# Patient Record
Sex: FEMALE | Race: WHITE | NOT HISPANIC OR LATINO | Employment: FULL TIME | URBAN - NONMETROPOLITAN AREA
[De-identification: names, ages, dates, MRNs, and addresses within clinical notes are randomized per-mention and may not be internally consistent; named-entity substitution may affect disease eponyms.]

---

## 2017-01-17 ENCOUNTER — MYC MEDICAL ADVICE (OUTPATIENT)
Dept: FAMILY MEDICINE | Facility: OTHER | Age: 42
End: 2017-01-17

## 2017-01-30 ENCOUNTER — OFFICE VISIT (OUTPATIENT)
Dept: FAMILY MEDICINE | Facility: OTHER | Age: 42
End: 2017-01-30
Payer: COMMERCIAL

## 2017-01-30 VITALS
WEIGHT: 207.7 LBS | RESPIRATION RATE: 20 BRPM | SYSTOLIC BLOOD PRESSURE: 122 MMHG | TEMPERATURE: 99.6 F | HEART RATE: 80 BPM | HEIGHT: 66 IN | BODY MASS INDEX: 33.38 KG/M2 | DIASTOLIC BLOOD PRESSURE: 80 MMHG

## 2017-01-30 DIAGNOSIS — J32.0 CHRONIC MAXILLARY SINUSITIS: Primary | ICD-10-CM

## 2017-01-30 PROCEDURE — 99213 OFFICE O/P EST LOW 20 MIN: CPT | Performed by: PHYSICIAN ASSISTANT

## 2017-01-30 RX ORDER — AZITHROMYCIN 250 MG/1
TABLET, FILM COATED ORAL
Qty: 6 TABLET | Refills: 1 | Status: SHIPPED | OUTPATIENT
Start: 2017-01-30 | End: 2017-03-14

## 2017-01-30 ASSESSMENT — PAIN SCALES - GENERAL: PAINLEVEL: MILD PAIN (3)

## 2017-01-30 NOTE — NURSING NOTE
"Chief Complaint   Patient presents with     Sinus Problem     2 weeks       Initial /80 mmHg  Pulse 80  Temp(Src) 99.6  F (37.6  C) (Oral)  Resp 20  Ht 5' 5.75\" (1.67 m)  Wt 207 lb 11.2 oz (94.212 kg)  BMI 33.78 kg/m2 Estimated body mass index is 33.78 kg/(m^2) as calculated from the following:    Height as of this encounter: 5' 5.75\" (1.67 m).    Weight as of this encounter: 207 lb 11.2 oz (94.212 kg).  BP completed using cuff size: dilip DE LA GARZA LPN      "

## 2017-01-30 NOTE — PROGRESS NOTES
SUBJECTIVE:                                                    Amelia Coker is a 42 year old female who presents to clinic today for the following health issues:      Acute Illness   Acute illness concerns: sinus problems  Onset: 2 weeks     Fever: no    Chills/Sweats: no    Headache (location?): YES    Sinus Pressure:YES- post-nasal drainage, facial pain, teeth pain and mucopurulent discharge    Conjunctivitis:  no    Ear Pain: no    Rhinorrhea: YES    Congestion: YES    Sore Throat: no     Cough: no    Wheeze: no    Decreased Appetite: no    Nausea: no    Vomiting: no    Diarrhea:  no    Dysuria/Freq.: no    Fatigue/Achiness: no    Sick/Strep Exposure: no     Therapies Tried and outcome:     Problem list and histories reviewed & adjusted, as indicated.  Additional history: as documented    Patient Active Problem List   Diagnosis     Intractable migraine     Bell's palsy     Contact dermatitis and other eczema, due to unspecified cause     Allergic rhinitis due to other allergen     Scalp lesion     Lyme disease     PAC (premature atrial contraction)     Palpitations     CARDIOVASCULAR SCREENING; LDL GOAL LESS THAN 160     Raynaud phenomenon     Cold intolerance of hand     Chronic fatigue     Hair loss     Pain in joint     Abnormal PFT     Shoulder joint instability     Family history of melanoma     MRSA (methicillin resistant staph aureus) culture positive     Dry eyes     Keratitis sicca     Laceration of foot, right     Skin ulcer of neck (H)     Family history of colon cancer     Pain in joint, upper arm     Traumatic amputation of fingertip     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     FLORIDALMA positive     Plantar fascial fibromatosis     Foraminal stenosis of lumbar region     DJD (degenerative joint disease), lumbar     Left shoulder pain     Migraine without aura and without status migrainosus, not intractable     Lumbar radiculopathy     Ds DNA antibody positive      Muscular wasting and disuse atrophy, not elsewhere classified     Other postprocedural status(V45.89)     Facial contusion     Frontal headache     Telangiectasia of face     Lateral epicondylitis     Right shoulder pain     Plantar fasciitis     HTN, goal below 140/90     Tooth pain     MRSA infection     Skin lesion     AD (atopic dermatitis)     Heat sensitivity     Rotator cuff arthropathy, right     Contusion of left foot, initial encounter     Gastroesophageal reflux disease, esophagitis presence not specified     Abnormal mammogram     Ringworm of body     Past Surgical History   Procedure Laterality Date     Hc removal of tonsils,<9 y/o       Tonsils <10y.o.     Hc tooth extraction w/forcep       Colonoscopy  3/11/2013     Procedure: COLONOSCOPY;  colonoscopy;  Surgeon: Lobito Mas MD;  Location: [facility]     Esophagoscopy, gastroscopy, duodenoscopy (egd), combined  11/8/2013     Procedure: COMBINED ESOPHAGOSCOPY, GASTROSCOPY, DUODENOSCOPY (EGD), BIOPSY SINGLE OR MULTIPLE;  Esophagoscopy, Gastroscopy, Duodenoscopy EGD with multiple biopsies;  Surgeon: Teja Koch MD;  Location: [facility]     Repair tendon elbow  7/9/2014     Procedure: REPAIR TENDON ELBOW;  Surgeon: Chris Johnston MD;  Location: [facility]     Decompression lumbar two levels Bilateral 12/8/2016     Procedure: DECOMPRESSION LUMBAR TWO LEVELS;  Surgeon: Bang Burrell MD;  Location: [facility]       Social History   Substance Use Topics     Smoking status: Never Smoker      Smokeless tobacco: Never Used     Alcohol Use: No      Comment: social     Family History   Problem Relation Age of Onset     DIABETES Mother      adult     Cancer - colorectal Mother      Hypertension Mother      Allergies Mother      CANCER Mother      colon     Depression Mother      GASTROINTESTINAL DISEASE Mother      ibs     Genitourinary Problems Mother      kidney cyst     Gynecology Mother      endometriosis     Lipids Mother      Thyroid Disease  "Mother      Arthritis Mother      RA     Allergies Father      Unknown/Adopted Father      HEART DISEASE Father      mi-age mid 40's     Cardiovascular Father      Lipids Father      Respiratory Father      asthma     Depression Sister      Allergies Sister      Gynecology Sister      endometriosis     HEART DISEASE Maternal Grandfather      MI,  - 60's     Lipids Paternal Grandmother      OSTEOPOROSIS Paternal Grandmother      Thyroid Disease Paternal Grandmother      Respiratory Son      asthma     Allergies Son      Arthritis Sister      Allergies Brother      HEART DISEASE Brother      Allergies Daughter            ROS:  Constitutional, HEENT, cardiovascular, pulmonary, gi and gu systems are negative, except as otherwise noted.    OBJECTIVE:                                                    /80 mmHg  Pulse 80  Temp(Src) 99.6  F (37.6  C) (Oral)  Resp 20  Ht 5' 5.75\" (1.67 m)  Wt 207 lb 11.2 oz (94.212 kg)  BMI 33.78 kg/m2  Body mass index is 33.78 kg/(m^2).  GENERAL: healthy, alert and no distress  HENT: normal cephalic/atraumatic, ear canals and TM's normal, nose and mouth without ulcers or lesions, rhinorrhea yellow, oropharynx clear and sinuses: maxillary tenderness on left  NECK: no adenopathy, no asymmetry, masses, or scars and trachea midline and normal to palpation  RESP: lungs clear to auscultation - no rales, rhonchi or wheezes  CV: regular rate and rhythm, normal S1 S2, no S3 or S4, no murmur, click or rub, no peripheral edema and peripheral pulses strong  ABDOMEN: soft, nontender, no hepatosplenomegaly, no masses and bowel sounds normal  MS: no gross musculoskeletal defects noted, no edema  NEURO: Normal strength and tone, mentation intact and speech normal  PSYCH: mentation appears normal, affect normal/bright         ASSESSMENT/PLAN:                                                    Amelia was seen today for sinus problem.    Diagnoses and all orders for this " visit:    Chronic maxillary sinusitis  -     azithromycin (ZITHROMAX) 250 MG tablet; Two tablets first day, then one tablet daily for four days.    JENNIFER Manuel PA-C  [facility]

## 2017-01-30 NOTE — PATIENT INSTRUCTIONS
Sinusitis          What is sinusitis?   Sinusitis is swollen, infected linings of the sinuses. The sinuses are hollow spaces in the bones of your face and skull. They connect with the nose through small openings. Like the nose, their linings make mucus.   How does it occur?   Sinusitis occurs when the sinus linings become infected. The passageways from the sinuses to the nose are very narrow. Swelling and mucus may block the passageways. This leads to pressure changes in the sinuses that can be painful.   A number of things can cause swelling and sinusitis. Most often it's allergens (things that cause allergies, like pollen and mold) and viruses, such as viruses that cause the common cold. Whether the cause is allergies or a virus, the sinus linings can swell. When swelling causes the sinus passageway to swell shut, bacteria, viruses, and even fungus can be trapped in the sinuses and cause a sinus infection.   If your nasal bones have been injured or are deformed, causing partial blockage of the sinus openings, you are more likely to get sinusitis.   What are the symptoms?   Symptoms include:   feeling of fullness or pressure in your head   a headache that is most painful when you first wake up in the morning or when you bend your head down or forward   pain above or below your eyes   aching in the upper jaw and teeth   runny or stuffy nose   cough, especially at night   fluid draining down the back of your throat (postnasal drainage)   sore throat in the morning or evening.   How is it diagnosed?   Your healthcare provider will ask about your symptoms and will examine you. You may have an X-ray to look for swelling, fluid, or small benign growths (polyps) in the sinuses.   How is it treated?   Decongestants may help. They may be nonprescription or prescription. They are available as liquids, pills, and nose sprays.   Your healthcare provider may prescribe an antibiotic. In some cases you may need to  take decongestants and antibiotics for several weeks.   You may need nonprescription medicine for pain, such as acetaminophen or ibuprofen. Check with your healthcare provider before you give any medicine that contains aspirin or salicylates to a child or teen. This includes medicines like baby aspirin, some cold medicines, and Pepto Bismol. Children and teens who take aspirin are at risk for a serious illness called Reye's syndrome. Ibuprofen is an NSAID. Nonsteroidal anti-inflammatory medicines (NSAIDs) may cause stomach bleeding and other problems. These risks increase with age. Read the label and take as directed. Unless recommended by your healthcare provider, do not take NSAIDs for more than 10 days for any reason.   If you have chronic or repeated sinus infections, allergies may be the cause. Your healthcare provider may prescribe antihistamine tablets or prescription nasal sprays (steroids or cromolyn) to treat the allergies.   If you have chronic, severe sinusitis that does not respond to treatment with medicines, surgery may be done. The surgeon can create an extra or enlarged passageway in the wall of the sinus cavity. This allows the sinuses to drain more easily through the nasal passages. This should help them stay free of infection.   How long will the effects last?   Symptoms may get better gradually over 3 to 10 days. Depending on what caused the sinusitis and how severe it is, it may last for days or weeks. The symptoms may come back if you do not finish all of your antibiotic.   How can I take care of myself?   Follow your healthcare provider's instructions.   If you are taking an antibiotic, take all of it as directed by your provider. If you stop taking the medicine when your symptoms are gone but before you have taken all of the medicine, symptoms may come back.   Avoid tobacco smoke.   If you have allergies, take care to avoid the things you are allergic to, such as animal dander.   Add  moisture to the air with a humidifier or a vaporizer, unless you have mold allergy (mold may grow in your vaporizer).   Inhale steam from a basin of hot water or shower to help open your sinuses and relieve pain.   Use saline nasal sprays to help wash out nasal passages and clear some mucus from the airways.   Use decongestants as directed on the label or by your provider.   If you are using a nonprescription nasal-spray decongestant, generally you should not use it for more than 3 days. After 3 days it may cause your symptoms to get worse. Ask your healthcare provider if it is OK for you to use a nasal spray decongestant longer than this.   Get plenty of rest.   Drink more fluids to keep the mucus as thin as possible so your sinuses can drain more easily.   Put warm compresses on painful areas.   Take antibiotics as prescribed. Use all of the medicine, even after you feel better. Some sinus infections require 2 to 4 weeks of antibiotic treatment.   See your healthcare provider if the pain lasts for several days or gets worse.   If the sinus areas above or below your eyes are swollen or bulging, see your healthcare provider right away. This symptom may mean that the infection is spreading. A spreading infection can affect other parts of your body--even the brain--and needs to be treated promptly.   How can I help prevent sinusitis?   Treat your colds and allergies promptly. Use decongestants as soon as you start having symptoms.   Do not smoke and stay away from secondhand smoke.   Drink lots of fluids to keep the mucus thin.   Humidify your home if the air is particularly dry.   If you have sinus infections often, consider having allergy tests.   If sinusitis continues to be a problem despite treatment, you might need an exam by an ear, nose, and throat doctor (called an ENT or otolaryngologist). The specialist will check for polyps or a deformed bone that may be blocking your sinuses.     Published by  [employer].  This content is reviewed periodically and is subject to change as new health information becomes available. The information is intended to inform and educate and is not a replacement for medical evaluation, advice, diagnosis or treatment by a healthcare professional.   Developed by [employer].   ? 2010 [employer] [facility] and/or its affiliates. All Rights Reserved.   Copyright   [employer] 2011  Adult [employer] Advisor

## 2017-01-30 NOTE — MR AVS SNAPSHOT
After Visit Summary   1/30/2017    Amelia Coker    MRN: [id number]           Patient Information     Date Of Birth          1975        Visit Information        Provider Department      1/30/2017 4:20 PM Otoniel Nelson PA-C [facility]        Today's Diagnoses     Chronic maxillary sinusitis    -  1       Care Instructions                 Sinusitis          What is sinusitis?   Sinusitis is swollen, infected linings of the sinuses. The sinuses are hollow spaces in the bones of your face and skull. They connect with the nose through small openings. Like the nose, their linings make mucus.   How does it occur?   Sinusitis occurs when the sinus linings become infected. The passageways from the sinuses to the nose are very narrow. Swelling and mucus may block the passageways. This leads to pressure changes in the sinuses that can be painful.   A number of things can cause swelling and sinusitis. Most often it's allergens (things that cause allergies, like pollen and mold) and viruses, such as viruses that cause the common cold. Whether the cause is allergies or a virus, the sinus linings can swell. When swelling causes the sinus passageway to swell shut, bacteria, viruses, and even fungus can be trapped in the sinuses and cause a sinus infection.   If your nasal bones have been injured or are deformed, causing partial blockage of the sinus openings, you are more likely to get sinusitis.   What are the symptoms?   Symptoms include:   feeling of fullness or pressure in your head   a headache that is most painful when you first wake up in the morning or when you bend your head down or forward   pain above or below your eyes   aching in the upper jaw and teeth   runny or stuffy nose   cough, especially at night   fluid draining down the back of your throat (postnasal drainage)   sore throat in the morning or evening.   How is it diagnosed?   Your healthcare provider will ask about your  symptoms and will examine you. You may have an X-ray to look for swelling, fluid, or small benign growths (polyps) in the sinuses.   How is it treated?   Decongestants may help. They may be nonprescription or prescription. They are available as liquids, pills, and nose sprays.   Your healthcare provider may prescribe an antibiotic. In some cases you may need to take decongestants and antibiotics for several weeks.   You may need nonprescription medicine for pain, such as acetaminophen or ibuprofen. Check with your healthcare provider before you give any medicine that contains aspirin or salicylates to a child or teen. This includes medicines like baby aspirin, some cold medicines, and Pepto Bismol. Children and teens who take aspirin are at risk for a serious illness called Reye's syndrome. Ibuprofen is an NSAID. Nonsteroidal anti-inflammatory medicines (NSAIDs) may cause stomach bleeding and other problems. These risks increase with age. Read the label and take as directed. Unless recommended by your healthcare provider, do not take NSAIDs for more than 10 days for any reason.   If you have chronic or repeated sinus infections, allergies may be the cause. Your healthcare provider may prescribe antihistamine tablets or prescription nasal sprays (steroids or cromolyn) to treat the allergies.   If you have chronic, severe sinusitis that does not respond to treatment with medicines, surgery may be done. The surgeon can create an extra or enlarged passageway in the wall of the sinus cavity. This allows the sinuses to drain more easily through the nasal passages. This should help them stay free of infection.   How long will the effects last?   Symptoms may get better gradually over 3 to 10 days. Depending on what caused the sinusitis and how severe it is, it may last for days or weeks. The symptoms may come back if you do not finish all of your antibiotic.   How can I take care of myself?   Follow your healthcare  provider's instructions.   If you are taking an antibiotic, take all of it as directed by your provider. If you stop taking the medicine when your symptoms are gone but before you have taken all of the medicine, symptoms may come back.   Avoid tobacco smoke.   If you have allergies, take care to avoid the things you are allergic to, such as animal dander.   Add moisture to the air with a humidifier or a vaporizer, unless you have mold allergy (mold may grow in your vaporizer).   Inhale steam from a basin of hot water or shower to help open your sinuses and relieve pain.   Use saline nasal sprays to help wash out nasal passages and clear some mucus from the airways.   Use decongestants as directed on the label or by your provider.   If you are using a nonprescription nasal-spray decongestant, generally you should not use it for more than 3 days. After 3 days it may cause your symptoms to get worse. Ask your healthcare provider if it is OK for you to use a nasal spray decongestant longer than this.   Get plenty of rest.   Drink more fluids to keep the mucus as thin as possible so your sinuses can drain more easily.   Put warm compresses on painful areas.   Take antibiotics as prescribed. Use all of the medicine, even after you feel better. Some sinus infections require 2 to 4 weeks of antibiotic treatment.   See your healthcare provider if the pain lasts for several days or gets worse.   If the sinus areas above or below your eyes are swollen or bulging, see your healthcare provider right away. This symptom may mean that the infection is spreading. A spreading infection can affect other parts of your body--even the brain--and needs to be treated promptly.   How can I help prevent sinusitis?   Treat your colds and allergies promptly. Use decongestants as soon as you start having symptoms.   Do not smoke and stay away from secondhand smoke.   Drink lots of fluids to keep the mucus thin.   Humidify your home if the air  is particularly dry.   If you have sinus infections often, consider having allergy tests.   If sinusitis continues to be a problem despite treatment, you might need an exam by an ear, nose, and throat doctor (called an ENT or otolaryngologist). The specialist will check for polyps or a deformed bone that may be blocking your sinuses.     Published by [employer].  This content is reviewed periodically and is subject to change as new health information becomes available. The information is intended to inform and educate and is not a replacement for medical evaluation, advice, diagnosis or treatment by a healthcare professional.   Developed by [employer].   ? 2010 [employer] and/or its affiliates. All Rights Reserved.   Copyright   Clinical Reference Systems 2011  Adult Health Advisor              Follow-ups after your visit        Who to contact     If you have questions or need follow up information about today's clinic visit or your schedule please contact [facility] directly at [phone].  Normal or non-critical lab and imaging results will be communicated to you by Robodromhart, letter or phone within 4 business days after the clinic has received the results. If you do not hear from us within 7 days, please contact the clinic through [place] or phone. If you have a critical or abnormal lab result, we will notify you by phone as soon as possible.  Submit refill requests through [employer] or call your pharmacy and they will forward the refill request to us. Please allow 3 business days for your refill to be completed.          Additional Information About Your Visit        [facility]odromhar[employer]a Information     [employer] gives you secure access to your electronic health record. If you see a primary care provider, you can also send messages to your care team and make appointments. If you have questions, please call your primary care clinic.  If you do not have a primary care provider, please call [phone] and they  "will assist you.        Care EveryWhere ID     This is your Care EveryWhere ID. This could be used by other organizations to access your [facility] medical records  [id number]        Your Vitals Were     Pulse Temperature Respirations Height BMI (Body Mass Index)       80 99.6  F (37.6  C) (Oral) 20 5' 5.75\" (1.67 m) 33.78 kg/m2        Blood Pressure from Last 3 Encounters:   01/30/17 122/80   12/09/16 112/71   12/01/16 124/60    Weight from Last 3 Encounters:   01/30/17 207 lb 11.2 oz (94.212 kg)   12/08/16 204 lb (92.534 kg)   12/01/16 204 lb 4.8 oz (92.67 kg)              Today, you had the following     No orders found for display         Today's Medication Changes          These changes are accurate as of: 1/30/17  4:29 PM.  If you have any questions, ask your nurse or doctor.               Start taking these medicines.        Dose/Directions    azithromycin 250 MG tablet   Commonly known as:  ZITHROMAX   Used for:  Chronic maxillary sinusitis   Started by:  Otoniel Nelson PA-C        Two tablets first day, then one tablet daily for four days.   Quantity:  6 tablet   Refills:  1            Where to get your medicines      These medications were sent to [facility] [place] [address]   [address] [facility] [address]     Phone:  [phone]    - azithromycin 250 MG tablet             Primary Care Provider Office Phone # Fax #    Otoniel Nelson PA-C [phone]       [facility] [address] [facility] [address]        Thank you!     Thank you for choosing [facility]  for your care. Our goal is always to provide you with excellent care. Hearing back from our patients is one way we can continue to improve our services. Please take a few minutes to complete the written survey that you may receive in the mail after your visit with us. Thank you!             Your Updated Medication List - Protect others around you: Learn how to safely use, store and throw " away your medicines at www.disposemymeds.org.          This list is accurate as of: 1/30/17  4:29 PM.  Always use your most recent med list.                   Brand Name Dispense Instructions for use    ALLEGRA 180 MG tablet   Generic drug:  fexofenadine      Take 1 tablet by mouth daily As needed       azithromycin 250 MG tablet    ZITHROMAX    6 tablet    Two tablets first day, then one tablet daily for four days.       FRESHKOTE 2.7-2 % Soln   Generic drug:  Polyvinyl Alcohol-Povidone      Apply  to eye.       hydrocortisone 0.2 % cream    WESTCORT    60 g    Apply sparingly to affected area three times daily as needed.       ibuprofen 800 MG tablet    ADVIL/MOTRIN    100 tablet    TAKE ONE TABLET BY MOUTH EVERY 8 HOURS AS NEEDED FOR PAIN       ketoconazole 2 % cream    NIZORAL    30 g    Apply topically 2 times daily       meloxicam 15 MG tablet    MOBIC    90 tablet    TAKE ONE TABLET BY MOUTH EVERY DAY       mupirocin 2 % ointment    BACTROBAN    22 g    Apply topically 3 times daily       nystatin-triamcinolone cream    MYCOLOG II    15 g    Apply topically 2 times daily       order for DME     1 Device    Dorsal (anterior) night splint, Size L/XL, with [facility] agreement signed by patient.       rizatriptan 5 MG ODT tab    MAXALT-MLT    18 tablet    Take 1-2 tablets (5-10 mg) by mouth at onset of headache for migraine May repeat dose in 2 hours.  Do not exceed 30 mg in 24 hours       sucralfate 1 GM tablet    CARAFATE    120 tablet    TAKE ONE TABLET BY MOUTH FOUR TIMES A DAY       SUMAtriptan 100 MG tablet    IMITREX    27 tablet    Take 1 tablet by mouth See Admin Instructions. WITH ONSET OF MIGRAINE, MAY REPEAT ONCE AFTER 2 HOURS. DO NOT EXCEED 2 TABLETS IN 24 HOURS.       SYSTANE OP      Apply  to eye. In between the fresh kote.       tiZANidine 4 MG tablet    ZANAFLEX    90 tablet    Take 1-2 tablets (4-8 mg) by mouth 3 times daily as needed for muscle spasms       TYLENOL PO      Take 500 mg by mouth  every 6 hours as needed

## 2017-02-16 ENCOUNTER — HOSPITAL ENCOUNTER (OUTPATIENT)
Dept: PHYSICAL THERAPY | Facility: CLINIC | Age: 42
Setting detail: THERAPIES SERIES
End: 2017-02-16
Attending: PHYSICIAN ASSISTANT
Payer: COMMERCIAL

## 2017-02-16 PROCEDURE — 97162 PT EVAL MOD COMPLEX 30 MIN: CPT | Mod: GP | Performed by: PHYSICAL THERAPIST

## 2017-02-16 PROCEDURE — 97110 THERAPEUTIC EXERCISES: CPT | Mod: GP | Performed by: PHYSICAL THERAPIST

## 2017-02-16 PROCEDURE — 40000718 ZZHC STATISTIC PT DEPARTMENT ORTHO VISIT: Performed by: PHYSICAL THERAPIST

## 2017-02-16 PROCEDURE — 97140 MANUAL THERAPY 1/> REGIONS: CPT | Mod: GP | Performed by: PHYSICAL THERAPIST

## 2017-02-16 NOTE — PROGRESS NOTES
" 02/16/17 0800   General Information   Type of Visit Initial OP Ortho PT Evaluation   Start of Care Date 02/16/17   Referring Physician Tucker RUDOLPH, Dr Burrell Surgeon   Patient/Family Goals Statement Sleep better without waking, better pain control   Orders Per Therapist Evaluation   Orders Comment Stretching, strengthening   Date of Order 01/19/17   Insurance Type [employer]   Insurance Comments/Visits Authorized 20   Medical Diagnosis LBP, s/p decompression 12/8/2016   Surgical/Medical history reviewed Yes   Precautions/Limitations no known precautions/limitations   Weight-Bearing Status - LUE weight-bearing as tolerated   Weight-Bearing Status - RUE weight-bearing as tolerated   Weight-Bearing Status - LLE weight-bearing as tolerated   Weight-Bearing Status - RLE weight-bearing as tolerated   Body Part(s)   Body Part(s) Lumbar Spine/SI   Presentation and Etiology   Pertinent history of current problem (include personal factors and/or comorbidities that impact the POC) Lumbar decompression 12/8/2016 2 level decompression with Dr Burrell. States she has low back symptoms starting 2011, always had issues; riding a horse and jerked into hyperextension which created more symptoms, and also more aggravated after doing home repair projects around then and stayed flared up since then. Since surgery; states she things were initially better with less of the spasms in the legs. In the past week, she has had return of \"curt horses\" in legs, especially with ankle DF. They are increase in frequency again than she had had since surgery. Laying on her back she couldn't move her legs much at all.  Relevant past medical history includes: Neck, headaches, TMJ, R shoulder dysfunction, history of falls (ice related). Relevant surgical history includes: This lumbar decompression and cyst removal, L foot Plantar fascia tendon release, Bone spur removal 2015, 2014 elbow repair, Tonsillectomy, 2 vaginal births. Aggravating " factors include: Turning to rotate in the car/backing up/turning,  laying down (stomach worst, sides, back are also hard), supported sitting with lumbar extension, standing with PPT,  sitting on the couch . Alleviating/Easing factors include: Keeping hip/pelvis straight/not rotating, Stretching, semi supported bent over on counter while watching TV, abducted/ER and flexing leg, shifting off to stand on R leg. Patient is self reporting their current level of function to be 70% compared to their baseline, states she just pushes through the pain.  Prior interventions include  injections pre operatively, pre op PT, and surgery. Hoping to avoid lumbar fusion. Can fall asleep but waking 4-6 times per night with with need for position shift and hard to get back comfortable.  Patient takes Myloxican and Tylenol medication for this on a daily basis.  Did do pre op PT which did not seem to help, including stretching, neural flossing, strengthening. 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  Able to alter positioning as she needs to, tending to change position. Sitting tolerance increasing 1 hour than has to get up and move, standing increased symptoms at 5 minutes but can continue to stand longer, Walking tolerance is about 1 mile, and hasn't pushed past it. Car rides are tough.  Goal for PT: Be able to sleep through night again   Symptom Location B low back L>R, L leg   How/Where did it occur With repetition/overuse;From insidious onset;From Degenerative Joint Disease   Pain rating comment Pain is 2-3/10 at best, 6/10  Recently at worst (9/10 post surgery), and 4/10 currently L>R low back and shoots up in pelvis crests size of sheet of paper, L>R leg to middle of arch posterior in leg, constant; variable intensity   Frequency of pain/symptoms A. Constant   Pain/symptoms are: (Worse with rest/lack of movement)   Pain/symptoms exacerbated by A. Sitting;B. Walking;C. Lifting;D.  Carrying;E. Rest;G. Certain positions;H. Overhead reach;I. Bending;J. ADL;K. Home tasks;L. Work tasks   Pain/symptoms eased by E. Changing positions;F. Certain positions   Pain eased by comment Hyperflexion of spine, L leg, Abd/ER FLexion of hip (tailors sit)   Progression of symptoms since onset: Other   Pain progression comment Was better from surgery but now some return of leg symptoms   Current / Previous Interventions   Diagnostic Tests: MRI   MRI Results Results   MRI results EMR reviewed MRI from 2015, unable to see most current one   Prior Level of Function   Functional Level Prior Comment Works through the pain   Current Level of Function   Patient role/employment history A. 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 Yes   Per patient - Fall with injury in past year? Yes   Timed Up and Go score (seconds) 11   Is patient a fall risk? No   Fall screen comments Fell  2 times on ice outside   System Outcome Measures   Outcome Measures Low Back Pain (see Oswestry and Jonah)   Lumbar Spine/SI Objective Findings   Pelvic Screen SI Squish stiff/hypomobile on L with ilium compression into sacrum on L   Hip Screen Hip ROM WNL. Demonstrates suggestion of overall hypermobility> Painful pubis with flexion/adduction of hip and lack of SI gapping with this on L   Lumbar/Hip/Knee/Foot Strength Comments B heel raise able, L toe raise hard with weight shift   Hamstring Flexibility WNL    Sensation Testing Intact, fluctuating parathesias, buzzing, nerve symptoms in L LE varying from L low back to L plantar foot   Observation Prefers to sit in extreme lumbar lordosis and more WB on R with IT lower than L and also R TATA lower.    Integumentary Scar from lumbar surgery in December   Posture Standing, prefers  to PF foot to make leg longer, shift weight to R side   Gait/Locomotion Reduces pelvic mobility in all planes with slight compensated lateral trunk sway/lean during stance L    Balance/Proprioception (Single Leg Stance) Able with more difficulty L   Lumbar ROM Comment Lumbar Range of motion: Standing Forward flexion with fingertips reaching palms flat on the floor and states this relieves her back pain and R leg pain. Early and increased rise of L sacrum/SL and return to stand she demonstrates hinging of lumbar spine prior to pelvis posterior rotation indicating reversal of expected lumbopelvis rhythm, Extension mildly limited in lower lumbar spine with compensated upper lumbar and thoracic hinging  So overall 25% limited with poor sacral mobility noted. R sidebend Full with poor sacral mobility.  L sidebend is 25% limited and tight down her whole L leg. Supine lying pain to foot, Hooklying recedes to knee, and abd/ER/flexion L hip relieves pain up to L hip.  HS length L with R knee extended is nearing excess but again relieves some of her symptoms. In leg.Abduction, flexion and ER of L hip over hooklying R knee relieves the foot pain up to the L hip and also relaxes the back. She has observed early and excess mobility of L innominate again on a relatively fixed sacrum. She does have the innominate positioning in outflare positioning L versus R. There is difficulty with L hip/pelvic complex movement observed in weightbearing suspect due to sacral mechanics.    Hip Flexion (L2) Strength 5   Hip Abduction Strength 4   Hip Adduction Strength 4 with pressure L pubis   Hip Extension Strength 4+   Knee Flexion Strength 5   Knee Extension (L3) Strength 5   Ankle Dorsiflexion (L4) Strength 5   Piriformis Flexibility WNL   Lumbar/SI Special Tests Comments She has good global mobility but some local hypomobility of sacrum altering lumbopelvic mechanics and lead to hypermobility obtained else where. She also uses stretching to  gain ROM via HS stretch, Weight FF stretch to floor to reduce symptoms but suspect she is stretching the neural container and ligamentous structure some . Poor ribcage expansion, lower thoraic mobility and diaphragm tight per palpation with reduced thoracic/ribcage on pelvis rotation leading again likely to increased lumbar rotation demands with things like backing her car up   Transversus Abdominus Strength (Florian Leg Lowering-deg) Core strength is fair with iso flexor hold, > 30 seconds, isolated TA test is fair with recruitment. Bent knee fall out is with biomechanical influences and may not be indicative of strength loss but of movement mechanics   Clinical Impression   Criteria for Skilled Therapeutic Interventions Met yes, treatment indicated   PT Diagnosis Reduced sacral mobility,SI and pelvic dysfunction with underlying suspect hypermobility and compensated movement patterns,    Influenced by the following impairments clinical exam, palpation, ROM,    Functional limitations due to impairments Sustained positions, push, pull , sleep disturbances, activity tolerance   Clinical Presentation Evolving/Changing   Clinical Presentation Rationale Cormorbidities, symptoms reported, exam findings   Clinical Decision Making (Complexity) Moderate complexity   Therapy Frequency 2 times/Week   Predicted Duration of Therapy Intervention (days/wks) 12 weeks   Risk & Benefits of therapy have been explained Yes   Patient, Family & other staff in agreement with plan of care Yes   Clinical Impression Comments Sacral mobilty and neural mobility (normal length but sensitivity noted in dural glide with sacrum position). Overall hypermobility, needing dural/neural work, sacral work and work on core/stabilization exercises next   Education Assessment   Preferred Learning Style Listening;Reading;Demonstration;Pictures/video   Barriers to Learning No barriers   Ortho Goal 1   Goal Identifier 1   Goal Description Patient will report  ability to sleep with  50% reduced waking/restlessness due to back pain in 8 weeks   Target Date 04/13/17   Ortho Goal 2   Goal Identifier 2   Goal Description Patient will report 50% reduced nerve pain in L leg and ability to maintain positions for 1 hour to improve tolerance to work and ADL activities in 8 weeks   Target Date 04/13/17   Ortho Goal 3   Goal Identifier 4   Goal Description Patient will note improved quality of life related to activiites evidenced on [place] <20% and Jonah < 4 in 12 weeks   Target Date 05/11/17   Total Evaluation Time   Total Evaluation Time 40

## 2017-02-20 ENCOUNTER — MYC MEDICAL ADVICE (OUTPATIENT)
Dept: FAMILY MEDICINE | Facility: OTHER | Age: 42
End: 2017-02-20

## 2017-02-23 ENCOUNTER — HOSPITAL ENCOUNTER (OUTPATIENT)
Dept: PHYSICAL THERAPY | Facility: CLINIC | Age: 42
Setting detail: THERAPIES SERIES
End: 2017-02-23
Attending: PHYSICIAN ASSISTANT
Payer: COMMERCIAL

## 2017-02-23 ENCOUNTER — MYC MEDICAL ADVICE (OUTPATIENT)
Dept: FAMILY MEDICINE | Facility: OTHER | Age: 42
End: 2017-02-23

## 2017-02-23 PROCEDURE — 40000718 ZZHC STATISTIC PT DEPARTMENT ORTHO VISIT: Performed by: PHYSICAL THERAPIST

## 2017-02-23 PROCEDURE — 97140 MANUAL THERAPY 1/> REGIONS: CPT | Mod: GP | Performed by: PHYSICAL THERAPIST

## 2017-02-27 ENCOUNTER — HOSPITAL ENCOUNTER (OUTPATIENT)
Dept: PHYSICAL THERAPY | Facility: CLINIC | Age: 42
Setting detail: THERAPIES SERIES
End: 2017-02-27
Attending: PHYSICIAN ASSISTANT
Payer: COMMERCIAL

## 2017-02-27 PROCEDURE — 97140 MANUAL THERAPY 1/> REGIONS: CPT | Mod: GP | Performed by: PHYSICAL THERAPIST

## 2017-02-27 PROCEDURE — 40000718 ZZHC STATISTIC PT DEPARTMENT ORTHO VISIT: Performed by: PHYSICAL THERAPIST

## 2017-03-06 ENCOUNTER — MYC MEDICAL ADVICE (OUTPATIENT)
Dept: FAMILY MEDICINE | Facility: OTHER | Age: 42
End: 2017-03-06

## 2017-03-10 ENCOUNTER — HOSPITAL ENCOUNTER (OUTPATIENT)
Dept: PHYSICAL THERAPY | Facility: CLINIC | Age: 42
Setting detail: THERAPIES SERIES
End: 2017-03-10
Attending: PHYSICIAN ASSISTANT
Payer: COMMERCIAL

## 2017-03-10 PROCEDURE — 40000718 ZZHC STATISTIC PT DEPARTMENT ORTHO VISIT: Performed by: PHYSICAL THERAPIST

## 2017-03-10 PROCEDURE — 97140 MANUAL THERAPY 1/> REGIONS: CPT | Mod: GP | Performed by: PHYSICAL THERAPIST

## 2017-03-13 ENCOUNTER — TELEPHONE (OUTPATIENT)
Dept: FAMILY MEDICINE | Facility: OTHER | Age: 42
End: 2017-03-13

## 2017-03-13 DIAGNOSIS — Z53.9 ERRONEOUS ENCOUNTER--DISREGARD: Primary | ICD-10-CM

## 2017-03-13 DIAGNOSIS — R92.8 ABNORMAL MAMMOGRAM: Primary | ICD-10-CM

## 2017-03-14 ENCOUNTER — OFFICE VISIT (OUTPATIENT)
Dept: FAMILY MEDICINE | Facility: OTHER | Age: 42
End: 2017-03-14
Payer: COMMERCIAL

## 2017-03-14 VITALS
WEIGHT: 207.8 LBS | SYSTOLIC BLOOD PRESSURE: 130 MMHG | HEART RATE: 76 BPM | RESPIRATION RATE: 20 BRPM | DIASTOLIC BLOOD PRESSURE: 80 MMHG | TEMPERATURE: 99.1 F | BODY MASS INDEX: 33.8 KG/M2

## 2017-03-14 DIAGNOSIS — K12.0 APHTHAE, ORAL: Primary | ICD-10-CM

## 2017-03-14 PROCEDURE — 99213 OFFICE O/P EST LOW 20 MIN: CPT | Performed by: PHYSICIAN ASSISTANT

## 2017-03-14 ASSESSMENT — PAIN SCALES - GENERAL: PAINLEVEL: MODERATE PAIN (4)

## 2017-03-14 NOTE — NURSING NOTE
"Chief Complaint   Patient presents with     Mass     lump in her mouth, x 1 week       Initial /80 (BP Location: Right arm, Patient Position: Chair, Cuff Size: Adult Large)  Pulse 76  Temp 99.1  F (37.3  C) (Oral)  Resp 20  Wt 207 lb 12.8 oz (94.3 kg)  BMI 33.8 kg/m2 Estimated body mass index is 33.8 kg/(m^2) as calculated from the following:    Height as of 1/30/17: 5' 5.75\" (1.67 m).    Weight as of this encounter: 207 lb 12.8 oz (94.3 kg).  Medication Reconciliation: complete       Shaina DE LA GARZA LPN      "

## 2017-03-14 NOTE — PROGRESS NOTES
SUBJECTIVE:                                                    Amelia Coekr is a 42 year old female who presents to clinic today for the following health issues:      Concern - lump in her mouth     Onset: Small cystic-type bump at the buccal mucosa on the right near the posterior molars.    Description:   As noted    Intensity: mild    Progression of Symptoms:  waxing and waning    Accompanying Signs & Symptoms:  Mild pain and discomfort       Previous history of similar problem:   Denies    Precipitating factors:   Worsened by: Catching it with her teeth    Alleviating factors:  Improved by: Salt on this area is helped a little bit       Therapies Tried and outcome:     Problem list and histories reviewed & adjusted, as indicated.  Additional history: as documented    Patient Active Problem List   Diagnosis     Intractable migraine     Bell's palsy     Contact dermatitis and other eczema, due to unspecified cause     Allergic rhinitis due to other allergen     Scalp lesion     Lyme disease     PAC (premature atrial contraction)     Palpitations     CARDIOVASCULAR SCREENING; LDL GOAL LESS THAN 160     Raynaud phenomenon     Cold intolerance of hand     Chronic fatigue     Hair loss     Pain in joint     Abnormal PFT     Shoulder joint instability     Family history of melanoma     MRSA (methicillin resistant staph aureus) culture positive     Dry eyes     Keratitis sicca     Laceration of foot, right     Skin ulcer of neck (H)     Family history of colon cancer     Pain in joint, upper arm     Traumatic amputation of fingertip     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     FLORIDALMA positive     Plantar fascial fibromatosis     Foraminal stenosis of lumbar region     DJD (degenerative joint disease), lumbar     Left shoulder pain     Migraine without aura and without status migrainosus, not intractable     Lumbar radiculopathy     Ds DNA antibody positive     Muscular wasting and disuse  atrophy, not elsewhere classified     Other postprocedural status(V45.89)     Facial contusion     Frontal headache     Telangiectasia of face     Lateral epicondylitis     Right shoulder pain     Plantar fasciitis     HTN, goal below 140/90     Tooth pain     MRSA infection     Skin lesion     AD (atopic dermatitis)     Heat sensitivity     Rotator cuff arthropathy, right     Contusion of left foot, initial encounter     Gastroesophageal reflux disease, esophagitis presence not specified     Abnormal mammogram     Ringworm of body     Past Surgical History   Procedure Laterality Date     Hc removal of tonsils,<9 y/o       Tonsils <10y.o.     Hc tooth extraction w/forcep       Colonoscopy  3/11/2013     Procedure: COLONOSCOPY;  colonoscopy;  Surgeon: Lobito Mas MD;  Location: [facility]     Esophagoscopy, gastroscopy, duodenoscopy (egd), combined  11/8/2013     Procedure: COMBINED ESOPHAGOSCOPY, GASTROSCOPY, DUODENOSCOPY (EGD), BIOPSY SINGLE OR MULTIPLE;  Esophagoscopy, Gastroscopy, Duodenoscopy EGD with multiple biopsies;  Surgeon: Teja Koch MD;  Location: [facility]     Repair tendon elbow  7/9/2014     Procedure: REPAIR TENDON ELBOW;  Surgeon: Chris Johnston MD;  Location: [facility]     Decompression lumbar two levels Bilateral 12/8/2016     Procedure: DECOMPRESSION LUMBAR TWO LEVELS;  Surgeon: Bang Burrell MD;  Location: [facility]       Social History   Substance Use Topics     Smoking status: Never Smoker     Smokeless tobacco: Never Used     Alcohol use No      Comment: social     Family History   Problem Relation Age of Onset     DIABETES Mother      adult     Cancer - colorectal Mother      Hypertension Mother      Allergies Mother      CANCER Mother      colon     Depression Mother      GASTROINTESTINAL DISEASE Mother      ibs     Genitourinary Problems Mother      kidney cyst     Gynecology Mother      endometriosis     Lipids Mother      Thyroid Disease Mother      Arthritis Mother       RA     HEART DISEASE Maternal Grandfather      MI,  - 60's     Allergies Father      Unknown/Adopted Father      HEART DISEASE Father      mi-age mid 40's     Cardiovascular Father      Lipids Father      Respiratory Father      asthma     CANCER Father      Other Cancer Father      renal cancer     Depression Sister      Allergies Sister      Gynecology Sister      endometriosis     Lipids Paternal Grandmother      OSTEOPOROSIS Paternal Grandmother      Thyroid Disease Paternal Grandmother      Respiratory Son      asthma     Allergies Son      Arthritis Sister      Allergies Brother      HEART DISEASE Brother      Allergies Daughter            Reviewed and updated as needed this visit by clinical staff  Tobacco  Allergies  Med Hx  Surg Hx  Fam Hx  Soc Hx      Reviewed and updated as needed this visit by Provider         ROS:  Constitutional, HEENT, cardiovascular, pulmonary, gi and gu systems are negative, except as otherwise noted.    OBJECTIVE:                                                    /80 (BP Location: Right arm, Patient Position: Chair, Cuff Size: Adult Large)  Pulse 76  Temp 99.1  F (37.3  C) (Oral)  Resp 20  Wt 207 lb 12.8 oz (94.3 kg)  BMI 33.8 kg/m2  Body mass index is 33.8 kg/(m^2).  GENERAL: healthy, alert and no distress  HENT: normal cephalic/atraumatic, ear canals and TM's normal, nose and mouth without ulcers or lesions, oropharynx clear, oral mucous membranes moist and what appears to be a single oral aphthae-type lesion to the right buccal mucosa is noted upon exam.  NECK: no adenopathy, no asymmetry, masses, or scars and trachea midline and normal to palpation  RESP: lungs clear to auscultation - no rales, rhonchi or wheezes  CV: regular rate and rhythm, normal S1 S2, no S3 or S4, no murmur, click or rub, no peripheral edema and peripheral pulses strong  MS: no gross musculoskeletal defects noted, no edema    Diagnostic Test Results:  No results found for this or any  previous visit (from the past 24 hour(s)).     ASSESSMENT/PLAN:                                                    1. Aphthae, oral  As noted above advised warm hot water salt gargles 3-4 times per day and observe carefully. If any further concerns for infection and noted we will consider Zithromax to treat this. Return office visit for follow-up as needed.  Otoniel Manuel PA-C  [facility]

## 2017-03-14 NOTE — MR AVS SNAPSHOT
After Visit Summary   3/14/2017    Amelia Coker    MRN: [id number]           Patient Information     Date Of Birth          1975        Visit Information        Provider Department      3/14/2017 2:40 PM Otoniel Nelson PA-C [facility]        Today's Diagnoses     Aphthae, oral    -  1       Follow-ups after your visit        Your next 10 appointments already scheduled     Mar 15, 2017  7:30 AM CDT   Treatment 45 with Stacie Beltran PT   [facility] ([facility])    [address] [place] [address] Dr Cheng [place] [address] [phone]            Mar 15, 2017 10:00 AM CDT   MA DIAGNOSTIC DIGITAL RIGHT with PHMA1, PH RAD   [facility] ([facility])    [address] [place], [place] [address] [place] [address] [phone]           Do not use any powder, lotion or deodorant under your arms or on your breast. If you do, we will ask you to remove it before your exam.  Wear comfortable, two-piece clothing.  If you have any allergies, tell your care team.  Bring any previous mammograms from other facilities or have them mailed to the breast center.            Mar 17, 2017  7:30 AM CDT   Treatment 45 with Stacie Beltran PT   [facility] ([facility])    [address] [place] [address] Dr Cheng [place] [address]   [phone]            Mar 21, 2017  7:30 AM CDT   Treatment 45 with Stacie Beltran PT   [facility] ([facility])    [address] Cheng [place] [address]   [phone]            Mar 24, 2017  7:30 AM CDT   Treatment 45 with Stacie Beltran PT   [facility] ([facility])    [address] Cheng [place] [address]   [phone]            Mar 30, 2017  8:30 AM CDT   Treatment 45 with Stacie Beltran PT   [facility] ([facility])    [address] Cheng [place] [address]    [phone]            Apr 03, 2017  3:45 PM CDT   Treatment 45 with Stacie Beltran, PT   [facility] ([facility])    [address] Cheng [place] [id number] [phone]            Apr 07, 2017  7:30 AM CDT   Treatment 45 with Stacie Beltran, PT   [facility] ([facility])    [address] Cheng [place] [id number] [phone]              Future tests that were ordered for you today     Open Future Orders        Priority Expected Expires Ordered    MA Diagnostic Digital Right Routine  3/13/2018 3/13/2017            Who to contact     If you have questions or need follow up information about today's clinic visit or your schedule please contact [facility] directly at [phone].  Normal or non-critical lab and imaging results will be communicated to you by [employer], letter or phone within 4 business days after the clinic has received the results. If you do not hear from us within 7 days, please contact the clinic through [employer] or phone. If you have a critical or abnormal lab result, we will notify you by phone as soon as possible.  Submit refill requests through [employer] or call your pharmacy and they will forward the refill request to us. Please allow 3 business days for your refill to be completed.          Additional Information About Your Visit        [employer] Information     [employer] gives you secure access to your electronic health record. If you see a primary care provider, you can also send messages to your care team and make appointments. If you have questions, please call your primary care clinic.  If you do not have a primary care provider, please call [phone] and they will assist you.        Care EveryWhere ID     This is your Care EveryWhere ID. This could be used by other organizations to access your [facility] medical records  [id number]        Your Vitals Were     Pulse Temperature  Respirations BMI (Body Mass Index)          76 99.1  F (37.3  C) (Oral) 20 33.8 kg/m2         Blood Pressure from Last 3 Encounters:   03/14/17 130/80   01/30/17 122/80   12/09/16 112/71    Weight from Last 3 Encounters:   03/14/17 207 lb 12.8 oz (94.3 kg)   01/30/17 207 lb 11.2 oz (94.2 kg)   12/08/16 204 lb (92.5 kg)              Today, you had the following     No orders found for display       Primary Care Provider Office Phone # Fax #    Otoniel Nelson PA-C [phone]       [facility] [address] [facility] [address]        Thank you!     Thank you for choosing [facility]  for your care. Our goal is always to provide you with excellent care. Hearing back from our patients is one way we can continue to improve our services. Please take a few minutes to complete the written survey that you may receive in the mail after your visit with us. Thank you!             Your Updated Medication List - Protect others around you: Learn how to safely use, store and throw away your medicines at www.disposemymeds.org.          This list is accurate as of: 3/14/17  2:59 PM.  Always use your most recent med list.                   Brand Name Dispense Instructions for use    ALLEGRA 180 MG tablet   Generic drug:  fexofenadine      Take 1 tablet by mouth daily Reported on 3/14/2017       FRESHKOTE 2.7-2 % Soln   Generic drug:  Polyvinyl Alcohol-Povidone      Apply  to eye.       hydrocortisone 0.2 % cream    WESTCORT    60 g    Apply sparingly to affected area three times daily as needed.       ibuprofen 800 MG tablet    ADVIL/MOTRIN    100 tablet    TAKE ONE TABLET BY MOUTH EVERY 8 HOURS AS NEEDED FOR PAIN       ketoconazole 2 % cream    NIZORAL    30 g    Apply topically 2 times daily       meloxicam 15 MG tablet    MOBIC    90 tablet    TAKE ONE TABLET BY MOUTH EVERY DAY       mupirocin 2 % ointment    BACTROBAN    22 g    Apply topically 3 times daily       nystatin-triamcinolone cream     MYCOLOG II    15 g    Apply topically 2 times daily       order for DME     1 Device    Reported on 3/14/2017       rizatriptan 5 MG ODT tab    MAXALT-MLT    18 tablet    Take 1-2 tablets (5-10 mg) by mouth at onset of headache for migraine May repeat dose in 2 hours.  Do not exceed 30 mg in 24 hours       sucralfate 1 GM tablet    CARAFATE    120 tablet    TAKE ONE TABLET BY MOUTH FOUR TIMES A DAY       SUMAtriptan 100 MG tablet    IMITREX    27 tablet    Take 1 tablet by mouth See Admin Instructions. WITH ONSET OF MIGRAINE, MAY REPEAT ONCE AFTER 2 HOURS. DO NOT EXCEED 2 TABLETS IN 24 HOURS.       SYSTANE OP      Reported on 3/14/2017       tiZANidine 4 MG tablet    ZANAFLEX    90 tablet    Take 1-2 tablets (4-8 mg) by mouth 3 times daily as needed for muscle spasms       TYLENOL PO      Take 500 mg by mouth every 6 hours as needed

## 2017-03-15 ENCOUNTER — HOSPITAL ENCOUNTER (OUTPATIENT)
Dept: PHYSICAL THERAPY | Facility: CLINIC | Age: 42
Setting detail: THERAPIES SERIES
End: 2017-03-15
Attending: PHYSICIAN ASSISTANT
Payer: COMMERCIAL

## 2017-03-15 ENCOUNTER — TELEPHONE (OUTPATIENT)
Dept: FAMILY MEDICINE | Facility: OTHER | Age: 42
End: 2017-03-15

## 2017-03-15 ENCOUNTER — HOSPITAL ENCOUNTER (OUTPATIENT)
Dept: MAMMOGRAPHY | Facility: CLINIC | Age: 42
Discharge: HOME | End: 2017-03-15
Attending: PHYSICIAN ASSISTANT | Admitting: PHYSICIAN ASSISTANT
Payer: COMMERCIAL

## 2017-03-15 DIAGNOSIS — R92.8 ABNORMAL MAMMOGRAM: Primary | ICD-10-CM

## 2017-03-15 DIAGNOSIS — R92.8 ABNORMAL MAMMOGRAM: ICD-10-CM

## 2017-03-15 PROCEDURE — G0206 DX MAMMO INCL CAD UNI: HCPCS | Mod: RT

## 2017-03-15 PROCEDURE — 40000718 ZZHC STATISTIC PT DEPARTMENT ORTHO VISIT: Performed by: PHYSICAL THERAPIST

## 2017-03-15 PROCEDURE — 97140 MANUAL THERAPY 1/> REGIONS: CPT | Mod: GP | Performed by: PHYSICAL THERAPIST

## 2017-03-15 PROCEDURE — 97110 THERAPEUTIC EXERCISES: CPT | Mod: GP | Performed by: PHYSICAL THERAPIST

## 2017-03-15 NOTE — TELEPHONE ENCOUNTER
Spoke with [facility] regarding MRI, they will call patient to schedule.    Bhavya Lewis XRO/

## 2017-03-17 ENCOUNTER — HOSPITAL ENCOUNTER (OUTPATIENT)
Dept: PHYSICAL THERAPY | Facility: CLINIC | Age: 42
Setting detail: THERAPIES SERIES
End: 2017-03-17
Attending: PHYSICIAN ASSISTANT
Payer: COMMERCIAL

## 2017-03-17 PROCEDURE — 97140 MANUAL THERAPY 1/> REGIONS: CPT | Mod: GP | Performed by: PHYSICAL THERAPIST

## 2017-03-17 PROCEDURE — 40000718 ZZHC STATISTIC PT DEPARTMENT ORTHO VISIT: Performed by: PHYSICAL THERAPIST

## 2017-03-21 ENCOUNTER — HOSPITAL ENCOUNTER (OUTPATIENT)
Dept: PHYSICAL THERAPY | Facility: CLINIC | Age: 42
Setting detail: THERAPIES SERIES
End: 2017-03-21
Attending: PHYSICIAN ASSISTANT
Payer: COMMERCIAL

## 2017-03-21 PROCEDURE — 40000718 ZZHC STATISTIC PT DEPARTMENT ORTHO VISIT: Performed by: PHYSICAL THERAPIST

## 2017-03-21 PROCEDURE — 97140 MANUAL THERAPY 1/> REGIONS: CPT | Mod: GP | Performed by: PHYSICAL THERAPIST

## 2017-03-23 ENCOUNTER — RADIANT APPOINTMENT (OUTPATIENT)
Dept: MRI IMAGING | Facility: CLINIC | Age: 42
End: 2017-03-23
Attending: PHYSICIAN ASSISTANT
Payer: COMMERCIAL

## 2017-03-23 DIAGNOSIS — R92.8 ABNORMAL MAMMOGRAM: ICD-10-CM

## 2017-03-23 PROCEDURE — A9585 GADOBUTROL INJECTION: HCPCS | Mod: JW | Performed by: RADIOLOGY

## 2017-03-23 PROCEDURE — 0159T MR BREAST RIGHT W/O & W CONTRAST: CPT | Performed by: RADIOLOGY

## 2017-03-23 PROCEDURE — 77058 MR BREAST RIGHT W/O & W CONTRAST: CPT | Mod: RT | Performed by: RADIOLOGY

## 2017-03-23 RX ORDER — GADOBUTROL 604.72 MG/ML
10 INJECTION INTRAVENOUS ONCE
Status: COMPLETED | OUTPATIENT
Start: 2017-03-23 | End: 2017-03-23

## 2017-03-23 RX ADMIN — GADOBUTROL 9.5 ML: 604.72 INJECTION INTRAVENOUS at 10:27

## 2017-03-24 ENCOUNTER — HOSPITAL ENCOUNTER (OUTPATIENT)
Dept: PHYSICAL THERAPY | Facility: CLINIC | Age: 42
Setting detail: THERAPIES SERIES
End: 2017-03-24
Attending: PHYSICIAN ASSISTANT
Payer: COMMERCIAL

## 2017-03-24 PROCEDURE — 40000718 ZZHC STATISTIC PT DEPARTMENT ORTHO VISIT: Performed by: PHYSICAL THERAPIST

## 2017-03-24 PROCEDURE — 97140 MANUAL THERAPY 1/> REGIONS: CPT | Mod: GP | Performed by: PHYSICAL THERAPIST

## 2017-03-30 ENCOUNTER — HOSPITAL ENCOUNTER (OUTPATIENT)
Dept: PHYSICAL THERAPY | Facility: CLINIC | Age: 42
Setting detail: THERAPIES SERIES
End: 2017-03-30
Attending: PHYSICIAN ASSISTANT
Payer: COMMERCIAL

## 2017-03-30 PROCEDURE — 97140 MANUAL THERAPY 1/> REGIONS: CPT | Mod: GP | Performed by: PHYSICAL THERAPIST

## 2017-03-30 PROCEDURE — 40000718 ZZHC STATISTIC PT DEPARTMENT ORTHO VISIT: Performed by: PHYSICAL THERAPIST

## 2017-04-03 ENCOUNTER — HOSPITAL ENCOUNTER (OUTPATIENT)
Dept: PHYSICAL THERAPY | Facility: CLINIC | Age: 42
Setting detail: THERAPIES SERIES
End: 2017-04-03
Attending: PHYSICIAN ASSISTANT
Payer: COMMERCIAL

## 2017-04-03 PROCEDURE — 97140 MANUAL THERAPY 1/> REGIONS: CPT | Mod: GP | Performed by: PHYSICAL THERAPIST

## 2017-04-03 PROCEDURE — 40000718 ZZHC STATISTIC PT DEPARTMENT ORTHO VISIT: Performed by: PHYSICAL THERAPIST

## 2017-04-04 ENCOUNTER — MYC MEDICAL ADVICE (OUTPATIENT)
Dept: FAMILY MEDICINE | Facility: OTHER | Age: 42
End: 2017-04-04

## 2017-04-04 NOTE — TELEPHONE ENCOUNTER
Called patient and she has been scheduled to come in to be seen tomorrow, 4/5/17.    Barbara DE LA GARZA

## 2017-04-05 ENCOUNTER — OFFICE VISIT (OUTPATIENT)
Dept: FAMILY MEDICINE | Facility: OTHER | Age: 42
End: 2017-04-05
Payer: COMMERCIAL

## 2017-04-05 VITALS
RESPIRATION RATE: 16 BRPM | DIASTOLIC BLOOD PRESSURE: 80 MMHG | SYSTOLIC BLOOD PRESSURE: 136 MMHG | HEART RATE: 76 BPM | WEIGHT: 209.1 LBS | TEMPERATURE: 98.9 F | BODY MASS INDEX: 34.01 KG/M2
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
## 2017-04-05 DIAGNOSIS — M79.12 STERNOCLEIDOMASTOID MUSCLE TENDERNESS: ICD-10-CM

## 2017-04-05 DIAGNOSIS — R07.89 CHEST WALL PAIN: ICD-10-CM

## 2017-04-05 DIAGNOSIS — S16.1XXA ACUTE CERVICAL MYOFASCIAL STRAIN, INITIAL ENCOUNTER: Primary | ICD-10-CM

## 2017-04-05 PROCEDURE — 99213 OFFICE O/P EST LOW 20 MIN: CPT | Performed by: PHYSICIAN ASSISTANT

## 2017-04-05 RX ORDER — METHYLPREDNISOLONE 4 MG
TABLET, DOSE PACK ORAL
Qty: 21 TABLET | Refills: 0 | Status: SHIPPED | OUTPATIENT
Start: 2017-04-05 | End: 2017-08-30

## 2017-04-05 ASSESSMENT — ANXIETY QUESTIONNAIRES
7. FEELING AFRAID AS IF SOMETHING AWFUL MIGHT HAPPEN: NOT AT ALL
1. FEELING NERVOUS, ANXIOUS, OR ON EDGE: NOT AT ALL
5. BEING SO RESTLESS THAT IT IS HARD TO SIT STILL: NOT AT ALL
2. NOT BEING ABLE TO STOP OR CONTROL WORRYING: NOT AT ALL
3. WORRYING TOO MUCH ABOUT DIFFERENT THINGS: NOT AT ALL
GAD7 TOTAL SCORE: 0
6. BECOMING EASILY ANNOYED OR IRRITABLE: NOT AT ALL

## 2017-04-05 ASSESSMENT — PAIN SCALES - GENERAL: PAINLEVEL: SEVERE PAIN (7)

## 2017-04-05 ASSESSMENT — PATIENT HEALTH QUESTIONNAIRE - PHQ9: 5. POOR APPETITE OR OVEREATING: NOT AT ALL

## 2017-04-05 NOTE — MR AVS SNAPSHOT
After Visit Summary   4/5/2017    Amelia Coker    MRN: [id number]           Patient Information     Date Of Birth          1975        Visit Information        Provider Department      4/5/2017 3:40 PM Otoniel Nelson PA-C [facility]        Today's Diagnoses     Acute cervical myofascial strain, initial encounter    -  1    Sternocleidomastoid muscle tenderness        Fall from horse, initial encounter        Chest wall pain           Follow-ups after your visit        Your next 10 appointments already scheduled     Apr 07, 2017  7:30 AM CDT   Treatment 45 with Stacie Blankenship, PT   [facility] ([facility])    [address] [place] [address] Dr Cheng [place] [address]   [phone]            Apr 11, 2017  7:30 AM CDT   Treatment 45 with Stacie Blankenship, PT   [facility] ([facility])    [address] [place] [address] Dr Cheng [place] [address]   [phone]            Apr 14, 2017  7:30 AM CDT   Treatment 45 with Stacie Blankenship, PT   [facility] ([facility])    [address] [place] [address] Dr Cheng [place] [address]   [phone]            Apr 18, 2017  7:30 AM CDT   Treatment 45 with Stacie Blankenship, PT   [facility] ([facility])    [address] Cheng [place] [address]   [phone]            Apr 20, 2017  7:30 AM CDT   Treatment 45 with Stacie Blankenship PT   [facility] ([facility])    [address] [place] [address] Dr Cheng [place] [address]   [phone]              Who to contact     If you have questions or need follow up information about today's clinic visit or your schedule please contact [facility] directly at [phone].  Normal or non-critical lab and imaging results will be communicated to you by MyChart, letter or phone within 4 business days after the clinic has received the results. If you do not hear from us within 7 days,  please contact the clinic through [employer] or phone. If you have a critical or abnormal lab result, we will notify you by phone as soon as possible.  Submit refill requests through [employer] or call your pharmacy and they will forward the refill request to us. Please allow 3 business days for your refill to be completed.          Additional Information About Your Visit        [employer] Information     [employer] gives you secure access to your electronic health record. If you see a primary care provider, you can also send messages to your care team and make appointments. If you have questions, please call your primary care clinic.  If you do not have a primary care provider, please call [phone] and they will assist you.        Care EveryWhere ID     This is your Care EveryWhere ID. This could be used by other organizations to access your [facility] medical records  [id number]        Your Vitals Were     Pulse Temperature Respirations BMI (Body Mass Index)          76 98.9  F (37.2  C) (Oral) 16 34.01 kg/m2         Blood Pressure from Last 3 Encounters:   04/05/17 136/80   03/14/17 130/80   01/30/17 122/80    Weight from Last 3 Encounters:   04/05/17 209 lb 1.6 oz (94.8 kg)   03/14/17 207 lb 12.8 oz (94.3 kg)   01/30/17 207 lb 11.2 oz (94.2 kg)              Today, you had the following     No orders found for display         Today's Medication Changes          These changes are accurate as of: 4/5/17  4:10 PM.  If you have any questions, ask your nurse or doctor.               Start taking these medicines.        Dose/Directions    methylPREDNISolone 4 MG tablet   Commonly known as:  MEDROL DOSEPAK   Used for:  Fall from horse, initial encounter, Sternocleidomastoid muscle tenderness, Acute cervical myofascial strain, initial encounter   Started by:  Otoniel Nelson PA-C        Follow package instructions   Quantity:  21 tablet   Refills:  0            Where to get your medicines      These medications were sent to  [facility] [place], [place] - [address]  [address] , [facility] [address]     Phone:  [phone]     methylPREDNISolone 4 MG tablet                Primary Care Provider Office Phone # Fax #    Otoniel Nelson PA-C [phone]       [facility] [address]  [facility] [address]        Thank you!     Thank you for choosing [facility]  for your care. Our goal is always to provide you with excellent care. Hearing back from our patients is one way we can continue to improve our services. Please take a few minutes to complete the written survey that you may receive in the mail after your visit with us. Thank you!             Your Updated Medication List - Protect others around you: Learn how to safely use, store and throw away your medicines at www.disposemymeds.org.          This list is accurate as of: 4/5/17  4:10 PM.  Always use your most recent med list.                   Brand Name Dispense Instructions for use    ALLEGRA 180 MG tablet   Generic drug:  fexofenadine      Take 1 tablet by mouth daily Reported on 4/5/2017       FRESHKOTE 2.7-2 % Soln   Generic drug:  Polyvinyl Alcohol-Povidone      Apply  to eye.       hydrocortisone 0.2 % cream    WESTCORT    60 g    Apply sparingly to affected area three times daily as needed.       ibuprofen 800 MG tablet    ADVIL/MOTRIN    100 tablet    TAKE ONE TABLET BY MOUTH EVERY 8 HOURS AS NEEDED FOR PAIN       ketoconazole 2 % cream    NIZORAL    30 g    Apply topically 2 times daily       meloxicam 15 MG tablet    MOBIC    90 tablet    TAKE ONE TABLET BY MOUTH EVERY DAY       methylPREDNISolone 4 MG tablet    MEDROL DOSEPAK    21 tablet    Follow package instructions       mupirocin 2 % ointment    BACTROBAN    22 g    Apply topically 3 times daily       nystatin-triamcinolone cream    MYCOLOG II    15 g    Apply topically 2 times daily       order for DME     1 Device    Reported on 4/5/2017       rizatriptan 5 MG ODT tab     MAXALT-MLT    18 tablet    Take 1-2 tablets (5-10 mg) by mouth at onset of headache for migraine May repeat dose in 2 hours.  Do not exceed 30 mg in 24 hours       sucralfate 1 GM tablet    CARAFATE    120 tablet    TAKE ONE TABLET BY MOUTH FOUR TIMES A DAY       SUMAtriptan 100 MG tablet    IMITREX    27 tablet    Take 1 tablet by mouth See Admin Instructions. WITH ONSET OF MIGRAINE, MAY REPEAT ONCE AFTER 2 HOURS. DO NOT EXCEED 2 TABLETS IN 24 HOURS.       SYSTANE OP      Reported on 3/14/2017       tiZANidine 4 MG tablet    ZANAFLEX    90 tablet    Take 1-2 tablets (4-8 mg) by mouth 3 times daily as needed for muscle spasms       TYLENOL PO      Take 500 mg by mouth every 6 hours as needed

## 2017-04-05 NOTE — PROGRESS NOTES
SUBJECTIVE:                                                    Amelia Coker is a 42 year old female who presents to clinic today for the following health issues:      Concern - fell off her horse      Onset: 4/1/2017    Description:   Patient was riding horse bareback near her home when her horse became skittish and move rapidly from underneath her causing her to fall off landing heavily on the right buttock and to the flat of her back. She has left-sided neck pain right-sided buttock pain right-sided chest wall pain and muscle aches and pain globally. No new numbness and tingling has been noted. Still has had some problems with lumbar radiculopathy down the left leg but this is chronic in nature and not changed from previous visits.    Intensity: moderate    Progression of Symptoms:  waxing and waning    Accompanying Signs & Symptoms:  Pain and discomfort with movement. We joke around about her needing bubble wrap and pain with laughter is noted as well. She is able to take a deep breath if she does so slowly.       Previous history of similar problem:   She has not fallen off a horse in quite some time but she has had elbow pain for quite a while.    Precipitating factors:   Worsened by: Movement    Alleviating factors:  Improved by: Breast pain control muscle relaxants       Therapies Tried and outcome:     Problem list and histories reviewed & adjusted, as indicated.  Additional history: as documented    Patient Active Problem List   Diagnosis     Intractable migraine     Bell's palsy     Contact dermatitis and other eczema, due to unspecified cause     Allergic rhinitis due to other allergen     Scalp lesion     Lyme disease     PAC (premature atrial contraction)     Palpitations     CARDIOVASCULAR SCREENING; LDL GOAL LESS THAN 160     Raynaud phenomenon     Cold intolerance of hand     Chronic fatigue     Hair loss     Pain in joint     Abnormal PFT     Shoulder joint instability     Family history of  melanoma     MRSA (methicillin resistant staph aureus) culture positive     Dry eyes     Keratitis sicca     Laceration of foot, right     Skin ulcer of neck (H)     Family history of colon cancer     Pain in joint, upper arm     Traumatic amputation of fingertip     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     FLORIDALMA positive     Plantar fascial fibromatosis     Foraminal stenosis of lumbar region     DJD (degenerative joint disease), lumbar     Left shoulder pain     Migraine without aura and without status migrainosus, not intractable     Lumbar radiculopathy     Ds DNA antibody positive     Muscular wasting and disuse atrophy, not elsewhere classified     Other postprocedural status(V45.89)     Facial contusion     Frontal headache     Telangiectasia of face     Lateral epicondylitis     Right shoulder pain     Plantar fasciitis     HTN, goal below 140/90     Tooth pain     MRSA infection     Skin lesion     AD (atopic dermatitis)     Heat sensitivity     Rotator cuff arthropathy, right     Contusion of left foot, initial encounter     Gastroesophageal reflux disease, esophagitis presence not specified     Abnormal mammogram     Ringworm of body     Fall from horse, initial encounter     Sternocleidomastoid muscle tenderness     Acute cervical myofascial strain, initial encounter     Past Surgical History:   Procedure Laterality Date     COLONOSCOPY  3/11/2013    Procedure: COLONOSCOPY;  colonoscopy;  Surgeon: Lobito Mas MD;  Location:  GI     DECOMPRESSION LUMBAR TWO LEVELS Bilateral 12/8/2016    Procedure: DECOMPRESSION LUMBAR TWO LEVELS;  Surgeon: Bang Burrell MD;  Location:  OR     ESOPHAGOSCOPY, GASTROSCOPY, DUODENOSCOPY (EGD), COMBINED  11/8/2013    Procedure: COMBINED ESOPHAGOSCOPY, GASTROSCOPY, DUODENOSCOPY (EGD), BIOPSY SINGLE OR MULTIPLE;  Esophagoscopy, Gastroscopy, Duodenoscopy EGD with multiple biopsies;  Surgeon: Teja Koch MD;  Location:   GI     HC REMOVAL OF TONSILS,<11 Y/O      Tonsils <10y.o.     HC TOOTH EXTRACTION W/FORCEP       REPAIR TENDON ELBOW  2014    Procedure: REPAIR TENDON ELBOW;  Surgeon: Chris Johnston MD;  Location: [facility]       Social History   Substance Use Topics     Smoking status: Never Smoker     Smokeless tobacco: Never Used     Alcohol use No      Comment: social     Family History   Problem Relation Age of Onset     DIABETES Mother      adult     Cancer - colorectal Mother      Hypertension Mother      Allergies Mother      CANCER Mother      colon     Depression Mother      GASTROINTESTINAL DISEASE Mother      ibs     Genitourinary Problems Mother      kidney cyst     Gynecology Mother      endometriosis     Lipids Mother      Thyroid Disease Mother      Arthritis Mother      RA     HEART DISEASE Maternal Grandfather      MI,  - 60's     Allergies Father      Unknown/Adopted Father      HEART DISEASE Father      mi-age mid 40's     Cardiovascular Father      Lipids Father      Respiratory Father      asthma     CANCER Father      Other Cancer Father      renal cancer     Depression Sister      Allergies Sister      Gynecology Sister      endometriosis     Lipids Paternal Grandmother      OSTEOPOROSIS Paternal Grandmother      Thyroid Disease Paternal Grandmother      Respiratory Son      asthma     Allergies Son      Arthritis Sister      Allergies Brother      HEART DISEASE Brother      Allergies Daughter      Blood Disease Paternal Aunt      LGL T cell Leukemia           Reviewed and updated as needed this visit by clinical staff  Tobacco  Allergies  Med Hx  Surg Hx  Fam Hx  Soc Hx      Reviewed and updated as needed this visit by Provider         ROS:  Constitutional, HEENT, cardiovascular, pulmonary, gi and gu systems are negative, except as otherwise noted.    OBJECTIVE:                                                    /80 (BP Location: Left arm, Patient Position: Chair, Cuff Size: Adult  Large)  Pulse 76  Temp 98.9  F (37.2  C) (Oral)  Resp 16  Wt 209 lb 1.6 oz (94.8 kg)  BMI 34.01 kg/m2  Body mass index is 34.01 kg/(m^2).  GENERAL: healthy, alert and no distress  NECK: no adenopathy, no asymmetry, masses, or scars and trachea midline and normal to palpation  RESP: lungs clear to auscultation - no rales, rhonchi or wheezes  CV: regular rate and rhythm, normal S1 S2, no S3 or S4, no murmur, click or rub, no peripheral edema and peripheral pulses strong  ABDOMEN: soft, nontender, no hepatosplenomegaly, no masses and bowel sounds normal  MS: no gross musculoskeletal defects noted, no edema, tenderness noted to the chest wall on the right. I do not appreciate any grave concerns for rib fracture and even if there were concerns at this point in time decided not to do any imaging as it would not change overall treatment. The right buttock and low back pain seems to be muscular in nature perhaps more with the piriformis and gluteal muscles to this region. Left low back is within normal limits considering her prior condition. The left sternocleidomastoid muscle is tender to touch and to a type of movement. She has a hard time turning her head to the right as we would consider normal considering the overall related pain and tenderness and accident. In short this appears to be similar to a whiplash type of injury.  SKIN: no suspicious lesions or rashes  NEURO: Normal strength and tone, mentation intact and speech normal  PSYCH: mentation appears normal, affect normal/bright    Diagnostic Test Results:  No results found for this or any previous visit (from the past 24 hour(s)).     ASSESSMENT/PLAN:                                                    1. Acute cervical myofascial strain, initial encounter  2. Sternocleidomastoid muscle tenderness  3. Fall from horse, initial encounter  At this point in time advised that we do Medrol Dosepak to see if we get some inflammation causing pain. She ready has muscle  relaxants and benzodiazepines at home. She has pain medications from previous surgeries at home. She is to use these very judiciously. She is to follow-up in one to 2 weeks as needed.  - methylPREDNISolone (MEDROL DOSEPAK) 4 MG tablet; Follow package instructions  Dispense: 21 tablet; Refill: 0    4. Chest wall pain  Most likely contusion from the fall no adventitious breath sounds were noted above continue to observe carefully.    Otoniel Manuel PA-C  [facility]

## 2017-04-06 ASSESSMENT — PATIENT HEALTH QUESTIONNAIRE - PHQ9: SUM OF ALL RESPONSES TO PHQ QUESTIONS 1-9: 0

## 2017-04-06 ASSESSMENT — ANXIETY QUESTIONNAIRES: GAD7 TOTAL SCORE: 0

## 2017-04-07 ENCOUNTER — HOSPITAL ENCOUNTER (OUTPATIENT)
Dept: PHYSICAL THERAPY | Facility: CLINIC | Age: 42
Setting detail: THERAPIES SERIES
End: 2017-04-07
Attending: PHYSICIAN ASSISTANT
Payer: COMMERCIAL

## 2017-04-07 PROCEDURE — 97140 MANUAL THERAPY 1/> REGIONS: CPT | Mod: GP | Performed by: PHYSICAL THERAPIST

## 2017-04-07 PROCEDURE — 40000718 ZZHC STATISTIC PT DEPARTMENT ORTHO VISIT: Performed by: PHYSICAL THERAPIST

## 2017-04-10 ENCOUNTER — MYC MEDICAL ADVICE (OUTPATIENT)
Dept: FAMILY MEDICINE | Facility: OTHER | Age: 42
End: 2017-04-10

## 2017-04-11 ENCOUNTER — HOSPITAL ENCOUNTER (OUTPATIENT)
Dept: PHYSICAL THERAPY | Facility: CLINIC | Age: 42
Setting detail: THERAPIES SERIES
End: 2017-04-11
Attending: PHYSICIAN ASSISTANT
Payer: COMMERCIAL

## 2017-04-11 PROCEDURE — 97140 MANUAL THERAPY 1/> REGIONS: CPT | Mod: GP | Performed by: PHYSICAL THERAPIST

## 2017-04-11 PROCEDURE — 40000718 ZZHC STATISTIC PT DEPARTMENT ORTHO VISIT: Performed by: PHYSICAL THERAPIST

## 2017-04-14 ENCOUNTER — HOSPITAL ENCOUNTER (OUTPATIENT)
Dept: PHYSICAL THERAPY | Facility: CLINIC | Age: 42
Setting detail: THERAPIES SERIES
End: 2017-04-14
Attending: PHYSICIAN ASSISTANT
Payer: COMMERCIAL

## 2017-04-14 PROCEDURE — 40000718 ZZHC STATISTIC PT DEPARTMENT ORTHO VISIT: Performed by: PHYSICAL THERAPIST

## 2017-04-14 PROCEDURE — 97140 MANUAL THERAPY 1/> REGIONS: CPT | Mod: GP | Performed by: PHYSICAL THERAPIST

## 2017-04-17 ENCOUNTER — TELEPHONE (OUTPATIENT)
Dept: FAMILY MEDICINE | Facility: CLINIC | Age: 42
End: 2017-04-17

## 2017-04-17 ENCOUNTER — ALLIED HEALTH/NURSE VISIT (OUTPATIENT)
Dept: FAMILY MEDICINE | Facility: OTHER | Age: 42
End: 2017-04-17
Payer: COMMERCIAL

## 2017-04-17 DIAGNOSIS — G43.111 INTRACTABLE MIGRAINE WITH AURA WITH STATUS MIGRAINOSUS: Primary | ICD-10-CM

## 2017-04-17 DIAGNOSIS — G43.009 MIGRAINE WITHOUT AURA AND WITHOUT STATUS MIGRAINOSUS, NOT INTRACTABLE: Primary | ICD-10-CM

## 2017-04-17 PROCEDURE — 96372 THER/PROPH/DIAG INJ SC/IM: CPT

## 2017-04-17 PROCEDURE — 99207 ZZC NO CHARGE NURSE ONLY: CPT

## 2017-04-17 RX ORDER — KETOROLAC TROMETHAMINE 30 MG/ML
60 INJECTION, SOLUTION INTRAMUSCULAR; INTRAVENOUS ONCE
Qty: 2 ML | Refills: 0 | OUTPATIENT
Start: 2017-04-17 | End: 2017-04-17

## 2017-04-17 NOTE — NURSING NOTE
The following medication was given:     MEDICATION: Toradol 60 mg  See medication note.  Patient instructed to remain in clinic for 20 minutes afterwards, and to report any adverse reaction to me immediately.  Prior to injection verified patient identity using patient's name and date of birth.  Suzanne Cervantes MA     4/17/2017

## 2017-04-17 NOTE — TELEPHONE ENCOUNTER
"Called and spoke to the patient. She said sometime last night she developed a migraine. Patient said when she got up this morning at 5 AM it was a full on bad migraine. Patient said she tool the max amount allowed of her Maxalt but it has not helped. Patient said she did get minimal relief from it but not very much. Patient said she can now open her eyes where earlier she could not even open her eyes because the nausea and vertigo was so bad. Patient said she had some vomiting this morning. She said her vision is still slightly blurry.     Patient said it has been awhile since her last terrible migraine. She said a few times Otoniel was able to give her a Toradol shot in clinic which seemed to have helped. Patient is wondering if Dr. Mary could order a Toradol shot that she could come to the clinic for.     Patient denies: \"worst headache ever\", weakness, numbness, tingling, confusion, slurred speech, fever, or rash.     Patient is refusing to be seen in the ER. She would like request to go to covering provider to see if a Toradol shot could be ordered. She is aware that Dr. Mary is not in clinic until after 1:30 this afternoon. She is requesting message to go to him. If he cant order it patient said she would follow up with Otoniel.     Will route to provider to advise.     RECOMMENDED DISPOSITION:  To ED, another person to drive - patient is refusing the ER. She is requesting message to go to covering provider to see if a Toradol shot could be ordered.   Will comply with recommendation: no - patient is refusing the ER. She is requesting message to go to covering provider to see if a Toradol shot could be ordered.    If further questions/concerns or if Sx do not improve, worsen or new Sx develop, call your PCP or [facility] as soon as possible.    NOTES:  Disposition was determined by the first positive assessment question, therefore all previous assessment questions were negative.  Informed to check " provider manual or call insurance company to assure coverage.    Guideline used: Headache  Telephone Triage Protocols for Nurses, Fifth Edition, Estephania Philip RN

## 2017-04-17 NOTE — TELEPHONE ENCOUNTER
Called and spoke to the patient. Informed her of the message below. Patient is scheduled today with the float nurse.     Yudelka Philip RN  [facility]

## 2017-04-17 NOTE — TELEPHONE ENCOUNTER
Reason for call:  Patient reporting a symptom    Symptom or request: would like to see if another provider would order a Tordal shot for her migraine.  Her PCP is out.  Amelia states she has reached her max on her migraine medication and it still is not going away.  Pt states she has vomited also.    Duration (how long have symptoms been present): During night    Have you been treated for this before? Yes    Additional comments: Please call and advise    Phone Number patient can be reached at:  Home number on file [phone] (home)    Best Time:  any    Can we leave a detailed message on this number:  YES    Call taken on 4/17/2017 at 11:40 AM by Melia Colin

## 2017-04-17 NOTE — MR AVS SNAPSHOT
After Visit Summary   4/17/2017    Amelia Coker    MRN: [id number]           Patient Information     Date Of Birth          1975        Visit Information        Provider Department      4/17/2017 2:15 PM NL FLOAT NURSE, [facility]        Today's Diagnoses     Migraine without aura and without status migrainosus, not intractable    -  1       Follow-ups after your visit        Your next 10 appointments already scheduled     Apr 18, 2017  7:30 AM CDT   Treatment 45 with Stacie Blankenship, PT   [facility] ([facility])    [address] [place] [address]   [phone]            Apr 20, 2017  7:30 AM CDT   Treatment 45 with Stacie Blankenship, PT   [facility] ([facility])    [address] [place] [address]   [phone]            May 08, 2017  7:30 AM CDT   Treatment 45 with Stacie Blankenship PT   [facility] ([facility])    [address] [place] [address]   [phone]            May 11, 2017  7:30 AM CDT   Treatment 45 with Stacie Blankenship PT   [facility] ([facility])    [address] [place] [address]   [phone]            May 16, 2017  7:30 AM CDT   Treatment 45 with Stacie Blankenship, PT   [facility] ([facility])    [address] [place] [address]   [phone]            May 19, 2017  7:30 AM CDT   Treatment 45 with Stacie Blankenship PT   [facility] ([facility])    [address] [place] [address] [address] [place] [address]   [phone]              Who to contact     If you have questions or need follow up information about today's clinic visit or your schedule please contact [facility] directly at [phone].  Normal or non-critical lab and imaging results will be communicated to you by MyChart, letter or  phone within 4 business days after the clinic has received the results. If you do not hear from us within 7 days, please contact the clinic through [employer] or phone. If you have a critical or abnormal lab result, we will notify you by phone as soon as possible.  Submit refill requests through [employer] or call your pharmacy and they will forward the refill request to us. Please allow 3 business days for your refill to be completed.          Additional Information About Your Visit        [employer] Information     [employer] gives you secure access to your electronic health record. If you see a primary care provider, you can also send messages to your care team and make appointments. If you have questions, please call your primary care clinic.  If you do not have a primary care provider, please call [phone] and they will assist you.        Care EveryWhere ID     This is your Care EveryWhere ID. This could be used by other organizations to access your [place] medical records  [id number]         Blood Pressure from Last 3 Encounters:   04/05/17 136/80   03/14/17 130/80   01/30/17 122/80    Weight from Last 3 Encounters:   04/05/17 209 lb 1.6 oz (94.8 kg)   03/14/17 207 lb 12.8 oz (94.3 kg)   01/30/17 207 lb 11.2 oz (94.2 kg)              We Performed the Following     INJECTION INTRAMUSCULAR OR SUB-Q     KETOROLAC TROMETHAMINE 15MG          Today's Medication Changes          These changes are accurate as of: 4/17/17  2:36 PM.  If you have any questions, ask your nurse or doctor.               Start taking these medicines.        Dose/Directions    ketorolac 60 MG/2ML Soln injection   Commonly known as:  TORADOL   Used for:  Intractable migraine with aura with status migrainosus   Started by:  Otoniel Nelson PA-C        Dose:  60 mg   Inject 2 mLs (60 mg) into the muscle once for 1 dose   Quantity:  2 mL   Refills:  0            Where to get your medicines      Some of these will need a paper prescription and others  can be bought over the counter.  Ask your nurse if you have questions.     You don't need a prescription for these medications     ketorolac 60 MG/2ML Soln injection                Primary Care Provider Office Phone # Fax #    Otoniel Nelson PA-C [phone]       [facility] [address] [facility] [address]        Thank you!     Thank you for choosing [facility]  for your care. Our goal is always to provide you with excellent care. Hearing back from our patients is one way we can continue to improve our services. Please take a few minutes to complete the written survey that you may receive in the mail after your visit with us. Thank you!             Your Updated Medication List - Protect others around you: Learn how to safely use, store and throw away your medicines at www.disposemymeds.org.          This list is accurate as of: 4/17/17  2:36 PM.  Always use your most recent med list.                   Brand Name Dispense Instructions for use    ALLEGRA 180 MG tablet   Generic drug:  fexofenadine      Take 1 tablet by mouth daily Reported on 4/5/2017       FRESHKOTE 2.7-2 % Soln   Generic drug:  Polyvinyl Alcohol-Povidone      Apply  to eye.       hydrocortisone 0.2 % cream    WESTCORT    60 g    Apply sparingly to affected area three times daily as needed.       ibuprofen 800 MG tablet    ADVIL/MOTRIN    100 tablet    TAKE ONE TABLET BY MOUTH EVERY 8 HOURS AS NEEDED FOR PAIN       ketoconazole 2 % cream    NIZORAL    30 g    Apply topically 2 times daily       ketorolac 60 MG/2ML Soln injection    TORADOL    2 mL    Inject 2 mLs (60 mg) into the muscle once for 1 dose       meloxicam 15 MG tablet    MOBIC    90 tablet    TAKE ONE TABLET BY MOUTH EVERY DAY       methylPREDNISolone 4 MG tablet    MEDROL DOSEPAK    21 tablet    Follow package instructions       mupirocin 2 % ointment    BACTROBAN    22 g    Apply topically 3 times daily       nystatin-triamcinolone cream     MYCOLOG II    15 g    Apply topically 2 times daily       order for DME     1 Device    Reported on 4/5/2017       rizatriptan 5 MG ODT tab    MAXALT-MLT    18 tablet    Take 1-2 tablets (5-10 mg) by mouth at onset of headache for migraine May repeat dose in 2 hours.  Do not exceed 30 mg in 24 hours       sucralfate 1 GM tablet    CARAFATE    120 tablet    TAKE ONE TABLET BY MOUTH FOUR TIMES A DAY       SUMAtriptan 100 MG tablet    IMITREX    27 tablet    Take 1 tablet by mouth See Admin Instructions. WITH ONSET OF MIGRAINE, MAY REPEAT ONCE AFTER 2 HOURS. DO NOT EXCEED 2 TABLETS IN 24 HOURS.       SYSTANE OP      Reported on 3/14/2017       tiZANidine 4 MG tablet    ZANAFLEX    90 tablet    Take 1-2 tablets (4-8 mg) by mouth 3 times daily as needed for muscle spasms       TYLENOL PO      Take 500 mg by mouth every 6 hours as needed

## 2017-04-18 ENCOUNTER — HOSPITAL ENCOUNTER (OUTPATIENT)
Dept: PHYSICAL THERAPY | Facility: CLINIC | Age: 42
Setting detail: THERAPIES SERIES
End: 2017-04-18
Attending: PHYSICIAN ASSISTANT
Payer: COMMERCIAL

## 2017-04-18 PROCEDURE — 97140 MANUAL THERAPY 1/> REGIONS: CPT | Mod: GP | Performed by: PHYSICAL THERAPIST

## 2017-04-18 PROCEDURE — 40000718 ZZHC STATISTIC PT DEPARTMENT ORTHO VISIT: Performed by: PHYSICAL THERAPIST

## 2017-04-20 ENCOUNTER — HOSPITAL ENCOUNTER (OUTPATIENT)
Dept: PHYSICAL THERAPY | Facility: CLINIC | Age: 42
Setting detail: THERAPIES SERIES
End: 2017-04-20
Attending: PHYSICIAN ASSISTANT
Payer: COMMERCIAL

## 2017-04-20 PROCEDURE — 97140 MANUAL THERAPY 1/> REGIONS: CPT | Mod: GP | Performed by: PHYSICAL THERAPIST

## 2017-04-20 PROCEDURE — 40000718 ZZHC STATISTIC PT DEPARTMENT ORTHO VISIT: Performed by: PHYSICAL THERAPIST

## 2017-04-24 ENCOUNTER — HOSPITAL ENCOUNTER (OUTPATIENT)
Dept: PHYSICAL THERAPY | Facility: CLINIC | Age: 42
Setting detail: THERAPIES SERIES
End: 2017-04-24
Attending: PHYSICIAN ASSISTANT
Payer: COMMERCIAL

## 2017-04-24 PROCEDURE — 40000718 ZZHC STATISTIC PT DEPARTMENT ORTHO VISIT: Performed by: PHYSICAL THERAPIST

## 2017-04-24 PROCEDURE — 97140 MANUAL THERAPY 1/> REGIONS: CPT | Mod: GP | Performed by: PHYSICAL THERAPIST

## 2017-05-01 ENCOUNTER — HOSPITAL ENCOUNTER (OUTPATIENT)
Dept: PHYSICAL THERAPY | Facility: CLINIC | Age: 42
Setting detail: THERAPIES SERIES
End: 2017-05-01
Attending: PHYSICIAN ASSISTANT
Payer: COMMERCIAL

## 2017-05-01 PROCEDURE — 97140 MANUAL THERAPY 1/> REGIONS: CPT | Mod: GP | Performed by: PHYSICAL THERAPIST

## 2017-05-01 PROCEDURE — 40000718 ZZHC STATISTIC PT DEPARTMENT ORTHO VISIT: Performed by: PHYSICAL THERAPIST

## 2017-05-04 ENCOUNTER — HOSPITAL ENCOUNTER (OUTPATIENT)
Dept: PHYSICAL THERAPY | Facility: CLINIC | Age: 42
Setting detail: THERAPIES SERIES
End: 2017-05-04
Attending: PHYSICIAN ASSISTANT
Payer: COMMERCIAL

## 2017-05-04 PROCEDURE — 40000718 ZZHC STATISTIC PT DEPARTMENT ORTHO VISIT: Performed by: PHYSICAL THERAPIST

## 2017-05-04 PROCEDURE — 97140 MANUAL THERAPY 1/> REGIONS: CPT | Mod: GP | Performed by: PHYSICAL THERAPIST

## 2017-05-08 ENCOUNTER — HOSPITAL ENCOUNTER (OUTPATIENT)
Dept: PHYSICAL THERAPY | Facility: CLINIC | Age: 42
Setting detail: THERAPIES SERIES
End: 2017-05-08
Attending: PHYSICIAN ASSISTANT
Payer: COMMERCIAL

## 2017-05-08 PROCEDURE — 40000718 ZZHC STATISTIC PT DEPARTMENT ORTHO VISIT: Performed by: PHYSICAL THERAPIST

## 2017-05-08 PROCEDURE — 97140 MANUAL THERAPY 1/> REGIONS: CPT | Mod: GP | Performed by: PHYSICAL THERAPIST

## 2017-05-08 NOTE — PROGRESS NOTES
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 ;s had bells palsy on the R. Still having the L sided headache neck pain and L SCM to temporal bone. LBP 3/10, minimal leg pain just the tightness/statically feeling. Static leg feeling is intermittent now. Was able to get 23,000 steps this weekend, working out in the yard. Still having B SI pain with less referal pain into legs now. She has better control now of the stomach upset. Not needing to contort her body as much as she use to have to get gain symptom relief.     Objective Measurements:  Objective Measure: ROM  Details: Lumbar flexion full to palsm on floor. Improved tolerance to extension to 50% but passing midline and into ext at lower lumbar spine, poor sacral and lumbar mechanics.   Objective Measure: Palpation  Details: Improving overall pull on sacrum. AP glide hip L is quite limited with increasing flexion ROM. Overall, has localized Myofascial, visceral and joint tightness with difficulty isolating them due to overly of hypermobility. Still apparent LLD L short though suspect related to the tightness we are dealing with in the pelvis and hip complex L.   Objective Measure: Gait  Details: Better evidence of emergence of trunk rotation at thoracic region, emergence of a bit of L iliac AP rotation vs all transverse plane rotation. Less L LE loading response.        Goals:  Goal Identifier 1   Goal Description Patient will report ability to sleep with  50% reduced waking/restlessness due to back pain in 8 weeks   Target Date 17   Date Met  17   Progress:     Goal  Identifier 2   Goal Description Patient will report 50% reduced nerve pain in L leg and ability to maintain positions for 1 hour to improve tolerance to work and ADL activities in 8 weeks   Target Date 04/13/17   Date Met  05/02/17   Progress:     Goal Identifier 2   Goal Description Patient will report 50% reduced nerve pain in L leg and ability to maintain positions for 1 hour to improve tolerance to work and ADL activities in 8 weeks   Target Date 04/13/17   Date Met  05/02/17   Progress:     Goal Identifier 3   Goal Description Patient will note improved quality of life related to activites evidenced on DAKOTA <20% and Jonah < 4 in 12 weeks   Target Date 07/03/17   Date Met   (26% as of 4/20)   Progress:     Goal Identifier 4   Goal Description Patient will note ability to sit and stand for 1 hour without pain > 2/10 to better tolerate work activities   Target Date 07/03/17   Date Met      Progress: New goal added today related to functional deficits remaining     Progress Toward Goals:   Progress this reporting period: Good progress.    Treatment has included therapeutic exercise for positioning to improve nerve root relaxation, Manual therapy to address myofascial, visceral, scar tissue, muscular and joint mobility limitations affection biomechanics of movement, compensated patterns with overlay of generalized hypermobility. This has added to complexity of isolating movement restrictions with HEP as she does not isolate her tightness well with stretching or self guided exercises; and needs continued PT to help her get to the point of self symptom management. She has noted > 60% reduction in her symptom intensity/frequency, and remains appropriate to continue PT at this time  Plan:  Continue therapy per current plan of care.  Changes include 1-2 times per week for 8 weeks.     Discharge:  No

## 2017-05-11 ENCOUNTER — HOSPITAL ENCOUNTER (OUTPATIENT)
Dept: PHYSICAL THERAPY | Facility: CLINIC | Age: 42
Setting detail: THERAPIES SERIES
End: 2017-05-11
Attending: PHYSICIAN ASSISTANT
Payer: COMMERCIAL

## 2017-05-11 PROCEDURE — 40000718 ZZHC STATISTIC PT DEPARTMENT ORTHO VISIT: Performed by: PHYSICAL THERAPIST

## 2017-05-11 PROCEDURE — 97140 MANUAL THERAPY 1/> REGIONS: CPT | Mod: GP | Performed by: PHYSICAL THERAPIST

## 2017-05-16 ENCOUNTER — MYC MEDICAL ADVICE (OUTPATIENT)
Dept: FAMILY MEDICINE | Facility: OTHER | Age: 42
End: 2017-05-16

## 2017-05-24 ENCOUNTER — HOSPITAL ENCOUNTER (OUTPATIENT)
Dept: PHYSICAL THERAPY | Facility: CLINIC | Age: 42
Setting detail: THERAPIES SERIES
End: 2017-05-24
Attending: PHYSICIAN ASSISTANT
Payer: COMMERCIAL

## 2017-05-24 PROCEDURE — 40000718 ZZHC STATISTIC PT DEPARTMENT ORTHO VISIT: Performed by: PHYSICAL THERAPIST

## 2017-05-24 PROCEDURE — 97140 MANUAL THERAPY 1/> REGIONS: CPT | Mod: GP | Performed by: PHYSICAL THERAPIST

## 2017-05-26 ENCOUNTER — HOSPITAL ENCOUNTER (OUTPATIENT)
Dept: PHYSICAL THERAPY | Facility: CLINIC | Age: 42
Setting detail: THERAPIES SERIES
End: 2017-05-26
Attending: PHYSICIAN ASSISTANT
Payer: COMMERCIAL

## 2017-05-26 PROCEDURE — 40000718 ZZHC STATISTIC PT DEPARTMENT ORTHO VISIT: Performed by: PHYSICAL THERAPIST

## 2017-05-26 PROCEDURE — 97140 MANUAL THERAPY 1/> REGIONS: CPT | Mod: GP | Performed by: PHYSICAL THERAPIST

## 2017-06-01 ENCOUNTER — HOSPITAL ENCOUNTER (OUTPATIENT)
Dept: PHYSICAL THERAPY | Facility: CLINIC | Age: 42
Setting detail: THERAPIES SERIES
End: 2017-06-01
Attending: PHYSICIAN ASSISTANT
Payer: COMMERCIAL

## 2017-06-01 PROCEDURE — 40000718 ZZHC STATISTIC PT DEPARTMENT ORTHO VISIT: Performed by: PHYSICAL THERAPIST

## 2017-06-01 PROCEDURE — 97140 MANUAL THERAPY 1/> REGIONS: CPT | Mod: GP | Performed by: PHYSICAL THERAPIST

## 2017-06-05 ENCOUNTER — HOSPITAL ENCOUNTER (OUTPATIENT)
Dept: PHYSICAL THERAPY | Facility: CLINIC | Age: 42
Setting detail: THERAPIES SERIES
End: 2017-06-05
Attending: PHYSICIAN ASSISTANT
Payer: COMMERCIAL

## 2017-06-05 PROCEDURE — 97110 THERAPEUTIC EXERCISES: CPT | Mod: GP | Performed by: PHYSICAL THERAPIST

## 2017-06-05 PROCEDURE — 40000718 ZZHC STATISTIC PT DEPARTMENT ORTHO VISIT: Performed by: PHYSICAL THERAPIST

## 2017-06-08 ENCOUNTER — HOSPITAL ENCOUNTER (OUTPATIENT)
Dept: PHYSICAL THERAPY | Facility: CLINIC | Age: 42
Setting detail: THERAPIES SERIES
End: 2017-06-08
Attending: PHYSICIAN ASSISTANT
Payer: COMMERCIAL

## 2017-06-08 PROCEDURE — 40000718 ZZHC STATISTIC PT DEPARTMENT ORTHO VISIT: Performed by: PHYSICAL THERAPIST

## 2017-06-08 PROCEDURE — 97140 MANUAL THERAPY 1/> REGIONS: CPT | Mod: GP | Performed by: PHYSICAL THERAPIST

## 2017-06-12 ENCOUNTER — HOSPITAL ENCOUNTER (OUTPATIENT)
Dept: PHYSICAL THERAPY | Facility: CLINIC | Age: 42
Setting detail: THERAPIES SERIES
End: 2017-06-12
Attending: PHYSICIAN ASSISTANT
Payer: COMMERCIAL

## 2017-06-12 ENCOUNTER — MYC MEDICAL ADVICE (OUTPATIENT)
Dept: FAMILY MEDICINE | Facility: OTHER | Age: 42
End: 2017-06-12

## 2017-06-12 PROCEDURE — 40000718 ZZHC STATISTIC PT DEPARTMENT ORTHO VISIT: Performed by: PHYSICAL THERAPIST

## 2017-06-12 PROCEDURE — 97140 MANUAL THERAPY 1/> REGIONS: CPT | Mod: GP | Performed by: PHYSICAL THERAPIST

## 2017-06-15 ENCOUNTER — HOSPITAL ENCOUNTER (OUTPATIENT)
Dept: PHYSICAL THERAPY | Facility: CLINIC | Age: 42
Setting detail: THERAPIES SERIES
End: 2017-06-15
Attending: PHYSICIAN ASSISTANT
Payer: COMMERCIAL

## 2017-06-15 PROCEDURE — 40000718 ZZHC STATISTIC PT DEPARTMENT ORTHO VISIT: Performed by: PHYSICAL THERAPIST

## 2017-06-15 PROCEDURE — 97110 THERAPEUTIC EXERCISES: CPT | Mod: GP | Performed by: PHYSICAL THERAPIST

## 2017-06-15 PROCEDURE — 97140 MANUAL THERAPY 1/> REGIONS: CPT | Mod: GP | Performed by: PHYSICAL THERAPIST

## 2017-06-20 ENCOUNTER — MYC MEDICAL ADVICE (OUTPATIENT)
Dept: FAMILY MEDICINE | Facility: OTHER | Age: 42
End: 2017-06-20

## 2017-06-20 DIAGNOSIS — M48.061 FORAMINAL STENOSIS OF LUMBAR REGION: ICD-10-CM

## 2017-06-20 DIAGNOSIS — M54.16 LUMBAR RADICULOPATHY: Primary | ICD-10-CM

## 2017-06-21 ENCOUNTER — MYC MEDICAL ADVICE (OUTPATIENT)
Dept: FAMILY MEDICINE | Facility: OTHER | Age: 42
End: 2017-06-21

## 2017-06-26 ENCOUNTER — HOSPITAL ENCOUNTER (OUTPATIENT)
Dept: MRI IMAGING | Facility: CLINIC | Age: 42
Discharge: HOME | End: 2017-06-26
Attending: PHYSICIAN ASSISTANT | Admitting: PHYSICIAN ASSISTANT
Payer: COMMERCIAL

## 2017-06-26 ENCOUNTER — MYC MEDICAL ADVICE (OUTPATIENT)
Dept: FAMILY MEDICINE | Facility: OTHER | Age: 42
End: 2017-06-26

## 2017-06-26 DIAGNOSIS — M48.061 FORAMINAL STENOSIS OF LUMBAR REGION: ICD-10-CM

## 2017-06-26 DIAGNOSIS — M54.16 LUMBAR RADICULOPATHY: ICD-10-CM

## 2017-06-26 PROCEDURE — 25000128 H RX IP 250 OP 636: Performed by: RADIOLOGY

## 2017-06-26 PROCEDURE — 72158 MRI LUMBAR SPINE W/O & W/DYE: CPT

## 2017-06-26 PROCEDURE — A9585 GADOBUTROL INJECTION: HCPCS | Performed by: RADIOLOGY

## 2017-06-26 RX ORDER — GADOBUTROL 604.72 MG/ML
10 INJECTION INTRAVENOUS ONCE
Status: COMPLETED | OUTPATIENT
Start: 2017-06-26 | End: 2017-06-26

## 2017-06-26 RX ADMIN — GADOBUTROL 10 ML: 604.72 INJECTION INTRAVENOUS at 14:24

## 2017-06-30 ENCOUNTER — HOSPITAL ENCOUNTER (OUTPATIENT)
Dept: PHYSICAL THERAPY | Facility: CLINIC | Age: 42
Setting detail: THERAPIES SERIES
End: 2017-06-30
Attending: PHYSICIAN ASSISTANT
Payer: COMMERCIAL

## 2017-06-30 PROCEDURE — 97140 MANUAL THERAPY 1/> REGIONS: CPT | Mod: GP | Performed by: PHYSICAL THERAPIST

## 2017-06-30 PROCEDURE — 40000718 ZZHC STATISTIC PT DEPARTMENT ORTHO VISIT: Performed by: PHYSICAL THERAPIST

## 2017-07-06 ENCOUNTER — HOSPITAL ENCOUNTER (OUTPATIENT)
Dept: PHYSICAL THERAPY | Facility: CLINIC | Age: 42
Setting detail: THERAPIES SERIES
End: 2017-07-06
Attending: PHYSICIAN ASSISTANT
Payer: COMMERCIAL

## 2017-07-06 PROCEDURE — 40000718 ZZHC STATISTIC PT DEPARTMENT ORTHO VISIT: Performed by: PHYSICAL THERAPIST

## 2017-07-06 PROCEDURE — 97140 MANUAL THERAPY 1/> REGIONS: CPT | Mod: GP | Performed by: PHYSICAL THERAPIST

## 2017-07-07 DIAGNOSIS — M62.830 BACK MUSCLE SPASM: ICD-10-CM

## 2017-07-07 DIAGNOSIS — M54.16 LUMBAR RADICULOPATHY: ICD-10-CM

## 2017-07-07 NOTE — TELEPHONE ENCOUNTER
Zanaflex      Last Written Prescription Date:  12/15/16  Last Fill Quantity: 90,   # refills: 0  Last Office Visit with [facility] prescribing provider: 4/5/17  Future Office visit:       Routing refill request to provider for review/approval because:  Drug not on the [facility] refill protocol or controlled substance

## 2017-07-10 ENCOUNTER — MYC MEDICAL ADVICE (OUTPATIENT)
Dept: FAMILY MEDICINE | Facility: OTHER | Age: 42
End: 2017-07-10

## 2017-07-10 DIAGNOSIS — L65.9 HAIR LOSS: Primary | ICD-10-CM

## 2017-07-10 DIAGNOSIS — L98.9 SCALP LESION: ICD-10-CM

## 2017-07-11 ENCOUNTER — TELEPHONE (OUTPATIENT)
Dept: FAMILY MEDICINE | Facility: OTHER | Age: 42
End: 2017-07-11

## 2017-07-11 DIAGNOSIS — M62.838 MUSCLE SPASM: ICD-10-CM

## 2017-07-11 DIAGNOSIS — M62.838 SPASM OF MUSCLE: Primary | ICD-10-CM

## 2017-07-11 DIAGNOSIS — M54.2 CERVICALGIA: ICD-10-CM

## 2017-07-11 RX ORDER — MELOXICAM 15 MG/1
TABLET ORAL
Qty: 90 TABLET | Refills: 1 | Status: SHIPPED | OUTPATIENT
Start: 2017-07-11 | End: 2018-02-20

## 2017-07-11 RX ORDER — DIAZEPAM 5 MG
5 TABLET ORAL 3 TIMES DAILY PRN
Qty: 90 TABLET | Refills: 0 | Status: SHIPPED | OUTPATIENT
Start: 2017-07-11 | End: 2019-08-13

## 2017-07-11 NOTE — TELEPHONE ENCOUNTER
Amelia is requesting a prescription for Diazepam 5mg tablets and it is not on her current med list, she was getting it from Dr. Blanquita Gentile at [facility] for quantity of 90 with directions of 1 tablet every 6 hours as needed for muscle spasms (Max of 3 tablets per day).  Last dispensed on 12/12/16 for #90.  Last office visit with you was 4/05/17, if you are ok with this please fax new order to [facility], thanks.    Yaima Doyle-Technician  [facility]  [phone]

## 2017-07-20 ENCOUNTER — MYC MEDICAL ADVICE (OUTPATIENT)
Dept: FAMILY MEDICINE | Facility: OTHER | Age: 42
End: 2017-07-20

## 2017-07-20 DIAGNOSIS — R51.9 FACIAL PAIN: Primary | ICD-10-CM

## 2017-07-20 DIAGNOSIS — H92.01 PAIN IN RIGHT EAR: ICD-10-CM

## 2017-07-20 RX ORDER — VALACYCLOVIR HYDROCHLORIDE 1 G/1
1000 TABLET, FILM COATED ORAL 3 TIMES DAILY
Qty: 21 TABLET | Refills: 0 | Status: SHIPPED | OUTPATIENT
Start: 2017-07-20 | End: 2017-08-30

## 2017-08-08 ENCOUNTER — HOSPITAL ENCOUNTER (OUTPATIENT)
Dept: PHYSICAL THERAPY | Facility: CLINIC | Age: 42
Setting detail: THERAPIES SERIES
End: 2017-08-08
Attending: PHYSICIAN ASSISTANT
Payer: COMMERCIAL

## 2017-08-08 PROCEDURE — 97140 MANUAL THERAPY 1/> REGIONS: CPT | Mod: GP | Performed by: PHYSICAL THERAPIST

## 2017-08-08 PROCEDURE — 40000718 ZZHC STATISTIC PT DEPARTMENT ORTHO VISIT: Performed by: PHYSICAL THERAPIST

## 2017-08-17 ENCOUNTER — HOSPITAL ENCOUNTER (OUTPATIENT)
Dept: PHYSICAL THERAPY | Facility: CLINIC | Age: 42
Setting detail: THERAPIES SERIES
End: 2017-08-17
Attending: PHYSICIAN ASSISTANT
Payer: COMMERCIAL

## 2017-08-17 PROCEDURE — 40000718 ZZHC STATISTIC PT DEPARTMENT ORTHO VISIT: Performed by: PHYSICAL THERAPIST

## 2017-08-17 PROCEDURE — 97140 MANUAL THERAPY 1/> REGIONS: CPT | Mod: GP | Performed by: PHYSICAL THERAPIST

## 2017-08-17 PROCEDURE — 97110 THERAPEUTIC EXERCISES: CPT | Mod: GP | Performed by: PHYSICAL THERAPIST

## 2017-08-30 ENCOUNTER — MYC MEDICAL ADVICE (OUTPATIENT)
Dept: FAMILY MEDICINE | Facility: OTHER | Age: 42
End: 2017-08-30

## 2017-08-30 ENCOUNTER — OFFICE VISIT (OUTPATIENT)
Dept: FAMILY MEDICINE | Facility: OTHER | Age: 42
End: 2017-08-30
Payer: COMMERCIAL

## 2017-08-30 VITALS
DIASTOLIC BLOOD PRESSURE: 70 MMHG | SYSTOLIC BLOOD PRESSURE: 118 MMHG | TEMPERATURE: 98.9 F | HEART RATE: 80 BPM | RESPIRATION RATE: 16 BRPM | WEIGHT: 202.2 LBS | BODY MASS INDEX: 32.88 KG/M2

## 2017-08-30 DIAGNOSIS — I10 HTN, GOAL BELOW 140/90: Primary | ICD-10-CM

## 2017-08-30 DIAGNOSIS — D23.5 BENIGN NEOPLASM OF SKIN OF TRUNK, EXCEPT SCROTUM: ICD-10-CM

## 2017-08-30 DIAGNOSIS — E78.5 HYPERLIPIDEMIA LDL GOAL <130: ICD-10-CM

## 2017-08-30 DIAGNOSIS — R53.82 CHRONIC FATIGUE: ICD-10-CM

## 2017-08-30 LAB
ALBUMIN SERPL-MCNC: 3.9 G/DL (ref 3.4–5)
ALP SERPL-CCNC: 53 U/L (ref 40–150)
ALT SERPL W P-5'-P-CCNC: 22 U/L (ref 0–50)
ANION GAP SERPL CALCULATED.3IONS-SCNC: 9 MMOL/L (ref 3–14)
AST SERPL W P-5'-P-CCNC: 14 U/L (ref 0–45)
BILIRUB SERPL-MCNC: 0.8 MG/DL (ref 0.2–1.3)
BUN SERPL-MCNC: 14 MG/DL (ref 7–30)
CALCIUM SERPL-MCNC: 8.8 MG/DL (ref 8.5–10.1)
CHLORIDE SERPL-SCNC: 109 MMOL/L (ref 94–109)
CHOLEST SERPL-MCNC: 159 MG/DL
CO2 SERPL-SCNC: 25 MMOL/L (ref 20–32)
CREAT SERPL-MCNC: 0.66 MG/DL (ref 0.52–1.04)
GFR SERPL CREATININE-BSD FRML MDRD: >90 ML/MIN/1.7M2
GLUCOSE SERPL-MCNC: 92 MG/DL (ref 70–99)
HDLC SERPL-MCNC: 72 MG/DL
LDLC SERPL CALC-MCNC: 77 MG/DL
NONHDLC SERPL-MCNC: 87 MG/DL
POTASSIUM SERPL-SCNC: 4.2 MMOL/L (ref 3.4–5.3)
PROT SERPL-MCNC: 7.1 G/DL (ref 6.8–8.8)
SODIUM SERPL-SCNC: 143 MMOL/L (ref 133–144)
TRIGL SERPL-MCNC: 50 MG/DL

## 2017-08-30 PROCEDURE — 80053 COMPREHEN METABOLIC PANEL: CPT | Performed by: PHYSICIAN ASSISTANT

## 2017-08-30 PROCEDURE — 80061 LIPID PANEL: CPT | Performed by: PHYSICIAN ASSISTANT

## 2017-08-30 PROCEDURE — 17110 DESTRUCTION B9 LES UP TO 14: CPT | Performed by: PHYSICIAN ASSISTANT

## 2017-08-30 PROCEDURE — 99213 OFFICE O/P EST LOW 20 MIN: CPT | Mod: 25 | Performed by: PHYSICIAN ASSISTANT

## 2017-08-30 PROCEDURE — 36415 COLL VENOUS BLD VENIPUNCTURE: CPT | Performed by: PHYSICIAN ASSISTANT

## 2017-08-30 RX ORDER — METHYLPHENIDATE HYDROCHLORIDE 10 MG/1
10-20 TABLET ORAL 2 TIMES DAILY
Qty: 120 TABLET | Refills: 0 | Status: SHIPPED | OUTPATIENT
Start: 2017-08-30 | End: 2019-03-11

## 2017-08-30 ASSESSMENT — PAIN SCALES - GENERAL: PAINLEVEL: MODERATE PAIN (4)

## 2017-08-30 NOTE — MR AVS SNAPSHOT
After Visit Summary   8/30/2017    Amelia Coker    MRN: [id number]           Patient Information     Date Of Birth          1975        Visit Information        Provider Department      8/30/2017 7:40 AM Otoniel Nelson PA-C [facility]        Today's Diagnoses     HTN, goal below 140/90    -  1    Hyperlipidemia LDL goal <130           Follow-ups after your visit        Your next 10 appointments already scheduled     Aug 31, 2017  7:15 AM CDT   Ortho Treatment with Ashleigh Martínez, PT   [facility] ([facility])    [address] [place] [address]   [phone]            Sep 07, 2017  2:30 PM CDT   Ortho Treatment with Ashleigh Martínez, PT   [facility] ([facility])    [address] [place] [address]   [phone]              Who to contact     If you have questions or need follow up information about today's clinic visit or your schedule please contact [facility] directly at [phone].  Normal or non-critical lab and imaging results will be communicated to you by Swan Inchart, letter or phone within 4 business days after the clinic has received the results. If you do not hear from us within 7 days, please contact the clinic through Kickit Witht or phone. If you have a critical or abnormal lab result, we will notify you by phone as soon as possible.  Submit refill requests through Broadband Voice or call your pharmacy and they will forward the refill request to us. Please allow 3 business days for your refill to be completed.          Additional Information About Your Visit        MyChart Information     Broadband Voice gives you secure access to your electronic health record. If you see a primary care provider, you can also send messages to your care team and make appointments. If you have questions, please call your primary care clinic.  If you do not have a primary care provider, please call  [phone] and they will assist you.        Care EveryWhere ID     This is your Care EveryWhere ID. This could be used by other organizations to access your [place] medical records  [id number]        Your Vitals Were     Pulse Temperature Respirations BMI (Body Mass Index)          80 98.9  F (37.2  C) (Oral) 16 32.88 kg/m2         Blood Pressure from Last 3 Encounters:   08/30/17 118/70   04/05/17 136/80   03/14/17 130/80    Weight from Last 3 Encounters:   08/30/17 202 lb 3.2 oz (91.7 kg)   04/05/17 209 lb 1.6 oz (94.8 kg)   03/14/17 207 lb 12.8 oz (94.3 kg)              We Performed the Following     Comprehensive metabolic panel     Lipid panel reflex to direct LDL        Primary Care Provider Office Phone # Fax #    Otoniel Nelson PA-C [phone]       [address] [facility] [address]        Equal Access to Services     CHI GARRISON : Hadii aad ku hadasho Soomaali, waaxda luqadaha, qaybta kaalmada adeegyada, waxay rangelin sharlene amaya . So [facility] [phone].    ATENCIÓN: Si habla español, tiene a hines disposición servicios gratuitos de asistencia lingüística. Llame al [phone].    We comply with applicable federal civil rights laws and [place] laws. We do not discriminate on the basis of race, color, national origin, age, disability sex, sexual orientation or gender identity.            Thank you!     Thank you for choosing [facility]  for your care. Our goal is always to provide you with excellent care. Hearing back from our patients is one way we can continue to improve our services. Please take a few minutes to complete the written survey that you may receive in the mail after your visit with us. Thank you!             Your Updated Medication List - Protect others around you: Learn how to safely use, store and throw away your medicines at www.disposemymeds.org.          This list is accurate as of: 8/30/17  7:57 AM.  Always use your most recent med list.                    Brand Name Dispense Instructions for use Diagnosis    ALLEGRA 180 MG tablet   Generic drug:  fexofenadine      Take 1 tablet by mouth daily Reported on 4/5/2017    FLORIDALMA positive, Family history of lupus erythematosus, Lumbar radiculopathy, Fatigue, Dry eyes, bilateral, Keratitis sicca, Foraminal stenosis of lumbar region, Ds DNA antibody positive       Coal Tar Extract 4 % Sham     168 mL    Externally apply 1 Dose topically daily    Hair loss, Scalp lesion       diazepam 5 MG tablet    VALIUM    90 tablet    Take 1 tablet (5 mg) by mouth 3 times daily as needed for muscle spasms    Spasm of muscle       FRESHKOTE 2.7-2 % Soln   Generic drug:  Polyvinyl Alcohol-Povidone      Apply  to eye.        hydrocortisone 0.2 % cream    WESTCORT    60 g    Apply sparingly to affected area three times daily as needed.    Telangiectasia of face       ibuprofen 800 MG tablet    ADVIL/MOTRIN    100 tablet    TAKE ONE TABLET BY MOUTH EVERY 8 HOURS AS NEEDED FOR PAIN    Cervicalgia, Muscle spasm       ketoconazole 2 % cream    NIZORAL    30 g    Apply topically 2 times daily    Ringworm of body       * meloxicam 15 MG tablet    MOBIC    90 tablet    TAKE ONE TABLET BY MOUTH EVERY DAY    Cervicalgia, Muscle spasm       * meloxicam 15 MG tablet    MOBIC    90 tablet    TAKE ONE TABLET BY MOUTH EVERY DAY    Cervicalgia, Muscle spasm       mupirocin 2 % ointment    BACTROBAN    22 g    Apply topically 3 times daily    MRSA (methicillin resistant staph aureus) culture positive, Skin lesion       nystatin-triamcinolone cream    MYCOLOG II    15 g    Apply topically 2 times daily    Atopic dermatitis       order for DME     1 Device    Reported on 4/5/2017    Plantar fascial fibromatosis       rizatriptan 5 MG ODT tab    MAXALT-MLT    18 tablet    Take 1-2 tablets (5-10 mg) by mouth at onset of headache for migraine May repeat dose in 2 hours.  Do not exceed 30 mg in 24 hours    Migraine without aura and without status  migrainosus, not intractable       sucralfate 1 GM tablet    CARAFATE    120 tablet    TAKE ONE TABLET BY MOUTH FOUR TIMES A DAY    Gastroesophageal reflux disease, esophagitis presence not specified       SUMAtriptan 100 MG tablet    IMITREX    27 tablet    Take 1 tablet by mouth See Admin Instructions. WITH ONSET OF MIGRAINE, MAY REPEAT ONCE AFTER 2 HOURS. DO NOT EXCEED 2 TABLETS IN 24 HOURS.    Migraine, unspecified, with intractable migraine, so stated, without mention of status migrainosus       SYSTANE OP      Reported on 3/14/2017        tiZANidine 4 MG tablet    ZANAFLEX    90 tablet    TAKE ONE TO TWO TABLETS BY MOUTH THREE TIMES A DAY AS NEEDED FOR MUSCLE SPASMS    Lumbar radiculopathy, Back muscle spasm       TYLENOL PO      Take 500 mg by mouth every 6 hours as needed        * Notice:  This list has 2 medication(s) that are the same as other medications prescribed for you. Read the directions carefully, and ask your doctor or other care provider to review them with you.

## 2017-08-30 NOTE — PROGRESS NOTES
SUBJECTIVE:   Amelia Coker is a 42 year old female who presents to clinic today for the following health issues:      Hyperlipidemia Follow-Up      Rate your low fat/cholesterol diet?: good    Taking statin?  No    Other lipid medications/supplements?:  none        Amount of exercise or physical activity: 2-3 days/week for an average of 15-30 minutes    Problems taking medications regularly: No    Medication side effects: none  Diet: regular (no restrictions)  Concern - check spots on her skin  Onset: several dry flaking areas noted to the neck, scalp and temple region.  States scalp lesions have essentially resolved today.    Description:   Flat scaling lesions    Intensity: mild    Progression of Symptoms:  waxing and waning    Accompanying Signs & Symptoms:      Previous history of similar problem:   ongoing    Precipitating factors:   Worsened by: getting caught on clothing.    Alleviating factors:  Improved by: none    Therapies Tried and outcome:     Problem list and histories reviewed & adjusted, as indicated.  Additional history: as documented    Patient Active Problem List   Diagnosis     Intractable migraine     Bell's palsy     Contact dermatitis and other eczema, due to unspecified cause     Allergic rhinitis due to other allergen     Scalp lesion     Lyme disease     PAC (premature atrial contraction)     Palpitations     Raynaud phenomenon     Cold intolerance of hand     Chronic fatigue     Hair loss     Pain in joint     Abnormal PFT     Shoulder joint instability     Family history of melanoma     MRSA (methicillin resistant staph aureus) culture positive     Dry eyes     Keratitis sicca     Laceration of foot, right     Skin ulcer of neck (H)     Family history of colon cancer     Pain in joint, upper arm     Traumatic amputation of fingertip     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     FLORIDALMA positive     Plantar fascial fibromatosis     Foraminal stenosis of  lumbar region     DJD (degenerative joint disease), lumbar     Left shoulder pain     Migraine without aura and without status migrainosus, not intractable     Lumbar radiculopathy     Ds DNA antibody positive     Muscular wasting and disuse atrophy, not elsewhere classified     Post-proc states NEC     Facial contusion     Frontal headache     Telangiectasia of face     Lateral epicondylitis     Right shoulder pain     Plantar fasciitis     HTN, goal below 140/90     Tooth pain     MRSA infection     Skin lesion     AD (atopic dermatitis)     Heat sensitivity     Rotator cuff arthropathy, right     Contusion of left foot, initial encounter     Gastroesophageal reflux disease, esophagitis presence not specified     Abnormal mammogram     Ringworm of body     Fall from horse, initial encounter     Sternocleidomastoid muscle tenderness     Acute cervical myofascial strain, initial encounter     Spasm of muscle     Hyperlipidemia LDL goal <130     Past Surgical History:   Procedure Laterality Date     COLONOSCOPY  3/11/2013    Procedure: COLONOSCOPY;  colonoscopy;  Surgeon: Lobito Mas MD;  Location: [facility]     DECOMPRESSION LUMBAR TWO LEVELS Bilateral 12/8/2016    Procedure: DECOMPRESSION LUMBAR TWO LEVELS;  Surgeon: Bang Burrell MD;  Location: [facility]     ESOPHAGOSCOPY, GASTROSCOPY, DUODENOSCOPY (EGD), COMBINED  11/8/2013    Procedure: COMBINED ESOPHAGOSCOPY, GASTROSCOPY, DUODENOSCOPY (EGD), BIOPSY SINGLE OR MULTIPLE;  Esophagoscopy, Gastroscopy, Duodenoscopy EGD with multiple biopsies;  Surgeon: Teja Koch MD;  Location: [facility]     HC REMOVAL OF TONSILS,<13 Y/O      Tonsils <10y.o.     HC TOOTH EXTRACTION W/FORCEP       REPAIR TENDON ELBOW  7/9/2014    Procedure: REPAIR TENDON ELBOW;  Surgeon: Chris Johnston MD;  Location: [facility]       Social History   Substance Use Topics     Smoking status: Never Smoker     Smokeless tobacco: Never Used     Alcohol use No      Comment: social      Family History   Problem Relation Age of Onset     DIABETES Mother      adult     Cancer - colorectal Mother      Hypertension Mother      Allergies Mother      CANCER Mother      colon     Depression Mother      GASTROINTESTINAL DISEASE Mother      ibs     Genitourinary Problems Mother      kidney cyst     Gynecology Mother      endometriosis     Lipids Mother      Thyroid Disease Mother      Arthritis Mother      RA     HEART DISEASE Maternal Grandfather      MI,  - 60's     Allergies Father      Unknown/Adopted Father      HEART DISEASE Father      mi-age mid 40's     Cardiovascular Father      Lipids Father      Respiratory Father      asthma     CANCER Father      Other Cancer Father      renal cancer     Depression Sister      Allergies Sister      Gynecology Sister      endometriosis     Lipids Paternal Grandmother      OSTEOPOROSIS Paternal Grandmother      Thyroid Disease Paternal Grandmother      Respiratory Son      asthma     Allergies Son      Arthritis Sister      Allergies Brother      HEART DISEASE Brother      Allergies Daughter      Blood Disease Paternal Aunt      LGL T cell Leukemia             Reviewed and updated as needed this visit by clinical staffTobacco  Allergies  Med Hx  Surg Hx  Fam Hx  Soc Hx      Reviewed and updated as needed this visit by Provider         ROS:  Constitutional, HEENT, cardiovascular, pulmonary, gi and gu systems are negative, except as otherwise noted.      OBJECTIVE:   /70 (BP Location: Left arm, Patient Position: Chair, Cuff Size: Adult Large)  Pulse 80  Temp 98.9  F (37.2  C) (Oral)  Resp 16  Wt 202 lb 3.2 oz (91.7 kg)  BMI 32.88 kg/m2  Body mass index is 32.88 kg/(m^2).  GENERAL: healthy, alert and no distress  MS: no gross musculoskeletal defects noted, no edema  SKIN: no suspicious lesions or rashes  SKIN: keratoses - seborrheic, actinic and actinic # 4 - frozen to the face and neck region    Diagnostic Test Results:  No results found  for this or any previous visit (from the past 24 hour(s)).    Each lesion was frozen easily three times with liquid nitrogen.  A total of 4 lesions are treated today.  The patient is advised to return every 2-3 weeks until all warts have resolved.    ASSESSMENT/PLAN:     1. HTN, goal below 140/90  Doing well  - Comprehensive metabolic panel  - Lipid panel reflex to direct LDL    2. Hyperlipidemia LDL goal <130  Due for labs ROV PRN  - Comprehensive metabolic panel  - Lipid panel reflex to direct LDL    3. Benign neoplasm of skin of trunk, except scrotum  Noted and treated today.      ROV PRN    Otoniel Manuel PA-C  [facility]

## 2017-08-30 NOTE — NURSING NOTE
"Chief Complaint   Patient presents with     Derm Problem     check spots on skin     Lipids       Initial /70 (BP Location: Left arm, Patient Position: Chair, Cuff Size: Adult Large)  Pulse 80  Temp 98.9  F (37.2  C) (Oral)  Resp 16  Wt 202 lb 3.2 oz (91.7 kg)  BMI 32.88 kg/m2 Estimated body mass index is 32.88 kg/(m^2) as calculated from the following:    Height as of 1/30/17: 5' 5.75\" (1.67 m).    Weight as of this encounter: 202 lb 3.2 oz (91.7 kg).  Medication Reconciliation: complete       Shaina DE LA GARZA LPN      "

## 2017-10-09 ENCOUNTER — MYC MEDICAL ADVICE (OUTPATIENT)
Dept: FAMILY MEDICINE | Facility: OTHER | Age: 42
End: 2017-10-09

## 2017-10-09 RX ORDER — AZITHROMYCIN 250 MG/1
TABLET, FILM COATED ORAL
Qty: 6 TABLET | Refills: 0 | Status: SHIPPED | OUTPATIENT
Start: 2017-10-09 | End: 2017-11-09

## 2017-10-13 ENCOUNTER — MYC MEDICAL ADVICE (OUTPATIENT)
Dept: FAMILY MEDICINE | Facility: OTHER | Age: 42
End: 2017-10-13

## 2017-10-13 RX ORDER — FLUCONAZOLE 150 MG/1
150 TABLET ORAL ONCE
Qty: 1 TABLET | Refills: 0 | Status: SHIPPED | OUTPATIENT
Start: 2017-10-13 | End: 2017-10-13

## 2017-10-23 ENCOUNTER — MYC MEDICAL ADVICE (OUTPATIENT)
Dept: FAMILY MEDICINE | Facility: OTHER | Age: 42
End: 2017-10-23

## 2017-10-30 ENCOUNTER — MYC MEDICAL ADVICE (OUTPATIENT)
Dept: FAMILY MEDICINE | Facility: OTHER | Age: 42
End: 2017-10-30

## 2017-10-30 ENCOUNTER — HOSPITAL ENCOUNTER (OUTPATIENT)
Dept: CT IMAGING | Facility: CLINIC | Age: 42
Discharge: HOME | End: 2017-10-30
Attending: PHYSICIAN ASSISTANT | Admitting: PHYSICIAN ASSISTANT
Payer: COMMERCIAL

## 2017-10-30 DIAGNOSIS — S00.03XA CONTUSION OF FACE, SCALP AND NECK, INITIAL ENCOUNTER: ICD-10-CM

## 2017-10-30 DIAGNOSIS — S00.83XA CONTUSION OF FACE, SCALP AND NECK, INITIAL ENCOUNTER: Primary | ICD-10-CM

## 2017-10-30 DIAGNOSIS — S00.83XA CONTUSION OF FACE, SCALP AND NECK, INITIAL ENCOUNTER: ICD-10-CM

## 2017-10-30 DIAGNOSIS — S00.03XA CONTUSION OF SCALP, INITIAL ENCOUNTER: Primary | ICD-10-CM

## 2017-10-30 DIAGNOSIS — S10.93XA CONTUSION OF FACE, SCALP AND NECK, INITIAL ENCOUNTER: ICD-10-CM

## 2017-10-30 DIAGNOSIS — S00.03XA CONTUSION OF FACE, SCALP AND NECK, INITIAL ENCOUNTER: Primary | ICD-10-CM

## 2017-10-30 DIAGNOSIS — S10.93XA CONTUSION OF FACE, SCALP AND NECK, INITIAL ENCOUNTER: Primary | ICD-10-CM

## 2017-10-30 PROCEDURE — 70450 CT HEAD/BRAIN W/O DYE: CPT

## 2017-10-30 NOTE — TELEPHONE ENCOUNTER
Per radiologist suggestion Skull xrays have been changed to CT Head without, patient has been informed of this and has no further questions  Closing encounter  Sandi Alas RT (R)

## 2017-10-30 NOTE — TELEPHONE ENCOUNTER
Responded via Synappiohart using the head injury protocol from telephone triage protocols for nurses, 5th edition.    Nona Schuster RN

## 2017-10-30 NOTE — PROGRESS NOTES
Adjusted imaging orders based on radiology advice.  Electronically signed:    Otoniel Manuel PA-C

## 2017-10-31 ENCOUNTER — MYC MEDICAL ADVICE (OUTPATIENT)
Dept: FAMILY MEDICINE | Facility: OTHER | Age: 42
End: 2017-10-31

## 2017-10-31 NOTE — TELEPHONE ENCOUNTER
[employer] message read with no response.  Will close encounter at this time.    Eric Camacho, RN, BSN

## 2017-11-09 ENCOUNTER — TELEPHONE (OUTPATIENT)
Dept: FAMILY MEDICINE | Facility: OTHER | Age: 42
End: 2017-11-09

## 2017-11-09 ENCOUNTER — OFFICE VISIT (OUTPATIENT)
Dept: FAMILY MEDICINE | Facility: OTHER | Age: 42
End: 2017-11-09
Payer: COMMERCIAL

## 2017-11-09 VITALS
DIASTOLIC BLOOD PRESSURE: 76 MMHG | TEMPERATURE: 97.1 F | WEIGHT: 199.8 LBS | RESPIRATION RATE: 18 BRPM | HEART RATE: 64 BPM | SYSTOLIC BLOOD PRESSURE: 138 MMHG | BODY MASS INDEX: 32.49 KG/M2

## 2017-11-09 DIAGNOSIS — S00.03XA HEMATOMA OF SCALP, INITIAL ENCOUNTER: Primary | ICD-10-CM

## 2017-11-09 DIAGNOSIS — G51.8 PAIN DUE TO NEUROPATHY OF FACIAL NERVE: ICD-10-CM

## 2017-11-09 PROCEDURE — 99213 OFFICE O/P EST LOW 20 MIN: CPT | Performed by: PHYSICIAN ASSISTANT

## 2017-11-09 RX ORDER — METHYLPREDNISOLONE 4 MG
TABLET, DOSE PACK ORAL
Qty: 21 TABLET | Refills: 0 | Status: SHIPPED | OUTPATIENT
Start: 2017-11-09 | End: 2018-02-02

## 2017-11-09 ASSESSMENT — PAIN SCALES - GENERAL: PAINLEVEL: MODERATE PAIN (4)

## 2017-11-09 NOTE — MR AVS SNAPSHOT
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  Normal or non-critical lab and imaging results will be communicated to you by Foodistahart, letter or phone within 4 business days after the clinic has received the results. If you do not hear from us within 7 days, please contact the clinic through [email or web address] or phone. If you have a critical or abnormal lab result, we will notify you by phone as soon as possible.  Submit refill requests through Tonx or call your pharmacy and they will forward the refill request to us. Please allow 3 business days for your refill to be completed.          Additional Information About Your Visit        MyChart Information     Tonx gives you secure access to your electronic health record. If you see a primary care provider, you can also send messages to your care team and make appointments. If you have questions, please call your primary care clinic.  If you do not have a primary care provider, please call [phone] and they will assist you.        Care EveryWhere ID     This is your Care EveryWhere ID. This could be used by other organizations to access your [place] medical records  [id number]        Your Vitals Were     Pulse Temperature Respirations BMI (Body Mass Index)          64 97.1  F (36.2  C) (Temporal) 18 32.49 kg/m2         Blood Pressure from Last 3 Encounters:   11/09/17 138/76   08/30/17 118/70   04/05/17  136/80    Weight from Last 3 Encounters:   11/09/17 199 lb 12.8 oz (90.6 kg)   08/30/17 202 lb 3.2 oz (91.7 kg)   04/05/17 209 lb 1.6 oz (94.8 kg)              Today, you had the following     No orders found for display         Today's Medication Changes          These changes are accurate as of: 11/9/17  4:47 PM.  If you have any questions, ask your nurse or doctor.               Start taking these medicines.        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   Quantity:  21 tablet   Refills:  0            Where to get your medicines      These medications were sent to [facility] [address]   [address] [facility] [address]     Phone:  [phone]     methylPREDNISolone 4 MG tablet                Primary Care Provider Office Phone # Fax #    Otoniel Nelson PA-C [phone]       [address] [facility] [address]        Equal Access to Services     HOLLIE GARRISON : Hadii abelino ku hadasho Soomaali, waaxda luqadaha, qaybta kaalmada adeegyada, jade amaya . So [facility] [phone].    ATENCIÓN: Si habla español, tiene a hines disposición servicios gratuitos de asistencia lingüística. Llame al [phone].    We comply with applicable federal civil rights laws and [place] laws. We do not discriminate on the basis of race, color, national origin, age, disability, sex, sexual orientation, or gender identity.            Thank you!     Thank you for choosing [facility]  for your care. Our goal is always to provide you with excellent care. Hearing back from our patients is one way we can continue to improve our services. Please take a few minutes to complete the written survey that you may receive in the mail after your visit with us. Thank  you!             Your Updated Medication List - Protect others around you: Learn how to safely use, store and throw away your medicines at www.disposemymeds.org.          This list is accurate as of: 11/9/17  4:47 PM.  Always use your most recent med list.                   Brand Name Dispense Instructions for use Diagnosis    ALLEGRA 180 MG tablet   Generic drug:  fexofenadine      Take 1 tablet by mouth daily Reported on 4/5/2017    FLORIDALMA positive, Family history of lupus erythematosus, Lumbar radiculopathy, Fatigue, Dry eyes, bilateral, Keratitis sicca, Foraminal stenosis of lumbar region, Ds DNA antibody positive       Coal Tar Extract 4 % Sham     168 mL    Externally apply 1 Dose topically daily    Hair loss, Scalp lesion       diazepam 5 MG tablet    VALIUM    90 tablet    Take 1 tablet (5 mg) by mouth 3 times daily as needed for muscle spasms    Spasm of muscle       FRESHKOTE 2.7-2 % Soln   Generic drug:  Polyvinyl Alcohol-Povidone      Apply  to eye.        hydrocortisone 0.2 % cream    WESTCORT    60 g    Apply sparingly to affected area three times daily as needed.    Telangiectasia of face       ibuprofen 800 MG tablet    ADVIL/MOTRIN    100 tablet    TAKE ONE TABLET BY MOUTH EVERY 8 HOURS AS NEEDED FOR PAIN    Cervicalgia, Muscle spasm       ketoconazole 2 % cream    NIZORAL    30 g    Apply topically 2 times daily    Ringworm of body       * meloxicam 15 MG tablet    MOBIC    90 tablet    TAKE ONE TABLET BY MOUTH EVERY DAY    Cervicalgia, Muscle spasm       * meloxicam 15 MG tablet    MOBIC    90 tablet    TAKE ONE TABLET BY MOUTH EVERY DAY    Cervicalgia, Muscle spasm       methylphenidate 10 MG tablet    RITALIN    120 tablet    Take 1-2 tablets (10-20 mg) by mouth 2 times daily    Chronic fatigue       methylPREDNISolone 4 MG tablet    MEDROL DOSEPAK    21 tablet    Follow package directions.    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, Hematoma of scalp, initial encounter       mupirocin 2 % nasal  ointment    BACTROBAN NASAL    22 g    Apply to affected area 3 times daily    Skin puncture       mupirocin 2 % ointment    BACTROBAN    22 g    Apply topically 3 times daily    MRSA (methicillin resistant staph aureus) culture positive, Skin lesion       nystatin-triamcinolone cream    MYCOLOG II    15 g    Apply topically 2 times daily    Atopic dermatitis       order for DME     1 Device    Reported on 4/5/2017    Plantar fascial fibromatosis       rizatriptan 5 MG ODT tab    MAXALT-MLT    18 tablet    Take 1-2 tablets (5-10 mg) by mouth at onset of headache for migraine May repeat dose in 2 hours.  Do not exceed 30 mg in 24 hours    Migraine without aura and without status migrainosus, not intractable       sucralfate 1 GM tablet    CARAFATE    120 tablet    TAKE ONE TABLET BY MOUTH FOUR TIMES A DAY    Gastroesophageal reflux disease, esophagitis presence not specified       SUMAtriptan 100 MG tablet    IMITREX    27 tablet    Take 1 tablet by mouth See Admin Instructions. WITH ONSET OF MIGRAINE, MAY REPEAT ONCE AFTER 2 HOURS. DO NOT EXCEED 2 TABLETS IN 24 HOURS.    Migraine, unspecified, with intractable migraine, so stated, without mention of status migrainosus       SYSTANE OP      Reported on 3/14/2017        tiZANidine 4 MG tablet    ZANAFLEX    90 tablet    TAKE ONE TO TWO TABLETS BY MOUTH THREE TIMES A DAY AS NEEDED FOR MUSCLE SPASMS    Lumbar radiculopathy, Back muscle spasm       TYLENOL PO      Take 500 mg by mouth every 6 hours as needed        XIIDRA 5 % Soln   Generic drug:  Lifitegrast           * Notice:  This list has 2 medication(s) that are the same as other medications prescribed for you. Read the directions carefully, and ask your doctor or other care provider to review them with you.

## 2017-11-09 NOTE — PROGRESS NOTES
SUBJECTIVE:                                                    Amelia Coker is a 42 year old female who presents to clinic today for the following health issues:      HPI    Headache  Onset: over one week    Description:   Location: unilateral in the left temporal area   Character: dull pain, shooting  Frequency:  Every day  Duration:  All day    Intensity: mild, severe    Progression of Symptoms:  same    Accompanying Signs & Symptoms:  Stiff neck: YES- left side  Neck or upper back pain: YES- left side  Fever: no  Sinus pressure: no  Nausea or vomiting: no  Dizziness: YES  Numbness: no  Weakness: no  Visual changes: YES    History:   Head trauma: YES  Family history of migraines: no  Previous tests for headaches: no  Neurologist evaluations: no  Able to do daily activities: YES- with more difficulty  Wake with a headaches: YES  Do headaches wake you up: YES  Daily pain medication use: YES  Work/school stressors/changes: no    Precipitating factors:   Does light make it worse: YES  Does sound make it worse: YES    Alleviating factors:  Does sleep help: no    Therapies Tried and outcome: Ibuprofen (Advil, Motrin) and Maxalt      Problem list and histories reviewed & adjusted, as indicated.  Additional history: as documented    Patient Active Problem List   Diagnosis     Intractable migraine     Bell's palsy     Contact dermatitis and other eczema, due to unspecified cause     Allergic rhinitis due to other allergen     Scalp lesion     Lyme disease     PAC (premature atrial contraction)     Palpitations     Raynaud phenomenon     Cold intolerance of hand     Chronic fatigue     Hair loss     Pain in joint     Abnormal PFT     Shoulder joint instability     Family history of melanoma     MRSA (methicillin resistant staph aureus) culture positive     Dry eyes     Keratitis sicca     Laceration of foot, right     Skin ulcer of neck (H)     Family history of colon cancer     Pain in joint, upper arm     Traumatic  amputation of fingertip     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     FLORIDALMA positive     Plantar fascial fibromatosis     Foraminal stenosis of lumbar region     DJD (degenerative joint disease), lumbar     Left shoulder pain     Migraine without aura and without status migrainosus, not intractable     Lumbar radiculopathy     Ds DNA antibody positive     Muscular wasting and disuse atrophy, not elsewhere classified     Other postprocedural status(V45.89)     Facial contusion     Frontal headache     Telangiectasia of face     Lateral epicondylitis     Right shoulder pain     Plantar fasciitis     HTN, goal below 140/90     Tooth pain     MRSA infection     Skin lesion     AD (atopic dermatitis)     Heat sensitivity     Rotator cuff arthropathy, right     Contusion of left foot, initial encounter     Gastroesophageal reflux disease, esophagitis presence not specified     Abnormal mammogram     Ringworm of body     Fall from horse, initial encounter     Sternocleidomastoid muscle tenderness     Acute cervical myofascial strain, initial encounter     Spasm of muscle     Hyperlipidemia LDL goal <130     Past Surgical History:   Procedure Laterality Date     COLONOSCOPY  3/11/2013    Procedure: COLONOSCOPY;  colonoscopy;  Surgeon: Lobito Mas MD;  Location: [facility]     DECOMPRESSION LUMBAR TWO LEVELS Bilateral 12/8/2016    Procedure: DECOMPRESSION LUMBAR TWO LEVELS;  Surgeon: Bang Burrell MD;  Location: [facility]     ESOPHAGOSCOPY, GASTROSCOPY, DUODENOSCOPY (EGD), COMBINED  11/8/2013    Procedure: COMBINED ESOPHAGOSCOPY, GASTROSCOPY, DUODENOSCOPY (EGD), BIOPSY SINGLE OR MULTIPLE;  Esophagoscopy, Gastroscopy, Duodenoscopy EGD with multiple biopsies;  Surgeon: Teja Koch MD;  Location: [facility]     HC REMOVAL OF TONSILS,<9 Y/O      Tonsils <10y.o.     HC TOOTH EXTRACTION W/FORCEP       REPAIR TENDON ELBOW  7/9/2014    Procedure: REPAIR TENDON ELBOW;  Surgeon: Chris Johnston  MD Nadeem;  Location:  OR       Social History   Substance Use Topics     Smoking status: Never Smoker     Smokeless tobacco: Never Used     Alcohol use No      Comment: social     Family History   Problem Relation Age of Onset     DIABETES Mother      adult     Cancer - colorectal Mother      Hypertension Mother      Allergies Mother      CANCER Mother      colon     Depression Mother      GASTROINTESTINAL DISEASE Mother      ibs     Genitourinary Problems Mother      kidney cyst     Gynecology Mother      endometriosis     Lipids Mother      Thyroid Disease Mother      Arthritis Mother      RA     HEART DISEASE Maternal Grandfather      MI,  - 60's     Allergies Father      Unknown/Adopted Father      HEART DISEASE Father      mi-age mid 40's     Cardiovascular Father      Lipids Father      Respiratory Father      asthma     CANCER Father      Other Cancer Father      renal cancer     Depression Sister      Allergies Sister      Gynecology Sister      endometriosis     Lipids Paternal Grandmother      OSTEOPOROSIS Paternal Grandmother      Thyroid Disease Paternal Grandmother      Respiratory Son      asthma     Allergies Son      Arthritis Sister      Allergies Brother      HEART DISEASE Brother      Allergies Daughter      Blood Disease Paternal Aunt      LGL T cell Leukemia         Current Outpatient Prescriptions   Medication Sig Dispense Refill     Lifitegrast (XIIDRA) 5 % SOLN        methylPREDNISolone (MEDROL DOSEPAK) 4 MG tablet Follow package directions. 21 tablet 0     mupirocin (BACTROBAN NASAL) 2 % nasal ointment Apply to affected area 3 times daily 22 g 0     methylphenidate (RITALIN) 10 MG tablet Take 1-2 tablets (10-20 mg) by mouth 2 times daily 120 tablet 0     diazepam (VALIUM) 5 MG tablet Take 1 tablet (5 mg) by mouth 3 times daily as needed for muscle spasms 90 tablet 0     tiZANidine (ZANAFLEX) 4 MG tablet TAKE ONE TO TWO TABLETS BY MOUTH THREE TIMES A DAY AS NEEDED FOR MUSCLE  SPASMS 90 tablet 0     Coal Tar Extract 4 % SHAM Externally apply 1 Dose topically daily 168 mL 1     meloxicam (MOBIC) 15 MG tablet TAKE ONE TABLET BY MOUTH EVERY DAY 90 tablet 1     rizatriptan (MAXALT-MLT) 5 MG ODT tab Take 1-2 tablets (5-10 mg) by mouth at onset of headache for migraine May repeat dose in 2 hours.  Do not exceed 30 mg in 24 hours 18 tablet 3     sucralfate (CARAFATE) 1 GM tablet TAKE ONE TABLET BY MOUTH FOUR TIMES A  tablet 1     Acetaminophen (TYLENOL PO) Take 500 mg by mouth every 6 hours as needed        SUMAtriptan (IMITREX) 100 MG tablet Take 1 tablet by mouth See Admin Instructions. WITH ONSET OF MIGRAINE, MAY REPEAT ONCE AFTER 2 HOURS. DO NOT EXCEED 2 TABLETS IN 24 HOURS. 27 tablet prn     POLYVIN ALC-POVIDON DIMETHYLAM (FRESHKOTE) 2.7-2 % SOLN Apply  to eye.       meloxicam (MOBIC) 15 MG tablet TAKE ONE TABLET BY MOUTH EVERY DAY (Patient not taking: Reported on 11/9/2017) 90 tablet 1     ibuprofen (ADVIL/MOTRIN) 800 MG tablet TAKE ONE TABLET BY MOUTH EVERY 8 HOURS AS NEEDED FOR PAIN (Patient not taking: Reported on 8/30/2017) 100 tablet 3     ketoconazole (NIZORAL) 2 % cream Apply topically 2 times daily (Patient not taking: Reported on 3/14/2017) 30 g 1     mupirocin (BACTROBAN) 2 % ointment Apply topically 3 times daily (Patient not taking: Reported on 11/9/2017) 22 g 1     hydrocortisone (WESTCORT) 0.2 % cream Apply sparingly to affected area three times daily as needed. (Patient not taking: Reported on 11/9/2017) 60 g 0     fexofenadine (ALLEGRA) 180 MG tablet Take 1 tablet by mouth daily Reported on 4/5/2017       nystatin-triamcinolone (MYCOLOG II) cream Apply topically 2 times daily (Patient not taking: Reported on 11/9/2017) 15 g 0     ORDER FOR DME Reported on 4/5/2017 1 Device 0     Polyethyl Glycol-Propyl Glycol (SYSTANE OP) Reported on 3/14/2017       Allergies   Allergen Reactions     Ceftriaxone Anaphylaxis     Hives, rash, racing heart beat     Bactrim  [Sulfamethoxazole W/Trimethoprim] Hives     Ciprofloxacin Other (See Comments)     Tendon Issues     Codeine Nausea and Vomiting     Copper      Rash       Doxycycline      Loss of skin pigmentation, skin loss.     Gold      Rash       Iodine-131 Hives     Latex      Levaquin [Levofloxacin] Other (See Comments)     Tendon issues with levaquin and cipro     Nickel      rash     Steel [Staples]      Rash from staples       Penicillins Rash     BP Readings from Last 3 Encounters:   11/09/17 138/76   08/30/17 118/70   04/05/17 136/80    Wt Readings from Last 3 Encounters:   11/09/17 199 lb 12.8 oz (90.6 kg)   08/30/17 202 lb 3.2 oz (91.7 kg)   04/05/17 209 lb 1.6 oz (94.8 kg)                        ROS:  Constitutional, HEENT, cardiovascular, pulmonary, gi and gu systems are negative, except as otherwise noted.      OBJECTIVE:   /76 (Cuff Size: Adult Regular)  Pulse 64  Temp 97.1  F (36.2  C) (Temporal)  Resp 18  Wt 199 lb 12.8 oz (90.6 kg)  BMI 32.49 kg/m2  Body mass index is 32.49 kg/(m^2).  GENERAL: healthy, alert and no distress  EYES: Eyes grossly normal to inspection, PERRL and conjunctivae and sclerae normal  HENT: ear canals and TM's normal, nose and mouth without ulcers or lesions  SCALP: small hematoma suspected over the left posterior temporal region  NECK: no adenopathy, no asymmetry, masses, or scars and trachea midline and normal to palpation  MS: no gross musculoskeletal defects noted, no edema  SKIN: no suspicious lesions or rashes  NEURO: Normal strength and tone, mentation intact and speech normal  PSYCH: mentation appears normal, affect normal/bright    Diagnostic Test Results:  Results for orders placed or performed during the hospital encounter of 10/30/17   CT Head w/o Contrast    Narrative    CT HEAD WITHOUT CONTRAST   10/30/2017 2:36 PM     HISTORY:  Struck in the head by metal implement 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 Left temporal injury area. Injury yesterday morning.  Pain now  "radiating down left back of patient's head into neck. No  history of surgery or cancer. Patient does have history of Bell's  palsy.    COMPARISON: CT dated 1/27/2016 and MRI dated 3/29/2016.    TECHNIQUE: Axial images through the brain obtained without intravenous  contrast, reviewed in bone, brain, and subdural windows.  Radiation dose for this scan was reduced using automated exposure  control, adjustment of the mA and/or kV according to patient size, or  iterative reconstruction technique.    FINDINGS: Attenuation of the brain parenchyma is grossly within normal  limits without mass or acute hemorrhage. There is no mass-effect or  midline shift. Gray/white matter differentiation is well maintained.   No abnormal intra- or extra-axial fluid collections. The ventricles do  not appear enlarged out of proportion to the cerebral sulci.     The visualized portions of the paranasal sinuses, mastoid air cells,  calvarium, and orbits are unremarkable.       Impression    IMPRESSION:  No acute intracranial pathology. No acute intracranial  traumatic injury is identified.    I discussed these findings with Otoniel Manuel on 10/30/2017 at 2:45 PM.    PRACHI MAYORGA MD       ASSESSMENT/PLAN:   BMI:   Estimated body mass index is 32.49 kg/(m^2) as calculated from the following:    Height as of 1/30/17: 5' 5.75\" (1.67 m).    Weight as of this encounter: 199 lb 12.8 oz (90.6 kg).   Weight management plan: Discussed healthy diet and exercise guidelines and patient will follow up in 6 months in clinic to re-evaluate.        1. Hematoma of scalp, initial encounter  2. 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  Dispense: 21 tablet; Refill: 0    ROV NATHANN    Otoniel Manuel PA-C  [facility]  "

## 2017-11-09 NOTE — NURSING NOTE
"Chief Complaint   Patient presents with     Head Injury       Initial /76 (Cuff Size: Adult Regular)  Pulse 64  Temp 97.1  F (36.2  C) (Temporal)  Resp 18  Wt 199 lb 12.8 oz (90.6 kg)  BMI 32.49 kg/m2 Estimated body mass index is 32.49 kg/(m^2) as calculated from the following:    Height as of 1/30/17: 5' 5.75\" (1.67 m).    Weight as of this encounter: 199 lb 12.8 oz (90.6 kg).  Medication Reconciliation: complete   Arabella Petty CMA ([facility])    "

## 2017-11-13 ENCOUNTER — TRANSFERRED RECORDS (OUTPATIENT)
Dept: HEALTH INFORMATION MANAGEMENT | Facility: CLINIC | Age: 42
End: 2017-11-13

## 2017-11-28 ENCOUNTER — MYC MEDICAL ADVICE (OUTPATIENT)
Dept: FAMILY MEDICINE | Facility: OTHER | Age: 42
End: 2017-11-28

## 2017-11-28 ENCOUNTER — TELEPHONE (OUTPATIENT)
Dept: FAMILY MEDICINE | Facility: OTHER | Age: 42
End: 2017-11-28

## 2017-11-28 DIAGNOSIS — B00.1 RECURRENT COLD SORES: ICD-10-CM

## 2017-11-28 DIAGNOSIS — B00.1 RECURRENT COLD SORES: Primary | ICD-10-CM

## 2017-11-28 RX ORDER — DOCOSANOL 100 MG/G
CREAM TOPICAL
Qty: 2 G | Refills: 1 | Status: SHIPPED | OUTPATIENT
Start: 2017-11-28 | End: 2017-11-28

## 2017-11-28 RX ORDER — ACYCLOVIR 50 MG/G
OINTMENT TOPICAL
Qty: 15 G | Refills: 3 | Status: SHIPPED | OUTPATIENT
Start: 2017-11-28 | End: 2020-09-24

## 2017-12-01 ENCOUNTER — MYC MEDICAL ADVICE (OUTPATIENT)
Dept: FAMILY MEDICINE | Facility: OTHER | Age: 42
End: 2017-12-01

## 2017-12-01 ENCOUNTER — ALLIED HEALTH/NURSE VISIT (OUTPATIENT)
Dept: FAMILY MEDICINE | Facility: OTHER | Age: 42
End: 2017-12-01
Payer: COMMERCIAL

## 2017-12-01 DIAGNOSIS — G43.009 MIGRAINE WITHOUT AURA AND WITHOUT STATUS MIGRAINOSUS, NOT INTRACTABLE: Primary | ICD-10-CM

## 2017-12-01 PROCEDURE — 96372 THER/PROPH/DIAG INJ SC/IM: CPT

## 2017-12-01 RX ORDER — KETOROLAC TROMETHAMINE 30 MG/ML
60 INJECTION, SOLUTION INTRAMUSCULAR; INTRAVENOUS ONCE
Qty: 2 ML | Refills: 0 | OUTPATIENT
Start: 2017-12-01 | End: 2017-12-01

## 2017-12-01 NOTE — MR AVS SNAPSHOT
After Visit Summary   12/1/2017    Amelia Coker    MRN: [id number]           Patient Information     Date Of Birth          1975        Visit Information        Provider Department      12/1/2017 10:45 AM PAMELA MARCELINO NURSE, [facility]        Today's Diagnoses     Migraine without aura and without status migrainosus, not intractable    -  1       Follow-ups after your visit        Who to contact     If you have questions or need follow up information about today's clinic visit or your schedule please contact [facility] directly at [phone].  Normal or non-critical lab and imaging results will be communicated to you by [employer], letter or phone within 4 business days after the clinic has received the results. If you do not hear from us within 7 days, please contact the clinic through ezzai - how to arabiat or phone. If you have a critical or abnormal lab result, we will notify you by phone as soon as possible.  Submit refill requests through [employer]Pads or call your pharmacy and they will forward the refill request to us. Please allow 3 business days for your refill to be completed.          Additional Information About Your Visit        MyChart Information     HotPads gives you secure access to your electronic health record. If you see a primary care provider, you can also send messages to your care team and make appointments. If you have questions, please call your primary care clinic.  If you do not have a primary care provider, please call [phone] and they will assist you.        Care EveryWhere ID     This is your Care EveryWhere ID. This could be used by other organizations to access your [facility] medical records  [id number]         Blood Pressure from Last 3 Encounters:   11/09/17 138/76   08/30/17 118/70   04/05/17 136/80    Weight from Last 3 Encounters:   11/09/17 199 lb 12.8 oz (90.6 kg)   08/30/17 202 lb 3.2 oz (91.7 kg)   04/05/17 209 lb 1.6 oz (94.8 kg)               We Performed the Following     INJECTION INTRAMUSCULAR OR SUB-Q     KETOROLAC TROMETHAMINE 15MG          Today's Medication Changes          These changes are accurate as of: 12/1/17 10:57 AM.  If you have any questions, ask your nurse or doctor.               Start taking these medicines.        Dose/Directions    ketorolac 60 MG/2ML Soln injection   Commonly known as:  TORADOL   Used for:  Migraine without aura and without status migrainosus, not intractable   Started by:  Otoniel Nelson PA-C        Dose:  60 mg   Inject 2 mLs (60 mg) into the muscle once for 1 dose   Quantity:  2 mL   Refills:  0            Where to get your medicines      Some of these will need a paper prescription and others can be bought over the counter.  Ask your nurse if you have questions.     You don't need a prescription for these medications     ketorolac 60 MG/2ML Soln injection                Primary Care Provider Office Phone # Fax #    Otoniel Nelson PA-C [phone]       [place] [address]        Equal Access to Services     HOLLIE [facility] HospitalARAMIS AH: Hadii aad ku hadasho Soomaali, waaxda luqadaha, qaybta kaalmada adeegyada, waxay idiin haymartan cheri amaya . [facility] [phone].    ATENCIÓN: Si habla español, tiene a hines disposición servicios gratuitos de asistencia lingüística. [facility] [phone].    We comply with applicable federal civil rights laws and [place] laws. We do not discriminate on the basis of race, color, national origin, age, disability, sex, sexual orientation, or gender identity.            Thank you!     Thank you for choosing [facility]  for your care. Our goal is always to provide you with excellent care. Hearing back from our patients is one way we can continue to improve our services. Please take a few minutes to complete the written survey that you may receive in the mail after your visit with us. Thank you!             Your Updated  Medication List - Protect others around you: Learn how to safely use, store and throw away your medicines at www.disposemymeds.org.          This list is accurate as of: 12/1/17 10:57 AM.  Always use your most recent med list.                   Brand Name Dispense Instructions for use Diagnosis    acyclovir 5 % ointment    ZOVIRAX    15 g    Apply topically 6 times daily    Recurrent cold sores       ALLEGRA 180 MG tablet   Generic drug:  fexofenadine      Take 1 tablet by mouth daily Reported on 4/5/2017    FLORIDALMA positive, Family history of lupus erythematosus, Lumbar radiculopathy, Fatigue, Dry eyes, bilateral, Keratitis sicca, Foraminal stenosis of lumbar region, Ds DNA antibody positive       Coal Tar Extract 4 % Sham     168 mL    Externally apply 1 Dose topically daily    Hair loss, Scalp lesion       diazepam 5 MG tablet    VALIUM    90 tablet    Take 1 tablet (5 mg) by mouth 3 times daily as needed for muscle spasms    Spasm of muscle       FRESHKOTE 2.7-2 % Soln   Generic drug:  Polyvinyl Alcohol-Povidone      Apply  to eye.        hydrocortisone 0.2 % cream    WESTCORT    60 g    Apply sparingly to affected area three times daily as needed.    Telangiectasia of face       ibuprofen 800 MG tablet    ADVIL/MOTRIN    100 tablet    TAKE ONE TABLET BY MOUTH EVERY 8 HOURS AS NEEDED FOR PAIN    Cervicalgia, Muscle spasm       ketoconazole 2 % cream    NIZORAL    30 g    Apply topically 2 times daily    Ringworm of body       ketorolac 60 MG/2ML Soln injection    TORADOL    2 mL    Inject 2 mLs (60 mg) into the muscle once for 1 dose    Migraine without aura and without status migrainosus, not intractable       * meloxicam 15 MG tablet    MOBIC    90 tablet    TAKE ONE TABLET BY MOUTH EVERY DAY    Cervicalgia, Muscle spasm       * meloxicam 15 MG tablet    MOBIC    90 tablet    TAKE ONE TABLET BY MOUTH EVERY DAY    Cervicalgia, Muscle spasm       methylphenidate 10 MG tablet    RITALIN    120 tablet    Take 1-2  tablets (10-20 mg) by mouth 2 times daily    Chronic fatigue       methylPREDNISolone 4 MG tablet    MEDROL DOSEPAK    21 tablet    Follow package directions.    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, Hematoma of scalp, initial encounter       mupirocin 2 % nasal ointment    BACTROBAN NASAL    22 g    Apply to affected area 3 times daily    Skin puncture       mupirocin 2 % ointment    BACTROBAN    22 g    Apply topically 3 times daily    MRSA (methicillin resistant staph aureus) culture positive, Skin lesion       nystatin-triamcinolone cream    MYCOLOG II    15 g    Apply topically 2 times daily    Atopic dermatitis       order for DME     1 Device    Reported on 4/5/2017    Plantar fascial fibromatosis       rizatriptan 5 MG ODT tab    MAXALT-MLT    18 tablet    Take 1-2 tablets (5-10 mg) by mouth at onset of headache for migraine May repeat dose in 2 hours.  Do not exceed 30 mg in 24 hours    Migraine without aura and without status migrainosus, not intractable       sucralfate 1 GM tablet    CARAFATE    120 tablet    TAKE ONE TABLET BY MOUTH FOUR TIMES A DAY    Gastroesophageal reflux disease, esophagitis presence not specified       SUMAtriptan 100 MG tablet    IMITREX    27 tablet    Take 1 tablet by mouth See Admin Instructions. WITH ONSET OF MIGRAINE, MAY REPEAT ONCE AFTER 2 HOURS. DO NOT EXCEED 2 TABLETS IN 24 HOURS.    Migraine, unspecified, with intractable migraine, so stated, without mention of status migrainosus       SYSTANE OP      Reported on 3/14/2017        tiZANidine 4 MG tablet    ZANAFLEX    90 tablet    TAKE ONE TO TWO TABLETS BY MOUTH THREE TIMES A DAY AS NEEDED FOR MUSCLE SPASMS    Lumbar radiculopathy, Back muscle spasm       TYLENOL PO      Take 500 mg by mouth every 6 hours as needed        XIIDRA 5 % Soln   Generic drug:  Lifitegrast           * Notice:  This list has 2 medication(s) that are the same as other medications prescribed for you. Read the directions carefully, and ask your  doctor or other care provider to review them with you.

## 2017-12-01 NOTE — NURSING NOTE
The following medication was given:     MEDICATION: Toradol 60 mg  See medication note.  Patient instructed to remain in clinic for 20 minutes afterwards, and to report any adverse reaction to me immediately.  Prior to injection verified patient identity using patient's name and date of birth.  Suzanne Cervantes MA     12/1/2017

## 2017-12-01 NOTE — NURSING NOTE
Pt sat for 30 minutes, her headache did not improve and her pain is left sided. She reports that she had a head injury a few weeks ago on her left side and had testing done in [place], her PCP is aware of this. I huddled with Yudelka Philip RN and called PCP, Otoniel Manuel PA-C, and per PCP pt was advised to go to the ED. Pt declined ambulance. She plans to drive to work which is a few blocks from the clinic and wait for her  to pick her up in an hour.    Suzanne Cervantes MA     12/1/2017

## 2017-12-01 NOTE — TELEPHONE ENCOUNTER
Medication pending for provider signature if indicated.  Arabella Petty CMA ([facility])

## 2018-01-29 NOTE — PROGRESS NOTES
SUBJECTIVE:   Amelia Coker is a 43 year old female who presents to clinic today for the following health issues:      HPI  Acute Illness   Acute illness concerns: Cough, fever - recent trip to [place]  Onset: 11 days    Fever: YES- Off and on    Chills/Sweats: YES    Headache (location?): no    Sinus Pressure:no    Conjunctivitis:  no    Ear Pain: no    Rhinorrhea: YES    Congestion: no    Sore Throat: no     Cough: YES-non-productive, productive of clear sputum, with shortness of breath    Wheeze: YES- at night, reclining    Decreased Appetite: no    Nausea: no    Vomiting: no    Diarrhea:  no    Dysuria/Freq.: no    Fatigue/Achiness: YES    Sick/Strep Exposure: YES-      Therapies Tried and outcome: Mucinex, sudafed, Ibuprofen, tylenol    Problem list and histories reviewed & adjusted, as indicated.  Additional history: as documented    Patient Active Problem List   Diagnosis     Intractable migraine     Bell's palsy     Contact dermatitis and other eczema, due to unspecified cause     Allergic rhinitis due to other allergen     Scalp lesion     Lyme disease     PAC (premature atrial contraction)     Palpitations     Raynaud phenomenon     Cold intolerance of hand     Chronic fatigue     Hair loss     Pain in joint     Abnormal PFT     Shoulder joint instability     Family history of melanoma     MRSA (methicillin resistant staph aureus) culture positive     Dry eyes     Keratitis sicca     Laceration of foot, right     Skin ulcer of neck (H)     Family history of colon cancer     Pain in joint, upper arm     Traumatic amputation of fingertip     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     FLORIDALMA positive     Plantar fascial fibromatosis     Foraminal stenosis of lumbar region     DJD (degenerative joint disease), lumbar     Left shoulder pain     Migraine without aura and without status migrainosus, not intractable     Lumbar radiculopathy     Ds DNA antibody positive     Muscular  wasting and disuse atrophy, not elsewhere classified     Other postprocedural status(V45.89)     Facial contusion     Frontal headache     Telangiectasia of face     Lateral epicondylitis     Right shoulder pain     Plantar fasciitis     HTN, goal below 140/90     Tooth pain     MRSA infection     Skin lesion     AD (atopic dermatitis)     Heat sensitivity     Rotator cuff arthropathy, right     Contusion of left foot, initial encounter     Gastroesophageal reflux disease, esophagitis presence not specified     Abnormal mammogram     Ringworm of body     Fall from horse, initial encounter     Sternocleidomastoid muscle tenderness     Acute cervical myofascial strain, initial encounter     Spasm of muscle     Hyperlipidemia LDL goal <130     Past Surgical History:   Procedure Laterality Date     COLONOSCOPY  3/11/2013    Procedure: COLONOSCOPY;  colonoscopy;  Surgeon: Lobito Mas MD;  Location: [facility]     DECOMPRESSION LUMBAR TWO LEVELS Bilateral 12/8/2016    Procedure: DECOMPRESSION LUMBAR TWO LEVELS;  Surgeon: Bang Burrell MD;  Location: [facility]     ESOPHAGOSCOPY, GASTROSCOPY, DUODENOSCOPY (EGD), COMBINED  11/8/2013    Procedure: COMBINED ESOPHAGOSCOPY, GASTROSCOPY, DUODENOSCOPY (EGD), BIOPSY SINGLE OR MULTIPLE;  Esophagoscopy, Gastroscopy, Duodenoscopy EGD with multiple biopsies;  Surgeon: Teja Koch MD;  Location: [facility]     HC REMOVAL OF TONSILS,<13 Y/O      Tonsils <10y.o.     HC TOOTH EXTRACTION W/FORCEP       REPAIR TENDON ELBOW  7/9/2014    Procedure: REPAIR TENDON ELBOW;  Surgeon: Chris Johnston MD;  Location: [facility]       Social History   Substance Use Topics     Smoking status: Never Smoker     Smokeless tobacco: Never Used     Alcohol use No      Comment: social     Family History   Problem Relation Age of Onset     DIABETES Mother      adult     Cancer - colorectal Mother      Hypertension Mother      Allergies Mother      CANCER Mother      colon     Depression Mother       GASTROINTESTINAL DISEASE Mother      ibs     Genitourinary Problems Mother      kidney cyst     Gynecology Mother      endometriosis     Lipids Mother      Thyroid Disease Mother      Arthritis Mother      RA     HEART DISEASE Maternal Grandfather      MI,  - 60's     Allergies Father      Unknown/Adopted Father      HEART DISEASE Father      mi-age mid 40's     Cardiovascular Father      Lipids Father      Respiratory Father      asthma     CANCER Father      Other Cancer Father      renal cancer     Depression Sister      Allergies Sister      Gynecology Sister      endometriosis     Lipids Paternal Grandmother      OSTEOPOROSIS Paternal Grandmother      Thyroid Disease Paternal Grandmother      Respiratory Son      asthma     Allergies Son      Arthritis Sister      Allergies Brother      HEART DISEASE Brother      Allergies Daughter      Blood Disease Paternal Aunt      LGL T cell Leukemia         Current Outpatient Prescriptions   Medication Sig Dispense Refill     polyethylene glycol 0.4%- propylene glycol 0.3% (SYSTANE ULTRA) 0.4-0.3 % SOLN ophthalmic solution Place 1 drop into both eyes every hour as needed for dry eyes       methylPREDNISolone (MEDROL DOSEPAK) 4 MG tablet Follow package instructions 21 tablet 0     acyclovir (ZOVIRAX) 5 % ointment Apply topically 6 times daily 15 g 3     Lifitegrast (XIIDRA) 5 % SOLN        mupirocin (BACTROBAN NASAL) 2 % nasal ointment Apply to affected area 3 times daily 22 g 0     methylphenidate (RITALIN) 10 MG tablet Take 1-2 tablets (10-20 mg) by mouth 2 times daily 120 tablet 0     diazepam (VALIUM) 5 MG tablet Take 1 tablet (5 mg) by mouth 3 times daily as needed for muscle spasms 90 tablet 0     tiZANidine (ZANAFLEX) 4 MG tablet TAKE ONE TO TWO TABLETS BY MOUTH THREE TIMES A DAY AS NEEDED FOR MUSCLE SPASMS 90 tablet 0     Coal Tar Extract 4 % SHAM Externally apply 1 Dose topically daily 168 mL 1     ibuprofen (ADVIL/MOTRIN) 800 MG tablet TAKE ONE TABLET BY  MOUTH EVERY 8 HOURS AS NEEDED FOR PAIN 100 tablet 3     rizatriptan (MAXALT-MLT) 5 MG ODT tab Take 1-2 tablets (5-10 mg) by mouth at onset of headache for migraine May repeat dose in 2 hours.  Do not exceed 30 mg in 24 hours 18 tablet 3     ketoconazole (NIZORAL) 2 % cream Apply topically 2 times daily 30 g 1     sucralfate (CARAFATE) 1 GM tablet TAKE ONE TABLET BY MOUTH FOUR TIMES A  tablet 1     hydrocortisone (WESTCORT) 0.2 % cream Apply sparingly to affected area three times daily as needed. 60 g 0     Acetaminophen (TYLENOL PO) Take 500 mg by mouth every 6 hours as needed        fexofenadine (ALLEGRA) 180 MG tablet Take 1 tablet by mouth daily Reported on 4/5/2017       nystatin-triamcinolone (MYCOLOG II) cream Apply topically 2 times daily 15 g 0     SUMAtriptan (IMITREX) 100 MG tablet Take 1 tablet by mouth See Admin Instructions. WITH ONSET OF MIGRAINE, MAY REPEAT ONCE AFTER 2 HOURS. DO NOT EXCEED 2 TABLETS IN 24 HOURS. 27 tablet prn     meloxicam (MOBIC) 15 MG tablet TAKE ONE TABLET BY MOUTH EVERY DAY (Patient not taking: Reported on 11/9/2017) 90 tablet 1     meloxicam (MOBIC) 15 MG tablet TAKE ONE TABLET BY MOUTH EVERY DAY (Patient not taking: Reported on 2/2/2018) 90 tablet 1     mupirocin (BACTROBAN) 2 % ointment Apply topically 3 times daily (Patient not taking: Reported on 11/9/2017) 22 g 1     POLYVIN ALC-POVIDON DIMETHYLAM (FRESHKOTE) 2.7-2 % SOLN Apply  to eye.       Allergies   Allergen Reactions     Ceftriaxone Anaphylaxis     Hives, rash, racing heart beat     Bactrim [Sulfamethoxazole W/Trimethoprim] Hives     Ciprofloxacin Other (See Comments)     Tendon Issues     Codeine Nausea and Vomiting     Copper      Rash       Doxycycline      Loss of skin pigmentation, skin loss.     Gold      Rash       Iodine-131 Hives     Latex      Levaquin [Levofloxacin] Other (See Comments)     Tendon issues with levaquin and cipro     Nickel      rash     Steel [Staples]      Rash from staples        "Penicillins Rash     BP Readings from Last 3 Encounters:   02/02/18 120/84   11/09/17 138/76   08/30/17 118/70    Wt Readings from Last 3 Encounters:   02/02/18 200 lb 3.2 oz (90.8 kg)   11/09/17 199 lb 12.8 oz (90.6 kg)   08/30/17 202 lb 3.2 oz (91.7 kg)                    ROS:  Constitutional, HEENT, cardiovascular, pulmonary, gi and gu systems are negative, except as otherwise noted.    OBJECTIVE:     /84 (Patient Position: Chair, Cuff Size: Adult Large)  Pulse 68  Temp 99.2  F (37.3  C) (Temporal)  Resp 20  Ht 5' 5.75\" (1.67 m)  Wt 200 lb 3.2 oz (90.8 kg)  SpO2 100%  BMI 32.56 kg/m2  Body mass index is 32.56 kg/(m^2).  GENERAL: healthy, alert and no distress  HENT: ear canals and TM's normal, nose and mouth without ulcers or lesions  NECK: no adenopathy, no asymmetry, masses, or scars and thyroid normal to palpation  RESP: lungs clear to auscultation - no rales, rhonchi or wheezes  CV: regular rate and rhythm, normal S1 S2, no S3 or S4, no murmur, click or rub, no peripheral edema and peripheral pulses strong  MS: no gross musculoskeletal defects noted, no edema  PSYCH: mentation appears normal, affect normal/bright    Diagnostic Test Results:  Results for orders placed or performed in visit on 02/02/18 (from the past 24 hour(s))   CBC with platelets and differential   Result Value Ref Range    WBC 4.7 4.0 - 11.0 10e9/L    RBC Count 4.46 3.8 - 5.2 10e12/L    Hemoglobin 13.6 11.7 - 15.7 g/dL    Hematocrit 40.2 35.0 - 47.0 %    MCV 90 78 - 100 fl    MCH 30.5 26.5 - 33.0 pg    MCHC 33.8 31.5 - 36.5 g/dL    RDW 13.2 10.0 - 15.0 %    Platelet Count 315 150 - 450 10e9/L    Diff Method Automated Method     % Neutrophils 55.1 %    % Lymphocytes 34.8 %    % Monocytes 9.0 %    % Eosinophils 0.9 %    % Basophils 0.2 %    Absolute Neutrophil 2.6 1.6 - 8.3 10e9/L    Absolute Lymphocytes 1.6 0.8 - 5.3 10e9/L    Absolute Monocytes 0.4 0.0 - 1.3 10e9/L    Absolute Eosinophils 0.0 0.0 - 0.7 10e9/L    Absolute " Basophils 0.0 0.0 - 0.2 10e9/L       ASSESSMENT/PLAN:     1. Bronchitis  2. Recent foreign travel  3. Cough  4. Fever, unspecified fever cause  5. Malaise  Appears to be viral, recheck in 2 weeks.  - CBC with platelets and differential  - methylPREDNISolone (MEDROL DOSEPAK) 4 MG tablet; Follow package instructions  Dispense: 21 tablet; Refill: 0    ROV NATHANN    Otoniel Manuel PA-C  [facility]

## 2018-02-02 ENCOUNTER — OFFICE VISIT (OUTPATIENT)
Dept: FAMILY MEDICINE | Facility: OTHER | Age: 43
End: 2018-02-02
Payer: COMMERCIAL

## 2018-02-02 VITALS
BODY MASS INDEX: 32.17 KG/M2 | TEMPERATURE: 99.2 F | HEIGHT: 66 IN | SYSTOLIC BLOOD PRESSURE: 120 MMHG | DIASTOLIC BLOOD PRESSURE: 84 MMHG | WEIGHT: 200.2 LBS | HEART RATE: 68 BPM | RESPIRATION RATE: 20 BRPM | OXYGEN SATURATION: 100 %

## 2018-02-02 DIAGNOSIS — R05.9 COUGH: ICD-10-CM

## 2018-02-02 DIAGNOSIS — J40 BRONCHITIS: Primary | ICD-10-CM

## 2018-02-02 DIAGNOSIS — R53.81 MALAISE: ICD-10-CM

## 2018-02-02 DIAGNOSIS — Z78.9 RECENT FOREIGN TRAVEL: ICD-10-CM

## 2018-02-02 DIAGNOSIS — R50.9 FEVER, UNSPECIFIED FEVER CAUSE: ICD-10-CM

## 2018-02-02 LAB
BASOPHILS # BLD AUTO: 0 10E9/L (ref 0–0.2)
BASOPHILS NFR BLD AUTO: 0.2 %
DIFFERENTIAL METHOD BLD: NORMAL
EOSINOPHIL # BLD AUTO: 0 10E9/L (ref 0–0.7)
EOSINOPHIL NFR BLD AUTO: 0.9 %
ERYTHROCYTE [DISTWIDTH] IN BLOOD BY AUTOMATED COUNT: 13.2 % (ref 10–15)
HCT VFR BLD AUTO: 40.2 % (ref 35–47)
HGB BLD-MCNC: 13.6 G/DL (ref 11.7–15.7)
LYMPHOCYTES # BLD AUTO: 1.6 10E9/L (ref 0.8–5.3)
LYMPHOCYTES NFR BLD AUTO: 34.8 %
MCH RBC QN AUTO: 30.5 PG (ref 26.5–33)
MCHC RBC AUTO-ENTMCNC: 33.8 G/DL (ref 31.5–36.5)
MCV RBC AUTO: 90 FL (ref 78–100)
MONOCYTES # BLD AUTO: 0.4 10E9/L (ref 0–1.3)
MONOCYTES NFR BLD AUTO: 9 %
NEUTROPHILS # BLD AUTO: 2.6 10E9/L (ref 1.6–8.3)
NEUTROPHILS NFR BLD AUTO: 55.1 %
PLATELET # BLD AUTO: 315 10E9/L (ref 150–450)
RBC # BLD AUTO: 4.46 10E12/L (ref 3.8–5.2)
WBC # BLD AUTO: 4.7 10E9/L (ref 4–11)

## 2018-02-02 PROCEDURE — 85025 COMPLETE CBC W/AUTO DIFF WBC: CPT | Performed by: PHYSICIAN ASSISTANT

## 2018-02-02 PROCEDURE — 99213 OFFICE O/P EST LOW 20 MIN: CPT | Performed by: PHYSICIAN ASSISTANT

## 2018-02-02 PROCEDURE — 36415 COLL VENOUS BLD VENIPUNCTURE: CPT | Performed by: PHYSICIAN ASSISTANT

## 2018-02-02 RX ORDER — METHYLPREDNISOLONE 4 MG
TABLET, DOSE PACK ORAL
Qty: 21 TABLET | Refills: 0 | Status: SHIPPED | OUTPATIENT
Start: 2018-02-02 | End: 2018-03-13

## 2018-02-02 ASSESSMENT — PAIN SCALES - GENERAL: PAINLEVEL: MODERATE PAIN (4)

## 2018-02-02 NOTE — MR AVS SNAPSHOT
After Visit Summary   2/2/2018    Amelia Coker    MRN: [id number]           Patient Information     Date Of Birth          1975        Visit Information        Provider Department      2/2/2018 7:40 AM Otoniel Nelson PA-C [facility]        Today's Diagnoses     Cough    -  1    Fever, unspecified fever cause        Malaise        Recent foreign travel        Bronchitis          Care Instructions                Acute Bronchitis          What is acute bronchitis?   Bronchitis is an infection of the air passages--that is, the tubes that connect the windpipe to the lungs. It causes swelling and irritation of the airways. With acute bronchitis you usually have a cough that produces phlegm and pain behind the breastbone when you breathe deeply or cough.   How does it occur?   Bronchitis often occurs with viral infections of the respiratory tract, such as colds and flu. Bronchitis may also be caused by bacterial infections. It may occur with childhood illnesses such as measles and whooping cough.   Attacks are most frequent during the winter or when the level of air pollution is high.   Infants, young children, older adults, smokers, and people with heart disease or lung disease (including asthma and allergies) are most likely to get acute bronchitis.   What are the symptoms?   Symptoms may include:   a deep cough that produces yellowish or greenish phlegm   pain behind the breastbone when you breathe deeply or cough   wheezing   feeling short of breath   fever   chills   headache   sore muscles.   How is it diagnosed?   Your healthcare provider will ask about your symptoms and examine you. You may have tests, such as:   a test of phlegm to look for bacteria   chest X-ray   blood tests.   How is it treated?   Acute bronchitis often does not require medical treatment. Resting at home and drinking plenty of fluids to keep the mucus loose may be all you need to do to get better in a few  days. If your symptoms are severe or you have other health problems (such as heart or lung disease or diabetes), you may need to take antibiotics.   How long will the effects last?   Most of the time acute bronchitis clears up in a few days. Your cough may slowly get better in 1 to 2 weeks.   It may take you longer to recover if:   You are a smoker.   You live in an area where air pollution is a problem.   You have a heart or lung disease.   You have any other continuing health problems.   How can I take care of myself?   You can help yourself by:   following the full treatment your healthcare provider recommends   using a vaporizer, humidifier, or steam from hot water to add moisture to the air   drinking plenty of liquids   taking cough medicine if recommended by your healthcare provider   resting in bed   taking aspirin or acetaminophen to reduce fever and relieve headache and muscle pain (Check with your healthcare provider before you give any medicine that contains aspirin or salicylates to a child or teen. This includes medicines like baby aspirin, some cold medicines, and Pepto Bismol. Children and teens who take aspirin are at risk for a serious illness called Reye's syndrome.)   eating healthy meals.   Call your healthcare provider if:   You have trouble breathing.   You have a fever of 101.5?F (38.6?C) or higher.   You cough up blood.   Your symptoms are getting worse instead of better.   You don't start to feel better after 3 days of treatment.   You have any symptoms that concern you.   How can I help prevent acute bronchitis?   To reduce your risk of getting a respiratory infection:   Do not smoke.   Wash your hands often, especially when you are around people with colds (upper respiratory infections).   If you have asthma or allergies, keep your symptoms under good control.   Get regular exercise.   Eat healthy foods.     Published by [employer].  This content is reviewed periodically and is subject  "to change as new health information becomes available. The information is intended to inform and educate and is not a replacement for medical evaluation, advice, diagnosis or treatment by a healthcare professional.   Developed by [employer].   ? 2010 [employer] and/or its affiliates. All Rights Reserved.   Copyright   Clinical Reference Systems 2011  Adult Health Advisor                Follow-ups after your visit        Who to contact     If you have questions or need follow up information about today's clinic visit or your schedule please contact [facility] directly at [phone].  Normal or non-critical lab and imaging results will be communicated to you by [employer], letter or phone within 4 business days after the clinic has received the results. If you do not hear from us within 7 days, please contact the clinic through [employer] or phone. If you have a critical or abnormal lab result, we will notify you by phone as soon as possible.  Submit refill requests through [employer] or call your pharmacy and they will forward the refill request to us. Please allow 3 business days for your refill to be completed.          Additional Information About Your Visit        Virtual DBS Information     Virtual [employer]BS gives you secure access to your electronic health record. If you see a primary care provider, you can also send messages to your care team and make appointments. If you have questions, please call your primary care clinic.  If you do not have a primary care provider, please call [phone] and they will assist you.        Care EveryWhere ID     This is your Care EveryWhere ID. This could be used by other organizations to access your [facility] medical records  [id number]        Your Vitals Were     Pulse Temperature Respirations Height Pulse Oximetry BMI (Body Mass Index)    68 99.2  F (37.3  C) (Temporal) 20 5' 5.75\" (1.67 m) 100% 32.56 kg/m2       Blood Pressure from Last 3 Encounters:   02/02/18 120/84 "   11/09/17 138/76   08/30/17 118/70    Weight from Last 3 Encounters:   02/02/18 200 lb 3.2 oz (90.8 kg)   11/09/17 199 lb 12.8 oz (90.6 kg)   08/30/17 202 lb 3.2 oz (91.7 kg)              We Performed the Following     CBC with platelets and differential          Today's Medication Changes          These changes are accurate as of 2/2/18  8:04 AM.  If you have any questions, ask your nurse or doctor.               Start taking these medicines.        Dose/Directions    methylPREDNISolone 4 MG tablet   Commonly known as:  MEDROL DOSEPAK   Used for:  Cough, Fever, unspecified fever cause, Malaise, Recent foreign travel, Bronchitis   Started by:  Otoniel Nelson PA-C        Follow package instructions   Quantity:  21 tablet   Refills:  0            Where to get your medicines      These medications were sent to [facility] [place] [address]   [address] [facility] [address]     Phone:  [phone]     methylPREDNISolone 4 MG tablet                Primary Care Provider Office Phone # Fax #    Otoniel Nelson PA-C [phone]       [place] [address]        Equal Access to Services     [facility] AH: Hadii abelino mallory hadasho Soomaali, waaxda luqadaha, qaybta kaalmada adeegyada, jade plasencia. So [facility] [phone].    ATENCIÓN: Si habla español, tiene a hines disposición servicios gratuitos de asistencia lingüística. Llame al [phone].    We comply with applicable federal civil rights laws and [place] laws. We do not discriminate on the basis of race, color, national origin, age, disability, sex, sexual orientation, or gender identity.            Thank you!     Thank you for choosing [facility]  for your care. Our goal is always to provide you with excellent care. Hearing back from our patients is one way we can continue to improve our services. Please take a few minutes to complete the written survey that  you may receive in the mail after your visit with us. Thank you!             Your Updated Medication List - Protect others around you: Learn how to safely use, store and throw away your medicines at www.disposemymeds.org.          This list is accurate as of 2/2/18  8:04 AM.  Always use your most recent med list.                   Brand Name Dispense Instructions for use Diagnosis    acyclovir 5 % ointment    ZOVIRAX    15 g    Apply topically 6 times daily    Recurrent cold sores       ALLEGRA 180 MG tablet   Generic drug:  fexofenadine      Take 1 tablet by mouth daily Reported on 4/5/2017    FLORIDALMA positive, Family history of lupus erythematosus, Lumbar radiculopathy, Fatigue, Dry eyes, bilateral, Keratitis sicca, Foraminal stenosis of lumbar region, Ds DNA antibody positive       Coal Tar Extract 4 % Sham     168 mL    Externally apply 1 Dose topically daily    Hair loss, Scalp lesion       diazepam 5 MG tablet    VALIUM    90 tablet    Take 1 tablet (5 mg) by mouth 3 times daily as needed for muscle spasms    Spasm of muscle       FRESHKOTE 2.7-2 % Soln   Generic drug:  Polyvinyl Alcohol-Povidone      Apply  to eye.        hydrocortisone 0.2 % cream    WESTCORT    60 g    Apply sparingly to affected area three times daily as needed.    Telangiectasia of face       ibuprofen 800 MG tablet    ADVIL/MOTRIN    100 tablet    TAKE ONE TABLET BY MOUTH EVERY 8 HOURS AS NEEDED FOR PAIN    Cervicalgia, Muscle spasm       ketoconazole 2 % cream    NIZORAL    30 g    Apply topically 2 times daily    Ringworm of body       * meloxicam 15 MG tablet    MOBIC    90 tablet    TAKE ONE TABLET BY MOUTH EVERY DAY    Cervicalgia, Muscle spasm       * meloxicam 15 MG tablet    MOBIC    90 tablet    TAKE ONE TABLET BY MOUTH EVERY DAY    Cervicalgia, Muscle spasm       methylphenidate 10 MG tablet    RITALIN    120 tablet    Take 1-2 tablets (10-20 mg) by mouth 2 times daily    Chronic fatigue       methylPREDNISolone 4 MG tablet     MEDROL DOSEPAK    21 tablet    Follow package instructions    Cough, Fever, unspecified fever cause, Malaise, Recent foreign travel, Bronchitis       mupirocin 2 % nasal ointment    BACTROBAN NASAL    22 g    Apply to affected area 3 times daily    Skin puncture       mupirocin 2 % ointment    BACTROBAN    22 g    Apply topically 3 times daily    MRSA (methicillin resistant staph aureus) culture positive, Skin lesion       nystatin-triamcinolone cream    MYCOLOG II    15 g    Apply topically 2 times daily    Atopic dermatitis       polyethylene glycol 0.4%- propylene glycol 0.3% 0.4-0.3 % Soln ophthalmic solution    SYSTANE ULTRA     Place 1 drop into both eyes every hour as needed for dry eyes        rizatriptan 5 MG ODT tab    MAXALT-MLT    18 tablet    Take 1-2 tablets (5-10 mg) by mouth at onset of headache for migraine May repeat dose in 2 hours.  Do not exceed 30 mg in 24 hours    Migraine without aura and without status migrainosus, not intractable       sucralfate 1 GM tablet    CARAFATE    120 tablet    TAKE ONE TABLET BY MOUTH FOUR TIMES A DAY    Gastroesophageal reflux disease, esophagitis presence not specified       SUMAtriptan 100 MG tablet    IMITREX    27 tablet    Take 1 tablet by mouth See Admin Instructions. WITH ONSET OF MIGRAINE, MAY REPEAT ONCE AFTER 2 HOURS. DO NOT EXCEED 2 TABLETS IN 24 HOURS.    Migraine, unspecified, with intractable migraine, so stated, without mention of status migrainosus       tiZANidine 4 MG tablet    ZANAFLEX    90 tablet    TAKE ONE TO TWO TABLETS BY MOUTH THREE TIMES A DAY AS NEEDED FOR MUSCLE SPASMS    Lumbar radiculopathy, Back muscle spasm       TYLENOL PO      Take 500 mg by mouth every 6 hours as needed        XIIDRA 5 % Soln   Generic drug:  Lifitegrast           * Notice:  This list has 2 medication(s) that are the same as other medications prescribed for you. Read the directions carefully, and ask your doctor or other care provider to review them with you.

## 2018-02-02 NOTE — PATIENT INSTRUCTIONS
Acute Bronchitis          What is acute bronchitis?   Bronchitis is an infection of the air passages--that is, the tubes that connect the windpipe to the lungs. It causes swelling and irritation of the airways. With acute bronchitis you usually have a cough that produces phlegm and pain behind the breastbone when you breathe deeply or cough.   How does it occur?   Bronchitis often occurs with viral infections of the respiratory tract, such as colds and flu. Bronchitis may also be caused by bacterial infections. It may occur with childhood illnesses such as measles and whooping cough.   Attacks are most frequent during the winter or when the level of air pollution is high.   Infants, young children, older adults, smokers, and people with heart disease or lung disease (including asthma and allergies) are most likely to get acute bronchitis.   What are the symptoms?   Symptoms may include:   a deep cough that produces yellowish or greenish phlegm   pain behind the breastbone when you breathe deeply or cough   wheezing   feeling short of breath   fever   chills   headache   sore muscles.   How is it diagnosed?   Your healthcare provider will ask about your symptoms and examine you. You may have tests, such as:   a test of phlegm to look for bacteria   chest X-ray   blood tests.   How is it treated?   Acute bronchitis often does not require medical treatment. Resting at home and drinking plenty of fluids to keep the mucus loose may be all you need to do to get better in a few days. If your symptoms are severe or you have other health problems (such as heart or lung disease or diabetes), you may need to take antibiotics.   How long will the effects last?   Most of the time acute bronchitis clears up in a few days. Your cough may slowly get better in 1 to 2 weeks.   It may take you longer to recover if:   You are a smoker.   You live in an area where air pollution is a problem.   You have a heart or lung  disease.   You have any other continuing health problems.   How can I take care of myself?   You can help yourself by:   following the full treatment your healthcare provider recommends   using a vaporizer, humidifier, or steam from hot water to add moisture to the air   drinking plenty of liquids   taking cough medicine if recommended by your healthcare provider   resting in bed   taking aspirin or acetaminophen to reduce fever and relieve headache and muscle pain (Check with your healthcare provider before you give any medicine that contains aspirin or salicylates to a child or teen. This includes medicines like baby aspirin, some cold medicines, and Pepto Bismol. Children and teens who take aspirin are at risk for a serious illness called Reye's syndrome.)   eating healthy meals.   Call your healthcare provider if:   You have trouble breathing.   You have a fever of 101.5?F (38.6?C) or higher.   You cough up blood.   Your symptoms are getting worse instead of better.   You don't start to feel better after 3 days of treatment.   You have any symptoms that concern you.   How can I help prevent acute bronchitis?   To reduce your risk of getting a respiratory infection:   Do not smoke.   Wash your hands often, especially when you are around people with colds (upper respiratory infections).   If you have asthma or allergies, keep your symptoms under good control.   Get regular exercise.   Eat healthy foods.     Published by [employer].  This content is reviewed periodically and is subject to change as new health information becomes available. The information is intended to inform and educate and is not a replacement for medical evaluation, advice, diagnosis or treatment by a healthcare professional.   Developed by [employer].   ? 2010 [employer] and/or its affiliates. All Rights Reserved.   Copyright   [employer] 2011  Adult Health Advisor

## 2018-02-02 NOTE — NURSING NOTE
"Chief Complaint   Patient presents with     Cough     Fever     Panel Management     flu shot       Initial /84 (Patient Position: Chair, Cuff Size: Adult Large)  Pulse 68  Temp 99.2  F (37.3  C) (Temporal)  Resp 20  Ht 5' 5.75\" (1.67 m)  Wt 200 lb 3.2 oz (90.8 kg)  SpO2 100%  BMI 32.56 kg/m2 Estimated body mass index is 32.56 kg/(m^2) as calculated from the following:    Height as of this encounter: 5' 5.75\" (1.67 m).    Weight as of this encounter: 200 lb 3.2 oz (90.8 kg).  Medication Reconciliation: complete  "

## 2018-02-12 ENCOUNTER — MYC MEDICAL ADVICE (OUTPATIENT)
Dept: FAMILY MEDICINE | Facility: OTHER | Age: 43
End: 2018-02-12

## 2018-02-12 ENCOUNTER — ALLIED HEALTH/NURSE VISIT (OUTPATIENT)
Dept: FAMILY MEDICINE | Facility: OTHER | Age: 43
End: 2018-02-12
Payer: COMMERCIAL

## 2018-02-12 DIAGNOSIS — G43.009 MIGRAINE WITHOUT AURA AND WITHOUT STATUS MIGRAINOSUS, NOT INTRACTABLE: Primary | ICD-10-CM

## 2018-02-12 PROCEDURE — 99207 ZZC NO CHARGE NURSE ONLY: CPT

## 2018-02-12 PROCEDURE — 96372 THER/PROPH/DIAG INJ SC/IM: CPT

## 2018-02-12 RX ORDER — KETOROLAC TROMETHAMINE 30 MG/ML
60 INJECTION, SOLUTION INTRAMUSCULAR; INTRAVENOUS ONCE
Qty: 2 ML | Refills: 0 | OUTPATIENT
Start: 2018-02-12 | End: 2018-02-12

## 2018-02-12 NOTE — MR AVS SNAPSHOT
After Visit Summary   2/12/2018    Amelia Coker    MRN: [id number]           Patient Information     Date Of Birth          1975        Visit Information        Provider Department      2/12/2018 4:00 PM NL FLOAT NURSE [facility]        Today's Diagnoses     Migraine without aura and without status migrainosus, not intractable    -  1       Follow-ups after your visit        Who to contact     If you have questions or need follow up information about today's clinic visit or your schedule please contact [facility] directly at [phone].  Normal or non-critical lab and imaging results will be communicated to you by [employer] Movie[employer], letter or phone within 4 business days after the clinic has received the results. If you do not hear from us within 7 days, please contact the clinic through [employer] or phone. If you have a critical or abnormal lab result, we will notify you by phone as soon as possible.  Submit refill requests through [employer] or call your pharmacy and they will forward the refill request to us. Please allow 3 business days for your refill to be completed.          Additional Information About Your Visit        MyChart Information     [employer] gives you secure access to your electronic health record. If you see a primary care provider, you can also send messages to your care team and make appointments. If you have questions, please call your primary care clinic.  If you do not have a primary care provider, please call [phone] and they will assist you.        Care EveryWhere ID     This is your Care EveryWhere ID. This could be used by other organizations to access your [place] medical records  [id number]         Blood Pressure from Last 3 Encounters:   02/02/18 120/84   11/09/17 138/76   08/30/17 118/70    Weight from Last 3 Encounters:   02/02/18 200 lb 3.2 oz (90.8 kg)   11/09/17 199 lb 12.8 oz (90.6 kg)   08/30/17 202 lb 3.2 oz (91.7 kg)               We Performed the Following     INJECTION INTRAMUSCULAR OR SUB-Q     KETOROLAC TROMETHAMINE 15MG          Today's Medication Changes          These changes are accurate as of 2/12/18  5:20 PM.  If you have any questions, ask your nurse or doctor.               Start taking these medicines.        Dose/Directions    ketorolac 60 MG/2ML Soln injection   Commonly known as:  TORADOL   Used for:  Migraine without aura and without status migrainosus, not intractable   Started by:  Otoniel Nelson PA-C        Dose:  60 mg   Inject 2 mLs (60 mg) into the muscle once for 1 dose   Quantity:  2 mL   Refills:  0            Where to get your medicines      Some of these will need a paper prescription and others can be bought over the counter.  Ask your nurse if you have questions.     You don't need a prescription for these medications     ketorolac 60 MG/2ML Soln injection                Primary Care Provider Office Phone # Fax #    Otoniel Nelson PA-C [phone]       [place] [address]        Equal Access to Services     HOLLIE GARRISON : Hadii aad ku hadasho Soomaali, waaxda luqadaha, qaybta kaalmada adeegyada, waxay idiin haymisti amaya . So [facility] [phone].    ATENCIÓN: Si habla español, tiene a hines disposición servicios gratuitos de asistencia lingüística. [facility] [phone].    We comply with applicable federal civil rights laws and [place] laws. We do not discriminate on the basis of race, color, national origin, age, disability, sex, sexual orientation, or gender identity.            Thank you!     Thank you for choosing [facility]  for your care. Our goal is always to provide you with excellent care. Hearing back from our patients is one way we can continue to improve our services. Please take a few minutes to complete the written survey that you may receive in the mail after your visit with us. Thank you!             Your Updated  Medication List - Protect others around you: Learn how to safely use, store and throw away your medicines at www.disposemymeds.org.          This list is accurate as of 2/12/18  5:20 PM.  Always use your most recent med list.                   Brand Name Dispense Instructions for use Diagnosis    acyclovir 5 % ointment    ZOVIRAX    15 g    Apply topically 6 times daily    Recurrent cold sores       ALLEGRA 180 MG tablet   Generic drug:  fexofenadine      Take 1 tablet by mouth daily Reported on 4/5/2017    FLORIDALMA positive, Family history of lupus erythematosus, Lumbar radiculopathy, Fatigue, Dry eyes, bilateral, Keratitis sicca, Foraminal stenosis of lumbar region, Ds DNA antibody positive       Coal Tar Extract 4 % Sham     168 mL    Externally apply 1 Dose topically daily    Hair loss, Scalp lesion       diazepam 5 MG tablet    VALIUM    90 tablet    Take 1 tablet (5 mg) by mouth 3 times daily as needed for muscle spasms    Spasm of muscle       FRESHKOTE 2.7-2 % Soln   Generic drug:  Polyvinyl Alcohol-Povidone      Apply  to eye.        hydrocortisone 0.2 % cream    WESTCORT    60 g    Apply sparingly to affected area three times daily as needed.    Telangiectasia of face       ibuprofen 800 MG tablet    ADVIL/MOTRIN    100 tablet    TAKE ONE TABLET BY MOUTH EVERY 8 HOURS AS NEEDED FOR PAIN    Cervicalgia, Muscle spasm       ketoconazole 2 % cream    NIZORAL    30 g    Apply topically 2 times daily    Ringworm of body       ketorolac 60 MG/2ML Soln injection    TORADOL    2 mL    Inject 2 mLs (60 mg) into the muscle once for 1 dose    Migraine without aura and without status migrainosus, not intractable       * meloxicam 15 MG tablet    MOBIC    90 tablet    TAKE ONE TABLET BY MOUTH EVERY DAY    Cervicalgia, Muscle spasm       * meloxicam 15 MG tablet    MOBIC    90 tablet    TAKE ONE TABLET BY MOUTH EVERY DAY    Cervicalgia, Muscle spasm       methylphenidate 10 MG tablet    RITALIN    120 tablet    Take 1-2  tablets (10-20 mg) by mouth 2 times daily    Chronic fatigue       methylPREDNISolone 4 MG tablet    MEDROL DOSEPAK    21 tablet    Follow package instructions    Cough, Fever, unspecified fever cause, Malaise, Recent foreign travel, Bronchitis       mupirocin 2 % nasal ointment    BACTROBAN NASAL    22 g    Apply to affected area 3 times daily    Skin puncture       mupirocin 2 % ointment    BACTROBAN    22 g    Apply topically 3 times daily    MRSA (methicillin resistant staph aureus) culture positive, Skin lesion       nystatin-triamcinolone cream    MYCOLOG II    15 g    Apply topically 2 times daily    Atopic dermatitis       polyethylene glycol 0.4%- propylene glycol 0.3% 0.4-0.3 % Soln ophthalmic solution    SYSTANE ULTRA     Place 1 drop into both eyes every hour as needed for dry eyes        rizatriptan 5 MG ODT tab    MAXALT-MLT    18 tablet    Take 1-2 tablets (5-10 mg) by mouth at onset of headache for migraine May repeat dose in 2 hours.  Do not exceed 30 mg in 24 hours    Migraine without aura and without status migrainosus, not intractable       sucralfate 1 GM tablet    CARAFATE    120 tablet    TAKE ONE TABLET BY MOUTH FOUR TIMES A DAY    Gastroesophageal reflux disease, esophagitis presence not specified       SUMAtriptan 100 MG tablet    IMITREX    27 tablet    Take 1 tablet by mouth See Admin Instructions. WITH ONSET OF MIGRAINE, MAY REPEAT ONCE AFTER 2 HOURS. DO NOT EXCEED 2 TABLETS IN 24 HOURS.    Migraine, unspecified, with intractable migraine, so stated, without mention of status migrainosus       tiZANidine 4 MG tablet    ZANAFLEX    90 tablet    TAKE ONE TO TWO TABLETS BY MOUTH THREE TIMES A DAY AS NEEDED FOR MUSCLE SPASMS    Lumbar radiculopathy, Back muscle spasm       TYLENOL PO      Take 500 mg by mouth every 6 hours as needed        XIIDRA 5 % Soln   Generic drug:  Lifitegrast           * Notice:  This list has 2 medication(s) that are the same as other medications prescribed for you.  Read the directions carefully, and ask your doctor or other care provider to review them with you.

## 2018-02-12 NOTE — PROGRESS NOTES
Chief Complaint   Patient presents with     Imm/Inj     toradol     Prior to injection verified patient identity using patient's name and date of birth.  The following medication was given:     MEDICATION: Toradol 60 mg  ROUTE: IM  SITE: Ventrogluteal - Left  DOSE: 60mg/mL  LOT #: [id number]  :  [employer]  EXPIRATION DATE:  6/2018  NDC#: 08028-128-84  Provider Otoniel Kat CMA ([facility])

## 2018-02-20 DIAGNOSIS — M62.838 MUSCLE SPASM: ICD-10-CM

## 2018-02-20 DIAGNOSIS — M54.2 CERVICALGIA: ICD-10-CM

## 2018-02-20 RX ORDER — MELOXICAM 15 MG/1
TABLET ORAL
Qty: 90 TABLET | Refills: 1 | Status: SHIPPED | OUTPATIENT
Start: 2018-02-20 | End: 2018-10-09

## 2018-02-20 NOTE — TELEPHONE ENCOUNTER
Meloxicam    Prescription approved per [facility] Refill Protocol.    Nasrin Trevizo, RN, BSN

## 2018-03-08 ENCOUNTER — HOSPITAL ENCOUNTER (OUTPATIENT)
Dept: MRI IMAGING | Facility: CLINIC | Age: 43
Discharge: HOME | End: 2018-03-08
Attending: PHYSICIAN ASSISTANT | Admitting: PHYSICIAN ASSISTANT
Payer: COMMERCIAL

## 2018-03-08 DIAGNOSIS — M25.511 ACUTE PAIN OF RIGHT SHOULDER: ICD-10-CM

## 2018-03-08 PROCEDURE — 73221 MRI JOINT UPR EXTREM W/O DYE: CPT | Mod: RT

## 2018-03-13 ENCOUNTER — OFFICE VISIT (OUTPATIENT)
Dept: ORTHOPEDICS | Facility: CLINIC | Age: 43
End: 2018-03-13
Payer: COMMERCIAL

## 2018-03-13 ENCOUNTER — MYC MEDICAL ADVICE (OUTPATIENT)
Dept: FAMILY MEDICINE | Facility: OTHER | Age: 43
End: 2018-03-13

## 2018-03-13 VITALS — WEIGHT: 208 LBS | BODY MASS INDEX: 33.43 KG/M2 | TEMPERATURE: 98.5 F | HEIGHT: 66 IN

## 2018-03-13 DIAGNOSIS — S43.421D SPRAIN OF RIGHT ROTATOR CUFF CAPSULE, SUBSEQUENT ENCOUNTER: Primary | ICD-10-CM

## 2018-03-13 PROCEDURE — 99213 OFFICE O/P EST LOW 20 MIN: CPT | Performed by: ORTHOPAEDIC SURGERY

## 2018-03-13 ASSESSMENT — PAIN SCALES - GENERAL: PAINLEVEL: MODERATE PAIN (5)

## 2018-03-13 NOTE — MR AVS SNAPSHOT
"              After Visit Summary   3/13/2018    Amelia Coker    MRN: [id number]           Patient Information     Date Of Birth          1975        Visit Information        Provider Department      3/13/2018 7:30 AM Chris Johnston MD [facility]        Today's Diagnoses     Sprain of right rotator cuff capsule, subsequent encounter    -  1       Follow-ups after your visit        Who to contact     If you have questions or need follow up information about today's clinic visit or your schedule please contact [facility] directly at [phone].  Normal or non-critical lab and imaging results will be communicated to you by J[employer]hart, letter or phone within 4 business days after the clinic has received the results. If you do not hear from us within 7 days, please contact the clinic through [employer] or phone. If you have a critical or abnormal lab result, we will notify you by phone as soon as possible.  Submit refill requests through [employer] or call your pharmacy and they will forward the refill request to us. Please allow 3 business days for your refill to be completed.          Additional Information About Your Visit        MyChart Information     [employer] gives you secure access to your electronic health record. If you see a primary care provider, you can also send messages to your care team and make appointments. If you have questions, please call your primary care clinic.  If you do not have a primary care provider, please call [phone] and they will assist you.        Care EveryWhere ID     This is your Care EveryWhere ID. This could be used by other organizations to access your [place] medical records  [id number]        Your Vitals Were     Temperature Height BMI (Body Mass Index)             98.5  F (36.9  C) (Temporal) 1.67 m (5' 5.75\") 33.83 kg/m2          Blood Pressure from Last 3 Encounters:   02/02/18 120/84   11/09/17 138/76   08/30/17 118/70    Weight " from Last 3 Encounters:   03/13/18 94.3 kg (208 lb)   02/02/18 90.8 kg (200 lb 3.2 oz)   11/09/17 90.6 kg (199 lb 12.8 oz)              Today, you had the following     No orders found for display       Primary Care Provider Office Phone # Fax #    Otoniel Nelson PA-C [phone]       [place] [address]        Equal Access to Services     CHI GARRISON : Hadii aad ku hadasho Soomaali, waaxda luqadaha, qaybta kaalmada adeegyada, waxay idiin hayaan adeeg kharash lajeanine plasencia. So [facility] [phone].    ATENCIÓN: Si habla español, tiene a hines disposición servicios gratuitos de asistencia lingüística. [facility] [phone].    We comply with applicable federal civil rights laws and [place] laws. We do not discriminate on the basis of race, color, national origin, age, disability, sex, sexual orientation, or gender identity.            Thank you!     Thank you for choosing [facility]  for your care. Our goal is always to provide you with excellent care. Hearing back from our patients is one way we can continue to improve our services. Please take a few minutes to complete the written survey that you may receive in the mail after your visit with us. Thank you!             Your Updated Medication List - Protect others around you: Learn how to safely use, store and throw away your medicines at www.disposemymeds.org.          This list is accurate as of 3/13/18  8:22 AM.  Always use your most recent med list.                   Brand Name Dispense Instructions for use Diagnosis    acyclovir 5 % ointment    ZOVIRAX    15 g    Apply topically 6 times daily    Recurrent cold sores       ALLEGRA 180 MG tablet   Generic drug:  fexofenadine      Take 1 tablet by mouth daily Reported on 4/5/2017    FLORIDALMA positive, Family history of lupus erythematosus, Lumbar radiculopathy, Fatigue, Dry eyes, bilateral, Keratitis sicca, Foraminal stenosis of lumbar region, Ds DNA antibody  positive       Coal Tar Extract 4 % Sham     168 mL    Externally apply 1 Dose topically daily    Hair loss, Scalp lesion       diazepam 5 MG tablet    VALIUM    90 tablet    Take 1 tablet (5 mg) by mouth 3 times daily as needed for muscle spasms    Spasm of muscle       FRESHKOTE 2.7-2 % Soln   Generic drug:  Polyvinyl Alcohol-Povidone      Apply  to eye.        hydrocortisone 0.2 % cream    WESTCORT    60 g    Apply sparingly to affected area three times daily as needed.    Telangiectasia of face       ibuprofen 800 MG tablet    ADVIL/MOTRIN    100 tablet    TAKE ONE TABLET BY MOUTH EVERY 8 HOURS AS NEEDED FOR PAIN    Cervicalgia, Muscle spasm       ketoconazole 2 % cream    NIZORAL    30 g    Apply topically 2 times daily    Ringworm of body       * meloxicam 15 MG tablet    MOBIC    90 tablet    TAKE ONE TABLET BY MOUTH EVERY DAY    Cervicalgia, Muscle spasm       * meloxicam 15 MG tablet    MOBIC    90 tablet    TAKE ONE TABLET BY MOUTH EVERY DAY (PATIENT WANTS Sepior BRAND ONLY)    Cervicalgia, Muscle spasm       methylphenidate 10 MG tablet    RITALIN    120 tablet    Take 1-2 tablets (10-20 mg) by mouth 2 times daily    Chronic fatigue       mupirocin 2 % ointment    BACTROBAN    22 g    Apply topically 3 times daily    MRSA (methicillin resistant staph aureus) culture positive, Skin lesion       nystatin-triamcinolone cream    MYCOLOG II    15 g    Apply topically 2 times daily    Atopic dermatitis       polyethylene glycol 0.4%- propylene glycol 0.3% 0.4-0.3 % Soln ophthalmic solution    SYSTANE ULTRA     Place 1 drop into both eyes every hour as needed for dry eyes        rizatriptan 5 MG ODT tab    MAXALT-MLT    18 tablet    Take 1-2 tablets (5-10 mg) by mouth at onset of headache for migraine May repeat dose in 2 hours.  Do not exceed 30 mg in 24 hours    Migraine without aura and without status migrainosus, not intractable       sucralfate 1 GM tablet    CARAFATE    120 tablet    TAKE ONE TABLET BY  MOUTH FOUR TIMES A DAY    Gastroesophageal reflux disease, esophagitis presence not specified       SUMAtriptan 100 MG tablet    IMITREX    27 tablet    Take 1 tablet by mouth See Admin Instructions. WITH ONSET OF MIGRAINE, MAY REPEAT ONCE AFTER 2 HOURS. DO NOT EXCEED 2 TABLETS IN 24 HOURS.    Migraine, unspecified, with intractable migraine, so stated, without mention of status migrainosus       TYLENOL PO      Take 500 mg by mouth every 6 hours as needed        XIIDRA 5 % Soln opthalmic solution   Generic drug:  Lifitegrast           * Notice:  This list has 2 medication(s) that are the same as other medications prescribed for you. Read the directions carefully, and ask your doctor or other care provider to review them with you.

## 2018-03-13 NOTE — PROGRESS NOTES
"Office Visit-Follow up  HISTORY OF PRESENT ILLNESS:    Amelia Coker is a 43 year old female who is seen in follow up for   Chief Complaint   Patient presents with     RECHECK     Right shoulder pain.  Review MRI.       Patient presents with:  RECHECK: Right shoulder pain.  Review MRI.      The patient called stating that she had worsening of her right shoulder symptoms.  She was concerned about progression of overall shoulder function.  She had been seen and fully evaluated 2 years ago for her right shoulder.  An MRI was done at that time.  She had a partial rotator cuff tear and evidence of labral tear.  Present symptoms: Her main complaint is that of deltoid discomfort with overhead activities.  She has modified her activities so that she does not reach overhead.  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  She has a hobby horse farm.  She has avoided any heavy activities there.  Treatments tried to this point: She has had injections in the past of which the most beneficial was the intra-articular injection.  She has attended physical therapy in the past.    She also has been recognized as having a positive FLORIDALMA test.  They are not certain of the type of autoimmune problems she has.  However she has soft tissue healing that is hypomanic because of this.    REVIEW OF SYSTEMS    General: negative for, night sweats, dizziness, fatigue  Resp: No shortness of breath and no cough  CV: negative for chest pain, syncope or near-syncope  GI: negative for nausea, vomiting and diarrhea  : negative for dysuria and hematuria  Musculoskeletal: as above  Neurologic: negative for syncope   Hematologic: negative for bleeding disorder    Physical Exam:    Vitals: Temp 98.5  F (36.9  C) (Temporal)  Ht 1.67 m (5' 5.75\")  Wt 94.3 kg (208 lb)  BMI 33.83 kg/m2  BMI= Body mass index is 33.83 kg/(m^2).  Constitutional: healthy, alert and no acute distress   Psychiatric: mentation appears normal " and affect normal/bright  NEURO: no focal deficits  SKIN: no excoriation or erythema. No signs of infection.    GAIT: non-antalgic    JOINT/EXTREMITIES:     Her overall range of motion is not too far off the opposite side.  She has a painful arc with forward elevation.  She has significant discomfort with supraspinatus testing with associated weakness.  Arm at side external rotation testing and abdominal liftoff testing is performed reasonably well.  Speed's testing causes some anterior arm discomfort.    Her biceps tendon contour on inspection is symmetrical..    IMAGING INTERPRETATION: The MRI had been repeated prior to this office visit.  This was compared to her previous study from January 2016.  The most notable finding is at the undersurface rotator cuff tear appears to have progressed.  The report is that she now has a 70% undersurface tear was previously was 40-50%.       Impression:      ICD-10-CM    1. Sprain of right rotator cuff capsule, subsequent encounter S43.421D        Patient has increasing right shoulder pain.  She by her description has not been doing any unusual activities that would typically irritate or stress the shoulder joint.  Despite this she has a progression of her rotator cuff damage although not a complete rotator cuff tear.  She also has labral changes of the posterior labrum which is consistent with internal impingement.    Concern for her is the soft tissue problems with identified in the past.  She had completely detached the lateral muscle mass from the lateral epicondyle with her elbow was operated on and any sort of repair has led to less than ideal outcome.    Plan:   The above was reviewed with Amelia and she is well aware of her own personal issues..     I expressed that the rotator cuff damage will likely advance as time goes on.  It is already proven that.  There is no urgency to surgically addressing this at this point in time given that it is a partial tear.  However  does have the propensity to become a complete tear and we usually recommend a complete tears get surgically addressed.    If she were to have surgery she may very well need to miss work for an extended amount of time.  My prediction would be at least 4 months.    At the end of the conversation she was discharged to think about her options.  She would have to let us know if she would like to proceed differently.      Return to clinic GAMALIEL Johnston MD

## 2018-03-13 NOTE — NURSING NOTE
"Chief Complaint   Patient presents with     RECHECK     Right shoulder pain.  Review MRI.       Initial Temp 98.5  F (36.9  C) (Temporal)  Ht 1.67 m (5' 5.75\")  Wt 94.3 kg (208 lb)  BMI 33.83 kg/m2 Estimated body mass index is 33.83 kg/(m^2) as calculated from the following:    Height as of this encounter: 1.67 m (5' 5.75\").    Weight as of this encounter: 94.3 kg (208 lb).  Medication Reconciliation: complete   COOKIE Beckford  "

## 2018-03-13 NOTE — LETTER
3/13/2018         RE: Amelia Coker  [address] [place] [id number]        Dear Colleague,    Thank you for referring your patient, Amelia Coker, to the [facility]. Please see a copy of my visit note below.    Office Visit-Follow up  HISTORY OF PRESENT ILLNESS:    Amelia Coker is a 43 year old female who is seen in follow up for   Chief Complaint   Patient presents with     RECHECK     Right shoulder pain.  Review MRI.       Patient presents with:  RECHECK: Right shoulder pain.  Review MRI.      The patient called stating that she had worsening of her right shoulder symptoms.  She was concerned about progression of overall shoulder function.  She had been seen and fully evaluated 2 years ago for her right shoulder.  An MRI was done at that time.  She had a partial rotator cuff tear and evidence of labral tear.  Present symptoms: Her main complaint is that of deltoid discomfort with overhead activities.  She has modified her activities so that she does not reach overhead.  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  She has a hobby horse farm.  She has avoided any heavy activities there.  Treatments tried to this point: She has had injections in the past of which the most beneficial was the intra-articular injection.  She has attended physical therapy in the past.    She also has been recognized as having a positive FLORIDALMA test.  They are not certain of the type of autoimmune problems she has.  However she has soft tissue healing that is hypomanic because of this.    REVIEW OF SYSTEMS    General: negative for, night sweats, dizziness, fatigue  Resp: No shortness of breath and no cough  CV: negative for chest pain, syncope or near-syncope  GI: negative for nausea, vomiting and diarrhea  : negative for dysuria and hematuria  Musculoskeletal: as above  Neurologic: negative for syncope   Hematologic: negative for bleeding disorder    Physical  "Exam:    Vitals: Temp 98.5  F (36.9  C) (Temporal)  Ht 1.67 m (5' 5.75\")  Wt 94.3 kg (208 lb)  BMI 33.83 kg/m2  BMI= Body mass index is 33.83 kg/(m^2).  Constitutional: healthy, alert and no acute distress   Psychiatric: mentation appears normal and affect normal/bright  NEURO: no focal deficits  SKIN: no excoriation or erythema. No signs of infection.    GAIT: non-antalgic    JOINT/EXTREMITIES:     Her overall range of motion is not too far off the opposite side.  She has a painful arc with forward elevation.  She has significant discomfort with supraspinatus testing with associated weakness.  Arm at side external rotation testing and abdominal liftoff testing is performed reasonably well.  Speed's testing causes some anterior arm discomfort.    Her biceps tendon contour on inspection is symmetrical..    IMAGING INTERPRETATION: The MRI had been repeated prior to this office visit.  This was compared to her previous study from January 2016.  The most notable finding is at the undersurface rotator cuff tear appears to have progressed.  The report is that she now has a 70% undersurface tear was previously was 40-50%.       Impression:      ICD-10-CM    1. Sprain of right rotator cuff capsule, subsequent encounter S43.421D        Patient has increasing right shoulder pain.  She by her description has not been doing any unusual activities that would typically irritate or stress the shoulder joint.  Despite this she has a progression of her rotator cuff damage although not a complete rotator cuff tear.  She also has labral changes of the posterior labrum which is consistent with internal impingement.    Concern for her is the soft tissue problems with identified in the past.  She had completely detached the lateral muscle mass from the lateral epicondyle with her elbow was operated on and any sort of repair has led to less than ideal outcome.    Plan:   The above was reviewed with Amelia and she is well aware of her " own personal issues..     I expressed that the rotator cuff damage will likely advance as time goes on.  It is already proven that.  There is no urgency to surgically addressing this at this point in time given that it is a partial tear.  However does have the propensity to become a complete tear and we usually recommend a complete tears get surgically addressed.    If she were to have surgery she may very well need to miss work for an extended amount of time.  My prediction would be at least 4 months.    At the end of the conversation she was discharged to think about her options.  She would have to let us know if she would like to proceed differently.      Return to clinic PRNAMAN Johnston MD    Again, thank you for allowing me to participate in the care of your patient.        Sincerely,        Chris Johnston MD

## 2018-03-15 NOTE — PROGRESS NOTES
Outpatient Physical Therapy Discharge Note     Patient: Amelia Coker  : 1975    Beginning/End Dates of Reporting Period:  16 to 17    Referring Provider: Tucker RUDOLPH    Therapy Diagnosis: LBP, leg pain     Client Self Report: Patient states not much better, placing on hold.     Objective Measurements:     See daily note, pain persists          PT feels very strongly that pt has stenosis on the left and this is causing her pain. I recommend pt getting a second opinion from a surgeon and she may need to have a new MRI because the last one is skewed from her being in 90-90 position in the MRI machine--this would cause the spine to flex and open which would not show true stenosis          Goals:  Not met    Plan:  Discharge from therapy.    Discharge:    Reason for Discharge: No further expectation of progress.      Discharge Plan: Other services: Patient returning back to     Thank you for the referral,            Ashleigh Martínez PT  .

## 2018-03-15 NOTE — ADDENDUM NOTE
Encounter addended by: Ashleigh Martínez, PT on: 3/15/2018 11:31 AM<BR>     Actions taken: Flowsheet data copied forward, Sign clinical note, Episode resolved

## 2018-03-20 ENCOUNTER — PRE VISIT (OUTPATIENT)
Dept: ORTHOPEDICS | Facility: CLINIC | Age: 43
End: 2018-03-20

## 2018-03-20 DIAGNOSIS — M25.511 RIGHT SHOULDER PAIN: Primary | ICD-10-CM

## 2018-03-20 DIAGNOSIS — M25.521 RIGHT ELBOW PAIN: ICD-10-CM

## 2018-03-20 NOTE — TELEPHONE ENCOUNTER
Pt reports being seen for : right shoulder pain, right elbow pain  Right elbow surgery 07/2014    Injections have not been helpful in shoulder, no injections in elbow since surgery.  Bicep is tight and has been cramping.  Losing  strength just like she did when her elbow was injured in 2014.     1. Do you have recent xrays (if not seen in EPIC)? No -  Patient informed that they will have x-rays done before appointment and to arrive at least 30 minutes before MD appointment. Xrays ordered per standing orders.  2. Do you have any MRI's (if not seen in EPIC)?Yes - Confirmed in EPIC.  3. Have you had surgery in the past for the same issue?Yes  4. Are you being referred by another provider? No  If yes-Records in Epic or being obtained by: .  5. Is this work comp or MVA related? No.      Trey Serrato RN

## 2018-03-22 ENCOUNTER — OFFICE VISIT (OUTPATIENT)
Dept: ORTHOPEDICS | Facility: CLINIC | Age: 43
End: 2018-03-22
Payer: COMMERCIAL

## 2018-03-22 ENCOUNTER — RADIANT APPOINTMENT (OUTPATIENT)
Dept: GENERAL RADIOLOGY | Facility: CLINIC | Age: 43
End: 2018-03-22
Attending: ORTHOPAEDIC SURGERY
Payer: COMMERCIAL

## 2018-03-22 VITALS — HEART RATE: 58 BPM | DIASTOLIC BLOOD PRESSURE: 84 MMHG | OXYGEN SATURATION: 99 % | SYSTOLIC BLOOD PRESSURE: 135 MMHG

## 2018-03-22 DIAGNOSIS — M67.921 BICEPS TENDINOPATHY, RIGHT: Primary | ICD-10-CM

## 2018-03-22 DIAGNOSIS — M25.511 RIGHT SHOULDER PAIN: ICD-10-CM

## 2018-03-22 DIAGNOSIS — M25.521 RIGHT ELBOW PAIN: ICD-10-CM

## 2018-03-22 PROCEDURE — 73030 X-RAY EXAM OF SHOULDER: CPT | Mod: RT | Performed by: RADIOLOGY

## 2018-03-22 PROCEDURE — 73080 X-RAY EXAM OF ELBOW: CPT | Mod: RT | Performed by: RADIOLOGY

## 2018-03-22 PROCEDURE — 99214 OFFICE O/P EST MOD 30 MIN: CPT | Performed by: ORTHOPAEDIC SURGERY

## 2018-03-22 NOTE — PROGRESS NOTES
CHIEF CONCERN:  Right shoulder and elbow pain.     HISTORY OF PRESENT ILLNESS:  Amelia Coker is a 43-year-old right-hand-dominant female presents for evaluation of right anterior shoulder and elbow pain.  No inciting event.  No trauma.  She has had issues with her right elbow since 2014 when she underwent a right elbow lateral epicondyle debridement with Dr. Johnston at [facility].  She notes slow progression of her pain symptoms.  She notes an aching and sharp anterior shoulder and antecubital fossa pain.  She notes some weakness with her .  No numbness or tingling.  Pain is worse with use of the arm.  She has problems with sleeping on it and it awakens her from sleep every night.  She has pain with activities as simple as brushing her teeth.  She has tried physical therapy a few years ago that did not help and only made her pain worse.  She has had a multitude of corticosteroid injections with at least 7 Subacromial steroid injections and 1 recent glenohumeral joint corticosteroid injection.  The glenohumeral joint corticosteroid injection did not help her at all per her report.  She notes limited relief with NSAIDs.      Past Medical History:   Diagnosis Date     Abnormal mammogram 9/21/2016    right breast      AD (atopic dermatitis) 10/29/2015     Allergic rhinitis due to other allergen      Arthritis      Bell's palsy      Chronic fatigue 6/6/2012     Chronic infection     MRSA - 2015 scalp and prior to Abdomen     Contact dermatitis and other eczema, due to unspecified cause     eczema     Contusion of left foot, initial encounter 3/16/2016     DJD (degenerative joint disease), lumbar 9/26/2013     DJD (degenerative joint disease), lumbar 9/26/2013     Ds DNA antibody positive 4/16/2014    borderline.      Elevated blood pressure reading without diagnosis of hypertension 12/24/2013     Facial contusion 12/15/2014    hit by horse      Foraminal stenosis of lumbar region 9/26/2013     Frontal  headache 12/15/2014     Gastroesophageal reflux disease, esophagitis presence not specified 6/29/2016     Heat sensitivity 10/29/2015     HTN, goal below 140/90 9/23/2015     Hyperlipidemia LDL goal <130 8/30/2017     Irregular heart beat      Keratitis sicca 10/31/2012     Left shoulder pain 9/26/2013     Lumbar radiculopathy 1/22/2014     Migraine headache 10/23/2013     Migraine, unspecified, with intractable migraine, so stated, without mention of status migrainosus      MRSA infection 10/20/2015     Muscular wasting and disuse atrophy, not elsewhere classified 6/9/2014    right SCM, right wrist,       Numbness and tingling     both legs anf feet greater on the left     PAC (premature atrial contraction) 7/15/2010     Plantar fasciitis 9/23/2015     PONV (postoperative nausea and vomiting)      Skin lesion 10/20/2015    posterior superior scalp      Spasm of muscle 7/11/2017     Telangiectasia of face 12/19/2014     Tooth pain 10/20/2015    left lower primary molar        Past Surgical History:   Procedure Laterality Date     COLONOSCOPY  3/11/2013    Procedure: COLONOSCOPY;  colonoscopy;  Surgeon: Lobito Mas MD;  Location: [facility]     DECOMPRESSION LUMBAR TWO LEVELS Bilateral 12/8/2016    Procedure: DECOMPRESSION LUMBAR TWO LEVELS;  Surgeon: Bang Burrell MD;  Location: [facility]     ESOPHAGOSCOPY, GASTROSCOPY, DUODENOSCOPY (EGD), COMBINED  11/8/2013    Procedure: COMBINED ESOPHAGOSCOPY, GASTROSCOPY, DUODENOSCOPY (EGD), BIOPSY SINGLE OR MULTIPLE;  Esophagoscopy, Gastroscopy, Duodenoscopy EGD with multiple biopsies;  Surgeon: Teja Koch MD;  Location: [facility]     HC REMOVAL OF TONSILS,<13 Y/O      Tonsils <10y.o.     HC TOOTH EXTRACTION W/FORCEP       REPAIR TENDON ELBOW  7/9/2014    Procedure: REPAIR TENDON ELBOW;  Surgeon: Chris Johnston MD;  Location: [facility]       Current Outpatient Prescriptions   Medication Sig Dispense Refill     meloxicam (MOBIC) 15 MG tablet TAKE ONE TABLET BY  MOUTH EVERY DAY (PATIENT WANTS UNICHEM BRAND ONLY) 90 tablet 1     polyethylene glycol 0.4%- propylene glycol 0.3% (SYSTANE ULTRA) 0.4-0.3 % SOLN ophthalmic solution Place 1 drop into both eyes every hour as needed for dry eyes       acyclovir (ZOVIRAX) 5 % ointment Apply topically 6 times daily 15 g 3     Lifitegrast (XIIDRA) 5 % SOLN        methylphenidate (RITALIN) 10 MG tablet Take 1-2 tablets (10-20 mg) by mouth 2 times daily 120 tablet 0     diazepam (VALIUM) 5 MG tablet Take 1 tablet (5 mg) by mouth 3 times daily as needed for muscle spasms 90 tablet 0     Coal Tar Extract 4 % SHAM Externally apply 1 Dose topically daily 168 mL 1     ibuprofen (ADVIL/MOTRIN) 800 MG tablet TAKE ONE TABLET BY MOUTH EVERY 8 HOURS AS NEEDED FOR PAIN 100 tablet 3     meloxicam (MOBIC) 15 MG tablet TAKE ONE TABLET BY MOUTH EVERY DAY 90 tablet 1     rizatriptan (MAXALT-MLT) 5 MG ODT tab Take 1-2 tablets (5-10 mg) by mouth at onset of headache for migraine May repeat dose in 2 hours.  Do not exceed 30 mg in 24 hours 18 tablet 3     ketoconazole (NIZORAL) 2 % cream Apply topically 2 times daily 30 g 1     sucralfate (CARAFATE) 1 GM tablet TAKE ONE TABLET BY MOUTH FOUR TIMES A  tablet 1     mupirocin (BACTROBAN) 2 % ointment Apply topically 3 times daily 22 g 1     hydrocortisone (WESTCORT) 0.2 % cream Apply sparingly to affected area three times daily as needed. 60 g 0     Acetaminophen (TYLENOL PO) Take 500 mg by mouth every 6 hours as needed        fexofenadine (ALLEGRA) 180 MG tablet Take 1 tablet by mouth daily Reported on 4/5/2017       nystatin-triamcinolone (MYCOLOG II) cream Apply topically 2 times daily 15 g 0     SUMAtriptan (IMITREX) 100 MG tablet Take 1 tablet by mouth See Admin Instructions. WITH ONSET OF MIGRAINE, MAY REPEAT ONCE AFTER 2 HOURS. DO NOT EXCEED 2 TABLETS IN 24 HOURS. 27 tablet prn     POLYVIN ALC-POVIDON DIMETHYLAM (FRESHKOTE) 2.7-2 % SOLN Apply  to eye.            Allergies   Allergen Reactions      Ceftriaxone Anaphylaxis     Hives, rash, racing heart beat     Bactrim [Sulfamethoxazole W/Trimethoprim] Hives     Ciprofloxacin Other (See Comments)     Tendon Issues     Codeine Nausea and Vomiting     Copper      Rash       Doxycycline      Loss of skin pigmentation, skin loss.     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     Social History Main Topics     Smoking status: Never Smoker     Smokeless tobacco: Never Used     Alcohol use No      Comment: social     Drug use: No     Sexual activity: Yes     Partners: Male     Birth control/ protection: Surgical      Comment: vasectomy     Other Topics Concern      Service No     Blood Transfusions No     Caffeine Concern No     0     Occupational Exposure No     Hobby Hazards Yes     horses     Sleep Concern No     Stress Concern No     Weight Concern Yes     Special Diet Yes     nhung's     Back Care No     Exercise Yes     occ     Bike Helmet Not Asked     na     Seat Belt Yes     Self-Exams Yes     occ     Social History Narrative       FAMILY HISTORY: Reviewed in EMR      REVIEW OF SYSTEMS: Positive for right shoulder and elbow pain as above. Also positive for heart palpitations (chronic). Negative for general, skin, HENT, eye,  lung, intestinal, urinary, reporductive, blood, nervous system, and mental health symptoms.      PHYSICAL EXAM:    Pleasant adult female in no apparent distress.  Height 5 feet 6 inches, weight 208 pounds.  Respirations nonlabored on room air.  Mood and affect congruent.    Focused examination of the right upper extremity  demonstrates full active and passive right shoulder and right elbow range of motion.  Symmetric to the contralateral extremity.  She is a well-healed surgical incision over the lateral epicondyle of her right arm.  Her skin is otherwise clean dry warm intact.  Normal hair and sweat patterns.  Sensation intact to light touch throughout.  5 out of 5 , FPL, EPL, IO, wrist flexor, wrist extensor, biceps, triceps, deltoid muscle strength.  She has no tenderness to firm palpation over her AC joint or posterior glenohumeral joint.  She does have significant tenderness to palpation over the bicipital groove.  Positive speeds, positive Yergason's with pain in the distal biceps tendon.    IMAGIN view radiographic series right shoulder reviewed demonstrates normal and well-maintained glenohumeral joint space.  No evidence of AC joint arthrosis.  No evidence of fracture or dislocation.  No other acute bony abnormality.  Normal glenoid morphology.    MRI of the right shoulder dated 3/8/2018 is also reviewed.  This does demonstrate a partial-thickness articular sided supraspinatus tear measuring approximately 25% of the tendon width.  There is also some degenerative tearing of the superior labrum.  There is also tendinopathy of the long head of biceps tendon.    ASSESSMENT:    1. 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   2. Right shoulder partial thickness articular sided supraspinatus tendon tear (25%).   3. 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     PLAN:  We had a long discussion with Amelia today.  We do feel that her anterior shoulder and anterior elbow pain is predominantly coming from the disease of the long head of the biceps tendon.  It is disappointing to hear that her last glenohumeral joint corticosteroid injection did not provide her significant relief.  We did explain options would include doing nothing, another course of physical therapy, a corticosteroid injection directly  in the bicipital groove under ultrasound guidance, or surgical intervention in the form of a right shoulder arthroscopy, subacromial decompression, possible cuff repair versus debridement, and open subpectoral biceps tenodesis versus tenotomy.  We carefully weighed the risks, benefits, alternatives to each of these options.  At the present time the patient is interested in proceeding with a right shoulder ultrasound-guided bicipital groove corticosteroid injection.  We will coordinate this with one of our partners either Dr. Westfall or Dr. Martínez.    1. Referral to Dr. Westfall or Dr. Martínez for a right shoulder ultrasound guided bicipital groove corticosteroid injection.   2. Return to clinic PRN to discuss results of injection and possible next steps.     Patient was seen with and discussed with Rona James MD, who agrees with the above.     I have personally examined this patient and have reviewed the clinical presentation and progress note with the resident.  I agree with the treatment plan as outlined.  The plan was formulated with the resident on the day of the resident's note.

## 2018-03-22 NOTE — NURSING NOTE
"Amelia Coker's goals for this visit include: 2nd opinion Right shoulder/elbow pain, s/p right elbow surgery 7/2014 with Dr. Johnston (in [facility]). R shoulder MR done 3/8/18, Right elbow MR done 9/2015, shoulder XR done 01/2016 ([facility] not in system), elbow XR done 7/2014    She requests these members of her care team be copied on today's visit information: Yes    PCP: Otoniel Nelson    Referring Provider:  No referring provider defined for this encounter.    Chief Complaint   Patient presents with     Consult      2nd opinion Right shoulder/elbow pain, s/p right elbow surgery 7/2014 with Dr. Johnston (in [facility]). R shoulder MR done 3/8/18, Right elbow MR done 9/2015, shoulder XR done 01/2016 ([facility] not in system), elbow XR done 7/2014       Hand Dominance: Right handed  Occupation:      Initial There were no vitals taken for this visit. Estimated body mass index is 33.83 kg/(m^2) as calculated from the following:    Height as of 3/13/18: 1.67 m (5' 5.75\").    Weight as of 3/13/18: 94.3 kg (208 lb).  Medication Reconciliation: complete   Yessenia Cross CMA      "

## 2018-03-22 NOTE — MR AVS SNAPSHOT
After Visit Summary   3/22/2018    Amelia Coker    MRN: [id number]           Patient Information     Date Of Birth          1975        Visit Information        Provider Department      3/22/2018 8:00 AM Rona James MD [facility]        Today's Diagnoses     Biceps tendinopathy, right    -  1       Follow-ups after your visit        Who to contact     If you have questions or need follow up information about today's clinic visit or your schedule please contact [facility] directly at [phone].  Normal or non-critical lab and imaging results will be communicated to you by [employer], letter or phone within 4 business days after the clinic has received the results. If you do not hear from us within 7 days, please contact the clinic through [employer] or phone. If you have a critical or abnormal lab result, we will notify you by phone as soon as possible.  Submit refill requests through [employer] or call your pharmacy and they will forward the refill request to us. Please allow 3 business days for your refill to be completed.          Additional Information About Your Visit        MyChart Information     [employer] gives you secure access to your electronic health record. If you see a primary care provider, you can also send messages to your care team and make appointments. If you have questions, please call your primary care clinic.  If you do not have a primary care provider, please call [phone] and they will assist you.      [employer] is an electronic gateway that provides easy, online access to your medical records. With [employer] you can request a clinic appointment, read your test results, renew a prescription or communicate with your care team.     To access your existing account, please contact your [facility] or call [phone] for assistance.        Care EveryWhere ID     This is your Care EveryWhere ID. This could  be used by other organizations to access your [place] medical records  [id number]        Your Vitals Were     Pulse Pulse Oximetry                58 99%           Blood Pressure from Last 3 Encounters:   03/27/18 157/80   03/22/18 135/84   02/02/18 120/84    Weight from Last 3 Encounters:   03/13/18 94.3 kg (208 lb)   02/02/18 90.8 kg (200 lb 3.2 oz)   11/09/17 90.6 kg (199 lb 12.8 oz)              Today, you had the following     No orders found for display       Primary Care Provider Office Phone # Fax #    Otoniel Nelson PA-C [phone]       [place] [address]        Equal Access to Services     CHI GARRISON : Hadii aad ku hadasho Soomaali, waaxda luqadaha, qaybta kaalmada adeegyada, jade plasencia. So [facility] [phone].    ATENCIÓN: Si habla español, tiene a hines disposición servicios gratuitos de asistencia lingüística. Llame al [phone].    We comply with applicable federal civil rights laws and [place] laws. We do not discriminate on the basis of race, color, national origin, age, disability, sex, sexual orientation, or gender identity.            Thank you!     Thank you for choosing [facility]  for your care. Our goal is always to provide you with excellent care. Hearing back from our patients is one way we can continue to improve our services. Please take a few minutes to complete the written survey that you may receive in the mail after your visit with us. Thank you!             Your Updated Medication List - Protect others around you: Learn how to safely use, store and throw away your medicines at www.disposemymeds.org.          This list is accurate as of 3/22/18 11:59 PM.  Always use your most recent med list.                   Brand Name Dispense Instructions for use Diagnosis    acyclovir 5 % ointment    ZOVIRAX    15 g    Apply topically 6 times daily    Recurrent cold sores       ALLEGRA 180 MG  tablet   Generic drug:  fexofenadine      Take 1 tablet by mouth daily Reported on 4/5/2017    FLORIDALMA positive, Family history of lupus erythematosus, Lumbar radiculopathy, Fatigue, Dry eyes, bilateral, Keratitis sicca, Foraminal stenosis of lumbar region, Ds DNA antibody positive       Coal Tar Extract 4 % Sham     168 mL    Externally apply 1 Dose topically daily    Hair loss, Scalp lesion       diazepam 5 MG tablet    VALIUM    90 tablet    Take 1 tablet (5 mg) by mouth 3 times daily as needed for muscle spasms    Spasm of muscle       FRESHKOTE 2.7-2 % Soln   Generic drug:  Polyvinyl Alcohol-Povidone      Apply  to eye.        hydrocortisone 0.2 % cream    WESTCORT    60 g    Apply sparingly to affected area three times daily as needed.    Telangiectasia of face       ibuprofen 800 MG tablet    ADVIL/MOTRIN    100 tablet    TAKE ONE TABLET BY MOUTH EVERY 8 HOURS AS NEEDED FOR PAIN    Cervicalgia, Muscle spasm       ketoconazole 2 % cream    NIZORAL    30 g    Apply topically 2 times daily    Ringworm of body       * meloxicam 15 MG tablet    MOBIC    90 tablet    TAKE ONE TABLET BY MOUTH EVERY DAY    Cervicalgia, Muscle spasm       * meloxicam 15 MG tablet    MOBIC    90 tablet    TAKE ONE TABLET BY MOUTH EVERY DAY (PATIENT WANTS [employer] BRAND ONLY)    Cervicalgia, Muscle spasm       methylphenidate 10 MG tablet    RITALIN    120 tablet    Take 1-2 tablets (10-20 mg) by mouth 2 times daily    Chronic fatigue       mupirocin 2 % ointment    BACTROBAN    22 g    Apply topically 3 times daily    MRSA (methicillin resistant staph aureus) culture positive, Skin lesion       nystatin-triamcinolone cream    MYCOLOG II    15 g    Apply topically 2 times daily    Atopic dermatitis       polyethylene glycol 0.4%- propylene glycol 0.3% 0.4-0.3 % Soln ophthalmic solution    SYSTANE ULTRA     Place 1 drop into both eyes every hour as needed for dry eyes        rizatriptan 5 MG ODT tab    MAXALT-MLT    18 tablet    Take 1-2  tablets (5-10 mg) by mouth at onset of headache for migraine May repeat dose in 2 hours.  Do not exceed 30 mg in 24 hours    Migraine without aura and without status migrainosus, not intractable       sucralfate 1 GM tablet    CARAFATE    120 tablet    TAKE ONE TABLET BY MOUTH FOUR TIMES A DAY    Gastroesophageal reflux disease, esophagitis presence not specified       SUMAtriptan 100 MG tablet    IMITREX    27 tablet    Take 1 tablet by mouth See Admin Instructions. WITH ONSET OF MIGRAINE, MAY REPEAT ONCE AFTER 2 HOURS. DO NOT EXCEED 2 TABLETS IN 24 HOURS.    Migraine, unspecified, with intractable migraine, so stated, without mention of status migrainosus       TYLENOL PO      Take 500 mg by mouth every 6 hours as needed        XIIDRA 5 % Soln opthalmic solution   Generic drug:  Lifitegrast           * Notice:  This list has 2 medication(s) that are the same as other medications prescribed for you. Read the directions carefully, and ask your doctor or other care provider to review them with you.

## 2018-03-22 NOTE — LETTER
3/22/2018         RE: Amelia Coker  [address] [place] [id number]        Dear Colleague,    Thank you for referring your patient, Amelia Coker, to the [facility]. Please see a copy of my visit note below.    CHIEF CONCERN:  Right shoulder and elbow pain.     HISTORY OF PRESENT ILLNESS:  Amelia Coker is a 43-year-old right-hand-dominant female presents for evaluation of right anterior shoulder and elbow pain.  No inciting event.  No trauma.  She has had issues with her right elbow since 2014 when she underwent a right elbow lateral epicondyle debridement with Dr. Johnston at [facility].  She notes slow progression of her pain symptoms.  She notes an aching and sharp anterior shoulder and antecubital fossa pain.  She notes some weakness with her .  No numbness or tingling.  Pain is worse with use of the arm.  She has problems with sleeping on it and it awakens her from sleep every night.  She has pain with activities as simple as brushing her teeth.  She has tried physical therapy a few years ago that did not help and only made her pain worse.  She has had a multitude of corticosteroid injections with at least 7 Subacromial steroid injections and 1 recent glenohumeral joint corticosteroid injection.  The glenohumeral joint corticosteroid injection did not help her at all per her report.  She notes limited relief with NSAIDs.      Past Medical History:   Diagnosis Date     Abnormal mammogram 9/21/2016    right breast      AD (atopic dermatitis) 10/29/2015     Allergic rhinitis due to other allergen      Arthritis      Bell's palsy      Chronic fatigue 6/6/2012     Chronic infection     MRSA - 2015 scalp and prior to Abdomen     Contact dermatitis and other eczema, due to unspecified cause     eczema     Contusion of left foot, initial encounter 3/16/2016     DJD (degenerative joint disease), lumbar 9/26/2013     DJD (degenerative joint disease), lumbar 9/26/2013     Ds  DNA antibody positive 4/16/2014    borderline.      Elevated blood pressure reading without diagnosis of hypertension 12/24/2013     Facial contusion 12/15/2014    hit by horse      Foraminal stenosis of lumbar region 9/26/2013     Frontal headache 12/15/2014     Gastroesophageal reflux disease, esophagitis presence not specified 6/29/2016     Heat sensitivity 10/29/2015     HTN, goal below 140/90 9/23/2015     Hyperlipidemia LDL goal <130 8/30/2017     Irregular heart beat      Keratitis sicca 10/31/2012     Left shoulder pain 9/26/2013     Lumbar radiculopathy 1/22/2014     Migraine headache 10/23/2013     Migraine, unspecified, with intractable migraine, so stated, without mention of status migrainosus      MRSA infection 10/20/2015     Muscular wasting and disuse atrophy, not elsewhere classified 6/9/2014    right SCM, right wrist,       Numbness and tingling     both legs anf feet greater on the left     PAC (premature atrial contraction) 7/15/2010     Plantar fasciitis 9/23/2015     PONV (postoperative nausea and vomiting)      Skin lesion 10/20/2015    posterior superior scalp      Spasm of muscle 7/11/2017     Telangiectasia of face 12/19/2014     Tooth pain 10/20/2015    left lower primary molar        Past Surgical History:   Procedure Laterality Date     COLONOSCOPY  3/11/2013    Procedure: COLONOSCOPY;  colonoscopy;  Surgeon: Lobito Mas MD;  Location: [facility]     DECOMPRESSION LUMBAR TWO LEVELS Bilateral 12/8/2016    Procedure: DECOMPRESSION LUMBAR TWO LEVELS;  Surgeon: Bang Burrell MD;  Location:  OR     ESOPHAGOSCOPY, GASTROSCOPY, DUODENOSCOPY (EGD), COMBINED  11/8/2013    Procedure: COMBINED ESOPHAGOSCOPY, GASTROSCOPY, DUODENOSCOPY (EGD), BIOPSY SINGLE OR MULTIPLE;  Esophagoscopy, Gastroscopy, Duodenoscopy EGD with multiple biopsies;  Surgeon: Teja Koch MD;  Location: [facility]     HC REMOVAL OF TONSILS,<11 Y/O      Tonsils <10y.o.     HC TOOTH EXTRACTION W/FORCEP        REPAIR TENDON ELBOW  7/9/2014    Procedure: REPAIR TENDON ELBOW;  Surgeon: Chris Johnston MD;  Location: [facility]       Current Outpatient Prescriptions   Medication Sig Dispense Refill     meloxicam (MOBIC) 15 MG tablet TAKE ONE TABLET BY MOUTH EVERY DAY (PATIENT WANTS UNICHEM BRAND ONLY) 90 tablet 1     polyethylene glycol 0.4%- propylene glycol 0.3% (SYSTANE ULTRA) 0.4-0.3 % SOLN ophthalmic solution Place 1 drop into both eyes every hour as needed for dry eyes       acyclovir (ZOVIRAX) 5 % ointment Apply topically 6 times daily 15 g 3     Lifitegrast (XIIDRA) 5 % SOLN        methylphenidate (RITALIN) 10 MG tablet Take 1-2 tablets (10-20 mg) by mouth 2 times daily 120 tablet 0     diazepam (VALIUM) 5 MG tablet Take 1 tablet (5 mg) by mouth 3 times daily as needed for muscle spasms 90 tablet 0     Coal Tar Extract 4 % SHAM Externally apply 1 Dose topically daily 168 mL 1     ibuprofen (ADVIL/MOTRIN) 800 MG tablet TAKE ONE TABLET BY MOUTH EVERY 8 HOURS AS NEEDED FOR PAIN 100 tablet 3     meloxicam (MOBIC) 15 MG tablet TAKE ONE TABLET BY MOUTH EVERY DAY 90 tablet 1     rizatriptan (MAXALT-MLT) 5 MG ODT tab Take 1-2 tablets (5-10 mg) by mouth at onset of headache for migraine May repeat dose in 2 hours.  Do not exceed 30 mg in 24 hours 18 tablet 3     ketoconazole (NIZORAL) 2 % cream Apply topically 2 times daily 30 g 1     sucralfate (CARAFATE) 1 GM tablet TAKE ONE TABLET BY MOUTH FOUR TIMES A  tablet 1     mupirocin (BACTROBAN) 2 % ointment Apply topically 3 times daily 22 g 1     hydrocortisone (WESTCORT) 0.2 % cream Apply sparingly to affected area three times daily as needed. 60 g 0     Acetaminophen (TYLENOL PO) Take 500 mg by mouth every 6 hours as needed        fexofenadine (ALLEGRA) 180 MG tablet Take 1 tablet by mouth daily Reported on 4/5/2017       nystatin-triamcinolone (MYCOLOG II) cream Apply topically 2 times daily 15 g 0     SUMAtriptan (IMITREX) 100 MG tablet Take 1 tablet by mouth See  Admin Instructions. WITH ONSET OF MIGRAINE, MAY REPEAT ONCE AFTER 2 HOURS. DO NOT EXCEED 2 TABLETS IN 24 HOURS. 27 tablet prn     POLYVIN ALC-POVIDON DIMETHYLAM (FRESHKOTE) 2.7-2 % SOLN Apply  to eye.            Allergies   Allergen Reactions     Ceftriaxone Anaphylaxis     Hives, rash, racing heart beat     Bactrim [Sulfamethoxazole W/Trimethoprim] Hives     Ciprofloxacin Other (See Comments)     Tendon Issues     Codeine Nausea and Vomiting     Copper      Rash       Doxycycline      Loss of skin pigmentation, skin loss.     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     Social History Main Topics     Smoking status: Never Smoker     Smokeless tobacco: Never Used     Alcohol use No      Comment: social     Drug use: No     Sexual activity: Yes     Partners: Male     Birth control/ protection: Surgical      Comment: vasectomy     Other Topics Concern      Service No     Blood Transfusions No     Caffeine Concern No     0     Occupational Exposure No     Hobby Hazards Yes     horses     Sleep Concern No     Stress Concern No     Weight Concern Yes     Special Diet Yes     nhung's     Back Care No     Exercise Yes     occ     Bike Helmet Not Asked     na     Seat Belt Yes     Self-Exams Yes     occ     Social History Narrative       FAMILY HISTORY: Reviewed in EMR      REVIEW OF SYSTEMS: Positive for right shoulder and elbow pain as above. Also positive for heart palpitations (chronic). Negative for general, skin, HENT, eye,  lung, intestinal, urinary, reporductive, blood, nervous system, and mental health  symptoms.      PHYSICAL EXAM:    Pleasant adult female in no apparent distress.  Height 5 feet 6 inches, weight 208 pounds.  Respirations nonlabored on room air.  Mood and affect congruent.    Focused examination of the right upper extremity demonstrates full active and passive right shoulder and right elbow range of motion.  Symmetric to the contralateral extremity.  She is a well-healed surgical incision over the lateral epicondyle of her right arm.  Her skin is otherwise clean dry warm intact.  Normal hair and sweat patterns.  Sensation intact to light touch throughout.  5 out of 5 , FPL, EPL, IO, wrist flexor, wrist extensor, biceps, triceps, deltoid muscle strength.  She has no tenderness to firm palpation over her AC joint or posterior glenohumeral joint.  She does have significant tenderness to palpation over the bicipital groove.  Positive speeds, positive Yergason's with pain in the distal biceps tendon.    IMAGIN view radiographic series right shoulder reviewed demonstrates normal and well-maintained glenohumeral joint space.  No evidence of AC joint arthrosis.  No evidence of fracture or dislocation.  No other acute bony abnormality.  Normal glenoid morphology.    MRI of the right shoulder dated 3/8/2018 is also reviewed.  This does demonstrate a partial-thickness articular sided supraspinatus tear measuring approximately 25% of the tendon width.  There is also some degenerative tearing of the superior labrum.  There is also tendinopathy of the long head of biceps tendon.    ASSESSMENT:    1. 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   2. Right shoulder partial thickness articular sided supraspinatus tendon tear (25%).   3. 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     PLAN:  We had a long discussion with Amelia today.  We do feel that her anterior shoulder and anterior elbow pain is predominantly coming from the disease of the long head of the biceps tendon.  It is  disappointing to hear that her last glenohumeral joint corticosteroid injection did not provide her significant relief.  We did explain options would include doing nothing, another course of physical therapy, a corticosteroid injection directly in the bicipital groove under ultrasound guidance, or surgical intervention in the form of a right shoulder arthroscopy, subacromial decompression, possible cuff repair versus debridement, and open subpectoral biceps tenodesis versus tenotomy.  We carefully weighed the risks, benefits, alternatives to each of these options.  At the present time the patient is interested in proceeding with a right shoulder ultrasound-guided bicipital groove corticosteroid injection.  We will coordinate this with one of our partners either Dr. Westfall or Dr. Martínez.    1. Referral to Dr. Westfall or Dr. Martínez for a right shoulder ultrasound guided bicipital groove corticosteroid injection.   2. Return to clinic PRN to discuss results of injection and possible next steps.     Patient was seen with and discussed with Rona James MD, who agrees with the above.     I have personally examined this patient and have reviewed the clinical presentation and progress note with the resident.  I agree with the treatment plan as outlined.  The plan was formulated with the resident on the day of the resident's note.       Again, thank you for allowing me to participate in the care of your patient.        Sincerely,        Rona James MD

## 2018-03-26 ENCOUNTER — MYC MEDICAL ADVICE (OUTPATIENT)
Dept: FAMILY MEDICINE | Facility: OTHER | Age: 43
End: 2018-03-26

## 2018-03-26 DIAGNOSIS — Z80.0 FAMILY HISTORY OF COLON CANCER: ICD-10-CM

## 2018-03-26 DIAGNOSIS — R76.8 ANA POSITIVE: ICD-10-CM

## 2018-03-26 DIAGNOSIS — S16.1XXA ACUTE CERVICAL MYOFASCIAL STRAIN, INITIAL ENCOUNTER: Primary | ICD-10-CM

## 2018-03-26 DIAGNOSIS — M12.811 ROTATOR CUFF ARTHROPATHY, RIGHT: ICD-10-CM

## 2018-03-26 DIAGNOSIS — R76.8 DS DNA ANTIBODY POSITIVE: ICD-10-CM

## 2018-03-26 DIAGNOSIS — H16.229 KERATITIS SICCA: ICD-10-CM

## 2018-03-26 DIAGNOSIS — R53.82 CHRONIC FATIGUE: ICD-10-CM

## 2018-03-26 DIAGNOSIS — M67.921 BICEPS TENDINOPATHY, RIGHT: ICD-10-CM

## 2018-03-26 NOTE — TELEPHONE ENCOUNTER
Patient is due for colonoscopy, Mammogram, PHQ, and a TSH. Patient was seen 02/02/18 for illness. Does patient need follow up appointment?  Please advise.  Arabella Petty CMA ([facility])

## 2018-03-27 ENCOUNTER — OFFICE VISIT (OUTPATIENT)
Dept: ORTHOPEDICS | Facility: CLINIC | Age: 43
End: 2018-03-27
Payer: COMMERCIAL

## 2018-03-27 ENCOUNTER — RADIANT APPOINTMENT (OUTPATIENT)
Dept: MAMMOGRAPHY | Facility: CLINIC | Age: 43
End: 2018-03-27
Attending: PHYSICIAN ASSISTANT
Payer: COMMERCIAL

## 2018-03-27 VITALS — HEART RATE: 69 BPM | OXYGEN SATURATION: 99 % | SYSTOLIC BLOOD PRESSURE: 157 MMHG | DIASTOLIC BLOOD PRESSURE: 80 MMHG

## 2018-03-27 DIAGNOSIS — M12.811 ROTATOR CUFF ARTHROPATHY, RIGHT: ICD-10-CM

## 2018-03-27 DIAGNOSIS — R53.82 CHRONIC FATIGUE: ICD-10-CM

## 2018-03-27 DIAGNOSIS — Z80.0 FAMILY HISTORY OF COLON CANCER: ICD-10-CM

## 2018-03-27 DIAGNOSIS — Z12.31 VISIT FOR SCREENING MAMMOGRAM: ICD-10-CM

## 2018-03-27 DIAGNOSIS — M75.21 BICEPS TENDINITIS OF RIGHT UPPER EXTREMITY: Primary | ICD-10-CM

## 2018-03-27 DIAGNOSIS — H16.229 KERATITIS SICCA: ICD-10-CM

## 2018-03-27 DIAGNOSIS — S16.1XXA ACUTE CERVICAL MYOFASCIAL STRAIN, INITIAL ENCOUNTER: ICD-10-CM

## 2018-03-27 DIAGNOSIS — R76.8 DS DNA ANTIBODY POSITIVE: ICD-10-CM

## 2018-03-27 DIAGNOSIS — R76.8 ANA POSITIVE: ICD-10-CM

## 2018-03-27 DIAGNOSIS — M67.921 BICEPS TENDINOPATHY, RIGHT: ICD-10-CM

## 2018-03-27 LAB
CRP SERPL-MCNC: <2.9 MG/L (ref 0–8)
RHEUMATOID FACT SER NEPH-ACNC: <20 IU/ML (ref 0–20)
TSH SERPL DL<=0.005 MIU/L-ACNC: 1.23 MU/L (ref 0.4–4)

## 2018-03-27 PROCEDURE — 77067 SCR MAMMO BI INCL CAD: CPT

## 2018-03-27 PROCEDURE — 99000 SPECIMEN HANDLING OFFICE-LAB: CPT | Performed by: FAMILY MEDICINE

## 2018-03-27 PROCEDURE — 00000167 ZZHCL STATISTIC INR NC: Performed by: FAMILY MEDICINE

## 2018-03-27 PROCEDURE — 85613 RUSSELL VIPER VENOM DILUTED: CPT | Performed by: FAMILY MEDICINE

## 2018-03-27 PROCEDURE — 84443 ASSAY THYROID STIM HORMONE: CPT | Performed by: FAMILY MEDICINE

## 2018-03-27 PROCEDURE — 86618 LYME DISEASE ANTIBODY: CPT | Performed by: FAMILY MEDICINE

## 2018-03-27 PROCEDURE — 99207 ZZC NO CHARGE LOS: CPT | Performed by: FAMILY MEDICINE

## 2018-03-27 PROCEDURE — 86039 ANTINUCLEAR ANTIBODIES (ANA): CPT | Performed by: FAMILY MEDICINE

## 2018-03-27 PROCEDURE — 36415 COLL VENOUS BLD VENIPUNCTURE: CPT | Performed by: FAMILY MEDICINE

## 2018-03-27 PROCEDURE — 86140 C-REACTIVE PROTEIN: CPT | Performed by: FAMILY MEDICINE

## 2018-03-27 PROCEDURE — 85730 THROMBOPLASTIN TIME PARTIAL: CPT | Performed by: FAMILY MEDICINE

## 2018-03-27 PROCEDURE — 86431 RHEUMATOID FACTOR QUANT: CPT | Performed by: FAMILY MEDICINE

## 2018-03-27 PROCEDURE — 86038 ANTINUCLEAR ANTIBODIES: CPT | Performed by: FAMILY MEDICINE

## 2018-03-27 PROCEDURE — 00000401 ZZHCL STATISTIC THROMBIN TIME NC: Performed by: FAMILY MEDICINE

## 2018-03-27 PROCEDURE — 86666 EHRLICHIA ANTIBODY: CPT | Mod: 90 | Performed by: FAMILY MEDICINE

## 2018-03-27 PROCEDURE — 20550 NJX 1 TENDON SHEATH/LIGAMENT: CPT | Mod: RT | Performed by: FAMILY MEDICINE

## 2018-03-27 RX ORDER — TRIAMCINOLONE ACETONIDE 40 MG/ML
40 INJECTION, SUSPENSION INTRA-ARTICULAR; INTRAMUSCULAR ONCE
Qty: 1 ML | Refills: 0 | OUTPATIENT
Start: 2018-03-27 | End: 2018-03-27

## 2018-03-27 ASSESSMENT — PAIN SCALES - GENERAL: PAINLEVEL: MODERATE PAIN (4)

## 2018-03-27 NOTE — PATIENT INSTRUCTIONS
Thanks for coming today.  Ortho[facility] Medicine Clinic  [address] [place] [address]    To schedule future appointments in Ortho Clinic, you may call [phone].    To schedule ordered imaging by your provider:   Call Central Imaging [phone]    To schedule an injection ordered by your provider:  Call Central [facility] Injection scheduling line: [phone]  [employer] available online at:  [email or web address]    Please call if any further questions or concerns ([phone]).  Clinic hours 8 am to 5 pm.    Return to clinic (call) if symptoms worsen or fail to improve.

## 2018-03-27 NOTE — PROGRESS NOTES
Bhupinder is here for a right biceps tendon injection.  She's seen at the request of Dr. James.    Vitals:    03/27/18 0750   BP: 157/80   Pulse: 69   SpO2: 99%     Biceps Tendon Injection - Ultrasound Guided  The patient was informed of the risks and the benefits of the procedure and a written consent was signed.  The patient s right shoulder was prepped with chlorhexidine in sterile fashion.   40 mg of triamcinolone suspension was drawn up into a 3 mL syringe with 1 mL of 1% lidocaine.  Injection was performed using sterile technique.  Under ultrasound guidance a 1.5-inch 25-gauge needle was used to enter the right proximal biceps tendon in the bicipital groove.  Lateral to medial approach was used with the patient in supine position.  Needle placement was visualized and documented with ultrasound.  Ultrasound visualization was necessary to ensure placement in to the tendon sheath and not in to the tendon itself, which could potentially cause further tendon damage.  Injection performed long axis to the probe.  Injection solution visualized within the tendon sheath.  Images were permanently stored for the patient's record.  There were no complications. The patient tolerated the procedure well. There was negligible bleeding.   The patient was instructed to ice the shoulder upon leaving clinic and refrain from overuse over the next 3 days.   The patient was instructed to call or go to the emergency room with any unusual pain, swelling, redness, or if otherwise concerned.  A follow up appointment will be scheduled to evaluate response to the injection, and to assess range of motion and pain.  Kenalog: NDC [id number]    Prakash Martínez DO CAQSM

## 2018-03-27 NOTE — MR AVS SNAPSHOT
After Visit Summary   3/27/2018    Amelia Coker    MRN: [id number]           Patient Information     Date Of Birth          1975        Visit Information        Provider Department      3/27/2018 7:40 AM Patel Martínez,  [facility]        Today's Diagnoses     Biceps tendinitis of right upper extremity    -  1      Care Instructions    Thanks for coming today.  [facility]  [address] [place] [address]    To schedule future appointments in Ortho Clinic, you may call [phone].    To schedule ordered imaging by your provider:   Call Central Imaging [phone]    To schedule an injection ordered by your provider:  Call Central Imaging Injection scheduling line: [phone]  MyChart available online at:  [email or web address]    Please call if any further questions or concerns ([phone]).  Clinic hours 8 am to 5 pm.    Return to clinic (call) if symptoms worsen or fail to improve.            Follow-ups after your visit        Your next 10 appointments already scheduled     Mar 27, 2018  8:30 AM CDT   (Arrive by 8:15 AM)   MA SCREENING DIGITAL BILATERAL with MGMA1, MG MA TECH   [facility]    [address] [place] [id number] [phone]           Do not use any powder, lotion or deodorant under your arms or on your breast. If you do, we will ask you to remove it before your exam.  Wear comfortable, two-piece clothing.  If you have any allergies, tell your care team.  Bring any previous mammograms from other facilities or have them mailed to the breast center. Three-dimensional (3D) mammograms are available at [facility] locations in [facility], [place], [place], [facility], [facility], and [place]. [facility] locations include [place] and [facility] [place] in [place]. Benefits of 3D mammograms include: - Improved rate of cancer  "detection - Decreases your chance of having to go back for more tests, which means fewer: - \"False-positive\" results (This means that there is an abnormal area but it isn't cancer.) - Invasive testing procedures, such as a biopsy or surgery - Can provide clearer images of the breast if you have dense breast tissue. 3D mammography is an optional exam that anyone can have with a 2D mammogram. It doesn't replace or take the place of a 2D mammogram. 2D mammograms remain an effective screening test for all women.  Not all insurance companies cover the cost of a 3D mammogram. Check with your insurance.            Mar 27, 2018  9:20 AM CDT   LAB with LAB FIRST FLOOR [facility] ([facility])    [id number] [place] [address] [phone]           Please do not eat 10-12 hours before your appointment if you are coming in fasting for labs on lipids, cholesterol, or glucose (sugar). This does not apply to pregnant women. Water, hot tea and black coffee (with nothing added) are okay. Do not drink other fluids, diet soda or chew gum.              Future tests that were ordered for you today     Open Future Orders        Priority Expected Expires Ordered    US Guided Needle Placement Routine  3/28/2019 3/27/2018    MA Screening Digital Bilateral Routine  3/26/2019 3/26/2018            Who to contact     If you have questions or need follow up information about today's clinic visit or your schedule please contact [facility] directly at [phone].  Normal or non-critical lab and imaging results will be communicated to you by MyChart, letter or phone within 4 business days after the clinic has received the results. If you do not hear from us within 7 days, please contact the clinic through [employer] or phone. If you have a critical or abnormal lab result, we will notify you by phone as soon as possible.  Submit refill requests through [employer] or call " your pharmacy and they will forward the refill request to us. Please allow 3 business days for your refill to be completed.          Additional Information About Your Visit        [employer] Information     [employer] gives you secure access to your electronic health record. If you see a primary care provider, you can also send messages to your care team and make appointments. If you have questions, please call your primary care clinic.  If you do not have a primary care provider, please call [phone] and they will assist you.      [employer] is an electronic gateway that provides easy, online access to your medical records. With [employer], you can request a clinic appointment, read your test results, renew a prescription or communicate with your care team.     To access your existing account, please contact your [facility] or call [phone] for assistance.        Care EveryWhere ID     This is your Care EveryWhere ID. This could be used by other organizations to access your [facility] medical records  [id number]        Your Vitals Were     Pulse Pulse Oximetry                69 99%           Blood Pressure from Last 3 Encounters:   03/27/18 157/80   03/22/18 135/84   02/02/18 120/84    Weight from Last 3 Encounters:   03/13/18 94.3 kg (208 lb)   02/02/18 90.8 kg (200 lb 3.2 oz)   11/09/17 90.6 kg (199 lb 12.8 oz)              We Performed the Following     INJECTION SINGLE TENDON SHEATH/LIGAMENT          Today's Medication Changes          These changes are accurate as of 3/27/18  8:16 AM.  If you have any questions, ask your nurse or doctor.               Start taking these medicines.        Dose/Directions    triamcinolone acetonide 40 MG/ML injection   Commonly known as:  KENALOG   Used for:  Biceps tendinitis of right upper extremity   Started by:  Patel Martínez DO        Dose:  40 mg   1 mL (40 mg) by INTRA-ARTICULAR route once for 1 dose   Quantity:  1 mL   Refills:  0             Where to get your medicines      Some of these will need a paper prescription and others can be bought over the counter.  Ask your nurse if you have questions.     You don't need a prescription for these medications     triamcinolone acetonide 40 MG/ML injection                Primary Care Provider Office Phone # Fax #    Otoniel Nelson PA-C [phone]       [place] [address]        Equal Access to Services     CHI GARRISON : Hadii aad ku hadasho Soomaali, waaxda luqadaha, qaybta kaalmada adeegyada, waxay idiin hayaan adeeg kharash monique plasencia. So [facility] [phone].    ATENCIÓN: Si kavin simmons, tiene a hines disposición servicios gratuitos de asistencia lingüística. [facility] [phone].    We comply with applicable federal civil rights laws and [place] laws. We do not discriminate on the basis of race, color, national origin, age, disability, sex, sexual orientation, or gender identity.            Thank you!     Thank you for choosing [facility]  for your care. Our goal is always to provide you with excellent care. Hearing back from our patients is one way we can continue to improve our services. Please take a few minutes to complete the written survey that you may receive in the mail after your visit with us. Thank you!             Your Updated Medication List - Protect others around you: Learn how to safely use, store and throw away your medicines at www.disposemymeds.org.          This list is accurate as of 3/27/18  8:16 AM.  Always use your most recent med list.                   Brand Name Dispense Instructions for use Diagnosis    acyclovir 5 % ointment    ZOVIRAX    15 g    Apply topically 6 times daily    Recurrent cold sores       ALLEGRA 180 MG tablet   Generic drug:  fexofenadine      Take 1 tablet by mouth daily Reported on 4/5/2017    FLORIDALMA positive, Family history of lupus erythematosus, Lumbar radiculopathy, Fatigue, Dry eyes,  bilateral, Keratitis sicca, Foraminal stenosis of lumbar region, Ds DNA antibody positive       Coal Tar Extract 4 % Sham     168 mL    Externally apply 1 Dose topically daily    Hair loss, Scalp lesion       diazepam 5 MG tablet    VALIUM    90 tablet    Take 1 tablet (5 mg) by mouth 3 times daily as needed for muscle spasms    Spasm of muscle       FRESHKOTE 2.7-2 % Soln   Generic drug:  Polyvinyl Alcohol-Povidone      Apply  to eye.        hydrocortisone 0.2 % cream    WESTCORT    60 g    Apply sparingly to affected area three times daily as needed.    Telangiectasia of face       ibuprofen 800 MG tablet    ADVIL/MOTRIN    100 tablet    TAKE ONE TABLET BY MOUTH EVERY 8 HOURS AS NEEDED FOR PAIN    Cervicalgia, Muscle spasm       ketoconazole 2 % cream    NIZORAL    30 g    Apply topically 2 times daily    Ringworm of body       * meloxicam 15 MG tablet    MOBIC    90 tablet    TAKE ONE TABLET BY MOUTH EVERY DAY    Cervicalgia, Muscle spasm       * meloxicam 15 MG tablet    MOBIC    90 tablet    TAKE ONE TABLET BY MOUTH EVERY DAY (PATIENT WANTS RelaborateHEM BRAND ONLY)    Cervicalgia, Muscle spasm       methylphenidate 10 MG tablet    RITALIN    120 tablet    Take 1-2 tablets (10-20 mg) by mouth 2 times daily    Chronic fatigue       mupirocin 2 % ointment    BACTROBAN    22 g    Apply topically 3 times daily    MRSA (methicillin resistant staph aureus) culture positive, Skin lesion       nystatin-triamcinolone cream    MYCOLOG II    15 g    Apply topically 2 times daily    Atopic dermatitis       polyethylene glycol 0.4%- propylene glycol 0.3% 0.4-0.3 % Soln ophthalmic solution    SYSTANE ULTRA     Place 1 drop into both eyes every hour as needed for dry eyes        rizatriptan 5 MG ODT tab    MAXALT-MLT    18 tablet    Take 1-2 tablets (5-10 mg) by mouth at onset of headache for migraine May repeat dose in 2 hours.  Do not exceed 30 mg in 24 hours    Migraine without aura and without status migrainosus, not  intractable       sucralfate 1 GM tablet    CARAFATE    120 tablet    TAKE ONE TABLET BY MOUTH FOUR TIMES A DAY    Gastroesophageal reflux disease, esophagitis presence not specified       SUMAtriptan 100 MG tablet    IMITREX    27 tablet    Take 1 tablet by mouth See Admin Instructions. WITH ONSET OF MIGRAINE, MAY REPEAT ONCE AFTER 2 HOURS. DO NOT EXCEED 2 TABLETS IN 24 HOURS.    Migraine, unspecified, with intractable migraine, so stated, without mention of status migrainosus       triamcinolone acetonide 40 MG/ML injection    KENALOG    1 mL    1 mL (40 mg) by INTRA-ARTICULAR route once for 1 dose    Biceps tendinitis of right upper extremity       TYLENOL PO      Take 500 mg by mouth every 6 hours as needed        XIIDRA 5 % Soln opthalmic solution   Generic drug:  Lifitegrast           * Notice:  This list has 2 medication(s) that are the same as other medications prescribed for you. Read the directions carefully, and ask your doctor or other care provider to review them with you.

## 2018-03-27 NOTE — LETTER
3/27/2018         RE: Amelia Coker  [address] [place] [id number]        Dear Colleague,    Thank you for referring your patient, Amelia Coker, to the [facility]. Please see a copy of my visit note below.    Bhupinder is here for a right biceps tendon injection.  She's seen at the request of Dr. James.    Vitals:    03/27/18 0750   BP: 157/80   Pulse: 69   SpO2: 99%     Biceps Tendon Injection - Ultrasound Guided  The patient was informed of the risks and the benefits of the procedure and a written consent was signed.  The patient s right shoulder was prepped with chlorhexidine in sterile fashion.   40 mg of triamcinolone suspension was drawn up into a 3 mL syringe with 1 mL of 1% lidocaine.  Injection was performed using sterile technique.  Under ultrasound guidance a 1.5-inch 25-gauge needle was used to enter the right proximal biceps tendon in the bicipital groove.  Lateral to medial approach was used with the patient in supine position.  Needle placement was visualized and documented with ultrasound.  Ultrasound visualization was necessary to ensure placement in to the tendon sheath and not in to the tendon itself, which could potentially cause further tendon damage.  Injection performed long axis to the probe.  Injection solution visualized within the tendon sheath.  Images were permanently stored for the patient's record.  There were no complications. The patient tolerated the procedure well. There was negligible bleeding.   The patient was instructed to ice the shoulder upon leaving clinic and refrain from overuse over the next 3 days.   The patient was instructed to call or go to the emergency room with any unusual pain, swelling, redness, or if otherwise concerned.  A follow up appointment will be scheduled to evaluate response to the injection, and to assess range of motion and pain.  Kenalog: NDC [id number]    Prakash Martínez DO CAQSM          Again, thank you for  allowing me to participate in the care of your patient.        Sincerely,        Patel Martínez, DO

## 2018-03-27 NOTE — NURSING NOTE
"Amelia Coker's goals for this visit include: Right shoulder injection   She requests these members of her care team be copied on today's visit information: yes    PCP: Otoniel Nelson    Referring Provider:  No referring provider defined for this encounter.    Chief Complaint   Patient presents with     Consult     right shoulder injection.        Initial /80  Pulse 69  SpO2 99% Estimated body mass index is 33.83 kg/(m^2) as calculated from the following:    Height as of 3/13/18: 1.67 m (5' 5.75\").    Weight as of 3/13/18: 94.3 kg (208 lb).  Medication Reconciliation: complete    "

## 2018-03-28 LAB
ANA PAT SER IF-IMP: ABNORMAL
ANA SER QL IF: ABNORMAL
ANA TITR SER IF: ABNORMAL {TITER}
B BURGDOR IGG+IGM SER QL: 0.17 (ref 0–0.89)
LA PPP-IMP: NEGATIVE

## 2018-03-29 LAB
A PHAGOCYTOPH IGG TITR SER IF: NORMAL {TITER}
A PHAGOCYTOPH IGM TITR SER IF: NORMAL {TITER}

## 2018-04-02 ENCOUNTER — MYC MEDICAL ADVICE (OUTPATIENT)
Dept: FAMILY MEDICINE | Facility: OTHER | Age: 43
End: 2018-04-02

## 2018-04-09 DIAGNOSIS — G43.009 MIGRAINE WITHOUT AURA AND WITHOUT STATUS MIGRAINOSUS, NOT INTRACTABLE: ICD-10-CM

## 2018-04-10 RX ORDER — RIZATRIPTAN BENZOATE 5 MG/1
5-10 TABLET, ORALLY DISINTEGRATING ORAL
Qty: 18 TABLET | Refills: 3 | Status: SHIPPED | OUTPATIENT
Start: 2018-04-10 | End: 2018-11-12 | Stop reason: ALTCHOICE

## 2018-04-10 NOTE — TELEPHONE ENCOUNTER
rizatriptan (MAXALT-MLT) 5 MG ODT tab  Routing refill request to provider for review/approval because:  A break in medication, last prescribed 12/16/2016    Macrina Doyle RN, BSN

## 2018-04-12 ENCOUNTER — MYC MEDICAL ADVICE (OUTPATIENT)
Dept: FAMILY MEDICINE | Facility: OTHER | Age: 43
End: 2018-04-12

## 2018-04-12 ENCOUNTER — OFFICE VISIT (OUTPATIENT)
Dept: FAMILY MEDICINE | Facility: OTHER | Age: 43
End: 2018-04-12
Payer: COMMERCIAL

## 2018-04-12 VITALS
RESPIRATION RATE: 18 BRPM | BODY MASS INDEX: 33.5 KG/M2 | WEIGHT: 206 LBS | TEMPERATURE: 98.3 F | DIASTOLIC BLOOD PRESSURE: 76 MMHG | SYSTOLIC BLOOD PRESSURE: 142 MMHG | HEART RATE: 84 BPM

## 2018-04-12 DIAGNOSIS — R22.0 SUPERFICIAL SWELLING OF SCALP: Primary | ICD-10-CM

## 2018-04-12 PROCEDURE — 99213 OFFICE O/P EST LOW 20 MIN: CPT | Performed by: PHYSICIAN ASSISTANT

## 2018-04-12 ASSESSMENT — ANXIETY QUESTIONNAIRES
4. TROUBLE RELAXING: NOT AT ALL
GAD7 TOTAL SCORE: 0
1. FEELING NERVOUS, ANXIOUS, OR ON EDGE: NOT AT ALL
7. FEELING AFRAID AS IF SOMETHING AWFUL MIGHT HAPPEN: NOT AT ALL
2. NOT BEING ABLE TO STOP OR CONTROL WORRYING: NOT AT ALL
6. BECOMING EASILY ANNOYED OR IRRITABLE: NOT AT ALL
GAD7 TOTAL SCORE: 0
7. FEELING AFRAID AS IF SOMETHING AWFUL MIGHT HAPPEN: NOT AT ALL
3. WORRYING TOO MUCH ABOUT DIFFERENT THINGS: NOT AT ALL
GAD7 TOTAL SCORE: 0
5. BEING SO RESTLESS THAT IT IS HARD TO SIT STILL: NOT AT ALL

## 2018-04-12 ASSESSMENT — PATIENT HEALTH QUESTIONNAIRE - PHQ9
SUM OF ALL RESPONSES TO PHQ QUESTIONS 1-9: 4
SUM OF ALL RESPONSES TO PHQ QUESTIONS 1-9: 4
10. IF YOU CHECKED OFF ANY PROBLEMS, HOW DIFFICULT HAVE THESE PROBLEMS MADE IT FOR YOU TO DO YOUR WORK, TAKE CARE OF THINGS AT HOME, OR GET ALONG WITH OTHER PEOPLE: NOT DIFFICULT AT ALL

## 2018-04-12 ASSESSMENT — PAIN SCALES - GENERAL: PAINLEVEL: SEVERE PAIN (6)

## 2018-04-12 NOTE — PROGRESS NOTES
SUBJECTIVE:   Amelia Coker is a 43 year old female who presents to clinic today for the following health issues:      HPI  Headache  Onset: 2 weeks    Description:   Location: left side of head- swelling   Character: dull pain, pressure  Frequency:  daily  Duration:  All day    Intensity: moderate, severe    Progression of Symptoms:  worsening    Accompanying Signs & Symptoms:  Stiff neck: no  Neck or upper back pain: no  Fever: no  Sinus pressure: YES- Pressure in head.  Nausea or vomiting: no  Dizziness: no  Numbness: YES  Weakness: no  Visual changes: YES    History:   Head trauma: YES- in the past  Family history of migraines: YES  Previous tests for headaches: YES  Neurologist evaluations: YES- Previous  Able to do daily activities: YES  Wake with a headaches: YES  Do headaches wake you up: no  Daily pain medication use: YES- Meloxicam, tylenol  Work/school stressors/changes: no    Precipitating factors:   Does light make it worse: YES  Does sound make it worse: no    Alleviating factors:  Does sleep help: no    Therapies Tried and outcome: Meloxicam, Tylenol and Maxalt  Problem list and histories reviewed & adjusted, as indicated.  Additional history: as documented    Patient Active Problem List   Diagnosis     Intractable migraine     Bell's palsy     Contact dermatitis and other eczema, due to unspecified cause     Allergic rhinitis due to other allergen     Scalp lesion     Lyme disease     PAC (premature atrial contraction)     Palpitations     Raynaud phenomenon     Cold intolerance of hand     Chronic fatigue     Hair loss     Pain in joint     Abnormal PFT     Shoulder joint instability     Family history of melanoma     MRSA (methicillin resistant staph aureus) culture positive     Dry eyes     Keratitis sicca     Laceration of foot, right     Skin ulcer of neck (H)     Family history of colon cancer     Pain in joint, upper arm     Traumatic amputation of fingertip     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     FLORIDALMA positive     Plantar fascial fibromatosis     Foraminal stenosis of lumbar region     DJD (degenerative joint disease), lumbar     Left shoulder pain     Migraine without aura and without status migrainosus, not intractable     Lumbar radiculopathy     Ds DNA antibody positive     Muscular wasting and disuse atrophy, not elsewhere classified     Other postprocedural status(V45.89)     Facial contusion     Frontal headache     Telangiectasia of face     Lateral epicondylitis     Right shoulder pain     Plantar fasciitis     HTN, goal below 140/90     Tooth pain     MRSA infection     Skin lesion     AD (atopic dermatitis)     Heat sensitivity     Rotator cuff arthropathy, right     Contusion of left foot, initial encounter     Gastroesophageal reflux disease, esophagitis presence not specified     Abnormal mammogram     Ringworm of body     Fall from horse, initial encounter     Sternocleidomastoid muscle tenderness     Acute cervical myofascial strain, initial encounter     Spasm of muscle     Hyperlipidemia LDL goal <130     Biceps tendinopathy, right     Superficial swelling of scalp     Past Surgical History:   Procedure Laterality Date     COLONOSCOPY  3/11/2013    Procedure: COLONOSCOPY;  colonoscopy;  Surgeon: Lobito Mas MD;  Location: [facility]     DECOMPRESSION LUMBAR TWO LEVELS Bilateral 12/8/2016    Procedure: DECOMPRESSION LUMBAR TWO LEVELS;  Surgeon: Bang Burrell MD;  Location: [facility]     ESOPHAGOSCOPY, GASTROSCOPY, DUODENOSCOPY (EGD), COMBINED  11/8/2013    Procedure: COMBINED ESOPHAGOSCOPY, GASTROSCOPY, DUODENOSCOPY (EGD), BIOPSY SINGLE OR MULTIPLE;  Esophagoscopy, Gastroscopy, Duodenoscopy EGD with multiple biopsies;  Surgeon: Teja Koch MD;  Location: [facility]     HC REMOVAL OF TONSILS,<11 Y/O      Tonsils <10y.o.     HC TOOTH EXTRACTION W/FORCEP       REPAIR TENDON ELBOW  7/9/2014    Procedure: REPAIR TENDON ELBOW;   Surgeon: Chris Johnston MD;  Location: [facility]       Social History   Substance Use Topics     Smoking status: Never Smoker     Smokeless tobacco: Never Used     Alcohol use No      Comment: social     Family History   Problem Relation Age of Onset     DIABETES Mother      adult     Cancer - colorectal Mother      Hypertension Mother      Allergies Mother      CANCER Mother      colon     Depression Mother      GASTROINTESTINAL DISEASE Mother      ibs     Genitourinary Problems Mother      kidney cyst     Gynecology Mother      endometriosis     Lipids Mother      Thyroid Disease Mother      Arthritis Mother      RA     HEART DISEASE Maternal Grandfather      MI,  - 60's     Allergies Father      Unknown/Adopted Father      HEART DISEASE Father      mi-age mid 40's     Cardiovascular Father      Lipids Father      Respiratory Father      asthma     CANCER Father      Other Cancer Father      renal cancer     Depression Sister      Allergies Sister      Gynecology Sister      endometriosis     Lipids Paternal Grandmother      OSTEOPOROSIS Paternal Grandmother      Thyroid Disease Paternal Grandmother      Respiratory Son      asthma     Allergies Son      Arthritis Sister      Allergies Brother      HEART DISEASE Brother      Allergies Daughter      Blood Disease Paternal Aunt      LGL T cell Leukemia         Current Outpatient Prescriptions   Medication Sig Dispense Refill     rizatriptan (MAXALT-MLT) 5 MG ODT tab Take 1-2 tablets (5-10 mg) by mouth at onset of headache for migraine May repeat dose in 2 hours.  Do not exceed 30 mg in 24 hours 18 tablet 3     meloxicam (MOBIC) 15 MG tablet TAKE ONE TABLET BY MOUTH EVERY DAY (PATIENT WANTS UNICHEM BRAND ONLY) 90 tablet 1     polyethylene glycol 0.4%- propylene glycol 0.3% (SYSTANE ULTRA) 0.4-0.3 % SOLN ophthalmic solution Place 1 drop into both eyes every hour as needed for dry eyes       acyclovir (ZOVIRAX) 5 % ointment Apply topically 6 times daily 15 g  3     Lifitegrast (XIIDRA) 5 % SOLN        methylphenidate (RITALIN) 10 MG tablet Take 1-2 tablets (10-20 mg) by mouth 2 times daily 120 tablet 0     diazepam (VALIUM) 5 MG tablet Take 1 tablet (5 mg) by mouth 3 times daily as needed for muscle spasms 90 tablet 0     Coal Tar Extract 4 % SHAM Externally apply 1 Dose topically daily 168 mL 1     ibuprofen (ADVIL/MOTRIN) 800 MG tablet TAKE ONE TABLET BY MOUTH EVERY 8 HOURS AS NEEDED FOR PAIN 100 tablet 3     meloxicam (MOBIC) 15 MG tablet TAKE ONE TABLET BY MOUTH EVERY DAY 90 tablet 1     ketoconazole (NIZORAL) 2 % cream Apply topically 2 times daily 30 g 1     sucralfate (CARAFATE) 1 GM tablet TAKE ONE TABLET BY MOUTH FOUR TIMES A  tablet 1     mupirocin (BACTROBAN) 2 % ointment Apply topically 3 times daily 22 g 1     hydrocortisone (WESTCORT) 0.2 % cream Apply sparingly to affected area three times daily as needed. 60 g 0     Acetaminophen (TYLENOL PO) Take 500 mg by mouth every 6 hours as needed        fexofenadine (ALLEGRA) 180 MG tablet Take 1 tablet by mouth daily Reported on 4/5/2017       nystatin-triamcinolone (MYCOLOG II) cream Apply topically 2 times daily 15 g 0     SUMAtriptan (IMITREX) 100 MG tablet Take 1 tablet by mouth See Admin Instructions. WITH ONSET OF MIGRAINE, MAY REPEAT ONCE AFTER 2 HOURS. DO NOT EXCEED 2 TABLETS IN 24 HOURS. 27 tablet prn     POLYVIN ALC-POVIDON DIMETHYLAM (FRESHKOTE) 2.7-2 % SOLN Apply  to eye.       Allergies   Allergen Reactions     Ceftriaxone Anaphylaxis     Hives, rash, racing heart beat     Bactrim [Sulfamethoxazole W/Trimethoprim] Hives     Ciprofloxacin Other (See Comments)     Tendon Issues     Codeine Nausea and Vomiting     Copper      Rash       Doxycycline      Loss of skin pigmentation, skin loss.     Gold      Rash       Iodine-131 Hives     Latex      Levaquin [Levofloxacin] Other (See Comments)     Tendon issues with levaquin and cipro     Nickel      rash     Steel [Staples]      Rash from staples        Penicillins Rash     Recent Labs   Lab Test  03/27/18   0733  08/30/17   0757  12/23/16   0801   09/15/16   1123  06/08/16   0745   12/02/15   0758   06/09/14   1335   A1C   --    --    --    --    --   5.5   --    --    --    --    LDL   --   77   --    --    --    --    --   108*   --   132*   HDL   --   72   --    --    --    --    --   78   --   56   TRIG   --   50   --    --    --    --    --   52   --   62   ALT   --   22  43   --    --    --    --   64*   < >  34   CR   --   0.66  0.59   < >   --   0.54   < >  0.60   < >  0.67   GFRESTIMATED   --   >90  >90  Non African American GFR Calc     < >   --   >90  Non  GFR Calc     < >  >90  Non  GFR Calc     < >  >90   GFRESTBLACK   --   >90  >90  African American GFR Calc     < >   --   >90   GFR Calc     < >  >90   GFR Calc     < >  >90   POTASSIUM   --   4.2  4.1   < >   --   4.2   < >  4.0   < >  4.1   TSH  1.23   --    --    --   0.84   --    --    --    < >  1.51    < > = values in this interval not displayed.      BP Readings from Last 3 Encounters:   04/12/18 142/76   03/27/18 157/80   03/22/18 135/84    Wt Readings from Last 3 Encounters:   04/12/18 206 lb (93.4 kg)   03/13/18 208 lb (94.3 kg)   02/02/18 200 lb 3.2 oz (90.8 kg)                  Labs reviewed in EPIC    ROS:  Constitutional, HEENT, cardiovascular, pulmonary, gi and gu systems are negative, except as otherwise noted.    OBJECTIVE:     /76 (Cuff Size: Adult Large)  Pulse 84  Temp 98.3  F (36.8  C) (Temporal)  Resp 18  Wt 206 lb (93.4 kg)  BMI 33.5 kg/m2  Body mass index is 33.5 kg/(m^2).  GENERAL: healthy, alert and no distress  HENT: normal cephalic/atraumatic, ear canals and TM's normal, nose and mouth without ulcers or lesions, oropharynx clear and oral mucous membranes moist  NECK: no adenopathy, no asymmetry, masses, or scars and trachea midline and normal to palpation  RESP: lungs clear to auscultation - no  rales, rhonchi or wheezes  CV: regular rate and rhythm, normal S1 S2, no S3 or S4, no murmur, click or rub, no peripheral edema and peripheral pulses strong  ABDOMEN: soft, nontender, no hepatosplenomegaly, no masses and bowel sounds normal  MS: no gross musculoskeletal defects noted, no edema  SKIN: no suspicious lesions or rashes to visible skin.  There may be a dry patch to the left upper scalp over top the area of swelling to the scalp.  No new concerns at this point in time.      ASSESSMENT/PLAN:     1. Superficial swelling of scalp  Mild and slowly resolving this point in time she is simply to observe.    Otoniel Manuel PA-C  [facility] for HPI/ROS submitted by the patient on 4/12/2018   If you checked off any problems, how difficult have these problems made it for you to do your work, take care of things at home, or get along with other people?: Not difficult at all  PHQ9 TOTAL SCORE: 4  DYLON 7 TOTAL SCORE: 0

## 2018-04-12 NOTE — TELEPHONE ENCOUNTER
I have a 4:20 PM or 4:40 PM appointment time that we could work you into today.  Tomorrow morning we have a 940 1040 or 1140.  With whether predictions it would probably be smarter to see you this afternoon.  Feel free to make an appointment when you can make it work in your schedule.  When you call you may tell the  that I gave permission for the 420 or the 440 today.  Electronically signed:    Otoniel Manuel PA-C

## 2018-04-12 NOTE — NURSING NOTE
"Chief Complaint   Patient presents with     Headache       Initial /76 (Cuff Size: Adult Large)  Pulse 84  Temp 98.3  F (36.8  C) (Temporal)  Resp 18  Wt 206 lb (93.4 kg)  BMI 33.5 kg/m2 Estimated body mass index is 33.5 kg/(m^2) as calculated from the following:    Height as of 3/13/18: 5' 5.75\" (1.67 m).    Weight as of this encounter: 206 lb (93.4 kg).  Medication Reconciliation: complete  "

## 2018-04-12 NOTE — MR AVS SNAPSHOT
After Visit Summary   4/12/2018    Amelia Coker    MRN: [id number]           Patient Information     Date Of Birth          1975        Visit Information        Provider Department      4/12/2018 4:40 PM Otoniel Nelson PA-C [facility]         Follow-ups after your visit        Who to contact     If you have questions or need follow up information about today's clinic visit or your schedule please contact [facility] directly at [phone].  Normal or non-critical lab and imaging results will be communicated to you by MyChart, letter or phone within 4 business days after the clinic has received the results. If you do not hear from us within 7 days, please contact the clinic through [facility] or phone. If you have a critical or abnormal lab result, we will notify you by phone as soon as possible.  Submit refill requests through [facility] or call your pharmacy and they will forward the refill request to us. Please allow 3 business days for your refill to be completed.          Additional Information About Your Visit        MyChart Information     [facility] [employer] gives you secure access to your electronic health record. If you see a primary care provider, you can also send messages to your care team and make appointments. If you have questions, please call your primary care clinic.  If you do not have a primary care provider, please call [phone] and they will assist you.        Care EveryWhere ID     This is your Care EveryWhere ID. This could be used by other organizations to access your [place] medical records  [id number]        Your Vitals Were     Pulse Temperature Respirations BMI (Body Mass Index)          84 98.3  F (36.8  C) (Temporal) 18 33.5 kg/m2         Blood Pressure from Last 3 Encounters:   04/12/18 142/76   03/27/18 157/80   03/22/18 135/84    Weight from Last 3 Encounters:   04/12/18 206 lb (93.4 kg)   03/13/18 208 lb (94.3 kg)   02/02/18 200 lb 3.2 oz  (90.8 kg)              Today, you had the following     No orders found for display       Primary Care Provider Office Phone # Fax #    Otoniel Nelson PA-C [phone]       [facility] [address] [facility] [address]        Equal Access to Services     [facility] GARRISON : Hadii aad ku hadasho Soomaali, waaxda luqadaha, qaybta kaalmada adeegyada, waxay rangelin hayaan adeelizabeth karitata lajeanine plasencia. So [facility] [phone].    ATENCIÓN: Si habla español, tiene a hines disposición servicios gratuitos de asistencia lingüística. Llame al [phone].    We comply with applicable federal civil rights laws and [place] laws. We do not discriminate on the basis of race, color, national origin, age, disability, sex, sexual orientation, or gender identity.            Thank you!     Thank you for choosing [facility]  for your care. Our goal is always to provide you with excellent care. Hearing back from our patients is one way we can continue to improve our services. Please take a few minutes to complete the written survey that you may receive in the mail after your visit with us. Thank you!             Your Updated Medication List - Protect others around you: Learn how to safely use, store and throw away your medicines at www.disposemymeds.org.          This list is accurate as of 4/12/18  5:07 PM.  Always use your most recent med list.                   Brand Name Dispense Instructions for use Diagnosis    acyclovir 5 % ointment    ZOVIRAX    15 g    Apply topically 6 times daily    Recurrent cold sores       ALLEGRA 180 MG tablet   Generic drug:  fexofenadine      Take 1 tablet by mouth daily Reported on 4/5/2017    FLORIDALMA positive, Family history of lupus erythematosus, Lumbar radiculopathy, Fatigue, Dry eyes, bilateral, Keratitis sicca, Foraminal stenosis of lumbar region, Ds DNA antibody positive       Coal Tar Extract 4 % Sham     168 mL    Externally apply 1 Dose topically daily    Hair loss, Scalp  lesion       diazepam 5 MG tablet    VALIUM    90 tablet    Take 1 tablet (5 mg) by mouth 3 times daily as needed for muscle spasms    Spasm of muscle       FRESHKOTE 2.7-2 % Soln   Generic drug:  Polyvinyl Alcohol-Povidone      Apply  to eye.        hydrocortisone 0.2 % cream    WESTCORT    60 g    Apply sparingly to affected area three times daily as needed.    Telangiectasia of face       ibuprofen 800 MG tablet    ADVIL/MOTRIN    100 tablet    TAKE ONE TABLET BY MOUTH EVERY 8 HOURS AS NEEDED FOR PAIN    Cervicalgia, Muscle spasm       ketoconazole 2 % cream    NIZORAL    30 g    Apply topically 2 times daily    Ringworm of body       * meloxicam 15 MG tablet    MOBIC    90 tablet    TAKE ONE TABLET BY MOUTH EVERY DAY    Cervicalgia, Muscle spasm       * meloxicam 15 MG tablet    MOBIC    90 tablet    TAKE ONE TABLET BY MOUTH EVERY DAY (PATIENT WANTS [employer] BRAND ONLY)    Cervicalgia, Muscle spasm       methylphenidate 10 MG tablet    RITALIN    120 tablet    Take 1-2 tablets (10-20 mg) by mouth 2 times daily    Chronic fatigue       mupirocin 2 % ointment    BACTROBAN    22 g    Apply topically 3 times daily    MRSA (methicillin resistant staph aureus) culture positive, Skin lesion       nystatin-triamcinolone cream    MYCOLOG II    15 g    Apply topically 2 times daily    Atopic dermatitis       polyethylene glycol 0.4%- propylene glycol 0.3% 0.4-0.3 % Soln ophthalmic solution    SYSTANE ULTRA     Place 1 drop into both eyes every hour as needed for dry eyes        rizatriptan 5 MG ODT tab    MAXALT-MLT    18 tablet    Take 1-2 tablets (5-10 mg) by mouth at onset of headache for migraine May repeat dose in 2 hours.  Do not exceed 30 mg in 24 hours    Migraine without aura and without status migrainosus, not intractable       sucralfate 1 GM tablet    CARAFATE    120 tablet    TAKE ONE TABLET BY MOUTH FOUR TIMES A DAY    Gastroesophageal reflux disease, esophagitis presence not specified       SUMAtriptan 100  MG tablet    IMITREX    27 tablet    Take 1 tablet by mouth See Admin Instructions. WITH ONSET OF MIGRAINE, MAY REPEAT ONCE AFTER 2 HOURS. DO NOT EXCEED 2 TABLETS IN 24 HOURS.    Migraine, unspecified, with intractable migraine, so stated, without mention of status migrainosus       TYLENOL PO      Take 500 mg by mouth every 6 hours as needed        XIIDRA 5 % Soln opthalmic solution   Generic drug:  Lifitegrast           * Notice:  This list has 2 medication(s) that are the same as other medications prescribed for you. Read the directions carefully, and ask your doctor or other care provider to review them with you.

## 2018-04-13 ASSESSMENT — ANXIETY QUESTIONNAIRES: GAD7 TOTAL SCORE: 0

## 2018-04-13 ASSESSMENT — PATIENT HEALTH QUESTIONNAIRE - PHQ9: SUM OF ALL RESPONSES TO PHQ QUESTIONS 1-9: 4

## 2018-04-14 ENCOUNTER — MYC MEDICAL ADVICE (OUTPATIENT)
Dept: FAMILY MEDICINE | Facility: OTHER | Age: 43
End: 2018-04-14

## 2018-04-16 ENCOUNTER — MYC MEDICAL ADVICE (OUTPATIENT)
Dept: FAMILY MEDICINE | Facility: OTHER | Age: 43
End: 2018-04-16

## 2018-05-08 ENCOUNTER — MYC MEDICAL ADVICE (OUTPATIENT)
Dept: FAMILY MEDICINE | Facility: OTHER | Age: 43
End: 2018-05-08

## 2018-05-08 NOTE — TELEPHONE ENCOUNTER
Added to family history.  Closing encounter.  Arabella Petty CMA ([facility])

## 2018-05-11 ENCOUNTER — HEALTH MAINTENANCE LETTER (OUTPATIENT)
Age: 43
End: 2018-05-11

## 2018-05-17 NOTE — PROGRESS NOTES
SUBJECTIVE:   Amelia Coker is a 43 year old female who presents to clinic today for the following health issues:      HPI  Joint Pain    Onset: April 28th    Description:   Location: Right foot  Character: Sharp and Dull ache    Intensity: mild, severe    Progression of Symptoms: worse    Accompanying Signs & Symptoms:  Other symptoms: radiation of pain up calf    History:   Previous similar pain: no       Precipitating factors:   Trauma or overuse: no     Alleviating factors:  Improved by: ice    Therapies Tried and outcome: Ice, ibuprofen,       Problem list and histories reviewed & adjusted, as indicated.  Additional history: as documented      Patient Active Problem List   Diagnosis     Intractable migraine     Bell's palsy     Contact dermatitis and other eczema, due to unspecified cause     Allergic rhinitis due to other allergen     Scalp lesion     Lyme disease     PAC (premature atrial contraction)     Palpitations     Raynaud phenomenon     Cold intolerance of hand     Chronic fatigue     Hair loss     Pain in joint     Abnormal PFT     Shoulder joint instability     Family history of melanoma     MRSA (methicillin resistant staph aureus) culture positive     Dry eyes     Keratitis sicca     Laceration of foot, right     Skin ulcer of neck (H)     Family history of colon cancer     Pain in joint, upper arm     Traumatic amputation of fingertip     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     FLORIDALMA positive     Plantar fascial fibromatosis     Foraminal stenosis of lumbar region     DJD (degenerative joint disease), lumbar     Left shoulder pain     Migraine without aura and without status migrainosus, not intractable     Lumbar radiculopathy     Ds DNA antibody positive     Muscular wasting and disuse atrophy, not elsewhere classified     Other postprocedural status(V45.89)     Facial contusion     Frontal headache     Telangiectasia of face     Lateral epicondylitis     Right  shoulder pain     Plantar fasciitis     HTN, goal below 140/90     Tooth pain     MRSA infection     Skin lesion     AD (atopic dermatitis)     Heat sensitivity     Rotator cuff arthropathy, right     Contusion of left foot, initial encounter     Gastroesophageal reflux disease, esophagitis presence not specified     Abnormal mammogram     Ringworm of body     Fall from horse, initial encounter     Sternocleidomastoid muscle tenderness     Acute cervical myofascial strain, initial encounter     Spasm of muscle     Hyperlipidemia LDL goal <130     Biceps tendinopathy, right     Superficial swelling of scalp     Past Surgical History:   Procedure Laterality Date     COLONOSCOPY  3/11/2013    Procedure: COLONOSCOPY;  colonoscopy;  Surgeon: Lobito Mas MD;  Location: [facility]     DECOMPRESSION LUMBAR TWO LEVELS Bilateral 12/8/2016    Procedure: DECOMPRESSION LUMBAR TWO LEVELS;  Surgeon: Bang Burrell MD;  Location: [facility]     ESOPHAGOSCOPY, GASTROSCOPY, DUODENOSCOPY (EGD), COMBINED  11/8/2013    Procedure: COMBINED ESOPHAGOSCOPY, GASTROSCOPY, DUODENOSCOPY (EGD), BIOPSY SINGLE OR MULTIPLE;  Esophagoscopy, Gastroscopy, Duodenoscopy EGD with multiple biopsies;  Surgeon: Teja Koch MD;  Location: [facility]     HC REMOVAL OF TONSILS,<13 Y/O      Tonsils <10y.o.     HC TOOTH EXTRACTION W/FORCEP       REPAIR TENDON ELBOW  7/9/2014    Procedure: REPAIR TENDON ELBOW;  Surgeon: Chris Johnston MD;  Location: [facility]       Social History   Substance Use Topics     Smoking status: Never Smoker     Smokeless tobacco: Never Used     Alcohol use No      Comment: social     Family History   Problem Relation Age of Onset     DIABETES Mother      adult     Cancer - colorectal Mother      Hypertension Mother      Allergies Mother      CANCER Mother      colon     Depression Mother      GASTROINTESTINAL DISEASE Mother      ibs     Genitourinary Problems Mother      kidney cyst     Gynecology Mother      endometriosis      Lipids Mother      Thyroid Disease Mother      Arthritis Mother      RA     HEART DISEASE Maternal Grandfather      MI,  - 60's     Allergies Father      Unknown/Adopted Father      HEART DISEASE Father      mi-age mid 40's     Cardiovascular Father      Lipids Father      Respiratory Father      asthma     CANCER Father      Other Cancer Father      renal cancer     Depression Sister      Allergies Sister      Gynecology Sister      endometriosis     Lipids Paternal Grandmother      OSTEOPOROSIS Paternal Grandmother      Thyroid Disease Paternal Grandmother      Respiratory Son      asthma     Allergies Son      Arthritis Sister      Allergies Brother      HEART DISEASE Brother      Allergies Daughter      Blood Disease Paternal Aunt      LGL T cell Leukemia     Rheumatoid Arthritis Paternal Aunt      KIDNEY DISEASE Maternal Aunt      Lupus Maternal Aunt          Current Outpatient Prescriptions   Medication Sig Dispense Refill     Acetaminophen (TYLENOL PO) Take 500 mg by mouth every 6 hours as needed        acyclovir (ZOVIRAX) 5 % ointment Apply topically 6 times daily 15 g 3     Coal Tar Extract 4 % SHAM Externally apply 1 Dose topically daily 168 mL 1     diazepam (VALIUM) 5 MG tablet Take 1 tablet (5 mg) by mouth 3 times daily as needed for muscle spasms 90 tablet 0     fexofenadine (ALLEGRA) 180 MG tablet Take 1 tablet by mouth daily Reported on 2017       hydrocortisone (WESTCORT) 0.2 % cream Apply sparingly to affected area three times daily as needed. 60 g 0     ibuprofen (ADVIL/MOTRIN) 800 MG tablet TAKE ONE TABLET BY MOUTH EVERY 8 HOURS AS NEEDED FOR PAIN 100 tablet 3     ketoconazole (NIZORAL) 2 % cream Apply topically 2 times daily 30 g 1     meloxicam (MOBIC) 15 MG tablet TAKE ONE TABLET BY MOUTH EVERY DAY 90 tablet 1     meloxicam (MOBIC) 15 MG tablet TAKE ONE TABLET BY MOUTH EVERY DAY (PATIENT WANTS UNICHEM BRAND ONLY) 90 tablet 1     methylphenidate (RITALIN) 10 MG tablet Take 1-2  tablets (10-20 mg) by mouth 2 times daily 120 tablet 0     mupirocin (BACTROBAN) 2 % ointment Apply topically 3 times daily 22 g 1     nystatin-triamcinolone (MYCOLOG II) cream Apply topically 2 times daily 15 g 0     polyethylene glycol 0.4%- propylene glycol 0.3% (SYSTANE ULTRA) 0.4-0.3 % SOLN ophthalmic solution Place 1 drop into both eyes every hour as needed for dry eyes       POLYVIN ALC-POVIDON DIMETHYLAM (FRESHKOTE) 2.7-2 % SOLN Apply  to eye.       rizatriptan (MAXALT-MLT) 5 MG ODT tab Take 1-2 tablets (5-10 mg) by mouth at onset of headache for migraine May repeat dose in 2 hours.  Do not exceed 30 mg in 24 hours 18 tablet 3     sucralfate (CARAFATE) 1 GM tablet TAKE ONE TABLET BY MOUTH FOUR TIMES A  tablet 1     SUMAtriptan (IMITREX) 100 MG tablet Take 1 tablet by mouth See Admin Instructions. WITH ONSET OF MIGRAINE, MAY REPEAT ONCE AFTER 2 HOURS. DO NOT EXCEED 2 TABLETS IN 24 HOURS. 27 tablet prn     Lifitegrast (XIIDRA) 5 % SOLN        Allergies   Allergen Reactions     Ceftriaxone Anaphylaxis     Hives, rash, racing heart beat     Bactrim [Sulfamethoxazole W/Trimethoprim] Hives     Ciprofloxacin Other (See Comments)     Tendon Issues     Codeine Nausea and Vomiting     Copper      Rash       Doxycycline      Loss of skin pigmentation, skin loss.     Gold      Rash       Iodine-131 Hives     Latex      Levaquin [Levofloxacin] Other (See Comments)     Tendon issues with levaquin and cipro     Nickel      rash     Steel [Staples]      Rash from staples       Penicillins Rash     Recent Labs   Lab Test  03/27/18   0733  08/30/17   0757  12/23/16   0801   09/15/16   1123  06/08/16   0745   12/02/15   0758   06/09/14   1335   A1C   --    --    --    --    --   5.5   --    --    --    --    LDL   --   77   --    --    --    --    --   108*   --   132*   HDL   --   72   --    --    --    --    --   78   --   56   TRIG   --   50   --    --    --    --    --   52   --   62   ALT   --   22  43   --    --     --    --   64*   < >  34   CR   --   0.66  0.59   < >   --   0.54   < >  0.60   < >  0.67   GFRESTIMATED   --   >90  >90  Non African American GFR Calc     < >   --   >90  Non  GFR Calc     < >  >90  Non  GFR Calc     < >  >90   GFRESTBLACK   --   >90  >90  African American GFR Calc     < >   --   >90   GFR Calc     < >  >90   GFR Calc     < >  >90   POTASSIUM   --   4.2  4.1   < >   --   4.2   < >  4.0   < >  4.1   TSH  1.23   --    --    --   0.84   --    --    --    < >  1.51    < > = values in this interval not displayed.      BP Readings from Last 3 Encounters:   05/22/18 118/76   04/12/18 142/76   03/27/18 157/80    Wt Readings from Last 3 Encounters:   05/22/18 204 lb (92.5 kg)   04/12/18 206 lb (93.4 kg)   03/13/18 208 lb (94.3 kg)                  Labs reviewed in EPIC    ROS:  Constitutional, HEENT, cardiovascular, pulmonary, gi and gu systems are negative, except as otherwise noted.    OBJECTIVE:     /76 (Cuff Size: Adult Large)  Pulse 72  Temp 99.1  F (37.3  C) (Temporal)  Resp 18  Wt 204 lb (92.5 kg)  BMI 33.18 kg/m2  Body mass index is 33.18 kg/(m^2).  GENERAL: healthy, alert and no distress  MS: She has some tenderness over the lateral aspect of the calcaneus of the right foot.  This may be related to the insertion of the lateral ligament from the fibula.  No bruising is noted.  Skin is intact.  SKIN: no suspicious lesions or rashes to visible skin.  Site of ulceration of the neck is within normal limits and completely resolved.  It is obvious that she has spent time in the sun recently.  NEURO: Normal strength and tone, mentation intact and speech normal  PSYCH: mentation appears normal, affect normal/bright    Diagnostic Test Results:  No results found. However, due to the size of the patient record, not all encounters were searched. Please check Results Review for a complete set of results.    ASSESSMENT/PLAN:     1. Pain  of right heel  Our x-ray tech is not here at the time of the visit so she will have the x-rays done in [place] at her convenience.  We will adjust her cares based on results.  - XR Calcaneus Right G/E 2 Views; Future    2. Skin ulcer of neck, limited to breakdown of skin (H)  Resolved    Work on weight loss  Regular exercise  Follow-up in 3 months as needed.    Otoniel Manuel PA-C  [facility]

## 2018-05-22 ENCOUNTER — RADIANT APPOINTMENT (OUTPATIENT)
Dept: GENERAL RADIOLOGY | Facility: OTHER | Age: 43
End: 2018-05-22
Attending: PHYSICIAN ASSISTANT
Payer: COMMERCIAL

## 2018-05-22 ENCOUNTER — MYC MEDICAL ADVICE (OUTPATIENT)
Dept: FAMILY MEDICINE | Facility: OTHER | Age: 43
End: 2018-05-22

## 2018-05-22 ENCOUNTER — OFFICE VISIT (OUTPATIENT)
Dept: FAMILY MEDICINE | Facility: OTHER | Age: 43
End: 2018-05-22
Payer: COMMERCIAL

## 2018-05-22 VITALS
HEART RATE: 72 BPM | BODY MASS INDEX: 33.18 KG/M2 | DIASTOLIC BLOOD PRESSURE: 76 MMHG | SYSTOLIC BLOOD PRESSURE: 118 MMHG | WEIGHT: 204 LBS | RESPIRATION RATE: 18 BRPM | TEMPERATURE: 99.1 F

## 2018-05-22 DIAGNOSIS — Z80.0 FAMILY HISTORY OF COLON CANCER: Primary | ICD-10-CM

## 2018-05-22 DIAGNOSIS — M79.671 PAIN OF RIGHT HEEL: ICD-10-CM

## 2018-05-22 DIAGNOSIS — L98.491: ICD-10-CM

## 2018-05-22 DIAGNOSIS — M79.671 PAIN OF RIGHT HEEL: Primary | ICD-10-CM

## 2018-05-22 PROCEDURE — 73650 X-RAY EXAM OF HEEL: CPT | Mod: RT

## 2018-05-22 PROCEDURE — 99213 OFFICE O/P EST LOW 20 MIN: CPT | Performed by: PHYSICIAN ASSISTANT

## 2018-05-22 ASSESSMENT — PAIN SCALES - GENERAL: PAINLEVEL: MODERATE PAIN (4)

## 2018-05-22 NOTE — MR AVS SNAPSHOT
After Visit Summary   5/22/2018    Amelia Coker    MRN: [id number]           Patient Information     Date Of Birth          1975        Visit Information        Provider Department      5/22/2018 7:40 AM Otoniel Nelson PA-C [facility]        Today's Diagnoses     Pain of right heel    -  1       Follow-ups after your visit        Future tests that were ordered for you today     Open Future Orders        Priority Expected Expires Ordered    XR Calcaneus Right G/E 2 Views Routine 5/22/2018 5/22/2019 5/22/2018            Who to contact     If you have questions or need follow up information about today's clinic visit or your schedule please contact [facility] directly at [phone].  Normal or non-critical lab and imaging results will be communicated to you by [employer], letter or phone within 4 business days after the clinic has received the results. If you do not hear from us within 7 days, please contact the clinic through [employer] or phone. If you have a critical or abnormal lab result, we will notify you by phone as soon as possible.  Submit refill requests through [employer] or call your pharmacy and they will forward the refill request to us. Please allow 3 business days for your refill to be completed.          Additional Information About Your Visit        MyChart Information     [employer] gives you secure access to your electronic health record. If you see a primary care provider, you can also send messages to your care team and make appointments. If you have questions, please call your primary care clinic.  If you do not have a primary care provider, please call [phone] and they will assist you.        Care EveryWhere ID     This is your Care EveryWhere ID. This could be used by other organizations to access your [place] medical records  [id number]        Your Vitals Were     Pulse Temperature Respirations BMI (Body Mass Index)          72 99.1  F (37.3   C) (Temporal) 18 33.18 kg/m2         Blood Pressure from Last 3 Encounters:   05/22/18 118/76   04/12/18 142/76   03/27/18 157/80    Weight from Last 3 Encounters:   05/22/18 204 lb (92.5 kg)   04/12/18 206 lb (93.4 kg)   03/13/18 208 lb (94.3 kg)               Primary Care Provider Office Phone # Fax #    Otoniel Nelson PA-C [phone] [id number] [facility] [address]        Equal Access to Services     [facility]: Hadii aad ku hadasho Soomaali, waaxda luqadaha, qaybta kaalmada adeegyada, jade plasencia. So [facility] [phone].    ATENCIÓN: Si habla español, tiene a hines disposición servicios gratuitos de asistencia lingüística. [facility] [phone].    We comply with applicable federal civil rights laws and [place] laws. We do not discriminate on the basis of race, color, national origin, age, disability, sex, sexual orientation, or gender identity.            Thank you!     Thank you for choosing [facility]  for your care. Our goal is always to provide you with excellent care. Hearing back from our patients is one way we can continue to improve our services. Please take a few minutes to complete the written survey that you may receive in the mail after your visit with us. Thank you!             Your Updated Medication List - Protect others around you: Learn how to safely use, store and throw away your medicines at www.disposemymeds.org.          This list is accurate as of 5/22/18  7:53 AM.  Always use your most recent med list.                   Brand Name Dispense Instructions for use Diagnosis    acyclovir 5 % ointment    ZOVIRAX    15 g    Apply topically 6 times daily    Recurrent cold sores       ALLEGRA 180 MG tablet   Generic drug:  fexofenadine      Take 1 tablet by mouth daily Reported on 4/5/2017    FLORIDALMA positive, Family history of lupus erythematosus, Lumbar radiculopathy, Fatigue, Dry eyes, bilateral, Keratitis sicca, Foraminal  stenosis of lumbar region, Ds DNA antibody positive       Coal Tar Extract 4 % Sham     168 mL    Externally apply 1 Dose topically daily    Hair loss, Scalp lesion       diazepam 5 MG tablet    VALIUM    90 tablet    Take 1 tablet (5 mg) by mouth 3 times daily as needed for muscle spasms    Spasm of muscle       FRESHKOTE 2.7-2 % Soln   Generic drug:  Polyvinyl Alcohol-Povidone      Apply  to eye.        hydrocortisone 0.2 % cream    WESTCORT    60 g    Apply sparingly to affected area three times daily as needed.    Telangiectasia of face       ibuprofen 800 MG tablet    ADVIL/MOTRIN    100 tablet    TAKE ONE TABLET BY MOUTH EVERY 8 HOURS AS NEEDED FOR PAIN    Cervicalgia, Muscle spasm       ketoconazole 2 % cream    NIZORAL    30 g    Apply topically 2 times daily    Ringworm of body       * meloxicam 15 MG tablet    MOBIC    90 tablet    TAKE ONE TABLET BY MOUTH EVERY DAY    Cervicalgia, Muscle spasm       * meloxicam 15 MG tablet    MOBIC    90 tablet    TAKE ONE TABLET BY MOUTH EVERY DAY (PATIENT WANTS UNICHEM BRAND ONLY)    Cervicalgia, Muscle spasm       methylphenidate 10 MG tablet    RITALIN    120 tablet    Take 1-2 tablets (10-20 mg) by mouth 2 times daily    Chronic fatigue       mupirocin 2 % ointment    BACTROBAN    22 g    Apply topically 3 times daily    MRSA (methicillin resistant staph aureus) culture positive, Skin lesion       nystatin-triamcinolone cream    MYCOLOG II    15 g    Apply topically 2 times daily    Atopic dermatitis       polyethylene glycol 0.4%- propylene glycol 0.3% 0.4-0.3 % Soln ophthalmic solution    SYSTANE ULTRA     Place 1 drop into both eyes every hour as needed for dry eyes        rizatriptan 5 MG ODT tab    MAXALT-MLT    18 tablet    Take 1-2 tablets (5-10 mg) by mouth at onset of headache for migraine May repeat dose in 2 hours.  Do not exceed 30 mg in 24 hours    Migraine without aura and without status migrainosus, not intractable       sucralfate 1 GM tablet     CARAFATE    120 tablet    TAKE ONE TABLET BY MOUTH FOUR TIMES A DAY    Gastroesophageal reflux disease, esophagitis presence not specified       SUMAtriptan 100 MG tablet    IMITREX    27 tablet    Take 1 tablet by mouth See Admin Instructions. WITH ONSET OF MIGRAINE, MAY REPEAT ONCE AFTER 2 HOURS. DO NOT EXCEED 2 TABLETS IN 24 HOURS.    Migraine, unspecified, with intractable migraine, so stated, without mention of status migrainosus       TYLENOL PO      Take 500 mg by mouth every 6 hours as needed        XIIDRA 5 % Soln opthalmic solution   Generic drug:  Lifitegrast           * Notice:  This list has 2 medication(s) that are the same as other medications prescribed for you. Read the directions carefully, and ask your doctor or other care provider to review them with you.

## 2018-05-23 ENCOUNTER — MYC MEDICAL ADVICE (OUTPATIENT)
Dept: FAMILY MEDICINE | Facility: OTHER | Age: 43
End: 2018-05-23

## 2018-05-24 ENCOUNTER — TELEPHONE (OUTPATIENT)
Dept: FAMILY MEDICINE | Facility: OTHER | Age: 43
End: 2018-05-24

## 2018-05-24 ENCOUNTER — MYC MEDICAL ADVICE (OUTPATIENT)
Dept: FAMILY MEDICINE | Facility: OTHER | Age: 43
End: 2018-05-24

## 2018-05-24 NOTE — TELEPHONE ENCOUNTER
Left message for patient to return call to schedule colonoscopy or EGD. If Eboni or Radha are unavailable, please transfer to the surgery center.

## 2018-05-25 NOTE — TELEPHONE ENCOUNTER
Left message for patient to return call to schedule EGD/colonoscopy. If Radha or Eboni are not available, please transfer to same day surgery

## 2018-05-29 ENCOUNTER — MYC MEDICAL ADVICE (OUTPATIENT)
Dept: FAMILY MEDICINE | Facility: OTHER | Age: 43
End: 2018-05-29

## 2018-05-29 ENCOUNTER — TELEPHONE (OUTPATIENT)
Dept: FAMILY MEDICINE | Facility: OTHER | Age: 43
End: 2018-05-29

## 2018-05-29 DIAGNOSIS — M79.671 INTRACTABLE RIGHT HEEL PAIN: Primary | ICD-10-CM

## 2018-05-29 RX ORDER — LIDOCAINE/PRILOCAINE 2.5 %-2.5%
CREAM (GRAM) TOPICAL PRN
Qty: 30 G | Refills: 1 | Status: SHIPPED | OUTPATIENT
Start: 2018-05-29 | End: 2019-03-11

## 2018-05-29 NOTE — TELEPHONE ENCOUNTER
Your easiest and most efficient option would be to see our podiatrist in [place].  If that is not your liking than the next best approach would be to go down [place] for specialist consultation.  We certainly could prescribe some EMLA cream to see if that will help with pain relief.  I have concerns for connective tissue disorder in this region with what you are are explaining and a negative x-ray.  Have any further questions please give us a call or you can respond to this as well thanks.  Electronically signed:    Otoniel Manuel PA-C

## 2018-06-07 ENCOUNTER — OFFICE VISIT (OUTPATIENT)
Dept: ORTHOPEDICS | Facility: CLINIC | Age: 43
End: 2018-06-07
Payer: COMMERCIAL

## 2018-06-07 VITALS — SYSTOLIC BLOOD PRESSURE: 134 MMHG | DIASTOLIC BLOOD PRESSURE: 72 MMHG | HEART RATE: 101 BPM | OXYGEN SATURATION: 99 %

## 2018-06-07 DIAGNOSIS — M79.671 CHRONIC HEEL PAIN, RIGHT: Primary | ICD-10-CM

## 2018-06-07 DIAGNOSIS — G89.29 CHRONIC HEEL PAIN, RIGHT: Primary | ICD-10-CM

## 2018-06-07 DIAGNOSIS — M25.521 RIGHT ELBOW PAIN: ICD-10-CM

## 2018-06-07 PROCEDURE — 99213 OFFICE O/P EST LOW 20 MIN: CPT | Performed by: FAMILY MEDICINE

## 2018-06-07 ASSESSMENT — PAIN SCALES - GENERAL: PAINLEVEL: MODERATE PAIN (4)

## 2018-06-07 NOTE — LETTER
6/7/2018         RE: Amelia Coker  [address] [place] [id number]        Dear Colleague,    Thank you for referring your patient, Amelia Coker, to the [facility]. Please see a copy of my visit note below.    CHIEF COMPLAINT:  RECHECK (pt states she rolled her right heel. Also pt is here for follow up on right elbow pain. )       HISTORY OF PRESENT ILLNESS  Ms. Coker is a pleasant 43 year old year old female who presents to clinic today with right heel pain.  I saw Bhupinder briefly at the end of March of this year in consult for a biceps tendon injection.  Bhupinder is seen at the request of Otoniel Nelson PA-C.    Bhupinder has had pain in her right heel since an incident on April 20 in which she was walking outside and she stepped into a hole that a horse who had made.  She got her heel caught in the hole and inverted her right foot.  She felt a sudden pull in the region of her heel and had immediate pain.  She was limping throughout the day but was able to keep walking.  Later in the day she felt a pull go all the way up her Achilles tendon.  Since this time she has had persistent pain in the lateral portion of her heel.  Markedly tender to the touch, denies numbness or tingling, no weakness.  this is somewhat relieved by inverting her foot when she walks, she has to do this in order to walk throughout the day.  Tennis shoes on her feet bring her pain.  She has tried oral analgesics and NSAID gel, this has not helped.    She also mentions chronic right elbow pain.  She had a history of severe lateral epicondylitis for which she had a surgery in 2014.  Her pain never really improved after the surgery, during the surgery she does note that there was more damage than was expected.  The surgery has left her with some residual pain in her forearm and the inability to fully extend her middle finger.  Her pain is sharp, worse when she , worse as the day goes on.  She does have some pain with  gripping most objects and with writing.    Additional history: as documented    MEDICAL HISTORY  Patient Active Problem List   Diagnosis     Intractable migraine     Bell's palsy     Contact dermatitis and other eczema, due to unspecified cause     Allergic rhinitis due to other allergen     Scalp lesion     Lyme disease     PAC (premature atrial contraction)     Palpitations     Raynaud phenomenon     Cold intolerance of hand     Chronic fatigue     Hair loss     Pain in joint     Abnormal PFT     Shoulder joint instability     Family history of melanoma     MRSA (methicillin resistant staph aureus) culture positive     Dry eyes     Keratitis sicca     Laceration of foot, right     Family history of colon cancer     Pain in joint, upper arm     Traumatic amputation of fingertip     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     FLORIDALMA positive     Plantar fascial fibromatosis     Foraminal stenosis of lumbar region     DJD (degenerative joint disease), lumbar     Left shoulder pain     Migraine without aura and without status migrainosus, not intractable     Lumbar radiculopathy     Ds DNA antibody positive     Muscular wasting and disuse atrophy, not elsewhere classified     Other postprocedural status(V45.89)     Facial contusion     Frontal headache     Telangiectasia of face     Lateral epicondylitis     Right shoulder pain     Plantar fasciitis     HTN, goal below 140/90     Tooth pain     MRSA infection     Skin lesion     AD (atopic dermatitis)     Heat sensitivity     Rotator cuff arthropathy, right     Contusion of left foot, initial encounter     Gastroesophageal reflux disease, esophagitis presence not specified     Abnormal mammogram     Ringworm of body     Fall from horse, initial encounter     Sternocleidomastoid muscle tenderness     Acute cervical myofascial strain, initial encounter     Spasm of muscle     Hyperlipidemia LDL goal <130     Biceps tendinopathy, right      Superficial swelling of scalp     Intractable right heel pain       Current Outpatient Prescriptions   Medication Sig Dispense Refill     Acetaminophen (TYLENOL PO) Take 500 mg by mouth every 6 hours as needed        acyclovir (ZOVIRAX) 5 % ointment Apply topically 6 times daily 15 g 3     Coal Tar Extract 4 % SHAM Externally apply 1 Dose topically daily 168 mL 1     diazepam (VALIUM) 5 MG tablet Take 1 tablet (5 mg) by mouth 3 times daily as needed for muscle spasms 90 tablet 0     fexofenadine (ALLEGRA) 180 MG tablet Take 1 tablet by mouth daily Reported on 4/5/2017       hydrocortisone (WESTCORT) 0.2 % cream Apply sparingly to affected area three times daily as needed. 60 g 0     ketoconazole (NIZORAL) 2 % cream Apply topically 2 times daily 30 g 1     lidocaine-prilocaine (EMLA) cream Apply topically as needed for moderate pain 30 g 1     Lifitegrast (XIIDRA) 5 % SOLN        meloxicam (MOBIC) 15 MG tablet TAKE ONE TABLET BY MOUTH EVERY DAY (PATIENT WANTS UNICHEM BRAND ONLY) 90 tablet 1     meloxicam (MOBIC) 15 MG tablet TAKE ONE TABLET BY MOUTH EVERY DAY 90 tablet 1     methylphenidate (RITALIN) 10 MG tablet Take 1-2 tablets (10-20 mg) by mouth 2 times daily 120 tablet 0     mupirocin (BACTROBAN) 2 % ointment Apply topically 3 times daily 22 g 1     nystatin-triamcinolone (MYCOLOG II) cream Apply topically 2 times daily 15 g 0     polyethylene glycol 0.4%- propylene glycol 0.3% (SYSTANE ULTRA) 0.4-0.3 % SOLN ophthalmic solution Place 1 drop into both eyes every hour as needed for dry eyes       POLYVIN ALC-POVIDON DIMETHYLAM (FRESHKOTE) 2.7-2 % SOLN Apply  to eye.       rizatriptan (MAXALT-MLT) 5 MG ODT tab Take 1-2 tablets (5-10 mg) by mouth at onset of headache for migraine May repeat dose in 2 hours.  Do not exceed 30 mg in 24 hours 18 tablet 3     sucralfate (CARAFATE) 1 GM tablet TAKE ONE TABLET BY MOUTH FOUR TIMES A  tablet 1     SUMAtriptan (IMITREX) 100 MG tablet Take 1 tablet by mouth See Admin  Instructions. WITH ONSET OF MIGRAINE, MAY REPEAT ONCE AFTER 2 HOURS. DO NOT EXCEED 2 TABLETS IN 24 HOURS. 27 tablet prn     ibuprofen (ADVIL/MOTRIN) 800 MG tablet TAKE ONE TABLET BY MOUTH EVERY 8 HOURS AS NEEDED FOR PAIN (Patient not taking: Reported on 2018) 100 tablet 3       Allergies   Allergen Reactions     Ceftriaxone Anaphylaxis     Hives, rash, racing heart beat     Bactrim [Sulfamethoxazole W/Trimethoprim] Hives     Ciprofloxacin Other (See Comments)     Tendon Issues     Codeine Nausea and Vomiting     Copper      Rash       Doxycycline      Loss of skin pigmentation, skin loss.     Gold      Rash       Iodine-131 Hives     Latex      Levaquin [Levofloxacin] Other (See Comments)     Tendon issues with levaquin and cipro     Nickel      rash     Steel [Staples]      Rash from staples       Penicillins Rash       Family History   Problem Relation Age of Onset     DIABETES Mother      adult     Cancer - colorectal Mother      Hypertension Mother      Allergies Mother      CANCER Mother      colon     Depression Mother      GASTROINTESTINAL DISEASE Mother      ibs     Genitourinary Problems Mother      kidney cyst     Gynecology Mother      endometriosis     Lipids Mother      Thyroid Disease Mother      Arthritis Mother      RA     HEART DISEASE Maternal Grandfather      MI,  - 60's     Allergies Father      Unknown/Adopted Father      HEART DISEASE Father      mi-age mid 40's     Cardiovascular Father      Lipids Father      Respiratory Father      asthma     CANCER Father      Other Cancer Father      renal cancer     Depression Sister      Allergies Sister      Gynecology Sister      endometriosis     Lipids Paternal Grandmother      OSTEOPOROSIS Paternal Grandmother      Thyroid Disease Paternal Grandmother      Respiratory Son      asthma     Allergies Son      Arthritis Sister      Allergies Brother      HEART DISEASE Brother      Allergies Daughter      Blood Disease Paternal Aunt      LGL  T cell Leukemia     Rheumatoid Arthritis Paternal Aunt      KIDNEY DISEASE Maternal Aunt      Lupus Maternal Aunt      Bladder Cancer Maternal Aunt        Additional medical/Social/Surgical histories reviewed in [facility] and updated as appropriate.     REVIEW OF SYSTEMS (6/7/2018)  CONSTITUTIONAL: Denies fever and weight loss  EYES: Denies acute vision changes  ENT: Denies hearing changes or difficulty swallowing  CARDIAC: Denies chest pain or edema  RESPIRATORY: Denies dyspnea, cough or wheeze  GASTROINTESTINAL: Denies abdominal pain, nausea, vomiting  MUSCULOSKELETAL: See HPI  SKIN: Denies any recent rash or lesion  NEUROLOGICAL: Denies numbness or focal weakness  PSYCHIATRIC: No history of psychiatric symptoms or problems  ENDOCRINE:    Lab Results   Component Value Date    A1C 5.5 06/08/2016     HEMATOLOGY: Denies episodes of easy bleeding      PHYSICAL EXAM  Vitals:    06/07/18 1349   BP: 134/72   Pulse: 101   SpO2: 99%     General  - normal appearance, in no obvious distress  CV  - normal pulses at posterior tib and dorsalis pedis  Pulm  - normal respiratory pattern, non-labored  Musculoskeletal - right foot / ankle  - stance: inverts right foot for comfort, gait favors right side  - inspection: Achilles tendon not thickened, normal bone and joint alignment, no obvious deformity  - palpation: tender over the lateral calcaneal body and Achilles insertion  - ROM: normal plantar flexion, dorsiflexion  - strength: 5/5 in all planes  (+) calcaneal squeeze    Musculoskeletal - right elbow  - inspection: longitudinal scar overlying common extensor tendon, mild soft tissue swelling laterally  - palpation: tender at the origin of the common extensor tendon  - ROM:  Intact but painful in all directions  - strength: 5/5 wrist extension with elbow flexed, 4/5 with elbow extended, painful resisted extension of long finger with elbow flexed, worse with extension, 5/5  strength    Neuro  - no sensory or motor deficit,  grossly normal coordination, normal muscle tone  Skin  - no ecchymosis, erythema, warmth, or induration, no obvious rash  Psych  - interactive, appropriate, normal mood and affect           ASSESSMENT & PLAN  Ms. Coker is a 43 year old year old female who presents to clinic today with right heel pain.    I reviewed her x-ray in the room with her which reads:    IMPRESSION: Enthesopathic changes of the calcaneus have progressed  since the prior study. Otherwise negative right calcaneal x-rays.    I do suspect she may have a bony injury to her calcaneus, rather that an Achilles injury - although this is difficult to discount.  I am ordering an MRI of her right foot to further diagnose.    Her elbow pain is a bit more difficult.  She has had a couple of surgical consults, one surgery was offered by [facility] that would involve a muscle transposition, she was not very interested in this.  She is more interested in nonoperative treatment modalities.  She has failed a couple of rounds of physical therapy.  We did discuss the idea of PRP, although I am not certain if this would be risky or carry a large risk of infection.  I will discuss this with our hand specialists.    I'll get in touch with Bhupinder about this and regarding her MRI.    Thank you for allowing me to participate in Amelia's care.    Patel Martínez DO, [facility]  Primary Care Sports Medicine           Again, thank you for allowing me to participate in the care of your patient.        Sincerely,        Patel Martínez DO

## 2018-06-07 NOTE — PROGRESS NOTES
CHIEF COMPLAINT:  RECHECK (pt states she rolled her right heel. Also pt is here for follow up on right elbow pain. )       HISTORY OF PRESENT ILLNESS  Ms. Coker is a pleasant 43 year old year old female who presents to clinic today with right heel pain.  I saw Bhupinder briefly at the end of March of this year in consult for a biceps tendon injection.  Bhupinder is seen at the request of Otoniel Nelson PA-C.    Bhupinder has had pain in her right heel since an incident on April 20 in which she was walking outside and she stepped into a hole that a horse who had made.  She got her heel caught in the hole and inverted her right foot.  She felt a sudden pull in the region of her heel and had immediate pain.  She was limping throughout the day but was able to keep walking.  Later in the day she felt a pull go all the way up her Achilles tendon.  Since this time she has had persistent pain in the lateral portion of her heel.  Markedly tender to the touch, denies numbness or tingling, no weakness.  this is somewhat relieved by inverting her foot when she walks, she has to do this in order to walk throughout the day.  Tennis shoes on her feet bring her pain.  She has tried oral analgesics and NSAID gel, this has not helped.    She also mentions chronic right elbow pain.  She had a history of severe lateral epicondylitis for which she had a surgery in 2014.  Her pain never really improved after the surgery, during the surgery she does note that there was more damage than was expected.  The surgery has left her with some residual pain in her forearm and the inability to fully extend her middle finger.  Her pain is sharp, worse when she , worse as the day goes on.  She does have some pain with gripping most objects and with writing.    Additional history: as documented    MEDICAL HISTORY  Patient Active Problem List   Diagnosis     Intractable migraine     Bell's palsy     Contact dermatitis and other eczema, due to unspecified cause      Allergic rhinitis due to other allergen     Scalp lesion     Lyme disease     PAC (premature atrial contraction)     Palpitations     Raynaud phenomenon     Cold intolerance of hand     Chronic fatigue     Hair loss     Pain in joint     Abnormal PFT     Shoulder joint instability     Family history of melanoma     MRSA (methicillin resistant staph aureus) culture positive     Dry eyes     Keratitis sicca     Laceration of foot, right     Family history of colon cancer     Pain in joint, upper arm     Traumatic amputation of fingertip     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     FLORIDALMA positive     Plantar fascial fibromatosis     Foraminal stenosis of lumbar region     DJD (degenerative joint disease), lumbar     Left shoulder pain     Migraine without aura and without status migrainosus, not intractable     Lumbar radiculopathy     Ds DNA antibody positive     Muscular wasting and disuse atrophy, not elsewhere classified     Other postprocedural status(V45.89)     Facial contusion     Frontal headache     Telangiectasia of face     Lateral epicondylitis     Right shoulder pain     Plantar fasciitis     HTN, goal below 140/90     Tooth pain     MRSA infection     Skin lesion     AD (atopic dermatitis)     Heat sensitivity     Rotator cuff arthropathy, right     Contusion of left foot, initial encounter     Gastroesophageal reflux disease, esophagitis presence not specified     Abnormal mammogram     Ringworm of body     Fall from horse, initial encounter     Sternocleidomastoid muscle tenderness     Acute cervical myofascial strain, initial encounter     Spasm of muscle     Hyperlipidemia LDL goal <130     Biceps tendinopathy, right     Superficial swelling of scalp     Intractable right heel pain       Current Outpatient Prescriptions   Medication Sig Dispense Refill     Acetaminophen (TYLENOL PO) Take 500 mg by mouth every 6 hours as needed        acyclovir (ZOVIRAX) 5 % ointment Apply  topically 6 times daily 15 g 3     Coal Tar Extract 4 % SHAM Externally apply 1 Dose topically daily 168 mL 1     diazepam (VALIUM) 5 MG tablet Take 1 tablet (5 mg) by mouth 3 times daily as needed for muscle spasms 90 tablet 0     fexofenadine (ALLEGRA) 180 MG tablet Take 1 tablet by mouth daily Reported on 4/5/2017       hydrocortisone (WESTCORT) 0.2 % cream Apply sparingly to affected area three times daily as needed. 60 g 0     ketoconazole (NIZORAL) 2 % cream Apply topically 2 times daily 30 g 1     lidocaine-prilocaine (EMLA) cream Apply topically as needed for moderate pain 30 g 1     Lifitegrast (XIIDRA) 5 % SOLN        meloxicam (MOBIC) 15 MG tablet TAKE ONE TABLET BY MOUTH EVERY DAY (PATIENT WANTS UNICHEM BRAND ONLY) 90 tablet 1     meloxicam (MOBIC) 15 MG tablet TAKE ONE TABLET BY MOUTH EVERY DAY 90 tablet 1     methylphenidate (RITALIN) 10 MG tablet Take 1-2 tablets (10-20 mg) by mouth 2 times daily 120 tablet 0     mupirocin (BACTROBAN) 2 % ointment Apply topically 3 times daily 22 g 1     nystatin-triamcinolone (MYCOLOG II) cream Apply topically 2 times daily 15 g 0     polyethylene glycol 0.4%- propylene glycol 0.3% (SYSTANE ULTRA) 0.4-0.3 % SOLN ophthalmic solution Place 1 drop into both eyes every hour as needed for dry eyes       POLYVIN ALC-POVIDON DIMETHYLAM (FRESHKOTE) 2.7-2 % SOLN Apply  to eye.       rizatriptan (MAXALT-MLT) 5 MG ODT tab Take 1-2 tablets (5-10 mg) by mouth at onset of headache for migraine May repeat dose in 2 hours.  Do not exceed 30 mg in 24 hours 18 tablet 3     sucralfate (CARAFATE) 1 GM tablet TAKE ONE TABLET BY MOUTH FOUR TIMES A  tablet 1     SUMAtriptan (IMITREX) 100 MG tablet Take 1 tablet by mouth See Admin Instructions. WITH ONSET OF MIGRAINE, MAY REPEAT ONCE AFTER 2 HOURS. DO NOT EXCEED 2 TABLETS IN 24 HOURS. 27 tablet prn     ibuprofen (ADVIL/MOTRIN) 800 MG tablet TAKE ONE TABLET BY MOUTH EVERY 8 HOURS AS NEEDED FOR PAIN (Patient not taking: Reported on  2018) 100 tablet 3       Allergies   Allergen Reactions     Ceftriaxone Anaphylaxis     Hives, rash, racing heart beat     Bactrim [Sulfamethoxazole W/Trimethoprim] Hives     Ciprofloxacin Other (See Comments)     Tendon Issues     Codeine Nausea and Vomiting     Copper      Rash       Doxycycline      Loss of skin pigmentation, skin loss.     Gold      Rash       Iodine-131 Hives     Latex      Levaquin [Levofloxacin] Other (See Comments)     Tendon issues with levaquin and cipro     Nickel      rash     Steel [Staples]      Rash from staples       Penicillins Rash       Family History   Problem Relation Age of Onset     DIABETES Mother      adult     Cancer - colorectal Mother      Hypertension Mother      Allergies Mother      CANCER Mother      colon     Depression Mother      GASTROINTESTINAL DISEASE Mother      ibs     Genitourinary Problems Mother      kidney cyst     Gynecology Mother      endometriosis     Lipids Mother      Thyroid Disease Mother      Arthritis Mother      RA     HEART DISEASE Maternal Grandfather      MI,  - 60's     Allergies Father      Unknown/Adopted Father      HEART DISEASE Father      mi-age mid 40's     Cardiovascular Father      Lipids Father      Respiratory Father      asthma     CANCER Father      Other Cancer Father      renal cancer     Depression Sister      Allergies Sister      Gynecology Sister      endometriosis     Lipids Paternal Grandmother      OSTEOPOROSIS Paternal Grandmother      Thyroid Disease Paternal Grandmother      Respiratory Son      asthma     Allergies Son      Arthritis Sister      Allergies Brother      HEART DISEASE Brother      Allergies Daughter      Blood Disease Paternal Aunt      LGL T cell Leukemia     Rheumatoid Arthritis Paternal Aunt      KIDNEY DISEASE Maternal Aunt      Lupus Maternal Aunt      Bladder Cancer Maternal Aunt        Additional medical/Social/Surgical histories reviewed in [facility] and updated as appropriate.      REVIEW OF SYSTEMS (6/7/2018)  CONSTITUTIONAL: Denies fever and weight loss  EYES: Denies acute vision changes  ENT: Denies hearing changes or difficulty swallowing  CARDIAC: Denies chest pain or edema  RESPIRATORY: Denies dyspnea, cough or wheeze  GASTROINTESTINAL: Denies abdominal pain, nausea, vomiting  MUSCULOSKELETAL: See HPI  SKIN: Denies any recent rash or lesion  NEUROLOGICAL: Denies numbness or focal weakness  PSYCHIATRIC: No history of psychiatric symptoms or problems  ENDOCRINE:    Lab Results   Component Value Date    A1C 5.5 06/08/2016     HEMATOLOGY: Denies episodes of easy bleeding      PHYSICAL EXAM  Vitals:    06/07/18 1349   BP: 134/72   Pulse: 101   SpO2: 99%     General  - normal appearance, in no obvious distress  CV  - normal pulses at posterior tib and dorsalis pedis  Pulm  - normal respiratory pattern, non-labored  Musculoskeletal - right foot / ankle  - stance: inverts right foot for comfort, gait favors right side  - inspection: Achilles tendon not thickened, normal bone and joint alignment, no obvious deformity  - palpation: tender over the lateral calcaneal body and Achilles insertion  - ROM: normal plantar flexion, dorsiflexion  - strength: 5/5 in all planes  (+) calcaneal squeeze    Musculoskeletal - right elbow  - inspection: longitudinal scar overlying common extensor tendon, mild soft tissue swelling laterally  - palpation: tender at the origin of the common extensor tendon  - ROM:  Intact but painful in all directions  - strength: 5/5 wrist extension with elbow flexed, 4/5 with elbow extended, painful resisted extension of long finger with elbow flexed, worse with extension, 5/5  strength    Neuro  - no sensory or motor deficit, grossly normal coordination, normal muscle tone  Skin  - no ecchymosis, erythema, warmth, or induration, no obvious rash  Psych  - interactive, appropriate, normal mood and affect           ASSESSMENT & PLAN  Ms. Coker is a 43 year old year old female  who presents to clinic today with right heel pain.    I reviewed her x-ray in the room with her which reads:    IMPRESSION: Enthesopathic changes of the calcaneus have progressed  since the prior study. Otherwise negative right calcaneal x-rays.    I do suspect she may have a bony injury to her calcaneus, rather that an Achilles injury - although this is difficult to discount.  I am ordering an MRI of her right foot to further diagnose.    Her elbow pain is a bit more difficult.  She has had a couple of surgical consults, one surgery was offered by [facility] that would involve a muscle transposition, she was not very interested in this.  She is more interested in nonoperative treatment modalities.  She has failed a couple of rounds of physical therapy.  We did discuss the idea of PRP, although I am not certain if this would be risky or carry a large risk of infection.  I will discuss this with our hand specialists.    I'll get in touch with Bhupinder about this and regarding her MRI.    Thank you for allowing me to participate in Amelia's care.    Patel Martínez DO, [facility]  Primary Care Sports Medicine

## 2018-06-07 NOTE — NURSING NOTE
Amelia Coker's chief complaint for this visit includes:  Chief Complaint   Patient presents with     RECHECK     pt states she rolled her right heel. Also pt is here for follow up on right elbow pain.      PCP: Otoniel Nelson    Referring Provider:  Otoniel Nelson PA-C  [address] [place] [address]    /72  Pulse 101  SpO2 99%  Moderate Pain (4)     Do you need any medication refills at today's visit? No

## 2018-06-07 NOTE — PATIENT INSTRUCTIONS
Thanks for coming today.  Ortho[facility]Sports Medicine Clinic  [address] [place] [address]    To schedule future appointments in Ortho Clinic, you may call [phone].    To schedule ordered imaging by your provider:   Call [facility] [phone]    To schedule an injection ordered by your provider:  Call [facility] Injection scheduling line: [phone]  [facility] available online at:  [email or web address]    Please call if any further questions or concerns ([phone]).  Clinic hours 8 am to 5 pm.    Return to clinic (call) if symptoms worsen or fail to improve.

## 2018-06-07 NOTE — MR AVS SNAPSHOT
After Visit Summary   2018    Amelia Coker    MRN: [id number]           Patient Information     Date Of Birth          1975        Visit Information        Provider Department      2018 2:00 PM Patel Martínez, DO [facility]        Today's Diagnoses     Chronic heel pain, right    -  1      Care Instructions    Thanks for coming today.  [facility]  [address] [place] [address]    To schedule future appointments in Ortho Clinic, you may call [phone].    To schedule ordered imaging by your provider:   Call Central Imaging [phone]    To schedule an injection ordered by your provider:  Call Central Imaging Injection scheduling line: [phone]  PocketMobile available online at:  [employer]    Please call if any further questions or concerns ([phone]).  Clinic hours 8 am to 5 pm.    Return to clinic (call) if symptoms worsen or fail to improve.            Follow-ups after your visit        Future tests that were ordered for you today     Open Future Orders        Priority Expected Expires Ordered    MR Foot Right w/o Contrast Routine  2019            Who to contact     If you have questions or need follow up information about today's clinic visit or your schedule please contact [facility] directly at [phone].  Normal or non-critical lab and imaging results will be communicated to you by MyChart, letter or phone within 4 business days after the clinic has received the results. If you do not hear from us within 7 days, please contact the clinic through MyChart or phone. If you have a critical or abnormal lab result, we will notify you by phone as soon as possible.  Submit refill requests through PocketMobile or call your pharmacy and they will forward the refill request to us. Please allow 3 business days for your refill to be completed.          Additional Information About  Your Visit        MyChart Information     [employer] gives you secure access to your electronic health record. If you see a primary care provider, you can also send messages to your care team and make appointments. If you have questions, please call your primary care clinic.  If you do not have a primary care provider, please call [phone] and they will assist you.      Vquence is an electronic gateway that provides easy, online access to your medical records. With Vquence, you can request a clinic appointment, read your test results, renew a prescription or communicate with your care team.     To access your existing account, please contact your [facility] or call [phone] for assistance.        Care EveryWhere ID     This is your Care EveryWhere ID. This could be used by other organizations to access your [facility] medical records  [id number]        Your Vitals Were     Pulse Pulse Oximetry                101 99%           Blood Pressure from Last 3 Encounters:   06/07/18 134/72   05/22/18 118/76   04/12/18 142/76    Weight from Last 3 Encounters:   05/22/18 92.5 kg (204 lb)   04/12/18 93.4 kg (206 lb)   03/13/18 94.3 kg (208 lb)               Primary Care Provider Office Phone # Fax #    Otoniel Nelson PA-C [phone] [address] [facility] [address]        Equal Access to Services     CHI GARRISON AH: Hadii aad ku hadasho Soomaali, waaxda luqadaha, qaybta kaalmada adeegyada, jade amaya . So [facility] [phone].    ATENCIÓN: Si habla español, tiene a hines disposición servicios gratuitos de asistencia lingüística. Larisa al [phone].    We comply with applicable federal civil rights laws and [place] laws. We do not discriminate on the basis of race, color, national origin, age, disability, sex, sexual orientation, or gender identity.            Thank you!     Thank you for choosing [facility]  for your  care. Our goal is always to provide you with excellent care. Hearing back from our patients is one way we can continue to improve our services. Please take a few minutes to complete the written survey that you may receive in the mail after your visit with us. Thank you!             Your Updated Medication List - Protect others around you: Learn how to safely use, store and throw away your medicines at www.disposemymeds.org.          This list is accurate as of 6/7/18  2:23 PM.  Always use your most recent med list.                   Brand Name Dispense Instructions for use Diagnosis    acyclovir 5 % ointment    ZOVIRAX    15 g    Apply topically 6 times daily    Recurrent cold sores       ALLEGRA 180 MG tablet   Generic drug:  fexofenadine      Take 1 tablet by mouth daily Reported on 4/5/2017    FLORIDALMA positive, Family history of lupus erythematosus, Lumbar radiculopathy, Fatigue, Dry eyes, bilateral, Keratitis sicca, Foraminal stenosis of lumbar region, Ds DNA antibody positive       Coal Tar Extract 4 % Sham     168 mL    Externally apply 1 Dose topically daily    Hair loss, Scalp lesion       diazepam 5 MG tablet    VALIUM    90 tablet    Take 1 tablet (5 mg) by mouth 3 times daily as needed for muscle spasms    Spasm of muscle       FRESHKOTE 2.7-2 % Soln   Generic drug:  Polyvinyl Alcohol-Povidone      Apply  to eye.        hydrocortisone 0.2 % cream    WESTCORT    60 g    Apply sparingly to affected area three times daily as needed.    Telangiectasia of face       ibuprofen 800 MG tablet    ADVIL/MOTRIN    100 tablet    TAKE ONE TABLET BY MOUTH EVERY 8 HOURS AS NEEDED FOR PAIN    Cervicalgia, Muscle spasm       ketoconazole 2 % cream    NIZORAL    30 g    Apply topically 2 times daily    Ringworm of body       lidocaine-prilocaine cream    EMLA    30 g    Apply topically as needed for moderate pain    Intractable right heel pain       * meloxicam 15 MG tablet    MOBIC    90 tablet    TAKE ONE TABLET BY MOUTH  EVERY DAY    Cervicalgia, Muscle spasm       * meloxicam 15 MG tablet    MOBIC    90 tablet    TAKE ONE TABLET BY MOUTH EVERY DAY (PATIENT WANTS UNICHEM BRAND ONLY)    Cervicalgia, Muscle spasm       methylphenidate 10 MG tablet    RITALIN    120 tablet    Take 1-2 tablets (10-20 mg) by mouth 2 times daily    Chronic fatigue       mupirocin 2 % ointment    BACTROBAN    22 g    Apply topically 3 times daily    MRSA (methicillin resistant staph aureus) culture positive, Skin lesion       nystatin-triamcinolone cream    MYCOLOG II    15 g    Apply topically 2 times daily    Atopic dermatitis       polyethylene glycol 0.4%- propylene glycol 0.3% 0.4-0.3 % Soln ophthalmic solution    SYSTANE ULTRA     Place 1 drop into both eyes every hour as needed for dry eyes        rizatriptan 5 MG ODT tab    MAXALT-MLT    18 tablet    Take 1-2 tablets (5-10 mg) by mouth at onset of headache for migraine May repeat dose in 2 hours.  Do not exceed 30 mg in 24 hours    Migraine without aura and without status migrainosus, not intractable       sucralfate 1 GM tablet    CARAFATE    120 tablet    TAKE ONE TABLET BY MOUTH FOUR TIMES A DAY    Gastroesophageal reflux disease, esophagitis presence not specified       SUMAtriptan 100 MG tablet    IMITREX    27 tablet    Take 1 tablet by mouth See Admin Instructions. WITH ONSET OF MIGRAINE, MAY REPEAT ONCE AFTER 2 HOURS. DO NOT EXCEED 2 TABLETS IN 24 HOURS.    Migraine, unspecified, with intractable migraine, so stated, without mention of status migrainosus       TYLENOL PO      Take 500 mg by mouth every 6 hours as needed        XIIDRA 5 % Soln opthalmic solution   Generic drug:  Lifitegrast           * Notice:  This list has 2 medication(s) that are the same as other medications prescribed for you. Read the directions carefully, and ask your doctor or other care provider to review them with you.

## 2018-06-08 ENCOUNTER — MYC MEDICAL ADVICE (OUTPATIENT)
Dept: FAMILY MEDICINE | Facility: OTHER | Age: 43
End: 2018-06-08

## 2018-06-08 ENCOUNTER — ALLIED HEALTH/NURSE VISIT (OUTPATIENT)
Dept: FAMILY MEDICINE | Facility: OTHER | Age: 43
End: 2018-06-08
Payer: COMMERCIAL

## 2018-06-08 DIAGNOSIS — T14.8XXA OPEN WOUND: ICD-10-CM

## 2018-06-08 DIAGNOSIS — T14.8XXA OPEN WOUND: Primary | ICD-10-CM

## 2018-06-08 PROCEDURE — 99207 ZZC NO CHARGE NURSE ONLY: CPT

## 2018-06-08 PROCEDURE — 90714 TD VACC NO PRESV 7 YRS+ IM: CPT

## 2018-06-08 NOTE — PROGRESS NOTES
Prior to injection verified patient identity using patient's name and date of birth.  Due to injection administration, patient instructed to remain in clinic for 15 minutes  afterwards, and to report any adverse reaction to me immediately.    Screening Questionnaire for Adult Immunization    Are you sick today?   No   Do you have allergies to medications, food, a vaccine component or latex?   No   Have you ever had a serious reaction after receiving a vaccination?   No   Do you have a long-term health problem with heart disease, lung disease, asthma, kidney disease, metabolic disease (e.g. diabetes), anemia, or other blood disorder?   No   Do you have cancer, leukemia, HIV/AIDS, or any other immune system problem?   No   In the past 3 months, have you taken medications that affect  your immune system, such as prednisone, other steroids, or anticancer drugs; drugs for the treatment of rheumatoid arthritis, Crohn s disease, or psoriasis; or have you had radiation treatments?   No   Have you had a seizure, or a brain or other nervous system problem?   No   During the past year, have you received a transfusion of blood or blood     products, or been given immune (gamma) globulin or antiviral drug?   No   For women: Are you pregnant or is there a chance you could become        pregnant during the next month?   No   Have you received any vaccinations in the past 4 weeks?   No     Immunization questionnaire answers were all negative.        Per orders of Otoniel Han, injection of td given by Amber Cevallos. Patient instructed to remain in clinic for 15 minutes afterwards, and to report any adverse reaction to me immediately.       Screening performed by Amber Cevallos on 6/8/2018 at 1:53 PM.

## 2018-06-08 NOTE — MR AVS SNAPSHOT
After Visit Summary   6/8/2018    Amelia Coker    MRN: [id number]           Patient Information     Date Of Birth          1975        Visit Information        Provider Department      6/8/2018 1:45 PM LISA MARCELINO [facility]        Today's Diagnoses     Open wound           Follow-ups after your visit        Your next 10 appointments already scheduled     Jun 09, 2018  9:00 AM CDT   MR FOOT RIGHT W/O CONTRAST with PHMR1   [facility]    [address] [facility] [address] [phone]           Take your medicines as usual, unless your doctor tells you not to. Bring a list of your current medicines to your exam (including vitamins, minerals and over-the-counter drugs). Also bring the results of similar scans you may have had.  Please remove any body piercings and hair extensions before you arrive.  Follow your doctor s orders. If you do not, we may have to postpone your exam.  You may or may not receive IV contrast for this exam pending the discretion of the Radiologist.  You do not need to do anything special to prepare.  The MRI machine uses a strong magnet. Please wear clothes without metal (snaps, zippers). A sweatsuit works well, or we may give you a hospital gown.   **IMPORTANT** THE INSTRUCTIONS BELOW ARE ONLY FOR THOSE PATIENTS WHO HAVE BEEN PRESCRIBED SEDATION OR GENERAL ANESTHESIA DURING THEIR MRI PROCEDURE:  IF YOUR DOCTOR PRESCRIBED ORAL SEDATION (take medicine to help you relax during your exam):   You must get the medicine from your doctor (oral medication) before you arrive. Bring the medicine to the exam. Do not take it at home. You ll be told when to take it upon arriving for your exam.   Arrive one hour early. Bring someone who can take you home after the test. Your medicine will make you sleepy. After the exam, you may not drive, take a bus or take a taxi by yourself.  IF YOUR DOCTOR PRESCRIBED IV SEDATION:    Arrive one hour early. Bring someone who can take you home after the test. Your medicine will make you sleepy. After the exam, you may not drive, take a bus or take a taxi by yourself.   No eating 6 hours before your exam. You may have clear liquids up until 4 hours before your exam. (Clear liquids include water, clear tea, black coffee and fruit juice without pulp.)  IF YOUR DOCTOR PRESCRIBED ANESTHESIA (be asleep for your exam):   Arrive 1 1/2 hours early. Bring someone who can take you home after the test. You may not drive, take a bus or take a taxi by yourself.   No eating 8 hours before your exam. You may have clear liquids up until 4 hours before your exam. (Clear liquids include water, clear tea, black coffee and fruit juice without pulp.)   You will spend four to five hours in the recovery room.  Please call the Imaging Department at your exam site with any questions.              Future tests that were ordered for you today     Open Future Orders        Priority Expected Expires Ordered    MR Foot Right w/o Contrast Routine  6/7/2019 6/7/2018            Who to contact     If you have questions or need follow up information about today's clinic visit or your schedule please contact [facility] directly at [phone].  Normal or non-critical lab and imaging results will be communicated to you by [employer], letter or phone within 4 business days after the clinic has received the results. If you do not hear from us within 7 days, please contact the clinic through SQLstream[employer] or phone. If you have a critical or abnormal lab result, we will notify you by phone as soon as possible.  Submit refill requests through [employer] or call your pharmacy and they will forward the refill request to us. Please allow 3 business days for your refill to be completed.          Additional Information About Your Visit        Pennant Information     [employer] gives you secure access to your electronic health record. If you see  a primary care provider, you can also send messages to your care team and make appointments. If you have questions, please call your primary care clinic.  If you do not have a primary care provider, please call [phone] and they will assist you.        Care EveryWhere ID     This is your Care EveryWhere ID. This could be used by other organizations to access your [facility] medical records  [id number]         Blood Pressure from Last 3 Encounters:   06/07/18 134/72   05/22/18 118/76   04/12/18 142/76    Weight from Last 3 Encounters:   05/22/18 204 lb (92.5 kg)   04/12/18 206 lb (93.4 kg)   03/13/18 208 lb (94.3 kg)              We Performed the Following     TD PRESERV FREE >=7 YRS ADS IM        Primary Care Provider Office Phone # Fax #    Otoniel Nelson PA-C [phone] [address] [facility] [address]        Equal Access to Services     CHI GARRISON : Hadii aad ku hadasho Soomaali, waaxda luqadaha, qaybta kaalmada adeegyada, waxay idiin hayaan vanesaeg kharatata lajeanine . So [facility] [phone].    ATENCIÓN: Si habla español, tiene a hines disposición servicios gratuitos de asistencia lingüística. Llame al [phone].    We comply with applicable federal civil rights laws and [place] laws. We do not discriminate on the basis of race, color, national origin, age, disability, sex, sexual orientation, or gender identity.            Thank you!     Thank you for choosing [facility]  for your care. Our goal is always to provide you with excellent care. Hearing back from our patients is one way we can continue to improve our services. Please take a few minutes to complete the written survey that you may receive in the mail after your visit with us. Thank you!             Your Updated Medication List - Protect others around you: Learn how to safely use, store and throw away your medicines at www.disposemymeds.org.          This list is accurate as of 6/8/18  1:58 PM.  Always use your  most recent med list.                   Brand Name Dispense Instructions for use Diagnosis    acyclovir 5 % ointment    ZOVIRAX    15 g    Apply topically 6 times daily    Recurrent cold sores       ALLEGRA 180 MG tablet   Generic drug:  fexofenadine      Take 1 tablet by mouth daily Reported on 4/5/2017    FLORIDALMA positive, Family history of lupus erythematosus, Lumbar radiculopathy, Fatigue, Dry eyes, bilateral, Keratitis sicca, Foraminal stenosis of lumbar region, Ds DNA antibody positive       Coal Tar Extract 4 % Sham     168 mL    Externally apply 1 Dose topically daily    Hair loss, Scalp lesion       diazepam 5 MG tablet    VALIUM    90 tablet    Take 1 tablet (5 mg) by mouth 3 times daily as needed for muscle spasms    Spasm of muscle       FRESHKOTE 2.7-2 % Soln   Generic drug:  Polyvinyl Alcohol-Povidone      Apply  to eye.        hydrocortisone 0.2 % cream    WESTCORT    60 g    Apply sparingly to affected area three times daily as needed.    Telangiectasia of face       ibuprofen 800 MG tablet    ADVIL/MOTRIN    100 tablet    TAKE ONE TABLET BY MOUTH EVERY 8 HOURS AS NEEDED FOR PAIN    Cervicalgia, Muscle spasm       ketoconazole 2 % cream    NIZORAL    30 g    Apply topically 2 times daily    Ringworm of body       lidocaine-prilocaine cream    EMLA    30 g    Apply topically as needed for moderate pain    Intractable right heel pain       * meloxicam 15 MG tablet    MOBIC    90 tablet    TAKE ONE TABLET BY MOUTH EVERY DAY    Cervicalgia, Muscle spasm       * meloxicam 15 MG tablet    MOBIC    90 tablet    TAKE ONE TABLET BY MOUTH EVERY DAY (PATIENT WANTS Dr. ZHEM BRAND ONLY)    Cervicalgia, Muscle spasm       methylphenidate 10 MG tablet    RITALIN    120 tablet    Take 1-2 tablets (10-20 mg) by mouth 2 times daily    Chronic fatigue       mupirocin 2 % ointment    BACTROBAN    22 g    Apply topically 3 times daily    MRSA (methicillin resistant staph aureus) culture positive, Skin lesion        nystatin-triamcinolone cream    MYCOLOG II    15 g    Apply topically 2 times daily    Atopic dermatitis       polyethylene glycol 0.4%- propylene glycol 0.3% 0.4-0.3 % Soln ophthalmic solution    SYSTANE ULTRA     Place 1 drop into both eyes every hour as needed for dry eyes        rizatriptan 5 MG ODT tab    MAXALT-MLT    18 tablet    Take 1-2 tablets (5-10 mg) by mouth at onset of headache for migraine May repeat dose in 2 hours.  Do not exceed 30 mg in 24 hours    Migraine without aura and without status migrainosus, not intractable       sucralfate 1 GM tablet    CARAFATE    120 tablet    TAKE ONE TABLET BY MOUTH FOUR TIMES A DAY    Gastroesophageal reflux disease, esophagitis presence not specified       SUMAtriptan 100 MG tablet    IMITREX    27 tablet    Take 1 tablet by mouth See Admin Instructions. WITH ONSET OF MIGRAINE, MAY REPEAT ONCE AFTER 2 HOURS. DO NOT EXCEED 2 TABLETS IN 24 HOURS.    Migraine, unspecified, with intractable migraine, so stated, without mention of status migrainosus       TYLENOL PO      Take 500 mg by mouth every 6 hours as needed        XIIDRA 5 % Soln opthalmic solution   Generic drug:  Lifitegrast           * Notice:  This list has 2 medication(s) that are the same as other medications prescribed for you. Read the directions carefully, and ask your doctor or other care provider to review them with you.

## 2018-06-09 ENCOUNTER — HOSPITAL ENCOUNTER (OUTPATIENT)
Dept: MRI IMAGING | Facility: CLINIC | Age: 43
Discharge: HOME | End: 2018-06-09
Attending: FAMILY MEDICINE | Admitting: FAMILY MEDICINE
Payer: COMMERCIAL

## 2018-06-09 DIAGNOSIS — M79.671 CHRONIC HEEL PAIN, RIGHT: ICD-10-CM

## 2018-06-09 DIAGNOSIS — G89.29 CHRONIC HEEL PAIN, RIGHT: ICD-10-CM

## 2018-06-09 PROCEDURE — 73721 MRI JNT OF LWR EXTRE W/O DYE: CPT | Mod: RT

## 2018-06-11 ENCOUNTER — MYC MEDICAL ADVICE (OUTPATIENT)
Dept: ORTHOPEDICS | Facility: CLINIC | Age: 43
End: 2018-06-11

## 2018-06-11 DIAGNOSIS — B37.31 CANDIDIASIS OF VAGINA: ICD-10-CM

## 2018-06-11 DIAGNOSIS — M72.2 PLANTAR FASCIAL FIBROMATOSIS: Primary | ICD-10-CM

## 2018-06-12 RX ORDER — PREDNISONE 20 MG/1
40 TABLET ORAL DAILY
Qty: 10 TABLET | Refills: 0 | Status: SHIPPED | OUTPATIENT
Start: 2018-06-12 | End: 2018-06-17

## 2018-06-14 RX ORDER — FLUCONAZOLE 150 MG/1
150 TABLET ORAL ONCE
Qty: 3 TABLET | Refills: 0 | Status: SHIPPED | OUTPATIENT
Start: 2018-06-14 | End: 2018-06-14

## 2018-06-14 NOTE — TELEPHONE ENCOUNTER
Pt came in today to get a short boot for right foot. Dr. Martínez also did Rx Diflucan and sent to [facility]

## 2018-07-02 ENCOUNTER — OFFICE VISIT (OUTPATIENT)
Dept: ORTHOPEDICS | Facility: CLINIC | Age: 43
End: 2018-07-02
Payer: COMMERCIAL

## 2018-07-02 VITALS — DIASTOLIC BLOOD PRESSURE: 73 MMHG | HEART RATE: 67 BPM | SYSTOLIC BLOOD PRESSURE: 111 MMHG | OXYGEN SATURATION: 99 %

## 2018-07-02 DIAGNOSIS — M72.2 PLANTAR FASCIAL FIBROMATOSIS: Primary | ICD-10-CM

## 2018-07-02 PROCEDURE — 99213 OFFICE O/P EST LOW 20 MIN: CPT | Performed by: FAMILY MEDICINE

## 2018-07-02 ASSESSMENT — PAIN SCALES - GENERAL: PAINLEVEL: MODERATE PAIN (4)

## 2018-07-02 NOTE — LETTER
7/2/2018         RE: Amelia Coker  [address] [place] [id number]        Dear Colleague,    Thank you for referring your patient, Amelia Coker, to the [facility]. Please see a copy of my visit note below.    HISTORY OF PRESENT ILLNESS  Ms. Coker is a pleasant 43 year old year old female following up with right heel pain.  Bhupinder is not feeling much better overall.  She has been wearing her boot at most times throughout the day, at least 8-12 hours a day while at work.  She does have pain with most steps, definitely still has pain in the morning.  Her pain is now concentrated to the medial and lateral aspects of her heel.  She is here to review her MRI as well.  Additional history: as documented      REVIEW OF SYSTEMS (7/2/2018)  10 point ROS of systems including Constitutional, Eyes, Respiratory, Cardiovascular, Gastroenterology, Genitourinary, Integumentary, Musculoskeletal, Psychiatric were all negative except for pertinent positives noted in my HPI.     PHYSICAL EXAM  Vitals:    07/02/18 0843   BP: 111/73   Pulse: 67   SpO2: 99%     General  - normal appearance, in no obvious distress  CV  - normal pulses at posterior tib and dorsalis pedis  Pulm  - normal respiratory pattern, non-labored  Musculoskeletal - right foot  - stance: gait favors right foot  - inspection: no swelling or effusion  - palpation: tender at the medial calcaneal tubercle  - ROM: normal active and passive ROM of great and lesser toes  - strength: 5/5 in all planes  Neuro  - no sensory or motor deficit, grossly normal coordination, normal muscle tone  Skin  - no ecchymosis, erythema, warmth, or induration, no obvious rash  Psych  - interactive, appropriate, normal mood and affect        ASSESSMENT & PLAN  Ms. Coker is a 43 year old year old female following up with chronic right heel pain.    I reviewed her MR in the room with her:  IMPRESSION:    1. Moderate acute to subacute plantar fasciitis.  2. Small area  of subchondral edema medial corner of talar dome may  represent a contusion or small focus of osteochondritis dissecans with  intact overlying articular cartilage.       We had a long discussion centering around treatment moving forward.  Bhupinder is reasonably frustrated by her pain.  I did prescribe her Voltaren gel for topical relief, hopefully this will ease to help temporarily.  I do think she will benefit from more aggressive physical therapy, I am referring her to PT for eccentrics and to include Graston or Astym.  I also gave her handout for platelet rich plasma, this might be a viable treatment option for her.    Bhupinder may follow-up for a PRP injection soon.    It was a pleasure seeing Bhupinder.        Patel Martínez DO, [facility]          Again, thank you for allowing me to participate in the care of your patient.        Sincerely,        Patel Martínez DO

## 2018-07-02 NOTE — MR AVS SNAPSHOT
After Visit Summary   2018    Amelia Coker    MRN: [id number]           Patient Information     Date Of Birth          1975        Visit Information        Provider Department      2018 9:00 AM Patel Martínez, DO [facility]        Today's Diagnoses     Plantar fascial fibromatosis    -  1      Care Instructions    Thanks for coming today.  Ortho[facility] Clinic  [address] [place] [address]    To schedule future appointments in Ortho Clinic, you may call [phone].    To schedule ordered imaging by your provider:   Call Central Imaging [phone]    To schedule an injection ordered by your provider:  Call Central Imaging Injection scheduling line: [phone]  [employer] available online at:  [email or web address]    Please call if any further questions or concerns ([phone]).  Clinic hours 8 am to 5 pm.    Return to clinic (call) if symptoms worsen or fail to improve.            Follow-ups after your visit        Additional Services     PHYSICAL THERAPY REFERRAL       Please eval and treat for plantar fasciitis, please perform eccentrics, can perform graston or astym if able                  Who to contact     If you have questions or need follow up information about today's clinic visit or your schedule please contact [facility] directly at [phone].  Normal or non-critical lab and imaging results will be communicated to you by MyChart, letter or phone within 4 business days after the clinic has received the results. If you do not hear from us within 7 days, please contact the clinic through MyChart or phone. If you have a critical or abnormal lab result, we will notify you by phone as soon as possible.  Submit refill requests through [employer] or call your pharmacy and they will forward the refill request to us. Please allow 3 business days for your refill to be completed.          Additional  Information About Your Visit        [employer] Information     [employer] gives you secure access to your electronic health record. If you see a primary care provider, you can also send messages to your care team and make appointments. If you have questions, please call your primary care clinic.  If you do not have a primary care provider, please call [phone] and they will assist you.      [employer] is an electronic gateway that provides easy, online access to your medical records. With [employer], you can request a clinic appointment, read your test results, renew a prescription or communicate with your care team.     To access your existing account, please contact your [facility] or call [phone] for assistance.        Care EveryWhere ID     This is your Care EveryWhere ID. This could be used by other organizations to access your [facility] medical records  [id number]        Your Vitals Were     Pulse Pulse Oximetry                67 99%           Blood Pressure from Last 3 Encounters:   07/02/18 111/73   06/07/18 134/72   05/22/18 118/76    Weight from Last 3 Encounters:   05/22/18 92.5 kg (204 lb)   04/12/18 93.4 kg (206 lb)   03/13/18 94.3 kg (208 lb)              We Performed the Following     PHYSICAL THERAPY REFERRAL          Today's Medication Changes          These changes are accurate as of 7/2/18  9:44 AM.  If you have any questions, ask your nurse or doctor.               Start taking these medicines.        Dose/Directions    diclofenac 1 % Gel topical gel   Commonly known as:  VOLTAREN   Used for:  Plantar fascial fibromatosis   Started by:  Patel Martínez, DO        Apply 2 grams to heels up to four times daily using enclosed dosing card.   Quantity:  100 g   Refills:  1            Where to get your medicines      These medications were sent to [facility] [place], [place] - [address]   [address] [facility] [address]     Phone:  [phone]     diclofenac  1 % Gel topical gel                Primary Care Provider Office Phone # Fax #    Otoniel Nelson PA-C [phone] [address] [facility] [address]        Equal Access to Services     CHI GARRISON : Hadmaria eugenia abelino mallory samsono Soandiali, waaxda luqadaha, qaybta kaalmada adeegyada, jade trimble laPallavimisti plasencia. So [facility] [phone].    ATENCIÓN: Si habla español, tiene a hines disposición servicios gratuitos de asistencia lingüística. Llame al [phone].    We comply with applicable federal civil rights laws and [place] laws. We do not discriminate on the basis of race, color, national origin, age, disability, sex, sexual orientation, or gender identity.            Thank you!     Thank you for choosing [facility]  for your care. Our goal is always to provide you with excellent care. Hearing back from our patients is one way we can continue to improve our services. Please take a few minutes to complete the written survey that you may receive in the mail after your visit with us. Thank you!             Your Updated Medication List - Protect others around you: Learn how to safely use, store and throw away your medicines at www.disposemymeds.org.          This list is accurate as of 7/2/18  9:44 AM.  Always use your most recent med list.                   Brand Name Dispense Instructions for use Diagnosis    acyclovir 5 % ointment    ZOVIRAX    15 g    Apply topically 6 times daily    Recurrent cold sores       ALLEGRA 180 MG tablet   Generic drug:  fexofenadine      Take 1 tablet by mouth daily Reported on 4/5/2017    FLORIDALMA positive, Family history of lupus erythematosus, Lumbar radiculopathy, Fatigue, Dry eyes, bilateral, Keratitis sicca, Foraminal stenosis of lumbar region, Ds DNA antibody positive       Coal Tar Extract 4 % Sham     168 mL    Externally apply 1 Dose topically daily    Hair loss, Scalp lesion       diazepam 5 MG tablet    VALIUM    90 tablet    Take 1  tablet (5 mg) by mouth 3 times daily as needed for muscle spasms    Spasm of muscle       diclofenac 1 % Gel topical gel    VOLTAREN    100 g    Apply 2 grams to heels up to four times daily using enclosed dosing card.    Plantar fascial fibromatosis       FRESHKOTE 2.7-2 % Soln   Generic drug:  Polyvinyl Alcohol-Povidone      Apply  to eye.        hydrocortisone 0.2 % cream    WESTCORT    60 g    Apply sparingly to affected area three times daily as needed.    Telangiectasia of face       ibuprofen 800 MG tablet    ADVIL/MOTRIN    100 tablet    TAKE ONE TABLET BY MOUTH EVERY 8 HOURS AS NEEDED FOR PAIN    Cervicalgia, Muscle spasm       ketoconazole 2 % cream    NIZORAL    30 g    Apply topically 2 times daily    Ringworm of body       lidocaine-prilocaine cream    EMLA    30 g    Apply topically as needed for moderate pain    Intractable right heel pain       * meloxicam 15 MG tablet    MOBIC    90 tablet    TAKE ONE TABLET BY MOUTH EVERY DAY    Cervicalgia, Muscle spasm       * meloxicam 15 MG tablet    MOBIC    90 tablet    TAKE ONE TABLET BY MOUTH EVERY DAY (PATIENT WANTS MENABANQER BRAND ONLY)    Cervicalgia, Muscle spasm       methylphenidate 10 MG tablet    RITALIN    120 tablet    Take 1-2 tablets (10-20 mg) by mouth 2 times daily    Chronic fatigue       mupirocin 2 % ointment    BACTROBAN    22 g    Apply topically 3 times daily    MRSA (methicillin resistant staph aureus) culture positive, Skin lesion       nystatin-triamcinolone cream    MYCOLOG II    15 g    Apply topically 2 times daily    Atopic dermatitis       polyethylene glycol 0.4%- propylene glycol 0.3% 0.4-0.3 % Soln ophthalmic solution    SYSTANE ULTRA     Place 1 drop into both eyes every hour as needed for dry eyes        rizatriptan 5 MG ODT tab    MAXALT-MLT    18 tablet    Take 1-2 tablets (5-10 mg) by mouth at onset of headache for migraine May repeat dose in 2 hours.  Do not exceed 30 mg in 24 hours    Migraine without aura and without  status migrainosus, not intractable       sucralfate 1 GM tablet    CARAFATE    120 tablet    TAKE ONE TABLET BY MOUTH FOUR TIMES A DAY    Gastroesophageal reflux disease, esophagitis presence not specified       SUMAtriptan 100 MG tablet    IMITREX    27 tablet    Take 1 tablet by mouth See Admin Instructions. WITH ONSET OF MIGRAINE, MAY REPEAT ONCE AFTER 2 HOURS. DO NOT EXCEED 2 TABLETS IN 24 HOURS.    Migraine, unspecified, with intractable migraine, so stated, without mention of status migrainosus       TYLENOL PO      Take 500 mg by mouth every 6 hours as needed        XIIDRA 5 % Soln opthalmic solution   Generic drug:  Lifitegrast           * Notice:  This list has 2 medication(s) that are the same as other medications prescribed for you. Read the directions carefully, and ask your doctor or other care provider to review them with you.

## 2018-07-02 NOTE — NURSING NOTE
Amelia Coker's chief complaint for this visit includes:  Chief Complaint   Patient presents with     RECHECK     right foot follow up.      PCP: Otoniel Nelson    Referring Provider:  No referring provider defined for this encounter.    /73  Pulse 67  SpO2 99%  Moderate Pain (4)     Do you need any medication refills at today's visit? No

## 2018-07-02 NOTE — PROGRESS NOTES
HISTORY OF PRESENT ILLNESS  Ms. Coker is a pleasant 43 year old year old female following up with right heel pain.  Bhupinder is not feeling much better overall.  She has been wearing her boot at most times throughout the day, at least 8-12 hours a day while at work.  She does have pain with most steps, definitely still has pain in the morning.  Her pain is now concentrated to the medial and lateral aspects of her heel.  She is here to review her MRI as well.  Additional history: as documented      REVIEW OF SYSTEMS (7/2/2018)  10 point ROS of systems including Constitutional, Eyes, Respiratory, Cardiovascular, Gastroenterology, Genitourinary, Integumentary, Musculoskeletal, Psychiatric were all negative except for pertinent positives noted in my HPI.     PHYSICAL EXAM  Vitals:    07/02/18 0843   BP: 111/73   Pulse: 67   SpO2: 99%     General  - normal appearance, in no obvious distress  CV  - normal pulses at posterior tib and dorsalis pedis  Pulm  - normal respiratory pattern, non-labored  Musculoskeletal - right foot  - stance: gait favors right foot  - inspection: no swelling or effusion  - palpation: tender at the medial calcaneal tubercle  - ROM: normal active and passive ROM of great and lesser toes  - strength: 5/5 in all planes  Neuro  - no sensory or motor deficit, grossly normal coordination, normal muscle tone  Skin  - no ecchymosis, erythema, warmth, or induration, no obvious rash  Psych  - interactive, appropriate, normal mood and affect        ASSESSMENT & PLAN  Ms. Coker is a 43 year old year old female following up with chronic right heel pain.    I reviewed her MR in the room with her:  IMPRESSION:    1. Moderate acute to subacute plantar fasciitis.  2. Small area of subchondral edema medial corner of talar dome may  represent a contusion or small focus of osteochondritis dissecans with  intact overlying articular cartilage.       We had a long discussion centering around treatment moving forward.  Bhupinder  is reasonably frustrated by her pain.  I did prescribe her Voltaren gel for topical relief, hopefully this will ease to help temporarily.  I do think she will benefit from more aggressive physical therapy, I am referring her to PT for eccentrics and to include Afshan or Alonso.  I also gave her handout for platelet rich plasma, this might be a viable treatment option for her.    Bhupinder may follow-up for a PRP injection soon.    It was a pleasure seeing Bhupinder.        Patel Martínez, DO, CAQSM

## 2018-07-02 NOTE — PATIENT INSTRUCTIONS
Thanks for coming today.  Ortho[facility] Medicine Clinic  [address] [place] [address]    To schedule future appointments in Ortho Clinic, you may call [phone].    To schedule ordered imaging by your provider:   Call Central Imaging [phone]    To schedule an injection ordered by your provider:  Call Central Imaging Injection scheduling line: [phone]  [employer] available online at:  [email or web address]    Please call if any further questions or concerns ([phone]).  Clinic hours 8 am to 5 pm.    Return to clinic (call) if symptoms worsen or fail to improve.

## 2018-08-06 ENCOUNTER — OFFICE VISIT (OUTPATIENT)
Dept: FAMILY MEDICINE | Facility: OTHER | Age: 43
End: 2018-08-06
Payer: COMMERCIAL

## 2018-08-06 ENCOUNTER — MYC MEDICAL ADVICE (OUTPATIENT)
Dept: FAMILY MEDICINE | Facility: OTHER | Age: 43
End: 2018-08-06

## 2018-08-06 VITALS
RESPIRATION RATE: 18 BRPM | TEMPERATURE: 99.5 F | WEIGHT: 208 LBS | DIASTOLIC BLOOD PRESSURE: 86 MMHG | HEART RATE: 72 BPM | SYSTOLIC BLOOD PRESSURE: 120 MMHG | BODY MASS INDEX: 33.83 KG/M2

## 2018-08-06 DIAGNOSIS — R10.11 ABDOMINAL PAIN, RIGHT UPPER QUADRANT: Primary | ICD-10-CM

## 2018-08-06 DIAGNOSIS — R10.13 ABDOMINAL PAIN, EPIGASTRIC: ICD-10-CM

## 2018-08-06 DIAGNOSIS — Z80.0 FAMILY HISTORY OF COLON CANCER: ICD-10-CM

## 2018-08-06 LAB
ALBUMIN SERPL-MCNC: 4 G/DL (ref 3.4–5)
ALP SERPL-CCNC: 55 U/L (ref 40–150)
ALT SERPL W P-5'-P-CCNC: 23 U/L (ref 0–50)
AMYLASE SERPL-CCNC: 51 U/L (ref 30–110)
ANION GAP SERPL CALCULATED.3IONS-SCNC: 10 MMOL/L (ref 3–14)
AST SERPL W P-5'-P-CCNC: 16 U/L (ref 0–45)
BILIRUB SERPL-MCNC: 0.7 MG/DL (ref 0.2–1.3)
BUN SERPL-MCNC: 22 MG/DL (ref 7–30)
CALCIUM SERPL-MCNC: 8.8 MG/DL (ref 8.5–10.1)
CHLORIDE SERPL-SCNC: 104 MMOL/L (ref 94–109)
CO2 SERPL-SCNC: 27 MMOL/L (ref 20–32)
CREAT SERPL-MCNC: 0.74 MG/DL (ref 0.52–1.04)
ERYTHROCYTE [DISTWIDTH] IN BLOOD BY AUTOMATED COUNT: 12.7 % (ref 10–15)
GFR SERPL CREATININE-BSD FRML MDRD: 85 ML/MIN/1.7M2
GLUCOSE SERPL-MCNC: 100 MG/DL (ref 70–99)
HCT VFR BLD AUTO: 36.5 % (ref 35–47)
HGB BLD-MCNC: 12.3 G/DL (ref 11.7–15.7)
LIPASE SERPL-CCNC: 180 U/L (ref 73–393)
MCH RBC QN AUTO: 30.8 PG (ref 26.5–33)
MCHC RBC AUTO-ENTMCNC: 33.7 G/DL (ref 31.5–36.5)
MCV RBC AUTO: 91 FL (ref 78–100)
PLATELET # BLD AUTO: 274 10E9/L (ref 150–450)
POTASSIUM SERPL-SCNC: 3.8 MMOL/L (ref 3.4–5.3)
PROT SERPL-MCNC: 7.6 G/DL (ref 6.8–8.8)
RBC # BLD AUTO: 4 10E12/L (ref 3.8–5.2)
SODIUM SERPL-SCNC: 141 MMOL/L (ref 133–144)
WBC # BLD AUTO: 6 10E9/L (ref 4–11)

## 2018-08-06 PROCEDURE — 99214 OFFICE O/P EST MOD 30 MIN: CPT | Performed by: PHYSICIAN ASSISTANT

## 2018-08-06 PROCEDURE — 83690 ASSAY OF LIPASE: CPT | Performed by: PHYSICIAN ASSISTANT

## 2018-08-06 PROCEDURE — 82150 ASSAY OF AMYLASE: CPT | Performed by: PHYSICIAN ASSISTANT

## 2018-08-06 PROCEDURE — 85027 COMPLETE CBC AUTOMATED: CPT | Performed by: PHYSICIAN ASSISTANT

## 2018-08-06 PROCEDURE — 36415 COLL VENOUS BLD VENIPUNCTURE: CPT | Performed by: PHYSICIAN ASSISTANT

## 2018-08-06 PROCEDURE — 80053 COMPREHEN METABOLIC PANEL: CPT | Performed by: PHYSICIAN ASSISTANT

## 2018-08-06 ASSESSMENT — PAIN SCALES - GENERAL: PAINLEVEL: SEVERE PAIN (6)

## 2018-08-06 NOTE — PATIENT INSTRUCTIONS
Gallstones          What are gallstones?   Gallstones are solid particles made from bile in the gallbladder. Bile is a substance made by the liver to help you digest fats. The bile is stored in the gallbladder. The gallbladder is a small sac that lies under the liver and is part of the digestive system. Bile ducts are small tubes that drain bile from the liver into the gallbladder and small intestine. Gallstones may stay in the gallbladder or they may move into the bile ducts. If they block the outlet of the gallbladder or a duct, they can cause a lot of pain.  The formation of gallstones in the gallbladder is called cholelithiasis. Gallstones can be any size--from a grain of sand to as large as a golf ball.  How does it occur?   Bile can contain cholesterol or other substances from the breakdown of old blood cells (bilirubin). If there is too much cholesterol or bilirubin in the bile, the bile can turn into a solid form called a gallstone.   You are more likely to have gallstones if:  You are female.   You have had multiple pregnancies, are on hormone replacement therapy, or take birth control pills.   You are overweight.   You have type 2 diabetes.   You are .   You have sickle cell anemia or another disease that breaks down red blood cells.   Other members of your family have had gallstones.   You are taking drugs to lower cholesterol.   You are over 40 years old.   You lost a lot of weight in a short time (for example, more than 3 pounds a week), especially if this happened from eating a very low calorie diet.   You have been getting intravenous (IV) feedings for a long time.  What are the symptoms?   Gallstones often do not cause any symptoms and do not need treatment. When they do cause symptoms, they may include:  pain in your upper abdomen or back, or in the center of your chest after meals, especially after heavy or high-fat meals   indigestion, bloating, belching   nausea and  vomiting   fever   yellow-tinged skin (jaundice).  Biliary colic is the medical term for the pain caused by gallstones. It happens when the gallbladder tries to empty and a stone is in the way. The pain may be mild or severe. It may last a few minutes or an hour or more. You may have nausea and vomiting with the pain. The pain may spread from your chest or abdomen to your right shoulder or back.  It's possible for stones to move into the main duct and clog it, causing you to turn yellow (jaundice). The stones can also cause pancreatitis, an inflammatory reaction in the pancreas that can be life threatening. The main symptom of pancreatitis is severe pain in the middle of the upper abdomen.  How is it diagnosed?   Your healthcare provider will review your symptoms, ask about your medical history, and examine you. He or she may use the following tests:  X-rays   ultrasound scan   CT scan   nuclear gallbladder scan (called a HIDA or DISIDA scan)   blood tests.  Not all gallstones show up on regular X-rays. Ultrasound can most often show whether stones are present.   A nuclear gallbladder scan uses an injection of radioactive dye and can show whether the gallbladder is blocked and inflamed. It can also show if the gallbladder is working properly. Your provider will check to see if your symptoms happen again when the scan shows the gallbladder emptying during the test.  How is it treated?   Usually gallstones that cause symptoms are treated with surgery to remove the gallbladder. In most cases the gallbladder and stones can be removed with a laparoscope and several small cuts rather than open surgery with a large incision. A laparoscope is a thin metal tube with a light and tiny camera. Your provider can put the scope and tools into your abdominal cavity through the small cuts. Open surgery through a large incision is necessary when the gallbladder disease is more serious or there is severe infection. It may also be  necessary if you are very obese or pregnant. Removal of the gallbladder should cause few, if any, long-term problems because the digestive system can function normally without it. Some people have looser bowel movements after its removal.  In some cases, especially if you are not well enough to have surgery, other treatments may be tried. For example, if you have only a couple of very tiny stones, your healthcare provider may try to dissolve the stones with medicine. The stones may come back, so the best treatment is usually removal of the gallbladder.  If you are extremely overweight and need to follow a very low calorie diet for quick weight loss, your healthcare provider may prescribe the medicine ursodiol, which may help prevent gallstones. The weight loss medicine orlistat may also help protect against stone formation during weight loss. Ask your provider if either of these medicines would be appropriate.  How long will the effects last?   The pain caused by gallstones usually keeps coming back until the stones are removed. If the pain lasts over a few hours, you should seek care from your healthcare provider. Gallstones that are not removed can cause an infection in the gallbladder or slide into the bile duct and block bile flow. Both of these conditions need emergency care.  How can I take care of myself?   To take care of yourself during and after treatment, follow these guidelines:  Follow the treatment plan prescribed by your healthcare provider.   Follow your healthcare provider s advice about diet.   After surgery don t lift more than 10 pounds until your healthcare provider says it s OK. Take frequent short walks to help you get your strength back.  Call your healthcare provider right away if:  Your symptoms are getting worse, not better.   You have abdominal or back pain with nausea and vomiting that are not getting better with treatment.   Your skin or eyes look yellow.   You have a fever over. 100 F  (37.8 C).   You have any symptoms that worry you.  How can I help prevent gallstones?   To prevent gallstones, follow these guidelines:  Follow your healthcare provider's advice for weight control if you are overweight. You should not try to lose weight too fast because that can lead to more gallstones.   Eat a high-fiber diet that includes healthy fats.   Eat healthy foods that are high in fiber, such as whole grains, fresh fruits, and vegetables.   Avoid fasting. Long periods of fasting can cause gallstones because the bile stays in the gallbladder too long.   Lose weight if you are overweight and then keep a normal weight with a healthy diet and physical activity.

## 2018-08-06 NOTE — MR AVS SNAPSHOT
After Visit Summary   8/6/2018    Amelia Coker    MRN: [id number]           Patient Information     Date Of Birth          1975        Visit Information        Provider Department      8/6/2018 2:40 PM Otoniel Nelson PA-C [facility]        Today's Diagnoses     Abdominal pain, right upper quadrant    -  1    Abdominal pain, epigastric          Care Instructions                  Gallstones          What are gallstones?   Gallstones are solid particles made from bile in the gallbladder. Bile is a substance made by the liver to help you digest fats. The bile is stored in the gallbladder. The gallbladder is a small sac that lies under the liver and is part of the digestive system. Bile ducts are small tubes that drain bile from the liver into the gallbladder and small intestine. Gallstones may stay in the gallbladder or they may move into the bile ducts. If they block the outlet of the gallbladder or a duct, they can cause a lot of pain.  The formation of gallstones in the gallbladder is called cholelithiasis. Gallstones can be any size--from a grain of sand to as large as a golf ball.  How does it occur?   Bile can contain cholesterol or other substances from the breakdown of old blood cells (bilirubin). If there is too much cholesterol or bilirubin in the bile, the bile can turn into a solid form called a gallstone.   You are more likely to have gallstones if:  You are female.   You have had multiple pregnancies, are on hormone replacement therapy, or take birth control pills.   You are overweight.   You have type 2 diabetes.   You are .   You have sickle cell anemia or another disease that breaks down red blood cells.   Other members of your family have had gallstones.   You are taking drugs to lower cholesterol.   You are over 40 years old.   You lost a lot of weight in a short time (for example, more than 3 pounds a week), especially if this happened from eating a  very low calorie diet.   You have been getting intravenous (IV) feedings for a long time.  What are the symptoms?   Gallstones often do not cause any symptoms and do not need treatment. When they do cause symptoms, they may include:  pain in your upper abdomen or back, or in the center of your chest after meals, especially after heavy or high-fat meals   indigestion, bloating, belching   nausea and vomiting   fever   yellow-tinged skin (jaundice).  Biliary colic is the medical term for the pain caused by gallstones. It happens when the gallbladder tries to empty and a stone is in the way. The pain may be mild or severe. It may last a few minutes or an hour or more. You may have nausea and vomiting with the pain. The pain may spread from your chest or abdomen to your right shoulder or back.  It's possible for stones to move into the main duct and clog it, causing you to turn yellow (jaundice). The stones can also cause pancreatitis, an inflammatory reaction in the pancreas that can be life threatening. The main symptom of pancreatitis is severe pain in the middle of the upper abdomen.  How is it diagnosed?   Your healthcare provider will review your symptoms, ask about your medical history, and examine you. He or she may use the following tests:  X-rays   ultrasound scan   CT scan   nuclear gallbladder scan (called a HIDA or DISIDA scan)   blood tests.  Not all gallstones show up on regular X-rays. Ultrasound can most often show whether stones are present.   A nuclear gallbladder scan uses an injection of radioactive dye and can show whether the gallbladder is blocked and inflamed. It can also show if the gallbladder is working properly. Your provider will check to see if your symptoms happen again when the scan shows the gallbladder emptying during the test.  How is it treated?   Usually gallstones that cause symptoms are treated with surgery to remove the gallbladder. In most cases the gallbladder and stones can be  removed with a laparoscope and several small cuts rather than open surgery with a large incision. A laparoscope is a thin metal tube with a light and tiny camera. Your provider can put the scope and tools into your abdominal cavity through the small cuts. Open surgery through a large incision is necessary when the gallbladder disease is more serious or there is severe infection. It may also be necessary if you are very obese or pregnant. Removal of the gallbladder should cause few, if any, long-term problems because the digestive system can function normally without it. Some people have looser bowel movements after its removal.  In some cases, especially if you are not well enough to have surgery, other treatments may be tried. For example, if you have only a couple of very tiny stones, your healthcare provider may try to dissolve the stones with medicine. The stones may come back, so the best treatment is usually removal of the gallbladder.  If you are extremely overweight and need to follow a very low calorie diet for quick weight loss, your healthcare provider may prescribe the medicine ursodiol, which may help prevent gallstones. The weight loss medicine orlistat may also help protect against stone formation during weight loss. Ask your provider if either of these medicines would be appropriate.  How long will the effects last?   The pain caused by gallstones usually keeps coming back until the stones are removed. If the pain lasts over a few hours, you should seek care from your healthcare provider. Gallstones that are not removed can cause an infection in the gallbladder or slide into the bile duct and block bile flow. Both of these conditions need emergency care.  How can I take care of myself?   To take care of yourself during and after treatment, follow these guidelines:  Follow the treatment plan prescribed by your healthcare provider.   Follow your healthcare provider s advice about diet.   After surgery  don t lift more than 10 pounds until your healthcare provider says it s OK. Take frequent short walks to help you get your strength back.  Call your healthcare provider right away if:  Your symptoms are getting worse, not better.   You have abdominal or back pain with nausea and vomiting that are not getting better with treatment.   Your skin or eyes look yellow.   You have a fever over. 100 F (37.8 C).   You have any symptoms that worry you.  How can I help prevent gallstones?   To prevent gallstones, follow these guidelines:  Follow your healthcare provider's advice for weight control if you are overweight. You should not try to lose weight too fast because that can lead to more gallstones.   Eat a high-fiber diet that includes healthy fats.   Eat healthy foods that are high in fiber, such as whole grains, fresh fruits, and vegetables.   Avoid fasting. Long periods of fasting can cause gallstones because the bile stays in the gallbladder too long.   Lose weight if you are overweight and then keep a normal weight with a healthy diet and physical activity.                               Follow-ups after your visit        Follow-up notes from your care team     Return in about 2 weeks (around 8/20/2018) for recheck of current condition.      Your next 10 appointments already scheduled     Aug 07, 2018 10:30 AM CDT   US ABDOMEN COMPLETE with ERUS1   [facility]    [address] [place] [id number] [phone]           Please bring a list of your medicines (including vitamins, minerals and over-the-counter drugs). Also, tell your doctor about any allergies you may have. Wear comfortable clothes and leave your valuables at home.  Adults: No eating or drinking for 8 hours before the exam. You may take medicine with a small sip of water.  Children: - Infants, breast-fed: may have breast milk up to 2 hours before exam. - Infants, formula: may have bottle until 4  hours before exam. - Children 1-5 years: No food or drink for 4 hours before exam. - Children 6 -12 years: No food or drink for 6 hours before exam. - Children over 12 years: No food or drink for 8 hours before exam. - J Tube Fed: No need to stop feedings.  Please call the Imaging Department at your exam site with any questions.            Aug 13, 2018  1:20 PM CDT   PROCEDURE with Patel Martínez DO   [facility] ([facility]    [address] [place] [id number] [phone]              Future tests that were ordered for you today     Open Future Orders        Priority Expected Expires Ordered    US Abdomen Complete Routine  8/6/2019 8/6/2018            Who to contact     If you have questions or need follow up information about today's clinic visit or your schedule please contact [facility] directly at [phone].  Normal or non-critical lab and imaging results will be communicated to you by [email or web address], letter or phone within 4 business days after the clinic has received the results. If you do not hear from us within 7 days, please contact the clinic through [employer] or phone. If you have a critical or abnormal lab result, we will notify you by phone as soon as possible.  Submit refill requests through [employer] or call your pharmacy and they will forward the refill request to us. Please allow 3 business days for your refill to be completed.          Additional Information About Your Visit        [email or web address] Information     [employer] gives you secure access to your electronic health record. If you see a primary care provider, you can also send messages to your care team and make appointments. If you have questions, please call your primary care clinic.  If you do not have a primary care provider, please call [phone] and they will assist you.        Care EveryWhere ID     This is your Care EveryWhere ID. This could be used by other organizations to  access your [place] medical records  [id number]        Your Vitals Were     Pulse Temperature Respirations BMI (Body Mass Index)          72 99.5  F (37.5  C) (Temporal) 18 33.83 kg/m2         Blood Pressure from Last 3 Encounters:   08/06/18 120/86   07/02/18 111/73   06/07/18 134/72    Weight from Last 3 Encounters:   08/06/18 208 lb (94.3 kg)   05/22/18 204 lb (92.5 kg)   04/12/18 206 lb (93.4 kg)              We Performed the Following     Amylase     CBC with platelets     Comprehensive metabolic panel     Lipase        Primary Care Provider Office Phone # Fax #    Otoniel Nelson PA-C [phone] [id number] [facility] [address]        Equal Access to Services     CHI GARRISON : Merry cannon Soleon, waaxda luqadaha, qaybta kaalmada cheriyadulce, jade amaya . So [facility] [phone].    ATENCIÓN: Si habla español, tiene a hines disposición servicios gratuitos de asistencia lingüística. Llame al [phone].    We comply with applicable federal civil rights laws and [place] laws. We do not discriminate on the basis of race, color, national origin, age, disability, sex, sexual orientation, or gender identity.            Thank you!     Thank you for choosing [facility]  for your care. Our goal is always to provide you with excellent care. Hearing back from our patients is one way we can continue to improve our services. Please take a few minutes to complete the written survey that you may receive in the mail after your visit with us. Thank you!             Your Updated Medication List - Protect others around you: Learn how to safely use, store and throw away your medicines at www.disposemymeds.org.          This list is accurate as of 8/6/18  3:08 PM.  Always use your most recent med list.                   Brand Name Dispense Instructions for use Diagnosis    acyclovir 5 % ointment    ZOVIRAX    15 g    Apply topically 6 times daily     Recurrent cold sores       ALLEGRA 180 MG tablet   Generic drug:  fexofenadine      Take 1 tablet by mouth daily Reported on 4/5/2017    FLORIDALMA positive, Family history of lupus erythematosus, Lumbar radiculopathy, Fatigue, Dry eyes, bilateral, Keratitis sicca, Foraminal stenosis of lumbar region, Ds DNA antibody positive       Coal Tar Extract 4 % Sham     168 mL    Externally apply 1 Dose topically daily    Hair loss, Scalp lesion       diazepam 5 MG tablet    VALIUM    90 tablet    Take 1 tablet (5 mg) by mouth 3 times daily as needed for muscle spasms    Spasm of muscle       diclofenac 1 % Gel topical gel    VOLTAREN    100 g    Apply 2 grams to heels up to four times daily using enclosed dosing card.    Plantar fascial fibromatosis       FRESHKOTE 2.7-2 % Soln   Generic drug:  Polyvinyl Alcohol-Povidone      Apply  to eye.        hydrocortisone 0.2 % cream    WESTCORT    60 g    Apply sparingly to affected area three times daily as needed.    Telangiectasia of face       ibuprofen 800 MG tablet    ADVIL/MOTRIN    100 tablet    TAKE ONE TABLET BY MOUTH EVERY 8 HOURS AS NEEDED FOR PAIN    Cervicalgia, Muscle spasm       ketoconazole 2 % cream    NIZORAL    30 g    Apply topically 2 times daily    Ringworm of body       lidocaine-prilocaine cream    EMLA    30 g    Apply topically as needed for moderate pain    Intractable right heel pain       meloxicam 15 MG tablet    MOBIC    90 tablet    TAKE ONE TABLET BY MOUTH EVERY DAY (PATIENT WANTS UNICHEM BRAND ONLY)    Cervicalgia, Muscle spasm       methylphenidate 10 MG tablet    RITALIN    120 tablet    Take 1-2 tablets (10-20 mg) by mouth 2 times daily    Chronic fatigue       mupirocin 2 % ointment    BACTROBAN    22 g    Apply topically 3 times daily    MRSA (methicillin resistant staph aureus) culture positive, Skin lesion       nystatin-triamcinolone cream    MYCOLOG II    15 g    Apply topically 2 times daily    Atopic dermatitis       polyethylene glycol 0.4%-  propylene glycol 0.3% 0.4-0.3 % Soln ophthalmic solution    SYSTANE ULTRA     Place 1 drop into both eyes every hour as needed for dry eyes        rizatriptan 5 MG ODT tab    MAXALT-MLT    18 tablet    Take 1-2 tablets (5-10 mg) by mouth at onset of headache for migraine May repeat dose in 2 hours.  Do not exceed 30 mg in 24 hours    Migraine without aura and without status migrainosus, not intractable       sucralfate 1 GM tablet    CARAFATE    120 tablet    TAKE ONE TABLET BY MOUTH FOUR TIMES A DAY    Gastroesophageal reflux disease, esophagitis presence not specified       SUMAtriptan 100 MG tablet    IMITREX    27 tablet    Take 1 tablet by mouth See Admin Instructions. WITH ONSET OF MIGRAINE, MAY REPEAT ONCE AFTER 2 HOURS. DO NOT EXCEED 2 TABLETS IN 24 HOURS.    Migraine, unspecified, with intractable migraine, so stated, without mention of status migrainosus       TYLENOL PO      Take 500 mg by mouth every 6 hours as needed        XIIDRA 5 % opthalmic solution   Generic drug:  lifitegrast

## 2018-08-06 NOTE — PROGRESS NOTES
SUBJECTIVE:   Amelia Coker is a 43 year old female who presents to clinic today for the following health issues:      HPI  ABDOMINAL PAIN     Onset: 2 weeks    Description:   Character: Gnawing, pressure  Location: epigastric region  Radiation: Back    Intensity: mild, severe    Progression of Symptoms:  intermittent    Accompanying Signs & Symptoms:  Fever/Chills?: YES- Low grade  Gas/Bloating: YES- feelings of being bloated  Nausea: YES  Vomitting: no   Diarrhea?: YES  Constipation:no   Dysuria or Hematuria: no    History:   Trauma: no   Previous similar pain: YES- Intermittent for a few days.   Previous tests done: none    Precipitating factors:   Does the pain change with:     Food: YES     BM: no     Urination: no     Alleviating factors:  Positioning    Therapies Tried and outcome: stretching, positioning,     LMP:  not applicable     Problem list and histories reviewed & adjusted, as indicated.  Additional history: Family history is significant for gastric cancers of multiple different types.  With her overall presentation and history of the last couple weeks I am most concerned with gallbladder dysfunction however cannot deny that this could be something other than this as well.  Further evaluation is definitely needed.    Patient Active Problem List   Diagnosis     Intractable migraine     Bell's palsy     Contact dermatitis and other eczema, due to unspecified cause     Allergic rhinitis due to other allergen     Scalp lesion     Lyme disease     PAC (premature atrial contraction)     Palpitations     Raynaud phenomenon     Cold intolerance of hand     Chronic fatigue     Hair loss     Pain in joint     Abnormal PFT     Shoulder joint instability     Family history of melanoma     MRSA (methicillin resistant staph aureus) culture positive     Dry eyes     Keratitis sicca     Laceration of foot, right     Family history of colon cancer     Pain in joint, upper arm     Traumatic amputation of fingertip      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     FLORIDALMA positive     Plantar fascial fibromatosis     Foraminal stenosis of lumbar region     DJD (degenerative joint disease), lumbar     Left shoulder pain     Migraine without aura and without status migrainosus, not intractable     Lumbar radiculopathy     Ds DNA antibody positive     Muscular wasting and disuse atrophy, not elsewhere classified     Other postprocedural status(V45.89)     Facial contusion     Frontal headache     Telangiectasia of face     Lateral epicondylitis     Right shoulder pain     Plantar fasciitis     HTN, goal below 140/90     Tooth pain     MRSA infection     Skin lesion     AD (atopic dermatitis)     Heat sensitivity     Rotator cuff arthropathy, right     Contusion of left foot, initial encounter     Gastroesophageal reflux disease, esophagitis presence not specified     Abnormal mammogram     Ringworm of body     Fall from horse, initial encounter     Sternocleidomastoid muscle tenderness     Acute cervical myofascial strain, initial encounter     Spasm of muscle     Hyperlipidemia LDL goal <130     Biceps tendinopathy, right     Superficial swelling of scalp     Intractable right heel pain     Past Surgical History:   Procedure Laterality Date     COLONOSCOPY  3/11/2013    Procedure: COLONOSCOPY;  colonoscopy;  Surgeon: Lobito Mas MD;  Location: [facility]     DECOMPRESSION LUMBAR TWO LEVELS Bilateral 12/8/2016    Procedure: DECOMPRESSION LUMBAR TWO LEVELS;  Surgeon: Bang Burrell MD;  Location: [facility]     ESOPHAGOSCOPY, GASTROSCOPY, DUODENOSCOPY (EGD), COMBINED  11/8/2013    Procedure: COMBINED ESOPHAGOSCOPY, GASTROSCOPY, DUODENOSCOPY (EGD), BIOPSY SINGLE OR MULTIPLE;  Esophagoscopy, Gastroscopy, Duodenoscopy EGD with multiple biopsies;  Surgeon: Teja Koch MD;  Location: [facility]     HC REMOVAL OF TONSILS,<11 Y/O      Tonsils <10y.o.     HC TOOTH EXTRACTION W/FORCEP       REPAIR TENDON  ELBOW  2014    Procedure: REPAIR TENDON ELBOW;  Surgeon: Chris Johnston MD;  Location: [facility]       Social History   Substance Use Topics     Smoking status: Never Smoker     Smokeless tobacco: Never Used     Alcohol use No      Comment: social     Family History   Problem Relation Age of Onset     Diabetes Mother      adult     Cancer - colorectal Mother      Hypertension Mother      Allergies Mother      Cancer Mother      colon     Depression Mother      GASTROINTESTINAL DISEASE Mother      ibs     Genitourinary Problems Mother      kidney cyst     Gynecology Mother      endometriosis     Lipids Mother      Thyroid Disease Mother      Arthritis Mother      RA     HEART DISEASE Maternal Grandfather      MI,  - 60's     Allergies Father      Unknown/Adopted Father      HEART DISEASE Father      mi-age mid 40's     Cardiovascular Father      Lipids Father      Respiratory Father      asthma     Cancer Father      Other Cancer Father      renal cancer     Depression Sister      Allergies Sister      Gynecology Sister      endometriosis     Lipids Paternal Grandmother      Osteoperosis Paternal Grandmother      Thyroid Disease Paternal Grandmother      Respiratory Son      asthma     Allergies Son      Arthritis Sister      Allergies Brother      HEART DISEASE Brother      Allergies Daughter      Blood Disease Paternal Aunt      LGL T cell Leukemia     Rheumatoid Arthritis Paternal Aunt      KIDNEY DISEASE Maternal Aunt      Lupus Maternal Aunt      Bladder Cancer Maternal Aunt          Current Outpatient Prescriptions   Medication Sig Dispense Refill     Acetaminophen (TYLENOL PO) Take 500 mg by mouth every 6 hours as needed        acyclovir (ZOVIRAX) 5 % ointment Apply topically 6 times daily 15 g 3     Coal Tar Extract 4 % SHAM Externally apply 1 Dose topically daily 168 mL 1     diazepam (VALIUM) 5 MG tablet Take 1 tablet (5 mg) by mouth 3 times daily as needed for muscle spasms 90 tablet 0      diclofenac (VOLTAREN) 1 % GEL topical gel Apply 2 grams to heels up to four times daily using enclosed dosing card. 100 g 1     fexofenadine (ALLEGRA) 180 MG tablet Take 1 tablet by mouth daily Reported on 4/5/2017       hydrocortisone (WESTCORT) 0.2 % cream Apply sparingly to affected area three times daily as needed. 60 g 0     ibuprofen (ADVIL/MOTRIN) 800 MG tablet TAKE ONE TABLET BY MOUTH EVERY 8 HOURS AS NEEDED FOR PAIN 100 tablet 3     ketoconazole (NIZORAL) 2 % cream Apply topically 2 times daily 30 g 1     lidocaine-prilocaine (EMLA) cream Apply topically as needed for moderate pain 30 g 1     meloxicam (MOBIC) 15 MG tablet TAKE ONE TABLET BY MOUTH EVERY DAY (PATIENT WANTS UNICHEM BRAND ONLY) 90 tablet 1     methylphenidate (RITALIN) 10 MG tablet Take 1-2 tablets (10-20 mg) by mouth 2 times daily 120 tablet 0     mupirocin (BACTROBAN) 2 % ointment Apply topically 3 times daily 22 g 1     nystatin-triamcinolone (MYCOLOG II) cream Apply topically 2 times daily 15 g 0     polyethylene glycol 0.4%- propylene glycol 0.3% (SYSTANE ULTRA) 0.4-0.3 % SOLN ophthalmic solution Place 1 drop into both eyes every hour as needed for dry eyes       rizatriptan (MAXALT-MLT) 5 MG ODT tab Take 1-2 tablets (5-10 mg) by mouth at onset of headache for migraine May repeat dose in 2 hours.  Do not exceed 30 mg in 24 hours 18 tablet 3     sucralfate (CARAFATE) 1 GM tablet TAKE ONE TABLET BY MOUTH FOUR TIMES A  tablet 0     SUMAtriptan (IMITREX) 100 MG tablet Take 1 tablet by mouth See Admin Instructions. WITH ONSET OF MIGRAINE, MAY REPEAT ONCE AFTER 2 HOURS. DO NOT EXCEED 2 TABLETS IN 24 HOURS. 27 tablet prn     Lifitegrast (XIIDRA) 5 % SOLN        POLYVIN ALC-POVIDON DIMETHYLAM (FRESHKOTE) 2.7-2 % SOLN Apply  to eye.       Allergies   Allergen Reactions     Ceftriaxone Anaphylaxis     Hives, rash, racing heart beat     Bactrim [Sulfamethoxazole W/Trimethoprim] Hives     Ciprofloxacin Other (See Comments)     Tendon Issues      Codeine Nausea and Vomiting     Copper      Rash       Doxycycline      Loss of skin pigmentation, skin loss.     Gold      Rash       Iodine-131 Hives     Latex      Levaquin [Levofloxacin] Other (See Comments)     Tendon issues with levaquin and cipro     Nickel      rash     Steel [Staples]      Rash from staples       Penicillins Rash     Recent Labs   Lab Test  03/27/18   0733  08/30/17   0757  12/23/16   0801   09/15/16   1123  06/08/16   0745   12/02/15   0758   06/09/14   1335   A1C   --    --    --    --    --   5.5   --    --    --    --    LDL   --   77   --    --    --    --    --   108*   --   132*   HDL   --   72   --    --    --    --    --   78   --   56   TRIG   --   50   --    --    --    --    --   52   --   62   ALT   --   22  43   --    --    --    --   64*   < >  34   CR   --   0.66  0.59   < >   --   0.54   < >  0.60   < >  0.67   GFRESTIMATED   --   >90  >90  Non African American GFR Calc     < >   --   >90  Non  GFR Calc     < >  >90  Non  GFR Calc     < >  >90   GFRESTBLACK   --   >90  >90  African American GFR Calc     < >   --   >90   GFR Calc     < >  >90   GFR Calc     < >  >90   POTASSIUM   --   4.2  4.1   < >   --   4.2   < >  4.0   < >  4.1   TSH  1.23   --    --    --   0.84   --    --    --    < >  1.51    < > = values in this interval not displayed.      BP Readings from Last 3 Encounters:   08/06/18 120/86   07/02/18 111/73   06/07/18 134/72    Wt Readings from Last 3 Encounters:   08/06/18 208 lb (94.3 kg)   05/22/18 204 lb (92.5 kg)   04/12/18 206 lb (93.4 kg)                    ROS:  Constitutional, HEENT, cardiovascular, pulmonary, gi and gu systems are negative, except as otherwise noted.    OBJECTIVE:     /86 (Cuff Size: Adult Large)  Pulse 72  Temp 99.5  F (37.5  C) (Temporal)  Resp 18  Wt 208 lb (94.3 kg)  BMI 33.83 kg/m2  Body mass index is 33.83 kg/(m^2).  GENERAL: healthy, alert and no  distress  RESP: lungs clear to auscultation - no rales, rhonchi or wheezes  CV: regular rate and rhythm, normal S1 S2, no S3 or S4, no murmur, click or rub, no peripheral edema and peripheral pulses strong  ABDOMEN: tenderness epigastric and RUQ and bowel sounds normal  MS: no gross musculoskeletal defects noted, no edema  NEURO: Normal strength and tone, mentation intact and speech normal  PSYCH: mentation appears normal, affect normal/bright    Diagnostic Test Results:  No results found. However, due to the size of the patient record, not all encounters were searched. Please check Results Review for a complete set of results.  X-ray: Discussed further evaluation with ultrasound is a place to start for her right upper quadrant/epigastric type pain.    ASSESSMENT/PLAN:     1. Abdominal pain, right upper quadrant  2. Abdominal pain, epigastric  Suspected gallbladder dysfunction at this point in time but with family history is significant as hers we may need to do more than just lab work and ultrasound.  Would suspect his ultrasound is negative she should have a HIDA scan done.  If HIDA scan is negative as well then CT scan of the abdomen would be the best route to continue to pursue this.  She may be due for colonoscopy in the very near future as well.  - CBC with platelets  - Comprehensive metabolic panel  - Lipase  - Amylase  - US Abdomen Complete; Future    ROV 2-3 weeks for this as needed based on results and approach.    Otoniel Manuel PA-C  [facility]

## 2018-08-07 ENCOUNTER — RADIANT APPOINTMENT (OUTPATIENT)
Dept: ULTRASOUND IMAGING | Facility: OTHER | Age: 43
End: 2018-08-07
Attending: PHYSICIAN ASSISTANT
Payer: COMMERCIAL

## 2018-08-07 DIAGNOSIS — Z53.9 ERRONEOUS ENCOUNTER--DISREGARD: Primary | ICD-10-CM

## 2018-08-07 DIAGNOSIS — R10.13 ABDOMINAL PAIN, EPIGASTRIC: ICD-10-CM

## 2018-08-07 DIAGNOSIS — R10.11 ABDOMINAL PAIN, RIGHT UPPER QUADRANT: Primary | ICD-10-CM

## 2018-08-07 DIAGNOSIS — R10.11 ABDOMINAL PAIN, RIGHT UPPER QUADRANT: ICD-10-CM

## 2018-08-07 PROCEDURE — 76705 ECHO EXAM OF ABDOMEN: CPT

## 2018-08-07 NOTE — MR AVS SNAPSHOT
After Visit Summary   8/7/2018    Amelia Coker    MRN: [id number]           Patient Information     Date Of Birth          1975        Visit Information        Provider Department      8/7/2018 10:30 AM [address]        Today's Diagnoses     ERRONEOUS ENCOUNTER--DISREGARD    -  1    Abdominal pain, right upper quadrant        Abdominal pain, epigastric           Follow-ups after your visit        Your next 10 appointments already scheduled     Aug 16, 2018  7:40 AM CDT   PROCEDURE with Patel Martínez DO   [facility]    [address] [place] [id number] [phone]              Future tests that were ordered for you today     Open Future Orders        Priority Expected Expires Ordered    NM Hepatobiliary Scan w GB EF Routine  8/7/2019 8/7/2018            Who to contact     If you have questions or need follow up information about today's clinic visit or your schedule please contact [facility] directly at [phone].  Normal or non-critical lab and imaging results will be communicated to you by [facility], letter or phone within 4 business days after the clinic has received the results. If you do not hear from us within 7 days, please contact the clinic through [employer] or phone. If you have a critical or abnormal lab result, we will notify you by phone as soon as possible.  Submit refill requests through [employer] or call your pharmacy and they will forward the refill request to us. Please allow 3 business days for your refill to be completed.          Additional Information About Your Visit        [facility] Information     [employer] gives you secure access to your electronic health record. If you see a primary care provider, you can also send messages to your care team and make appointments. If you have questions, please call your primary care clinic.  If you do not have a primary care provider,  please call [phone] and they will assist you.        Care EveryWhere ID     This is your Care EveryWhere ID. This could be used by other organizations to access your [facility] medical records  [id number]         Blood Pressure from Last 3 Encounters:   08/06/18 120/86   07/02/18 111/73   06/07/18 134/72    Weight from Last 3 Encounters:   08/06/18 208 lb (94.3 kg)   05/22/18 204 lb (92.5 kg)   04/12/18 206 lb (93.4 kg)              We Performed the Following     [facility]        Primary Care Provider Office Phone # Fax #    Otoniel Nelson PA-C [phone] [address] [facility] [address]        Equal Access to Services     CHI GARRISON : Hadii aad ku hadasho Soomaali, waaxda luqadaha, qaybta kaalmada adeegyada, jade amaya . So [facility] [phone].    ATENCIÓN: Si habla español, tiene a hines disposición servicios gratuitos de asistencia lingüística. Llame al [phone].    We comply with applicable federal civil rights laws and [place] laws. We do not discriminate on the basis of race, color, national origin, age, disability, sex, sexual orientation, or gender identity.            Thank you!     Thank you for choosing [facility]  for your care. Our goal is always to provide you with excellent care. Hearing back from our patients is one way we can continue to improve our services. Please take a few minutes to complete the written survey that you may receive in the mail after your visit with us. Thank you!             Your Updated Medication List - Protect others around you: Learn how to safely use, store and throw away your medicines at www.disposemymeds.org.          This list is accurate as of 8/7/18 11:59 PM.  Always use your most recent med list.                   Brand Name Dispense Instructions for use Diagnosis    acyclovir 5 % ointment    ZOVIRAX    15 g    Apply topically 6 times daily    Recurrent cold sores       ALLEGRA  180 MG tablet   Generic drug:  fexofenadine      Take 1 tablet by mouth daily Reported on 4/5/2017    FLORIDALMA positive, Family history of lupus erythematosus, Lumbar radiculopathy, Fatigue, Dry eyes, bilateral, Keratitis sicca, Foraminal stenosis of lumbar region, Ds DNA antibody positive       Coal Tar Extract 4 % Sham     168 mL    Externally apply 1 Dose topically daily    Hair loss, Scalp lesion       diazepam 5 MG tablet    VALIUM    90 tablet    Take 1 tablet (5 mg) by mouth 3 times daily as needed for muscle spasms    Spasm of muscle       diclofenac 1 % Gel topical gel    VOLTAREN    100 g    Apply 2 grams to heels up to four times daily using enclosed dosing card.    Plantar fascial fibromatosis       FRESHKOTE 2.7-2 % Soln   Generic drug:  Polyvinyl Alcohol-Povidone      Apply  to eye.        hydrocortisone 0.2 % cream    WESTCORT    60 g    Apply sparingly to affected area three times daily as needed.    Telangiectasia of face       ibuprofen 800 MG tablet    ADVIL/MOTRIN    100 tablet    TAKE ONE TABLET BY MOUTH EVERY 8 HOURS AS NEEDED FOR PAIN    Cervicalgia, Muscle spasm       ketoconazole 2 % cream    NIZORAL    30 g    Apply topically 2 times daily    Ringworm of body       lidocaine-prilocaine cream    EMLA    30 g    Apply topically as needed for moderate pain    Intractable right heel pain       meloxicam 15 MG tablet    MOBIC    90 tablet    TAKE ONE TABLET BY MOUTH EVERY DAY (PATIENT WANTS UNICHEM BRAND ONLY)    Cervicalgia, Muscle spasm       methylphenidate 10 MG tablet    RITALIN    120 tablet    Take 1-2 tablets (10-20 mg) by mouth 2 times daily    Chronic fatigue       mupirocin 2 % ointment    BACTROBAN    22 g    Apply topically 3 times daily    MRSA (methicillin resistant staph aureus) culture positive, Skin lesion       nystatin-triamcinolone cream    MYCOLOG II    15 g    Apply topically 2 times daily    Atopic dermatitis       polyethylene glycol 0.4%- propylene glycol 0.3% 0.4-0.3 % Soln  ophthalmic solution    SYSTANE ULTRA     Place 1 drop into both eyes every hour as needed for dry eyes        rizatriptan 5 MG ODT tab    MAXALT-MLT    18 tablet    Take 1-2 tablets (5-10 mg) by mouth at onset of headache for migraine May repeat dose in 2 hours.  Do not exceed 30 mg in 24 hours    Migraine without aura and without status migrainosus, not intractable       sucralfate 1 GM tablet    CARAFATE    120 tablet    TAKE ONE TABLET BY MOUTH FOUR TIMES A DAY    Gastroesophageal reflux disease, esophagitis presence not specified       SUMAtriptan 100 MG tablet    IMITREX    27 tablet    Take 1 tablet by mouth See Admin Instructions. WITH ONSET OF MIGRAINE, MAY REPEAT ONCE AFTER 2 HOURS. DO NOT EXCEED 2 TABLETS IN 24 HOURS.    Migraine, unspecified, with intractable migraine, so stated, without mention of status migrainosus       TYLENOL PO      Take 500 mg by mouth every 6 hours as needed        XIIDRA 5 % opthalmic solution   Generic drug:  lifitegrast

## 2018-08-08 ENCOUNTER — TELEPHONE (OUTPATIENT)
Dept: FAMILY MEDICINE | Facility: OTHER | Age: 43
End: 2018-08-08

## 2018-08-16 ENCOUNTER — OFFICE VISIT (OUTPATIENT)
Dept: ORTHOPEDICS | Facility: CLINIC | Age: 43
End: 2018-08-16
Payer: COMMERCIAL

## 2018-08-16 VITALS — OXYGEN SATURATION: 98 % | SYSTOLIC BLOOD PRESSURE: 118 MMHG | HEART RATE: 79 BPM | DIASTOLIC BLOOD PRESSURE: 60 MMHG

## 2018-08-16 DIAGNOSIS — M72.2 PLANTAR FASCIITIS OF RIGHT FOOT: Primary | ICD-10-CM

## 2018-08-16 PROCEDURE — 0232T NJX PLATELET PLASMA: CPT | Mod: RT | Performed by: FAMILY MEDICINE

## 2018-08-16 NOTE — NURSING NOTE
Amelia Coker's chief complaint for this visit includes:  Chief Complaint   Patient presents with     RECHECK     PRP injection right foot     PCP: Otoniel Nelson    Referring Provider:  No referring provider defined for this encounter.    /60  Pulse 79  SpO2 98%  Data Unavailable     Do you need any medication refills at today's visit? No

## 2018-08-16 NOTE — PATIENT INSTRUCTIONS
Thanks for coming today.  [facility]  [address] [place] [address]    To schedule future appointments in Ortho Clinic, you may call [phone].    To schedule ordered imaging by your provider:   Call Central [facility] [phone]    To schedule an injection ordered by your provider:  Call [facility] Injection scheduling line: [phone]  [facility] available online at:  [email or web address]    Please call if any further questions or concerns ([phone]).  Clinic hours 8 am to 5 pm.    Return to clinic (call) if symptoms worsen or fail to improve.

## 2018-08-16 NOTE — PROGRESS NOTES
Platelet Rich Plasma Injection - Plantar Fascia  The patient was informed of the risks and the benefits of the procedure and a written consent was signed.  The patient s right heel was prepped with chlorhexidine in sterile fashion.   10 cc of blood was drawn from the patient's antecubital fossa by our in-house  in to a dual lumen 10 cc syringe. Syringe was inserted into the CTSpace centrifuge with appropriate counter balancing. Centrifuge was set at 1500 RPMs for 5 minutes. Syringe was extracted and the PRP was  from the red cells manually.  Injection was performed using sterile technique.  Under ultrasound guidance a 1.5-inch 22-gauge needle was used to fenestrate the plantar fascia.  Multiple fenestrations, approximately 20, were made through the tendon at the bony attachment.  PRP was injected without difficulty.  Needle placement and injection were visualized and documented with ultrasound.  Ultrasound visualization was necessary to visualize fenestration and confirm injectate placement.  Images were permanently stored for the patient's record.  There were no complications. The patient tolerated the procedure well. There was negligible bleeding.   The patient was instructed to avoid NSAIDS for the next week and refrain from overuse over the next 3 days.   The patient was instructed to call or go to the emergency room with any unusual pain, swelling, redness, or if otherwise concerned.      DO SERGE Subramanian

## 2018-08-16 NOTE — LETTER
8/16/2018         RE: Amelia Coker  [address] [place] [id number]        Dear Colleague,    Thank you for referring your patient, Amelia Coker, to the [facility]. Please see a copy of my visit note below.    Platelet Rich Plasma Injection - Plantar Fascia  The patient was informed of the risks and the benefits of the procedure and a written consent was signed.  The patient s right heel was prepped with chlorhexidine in sterile fashion.   10 cc of blood was drawn from the patient's antecubital fossa by our in-house  in to a dual lumen 10 cc syringe. Syringe was inserted into the [email or web address] centrifuge with appropriate counter balancing. Centrifuge was set at 1500 RPMs for 5 minutes. Syringe was extracted and the PRP was  from the red cells manually.  Injection was performed using sterile technique.  Under ultrasound guidance a 1.5-inch 22-gauge needle was used to fenestrate the plantar fascia.  Multiple fenestrations, approximately 20, were made through the tendon at the bony attachment.  PRP was injected without difficulty.  Needle placement and injection were visualized and documented with ultrasound.  Ultrasound visualization was necessary to visualize fenestration and confirm injectate placement.  Images were permanently stored for the patient's record.  There were no complications. The patient tolerated the procedure well. There was negligible bleeding.   The patient was instructed to avoid NSAIDS for the next week and refrain from overuse over the next 3 days.   The patient was instructed to call or go to the emergency room with any unusual pain, swelling, redness, or if otherwise concerned.      Prakash Martínez DO [facility]      Again, thank you for allowing me to participate in the care of your patient.        Sincerely,        Patel Martínez DO

## 2018-08-16 NOTE — MR AVS SNAPSHOT
After Visit Summary   2018    Amelia Coker    MRN: [id number]           Patient Information     Date Of Birth          1975        Visit Information        Provider Department      2018 7:40 AM Patel Martínez, DO [facility]        Today's Diagnoses     Plantar fasciitis of right foot    -  1      Care Instructions    Thanks for coming today.  Ortho/Sports Medicine Clinic  [address] [place] [address]    To schedule future appointments in Ortho Clinic, you may call [phone].    To schedule ordered imaging by your provider:   Call Central Imaging [phone]    To schedule an injection ordered by your provider:  Call Central Imaging Injection scheduling line: [phone]  Scancell available online at:  [email or web address]    Please call if any further questions or concerns ([phone]).  Clinic hours 8 am to 5 pm.    Return to clinic (call) if symptoms worsen or fail to improve.            Follow-ups after your visit        Who to contact     If you have questions or need follow up information about today's clinic visit or your schedule please contact [facility] directly at [phone].  Normal or non-critical lab and imaging results will be communicated to you by MyChart, letter or phone within 4 business days after the clinic has received the results. If you do not hear from us within 7 days, please contact the clinic through Safeguard Interactivehart or phone. If you have a critical or abnormal lab result, we will notify you by phone as soon as possible.  Submit refill requests through Scancell or call your pharmacy and they will forward the refill request to us. Please allow 3 business days for your refill to be completed.          Additional Information About Your Visit        MyChart Information     Scancell gives you secure access to your electronic health record. If you see a primary care provider, you can also send  messages to your care team and make appointments. If you have questions, please call your primary care clinic.  If you do not have a primary care provider, please call [phone] and they will assist you.      Zephyr is an electronic gateway that provides easy, online access to your medical records. With Zephyr, you can request a clinic appointment, read your test results, renew a prescription or communicate with your care team.     To access your existing account, please contact your [facility] or call [phone] for assistance.        Care EveryWhere ID     This is your Care EveryWhere ID. This could be used by other organizations to access your [facility] medical records  [id number]        Your Vitals Were     Pulse Pulse Oximetry                79 98%           Blood Pressure from Last 3 Encounters:   08/16/18 118/60   08/06/18 120/86   07/02/18 111/73    Weight from Last 3 Encounters:   08/06/18 94.3 kg (208 lb)   05/22/18 92.5 kg (204 lb)   04/12/18 93.4 kg (206 lb)              We Performed the Following     HC INJ(S), PLATELET RICH PLASMA W ARTHREX CENTRIFUGE        Primary Care Provider Office Phone # Fax #    Otoniel Nelson PA-C [phone] [address] [facility] [address]        Equal Access to Services     CHI GARRISON AH: Hadii aad ku hadasho Soomaali, waaxda luqadaha, qaybta kaalmada adeegyada, waxay idiin haymartan cheri amaya . So [facility] [phone].    ATENCIÓN: Si habla español, tiene a hines disposición servicios gratuitos de asistencia lingüística. Llame al [phone].    We comply with applicable federal civil rights laws and [place] laws. We do not discriminate on the basis of race, color, national origin, age, disability, sex, sexual orientation, or gender identity.            Thank you!     Thank you for choosing [facility]  for your care. Our goal is always to provide you with excellent care. Hearing back  from our patients is one way we can continue to improve our services. Please take a few minutes to complete the written survey that you may receive in the mail after your visit with us. Thank you!             Your Updated Medication List - Protect others around you: Learn how to safely use, store and throw away your medicines at www.disposemymeds.org.          This list is accurate as of 8/16/18  9:18 AM.  Always use your most recent med list.                   Brand Name Dispense Instructions for use Diagnosis    acyclovir 5 % ointment    ZOVIRAX    15 g    Apply topically 6 times daily    Recurrent cold sores       ALLEGRA 180 MG tablet   Generic drug:  fexofenadine      Take 1 tablet by mouth daily Reported on 4/5/2017    FLORIDALMA positive, Family history of lupus erythematosus, Lumbar radiculopathy, Fatigue, Dry eyes, bilateral, Keratitis sicca, Foraminal stenosis of lumbar region, Ds DNA antibody positive       Coal Tar Extract 4 % Sham     168 mL    Externally apply 1 Dose topically daily    Hair loss, Scalp lesion       diazepam 5 MG tablet    VALIUM    90 tablet    Take 1 tablet (5 mg) by mouth 3 times daily as needed for muscle spasms    Spasm of muscle       diclofenac 1 % Gel topical gel    VOLTAREN    100 g    Apply 2 grams to heels up to four times daily using enclosed dosing card.    Plantar fascial fibromatosis       FRESHKOTE 2.7-2 % Soln   Generic drug:  Polyvinyl Alcohol-Povidone      Apply  to eye.        hydrocortisone 0.2 % cream    WESTCORT    60 g    Apply sparingly to affected area three times daily as needed.    Telangiectasia of face       ibuprofen 800 MG tablet    ADVIL/MOTRIN    100 tablet    TAKE ONE TABLET BY MOUTH EVERY 8 HOURS AS NEEDED FOR PAIN    Cervicalgia, Muscle spasm       ketoconazole 2 % cream    NIZORAL    30 g    Apply topically 2 times daily    Ringworm of body       lidocaine-prilocaine cream    EMLA    30 g    Apply topically as needed for moderate pain    Intractable right  heel pain       meloxicam 15 MG tablet    MOBIC    90 tablet    TAKE ONE TABLET BY MOUTH EVERY DAY (PATIENT WANTS UNICHEM BRAND ONLY)    Cervicalgia, Muscle spasm       methylphenidate 10 MG tablet    RITALIN    120 tablet    Take 1-2 tablets (10-20 mg) by mouth 2 times daily    Chronic fatigue       mupirocin 2 % ointment    BACTROBAN    22 g    Apply topically 3 times daily    MRSA (methicillin resistant staph aureus) culture positive, Skin lesion       nystatin-triamcinolone cream    MYCOLOG II    15 g    Apply topically 2 times daily    Atopic dermatitis       polyethylene glycol 0.4%- propylene glycol 0.3% 0.4-0.3 % Soln ophthalmic solution    SYSTANE ULTRA     Place 1 drop into both eyes every hour as needed for dry eyes        rizatriptan 5 MG ODT tab    MAXALT-MLT    18 tablet    Take 1-2 tablets (5-10 mg) by mouth at onset of headache for migraine May repeat dose in 2 hours.  Do not exceed 30 mg in 24 hours    Migraine without aura and without status migrainosus, not intractable       sucralfate 1 GM tablet    CARAFATE    120 tablet    TAKE ONE TABLET BY MOUTH FOUR TIMES A DAY    Gastroesophageal reflux disease, esophagitis presence not specified       SUMAtriptan 100 MG tablet    IMITREX    27 tablet    Take 1 tablet by mouth See Admin Instructions. WITH ONSET OF MIGRAINE, MAY REPEAT ONCE AFTER 2 HOURS. DO NOT EXCEED 2 TABLETS IN 24 HOURS.    Migraine, unspecified, with intractable migraine, so stated, without mention of status migrainosus       TYLENOL PO      Take 500 mg by mouth every 6 hours as needed        XIIDRA 5 % opthalmic solution   Generic drug:  lifitegrast

## 2018-08-24 ENCOUNTER — MYC MEDICAL ADVICE (OUTPATIENT)
Dept: FAMILY MEDICINE | Facility: OTHER | Age: 43
End: 2018-08-24

## 2018-08-24 DIAGNOSIS — I83.899 RUPTURED VARICOSE VEIN: Primary | ICD-10-CM

## 2018-08-24 NOTE — TELEPHONE ENCOUNTER
Otoniel - anything else you would add?    Responded via [employer] message.    Bobby Porter RN, BSN

## 2018-08-30 ENCOUNTER — OFFICE VISIT (OUTPATIENT)
Dept: ORTHOPEDICS | Facility: CLINIC | Age: 43
End: 2018-08-30
Payer: COMMERCIAL

## 2018-08-30 VITALS — HEART RATE: 71 BPM | SYSTOLIC BLOOD PRESSURE: 112 MMHG | OXYGEN SATURATION: 98 % | DIASTOLIC BLOOD PRESSURE: 68 MMHG

## 2018-08-30 DIAGNOSIS — M72.2 PLANTAR FASCIITIS OF RIGHT FOOT: Primary | ICD-10-CM

## 2018-08-30 PROCEDURE — 99213 OFFICE O/P EST LOW 20 MIN: CPT | Performed by: FAMILY MEDICINE

## 2018-08-30 ASSESSMENT — PAIN SCALES - GENERAL: PAINLEVEL: SEVERE PAIN (7)

## 2018-08-30 NOTE — MR AVS SNAPSHOT
After Visit Summary   2018    Amelia Coker    MRN: [id number]           Patient Information     Date Of Birth          1975        Visit Information        Provider Department      2018 7:40 AM Patel Martínez,  [facility]        Today's Diagnoses     Plantar fasciitis of right foot    -  1      Care Instructions    Thanks for coming today.  Ortho/Sports Medicine Clinic  [address] [place] [address]    To schedule future appointments in Ortho Clinic, you may call [phone].    To schedule ordered imaging by your provider:   Call Central Imaging [phone]    To schedule an injection ordered by your provider:  Call Central Imaging Injection scheduling line: [phone]  [email or web address] available online at:  [email or web address]    Please call if any further questions or concerns ([phone]).  Clinic hours 8 am to 5 pm.    Return to clinic (call) if symptoms worsen or fail to improve.            Follow-ups after your visit        Your next 10 appointments already scheduled     Sep 13, 2018  8:30 AM CDT   NM HEPATOBILIARY SCAN W GB EF with MGNM1, MG IMAGING NURSE   [facility] ([facility])    [address] [place] [id number] [phone]           Please bring a list of your medicines to the exam. (Include vitamins, minerals and over-the-counter drugs.) You should wear comfortable clothes. Leave your valuables at home. Please bring related prior results and films.  Tell your doctor:   If you are breastfeeding or may be pregnant.   If you have had a test including barium within the past 48 hours. Barium may change the results of certain exams.   If you think you may need sedation (medicine to help you relax).  4 hours before your exam: stop drinking and eating. Stop all morphine or morphine derivatives.  Please call your Imaging Department at your exam site with any questions.               Who to contact     If you have questions or need follow up information about today's clinic visit or your schedule please contact [facility] directly at [phone].  Normal or non-critical lab and imaging results will be communicated to you by Relievant Medsystemshart, letter or phone within 4 business days after the clinic has received the results. If you do not hear from us within 7 days, please contact the clinic through [employer] Medsystemshart or phone. If you have a critical or abnormal lab result, we will notify you by phone as soon as possible.  Submit refill requests through Miso Media or call your pharmacy and they will forward the refill request to us. Please allow 3 business days for your refill to be completed.          Additional Information About Your Visit        Miso Media Information     Miso Media gives you secure access to your electronic health record. If you see a primary care provider, you can also send messages to your care team and make appointments. If you have questions, please call your primary care clinic.  If you do not have a primary care provider, please call [phone] and they will assist you.      Miso Media is an electronic gateway that provides easy, online access to your medical records. With Miso Media, you can request a clinic appointment, read your test results, renew a prescription or communicate with your care team.     To access your existing account, please contact your [facility] or call [phone] for assistance.        Care EveryWhere ID     This is your Care EveryWhere ID. This could be used by other organizations to access your [facility] medical records  [id number]        Your Vitals Were     Pulse Pulse Oximetry                71 98%           Blood Pressure from Last 3 Encounters:   08/30/18 112/68   08/16/18 118/60   08/06/18 120/86    Weight from Last 3 Encounters:   08/06/18 94.3 kg (208 lb)   05/22/18 92.5 kg (204 lb)   04/12/18 93.4 kg (206 lb)               Today, you had the following     No orders found for display       Primary Care Provider Office Phone # Fax #    Otoniel Nelson PA-C [phone] [address] [facility] [address]        Equal Access to Services     CHI GARRISON : Hadmaria eugenia abelino ku samsono Soomaali, waaxda luqadaha, qaybta kaalmada adeegyada, jade hurdn cheri trimble lalissethjoaquin plasencia. So [facility] [phone].    ATENCIÓN: Si habla español, tiene a hines disposición servicios gratuitos de asistencia lingüística. Llame al [phone].    We comply with applicable federal civil rights laws and [place] laws. We do not discriminate on the basis of race, color, national origin, age, disability, sex, sexual orientation, or gender identity.            Thank you!     Thank you for choosing [facility]  for your care. Our goal is always to provide you with excellent care. Hearing back from our patients is one way we can continue to improve our services. Please take a few minutes to complete the written survey that you may receive in the mail after your visit with us. Thank you!             Your Updated Medication List - Protect others around you: Learn how to safely use, store and throw away your medicines at www.disposemymeds.org.          This list is accurate as of 8/30/18  8:33 AM.  Always use your most recent med list.                   Brand Name Dispense Instructions for use Diagnosis    acyclovir 5 % ointment    ZOVIRAX    15 g    Apply topically 6 times daily    Recurrent cold sores       ALLEGRA 180 MG tablet   Generic drug:  fexofenadine      Take 1 tablet by mouth daily Reported on 4/5/2017    FLORIDALMA positive, Family history of lupus erythematosus, Lumbar radiculopathy, Fatigue, Dry eyes, bilateral, Keratitis sicca, Foraminal stenosis of lumbar region, Ds DNA antibody positive       Coal Tar Extract 4 % Sham     168 mL    Externally apply 1 Dose topically daily    Hair loss, Scalp lesion       diazepam 5 MG  tablet    VALIUM    90 tablet    Take 1 tablet (5 mg) by mouth 3 times daily as needed for muscle spasms    Spasm of muscle       diclofenac 1 % Gel topical gel    VOLTAREN    100 g    Apply 2 grams to heels up to four times daily using enclosed dosing card.    Plantar fascial fibromatosis       FRESHKOTE 2.7-2 % Soln   Generic drug:  Polyvinyl Alcohol-Povidone      Apply  to eye.        hydrocortisone 0.2 % cream    WESTCORT    60 g    Apply sparingly to affected area three times daily as needed.    Telangiectasia of face       ibuprofen 800 MG tablet    ADVIL/MOTRIN    100 tablet    TAKE ONE TABLET BY MOUTH EVERY 8 HOURS AS NEEDED FOR PAIN    Cervicalgia, Muscle spasm       ketoconazole 2 % cream    NIZORAL    30 g    Apply topically 2 times daily    Ringworm of body       lidocaine-prilocaine cream    EMLA    30 g    Apply topically as needed for moderate pain    Intractable right heel pain       meloxicam 15 MG tablet    MOBIC    90 tablet    TAKE ONE TABLET BY MOUTH EVERY DAY (PATIENT WANTS UNICHEM BRAND ONLY)    Cervicalgia, Muscle spasm       methylphenidate 10 MG tablet    RITALIN    120 tablet    Take 1-2 tablets (10-20 mg) by mouth 2 times daily    Chronic fatigue       mupirocin 2 % ointment    BACTROBAN    22 g    Apply topically 3 times daily    MRSA (methicillin resistant staph aureus) culture positive, Skin lesion       nystatin-triamcinolone cream    MYCOLOG II    15 g    Apply topically 2 times daily    Atopic dermatitis       polyethylene glycol 0.4%- propylene glycol 0.3% 0.4-0.3 % Soln ophthalmic solution    SYSTANE ULTRA     Place 1 drop into both eyes every hour as needed for dry eyes        rizatriptan 5 MG ODT tab    MAXALT-MLT    18 tablet    Take 1-2 tablets (5-10 mg) by mouth at onset of headache for migraine May repeat dose in 2 hours.  Do not exceed 30 mg in 24 hours    Migraine without aura and without status migrainosus, not intractable       sucralfate 1 GM tablet    CARAFATE    120  tablet    TAKE ONE TABLET BY MOUTH FOUR TIMES A DAY    Gastroesophageal reflux disease, esophagitis presence not specified       SUMAtriptan 100 MG tablet    IMITREX    27 tablet    Take 1 tablet by mouth See Admin Instructions. WITH ONSET OF MIGRAINE, MAY REPEAT ONCE AFTER 2 HOURS. DO NOT EXCEED 2 TABLETS IN 24 HOURS.    Migraine, unspecified, with intractable migraine, so stated, without mention of status migrainosus       TYLENOL PO      Take 500 mg by mouth every 6 hours as needed        XIIDRA 5 % opthalmic solution   Generic drug:  lifitegrast

## 2018-08-30 NOTE — LETTER
8/30/2018         RE: Amelia Coker  [address] [place] [address]        Dear Colleague,    Thank you for referring your patient, Amelia Coker, to the [facility]. Please see a copy of my visit note below.    HISTORY OF PRESENT ILLNESS  Ms. Coker is a pleasant 43 year old year old female following up after a PRP injection in her right foot for plantar fasciitis.  Bhupinder's procedure was done exactly 2 weeks ago.  Her foot is quite painful today.  It feels a little worse than it did before she had her injection.  Pain was quite severe for the first 36 hours, then subsided, only to return the following day.  She is having tenderness when she walks on her foot, less so in the boot.  She has been wearing the boot most of the time for the past couple of weeks.  Additional history: as documented      REVIEW OF SYSTEMS (8/30/2018)  10 point ROS of systems including Constitutional, Eyes, Respiratory, Cardiovascular, Gastroenterology, Genitourinary, Integumentary, Musculoskeletal, Psychiatric were all negative except for pertinent positives noted in my HPI.     PHYSICAL EXAM  Vitals:    08/30/18 0741   BP: 112/68   Pulse: 71   SpO2: 98%     Bhupinder's right foot is tender over the plantar fascia and calcaneal fat pad.  There is no bruising or ecchymosis, no skin changes, although her foot does appear mildly swollen.  She has full range of motion of her ankle and is painless with palpation of the Achilles.  She does have pain when the big toe is flexed against resistance.      ASSESSMENT & PLAN  Ms. Coker is a 43 year old year old female following up with chronic plantar fasciitis status post PRP injection.    We did discuss that it it is expected to some degree to be more painful than when she had the injection initially.  I do think she should wear the boot for the next couple of weeks, hopefully she is able to wean out of it and the next week to 2 weeks.  She should also wrap her foot underneath  the boot, this may help with some of the swelling.    She does have a physical therapy appointment this Wednesday, this should help quite a bit with her swelling and pain in the long run.    Bhupinder is going to follow up in 4 weeks.    It was a pleasure seeing Bhupinder.        Patel Martínez DO, [facility]          Again, thank you for allowing me to participate in the care of your patient.        Sincerely,        Patel Martínez DO

## 2018-08-30 NOTE — PROGRESS NOTES
HISTORY OF PRESENT ILLNESS  Ms. Coker is a pleasant 43 year old year old female following up after a PRP injection in her right foot for plantar fasciitis.  Bhupinder's procedure was done exactly 2 weeks ago.  Her foot is quite painful today.  It feels a little worse than it did before she had her injection.  Pain was quite severe for the first 36 hours, then subsided, only to return the following day.  She is having tenderness when she walks on her foot, less so in the boot.  She has been wearing the boot most of the time for the past couple of weeks.  Additional history: as documented      REVIEW OF SYSTEMS (8/30/2018)  10 point ROS of systems including Constitutional, Eyes, Respiratory, Cardiovascular, Gastroenterology, Genitourinary, Integumentary, Musculoskeletal, Psychiatric were all negative except for pertinent positives noted in my HPI.     PHYSICAL EXAM  Vitals:    08/30/18 0741   BP: 112/68   Pulse: 71   SpO2: 98%     Bhupinder's right foot is tender over the plantar fascia and calcaneal fat pad.  There is no bruising or ecchymosis, no skin changes, although her foot does appear mildly swollen.  She has full range of motion of her ankle and is painless with palpation of the Achilles.  She does have pain when the big toe is flexed against resistance.      ASSESSMENT & PLAN  Ms. Coker is a 43 year old year old female following up with chronic plantar fasciitis status post PRP injection.    We did discuss that it it is expected to some degree to be more painful than when she had the injection initially.  I do think she should wear the boot for the next couple of weeks, hopefully she is able to wean out of it and the next week to 2 weeks.  She should also wrap her foot underneath the boot, this may help with some of the swelling.    She does have a physical therapy appointment this Wednesday, this should help quite a bit with her swelling and pain in the long run.    Bhupinder is going to follow up in 4 weeks.    It was a  pleasure seeing Bhupinder.        Patel Martínez DO, CAQSM

## 2018-08-30 NOTE — PATIENT INSTRUCTIONS
Thanks for coming today.  Ortho/Sports Medicine Clinic  [address] [place] [address]    To schedule future appointments in Ortho Clinic, you may call [phone].    To schedule ordered imaging by your provider:   Call Central Imaging [phone]    To schedule an injection ordered by your provider:  Call Central Imaging Injection scheduling line: [phone]  ITemahart available online at:  [email or web address]    Please call if any further questions or concerns ([phone]).  Clinic hours 8 am to 5 pm.    Return to clinic (call) if symptoms worsen or fail to improve.

## 2018-08-30 NOTE — NURSING NOTE
Amelia Coker's chief complaint for this visit includes:  Chief Complaint   Patient presents with     RECHECK     2 week Follow up PRP injection  Plantar fasciitis of right foot      PCP: Otoniel Nelson    Referring Provider:  No referring provider defined for this encounter.    /68  Pulse 71  SpO2 98%  Severe Pain (7)     Do you need any medication refills at today's visit? No

## 2018-09-06 RX ORDER — MUPIROCIN 20 MG/G
OINTMENT TOPICAL
Qty: 22 G | Refills: 0 | Status: SHIPPED | OUTPATIENT
Start: 2018-09-06 | End: 2019-03-11

## 2018-09-06 NOTE — TELEPHONE ENCOUNTER
Bactroban ointment  Routing refill request to provider for review/approval because:  Drug not on the [facility] refill protocol     Kenyatta Lyles, RN, BSN

## 2018-09-13 ENCOUNTER — RADIANT APPOINTMENT (OUTPATIENT)
Dept: NUCLEAR MEDICINE | Facility: CLINIC | Age: 43
End: 2018-09-13
Attending: PHYSICIAN ASSISTANT
Payer: COMMERCIAL

## 2018-09-13 DIAGNOSIS — R10.11 ABDOMINAL PAIN, RIGHT UPPER QUADRANT: ICD-10-CM

## 2018-09-13 DIAGNOSIS — R10.13 ABDOMINAL PAIN, EPIGASTRIC: ICD-10-CM

## 2018-09-13 PROCEDURE — 78227 HEPATOBIL SYST IMAGE W/DRUG: CPT

## 2018-09-13 PROCEDURE — A9537 TC99M MEBROFENIN: HCPCS | Performed by: PHYSICIAN ASSISTANT

## 2018-09-13 RX ORDER — KIT FOR THE PREPARATION OF TECHNETIUM TC 99M MEBROFENIN 45 MG/10ML
4.8-7.2 INJECTION, POWDER, LYOPHILIZED, FOR SOLUTION INTRAVENOUS ONCE
Status: COMPLETED | OUTPATIENT
Start: 2018-09-13 | End: 2018-09-13

## 2018-09-13 RX ADMIN — KIT FOR THE PREPARATION OF TECHNETIUM TC 99M MEBROFENIN 5 MCI.: 45 INJECTION, POWDER, LYOPHILIZED, FOR SOLUTION INTRAVENOUS at 09:33

## 2018-09-19 ENCOUNTER — MYC MEDICAL ADVICE (OUTPATIENT)
Dept: FAMILY MEDICINE | Facility: OTHER | Age: 43
End: 2018-09-19

## 2018-09-19 DIAGNOSIS — I78.1 TELANGIECTASIA OF FACE: ICD-10-CM

## 2018-09-19 RX ORDER — HYDROCORTISONE VALERATE CREAM 2 MG/G
CREAM TOPICAL
Qty: 60 G | Refills: 0 | Status: SHIPPED | OUTPATIENT
Start: 2018-09-19 | End: 2019-03-11

## 2018-09-19 NOTE — TELEPHONE ENCOUNTER
"Requested Prescriptions   Pending Prescriptions Disp Refills     hydrocortisone (WESTCORT) 0.2 % cream 60 g 0     Sig: Apply sparingly to affected area three times daily as needed.    Topical Steroids and Nonsteroidals Protocol Passed    9/19/2018  8:59 AM       Passed - Patient is age 6 or older       Passed - Authorizing prescriber's most recent note related to this medication read.    If refill request is for ophthalmic use, please forward request to provider for approval.         Passed - High potency steroid not ordered       Passed - Recent (12 mo) or future (30 days) visit within the authorizing provider's specialty    Patient had office visit in the last 12 months or has a visit in the next 30 days with authorizing provider or within the authorizing provider's specialty.  See \"Patient Info\" tab in inbasket, or \"Choose Columns\" in Meds & Orders section of the refill encounter.            Last OV 08/06/2018  Last filled 12/19/2014      Routing refill request to provider for review/approval because:  A break in medication            "

## 2018-09-19 NOTE — TELEPHONE ENCOUNTER
Replied via mychart and routed to refill pool.    Kelin Ivory CMA ([facility])

## 2018-09-25 ENCOUNTER — MYC MEDICAL ADVICE (OUTPATIENT)
Dept: FAMILY MEDICINE | Facility: OTHER | Age: 43
End: 2018-09-25

## 2018-09-27 ENCOUNTER — OFFICE VISIT (OUTPATIENT)
Dept: ORTHOPEDICS | Facility: CLINIC | Age: 43
End: 2018-09-27
Payer: COMMERCIAL

## 2018-09-27 VITALS — SYSTOLIC BLOOD PRESSURE: 110 MMHG | HEART RATE: 80 BPM | DIASTOLIC BLOOD PRESSURE: 77 MMHG | OXYGEN SATURATION: 98 %

## 2018-09-27 DIAGNOSIS — M72.2 PLANTAR FASCIITIS OF RIGHT FOOT: Primary | ICD-10-CM

## 2018-09-27 PROCEDURE — 99213 OFFICE O/P EST LOW 20 MIN: CPT | Performed by: FAMILY MEDICINE

## 2018-09-27 ASSESSMENT — PAIN SCALES - GENERAL: PAINLEVEL: MODERATE PAIN (5)

## 2018-09-27 NOTE — LETTER
9/27/2018         RE: Amelia Coker  [address] [place] [id number]        Dear Colleague,    Thank you for referring your patient, Amelia Coker, to the [facility]. Please see a copy of my visit note below.    HISTORY OF PRESENT ILLNESS  Ms. Coker is a pleasant 43 year old year old female following up with plantar fasciitis.  She is 6 weeks status post PRP injection.  Bhupinder is doing a little better today.  Overall she feels about 30-40% better.  Physical therapy is going relatively well.  She is still having pain when walking on uneven surfaces, which is largely unavoidable for her.  Additional history: as documented      REVIEW OF SYSTEMS (9/27/2018)  10 point ROS of systems including Constitutional, Eyes, Respiratory, Cardiovascular, Gastroenterology, Genitourinary, Integumentary, Musculoskeletal, Psychiatric were all negative except for pertinent positives noted in my HPI.     PHYSICAL EXAM  Vitals:    09/27/18 0843   BP: 110/77   Pulse: 80   SpO2: 98%     Walks comfortably without the use of a Cam walkerKris, there is no visual disturbance in her gait.  She is tender along the medial calcaneal tubercle, as well as the general region of the calcaneal fat pad.  Her ankle and foot otherwise are free and painless range of motion.      ASSESSMENT & PLAN  Ms. Coker is a 43 year old year old female following up with chronic plantar fasciitis, 6 weeks status post PRP injection.    We did revisit her pain in detail, I do believe that Kwan is well on her way and is recovering as expected.    I did provide her with some heel cups to hopefully aid in off lifting and shortening her arch.    We should follow-up in 4 weeks to revisit.    It was a pleasure seeing Bhupinder.        Patel Martínez DO, CASHERRONM          Again, thank you for allowing me to participate in the care of your patient.        Sincerely,        Patel Martínez DO

## 2018-09-27 NOTE — NURSING NOTE
Amelia Coker's chief complaint for this visit includes:  Chief Complaint   Patient presents with     RECHECK     PRP follow Right foot      PCP: Otoniel Nelson    Referring Provider:  No referring provider defined for this encounter.    /77  Pulse 80  SpO2 98%  Moderate Pain (5)     Do you need any medication refills at today's visit? No

## 2018-09-27 NOTE — PATIENT INSTRUCTIONS
Thanks for coming today.  Ortho[facility] Medicine Clinic  [address] [place] [address]    To schedule future appointments in Ortho Clinic, you may call [phone].    To schedule ordered imaging by your provider:   Call Central Imaging [phone]    To schedule an injection ordered by your provider:  Call Central Imaging Injection scheduling line: [phone]  [email or web address] available online at:  [email or web address]    Please call if any further questions or concerns ([phone]).  Clinic hours 8 am to 5 pm.    Return to clinic (call) if symptoms worsen or fail to improve.

## 2018-09-27 NOTE — PROGRESS NOTES
HISTORY OF PRESENT ILLNESS  Ms. Coker is a pleasant 43 year old year old female following up with plantar fasciitis.  She is 6 weeks status post PRP injection.  Bhupinder is doing a little better today.  Overall she feels about 30-40% better.  Physical therapy is going relatively well.  She is still having pain when walking on uneven surfaces, which is largely unavoidable for her.  Additional history: as documented      REVIEW OF SYSTEMS (9/27/2018)  10 point ROS of systems including Constitutional, Eyes, Respiratory, Cardiovascular, Gastroenterology, Genitourinary, Integumentary, Musculoskeletal, Psychiatric were all negative except for pertinent positives noted in my HPI.     PHYSICAL EXAM  Vitals:    09/27/18 0843   BP: 110/77   Pulse: 80   SpO2: 98%     Walks comfortably without the use of a Cam walkerKris, there is no visual disturbance in her gait.  She is tender along the medial calcaneal tubercle, as well as the general region of the calcaneal fat pad.  Her ankle and foot otherwise are free and painless range of motion.      ASSESSMENT & PLAN  Ms. Coker is a 43 year old year old female following up with chronic plantar fasciitis, 6 weeks status post PRP injection.    We did revisit her pain in detail, I do believe that Kwan is well on her way and is recovering as expected.    I did provide her with some heel cups to hopefully aid in off lifting and shortening her arch.    We should follow-up in 4 weeks to revisit.    It was a pleasure seeing Bhupinder.        Patel Martínez DO, CAM

## 2018-09-27 NOTE — MR AVS SNAPSHOT
After Visit Summary   2018    Amelia Coker    MRN: [id number]           Patient Information     Date Of Birth          1975        Visit Information        Provider Department      2018 8:40 AM Patel Martínez, DO [facility]        Today's Diagnoses     Plantar fasciitis of right foot    -  1      Care Instructions    Thanks for coming today.  Ortho/[facility] Medicine Clinic  [address] [place] [address]    To schedule future appointments in Ortho Clinic, you may call [phone].    To schedule ordered imaging by your provider:   Call Central Imaging [phone]    To schedule an injection ordered by your provider:  Call Central Imaging Injection scheduling line: [phone]  [facility] available online at:  [email or web address]    Please call if any further questions or concerns ([phone]).  Clinic hours 8 am to 5 pm.    Return to clinic (call) if symptoms worsen or fail to improve.            Follow-ups after your visit        Who to contact     If you have questions or need follow up information about today's clinic visit or your schedule please contact [facility] directly at [phone].  Normal or non-critical lab and imaging results will be communicated to you by MyChart, letter or phone within 4 business days after the clinic has received the results. If you do not hear from us within 7 days, please contact the clinic through [facility] or phone. If you have a critical or abnormal lab result, we will notify you by phone as soon as possible.  Submit refill requests through [facility] or call your pharmacy and they will forward the refill request to us. Please allow 3 business days for your refill to be completed.          Additional Information About Your Visit        MyChart Information     [facility] gives you secure access to your electronic health record. If you see a primary care provider, you can also send  messages to your care team and make appointments. If you have questions, please call your primary care clinic.  If you do not have a primary care provider, please call [phone] and they will assist you.      [employer] is an electronic gateway that provides easy, online access to your medical records. With [employer], you can request a clinic appointment, read your test results, renew a prescription or communicate with your care team.     To access your existing account, please contact your [facility] or call [phone] for assistance.        Care EveryWhere ID     This is your Care EveryWhere ID. This could be used by other organizations to access your [facility] medical records  [id number]        Your Vitals Were     Pulse Pulse Oximetry                80 98%           Blood Pressure from Last 3 Encounters:   09/27/18 110/77   08/30/18 112/68   08/16/18 118/60    Weight from Last 3 Encounters:   08/06/18 94.3 kg (208 lb)   05/22/18 92.5 kg (204 lb)   04/12/18 93.4 kg (206 lb)              Today, you had the following     No orders found for display       Primary Care Provider Office Phone # Fax #    Otoniel Nelson PA-C [phone] [email or web address] [facility] [id number]        Equal Access to Services     CHI GARRISON : Hadii aad ku hadasho Soomaali, waaxda luqadaha, qaybta kaalmada adeegyada, waxay idiin haymartan cheri plasencia. So [facility] [phone].    ATENCIÓN: Si habla español, tiene a hines disposición servicios gratuitos de asistencia lingüística. Llame al [phone].    We comply with applicable federal civil rights laws and [place] laws. We do not discriminate on the basis of race, color, national origin, age, disability, sex, sexual orientation, or gender identity.            Thank you!     Thank you for choosing [facility]  for your care. Our goal is always to provide you with excellent care. Hearing back from our patients is one  way we can continue to improve our services. Please take a few minutes to complete the written survey that you may receive in the mail after your visit with us. Thank you!             Your Updated Medication List - Protect others around you: Learn how to safely use, store and throw away your medicines at www.disposemymeds.org.          This list is accurate as of 9/27/18 10:26 AM.  Always use your most recent med list.                   Brand Name Dispense Instructions for use Diagnosis    acyclovir 5 % ointment    ZOVIRAX    15 g    Apply topically 6 times daily    Recurrent cold sores       ALLEGRA 180 MG tablet   Generic drug:  fexofenadine      Take 1 tablet by mouth daily Reported on 4/5/2017    FLORIDALMA positive, Family history of lupus erythematosus, Lumbar radiculopathy, Fatigue, Dry eyes, bilateral, Keratitis sicca, Foraminal stenosis of lumbar region, Ds DNA antibody positive       Coal Tar Extract 4 % Sham     168 mL    Externally apply 1 Dose topically daily    Hair loss, Scalp lesion       diazepam 5 MG tablet    VALIUM    90 tablet    Take 1 tablet (5 mg) by mouth 3 times daily as needed for muscle spasms    Spasm of muscle       diclofenac 1 % Gel topical gel    VOLTAREN    100 g    Apply 2 grams to heels up to four times daily using enclosed dosing card.    Plantar fascial fibromatosis       FRESHKOTE 2.7-2 % Soln   Generic drug:  Polyvinyl Alcohol-Povidone      Apply  to eye.        hydrocortisone 0.2 % cream    WESTCORT    60 g    Apply sparingly to affected area three times daily as needed.    Telangiectasia of face       ibuprofen 800 MG tablet    ADVIL/MOTRIN    100 tablet    TAKE ONE TABLET BY MOUTH EVERY 8 HOURS AS NEEDED FOR PAIN    Cervicalgia, Muscle spasm       ketoconazole 2 % cream    NIZORAL    30 g    Apply topically 2 times daily    Ringworm of body       lidocaine-prilocaine cream    EMLA    30 g    Apply topically as needed for moderate pain    Intractable right heel pain       meloxicam  15 MG tablet    MOBIC    90 tablet    TAKE ONE TABLET BY MOUTH EVERY DAY (PATIENT WANTS UNICHEM BRAND ONLY)    Cervicalgia, Muscle spasm       methylphenidate 10 MG tablet    RITALIN    120 tablet    Take 1-2 tablets (10-20 mg) by mouth 2 times daily    Chronic fatigue       * mupirocin 2 % ointment    BACTROBAN    22 g    Apply topically 3 times daily    MRSA (methicillin resistant staph aureus) culture positive, Skin lesion       * mupirocin 2 % ointment    BACTROBAN    22 g    APPLY TO AFFECTED AREA(S) THREE TIMES A DAY    Skin puncture       nystatin-triamcinolone cream    MYCOLOG II    15 g    Apply topically 2 times daily    Atopic dermatitis       polyethylene glycol 0.4%- propylene glycol 0.3% 0.4-0.3 % Soln ophthalmic solution    SYSTANE ULTRA     Place 1 drop into both eyes every hour as needed for dry eyes        rizatriptan 5 MG ODT tab    MAXALT-MLT    18 tablet    Take 1-2 tablets (5-10 mg) by mouth at onset of headache for migraine May repeat dose in 2 hours.  Do not exceed 30 mg in 24 hours    Migraine without aura and without status migrainosus, not intractable       sucralfate 1 GM tablet    CARAFATE    120 tablet    TAKE ONE TABLET BY MOUTH FOUR TIMES A DAY    Gastroesophageal reflux disease, esophagitis presence not specified       SUMAtriptan 100 MG tablet    IMITREX    27 tablet    Take 1 tablet by mouth See Admin Instructions. WITH ONSET OF MIGRAINE, MAY REPEAT ONCE AFTER 2 HOURS. DO NOT EXCEED 2 TABLETS IN 24 HOURS.    Migraine, unspecified, with intractable migraine, so stated, without mention of status migrainosus       TYLENOL PO      Take 500 mg by mouth every 6 hours as needed        XIIDRA 5 % opthalmic solution   Generic drug:  lifitegrast           * Notice:  This list has 2 medication(s) that are the same as other medications prescribed for you. Read the directions carefully, and ask your doctor or other care provider to review them with you.

## 2018-10-09 DIAGNOSIS — M62.838 MUSCLE SPASM: ICD-10-CM

## 2018-10-09 DIAGNOSIS — M54.2 CERVICALGIA: ICD-10-CM

## 2018-10-10 RX ORDER — MELOXICAM 15 MG/1
TABLET ORAL
Qty: 90 TABLET | Refills: 1 | Status: SHIPPED | OUTPATIENT
Start: 2018-10-10 | End: 2019-03-11

## 2018-10-10 NOTE — TELEPHONE ENCOUNTER
"Requested Prescriptions   Pending Prescriptions Disp Refills     meloxicam (MOBIC) 15 MG tablet [Pharmacy Med Name: MELOXICAM 15MG TABS] 90 tablet 1     Sig: TAKE ONE TABLET BY MOUTH EVERY DAY (PATIENT WANTS UNICHEM BRAND ONLY)    NSAID Medications Passed    10/9/2018  9:02 AM       Passed - Blood pressure under 140/90 in past 12 months    BP Readings from Last 3 Encounters:   09/27/18 110/77   08/30/18 112/68   08/16/18 118/60          Passed - Normal ALT on file in past 12 months    Recent Labs   Lab Test  08/06/18   1509   ALT  23          Passed - Normal AST on file in past 12 months    Recent Labs   Lab Test  08/06/18   1509   AST  16          Passed - Recent (12 mo) or future (30 days) visit within the authorizing provider's specialty    Patient had office visit in the last 12 months or has a visit in the next 30 days with authorizing provider or within the authorizing provider's specialty.  See \"Patient Info\" tab in inbasket, or \"Choose Columns\" in Meds & Orders section of the refill encounter.           Passed - Patient is age 6-64 years       Passed - Normal CBC on file in past 12 months    Recent Labs   Lab Test  08/06/18   1509   WBC  6.0   RBC  4.00   HGB  12.3   HCT  36.5   PLT  274     For [facility] ONLY: [id number] = WBC, WPXP003 = RBC         Passed - No active pregnancy on record       Passed - Normal serum creatinine on file in past 12 months    Recent Labs   Lab Test  08/06/18   1509   CR  0.74          Passed - No positive pregnancy test in past 12 months        Last OV 08/06/2018  Last filled 02/20/2018    Prescription approved per [facility] Refill Protocol.  Eric Camacho, RN, BSN          "

## 2018-11-01 ENCOUNTER — OFFICE VISIT (OUTPATIENT)
Dept: ORTHOPEDICS | Facility: CLINIC | Age: 43
End: 2018-11-01
Payer: COMMERCIAL

## 2018-11-01 VITALS — DIASTOLIC BLOOD PRESSURE: 76 MMHG | SYSTOLIC BLOOD PRESSURE: 116 MMHG | HEART RATE: 68 BPM

## 2018-11-01 DIAGNOSIS — M72.2 PLANTAR FASCIITIS OF RIGHT FOOT: Primary | ICD-10-CM

## 2018-11-01 PROCEDURE — 99213 OFFICE O/P EST LOW 20 MIN: CPT | Performed by: FAMILY MEDICINE

## 2018-11-01 ASSESSMENT — PAIN SCALES - GENERAL: PAINLEVEL: MODERATE PAIN (5)

## 2018-11-01 NOTE — PROGRESS NOTES
HISTORY OF PRESENT ILLNESS  Ms. Coker is a pleasant 43 year old year old female following up with right plantar fasciitis.  Bhupinder is 11 weeks status post PRP injection.  At our last visit she was improving, unfortunately that was the most improvement that she experienced throughout the course of recovery.  She feels nearly as bad as she did before she started PRP.  She has been going to physical therapy, she has been doing home exercises.  She also did about 8 sessions of Graston, this was not helpful.  Additional history: as documented      REVIEW OF SYSTEMS (11/1/2018)  10 point ROS of systems including Constitutional, Eyes, Respiratory, Cardiovascular, Gastroenterology, Genitourinary, Integumentary, Musculoskeletal, Psychiatric were all negative except for pertinent positives noted in my HPI.     PHYSICAL EXAM  Vitals:    11/01/18 0808   BP: 116/76   BP Location: Right arm   Patient Position: Chair   Cuff Size: Adult Large   Pulse: 68     Bhupinder is tender to palpation at the medial calcaneal tubercle on the right foot.  She does have some swelling around the right plantar fascia.  She does have tenderness in the plantar surface of the heel with calcaneal squeeze, no bony tenderness.  She has no sensory or motor lesions.  Her overlying skin is free of rashes, redness, or warmth.      ASSESSMENT & PLAN  Ms. Coker is a 43 year old year old female following up with chronic plantar fasciitis.    We spent some time reviewing options again.  Ultimately I am disappointed that PRP did not provide long-term benefit.  She would very much like to avoid surgery, if possible.    We renewed our discussion centering around percutaneous tenotomy with Tenex.  She is interested in this.  Pending insurance approval we could get her scheduled at her earliest convenience.    It was a pleasure seeing Bhupinder.        Patel Martínez DO, CAQSM

## 2018-11-01 NOTE — NURSING NOTE
Amelia Coker's goals for this visit include:   Chief Complaint   Patient presents with     Right Foot - Pain     RECHECK       She requests these members of her care team be copied on today's visit information: PCP    PCP: Otoniel Nelson    Referring Provider:  No referring provider defined for this encounter.    /76 (BP Location: Right arm, Patient Position: Chair, Cuff Size: Adult Large)  Pulse 68    Do you need any medication refills at today's visit? None    Lavern Montenegro, CMA

## 2018-11-01 NOTE — LETTER
11/1/2018         RE: Amelia Coker  [address] [place] [id number]        Dear Colleague,    Thank you for referring your patient, Amelia Coker, to the [facility]. Please see a copy of my visit note below.    HISTORY OF PRESENT ILLNESS  Ms. Coker is a pleasant 43 year old year old female following up with right plantar fasciitis.  Bhupinder is 11 weeks status post PRP injection.  At our last visit she was improving, unfortunately that was the most improvement that she experienced throughout the course of recovery.  She feels nearly as bad as she did before she started PRP.  She has been going to physical therapy, she has been doing home exercises.  She also did about 8 sessions of Graston, this was not helpful.  Additional history: as documented      REVIEW OF SYSTEMS (11/1/2018)  10 point ROS of systems including Constitutional, Eyes, Respiratory, Cardiovascular, Gastroenterology, Genitourinary, Integumentary, Musculoskeletal, Psychiatric were all negative except for pertinent positives noted in my HPI.     PHYSICAL EXAM  Vitals:    11/01/18 0808   BP: 116/76   BP Location: Right arm   Patient Position: Chair   Cuff Size: Adult Large   Pulse: 68     Bhupinder is tender to palpation at the medial calcaneal tubercle on the right foot.  She does have some swelling around the right plantar fascia.  She does have tenderness in the plantar surface of the heel with calcaneal squeeze, no bony tenderness.  She has no sensory or motor lesions.  Her overlying skin is free of rashes, redness, or warmth.      ASSESSMENT & PLAN  Ms. Coker is a 43 year old year old female following up with chronic plantar fasciitis.    We spent some time reviewing options again.  Ultimately I am disappointed that PRP did not provide long-term benefit.  She would very much like to avoid surgery, if possible.    We renewed our discussion centering around percutaneous tenotomy with Tenex.  She is interested in this.  Pending  insurance approval we could get her scheduled at her earliest convenience.    It was a pleasure seeing Bhupinder.        Patel Martínez DO, [facility]          Again, thank you for allowing me to participate in the care of your patient.        Sincerely,        Patel Martínez DO

## 2018-11-01 NOTE — MR AVS SNAPSHOT
After Visit Summary   2018    Amelia Coker    MRN: [id number]           Patient Information     Date Of Birth          1975        Visit Information        Provider Department      2018 8:00 AM Patel Martínez,  [facility]        Today's Diagnoses     Plantar fasciitis of right foot    -  1      Care Instructions    Thanks for coming today.  Ortho/Sports Medicine Clinic  [address] [place] [address]    To schedule future appointments in Ortho Clinic, you may call [phone].    To schedule ordered imaging by your provider:   Call Central Imaging [phone]    To schedule an injection ordered by your provider:  Call Central Imaging Injection scheduling line: [phone]  Cheyipait available online at:  [email or web address]    Please call if any further questions or concerns ([phone]).  Clinic hours 8 am to 5 pm.    Return to clinic (call) if symptoms worsen or fail to improve.            Follow-ups after your visit        Your next 10 appointments already scheduled     2018   Procedure with Goyo Blank MD   [facility] (--)    [address] [facility] [id number] [phone]              Who to contact     If you have questions or need follow up information about today's clinic visit or your schedule please contact [facility] directly at [phone].  Normal or non-critical lab and imaging results will be communicated to you by MyChart, letter or phone within 4 business days after the clinic has received the results. If you do not hear from us within 7 days, please contact the clinic through MyChart or phone. If you have a critical or abnormal lab result, we will notify you by phone as soon as possible.  Submit refill requests through My [employer] or call your pharmacy and they will forward the refill request to us. Please allow 3 business days for your refill to be  completed.          Additional Information About Your Visit        [employer] Information     [employer] gives you secure access to your electronic health record. If you see a primary care provider, you can also send messages to your care team and make appointments. If you have questions, please call your primary care clinic.  If you do not have a primary care provider, please call [phone] and they will assist you.      [employer] is an electronic gateway that provides easy, online access to your medical records. With [employer], you can request a clinic appointment, read your test results, renew a prescription or communicate with your care team.     To access your existing account, please contact your [facility] or call [phone] for assistance.        Care EveryWhere ID     This is your Care EveryWhere ID. This could be used by other organizations to access your [facility] medical records  [id number]        Your Vitals Were     Pulse                   68            Blood Pressure from Last 3 Encounters:   11/01/18 116/76   09/27/18 110/77   08/30/18 112/68    Weight from Last 3 Encounters:   08/06/18 94.3 kg (208 lb)   05/22/18 92.5 kg (204 lb)   04/12/18 93.4 kg (206 lb)              Today, you had the following     No orders found for display       Primary Care Provider Office Phone # Fax #    Otoniel Nelson PA-C [phone] [address] [facility] [address]        Equal Access to Services     CHI GARRISON : Hadii aad ku hadasho Soomaali, waaxda luqadaha, qaybta kaalmada adeegyada, jade amaya . So [facility] [phone].    ATENCIÓN: Si habla español, tiene a hines disposición servicios gratuitos de asistencia lingüística. Llame al [phone].    We comply with applicable federal civil rights laws and [place] laws. We do not discriminate on the basis of race, color, national origin, age, disability, sex, sexual orientation, or gender  identity.            Thank you!     Thank you for choosing [facility]  for your care. Our goal is always to provide you with excellent care. Hearing back from our patients is one way we can continue to improve our services. Please take a few minutes to complete the written survey that you may receive in the mail after your visit with us. Thank you!             Your Updated Medication List - Protect others around you: Learn how to safely use, store and throw away your medicines at www.disposemymeds.org.          This list is accurate as of 11/1/18  8:51 AM.  Always use your most recent med list.                   Brand Name Dispense Instructions for use Diagnosis    acyclovir 5 % ointment    ZOVIRAX    15 g    Apply topically 6 times daily    Recurrent cold sores       ALLEGRA 180 MG tablet   Generic drug:  fexofenadine      Take 1 tablet by mouth daily Reported on 4/5/2017    FLORIDALMA positive, Family history of lupus erythematosus, Lumbar radiculopathy, Fatigue, Dry eyes, bilateral, Keratitis sicca, Foraminal stenosis of lumbar region, Ds DNA antibody positive       Coal Tar Extract 4 % Sham     168 mL    Externally apply 1 Dose topically daily    Hair loss, Scalp lesion       diazepam 5 MG tablet    VALIUM    90 tablet    Take 1 tablet (5 mg) by mouth 3 times daily as needed for muscle spasms    Spasm of muscle       diclofenac 1 % Gel topical gel    VOLTAREN    100 g    Apply 2 grams to heels up to four times daily using enclosed dosing card.    Plantar fascial fibromatosis       FRESHKOTE 2.7-2 % Soln   Generic drug:  Polyvinyl Alcohol-Povidone      Apply  to eye.        hydrocortisone 0.2 % cream    WESTCORT    60 g    Apply sparingly to affected area three times daily as needed.    Telangiectasia of face       ibuprofen 800 MG tablet    ADVIL/MOTRIN    100 tablet    TAKE ONE TABLET BY MOUTH EVERY 8 HOURS AS NEEDED FOR PAIN    Cervicalgia, Muscle spasm       ketoconazole 2 % cream    NIZORAL    30  g    Apply topically 2 times daily    Ringworm of body       lidocaine-prilocaine cream    EMLA    30 g    Apply topically as needed for moderate pain    Intractable right heel pain       meloxicam 15 MG tablet    MOBIC    90 tablet    TAKE ONE TABLET BY MOUTH EVERY DAY (PATIENT WANTS UNICHEM BRAND ONLY)    Cervicalgia, Muscle spasm       methylphenidate 10 MG tablet    RITALIN    120 tablet    Take 1-2 tablets (10-20 mg) by mouth 2 times daily    Chronic fatigue       * mupirocin 2 % ointment    BACTROBAN    22 g    Apply topically 3 times daily    MRSA (methicillin resistant staph aureus) culture positive, Skin lesion       * mupirocin 2 % ointment    BACTROBAN    22 g    APPLY TO AFFECTED AREA(S) THREE TIMES A DAY    Skin puncture       nystatin-triamcinolone cream    MYCOLOG II    15 g    Apply topically 2 times daily    Atopic dermatitis       polyethylene glycol 0.4%- propylene glycol 0.3% 0.4-0.3 % Soln ophthalmic solution    SYSTANE ULTRA     Place 1 drop into both eyes every hour as needed for dry eyes        rizatriptan 5 MG ODT tab    MAXALT-MLT    18 tablet    Take 1-2 tablets (5-10 mg) by mouth at onset of headache for migraine May repeat dose in 2 hours.  Do not exceed 30 mg in 24 hours    Migraine without aura and without status migrainosus, not intractable       sucralfate 1 GM tablet    CARAFATE    120 tablet    TAKE ONE TABLET BY MOUTH FOUR TIMES A DAY    Gastroesophageal reflux disease, esophagitis presence not specified       SUMAtriptan 100 MG tablet    IMITREX    27 tablet    Take 1 tablet by mouth See Admin Instructions. WITH ONSET OF MIGRAINE, MAY REPEAT ONCE AFTER 2 HOURS. DO NOT EXCEED 2 TABLETS IN 24 HOURS.    Migraine, unspecified, with intractable migraine, so stated, without mention of status migrainosus       TYLENOL PO      Take 500 mg by mouth every 6 hours as needed        XIIDRA 5 % opthalmic solution   Generic drug:  lifitegrast           * Notice:  This list has 2 medication(s)  that are the same as other medications prescribed for you. Read the directions carefully, and ask your doctor or other care provider to review them with you.

## 2018-11-01 NOTE — PATIENT INSTRUCTIONS
Thanks for coming today.  Ortho/Sports Medicine Clinic  [address] [place] [address]    To schedule future appointments in Ortho Clinic, you may call [phone].    To schedule ordered imaging by your provider:   Call Central Imaging [phone]    To schedule an injection ordered by your provider:  Call Central Imaging Injection scheduling line: [phone]  Vo[employer] available online at:  [email or web address]    Please call if any further questions or concerns ([phone]).  Clinic hours 8 am to 5 pm.    Return to clinic (call) if symptoms worsen or fail to improve.

## 2018-11-02 ENCOUNTER — TELEPHONE (OUTPATIENT)
Dept: ORTHOPEDICS | Facility: CLINIC | Age: 43
End: 2018-11-02

## 2018-11-02 NOTE — TELEPHONE ENCOUNTER
Financial Counselor Review:    Procedure to be performed (include CPT code):Yes Tenex fasciotomy, right foot [id number]    Diagnosis code (include ICD-10 code):Plantar fasciitis of right foot M72.2    Medication order (include J code):NA    Medication dose and frequency:N/A    Cosmetic procedure/medication:NA    Coverage information only:YES    Coverage and patient financial responsibility information:YES    Does patient need to be contacted by Financial Counselor:YES    Note: Do not use abbreviations and route encounter to Sports Med

## 2018-11-02 NOTE — TELEPHONE ENCOUNTER
"Patient is scheduled to see Dr. Martínez on Monday 11-5-18 for \"tenex procedure\"  This procedure cannot be scheduled in clinic with Dr. Martínez, this is scheduled in the  ASC.   Left VM for patient to return call, will need to cancel appointment and run a financial check through our financial department before Tenex can be scheduled.       "

## 2018-11-05 NOTE — TELEPHONE ENCOUNTER
mycharted pt to let her know what is going on. She respond and we candled appointment for today.   Dee Dee Sheppard MA.... 8:02 AM....11/5/2018

## 2018-11-08 NOTE — TELEPHONE ENCOUNTER
Per [employer] [place] -Patient doesn't require a prior approval for Tenex patient is subject to her plan deductible and o/p patient will need to call to get her benefits the rep wouldn't give me that information      M722 - Plantar fascial fibromatosis  86426 - INCISION OF FOOT FASCIA    Transaction Type  Outpatient Authorization   Certification/Reference Number  [id number]  Status  No prior authorization required.  Note  No Prior Authorization is required for the service(s) requested for this member. This response is not an approval or denial of coverage. The health plan will cover the requested service(s) only if the member has active coverage with the health plan on the date of service, the service(s) are covered in the member s benefit plan, the service(s) provided are medically necessary and are not considered experimental or investigative by the health plan, the provider is eligible for payment, and the provider bills for the services requested. All services are subject to other terms of coverage including any applicable copays and/or deductibles, preexisting condition limitations, and contract exclusions that may result in denial of payment. Please verify the member s benefits and network limitations before providing the requested service(s).

## 2018-11-12 ENCOUNTER — MYC MEDICAL ADVICE (OUTPATIENT)
Dept: FAMILY MEDICINE | Facility: OTHER | Age: 43
End: 2018-11-12

## 2018-11-12 DIAGNOSIS — G43.009 MIGRAINE WITHOUT AURA AND WITHOUT STATUS MIGRAINOSUS, NOT INTRACTABLE: ICD-10-CM

## 2018-11-12 RX ORDER — ELETRIPTAN HYDROBROMIDE 20 MG/1
20-40 TABLET, FILM COATED ORAL
Qty: 18 TABLET | Refills: 1 | Status: SHIPPED | OUTPATIENT
Start: 2018-11-12 | End: 2019-10-10 | Stop reason: ALTCHOICE

## 2018-11-12 NOTE — TELEPHONE ENCOUNTER
I spoke with this patient regarding her benefits/coverage she would like a call to get the procedure scheduled while her benefits are all met.  Thanks.

## 2018-11-13 NOTE — TELEPHONE ENCOUNTER
Date Scheduled: 11-21-18  Facility: [facility]  Surgeon: Dr. Martínez and Dr. Westfall   Post-op appointment scheduled:    scheduled?: No  Surgery packet/instructions confirmed received?  No  Special Considerations:   Treasure Cruz, Surgery Scheduling Coordinator

## 2018-11-15 ENCOUNTER — ALLIED HEALTH/NURSE VISIT (OUTPATIENT)
Dept: FAMILY MEDICINE | Facility: OTHER | Age: 43
End: 2018-11-15
Payer: COMMERCIAL

## 2018-11-15 ENCOUNTER — MYC MEDICAL ADVICE (OUTPATIENT)
Dept: FAMILY MEDICINE | Facility: OTHER | Age: 43
End: 2018-11-15

## 2018-11-15 DIAGNOSIS — Z20.818 MRSA EXPOSURE: ICD-10-CM

## 2018-11-15 DIAGNOSIS — Z20.818 MRSA EXPOSURE: Primary | ICD-10-CM

## 2018-11-15 LAB
MRSA DNA SPEC QL NAA+PROBE: NEGATIVE
SPECIMEN SOURCE: NORMAL

## 2018-11-15 PROCEDURE — 87641 MR-STAPH DNA AMP PROBE: CPT | Performed by: FAMILY MEDICINE

## 2018-11-15 PROCEDURE — 99207 ZZC NO CHARGE NURSE ONLY: CPT

## 2018-11-15 PROCEDURE — 87640 STAPH A DNA AMP PROBE: CPT | Mod: 59 | Performed by: FAMILY MEDICINE

## 2018-11-15 NOTE — NURSING NOTE
Wilmer Justin did nasal swab for MRSA for future procedures coming up.       Ashley Blair MA 11/15/2018  3:39 PM

## 2018-11-15 NOTE — MR AVS SNAPSHOT
After Visit Summary   11/15/2018    Amelia Coker    MRN: [id number]           Patient Information     Date Of Birth          1975        Visit Information        Provider Department      11/15/2018 3:30 PM LISA MARCELINO MA [facility]        Today's Diagnoses     MRSA exposure           Follow-ups after your visit        Your next 10 appointments already scheduled     Nov 19, 2018   Procedure with Goyo Blank MD   [facility] (--)    [address]  [facility] [address] [phone]            Nov 21, 2018   Procedure with Patel Martínez DO   [facility] (--)    [address]  [facility] [address] [phone]              Who to contact     If you have questions or need follow up information about today's clinic visit or your schedule please contact [facility] directly at [phone].  Normal or non-critical lab and imaging results will be communicated to you by MyChart, letter or phone within 4 business days after the clinic has received the results. If you do not hear from us within 7 days, please contact the clinic through [employer] or phone. If you have a critical or abnormal lab result, we will notify you by phone as soon as possible.  Submit refill requests through [employer] or call your pharmacy and they will forward the refill request to us. Please allow 3 business days for your refill to be completed.          Additional Information About Your Visit        MyChart Information     [employer] gives you secure access to your electronic health record. If you see a primary care provider, you can also send messages to your care team and make appointments. If you have questions, please call your primary care clinic.  If you do not have a primary care provider, please call [phone] and they will assist you.        Care EveryWhere ID     This is your Care EveryWhere ID. This could be used by other organizations  to access your [facility] medical records  [id number]        Your Vitals Were     Last Period                   11/05/2018            Blood Pressure from Last 3 Encounters:   11/01/18 116/76   09/27/18 110/77   08/30/18 112/68    Weight from Last 3 Encounters:   11/15/18 210 lb (95.3 kg)   08/06/18 208 lb (94.3 kg)   05/22/18 204 lb (92.5 kg)              We Performed the Following     MRSA MSSA Presurgical Screen        Primary Care Provider Office Phone # Fax #    Otoniel Nelson PA-C [phone] [address] [facility] [address]        Equal Access to Services     CHI GARRISON : Hadii abelino cannon Soleon, waaxda ann, qaybta kaalmada adeelizabethyadulce, jade plasencia. So [facility] [phone].    ATENCIÓN: Si habla español, tiene a hines disposición servicios gratuitos de asistencia lingüística. Llame al [phone].    We comply with applicable federal civil rights laws and [place] laws. We do not discriminate on the basis of race, color, national origin, age, disability, sex, sexual orientation, or gender identity.            Thank you!     Thank you for choosing [facility]  for your care. Our goal is always to provide you with excellent care. Hearing back from our patients is one way we can continue to improve our services. Please take a few minutes to complete the written survey that you may receive in the mail after your visit with us. Thank you!             Your Updated Medication List - Protect others around you: Learn how to safely use, store and throw away your medicines at www.disposemymeds.org.          This list is accurate as of 11/15/18  3:40 PM.  Always use your most recent med list.                   Brand Name Dispense Instructions for use Diagnosis    acyclovir 5 % ointment    ZOVIRAX    15 g    Apply topically 6 times daily    Recurrent cold sores       ALLEGRA 180 MG tablet   Generic drug:  fexofenadine      Take 1 tablet by mouth  daily Reported on 4/5/2017    FLORIDALMA positive, Family history of lupus erythematosus, Lumbar radiculopathy, Fatigue, Dry eyes, bilateral, Keratitis sicca, Foraminal stenosis of lumbar region, Ds DNA antibody positive       Coal Tar Extract 4 % Sham     168 mL    Externally apply 1 Dose topically daily    Hair loss, Scalp lesion       diazepam 5 MG tablet    VALIUM    90 tablet    Take 1 tablet (5 mg) by mouth 3 times daily as needed for muscle spasms    Spasm of muscle       diclofenac 1 % Gel topical gel    VOLTAREN    100 g    Apply 2 grams to heels up to four times daily using enclosed dosing card.    Plantar fascial fibromatosis       eletriptan 20 MG tablet    RELPAX    18 tablet    Take 1-2 tablets (20-40 mg) by mouth at onset of headache for migraine May repeat in 2 hours. Max 4 tablets/24 hours.    Migraine without aura and without status migrainosus, not intractable       FRESHKOTE 2.7-2 % Soln   Generic drug:  Polyvinyl Alcohol-Povidone      Apply  to eye.        hydrocortisone 0.2 % cream    WESTCORT    60 g    Apply sparingly to affected area three times daily as needed.    Telangiectasia of face       ibuprofen 800 MG tablet    ADVIL/MOTRIN    100 tablet    TAKE ONE TABLET BY MOUTH EVERY 8 HOURS AS NEEDED FOR PAIN    Cervicalgia, Muscle spasm       ketoconazole 2 % cream    NIZORAL    30 g    Apply topically 2 times daily    Ringworm of body       lidocaine-prilocaine cream    EMLA    30 g    Apply topically as needed for moderate pain    Intractable right heel pain       meloxicam 15 MG tablet    MOBIC    90 tablet    TAKE ONE TABLET BY MOUTH EVERY DAY (PATIENT WANTS UNICHEM BRAND ONLY)    Cervicalgia, Muscle spasm       methylphenidate 10 MG tablet    RITALIN    120 tablet    Take 1-2 tablets (10-20 mg) by mouth 2 times daily    Chronic fatigue       * mupirocin 2 % ointment    BACTROBAN    22 g    Apply topically 3 times daily    MRSA (methicillin resistant staph aureus) culture positive, Skin lesion        * mupirocin 2 % ointment    BACTROBAN    22 g    APPLY TO AFFECTED AREA(S) THREE TIMES A DAY    Skin puncture       nystatin-triamcinolone cream    MYCOLOG II    15 g    Apply topically 2 times daily    Atopic dermatitis       polyethylene glycol 0.4%- propylene glycol 0.3% 0.4-0.3 % Soln ophthalmic solution    SYSTANE ULTRA     Place 1 drop into both eyes every hour as needed for dry eyes        sucralfate 1 GM tablet    CARAFATE    120 tablet    TAKE ONE TABLET BY MOUTH FOUR TIMES A DAY    Gastroesophageal reflux disease, esophagitis presence not specified       SUMAtriptan 100 MG tablet    IMITREX    27 tablet    Take 1 tablet by mouth See Admin Instructions. WITH ONSET OF MIGRAINE, MAY REPEAT ONCE AFTER 2 HOURS. DO NOT EXCEED 2 TABLETS IN 24 HOURS.    Migraine, unspecified, with intractable migraine, so stated, without mention of status migrainosus       TYLENOL PO      Take 500 mg by mouth every 6 hours as needed        XIIDRA 5 % opthalmic solution   Generic drug:  lifitegrast           * Notice:  This list has 2 medication(s) that are the same as other medications prescribed for you. Read the directions carefully, and ask your doctor or other care provider to review them with you.

## 2018-11-19 ENCOUNTER — SURGERY (OUTPATIENT)
Age: 43
End: 2018-11-19

## 2018-11-19 ENCOUNTER — TELEPHONE (OUTPATIENT)
Dept: FAMILY MEDICINE | Facility: OTHER | Age: 43
End: 2018-11-19

## 2018-11-19 ENCOUNTER — HOSPITAL ENCOUNTER (OUTPATIENT)
Facility: AMBULATORY SURGERY CENTER | Age: 43
Discharge: HOME | End: 2018-11-19
Attending: SURGERY | Admitting: SURGERY
Payer: COMMERCIAL

## 2018-11-19 VITALS
TEMPERATURE: 97.9 F | HEART RATE: 66 BPM | DIASTOLIC BLOOD PRESSURE: 88 MMHG | HEIGHT: 66 IN | WEIGHT: 210 LBS | BODY MASS INDEX: 33.75 KG/M2 | OXYGEN SATURATION: 98 % | RESPIRATION RATE: 18 BRPM | SYSTOLIC BLOOD PRESSURE: 105 MMHG

## 2018-11-19 DIAGNOSIS — A49.02 MRSA INFECTION: Primary | ICD-10-CM

## 2018-11-19 LAB — COLONOSCOPY: NORMAL

## 2018-11-19 PROCEDURE — 88305 TISSUE EXAM BY PATHOLOGIST: CPT | Performed by: SURGERY

## 2018-11-19 PROCEDURE — 99152 MOD SED SAME PHYS/QHP 5/>YRS: CPT | Mod: 59 | Performed by: SURGERY

## 2018-11-19 PROCEDURE — G8907 PT DOC NO EVENTS ON DISCHARG: HCPCS

## 2018-11-19 PROCEDURE — 45385 COLONOSCOPY W/LESION REMOVAL: CPT | Mod: PT | Performed by: SURGERY

## 2018-11-19 PROCEDURE — G8918 PT W/O PREOP ORDER IV AB PRO: HCPCS

## 2018-11-19 PROCEDURE — 45385 COLONOSCOPY W/LESION REMOVAL: CPT

## 2018-11-19 RX ORDER — LIDOCAINE 40 MG/G
CREAM TOPICAL
Status: DISCONTINUED | OUTPATIENT
Start: 2018-11-19 | End: 2018-11-20 | Stop reason: HOSPADM

## 2018-11-19 RX ORDER — FENTANYL CITRATE 50 UG/ML
INJECTION, SOLUTION INTRAMUSCULAR; INTRAVENOUS PRN
Status: DISCONTINUED | OUTPATIENT
Start: 2018-11-19 | End: 2018-11-19 | Stop reason: HOSPADM

## 2018-11-19 RX ADMIN — FENTANYL CITRATE 100 MCG: 50 INJECTION, SOLUTION INTRAMUSCULAR; INTRAVENOUS at 08:23

## 2018-11-19 RX ADMIN — FENTANYL CITRATE 50 MCG: 50 INJECTION, SOLUTION INTRAMUSCULAR; INTRAVENOUS at 08:30

## 2018-11-21 ENCOUNTER — SURGERY (OUTPATIENT)
Age: 43
End: 2018-11-21

## 2018-11-21 ENCOUNTER — HOSPITAL ENCOUNTER (OUTPATIENT)
Facility: AMBULATORY SURGERY CENTER | Age: 43
Discharge: HOME | End: 2018-11-21
Attending: FAMILY MEDICINE | Admitting: FAMILY MEDICINE
Payer: COMMERCIAL

## 2018-11-21 VITALS
OXYGEN SATURATION: 96 % | RESPIRATION RATE: 16 BRPM | DIASTOLIC BLOOD PRESSURE: 71 MMHG | SYSTOLIC BLOOD PRESSURE: 122 MMHG | TEMPERATURE: 98.2 F

## 2018-11-21 LAB — COPATH REPORT: NORMAL

## 2018-11-21 PROCEDURE — G8918 PT W/O PREOP ORDER IV AB PRO: HCPCS

## 2018-11-21 PROCEDURE — 28008 INCISION OF FOOT FASCIA: CPT | Mod: RT

## 2018-11-21 PROCEDURE — G8907 PT DOC NO EVENTS ON DISCHARG: HCPCS

## 2018-11-21 RX ORDER — LIDOCAINE HYDROCHLORIDE 10 MG/ML
INJECTION, SOLUTION INFILTRATION; PERINEURAL PRN
Status: DISCONTINUED | OUTPATIENT
Start: 2018-11-21 | End: 2018-11-21 | Stop reason: HOSPADM

## 2018-11-21 RX ADMIN — LIDOCAINE HYDROCHLORIDE 5 ML: 10 INJECTION, SOLUTION INFILTRATION; PERINEURAL at 12:36

## 2018-11-21 RX ADMIN — LIDOCAINE HYDROCHLORIDE 10 ML: 10 INJECTION, SOLUTION INFILTRATION; PERINEURAL at 12:26

## 2018-11-21 NOTE — OP NOTE
Ms. Coker is a 43 year old year old female here for a percutaneous tenotomy of the plantar fascia using ultrasound guidance to cut and remove the pathologic tissue.  Amelia has a history of chronic plantar fasciitis at the right heel.    The anatomy was identified and the diseased tissue was visualized and confirmed at the common extensor tendon near the insertion.  The area was prepped with chlorhexidine.  Local anesthesia was provided with a local injection of 5 mL of 1% lidocaine, using a 25 gauge needle.  A #11 blade was used to puncture the skin to the site of the pathologic tissue.  A sterile sleeve was placed over the ultrasound transducer.    To section the tendon the surgical instrument is introduced through the puncture site advancing to the diseased tendon.  Once the tip of the instrument is confirmed to be on the pathologic tissue, the foot pedal was depressed and the tendon is incised along its length, cutting and removing the diseased tendon tissue.  Procedure was performed with a total of 120 seconds of cutting time.    Following the procedure, steri-strips were placed, followed by Tegaderm and a Tubigrip compression sleeve.  Post-operative instructions were given:    Amelia is scheduled to follow up in 2 weeks.        DO SERGE Subramanian

## 2018-11-23 ENCOUNTER — TELEPHONE (OUTPATIENT)
Dept: ORTHOPEDICS | Facility: CLINIC | Age: 43
End: 2018-11-23

## 2018-11-23 NOTE — TELEPHONE ENCOUNTER
Health Call Center    Phone Message    May a detailed message be left on voicemail: yes    Reason for Call: Other: Patient is calling requesting to have Dr. Martínez's nurse call her back. Patient had a Tenex injection on 11/21/18 and the  steri strips came off yesterday and was told they should stay on for about five days. Patient would like to know what is advised.     Action Taken: Message routed to:  Adult Clinics: Sports Medicine p [phone]

## 2018-11-26 ENCOUNTER — MYC MEDICAL ADVICE (OUTPATIENT)
Dept: FAMILY MEDICINE | Facility: OTHER | Age: 43
End: 2018-11-26

## 2018-11-26 NOTE — TELEPHONE ENCOUNTER
Advised a shoe with a an elevated heel similar to a cowboy boot to help with equalizing the pelvic tilt.  No new medications at this point time.

## 2018-11-28 NOTE — TELEPHONE ENCOUNTER
Patient was contacted via [employer]. Will discuss with Dr. Martínez for additional recommendations at this time

## 2018-11-29 ENCOUNTER — ALLIED HEALTH/NURSE VISIT (OUTPATIENT)
Dept: FAMILY MEDICINE | Facility: OTHER | Age: 43
End: 2018-11-29
Payer: COMMERCIAL

## 2018-11-29 DIAGNOSIS — A49.02 MRSA INFECTION: ICD-10-CM

## 2018-11-29 PROCEDURE — 99207 ZZC NO CHARGE NURSE ONLY: CPT

## 2018-11-29 PROCEDURE — 40000869 ZZHCL STATISTIC MRSA/MSSA PRESURGICAL SCREEN CULTURE: Performed by: FAMILY MEDICINE

## 2018-11-29 PROCEDURE — 40000868 ZZHCL STATISTIC MRSA/MSSA PRESURGICAL SCREEN ID: Performed by: FAMILY MEDICINE

## 2018-11-29 NOTE — MR AVS SNAPSHOT
After Visit Summary   11/29/2018    Amelia Coker    MRN: [id number]           Patient Information     Date Of Birth          1975        Visit Information        Provider Department      11/29/2018 2:00 PM LISA MARCELINO MA [facility]        Today's Diagnoses     History of MRSA exposure / infection           Follow-ups after your visit        Who to contact     If you have questions or need follow up information about today's clinic visit or your schedule please contact [facility] directly at [phone].  Normal or non-critical lab and imaging results will be communicated to you by [employer], letter or phone within 4 business days after the clinic has received the results. If you do not hear from us within 7 days, please contact the clinic through [employer] or phone. If you have a critical or abnormal lab result, we will notify you by phone as soon as possible.  Submit refill requests through [employer] or call your pharmacy and they will forward the refill request to us. Please allow 3 business days for your refill to be completed.          Additional Information About Your Visit        MyChart Information     [employer] gives you secure access to your electronic health record. If you see a primary care provider, you can also send messages to your care team and make appointments. If you have questions, please call your primary care clinic.  If you do not have a primary care provider, please call [phone] and they will assist you.        Care EveryWhere ID     This is your Care EveryWhere ID. This could be used by other organizations to access your [place] medical records  [id number]        Your Vitals Were     Last Period                   11/05/2018            Blood Pressure from Last 3 Encounters:   11/21/18 122/71   11/19/18 105/88   11/01/18 116/76    Weight from Last 3 Encounters:   11/15/18 210 lb (95.3 kg)   08/06/18 208 lb (94.3 kg)   05/22/18 204 lb (92.5 kg)               We Performed the Following     MRSA MSSA Presurgical Screen        Primary Care Provider Office Phone # Fax #    Otoniel Nelson PA-C [phone] [facility] [address]        Equal Access to Services     [facility] GARRISON : Merry abelino mallory samsono Soandiali, waaxda luqadaha, qaybta kaalmada adeelizabethyada, jade rangelin hayaajoaquin alexiselizabeth trimble monique plasencia. So [facility] [phone].    ATENCIÓN: Si habla español, tiene a hines disposición servicios gratuitos de asistencia lingüística. Llame al [phone].    We comply with applicable federal civil rights laws and [place] laws. We do not discriminate on the basis of race, color, national origin, age, disability, sex, sexual orientation, or gender identity.            Thank you!     Thank you for choosing [facility]  for your care. Our goal is always to provide you with excellent care. Hearing back from our patients is one way we can continue to improve our services. Please take a few minutes to complete the written survey that you may receive in the mail after your visit with us. Thank you!             Your Updated Medication List - Protect others around you: Learn how to safely use, store and throw away your medicines at www.disposemymeds.org.          This list is accurate as of 11/29/18  2:14 PM.  Always use your most recent med list.                   Brand Name Dispense Instructions for use Diagnosis    acyclovir 5 % external ointment    ZOVIRAX    15 g    Apply topically 6 times daily    Recurrent cold sores       ALLEGRA 180 MG tablet   Generic drug:  fexofenadine      Take 1 tablet by mouth daily Reported on 4/5/2017    FLORIDALMA positive, Family history of lupus erythematosus, Lumbar radiculopathy, Fatigue, Dry eyes, bilateral, Keratitis sicca, Foraminal stenosis of lumbar region, Ds DNA antibody positive       Coal Tar Extract 4 % Sham     168 mL    Externally apply 1 Dose topically daily    Hair loss, Scalp lesion       diazepam  5 MG tablet    VALIUM    90 tablet    Take 1 tablet (5 mg) by mouth 3 times daily as needed for muscle spasms    Spasm of muscle       diclofenac 1 % topical gel    VOLTAREN    100 g    Apply 2 grams to heels up to four times daily using enclosed dosing card.    Plantar fascial fibromatosis       eletriptan 20 MG tablet    RELPAX    18 tablet    Take 1-2 tablets (20-40 mg) by mouth at onset of headache for migraine May repeat in 2 hours. Max 4 tablets/24 hours.    Migraine without aura and without status migrainosus, not intractable       FRESHKOTE 2.7-2 % Soln   Generic drug:  Polyvinyl Alcohol-Povidone      Apply  to eye.        hydrocortisone 0.2 % external cream    WESTCORT    60 g    Apply sparingly to affected area three times daily as needed.    Telangiectasia of face       ibuprofen 800 MG tablet    ADVIL/MOTRIN    100 tablet    TAKE ONE TABLET BY MOUTH EVERY 8 HOURS AS NEEDED FOR PAIN    Cervicalgia, Muscle spasm       ketoconazole 2 % external cream    NIZORAL    30 g    Apply topically 2 times daily    Ringworm of body       lidocaine-prilocaine 2.5-2.5 % external cream    EMLA    30 g    Apply topically as needed for moderate pain    Intractable right heel pain       meloxicam 15 MG tablet    MOBIC    90 tablet    TAKE ONE TABLET BY MOUTH EVERY DAY (PATIENT WANTS UNICHEM BRAND ONLY)    Cervicalgia, Muscle spasm       methylphenidate 10 MG tablet    RITALIN    120 tablet    Take 1-2 tablets (10-20 mg) by mouth 2 times daily    Chronic fatigue       * mupirocin 2 % external ointment    BACTROBAN    22 g    Apply topically 3 times daily    MRSA (methicillin resistant staph aureus) culture positive, Skin lesion       * mupirocin 2 % external ointment    BACTROBAN    22 g    APPLY TO AFFECTED AREA(S) THREE TIMES A DAY    Skin puncture       nystatin-triamcinolone 776483-8.1 UNIT/GM-% external cream    MYCOLOG II    15 g    Apply topically 2 times daily    Atopic dermatitis       polyethylene glycol 0.4%-  propylene glycol 0.3% 0.4-0.3 % Soln ophthalmic solution    SYSTANE ULTRA     Place 1 drop into both eyes every hour as needed for dry eyes        sucralfate 1 GM tablet    CARAFATE    120 tablet    TAKE ONE TABLET BY MOUTH FOUR TIMES A DAY    Gastroesophageal reflux disease, esophagitis presence not specified       SUMAtriptan 100 MG tablet    IMITREX    27 tablet    Take 1 tablet by mouth See Admin Instructions. WITH ONSET OF MIGRAINE, MAY REPEAT ONCE AFTER 2 HOURS. DO NOT EXCEED 2 TABLETS IN 24 HOURS.    Migraine, unspecified, with intractable migraine, so stated, without mention of status migrainosus       TYLENOL PO      Take 500 mg by mouth every 6 hours as needed        XIIDRA 5 % opthalmic solution   Generic drug:  lifitegrast           * Notice:  This list has 2 medication(s) that are the same as other medications prescribed for you. Read the directions carefully, and ask your doctor or other care provider to review them with you.

## 2018-12-01 LAB
BACTERIA SPEC CULT: NORMAL
BACTERIA SPEC CULT: NORMAL
SPECIMEN SOURCE: NORMAL

## 2018-12-13 ENCOUNTER — ALLIED HEALTH/NURSE VISIT (OUTPATIENT)
Dept: FAMILY MEDICINE | Facility: OTHER | Age: 43
End: 2018-12-13
Payer: COMMERCIAL

## 2018-12-13 DIAGNOSIS — A49.02 MRSA INFECTION: ICD-10-CM

## 2018-12-13 DIAGNOSIS — Z86.14 HISTORY OF MRSA INFECTION: Primary | ICD-10-CM

## 2018-12-13 PROCEDURE — 40000868 ZZHCL STATISTIC MRSA/MSSA PRESURGICAL SCREEN ID: Performed by: FAMILY MEDICINE

## 2018-12-13 PROCEDURE — 99207 ZZC NO CHARGE NURSE ONLY: CPT

## 2018-12-13 PROCEDURE — 40000869 ZZHCL STATISTIC MRSA/MSSA PRESURGICAL SCREEN CULTURE: Performed by: FAMILY MEDICINE

## 2018-12-14 LAB
BACTERIA SPEC CULT: NORMAL
BACTERIA SPEC CULT: NORMAL
SPECIMEN SOURCE: NORMAL

## 2018-12-26 NOTE — PROGRESS NOTES
SUBJECTIVE:   CC: Amelia Coker is an 43 year old woman who presents for preventive health visit.     Physical   Annual:     Getting at least 3 servings of Calcium per day:  Yes    Bi-annual eye exam:  Yes    Dental care twice a year:  Yes    Sleep apnea or symptoms of sleep apnea:  Daytime drowsiness    Diet:  Carbohydrate counting    Frequency of exercise:  6-7 days/week    Duration of exercise:  30-45 minutes    Taking medications regularly:  Not Applicable    PHQ-2 Total Score: 0      Today's PHQ-2 Score:   PHQ-2 ( 1999 Pf[employer]) 12/29/2018   Q1: Little interest or pleasure in doing things 0   Q2: Feeling down, depressed or hopeless 0   PHQ-2 Score 0   Q1: Little interest or pleasure in doing things Not at all   Q2: Feeling down, depressed or hopeless Not at all   PHQ-2 Score 0       Abuse: Current or Past(Physical, Sexual or Emotional)- No  Do you feel safe in your environment? Yes    Social History     Tobacco Use     Smoking status: Never Smoker     Smokeless tobacco: Never Used   Substance Use Topics     Alcohol use: No     Comment: social     Alcohol Use 12/29/2018   If you drink alcohol do you typically have greater than 3 drinks per day OR greater than 7 drinks per week? No   No flowsheet data found.    Reviewed orders with patient.  Reviewed health maintenance and updated orders accordingly - Yes  Labs reviewed in EPIC  BP Readings from Last 3 Encounters:   12/31/18 114/80   12/27/18 127/80   11/21/18 122/71    Wt Readings from Last 3 Encounters:   12/31/18 93.3 kg (205 lb 9.6 oz)   12/27/18 95.3 kg (210 lb)   11/15/18 95.3 kg (210 lb)                  Patient Active Problem List   Diagnosis     Intractable migraine     Bell's palsy     Contact dermatitis and other eczema, due to unspecified cause     Allergic rhinitis due to other allergen     Scalp lesion     Lyme disease     PAC (premature atrial contraction)     Palpitations     Raynaud phenomenon     Cold intolerance of hand     Chronic fatigue      Hair loss     Pain in joint     Abnormal PFT     Shoulder joint instability     Family history of melanoma     MRSA (methicillin resistant staph aureus) culture positive     Dry eyes     Keratitis sicca     Laceration of foot, right     Family history of colon cancer     Pain in joint, upper arm     Traumatic amputation of fingertip     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     FLORIDALMA positive     Plantar fascial fibromatosis     Foraminal stenosis of lumbar region     DJD (degenerative joint disease), lumbar     Left shoulder pain     Migraine without aura and without status migrainosus, not intractable     Lumbar radiculopathy     Ds DNA antibody positive     Muscular wasting and disuse atrophy, not elsewhere classified     Other postprocedural status(V45.89)     Facial contusion     Frontal headache     Telangiectasia of face     Lateral epicondylitis     Right shoulder pain     Plantar fasciitis     HTN, goal below 140/90     Tooth pain     History of MRSA exposure / infection     Skin lesion     AD (atopic dermatitis)     Heat sensitivity     Rotator cuff arthropathy, right     Contusion of left foot, initial encounter     Gastroesophageal reflux disease, esophagitis presence not specified     Abnormal mammogram     Ringworm of body     Fall from horse, initial encounter     Sternocleidomastoid muscle tenderness     Acute cervical myofascial strain, initial encounter     Spasm of muscle     Hyperlipidemia LDL goal <130     Biceps tendinopathy, right     Superficial swelling of scalp     Intractable right heel pain     Past Surgical History:   Procedure Laterality Date     COLONOSCOPY  3/11/2013    Procedure: COLONOSCOPY;  colonoscopy;  Surgeon: Lobito Mas MD;  Location: [facility]     COLONOSCOPY WITH CO2 INSUFFLATION N/A 11/19/2018    Procedure: COLONOSCOPY WITH CO2 INSUFFLATION;  Surgeon: Goyo Blank MD;  Location: [facility]     DECOMPRESSION LUMBAR TWO LEVELS  Bilateral 2016    Procedure: DECOMPRESSION LUMBAR TWO LEVELS;  Surgeon: Bang Burrell MD;  Location: [facility]     ESOPHAGOSCOPY, GASTROSCOPY, DUODENOSCOPY (EGD), COMBINED  2013    Procedure: COMBINED ESOPHAGOSCOPY, GASTROSCOPY, DUODENOSCOPY (EGD), BIOPSY SINGLE OR MULTIPLE;  Esophagoscopy, Gastroscopy, Duodenoscopy EGD with multiple biopsies;  Surgeon: Teja Koch MD;  Location: [facility]     HC REMOVAL OF TONSILS,<13 Y/O      Tonsils <10y.o.     HC TOOTH EXTRACTION W/FORCEP       REPAIR TENDON ELBOW  2014    Procedure: REPAIR TENDON ELBOW;  Surgeon: Chris Johnston MD;  Location: [facility]     ULTRASONIC REMOVAL SOFT TISSUE (LOCATION) Right 2018    Procedure: Tenex right foot;  Surgeon: Patel Martínez DO;  Location: [facility]       Social History     Tobacco Use     Smoking status: Never Smoker     Smokeless tobacco: Never Used   Substance Use Topics     Alcohol use: No     Comment: social     Family History   Problem Relation Age of Onset     Diabetes Mother         adult     Cancer - colorectal Mother      Hypertension Mother      Allergies Mother      Cancer Mother         colon     Depression Mother      Gastrointestinal Disease Mother         ibs     Genitourinary Problems Mother         kidney cyst     Gynecology Mother         endometriosis     Lipids Mother      Thyroid Disease Mother      Arthritis Mother         RA     Heart Disease Maternal Grandfather         MI,  - 60's     Allergies Father      Unknown/Adopted Father      Heart Disease Father         mi-age mid 40's     Cardiovascular Father      Lipids Father      Respiratory Father         asthma     Cancer Father      Other Cancer Father         renal cancer     Depression Sister      Allergies Sister      Gynecology Sister         endometriosis     Lipids Paternal Grandmother      Osteoporosis Paternal Grandmother      Thyroid Disease Paternal Grandmother      Respiratory Son         asthma     Allergies Son       Arthritis Sister      Allergies Brother      Heart Disease Brother      Allergies Daughter      Blood Disease Paternal Aunt         LGL T cell Leukemia     Rheumatoid Arthritis Paternal Aunt      Kidney Disease Maternal Aunt      Lupus Maternal Aunt      Bladder Cancer Maternal Aunt          Current Outpatient Medications   Medication Sig Dispense Refill     Acetaminophen (TYLENOL PO) Take 500 mg by mouth every 6 hours as needed        acyclovir (ZOVIRAX) 5 % ointment Apply topically 6 times daily 15 g 3     Coal Tar Extract 4 % SHAM Externally apply 1 Dose topically daily 168 mL 1     diazepam (VALIUM) 5 MG tablet Take 1 tablet (5 mg) by mouth 3 times daily as needed for muscle spasms 90 tablet 0     diclofenac (VOLTAREN) 1 % GEL topical gel Apply 2 grams to heels up to four times daily using enclosed dosing card. 100 g 1     eletriptan (RELPAX) 20 MG tablet Take 1-2 tablets (20-40 mg) by mouth at onset of headache for migraine May repeat in 2 hours. Max 4 tablets/24 hours. 18 tablet 1     fexofenadine (ALLEGRA) 180 MG tablet Take 1 tablet by mouth daily Reported on 4/5/2017       hydrocortisone (WESTCORT) 0.2 % cream Apply sparingly to affected area three times daily as needed. 60 g 0     ibuprofen (ADVIL/MOTRIN) 800 MG tablet TAKE ONE TABLET BY MOUTH EVERY 8 HOURS AS NEEDED FOR PAIN 100 tablet 3     ketoconazole (NIZORAL) 2 % cream Apply topically 2 times daily 30 g 1     lidocaine-prilocaine (EMLA) cream Apply topically as needed for moderate pain 30 g 1     Lifitegrast (XIIDRA) 5 % SOLN        meloxicam (MOBIC) 15 MG tablet TAKE ONE TABLET BY MOUTH EVERY DAY (PATIENT WANTS UNICHEM BRAND ONLY) 90 tablet 1     methylphenidate (RITALIN) 10 MG tablet Take 1-2 tablets (10-20 mg) by mouth 2 times daily 120 tablet 0     mupirocin (BACTROBAN) 2 % ointment Apply topically 3 times daily 22 g 1     nystatin-triamcinolone (MYCOLOG II) cream Apply topically 2 times daily 15 g 0     polyethylene glycol 0.4%- propylene  glycol 0.3% (SYSTANE ULTRA) 0.4-0.3 % SOLN ophthalmic solution Place 1 drop into both eyes every hour as needed for dry eyes       POLYVIN ALC-POVIDON DIMETHYLAM (FRESHKOTE) 2.7-2 % SOLN Apply  to eye.       sucralfate (CARAFATE) 1 GM tablet TAKE ONE TABLET BY MOUTH FOUR TIMES A  tablet 0     SUMAtriptan (IMITREX) 100 MG tablet Take 1 tablet by mouth See Admin Instructions. WITH ONSET OF MIGRAINE, MAY REPEAT ONCE AFTER 2 HOURS. DO NOT EXCEED 2 TABLETS IN 24 HOURS. 27 tablet prn     mupirocin (BACTROBAN) 2 % ointment APPLY TO AFFECTED AREA(S) THREE TIMES A DAY (Patient not taking: Reported on 12/31/2018) 22 g 0     Allergies   Allergen Reactions     Ceftriaxone Anaphylaxis     Hives, rash, racing heart beat     Bactrim [Sulfamethoxazole W/Trimethoprim] Hives     Ciprofloxacin Other (See Comments)     Tendon Issues     Codeine Nausea and Vomiting     Copper      Rash       Doxycycline      Loss of skin pigmentation, skin loss.     Gold      Rash       Iodine-131 Hives     Latex      Levaquin [Levofloxacin] Other (See Comments)     Tendon issues with levaquin and cipro     Nickel      rash     Steel [Staples]      Rash from staples       Penicillins Rash       Mammogram Screening: Patient under age 50, mutual decision reflected in health maintenance.      Pertinent mammograms are reviewed under the imaging tab.  History of abnormal Pap smear:   NO - age 30-65 PAP every 5 years with negative HPV co-testing recommended  Last 3 Pap and HPV Results:   PAP / HPV Latest Ref Rng & Units 12/2/2015 8/31/2012 11/27/2009   PAP - NIL NIL NIL   HPV 16 DNA NEG Negative - -   HPV 18 DNA NEG Negative - -   OTHER HR HPV NEG Negative - -     PAP / HPV Latest Ref Rng & Units 12/2/2015 8/31/2012 11/27/2009   PAP - NIL NIL NIL   HPV 16 DNA NEG Negative - -   HPV 18 DNA NEG Negative - -   OTHER HR HPV NEG Negative - -     Reviewed and updated as needed this visit by clinical staff  Tobacco  Allergies  Meds  Med Hx  Surg Hx  Fam  Hx  Soc Hx        Reviewed and updated as needed this visit by Provider        Past Medical History:   Diagnosis Date     Abnormal mammogram 9/21/2016    right breast      AD (atopic dermatitis) 10/29/2015     Allergic rhinitis due to other allergen      Arthritis      Bell's palsy      Chronic fatigue 6/6/2012     Chronic infection     MRSA - 2015 scalp and prior to Abdomen     Contact dermatitis and other eczema, due to unspecified cause     eczema     Contusion of left foot, initial encounter 3/16/2016     DJD (degenerative joint disease), lumbar 9/26/2013     DJD (degenerative joint disease), lumbar 9/26/2013     Ds DNA antibody positive 4/16/2014    borderline.      Elevated blood pressure reading without diagnosis of hypertension 12/24/2013     Facial contusion 12/15/2014    hit by horse      Foraminal stenosis of lumbar region 9/26/2013     Frontal headache 12/15/2014     Gastroesophageal reflux disease, esophagitis presence not specified 6/29/2016     Heat sensitivity 10/29/2015     HTN, goal below 140/90 9/23/2015     Hyperlipidemia LDL goal <130 8/30/2017     Irregular heart beat      Keratitis sicca 10/31/2012     Left shoulder pain 9/26/2013     Lumbar radiculopathy 1/22/2014     Migraine headache 10/23/2013     Migraine, unspecified, with intractable migraine, so stated, without mention of status migrainosus      MRSA infection 10/20/2015     Muscular wasting and disuse atrophy, not elsewhere classified 6/9/2014    right SCM, right wrist,       Numbness and tingling     both legs anf feet greater on the left     PAC (premature atrial contraction) 7/15/2010     Plantar fasciitis 9/23/2015     PONV (postoperative nausea and vomiting)      Skin lesion 10/20/2015    posterior superior scalp      Spasm of muscle 7/11/2017     Telangiectasia of face 12/19/2014     Tooth pain 10/20/2015    left lower primary molar       Past Surgical History:   Procedure Laterality Date     COLONOSCOPY  3/11/2013     Procedure: COLONOSCOPY;  colonoscopy;  Surgeon: Lobito Mas MD;  [facility]     COLONOSCOPY WITH CO2 INSUFFLATION N/A 11/19/2018    Procedure: COLONOSCOPY WITH CO2 INSUFFLATION;  Surgeon: Goyo Blank MD;  [facility]     DECOMPRESSION LUMBAR TWO LEVELS Bilateral 12/8/2016    Procedure: DECOMPRESSION LUMBAR TWO LEVELS;  Surgeon: Bang Burrell MD;  [facility]: [facility]     ESOPHAGOSCOPY, GASTROSCOPY, DUODENOSCOPY (EGD), COMBINED  11/8/2013    Procedure: COMBINED ESOPHAGOSCOPY, GASTROSCOPY, DUODENOSCOPY (EGD), BIOPSY SINGLE OR MULTIPLE;  Esophagoscopy, Gastroscopy, Duodenoscopy EGD with multiple biopsies;  Surgeon: Teja Koch MD;  [facility]: [facility]     HC REMOVAL OF TONSILS,<11 Y/O      Tonsils <10y.o.     HC TOOTH EXTRACTION W/FORCEP       REPAIR TENDON ELBOW  7/9/2014    Procedure: REPAIR TENDON ELBOW;  Surgeon: Chris Johnston MD;  [facility]     ULTRASONIC REMOVAL SOFT TISSUE (LOCATION) Right 11/21/2018    Procedure: Tenex right foot;  Surgeon: Patel Martínez DO;  Location: [facility]       Review of Systems   Constitutional: Negative for chills and fever.   HENT: Negative for congestion, ear pain, hearing loss and sore throat.    Eyes: Negative for pain and visual disturbance.   Respiratory: Negative for cough and shortness of breath.    Cardiovascular: Negative for chest pain, palpitations and peripheral edema.   Gastrointestinal: Negative for abdominal pain, constipation, diarrhea, heartburn, hematochezia and nausea.   Breasts:  Negative for tenderness, breast mass and discharge.   Genitourinary: Negative for dysuria, frequency, genital sores, hematuria, pelvic pain, urgency, vaginal bleeding and vaginal discharge.   Musculoskeletal: Negative for arthralgias, joint swelling and myalgias.   Skin: Negative for rash.   Neurological: Negative for dizziness, weakness, headaches and paresthesias.   Psychiatric/Behavioral: Negative for mood changes. The patient is not  "nervous/anxious.         OBJECTIVE:   /80 (Cuff Size: Adult Regular)   Pulse 80   Temp 98.8  F (37.1  C) (Temporal)   Resp 18   Ht 1.678 m (5' 6.06\")   Wt 93.3 kg (205 lb 9.6 oz)   BMI 33.12 kg/m    Physical Exam  GENERAL: healthy, alert and no distress  EYES: Eyes grossly normal to inspection, PERRL and conjunctivae and sclerae normal  HENT: ear canals and TM's normal, nose and mouth without ulcers or lesions  NECK: no adenopathy, no asymmetry, masses, or scars and thyroid normal to palpation  RESP: lungs clear to auscultation - no rales, rhonchi or wheezes  BREAST: normal without masses, tenderness or nipple discharge and no palpable axillary masses or adenopathy  CV: regular rate and rhythm, normal S1 S2, no S3 or S4, no murmur, click or rub, no peripheral edema and peripheral pulses strong  ABDOMEN: soft, nontender, no hepatosplenomegaly, no masses and bowel sounds normal   (female): normal female external genitalia, normal urethral meatus, vaginal mucosa pink, moist, well rugated, and normal cervix/adnexa/uterus without masses or discharge   (female): vaginal discharge - moderate and white  MS: no gross musculoskeletal defects noted, no edema  SKIN: no suspicious lesions or rashes  NEURO: Normal strength and tone, mentation intact and speech normal  PSYCH: mentation appears normal, affect normal/bright    Diagnostic Test Results:  Results for orders placed or performed in visit on 12/31/18 (from the past 24 hour(s))   CBC with platelets   Result Value Ref Range    WBC 5.3 4.0 - 11.0 10e9/L    RBC Count 4.39 3.8 - 5.2 10e12/L    Hemoglobin 13.3 11.7 - 15.7 g/dL    Hematocrit 39.3 35.0 - 47.0 %    MCV 90 78 - 100 fl    MCH 30.3 26.5 - 33.0 pg    MCHC 33.8 31.5 - 36.5 g/dL    RDW 14.1 10.0 - 15.0 %    Platelet Count 274 150 - 450 10e9/L     *Note: Due to a large number of results and/or encounters for the requested time period, some results have not been displayed. A complete set of results can be " "found in Results Review.       ASSESSMENT/PLAN:   1. Screening for malignant neoplasm of cervix  - HIV Screening  - Pap imaged thin layer screen with HPV - recommended age 30 - 65 years (select HPV order below)    2. Screening for HIV (human immunodeficiency virus)    3. Need for prophylactic vaccination and inoculation against influenza    4. Hyperlipidemia LDL goal <130  - Lipid panel reflex to direct LDL Fasting  - CBC with platelets  - Comprehensive metabolic panel    5. Vaginal discharge  - Wet prep    COUNSELING:  Reviewed preventive health counseling, as reflected in patient instructions       Regular exercise       Healthy diet/nutrition       Vision screening       Hearing screening    BP Readings from Last 1 Encounters:   12/31/18 114/80     Estimated body mass index is 33.12 kg/m  as calculated from the following:    Height as of this encounter: 1.678 m (5' 6.06\").    Weight as of this encounter: 93.3 kg (205 lb 9.6 oz).      Weight management plan: Discussed healthy diet and exercise guidelines     reports that  has never smoked. she has never used smokeless tobacco.      Counseling Resources:  ATP IV Guidelines  Pooled Cohorts Equation Calculator  Breast Cancer Risk Calculator  FRAX Risk Assessment  ICSI Preventive Guidelines  Dietary Guidelines for Americans, 2010  USDA's MyPlate  ASA Prophylaxis  Lung CA Screening    Otoniel Manuel PA-C  [facility]  "

## 2018-12-27 ENCOUNTER — OFFICE VISIT (OUTPATIENT)
Dept: ORTHOPEDICS | Facility: CLINIC | Age: 43
End: 2018-12-27
Payer: COMMERCIAL

## 2018-12-27 VITALS
DIASTOLIC BLOOD PRESSURE: 80 MMHG | SYSTOLIC BLOOD PRESSURE: 127 MMHG | BODY MASS INDEX: 33.75 KG/M2 | WEIGHT: 210 LBS | HEIGHT: 66 IN | HEART RATE: 62 BPM

## 2018-12-27 DIAGNOSIS — M79.671 INTRACTABLE RIGHT HEEL PAIN: Primary | ICD-10-CM

## 2018-12-27 PROCEDURE — 20550 NJX 1 TENDON SHEATH/LIGAMENT: CPT | Mod: RT | Performed by: FAMILY MEDICINE

## 2018-12-27 PROCEDURE — 99207 ZZC DROP WITH A PROCEDURE: CPT | Performed by: FAMILY MEDICINE

## 2018-12-27 ASSESSMENT — PAIN SCALES - GENERAL: PAINLEVEL: MODERATE PAIN (5)

## 2018-12-27 ASSESSMENT — MIFFLIN-ST. JEOR: SCORE: 1614.3

## 2018-12-27 NOTE — PROGRESS NOTES
"HISTORY OF PRESENT ILLNESS  Ms. Coker is a pleasant 43 year old year old female following up with chronic plantar fasciitis.  Bhupinder had a percutaneous tenotomy procedure with Tenex on November 21.  Unfortunately she feels no better.  For about 3 weeks she was unable to put her heel on the ground due to the pain.  Today she feels slightly improved, although about baseline compared to before she had the procedure none.  Her incision has healed well.  Bhupinder is interested in discussing surgical options for her right foot, as she did with her left.  Her  is gone for about a month, leaving her with much of the duties around her home instead including caring for her horses and general grounds.  Surgery would be this spring.  She is interested in pain control options until then.  Additional history: as documented      REVIEW OF SYSTEMS (12/27/2018)  10 point ROS of systems including Constitutional, Eyes, Respiratory, Cardiovascular, Gastroenterology, Genitourinary, Integumentary, Musculoskeletal, Psychiatric were all negative except for pertinent positives noted in my HPI.     PHYSICAL EXAM  Vitals:    12/27/18 0658   BP: 127/80   Pulse: 62   Weight: 95.3 kg (210 lb)   Height: 1.676 m (5' 6\")     General  - normal appearance, in no obvious distress  CV  - normal pulses at posterior tib and dorsalis pedis  Pulm  - normal respiratory pattern, non-labored  Musculoskeletal - right foot  - inspection: no swelling or effusion  - palpation: tender at the medial calcaneal tubercle  - ROM: normal active and passive ROM of great and lesser toes, no pain with MT translation  Neuro  - no sensory or motor deficit, grossly normal coordination, normal muscle tone  Skin  - no ecchymosis, erythema, warmth, or induration, no obvious rash  Psych  - interactive, appropriate, normal mood and affect        ASSESSMENT & PLAN  Ms. Coker is a 43 year old year old female following up with chronic right plantar fasciitis.    We discussed " options.  I do think it is reasonable to discuss surgical options, she has been through nearly every conservative treatment to date.    Bhupinder did inquire about the possibility of an injection, we discussed this in some detail.  Generally this is not something that is recommended, although in this case I do believe it may benefit her in the short-term now that she has an end goal in mind.    I did inject her heel today (see procedure note).  If no improvement over the next 2 weeks Bhupinder is going to get in touch with me.    It was a pleasure seeing Bhupinder.    PROCEDURE    Foot Injection - Plantar Fascia  The patient was informed of the risks and the benefits of the procedure and a written consent was signed.  The patient s right foot was prepped with chlorhexidine in sterile fashion.   20 mg of triamcinolone suspension was drawn up into a 5 mL syringe with 2 mL of 1% lidocaine.  Injection was performed using substerile technique.  A 1.5-inch 25-gauge needle was used to enter the medial aspect of the . right foot at the calcaneal tubercle. Injection performed successfully without difficulty.  There were no complications. The patient tolerated the procedure well. There was negligible bleeding.   The patient was instructed to ice the foot upon leaving clinic and refrain from overuse over the next 3 days.   The patient was instructed to call or go to the emergency room with any unusual pain, swelling, redness, or if otherwise concerned.  Kenalog: NDC [id number]        Patel Martínez DO, SERGE

## 2018-12-27 NOTE — NURSING NOTE
"Amelia Coker's chief complaint for this visit includes:  Chief Complaint   Patient presents with     RECHECK     Follow up for right foot after tenex     PCP: Otoniel Nelson    Referring Provider:  Referred Self, MD  No address on file    /80   Pulse 62   Ht 1.676 m (5' 6\")   Wt 95.3 kg (210 lb)   BMI 33.89 kg/m    Moderate Pain (5)     Do you need any medication refills at today's visit? No    "

## 2018-12-29 ASSESSMENT — ENCOUNTER SYMPTOMS
HEARTBURN: 0
MYALGIAS: 0
NERVOUS/ANXIOUS: 0
CONSTIPATION: 0
BREAST MASS: 0
CHILLS: 0
COUGH: 0
SHORTNESS OF BREATH: 0
NAUSEA: 0
HEADACHES: 0
SORE THROAT: 0
ABDOMINAL PAIN: 0
DIARRHEA: 0
DIZZINESS: 0
FREQUENCY: 0
HEMATOCHEZIA: 0
HEMATURIA: 0
WEAKNESS: 0
JOINT SWELLING: 0
ARTHRALGIAS: 0
PALPITATIONS: 0
DYSURIA: 0
FEVER: 0
PARESTHESIAS: 0
EYE PAIN: 0

## 2018-12-31 ENCOUNTER — MYC MEDICAL ADVICE (OUTPATIENT)
Dept: FAMILY MEDICINE | Facility: OTHER | Age: 43
End: 2018-12-31

## 2018-12-31 ENCOUNTER — OFFICE VISIT (OUTPATIENT)
Dept: FAMILY MEDICINE | Facility: OTHER | Age: 43
End: 2018-12-31
Payer: COMMERCIAL

## 2018-12-31 VITALS
BODY MASS INDEX: 33.04 KG/M2 | HEIGHT: 66 IN | HEART RATE: 80 BPM | WEIGHT: 205.6 LBS | SYSTOLIC BLOOD PRESSURE: 114 MMHG | RESPIRATION RATE: 18 BRPM | TEMPERATURE: 98.8 F | DIASTOLIC BLOOD PRESSURE: 80 MMHG

## 2018-12-31 DIAGNOSIS — Z23 NEED FOR PROPHYLACTIC VACCINATION AND INOCULATION AGAINST INFLUENZA: ICD-10-CM

## 2018-12-31 DIAGNOSIS — Z12.4 SCREENING FOR MALIGNANT NEOPLASM OF CERVIX: ICD-10-CM

## 2018-12-31 DIAGNOSIS — E78.5 HYPERLIPIDEMIA LDL GOAL <130: Primary | ICD-10-CM

## 2018-12-31 DIAGNOSIS — N89.8 VAGINAL DISCHARGE: ICD-10-CM

## 2018-12-31 DIAGNOSIS — Z11.4 SCREENING FOR HIV (HUMAN IMMUNODEFICIENCY VIRUS): ICD-10-CM

## 2018-12-31 LAB
ALBUMIN SERPL-MCNC: 4.4 G/DL (ref 3.4–5)
ALP SERPL-CCNC: 57 U/L (ref 40–150)
ALT SERPL W P-5'-P-CCNC: 23 U/L (ref 0–50)
ANION GAP SERPL CALCULATED.3IONS-SCNC: 6 MMOL/L (ref 3–14)
AST SERPL W P-5'-P-CCNC: 17 U/L (ref 0–45)
BILIRUB SERPL-MCNC: 1.4 MG/DL (ref 0.2–1.3)
BUN SERPL-MCNC: 16 MG/DL (ref 7–30)
CALCIUM SERPL-MCNC: 8.8 MG/DL (ref 8.5–10.1)
CHLORIDE SERPL-SCNC: 107 MMOL/L (ref 94–109)
CHOLEST SERPL-MCNC: 209 MG/DL
CO2 SERPL-SCNC: 25 MMOL/L (ref 20–32)
CREAT SERPL-MCNC: 0.69 MG/DL (ref 0.52–1.04)
ERYTHROCYTE [DISTWIDTH] IN BLOOD BY AUTOMATED COUNT: 14.1 % (ref 10–15)
GFR SERPL CREATININE-BSD FRML MDRD: >90 ML/MIN/{1.73_M2}
GLUCOSE SERPL-MCNC: 93 MG/DL (ref 70–99)
HCT VFR BLD AUTO: 39.3 % (ref 35–47)
HDLC SERPL-MCNC: 86 MG/DL
HGB BLD-MCNC: 13.3 G/DL (ref 11.7–15.7)
LDLC SERPL CALC-MCNC: 113 MG/DL
MCH RBC QN AUTO: 30.3 PG (ref 26.5–33)
MCHC RBC AUTO-ENTMCNC: 33.8 G/DL (ref 31.5–36.5)
MCV RBC AUTO: 90 FL (ref 78–100)
NONHDLC SERPL-MCNC: 123 MG/DL
PLATELET # BLD AUTO: 274 10E9/L (ref 150–450)
POTASSIUM SERPL-SCNC: 4 MMOL/L (ref 3.4–5.3)
PROT SERPL-MCNC: 8.1 G/DL (ref 6.8–8.8)
RBC # BLD AUTO: 4.39 10E12/L (ref 3.8–5.2)
SODIUM SERPL-SCNC: 138 MMOL/L (ref 133–144)
SPECIMEN SOURCE: NORMAL
TRIGL SERPL-MCNC: 49 MG/DL
WBC # BLD AUTO: 5.3 10E9/L (ref 4–11)
WET PREP SPEC: NORMAL

## 2018-12-31 PROCEDURE — 87389 HIV-1 AG W/HIV-1&-2 AB AG IA: CPT | Performed by: PHYSICIAN ASSISTANT

## 2018-12-31 PROCEDURE — 87210 SMEAR WET MOUNT SALINE/INK: CPT | Performed by: PHYSICIAN ASSISTANT

## 2018-12-31 PROCEDURE — 87624 HPV HI-RISK TYP POOLED RSLT: CPT | Performed by: PHYSICIAN ASSISTANT

## 2018-12-31 PROCEDURE — 36415 COLL VENOUS BLD VENIPUNCTURE: CPT | Performed by: PHYSICIAN ASSISTANT

## 2018-12-31 PROCEDURE — 99396 PREV VISIT EST AGE 40-64: CPT | Performed by: PHYSICIAN ASSISTANT

## 2018-12-31 PROCEDURE — 80053 COMPREHEN METABOLIC PANEL: CPT | Performed by: PHYSICIAN ASSISTANT

## 2018-12-31 PROCEDURE — G0145 SCR C/V CYTO,THINLAYER,RESCR: HCPCS | Performed by: PHYSICIAN ASSISTANT

## 2018-12-31 PROCEDURE — 80061 LIPID PANEL: CPT | Performed by: PHYSICIAN ASSISTANT

## 2018-12-31 PROCEDURE — 85027 COMPLETE CBC AUTOMATED: CPT | Performed by: PHYSICIAN ASSISTANT

## 2018-12-31 ASSESSMENT — ENCOUNTER SYMPTOMS
HEADACHES: 0
JOINT SWELLING: 0
DIZZINESS: 0
SORE THROAT: 0
HEMATURIA: 0
PALPITATIONS: 0
ABDOMINAL PAIN: 0
FREQUENCY: 0
BREAST MASS: 0
DYSURIA: 0
NERVOUS/ANXIOUS: 0
HEMATOCHEZIA: 0
COUGH: 0
CONSTIPATION: 0
FEVER: 0
NAUSEA: 0
EYE PAIN: 0
MYALGIAS: 0
WEAKNESS: 0
DIARRHEA: 0
CHILLS: 0
PARESTHESIAS: 0
ARTHRALGIAS: 0
SHORTNESS OF BREATH: 0
HEARTBURN: 0

## 2018-12-31 ASSESSMENT — MIFFLIN-ST. JEOR: SCORE: 1595.35

## 2018-12-31 ASSESSMENT — PAIN SCALES - GENERAL: PAINLEVEL: NO PAIN (0)

## 2019-01-03 ENCOUNTER — MYC MEDICAL ADVICE (OUTPATIENT)
Dept: FAMILY MEDICINE | Facility: OTHER | Age: 44
End: 2019-01-03

## 2019-01-03 DIAGNOSIS — E78.5 HYPERLIPIDEMIA LDL GOAL <130: Primary | ICD-10-CM

## 2019-01-03 DIAGNOSIS — E80.6 HYPERBILIRUBINEMIA: ICD-10-CM

## 2019-01-29 ENCOUNTER — MYC MEDICAL ADVICE (OUTPATIENT)
Dept: FAMILY MEDICINE | Facility: OTHER | Age: 44
End: 2019-01-29

## 2019-02-06 ENCOUNTER — MYC MEDICAL ADVICE (OUTPATIENT)
Dept: FAMILY MEDICINE | Facility: OTHER | Age: 44
End: 2019-02-06

## 2019-02-07 NOTE — TELEPHONE ENCOUNTER
Spoke with the patient in regards to the questions she had.  My suspicion that all of her signs and symptoms from the waist down are related to her feet and ankle problems.  Made some suggestions as to increasing her calcium to see if that would help plenty of fluids and observation.  Recommended hot bath if at all possible.    The jaw problem is most likely related to known temporomandibular joint dysfunction.  Soft foods encouraged as well as ongoing anti-inflammatories.    Discussed that with her known neck problems this may be affecting her hands and further examination may be necessary.  Advised that she do what she can and if not better by the weekend that we see her next week for evaluation and treatment.    I did offer steroids but she declined at this point in time.  Electronically signed:    Otoniel Manuel PA-C

## 2019-02-08 DIAGNOSIS — E78.5 HYPERLIPIDEMIA WITH TARGET LDL LESS THAN 130: ICD-10-CM

## 2019-02-08 DIAGNOSIS — E78.5 HYPERLIPIDEMIA LDL GOAL <130: ICD-10-CM

## 2019-02-08 DIAGNOSIS — E80.6 HYPERBILIRUBINEMIA: ICD-10-CM

## 2019-02-08 LAB
ALBUMIN SERPL-MCNC: 3.8 G/DL (ref 3.4–5)
ALP SERPL-CCNC: 60 U/L (ref 40–150)
ALT SERPL W P-5'-P-CCNC: 27 U/L (ref 0–50)
AST SERPL W P-5'-P-CCNC: 18 U/L (ref 0–45)
BILIRUB DIRECT SERPL-MCNC: 0.2 MG/DL (ref 0–0.2)
BILIRUB SERPL-MCNC: 0.6 MG/DL (ref 0.2–1.3)
CHOLEST SERPL-MCNC: 176 MG/DL
HDLC SERPL-MCNC: 75 MG/DL
LDLC SERPL CALC-MCNC: 93 MG/DL
NONHDLC SERPL-MCNC: 101 MG/DL
PROT SERPL-MCNC: 7.1 G/DL (ref 6.8–8.8)
TRIGL SERPL-MCNC: 40 MG/DL

## 2019-02-08 PROCEDURE — 36415 COLL VENOUS BLD VENIPUNCTURE: CPT | Performed by: PHYSICIAN ASSISTANT

## 2019-02-08 PROCEDURE — 80061 LIPID PANEL: CPT | Performed by: PHYSICIAN ASSISTANT

## 2019-02-08 PROCEDURE — 80076 HEPATIC FUNCTION PANEL: CPT | Performed by: PHYSICIAN ASSISTANT

## 2019-02-22 ENCOUNTER — MYC MEDICAL ADVICE (OUTPATIENT)
Dept: FAMILY MEDICINE | Facility: OTHER | Age: 44
End: 2019-02-22

## 2019-02-22 DIAGNOSIS — M25.529 ARTHRALGIA OF UPPER ARM, UNSPECIFIED LATERALITY: Primary | ICD-10-CM

## 2019-02-22 DIAGNOSIS — S16.1XXA ACUTE CERVICAL MYOFASCIAL STRAIN, INITIAL ENCOUNTER: ICD-10-CM

## 2019-02-22 DIAGNOSIS — R20.0 BILATERAL HAND NUMBNESS: ICD-10-CM

## 2019-02-25 ENCOUNTER — HOSPITAL ENCOUNTER (OUTPATIENT)
Dept: MRI IMAGING | Facility: CLINIC | Age: 44
Discharge: HOME | End: 2019-02-25
Attending: PHYSICIAN ASSISTANT | Admitting: PHYSICIAN ASSISTANT
Payer: COMMERCIAL

## 2019-02-25 DIAGNOSIS — R20.0 BILATERAL HAND NUMBNESS: ICD-10-CM

## 2019-02-25 DIAGNOSIS — S16.1XXA ACUTE CERVICAL MYOFASCIAL STRAIN, INITIAL ENCOUNTER: ICD-10-CM

## 2019-02-25 DIAGNOSIS — M25.529 ARTHRALGIA OF UPPER ARM, UNSPECIFIED LATERALITY: ICD-10-CM

## 2019-02-25 PROCEDURE — 72141 MRI NECK SPINE W/O DYE: CPT

## 2019-02-26 ENCOUNTER — MYC MEDICAL ADVICE (OUTPATIENT)
Dept: FAMILY MEDICINE | Facility: OTHER | Age: 44
End: 2019-02-26

## 2019-03-11 ENCOUNTER — OFFICE VISIT (OUTPATIENT)
Dept: FAMILY MEDICINE | Facility: OTHER | Age: 44
End: 2019-03-11
Payer: COMMERCIAL

## 2019-03-11 VITALS
WEIGHT: 213.8 LBS | SYSTOLIC BLOOD PRESSURE: 130 MMHG | DIASTOLIC BLOOD PRESSURE: 84 MMHG | TEMPERATURE: 98.5 F | BODY MASS INDEX: 34.36 KG/M2 | HEART RATE: 60 BPM | RESPIRATION RATE: 16 BRPM | HEIGHT: 66 IN

## 2019-03-11 DIAGNOSIS — G44.211 INTRACTABLE EPISODIC TENSION-TYPE HEADACHE: ICD-10-CM

## 2019-03-11 DIAGNOSIS — D17.30 LIPOMA OF SKIN AND SUBCUTANEOUS TISSUE: ICD-10-CM

## 2019-03-11 DIAGNOSIS — L98.9 SKIN LESION OF LEFT ARM: Primary | ICD-10-CM

## 2019-03-11 PROCEDURE — 99214 OFFICE O/P EST MOD 30 MIN: CPT | Performed by: PHYSICIAN ASSISTANT

## 2019-03-11 ASSESSMENT — PAIN SCALES - GENERAL: PAINLEVEL: NO PAIN (0)

## 2019-03-11 ASSESSMENT — MIFFLIN-ST. JEOR: SCORE: 1627.49

## 2019-03-11 NOTE — PROGRESS NOTES
SUBJECTIVE:   Amelia Coker is a 44 year old female who presents to clinic today for the following health issues:      HPI  Headache  Onset: Ongoing    Description:   Location: Base of skull to top of head with radiation surrounding the head   Character: James horse with an icec ream headache   Frequency:  Daily  Duration:  03/01    Intensity: mild, severe    Progression of Symptoms:  intermittent    Accompanying Signs & Symptoms:  Stiff neck: YES  Neck or upper back pain: YES  Fever: no  Sinus pressure: no  Nausea or vomiting: YES- Nausea  Dizziness: YES  Numbness: no  Weakness: no  Visual changes: no    History:   Head trauma: YES  Family history of migraines: no  Wake with a headaches: YES  Do headaches wake you up: YES  Daily pain medication use: YES  Work/school stressors/changes: YES    Precipitating factors:   Does light make it worse: YES  Does sound make it worse: no   Smells worse and turning head to the left and looking up    Alleviating factors:  Does sleep help: no    Therapies Tried and outcome: Alcohol , Ibuprofen (Advil, Motrin), Imitrex, Maxalt and Relpax  Problem list and histories reviewed & adjusted, as indicated.  Additional history: She has an odd lesion to the left posterior lateral forearm that has been changing in texture and color and raising concerns for problematic nature within the last week or so.    She has what is suspected to be a lipoma to the right upper back.  We discussed possible resection.    Patient Active Problem List   Diagnosis     Intractable migraine     Bell's palsy     Contact dermatitis and other eczema, due to unspecified cause     Allergic rhinitis due to other allergen     Scalp lesion     Lyme disease     PAC (premature atrial contraction)     Palpitations     Raynaud phenomenon     Cold intolerance of hand     Chronic fatigue     Hair loss     Pain in joint     Abnormal PFT     Shoulder joint instability     Family history of melanoma     MRSA (methicillin  resistant staph aureus) culture positive     Dry eyes     Keratitis sicca     Laceration of foot, right     Family history of colon cancer     Pain in joint, upper arm     Traumatic amputation of fingertip     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     FLORIDALMA positive     Plantar fascial fibromatosis     Foraminal stenosis of lumbar region     DJD (degenerative joint disease), lumbar     Left shoulder pain     Migraine without aura and without status migrainosus, not intractable     Lumbar radiculopathy     Ds DNA antibody positive     Muscular wasting and disuse atrophy, not elsewhere classified     Other postprocedural status(V45.89)     Facial contusion     Frontal headache     Telangiectasia of face     Lateral epicondylitis     Right shoulder pain     Plantar fasciitis     HTN, goal below 140/90     Tooth pain     History of MRSA exposure / infection     Skin lesion     AD (atopic dermatitis)     Heat sensitivity     Rotator cuff arthropathy, right     Contusion of left foot, initial encounter     Gastroesophageal reflux disease, esophagitis presence not specified     Abnormal mammogram     Ringworm of body     Fall from horse, initial encounter     Sternocleidomastoid muscle tenderness     Acute cervical myofascial strain, initial encounter     Spasm of muscle     Hyperlipidemia LDL goal <130     Biceps tendinopathy, right     Superficial swelling of scalp     Intractable right heel pain     Intractable episodic tension-type headache     Lipoma of skin and subcutaneous tissue     Skin lesion of left arm     Past Surgical History:   Procedure Laterality Date     COLONOSCOPY  3/11/2013    Procedure: COLONOSCOPY;  colonoscopy;  Surgeon: Lobito Mas MD;  Location: [facility]     COLONOSCOPY WITH CO2 INSUFFLATION N/A 11/19/2018    Procedure: COLONOSCOPY WITH CO2 INSUFFLATION;  Surgeon: Goyo Blank MD;  Location: [facility]     DECOMPRESSION LUMBAR TWO LEVELS Bilateral 12/8/2016     Procedure: DECOMPRESSION LUMBAR TWO LEVELS;  Surgeon: Bang Burrell MD;  Location: [facility]     ESOPHAGOSCOPY, GASTROSCOPY, DUODENOSCOPY (EGD), COMBINED  2013    Procedure: COMBINED ESOPHAGOSCOPY, GASTROSCOPY, DUODENOSCOPY (EGD), BIOPSY SINGLE OR MULTIPLE;  Esophagoscopy, Gastroscopy, Duodenoscopy EGD with multiple biopsies;  Surgeon: Teja Koch MD;  Location: [facility]     HC REMOVAL OF TONSILS,<11 Y/O      Tonsils <10y.o.     HC TOOTH EXTRACTION W/FORCEP       REPAIR TENDON ELBOW  2014    Procedure: REPAIR TENDON ELBOW;  Surgeon: Chris Johnston MD;  Location: [facility]     ULTRASONIC REMOVAL SOFT TISSUE (LOCATION) Right 2018    Procedure: Tenex right foot;  Surgeon: Patel Martínez DO;  Location: [facility]       Social History     Tobacco Use     Smoking status: Never Smoker     Smokeless tobacco: Never Used   Substance Use Topics     Alcohol use: No     Comment: social     Family History   Problem Relation Age of Onset     Diabetes Mother         adult     Cancer - colorectal Mother      Hypertension Mother      Allergies Mother      Cancer Mother         colon     Depression Mother      Gastrointestinal Disease Mother         ibs     Genitourinary Problems Mother         kidney cyst     Gynecology Mother         endometriosis     Lipids Mother      Thyroid Disease Mother      Arthritis Mother         RA     Heart Disease Maternal Grandfather         MI,  - 60's     Allergies Father      Unknown/Adopted Father      Heart Disease Father         mi-age mid 40's     Cardiovascular Father      Lipids Father      Respiratory Father         asthma     Cancer Father      Other Cancer Father         renal cancer     Depression Sister      Allergies Sister      Gynecology Sister         endometriosis     Lipids Paternal Grandmother      Osteoporosis Paternal Grandmother      Thyroid Disease Paternal Grandmother      Respiratory Son         asthma     Allergies Son      Arthritis Sister       Allergies Brother      Heart Disease Brother      Allergies Daughter      Blood Disease Paternal Aunt         LGL T cell Leukemia     Rheumatoid Arthritis Paternal Aunt      Kidney Disease Maternal Aunt      Lupus Maternal Aunt      Bladder Cancer Maternal Aunt          Current Outpatient Medications   Medication Sig Dispense Refill     Acetaminophen (TYLENOL PO) Take 500 mg by mouth every 6 hours as needed        acyclovir (ZOVIRAX) 5 % ointment Apply topically 6 times daily 15 g 3     Coal Tar Extract 4 % SHAM Externally apply 1 Dose topically daily 168 mL 1     diazepam (VALIUM) 5 MG tablet Take 1 tablet (5 mg) by mouth 3 times daily as needed for muscle spasms 90 tablet 0     diclofenac (VOLTAREN) 1 % GEL topical gel Apply 2 grams to heels up to four times daily using enclosed dosing card. 100 g 1     eletriptan (RELPAX) 20 MG tablet Take 1-2 tablets (20-40 mg) by mouth at onset of headache for migraine May repeat in 2 hours. Max 4 tablets/24 hours. 18 tablet 1     Lifitegrast (XIIDRA) 5 % SOLN        mupirocin (BACTROBAN) 2 % ointment Apply topically 3 times daily 22 g 1     nystatin-triamcinolone (MYCOLOG II) cream Apply topically 2 times daily 15 g 0     POLYVIN ALC-POVIDON DIMETHYLAM (FRESHKOTE) 2.7-2 % SOLN Apply  to eye.       sucralfate (CARAFATE) 1 GM tablet TAKE ONE TABLET BY MOUTH FOUR TIMES A  tablet 0     SUMAtriptan (IMITREX) 100 MG tablet Take 1 tablet by mouth See Admin Instructions. WITH ONSET OF MIGRAINE, MAY REPEAT ONCE AFTER 2 HOURS. DO NOT EXCEED 2 TABLETS IN 24 HOURS. 27 tablet prn     fexofenadine (ALLEGRA) 180 MG tablet Take 1 tablet by mouth daily Reported on 4/5/2017       Allergies   Allergen Reactions     Ceftriaxone Anaphylaxis     Hives, rash, racing heart beat     Bactrim [Sulfamethoxazole W/Trimethoprim] Hives     Ciprofloxacin Other (See Comments)     Tendon Issues     Codeine Nausea and Vomiting     Copper      Rash       Doxycycline      Loss of skin pigmentation,  "skin loss.     Gold      Rash       Iodine-131 Hives     Latex      Levaquin [Levofloxacin] Other (See Comments)     Tendon issues with levaquin and cipro     Nickel      rash     Steel [Staples]      Rash from staples       Penicillins Rash     BP Readings from Last 3 Encounters:   03/11/19 130/84   12/31/18 114/80   12/27/18 127/80    Wt Readings from Last 3 Encounters:   03/11/19 97 kg (213 lb 12.8 oz)   12/31/18 93.3 kg (205 lb 9.6 oz)   12/27/18 95.3 kg (210 lb)                    ROS:  CONSTITUTIONAL: NEGATIVE for fever, chills, change in weight  INTEGUMENTARY/SKIN: POSITIVE for lesion to the left forearm and right upper back other than that fairly typical for her fair skin.  Multiple hemangiomas noted.  ENT/MOUTH: NEGATIVE for ear, mouth and throat problems  RESP: NEGATIVE for significant cough or SOB  CV: NEGATIVE for chest pain, palpitations or peripheral edema  GI: NEGATIVE for nausea, abdominal pain, heartburn, or change in bowel habits  MUSCULOSKELETAL: Positive for neck pain and discomfort as well as low back pain and discomfort which is chronic in nature.  Recent MRI is reviewed with her today.  Advised that she see spine specialist for her neck.  NEURO: NEGATIVE for weakness, dizziness or paresthesias  PSYCHIATRIC: NEGATIVE for changes in mood or affect    OBJECTIVE:     /84 (Cuff Size: Adult Large)   Pulse 60   Temp 98.5  F (36.9  C) (Temporal)   Resp 16   Ht 1.678 m (5' 6.06\")   Wt 97 kg (213 lb 12.8 oz)   BMI 34.45 kg/m    Body mass index is 34.45 kg/m .  GENERAL: healthy, alert and no distress  NECK: no adenopathy, no asymmetry, masses, or scars, trachea midline and normal to palpation and pain is noted to the posterior aspect of the spine at the base of the skull consistent with muscle spasm today.  With some mild pressure to this region and it helps briefly and then once pressure is relieved she tends to have a little bit more discomfort once again.  RESP: lungs clear to auscultation " - no rales, rhonchi or wheezes  CV: regular rate and rhythm, normal S1 S2, no S3 or S4, no murmur, click or rub, no peripheral edema and peripheral pulses strong  MS: extremities normal- no gross deformities noted  SKIN: no suspicious lesions or rashes with exception of the left anterior forearm lesion as discussed above.  This is approximately 1 cm rough diameter.  It does not have good clear margins.  It is irregularly pigmented and has an irregularly surface.  She describes a tingling sensation at the site which makes me think it might be a neuroma.  NEURO: Normal strength and tone, mentation intact and speech normal  PSYCH: mentation appears normal, affect normal/bright    Diagnostic Test Results:  No results found. However, due to the size of the patient record, not all encounters were searched. Please check Results Review for a complete set of results.    ASSESSMENT/PLAN:     1. Skin lesion of left arm  2. Lipoma of skin and subcutaneous tissue  Advised that we will certainly take the lesion off the left forearm and discuss the potential of scarring.  If we can make a good argument to remove the lipoma we will consider doing that as well.  This will need to be the last patient of my afternoon over the last patient of my morning.  40 minutes is desired for surgical intervention.    3. Intractable episodic tension-type headache  I suspect that this is coming from her neck based on MRI results.  Advised that she contact spine surgery folks to consider injection to help with muscle spasm and overall pain in this region.  Further intervention based on their assessment when she presents to the clinic.  - SPINE SURGERY REFERRAL    ROV as noted above.    Otoniel Manuel PA-C  [facility]

## 2019-03-11 NOTE — PROGRESS NOTES
SUBJECTIVE:   Amelia Coker is a 44 year old female who presents to clinic today for the following health issues:    Patient has 2 lesions that she would like to have removed at this point in time.  The main one is to the left anterior forearm at the lateral aspect of the antecubital fossa.  Is prone to trauma with things that she does in the rubbing of clothing.  It began to bother her about a month ago and has not healed well since.  It has irregular borders and is irregularly pigmented.  It is approximately 1 cm in rough diameter.  It is not tender to touch but it does itch a lot.  Concern of course is for basal cell carcinoma or possibly even melanoma.  The second lesion is to the mid back.  This is more of a cystic structure and I suspected to be either lipoma or a sebaceous cyst deep to the back.  It is approximately at the region of T5 and slightly right of center.  We discussed the risks of scarring, infection, discomfort and poor differentiation of surrounding tissue during surgical excision.  She wishes to proceed.    History of Present Illness     Diet:  Carbohydrate counting  Taking medications regularly:  Yes  Medication side effects:  Not applicable  Additional concerns today:  No    Patient Presents to clinic for lesion removal from the left forearm     Problem list and histories reviewed & adjusted, as indicated.  Additional history: as documented    Patient Active Problem List   Diagnosis     Intractable migraine     Bell's palsy     Contact dermatitis and other eczema, due to unspecified cause     Allergic rhinitis due to other allergen     Scalp lesion     Lyme disease     PAC (premature atrial contraction)     Palpitations     Raynaud phenomenon     Cold intolerance of hand     Chronic fatigue     Hair loss     Pain in joint     Abnormal PFT     Shoulder joint instability     Family history of melanoma     MRSA (methicillin resistant staph aureus) culture positive     Dry eyes     Keratitis  sicca     Laceration of foot, right     Family history of colon cancer     Pain in joint, upper arm     Traumatic amputation of fingertip     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     FLORIDALMA positive     Plantar fascial fibromatosis     Foraminal stenosis of lumbar region     DJD (degenerative joint disease), lumbar     Left shoulder pain     Migraine without aura and without status migrainosus, not intractable     Lumbar radiculopathy     Ds DNA antibody positive     Muscular wasting and disuse atrophy, not elsewhere classified     Other postprocedural status(V45.89)     Facial contusion     Frontal headache     Telangiectasia of face     Lateral epicondylitis     Right shoulder pain     Plantar fasciitis     HTN, goal below 140/90     Tooth pain     History of MRSA exposure / infection     Skin lesion     AD (atopic dermatitis)     Heat sensitivity     Rotator cuff arthropathy, right     Contusion of left foot, initial encounter     Gastroesophageal reflux disease, esophagitis presence not specified     Abnormal mammogram     Ringworm of body     Fall from horse, initial encounter     Sternocleidomastoid muscle tenderness     Acute cervical myofascial strain, initial encounter     Spasm of muscle     Hyperlipidemia LDL goal <130     Biceps tendinopathy, right     Superficial swelling of scalp     Intractable right heel pain     Intractable episodic tension-type headache     Lipoma of skin and subcutaneous tissue     Skin lesion of left arm     Past Surgical History:   Procedure Laterality Date     COLONOSCOPY  3/11/2013    Procedure: COLONOSCOPY;  colonoscopy;  Surgeon: Lobito Mas MD;  Location: [facility]     COLONOSCOPY WITH CO2 INSUFFLATION N/A 11/19/2018    Procedure: COLONOSCOPY WITH CO2 INSUFFLATION;  Surgeon: Goyo Blank MD;  Location: [facility]     DECOMPRESSION LUMBAR TWO LEVELS Bilateral 12/8/2016    Procedure: DECOMPRESSION LUMBAR TWO LEVELS;  Surgeon: Indra  Bang GIFFORD MD;  Location: [facility]     ESOPHAGOSCOPY, GASTROSCOPY, DUODENOSCOPY (EGD), COMBINED  2013    Procedure: COMBINED ESOPHAGOSCOPY, GASTROSCOPY, DUODENOSCOPY (EGD), BIOPSY SINGLE OR MULTIPLE;  Esophagoscopy, Gastroscopy, Duodenoscopy EGD with multiple biopsies;  Surgeon: Teja Koch MD;  Location: [facility]     HC REMOVAL OF TONSILS,<9 Y/O      Tonsils <10y.o.     HC TOOTH EXTRACTION W/FORCEP       REPAIR TENDON ELBOW  2014    Procedure: REPAIR TENDON ELBOW;  Surgeon: Chris Johnston MD;  Location: [facility]     ULTRASONIC REMOVAL SOFT TISSUE (LOCATION) Right 2018    Procedure: Tenex right foot;  Surgeon: Patel Martínez DO;  Location: [facility]       Social History     Tobacco Use     Smoking status: Never Smoker     Smokeless tobacco: Never Used   Substance Use Topics     Alcohol use: No     Comment: social     Family History   Problem Relation Age of Onset     Diabetes Mother         adult     Cancer - colorectal Mother      Hypertension Mother      Allergies Mother      Cancer Mother         colon     Depression Mother      Gastrointestinal Disease Mother         ibs     Genitourinary Problems Mother         kidney cyst     Gynecology Mother         endometriosis     Lipids Mother      Thyroid Disease Mother      Arthritis Mother         RA     Heart Disease Maternal Grandfather         MI,  - 60's     Allergies Father      Unknown/Adopted Father      Heart Disease Father         mi-age mid 40's     Cardiovascular Father      Lipids Father      Respiratory Father         asthma     Cancer Father      Other Cancer Father         renal cancer     Depression Sister      Allergies Sister      Gynecology Sister         endometriosis     Lipids Paternal Grandmother      Osteoporosis Paternal Grandmother      Thyroid Disease Paternal Grandmother      Respiratory Son         asthma     Allergies Son      Arthritis Sister      Allergies Brother      Heart Disease Brother      Allergies  Daughter      Blood Disease Paternal Aunt         LGL T cell Leukemia     Rheumatoid Arthritis Paternal Aunt      Kidney Disease Maternal Aunt      Lupus Maternal Aunt      Bladder Cancer Maternal Aunt          Current Outpatient Medications   Medication Sig Dispense Refill     Acetaminophen (TYLENOL PO) Take 500 mg by mouth every 6 hours as needed        acyclovir (ZOVIRAX) 5 % ointment Apply topically 6 times daily 15 g 3     azithromycin (ZITHROMAX) 250 MG tablet Two tablets first day, then one tablet daily for four days, off for 5 days and repeat. 12 tablet 0     Coal Tar Extract 4 % SHAM Externally apply 1 Dose topically daily 168 mL 1     diazepam (VALIUM) 5 MG tablet Take 1 tablet (5 mg) by mouth 3 times daily as needed for muscle spasms 90 tablet 0     diclofenac (VOLTAREN) 1 % GEL topical gel Apply 2 grams to heels up to four times daily using enclosed dosing card. 100 g 1     eletriptan (RELPAX) 20 MG tablet Take 1-2 tablets (20-40 mg) by mouth at onset of headache for migraine May repeat in 2 hours. Max 4 tablets/24 hours. 18 tablet 1     fexofenadine (ALLEGRA) 180 MG tablet Take 1 tablet by mouth daily Reported on 4/5/2017       Lifitegrast (XIIDRA) 5 % SOLN        mupirocin (BACTROBAN) 2 % ointment Apply topically 3 times daily 22 g 1     nystatin-triamcinolone (MYCOLOG II) cream Apply topically 2 times daily 15 g 0     POLYVIN ALC-POVIDON DIMETHYLAM (FRESHKOTE) 2.7-2 % SOLN Apply  to eye.       sucralfate (CARAFATE) 1 GM tablet TAKE ONE TABLET BY MOUTH FOUR TIMES A  tablet 0     SUMAtriptan (IMITREX) 100 MG tablet Take 1 tablet by mouth See Admin Instructions. WITH ONSET OF MIGRAINE, MAY REPEAT ONCE AFTER 2 HOURS. DO NOT EXCEED 2 TABLETS IN 24 HOURS. 27 tablet prn     Allergies   Allergen Reactions     Ceftriaxone Anaphylaxis     Hives, rash, racing heart beat     Bactrim [Sulfamethoxazole W/Trimethoprim] Hives     Ciprofloxacin Other (See Comments)     Tendon Issues     Codeine Nausea and  Vomiting     Copper      Rash       Doxycycline      Loss of skin pigmentation, skin loss.     Gold      Rash       Iodine-131 Hives     Latex      Levaquin [Levofloxacin] Other (See Comments)     Tendon issues with levaquin and cipro     Nickel      rash     Steel [Staples]      Rash from staples       Penicillins Rash     Recent Labs   Lab Test 02/08/19  0817 12/31/18  0805 08/06/18  1509 03/27/18  0733 08/30/17  0757  09/15/16  1123 06/08/16  0745   A1C  --   --   --   --   --   --   --  5.5   LDL 93 113*  --   --  77  --   --   --    HDL 75 86  --   --  72  --   --   --    TRIG 40 49  --   --  50  --   --   --    ALT 27 23 23  --  22   < >  --   --    CR  --  0.69 0.74  --  0.66   < >  --  0.54   GFRESTIMATED  --  >90 85  --  >90   < >  --  >90  Non  GFR Calc     GFRESTBLACK  --  >90 >90  --  >90   < >  --  >90   GFR Calc     POTASSIUM  --  4.0 3.8  --  4.2   < >  --  4.2   TSH  --   --   --  1.23  --   --  0.84  --     < > = values in this interval not displayed.      BP Readings from Last 3 Encounters:   03/14/19 (!) 137/93   03/14/19 136/81   03/11/19 130/84    Wt Readings from Last 3 Encounters:   03/14/19 96.6 kg (213 lb)   03/14/19 97 kg (213 lb 12.8 oz)   03/11/19 97 kg (213 lb 12.8 oz)                  Labs reviewed in EPIC    ROS:  CONSTITUTIONAL: NEGATIVE for fever, chills, change in weight  INTEGUMENTARY/SKIN: POSITIVE for concerns related to the above lesions.  ENT/MOUTH: NEGATIVE for ear, mouth and throat problems  RESP: NEGATIVE for significant cough or SOB  CV: NEGATIVE for chest pain, palpitations or peripheral edema  GI: NEGATIVE for nausea, abdominal pain, heartburn, or change in bowel habits  MUSCULOSKELETAL: NEGATIVE for significant arthralgias or myalgia  NEURO: NEGATIVE for weakness, dizziness or paresthesias  PSYCHIATRIC: NEGATIVE for changes in mood or affect    OBJECTIVE:     BP (!) 137/93 (Cuff Size: Adult Regular)   Pulse 88   Temp 98.2  F (36.8  C)  (Temporal)   Resp 16   Wt 96.6 kg (213 lb)   BMI 34.32 kg/m    Body mass index is 34.32 kg/m .  GENERAL: healthy, alert and no distress  NECK: no adenopathy, no asymmetry, masses, or scars and trachea midline and normal to palpation  RESP: lungs clear to auscultation - no rales, rhonchi or wheezes  CV: regular rate and rhythm, normal S1 S2, no S3 or S4, no murmur, click or rub, no peripheral edema and peripheral pulses strong  ABDOMEN: soft, nontender, no hepatosplenomegaly, no masses and bowel sounds normal  MS: no gross musculoskeletal defects noted, no edema  SKIN: Curious 1cm lesion to the left anterior forearm as noted above.  No pleural lessons is noted at this point in time.  She states that this does tingle a bit and may be even itch on occasion.  Makes me wonder if this may be a neuroma.  The lesion to the mid back appears to be either a lipoma or a sebaceous cyst.  1 cm  NEURO: Normal strength and tone, mentation intact and speech normal  PSYCH: mentation appears normal, affect normal/bright    Procedure:  Informed consent is obtained and risk factors discussed to the patients satisfaction.  I answered questions the patient had.  The area of concern is/are cleansed with chlorhexidine scrub since she is allergic to iodine..  The base of the forearm lesion is / are infiltrated with 1% lidocaine with epinephrine local with good effect.  The left anterior forearm lesion was easily removed with sharp shave type dissection and placed in pathology containers which have been labeled appropriately. Cautery is used to obtain hemostasis.       The lesion to the mid back was a little bit more involved.  I ended up having to numb up a region approximately 3 cm long by 2 cm wide.  I then used a #15 blade to sharply dissect to the level of the cyst.  We then explore the region and isolate the cyst as best we can unfortunately it was ruptured in the process and we obtained what appears to be fatty type substance and  granular tissue.  The capsule was then grasped and sharply dissected down to its base and removed.  This is all placed in a sample container for delivery to pathology for further evaluation.  I then closed the lesion using 4-0 Ethilon in a horizontal mattress type suture arrangement.  Surgical sites are cleansed and dressed in the usual fashion.  Patient tolerated the procedure well.  Removal of sutures in 1 week is advised.  Close observation for signs or symptoms of infection is discussed.      ASSESSMENT/PLAN:     1. Visit for screening mammogram  2. Need for prophylactic vaccination and inoculation against influenza  Declines health maintenance intervention today.    3. Skin lesion  4. Sebaceous cyst  We will await pathology prior to any further evaluation or treatment changes.  She is to watch carefully for signs symptoms of infection.  We discussed being careful to keep this area clean and dry over the next 24 hours and then she can shower from there as long as she does not scrub.  Call with any questions that she may have.  - azithromycin (ZITHROMAX) 250 MG tablet; Two tablets first day, then one tablet daily for four days, off for 5 days and repeat.  Dispense: 12 tablet; Refill: 0  - Surgical pathology exam      Otoniel Manuel PA-C  [facility]

## 2019-03-14 ENCOUNTER — OFFICE VISIT (OUTPATIENT)
Dept: FAMILY MEDICINE | Facility: OTHER | Age: 44
End: 2019-03-14
Payer: COMMERCIAL

## 2019-03-14 ENCOUNTER — OFFICE VISIT (OUTPATIENT)
Dept: NEUROSURGERY | Facility: CLINIC | Age: 44
End: 2019-03-14
Payer: COMMERCIAL

## 2019-03-14 VITALS
RESPIRATION RATE: 16 BRPM | TEMPERATURE: 98.2 F | HEART RATE: 88 BPM | SYSTOLIC BLOOD PRESSURE: 137 MMHG | BODY MASS INDEX: 34.32 KG/M2 | DIASTOLIC BLOOD PRESSURE: 93 MMHG | WEIGHT: 213 LBS

## 2019-03-14 VITALS
SYSTOLIC BLOOD PRESSURE: 136 MMHG | DIASTOLIC BLOOD PRESSURE: 81 MMHG | BODY MASS INDEX: 34.36 KG/M2 | HEIGHT: 66 IN | HEART RATE: 101 BPM | WEIGHT: 213.8 LBS

## 2019-03-14 DIAGNOSIS — L98.9 SKIN LESION: Primary | ICD-10-CM

## 2019-03-14 DIAGNOSIS — Z23 NEED FOR PROPHYLACTIC VACCINATION AND INOCULATION AGAINST INFLUENZA: ICD-10-CM

## 2019-03-14 DIAGNOSIS — M54.12 CERVICAL RADICULOPATHY: Primary | ICD-10-CM

## 2019-03-14 DIAGNOSIS — Z12.31 VISIT FOR SCREENING MAMMOGRAM: ICD-10-CM

## 2019-03-14 DIAGNOSIS — L72.3 SEBACEOUS CYST: ICD-10-CM

## 2019-03-14 PROCEDURE — 11401 EXC TR-EXT B9+MARG 0.6-1 CM: CPT | Performed by: PHYSICIAN ASSISTANT

## 2019-03-14 PROCEDURE — 99207 ZZC DROP WITH A PROCEDURE: CPT | Performed by: PHYSICIAN ASSISTANT

## 2019-03-14 PROCEDURE — 11102 TANGNTL BX SKIN SINGLE LES: CPT | Mod: 59 | Performed by: PHYSICIAN ASSISTANT

## 2019-03-14 PROCEDURE — 99203 OFFICE O/P NEW LOW 30 MIN: CPT | Performed by: NURSE PRACTITIONER

## 2019-03-14 PROCEDURE — 88305 TISSUE EXAM BY PATHOLOGIST: CPT | Performed by: PHYSICIAN ASSISTANT

## 2019-03-14 PROCEDURE — 88304 TISSUE EXAM BY PATHOLOGIST: CPT | Performed by: PHYSICIAN ASSISTANT

## 2019-03-14 RX ORDER — AZITHROMYCIN 250 MG/1
TABLET, FILM COATED ORAL
Qty: 12 TABLET | Refills: 0 | Status: SHIPPED | OUTPATIENT
Start: 2019-03-14 | End: 2019-04-11

## 2019-03-14 ASSESSMENT — PAIN SCALES - GENERAL
PAINLEVEL: SEVERE PAIN (6)
PAINLEVEL: SEVERE PAIN (6)

## 2019-03-14 ASSESSMENT — MIFFLIN-ST. JEOR: SCORE: 1627.49

## 2019-03-14 NOTE — LETTER
3/14/2019         RE: Amelia Coker  [address] [place] [id number]        Dear Colleague,    Thank you for referring your patient, Amelia Coker, to the [facility]. Please see a copy of my visit note below.    [facility]  Neurosurgery Clinic Visit      CC: neck and head pain    Primary care Provider: Otoniel Nelson      Reason For Visit:   I was asked by Otoniel Salcedo PA-C to consult on the patient for intractable episodic tension-type headache.      HPI: Amelia Coker is a 44 year old female with a 1 year history of progressive neck pain. She states over the past 3 weeks the pain has progressed to be triggering headaches. She states she has a history of migraines but this is different than her typical migraines. She states the pain originates in her neck and radiates to the top of her head. She denies paresthesias, weakness, foot drop, and bowel/bladder complaints. ROM aggravates the pain and she does not know anything that alleviates the pain.     Pain at its worst 6  Pain right now:  6    Past Medical History:   Diagnosis Date     Abnormal mammogram 9/21/2016    right breast      AD (atopic dermatitis) 10/29/2015     Allergic rhinitis due to other allergen      Arthritis      Bell's palsy      Chronic fatigue 6/6/2012     Chronic infection     MRSA - 2015 scalp and prior to Abdomen     Contact dermatitis and other eczema, due to unspecified cause     eczema     Contusion of left foot, initial encounter 3/16/2016     DJD (degenerative joint disease), lumbar 9/26/2013     DJD (degenerative joint disease), lumbar 9/26/2013     Ds DNA antibody positive 4/16/2014    borderline.      Elevated blood pressure reading without diagnosis of hypertension 12/24/2013     Facial contusion 12/15/2014    hit by horse      Foraminal stenosis of lumbar region 9/26/2013     Frontal headache 12/15/2014     Gastroesophageal reflux disease, esophagitis presence not specified  6/29/2016     Heat sensitivity 10/29/2015     HTN, goal below 140/90 9/23/2015     Hyperlipidemia LDL goal <130 8/30/2017     Irregular heart beat      Keratitis sicca 10/31/2012     Left shoulder pain 9/26/2013     Lumbar radiculopathy 1/22/2014     Migraine headache 10/23/2013     Migraine, unspecified, with intractable migraine, so stated, without mention of status migrainosus      MRSA infection 10/20/2015     Muscular wasting and disuse atrophy, not elsewhere classified 6/9/2014    right SCM, right wrist,       Numbness and tingling     both legs anf feet greater on the left     PAC (premature atrial contraction) 7/15/2010     Plantar fasciitis 9/23/2015     PONV (postoperative nausea and vomiting)      Skin lesion 10/20/2015    posterior superior scalp      Spasm of muscle 7/11/2017     Telangiectasia of face 12/19/2014     Tooth pain 10/20/2015    left lower primary molar        Past Medical History reviewed with patient during visit.    Past Surgical History:   Procedure Laterality Date     COLONOSCOPY  3/11/2013    Procedure: COLONOSCOPY;  colonoscopy;  Surgeon: Lobito Mas MD;  [facility]     COLONOSCOPY WITH CO2 INSUFFLATION N/A 11/19/2018    Procedure: COLONOSCOPY WITH CO2 INSUFFLATION;  Surgeon: Goyo Blank MD;  [facility]: [facility]     DECOMPRESSION LUMBAR TWO LEVELS Bilateral 12/8/2016    Procedure: DECOMPRESSION LUMBAR TWO LEVELS;  Surgeon: Bang Burrell MD;  Location: [facility]     ESOPHAGOSCOPY, GASTROSCOPY, DUODENOSCOPY (EGD), COMBINED  11/8/2013    Procedure: COMBINED ESOPHAGOSCOPY, GASTROSCOPY, DUODENOSCOPY (EGD), BIOPSY SINGLE OR MULTIPLE;  Esophagoscopy, Gastroscopy, Duodenoscopy EGD with multiple biopsies;  Surgeon: Teja Koch MD;  Location: [facility]     HC REMOVAL OF TONSILS,<11 Y/O      Tonsils <10y.o.     HC TOOTH EXTRACTION W/FORCEP       REPAIR TENDON ELBOW  7/9/2014    Procedure: REPAIR TENDON ELBOW;  Surgeon: Chris Johnston MD;  [facility]: [facility]      ULTRASONIC REMOVAL SOFT TISSUE (LOCATION) Right 11/21/2018    Procedure: Tenex right foot;  Surgeon: Patel Martínez DO;  Location: [facility]     Past Surgical History reviewed with patient during visit.    Current Outpatient Medications   Medication     Acetaminophen (TYLENOL PO)     acyclovir (ZOVIRAX) 5 % ointment     Coal Tar Extract 4 % SHAM     diazepam (VALIUM) 5 MG tablet     diclofenac (VOLTAREN) 1 % GEL topical gel     eletriptan (RELPAX) 20 MG tablet     fexofenadine (ALLEGRA) 180 MG tablet     Lifitegrast (XIIDRA) 5 % SOLN     mupirocin (BACTROBAN) 2 % ointment     nystatin-triamcinolone (MYCOLOG II) cream     POLYVIN ALC-POVIDON DIMETHYLAM (FRESHKOTE) 2.7-2 % SOLN     sucralfate (CARAFATE) 1 GM tablet     SUMAtriptan (IMITREX) 100 MG tablet     No current facility-administered medications for this visit.        Allergies   Allergen Reactions     Ceftriaxone Anaphylaxis     Hives, rash, racing heart beat     Bactrim [Sulfamethoxazole W/Trimethoprim] Hives     Ciprofloxacin Other (See Comments)     Tendon Issues     Codeine Nausea and Vomiting     Copper      Rash       Doxycycline      Loss of skin pigmentation, skin loss.     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   Social Needs     Financial resource strain: Not on file     Food insecurity:     Worry: Not on file     Inability: Not on file     Transportation needs:     Medical: Not on file     Non-medical: Not on file   Tobacco Use     Smoking status: Never Smoker     Smokeless tobacco: Never Used    Substance and Sexual Activity     Alcohol use: No     Comment: social     Drug use: No     Sexual activity: Yes     Partners: Male     Birth control/protection: Surgical     Comment: vasectomy   Lifestyle     Physical activity:     Days per week: Not on file     Minutes per session: Not on file     Stress: Not on file   Relationships     Social connections:     Talks on phone: Not on file     Gets together: Not on file     Attends Jehovah's witness service: Not on file     Active member of club or organization: Not on file     Attends meetings of clubs or organizations: Not on file     Relationship status: Not on file     Intimate partner violence:     Fear of current or ex partner: Not on file     Emotionally abused: Not on file     Physically abused: Not on file     Forced sexual activity: Not on file   Other Topics Concern      Service No     Blood Transfusions No     Caffeine Concern No     Comment: 0     Occupational Exposure No     Hobby Hazards Yes     Comment: horses     Sleep Concern No     Stress Concern No     Weight Concern Yes     Special Diet Yes     Comment: nhung's     Back Care No     Exercise Yes     Comment: occ     Bike Helmet Not Asked     Comment: na     Seat Belt Yes     Self-Exams Yes     Comment: occ     Parent/sibling w/ CABG, MI or angioplasty before 65F 55M? Not Asked   Social History Narrative     Not on file       Family History   Problem Relation Age of Onset     Diabetes Mother         adult     Cancer - colorectal Mother      Hypertension Mother      Allergies Mother      Cancer Mother         colon     Depression Mother      Gastrointestinal Disease Mother         ibs     Genitourinary Problems Mother         kidney cyst     Gynecology Mother         endometriosis     Lipids Mother      Thyroid Disease Mother      Arthritis Mother         RA     Heart Disease Maternal Grandfather         MI,  - 60's     Allergies Father      Unknown/Adopted Father      Heart Disease Father          mi-age mid 40's     Cardiovascular Father      Lipids Father      Respiratory Father         asthma     Cancer Father      Other Cancer Father         renal cancer     Depression Sister      Allergies Sister      Gynecology Sister         endometriosis     Lipids Paternal Grandmother      Osteoporosis Paternal Grandmother      Thyroid Disease Paternal Grandmother      Respiratory Son         asthma     Allergies Son      Arthritis Sister      Allergies Brother      Heart Disease Brother      Allergies Daughter      Blood Disease Paternal Aunt         LGL T cell Leukemia     Rheumatoid Arthritis Paternal Aunt      Kidney Disease Maternal Aunt      Lupus Maternal Aunt      Bladder Cancer Maternal Aunt          ROS: 10 point ROS neg other than the symptoms noted above in the HPI.    Vital Signs:       Examination:  Constitutional:  Alert, well nourished, NAD.  Memory: recent and remote memory   HEENT: Normocephalic, atraumatic.   Pulm:  Without shortness of breath   CV:  No pitting edema of BLE.    Neurological:  Awake  Alert  Oriented x 3  Speech clear  Motor exam   Shoulder Abduction:  Right:  5/5   Left:  5/5  Biceps:                      Right:  5/5   Left:  5/5  Triceps:                     Right:  5/5   Left:  5/5  Wrist Extensors:       Right:  5/5   Left:  5/5  Wrist Flexors:           Right:  5/5   Left:  5/5  Intrinsics:                   Right:  5/5   Left:  5/5   Hip Flexor:                Right: 5/5  Left:  5/5  Hip Adductor:             Right:  5/5  Left:  5/5  Hip Abductor:             Right:  5/5  Left:  5/5  Gastroc Soleus:        Right:  5/5  Left:  5/5  Tib/Ant:                      Right:  5/5  Left:  5/5  EHL:                          Right:  5/5  Left:  5/5   Sensation normal to bilateral upper and lower extremities  Muscle tone to bilateral upper and lower extremities   Gait: Able to stand from a seated position. Normal non-antalgic, non-myelopathic gait.  Able to heel/toe walk without  loss of balance  Cervical examination reveals good range of motion.  No tenderness to palpation of the cervical spine or paraspinous muscles bilaterally.     Imaging: Cervical MRI 2/25/2019    IMPRESSION:  1. C5-C6 degenerative disc disease with broad-based disc protrusion producing mild central and mild right-sided foraminal stenosis. Changes at this level are slightly more advanced than the prior study.  2. C6-C7 degenerative disc disease with secondary mild central canal stenosis and right-sided foraminal stenosis. Changes at this level are stable since the prior study.      Assessment/Plan:    Amelia Coker is a 44 year old female with a 1 year history of progressive neck pain. She states over the past 3 weeks the pain has progressed to be triggering headaches. She states she has a history of migraines but this is different than her typical migraines. She states the pain originates in her neck and radiates to the top of her head. She denies paresthesias, weakness, foot drop, and bowel/bladder complaints. ROM aggravates the pain and she does not know anything that alleviates the pain. Discussed starting physical therapy. She states scheduling physical therapy is difficult due to her work hours. She states she will attempt to find someone who has availability before 8 am. Discussed trying a cervical FRANCISCO. Patient verbalized understanding and agreement.    Patient Instructions   -Physical therapy ordered. They will contact you to schedule.  -Cervical steroid injection ordered. They will contact you to schedule.  -Please contact the clinic if pain persists at [phone].      Emily Conrad CNP  [facility]  [address] [address]    Tel [phone]  Pager [phone]      Again, thank you for allowing me to participate in the care of your patient.        Sincerely,        Emily Conrad NP

## 2019-03-14 NOTE — PROGRESS NOTES
[facility]  Neurosurgery Clinic Visit      CC: neck and head pain    Primary care Provider: Otoniel Nelson      Reason For Visit:   I was asked by Otoniel Salcedo PA-C to consult on the patient for intractable episodic tension-type headache.      HPI: Amelia Coker is a 44 year old female with a 1 year history of progressive neck pain. She states over the past 3 weeks the pain has progressed to be triggering headaches. She states she has a history of migraines but this is different than her typical migraines. She states the pain originates in her neck and radiates to the top of her head. She denies paresthesias, weakness, foot drop, and bowel/bladder complaints. ROM aggravates the pain and she does not know anything that alleviates the pain.     Pain at its worst 6  Pain right now:  6    Past Medical History:   Diagnosis Date     Abnormal mammogram 9/21/2016    right breast      AD (atopic dermatitis) 10/29/2015     Allergic rhinitis due to other allergen      Arthritis      Bell's palsy      Chronic fatigue 6/6/2012     Chronic infection     MRSA - 2015 scalp and prior to Abdomen     Contact dermatitis and other eczema, due to unspecified cause     eczema     Contusion of left foot, initial encounter 3/16/2016     DJD (degenerative joint disease), lumbar 9/26/2013     DJD (degenerative joint disease), lumbar 9/26/2013     Ds DNA antibody positive 4/16/2014    borderline.      Elevated blood pressure reading without diagnosis of hypertension 12/24/2013     Facial contusion 12/15/2014    hit by horse      Foraminal stenosis of lumbar region 9/26/2013     Frontal headache 12/15/2014     Gastroesophageal reflux disease, esophagitis presence not specified 6/29/2016     Heat sensitivity 10/29/2015     HTN, goal below 140/90 9/23/2015     Hyperlipidemia LDL goal <130 8/30/2017     Irregular heart beat      Keratitis sicca 10/31/2012     Left shoulder pain 9/26/2013     Lumbar radiculopathy 1/22/2014      Migraine headache 10/23/2013     Migraine, unspecified, with intractable migraine, so stated, without mention of status migrainosus      MRSA infection 10/20/2015     Muscular wasting and disuse atrophy, not elsewhere classified 6/9/2014    right SCM, right wrist,       Numbness and tingling     both legs anf feet greater on the left     PAC (premature atrial contraction) 7/15/2010     Plantar fasciitis 9/23/2015     PONV (postoperative nausea and vomiting)      Skin lesion 10/20/2015    posterior superior scalp      Spasm of muscle 7/11/2017     Telangiectasia of face 12/19/2014     Tooth pain 10/20/2015    left lower primary molar        Past Medical History reviewed with patient during visit.    Past Surgical History:   Procedure Laterality Date     COLONOSCOPY  3/11/2013    Procedure: COLONOSCOPY;  colonoscopy;  Surgeon: Lobito Mas MD;  [facility]     COLONOSCOPY WITH CO2 INSUFFLATION N/A 11/19/2018    Procedure: COLONOSCOPY WITH CO2 INSUFFLATION;  Surgeon: Goyo Blank MD;  [facility]: [facility]     DECOMPRESSION LUMBAR TWO LEVELS Bilateral 12/8/2016    Procedure: DECOMPRESSION LUMBAR TWO LEVELS;  Surgeon: Bang Burrell MD;  Location: [facility]     ESOPHAGOSCOPY, GASTROSCOPY, DUODENOSCOPY (EGD), COMBINED  11/8/2013    Procedure: COMBINED ESOPHAGOSCOPY, GASTROSCOPY, DUODENOSCOPY (EGD), BIOPSY SINGLE OR MULTIPLE;  Esophagoscopy, Gastroscopy, Duodenoscopy EGD with multiple biopsies;  Surgeon: Teja Koch MD;  Location: [facility]     HC REMOVAL OF TONSILS,<13 Y/O      Tonsils <10y.o.     HC TOOTH EXTRACTION W/FORCEP       REPAIR TENDON ELBOW  7/9/2014    Procedure: REPAIR TENDON ELBOW;  Surgeon: Chris Johnston MD;  [facility]     ULTRASONIC REMOVAL SOFT TISSUE (LOCATION) Right 11/21/2018    Procedure: Tenex right foot;  Surgeon: Patel Martínez DO;  Location: [facility]     Past Surgical History reviewed with patient during visit.    Current Outpatient Medications   Medication      Acetaminophen (TYLENOL PO)     acyclovir (ZOVIRAX) 5 % ointment     Coal Tar Extract 4 % SHAM     diazepam (VALIUM) 5 MG tablet     diclofenac (VOLTAREN) 1 % GEL topical gel     eletriptan (RELPAX) 20 MG tablet     fexofenadine (ALLEGRA) 180 MG tablet     Lifitegrast (XIIDRA) 5 % SOLN     mupirocin (BACTROBAN) 2 % ointment     nystatin-triamcinolone (MYCOLOG II) cream     POLYVIN ALC-POVIDON DIMETHYLAM (FRESHKOTE) 2.7-2 % SOLN     sucralfate (CARAFATE) 1 GM tablet     SUMAtriptan (IMITREX) 100 MG tablet     No current facility-administered medications for this visit.        Allergies   Allergen Reactions     Ceftriaxone Anaphylaxis     Hives, rash, racing heart beat     Bactrim [Sulfamethoxazole W/Trimethoprim] Hives     Ciprofloxacin Other (See Comments)     Tendon Issues     Codeine Nausea and Vomiting     Copper      Rash       Doxycycline      Loss of skin pigmentation, skin loss.     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   Social Needs     Financial resource strain: Not on file     Food insecurity:     Worry: Not on file     Inability: Not on file     Transportation needs:     Medical: Not on file     Non-medical: Not on file   Tobacco Use     Smoking status: Never Smoker     Smokeless tobacco: Never Used   Substance and Sexual Activity     Alcohol use: No     Comment: social     Drug use: No     Sexual activity: Yes     Partners: Male     Birth control/protection: Surgical     Comment: vasectomy   Lifestyle     Physical activity:     Days per week: Not on  file     Minutes per session: Not on file     Stress: Not on file   Relationships     Social connections:     Talks on phone: Not on file     Gets together: Not on file     Attends Restorationism service: Not on file     Active member of club or organization: Not on file     Attends meetings of clubs or organizations: Not on file     Relationship status: Not on file     Intimate partner violence:     Fear of current or ex partner: Not on file     Emotionally abused: Not on file     Physically abused: Not on file     Forced sexual activity: Not on file   Other Topics Concern      Service No     Blood Transfusions No     Caffeine Concern No     Comment: 0     Occupational Exposure No     Hobby Hazards Yes     Comment: horses     Sleep Concern No     Stress Concern No     Weight Concern Yes     Special Diet Yes     Comment: nhung's     Back Care No     Exercise Yes     Comment: occ     Bike Helmet Not Asked     Comment: na     Seat Belt Yes     Self-Exams Yes     Comment: occ     Parent/sibling w/ CABG, MI or angioplasty before 65F 55M? Not Asked   Social History Narrative     Not on file       Family History   Problem Relation Age of Onset     Diabetes Mother         adult     Cancer - colorectal Mother      Hypertension Mother      Allergies Mother      Cancer Mother         colon     Depression Mother      Gastrointestinal Disease Mother         ibs     Genitourinary Problems Mother         kidney cyst     Gynecology Mother         endometriosis     Lipids Mother      Thyroid Disease Mother      Arthritis Mother         RA     Heart Disease Maternal Grandfather         MI,  - 60's     Allergies Father      Unknown/Adopted Father      Heart Disease Father         mi-age mid 40's     Cardiovascular Father      Lipids Father      Respiratory Father         asthma     Cancer Father      Other Cancer Father         renal cancer     Depression Sister      Allergies Sister      Gynecology Sister          endometriosis     Lipids Paternal Grandmother      Osteoporosis Paternal Grandmother      Thyroid Disease Paternal Grandmother      Respiratory Son         asthma     Allergies Son      Arthritis Sister      Allergies Brother      Heart Disease Brother      Allergies Daughter      Blood Disease Paternal Aunt         LGL T cell Leukemia     Rheumatoid Arthritis Paternal Aunt      Kidney Disease Maternal Aunt      Lupus Maternal Aunt      Bladder Cancer Maternal Aunt          ROS: 10 point ROS neg other than the symptoms noted above in the HPI.    Vital Signs:       Examination:  Constitutional:  Alert, well nourished, NAD.  Memory: recent and remote memory   HEENT: Normocephalic, atraumatic.   Pulm:  Without shortness of breath   CV:  No pitting edema of BLE.    Neurological:  Awake  Alert  Oriented x 3  Speech clear  Motor exam   Shoulder Abduction:  Right:  5/5   Left:  5/5  Biceps:                      Right:  5/5   Left:  5/5  Triceps:                     Right:  5/5   Left:  5/5  Wrist Extensors:       Right:  5/5   Left:  5/5  Wrist Flexors:           Right:  5/5   Left:  5/5  Intrinsics:                   Right:  5/5   Left:  5/5   Hip Flexor:                Right: 5/5  Left:  5/5  Hip Adductor:             Right:  5/5  Left:  5/5  Hip Abductor:             Right:  5/5  Left:  5/5  Gastroc Soleus:        Right:  5/5  Left:  5/5  Tib/Ant:                      Right:  5/5  Left:  5/5  EHL:                          Right:  5/5  Left:  5/5   Sensation normal to bilateral upper and lower extremities  Muscle tone to bilateral upper and lower extremities   Gait: Able to stand from a seated position. Normal non-antalgic, non-myelopathic gait.  Able to heel/toe walk without loss of balance  Cervical examination reveals good range of motion.  No tenderness to palpation of the cervical spine or paraspinous muscles bilaterally.     Imaging: Cervical MRI 2/25/2019    IMPRESSION:  1. C5-C6 degenerative disc disease with  broad-based disc protrusion producing mild central and mild right-sided foraminal stenosis. Changes at this level are slightly more advanced than the prior study.  2. C6-C7 degenerative disc disease with secondary mild central canal stenosis and right-sided foraminal stenosis. Changes at this level are stable since the prior study.      Assessment/Plan:    Amelia Coker is a 44 year old female with a 1 year history of progressive neck pain. She states over the past 3 weeks the pain has progressed to be triggering headaches. She states she has a history of migraines but this is different than her typical migraines. She states the pain originates in her neck and radiates to the top of her head. She denies paresthesias, weakness, foot drop, and bowel/bladder complaints. ROM aggravates the pain and she does not know anything that alleviates the pain. Discussed starting physical therapy. She states scheduling physical therapy is difficult due to her work hours. She states she will attempt to find someone who has availability before 8 am. Discussed trying a cervical FRANCISCO. Patient verbalized understanding and agreement.    Patient Instructions   -Physical therapy ordered. They will contact you to schedule.  -Cervical steroid injection ordered. They will contact you to schedule.  -Please contact the clinic if pain persists at [phone].      Emily Conrad, CNP  [facility]  [address] [address]    Tel [phone]  Pager [phone]

## 2019-03-14 NOTE — PATIENT INSTRUCTIONS
-Physical therapy ordered. They will contact you to schedule.  -Cervical steroid injection ordered. They will contact you to schedule.  -Please contact the clinic if pain persists at [phone].

## 2019-03-15 ENCOUNTER — TELEPHONE (OUTPATIENT)
Dept: SURGERY | Facility: CLINIC | Age: 44
End: 2019-03-15

## 2019-03-19 LAB — COPATH REPORT: NORMAL

## 2019-03-20 ENCOUNTER — ALLIED HEALTH/NURSE VISIT (OUTPATIENT)
Dept: FAMILY MEDICINE | Facility: OTHER | Age: 44
End: 2019-03-20
Payer: COMMERCIAL

## 2019-03-20 VITALS — TEMPERATURE: 98.4 F

## 2019-03-20 DIAGNOSIS — Z48.02 ENCOUNTER FOR REMOVAL OF SUTURES: Primary | ICD-10-CM

## 2019-03-20 PROCEDURE — 99207 ZZC NO CHARGE NURSE ONLY: CPT

## 2019-03-20 NOTE — PROGRESS NOTES
Amelia Coker presents to the clinic today for  removal of sutures.  The patient has had the sutures in place for 7 days.    There has been no history of infection or drainage.    O: 1 running mattress suture is seen located on the back.  The wound is healing well with no signs of infection.    Tetanus status is up to date.    A: Suture removal.    P:  All sutures were easily removed today.  Routine wound care discussed.  The patient will follow up as needed.    JESSY LinN, RN  [facility]

## 2019-04-11 ENCOUNTER — OFFICE VISIT (OUTPATIENT)
Dept: OBGYN | Facility: OTHER | Age: 44
End: 2019-04-11
Payer: COMMERCIAL

## 2019-04-11 ENCOUNTER — HOSPITAL ENCOUNTER (OUTPATIENT)
Dept: ULTRASOUND IMAGING | Facility: CLINIC | Age: 44
Discharge: HOME | End: 2019-04-11
Attending: ADVANCED PRACTICE MIDWIFE | Admitting: ADVANCED PRACTICE MIDWIFE
Payer: COMMERCIAL

## 2019-04-11 VITALS
DIASTOLIC BLOOD PRESSURE: 84 MMHG | HEART RATE: 68 BPM | WEIGHT: 214.5 LBS | BODY MASS INDEX: 34.56 KG/M2 | SYSTOLIC BLOOD PRESSURE: 124 MMHG

## 2019-04-11 DIAGNOSIS — N92.0 MENORRHAGIA WITH REGULAR CYCLE: ICD-10-CM

## 2019-04-11 DIAGNOSIS — N92.0 MENORRHAGIA WITH REGULAR CYCLE: Primary | ICD-10-CM

## 2019-04-11 DIAGNOSIS — N83.292 COMPLEX CYST OF LEFT OVARY: Primary | ICD-10-CM

## 2019-04-11 PROBLEM — D17.30 LIPOMA OF SKIN AND SUBCUTANEOUS TISSUE: Status: RESOLVED | Noted: 2019-03-11 | Resolved: 2019-04-11
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
## 2019-04-11 PROBLEM — L98.9 SKIN LESION OF LEFT ARM: Status: RESOLVED | Noted: 2019-03-11 | Resolved: 2019-04-11

## 2019-04-11 LAB
ERYTHROCYTE [DISTWIDTH] IN BLOOD BY AUTOMATED COUNT: 13.1 % (ref 10–15)
HCT VFR BLD AUTO: 37.2 % (ref 35–47)
HGB BLD-MCNC: 12.5 G/DL (ref 11.7–15.7)
MCH RBC QN AUTO: 31 PG (ref 26.5–33)
MCHC RBC AUTO-ENTMCNC: 33.6 G/DL (ref 31.5–36.5)
MCV RBC AUTO: 92 FL (ref 78–100)
PLATELET # BLD AUTO: 316 10E9/L (ref 150–450)
RBC # BLD AUTO: 4.03 10E12/L (ref 3.8–5.2)
TSH SERPL DL<=0.005 MIU/L-ACNC: 1.64 MU/L (ref 0.4–4)
WBC # BLD AUTO: 5.7 10E9/L (ref 4–11)

## 2019-04-11 PROCEDURE — 36415 COLL VENOUS BLD VENIPUNCTURE: CPT | Performed by: ADVANCED PRACTICE MIDWIFE

## 2019-04-11 PROCEDURE — 84443 ASSAY THYROID STIM HORMONE: CPT | Performed by: ADVANCED PRACTICE MIDWIFE

## 2019-04-11 PROCEDURE — 99203 OFFICE O/P NEW LOW 30 MIN: CPT | Performed by: ADVANCED PRACTICE MIDWIFE

## 2019-04-11 PROCEDURE — 76830 TRANSVAGINAL US NON-OB: CPT

## 2019-04-11 PROCEDURE — 85027 COMPLETE CBC AUTOMATED: CPT | Performed by: ADVANCED PRACTICE MIDWIFE

## 2019-04-11 NOTE — PROGRESS NOTES
Amelia Coker is a 44 year old who presents to the clinic for evaluation of menorrhagia.   Has always had regular fairly light to moderate menses other than for no apparent reason had a two year break in between children.   Range is 26-31/7 days/ light/mod/light.   Vasectomy for birth control.   Has had a pinching sensation from time to time with movement in her right side.  Not overly troubling but constant.   About 2 weeks ago had an episode of 9/10 sharp pain on her right side for about 30 min.   Relieved spontaneously.  Then about 5 days ago same pain repeated for 90 min resolving spontaneously.   Her bleeding continues with bright red bleeding, and small clots, sometimes heavy.   No change in bowel or bladder        Histories reviewed and updated  Past Medical History:   Diagnosis Date     Abnormal mammogram 9/21/2016    right breast      AD (atopic dermatitis) 10/29/2015     Allergic rhinitis due to other allergen      Arthritis      Bell's palsy      Chronic fatigue 6/6/2012     Chronic infection     MRSA - 2015 scalp and prior to Abdomen     Contact dermatitis and other eczema, due to unspecified cause     eczema     Contusion of left foot, initial encounter 3/16/2016     DJD (degenerative joint disease), lumbar 9/26/2013     DJD (degenerative joint disease), lumbar 9/26/2013     Ds DNA antibody positive 4/16/2014    borderline.      Elevated blood pressure reading without diagnosis of hypertension 12/24/2013     Facial contusion 12/15/2014    hit by horse      Foraminal stenosis of lumbar region 9/26/2013     Frontal headache 12/15/2014     Gastroesophageal reflux disease, esophagitis presence not specified 6/29/2016     Heat sensitivity 10/29/2015     HTN, goal below 140/90 9/23/2015     Hyperlipidemia LDL goal <130 8/30/2017     Irregular heart beat      Keratitis sicca 10/31/2012     Left shoulder pain 9/26/2013     Lumbar radiculopathy 1/22/2014     Migraine headache 10/23/2013     Migraine,  unspecified, with intractable migraine, so stated, without mention of status migrainosus      MRSA infection 10/20/2015     Muscular wasting and disuse atrophy, not elsewhere classified 6/9/2014    right SCM, right wrist,       Numbness and tingling     both legs anf feet greater on the left     PAC (premature atrial contraction) 7/15/2010     Plantar fasciitis 9/23/2015     PONV (postoperative nausea and vomiting)      Skin lesion 10/20/2015    posterior superior scalp      Spasm of muscle 7/11/2017     Telangiectasia of face 12/19/2014     Tooth pain 10/20/2015    left lower primary molar      Past Surgical History:   Procedure Laterality Date     COLONOSCOPY  3/11/2013    Procedure: COLONOSCOPY;  colonoscopy;  Surgeon: Lobito Mas MD;  Location: [facility]     COLONOSCOPY WITH CO2 INSUFFLATION N/A 11/19/2018    Procedure: COLONOSCOPY WITH CO2 INSUFFLATION;  Surgeon: Goyo Blank MD;  Location: [facility]     DECOMPRESSION LUMBAR TWO LEVELS Bilateral 12/8/2016    Procedure: DECOMPRESSION LUMBAR TWO LEVELS;  Surgeon: Bang Burrell MD;  Location: [facility]     ESOPHAGOSCOPY, GASTROSCOPY, DUODENOSCOPY (EGD), COMBINED  11/8/2013    Procedure: COMBINED ESOPHAGOSCOPY, GASTROSCOPY, DUODENOSCOPY (EGD), BIOPSY SINGLE OR MULTIPLE;  Esophagoscopy, Gastroscopy, Duodenoscopy EGD with multiple biopsies;  Surgeon: Teja Koch MD;  Location: [facility]     HC REMOVAL OF TONSILS,<13 Y/O      Tonsils <10y.o.     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   Social Needs     Financial resource strain: Not on file     Food insecurity:     Worry: Not on file     Inability: Not on file     Transportation needs:     Medical: Not on file     Non-medical: Not on file   Tobacco Use     Smoking status: Never Smoker     Smokeless tobacco: Never Used   Substance and Sexual Activity     Alcohol use: No     Comment: social     Drug use: No     Sexual activity: Yes     Partners: Male     Birth control/protection: Surgical     Comment: vasectomy   Lifestyle     Physical activity:     Days per week: Not on file     Minutes per session: Not on file     Stress: Not on file   Relationships     Social connections:     Talks on phone: Not on file     Gets together: Not on file     Attends Rastafari service: Not on file     Active member of club or organization: Not on file     Attends meetings of clubs or organizations: Not on file     Relationship status: Not on file     Intimate partner violence:     Fear of current or ex partner: Not on file     Emotionally abused: Not on file     Physically abused: Not on file     Forced sexual activity: Not on file   Other Topics Concern      Service No     Blood Transfusions No     Caffeine Concern No     Comment: 0     Occupational Exposure No     Hobby Hazards Yes     Comment: horses     Sleep Concern No     Stress Concern No     Weight Concern Yes     Special Diet Yes     Comment: nhung's     Back Care No     Exercise Yes     Comment: occ     Bike Helmet Not Asked     Comment: na     Seat Belt Yes     Self-Exams Yes     Comment: occ     Parent/sibling w/ CABG, MI or angioplasty before 65F 55M? Not Asked   Social History Narrative     Not on file     Family History   Problem Relation Age of Onset     Diabetes Mother         adult     Cancer - colorectal Mother      Hypertension Mother      Allergies Mother      Cancer Mother         colon     Depression Mother      Gastrointestinal Disease  Mother         ibs     Genitourinary Problems Mother         kidney cyst     Gynecology Mother         endometriosis     Lipids Mother      Thyroid Disease Mother      Arthritis Mother         RA     Heart Disease Maternal Grandfather         MI,  - 60's     Allergies Father      Unknown/Adopted Father      Heart Disease Father         mi-age mid 40's     Cardiovascular Father      Lipids Father      Respiratory Father         asthma     Cancer Father      Other Cancer Father         renal cancer     Depression Sister      Allergies Sister      Gynecology Sister         endometriosis     Lipids Paternal Grandmother      Osteoporosis Paternal Grandmother      Thyroid Disease Paternal Grandmother      Respiratory Son         asthma     Allergies Son      Arthritis Sister      Allergies Brother      Heart Disease Brother      Allergies Daughter      Blood Disease Paternal Aunt         LGL T cell Leukemia     Rheumatoid Arthritis Paternal Aunt      Kidney Disease Maternal Aunt      Lupus Maternal Aunt      Bladder Cancer Maternal Aunt           ROS: 10 point ROS neg other than the symptoms noted above in the HPI.    EXAM:  /84 (BP Location: Left arm, Patient Position: Sitting, Cuff Size: Adult Large)   Pulse 68   Wt 97.3 kg (214 lb 8 oz)   LMP 2019 (Exact Date)   BMI 34.56 kg/m      : PELVIC EXAM:  Vulva: No external lesions, normal hair distribution, no adenopathy, BUS WNL  Vagina: Moist, pink, no abnormal discharge, well rugated, no lesions  Cervix:, smooth, pink, no visible lesions, neg CMT. Small amount of dark   Uterus: Normal size, anteverted, non-tender, mobile  Ovaries: No mass, non-tender, mobile  Rectal exam: deferred        ASSESSMENT/PLAN:    (N92.0) Menorrhagia with regular cycle  (primary encounter diagnosis)  Comment:   Plan: TSH with free T4 reflex, CBC with platelets, US        Pelvic Complete w Transvaginal          We discussed the possible causes of her pain, simple, complex  or collapsing ovarian cyst.  With follow up and implications.   Less likely cause uterine fibroids.   We also discussed menorrhagia, causes and treatments, depo, Mirena, ablation.   She is very opposed to hormones.   This is her first abnormal period so will follow up if there is recurrence.     Visit length 30 minutes was spent face to face with the patient today discussing her history, diagnosis, and follow-up plan as noted above.  Over 50% of the visit was spent in counseling and coordination of care.    Time noted does not include time required to complete procedures.

## 2019-04-11 NOTE — NURSING NOTE
"Chief Complaint   Patient presents with     Abnormal Uterine Bleeding     mestrual problems     Abdominal Pain     lower right quadrant       Initial /84 (BP Location: Left arm, Patient Position: Sitting, Cuff Size: Adult Large)   Pulse 68   Wt 97.3 kg (214 lb 8 oz)   LMP 03/31/2019 (Exact Date)   BMI 34.56 kg/m   Estimated body mass index is 34.56 kg/m  as calculated from the following:    Height as of 3/14/19: 1.678 m (5' 6.06\").    Weight as of this encounter: 97.3 kg (214 lb 8 oz).  Medication Reconciliation: complete    Marnie Monahan CMA    "

## 2019-04-12 NOTE — TELEPHONE ENCOUNTER
Pt returned call, states she is having stomach issues and will call back if she wants to schedule injection.

## 2019-04-15 ENCOUNTER — MYC MEDICAL ADVICE (OUTPATIENT)
Dept: FAMILY MEDICINE | Facility: OTHER | Age: 44
End: 2019-04-15

## 2019-04-15 ENCOUNTER — MYC MEDICAL ADVICE (OUTPATIENT)
Dept: OBGYN | Facility: OTHER | Age: 44
End: 2019-04-15

## 2019-05-07 DIAGNOSIS — M62.838 MUSCLE SPASM: ICD-10-CM

## 2019-05-07 DIAGNOSIS — M54.2 CERVICALGIA: ICD-10-CM

## 2019-05-08 NOTE — TELEPHONE ENCOUNTER
Routing refill request to provider for review/approval because:  Drug not active on patient's medication list  Last ov 03/14/2019    Pending Prescriptions:                       Disp   Refills    meloxicam (MOBIC) 15 MG tablet [Pharmacy *90 tab*1            Sig: TAKE ONE TABLET BY MOUTH EVERY DAY (PATIENT WANTS           UNICHEM BRAND ONLY)          Eric Camacho, RN, BSN

## 2019-05-09 RX ORDER — MELOXICAM 15 MG/1
TABLET ORAL
Qty: 90 TABLET | Refills: 1 | Status: SHIPPED | OUTPATIENT
Start: 2019-05-09 | End: 2019-11-18

## 2019-05-09 NOTE — TELEPHONE ENCOUNTER
Prescription approved per [facility] Refill Protocol.    Yudelka Philip RN  [facility]    
meloxicam (MOBIC) 15 MG tablet      Last Written Prescription Date: 12/16/16  Last Quantity: 90, # refills: 1  Last Office Visit with G, P or [facility] prescribing provider: 4/5/17       Creatinine   Date Value Ref Range Status   12/23/2016 0.59 0.52 - 1.04 mg/dL Final     Lab Results   Component Value Date    AST 22 12/23/2016     Lab Results   Component Value Date    ALT 43 12/23/2016     BP Readings from Last 3 Encounters:   04/05/17 136/80   03/14/17 130/80   01/30/17 122/80       
normal...

## 2019-05-16 ENCOUNTER — MYC MEDICAL ADVICE (OUTPATIENT)
Dept: FAMILY MEDICINE | Facility: OTHER | Age: 44
End: 2019-05-16

## 2019-05-20 ENCOUNTER — MYC MEDICAL ADVICE (OUTPATIENT)
Dept: FAMILY MEDICINE | Facility: OTHER | Age: 44
End: 2019-05-20

## 2019-05-20 DIAGNOSIS — K13.0 LIP LESION: Primary | ICD-10-CM

## 2019-05-21 ENCOUNTER — ANCILLARY PROCEDURE (OUTPATIENT)
Dept: ULTRASOUND IMAGING | Facility: OTHER | Age: 44
End: 2019-05-21
Attending: ADVANCED PRACTICE MIDWIFE
Payer: COMMERCIAL

## 2019-05-21 DIAGNOSIS — N83.292 COMPLEX CYST OF LEFT OVARY: ICD-10-CM

## 2019-05-21 PROCEDURE — 76856 US EXAM PELVIC COMPLETE: CPT

## 2019-05-21 PROCEDURE — 76830 TRANSVAGINAL US NON-OB: CPT

## 2019-05-22 NOTE — PROGRESS NOTES
Subjective     Amelia Coker is a 44 year old female who presents to clinic today for the following health issues:    HPI   ABDOMINAL   PAIN     Onset: couple months     Description:   Character: Sharp, Dull ache, Stabbing and Cramping  Location: right upper quadrant jin-umbilical region  Radiation: Back    Intensity: moderate, severe    Progression of Symptoms:  worsening    Accompanying Signs & Symptoms:  Fever/Chills?: no   Gas/Bloating: YES  Nausea: YES  Vomitting: no   Diarrhea?: no   Constipation:no   Dysuria or Hematuria: no    History:   Trauma: no   Previous similar pain: YES- cyst    Previous tests done: none    Precipitating factors:   Does the pain change with:     Food: YES     BM: YES    Urination: YES    Alleviating factors:      Therapies Tried and outcome: Mobic     LMP:  5/12/0209       Patient Active Problem List   Diagnosis     Intractable migraine     Bell's palsy     Contact dermatitis and other eczema, due to unspecified cause     Allergic rhinitis due to other allergen     Scalp lesion     Lyme disease     PAC (premature atrial contraction)     Palpitations     Raynaud phenomenon     Chronic fatigue     Hair loss     Abnormal PFT     Shoulder joint instability     Family history of melanoma     Dry eyes     Keratitis sicca     Family history of colon cancer     Pain in joint, upper arm     FLORIDALMA positive     Plantar fascial fibromatosis     Foraminal stenosis of lumbar region     DJD (degenerative joint disease), lumbar     Lumbar radiculopathy     Ds DNA antibody positive     Muscular wasting and disuse atrophy, not elsewhere classified     Frontal headache     Telangiectasia of face     Lateral epicondylitis     Right shoulder pain     Plantar fasciitis     HTN, goal below 140/90     Skin lesion     AD (atopic dermatitis)     Heat sensitivity     Rotator cuff arthropathy, right     Gastroesophageal reflux disease, esophagitis presence not specified     Abnormal mammogram      Sternocleidomastoid muscle tenderness     Acute cervical myofascial strain, initial encounter     Spasm of muscle     Hyperlipidemia LDL goal <130     Biceps tendinopathy, right     Superficial swelling of scalp     Intractable right heel pain     Intractable episodic tension-type headache     Past Surgical History:   Procedure Laterality Date     COLONOSCOPY  3/11/2013    Procedure: COLONOSCOPY;  colonoscopy;  Surgeon: Lobito Mas MD;  [facility]     COLONOSCOPY WITH CO2 INSUFFLATION N/A 11/19/2018    Procedure: COLONOSCOPY WITH CO2 INSUFFLATION;  Surgeon: Goyo Blank MD;  [facility]     DECOMPRESSION LUMBAR TWO LEVELS Bilateral 12/8/2016    Procedure: DECOMPRESSION LUMBAR TWO LEVELS;  Surgeon: Bang Burrell MD;  Location: [facility]     ESOPHAGOSCOPY, GASTROSCOPY, DUODENOSCOPY (EGD), COMBINED  11/8/2013    Procedure: COMBINED ESOPHAGOSCOPY, GASTROSCOPY, DUODENOSCOPY (EGD), BIOPSY SINGLE OR MULTIPLE;  Esophagoscopy, Gastroscopy, Duodenoscopy EGD with multiple biopsies;  Surgeon: Teja Koch MD;  Location: [facility]     HC REMOVAL OF TONSILS,<9 Y/O      Tonsils <10y.o.     HC TOOTH EXTRACTION W/FORCEP       REPAIR TENDON ELBOW  7/9/2014    Procedure: REPAIR TENDON ELBOW;  Surgeon: Chris Johnston MD;  [facility]: [facility]     ULTRASONIC REMOVAL SOFT TISSUE (LOCATION) Right 11/21/2018    Procedure: Tenex right foot;  Surgeon: Patel Martínez DO;  Location: [facility]       Social History     Tobacco Use     Smoking status: Never Smoker     Smokeless tobacco: Never Used   Substance Use Topics     Alcohol use: No     Comment: social     Family History   Problem Relation Age of Onset     Diabetes Mother         adult     Cancer - colorectal Mother      Hypertension Mother      Allergies Mother      Cancer Mother         colon     Depression Mother      Gastrointestinal Disease Mother         ibs     Genitourinary Problems Mother         kidney cyst     Gynecology Mother          endometriosis     Lipids Mother      Thyroid Disease Mother      Arthritis Mother         RA     Heart Disease Maternal Grandfather         MI,  - 60's     Allergies Father      Unknown/Adopted Father      Heart Disease Father         mi-age mid 40's     Cardiovascular Father      Lipids Father      Respiratory Father         asthma     Cancer Father      Other Cancer Father         renal cancer     Depression Sister      Allergies Sister      Gynecology Sister         endometriosis     Lipids Paternal Grandmother      Osteoporosis Paternal Grandmother      Thyroid Disease Paternal Grandmother      Respiratory Son         asthma     Allergies Son      Arthritis Sister      Allergies Brother      Heart Disease Brother      Allergies Daughter      Blood Disease Paternal Aunt         LGL T cell Leukemia     Rheumatoid Arthritis Paternal Aunt      Kidney Disease Maternal Aunt      Lupus Maternal Aunt      Bladder Cancer Maternal Aunt          Current Outpatient Medications   Medication Sig Dispense Refill     Acetaminophen (TYLENOL PO) Take 500 mg by mouth every 6 hours as needed        acyclovir (ZOVIRAX) 5 % ointment Apply topically 6 times daily 15 g 3     eletriptan (RELPAX) 20 MG tablet Take 1-2 tablets (20-40 mg) by mouth at onset of headache for migraine May repeat in 2 hours. Max 4 tablets/24 hours. 18 tablet 1     fexofenadine (ALLEGRA) 180 MG tablet Take 1 tablet by mouth daily Reported on 2017       meloxicam (MOBIC) 15 MG tablet TAKE ONE TABLET BY MOUTH EVERY DAY (PATIENT WANTS UNICHEM BRAND ONLY) 90 tablet 1     mupirocin (BACTROBAN) 2 % ointment Apply topically 3 times daily 22 g 1     SUMAtriptan (IMITREX) 100 MG tablet Take 1 tablet by mouth See Admin Instructions. WITH ONSET OF MIGRAINE, MAY REPEAT ONCE AFTER 2 HOURS. DO NOT EXCEED 2 TABLETS IN 24 HOURS. 27 tablet prn     Coal Tar Extract 4 % SHAM Externally apply 1 Dose topically daily (Patient not taking: Reported on 2019) 168 mL 1      diazepam (VALIUM) 5 MG tablet Take 1 tablet (5 mg) by mouth 3 times daily as needed for muscle spasms (Patient not taking: Reported on 5/23/2019) 90 tablet 0     diclofenac (VOLTAREN) 1 % GEL topical gel Apply 2 grams to heels up to four times daily using enclosed dosing card. (Patient not taking: Reported on 5/23/2019) 100 g 1     Lifitegrast (XIIDRA) 5 % SOLN        nystatin-triamcinolone (MYCOLOG II) cream Apply topically 2 times daily (Patient not taking: Reported on 5/23/2019) 15 g 0     POLYVIN ALC-POVIDON DIMETHYLAM (FRESHKOTE) 2.7-2 % SOLN Apply  to eye.       sucralfate (CARAFATE) 1 GM tablet TAKE ONE TABLET BY MOUTH FOUR TIMES A DAY (Patient not taking: Reported on 5/23/2019) 120 tablet 0     Allergies   Allergen Reactions     Ceftriaxone Anaphylaxis     Hives, rash, racing heart beat     Bactrim [Sulfamethoxazole W/Trimethoprim] Hives     Ciprofloxacin Other (See Comments)     Tendon Issues     Codeine Nausea and Vomiting     Copper      Rash       Doxycycline      Loss of skin pigmentation, skin loss.     Gold      Rash       Iodine-131 Hives     Latex      Levaquin [Levofloxacin] Other (See Comments)     Tendon issues with levaquin and cipro     Nickel      rash     Steel [Staples]      Rash from staples       Penicillins Rash     Recent Labs   Lab Test 04/11/19  1418 02/08/19  0817 12/31/18  0805 08/06/18  1509 03/27/18  0733 08/30/17  0757  06/08/16  0745   A1C  --   --   --   --   --   --   --  5.5   LDL  --  93 113*  --   --  77  --   --    HDL  --  75 86  --   --  72  --   --    TRIG  --  40 49  --   --  50  --   --    ALT  --  27 23 23  --  22   < >  --    CR  --   --  0.69 0.74  --  0.66   < > 0.54   GFRESTIMATED  --   --  >90 85  --  >90   < > >90  Non  GFR Calc     GFRESTBLACK  --   --  >90 >90  --  >90   < > >90  African American GFR Calc     POTASSIUM  --   --  4.0 3.8  --  4.2   < > 4.2   TSH 1.64  --   --   --  1.23  --    < >  --     < > = values in this interval not  displayed.      BP Readings from Last 3 Encounters:   05/23/19 132/86   04/11/19 124/84   03/14/19 (!) 137/93    Wt Readings from Last 3 Encounters:   05/23/19 95.7 kg (211 lb)   04/11/19 97.3 kg (214 lb 8 oz)   03/14/19 96.6 kg (213 lb)                    Reviewed and updated as needed this visit by Provider         Review of Systems   ROS COMP: CONSTITUTIONAL: NEGATIVE for fever, chills, change in weight  INTEGUMENTARY/SKIN: POSITIVE for skin lesion to the right lower lip that is in need of dermatologic evaluation.  ENT/MOUTH: NEGATIVE for ear, mouth and throat problems  RESP: NEGATIVE for significant cough or SOB  CV: NEGATIVE for chest pain, palpitations or peripheral edema  MUSCULOSKELETAL: NEGATIVE for significant arthralgias or myalgia  NEURO: NEGATIVE for weakness, dizziness or paresthesias  PSYCHIATRIC: NEGATIVE for changes in mood or affect      Objective    There were no vitals taken for this visit.  There is no height or weight on file to calculate BMI.  Physical Exam   GENERAL: healthy, alert and no distress  NECK: no adenopathy, no asymmetry, masses, or scars and trachea midline and normal to palpation  RESP: lungs clear to auscultation - no rales, rhonchi or wheezes  CV: regular rate and rhythm, normal S1 S2, no S3 or S4, no murmur, click or rub, no peripheral edema and peripheral pulses strong  ABDOMEN: tenderness epigastric, RUQ and LUQ, bowel sounds normal and questionable mass to the epigastric region today that feels more firm than I recall in the past.  It does give us pause to consider stool retention versus colonic irritation or redundant colon type of presentation.  MS: no gross musculoskeletal defects noted, no edema  SKIN: She has a lesion to the right lower lip today at the vermilion border that comes and goes and is of concern to her.  This lesion seems to be fairly flat approximately 4 mm in rough diameter and appears to be minimally present today.  No ulceration of this lesion is noted  "at this point time.  Further evaluation and treatment as discussed and desired by the patient.  NEURO: Normal strength and tone, mentation intact and speech normal  PSYCH: fatigued and somewhat more anxious about her abdominal pain and discomfort today.    Diagnostic Test Results:  Labs reviewed in Epic  Xray - air and stool in the intestines noted, no clear mass to correspond with physical findings.        Assessment & Plan     1. Epigastric pain  2. Gastroesophageal reflux disease, esophagitis presence not specified  4. [place] complexion  5. Abdominal distension  Concerns for underlying pathology and possible abdominal mass  - XR Abdomen 2 Views; Future  - CT Abdomen Pelvis w Contrast; Future    3. Lip lesion  See derm for consideration of biopsy  - DERMATOLOGY REFERRAL      BMI:   Estimated body mass index is 33.99 kg/m  as calculated from the following:    Height as of this encounter: 1.678 m (5' 6.06\").    Weight as of this encounter: 95.7 kg (211 lb).   Weight management plan: Discussed healthy diet and exercise guidelines    Return in about 1 month (around 6/20/2019) for recheck of current condition, Medication Recheck, if symptoms do not improve.    Otoniel Manuel PA-C  [facility]    "

## 2019-05-23 ENCOUNTER — ANCILLARY PROCEDURE (OUTPATIENT)
Dept: GENERAL RADIOLOGY | Facility: OTHER | Age: 44
End: 2019-05-23
Attending: PHYSICIAN ASSISTANT
Payer: COMMERCIAL

## 2019-05-23 ENCOUNTER — OFFICE VISIT (OUTPATIENT)
Dept: FAMILY MEDICINE | Facility: OTHER | Age: 44
End: 2019-05-23
Payer: COMMERCIAL

## 2019-05-23 ENCOUNTER — HOSPITAL ENCOUNTER (OUTPATIENT)
Dept: CT IMAGING | Facility: CLINIC | Age: 44
Discharge: HOME | End: 2019-05-23
Attending: PHYSICIAN ASSISTANT | Admitting: PHYSICIAN ASSISTANT
Payer: COMMERCIAL

## 2019-05-23 VITALS
OXYGEN SATURATION: 99 % | HEIGHT: 66 IN | DIASTOLIC BLOOD PRESSURE: 86 MMHG | BODY MASS INDEX: 33.91 KG/M2 | TEMPERATURE: 98 F | HEART RATE: 84 BPM | RESPIRATION RATE: 16 BRPM | SYSTOLIC BLOOD PRESSURE: 132 MMHG | WEIGHT: 211 LBS

## 2019-05-23 DIAGNOSIS — R10.13 EPIGASTRIC PAIN: ICD-10-CM

## 2019-05-23 DIAGNOSIS — K13.0 LIP LESION: ICD-10-CM

## 2019-05-23 DIAGNOSIS — R14.0 ABDOMINAL DISTENSION: ICD-10-CM

## 2019-05-23 DIAGNOSIS — K21.9 GASTROESOPHAGEAL REFLUX DISEASE, ESOPHAGITIS PRESENCE NOT SPECIFIED: ICD-10-CM

## 2019-05-23 DIAGNOSIS — R23.8 RUDDY COMPLEXION: ICD-10-CM

## 2019-05-23 DIAGNOSIS — R10.13 EPIGASTRIC PAIN: Primary | ICD-10-CM

## 2019-05-23 PROCEDURE — 25000125 ZZHC RX 250: Performed by: PHYSICIAN ASSISTANT

## 2019-05-23 PROCEDURE — 25000128 H RX IP 250 OP 636: Performed by: PHYSICIAN ASSISTANT

## 2019-05-23 PROCEDURE — 74019 RADEX ABDOMEN 2 VIEWS: CPT | Mod: FY

## 2019-05-23 PROCEDURE — 99214 OFFICE O/P EST MOD 30 MIN: CPT | Performed by: PHYSICIAN ASSISTANT

## 2019-05-23 PROCEDURE — 74177 CT ABD & PELVIS W/CONTRAST: CPT

## 2019-05-23 RX ORDER — IOPAMIDOL 755 MG/ML
500 INJECTION, SOLUTION INTRAVASCULAR ONCE
Status: COMPLETED | OUTPATIENT
Start: 2019-05-23 | End: 2019-05-23

## 2019-05-23 RX ADMIN — IOPAMIDOL 100 ML: 755 INJECTION, SOLUTION INTRAVENOUS at 13:29

## 2019-05-23 RX ADMIN — SODIUM CHLORIDE 60 ML: 9 INJECTION, SOLUTION INTRAVENOUS at 13:29

## 2019-05-23 ASSESSMENT — MIFFLIN-ST. JEOR: SCORE: 1614.79

## 2019-05-23 ASSESSMENT — PAIN SCALES - GENERAL: PAINLEVEL: MODERATE PAIN (4)

## 2019-05-24 ENCOUNTER — TELEPHONE (OUTPATIENT)
Dept: OBGYN | Facility: OTHER | Age: 44
End: 2019-05-24

## 2019-05-24 ENCOUNTER — MYC MEDICAL ADVICE (OUTPATIENT)
Dept: OBGYN | Facility: OTHER | Age: 44
End: 2019-05-24

## 2019-05-24 NOTE — TELEPHONE ENCOUNTER
Pt had requested we call her work during work hours. No answer will attempt later.     Hilda Elkins RN on 5/24/2019 at 12:50 PM

## 2019-05-24 NOTE — TELEPHONE ENCOUNTER
Unfortunately I am not able to work her in before Aubrie 3.  I can look at squeezing her in Aubrie 3.  Otherwise we have an OBGYN in [place] that has openings next week.  There are also openings with my partners in the [place] area.

## 2019-05-24 NOTE — TELEPHONE ENCOUNTER
Called pt's cell phone instead of work phone.     Writer informed pt that Dr. Naqvi would not be able to get her in before Aubrie 3. Writer assisted pt with scheduling to see Dr. Pham May 31 at [id number].     Hilda Elkins RN on 5/24/2019 at 1:18 PM

## 2019-05-31 ENCOUNTER — TELEPHONE (OUTPATIENT)
Dept: ONCOLOGY | Facility: CLINIC | Age: 44
End: 2019-05-31

## 2019-05-31 ENCOUNTER — TELEPHONE (OUTPATIENT)
Dept: FAMILY MEDICINE | Facility: OTHER | Age: 44
End: 2019-05-31

## 2019-05-31 ENCOUNTER — OFFICE VISIT (OUTPATIENT)
Dept: OBGYN | Facility: CLINIC | Age: 44
End: 2019-05-31
Payer: COMMERCIAL

## 2019-05-31 DIAGNOSIS — Z80.0 FAMILY HISTORY OF MALIGNANT NEOPLASM OF GASTROINTESTINAL TRACT: ICD-10-CM

## 2019-05-31 DIAGNOSIS — N93.9 ABNORMAL UTERINE BLEEDING (AUB): ICD-10-CM

## 2019-05-31 DIAGNOSIS — N84.0 ENDOMETRIAL POLYP: Primary | ICD-10-CM

## 2019-05-31 PROCEDURE — 99214 OFFICE O/P EST MOD 30 MIN: CPT | Performed by: OBSTETRICS & GYNECOLOGY

## 2019-05-31 ASSESSMENT — PAIN SCALES - GENERAL: PAINLEVEL: MODERATE PAIN (4)

## 2019-05-31 NOTE — TELEPHONE ENCOUNTER
Type of surgery: hysteroscopy, dilation & curettage, polypectomy, endometrial ablation  Location of surgery: [facility]   Date of surgery: 6/12/19  Surgeon: Dr. Pham  Pre-Op Appt Date: 06/11/19  Post-Op Appt Date: 06/25/19   Packet sent out: Surgery packet mailed to patient's home address.   Pre-cert/Authorization completed: NA  Date: 5/31/2019    Eboni Roth  Surgery Scheduler

## 2019-05-31 NOTE — PROGRESS NOTES
Clinic Note    CC:    Chief Complaint   Patient presents with     Consult     polyp      HPI:  Amelia Coker is a 44 year old  woman who presents for an endometrial polyp. Periods at baseline q28day, light to mod flow, no bothersome symptoms. Now irregular, lots of cramping, found to have a polyp on pelvic Ultrasound with otherwise normal uterus. Family hx of late menopause, around 60 for mom as well as aunt. She is also currently being evaluated for some bowel irritabilities. She understands that management of her polyp will not treat her bowel symptoms.     Ob Hx:  s/p  x2  Gyn Hx: Patient's last menstrual period was 2019.  Menses as above   Last pap NIL, HPV- ()     PMH:   Past Medical History:   Diagnosis Date     Abnormal mammogram 2016    right breast      AD (atopic dermatitis) 10/29/2015     Allergic rhinitis due to other allergen      Arthritis      Bell's palsy      Chronic fatigue 2012     Chronic infection     MRSA -  scalp and prior to Abdomen     Contact dermatitis and other eczema, due to unspecified cause     eczema     Contusion of left foot, initial encounter 3/16/2016     DJD (degenerative joint disease), lumbar 2013     DJD (degenerative joint disease), lumbar 2013     Ds DNA antibody positive 2014    borderline.      Elevated blood pressure reading without diagnosis of hypertension 2013     Facial contusion 12/15/2014    hit by horse      Foraminal stenosis of lumbar region 2013     Frontal headache 12/15/2014     Gastroesophageal reflux disease, esophagitis presence not specified 2016     Heat sensitivity 10/29/2015     HTN, goal below 140/90 2015     Hyperlipidemia LDL goal <130 2017     Irregular heart beat      Keratitis sicca 10/31/2012     Left shoulder pain 2013     Lumbar radiculopathy 2014     Migraine headache 10/23/2013     Migraine, unspecified, with intractable migraine, so stated, without  mention of status migrainosus      MRSA infection 10/20/2015     Muscular wasting and disuse atrophy, not elsewhere classified 6/9/2014    right SCM, right wrist,       Numbness and tingling     both legs anf feet greater on the left     PAC (premature atrial contraction) 7/15/2010     Plantar fasciitis 9/23/2015     PONV (postoperative nausea and vomiting)      Skin lesion 10/20/2015    posterior superior scalp      Spasm of muscle 7/11/2017     Telangiectasia of face 12/19/2014     Tooth pain 10/20/2015    left lower primary molar      SurgHx:   Past Surgical History:   Procedure Laterality Date     COLONOSCOPY  3/11/2013    Procedure: COLONOSCOPY;  colonoscopy;  Surgeon: Lobito Mas MD;  Location: [facility]     COLONOSCOPY WITH CO2 INSUFFLATION N/A 11/19/2018    Procedure: COLONOSCOPY WITH CO2 INSUFFLATION;  Surgeon: Goyo Blank MD;  Location: [facility]     DECOMPRESSION LUMBAR TWO LEVELS Bilateral 12/8/2016    Procedure: DECOMPRESSION LUMBAR TWO LEVELS;  Surgeon: Bang Burrell MD;  Location: [facility]     ESOPHAGOSCOPY, GASTROSCOPY, DUODENOSCOPY (EGD), COMBINED  11/8/2013    Procedure: COMBINED ESOPHAGOSCOPY, GASTROSCOPY, DUODENOSCOPY (EGD), BIOPSY SINGLE OR MULTIPLE;  Esophagoscopy, Gastroscopy, Duodenoscopy EGD with multiple biopsies;  Surgeon: Teja Koch MD;  Location: [facility]     HC REMOVAL OF TONSILS,<13 Y/O      Tonsils <10y.o.     HC TOOTH EXTRACTION W/FORCEP       REPAIR TENDON ELBOW  7/9/2014    Procedure: REPAIR TENDON ELBOW;  Surgeon: Chris Johnston MD;  Location: [facility]     ULTRASONIC REMOVAL SOFT TISSUE (LOCATION) Right 11/21/2018    Procedure: Tenex right foot;  Surgeon: Patel Martínez DO;  Location: [facility]     FamHx:   Family History   Problem Relation Age of Onset     Diabetes Mother         adult     Cancer - colorectal Mother      Hypertension Mother      Allergies Mother      Cancer Mother         colon     Depression Mother      Gastrointestinal Disease Mother          ibs     Genitourinary Problems Mother         kidney cyst     Gynecology Mother         endometriosis     Lipids Mother      Thyroid Disease Mother      Arthritis Mother         RA     Heart Disease Maternal Grandfather         MI,  - 60's     Allergies Father      Unknown/Adopted Father      Heart Disease Father         mi-age mid 40's     Cardiovascular Father      Lipids Father      Respiratory Father         asthma     Cancer Father      Other Cancer Father         renal cancer     Depression Sister      Allergies Sister      Cancer Sister         vaginal     Gynecology Sister         endometriosis     Lipids Paternal Grandmother      Osteoporosis Paternal Grandmother      Thyroid Disease Paternal Grandmother      Respiratory Son         asthma     Allergies Son      Arthritis Sister      Allergies Brother      Heart Disease Brother      Allergies Daughter      Blood Disease Paternal Aunt         LGL T cell Leukemia     Rheumatoid Arthritis Paternal Aunt      Kidney Disease Maternal Aunt      Lupus Maternal Aunt      Bladder Cancer Maternal Aunt      SocHx:   Social History     Socioeconomic History     Marital status:      Spouse name: Robert     Number of children: 2     Years of education: 12     Highest education level: None   Occupational History     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   Social Needs     Financial resource strain: None     Food insecurity:     Worry: None     Inability: None     Transportation needs:     Medical: None     Non-medical: None   Tobacco Use     Smoking status: Never Smoker     Smokeless tobacco: Never Used   Substance and Sexual Activity     Alcohol use: No     Comment: social     Drug use: No     Sexual activity: Yes     Partners: Male     Birth control/protection: Surgical     Comment: vasectomy   Lifestyle     Physical activity:     Days per week: None     Minutes per session: None     Stress: None   Relationships     Social connections:      Talks on phone: None     Gets together: None     Attends Sikh service: None     Active member of club or organization: None     Attends meetings of clubs or organizations: None     Relationship status: None     Intimate partner violence:     Fear of current or ex partner: None     Emotionally abused: None     Physically abused: None     Forced sexual activity: None   Other Topics Concern      Service No     Blood Transfusions No     Caffeine Concern No     Comment: 0     Occupational Exposure No     Hobby Hazards Yes     Comment: horses     Sleep Concern No     Stress Concern No     Weight Concern Yes     Special Diet Yes     Comment: nhung's     Back Care No     Exercise Yes     Comment: occ     Bike Helmet Not Asked     Comment: na     Seat Belt Yes     Self-Exams Yes     Comment: occ     Parent/sibling w/ CABG, MI or angioplasty before 65F 55M? Not Asked   Social History Narrative     None     Allergies:   Ceftriaxone; Bactrim [sulfamethoxazole w/trimethoprim]; Ciprofloxacin; Codeine; Copper; Doxycycline; Gold; Iodine-131; Latex; Levaquin [levofloxacin]; Nickel; Steel [staples]; and Penicillins    Current Medications:   Current Outpatient Medications   Medication Sig Dispense Refill     Acetaminophen (TYLENOL PO) Take 500 mg by mouth every 6 hours as needed        acyclovir (ZOVIRAX) 5 % ointment Apply topically 6 times daily 15 g 3     Coal Tar Extract 4 % SHAM Externally apply 1 Dose topically daily 168 mL 1     diazepam (VALIUM) 5 MG tablet Take 1 tablet (5 mg) by mouth 3 times daily as needed for muscle spasms 90 tablet 0     eletriptan (RELPAX) 20 MG tablet Take 1-2 tablets (20-40 mg) by mouth at onset of headache for migraine May repeat in 2 hours. Max 4 tablets/24 hours. 18 tablet 1     fexofenadine (ALLEGRA) 180 MG tablet Take 1 tablet by mouth daily Reported on 4/5/2017       Lifitegrast (XIIDRA) 5 % SOLN        meloxicam (MOBIC) 15 MG tablet TAKE ONE TABLET BY MOUTH EVERY DAY (PATIENT WANTS  UNICHEM BRAND ONLY) 90 tablet 1     mupirocin (BACTROBAN) 2 % ointment Apply topically 3 times daily 22 g 1     nystatin-triamcinolone (MYCOLOG II) cream Apply topically 2 times daily 15 g 0     POLYVIN ALC-POVIDON DIMETHYLAM (FRESHKOTE) 2.7-2 % SOLN Apply  to eye.       sucralfate (CARAFATE) 1 GM tablet TAKE ONE TABLET BY MOUTH FOUR TIMES A  tablet 0     SUMAtriptan (IMITREX) 100 MG tablet Take 1 tablet by mouth See Admin Instructions. WITH ONSET OF MIGRAINE, MAY REPEAT ONCE AFTER 2 HOURS. DO NOT EXCEED 2 TABLETS IN 24 HOURS. 27 tablet prn     ROS: 10 point ROS negative other than above.     EXAM:  Vitals:    05/31/19 0756   BP: (P) 122/76   BP Location: (P) Right arm   Cuff Size: (P) Adult Regular   Pulse: (P) 70   Weight: (P) 95.4 kg (210 lb 4.8 oz)    Body mass index is 33.88 kg/m  (pended).    Gen: Alert, oriented, appropriately interactive, NAD  CV: RRR, 2+ peripheral pulses  Resp: CTAB, good effort without distress   Abdomen: soft, non tender, non distended, no masses  : deferred     Labs & Imaging:  Hgb 12.5  Plt 316  TSH 1.64    Lab Results   Component Value Date    PAP NIL 12/31/2018    PAP NIL 12/02/2015    PAP NIL 08/31/2012     The uterus measures 8.9 x 4.5 x 4.9 cm. Hypoechoic structure  in the endometrial stripe measuring 1.0 x 0.5 x 0.8 cm could represent  a small polyp. Endometrial stripe is otherwise normal in appearance  measuring up to 0.9 cm in thickness.  The right ovary is normal in appearance measuring 2.7 x 1.5 x 2.3 cm.  The left ovary measures 3.4 x 2.4 x 1.9 cm and contains a simple  appearing cystic structure measuring 1.8 x 1.4 x 1.6 cm likely  representing a dominant follicle. The complex cystic structure in the  left ovary on the prior study which measured up to 4 cm has either  significantly decreased in size and become more simple or has resolved  with development of a new cyst in the left ovary. The ovaries are  otherwise unremarkable. No abnormal free fluid collections  are  identified.                                                             IMPRESSION:  1. Question polyp in the uterine body endometrial canal.  2. Dominant follicle in the left ovary. The complex cystic structure  in the left ovary on the prior study is no longer identified.  3. No other significant abnormalities.    ASSESSMENT/PLAN: Amelia Coker is a 44 year old  woman who presents for an endometrial polyp.    Total time spent face to face was 30 minutes. Greater than 50% of the time was spent counseling and/or coordination or care.    1. Endometrial polyp  - Reviewed imagine together, likely benign polyp causing worsening bleeding. Recommending hysteroscopy, D&C, polypectomy. Will collect tissue biopsy at time of procedure to rule out malignancy. Patient would also like an ablation at the time of the hysteroscopy given completed fertility, and for more definitive management of her AUB.     2. Family cancer history  - Family hx includes cancers of colon, kidney, blood, bladder, and vaginal  - Genetic counseling referral placed to discuss further     Fredy Pham MD  2019 8:21 AM

## 2019-05-31 NOTE — TELEPHONE ENCOUNTER
ONCOLOGY INTAKE: Records Information      APPT INFORMATION:  Referring provider:  Dr. Fredy Pham  Referring provider s clinic:  [facility]  Reason for visit/diagnosis:  Family history of malignant neoplasm of gastrointestinal tract [Z80.0]  Has patient been notified of appointment date and time?: NA    RECORDS INFORMATION:  Were the records received with the referral (via [employer])? No    ADDITIONAL INFORMATION:  Left VM with phone and hours. Will call on [id number] if Pt has not been scheduled. Referral in Epic

## 2019-06-10 ENCOUNTER — OFFICE VISIT (OUTPATIENT)
Dept: FAMILY MEDICINE | Facility: OTHER | Age: 44
End: 2019-06-10
Payer: COMMERCIAL

## 2019-06-10 VITALS
TEMPERATURE: 98.9 F | WEIGHT: 206 LBS | RESPIRATION RATE: 16 BRPM | HEART RATE: 64 BPM | DIASTOLIC BLOOD PRESSURE: 84 MMHG | BODY MASS INDEX: 33.19 KG/M2 | SYSTOLIC BLOOD PRESSURE: 120 MMHG

## 2019-06-10 DIAGNOSIS — Z01.818 PREOP GENERAL PHYSICAL EXAM: Primary | ICD-10-CM

## 2019-06-10 DIAGNOSIS — R19.5 LOOSE STOOLS: ICD-10-CM

## 2019-06-10 DIAGNOSIS — Z98.890 PONV (POSTOPERATIVE NAUSEA AND VOMITING): ICD-10-CM

## 2019-06-10 DIAGNOSIS — N94.6 DYSMENORRHEA: ICD-10-CM

## 2019-06-10 DIAGNOSIS — R19.12 HYPERACTIVE BOWEL SOUNDS: ICD-10-CM

## 2019-06-10 DIAGNOSIS — N94.6 MENORRHALGIA: ICD-10-CM

## 2019-06-10 DIAGNOSIS — N84.0 UTERINE POLYP: ICD-10-CM

## 2019-06-10 DIAGNOSIS — I10 HTN, GOAL BELOW 140/90: ICD-10-CM

## 2019-06-10 DIAGNOSIS — R11.2 PONV (POSTOPERATIVE NAUSEA AND VOMITING): ICD-10-CM

## 2019-06-10 DIAGNOSIS — K21.9 GASTROESOPHAGEAL REFLUX DISEASE, ESOPHAGITIS PRESENCE NOT SPECIFIED: ICD-10-CM

## 2019-06-10 DIAGNOSIS — I49.1 PAC (PREMATURE ATRIAL CONTRACTION): ICD-10-CM

## 2019-06-10 DIAGNOSIS — R10.13 EPIGASTRIC PAIN: ICD-10-CM

## 2019-06-10 LAB
ERYTHROCYTE [DISTWIDTH] IN BLOOD BY AUTOMATED COUNT: 13.1 % (ref 10–15)
HCG UR QL: NEGATIVE
HCT VFR BLD AUTO: 37 % (ref 35–47)
HGB BLD-MCNC: 12.4 G/DL (ref 11.7–15.7)
MCH RBC QN AUTO: 31 PG (ref 26.5–33)
MCHC RBC AUTO-ENTMCNC: 33.5 G/DL (ref 31.5–36.5)
MCV RBC AUTO: 93 FL (ref 78–100)
PLATELET # BLD AUTO: 293 10E9/L (ref 150–450)
RBC # BLD AUTO: 4 10E12/L (ref 3.8–5.2)
WBC # BLD AUTO: 5 10E9/L (ref 4–11)

## 2019-06-10 PROCEDURE — 80053 COMPREHEN METABOLIC PANEL: CPT | Performed by: PHYSICIAN ASSISTANT

## 2019-06-10 PROCEDURE — 93000 ELECTROCARDIOGRAM COMPLETE: CPT | Performed by: PHYSICIAN ASSISTANT

## 2019-06-10 PROCEDURE — 85027 COMPLETE CBC AUTOMATED: CPT | Performed by: PHYSICIAN ASSISTANT

## 2019-06-10 PROCEDURE — 99215 OFFICE O/P EST HI 40 MIN: CPT | Performed by: PHYSICIAN ASSISTANT

## 2019-06-10 PROCEDURE — 81025 URINE PREGNANCY TEST: CPT | Performed by: PHYSICIAN ASSISTANT

## 2019-06-10 PROCEDURE — 36415 COLL VENOUS BLD VENIPUNCTURE: CPT | Performed by: PHYSICIAN ASSISTANT

## 2019-06-10 ASSESSMENT — PAIN SCALES - GENERAL: PAINLEVEL: MODERATE PAIN (4)

## 2019-06-10 NOTE — PROGRESS NOTES
[facility]  [id number] [facility] [id number] [phone]  Dept: [phone]    PRE-OP EVALUATION:  Today's date: 6/10/2019    Amelia Coker (: 1975) presents for pre-operative evaluation assessment as requested by Dr. Pham.  She requires evaluation and anesthesia risk assessment prior to undergoing surgery/procedure for treatment of uterine polyp and dysmenorrhea.    Fax number for surgical facility:   Primary Physician: Otoniel Nelson  Type of Anesthesia Anticipated: Local with MAC    Patient has a Health Care Directive or Living Will:  NO    Preop Questions 6/10/2019   Who is doing your surgery? dr pham   What are you having done? uterine polyp removed and ablation   Date of Surgery/Procedure: 2019   Facility or Hospital where procedure/surgery will be performed: [facility]   1.  Do you have a history of Heart attack, stroke, stent, coronary bypass surgery, or other heart surgery? No   2.  Do you ever have any pain or discomfort in your chest? No   3.  Do you have a history of  Heart Failure? No   4.   Are you troubled by shortness of breath when:  walking on a level surface, or up a slight hill, or at night? No   5.  Do you currently have a cold, bronchitis or other respiratory infection? No   6.  Do you have a cough, shortness of breath, or wheezing? No   7.  Do you sometimes get pains in the calves of your legs when you walk? No   8. Do you or anyone in your family have previous history of blood clots? No   9.  Do you or does anyone in your family have a serious bleeding problem such as prolonged bleeding following surgeries or cuts? No   10. Have you ever had problems with anemia or been told to take iron pills? No   11. Have you had any abnormal blood loss such as black, tarry or bloody stools, or abnormal vaginal bleeding? YES -    12. Have you ever had a blood transfusion? No   13. Have you or any of your relatives ever had problems with anesthesia?  No   14. Do you have sleep apnea, excessive snoring or daytime drowsiness? No   15. Do you have any prosthetic heart valves? No   16. Do you have prosthetic joints? No   17. Is there any chance that you may be pregnant? No         HPI:     HPI related to upcoming procedure: see medical record and recent OB/GYN report      See problem list for active medical problems.  Problems all longstanding and stable, except as noted/documented.  See ROS for pertinent symptoms related to these conditions.      MEDICAL HISTORY:     Patient Active Problem List    Diagnosis Date Noted     Jaycob complexion 05/23/2019     Priority: Medium     Lip lesion 05/23/2019     Priority: Medium     Epigastric pain 05/23/2019     Priority: Medium     Abdominal distension 05/23/2019     Priority: Medium     Intractable episodic tension-type headache 03/11/2019     Priority: Medium     Intractable right heel pain 05/29/2018     Priority: Medium     Superficial swelling of scalp 04/12/2018     Priority: Medium     Biceps tendinopathy, right 03/22/2018     Priority: Medium     Hyperlipidemia LDL goal <130 08/30/2017     Priority: Medium     Spasm of muscle 07/11/2017     Priority: Medium     Sternocleidomastoid muscle tenderness 04/05/2017     Priority: Medium     left         Acute cervical myofascial strain, initial encounter 04/05/2017     Priority: Medium     Abnormal mammogram 09/21/2016     Priority: Medium     right breast       Gastroesophageal reflux disease, esophagitis presence not specified 06/29/2016     Priority: Medium     Rotator cuff arthropathy, right 03/07/2016     Priority: Medium     AD (atopic dermatitis) 10/29/2015     Priority: Medium     Heat sensitivity 10/29/2015     Priority: Medium     Skin lesion 10/20/2015     Priority: Medium     posterior superior scalp       Plantar fasciitis 09/23/2015     Priority: Medium     HTN, goal below 140/90 09/23/2015     Priority: Medium     Lateral epicondylitis 03/16/2015     Priority:  Medium     Problem list name updated by automated process. Provider to review       Right shoulder pain 03/16/2015     Priority: Medium     Telangiectasia of face 12/19/2014     Priority: Medium     Frontal headache 12/15/2014     Priority: Medium     Muscular wasting and disuse atrophy, not elsewhere classified 06/09/2014     Priority: Medium     right SCM, right wrist,        Ds DNA antibody positive 04/16/2014     Priority: Medium     borderline.       Lumbar radiculopathy 01/22/2014     Priority: Medium     Foraminal stenosis of lumbar region 09/26/2013     Priority: Medium     DJD (degenerative joint disease), lumbar 09/26/2013     Priority: Medium     FLORIDALMA positive 07/18/2013     Priority: Medium     Plantar fascial fibromatosis 07/18/2013     Priority: Medium     Pain in joint, upper arm 04/15/2013     Priority: Medium     Family history of colon cancer 03/05/2013     Priority: Medium     mother       Keratitis sicca 10/31/2012     Priority: Medium     Dry eyes 10/15/2012     Priority: Medium     Family history of melanoma 08/24/2012     Priority: Medium     Shoulder joint instability 08/15/2012     Priority: Medium     Abnormal PFT 07/31/2012     Priority: Medium     question of left heart failure and/or shunting in need of further investigation       Chronic fatigue 06/06/2012     Priority: Medium     Hair loss 06/06/2012     Priority: Medium     Raynaud phenomenon 04/11/2012     Priority: Medium     PAC (premature atrial contraction) 07/15/2010     Priority: Medium     Palpitations 07/15/2010     Priority: Medium     Lyme disease 06/07/2010     Priority: Medium     Scalp lesion 05/26/2010     Priority: Medium     Intractable migraine 04/04/2003     Priority: Medium     Problem list name updated by automated process. Provider to review       Bell's palsy 04/04/2003     Priority: Medium     Contact dermatitis and other eczema, due to unspecified cause 04/04/2003     Priority: Medium     Allergic rhinitis due  to other allergen 04/04/2003     Priority: Medium      Past Medical History:   Diagnosis Date     Abnormal mammogram 9/21/2016    right breast      AD (atopic dermatitis) 10/29/2015     Allergic rhinitis due to other allergen      Arthritis      Bell's palsy      Chronic fatigue 6/6/2012     Chronic infection     MRSA - 2015 scalp and prior to Abdomen     Contact dermatitis and other eczema, due to unspecified cause     eczema     Contusion of left foot, initial encounter 3/16/2016     DJD (degenerative joint disease), lumbar 9/26/2013     DJD (degenerative joint disease), lumbar 9/26/2013     Ds DNA antibody positive 4/16/2014    borderline.      Elevated blood pressure reading without diagnosis of hypertension 12/24/2013     Facial contusion 12/15/2014    hit by horse      Foraminal stenosis of lumbar region 9/26/2013     Frontal headache 12/15/2014     Gastroesophageal reflux disease, esophagitis presence not specified 6/29/2016     Heat sensitivity 10/29/2015     HTN, goal below 140/90 9/23/2015     Hyperlipidemia LDL goal <130 8/30/2017     Irregular heart beat      Keratitis sicca 10/31/2012     Left shoulder pain 9/26/2013     Lumbar radiculopathy 1/22/2014     Migraine headache 10/23/2013     Migraine, unspecified, with intractable migraine, so stated, without mention of status migrainosus      MRSA infection 10/20/2015     Muscular wasting and disuse atrophy, not elsewhere classified 6/9/2014    right SCM, right wrist,       Numbness and tingling     both legs anf feet greater on the left     PAC (premature atrial contraction) 7/15/2010     Plantar fasciitis 9/23/2015     PONV (postoperative nausea and vomiting)      Skin lesion 10/20/2015    posterior superior scalp      Spasm of muscle 7/11/2017     Telangiectasia of face 12/19/2014     Tooth pain 10/20/2015    left lower primary molar      Past Surgical History:   Procedure Laterality Date     COLONOSCOPY  3/11/2013    Procedure: COLONOSCOPY;   colonoscopy;  Surgeon: Lobito Mas MD;  Location: [facility]     COLONOSCOPY WITH CO2 INSUFFLATION N/A 11/19/2018    Procedure: COLONOSCOPY WITH CO2 INSUFFLATION;  Surgeon: Gooy Blank MD;  Location: [facility]     DECOMPRESSION LUMBAR TWO LEVELS Bilateral 12/8/2016    Procedure: DECOMPRESSION LUMBAR TWO LEVELS;  Surgeon: Bang Burrell MD;  Location: [facility]     ESOPHAGOSCOPY, GASTROSCOPY, DUODENOSCOPY (EGD), COMBINED  11/8/2013    Procedure: COMBINED ESOPHAGOSCOPY, GASTROSCOPY, DUODENOSCOPY (EGD), BIOPSY SINGLE OR MULTIPLE;  Esophagoscopy, Gastroscopy, Duodenoscopy EGD with multiple biopsies;  Surgeon: Teja Koch MD;  Location: [facility]     HC REMOVAL OF TONSILS,<13 Y/O      Tonsils <10y.o.     HC TOOTH EXTRACTION W/FORCEP       REPAIR TENDON ELBOW  7/9/2014    Procedure: REPAIR TENDON ELBOW;  Surgeon: Chris Johnston MD;  Location: [facility]     ULTRASONIC REMOVAL SOFT TISSUE (LOCATION) Right 11/21/2018    Procedure: Tenex right foot;  Surgeon: Patel Martínez DO;  Location: [facility]     Current Outpatient Medications   Medication Sig Dispense Refill     Acetaminophen (TYLENOL PO) Take 500 mg by mouth every 6 hours as needed        acyclovir (ZOVIRAX) 5 % ointment Apply topically 6 times daily 15 g 3     Coal Tar Extract 4 % SHAM Externally apply 1 Dose topically daily 168 mL 1     diazepam (VALIUM) 5 MG tablet Take 1 tablet (5 mg) by mouth 3 times daily as needed for muscle spasms 90 tablet 0     eletriptan (RELPAX) 20 MG tablet Take 1-2 tablets (20-40 mg) by mouth at onset of headache for migraine May repeat in 2 hours. Max 4 tablets/24 hours. 18 tablet 1     fexofenadine (ALLEGRA) 180 MG tablet Take 1 tablet by mouth daily Reported on 4/5/2017       Lifitegrast (XIIDRA) 5 % SOLN        meloxicam (MOBIC) 15 MG tablet TAKE ONE TABLET BY MOUTH EVERY DAY (PATIENT WANTS UNICHEM BRAND ONLY) 90 tablet 1     mupirocin (BACTROBAN) 2 % ointment Apply topically 3 times daily 22 g 1      nystatin-triamcinolone (MYCOLOG II) cream Apply topically 2 times daily 15 g 0     sucralfate (CARAFATE) 1 GM tablet TAKE ONE TABLET BY MOUTH FOUR TIMES A  tablet 0     SUMAtriptan (IMITREX) 100 MG tablet Take 1 tablet by mouth See Admin Instructions. WITH ONSET OF MIGRAINE, MAY REPEAT ONCE AFTER 2 HOURS. DO NOT EXCEED 2 TABLETS IN 24 HOURS. 27 tablet prn     OTC products: None, except as noted above    Allergies   Allergen Reactions     Ceftriaxone Anaphylaxis     Hives, rash, racing heart beat     Bactrim [Sulfamethoxazole W/Trimethoprim] Hives     Ciprofloxacin Other (See Comments)     Tendon Issues     Codeine Nausea and Vomiting     Copper      Rash       Doxycycline      Loss of skin pigmentation, skin loss.     Gold      Rash       Iodine-131 Hives     Latex      Levaquin [Levofloxacin] Other (See Comments)     Tendon issues with levaquin and cipro     Nickel      rash     Steel [Staples]      Rash from staples       Penicillins Rash      Latex Allergy: NO    Social History     Tobacco Use     Smoking status: Never Smoker     Smokeless tobacco: Never Used   Substance Use Topics     Alcohol use: No     Comment: social     History   Drug Use No       REVIEW OF SYSTEMS:   CONSTITUTIONAL: NEGATIVE for fever, chills, change in weight  INTEGUMENTARY/SKIN: Intermittent rashes of uncertain etiology have occurred in the past.  She has had some biopsies recently and scarring has been significant.  Concerns for adhesions have limited further evaluation of abdominal pain and discomfort which is ongoing.  We discussed this again today and place a referral for GI consult given information as noted.  EYES: NEGATIVE for vision changes or irritation  ENT/MOUTH: NEGATIVE for ear, mouth and throat problems  RESP: NEGATIVE for significant cough or SOB  BREAST: NEGATIVE for masses, tenderness or discharge  CV: NEGATIVE for chest pain, palpitations or peripheral edema  GI: NEGATIVE for nausea, abdominal pain, heartburn, or  change in bowel habits  MUSCULOSKELETAL: Musculoskeletal pain has been intermittent and varied over the years.  Today she does not have anything new to concern her.  NEURO: NEGATIVE for weakness, dizziness or paresthesias  ENDOCRINE: NEGATIVE for temperature intolerance, skin/hair changes  HEME: NEGATIVE for bleeding problems  PSYCHIATRIC: NEGATIVE for changes in mood or affect    EXAM:   /84 (Cuff Size: Adult Large)   Pulse 64   Temp 98.9  F (37.2  C) (Temporal)   Resp 16   Wt 93.4 kg (206 lb)   LMP 05/12/2019   BMI 33.19 kg/m      GENERAL APPEARANCE: healthy, alert and no distress     EYES: EOMI, PERRL     HENT: ear canals and TM's normal and nose and mouth without ulcers or lesions     NECK: no adenopathy, no asymmetry, masses, or scars and thyroid normal to palpation     RESP: lungs clear to auscultation - no rales, rhonchi or wheezes     CV: regular rates and rhythm, normal S1 S2, no S3 or S4 and no murmur, click or rub     ABDOMEN:  soft, nontender, no HSM or masses and bowel sounds normal     MS: extremities normal- no gross deformities noted, no evidence of inflammation in joints, FROM in all extremities.     SKIN: no suspicious lesions or rashes     NEURO: Normal strength and tone, sensory exam grossly normal, mentation intact and speech normal     PSYCH: mentation appears normal. and affect normal/bright     LYMPHATICS: No cervical adenopathy    DIAGNOSTICS:   EKG: appears normal, NSR, normal axis, normal intervals, no acute ST/T changes c/w ischemia, no LVH by voltage criteria, unchanged from previous tracings    Recent Labs   Lab Test 04/11/19  1418 12/31/18  0805 08/06/18  1509  06/08/16  0745  07/14/14  1228  05/14/13  1945   HGB 12.5 13.3 12.3   < > 12.9   < > 13.4   < > 13.1    274 274   < > 319   < > 305   < > 336   INR  --   --   --   --   --   --  1.0  --  0.89   NA  --  138 141   < > 140   < > 143   < > 144   POTASSIUM  --  4.0 3.8   < > 4.2   < > 4.5   < > 3.9   CR  --  0.69  0.74   < > 0.54   < > 0.64   < > 0.65   A1C  --   --   --   --  5.5  --   --   --   --     < > = values in this interval not displayed.        IMPRESSION:   Reason for surgery/procedure: Surgical intervention related to uterine problems with menorrhagia, uterine polyp, dysmenorrhea.    Diagnosis/reason for consult:  Anesthesia/surgical clearance    The proposed surgical procedure is considered INTERMEDIATE risk.    REVISED CARDIAC RISK INDEX  The patient has the following serious cardiovascular risks for perioperative complications such as (MI, PE, VFib and 3  AV Block):  No serious cardiac risks  INTERPRETATION: 1 risks: Class II (low risk - 0.9% complication rate)    The patient has the following additional risks for perioperative complications:  No identified additional risks  The 10-year ASCVD risk score (Jolie CHOUDHURY JrTimothy, et al., 2013) is: 0.4%    Values used to calculate the score:      Age: 44 years      Sex: Female      Is Non- : No      Diabetic: No      Tobacco smoker: No      Systolic Blood Pressure: 120 mmHg      Is BP treated: No      HDL Cholesterol: 75 mg/dL      Total Cholesterol: 176 mg/dL      ICD-10-CM    1. Preop general physical exam Z01.818 EKG 12-lead complete w/read - Clinics     CBC with platelets     Comprehensive metabolic panel     HCG Qual, Urine ([id number])   2. PONV (postoperative nausea and vomiting) R11.2 CBC with platelets    Z98.890 Comprehensive metabolic panel     HCG Qual, Urine ([id number])   3. HTN, goal below 140/90 I10 CBC with platelets     Comprehensive metabolic panel     HCG Qual, Urine ([id number])   4. PAC (premature atrial contraction) I49.1 CBC with platelets     Comprehensive metabolic panel     HCG Qual, Urine ([id number])   5. Menorrhalgia N94.6 CBC with platelets     Comprehensive metabolic panel     HCG Qual, Urine ([id number])   6. Uterine polyp N84.0 CBC with platelets     Comprehensive metabolic panel     HCG Qual, Urine ([id number])   7. Dysmenorrhea N94.6  CBC with platelets     Comprehensive metabolic panel     HCG Qual, Urine ([id number])   8. Epigastric pain R10.13 GASTROENTEROLOGY ADULT REF CONSULT ONLY   9. Gastroesophageal reflux disease, esophagitis presence not specified K21.9 GASTROENTEROLOGY ADULT REF CONSULT ONLY   10. Loose stools R19.5 GASTROENTEROLOGY ADULT REF CONSULT ONLY   11. Hyperactive bowel sounds R19.12 GASTROENTEROLOGY ADULT REF CONSULT ONLY       RECOMMENDATIONS:     --Patient is to take all scheduled medications on the day of surgery EXCEPT for modifications listed below.    APPROVAL GIVEN to proceed with proposed procedure, without further diagnostic evaluation should be of note that she struggles with postoperative nausea and vomiting but has not been trialed with a scopolamine patch which I do recommend at least a trial or consideration the above.  She reportedly has been successful with initiation of anesthesia with propofol but it does wear off more quickly than the anesthesiologist have expected in the past and she has awakened early from a couple of procedures to include colonoscopy and most recently a injection of her low back that was not pleasant for her.  Caution with respect to propofol you should be done.  It also should be noted that she has very poor eyesight and needs to have her glasses on immediately upon awakening from anesthesia to decrease her panic.  Oftentimes the last thing that is done before she drifts off to sleep with anesthesia is that her glasses are removed for her.       Signed Electronically by: SHEREEN Lisa PA-C    Copy of this evaluation report is provided to requesting physician.    [facility] Preop Guidelines    Revised Cardiac Risk Index

## 2019-06-11 LAB
ALBUMIN SERPL-MCNC: 4.2 G/DL (ref 3.4–5)
ALP SERPL-CCNC: 55 U/L (ref 40–150)
ALT SERPL W P-5'-P-CCNC: 20 U/L (ref 0–50)
ANION GAP SERPL CALCULATED.3IONS-SCNC: 6 MMOL/L (ref 3–14)
AST SERPL W P-5'-P-CCNC: 16 U/L (ref 0–45)
BILIRUB SERPL-MCNC: 0.7 MG/DL (ref 0.2–1.3)
BUN SERPL-MCNC: 20 MG/DL (ref 7–30)
CALCIUM SERPL-MCNC: 8.8 MG/DL (ref 8.5–10.1)
CHLORIDE SERPL-SCNC: 107 MMOL/L (ref 94–109)
CO2 SERPL-SCNC: 28 MMOL/L (ref 20–32)
CREAT SERPL-MCNC: 0.69 MG/DL (ref 0.52–1.04)
GFR SERPL CREATININE-BSD FRML MDRD: >90 ML/MIN/{1.73_M2}
GLUCOSE SERPL-MCNC: 90 MG/DL (ref 70–99)
POTASSIUM SERPL-SCNC: 3.9 MMOL/L (ref 3.4–5.3)
PROT SERPL-MCNC: 7.6 G/DL (ref 6.8–8.8)
SODIUM SERPL-SCNC: 141 MMOL/L (ref 133–144)

## 2019-06-12 ENCOUNTER — ANESTHESIA EVENT (OUTPATIENT)
Dept: SURGERY | Facility: CLINIC | Age: 44
End: 2019-06-12
Payer: COMMERCIAL

## 2019-06-12 ENCOUNTER — HOSPITAL ENCOUNTER (OUTPATIENT)
Facility: CLINIC | Age: 44
Discharge: HOME | End: 2019-06-12
Attending: OBSTETRICS & GYNECOLOGY | Admitting: OBSTETRICS & GYNECOLOGY
Payer: COMMERCIAL

## 2019-06-12 ENCOUNTER — ANESTHESIA (OUTPATIENT)
Dept: SURGERY | Facility: CLINIC | Age: 44
End: 2019-06-12
Payer: COMMERCIAL

## 2019-06-12 VITALS
TEMPERATURE: 97.8 F | OXYGEN SATURATION: 98 % | SYSTOLIC BLOOD PRESSURE: 114 MMHG | HEART RATE: 65 BPM | DIASTOLIC BLOOD PRESSURE: 71 MMHG | RESPIRATION RATE: 16 BRPM

## 2019-06-12 DIAGNOSIS — Z98.890 S/P ENDOMETRIAL ABLATION: Primary | ICD-10-CM

## 2019-06-12 LAB
ABO + RH BLD: NORMAL
ABO + RH BLD: NORMAL
B-HCG SERPL-ACNC: <1 IU/L (ref 0–5)
BLD GP AB SCN SERPL QL: NORMAL
BLOOD BANK CMNT PATIENT-IMP: NORMAL
ERYTHROCYTE [DISTWIDTH] IN BLOOD BY AUTOMATED COUNT: 12.8 % (ref 10–15)
HCT VFR BLD AUTO: 35.8 % (ref 35–47)
HGB BLD-MCNC: 12.1 G/DL (ref 11.7–15.7)
MCH RBC QN AUTO: 31.2 PG (ref 26.5–33)
MCHC RBC AUTO-ENTMCNC: 33.8 G/DL (ref 31.5–36.5)
MCV RBC AUTO: 92 FL (ref 78–100)
PLATELET # BLD AUTO: 263 10E9/L (ref 150–450)
RBC # BLD AUTO: 3.88 10E12/L (ref 3.8–5.2)
SPECIMEN EXP DATE BLD: NORMAL
WBC # BLD AUTO: 4.7 10E9/L (ref 4–11)

## 2019-06-12 PROCEDURE — 25000125 ZZHC RX 250: Performed by: NURSE ANESTHETIST, CERTIFIED REGISTERED

## 2019-06-12 PROCEDURE — 37000008 ZZH ANESTHESIA TECHNICAL FEE, 1ST 30 MIN: Performed by: OBSTETRICS & GYNECOLOGY

## 2019-06-12 PROCEDURE — 40000306 ZZH STATISTIC PRE PROC ASSESS II: Performed by: OBSTETRICS & GYNECOLOGY

## 2019-06-12 PROCEDURE — 25000128 H RX IP 250 OP 636: Performed by: NURSE ANESTHETIST, CERTIFIED REGISTERED

## 2019-06-12 PROCEDURE — 36000056 ZZH SURGERY LEVEL 3 1ST 30 MIN: Performed by: OBSTETRICS & GYNECOLOGY

## 2019-06-12 PROCEDURE — 71000027 ZZH RECOVERY PHASE 2 EACH 15 MINS: Performed by: OBSTETRICS & GYNECOLOGY

## 2019-06-12 PROCEDURE — 85027 COMPLETE CBC AUTOMATED: CPT | Performed by: OBSTETRICS & GYNECOLOGY

## 2019-06-12 PROCEDURE — 88305 TISSUE EXAM BY PATHOLOGIST: CPT | Mod: 26 | Performed by: OBSTETRICS & GYNECOLOGY

## 2019-06-12 PROCEDURE — 25000125 ZZHC RX 250: Performed by: OBSTETRICS & GYNECOLOGY

## 2019-06-12 PROCEDURE — 88305 TISSUE EXAM BY PATHOLOGIST: CPT | Performed by: OBSTETRICS & GYNECOLOGY

## 2019-06-12 PROCEDURE — 86901 BLOOD TYPING SEROLOGIC RH(D): CPT | Performed by: OBSTETRICS & GYNECOLOGY

## 2019-06-12 PROCEDURE — 86900 BLOOD TYPING SEROLOGIC ABO: CPT | Performed by: OBSTETRICS & GYNECOLOGY

## 2019-06-12 PROCEDURE — 37000009 ZZH ANESTHESIA TECHNICAL FEE, EACH ADDTL 15 MIN: Performed by: OBSTETRICS & GYNECOLOGY

## 2019-06-12 PROCEDURE — 58563 HYSTEROSCOPY ABLATION: CPT | Performed by: OBSTETRICS & GYNECOLOGY

## 2019-06-12 PROCEDURE — 86850 RBC ANTIBODY SCREEN: CPT | Performed by: OBSTETRICS & GYNECOLOGY

## 2019-06-12 PROCEDURE — 25800030 ZZH RX IP 258 OP 636: Performed by: NURSE ANESTHETIST, CERTIFIED REGISTERED

## 2019-06-12 PROCEDURE — 36000058 ZZH SURGERY LEVEL 3 EA 15 ADDTL MIN: Performed by: OBSTETRICS & GYNECOLOGY

## 2019-06-12 PROCEDURE — 84702 CHORIONIC GONADOTROPIN TEST: CPT | Performed by: OBSTETRICS & GYNECOLOGY

## 2019-06-12 RX ORDER — FENTANYL CITRATE 50 UG/ML
25-50 INJECTION, SOLUTION INTRAMUSCULAR; INTRAVENOUS
Status: DISCONTINUED | OUTPATIENT
Start: 2019-06-12 | End: 2019-06-12 | Stop reason: HOSPADM

## 2019-06-12 RX ORDER — NALOXONE HYDROCHLORIDE 0.4 MG/ML
.1-.4 INJECTION, SOLUTION INTRAMUSCULAR; INTRAVENOUS; SUBCUTANEOUS
Status: DISCONTINUED | OUTPATIENT
Start: 2019-06-12 | End: 2019-06-12 | Stop reason: HOSPADM

## 2019-06-12 RX ORDER — BUPIVACAINE HYDROCHLORIDE AND EPINEPHRINE 2.5; 5 MG/ML; UG/ML
INJECTION, SOLUTION INFILTRATION; PERINEURAL PRN
Status: DISCONTINUED | OUTPATIENT
Start: 2019-06-12 | End: 2019-06-12 | Stop reason: HOSPADM

## 2019-06-12 RX ORDER — DIMENHYDRINATE 50 MG/ML
INJECTION, SOLUTION INTRAMUSCULAR; INTRAVENOUS PRN
Status: DISCONTINUED | OUTPATIENT
Start: 2019-06-12 | End: 2019-06-12

## 2019-06-12 RX ORDER — PROPOFOL 10 MG/ML
INJECTION, EMULSION INTRAVENOUS CONTINUOUS PRN
Status: DISCONTINUED | OUTPATIENT
Start: 2019-06-12 | End: 2019-06-12

## 2019-06-12 RX ORDER — SODIUM CHLORIDE, SODIUM LACTATE, POTASSIUM CHLORIDE, CALCIUM CHLORIDE 600; 310; 30; 20 MG/100ML; MG/100ML; MG/100ML; MG/100ML
INJECTION, SOLUTION INTRAVENOUS CONTINUOUS
Status: DISCONTINUED | OUTPATIENT
Start: 2019-06-12 | End: 2019-06-12 | Stop reason: HOSPADM

## 2019-06-12 RX ORDER — ACETAMINOPHEN 325 MG/1
650 TABLET ORAL
Status: DISCONTINUED | OUTPATIENT
Start: 2019-06-12 | End: 2019-06-12 | Stop reason: HOSPADM

## 2019-06-12 RX ORDER — ONDANSETRON 4 MG/1
4-8 TABLET, ORALLY DISINTEGRATING ORAL EVERY 8 HOURS PRN
Qty: 4 TABLET | Refills: 0 | Status: SHIPPED | OUTPATIENT
Start: 2019-06-12 | End: 2019-08-29

## 2019-06-12 RX ORDER — FENTANYL CITRATE 50 UG/ML
INJECTION, SOLUTION INTRAMUSCULAR; INTRAVENOUS PRN
Status: DISCONTINUED | OUTPATIENT
Start: 2019-06-12 | End: 2019-06-12

## 2019-06-12 RX ORDER — KETOROLAC TROMETHAMINE 30 MG/ML
INJECTION, SOLUTION INTRAMUSCULAR; INTRAVENOUS PRN
Status: DISCONTINUED | OUTPATIENT
Start: 2019-06-12 | End: 2019-06-12

## 2019-06-12 RX ORDER — MEPERIDINE HYDROCHLORIDE 25 MG/ML
12.5 INJECTION INTRAMUSCULAR; INTRAVENOUS; SUBCUTANEOUS
Status: DISCONTINUED | OUTPATIENT
Start: 2019-06-12 | End: 2019-06-12 | Stop reason: HOSPADM

## 2019-06-12 RX ORDER — SODIUM CHLORIDE, SODIUM LACTATE, POTASSIUM CHLORIDE, CALCIUM CHLORIDE 600; 310; 30; 20 MG/100ML; MG/100ML; MG/100ML; MG/100ML
INJECTION, SOLUTION INTRAVENOUS CONTINUOUS PRN
Status: DISCONTINUED | OUTPATIENT
Start: 2019-06-12 | End: 2019-06-12

## 2019-06-12 RX ORDER — IBUPROFEN 600 MG/1
600 TABLET, FILM COATED ORAL EVERY 6 HOURS PRN
Qty: 30 TABLET | Refills: 0 | Status: SHIPPED | OUTPATIENT
Start: 2019-06-12 | End: 2019-10-28

## 2019-06-12 RX ORDER — ONDANSETRON 2 MG/ML
4 INJECTION INTRAMUSCULAR; INTRAVENOUS EVERY 30 MIN PRN
Status: DISCONTINUED | OUTPATIENT
Start: 2019-06-12 | End: 2019-06-12 | Stop reason: HOSPADM

## 2019-06-12 RX ORDER — HYDROCODONE BITARTRATE AND ACETAMINOPHEN 5; 325 MG/1; MG/1
1 TABLET ORAL
Status: DISCONTINUED | OUTPATIENT
Start: 2019-06-12 | End: 2019-06-12 | Stop reason: HOSPADM

## 2019-06-12 RX ORDER — ONDANSETRON 4 MG/1
4 TABLET, ORALLY DISINTEGRATING ORAL EVERY 30 MIN PRN
Status: DISCONTINUED | OUTPATIENT
Start: 2019-06-12 | End: 2019-06-12 | Stop reason: HOSPADM

## 2019-06-12 RX ORDER — SCOLOPAMINE TRANSDERMAL SYSTEM 1 MG/1
PATCH, EXTENDED RELEASE TRANSDERMAL PRN
Status: DISCONTINUED | OUTPATIENT
Start: 2019-06-12 | End: 2019-06-12

## 2019-06-12 RX ORDER — ONDANSETRON 4 MG/1
4 TABLET, ORALLY DISINTEGRATING ORAL
Status: DISCONTINUED | OUTPATIENT
Start: 2019-06-12 | End: 2019-06-12 | Stop reason: HOSPADM

## 2019-06-12 RX ORDER — LIDOCAINE HYDROCHLORIDE 20 MG/ML
INJECTION, SOLUTION INFILTRATION; PERINEURAL PRN
Status: DISCONTINUED | OUTPATIENT
Start: 2019-06-12 | End: 2019-06-12

## 2019-06-12 RX ADMIN — PROPOFOL 200 MCG/KG/MIN: 10 INJECTION, EMULSION INTRAVENOUS at 11:21

## 2019-06-12 RX ADMIN — MIDAZOLAM 1 MG: 1 INJECTION INTRAMUSCULAR; INTRAVENOUS at 11:15

## 2019-06-12 RX ADMIN — SODIUM CHLORIDE, POTASSIUM CHLORIDE, SODIUM LACTATE AND CALCIUM CHLORIDE: 600; 310; 30; 20 INJECTION, SOLUTION INTRAVENOUS at 11:15

## 2019-06-12 RX ADMIN — LIDOCAINE HYDROCHLORIDE 40 MG: 20 INJECTION, SOLUTION INFILTRATION; PERINEURAL at 11:21

## 2019-06-12 RX ADMIN — LIDOCAINE HYDROCHLORIDE 1 ML: 10 INJECTION, SOLUTION EPIDURAL; INFILTRATION; INTRACAUDAL; PERINEURAL at 10:49

## 2019-06-12 RX ADMIN — DIMENHYDRINATE 25 MG: 50 INJECTION, SOLUTION INTRAMUSCULAR; INTRAVENOUS at 11:43

## 2019-06-12 RX ADMIN — FENTANYL CITRATE 50 MCG: 50 INJECTION, SOLUTION INTRAMUSCULAR; INTRAVENOUS at 11:24

## 2019-06-12 RX ADMIN — MIDAZOLAM 1 MG: 1 INJECTION INTRAMUSCULAR; INTRAVENOUS at 11:24

## 2019-06-12 RX ADMIN — SODIUM CHLORIDE, POTASSIUM CHLORIDE, SODIUM LACTATE AND CALCIUM CHLORIDE: 600; 310; 30; 20 INJECTION, SOLUTION INTRAVENOUS at 10:50

## 2019-06-12 RX ADMIN — SCOPALAMINE 1 PATCH: 1 PATCH, EXTENDED RELEASE TRANSDERMAL at 11:36

## 2019-06-12 RX ADMIN — KETOROLAC TROMETHAMINE 30 MG: 30 INJECTION, SOLUTION INTRAMUSCULAR at 11:45

## 2019-06-12 ASSESSMENT — ENCOUNTER SYMPTOMS: DYSRHYTHMIAS: 1

## 2019-06-12 NOTE — ANESTHESIA CARE TRANSFER NOTE
Patient: Amelia Coker    Procedure(s):  hysteroscopy, dilation & curettage, polypectomy, endometrial ablation    Diagnosis: AUB, endometrial polyp  Diagnosis Additional Information: No value filed.    Anesthesia Type:   No value filed.     Note:  Airway :Face Mask  Patient transferred to:Phase II        Vitals: (Last set prior to Anesthesia Care Transfer)    CRNA VITALS  6/12/2019 1122 - 6/12/2019 1206      6/12/2019             Resp Rate (observed):  16                 Electronically Signed By: LAURI Altamirano CRNA  June 12, 2019  12:06 PM

## 2019-06-12 NOTE — DISCHARGE INSTRUCTIONS
[facility]Day [facility]   Adult Discharge Orders & Instructions     For 24 hours after surgery    1. Get plenty of rest.  A responsible adult must stay with you for at least 24 hours after you leave the hospital.   2. Do not drive or use heavy equipment.  If you have weakness or tingling, don't drive or use heavy equipment until this feeling goes away.  3. Do not drink alcohol.  4. Avoid strenuous or risky activities.  Ask for help when climbing stairs.   5. You may feel lightheaded.  If so, sit for a few minutes before standing.  Have someone help you get up.   6. You may have a slight fever. Call the doctor if your fever is over 100 F (37.7 C) (taken under the tongue) or lasts longer than 24 hours.  7. You may have a dry mouth, a sore throat, muscle aches or trouble sleeping.  These should go away after 24 hours.  8. Do not make important or legal decisions.  We don t expect you to have any problems from the surgery or treatment you had today. Just in case, here s what to do if you have pain, upset stomach (nausea), bleeding or infection:  Pain:  Take medicines your doctor has prescribed or over-the-counter medicine they have suggested. Resting and using ice packs can help, too. For surgery on an arm or leg, raise it on a pillow to ease swelling. Call your doctor if these methods don t work.  Copyright Cody Hernandez, Licensed under [employer]  Upset stomach (nausea):  Take anti-nausea medicine approved by your doctor. Drink clear liquids like apple juice, ginger ale, broth or 7-Up. Be sure to drink enough fluids. Rest can help, too. Move to normal foods when you re ready. Bleeding:  In the first 24 hours, you may see a little blood on your dressing, about the size of a quarter. You don t need to worry about this much blood, but if the blood spot keeps getting bigger:    Put pressure on the wound if you can, AND    Call your doctor.  Copyright [employer], Licensed under [employer]  International  Fever/Infection: Please call your doctor if you have any of these signs:    Redness    Swelling    Wound feels warm    Pain gets worse    Bad-smelling fluid leaks from wound    Fever or chills  Call your doctor for any of the followin.  It has been over 8 to 10 hours since surgery and you are still not able to urinate (pass water).    2.  Headache for over 24 hours.        Nurse advice line: [phone]

## 2019-06-12 NOTE — ANESTHESIA POSTPROCEDURE EVALUATION
Patient: Amelia Coker    Procedure(s):  hysteroscopy, dilation & curettage, polypectomy, endometrial ablation    Diagnosis:AUB, endometrial polyp  Diagnosis Additional Information: No value filed.    Anesthesia Type:  MAC    Note:  Anesthesia Post Evaluation    Patient location during evaluation: Phase 2  Patient participation: Able to fully participate in evaluation  Level of consciousness: awake and alert  Pain management: adequate  Airway patency: patent  Cardiovascular status: acceptable  Respiratory status: acceptable and room air  Hydration status: acceptable  PONV: none     Anesthetic complications: None          Last vitals:  Vitals:    06/12/19 1215 06/12/19 1230 06/12/19 1245   BP: 128/87 133/84 114/71   Pulse: 58 (!) 44 65   Resp: 16 16 16   Temp:      SpO2: 98% 99% 98%         Electronically Signed By: LAURI Altamirano CRNA  June 12, 2019  1:46 PM

## 2019-06-12 NOTE — OP NOTE
Operative Note    Name: Amelia Coker  MRN:[id number]  : 1975  Date of Surgery: 2019    Pre-operative Diagnosis:   AUB, endometrial polyp  Post-operative Diagnosis: Same  Procedure(s): Hysteroscopy, D&C, endometrial ablation    Surgeon: Fredy Pham MD    Anesthesia: MAC  EBL: 10 mL   Urine Output: clear urine drained at beginning of procedure    Specimens: Endometrial curetting   Complications: None apparent.  Findings: EUA revealed normal cervix and mobile small anteverted uterus, no adnexal masses.  Uterine cavity contained two polyps. Repeat view following ablation with endometrium ablated throughout, no polyps     Indications: Amelia Coker is a 44 year old female who presented with menorrhagia and endometrial polyps on ultrasound. Following discussion of options, the patient elected to undergo a hysteroscopy, D&C, and endometrial ablation. Risks, benefits, and alternatives to the procedure were discussed. The patient's questions were answered, understanding confirmed, and the patient signed written informed consent.    Technique:  The patient was taken to the operating room where she was placed in the dorsal lithotomy position. MAC anesthesia was administered and the patient was prepped and draped in the usual sterile fashion. A speculum was inserted into the vagina and the cervix visualized. A single-toothed tenaculum was placed at 12 o'clock on the cervix. A paracervical block with 0.5% marcaine with epi was performed at 4 and 8 o'clock. The cervix was dilated using sequential dilators up to 7 mm.  A sound was then used to measure the uterine cavity length.  The hysteroscope was advanced with finding noted about. The hysteroscope was removed and sharp curettage performed circumferentially with all tissue collected and send for pathology. The ablation device was inserted through the internal os and introduced into the uterine cavity.  After testing the uterine cavity size and ensuring an  adequate seal at the cervical os the ablation device was set to run at a power of 68. The device continued to run for 123 seconds. The Novasure was removed and the web examined with endometrial tissue noted. The hysteroscope was replaced with findings noted above, then removed. The tenaculum was removed from the cervix and good hemostasis was noted.  The speculum was removed from the vagina.  The patient tolerated the procedure well and was taken to the recovery room in stable condition.  Instrument, needle, and sponge counts were correct x2.      Fredy Pham   6/12/2019 11:55 AM

## 2019-06-12 NOTE — ANESTHESIA PREPROCEDURE EVALUATION
Anesthesia Pre-Procedure Evaluation    Patient: Amelia Coker   MRN: [id number] : 1975          Preoperative Diagnosis: AUB, endometrial polyp    Procedure(s):  hysteroscopy, dilation & curettage, polypectomy, endometrial ablation    Past Medical History:   Diagnosis Date     Abnormal mammogram 2016    right breast      AD (atopic dermatitis) 10/29/2015     Allergic rhinitis due to other allergen      Arthritis      Bell's palsy      Chronic fatigue 2012     Chronic infection     MRSA -  scalp and prior to Abdomen     Contact dermatitis and other eczema, due to unspecified cause     eczema     Contusion of left foot, initial encounter 3/16/2016     DJD (degenerative joint disease), lumbar 2013     DJD (degenerative joint disease), lumbar 2013     Ds DNA antibody positive 2014    borderline.      Elevated blood pressure reading without diagnosis of hypertension 2013     Facial contusion 12/15/2014    hit by horse      Foraminal stenosis of lumbar region 2013     Frontal headache 12/15/2014     Gastroesophageal reflux disease, esophagitis presence not specified 2016     Heat sensitivity 10/29/2015     HTN, goal below 140/90 2015     Hyperlipidemia LDL goal <130 2017     Irregular heart beat      Keratitis sicca 10/31/2012     Left shoulder pain 2013     Lumbar radiculopathy 2014     Migraine headache 10/23/2013     Migraine, unspecified, with intractable migraine, so stated, without mention of status migrainosus      Motion sickness      MRSA infection 10/20/2015     Muscular wasting and disuse atrophy, 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    right SCM, right wrist,       Numbness and tingling     both legs anf feet greater on the left     PAC (premature atrial contraction) 7/15/2010     Plantar fasciitis 2015     PONV (postoperative nausea and vomiting)      Skin lesion 10/20/2015    posterior superior scalp      Spasm of muscle 2017      Telangiectasia of face 12/19/2014     Tooth pain 10/20/2015    left lower primary molar      Past Surgical History:   Procedure Laterality Date     COLONOSCOPY  3/11/2013    Procedure: COLONOSCOPY;  colonoscopy;  Surgeon: Lobito Mas MD;  Location: [facility]     COLONOSCOPY WITH CO2 INSUFFLATION N/A 11/19/2018    Procedure: COLONOSCOPY WITH CO2 INSUFFLATION;  Surgeon: Goyo Blank MD;  Location: [facility]     DECOMPRESSION LUMBAR TWO LEVELS Bilateral 12/8/2016    Procedure: DECOMPRESSION LUMBAR TWO LEVELS;  Surgeon: Bang Burrell MD;  Location: [facility]     ESOPHAGOSCOPY, GASTROSCOPY, DUODENOSCOPY (EGD), COMBINED  11/8/2013    Procedure: COMBINED ESOPHAGOSCOPY, GASTROSCOPY, DUODENOSCOPY (EGD), BIOPSY SINGLE OR MULTIPLE;  Esophagoscopy, Gastroscopy, Duodenoscopy EGD with multiple biopsies;  Surgeon: Teja Koch MD;  Location: [facility]     HC REMOVAL OF TONSILS,<11 Y/O      Tonsils <10y.o.     HC TOOTH EXTRACTION W/FORCEP       REPAIR TENDON ELBOW  7/9/2014    Procedure: REPAIR TENDON ELBOW;  Surgeon: Chris Johnston MD;  Location: [facility]     ULTRASONIC REMOVAL SOFT TISSUE (LOCATION) Right 11/21/2018    Procedure: Tenex right foot;  Surgeon: Patel Martínez DO;  Location: [facility]       Anesthesia Evaluation     .             ROS/MED HX    ENT/Pulmonary:     (+)allergic rhinitis, , . .    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 : . . . :. dysrhythmias Irregular Heartbeat/Palpitations, .       METS/Exercise Tolerance:     Hematologic:         Musculoskeletal: Comment: Raynauds  (+) arthritis,  -       GI/Hepatic:     (+) GERD       Renal/Genitourinary:         Endo:         Psychiatric:         Infectious Disease:         Malignancy:         Other:    (+) No chance of pregnancy C-spine cleared: N/A, no H/O  "Chronic Pain,no other significant disability                         Physical Exam      Airway   Mallampati: I  TM distance: >3 FB  Neck ROM: full    Dental     Cardiovascular   Rhythm and rate: regular and normal      Pulmonary    breath sounds clear to auscultation            Lab Results   Component Value Date    WBC 4.7 06/12/2019    HGB 12.1 06/12/2019    HCT 35.8 06/12/2019     06/12/2019    CRP <2.9 03/27/2018    SED 9 08/29/2013     06/10/2019    POTASSIUM 3.9 06/10/2019    CHLORIDE 107 06/10/2019    CO2 28 06/10/2019    BUN 20 06/10/2019    CR 0.69 06/10/2019    GLC 90 06/10/2019    JELENA 8.8 06/10/2019    PHOS 3.0 11/19/2014    MAG 2.1 06/08/2016    ALBUMIN 4.2 06/10/2019    PROTTOTAL 7.6 06/10/2019    ALT 20 06/10/2019    AST 16 06/10/2019    ALKPHOS 55 06/10/2019    BILITOTAL 0.7 06/10/2019    BILIDIRECT 0 04/04/2003    LIPASE 180 08/06/2018    AMYLASE 51 08/06/2018    INR 1.0 07/14/2014    TSH 1.64 04/11/2019    T4 1.02 11/19/2014    HCG Negative 06/10/2019       Preop Vitals  BP Readings from Last 3 Encounters:   06/12/19 121/83   06/10/19 120/84   05/31/19 (P) 122/76    Pulse Readings from Last 3 Encounters:   06/10/19 64   05/31/19 (P) 70   05/23/19 84      Resp Readings from Last 3 Encounters:   06/10/19 16   05/23/19 16   03/14/19 16    SpO2 Readings from Last 3 Encounters:   06/12/19 100%   05/23/19 99%   11/21/18 96%      Temp Readings from Last 1 Encounters:   06/12/19 97.8  F (36.6  C) (Oral)    Ht Readings from Last 1 Encounters:   05/23/19 1.678 m (5' 6.06\")      Wt Readings from Last 1 Encounters:   06/10/19 93.4 kg (206 lb)    Estimated body mass index is 33.19 kg/m  as calculated from the following:    Height as of 5/23/19: 1.678 m (5' 6.06\").    Weight as of 6/10/19: 93.4 kg (206 lb).       Anesthesia Plan      History & Physical Review  History and physical reviewed and following examination; no interval change.    ASA Status:  2 .    NPO Status:  > 8 hours    Plan for MAC with " Intravenous and Propofol induction. Maintenance will be TIVA.  Reason for MAC:  Deep or markedly invasive procedure (G8)  PONV prophylaxis:  Ondansetron (or other 5HT-3), Scopolamine patch and Other (See comment)  dramamine      Postoperative Care  Postoperative pain management:  IV analgesics.      Consents  Anesthetic plan, risks, benefits and alternatives discussed with:  Patient and Spouse.  Use of blood products discussed: Yes.   Use of blood products discussed with Patient and Spouse.  Consented to blood products.  .                 LAURI Altamirano CRNA

## 2019-06-14 ENCOUNTER — MYC MEDICAL ADVICE (OUTPATIENT)
Dept: FAMILY MEDICINE | Facility: OTHER | Age: 44
End: 2019-06-14

## 2019-06-14 DIAGNOSIS — R19.7 DIARRHEA OF PRESUMED INFECTIOUS ORIGIN: Primary | ICD-10-CM

## 2019-06-14 DIAGNOSIS — R19.7 DIARRHEA OF PRESUMED INFECTIOUS ORIGIN: ICD-10-CM

## 2019-06-14 LAB
C DIFF TOX B STL QL: NEGATIVE
SPECIMEN SOURCE: NORMAL

## 2019-06-14 PROCEDURE — 87209 SMEAR COMPLEX STAIN: CPT | Performed by: PHYSICIAN ASSISTANT

## 2019-06-14 PROCEDURE — 87493 C DIFF AMPLIFIED PROBE: CPT | Mod: 59 | Performed by: PHYSICIAN ASSISTANT

## 2019-06-14 PROCEDURE — 87329 GIARDIA AG IA: CPT | Mod: 59 | Performed by: PHYSICIAN ASSISTANT

## 2019-06-14 PROCEDURE — 87506 IADNA-DNA/RNA PROBE TQ 6-11: CPT | Performed by: PHYSICIAN ASSISTANT

## 2019-06-14 PROCEDURE — 87177 OVA AND PARASITES SMEARS: CPT | Performed by: PHYSICIAN ASSISTANT

## 2019-06-16 LAB — COPATH REPORT: NORMAL

## 2019-06-17 ENCOUNTER — MYC MEDICAL ADVICE (OUTPATIENT)
Dept: OBGYN | Facility: CLINIC | Age: 44
End: 2019-06-17

## 2019-06-17 LAB
G LAMBLIA AG STL QL IA: NORMAL
O+P STL MICRO: NORMAL
O+P STL MICRO: NORMAL
SPECIMEN SOURCE: NORMAL
SPECIMEN SOURCE: NORMAL

## 2019-06-18 DIAGNOSIS — R19.7 DIARRHEA OF PRESUMED INFECTIOUS ORIGIN: Primary | ICD-10-CM

## 2019-06-18 PROCEDURE — 87209 SMEAR COMPLEX STAIN: CPT | Performed by: PHYSICIAN ASSISTANT

## 2019-06-18 PROCEDURE — 87177 OVA AND PARASITES SMEARS: CPT | Performed by: PHYSICIAN ASSISTANT

## 2019-06-19 LAB
O+P STL MICRO: NORMAL
O+P STL MICRO: NORMAL
SPECIMEN SOURCE: NORMAL

## 2019-06-25 ENCOUNTER — OFFICE VISIT (OUTPATIENT)
Dept: OBGYN | Facility: CLINIC | Age: 44
End: 2019-06-25
Payer: COMMERCIAL

## 2019-06-25 ENCOUNTER — MYC MEDICAL ADVICE (OUTPATIENT)
Dept: OBGYN | Facility: CLINIC | Age: 44
End: 2019-06-25

## 2019-06-25 ENCOUNTER — MYC MEDICAL ADVICE (OUTPATIENT)
Dept: FAMILY MEDICINE | Facility: OTHER | Age: 44
End: 2019-06-25

## 2019-06-25 VITALS
BODY MASS INDEX: 33.14 KG/M2 | HEART RATE: 68 BPM | WEIGHT: 205.7 LBS | SYSTOLIC BLOOD PRESSURE: 108 MMHG | DIASTOLIC BLOOD PRESSURE: 78 MMHG

## 2019-06-25 DIAGNOSIS — Z98.890 POSTOPERATIVE STATE: Primary | ICD-10-CM

## 2019-06-25 PROCEDURE — 99212 OFFICE O/P EST SF 10 MIN: CPT | Performed by: OBSTETRICS & GYNECOLOGY

## 2019-06-26 NOTE — PROGRESS NOTES
Postop Check    HPI:  Amelia Coker is a 44 year old  who is s/p hysteroscopy, D&C, and endometrial ablation on 6/12 for menorrhagia and endometrial polyp, the procedure was uncomplicated. She is doing well, had heavier discharge and some cramping for the first week, now mostly resolved. She also reports that her prior bowel irritation has seemingly also resolved postop. She is happy with the procedure and has no new symptoms nor concerns today.     EXAM:  Vitals:    06/25/19 0735   BP: 108/78   BP Location: Right arm   Cuff Size: Adult Regular   Pulse: 68   Weight: 93.3 kg (205 lb 11.2 oz)    Body mass index is 33.14 kg/m .    Gen: Alert, oriented, appropriately interactive, NAD  CV: RRR, 2+ peripheral pulses  Resp: CTAB, good effort without distress     Abdomen: soft, non tender, non distended, no masses   Lower extremities: non-tender, no edema  Skin: no lesions or rashes    Labs & Imaging:  SPECIMEN(S):   Endometrial curettings   FINAL DIAGNOSIS:   Endometrium, curettings:   Endometrium, biopsy:   - Multiple fragments of benign proliferative endometrium, one polyp-like,   with disordered proliferative   pattern, and negative for hyperplasia, atypia and malignancy.   - A small amount of normal endocervical tissue.     ASSESSMENT/PLAN: Amelia Coker is a 44 year old  who is s/p hysteroscopy, D&C, and endometrial ablation on 6/12 for menorrhagia and endometrial polyp, the procedure was uncomplicated. She is doing well. We reviewed the pathology results. No further concerns.     Fredy Pham MD  6/25/2019 7:52 PM

## 2019-08-01 ENCOUNTER — MYC MEDICAL ADVICE (OUTPATIENT)
Dept: FAMILY MEDICINE | Facility: OTHER | Age: 44
End: 2019-08-01

## 2019-08-01 ENCOUNTER — OFFICE VISIT (OUTPATIENT)
Dept: FAMILY MEDICINE | Facility: OTHER | Age: 44
End: 2019-08-01
Payer: COMMERCIAL

## 2019-08-01 VITALS
WEIGHT: 200.2 LBS | DIASTOLIC BLOOD PRESSURE: 76 MMHG | OXYGEN SATURATION: 99 % | SYSTOLIC BLOOD PRESSURE: 110 MMHG | TEMPERATURE: 98.8 F | HEART RATE: 60 BPM | RESPIRATION RATE: 16 BRPM | BODY MASS INDEX: 32.25 KG/M2

## 2019-08-01 DIAGNOSIS — B37.31 YEAST INFECTION OF THE VAGINA: Primary | ICD-10-CM

## 2019-08-01 DIAGNOSIS — H69.91 DYSFUNCTION OF RIGHT EUSTACHIAN TUBE: Primary | ICD-10-CM

## 2019-08-01 DIAGNOSIS — R59.0 CERVICAL LYMPHADENOPATHY: ICD-10-CM

## 2019-08-01 PROCEDURE — 99213 OFFICE O/P EST LOW 20 MIN: CPT | Performed by: PHYSICIAN ASSISTANT

## 2019-08-01 RX ORDER — AZITHROMYCIN 250 MG/1
TABLET, FILM COATED ORAL
Qty: 6 TABLET | Refills: 0 | Status: SHIPPED | OUTPATIENT
Start: 2019-08-01 | End: 2019-08-29

## 2019-08-01 RX ORDER — FLUCONAZOLE 150 MG/1
150 TABLET ORAL DAILY
Qty: 3 TABLET | Refills: 0 | Status: SHIPPED | OUTPATIENT
Start: 2019-08-01 | End: 2019-08-29

## 2019-08-01 ASSESSMENT — PAIN SCALES - GENERAL: PAINLEVEL: MODERATE PAIN (4)

## 2019-08-01 NOTE — PROGRESS NOTES
Subjective     Amelia Coker is a 44 year old female who presents to clinic today for the following health issues:    HPI   Concern - Ear Pain  Onset: 1 week    Description:   Patient reports her right ear has been painful.    Intensity: moderate    Progression of Symptoms:  worsening    Accompanying Signs & Symptoms:  Right lymph node in the neck is enlarged.    Previous history of similar problem:   Yes    Precipitating factors:   Worsened by: None    Alleviating factors:  Improved by: Meloxicam    Therapies Tried and outcome: Flonase, sudafed,       Patient Active Problem List   Diagnosis     Intractable migraine     Bell's palsy     Contact dermatitis and other eczema, due to unspecified cause     Allergic rhinitis due to other allergen     Scalp lesion     Lyme disease     PAC (premature atrial contraction)     Palpitations     Raynaud phenomenon     Chronic fatigue     Hair loss     Abnormal PFT     Shoulder joint instability     Family history of melanoma     Dry eyes     Keratitis sicca     Family history of colon cancer     Pain in joint, upper arm     FLORIDALMA positive     Plantar fascial fibromatosis     Foraminal stenosis of lumbar region     DJD (degenerative joint disease), lumbar     Lumbar radiculopathy     Ds DNA antibody positive     Muscular wasting and disuse atrophy, not elsewhere classified     Frontal headache     Telangiectasia of face     Lateral epicondylitis     Right shoulder pain     Plantar fasciitis     HTN, goal below 140/90     Skin lesion     AD (atopic dermatitis)     Heat sensitivity     Rotator cuff arthropathy, right     Gastroesophageal reflux disease, esophagitis presence not specified     Abnormal mammogram     Sternocleidomastoid muscle tenderness     Acute cervical myofascial strain, initial encounter     Spasm of muscle     Hyperlipidemia LDL goal <130     Biceps tendinopathy, right     Superficial swelling of scalp     Intractable right heel pain     Intractable episodic  tension-type headache     Jaycob complexion     Lip lesion     Epigastric pain     Abdominal distension     PONV (postoperative nausea and vomiting)     Menorrhalgia     Uterine polyp     Loose stools     Hyperactive bowel sounds     Past Surgical History:   Procedure Laterality Date     COLONOSCOPY  3/11/2013    Procedure: COLONOSCOPY;  colonoscopy;  Surgeon: Lobito Mas MD;  [facility]     COLONOSCOPY WITH CO2 INSUFFLATION N/A 11/19/2018    Procedure: COLONOSCOPY WITH CO2 INSUFFLATION;  Surgeon: Goyo Blank MD;  Location[facility]     DECOMPRESSION LUMBAR TWO LEVELS Bilateral 12/8/2016    Procedure: DECOMPRESSION LUMBAR TWO LEVELS;  Surgeon: Bang Burrell MD;  Location: [facility]     DILATION AND CURETTAGE, HYSTEROSCOPY, ABLATE ENDOMETRIUM NOVASURE, COMBINED N/A 6/12/2019    Procedure: hysteroscopy, dilation & curettage, polypectomy, endometrial ablation;  Surgeon: Fredy Pham MD;  [facility]     ESOPHAGOSCOPY, GASTROSCOPY, DUODENOSCOPY (EGD), COMBINED  11/8/2013    Procedure: COMBINED ESOPHAGOSCOPY, GASTROSCOPY, DUODENOSCOPY (EGD), BIOPSY SINGLE OR MULTIPLE;  Esophagoscopy, Gastroscopy, Duodenoscopy EGD with multiple biopsies;  Surgeon: Teja Koch MD;  Location: [facility]     HC REMOVAL OF TONSILS,<11 Y/O      Tonsils <10y.o.     HC TOOTH EXTRACTION W/FORCEP       REPAIR TENDON ELBOW  7/9/2014    Procedure: REPAIR TENDON ELBOW;  Surgeon: Chris Johnston MD;  [facility]     ULTRASONIC REMOVAL SOFT TISSUE (LOCATION) Right 11/21/2018    Procedure: Tenex right foot;  Surgeon: Patel Martínez DO;  Location: [facility]       Social History     Tobacco Use     Smoking status: Never Smoker     Smokeless tobacco: Never Used   Substance Use Topics     Alcohol use: No     Comment: social     Family History   Problem Relation Age of Onset     Diabetes Mother         adult     Cancer - colorectal Mother      Hypertension Mother      Allergies Mother      Cancer Mother          colon     Depression Mother      Gastrointestinal Disease Mother         ibs     Genitourinary Problems Mother         kidney cyst     Gynecology Mother         endometriosis     Lipids Mother      Thyroid Disease Mother      Arthritis Mother         RA     Heart Disease Maternal Grandfather         MI,  - 60's     Allergies Father      Unknown/Adopted Father      Heart Disease Father         mi-age mid 40's     Cardiovascular Father      Lipids Father      Respiratory Father         asthma     Cancer Father      Other Cancer Father         renal cancer     Depression Sister      Allergies Sister      Cancer Sister         vaginal     Gynecology Sister         endometriosis     Lipids Paternal Grandmother      Osteoporosis Paternal Grandmother      Thyroid Disease Paternal Grandmother      Respiratory Son         asthma     Allergies Son      Arthritis Sister      Allergies Brother      Heart Disease Brother      Allergies Daughter      Blood Disease Paternal Aunt         LGL T cell Leukemia     Rheumatoid Arthritis Paternal Aunt      Kidney Disease Maternal Aunt      Lupus Maternal Aunt      Bladder Cancer Maternal Aunt          Current Outpatient Medications   Medication Sig Dispense Refill     Acetaminophen (TYLENOL PO) Take 500 mg by mouth every 6 hours as needed        acyclovir (ZOVIRAX) 5 % ointment Apply topically 6 times daily 15 g 3     azithromycin (ZITHROMAX) 250 MG tablet Two tablets first day, then one tablet daily for four days 6 tablet 0     Coal Tar Extract 4 % SHAM Externally apply 1 Dose topically daily 168 mL 1     diazepam (VALIUM) 5 MG tablet Take 1 tablet (5 mg) by mouth 3 times daily as needed for muscle spasms 90 tablet 0     eletriptan (RELPAX) 20 MG tablet Take 1-2 tablets (20-40 mg) by mouth at onset of headache for migraine May repeat in 2 hours. Max 4 tablets/24 hours. 18 tablet 1     fexofenadine (ALLEGRA) 180 MG tablet Take 1 tablet by mouth daily Reported on 2017        ibuprofen (ADVIL/MOTRIN) 600 MG tablet Take 1 tablet (600 mg) by mouth every 6 hours as needed for moderate pain 30 tablet 0     meloxicam (MOBIC) 15 MG tablet TAKE ONE TABLET BY MOUTH EVERY DAY (PATIENT WANTS UNICHEM BRAND ONLY) 90 tablet 1     nystatin-triamcinolone (MYCOLOG II) cream Apply topically 2 times daily 15 g 0     ondansetron (ZOFRAN-ODT) 4 MG ODT tab Take 1-2 tablets (4-8 mg) by mouth every 8 hours as needed for nausea 4 tablet 0     sucralfate (CARAFATE) 1 GM tablet TAKE ONE TABLET BY MOUTH FOUR TIMES A  tablet 0     SUMAtriptan (IMITREX) 100 MG tablet Take 1 tablet by mouth See Admin Instructions. WITH ONSET OF MIGRAINE, MAY REPEAT ONCE AFTER 2 HOURS. DO NOT EXCEED 2 TABLETS IN 24 HOURS. 27 tablet prn     mupirocin (BACTROBAN) 2 % ointment Apply topically 3 times daily (Patient not taking: Reported on 8/1/2019) 22 g 1     Allergies   Allergen Reactions     Ceftriaxone Anaphylaxis     Hives, rash, racing heart beat     Bactrim [Sulfamethoxazole W/Trimethoprim] Hives     Ciprofloxacin Other (See Comments)     Tendon Issues     Codeine Nausea and Vomiting     Copper      Rash       Doxycycline      Loss of skin pigmentation, skin loss.     Gold      Rash       Iodine-131 Hives     Latex      Levaquin [Levofloxacin] Other (See Comments)     Tendon issues with levaquin and cipro     Nickel      rash     Steel [Staples]      Rash from staples       Penicillins Rash     Recent Labs   Lab Test 06/10/19  1851 04/11/19  1418 02/08/19  0817 12/31/18  0805  03/27/18  0733 08/30/17  0757  06/08/16  0745   A1C  --   --   --   --   --   --   --   --  5.5   LDL  --   --  93 113*  --   --  77  --   --    HDL  --   --  75 86  --   --  72  --   --    TRIG  --   --  40 49  --   --  50  --   --    ALT 20  --  27 23   < >  --  22   < >  --    CR 0.69  --   --  0.69   < >  --  0.66   < > 0.54   GFRESTIMATED >90  --   --  >90   < >  --  >90   < > >90  Non  GFR Calc     GFRESTBLACK >90  --   --   >90   < >  --  >90   < > >90  African American GFR Calc     POTASSIUM 3.9  --   --  4.0   < >  --  4.2   < > 4.2   TSH  --  1.64  --   --   --  1.23  --    < >  --     < > = values in this interval not displayed.      BP Readings from Last 3 Encounters:   08/01/19 110/76   06/25/19 108/78   06/12/19 114/71    Wt Readings from Last 3 Encounters:   08/01/19 90.8 kg (200 lb 3.2 oz)   06/25/19 93.3 kg (205 lb 11.2 oz)   06/10/19 93.4 kg (206 lb)                    Reviewed and updated as needed this visit by Provider         Review of Systems   ROS COMP: CONSTITUTIONAL: NEGATIVE for fever, chills, change in weight  INTEGUMENTARY/SKIN: NEGATIVE for worrisome rashes, moles or lesions  EYES: NEGATIVE for vision changes or irritation  RESP: NEGATIVE for significant cough or SOB  CV: NEGATIVE for chest pain, palpitations or peripheral edema  MUSCULOSKELETAL: NEGATIVE for significant arthralgias or myalgia  PSYCHIATRIC: NEGATIVE for changes in mood or affect      Objective    /76 (Cuff Size: Adult Regular)   Pulse 60   Temp 98.8  F (37.1  C) (Temporal)   Resp 16   Wt 90.8 kg (200 lb 3.2 oz)   SpO2 99%   BMI 32.25 kg/m    Body mass index is 32.25 kg/m .  Physical Exam   GENERAL: healthy, alert and no distress  HENT: normal cephalic/atraumatic, right ear: clear effusion, left ear: normal: no effusions, no erythema, normal landmarks, nose and mouth without ulcers or lesions, oropharynx clear and oral mucous membranes moist  NECK: cervical adenopathy on the right is noted upon exam today.  This is nontender. no asymmetry, masses, or scars and trachea midline and normal to palpation  RESP: lungs clear to auscultation - no rales, rhonchi or wheezes  CV: regular rate and rhythm, normal S1 S2, no S3 or S4, no murmur, click or rub, no peripheral edema and peripheral pulses strong  MS: no gross musculoskeletal defects noted, no edema  SKIN: no suspicious lesions or rashes to exposed skin.  NEURO: Normal strength and tone,  "mentation intact and speech normal  PSYCH: mentation appears normal, affect normal/bright    Diagnostic Test Results:  Labs reviewed in Epic        Assessment & Plan     1. Dysfunction of right eustachian tube  2. Cervical lymphadenopathy  Treat based on overall signs and symptoms and not necessarily on physical evidence today.  Follow-up in 2 weeks if the lymphadenopathy has not resolved.  - azithromycin (ZITHROMAX) 250 MG tablet; Two tablets first day, then one tablet daily for four days  Dispense: 6 tablet; Refill: 0     BMI:   Estimated body mass index is 32.25 kg/m  as calculated from the following:    Height as of 5/23/19: 1.678 m (5' 6.06\").    Weight as of this encounter: 90.8 kg (200 lb 3.2 oz).   Weight management plan: Discussed healthy diet and exercise guidelines    Return in about 2 weeks (around 8/15/2019) for recheck of current condition.    Otoniel Manuel PA-C  [facility]    "

## 2019-08-13 ENCOUNTER — MYC MEDICAL ADVICE (OUTPATIENT)
Dept: FAMILY MEDICINE | Facility: OTHER | Age: 44
End: 2019-08-13

## 2019-08-13 DIAGNOSIS — M62.838 SPASM OF MUSCLE: ICD-10-CM

## 2019-08-13 RX ORDER — DIAZEPAM 5 MG
5 TABLET ORAL 3 TIMES DAILY PRN
Qty: 30 TABLET | Refills: 0 | Status: SHIPPED | OUTPATIENT
Start: 2019-08-13 | End: 2020-09-24

## 2019-08-15 ENCOUNTER — MYC MEDICAL ADVICE (OUTPATIENT)
Dept: FAMILY MEDICINE | Facility: OTHER | Age: 44
End: 2019-08-15

## 2019-08-15 ENCOUNTER — HOSPITAL ENCOUNTER (OUTPATIENT)
Dept: MAMMOGRAPHY | Facility: CLINIC | Age: 44
Discharge: HOME | End: 2019-08-15
Attending: PHYSICIAN ASSISTANT | Admitting: PHYSICIAN ASSISTANT
Payer: COMMERCIAL

## 2019-08-15 DIAGNOSIS — Z12.31 VISIT FOR SCREENING MAMMOGRAM: ICD-10-CM

## 2019-08-15 PROCEDURE — 77063 BREAST TOMOSYNTHESIS BI: CPT

## 2019-08-26 ENCOUNTER — MYC MEDICAL ADVICE (OUTPATIENT)
Dept: FAMILY MEDICINE | Facility: OTHER | Age: 44
End: 2019-08-26

## 2019-08-26 NOTE — PROGRESS NOTES
Subjective     Amelia Coker is a 44 year old female who presents to clinic today for the following health issues:    HPI   Concern - skin is cracking on fingertips, dry hair, fingernails have ridges and fatigue  Onset: 2 months    Description:   Skin on fingertips is cracking, dry hair fatigue and nail problem    Intensity: worsening    Progression of Symptoms:  worsening    Accompanying Signs & Symptoms:      Previous history of similar problem:   Yes- hair falling out 2011    Precipitating factors:   Worsened by: none    Alleviating factors:  Improved by: none    Therapies Tried and outcome: multivitamin    Patient Active Problem List   Diagnosis     Intractable migraine     Bell's palsy     Contact dermatitis and other eczema, due to unspecified cause     Allergic rhinitis due to other allergen     Scalp lesion     Lyme disease     PAC (premature atrial contraction)     Palpitations     Raynaud phenomenon     Chronic fatigue     Hair loss     Abnormal PFT     Shoulder joint instability     Family history of melanoma     Dry eyes     Keratitis sicca     Family history of colon cancer     Pain in joint, upper arm     FLORIDALMA positive     Plantar fascial fibromatosis     Foraminal stenosis of lumbar region     DJD (degenerative joint disease), lumbar     Lumbar radiculopathy     Ds DNA antibody positive     Muscular wasting and disuse atrophy, not elsewhere classified     Frontal headache     Telangiectasia of face     Lateral epicondylitis     Right shoulder pain     Plantar fasciitis     HTN, goal below 140/90     Skin lesion     AD (atopic dermatitis)     Heat sensitivity     Rotator cuff arthropathy, right     Gastroesophageal reflux disease, esophagitis presence not specified     Abnormal mammogram     Sternocleidomastoid muscle tenderness     Acute cervical myofascial strain, initial encounter     Spasm of muscle     Hyperlipidemia LDL goal <130     Biceps tendinopathy, right     Superficial swelling of scalp      Intractable right heel pain     Intractable episodic tension-type headache     Jaycob complexion     Lip lesion     Epigastric pain     Abdominal distension     PONV (postoperative nausea and vomiting)     Menorrhalgia     Uterine polyp     Loose stools     Hyperactive bowel sounds     Abnormality of nail surface     Dry hair     Dry skin     Malaise and fatigue     Lymphadenopathy     Past Surgical History:   Procedure Laterality Date     COLONOSCOPY  3/11/2013    Procedure: COLONOSCOPY;  colonoscopy;  Surgeon: Lobito Mas MD;  Location: [facility]     COLONOSCOPY WITH CO2 INSUFFLATION N/A 11/19/2018    Procedure: COLONOSCOPY WITH CO2 INSUFFLATION;  Surgeon: Goyo Blank MD;  Location: [facility]     DECOMPRESSION LUMBAR TWO LEVELS Bilateral 12/8/2016    Procedure: DECOMPRESSION LUMBAR TWO LEVELS;  Surgeon: Bang Burrell MD;  Location: [facility]     DILATION AND CURETTAGE, HYSTEROSCOPY, ABLATE ENDOMETRIUM NOVASURE, COMBINED N/A 6/12/2019    Procedure: hysteroscopy, dilation & curettage, polypectomy, endometrial ablation;  Surgeon: Fredy Pham MD;  Location: [facility]     ESOPHAGOSCOPY, GASTROSCOPY, DUODENOSCOPY (EGD), COMBINED  11/8/2013    Procedure: COMBINED ESOPHAGOSCOPY, GASTROSCOPY, DUODENOSCOPY (EGD), BIOPSY SINGLE OR MULTIPLE;  Esophagoscopy, Gastroscopy, Duodenoscopy EGD with multiple biopsies;  Surgeon: Teja Koch MD;  Location: [facility]     HC REMOVAL OF TONSILS,<9 Y/O      Tonsils <10y.o.     HC TOOTH EXTRACTION W/FORCEP       REPAIR TENDON ELBOW  7/9/2014    Procedure: REPAIR TENDON ELBOW;  Surgeon: Chris Johnston MD;  Location: [facility]     ULTRASONIC REMOVAL SOFT TISSUE (LOCATION) Right 11/21/2018    Procedure: Tenex right foot;  Surgeon: Patel Martínez DO;  Location: [facility]       Social History     Tobacco Use     Smoking status: Never Smoker     Smokeless tobacco: Never Used   Substance Use Topics     Alcohol use: No     Comment: social     Family History    Problem Relation Age of Onset     Diabetes Mother         adult     Cancer - colorectal Mother      Hypertension Mother      Allergies Mother      Cancer Mother         colon     Depression Mother      Gastrointestinal Disease Mother         ibs     Genitourinary Problems Mother         kidney cyst     Gynecology Mother         endometriosis     Lipids Mother      Thyroid Disease Mother      Arthritis Mother         RA     Heart Disease Maternal Grandfather         MI,  - 60's     Allergies Father      Unknown/Adopted Father      Heart Disease Father         mi-age mid 40's     Cardiovascular Father      Lipids Father      Respiratory Father         asthma     Cancer Father      Other Cancer Father         renal cancer     Depression Sister      Allergies Sister      Cancer Sister         vaginal     Gynecology Sister         endometriosis     Lipids Paternal Grandmother      Osteoporosis Paternal Grandmother      Thyroid Disease Paternal Grandmother      Respiratory Son         asthma     Allergies Son      Arthritis Sister      Allergies Brother      Heart Disease Brother      Allergies Daughter      Blood Disease Paternal Aunt         LGL T cell Leukemia     Rheumatoid Arthritis Paternal Aunt      Kidney Disease Maternal Aunt      Lupus Maternal Aunt      Bladder Cancer Maternal Aunt          Current Outpatient Medications   Medication Sig Dispense Refill     Acetaminophen (TYLENOL PO) Take 500 mg by mouth every 6 hours as needed        acyclovir (ZOVIRAX) 5 % ointment Apply topically 6 times daily 15 g 3     diazepam (VALIUM) 5 MG tablet Take 1 tablet (5 mg) by mouth 3 times daily as needed for muscle spasms 30 tablet 0     eletriptan (RELPAX) 20 MG tablet Take 1-2 tablets (20-40 mg) by mouth at onset of headache for migraine May repeat in 2 hours. Max 4 tablets/24 hours. 18 tablet 1     fexofenadine (ALLEGRA) 180 MG tablet Take 1 tablet by mouth daily Reported on 2017       ibuprofen  (ADVIL/MOTRIN) 600 MG tablet Take 1 tablet (600 mg) by mouth every 6 hours as needed for moderate pain 30 tablet 0     meloxicam (MOBIC) 15 MG tablet TAKE ONE TABLET BY MOUTH EVERY DAY (PATIENT WANTS UNICHEM BRAND ONLY) 90 tablet 1     mupirocin (BACTROBAN) 2 % ointment Apply topically 3 times daily 22 g 1     nystatin-triamcinolone (MYCOLOG II) cream Apply topically 2 times daily 15 g 0     sucralfate (CARAFATE) 1 GM tablet TAKE ONE TABLET BY MOUTH FOUR TIMES A  tablet 0     SUMAtriptan (IMITREX) 100 MG tablet Take 1 tablet by mouth See Admin Instructions. WITH ONSET OF MIGRAINE, MAY REPEAT ONCE AFTER 2 HOURS. DO NOT EXCEED 2 TABLETS IN 24 HOURS. 27 tablet prn     Allergies   Allergen Reactions     Ceftriaxone Anaphylaxis     Hives, rash, racing heart beat     Bactrim [Sulfamethoxazole W/Trimethoprim] Hives     Ciprofloxacin Other (See Comments)     Tendon Issues     Codeine Nausea and Vomiting     Copper      Rash       Doxycycline      Loss of skin pigmentation, skin loss.     Gold      Rash       Iodine-131 Hives     Latex      Levaquin [Levofloxacin] Other (See Comments)     Tendon issues with levaquin and cipro     Nickel      rash     Steel [Staples]      Rash from staples       Penicillins Rash     Recent Labs   Lab Test 06/10/19  1851 04/11/19  1418 02/08/19  0817 12/31/18  0805  03/27/18  0733 08/30/17  0757  06/08/16  0745   A1C  --   --   --   --   --   --   --   --  5.5   LDL  --   --  93 113*  --   --  77  --   --    HDL  --   --  75 86  --   --  72  --   --    TRIG  --   --  40 49  --   --  50  --   --    ALT 20  --  27 23   < >  --  22   < >  --    CR 0.69  --   --  0.69   < >  --  0.66   < > 0.54   GFRESTIMATED >90  --   --  >90   < >  --  >90   < > >90  Non  GFR Calc     GFRESTBLACK >90  --   --  >90   < >  --  >90   < > >90  African American GFR Calc     POTASSIUM 3.9  --   --  4.0   < >  --  4.2   < > 4.2   TSH  --  1.64  --   --   --  1.23  --    < >  --     < > = values  "in this interval not displayed.      BP Readings from Last 3 Encounters:   08/29/19 112/80   08/01/19 110/76   06/25/19 108/78    Wt Readings from Last 3 Encounters:   08/29/19 90.7 kg (200 lb)   08/01/19 90.8 kg (200 lb 3.2 oz)   06/25/19 93.3 kg (205 lb 11.2 oz)                    Reviewed and updated as needed this visit by Provider         Review of Systems   ROS COMP: Constitutional, HEENT, cardiovascular, pulmonary, GI, , musculoskeletal, neuro, skin, endocrine and psych systems are negative, except as otherwise noted.      Objective    /80   Pulse 68   Temp 98  F (36.7  C) (Temporal)   Resp 16   Ht 1.67 m (5' 5.75\")   Wt 90.7 kg (200 lb)   SpO2 98%   BMI 32.53 kg/m    Body mass index is 32.53 kg/m .  Physical Exam   GENERAL: healthy, alert and no distress  HENT: ear canals and TM's normal, nose and mouth without ulcers or lesions  NECK: Right-sided submandibular lymph adenopathy noted, no asymmetry, masses, or scars and trachea midline and normal to palpation  RESP: lungs clear to auscultation - no rales, rhonchi or wheezes  CV: regular rate and rhythm, normal S1 S2, no S3 or S4, no murmur, click or rub, no peripheral edema and peripheral pulses strong  ABDOMEN: soft, nontender, no hepatosplenomegaly, no masses and bowel sounds normal  MS: no gross musculoskeletal defects noted, no edema  SKIN: Dry brittle hair is noted.  Longitudinal creases in all of her nails present on exam today.  PSYCH: mentation appears normal, affect normal/bright    Diagnostic Test Results:  Labs reviewed in Epic  No results found. However, due to the size of the patient record, not all encounters were searched. Please check Results Review for a complete set of results.        Assessment & Plan     1. Dry skin  2. Dry hair  3. Abnormality of nail surface  4. Malaise and fatigue  5. Ds DNA antibody positive  6. FLORIDALMA positive  7. Lymphadenopathy  Evaluate this further prior to making any changes in medications.  We do " "talk about the possibility of Sjogren's syndrome   - TSH with free T4 reflex  - Vitamin B6  - Vitamin B12  - Vitamin D Deficiency  - Anti Nuclear Nury IgG by IFA with Reflex  - SSA Ro VIRI Antibody IgG  - SSB La VIRI Antibody IgG  - Comprehensive metabolic panel              BMI:   Estimated body mass index is 32.53 kg/m  as calculated from the following:    Height as of this encounter: 1.67 m (5' 5.75\").    Weight as of this encounter: 90.7 kg (200 lb).     Return in about 4 weeks (around 9/26/2019) for recheck of current condition, if symptoms do not improve.    Otoniel Manuel PA-C  [facility]    "

## 2019-08-29 ENCOUNTER — OFFICE VISIT (OUTPATIENT)
Dept: FAMILY MEDICINE | Facility: OTHER | Age: 44
End: 2019-08-29
Payer: COMMERCIAL

## 2019-08-29 VITALS
BODY MASS INDEX: 32.14 KG/M2 | HEIGHT: 66 IN | RESPIRATION RATE: 16 BRPM | SYSTOLIC BLOOD PRESSURE: 112 MMHG | OXYGEN SATURATION: 98 % | DIASTOLIC BLOOD PRESSURE: 80 MMHG | WEIGHT: 200 LBS | HEART RATE: 68 BPM | TEMPERATURE: 98 F

## 2019-08-29 DIAGNOSIS — R76.8 DS DNA ANTIBODY POSITIVE: ICD-10-CM

## 2019-08-29 DIAGNOSIS — L67.8 DRY HAIR: ICD-10-CM

## 2019-08-29 DIAGNOSIS — R53.83 MALAISE AND FATIGUE: ICD-10-CM

## 2019-08-29 DIAGNOSIS — L85.3 DRY SKIN: Primary | ICD-10-CM

## 2019-08-29 DIAGNOSIS — R76.8 ANA POSITIVE: ICD-10-CM

## 2019-08-29 DIAGNOSIS — L60.9 ABNORMALITY OF NAIL SURFACE: ICD-10-CM

## 2019-08-29 DIAGNOSIS — R59.1 LYMPHADENOPATHY: ICD-10-CM

## 2019-08-29 DIAGNOSIS — R53.81 MALAISE AND FATIGUE: ICD-10-CM

## 2019-08-29 LAB
ALBUMIN SERPL-MCNC: 4.1 G/DL (ref 3.4–5)
ALP SERPL-CCNC: 57 U/L (ref 40–150)
ALT SERPL W P-5'-P-CCNC: 21 U/L (ref 0–50)
ANION GAP SERPL CALCULATED.3IONS-SCNC: 5 MMOL/L (ref 3–14)
AST SERPL W P-5'-P-CCNC: 18 U/L (ref 0–45)
BILIRUB SERPL-MCNC: 0.8 MG/DL (ref 0.2–1.3)
BUN SERPL-MCNC: 20 MG/DL (ref 7–30)
CALCIUM SERPL-MCNC: 8.7 MG/DL (ref 8.5–10.1)
CHLORIDE SERPL-SCNC: 108 MMOL/L (ref 94–109)
CO2 SERPL-SCNC: 27 MMOL/L (ref 20–32)
CREAT SERPL-MCNC: 0.56 MG/DL (ref 0.52–1.04)
GFR SERPL CREATININE-BSD FRML MDRD: >90 ML/MIN/{1.73_M2}
GLUCOSE SERPL-MCNC: 95 MG/DL (ref 70–99)
POTASSIUM SERPL-SCNC: 4.2 MMOL/L (ref 3.4–5.3)
PROT SERPL-MCNC: 7.8 G/DL (ref 6.8–8.8)
SODIUM SERPL-SCNC: 140 MMOL/L (ref 133–144)
TSH SERPL DL<=0.005 MIU/L-ACNC: 1.02 MU/L (ref 0.4–4)
VIT B12 SERPL-MCNC: 651 PG/ML (ref 193–986)

## 2019-08-29 PROCEDURE — 82306 VITAMIN D 25 HYDROXY: CPT | Performed by: PHYSICIAN ASSISTANT

## 2019-08-29 PROCEDURE — 80053 COMPREHEN METABOLIC PANEL: CPT | Performed by: PHYSICIAN ASSISTANT

## 2019-08-29 PROCEDURE — 84443 ASSAY THYROID STIM HORMONE: CPT | Performed by: PHYSICIAN ASSISTANT

## 2019-08-29 PROCEDURE — 86235 NUCLEAR ANTIGEN ANTIBODY: CPT | Performed by: PHYSICIAN ASSISTANT

## 2019-08-29 PROCEDURE — 86618 LYME DISEASE ANTIBODY: CPT | Performed by: PHYSICIAN ASSISTANT

## 2019-08-29 PROCEDURE — 86038 ANTINUCLEAR ANTIBODIES: CPT | Performed by: PHYSICIAN ASSISTANT

## 2019-08-29 PROCEDURE — 82607 VITAMIN B-12: CPT | Performed by: PHYSICIAN ASSISTANT

## 2019-08-29 PROCEDURE — 36415 COLL VENOUS BLD VENIPUNCTURE: CPT | Performed by: PHYSICIAN ASSISTANT

## 2019-08-29 PROCEDURE — 99000 SPECIMEN HANDLING OFFICE-LAB: CPT | Performed by: PHYSICIAN ASSISTANT

## 2019-08-29 PROCEDURE — 84207 ASSAY OF VITAMIN B-6: CPT | Mod: 90 | Performed by: PHYSICIAN ASSISTANT

## 2019-08-29 PROCEDURE — 86039 ANTINUCLEAR ANTIBODIES (ANA): CPT | Performed by: PHYSICIAN ASSISTANT

## 2019-08-29 PROCEDURE — 99214 OFFICE O/P EST MOD 30 MIN: CPT | Performed by: PHYSICIAN ASSISTANT

## 2019-08-29 ASSESSMENT — ANXIETY QUESTIONNAIRES
5. BEING SO RESTLESS THAT IT IS HARD TO SIT STILL: NOT AT ALL
6. BECOMING EASILY ANNOYED OR IRRITABLE: NOT AT ALL
7. FEELING AFRAID AS IF SOMETHING AWFUL MIGHT HAPPEN: NOT AT ALL
1. FEELING NERVOUS, ANXIOUS, OR ON EDGE: NOT AT ALL
3. WORRYING TOO MUCH ABOUT DIFFERENT THINGS: NOT AT ALL
GAD7 TOTAL SCORE: 0
2. NOT BEING ABLE TO STOP OR CONTROL WORRYING: NOT AT ALL
IF YOU CHECKED OFF ANY PROBLEMS ON THIS QUESTIONNAIRE, HOW DIFFICULT HAVE THESE PROBLEMS MADE IT FOR YOU TO DO YOUR WORK, TAKE CARE OF THINGS AT HOME, OR GET ALONG WITH OTHER PEOPLE: NOT DIFFICULT AT ALL

## 2019-08-29 ASSESSMENT — MIFFLIN-ST. JEOR: SCORE: 1559.97

## 2019-08-29 ASSESSMENT — PATIENT HEALTH QUESTIONNAIRE - PHQ9
5. POOR APPETITE OR OVEREATING: NOT AT ALL
SUM OF ALL RESPONSES TO PHQ QUESTIONS 1-9: 2

## 2019-08-30 LAB
B BURGDOR IGG+IGM SER QL: 0.25 (ref 0–0.89)
DEPRECATED CALCIDIOL+CALCIFEROL SERPL-MC: 38 UG/L (ref 20–75)
ENA SS-A IGG SER IA-ACNC: <0.2 AI (ref 0–0.9)
ENA SS-B IGG SER IA-ACNC: <0.2 AI (ref 0–0.9)

## 2019-08-30 ASSESSMENT — ANXIETY QUESTIONNAIRES: GAD7 TOTAL SCORE: 0

## 2019-09-01 LAB — VIT B6 SERPL-MCNC: 53.1 NMOL/L (ref 20–125)

## 2019-09-03 LAB
ANA PAT SER IF-IMP: ABNORMAL
ANA SER QL IF: ABNORMAL
ANA TITR SER IF: ABNORMAL {TITER}

## 2019-09-10 ENCOUNTER — MYC MEDICAL ADVICE (OUTPATIENT)
Dept: FAMILY MEDICINE | Facility: OTHER | Age: 44
End: 2019-09-10

## 2019-09-10 DIAGNOSIS — M47.816 OSTEOARTHRITIS OF LUMBAR SPINE, UNSPECIFIED SPINAL OSTEOARTHRITIS COMPLICATION STATUS: ICD-10-CM

## 2019-09-10 DIAGNOSIS — M54.16 LUMBAR RADICULOPATHY: Primary | ICD-10-CM

## 2019-09-10 DIAGNOSIS — R53.83 MALAISE AND FATIGUE: ICD-10-CM

## 2019-09-10 DIAGNOSIS — R53.81 MALAISE AND FATIGUE: ICD-10-CM

## 2019-09-10 NOTE — TELEPHONE ENCOUNTER
MyCJumpTimet message has been sent   Upright MRI  [facility] - [place], [place]  [address], [address]  [place] [address] [phone]    Sandi HIDALGO (R)

## 2019-09-11 ENCOUNTER — MYC MEDICAL ADVICE (OUTPATIENT)
Dept: FAMILY MEDICINE | Facility: OTHER | Age: 44
End: 2019-09-11

## 2019-09-12 ENCOUNTER — TRANSFERRED RECORDS (OUTPATIENT)
Dept: HEALTH INFORMATION MANAGEMENT | Facility: CLINIC | Age: 44
End: 2019-09-12

## 2019-09-16 ENCOUNTER — TELEPHONE (OUTPATIENT)
Dept: FAMILY MEDICINE | Facility: OTHER | Age: 44
End: 2019-09-16

## 2019-09-16 ENCOUNTER — MYC MEDICAL ADVICE (OUTPATIENT)
Dept: FAMILY MEDICINE | Facility: OTHER | Age: 44
End: 2019-09-16

## 2019-09-16 DIAGNOSIS — M48.061 FORAMINAL STENOSIS OF LUMBAR REGION: ICD-10-CM

## 2019-09-16 DIAGNOSIS — M47.26 OSTEOARTHRITIS OF SPINE WITH RADICULOPATHY, LUMBAR REGION: Primary | ICD-10-CM

## 2019-09-16 NOTE — TELEPHONE ENCOUNTER
Study exam date 9/12/2019   Upright MRI conclusion is that of a multilevel lumbar spondylosis with the following notable findings:  #1.  L5-S1 broad-based left sided herniation abuts the left S1 nerve root also chronic left greater than right foraminal stenosis encroaches/impinges the L5 nerve roots.    #2. no residual central subarticular stenosis at L4-5.    #3. disc bulging at L2-3 and L3-4 without stenosis or impingement.   #4. compared with this study from 6/26/2017 performed in the supine position L5-S1 foraminal stenosis slightly increased also mild progression of L2-3 disc degeneration and bulge    I am going to recommend that she see a spine specialist for review of these results.  Electronically signed:    Otoniel Manuel PA-C

## 2019-09-19 ENCOUNTER — TRANSFERRED RECORDS (OUTPATIENT)
Dept: HEALTH INFORMATION MANAGEMENT | Facility: CLINIC | Age: 44
End: 2019-09-19

## 2019-09-19 DIAGNOSIS — L65.9 HAIR LOSS: Primary | ICD-10-CM

## 2019-09-19 LAB
FERRITIN SERPL-MCNC: 67 NG/ML (ref 8–252)
FSH SERPL-ACNC: 4.8 IU/L
LH SERPL-ACNC: 3.8 IU/L
T3FREE SERPL-MCNC: 2.1 PG/ML (ref 2.3–4.2)
T4 FREE SERPL-MCNC: 0.97 NG/DL (ref 0.76–1.46)

## 2019-09-19 PROCEDURE — 84481 FREE ASSAY (FT-3): CPT | Performed by: DERMATOLOGY

## 2019-09-19 PROCEDURE — 36415 COLL VENOUS BLD VENIPUNCTURE: CPT | Performed by: DERMATOLOGY

## 2019-09-19 PROCEDURE — 83001 ASSAY OF GONADOTROPIN (FSH): CPT | Performed by: DERMATOLOGY

## 2019-09-19 PROCEDURE — 84439 ASSAY OF FREE THYROXINE: CPT | Performed by: DERMATOLOGY

## 2019-09-19 PROCEDURE — 83002 ASSAY OF GONADOTROPIN (LH): CPT | Performed by: DERMATOLOGY

## 2019-09-19 PROCEDURE — 82728 ASSAY OF FERRITIN: CPT | Performed by: DERMATOLOGY

## 2019-09-23 ENCOUNTER — MYC MEDICAL ADVICE (OUTPATIENT)
Dept: FAMILY MEDICINE | Facility: OTHER | Age: 44
End: 2019-09-23

## 2019-09-23 DIAGNOSIS — R79.89 ABNORMAL THYROID BLOOD TEST: Primary | ICD-10-CM

## 2019-09-23 NOTE — TELEPHONE ENCOUNTER
Centers message back on my chart.  We will have her resubmit for lab work with a TSH, T4, T3 free and total, referral to endocrinology placed.  Electronically signed:    Otoniel Manuel PA-C

## 2019-09-24 ENCOUNTER — TELEPHONE (OUTPATIENT)
Dept: ENDOCRINOLOGY | Facility: CLINIC | Age: 44
End: 2019-09-24

## 2019-09-24 NOTE — TELEPHONE ENCOUNTER
[facility] Call Center    Phone Message    May a detailed message be left on voicemail: yes    Reason for Call: Other: New endo referral for Abnormal thyroid blood test     Action Taken: Message routed to:  [facility] ([facility] endo

## 2019-09-26 ENCOUNTER — MYC MEDICAL ADVICE (OUTPATIENT)
Dept: FAMILY MEDICINE | Facility: OTHER | Age: 44
End: 2019-09-26

## 2019-09-27 ENCOUNTER — MYC MEDICAL ADVICE (OUTPATIENT)
Dept: FAMILY MEDICINE | Facility: OTHER | Age: 44
End: 2019-09-27

## 2019-09-27 DIAGNOSIS — T78.40XA HYPERSENSITIVITY REACTION, INITIAL ENCOUNTER: Primary | ICD-10-CM

## 2019-09-28 ENCOUNTER — HEALTH MAINTENANCE LETTER (OUTPATIENT)
Age: 44
End: 2019-09-28

## 2019-09-28 ENCOUNTER — MYC MEDICAL ADVICE (OUTPATIENT)
Dept: FAMILY MEDICINE | Facility: OTHER | Age: 44
End: 2019-09-28

## 2019-09-30 ENCOUNTER — TELEPHONE (OUTPATIENT)
Dept: ALLERGY | Facility: OTHER | Age: 44
End: 2019-09-30

## 2019-09-30 ENCOUNTER — MYC MEDICAL ADVICE (OUTPATIENT)
Dept: FAMILY MEDICINE | Facility: OTHER | Age: 44
End: 2019-09-30

## 2019-09-30 DIAGNOSIS — T78.40XA HYPERSENSITIVITY DISORDER, INITIAL ENCOUNTER: Primary | ICD-10-CM

## 2019-09-30 DIAGNOSIS — T78.40XA HYPERSENSITIVITY REACTION, INITIAL ENCOUNTER: ICD-10-CM

## 2019-09-30 DIAGNOSIS — R79.89 ABNORMAL THYROID BLOOD TEST: ICD-10-CM

## 2019-10-04 ENCOUNTER — MYC MEDICAL ADVICE (OUTPATIENT)
Dept: FAMILY MEDICINE | Facility: OTHER | Age: 44
End: 2019-10-04

## 2019-10-06 ENCOUNTER — NURSE TRIAGE (OUTPATIENT)
Dept: NURSING | Facility: CLINIC | Age: 44
End: 2019-10-06

## 2019-10-06 NOTE — TELEPHONE ENCOUNTER
Patient c/o loss of lower extremity feeling. Started Friday, has no soft touch pressure feeling just pressure. Describes not being able to feel herself urinate. Morning time is more pins and needles but by afternoon and later no feeling.   Symptoms triaged and pt advised to go to ER. Pt voiced understand and will follow disposition.   Lolly Li RN  [facility] Nurse Advisor          Reason for Disposition    Numbness in groin or rectal area (i.e., loss of sensation)    Additional Information    Negative: Major injury to the back (e.g., MVA, fall > 10 feet or 3 meters, penetrating injury, etc.)    Negative: Followed a tailbone injury    Negative: [1] Pain in the upper back over the ribs (rib cage) AND [2] radiates (travels, goes) into chest    Negative: [1] Pain in the upper back over the ribs (rib cage) AND [2] worsened by coughing (or clearly increases with breathing)    Negative: Back pain during pregnancy    Negative: Pain mainly in flank (i.e., in the side, over the lower ribs or just below the ribs)    Negative: Passed out (i.e., lost consciousness, collapsed and was not responding)    Negative: Shock suspected (e.g., cold/pale/clammy skin, too weak to stand, low BP, rapid pulse)    Negative: Sounds like a life-threatening emergency to the triager    Protocols used: BACK PAIN-A-

## 2019-10-07 DIAGNOSIS — R79.89 ABNORMAL THYROID BLOOD TEST: ICD-10-CM

## 2019-10-07 LAB
T3 SERPL-MCNC: 90 NG/DL (ref 60–181)
T3FREE SERPL-MCNC: 2.4 PG/ML (ref 2.3–4.2)
T4 FREE SERPL-MCNC: 0.92 NG/DL (ref 0.76–1.46)
TSH SERPL DL<=0.005 MIU/L-ACNC: 0.94 MU/L (ref 0.4–4)

## 2019-10-07 PROCEDURE — 84443 ASSAY THYROID STIM HORMONE: CPT | Performed by: PHYSICIAN ASSISTANT

## 2019-10-07 PROCEDURE — 36415 COLL VENOUS BLD VENIPUNCTURE: CPT | Performed by: PHYSICIAN ASSISTANT

## 2019-10-07 PROCEDURE — 84480 ASSAY TRIIODOTHYRONINE (T3): CPT | Performed by: PHYSICIAN ASSISTANT

## 2019-10-07 PROCEDURE — 84439 ASSAY OF FREE THYROXINE: CPT | Performed by: PHYSICIAN ASSISTANT

## 2019-10-08 ENCOUNTER — MYC MEDICAL ADVICE (OUTPATIENT)
Dept: FAMILY MEDICINE | Facility: OTHER | Age: 44
End: 2019-10-08

## 2019-10-08 DIAGNOSIS — M54.16 LUMBAR RADICULOPATHY: Primary | ICD-10-CM

## 2019-10-08 DIAGNOSIS — R76.8 ANA POSITIVE: ICD-10-CM

## 2019-10-08 DIAGNOSIS — M51.27 HERNIATION OF INTERVERTEBRAL DISC BETWEEN L5 AND S1: ICD-10-CM

## 2019-10-08 DIAGNOSIS — G43.009 MIGRAINE WITHOUT AURA AND WITHOUT STATUS MIGRAINOSUS, NOT INTRACTABLE: ICD-10-CM

## 2019-10-08 DIAGNOSIS — M48.061 FORAMINAL STENOSIS OF LUMBAR REGION: ICD-10-CM

## 2019-10-08 DIAGNOSIS — G44.211 INTRACTABLE EPISODIC TENSION-TYPE HEADACHE: ICD-10-CM

## 2019-10-10 PROBLEM — M51.27 HERNIATION OF INTERVERTEBRAL DISC BETWEEN L5 AND S1: Status: ACTIVE | Noted: 2019-10-10

## 2019-10-10 RX ORDER — RIZATRIPTAN BENZOATE 5 MG/1
5 TABLET, ORALLY DISINTEGRATING ORAL
Qty: 30 TABLET | Refills: 1 | Status: SHIPPED | OUTPATIENT
Start: 2019-10-10 | End: 2020-03-05

## 2019-10-10 NOTE — TELEPHONE ENCOUNTER
Referral placed.  In my MA box (overhead file over my desk) for completion and scan of the document into the medical record.  Electronically signed:    Otoniel Manuel PA-C

## 2019-10-11 ENCOUNTER — MYC MEDICAL ADVICE (OUTPATIENT)
Dept: FAMILY MEDICINE | Facility: OTHER | Age: 44
End: 2019-10-11

## 2019-10-14 ENCOUNTER — TELEPHONE (OUTPATIENT)
Dept: FAMILY MEDICINE | Facility: OTHER | Age: 44
End: 2019-10-14

## 2019-10-17 ENCOUNTER — TRANSFERRED RECORDS (OUTPATIENT)
Dept: HEALTH INFORMATION MANAGEMENT | Facility: CLINIC | Age: 44
End: 2019-10-17

## 2019-10-18 ENCOUNTER — MYC MEDICAL ADVICE (OUTPATIENT)
Dept: FAMILY MEDICINE | Facility: OTHER | Age: 44
End: 2019-10-18

## 2019-10-20 ENCOUNTER — MYC MEDICAL ADVICE (OUTPATIENT)
Dept: FAMILY MEDICINE | Facility: OTHER | Age: 44
End: 2019-10-20

## 2019-10-21 ENCOUNTER — MYC MEDICAL ADVICE (OUTPATIENT)
Dept: FAMILY MEDICINE | Facility: OTHER | Age: 44
End: 2019-10-21

## 2019-10-21 DIAGNOSIS — B37.31 YEAST INFECTION OF THE VAGINA: Primary | ICD-10-CM

## 2019-10-21 DIAGNOSIS — M47.26 OSTEOARTHRITIS OF SPINE WITH RADICULOPATHY, LUMBAR REGION: ICD-10-CM

## 2019-10-21 DIAGNOSIS — M54.16 LUMBAR RADICULOPATHY: Primary | ICD-10-CM

## 2019-10-21 RX ORDER — FLUCONAZOLE 150 MG/1
150 TABLET ORAL
Qty: 1 TABLET | Refills: 0 | Status: SHIPPED | OUTPATIENT
Start: 2019-10-21 | End: 2019-10-28

## 2019-10-21 RX ORDER — PREDNISONE 20 MG/1
TABLET ORAL
Qty: 20 TABLET | Refills: 0 | Status: SHIPPED | OUTPATIENT
Start: 2019-10-21 | End: 2019-11-25

## 2019-10-21 NOTE — TELEPHONE ENCOUNTER
Prescription for prednisone sent to the pharmacy for the patient.  Follow-up PRN.  She needs to continue to see her specialist for the relationship to her low back.

## 2019-10-28 ENCOUNTER — OFFICE VISIT (OUTPATIENT)
Dept: PHARMACY | Facility: OTHER | Age: 44
End: 2019-10-28
Payer: COMMERCIAL

## 2019-10-28 VITALS — WEIGHT: 203.6 LBS | HEART RATE: 82 BPM | BODY MASS INDEX: 33.11 KG/M2

## 2019-10-28 DIAGNOSIS — J30.2 SEASONAL ALLERGIC RHINITIS, UNSPECIFIED TRIGGER: ICD-10-CM

## 2019-10-28 DIAGNOSIS — B00.1 COLD SORE: ICD-10-CM

## 2019-10-28 DIAGNOSIS — G43.909 MIGRAINE WITHOUT STATUS MIGRAINOSUS, NOT INTRACTABLE, UNSPECIFIED MIGRAINE TYPE: ICD-10-CM

## 2019-10-28 DIAGNOSIS — M47.816 OSTEOARTHRITIS OF LUMBAR SPINE, UNSPECIFIED SPINAL OSTEOARTHRITIS COMPLICATION STATUS: Primary | ICD-10-CM

## 2019-10-28 PROCEDURE — 99607 MTMS BY PHARM ADDL 15 MIN: CPT | Performed by: PHARMACIST

## 2019-10-28 PROCEDURE — 99605 MTMS BY PHARM NP 15 MIN: CPT | Performed by: PHARMACIST

## 2019-10-28 RX ORDER — DULOXETIN HYDROCHLORIDE 30 MG/1
30 CAPSULE, DELAYED RELEASE ORAL DAILY
Qty: 60 CAPSULE | Refills: 1 | Status: SHIPPED | OUTPATIENT
Start: 2019-10-28 | End: 2019-11-25

## 2019-10-28 NOTE — Clinical Note
Jhonatan Frederick - I met with Bhupinder today and we discussed ways to optimize pain control. I started Cymbalta today and will follow-up in 1 month.Tatiana Randall, PharmDMedication Therapy Management PharmacistPager: [phone]

## 2019-10-28 NOTE — PROGRESS NOTES
SUBJECTIVE/OBJECTIVE:                           Amelia Coker is a 44 year old female coming in for an initial visit for Medication Therapy Management.  She was referred to me from Otoniel Nelson PA-C.    Chief Complaint: Uncontrolled pain.    Allergies/ADRs: Reviewed in Epic  Tobacco: none   Alcohol: social drinker   Caffeine: 1-2 cups/day of coffee  Activity: limited by back pain, would prefer to be more active.  PMH: Reviewed in Epic    Medication Adherence/Access:  Patient takes medications directly from bottles.  Patient takes medications 2 time(s) per day.   Per patient, misses medication 0 times per week.   The patient fills medications at [place]: YES.    Chronic Pain: Pt is currently taking Tylenol 1300mg twice daily and meloxicam 15mg once daily. Pt is currently on a prednisone course, stepping down from 60mg to 40mg worsened pain. Pt has constant low back ache, also has nerve pain in her left side below the waist, describes as burning and itching and pinprick sensation. Tried gabapentin in the past, it was ineffective at low doses and caused vision issues at higher doses. Tried topical lidocaine and diclofenac gels in the past, they were ineffective, hasn't tried patches yet. Tried cyclobenzaprine and tizanidine in the past, these were ineffective even at max doses. Uses CBD oil on her neck which is helpful. Also takes diazepam 5mg when pain is severe and needs to sleep, but doesn't like to take because it causes migraines and hangover effect for 3 days. Pt recently had a neurosurgery consultation and was referred to the [facility].     Allergies: Pt uses fexofenadine seasonally. It is effective and no side effects.    Migraines: Pt uses rizatriptan as needed, occasional use when diazepam causes migraines. Current therapy is effective and no side effects.     Cold Sores: Pt uses acyclovir ointment as needed. It is effective and no side effects.    Today's Vitals: Pulse 82   Wt 203 lb 9.6 oz (92.4 kg)    BMI 33.11 kg/m       BP Readings from Last 3 Encounters:   08/29/19 112/80   08/01/19 110/76   06/25/19 108/78     Last Comprehensive Metabolic Panel:  Sodium   Date Value Ref Range Status   08/29/2019 140 133 - 144 mmol/L Final     Potassium   Date Value Ref Range Status   08/29/2019 4.2 3.4 - 5.3 mmol/L Final     Chloride   Date Value Ref Range Status   08/29/2019 108 94 - 109 mmol/L Final     Carbon Dioxide   Date Value Ref Range Status   08/29/2019 27 20 - 32 mmol/L Final     Anion Gap   Date Value Ref Range Status   08/29/2019 5 3 - 14 mmol/L Final     Glucose   Date Value Ref Range Status   08/29/2019 95 70 - 99 mg/dL Final     Urea Nitrogen   Date Value Ref Range Status   08/29/2019 20 7 - 30 mg/dL Final     Creatinine   Date Value Ref Range Status   08/29/2019 0.56 0.52 - 1.04 mg/dL Final     GFR Estimate   Date Value Ref Range Status   08/29/2019 >90 >60 mL/min/[1.73_m2] Final     Comment:     Non  GFR Calc  Starting 12/18/2018, serum creatinine based estimated GFR (eGFR) will be   calculated using the Chronic Kidney Disease Epidemiology Collaboration   (CKD-EPI) equation.       Calcium   Date Value Ref Range Status   08/29/2019 8.7 8.5 - 10.1 mg/dL Final     ASSESSMENT:                            Medication Adherence: good, no issues identified    Chronic Pain: Needs Improvement. Pt needs education regarding Tylenol dosing. Pt may benefit from trying lidocaine patches. Pt may benefit from starting duloxetine for nerve pain.    Allergies: Stable.     Migraines: Stable.     Cold Sores: Stable.     PLAN:                            1. Pt can use Tylenol Arthritis 1-2 tablets up to 3 times daily as needed for pain. Maximum dose is 4000 mg/day from all sources.   2. Pt to try OTC lidocaine 4% patches.  3. Pt to start duloxetine 30mg once daily for 1 week, then increase to 60mg once daily. Sent order to pharmacy.    I spent 45 minutes with this patient today. All changes were made via  collaborative practice agreement with Otoniel Nelson PA-C. A copy of the visit note was provided to the patient's primary care provider.    Will follow up in 1 month - scheduled appt on 11/25.    The patient was given a summary of these recommendations as an after visit summary.     Tatiana Randall, PharmD  Medication Therapy Management Pharmacist  Pager: [phone]

## 2019-10-28 NOTE — PATIENT INSTRUCTIONS
Recommendations from today's MTM visit:                                                    MTM (medication therapy management) is a service provided by a clinical pharmacist designed to help you get the most of out of your medicines.   Today we reviewed what your medicines are for, how to know if they are working, that your medicines are safe and how to make your medicine regimen as easy as possible.      1. It is safe to use Tylenol Arthritis 1-2 tablets up to 3 times daily as needed for pain. Maximum dose is 4000 mg per day from all sources.     2. You could try over-the-counter lidocaine patches (Aspercreme is a common brand).    3. Start duloxetine 30mg once daily for 1 week, then increase to 60mg once daily. I'll send a prescription to your pharmacy.    It was great to speak with you today.  I value your experience and would be very thankful for your time with providing feedback on our clinic survey. You may receive a survey via email or text message in the next few days.     Next MTM visit: Monday November 25th at 1:00pm by phone (I will call you).    To schedule another MTM appointment, please call the clinic directly or you may call the MTM scheduling line at [phone] or toll-free at [phone].     My Clinical Pharmacist's contact information:                                                      It was a pleasure talking with you today!  Please feel free to contact me with any questions or concerns you have.      Tatiana Randall, PharmD  Medication Therapy Management Pharmacist  Pager: [phone]

## 2019-11-04 ENCOUNTER — MYC MEDICAL ADVICE (OUTPATIENT)
Dept: FAMILY MEDICINE | Facility: OTHER | Age: 44
End: 2019-11-04

## 2019-11-04 DIAGNOSIS — R35.0 INCREASED FREQUENCY OF URINATION: Primary | ICD-10-CM

## 2019-11-04 NOTE — TELEPHONE ENCOUNTER
More than likely a side effect of the medication but I have placed an order for a UA in the system.  You could certainly go to clinic in [place] and have that done.  If there is an infection we will consider treatment if there is not infection we will have to blame the medication at this point in time.  Electronically signed:    Otoniel Manuel PA-C

## 2019-11-18 ENCOUNTER — MYC MEDICAL ADVICE (OUTPATIENT)
Dept: FAMILY MEDICINE | Facility: OTHER | Age: 44
End: 2019-11-18

## 2019-11-18 DIAGNOSIS — M54.2 CERVICALGIA: ICD-10-CM

## 2019-11-18 DIAGNOSIS — M62.838 MUSCLE SPASM: ICD-10-CM

## 2019-11-19 RX ORDER — MELOXICAM 15 MG/1
TABLET ORAL
Qty: 90 TABLET | Refills: 2 | Status: SHIPPED | OUTPATIENT
Start: 2019-11-19 | End: 2020-03-05

## 2019-11-19 NOTE — TELEPHONE ENCOUNTER
Prescription approved per [facility] Refill Protocol.    Kenyatta Lyles, RN, BSN

## 2019-11-25 ENCOUNTER — ALLIED HEALTH/NURSE VISIT (OUTPATIENT)
Dept: PHARMACY | Facility: OTHER | Age: 44
End: 2019-11-25
Payer: COMMERCIAL

## 2019-11-25 DIAGNOSIS — M47.816 OSTEOARTHRITIS OF LUMBAR SPINE, UNSPECIFIED SPINAL OSTEOARTHRITIS COMPLICATION STATUS: Primary | ICD-10-CM

## 2019-11-25 PROCEDURE — 99606 MTMS BY PHARM EST 15 MIN: CPT | Performed by: PHARMACIST

## 2019-11-25 RX ORDER — DULOXETIN HYDROCHLORIDE 60 MG/1
60 CAPSULE, DELAYED RELEASE ORAL DAILY
Qty: 90 CAPSULE | Refills: 0 | Status: SHIPPED | OUTPATIENT
Start: 2019-11-25 | End: 2020-03-05

## 2019-11-25 NOTE — PROGRESS NOTES
"SUBJECTIVE/OBJECTIVE:                Amelia Coker is a 44 year old female called for a follow-up visit for Medication Therapy Management.  She was referred to me from Otoniel Nelson PA-C.     Chief Complaint: Follow up from [facility] visit on 10/28/19.  Recheck pain mgmt.    Tobacco: none   Alcohol: social drinker     Medication Adherence/Access:  no issues reported    Chronic Pain: Pt is currently taking Tylenol 1000mg PRN (usually 1-2 times daily), meloxicam 15mg once daily, and duloxetine 60mg once daily (started at last [facility] visit). Pt feels duloxetine is helping with nerve burn in her leg, but back pain is the same. Pt initially experienced side effects of feeling \"like eyes couldn't catch up when turned around\", but this has since resolved. Pt has appt with [facility] spinal surgeon and neurologist tomorrow to review recent scans/Xrays and figure out the plan moving forward. Pt tried OTC lidocaine patches, they weren't effective and also didn't adhere to her skin well. Pt would prefer to continue duloxetine at the current dose and reassess after her [facility] consultation.     Today's Vitals: None taken (telemed)    ASSESSMENT:                Medication Adherence: good, no issues identified    Chronic Pain: Neuropathy improved and back pain unimproved. Follow-up plan in place with [facility].      PLAN:                  1. Pt to continue duloxetine 60mg daily. She will first use up her supply of 30mg capsules and then switch to 60mg capsules. Sent order to pharmacy.    I spent 15 minutes with this patient today. All changes were made via collaborative practice agreement with Otoniel Nelson PA-C. A copy of the visit note was provided to the patient's primary care provider.     Will follow up in 3 months, sooner if needed.    The patient declined a summary of these recommendations as an after visit summary.    Tatiana Randall, PharmD  Medication Therapy Management Pharmacist  Pager: [phone]  "

## 2019-12-09 ENCOUNTER — MYC MEDICAL ADVICE (OUTPATIENT)
Dept: FAMILY MEDICINE | Facility: OTHER | Age: 44
End: 2019-12-09

## 2019-12-09 DIAGNOSIS — R59.1 LYMPHADENOPATHY: Primary | ICD-10-CM

## 2019-12-11 ENCOUNTER — HOSPITAL ENCOUNTER (OUTPATIENT)
Dept: CT IMAGING | Facility: CLINIC | Age: 44
Discharge: HOME | End: 2019-12-11
Attending: PHYSICIAN ASSISTANT | Admitting: PHYSICIAN ASSISTANT
Payer: COMMERCIAL

## 2019-12-11 DIAGNOSIS — R59.1 LYMPHADENOPATHY: ICD-10-CM

## 2019-12-11 PROCEDURE — 25000125 ZZHC RX 250: Performed by: PHYSICIAN ASSISTANT

## 2019-12-11 PROCEDURE — 70491 CT SOFT TISSUE NECK W/DYE: CPT

## 2019-12-11 PROCEDURE — 25000128 H RX IP 250 OP 636: Performed by: PHYSICIAN ASSISTANT

## 2019-12-11 RX ORDER — IOPAMIDOL 755 MG/ML
500 INJECTION, SOLUTION INTRAVASCULAR ONCE
Status: COMPLETED | OUTPATIENT
Start: 2019-12-11 | End: 2019-12-11

## 2019-12-11 RX ADMIN — IOPAMIDOL 80 ML: 755 INJECTION, SOLUTION INTRAVENOUS at 07:53

## 2019-12-11 RX ADMIN — SODIUM CHLORIDE 68 ML: 9 INJECTION, SOLUTION INTRAVENOUS at 07:53

## 2020-01-02 ENCOUNTER — ALLIED HEALTH/NURSE VISIT (OUTPATIENT)
Dept: FAMILY MEDICINE | Facility: OTHER | Age: 45
End: 2020-01-02
Payer: COMMERCIAL

## 2020-01-02 VITALS — TEMPERATURE: 97.9 F

## 2020-01-02 DIAGNOSIS — Z48.02 VISIT FOR SUTURE REMOVAL: Primary | ICD-10-CM

## 2020-01-02 PROCEDURE — 99211 OFF/OP EST MAY X REQ PHY/QHP: CPT

## 2020-01-02 NOTE — PROGRESS NOTES
Amelia Coker presents to the clinic today for  removal of sutures.  The patient has had the sutures in place for 14 days.    There has been no history of infection or drainage.    O: 3 sutures are seen located on the top of the scalp. The wound is healing well with no signs of infection.    Tetanus status is up to date.    A: Suture removal.    P:  All sutures were easily removed today.  Routine wound care discussed.  The patient will follow up as needed.    Pt had sutures placed at outside dermatology facility. Candy Mendoza gave verbal permission to remove today.     Daysi Dawn, RN, BSN

## 2020-01-06 ENCOUNTER — TELEPHONE (OUTPATIENT)
Dept: FAMILY MEDICINE | Facility: OTHER | Age: 45
End: 2020-01-06

## 2020-01-06 NOTE — TELEPHONE ENCOUNTER
Fax received to remove suture from [facility].  Ok per Dr. Teja Bhandari. Fax sent to scanning.    It appears she at these removed in [place].    Eric Camacho, RN, BSN

## 2020-01-23 ENCOUNTER — MYC MEDICAL ADVICE (OUTPATIENT)
Dept: FAMILY MEDICINE | Facility: OTHER | Age: 45
End: 2020-01-23

## 2020-01-27 ENCOUNTER — OFFICE VISIT (OUTPATIENT)
Dept: OBGYN | Facility: CLINIC | Age: 45
End: 2020-01-27
Payer: COMMERCIAL

## 2020-01-27 ENCOUNTER — MYC MEDICAL ADVICE (OUTPATIENT)
Dept: OBGYN | Facility: CLINIC | Age: 45
End: 2020-01-27

## 2020-01-27 VITALS
SYSTOLIC BLOOD PRESSURE: 134 MMHG | BODY MASS INDEX: 32.61 KG/M2 | WEIGHT: 202.9 LBS | HEIGHT: 66 IN | HEART RATE: 74 BPM | DIASTOLIC BLOOD PRESSURE: 86 MMHG

## 2020-01-27 DIAGNOSIS — B96.89 BACTERIAL VAGINITIS: Primary | ICD-10-CM

## 2020-01-27 DIAGNOSIS — R10.2 PELVIC PAIN IN FEMALE: ICD-10-CM

## 2020-01-27 DIAGNOSIS — N89.8 VAGINAL DISCHARGE: Primary | ICD-10-CM

## 2020-01-27 DIAGNOSIS — B37.31 YEAST INFECTION OF THE VAGINA: ICD-10-CM

## 2020-01-27 DIAGNOSIS — N76.0 BACTERIAL VAGINITIS: Primary | ICD-10-CM

## 2020-01-27 LAB
SPECIMEN SOURCE: ABNORMAL
WET PREP SPEC: ABNORMAL

## 2020-01-27 PROCEDURE — 99213 OFFICE O/P EST LOW 20 MIN: CPT | Performed by: OBSTETRICS & GYNECOLOGY

## 2020-01-27 PROCEDURE — 87591 N.GONORRHOEAE DNA AMP PROB: CPT | Performed by: OBSTETRICS & GYNECOLOGY

## 2020-01-27 PROCEDURE — 87491 CHLMYD TRACH DNA AMP PROBE: CPT | Performed by: OBSTETRICS & GYNECOLOGY

## 2020-01-27 PROCEDURE — 87210 SMEAR WET MOUNT SALINE/INK: CPT | Performed by: OBSTETRICS & GYNECOLOGY

## 2020-01-27 RX ORDER — METRONIDAZOLE 500 MG/1
500 TABLET ORAL 2 TIMES DAILY
Qty: 14 TABLET | Refills: 0 | Status: SHIPPED | OUTPATIENT
Start: 2020-01-27 | End: 2020-02-10

## 2020-01-27 RX ORDER — FLUCONAZOLE 150 MG/1
150 TABLET ORAL ONCE
Qty: 1 TABLET | Refills: 0 | Status: SHIPPED | OUTPATIENT
Start: 2020-01-27 | End: 2020-02-10

## 2020-01-27 ASSESSMENT — MIFFLIN-ST. JEOR: SCORE: 1572.1

## 2020-01-27 NOTE — PROGRESS NOTES
SUBJECTIVE:       HPI: Amelia Coker is a 45 year old  who presents today for abnormal vaginal discharge. She is s/p hysteroscopy, D+C, endometrial ablation 2019 and overall, has been doing well since until last month. She complains of vaginal discharge that is thick, yellow. No odor and not associated with pruritis. Present for one month. Last intercourse 1-2 weeks ago. Nothing else in the vagina in the last 2 weeks. No menses since ablation. She complains of new-onset pelvic cramping, primarily on the right side, in the last month that bothers her. This is also associated with bloating, pressure and has awakened her from sleep. She has not seen a difference with her daily pain medications (for back pain).    She denies irregular bleeding, dyspareunia. She denies chills, nausea/vomiting, abdominal pain. Denies dysuria, hematuria, constipation or diarrhea. + one time fever at home over weekend, Tmax 100.5. Associated with nasal congestion and cough. Complains of recurring bowel symptoms similar to before procedure.        Ob Hx:  s/p SVDx2.      Gyn Hx: No LMP recorded. Patient has had an ablation.    Patient is sexually active. One male partner   Last pap was 2018 NIL, Neg HR HPV, denies history of abnormal paps. Next pap due 2023   STI history denies  Using vasectomy for contraception.   STD testing offered?  Declined  Last Mammogram 2019 Birads 1  Family history of gyn-related malignancies: Mother- colon ca. Sister- vaginal ca. Maternal aunt- bladder ca.         reports that she has never smoked. She has never used smokeless tobacco.      Today's PHQ-2 Score:   PHQ-2 (  [employer]) 3/14/2019   Q1: Little interest or pleasure in doing things 0   Q2: Feeling down, depressed or hopeless 0   PHQ-2 Score 0   Q1: Little interest or pleasure in doing things Not at all   Q2: Feeling down, depressed or hopeless Not at all   PHQ-2 Score 0     Today's PHQ-9 Score:   PHQ-9 SCORE 2019   PHQ-9  Total Score [facility] -   PHQ-9 Total Score 2     Today's DYLON-7 Score:   DYLON-7 SCORE 8/29/2019   Total Score -   Total Score 0       Problem list and histories reviewed & adjusted, as indicated.  Additional history: as documented.    Patient Active Problem List   Diagnosis     Migraine, unspecified, with intractable migraine, so stated, without mention of status migrainosus     Bell's palsy     Contact dermatitis and other eczema, due to unspecified cause     Allergic rhinitis due to other allergen     Scalp lesion     Lyme disease     PAC (premature atrial contraction)     Palpitations     Raynaud phenomenon     Chronic fatigue     Hair loss     Abnormal PFT     Shoulder joint instability     Family history of melanoma     Dry eyes     Keratitis sicca     Family history of colon cancer     Pain in joint, upper arm     FLORIDALMA positive     Plantar fascial fibromatosis     Foraminal stenosis of lumbar region     DJD (degenerative joint disease), lumbar     Migraine without aura and without status migrainosus, not intractable     Lumbar radiculopathy     Ds DNA antibody positive     Muscular wasting and disuse atrophy, not elsewhere classified     Frontal headache     Telangiectasia of face     Lateral epicondylitis     Right shoulder pain     Plantar fasciitis     HTN, goal below 140/90     Skin lesion     AD (atopic dermatitis)     Heat sensitivity     Rotator cuff arthropathy, right     Gastroesophageal reflux disease, esophagitis presence not specified     Abnormal mammogram     Sternocleidomastoid muscle tenderness     Acute cervical myofascial strain, initial encounter     Spasm of muscle     Hyperlipidemia LDL goal <130     Biceps tendinopathy, right     Superficial swelling of scalp     Intractable right heel pain     Intractable episodic tension-type headache     Jaycob complexion     Lip lesion     Epigastric pain     Abdominal distension     PONV (postoperative nausea and vomiting)     Menorrhalgia     Uterine  polyp     Loose stools     Hyperactive bowel sounds     Abnormality of nail surface     Dry hair     Dry skin     Malaise and fatigue     Lymphadenopathy     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     Past Surgical History:   Procedure Laterality Date     AS INJ TRANSFORAMIN EPIDURAL, LUMB/SACR SINGLE       COLONOSCOPY  3/11/2013    Procedure: COLONOSCOPY;  colonoscopy;  Surgeon: Lobito Mas MD;  [facility]     COLONOSCOPY WITH CO2 INSUFFLATION N/A 11/19/2018    Procedure: COLONOSCOPY WITH CO2 INSUFFLATION;  Surgeon: Goyo Blank MD;  [facility]     DECOMPRESSION LUMBAR TWO LEVELS Bilateral 12/8/2016    Procedure: DECOMPRESSION LUMBAR TWO LEVELS;  Surgeon: Bang Burrell MD;  Location: [facility]     DILATION AND CURETTAGE, HYSTEROSCOPY, ABLATE ENDOMETRIUM NOVASURE, COMBINED N/A 6/12/2019    Procedure: hysteroscopy, dilation & curettage, polypectomy, endometrial ablation;  Surgeon: Fredy Pham MD;  [facility]     ESOPHAGOSCOPY, GASTROSCOPY, DUODENOSCOPY (EGD), COMBINED  11/8/2013    Procedure: COMBINED ESOPHAGOSCOPY, GASTROSCOPY, DUODENOSCOPY (EGD), BIOPSY SINGLE OR MULTIPLE;  Esophagoscopy, Gastroscopy, Duodenoscopy EGD with multiple biopsies;  Surgeon: Teja Koch MD;  [facility]: [facility]     HC REMOVAL OF TONSILS,<13 Y/O      Tonsils <10y.o.     HC TOOTH EXTRACTION W/FORCEP       REPAIR TENDON ELBOW  7/9/2014    Procedure: REPAIR TENDON ELBOW;  Surgeon: Chris Johnston MD;  [facility]: [facility]     ULTRASONIC REMOVAL SOFT TISSUE (LOCATION) Right 11/21/2018    Procedure: Tenex right foot;  Surgeon: Patel Martínez DO;  Location: [facility]      Social History     Tobacco Use     Smoking status: Never Smoker     Smokeless tobacco: Never Used   Substance Use Topics     Alcohol use: No     Comment: social      Problem (# of Occurrences) Relation (Name,Age of Onset)    Allergies (6) Mother, Father, Sister, Son, Brother, Daughter    Arthritis (2) Mother:  RA, Sister    Bladder Cancer (1) Maternal Aunt    Blood Disease (1) Paternal Aunt: LGL T cell Leukemia    Cancer (3) Mother: colon, Father, Sister: vaginal    Cancer - colorectal (1) Mother    Cardiovascular (1) Father    Depression (2) Mother, Sister    Diabetes (1) Mother: adult    Gastrointestinal Disease (1) Mother: ibs    Genitourinary Problems (1) Mother: kidney cyst    Gynecology (2) Mother: endometriosis, Sister: endometriosis    Heart Disease (3) Maternal Grandfather: MI,  - 60's, Father: mi-age mid 40's, Brother    Hypertension (1) Mother    Kidney Disease (1) Maternal Aunt    Lipids (3) Mother, Father, Paternal Grandmother    Lupus (1) Maternal Aunt    Osteoporosis (1) Paternal Grandmother    Other Cancer (1) Father: renal cancer    Respiratory (2) Father: asthma, Son: asthma    Rheumatoid Arthritis (1) Paternal Aunt    Thyroid Disease (2) Mother, Paternal Grandmother    Unknown/Adopted (1) Father            Acetaminophen (TYLENOL PO), Take 1,000 mg by mouth every 8 hours as needed   acyclovir (ZOVIRAX) 5 % ointment, Apply topically 6 times daily  DULoxetine (CYMBALTA) 60 MG capsule, Take 1 capsule (60 mg) by mouth daily  meloxicam (MOBIC) 15 MG tablet, TAKE ONE TABLET BY MOUTH EVERY DAY (PATIENT WANTS UNICHEM BRAND ONLY)  rizatriptan (MAXALT-MLT) 5 MG ODT, Take 1 tablet (5 mg) by mouth at onset of headache for migraine May repeat in 2 hours. Max 6 tablets/24 hours.  diazepam (VALIUM) 5 MG tablet, Take 1 tablet (5 mg) by mouth 3 times daily as needed for muscle spasms (Patient not taking: Reported on 2020)  fexofenadine (ALLEGRA) 180 MG tablet, Take 1 tablet by mouth daily Reported on 2017    No current facility-administered medications on file prior to visit.     Allergies   Allergen Reactions     Ceftriaxone Anaphylaxis     Hives, rash, racing heart beat     Bactrim [Sulfamethoxazole W/Trimethoprim] Hives     Ciprofloxacin Other (See Comments)     Tendon Issues     Codeine Nausea and  "Vomiting     Copper      Rash       Doxycycline      Loss of skin pigmentation, skin loss.     Gold      Rash       Iodine-131 Hives     Levaquin [Levofloxacin] Other (See Comments)     Tendon issues with levaquin and cipro     Nickel      rash     Steel [Staples]      Rash from staples       Latex Rash     Penicillins Rash       ROS:  10 Point review of systems negative other noted above in HPI    OBJECTIVE:     /86   Pulse 74   Ht 1.676 m (5' 6\")   Wt 92 kg (202 lb 14.4 oz)   BMI 32.75 kg/m    Body mass index is 32.75 kg/m .      Gen: Alert, oriented, appropriately interactive, NAD  Chest: Symmetrical, unlabored breathing  Abdomen: soft, non tender, non distended, no masses, no hernias. No inguinal lymphadenopathy.   External genitalia: no lesions; normal appearing external genitalia, bartholins glands, urethra, skenes glands  Vagina: no masses or lesions, normally rugated. +creamy white-yellow discharge.  Cervix: no masses or lesions or discharge   Bimanual exam:   Nontender pelvic floor muscles  Urethra: nontender   Bladder: nontender and without massess, well supported   Uterus: midline, anteverted, small, mobile  no masses, non-tender  Adnexa: no masses. Mild ttp w/ deep palpation right adnexa  No cervical motion tenderness  MSK: normal gait, symmetric movements UE & LE  Lower extremities: non-tender, no edema      In-Clinic Test Results:  No results found. However, due to the size of the patient record, not all encounters were searched. Please check Results Review for a complete set of results.     SPECIMEN(S):   Endometrial curettings   FINAL DIAGNOSIS:   Endometrium, curettings:   Endometrium, biopsy:   - Multiple fragments of benign proliferative endometrium, one polyp-like,   with disordered proliferative   pattern, and negative for hyperplasia, atypia and malignancy.   - A small amount of normal endocervical tissue.     ASSESSMENT/PLAN:                                                  "     Ameila Coker is a 45 year old  who presents today for vaginal discharge       ICD-10-CM    1. Vaginal discharge N89.8 Wet prep   2. Pelvic pain in female R10.2      Right pelvic pain, pressure and bloating for one month as well as increased vaginal discharge that is bothersome to her. Pain has awoken her from sleep once. Minimal tenderness on exam today. Pelvis ultrasound ordered for further evaluation. Wet prep, Gc/Ct also collected. Will call with results, treat as indicated.     Strong family history cancer, previously referred to genetic counseling. Planning to make an appointment soon.    Alice Contreras,   [facility]

## 2020-01-28 ENCOUNTER — MYC MEDICAL ADVICE (OUTPATIENT)
Dept: OBGYN | Facility: CLINIC | Age: 45
End: 2020-01-28

## 2020-01-28 DIAGNOSIS — B37.31 YEAST INFECTION OF THE VAGINA: Primary | ICD-10-CM

## 2020-01-28 LAB
C TRACH DNA SPEC QL NAA+PROBE: NEGATIVE
N GONORRHOEA DNA SPEC QL NAA+PROBE: NEGATIVE
SPECIMEN SOURCE: NORMAL
SPECIMEN SOURCE: NORMAL

## 2020-01-28 RX ORDER — FLUCONAZOLE 150 MG/1
150 TABLET ORAL DAILY
Qty: 2 TABLET | Refills: 0 | Status: SHIPPED | OUTPATIENT
Start: 2020-01-28 | End: 2020-02-10

## 2020-01-28 NOTE — TELEPHONE ENCOUNTER
Alice blankenship, DO  You 14 minutes ago (9:11 AM)      She is correct, sometimes we do treat yeast infections with up to 3 doses of Diflucan; however, this is not always necessary and why I did not initially prescribe the medication this way. I have no problem sending in additional doses to the pharmacy however, given the length of time she has had these symptoms. This has been sent to the pharmacy in [place].     Alice Contreras, DO

## 2020-01-28 NOTE — TELEPHONE ENCOUNTER
Pt was seen on 1/27/20 with Dr. Contreras.  Per pt's wet prep result note:  Notes recorded by Alice Contreras DO on 1/27/2020 at 4:11 PM CST  Please call the patient with the results. Wet prep positive for clue cells, suggesting bacterial vaginosis, as well as yeast. Rx for Flagyl 500mg BID x7days as well as Diflucan x1 dose sent.     Alice Contreras DO

## 2020-01-30 ENCOUNTER — MYC MEDICAL ADVICE (OUTPATIENT)
Dept: FAMILY MEDICINE | Facility: OTHER | Age: 45
End: 2020-01-30

## 2020-01-30 DIAGNOSIS — K13.0 CRACKED LIPS: Primary | ICD-10-CM

## 2020-01-30 RX ORDER — MUPIROCIN 20 MG/G
OINTMENT TOPICAL 3 TIMES DAILY
Qty: 30 G | Refills: 1 | Status: SHIPPED | OUTPATIENT
Start: 2020-01-30 | End: 2020-10-14

## 2020-01-31 ENCOUNTER — HOSPITAL ENCOUNTER (OUTPATIENT)
Dept: ULTRASOUND IMAGING | Facility: CLINIC | Age: 45
Discharge: HOME | End: 2020-01-31
Attending: OBSTETRICS & GYNECOLOGY | Admitting: OBSTETRICS & GYNECOLOGY
Payer: COMMERCIAL

## 2020-01-31 DIAGNOSIS — R10.2 PELVIC PAIN IN FEMALE: ICD-10-CM

## 2020-01-31 PROCEDURE — 76856 US EXAM PELVIC COMPLETE: CPT

## 2020-02-04 DIAGNOSIS — N83.201 CYST OF RIGHT OVARY: Primary | ICD-10-CM

## 2020-02-06 ENCOUNTER — MYC MEDICAL ADVICE (OUTPATIENT)
Dept: FAMILY MEDICINE | Facility: OTHER | Age: 45
End: 2020-02-06

## 2020-02-06 NOTE — PROGRESS NOTES
Subjective     Amelia Coker is a 45 year old female who presents to clinic today for the following health issues:    HPI   Joint Pain    Onset: Over one year    Description:   Location: Right foot  Character: Sharp    Intensity: moderate, severe    Progression of Symptoms: worse    Accompanying Signs & Symptoms:  Other symptoms: Lumps under the skin on the fight foot    Patient Active Problem List   Diagnosis     Migraine, unspecified, with intractable migraine, so stated, without mention of status migrainosus     Bell's palsy     Contact dermatitis and other eczema, due to unspecified cause     Allergic rhinitis due to other allergen     Scalp lesion     Lyme disease     PAC (premature atrial contraction)     Palpitations     Raynaud phenomenon     Chronic fatigue     Hair loss     Abnormal PFT     Shoulder joint instability     Family history of melanoma     Dry eyes     Keratitis sicca     Family history of colon cancer     Pain in joint, upper arm     FLORIDALMA positive     Plantar fascial fibromatosis     Foraminal stenosis of lumbar region     DJD (degenerative joint disease), lumbar     Migraine without aura and without status migrainosus, not intractable     Lumbar radiculopathy     Ds DNA antibody positive     Muscular wasting and disuse atrophy, not elsewhere classified     Frontal headache     Telangiectasia of face     Lateral epicondylitis     Right shoulder pain     Plantar fasciitis     HTN, goal below 140/90     Skin lesion     AD (atopic dermatitis)     Heat sensitivity     Rotator cuff arthropathy, right     Gastroesophageal reflux disease, esophagitis presence not specified     Abnormal mammogram     Sternocleidomastoid muscle tenderness     Acute cervical myofascial strain, initial encounter     Spasm of muscle     Hyperlipidemia LDL goal <130     Biceps tendinopathy, right     Superficial swelling of scalp     Intractable right heel pain     Intractable episodic tension-type headache     Jaycob  complexion     Lip lesion     Epigastric pain     Abdominal distension     PONV (postoperative nausea and vomiting)     Menorrhalgia     Uterine polyp     Loose stools     Hyperactive bowel sounds     Abnormality of nail surface     Dry hair     Dry skin     Malaise and fatigue     Lymphadenopathy     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     Endometrium, hyperplasia - on recent CT scan     Pain in joint involving pelvic region and thigh, right     Right lateral abdominal pain     Right foot pain     Past Surgical History:   Procedure Laterality Date     AS INJ TRANSFORAMIN EPIDURAL, LUMB/SACR SINGLE       COLONOSCOPY  3/11/2013    Procedure: COLONOSCOPY;  colonoscopy;  Surgeon: Lobito Mas MD;  [facility]     COLONOSCOPY WITH CO2 INSUFFLATION N/A 11/19/2018    Procedure: COLONOSCOPY WITH CO2 INSUFFLATION;  Surgeon: Goyo Blank MD;  [facility]: [facility]     DECOMPRESSION LUMBAR TWO LEVELS Bilateral 12/8/2016    Procedure: DECOMPRESSION LUMBAR TWO LEVELS;  Surgeon: Bang Burrell MD;  Location: [facility]     DILATION AND CURETTAGE, HYSTEROSCOPY, ABLATE ENDOMETRIUM NOVASURE, COMBINED N/A 6/12/2019    Procedure: hysteroscopy, dilation & curettage, polypectomy, endometrial ablation;  Surgeon: Fredy Pham MD;  [facility]     ESOPHAGOSCOPY, GASTROSCOPY, DUODENOSCOPY (EGD), COMBINED  11/8/2013    Procedure: COMBINED ESOPHAGOSCOPY, GASTROSCOPY, DUODENOSCOPY (EGD), BIOPSY SINGLE OR MULTIPLE;  Esophagoscopy, Gastroscopy, Duodenoscopy EGD with multiple biopsies;  Surgeon: Teja Koch MD;  [facility]: [facility]     HC REMOVAL OF TONSILS,<11 Y/O      Tonsils <10y.o.     HC TOOTH EXTRACTION W/FORCEP       REPAIR TENDON ELBOW  7/9/2014    Procedure: REPAIR TENDON ELBOW;  Surgeon: Chris Johnston MD;  [facility]     ULTRASONIC REMOVAL SOFT TISSUE (LOCATION) Right 11/21/2018    Procedure: Tenex right foot;  Surgeon: Patel Martínez DO;  Location: [facility]       Social  History     Tobacco Use     Smoking status: Never Smoker     Smokeless tobacco: Never Used   Substance Use Topics     Alcohol use: No     Comment: social     Family History   Problem Relation Age of Onset     Diabetes Mother         adult     Cancer - colorectal Mother      Hypertension Mother      Allergies Mother      Cancer Mother         colon     Depression Mother      Gastrointestinal Disease Mother         ibs     Genitourinary Problems Mother         kidney cyst     Gynecology Mother         endometriosis     Lipids Mother      Thyroid Disease Mother      Arthritis Mother         RA     Heart Disease Maternal Grandfather         MI,  - 60's     Allergies Father      Unknown/Adopted Father      Heart Disease Father         mi-age mid 40's     Cardiovascular Father      Lipids Father      Respiratory Father         asthma     Cancer Father      Other Cancer Father         renal cancer     Depression Sister      Allergies Sister      Cancer Sister         vaginal     Gynecology Sister         endometriosis     Lipids Paternal Grandmother      Osteoporosis Paternal Grandmother      Thyroid Disease Paternal Grandmother      Respiratory Son         asthma     Allergies Son      Arthritis Sister      Allergies Brother      Heart Disease Brother      Allergies Daughter      Blood Disease Paternal Aunt         LGL T cell Leukemia     Rheumatoid Arthritis Paternal Aunt      Kidney Disease Maternal Aunt      Lupus Maternal Aunt      Bladder Cancer Maternal Aunt          Current Outpatient Medications   Medication Sig Dispense Refill     Acetaminophen (TYLENOL PO) Take 1,000 mg by mouth every 8 hours as needed        acyclovir (ZOVIRAX) 5 % ointment Apply topically 6 times daily 15 g 3     DULoxetine (CYMBALTA) 60 MG capsule Take 1 capsule (60 mg) by mouth daily 90 capsule 0     fexofenadine (ALLEGRA) 180 MG tablet Take 1 tablet by mouth daily Reported on 2017       meloxicam (MOBIC) 15 MG tablet TAKE ONE  TABLET BY MOUTH EVERY DAY (PATIENT WANTS UNICQteros BRAND ONLY) 90 tablet 2     mupirocin (BACTROBAN) 2 % external ointment Apply topically 3 times daily 30 g 1     rizatriptan (MAXALT-MLT) 5 MG ODT Take 1 tablet (5 mg) by mouth at onset of headache for migraine May repeat in 2 hours. Max 6 tablets/24 hours. 30 tablet 1     diazepam (VALIUM) 5 MG tablet Take 1 tablet (5 mg) by mouth 3 times daily as needed for muscle spasms (Patient not taking: Reported on 1/27/2020) 30 tablet 0     Allergies   Allergen Reactions     Ceftriaxone Anaphylaxis     Hives, rash, racing heart beat     Bactrim [Sulfamethoxazole W/Trimethoprim] Hives     Ciprofloxacin Other (See Comments)     Tendon Issues     Codeine Nausea and Vomiting     Copper      Rash       Doxycycline      Loss of skin pigmentation, skin loss.     Gold      Rash       Iodine-131 Hives     Levaquin [Levofloxacin] Other (See Comments)     Tendon issues with levaquin and cipro     Nickel      rash     Steel [Staples]      Rash from staples       Latex Rash     Penicillins Rash     Recent Labs   Lab Test 02/07/20  1440 10/07/19  1505 08/29/19  0835 06/10/19  1851  02/08/19  0817 12/31/18  0805  08/30/17  0757  06/08/16  0745   A1C  --   --   --   --   --   --   --   --   --   --  5.5   LDL  --   --   --   --   --  93 113*  --  77  --   --    HDL  --   --   --   --   --  75 86  --  72  --   --    TRIG  --   --   --   --   --  40 49  --  50  --   --    ALT 26  --  21 20  --  27 23   < > 22   < >  --    CR 0.56  --  0.56 0.69  --   --  0.69   < > 0.66   < > 0.54   GFRESTIMATED >90  --  >90 >90  --   --  >90   < > >90   < > >90  Non  GFR Calc     GFRESTBLACK >90  --  >90 >90  --   --  >90   < > >90   < > >90  African American GFR Calc     POTASSIUM 3.9  --  4.2 3.9  --   --  4.0   < > 4.2   < > 4.2   TSH  --  0.94 1.02  --    < >  --   --    < >  --    < >  --     < > = values in this interval not displayed.      BP Readings from Last 3 Encounters:   02/10/20  "130/74   01/27/20 134/86   08/29/19 112/80    Wt Readings from Last 3 Encounters:   02/10/20 90.4 kg (199 lb 3.2 oz)   01/27/20 92 kg (202 lb 14.4 oz)   10/28/19 92.4 kg (203 lb 9.6 oz)                    Reviewed and updated as needed this visit by Provider         Review of Systems   ROS COMP: Constitutional, HEENT, cardiovascular, pulmonary, GI, , musculoskeletal, neuro, skin, endocrine and psych systems are negative, except as otherwise noted.      Objective    /74   Pulse 72   Temp 98.8  F (37.1  C) (Temporal)   Resp 16   Ht 1.676 m (5' 6\")   Wt 90.4 kg (199 lb 3.2 oz)   BMI 32.15 kg/m    Body mass index is 32.15 kg/m .  Physical Exam   GENERAL: healthy, alert and no distress  NECK: no adenopathy, no asymmetry, masses, or scars and thyroid normal to palpation  RESP: lungs clear to auscultation - no rales, rhonchi or wheezes  CV: regular rate and rhythm, normal S1 S2, no S3 or S4, no murmur, click or rub, no peripheral edema and peripheral pulses strong  ABDOMEN: soft, nontender, no hepatosplenomegaly, no masses and bowel sounds normal  MS: no gross musculoskeletal defects noted, no edema, there is a fullness over the plantar aspect of the base of the second and third toes.  Possible cystic type structure at the anterior aspect of the ankle of the right foot as well and some audible as well as palpable clicking of the tendons over this region.  SKIN: no suspicious lesions or rashes  NEURO: Normal strength and tone, mentation intact and speech normal  PSYCH: mentation appears normal, affect normal/bright    Diagnostic Test Results:  Labs reviewed in Epic  No results found. However, due to the size of the patient record, not all encounters were searched. Please check Results Review for a complete set of results.      X-ray: negative for concerning pathology to my independent review today.  It will be overread by radiology.    Assessment & Plan     1. Right foot pain  Suspect benign cysts causing some " "tendon irritation with mild degeneration as well.  - XR Foot Right G/E 3 Views; Future  - PODIATRY/FOOT & ANKLE SURGERY REFERRAL    2. Hyperlipidemia LDL goal <130  - Lipid panel reflex to direct LDL Fasting; Future    3. Right lateral abdominal pain  4. Pain in joint involving pelvic region and thigh, right  5. Endometrium, hyperplasia - on recent CT scan  Question of endometriosis referred pain?  - OB/GYN REFERRAL     BMI:   Estimated body mass index is 32.15 kg/m  as calculated from the following:    Height as of this encounter: 1.676 m (5' 6\").    Weight as of this encounter: 90.4 kg (199 lb 3.2 oz).   Weight management plan: Discussed healthy diet and exercise guidelines    Work on weight loss  Regular exercise  Return in about 4 weeks (around 3/9/2020) for recheck of current condition, if symptoms do not improve.    Otoniel Manuel PA-C  [facility]    "

## 2020-02-07 ENCOUNTER — HOSPITAL ENCOUNTER (OUTPATIENT)
Dept: CT IMAGING | Facility: CLINIC | Age: 45
Discharge: HOME | End: 2020-02-07
Attending: PHYSICIAN ASSISTANT | Admitting: PHYSICIAN ASSISTANT
Payer: COMMERCIAL

## 2020-02-07 DIAGNOSIS — R10.31 RLQ ABDOMINAL PAIN: Primary | ICD-10-CM

## 2020-02-07 DIAGNOSIS — R63.0 LOSS OF APPETITE: ICD-10-CM

## 2020-02-07 DIAGNOSIS — R53.81 MALAISE AND FATIGUE: ICD-10-CM

## 2020-02-07 DIAGNOSIS — R10.31 RLQ ABDOMINAL PAIN: ICD-10-CM

## 2020-02-07 DIAGNOSIS — R53.83 MALAISE AND FATIGUE: ICD-10-CM

## 2020-02-07 LAB
ALBUMIN SERPL-MCNC: 3.9 G/DL (ref 3.4–5)
ALP SERPL-CCNC: 53 U/L (ref 40–150)
ALT SERPL W P-5'-P-CCNC: 26 U/L (ref 0–50)
ANION GAP SERPL CALCULATED.3IONS-SCNC: 4 MMOL/L (ref 3–14)
AST SERPL W P-5'-P-CCNC: 20 U/L (ref 0–45)
BILIRUB SERPL-MCNC: 0.5 MG/DL (ref 0.2–1.3)
BUN SERPL-MCNC: 20 MG/DL (ref 7–30)
CALCIUM SERPL-MCNC: 8.8 MG/DL (ref 8.5–10.1)
CHLORIDE SERPL-SCNC: 109 MMOL/L (ref 94–109)
CO2 SERPL-SCNC: 28 MMOL/L (ref 20–32)
CREAT SERPL-MCNC: 0.56 MG/DL (ref 0.52–1.04)
GFR SERPL CREATININE-BSD FRML MDRD: >90 ML/MIN/{1.73_M2}
GLUCOSE SERPL-MCNC: 102 MG/DL (ref 70–99)
POTASSIUM SERPL-SCNC: 3.9 MMOL/L (ref 3.4–5.3)
PROT SERPL-MCNC: 7.5 G/DL (ref 6.8–8.8)
SODIUM SERPL-SCNC: 141 MMOL/L (ref 133–144)

## 2020-02-07 PROCEDURE — 36415 COLL VENOUS BLD VENIPUNCTURE: CPT | Performed by: PHYSICIAN ASSISTANT

## 2020-02-07 PROCEDURE — 25000125 ZZHC RX 250: Performed by: PHYSICIAN ASSISTANT

## 2020-02-07 PROCEDURE — 74177 CT ABD & PELVIS W/CONTRAST: CPT

## 2020-02-07 PROCEDURE — 25000128 H RX IP 250 OP 636: Performed by: PHYSICIAN ASSISTANT

## 2020-02-07 PROCEDURE — 80053 COMPREHEN METABOLIC PANEL: CPT | Performed by: PHYSICIAN ASSISTANT

## 2020-02-07 RX ORDER — IOPAMIDOL 755 MG/ML
500 INJECTION, SOLUTION INTRAVASCULAR ONCE
Status: COMPLETED | OUTPATIENT
Start: 2020-02-07 | End: 2020-02-07

## 2020-02-07 RX ADMIN — SODIUM CHLORIDE 60 ML: 9 INJECTION, SOLUTION INTRAVENOUS at 15:05

## 2020-02-07 RX ADMIN — IOPAMIDOL 90 ML: 755 INJECTION, SOLUTION INTRAVENOUS at 15:06

## 2020-02-07 NOTE — PROGRESS NOTES
Patient reports RLQ abdomen pain with loss of appetite worsening with movement and tight clothing  Different from recent ovarian cyst pain  Concerns for appendicitis.  Electronically signed:    Otoniel Manuel PA-C

## 2020-02-10 ENCOUNTER — ANCILLARY PROCEDURE (OUTPATIENT)
Dept: GENERAL RADIOLOGY | Facility: OTHER | Age: 45
End: 2020-02-10
Attending: PHYSICIAN ASSISTANT
Payer: COMMERCIAL

## 2020-02-10 ENCOUNTER — OFFICE VISIT (OUTPATIENT)
Dept: OBGYN | Facility: OTHER | Age: 45
End: 2020-02-10
Payer: COMMERCIAL

## 2020-02-10 ENCOUNTER — OFFICE VISIT (OUTPATIENT)
Dept: FAMILY MEDICINE | Facility: OTHER | Age: 45
End: 2020-02-10
Payer: COMMERCIAL

## 2020-02-10 VITALS
HEART RATE: 72 BPM | HEIGHT: 66 IN | DIASTOLIC BLOOD PRESSURE: 74 MMHG | BODY MASS INDEX: 32.02 KG/M2 | SYSTOLIC BLOOD PRESSURE: 130 MMHG | RESPIRATION RATE: 16 BRPM | WEIGHT: 199.2 LBS | TEMPERATURE: 98.8 F

## 2020-02-10 VITALS
BODY MASS INDEX: 32.01 KG/M2 | DIASTOLIC BLOOD PRESSURE: 78 MMHG | HEART RATE: 68 BPM | WEIGHT: 199.19 LBS | HEIGHT: 66 IN | SYSTOLIC BLOOD PRESSURE: 114 MMHG

## 2020-02-10 DIAGNOSIS — E78.5 HYPERLIPIDEMIA LDL GOAL <130: ICD-10-CM

## 2020-02-10 DIAGNOSIS — M79.671 RIGHT FOOT PAIN: Primary | ICD-10-CM

## 2020-02-10 DIAGNOSIS — M79.671 RIGHT FOOT PAIN: ICD-10-CM

## 2020-02-10 DIAGNOSIS — M25.551 PAIN IN JOINT INVOLVING PELVIC REGION AND THIGH, RIGHT: ICD-10-CM

## 2020-02-10 DIAGNOSIS — N85.00 ENDOMETRIUM, HYPERPLASIA: ICD-10-CM

## 2020-02-10 DIAGNOSIS — R10.2 PELVIC PAIN IN FEMALE: Primary | ICD-10-CM

## 2020-02-10 DIAGNOSIS — R10.9 RIGHT LATERAL ABDOMINAL PAIN: ICD-10-CM

## 2020-02-10 PROCEDURE — 73630 X-RAY EXAM OF FOOT: CPT | Mod: RT

## 2020-02-10 PROCEDURE — 99214 OFFICE O/P EST MOD 30 MIN: CPT | Performed by: PHYSICIAN ASSISTANT

## 2020-02-10 PROCEDURE — 99243 OFF/OP CNSLTJ NEW/EST LOW 30: CPT | Performed by: OBSTETRICS & GYNECOLOGY

## 2020-02-10 ASSESSMENT — MIFFLIN-ST. JEOR
SCORE: 1555.32
SCORE: 1555.26

## 2020-02-10 ASSESSMENT — PAIN SCALES - GENERAL: PAINLEVEL: SEVERE PAIN (6)

## 2020-02-10 NOTE — PROGRESS NOTES
Consult Note    Consult requested by Otoniel Salcedo  Chief Complaint   Patient presents with     Consult     ovarian cyst      HPI:  Amelia Coker is a 45 year old  woman with history of endometrial ablation last  who presents with a one month hx of worsening deep pelvic pain.   She historically has normal, regular, and non bothersome periods, though became irregular last spring. Found to have two endometrial polyps. Underwent a hysteroscopy, D&C, and ablation. Pathology was benign, and no periods nor vaginal bleeding since this time. Family menstrual hx notable for mother and maternal aunt with late menopause, around 60.   For the past month, slowly worsening pelvic pain. Pain is constant at baseline though worsening colicky pain which has brought tears to her eyes, caused her to miss work. Pain deep midline as well as bilaterally, Rt>LT, and wraps around to her back, more on the right. Her bowels have also been irregular, though this is her baseline (of know, bowels had been more regular following her ablation, until recently). She was seen on , neg GC/Chlam at that time, though wet prep did show yeast and clue cells (she had recently switched to a scented laundry soap, not back to prior one). Treated for BV and yeast, discharge resolved, pelvic pain has not changed.   A pelvic Ultrasound () showed a 2.4 cm right ovarian cyst, likely corpus lutem, and a 4 mm endometrial stripe. A CT () now shows as 2.9 cm cyst on the left ovary, and the endometrial stripe in 16 mm.   Hx notable for migraines with aura,  is s/p vasectomy.     PMH:   Past Medical History:   Diagnosis Date     Abnormal mammogram 2016    right breast      AD (atopic dermatitis) 10/29/2015     Allergic rhinitis due to other allergen      Arthritis      Bell's palsy      Chronic fatigue 2012     Chronic infection     MRSA - 2015 scalp and prior to Abdomen     Contact dermatitis and other eczema, due to unspecified  cause     eczema     Contusion of left foot, initial encounter 3/16/2016     DJD (degenerative joint disease), lumbar 9/26/2013     DJD (degenerative joint disease), lumbar 9/26/2013     Ds DNA antibody positive 4/16/2014    borderline.      Elevated blood pressure reading without diagnosis of hypertension 12/24/2013     Facial contusion 12/15/2014    hit by horse      Foraminal stenosis of lumbar region 9/26/2013     Frontal headache 12/15/2014     Gastroesophageal reflux disease, esophagitis presence not specified 6/29/2016     Heat sensitivity 10/29/2015     HTN, goal below 140/90 9/23/2015     Hyperlipidemia LDL goal <130 8/30/2017     Irregular heart beat      Keratitis sicca 10/31/2012     Left shoulder pain 9/26/2013     Lumbar radiculopathy 1/22/2014     Migraine headache 10/23/2013     Migraine, unspecified, with intractable migraine, so stated, without mention of status migrainosus      Motion sickness      MRSA infection 10/20/2015     Muscular wasting and disuse atrophy, not elsewhere classified 6/9/2014    right SCM, right wrist,       Numbness and tingling     both legs anf feet greater on the left     PAC (premature atrial contraction) 7/15/2010     Plantar fasciitis 9/23/2015     PONV (postoperative nausea and vomiting)      Skin lesion 10/20/2015    posterior superior scalp      Spasm of muscle 7/11/2017     Telangiectasia of face 12/19/2014     Tooth pain 10/20/2015    left lower primary molar      SurgHx:   Past Surgical History:   Procedure Laterality Date     AS INJ TRANSFORAMIN EPIDURAL, LUMB/SACR SINGLE       COLONOSCOPY  3/11/2013    Procedure: COLONOSCOPY;  colonoscopy;  Surgeon: Lobito Mas MD;  Location: [facility]     COLONOSCOPY WITH CO2 INSUFFLATION N/A 11/19/2018    Procedure: COLONOSCOPY WITH CO2 INSUFFLATION;  Surgeon: Goyo Blank MD;  Location: [facility]     DECOMPRESSION LUMBAR TWO LEVELS Bilateral 12/8/2016    Procedure: DECOMPRESSION LUMBAR TWO LEVELS;  Surgeon:  Bang Burrell MD;  Location: [facility]     DILATION AND CURETTAGE, HYSTEROSCOPY, ABLATE ENDOMETRIUM NOVASURE, COMBINED N/A 2019    Procedure: hysteroscopy, dilation & curettage, polypectomy, endometrial ablation;  Surgeon: Fredy Pham MD;  Location: [facility]     ESOPHAGOSCOPY, GASTROSCOPY, DUODENOSCOPY (EGD), COMBINED  2013    Procedure: COMBINED ESOPHAGOSCOPY, GASTROSCOPY, DUODENOSCOPY (EGD), BIOPSY SINGLE OR MULTIPLE;  Esophagoscopy, Gastroscopy, Duodenoscopy EGD with multiple biopsies;  Surgeon: Teja Koch MD;  Location: [facility]     HC REMOVAL OF TONSILS,<9 Y/O      Tonsils <10y.o.     HC TOOTH EXTRACTION W/FORCEP       REPAIR TENDON ELBOW  2014    Procedure: REPAIR TENDON ELBOW;  Surgeon: Chris Johnston MD;  Location: [facility]     ULTRASONIC REMOVAL SOFT TISSUE (LOCATION) Right 2018    Procedure: Tenex right foot;  Surgeon: Patel Martínez DO;  Location: [facility]     FamHx:   Family History   Problem Relation Age of Onset     Diabetes Mother         adult     Cancer - colorectal Mother      Hypertension Mother      Allergies Mother      Cancer Mother         colon     Depression Mother      Gastrointestinal Disease Mother         ibs     Genitourinary Problems Mother         kidney cyst     Gynecology Mother         endometriosis     Lipids Mother      Thyroid Disease Mother      Arthritis Mother         RA     Heart Disease Maternal Grandfather         MI,  - 60's     Allergies Father      Unknown/Adopted Father      Heart Disease Father         mi-age mid 40's     Cardiovascular Father      Lipids Father      Respiratory Father         asthma     Cancer Father      Other Cancer Father         renal cancer     Depression Sister      Allergies Sister      Cancer Sister         vaginal     Gynecology Sister         endometriosis     Lipids Paternal Grandmother      Osteoporosis Paternal Grandmother      Thyroid Disease Paternal Grandmother      Respiratory Son          asthma     Allergies Son      Arthritis Sister      Allergies Brother      Heart Disease Brother      Allergies Daughter      Blood Disease Paternal Aunt         LGL T cell Leukemia     Rheumatoid Arthritis Paternal Aunt      Kidney Disease Maternal Aunt      Lupus Maternal Aunt      Bladder Cancer Maternal Aunt      SocHx:   Social History     Socioeconomic History     Marital status:      Spouse name: Robert     Number of children: 2     Years of education: 12     Highest education level: None   Occupational History     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   Social Needs     Financial resource strain: None     Food insecurity:     Worry: None     Inability: None     Transportation needs:     Medical: None     Non-medical: None   Tobacco Use     Smoking status: Never Smoker     Smokeless tobacco: Never Used   Substance and Sexual Activity     Alcohol use: No     Comment: social     Drug use: No     Sexual activity: Yes     Partners: Male     Birth control/protection: Surgical     Comment: vasectomy   Lifestyle     Physical activity:     Days per week: None     Minutes per session: None     Stress: None   Relationships     Social connections:     Talks on phone: None     Gets together: None     Attends Congregation service: None     Active member of club or organization: None     Attends meetings of clubs or organizations: None     Relationship status: None     Intimate partner violence:     Fear of current or ex partner: None     Emotionally abused: None     Physically abused: None     Forced sexual activity: None   Other Topics Concern      Service No     Blood Transfusions No     Caffeine Concern No     Comment: 0     Occupational Exposure No     Hobby Hazards Yes     Comment: horses     Sleep Concern No     Stress Concern No     Weight Concern Yes     Special Diet Yes     Comment: nhung's     Back Care No     Exercise Yes     Comment: occ     Bike Helmet Not Asked     Comment: na      "Seat Belt Yes     Self-Exams Yes     Comment: occ     Parent/sibling w/ CABG, MI or angioplasty before 65F 55M? Not Asked   Social History Narrative     None     Allergies:   Ceftriaxone; Bactrim [sulfamethoxazole w/trimethoprim]; Ciprofloxacin; Codeine; Copper; Doxycycline; Gold; Iodine-131; Levaquin [levofloxacin]; Nickel; Steel [staples]; Latex; and Penicillins  Current Medications:   Current Outpatient Medications   Medication Sig Dispense Refill     Acetaminophen (TYLENOL PO) Take 1,000 mg by mouth every 8 hours as needed        acyclovir (ZOVIRAX) 5 % ointment Apply topically 6 times daily 15 g 3     DULoxetine (CYMBALTA) 60 MG capsule Take 1 capsule (60 mg) by mouth daily 90 capsule 0     fexofenadine (ALLEGRA) 180 MG tablet Take 1 tablet by mouth daily Reported on 4/5/2017       meloxicam (MOBIC) 15 MG tablet TAKE ONE TABLET BY MOUTH EVERY DAY (PATIENT WANTS UNICHEM BRAND ONLY) 90 tablet 2     mupirocin (BACTROBAN) 2 % external ointment Apply topically 3 times daily 30 g 1     rizatriptan (MAXALT-MLT) 5 MG ODT Take 1 tablet (5 mg) by mouth at onset of headache for migraine May repeat in 2 hours. Max 6 tablets/24 hours. 30 tablet 1     diazepam (VALIUM) 5 MG tablet Take 1 tablet (5 mg) by mouth 3 times daily as needed for muscle spasms (Patient not taking: Reported on 1/27/2020) 30 tablet 0     ROS: 10 point ROS negative other than as noted in the HPI    EXAM:  Vitals:    02/10/20 1236   BP: 114/78   BP Location: Right arm   Patient Position: Chair   Cuff Size: Adult Regular   Pulse: 68   Weight: 90.4 kg (199 lb 3 oz)   Height: 1.676 m (5' 6\")    Body mass index is 32.15 kg/m .    Gen: Alert, oriented, appropriately interactive, NAD  CV: RRR  Resp: CTAB, good effort without distress   Abdomen: soft, diffusely tender without focus, no guarding, no rebound, non distended, no masses, no hernias. No inguinal lymphadenopathy.   Perineum: no lesions; normal appearing external genitalia  Bimanual exam: non enlarged " mobile uterus in tender to palpation, right and left adnexa slightly less tender and without masses  MSK: normal gait, symmetric movements UE & LE  Lower extremities: non-tender, no edema    Labs & Imaging:  CT (2/7/20): The endometrial stripe is somewhat thickened and hypodense and is  slightly more prominent than on the prior CT dated 5/23/2019 and is  more prominent than expected as compared to the prior pelvic  ultrasound dated 1/31/2020. This could be due to the phase of  patient's cycle. Patient has reportedly had an endometrial ablation.  Cyst in the left ovary is again noted and has enlarged since the prior  CT dated 5/23/2019 but is similar in appearance to what is expected  from the prior pelvic ultrasound dated 1/31/2020. It measures up to  2.9 cm. The ovaries are otherwise unremarkable.  No adenopathy, free fluid or free air is seen in the peritoneal  cavity.   The colon is of normal caliber without pericolonic inflammatory change  to suggest acute diverticulitis. The appendix extends posteriorly from  the cecum and is normal in appearance and caliber. Small bowel is of  normal caliber and appearance. The stomach contains fluid and air and  some other ingested material. Stomach is otherwise unremarkable.                                                                IMPRESSION:  1. No evidence for acute appendicitis, diverticulitis, or urinary  system calculus or obstruction.  2. Mild thickening of the endometrial stripe of decreased attenuation  is of uncertain etiology and clinical significance but could be  associated with the patient's cycle. Endometrial stripe was normal in  appearance on the prior pelvic ultrasound dated 1/31/2020.  3. Left ovarian cyst is again noted and is similar in appearance to  the prior ultrasound dated 1/31/2020 but has increased in size since  the prior CT dated 5/23/2019. No other ovarian abnormality is seen.  4. Etiology for patient's symptoms is otherwise not  definitely  identified study.    Pelvic Ultrasound (20): The uterus measures 8.2 x 4.1 x 4.7 cm. Nabothian cyst in  the cervix measures 1 cm in diameter. Hyperechoic focus in the right  uterine fundal region measures 0.3 x 0.2 x 0.4 cm and could represent  a calcification. It is immediately adjacent to the endometrium and  could be associated with patient's prior endometrial ablation. Trace  amount free fluid is noted in the endometrial canal. This is of  doubtful clinical significance. Endometrium is 0.4 cm thick and  otherwise appears normal.  The ovaries are normal in size, shape and echotexture with no focal  lesions, measuring 2.8 x 2.7 x 2.2 cm on the right and 2.8 x 1.9 x 1.9  cm on the left. There is a mildly complex irregular structure with  vascular flow in the right ovary measuring 2.2 x 2.2 x 2.4 cm this  could represent a corpus luteum cyst. Other etiologies are considered  less likely. Normal-appearing follicles in the left ovary measures 1.4  x 1.3 x 2.0 cm.  There are no adnexal masses. There is no free fluid in the cul-de-sac.                                                               IMPRESSION:    1. Small amount of fluid in the endometrial canal and multiple small  calculi seen adjacent to the endometrium are likely chronic changes  status post endometrial ablation.  2. Question corpus luteum cyst versus other lesion in the right ovary.  I recommend follow-up ultrasound in 6 or 10 weeks at different stage  of patient's cycle. This could be associated with the patient's pain.  3. Simple appearing, probable follicle in the left ovary measures up  to 2.0 cm.    ASSESSMENT/PLAN: Amelia Coker is a 45 year old  woman with history of endometrial ablation last  who presents with a one month hx of worsening constant deep pelvic pain with colicky severe pain intermittently.     1. Pelvic pain in female  - Serial imaging with likely functional, possibly hemorrhagic cyst on right ovary,  then on left.   - Endometrium with apparent expansion from 4 to 16 mm, possible hematometra, which would be unusual in the setting of an ablation last summer with no vaginal bleeding, no other symptoms since, ablation was confirmed hysteroscopically at end of procedure.   - Endometrial tissue benign on pathology from time of ablation  - recent BV & yeast following use of scented laundry soap, now resolved without improvement in pelvic pain  - STI swab negative  - Exam is non specific, with mildly focal pain at uterus on bimanual, though adnexal bilaterally tender without mass appreciated.   - Recommending serial Ultrasound to look for improvement vs worsening of ovarian cysts vs endometrium. Discussed possibility of cystectomy vs hysteroscopy vs hysterectomy pending results of repeat imaging as well as evolution of her symptoms, asked to return sooner if pain becoming more severe     Fredy Pham MD   2/10/2020 1:29 PM

## 2020-02-10 NOTE — NURSING NOTE
"Chief Complaint   Patient presents with     Consult     ovarian cyst       Initial /78 (BP Location: Right arm, Patient Position: Chair, Cuff Size: Adult Regular)   Pulse 68   Ht 1.676 m (5' 6\")   Wt 90.4 kg (199 lb 3 oz)   LMP  (LMP Unknown)   BMI 32.15 kg/m   Estimated body mass index is 32.15 kg/m  as calculated from the following:    Height as of this encounter: 1.676 m (5' 6\").    Weight as of this encounter: 90.4 kg (199 lb 3 oz).  BP completed using cuff size: regular        The following HM Due: NONE      The following patient reported/Care Every where data was sent to:  P ABSTRACT QUALITY INITIATIVES [[id number]]       Stacie Mcclelland CMA  February 10, 2020           "

## 2020-02-13 ENCOUNTER — MYC MEDICAL ADVICE (OUTPATIENT)
Dept: FAMILY MEDICINE | Facility: OTHER | Age: 45
End: 2020-02-13

## 2020-02-17 ENCOUNTER — ANCILLARY PROCEDURE (OUTPATIENT)
Dept: ULTRASOUND IMAGING | Facility: OTHER | Age: 45
End: 2020-02-17
Attending: OBSTETRICS & GYNECOLOGY
Payer: COMMERCIAL

## 2020-02-17 DIAGNOSIS — N83.201 CYST OF RIGHT OVARY: ICD-10-CM

## 2020-02-17 PROCEDURE — 76830 TRANSVAGINAL US NON-OB: CPT

## 2020-02-17 PROCEDURE — 76856 US EXAM PELVIC COMPLETE: CPT

## 2020-02-21 ENCOUNTER — MYC MEDICAL ADVICE (OUTPATIENT)
Dept: FAMILY MEDICINE | Facility: OTHER | Age: 45
End: 2020-02-21

## 2020-02-21 DIAGNOSIS — K04.7 DENTAL ABSCESS: Primary | ICD-10-CM

## 2020-02-21 RX ORDER — VANCOMYCIN HYDROCHLORIDE 250 MG/1
250 CAPSULE ORAL 4 TIMES DAILY
Qty: 28 CAPSULE | Refills: 0 | Status: SHIPPED | OUTPATIENT
Start: 2020-02-21 | End: 2020-03-19

## 2020-03-02 NOTE — TELEPHONE ENCOUNTER
ONCOLOGY INTAKE: Records Information      APPT INFORMATION: 4/23/20 - [place] -   Referring provider:  Fredy Pham  Referring provider s clinic:  [facility]  Reason for visit/diagnosis:  fx hx of malignant neoplasm of GI tract  Has patient been notified of appointment date and time?: Yes    RECORDS INFORMATION:  Were the records received with the referral (via [facility])? Internal referral    Has patient been seen for any external appt for this diagnosis? No    If yes, where? NA    Has patient had any imaging or procedures outside of [facility] for this condition? No      If Yes, where? NA    ADDITIONAL INFORMATION:  Scheduled via call from patient

## 2020-03-03 NOTE — PROGRESS NOTES
SUBJECTIVE:   CC: Amelia Coker is an 45 year old woman who presents for preventive health visit.     Healthy Habits:     Getting at least 3 servings of Calcium per day:  Yes    Bi-annual eye exam:  Yes    Dental care twice a year:  NO    Sleep apnea or symptoms of sleep apnea:  Daytime drowsiness    Diet:  Carbohydrate counting    Frequency of exercise:  4-5 days/week    Duration of exercise:  30-45 minutes    Taking medications regularly:  Yes    Barriers to taking medications:  None    Medication side effects:  None    PHQ-2 Total Score: 0    Additional concerns today:  No    Please look at lower left tooth and left ear    Today's PHQ-2 Score:   PHQ-2 ( 1999 [employer]) 2/10/2020   Q1: Little interest or pleasure in doing things 0   Q2: Feeling down, depressed or hopeless 0   PHQ-2 Score 0   Q1: Little interest or pleasure in doing things Not at all   Q2: Feeling down, depressed or hopeless Not at all   PHQ-2 Score 0       Abuse: Current or Past(Physical, Sexual or Emotional)- No  Do you feel safe in your environment? Yes        Social History     Tobacco Use     Smoking status: Never Smoker     Smokeless tobacco: Never Used   Substance Use Topics     Alcohol use: No     Comment: social     If you drink alcohol do you typically have >3 drinks per day or >7 drinks per week? No    Reviewed orders with patient.  Reviewed health maintenance and updated orders accordingly - Yes  Lab work is in process  Labs reviewed in EPIC  BP Readings from Last 3 Encounters:   03/05/20 118/78   02/10/20 114/78   02/10/20 130/74    Wt Readings from Last 3 Encounters:   03/05/20 91.9 kg (202 lb 11.2 oz)   02/10/20 90.4 kg (199 lb 3 oz)   02/10/20 90.4 kg (199 lb 3.2 oz)                  Patient Active Problem List   Diagnosis     Migraine, unspecified, with intractable migraine, so stated, without mention of status migrainosus     Bell's palsy     Contact dermatitis and other eczema, due to unspecified cause     Allergic rhinitis due  to other allergen     Scalp lesion     Lyme disease     PAC (premature atrial contraction)     Palpitations     Raynaud phenomenon     Chronic fatigue     Hair loss     Abnormal PFT     Shoulder joint instability     Family history of melanoma     Dry eyes     Keratitis sicca     Family history of colon cancer     Pain in joint, upper arm     FLORIDALMA positive     Plantar fascial fibromatosis     Foraminal stenosis of lumbar region     DJD (degenerative joint disease), lumbar     Migraine without aura and without status migrainosus, not intractable     Lumbar radiculopathy     Ds DNA antibody positive     Muscular wasting and disuse atrophy, not elsewhere classified     Frontal headache     Telangiectasia of face     Lateral epicondylitis     Right shoulder pain     Plantar fasciitis     HTN, goal below 140/90     Skin lesion     AD (atopic dermatitis)     Heat sensitivity     Rotator cuff arthropathy, right     Gastroesophageal reflux disease, esophagitis presence not specified     Abnormal mammogram     Sternocleidomastoid muscle tenderness     Acute cervical myofascial strain, initial encounter     Spasm of muscle     Hyperlipidemia LDL goal <130     Biceps tendinopathy, right     Superficial swelling of scalp     Intractable right heel pain     Intractable episodic tension-type headache     Jaycob complexion     Lip lesion     Epigastric pain     Abdominal distension     PONV (postoperative nausea and vomiting)     Menorrhalgia     Uterine polyp     Loose stools     Hyperactive bowel sounds     Abnormality of nail surface     Dry hair     Dry skin     Malaise and fatigue     Lymphadenopathy     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     Endometrium, hyperplasia - on recent CT scan     Pain in joint involving pelvic region and thigh, right     Right lateral abdominal pain     Right foot pain     Dental abscess - left mandible molar     Past Surgical History:   Procedure Laterality Date     AS INJ  TRANSFORAMIN EPIDURAL, LUMB/SACR SINGLE       COLONOSCOPY  3/11/2013    Procedure: COLONOSCOPY;  colonoscopy;  Surgeon: Lobito Mas MD;  [facility]: [facility]     COLONOSCOPY WITH CO2 INSUFFLATION N/A 11/19/2018    Procedure: COLONOSCOPY WITH CO2 INSUFFLATION;  Surgeon: Goyo Blank MD;  Location: [facility]     DECOMPRESSION LUMBAR TWO LEVELS Bilateral 12/8/2016    Procedure: DECOMPRESSION LUMBAR TWO LEVELS;  Surgeon: Bang Burrell MD;  Location: [facility]     DILATION AND CURETTAGE, HYSTEROSCOPY, ABLATE ENDOMETRIUM NOVASURE, COMBINED N/A 6/12/2019    Procedure: hysteroscopy, dilation & curettage, polypectomy, endometrial ablation;  Surgeon: Fredy Pham MD;  [facility]     ESOPHAGOSCOPY, GASTROSCOPY, DUODENOSCOPY (EGD), COMBINED  11/8/2013    Procedure: COMBINED ESOPHAGOSCOPY, GASTROSCOPY, DUODENOSCOPY (EGD), BIOPSY SINGLE OR MULTIPLE;  Esophagoscopy, Gastroscopy, Duodenoscopy EGD with multiple biopsies;  Surgeon: Teja Koch MD;  Location: [facility]     HC REMOVAL OF TONSILS,<13 Y/O      Tonsils <10y.o.     HC TOOTH EXTRACTION W/FORCEP       REPAIR TENDON ELBOW  7/9/2014    Procedure: REPAIR TENDON ELBOW;  Surgeon: Chris Johnston MD;  [facility]: [facility]     ULTRASONIC REMOVAL SOFT TISSUE (LOCATION) Right 11/21/2018    Procedure: Tenex right foot;  Surgeon: Patel Martínez DO;  Location: [facility]       Social History     Tobacco Use     Smoking status: Never Smoker     Smokeless tobacco: Never Used   Substance Use Topics     Alcohol use: No     Comment: social     Family History   Problem Relation Age of Onset     Diabetes Mother         adult     Cancer - colorectal Mother      Hypertension Mother      Allergies Mother      Cancer Mother         colon     Depression Mother      Gastrointestinal Disease Mother         ibs     Genitourinary Problems Mother         kidney cyst     Gynecology Mother         endometriosis     Lipids Mother      Thyroid Disease Mother      Arthritis  Mother         RA     Heart Disease Maternal Grandfather         MI,  - 60's     Allergies Father      Unknown/Adopted Father      Heart Disease Father         mi-age mid 40's     Cardiovascular Father      Lipids Father      Respiratory Father         asthma     Cancer Father      Other Cancer Father         renal cancer     Depression Sister      Allergies Sister      Cancer Sister         vaginal     Gynecology Sister         endometriosis     Lipids Paternal Grandmother      Osteoporosis Paternal Grandmother      Thyroid Disease Paternal Grandmother      Respiratory Son         asthma     Allergies Son      Arthritis Sister      Allergies Brother      Heart Disease Brother      Allergies Daughter      Blood Disease Paternal Aunt         LGL T cell Leukemia     Rheumatoid Arthritis Paternal Aunt      Kidney Disease Maternal Aunt      Lupus Maternal Aunt      Bladder Cancer Maternal Aunt          Current Outpatient Medications   Medication Sig Dispense Refill     Acetaminophen (TYLENOL PO) Take 1,000 mg by mouth every 8 hours as needed        acyclovir (ZOVIRAX) 5 % ointment Apply topically 6 times daily 15 g 3     diazepam (VALIUM) 5 MG tablet Take 1 tablet (5 mg) by mouth 3 times daily as needed for muscle spasms 30 tablet 0     DULoxetine (CYMBALTA) 60 MG capsule Take 1 capsule (60 mg) by mouth daily 90 capsule 0     fexofenadine (ALLEGRA) 180 MG tablet Take 1 tablet by mouth daily Reported on 2017       meloxicam (MOBIC) 15 MG tablet TAKE ONE TABLET BY MOUTH EVERY DAY (PATIENT WANTS UNICHEM BRAND ONLY) 90 tablet 2     mupirocin (BACTROBAN) 2 % external ointment Apply topically 3 times daily 30 g 1     rizatriptan (MAXALT-MLT) 5 MG ODT Take 1 tablet (5 mg) by mouth at onset of headache for migraine May repeat in 2 hours. Max 6 tablets/24 hours. 30 tablet 1     Allergies   Allergen Reactions     Ceftriaxone Anaphylaxis     Hives, rash, racing heart beat     Bactrim [Sulfamethoxazole W/Trimethoprim]  Hives     Ciprofloxacin Other (See Comments)     Tendon Issues     Codeine Nausea and Vomiting     Copper      Rash       Doxycycline      Loss of skin pigmentation, skin loss.     Gold      Rash       Iodine-131 Hives     Levaquin [Levofloxacin] Other (See Comments)     Tendon issues with levaquin and cipro     Nickel      rash     Steel [Staples]      Rash from staples       Latex Rash     Penicillins Rash     Recent Labs   Lab Test 02/07/20  1440 10/07/19  1505 08/29/19  0835 06/10/19  1851  02/08/19  0817 12/31/18  0805  08/30/17  0757  06/08/16  0745   A1C  --   --   --   --   --   --   --   --   --   --  5.5   LDL  --   --   --   --   --  93 113*  --  77  --   --    HDL  --   --   --   --   --  75 86  --  72  --   --    TRIG  --   --   --   --   --  40 49  --  50  --   --    ALT 26  --  21 20  --  27 23   < > 22   < >  --    CR 0.56  --  0.56 0.69  --   --  0.69   < > 0.66   < > 0.54   GFRESTIMATED >90  --  >90 >90  --   --  >90   < > >90   < > >90  Non  GFR Calc     GFRESTBLACK >90  --  >90 >90  --   --  >90   < > >90   < > >90  African American GFR Calc     POTASSIUM 3.9  --  4.2 3.9  --   --  4.0   < > 4.2   < > 4.2   TSH  --  0.94 1.02  --    < >  --   --    < >  --    < >  --     < > = values in this interval not displayed.        Mammogram Screening: Patient under age 50, mutual decision reflected in health maintenance.      Pertinent mammograms are reviewed under the imaging tab.  History of abnormal Pap smear:   Last 3 Pap and HPV Results:   PAP / HPV Latest Ref Rng & Units 12/31/2018 12/2/2015 8/31/2012   PAP - NIL NIL NIL   HPV 16 DNA NEG:Negative Negative Negative -   HPV 18 DNA NEG:Negative Negative Negative -   OTHER HR HPV NEG:Negative Negative Negative -     PAP / HPV Latest Ref Rng & Units 12/31/2018 12/2/2015 8/31/2012   PAP - NIL NIL NIL   HPV 16 DNA NEG:Negative Negative Negative -   HPV 18 DNA NEG:Negative Negative Negative -   OTHER HR HPV NEG:Negative Negative Negative -      Reviewed and updated as needed this visit by clinical staff         Reviewed and updated as needed this visit by Provider        Past Medical History:   Diagnosis Date     Abnormal mammogram 9/21/2016    right breast      AD (atopic dermatitis) 10/29/2015     Allergic rhinitis due to other allergen      Arthritis      Bell's palsy      Chronic fatigue 6/6/2012     Chronic infection     MRSA - 2015 scalp and prior to Abdomen     Contact dermatitis and other eczema, due to unspecified cause     eczema     Contusion of left foot, initial encounter 3/16/2016     DJD (degenerative joint disease), lumbar 9/26/2013     DJD (degenerative joint disease), lumbar 9/26/2013     Ds DNA antibody positive 4/16/2014    borderline.      Elevated blood pressure reading without diagnosis of hypertension 12/24/2013     Facial contusion 12/15/2014    hit by horse      Foraminal stenosis of lumbar region 9/26/2013     Frontal headache 12/15/2014     Gastroesophageal reflux disease, esophagitis presence not specified 6/29/2016     Heat sensitivity 10/29/2015     HTN, goal below 140/90 9/23/2015     Hyperlipidemia LDL goal <130 8/30/2017     Irregular heart beat      Keratitis sicca 10/31/2012     Left shoulder pain 9/26/2013     Lumbar radiculopathy 1/22/2014     Migraine headache 10/23/2013     Migraine, unspecified, with intractable migraine, so stated, without mention of status migrainosus      Motion sickness      MRSA infection 10/20/2015     Muscular wasting and disuse atrophy, not elsewhere classified 6/9/2014    right SCM, right wrist,       Numbness and tingling     both legs anf feet greater on the left     PAC (premature atrial contraction) 7/15/2010     Plantar fasciitis 9/23/2015     PONV (postoperative nausea and vomiting)      Skin lesion 10/20/2015    posterior superior scalp      Spasm of muscle 7/11/2017     Telangiectasia of face 12/19/2014     Tooth pain 10/20/2015    left lower primary molar       Past Surgical  History:   Procedure Laterality Date     AS INJ TRANSFORAMIN EPIDURAL, LUMB/SACR SINGLE       COLONOSCOPY  3/11/2013    Procedure: COLONOSCOPY;  colonoscopy;  Surgeon: Lobito Mas MD;  [facility]     COLONOSCOPY WITH CO2 INSUFFLATION N/A 11/19/2018    Procedure: COLONOSCOPY WITH CO2 INSUFFLATION;  Surgeon: Goyo Blank MD;  [facility]     DECOMPRESSION LUMBAR TWO LEVELS Bilateral 12/8/2016    Procedure: DECOMPRESSION LUMBAR TWO LEVELS;  Surgeon: Bang Burrell MD;  Location[facility]     DILATION AND CURETTAGE, HYSTEROSCOPY, ABLATE ENDOMETRIUM NOVASURE, COMBINED N/A 6/12/2019    Procedure: hysteroscopy, dilation & curettage, polypectomy, endometrial ablation;  Surgeon: Fredy Pham MD;  [facility]     ESOPHAGOSCOPY, GASTROSCOPY, DUODENOSCOPY (EGD), COMBINED  11/8/2013    Procedure: COMBINED ESOPHAGOSCOPY, GASTROSCOPY, DUODENOSCOPY (EGD), BIOPSY SINGLE OR MULTIPLE;  Esophagoscopy, Gastroscopy, Duodenoscopy EGD with multiple biopsies;  Surgeon: Teja Koch MD;  [facility]: [facility]     HC REMOVAL OF TONSILS,<11 Y/O      Tonsils <10y.o.     HC TOOTH EXTRACTION W/FORCEP       REPAIR TENDON ELBOW  7/9/2014    Procedure: REPAIR TENDON ELBOW;  Surgeon: Chris Johnston MD;  [facility]     ULTRASONIC REMOVAL SOFT TISSUE (LOCATION) Right 11/21/2018    Procedure: Tenex right foot;  Surgeon: Patel Martínez DO;  Location: [facility]       Review of Systems   Constitutional: Negative for chills and fever.   HENT: Positive for ear pain. Negative for congestion, hearing loss and sore throat.    Eyes: Negative for pain and visual disturbance.   Respiratory: Negative for cough and shortness of breath.    Cardiovascular: Positive for palpitations. Negative for chest pain and peripheral edema.   Gastrointestinal: Negative for abdominal pain, constipation, diarrhea, heartburn, hematochezia and nausea.   Breasts:  Negative for tenderness, breast mass and discharge.  "  Genitourinary: Negative for dysuria, frequency, genital sores, hematuria, pelvic pain, urgency, vaginal bleeding and vaginal discharge.   Musculoskeletal: Negative for arthralgias, joint swelling and myalgias.   Skin: Negative for rash.   Neurological: Negative for dizziness, weakness, headaches and paresthesias.   Psychiatric/Behavioral: Negative for mood changes. The patient is not nervous/anxious.        OBJECTIVE:   /78   Pulse 72   Temp 97.2  F (36.2  C)   Resp 16   Ht 1.67 m (5' 5.75\")   Wt 91.9 kg (202 lb 11.2 oz)   LMP  (LMP Unknown)   SpO2 100%   BMI 32.97 kg/m    Physical Exam  GENERAL APPEARANCE: healthy, alert and no distress  EYES: Eyes grossly normal to inspection, PERRL and conjunctivae and sclerae normal  HENT: ear canals and TM's normal, nose and mouth without ulcers or lesions, oropharynx clear and oral mucous membranes moist, primary molar missing on the left mandibular.  Mild erythema of the gumline is noted.  Tenderness of the mandible is noted in this region.  Advised that she return to her dentist for consideration of directed x-rays.  NECK: no adenopathy, no asymmetry, masses, or scars and thyroid normal to palpation  RESP: lungs clear to auscultation - no rales, rhonchi or wheezes  CV: regular rate and rhythm, normal S1 S2, no S3 or S4, no murmur, click or rub, no peripheral edema and peripheral pulses strong  ABDOMEN: soft, nontender, no hepatosplenomegaly, no masses and bowel sounds normal  MS: no musculoskeletal defects are noted and gait is age appropriate without ataxia  SKIN: no suspicious lesions or rashes  NEURO: Normal strength and tone, sensory exam grossly normal, mentation intact and speech normal  PSYCH: mentation appears normal and affect normal/bright    Diagnostic Test Results:  Labs reviewed in Epic  No results found. However, due to the size of the patient record, not all encounters were searched. Please check Results Review for a complete set of " results.    ASSESSMENT/PLAN:   1. Routine general medical examination at a health care facility  - Lipid panel reflex to direct LDL Fasting  - CBC with platelets and differential  - Comprehensive metabolic panel  - TSH with free T4 reflex    2. Gastroesophageal reflux disease, esophagitis presence not specified  No new concerns with respect to this she is doing very well since she had her molar removed.  Follow-up in 1 year.    3. HTN, goal below 140/90  Good control with respect to her blood pressure follow-up in 1 year is fine.    4. Dental abscess - left mandible molar  Ongoing struggles with this advised that she see her dentist for directed x-rays and further evaluation.    5. Hyperlipidemia LDL goal <130  Goal reviewed with the patient.  Labs pending.    6. Cervicalgia  7. Muscle spasm  Doing well as long as she has the medications on a regular basis.  Follow-up in 6 months advised.  - meloxicam (MOBIC) 15 MG tablet; TAKE ONE TABLET BY MOUTH EVERY DAY (PATIENT WANTS UNICHEM BRAND ONLY)  Dispense: 90 tablet; Refill: 2    8. Osteoarthritis of lumbar spine, unspecified spinal osteoarthritis complication status  Within normal limits at this point in time all things considered.  Refills granted.  Follow-up in 6 months.  - DULoxetine (CYMBALTA) 60 MG capsule; Take 1 capsule (60 mg) by mouth daily  Dispense: 90 capsule; Refill: 0    9. Intractable episodic tension-type headache  10. Migraine without aura and without status migrainosus, not intractable  Doing well with respect to this no new concerns follow-up in 6 months  - rizatriptan (MAXALT-MLT) 5 MG ODT; Take 1 tablet (5 mg) by mouth at onset of headache for migraine May repeat in 2 hours. Max 6 tablets/24 hours.  Dispense: 30 tablet; Refill: 1    COUNSELING:  Reviewed preventive health counseling, as reflected in patient instructions       Regular exercise       Healthy diet/nutrition       Vision screening       Hearing screening    Estimated body mass index is  "32.15 kg/m  as calculated from the following:    Height as of 2/10/20: 1.676 m (5' 6\").    Weight as of 2/10/20: 90.4 kg (199 lb 3 oz).    Weight management plan: Discussed healthy diet and exercise guidelines     reports that she has never smoked. She has never used smokeless tobacco.      Counseling Resources:  ATP IV Guidelines  Pooled Cohorts Equation Calculator  Breast Cancer Risk Calculator  FRAX Risk Assessment  ICSI Preventive Guidelines  Dietary Guidelines for Americans, 2010  USDA's MyPlate  ASA Prophylaxis  Lung CA Screening    Otoniel Manuel PA-C  [facility]  "

## 2020-03-05 ENCOUNTER — OFFICE VISIT (OUTPATIENT)
Dept: FAMILY MEDICINE | Facility: OTHER | Age: 45
End: 2020-03-05
Payer: COMMERCIAL

## 2020-03-05 VITALS
SYSTOLIC BLOOD PRESSURE: 118 MMHG | HEIGHT: 66 IN | HEART RATE: 72 BPM | WEIGHT: 202.7 LBS | RESPIRATION RATE: 16 BRPM | OXYGEN SATURATION: 100 % | DIASTOLIC BLOOD PRESSURE: 78 MMHG | TEMPERATURE: 97.2 F | BODY MASS INDEX: 32.58 KG/M2

## 2020-03-05 DIAGNOSIS — I10 HTN, GOAL BELOW 140/90: ICD-10-CM

## 2020-03-05 DIAGNOSIS — M54.2 CERVICALGIA: ICD-10-CM

## 2020-03-05 DIAGNOSIS — K04.7 DENTAL ABSCESS: ICD-10-CM

## 2020-03-05 DIAGNOSIS — M47.816 OSTEOARTHRITIS OF LUMBAR SPINE, UNSPECIFIED SPINAL OSTEOARTHRITIS COMPLICATION STATUS: ICD-10-CM

## 2020-03-05 DIAGNOSIS — G43.009 MIGRAINE WITHOUT AURA AND WITHOUT STATUS MIGRAINOSUS, NOT INTRACTABLE: ICD-10-CM

## 2020-03-05 DIAGNOSIS — K21.9 GASTROESOPHAGEAL REFLUX DISEASE, ESOPHAGITIS PRESENCE NOT SPECIFIED: ICD-10-CM

## 2020-03-05 DIAGNOSIS — E78.5 HYPERLIPIDEMIA LDL GOAL <130: ICD-10-CM

## 2020-03-05 DIAGNOSIS — M62.838 MUSCLE SPASM: ICD-10-CM

## 2020-03-05 DIAGNOSIS — Z00.00 ROUTINE GENERAL MEDICAL EXAMINATION AT A HEALTH CARE FACILITY: Primary | ICD-10-CM

## 2020-03-05 DIAGNOSIS — G44.211 INTRACTABLE EPISODIC TENSION-TYPE HEADACHE: ICD-10-CM

## 2020-03-05 LAB
ALBUMIN SERPL-MCNC: 4 G/DL (ref 3.4–5)
ALP SERPL-CCNC: 58 U/L (ref 40–150)
ALT SERPL W P-5'-P-CCNC: 30 U/L (ref 0–50)
ANION GAP SERPL CALCULATED.3IONS-SCNC: 3 MMOL/L (ref 3–14)
AST SERPL W P-5'-P-CCNC: 20 U/L (ref 0–45)
BASOPHILS # BLD AUTO: 0 10E9/L (ref 0–0.2)
BASOPHILS NFR BLD AUTO: 0.6 %
BILIRUB SERPL-MCNC: 0.6 MG/DL (ref 0.2–1.3)
BUN SERPL-MCNC: 25 MG/DL (ref 7–30)
CALCIUM SERPL-MCNC: 8.7 MG/DL (ref 8.5–10.1)
CHLORIDE SERPL-SCNC: 108 MMOL/L (ref 94–109)
CHOLEST SERPL-MCNC: 214 MG/DL
CO2 SERPL-SCNC: 29 MMOL/L (ref 20–32)
CREAT SERPL-MCNC: 0.58 MG/DL (ref 0.52–1.04)
DIFFERENTIAL METHOD BLD: NORMAL
EOSINOPHIL # BLD AUTO: 0.1 10E9/L (ref 0–0.7)
EOSINOPHIL NFR BLD AUTO: 2.9 %
ERYTHROCYTE [DISTWIDTH] IN BLOOD BY AUTOMATED COUNT: 13.4 % (ref 10–15)
GFR SERPL CREATININE-BSD FRML MDRD: >90 ML/MIN/{1.73_M2}
GLUCOSE SERPL-MCNC: 87 MG/DL (ref 70–99)
HCT VFR BLD AUTO: 39.1 % (ref 35–47)
HDLC SERPL-MCNC: 79 MG/DL
HGB BLD-MCNC: 12.7 G/DL (ref 11.7–15.7)
LDLC SERPL CALC-MCNC: 125 MG/DL
LYMPHOCYTES # BLD AUTO: 1.7 10E9/L (ref 0.8–5.3)
LYMPHOCYTES NFR BLD AUTO: 35.1 %
MCH RBC QN AUTO: 30.2 PG (ref 26.5–33)
MCHC RBC AUTO-ENTMCNC: 32.5 G/DL (ref 31.5–36.5)
MCV RBC AUTO: 93 FL (ref 78–100)
MONOCYTES # BLD AUTO: 0.5 10E9/L (ref 0–1.3)
MONOCYTES NFR BLD AUTO: 11.1 %
NEUTROPHILS # BLD AUTO: 2.4 10E9/L (ref 1.6–8.3)
NEUTROPHILS NFR BLD AUTO: 50.3 %
NONHDLC SERPL-MCNC: 135 MG/DL
PLATELET # BLD AUTO: 295 10E9/L (ref 150–450)
POTASSIUM SERPL-SCNC: 4.4 MMOL/L (ref 3.4–5.3)
PROT SERPL-MCNC: 7.5 G/DL (ref 6.8–8.8)
RBC # BLD AUTO: 4.21 10E12/L (ref 3.8–5.2)
SODIUM SERPL-SCNC: 140 MMOL/L (ref 133–144)
TRIGL SERPL-MCNC: 51 MG/DL
TSH SERPL DL<=0.005 MIU/L-ACNC: 1.07 MU/L (ref 0.4–4)
WBC # BLD AUTO: 4.8 10E9/L (ref 4–11)

## 2020-03-05 PROCEDURE — 36415 COLL VENOUS BLD VENIPUNCTURE: CPT | Performed by: PHYSICIAN ASSISTANT

## 2020-03-05 PROCEDURE — 99213 OFFICE O/P EST LOW 20 MIN: CPT | Mod: 25 | Performed by: PHYSICIAN ASSISTANT

## 2020-03-05 PROCEDURE — 80050 GENERAL HEALTH PANEL: CPT | Performed by: PHYSICIAN ASSISTANT

## 2020-03-05 PROCEDURE — 99396 PREV VISIT EST AGE 40-64: CPT | Performed by: PHYSICIAN ASSISTANT

## 2020-03-05 PROCEDURE — 80061 LIPID PANEL: CPT | Performed by: PHYSICIAN ASSISTANT

## 2020-03-05 RX ORDER — RIZATRIPTAN BENZOATE 5 MG/1
5 TABLET, ORALLY DISINTEGRATING ORAL
Qty: 30 TABLET | Refills: 1 | Status: SHIPPED | OUTPATIENT
Start: 2020-03-05 | End: 2023-10-26

## 2020-03-05 RX ORDER — DULOXETIN HYDROCHLORIDE 60 MG/1
60 CAPSULE, DELAYED RELEASE ORAL DAILY
Qty: 90 CAPSULE | Refills: 0 | Status: SHIPPED | OUTPATIENT
Start: 2020-03-05 | End: 2020-05-22

## 2020-03-05 RX ORDER — MELOXICAM 15 MG/1
TABLET ORAL
Qty: 90 TABLET | Refills: 2 | Status: SHIPPED | OUTPATIENT
Start: 2020-03-05 | End: 2021-01-07

## 2020-03-05 ASSESSMENT — ENCOUNTER SYMPTOMS
DIARRHEA: 0
SHORTNESS OF BREATH: 0
EYE PAIN: 0
BREAST MASS: 0
WEAKNESS: 0
HEADACHES: 0
NAUSEA: 0
FEVER: 0
DYSURIA: 0
DIZZINESS: 0
CHILLS: 0
MYALGIAS: 0
PALPITATIONS: 1
COUGH: 0
ARTHRALGIAS: 0
FREQUENCY: 0
HEMATURIA: 0
CONSTIPATION: 0
JOINT SWELLING: 0
SORE THROAT: 0
HEARTBURN: 0
NERVOUS/ANXIOUS: 0
ABDOMINAL PAIN: 0
PARESTHESIAS: 0
HEMATOCHEZIA: 0

## 2020-03-05 ASSESSMENT — PAIN SCALES - GENERAL: PAINLEVEL: MODERATE PAIN (4)

## 2020-03-05 ASSESSMENT — MIFFLIN-ST. JEOR: SCORE: 1567.19

## 2020-03-16 ENCOUNTER — OFFICE VISIT (OUTPATIENT)
Dept: PODIATRY | Facility: CLINIC | Age: 45
End: 2020-03-16
Payer: COMMERCIAL

## 2020-03-16 ENCOUNTER — MYC MEDICAL ADVICE (OUTPATIENT)
Dept: ORTHOPEDICS | Facility: CLINIC | Age: 45
End: 2020-03-16

## 2020-03-16 ENCOUNTER — ANCILLARY PROCEDURE (OUTPATIENT)
Dept: MRI IMAGING | Facility: CLINIC | Age: 45
End: 2020-03-16
Attending: PODIATRIST
Payer: COMMERCIAL

## 2020-03-16 ENCOUNTER — ANCILLARY PROCEDURE (OUTPATIENT)
Dept: GENERAL RADIOLOGY | Facility: CLINIC | Age: 45
End: 2020-03-16
Attending: PODIATRIST
Payer: COMMERCIAL

## 2020-03-16 VITALS — DIASTOLIC BLOOD PRESSURE: 74 MMHG | SYSTOLIC BLOOD PRESSURE: 125 MMHG | HEART RATE: 99 BPM | OXYGEN SATURATION: 100 %

## 2020-03-16 DIAGNOSIS — M25.571 RIGHT ANKLE PAIN, UNSPECIFIED CHRONICITY: ICD-10-CM

## 2020-03-16 DIAGNOSIS — M25.371 INSTABILITY OF RIGHT ANKLE JOINT: ICD-10-CM

## 2020-03-16 DIAGNOSIS — M79.671 RIGHT FOOT PAIN: Primary | ICD-10-CM

## 2020-03-16 DIAGNOSIS — M79.671 RIGHT FOOT PAIN: ICD-10-CM

## 2020-03-16 PROCEDURE — 73610 X-RAY EXAM OF ANKLE: CPT | Mod: RT | Performed by: RADIOLOGY

## 2020-03-16 PROCEDURE — 99203 OFFICE O/P NEW LOW 30 MIN: CPT | Performed by: PODIATRIST

## 2020-03-16 PROCEDURE — 73721 MRI JNT OF LWR EXTRE W/O DYE: CPT | Mod: RT | Performed by: RADIOLOGY

## 2020-03-16 ASSESSMENT — PAIN SCALES - GENERAL: PAINLEVEL: MILD PAIN (2)

## 2020-03-16 NOTE — LETTER
3/16/2020         RE: Amelia Coker  [address] [facility] [id number]        Dear Colleague,    Thank you for referring your patient, Amelia Coker, to the [facility]. Please see a copy of my visit note below.    Past Medical History:   Diagnosis Date     Abnormal mammogram 9/21/2016    right breast      AD (atopic dermatitis) 10/29/2015     Allergic rhinitis due to other allergen      Arthritis      Bell's palsy      Chronic fatigue 6/6/2012     Chronic infection     MRSA - 2015 scalp and prior to Abdomen     Contact dermatitis and other eczema, due to unspecified cause     eczema     Contusion of left foot, initial encounter 3/16/2016     DJD (degenerative joint disease), lumbar 9/26/2013     DJD (degenerative joint disease), lumbar 9/26/2013     Ds DNA antibody positive 4/16/2014    borderline.      Elevated blood pressure reading without diagnosis of hypertension 12/24/2013     Facial contusion 12/15/2014    hit by horse      Foraminal stenosis of lumbar region 9/26/2013     Frontal headache 12/15/2014     Gastroesophageal reflux disease, esophagitis presence not specified 6/29/2016     Heat sensitivity 10/29/2015     HTN, goal below 140/90 9/23/2015     Hyperlipidemia LDL goal <130 8/30/2017     Irregular heart beat      Keratitis sicca 10/31/2012     Left shoulder pain 9/26/2013     Lumbar radiculopathy 1/22/2014     Migraine headache 10/23/2013     Migraine, unspecified, with intractable migraine, so stated, without mention of status migrainosus      Motion sickness      MRSA infection 10/20/2015     Muscular wasting and disuse atrophy, not elsewhere classified 6/9/2014    right SCM, right wrist,       Numbness and tingling     both legs anf feet greater on the left     PAC (premature atrial contraction) 7/15/2010     Plantar fasciitis 9/23/2015     PONV (postoperative nausea and vomiting)      Skin lesion 10/20/2015    posterior superior scalp      Spasm of muscle 7/11/2017      Telangiectasia of face 12/19/2014     Tooth pain 10/20/2015    left lower primary molar      Patient Active Problem List   Diagnosis     Migraine, unspecified, with intractable migraine, so stated, without mention of status migrainosus     Bell's palsy     Contact dermatitis and other eczema, due to unspecified cause     Allergic rhinitis due to other allergen     Scalp lesion     Lyme disease     PAC (premature atrial contraction)     Palpitations     Raynaud phenomenon     Chronic fatigue     Hair loss     Abnormal PFT     Shoulder joint instability     Family history of melanoma     Dry eyes     Keratitis sicca     Family history of colon cancer     Pain in joint, upper arm     FLORIDALMA positive     Plantar fascial fibromatosis     Foraminal stenosis of lumbar region     DJD (degenerative joint disease), lumbar     Migraine without aura and without status migrainosus, not intractable     Lumbar radiculopathy     Ds DNA antibody positive     Muscular wasting and disuse atrophy, not elsewhere classified     Frontal headache     Telangiectasia of face     Lateral epicondylitis     Right shoulder pain     Plantar fasciitis     HTN, goal below 140/90     Skin lesion     AD (atopic dermatitis)     Heat sensitivity     Rotator cuff arthropathy, right     Gastroesophageal reflux disease, esophagitis presence not specified     Abnormal mammogram     Sternocleidomastoid muscle tenderness     Acute cervical myofascial strain, initial encounter     Spasm of muscle     Hyperlipidemia LDL goal <130     Biceps tendinopathy, right     Superficial swelling of scalp     Intractable right heel pain     Intractable episodic tension-type headache     Jaycob complexion     Lip lesion     Epigastric pain     Abdominal distension     PONV (postoperative nausea and vomiting)     Menorrhalgia     Uterine polyp     Loose stools     Hyperactive bowel sounds     Abnormality of nail surface     Dry hair     Dry skin     Malaise and fatigue      Lymphadenopathy     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     Endometrium, hyperplasia - on recent CT scan     Pain in joint involving pelvic region and thigh, right     Right lateral abdominal pain     Right foot pain     Dental abscess - left mandible molar     Past Surgical History:   Procedure Laterality Date     AS INJ TRANSFORAMIN EPIDURAL, LUMB/SACR SINGLE       COLONOSCOPY  3/11/2013    Procedure: COLONOSCOPY;  colonoscopy;  Surgeon: Lobito Mas MD;  Location: [facility]     COLONOSCOPY WITH CO2 INSUFFLATION N/A 11/19/2018    Procedure: COLONOSCOPY WITH CO2 INSUFFLATION;  Surgeon: Goyo Blank MD;  Location: [facility]     DECOMPRESSION LUMBAR TWO LEVELS Bilateral 12/8/2016    Procedure: DECOMPRESSION LUMBAR TWO LEVELS;  Surgeon: Bang Burrell MD;  Location: [facility]     DILATION AND CURETTAGE, HYSTEROSCOPY, ABLATE ENDOMETRIUM NOVASURE, COMBINED N/A 6/12/2019    Procedure: hysteroscopy, dilation & curettage, polypectomy, endometrial ablation;  Surgeon: Fredy Pham MD;  Location: [facility]     ESOPHAGOSCOPY, GASTROSCOPY, DUODENOSCOPY (EGD), COMBINED  11/8/2013    Procedure: COMBINED ESOPHAGOSCOPY, GASTROSCOPY, DUODENOSCOPY (EGD), BIOPSY SINGLE OR MULTIPLE;  Esophagoscopy, Gastroscopy, Duodenoscopy EGD with multiple biopsies;  Surgeon: Teja Koch MD;  Location: [facility]     HC REMOVAL OF TONSILS,<13 Y/O      Tonsils <10y.o.     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   Social Needs     Financial resource strain: Not on file     Food insecurity     Worry: Not on file     Inability: Not on file     Transportation needs     Medical: Not on file     Non-medical: Not on file   Tobacco Use     Smoking status: Never Smoker     Smokeless tobacco: Never Used   Substance and Sexual Activity     Alcohol use: No     Comment: social     Drug use: No     Sexual activity: Yes     Partners: Male     Birth control/protection: Surgical     Comment: vasectomy   Lifestyle     Physical activity     Days per week: Not on file     Minutes per session: Not on file     Stress: Not on file   Relationships     Social connections     Talks on phone: Not on file     Gets together: Not on file     Attends Restorationist service: Not on file     Active member of club or organization: Not on file     Attends meetings of clubs or organizations: Not on file     Relationship status: Not on file     Intimate partner violence     Fear of current or ex partner: Not on file     Emotionally abused: Not on file     Physically abused: Not on file     Forced sexual activity: Not on file   Other Topics Concern      Service No     Blood Transfusions No     Caffeine Concern No     Comment: 0     Occupational Exposure No     Hobby Hazards Yes     Comment: horses     Sleep Concern No     Stress Concern No     Weight Concern Yes     Special Diet Yes     Comment: nhung's     Back Care No     Exercise Yes     Comment: occ     Bike Helmet Not Asked     Comment: na     Seat Belt Yes     Self-Exams Yes     Comment: occ     Parent/sibling w/ CABG, MI or angioplasty before 65F 55M? Not Asked   Social History Narrative     Not on file     Family History   Problem Relation Age of Onset     Diabetes Mother         adult     Cancer - colorectal Mother      Hypertension Mother      Allergies Mother      Cancer Mother         colon     Depression Mother      Gastrointestinal Disease Mother         ibs     Genitourinary  Problems Mother         kidney cyst     Gynecology Mother         endometriosis     Lipids Mother      Thyroid Disease Mother      Arthritis Mother         RA     Heart Disease Maternal Grandfather         MI,  - 60's     Allergies Father      Unknown/Adopted Father      Heart Disease Father         mi-age mid 40's     Cardiovascular Father      Lipids Father      Respiratory Father         asthma     Cancer Father      Other Cancer Father         renal cancer     Depression Sister      Allergies Sister      Cancer Sister         vaginal     Gynecology Sister         endometriosis     Lipids Paternal Grandmother      Osteoporosis Paternal Grandmother      Thyroid Disease Paternal Grandmother      Respiratory Son         asthma     Allergies Son      Arthritis Sister      Allergies Brother      Heart Disease Brother      Allergies Daughter      Blood Disease Paternal Aunt         LGL T cell Leukemia     Rheumatoid Arthritis Paternal Aunt      Kidney Disease Maternal Aunt      Lupus Maternal Aunt      Bladder Cancer Maternal Aunt      Lab Results   Component Value Date    WBC 4.8 2020     Lab Results   Component Value Date    RBC 4.21 2020     Lab Results   Component Value Date    HGB 12.7 2020     Lab Results   Component Value Date    HCT 39.1 2020     No components found for: MCT  Lab Results   Component Value Date    MCV 93 2020     Lab Results   Component Value Date    MCH 30.2 2020     Lab Results   Component Value Date    MCHC 32.5 2020     Lab Results   Component Value Date    RDW 13.4 2020     Lab Results   Component Value Date     2020     Last Comprehensive Metabolic Panel:  Sodium   Date Value Ref Range Status   2020 140 133 - 144 mmol/L Final     Potassium   Date Value Ref Range Status   2020 4.4 3.4 - 5.3 mmol/L Final     Chloride   Date Value Ref Range Status   2020 108 94 - 109 mmol/L Final     Carbon Dioxide   Date  Value Ref Range Status   03/05/2020 29 20 - 32 mmol/L Final     Anion Gap   Date Value Ref Range Status   03/05/2020 3 3 - 14 mmol/L Final     Glucose   Date Value Ref Range Status   03/05/2020 87 70 - 99 mg/dL Final     Comment:     Fasting specimen     Urea Nitrogen   Date Value Ref Range Status   03/05/2020 25 7 - 30 mg/dL Final     Creatinine   Date Value Ref Range Status   03/05/2020 0.58 0.52 - 1.04 mg/dL Final     GFR Estimate   Date Value Ref Range Status   03/05/2020 >90 >60 mL/min/[1.73_m2] Final     Comment:     Non  GFR Calc  Starting 12/18/2018, serum creatinine based estimated GFR (eGFR) will be   calculated using the Chronic Kidney Disease Epidemiology Collaboration   (CKD-EPI) equation.       Calcium   Date Value Ref Range Status   03/05/2020 8.7 8.5 - 10.1 mg/dL Final     SUBJECTIVE FINDINGS:  A 45-year-old female returns to clinic for right ankle pain.  She relates it kind of hurts on the anterior lateral ankle and down the dorsolateral foot.  She relates it locks up and it hurts, and it feels like the tendon is rolling over.  It goes into the foot.  She relates she has had plantar fasciitis in the past.  She had PRP injections, Tenex done.  The last injection did help, but she is starting to get that back again.  She relates it hurts in the plantar fascial course around the heel and then medial and lateral heel, she gets kind of shooting pain.  She relates she has had back disease in the past, which she has been treated for.  She relates no recent injuries.  She relates about 1-2 years ago, she kind of stepped in a hoof hole and rolled her ankle.  She relates she had x-rays, MRI done of the right previously.  She relates she had plantar fasciitis surgery on the left in the past.  She had a bone cyst removed on the left as well.  She relates her ankle has been bothering her for a few months and just feels like it locks up.      OBJECTIVE FINDINGS:  DP and PT are 2/4.  She has  functional hallux limitus with dorsal first MPJ prominence bilaterally.  She has some mild positive anterior drawer sign on the right anterior lateral ankle.   She relates she does have loose ligaments.  There is pain on palpation of the anterior lateral ankle and dorsolateral foot; it is mild today.  She relates it does feel better today.  There are no gross tendon voids.  No erythema, no drainage, no odor, no calor, right.  X-rays, previous MRIs reviewed with patient in clinic today.      ASSESSMENT AND PLAN:  Right ankle pain.  Right dorsolateral foot pain.  She has some ankle instability.  Rule out talar dome lesion and pathology.  Diagnosis and treatment options discussed with the patient.  Ordered ankle x-rays and MRI and use discussed with her.  Ankle brace dispensed and use discussed with her.  Advised her on rest and ice.  She relates she has Voltaren gel.  She can use that as needed as well.  Return to clinic and see me in 1 week.     X-rays reviewed.  Joint cortical margins are intact.  There is no fracture.  She has posterior calcaneal spurring.  She has bony prominence, posterior talus, some joint space narrowing and subchondral sclerosis noted that is mild.     Mris and report reviewed as noted in emr.        Again, thank you for allowing me to participate in the care of your patient.        Sincerely,        Ramon Wright DPM

## 2020-03-16 NOTE — PROGRESS NOTES
Past Medical History:   Diagnosis Date     Abnormal mammogram 9/21/2016    right breast      AD (atopic dermatitis) 10/29/2015     Allergic rhinitis due to other allergen      Arthritis      Bell's palsy      Chronic fatigue 6/6/2012     Chronic infection     MRSA - 2015 scalp and prior to Abdomen     Contact dermatitis and other eczema, due to unspecified cause     eczema     Contusion of left foot, initial encounter 3/16/2016     DJD (degenerative joint disease), lumbar 9/26/2013     DJD (degenerative joint disease), lumbar 9/26/2013     Ds DNA antibody positive 4/16/2014    borderline.      Elevated blood pressure reading without diagnosis of hypertension 12/24/2013     Facial contusion 12/15/2014    hit by horse      Foraminal stenosis of lumbar region 9/26/2013     Frontal headache 12/15/2014     Gastroesophageal reflux disease, esophagitis presence not specified 6/29/2016     Heat sensitivity 10/29/2015     HTN, goal below 140/90 9/23/2015     Hyperlipidemia LDL goal <130 8/30/2017     Irregular heart beat      Keratitis sicca 10/31/2012     Left shoulder pain 9/26/2013     Lumbar radiculopathy 1/22/2014     Migraine headache 10/23/2013     Migraine, unspecified, with intractable migraine, so stated, without mention of status migrainosus      Motion sickness      MRSA infection 10/20/2015     Muscular wasting and disuse atrophy, not elsewhere classified 6/9/2014    right SCM, right wrist,       Numbness and tingling     both legs anf feet greater on the left     PAC (premature atrial contraction) 7/15/2010     Plantar fasciitis 9/23/2015     PONV (postoperative nausea and vomiting)      Skin lesion 10/20/2015    posterior superior scalp      Spasm of muscle 7/11/2017     Telangiectasia of face 12/19/2014     Tooth pain 10/20/2015    left lower primary molar      Patient Active Problem List   Diagnosis     Migraine, unspecified, with intractable migraine, so stated, without mention of status migrainosus      Bell's palsy     Contact dermatitis and other eczema, due to unspecified cause     Allergic rhinitis due to other allergen     Scalp lesion     Lyme disease     PAC (premature atrial contraction)     Palpitations     Raynaud phenomenon     Chronic fatigue     Hair loss     Abnormal PFT     Shoulder joint instability     Family history of melanoma     Dry eyes     Keratitis sicca     Family history of colon cancer     Pain in joint, upper arm     FLORIDALMA positive     Plantar fascial fibromatosis     Foraminal stenosis of lumbar region     DJD (degenerative joint disease), lumbar     Migraine without aura and without status migrainosus, not intractable     Lumbar radiculopathy     Ds DNA antibody positive     Muscular wasting and disuse atrophy, not elsewhere classified     Frontal headache     Telangiectasia of face     Lateral epicondylitis     Right shoulder pain     Plantar fasciitis     HTN, goal below 140/90     Skin lesion     AD (atopic dermatitis)     Heat sensitivity     Rotator cuff arthropathy, right     Gastroesophageal reflux disease, esophagitis presence not specified     Abnormal mammogram     Sternocleidomastoid muscle tenderness     Acute cervical myofascial strain, initial encounter     Spasm of muscle     Hyperlipidemia LDL goal <130     Biceps tendinopathy, right     Superficial swelling of scalp     Intractable right heel pain     Intractable episodic tension-type headache     Jaycob complexion     Lip lesion     Epigastric pain     Abdominal distension     PONV (postoperative nausea and vomiting)     Menorrhalgia     Uterine polyp     Loose stools     Hyperactive bowel sounds     Abnormality of nail surface     Dry hair     Dry skin     Malaise and fatigue     Lymphadenopathy     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     Endometrium, hyperplasia - on recent CT scan     Pain in joint involving pelvic region and thigh, right     Right lateral abdominal pain     Right foot pain     Dental  abscess - left mandible molar     Past Surgical History:   Procedure Laterality Date     AS INJ TRANSFORAMIN EPIDURAL, LUMB/SACR SINGLE       COLONOSCOPY  3/11/2013    Procedure: COLONOSCOPY;  colonoscopy;  Surgeon: Lobito Mas MD;  [facility]     COLONOSCOPY WITH CO2 INSUFFLATION N/A 11/19/2018    Procedure: COLONOSCOPY WITH CO2 INSUFFLATION;  Surgeon: Goyo Blank MD;  [facility]     DECOMPRESSION LUMBAR TWO LEVELS Bilateral 12/8/2016    Procedure: DECOMPRESSION LUMBAR TWO LEVELS;  Surgeon: Bang Burrell MD;  Location: [facility]     DILATION AND CURETTAGE, HYSTEROSCOPY, ABLATE ENDOMETRIUM NOVASURE, COMBINED N/A 6/12/2019    Procedure: hysteroscopy, dilation & curettage, polypectomy, endometrial ablation;  Surgeon: Fredy Pham MD;  [facility]     ESOPHAGOSCOPY, GASTROSCOPY, DUODENOSCOPY (EGD), COMBINED  11/8/2013    Procedure: COMBINED ESOPHAGOSCOPY, GASTROSCOPY, DUODENOSCOPY (EGD), BIOPSY SINGLE OR MULTIPLE;  Esophagoscopy, Gastroscopy, Duodenoscopy EGD with multiple biopsies;  Surgeon: Teja Koch MD;  [facility]: [facility]     HC REMOVAL OF TONSILS,<13 Y/O      Tonsils <10y.o.     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   Social Needs     Financial resource strain: Not on file     Food insecurity     Worry: Not on file     Inability: Not on file     Transportation needs     Medical: Not on file     Non-medical: Not on file   Tobacco Use     Smoking  status: Never Smoker     Smokeless tobacco: Never Used   Substance and Sexual Activity     Alcohol use: No     Comment: social     Drug use: No     Sexual activity: Yes     Partners: Male     Birth control/protection: Surgical     Comment: vasectomy   Lifestyle     Physical activity     Days per week: Not on file     Minutes per session: Not on file     Stress: Not on file   Relationships     Social connections     Talks on phone: Not on file     Gets together: Not on file     Attends Yazidism service: Not on file     Active member of club or organization: Not on file     Attends meetings of clubs or organizations: Not on file     Relationship status: Not on file     Intimate partner violence     Fear of current or ex partner: Not on file     Emotionally abused: Not on file     Physically abused: Not on file     Forced sexual activity: Not on file   Other Topics Concern      Service No     Blood Transfusions No     Caffeine Concern No     Comment: 0     Occupational Exposure No     Hobby Hazards Yes     Comment: horses     Sleep Concern No     Stress Concern No     Weight Concern Yes     Special Diet Yes     Comment: nhung's     Back Care No     Exercise Yes     Comment: occ     Bike Helmet Not Asked     Comment: na     Seat Belt Yes     Self-Exams Yes     Comment: occ     Parent/sibling w/ CABG, MI or angioplasty before 65F 55M? Not Asked   Social History Narrative     Not on file     Family History   Problem Relation Age of Onset     Diabetes Mother         adult     Cancer - colorectal Mother      Hypertension Mother      Allergies Mother      Cancer Mother         colon     Depression Mother      Gastrointestinal Disease Mother         ibs     Genitourinary Problems Mother         kidney cyst     Gynecology Mother         endometriosis     Lipids Mother      Thyroid Disease Mother      Arthritis Mother         RA     Heart Disease Maternal Grandfather         MI,  - 60's     Allergies Father       Unknown/Adopted Father      Heart Disease Father         mi-age mid 40's     Cardiovascular Father      Lipids Father      Respiratory Father         asthma     Cancer Father      Other Cancer Father         renal cancer     Depression Sister      Allergies Sister      Cancer Sister         vaginal     Gynecology Sister         endometriosis     Lipids Paternal Grandmother      Osteoporosis Paternal Grandmother      Thyroid Disease Paternal Grandmother      Respiratory Son         asthma     Allergies Son      Arthritis Sister      Allergies Brother      Heart Disease Brother      Allergies Daughter      Blood Disease Paternal Aunt         LGL T cell Leukemia     Rheumatoid Arthritis Paternal Aunt      Kidney Disease Maternal Aunt      Lupus Maternal Aunt      Bladder Cancer Maternal Aunt      Lab Results   Component Value Date    WBC 4.8 03/05/2020     Lab Results   Component Value Date    RBC 4.21 03/05/2020     Lab Results   Component Value Date    HGB 12.7 03/05/2020     Lab Results   Component Value Date    HCT 39.1 03/05/2020     No components found for: MCT  Lab Results   Component Value Date    MCV 93 03/05/2020     Lab Results   Component Value Date    MCH 30.2 03/05/2020     Lab Results   Component Value Date    MCHC 32.5 03/05/2020     Lab Results   Component Value Date    RDW 13.4 03/05/2020     Lab Results   Component Value Date     03/05/2020     Last Comprehensive Metabolic Panel:  Sodium   Date Value Ref Range Status   03/05/2020 140 133 - 144 mmol/L Final     Potassium   Date Value Ref Range Status   03/05/2020 4.4 3.4 - 5.3 mmol/L Final     Chloride   Date Value Ref Range Status   03/05/2020 108 94 - 109 mmol/L Final     Carbon Dioxide   Date Value Ref Range Status   03/05/2020 29 20 - 32 mmol/L Final     Anion Gap   Date Value Ref Range Status   03/05/2020 3 3 - 14 mmol/L Final     Glucose   Date Value Ref Range Status   03/05/2020 87 70 - 99 mg/dL Final     Comment:     Fasting  specimen     Urea Nitrogen   Date Value Ref Range Status   03/05/2020 25 7 - 30 mg/dL Final     Creatinine   Date Value Ref Range Status   03/05/2020 0.58 0.52 - 1.04 mg/dL Final     GFR Estimate   Date Value Ref Range Status   03/05/2020 >90 >60 mL/min/[1.73_m2] Final     Comment:     Non  GFR Calc  Starting 12/18/2018, serum creatinine based estimated GFR (eGFR) will be   calculated using the Chronic Kidney Disease Epidemiology Collaboration   (CKD-EPI) equation.       Calcium   Date Value Ref Range Status   03/05/2020 8.7 8.5 - 10.1 mg/dL Final     SUBJECTIVE FINDINGS:  A 45-year-old female returns to clinic for right ankle pain.  She relates it kind of hurts on the anterior lateral ankle and down the dorsolateral foot.  She relates it locks up and it hurts, and it feels like the tendon is rolling over.  It goes into the foot.  She relates she has had plantar fasciitis in the past.  She had PRP injections, Tenex done.  The last injection did help, but she is starting to get that back again.  She relates it hurts in the plantar fascial course around the heel and then medial and lateral heel, she gets kind of shooting pain.  She relates she has had back disease in the past, which she has been treated for.  She relates no recent injuries.  She relates about 1-2 years ago, she kind of stepped in a hoof hole and rolled her ankle.  She relates she had x-rays, MRI done of the right previously.  She relates she had plantar fasciitis surgery on the left in the past.  She had a bone cyst removed on the left as well.  She relates her ankle has been bothering her for a few months and just feels like it locks up.      OBJECTIVE FINDINGS:  DP and PT are 2/4.  She has functional hallux limitus with dorsal first MPJ prominence bilaterally.  She has some mild positive anterior drawer sign on the right anterior lateral ankle.   She relates she does have loose ligaments.  There is pain on palpation of the anterior  lateral ankle and dorsolateral foot; it is mild today.  She relates it does feel better today.  There are no gross tendon voids.  No erythema, no drainage, no odor, no calor, right.  X-rays, previous MRIs reviewed with patient in clinic today.      ASSESSMENT AND PLAN:  Right ankle pain.  Right dorsolateral foot pain.  She has some ankle instability.  Rule out talar dome lesion and pathology.  Diagnosis and treatment options discussed with the patient.  Ordered ankle x-rays and MRI and use discussed with her.  Ankle brace dispensed and use discussed with her.  Advised her on rest and ice.  She relates she has Voltaren gel.  She can use that as needed as well.  Return to clinic and see me in 1 week.     X-rays reviewed.  Joint cortical margins are intact.  There is no fracture.  She has posterior calcaneal spurring.  She has bony prominence, posterior talus, some joint space narrowing and subchondral sclerosis noted that is mild.     Mris and report reviewed as noted in emr.

## 2020-03-16 NOTE — PATIENT INSTRUCTIONS
Thanks for coming today.  [facility]  [address] [place] [address]    To schedule future appointments in Ortho Clinic, you may call [phone].    To schedule ordered imaging by your provider:   Call [facility] [phone]    To schedule an injection ordered by your provider:  Call [facility] Injection scheduling line: [phone]  [email or web address] available online at:  [email or web address]    Please call if any further questions or concerns ([phone]).  Clinic hours 8 am to 5 pm.    Return to clinic (call) if symptoms worsen or fail to improve.

## 2020-03-16 NOTE — NURSING NOTE
Amelia Coker's chief complaint for this visit includes:  Chief Complaint   Patient presents with     Right Ankle - Pain     Right foot pain     Plantar Fascitis     right foot     PCP: Otoniel Nelson    Referring Provider:  No referring provider defined for this encounter.    /74 (BP Location: Left arm, Patient Position: Sitting, Cuff Size: Adult Regular)   Pulse 99   SpO2 100%   Mild Pain (2)     Do you need any medication refills at today's visit? No    Mable Vail CMA

## 2020-03-18 ENCOUNTER — VIRTUAL VISIT (OUTPATIENT)
Dept: PODIATRY | Facility: CLINIC | Age: 45
End: 2020-03-18
Payer: COMMERCIAL

## 2020-03-18 DIAGNOSIS — M25.571 RIGHT ANKLE PAIN, UNSPECIFIED CHRONICITY: ICD-10-CM

## 2020-03-18 DIAGNOSIS — M25.371 INSTABILITY OF RIGHT ANKLE JOINT: ICD-10-CM

## 2020-03-18 DIAGNOSIS — M79.671 RIGHT FOOT PAIN: Primary | ICD-10-CM

## 2020-03-18 NOTE — PROGRESS NOTES
Past Medical History:   Diagnosis Date     Abnormal mammogram 9/21/2016    right breast      AD (atopic dermatitis) 10/29/2015     Allergic rhinitis due to other allergen      Arthritis      Bell's palsy      Chronic fatigue 6/6/2012     Chronic infection     MRSA - 2015 scalp and prior to Abdomen     Contact dermatitis and other eczema, due to unspecified cause     eczema     Contusion of left foot, initial encounter 3/16/2016     DJD (degenerative joint disease), lumbar 9/26/2013     DJD (degenerative joint disease), lumbar 9/26/2013     Ds DNA antibody positive 4/16/2014    borderline.      Elevated blood pressure reading without diagnosis of hypertension 12/24/2013     Facial contusion 12/15/2014    hit by horse      Foraminal stenosis of lumbar region 9/26/2013     Frontal headache 12/15/2014     Gastroesophageal reflux disease, esophagitis presence not specified 6/29/2016     Heat sensitivity 10/29/2015     HTN, goal below 140/90 9/23/2015     Hyperlipidemia LDL goal <130 8/30/2017     Irregular heart beat      Keratitis sicca 10/31/2012     Left shoulder pain 9/26/2013     Lumbar radiculopathy 1/22/2014     Migraine headache 10/23/2013     Migraine, unspecified, with intractable migraine, so stated, without mention of status migrainosus      Motion sickness      MRSA infection 10/20/2015     Muscular wasting and disuse atrophy, not elsewhere classified 6/9/2014    right SCM, right wrist,       Numbness and tingling     both legs anf feet greater on the left     PAC (premature atrial contraction) 7/15/2010     Plantar fasciitis 9/23/2015     PONV (postoperative nausea and vomiting)      Skin lesion 10/20/2015    posterior superior scalp      Spasm of muscle 7/11/2017     Telangiectasia of face 12/19/2014     Tooth pain 10/20/2015    left lower primary molar      Patient Active Problem List   Diagnosis     Migraine, unspecified, with intractable migraine, so stated, without mention of status migrainosus      Bell's palsy     Contact dermatitis and other eczema, due to unspecified cause     Allergic rhinitis due to other allergen     Scalp lesion     Lyme disease     PAC (premature atrial contraction)     Palpitations     Raynaud phenomenon     Chronic fatigue     Hair loss     Abnormal PFT     Shoulder joint instability     Family history of melanoma     Dry eyes     Keratitis sicca     Family history of colon cancer     Pain in joint, upper arm     FLORIDALMA positive     Plantar fascial fibromatosis     Foraminal stenosis of lumbar region     DJD (degenerative joint disease), lumbar     Migraine without aura and without status migrainosus, not intractable     Lumbar radiculopathy     Ds DNA antibody positive     Muscular wasting and disuse atrophy, not elsewhere classified     Frontal headache     Telangiectasia of face     Lateral epicondylitis     Right shoulder pain     Plantar fasciitis     HTN, goal below 140/90     Skin lesion     AD (atopic dermatitis)     Heat sensitivity     Rotator cuff arthropathy, right     Gastroesophageal reflux disease, esophagitis presence not specified     Abnormal mammogram     Sternocleidomastoid muscle tenderness     Acute cervical myofascial strain, initial encounter     Spasm of muscle     Hyperlipidemia LDL goal <130     Biceps tendinopathy, right     Superficial swelling of scalp     Intractable right heel pain     Intractable episodic tension-type headache     Jaycob complexion     Lip lesion     Epigastric pain     Abdominal distension     PONV (postoperative nausea and vomiting)     Menorrhalgia     Uterine polyp     Loose stools     Hyperactive bowel sounds     Abnormality of nail surface     Dry hair     Dry skin     Malaise and fatigue     Lymphadenopathy     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     Endometrium, hyperplasia - on recent CT scan     Pain in joint involving pelvic region and thigh, right     Right lateral abdominal pain     Right foot pain     Dental  abscess - left mandible molar     Past Surgical History:   Procedure Laterality Date     AS INJ TRANSFORAMIN EPIDURAL, LUMB/SACR SINGLE       COLONOSCOPY  3/11/2013    Procedure: COLONOSCOPY;  colonoscopy;  Surgeon: Lobito Mas MD;  [facility]     COLONOSCOPY WITH CO2 INSUFFLATION N/A 11/19/2018    Procedure: COLONOSCOPY WITH CO2 INSUFFLATION;  Surgeon: Goyo Blank MD;  [facility]     DECOMPRESSION LUMBAR TWO LEVELS Bilateral 12/8/2016    Procedure: DECOMPRESSION LUMBAR TWO LEVELS;  Surgeon: Bang Burrell MD;  [facility]: [facility]     DILATION AND CURETTAGE, HYSTEROSCOPY, ABLATE ENDOMETRIUM NOVASURE, COMBINED N/A 6/12/2019    Procedure: hysteroscopy, dilation & curettage, polypectomy, endometrial ablation;  Surgeon: Fredy Pham MD;  [facility]     ESOPHAGOSCOPY, GASTROSCOPY, DUODENOSCOPY (EGD), COMBINED  11/8/2013    Procedure: COMBINED ESOPHAGOSCOPY, GASTROSCOPY, DUODENOSCOPY (EGD), BIOPSY SINGLE OR MULTIPLE;  Esophagoscopy, Gastroscopy, Duodenoscopy EGD with multiple biopsies;  Surgeon: Teja Koch MD;  [facility]     HC REMOVAL OF TONSILS,<9 Y/O      Tonsils <10y.o.     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   Social Needs     Financial resource strain: Not on file     Food insecurity     Worry: Not on file     Inability: Not on file     Transportation needs     Medical: Not on file     Non-medical: Not on file   Tobacco Use     Smoking  status: Never Smoker     Smokeless tobacco: Never Used   Substance and Sexual Activity     Alcohol use: No     Comment: social     Drug use: No     Sexual activity: Yes     Partners: Male     Birth control/protection: Surgical     Comment: vasectomy   Lifestyle     Physical activity     Days per week: Not on file     Minutes per session: Not on file     Stress: Not on file   Relationships     Social connections     Talks on phone: Not on file     Gets together: Not on file     Attends Scientology service: Not on file     Active member of club or organization: Not on file     Attends meetings of clubs or organizations: Not on file     Relationship status: Not on file     Intimate partner violence     Fear of current or ex partner: Not on file     Emotionally abused: Not on file     Physically abused: Not on file     Forced sexual activity: Not on file   Other Topics Concern      Service No     Blood Transfusions No     Caffeine Concern No     Comment: 0     Occupational Exposure No     Hobby Hazards Yes     Comment: horses     Sleep Concern No     Stress Concern No     Weight Concern Yes     Special Diet Yes     Comment: nhung's     Back Care No     Exercise Yes     Comment: occ     Bike Helmet Not Asked     Comment: na     Seat Belt Yes     Self-Exams Yes     Comment: occ     Parent/sibling w/ CABG, MI or angioplasty before 65F 55M? Not Asked   Social History Narrative     Not on file     Family History   Problem Relation Age of Onset     Diabetes Mother         adult     Cancer - colorectal Mother      Hypertension Mother      Allergies Mother      Cancer Mother         colon     Depression Mother      Gastrointestinal Disease Mother         ibs     Genitourinary Problems Mother         kidney cyst     Gynecology Mother         endometriosis     Lipids Mother      Thyroid Disease Mother      Arthritis Mother         RA     Heart Disease Maternal Grandfather         MI,  - 60's     Allergies Father       Unknown/Adopted Father      Heart Disease Father         mi-age mid 40's     Cardiovascular Father      Lipids Father      Respiratory Father         asthma     Cancer Father      Other Cancer Father         renal cancer     Depression Sister      Allergies Sister      Cancer Sister         vaginal     Gynecology Sister         endometriosis     Lipids Paternal Grandmother      Osteoporosis Paternal Grandmother      Thyroid Disease Paternal Grandmother      Respiratory Son         asthma     Allergies Son      Arthritis Sister      Allergies Brother      Heart Disease Brother      Allergies Daughter      Blood Disease Paternal Aunt         LGL T cell Leukemia     Rheumatoid Arthritis Paternal Aunt      Kidney Disease Maternal Aunt      Lupus Maternal Aunt      Bladder Cancer Maternal Aunt      3/18/20-Dx and tx reviewed with patient on phone, Mri and xray results reviewed with her.

## 2020-03-18 NOTE — PATIENT INSTRUCTIONS
Thanks for coming today.  [facility]  [address] [place] [address]    To schedule future appointments in Ortho Clinic, you may call [phone].    To schedule ordered imaging by your provider:   Call [facility] [phone]    To schedule an injection ordered by your provider:  Call [facility] Injection scheduling line: [phone]  [email or web address] available online at:  [email or web address]    Please call if any further questions or concerns ([phone]).  Clinic hours 8 am to 5 pm.    Return to clinic (call) if symptoms worsen or fail to improve.

## 2020-04-02 NOTE — TELEPHONE ENCOUNTER
Called and message left with pt    Spoke to patient she is going to come to [facility].   Arabella Petty CMA ([facility])

## 2020-04-07 ENCOUNTER — TELEPHONE (OUTPATIENT)
Dept: PODIATRY | Facility: CLINIC | Age: 45
End: 2020-04-07

## 2020-04-07 NOTE — TELEPHONE ENCOUNTER
Attempted to reach patient to review 24 hr Covid prescreen questions below.  Left message to return a call to the clinic.     Do you have any of the following symptoms:  a)      Fever (or reported chills) No  b)      Shortness of Breath No  c)      Rash No   D)      Cough No    If a patient reports yes to any of these symptoms, obtain direction from the provider and call the patient back to let them know if they can come in or not.  1.    Provider needs to determine if this patient should still be seen in clinic.  2.    If decision to not see in clinic, call patient back and refer them to COVID- 19 [email or web address] or schedule COVID-19 phone visit.  3.    Turn in-person visit into telephone visit (FOR RETURN PATIENTS ONLY)    Remind patients that visitors are not allowed on site. Only one legal guardian who screens negative to the above questions will be allowed to accompany patients. If a patient indicates that they will be bringing a legal guardian with them to the appointment please make sure to screen both the patient and the legal guardian for symptoms using the tool above.

## 2020-04-08 ENCOUNTER — OFFICE VISIT (OUTPATIENT)
Dept: PODIATRY | Facility: CLINIC | Age: 45
End: 2020-04-08
Payer: COMMERCIAL

## 2020-04-08 DIAGNOSIS — M72.2 PLANTAR FASCIITIS OF RIGHT FOOT: ICD-10-CM

## 2020-04-08 DIAGNOSIS — M25.571 RIGHT ANKLE PAIN, UNSPECIFIED CHRONICITY: ICD-10-CM

## 2020-04-08 DIAGNOSIS — M79.671 RIGHT FOOT PAIN: Primary | ICD-10-CM

## 2020-04-08 PROCEDURE — 20550 NJX 1 TENDON SHEATH/LIGAMENT: CPT | Performed by: PODIATRIST

## 2020-04-08 NOTE — NURSING NOTE
Amelia Coker's chief complaint for this visit includes:  Chief Complaint   Patient presents with     RECHECK     right foot pain     PCP: Otoniel Nelson    Referring Provider:  No referring provider defined for this encounter.    There were no vitals taken for this visit.  Data Unavailable     Do you need any medication refills at today's visit? No    Mable Vail CMA

## 2020-04-08 NOTE — PROGRESS NOTES
Past Medical History:   Diagnosis Date     Abnormal mammogram 9/21/2016    right breast      AD (atopic dermatitis) 10/29/2015     Allergic rhinitis due to other allergen      Arthritis      Bell's palsy      Chronic fatigue 6/6/2012     Chronic infection     MRSA - 2015 scalp and prior to Abdomen     Contact dermatitis and other eczema, due to unspecified cause     eczema     Contusion of left foot, initial encounter 3/16/2016     DJD (degenerative joint disease), lumbar 9/26/2013     DJD (degenerative joint disease), lumbar 9/26/2013     Ds DNA antibody positive 4/16/2014    borderline.      Elevated blood pressure reading without diagnosis of hypertension 12/24/2013     Facial contusion 12/15/2014    hit by horse      Foraminal stenosis of lumbar region 9/26/2013     Frontal headache 12/15/2014     Gastroesophageal reflux disease, esophagitis presence not specified 6/29/2016     Heat sensitivity 10/29/2015     HTN, goal below 140/90 9/23/2015     Hyperlipidemia LDL goal <130 8/30/2017     Irregular heart beat      Keratitis sicca 10/31/2012     Left shoulder pain 9/26/2013     Lumbar radiculopathy 1/22/2014     Migraine headache 10/23/2013     Migraine, unspecified, with intractable migraine, so stated, without mention of status migrainosus      Motion sickness      MRSA infection 10/20/2015     Muscular wasting and disuse atrophy, not elsewhere classified 6/9/2014    right SCM, right wrist,       Numbness and tingling     both legs anf feet greater on the left     PAC (premature atrial contraction) 7/15/2010     Plantar fasciitis 9/23/2015     PONV (postoperative nausea and vomiting)      Skin lesion 10/20/2015    posterior superior scalp      Spasm of muscle 7/11/2017     Telangiectasia of face 12/19/2014     Tooth pain 10/20/2015    left lower primary molar      Patient Active Problem List   Diagnosis     Migraine, unspecified, with intractable migraine, so stated, without mention of status migrainosus      Bell's palsy     Contact dermatitis and other eczema, due to unspecified cause     Allergic rhinitis due to other allergen     Scalp lesion     Lyme disease     PAC (premature atrial contraction)     Palpitations     Raynaud phenomenon     Chronic fatigue     Hair loss     Abnormal PFT     Shoulder joint instability     Family history of melanoma     Dry eyes     Keratitis sicca     Family history of colon cancer     Pain in joint, upper arm     FLORIDALMA positive     Plantar fascial fibromatosis     Foraminal stenosis of lumbar region     DJD (degenerative joint disease), lumbar     Migraine without aura and without status migrainosus, not intractable     Lumbar radiculopathy     Ds DNA antibody positive     Muscular wasting and disuse atrophy, not elsewhere classified     Frontal headache     Telangiectasia of face     Lateral epicondylitis     Right shoulder pain     Plantar fasciitis     HTN, goal below 140/90     Skin lesion     AD (atopic dermatitis)     Heat sensitivity     Rotator cuff arthropathy, right     Gastroesophageal reflux disease, esophagitis presence not specified     Abnormal mammogram     Sternocleidomastoid muscle tenderness     Acute cervical myofascial strain, initial encounter     Spasm of muscle     Hyperlipidemia LDL goal <130     Biceps tendinopathy, right     Superficial swelling of scalp     Intractable right heel pain     Intractable episodic tension-type headache     Jaycob complexion     Lip lesion     Epigastric pain     Abdominal distension     PONV (postoperative nausea and vomiting)     Menorrhalgia     Uterine polyp     Loose stools     Hyperactive bowel sounds     Abnormality of nail surface     Dry hair     Dry skin     Malaise and fatigue     Lymphadenopathy     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     Endometrium, hyperplasia - on recent CT scan     Pain in joint involving pelvic region and thigh, right     Right lateral abdominal pain     Right foot pain     Dental  abscess - left mandible molar     Past Surgical History:   Procedure Laterality Date     AS INJ TRANSFORAMIN EPIDURAL, LUMB/SACR SINGLE       COLONOSCOPY  3/11/2013    Procedure: COLONOSCOPY;  colonoscopy;  Surgeon: Lobito Mas MD;  Location: [facility]     COLONOSCOPY WITH CO2 INSUFFLATION N/A 11/19/2018    Procedure: COLONOSCOPY WITH CO2 INSUFFLATION;  Surgeon: Goyo Blank MD;  Location: [facility]     DECOMPRESSION LUMBAR TWO LEVELS Bilateral 12/8/2016    Procedure: DECOMPRESSION LUMBAR TWO LEVELS;  Surgeon: Bang Burrell MD;  Location: [facility]     DILATION AND CURETTAGE, HYSTEROSCOPY, ABLATE ENDOMETRIUM NOVASURE, COMBINED N/A 6/12/2019    Procedure: hysteroscopy, dilation & curettage, polypectomy, endometrial ablation;  Surgeon: Fredy Pham MD;  Location: [facility]     ESOPHAGOSCOPY, GASTROSCOPY, DUODENOSCOPY (EGD), COMBINED  11/8/2013    Procedure: COMBINED ESOPHAGOSCOPY, GASTROSCOPY, DUODENOSCOPY (EGD), BIOPSY SINGLE OR MULTIPLE;  Esophagoscopy, Gastroscopy, Duodenoscopy EGD with multiple biopsies;  Surgeon: Teja Koch MD;  Location: [facility]     HC REMOVAL OF TONSILS,<13 Y/O      Tonsils <10y.o.     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   Social Needs     Financial resource strain: Not on file     Food insecurity     Worry: Not on file     Inability: Not on file     Transportation needs     Medical: Not on file     Non-medical: Not on file   Tobacco Use     Smoking  status: Never Smoker     Smokeless tobacco: Never Used   Substance and Sexual Activity     Alcohol use: No     Comment: social     Drug use: No     Sexual activity: Yes     Partners: Male     Birth control/protection: Surgical     Comment: vasectomy   Lifestyle     Physical activity     Days per week: Not on file     Minutes per session: Not on file     Stress: Not on file   Relationships     Social connections     Talks on phone: Not on file     Gets together: Not on file     Attends [employer] service: Not on file     Active member of club or organization: Not on file     Attends meetings of clubs or organizations: Not on file     Relationship status: Not on file     Intimate partner violence     Fear of current or ex partner: Not on file     Emotionally abused: Not on file     Physically abused: Not on file     Forced sexual activity: Not on file   Other Topics Concern      Service No     Blood Transfusions No     Caffeine Concern No     Comment: 0     Occupational Exposure No     Hobby Hazards Yes     Comment: horses     Sleep Concern No     Stress Concern No     Weight Concern Yes     Special Diet Yes     Comment: nhung's     Back Care No     Exercise Yes     Comment: occ     Bike Helmet Not Asked     Comment: na     Seat Belt Yes     Self-Exams Yes     Comment: occ     Parent/sibling w/ CABG, MI or angioplasty before 65F 55M? Not Asked   Social History Narrative     Not on file     Family History   Problem Relation Age of Onset     Diabetes Mother         adult     Cancer - colorectal Mother      Hypertension Mother      Allergies Mother      Cancer Mother         colon     Depression Mother      Gastrointestinal Disease Mother         ibs     Genitourinary Problems Mother         kidney cyst     Gynecology Mother         endometriosis     Lipids Mother      Thyroid Disease Mother      Arthritis Mother         RA     Heart Disease Maternal Grandfather         MI,  - 60's     Allergies Father       Unknown/Adopted Father      Heart Disease Father         mi-age mid 40's     Cardiovascular Father      Lipids Father      Respiratory Father         asthma     Cancer Father      Other Cancer Father         renal cancer     Depression Sister      Allergies Sister      Cancer Sister         vaginal     Gynecology Sister         endometriosis     Lipids Paternal Grandmother      Osteoporosis Paternal Grandmother      Thyroid Disease Paternal Grandmother      Respiratory Son         asthma     Allergies Son      Arthritis Sister      Allergies Brother      Heart Disease Brother      Allergies Daughter      Blood Disease Paternal Aunt         LGL T cell Leukemia     Rheumatoid Arthritis Paternal Aunt      Kidney Disease Maternal Aunt      Lupus Maternal Aunt      Bladder Cancer Maternal Aunt      Lab Results   Component Value Date    WBC 4.8 03/05/2020     Lab Results   Component Value Date    RBC 4.21 03/05/2020     Lab Results   Component Value Date    HGB 12.7 03/05/2020     Lab Results   Component Value Date    HCT 39.1 03/05/2020     No components found for: MCT  Lab Results   Component Value Date    MCV 93 03/05/2020     Lab Results   Component Value Date    MCH 30.2 03/05/2020     Lab Results   Component Value Date    MCHC 32.5 03/05/2020     Lab Results   Component Value Date    RDW 13.4 03/05/2020     Lab Results   Component Value Date     03/05/2020     Last Comprehensive Metabolic Panel:  Sodium   Date Value Ref Range Status   03/05/2020 140 133 - 144 mmol/L Final     Potassium   Date Value Ref Range Status   03/05/2020 4.4 3.4 - 5.3 mmol/L Final     Chloride   Date Value Ref Range Status   03/05/2020 108 94 - 109 mmol/L Final     Carbon Dioxide   Date Value Ref Range Status   03/05/2020 29 20 - 32 mmol/L Final     Anion Gap   Date Value Ref Range Status   03/05/2020 3 3 - 14 mmol/L Final     Glucose   Date Value Ref Range Status   03/05/2020 87 70 - 99 mg/dL Final     Comment:     Fasting  specimen     Urea Nitrogen   Date Value Ref Range Status   03/05/2020 25 7 - 30 mg/dL Final     Creatinine   Date Value Ref Range Status   03/05/2020 0.58 0.52 - 1.04 mg/dL Final     GFR Estimate   Date Value Ref Range Status   03/05/2020 >90 >60 mL/min/[1.73_m2] Final     Comment:     Non  GFR Calc  Starting 12/18/2018, serum creatinine based estimated GFR (eGFR) will be   calculated using the Chronic Kidney Disease Epidemiology Collaboration   (CKD-EPI) equation.       Calcium   Date Value Ref Range Status   03/05/2020 8.7 8.5 - 10.1 mg/dL Final     Lab Results   Component Value Date    AST 20 03/05/2020     Lab Results   Component Value Date    ALT 30 03/05/2020     Lab Results   Component Value Date    BILICONJ 0.0 07/18/2003      Lab Results   Component Value Date    BILITOTAL 0.6 03/05/2020     Lab Results   Component Value Date    ALBUMIN 4.0 03/05/2020     Lab Results   Component Value Date    PROTTOTAL 7.5 03/05/2020      Lab Results   Component Value Date    ALKPHOS 58 03/05/2020     SUBJECTIVE FINDINGS:  45-year-old female returns to clinic for right foot pain.  She relates it hurts on the dorsal lateral foot, right anterior lateral ankle, plantar heel and along the plantar fascia course.  She requests a corticosteroid injection.  Previous notes reviewed.  She relates she is also getting some pain in her left foot that is starting.  She has been off work but she has been doing more walking.  She has had plantar fascia release.  She is wearing a heel pad and insoles in her shoes.  If she gets too much arch support, she relates that hurts her foot.        OBJECTIVE FINDINGS:  DP and PT are 2/4 right.  She has pain on palpation of plantar right heel and plantar fascia course.  She has pain on palpation of tarsometatarsal joint on the dorsal lateral right foot and anterior lateral ankle.  She has negative anterior drawer signs.  No tendon voids.  No erythema, no drainage, no odor, no calor  right foot and ankle.  MRI and x-rays reviewed as noted in the EMR.  She does have some osteoarthritic changes on x-ray and MRI noted.      ASSESSMENT/PLAN:  Right foot pain.  Right ankle pain.  Plantar fasciitis, right.  Diagnosis and treatment options discussed with the patient.  The patient requests corticosteroid injection.  Procedure, potential risks, expected benefits, expected outcomes and followup discussed with the patient.  Consent signed today.  The right tarsometatarsal joint area and plantar fascia at plantar heel injected.  The dorsal lateral foot was injected with 0.5 cc of dexamethasone sodium phosphate 4 mg/mL and 2.5 cc of lidocaine plain.  The plantar heel is injected with 0.5 cc of dexamethasone sodium phosphate 4 mg/mL and 4.5 cc of 1% lidocaine pain mix.  Both areas were injected at trigger point.  They were prepped prior to injection.  Patient tolerated the injection well with no complications.  Diagnosis and treatment options discussed with her.  She will return to clinic and see me in about 3 weeks.

## 2020-04-16 ENCOUNTER — MYC MEDICAL ADVICE (OUTPATIENT)
Dept: FAMILY MEDICINE | Facility: OTHER | Age: 45
End: 2020-04-16

## 2020-04-17 ENCOUNTER — APPOINTMENT (OUTPATIENT)
Dept: CT IMAGING | Facility: CLINIC | Age: 45
End: 2020-04-17
Attending: EMERGENCY MEDICINE
Payer: COMMERCIAL

## 2020-04-17 ENCOUNTER — HOSPITAL ENCOUNTER (EMERGENCY)
Facility: CLINIC | Age: 45
Discharge: HOME | End: 2020-04-17
Attending: EMERGENCY MEDICINE | Admitting: EMERGENCY MEDICINE
Payer: COMMERCIAL

## 2020-04-17 VITALS
RESPIRATION RATE: 19 BRPM | OXYGEN SATURATION: 97 % | TEMPERATURE: 98.8 F | HEART RATE: 63 BPM | BODY MASS INDEX: 32.53 KG/M2 | SYSTOLIC BLOOD PRESSURE: 119 MMHG | WEIGHT: 200 LBS | DIASTOLIC BLOOD PRESSURE: 80 MMHG

## 2020-04-17 DIAGNOSIS — R51.9 NONINTRACTABLE HEADACHE, UNSPECIFIED CHRONICITY PATTERN, UNSPECIFIED HEADACHE TYPE: ICD-10-CM

## 2020-04-17 LAB
ANION GAP SERPL CALCULATED.3IONS-SCNC: 4 MMOL/L (ref 3–14)
BASOPHILS # BLD AUTO: 0 10E9/L (ref 0–0.2)
BASOPHILS NFR BLD AUTO: 0.5 %
BUN SERPL-MCNC: 18 MG/DL (ref 7–30)
CALCIUM SERPL-MCNC: 8.7 MG/DL (ref 8.5–10.1)
CHLORIDE SERPL-SCNC: 107 MMOL/L (ref 94–109)
CO2 SERPL-SCNC: 28 MMOL/L (ref 20–32)
CREAT SERPL-MCNC: 0.64 MG/DL (ref 0.52–1.04)
DIFFERENTIAL METHOD BLD: NORMAL
EOSINOPHIL NFR BLD AUTO: 1.6 %
ERYTHROCYTE [DISTWIDTH] IN BLOOD BY AUTOMATED COUNT: 13.2 % (ref 10–15)
GFR SERPL CREATININE-BSD FRML MDRD: >90 ML/MIN/{1.73_M2}
GLUCOSE SERPL-MCNC: 100 MG/DL (ref 70–99)
HCT VFR BLD AUTO: 38.4 % (ref 35–47)
HGB BLD-MCNC: 12.7 G/DL (ref 11.7–15.7)
IMM GRANULOCYTES # BLD: 0 10E9/L (ref 0–0.4)
IMM GRANULOCYTES NFR BLD: 0.2 %
LYMPHOCYTES # BLD AUTO: 2.1 10E9/L (ref 0.8–5.3)
LYMPHOCYTES NFR BLD AUTO: 37.5 %
MCH RBC QN AUTO: 30.7 PG (ref 26.5–33)
MCHC RBC AUTO-ENTMCNC: 33.1 G/DL (ref 31.5–36.5)
MCV RBC AUTO: 93 FL (ref 78–100)
MONOCYTES # BLD AUTO: 0.5 10E9/L (ref 0–1.3)
MONOCYTES NFR BLD AUTO: 8.2 %
NEUTROPHILS # BLD AUTO: 2.9 10E9/L (ref 1.6–8.3)
NEUTROPHILS NFR BLD AUTO: 52 %
NRBC # BLD AUTO: 0 10*3/UL
NRBC BLD AUTO-RTO: 0 /100
PLATELET # BLD AUTO: 276 10E9/L (ref 150–450)
POTASSIUM SERPL-SCNC: 3.7 MMOL/L (ref 3.4–5.3)
RBC # BLD AUTO: 4.14 10E12/L (ref 3.8–5.2)
SODIUM SERPL-SCNC: 139 MMOL/L (ref 133–144)
WBC # BLD AUTO: 5.5 10E9/L (ref 4–11)

## 2020-04-17 PROCEDURE — 25800030 ZZH RX IP 258 OP 636: Performed by: EMERGENCY MEDICINE

## 2020-04-17 PROCEDURE — 99285 EMERGENCY DEPT VISIT HI MDM: CPT | Mod: 25 | Performed by: EMERGENCY MEDICINE

## 2020-04-17 PROCEDURE — 96375 TX/PRO/DX INJ NEW DRUG ADDON: CPT | Performed by: EMERGENCY MEDICINE

## 2020-04-17 PROCEDURE — 25000128 H RX IP 250 OP 636: Performed by: EMERGENCY MEDICINE

## 2020-04-17 PROCEDURE — 85025 COMPLETE CBC W/AUTO DIFF WBC: CPT | Performed by: EMERGENCY MEDICINE

## 2020-04-17 PROCEDURE — 96374 THER/PROPH/DIAG INJ IV PUSH: CPT | Performed by: EMERGENCY MEDICINE

## 2020-04-17 PROCEDURE — 99284 EMERGENCY DEPT VISIT MOD MDM: CPT | Mod: Z6 | Performed by: EMERGENCY MEDICINE

## 2020-04-17 PROCEDURE — 96361 HYDRATE IV INFUSION ADD-ON: CPT | Performed by: EMERGENCY MEDICINE

## 2020-04-17 PROCEDURE — 80048 BASIC METABOLIC PNL TOTAL CA: CPT | Performed by: EMERGENCY MEDICINE

## 2020-04-17 PROCEDURE — 70450 CT HEAD/BRAIN W/O DYE: CPT

## 2020-04-17 RX ORDER — DEXAMETHASONE SODIUM PHOSPHATE 10 MG/ML
10 INJECTION, SOLUTION INTRAMUSCULAR; INTRAVENOUS ONCE
Status: COMPLETED | OUTPATIENT
Start: 2020-04-17 | End: 2020-04-17

## 2020-04-17 RX ORDER — KETOROLAC TROMETHAMINE 30 MG/ML
30 INJECTION, SOLUTION INTRAMUSCULAR; INTRAVENOUS ONCE
Status: COMPLETED | OUTPATIENT
Start: 2020-04-17 | End: 2020-04-17

## 2020-04-17 RX ADMIN — DEXAMETHASONE SODIUM PHOSPHATE 10 MG: 10 INJECTION, SOLUTION INTRAMUSCULAR; INTRAVENOUS at 12:30

## 2020-04-17 RX ADMIN — PROCHLORPERAZINE EDISYLATE 10 MG: 5 INJECTION INTRAMUSCULAR; INTRAVENOUS at 12:30

## 2020-04-17 RX ADMIN — KETOROLAC TROMETHAMINE 30 MG: 30 INJECTION, SOLUTION INTRAMUSCULAR at 12:23

## 2020-04-17 RX ADMIN — SODIUM CHLORIDE 1000 ML: 9 INJECTION, SOLUTION INTRAVENOUS at 12:18

## 2020-04-17 NOTE — ED PROVIDER NOTES
History     Chief Complaint   Patient presents with     Headache     HPI  Amelia Coker is a 45 year old female who presents with a headache.  This began 4 days ago.  It became worse yesterday.  It is on the top of her head and into the right temple.  She did hit her head on a counter as she stood up 4 days ago.  She had vomiting 7 times yesterday.  She said no fever.  Yesterday she felt like she had some vertigo where her eyes could not catch up with her body.  She has a history of Bell's palsy on the right as well.  No other neurological deficit that is acute.  Headache is severe.    Allergies:  Allergies   Allergen Reactions     Ceftriaxone Anaphylaxis     Hives, rash, racing heart beat     Bactrim [Sulfamethoxazole W/Trimethoprim] Hives     Ciprofloxacin Other (See Comments)     Tendon Issues     Codeine Nausea and Vomiting     Copper      Rash       Doxycycline      Loss of skin pigmentation, skin loss.     Gold      Rash       Iodine-131 Hives     Levaquin [Levofloxacin] Other (See Comments)     Tendon issues with levaquin and cipro     Nickel      rash     Steel [Staples]      Rash from staples       Latex Rash     Penicillins Rash       Problem List:    Patient Active Problem List    Diagnosis Date Noted     Dental abscess - left mandible molar 03/05/2020     Priority: Medium     Endometrium, hyperplasia - on recent CT scan 02/10/2020     Priority: Medium     Pain in joint involving pelvic region and thigh, right 02/10/2020     Priority: Medium     Right lateral abdominal pain 02/10/2020     Priority: Medium     Right foot pain 02/10/2020     Priority: Medium     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 10/10/2019     Priority: Medium     Abnormality of nail surface 08/29/2019     Priority: Medium     Dry hair 08/29/2019     Priority: Medium     Dry skin 08/29/2019     Priority: Medium     Malaise and fatigue 08/29/2019     Priority: Medium     Lymphadenopathy 08/29/2019     Priority: Medium      PONV (postoperative nausea and vomiting) 06/10/2019     Priority: Medium     Menorrhalgia 06/10/2019     Priority: Medium     Uterine polyp 06/10/2019     Priority: Medium     Loose stools 06/10/2019     Priority: Medium     Hyperactive bowel sounds 06/10/2019     Priority: Medium     Jaycob complexion 05/23/2019     Priority: Medium     Lip lesion 05/23/2019     Priority: Medium     Epigastric pain 05/23/2019     Priority: Medium     Abdominal distension 05/23/2019     Priority: Medium     Intractable episodic tension-type headache 03/11/2019     Priority: Medium     Intractable right heel pain 05/29/2018     Priority: Medium     Superficial swelling of scalp 04/12/2018     Priority: Medium     Biceps tendinopathy, right 03/22/2018     Priority: Medium     Hyperlipidemia LDL goal <130 08/30/2017     Priority: Medium     Spasm of muscle 07/11/2017     Priority: Medium     Sternocleidomastoid muscle tenderness 04/05/2017     Priority: Medium     left         Acute cervical myofascial strain, initial encounter 04/05/2017     Priority: Medium     Abnormal mammogram 09/21/2016     Priority: Medium     right breast       Gastroesophageal reflux disease, esophagitis presence not specified 06/29/2016     Priority: Medium     Rotator cuff arthropathy, right 03/07/2016     Priority: Medium     AD (atopic dermatitis) 10/29/2015     Priority: Medium     Heat sensitivity 10/29/2015     Priority: Medium     Skin lesion 10/20/2015     Priority: Medium     posterior superior scalp       Plantar fasciitis 09/23/2015     Priority: Medium     HTN, goal below 140/90 09/23/2015     Priority: Medium     Lateral epicondylitis 03/16/2015     Priority: Medium     Problem list name updated by automated process. Provider to review       Right shoulder pain 03/16/2015     Priority: Medium     Telangiectasia of face 12/19/2014     Priority: Medium     Frontal headache 12/15/2014     Priority: Medium     Muscular wasting and disuse atrophy,  not elsewhere classified 06/09/2014     Priority: Medium     right SCM, right wrist,        Ds DNA antibody positive 04/16/2014     Priority: Medium     borderline.       Lumbar radiculopathy 01/22/2014     Priority: Medium     Migraine without aura and without status migrainosus, not intractable 10/23/2013     Priority: Medium     Foraminal stenosis of lumbar region 09/26/2013     Priority: Medium     DJD (degenerative joint disease), lumbar 09/26/2013     Priority: Medium     FLORIDALMA positive 07/18/2013     Priority: Medium     Plantar fascial fibromatosis 07/18/2013     Priority: Medium     Pain in joint, upper arm 04/15/2013     Priority: Medium     Family history of colon cancer 03/05/2013     Priority: Medium     mother       Keratitis sicca 10/31/2012     Priority: Medium     Dry eyes 10/15/2012     Priority: Medium     Family history of melanoma 08/24/2012     Priority: Medium     Shoulder joint instability 08/15/2012     Priority: Medium     Abnormal PFT 07/31/2012     Priority: Medium     question of left heart failure and/or shunting in need of further investigation       Chronic fatigue 06/06/2012     Priority: Medium     Hair loss 06/06/2012     Priority: Medium     Raynaud phenomenon 04/11/2012     Priority: Medium     PAC (premature atrial contraction) 07/15/2010     Priority: Medium     Palpitations 07/15/2010     Priority: Medium     Lyme disease 06/07/2010     Priority: Medium     Scalp lesion 05/26/2010     Priority: Medium     Migraine, unspecified, with intractable migraine, so stated, without mention of status migrainosus 04/04/2003     Priority: Medium     Problem list name updated by automated process. Provider to review       Bell's palsy 04/04/2003     Priority: Medium     Contact dermatitis and other eczema, due to unspecified cause 04/04/2003     Priority: Medium     Allergic rhinitis due to other allergen 04/04/2003     Priority: Medium        Past Medical History:    Past Medical History:    Diagnosis Date     Abnormal mammogram 9/21/2016     AD (atopic dermatitis) 10/29/2015     Allergic rhinitis due to other allergen      Arthritis      Bell's palsy      Chronic fatigue 6/6/2012     Chronic infection      Contact dermatitis and other eczema, due to unspecified cause      Contusion of left foot, initial encounter 3/16/2016     DJD (degenerative joint disease), lumbar 9/26/2013     DJD (degenerative joint disease), lumbar 9/26/2013     Ds DNA antibody positive 4/16/2014     Elevated blood pressure reading without diagnosis of hypertension 12/24/2013     Facial contusion 12/15/2014     Foraminal stenosis of lumbar region 9/26/2013     Frontal headache 12/15/2014     Gastroesophageal reflux disease, esophagitis presence not specified 6/29/2016     Heat sensitivity 10/29/2015     HTN, goal below 140/90 9/23/2015     Hyperlipidemia LDL goal <130 8/30/2017     Irregular heart beat      Keratitis sicca 10/31/2012     Left shoulder pain 9/26/2013     Lumbar radiculopathy 1/22/2014     Migraine headache 10/23/2013     Migraine, unspecified, with intractable migraine, so stated, without mention of status migrainosus      Motion sickness      MRSA infection 10/20/2015     Muscular wasting and disuse atrophy, not elsewhere classified 6/9/2014     Numbness and tingling      PAC (premature atrial contraction) 7/15/2010     Plantar fasciitis 9/23/2015     PONV (postoperative nausea and vomiting)      Skin lesion 10/20/2015     Spasm of muscle 7/11/2017     Telangiectasia of face 12/19/2014     Tooth pain 10/20/2015       Past Surgical History:    Past Surgical History:   Procedure Laterality Date     AS INJ TRANSFORAMIN EPIDURAL, LUMB/SACR SINGLE       COLONOSCOPY  3/11/2013    Procedure: COLONOSCOPY;  colonoscopy;  Surgeon: Lobito Mas MD;  Location:  GI     COLONOSCOPY WITH CO2 INSUFFLATION N/A 11/19/2018    Procedure: COLONOSCOPY WITH CO2 INSUFFLATION;  Surgeon: Goyo Blank MD;  Location:   OR     DECOMPRESSION LUMBAR TWO LEVELS Bilateral 2016    Procedure: DECOMPRESSION LUMBAR TWO LEVELS;  Surgeon: Bang Burrell MD;  Location: [facility] OR     DILATION AND CURETTAGE, HYSTEROSCOPY, ABLATE ENDOMETRIUM NOVASURE, COMBINED N/A 2019    Procedure: hysteroscopy, dilation & curettage, polypectomy, endometrial ablation;  Surgeon: Fredy Pham MD;  Location: [facility] OR     ESOPHAGOSCOPY, GASTROSCOPY, DUODENOSCOPY (EGD), COMBINED  2013    Procedure: COMBINED ESOPHAGOSCOPY, GASTROSCOPY, DUODENOSCOPY (EGD), BIOPSY SINGLE OR MULTIPLE;  Esophagoscopy, Gastroscopy, Duodenoscopy EGD with multiple biopsies;  Surgeon: Teja Koch MD;  Location: [facility]     HC REMOVAL OF TONSILS,<11 Y/O      Tonsils <10y.o.     HC TOOTH EXTRACTION W/FORCEP       REPAIR TENDON ELBOW  2014    Procedure: REPAIR TENDON ELBOW;  Surgeon: Chris Johnston MD;  Location: [facility] OR     ULTRASONIC REMOVAL SOFT TISSUE (LOCATION) Right 2018    Procedure: Tenex right foot;  Surgeon: Patel Martínez DO;  Location: [facility] OR       Family History:    Family History   Problem Relation Age of Onset     Diabetes Mother         adult     Cancer - colorectal Mother      Hypertension Mother      Allergies Mother      Cancer Mother         colon     Depression Mother      Gastrointestinal Disease Mother         ibs     Genitourinary Problems Mother         kidney cyst     Gynecology Mother         endometriosis     Lipids Mother      Thyroid Disease Mother      Arthritis Mother         RA     Heart Disease Maternal Grandfather         MI,  - 60's     Allergies Father      Unknown/Adopted Father      Heart Disease Father         mi-age mid 40's     Cardiovascular Father      Lipids Father      Respiratory Father         asthma     Cancer Father      Other Cancer Father         renal cancer     Depression Sister      Allergies Sister      Cancer Sister         vaginal     Gynecology Sister         endometriosis      Lipids Paternal Grandmother      Osteoporosis Paternal Grandmother      Thyroid Disease Paternal Grandmother      Respiratory Son         asthma     Allergies Son      Arthritis Sister      Allergies Brother      Heart Disease Brother      Allergies Daughter      Blood Disease Paternal Aunt         LGL T cell Leukemia     Rheumatoid Arthritis Paternal Aunt      Kidney Disease Maternal Aunt      Lupus Maternal Aunt      Bladder Cancer Maternal Aunt        Social History:  Marital Status:   [2]  Social History     Tobacco Use     Smoking status: Never Smoker     Smokeless tobacco: Never Used   Substance Use Topics     Alcohol use: No     Comment: social     Drug use: No        Medications:    Acetaminophen (TYLENOL PO)  acyclovir (ZOVIRAX) 5 % ointment  diazepam (VALIUM) 5 MG tablet  DULoxetine (CYMBALTA) 60 MG capsule  fexofenadine (ALLEGRA) 180 MG tablet  meloxicam (MOBIC) 15 MG tablet  mupirocin (BACTROBAN) 2 % external ointment  order for DME  rizatriptan (MAXALT-MLT) 5 MG ODT          Review of Systems  All other systems are reviewed and are negative    Physical Exam   BP: (!) 140/92  Pulse: 74  Heart Rate: 66  Temp: 98.8  F (37.1  C)  Resp: 16  Weight: 90.7 kg (200 lb)  SpO2: 97 %      Physical Exam  Vitals signs and nursing note reviewed.   Constitutional:       General: She is not in acute distress.     Appearance: She is well-developed. She is not diaphoretic.   HENT:      Head: Normocephalic and atraumatic.   Eyes:      General: No scleral icterus.     Extraocular Movements: Extraocular movements intact.      Conjunctiva/sclera: Conjunctivae normal.      Pupils: Pupils are equal, round, and reactive to light.   Neck:      Musculoskeletal: Normal range of motion and neck supple.   Cardiovascular:      Rate and Rhythm: Normal rate and regular rhythm.      Heart sounds: Normal heart sounds. No murmur.   Pulmonary:      Effort: No respiratory distress.      Breath sounds: No stridor. No wheezing or rales.    Abdominal:      Palpations: Abdomen is soft.      Tenderness: There is no abdominal tenderness.   Musculoskeletal:         General: No tenderness.   Skin:     General: Skin is warm and dry.      Coloration: Skin is not pale.      Findings: No erythema or rash.   Neurological:      Mental Status: She is alert.      GCS: GCS eye subscore is 4. GCS verbal subscore is 5. GCS motor subscore is 6.      Motor: Motor function is intact.      Coordination: Coordination is intact.      Comments: Right facial droop which is chronic and known from previous Bell's palsy.         ED Course        Procedures               Critical Care time:  none               Results for orders placed or performed during the hospital encounter of 04/17/20 (from the past 24 hour(s))   CBC with platelets differential   Result Value Ref Range    WBC 5.5 4.0 - 11.0 10e9/L    RBC Count 4.14 3.8 - 5.2 10e12/L    Hemoglobin 12.7 11.7 - 15.7 g/dL    Hematocrit 38.4 35.0 - 47.0 %    MCV 93 78 - 100 fl    MCH 30.7 26.5 - 33.0 pg    MCHC 33.1 31.5 - 36.5 g/dL    RDW 13.2 10.0 - 15.0 %    Platelet Count 276 150 - 450 10e9/L    Diff Method Automated Method     % Neutrophils 52.0 %    % Lymphocytes 37.5 %    % Monocytes 8.2 %    % Eosinophils 1.6 %    % Basophils 0.5 %    % Immature Granulocytes 0.2 %    Nucleated RBCs 0 0 /100    Absolute Neutrophil 2.9 1.6 - 8.3 10e9/L    Absolute Lymphocytes 2.1 0.8 - 5.3 10e9/L    Absolute Monocytes 0.5 0.0 - 1.3 10e9/L    Absolute Basophils 0.0 0.0 - 0.2 10e9/L    Abs Immature Granulocytes 0.0 0 - 0.4 10e9/L    Absolute Nucleated RBC 0.0    Basic metabolic panel   Result Value Ref Range    Sodium 139 133 - 144 mmol/L    Potassium 3.7 3.4 - 5.3 mmol/L    Chloride 107 94 - 109 mmol/L    Carbon Dioxide 28 20 - 32 mmol/L    Anion Gap 4 3 - 14 mmol/L    Glucose 100 (H) 70 - 99 mg/dL    Urea Nitrogen 18 7 - 30 mg/dL    Creatinine 0.64 0.52 - 1.04 mg/dL    GFR Estimate >90 >60 mL/min/[1.73_m2]    GFR Estimate If Black >90 >60  mL/min/[1.73_m2]    Calcium 8.7 8.5 - 10.1 mg/dL   CT Head w/o Contrast    Narrative    CT SCAN OF THE HEAD WITHOUT CONTRAST   4/17/2020 12:55 PM     HISTORY: Headache.    TECHNIQUE:  Axial images of the head and coronal reformations without  IV contrast material.  Radiation dose for this scan was reduced using  automated exposure control, adjustment of the mA and/or kV according  to patient size, or iterative reconstruction technique.    COMPARISON: 10/30/2017    FINDINGS:  The ventricles are normal in size, shape and configuration.   The brain parenchyma and subarachnoid spaces are normal. There is no  evidence of intracranial hemorrhage, mass, acute infarct or anomaly.     The visualized portions of the sinuses and mastoids appear normal.  There is no evidence of trauma.      Impression    IMPRESSION:  Normal CT scan of the head.     RAKESH HOLT MD     *Note: Due to a large number of results and/or encounters for the requested time period, some results have not been displayed. A complete set of results can be found in Results Review.       Medications   0.9% sodium chloride BOLUS (1,000 mLs Intravenous New Bag 4/17/20 1218)   prochlorperazine (COMPAZINE) injection 10 mg (10 mg Intravenous Given 4/17/20 1230)   ketorolac (TORADOL) injection 30 mg (30 mg Intravenous Given 4/17/20 1223)   dexamethasone PF (DECADRON) injection 10 mg (10 mg Intravenous Given 4/17/20 1230)       Assessments & Plan (with Medical Decision Making)  45-year-old female who presented with a headache of 4 days duration as described above.  History of migraines.  CT head negative after some trauma.  Somewhat improved after above medications.  She felt comfortable returning home.  No signs of acute neurological condition or worry.     I have reviewed the nursing notes.    I have reviewed the findings, diagnosis, plan and need for follow up with the patient.       New Prescriptions    No medications on file       Final diagnoses:    Nonintractable headache, unspecified chronicity pattern, unspecified headache type       4/17/2020   [facility] EMERGENCY DEPARTMENT     Luis Angel Blackburn MD  04/17/20 [id number]

## 2020-04-17 NOTE — ED AVS SNAPSHOT
[facility] Emergency Department  [address] [place] [address]  Phone:  [phone]  Fax:  [phone]                                    Amelia Coker   MRN: [id number]    Department:  [facility] Emergency Department   Date of Visit:  4/17/2020           After Visit Summary Signature Page    I have received my discharge instructions, and my questions have been answered. I have discussed any challenges I see with this plan with the nurse or doctor.    ..........................................................................................................................................  Patient/Patient Representative Signature      ..........................................................................................................................................  Patient Representative Print Name and Relationship to Patient    ..................................................               ................................................  Date                                   Time    ..........................................................................................................................................  Reviewed by Signature/Title    ...................................................              ..............................................  Date                                               Time          22EPIC Rev 08/18

## 2020-04-17 NOTE — ED TRIAGE NOTES
Patient hot her head on Monday, has had an increasingly worse headache since. She noticed vertigo on Thursday and increased vision difficulty today. She has previous right sided facial droop from [place] Palsy.

## 2020-04-22 ENCOUNTER — MYC MEDICAL ADVICE (OUTPATIENT)
Dept: FAMILY MEDICINE | Facility: OTHER | Age: 45
End: 2020-04-22

## 2020-04-22 DIAGNOSIS — K29.00 OTHER ACUTE GASTRITIS WITHOUT HEMORRHAGE: ICD-10-CM

## 2020-04-22 DIAGNOSIS — R23.4 CRACKED SKIN: Primary | ICD-10-CM

## 2020-04-22 RX ORDER — CLOBETASOL PROPIONATE 0.5 MG/G
CREAM TOPICAL 2 TIMES DAILY
Qty: 60 G | Refills: 1 | Status: SHIPPED | OUTPATIENT
Start: 2020-04-22 | End: 2022-05-19

## 2020-04-22 RX ORDER — SUCRALFATE 1 G/1
1 TABLET ORAL 4 TIMES DAILY
Qty: 40 TABLET | Refills: 0 | Status: SHIPPED | OUTPATIENT
Start: 2020-04-22 | End: 2022-05-19

## 2020-04-23 ENCOUNTER — PRE VISIT (OUTPATIENT)
Dept: ONCOLOGY | Facility: CLINIC | Age: 45
End: 2020-04-23

## 2020-04-29 NOTE — TELEPHONE ENCOUNTER
"Ramon Wright DPM sent to Mable Vail CMA    Caller: Unspecified (Today,  9:15 AM)               \"She can schedule to a phone/virtual visit or reschedule for 2 weeks.  Evan.\"     Patient rescheduled appointment to 5/21/2020.  "

## 2020-04-29 NOTE — TELEPHONE ENCOUNTER
Placed a call to patient to follow up on right foot and ankle pain.  Patient received injections to the right foot at last office visit on 4/8/2020.    Patient reports no improvement in pain following injection.  Pain can be as high as 6 on a scale of 1-10 if she is on her feet all day.    Will route call to Dr. Wright to determine whether patient should be seen in clinic, telephone or virtual visit or postpone appointment.

## 2020-05-18 ENCOUNTER — OFFICE VISIT (OUTPATIENT)
Dept: NEUROLOGY | Facility: CLINIC | Age: 45
End: 2020-05-18
Payer: COMMERCIAL

## 2020-05-18 DIAGNOSIS — R20.2 RIGHT HAND PARESTHESIA: Primary | ICD-10-CM

## 2020-05-18 NOTE — LETTER
5/18/2020       RE: Amelia Coker  [address] [address]     Dear Colleague,    Thank you for referring your patient, Amelia Coker, to the [facility] EMG at [facility]. Please see a copy of my visit note below.        [facility]  Electrodiagnostic Laboratory    Nerve Conduction & EMG Report          Patient:       Sheela Cisneros  Patient ID:    [id number]  Gender:        Female  YOB: 1975  Age:           45 Years 4 Months      Referring Physician: Tihen Martínez DO    History & Examination:    45 year old woman with chief complaint of paresthesias and pain at digits 1-2 of the right hand, occasionally radiating proximally to the palm, lateral forearm, upper arm and shoulder. She has a tender point at the right shoulder joint which, upon pressure, aggravates the paresthesias. Query right carpal tunnel syndrome, cervical radiculopathy, brachial plexopathy.    Techniques: Motor and sensory conduction studies were done with surface recording electrodes. EMG was done with a concentric needle electrode.     Results:    Right median and ulnar antidromic sensory and orthodromic mixed (done with stimulation at the palm) NCSs were normal. Right median and ulnar motor NCSs were normal. Needle EMG of the right FDI, triceps, deltoid, biceps, pronator teres, and C6-7 paraspinals was normal.     Interpretation:    Normal study. No electrodiagnostic evidence of right carpal tunnel syndrome, ulnar neuropathy, brachial plexopathy, or cervical radiculopathy.    EMG Physician:    Brent Hernandez MD       Sensory NCS      Nerve / Sites Rec. Site Onset Peak Ref. NP Amp Ref. PP Amp Dist Pineda Ref. Temp     ms ms ms  V  V  V cm m/s m/s  C   R MEDIAN - Dig II Anti      Wrist Dig II 2.29 3.13  44.5 10.0 69.0 14 61.1 48.0 23.7   R ULNAR - Dig V Anti      Wrist Dig V 2.19 2.92  30.4 8.0 55.3 12.5 57.1 48.0 23.6   R MEDIAN - Ulnar - Palmar      Median Wrist  1.35 1.82 2.40 94.9  102.3 8 59.1  23.6      Ulnar Wrist 1.30 1.72 2.40 48.5  74.3 8 61.4  23.4       Motor NCS      Nerve / Sites Rec. Site Lat Ref. Amp Ref. Rel Amp Dist Pineda Ref. Dur. Area Temp.     ms ms mV mV % cm m/s m/s ms %  C   R MEDIAN - APB      Wrist APB 3.23 4.40 13.7 5.0 100 8   5.78 100 23.6      Elbow APB 7.03  12.5  91.4 20.7 54.4 48.0 5.99 82.9 23.7   R ULNAR - ADM      Wrist ADM 2.81 3.50 13.0 5.0 100 8   4.84 100 23.6      B.Elbow ADM 5.68  12.3  94.3 18 62.8 48.0 5.05 101 23.6      A.Elbow ADM 7.66  11.7  89.8 10 50.5 48.0 5.05 89.2 23.6       EMG Summary Table     Spontaneous MUAP Recruitment    IA Fib/PSW Fasc H.F. Amp Dur. PPP Pattern   R. FIRST D INTEROSS N None None None N N N N   R. TRICEPS N None None None N N N N   R. DELTOID N None None None N N N N   R. BICEPS N None None None N N N N   R. PRON TERES N None None None N N N N   R. C6 PSP N None None None N N N N                      Again, thank you for allowing me to participate in the care of your patient.      Sincerely,    Brent eHrnandez MD

## 2020-05-18 NOTE — PROGRESS NOTES
[facility]  Electrodiagnostic Laboratory    Nerve Conduction & EMG Report          Patient:       Sheela Cisneros  Patient ID:    [id number]  Gender:        Female  YOB: 1975  Age:           45 Years 4 Months      Referring Physician: Thien Martínez DO    History & Examination:    45 year old woman with chief complaint of paresthesias and pain at digits 1-2 of the right hand, occasionally radiating proximally to the palm, lateral forearm, upper arm and shoulder. She has a tender point at the right shoulder joint which, upon pressure, aggravates the paresthesias. Query right carpal tunnel syndrome, cervical radiculopathy, brachial plexopathy.    Techniques: Motor and sensory conduction studies were done with surface recording electrodes. EMG was done with a concentric needle electrode.     Results:    Right median and ulnar antidromic sensory and orthodromic mixed (done with stimulation at the palm) NCSs were normal. Right median and ulnar motor NCSs were normal. Needle EMG of the right FDI, triceps, deltoid, biceps, pronator teres, and C6-7 paraspinals was normal.     Interpretation:    Normal study. No electrodiagnostic evidence of right carpal tunnel syndrome, ulnar neuropathy, brachial plexopathy, or cervical radiculopathy.    EMG Physician:    Brent Hernandez MD       Sensory NCS      Nerve / Sites Rec. Site Onset Peak Ref. NP Amp Ref. PP Amp Dist Pineda Ref. Temp     ms ms ms  V  V  V cm m/s m/s  C   R MEDIAN - Dig II Anti      Wrist Dig II 2.29 3.13  44.5 10.0 69.0 14 61.1 48.0 23.7   R ULNAR - Dig V Anti      Wrist Dig V 2.19 2.92  30.4 8.0 55.3 12.5 57.1 48.0 23.6   R MEDIAN - Ulnar - Palmar      Median Wrist 1.35 1.82 2.40 94.9  102.3 8 59.1  23.6      Ulnar Wrist 1.30 1.72 2.40 48.5  74.3 8 61.4  23.4       Motor NCS      Nerve / Sites Rec. Site Lat Ref. Amp Ref. Rel Amp Dist Pineda Ref. Dur. Area Temp.     ms ms mV mV % cm m/s m/s ms %  C   R MEDIAN - APB      Wrist APB 3.23  4.40 13.7 5.0 100 8   5.78 100 23.6      Elbow APB 7.03  12.5  91.4 20.7 54.4 48.0 5.99 82.9 23.7   R ULNAR - ADM      Wrist ADM 2.81 3.50 13.0 5.0 100 8   4.84 100 23.6      B.Elbow ADM 5.68  12.3  94.3 18 62.8 48.0 5.05 101 23.6      A.Elbow ADM 7.66  11.7  89.8 10 50.5 48.0 5.05 89.2 23.6       EMG Summary Table     Spontaneous MUAP Recruitment    IA Fib/PSW Fasc H.F. Amp Dur. PPP Pattern   R. FIRST D INTEROSS N None None None N N N N   R. TRICEPS N None None None N N N N   R. DELTOID N None None None N N N N   R. BICEPS N None None None N N N N   R. PRON TERES N None None None N N N N   R. C6 PSP N None None None N N N N

## 2020-05-19 ENCOUNTER — MYC MEDICAL ADVICE (OUTPATIENT)
Dept: FAMILY MEDICINE | Facility: OTHER | Age: 45
End: 2020-05-19

## 2020-05-20 ENCOUNTER — TELEPHONE (OUTPATIENT)
Dept: PODIATRY | Facility: CLINIC | Age: 45
End: 2020-05-20

## 2020-05-20 NOTE — PROGRESS NOTES
Subjective     Amelia Coker is a 45 year old female who presents to clinic today for the following health issues:    HPI     Concern - Right arm numbness  Onset: over a month     Description:   Thumb and index finger are almost always tingly. Arm falls asleep when using computer from wrist to shoulder. The numbness is definitely positional.     EMG was normal yesterday.      Therapies Tried and outcome: none       Concern - Med concerns     Description:   Pt feels that Meloxicam is not working as well as it had been for her all over joint pain      Patient Active Problem List   Diagnosis     Migraine, unspecified, with intractable migraine, so stated, without mention of status migrainosus     Bell's palsy     Contact dermatitis and other eczema, due to unspecified cause     Allergic rhinitis due to other allergen     Scalp lesion     Lyme disease     PAC (premature atrial contraction)     Palpitations     Raynaud phenomenon     Chronic fatigue     Hair loss     Abnormal PFT     Shoulder joint instability     Family history of melanoma     Dry eyes     Keratitis sicca     Family history of colon cancer     Pain in joint, upper arm     FLORIDALMA positive     Plantar fascial fibromatosis     Foraminal stenosis of lumbar region     DJD (degenerative joint disease), lumbar     Migraine without aura and without status migrainosus, not intractable     Lumbar radiculopathy     Ds DNA antibody positive     Muscular wasting and disuse atrophy, not elsewhere classified     Frontal headache     Telangiectasia of face     Lateral epicondylitis     Right shoulder pain     Plantar fasciitis     HTN, goal below 140/90     Skin lesion     AD (atopic dermatitis)     Heat sensitivity     Rotator cuff arthropathy, right     Gastroesophageal reflux disease, esophagitis presence not specified     Abnormal mammogram     Sternocleidomastoid muscle tenderness     Acute cervical myofascial strain, initial encounter     Spasm of muscle      Hyperlipidemia LDL goal <130     Biceps tendinopathy, right     Superficial swelling of scalp     Intractable right heel pain     Intractable episodic tension-type headache     Jaycob complexion     Lip lesion     Epigastric pain     Abdominal distension     PONV (postoperative nausea and vomiting)     Menorrhalgia     Uterine polyp     Loose stools     Hyperactive bowel sounds     Abnormality of nail surface     Dry hair     Dry skin     Malaise and fatigue     Lymphadenopathy     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     Endometrium, hyperplasia - on recent CT scan     Pain in joint involving pelvic region and thigh, right     Right lateral abdominal pain     Right foot pain     Dental abscess - left mandible molar     Brachial plexopathy - right shoulder     Past Surgical History:   Procedure Laterality Date     AS INJ TRANSFORAMIN EPIDURAL, LUMB/SACR SINGLE       COLONOSCOPY  3/11/2013    Procedure: COLONOSCOPY;  colonoscopy;  Surgeon: Lobito Mas MD;  Location: [facility]     COLONOSCOPY WITH CO2 INSUFFLATION N/A 11/19/2018    Procedure: COLONOSCOPY WITH CO2 INSUFFLATION;  Surgeon: Goyo Blank MD;  Location: [facility]     DECOMPRESSION LUMBAR TWO LEVELS Bilateral 12/8/2016    Procedure: DECOMPRESSION LUMBAR TWO LEVELS;  Surgeon: Bang Burrell MD;  Location: [facility]     DILATION AND CURETTAGE, HYSTEROSCOPY, ABLATE ENDOMETRIUM NOVASURE, COMBINED N/A 6/12/2019    Procedure: hysteroscopy, dilation & curettage, polypectomy, endometrial ablation;  Surgeon: Fredy Pham MD;  Location: [facility]     ESOPHAGOSCOPY, GASTROSCOPY, DUODENOSCOPY (EGD), COMBINED  11/8/2013    Procedure: COMBINED ESOPHAGOSCOPY, GASTROSCOPY, DUODENOSCOPY (EGD), BIOPSY SINGLE OR MULTIPLE;  Esophagoscopy, Gastroscopy, Duodenoscopy EGD with multiple biopsies;  Surgeon: Teja Koch MD;  Location: [facility]     HC REMOVAL OF TONSILS,<9 Y/O      Tonsils <10y.o.     HC TOOTH EXTRACTION W/FORCEP       REPAIR  TENDON ELBOW  2014    Procedure: REPAIR TENDON ELBOW;  Surgeon: Chris Johnston MD;  Location: [facility]     ULTRASONIC REMOVAL SOFT TISSUE (LOCATION) Right 2018    Procedure: Tenex right foot;  Surgeon: Patel Martínez DO;  Location: [facility]       Social History     Tobacco Use     Smoking status: Never Smoker     Smokeless tobacco: Never Used   Substance Use Topics     Alcohol use: No     Comment: social     Family History   Problem Relation Age of Onset     Diabetes Mother         adult     Cancer - colorectal Mother      Hypertension Mother      Allergies Mother      Cancer Mother         colon     Depression Mother      Gastrointestinal Disease Mother         ibs     Genitourinary Problems Mother         kidney cyst     Gynecology Mother         endometriosis     Lipids Mother      Thyroid Disease Mother      Arthritis Mother         RA     Heart Disease Maternal Grandfather         MI,  - 60's     Allergies Father      Unknown/Adopted Father      Heart Disease Father         mi-age mid 40's     Cardiovascular Father      Lipids Father      Respiratory Father         asthma     Cancer Father      Other Cancer Father         renal cancer     Depression Sister      Allergies Sister      Cancer Sister         vaginal     Gynecology Sister         endometriosis     Lipids Paternal Grandmother      Osteoporosis Paternal Grandmother      Thyroid Disease Paternal Grandmother      Respiratory Son         asthma     Allergies Son      Arthritis Sister      Allergies Brother      Heart Disease Brother      Allergies Daughter      Blood Disease Paternal Aunt         LGL T cell Leukemia     Rheumatoid Arthritis Paternal Aunt      Kidney Disease Maternal Aunt      Lupus Maternal Aunt      Bladder Cancer Maternal Aunt          Current Outpatient Medications   Medication Sig Dispense Refill     Acetaminophen (TYLENOL PO) Take 1,000 mg by mouth every 8 hours as needed        acyclovir (ZOVIRAX) 5 % ointment  Apply topically 6 times daily 15 g 3     clobetasol (TEMOVATE) 0.05 % external cream Apply topically 2 times daily 60 g 1     DULoxetine (CYMBALTA) 60 MG capsule Take 1 capsule (60 mg) by mouth daily 90 capsule 0     meloxicam (MOBIC) 15 MG tablet TAKE ONE TABLET BY MOUTH EVERY DAY (PATIENT WANTS UNICHEM BRAND ONLY) 90 tablet 2     mupirocin (BACTROBAN) 2 % external ointment Apply topically 3 times daily 30 g 1     order for [facility] Equipment being ordered: DME [id number] $70  Ankle Support MD Figure 8, Lace up 1 Device 0     rizatriptan (MAXALT-MLT) 5 MG ODT Take 1 tablet (5 mg) by mouth at onset of headache for migraine May repeat in 2 hours. Max 6 tablets/24 hours. 30 tablet 1     sucralfate (CARAFATE) 1 GM tablet Take 1 tablet (1 g) by mouth 4 times daily 40 tablet 0     diazepam (VALIUM) 5 MG tablet Take 1 tablet (5 mg) by mouth 3 times daily as needed for muscle spasms (Patient not taking: Reported on 5/21/2020) 30 tablet 0     fexofenadine (ALLEGRA) 180 MG tablet Take 1 tablet by mouth daily Reported on 4/5/2017       Allergies   Allergen Reactions     Ceftriaxone Anaphylaxis     Hives, rash, racing heart beat     Bactrim [Sulfamethoxazole W/Trimethoprim] Hives     Ciprofloxacin Other (See Comments)     Tendon Issues     Codeine Nausea and Vomiting     Copper      Rash       Doxycycline      Loss of skin pigmentation, skin loss.     Gold      Rash       Iodine-131 Hives     Levaquin [Levofloxacin] Other (See Comments)     Tendon issues with levaquin and cipro     Nickel      rash     Steel [Staples]      Rash from staples       Latex Rash     Penicillins Rash     Recent Labs   Lab Test 04/17/20  1136 03/05/20  0749 02/07/20  1440 10/07/19  1505 08/29/19  0835  02/08/19  0817 12/31/18  0805  06/08/16  0745   A1C  --   --   --   --   --   --   --   --   --  5.5   LDL  --  125*  --   --   --   --  93 113*   < >  --    HDL  --  79  --   --   --   --  75 86   < >  --    TRIG  --  51  --   --   --   --  40 49   <  ">  --    ALT  --  30 26  --  21   < > 27 23   < >  --    CR 0.64 0.58 0.56  --  0.56   < >  --  0.69   < > 0.54   GFRESTIMATED >90 >90 >90  --  >90   < >  --  >90   < > >90  Non  GFR Calc     GFRESTBLACK >90 >90 >90  --  >90   < >  --  >90   < > >90  African American GFR Calc     POTASSIUM 3.7 4.4 3.9  --  4.2   < >  --  4.0   < > 4.2   TSH  --  1.07  --  0.94 1.02   < >  --   --    < >  --     < > = values in this interval not displayed.      BP Readings from Last 3 Encounters:   05/21/20 122/84   05/21/20 120/80   04/17/20 119/80    Wt Readings from Last 3 Encounters:   05/21/20 93.4 kg (205 lb 12.8 oz)   05/21/20 90.7 kg (200 lb)   04/17/20 90.7 kg (200 lb)                    Reviewed and updated as needed this visit by Provider         Review of Systems   Constitutional, HEENT, cardiovascular, pulmonary, GI, , musculoskeletal, neuro, skin, endocrine and psych systems are negative, except as otherwise noted.      Objective    /84   Pulse 75   Temp 98  F (36.7  C) (Temporal)   Resp 16   Ht 1.665 m (5' 5.55\")   Wt 93.4 kg (205 lb 12.8 oz)   SpO2 99%   BMI 33.67 kg/m    Body mass index is 33.67 kg/m .  Physical Exam   GENERAL: healthy, alert and no distress  NECK: no adenopathy, no asymmetry, masses, or scars and thyroid normal to palpation  RESP: lungs clear to auscultation - no rales, rhonchi or wheezes  CV: regular rate and rhythm, normal S1 S2, no S3 or S4, no murmur, click or rub, no peripheral edema and peripheral pulses strong  MS: no gross musculoskeletal defects noted, no edema, focused exam of the right shoulder reveals normal musculature at this point in time.  There is residual ink marks from her recent EMG noted to the skin throughout the arm and shoulder.  We do note that she has full range of motion of the shoulder though it is uncomfortable for her to do so.  She describes posterior lateral hand numbness under certain positions especially if she places it to use a mouse " on a computer.  At this point time we do some sensation testing as long as she has her arm and hand down by her side and slightly posterior to the buttock she has good sensation and things are normal.  When we have her place her hand over top of her head and do similar sensation check this is normal as well.  Now we have her put her hand in position where her arm is slightly abducted and her elbow is flexed at 90 degrees and her palm is down.  Now over the course of 15 to 20 seconds she notes numbness and tingling to the posterior aspect of the hand this radiates up to the shoulder as we let time progress.  We placed her hand back in the position of hanging down by her side and slightly posterior to her right buttock and then placed pressure over top of the area that is just medial to the coracoid process and inferior to the clavicle at approximately the area where I would expect the brachial plexus to exit.  We are able to reproduce her signs and symptoms to the hand and forearm.  SKIN: no suspicious lesions or rashes  NEURO: Normal strength and tone, sensory exam grossly normal, mentation intact and other than changes as noted above in the musculoskeletal exam.  PSYCH: mentation appears normal, affect normal/bright    Diagnostic Test Results:  Labs reviewed in Epic        Assessment & Plan     1. Brachial plexopathy - right shoulder  At this point in time the best explanation for her overall numbness and tingling and sensation changes would be a brachial plexus compression that is positional in nature.  Hopefully our sports medicine provider can help delineate this further and consider ultrasound-guided injections in this region or further imaging to prove or disprove the neurologic dysfunction related to this region and the positional changes.  We did discuss trial of daily steroid but she states she was just on some steroids for another matter and did not change this plexopathy at all.  At this point time I will  need to turn this back over to our sports medicine orthopedic specialist for further evaluation.  We did discuss sending her to a neurologist to evaluate this more completely but will lean on sports med to guide that care as well.    NOTE:  I SPENT 35 MINUTES IN DIRECT CONTACT WITH THIS PATIENT GREATER THAN 50% OF WHICH WAS RELATED TO COUNSELING HER IN RELATIONSHIP TO THE ABOVE MENTIONED CONDITIONS.     No follow-ups on file.    Otoniel Manuel PA-C  [facility]

## 2020-05-20 NOTE — TELEPHONE ENCOUNTER
Do you have any of the following symptoms:  a)      Fever (or reported chills) No  b)      Shortness of Breath No  c)      Cough in the last 14 days No    If a patient reports yes to any of these symptoms, obtain direction from the provider and call the patient back to let them know if they can come in or not.  1.    Provider needs to determine if this patient should still be seen in clinic.  2.    If decision to not see in clinic, call patient back and refer them to COVID- 19 [email or web address] or schedule COVID-19 phone visit.  3.    Turn in-person visit into telephone visit (FOR RETURN PATIENTS ONLY)    Remind patients that visitors are not allowed on site. Only one legal guardian who screens negative to the above questions will be allowed to accompany patients. If a patient indicates that they will be bringing a legal guardian with them to the appointment please make sure to screen both the patient and the legal guardian for symptoms using the tool above.

## 2020-05-21 ENCOUNTER — OFFICE VISIT (OUTPATIENT)
Dept: FAMILY MEDICINE | Facility: OTHER | Age: 45
End: 2020-05-21
Payer: COMMERCIAL

## 2020-05-21 ENCOUNTER — PRE VISIT (OUTPATIENT)
Dept: ONCOLOGY | Facility: CLINIC | Age: 45
End: 2020-05-21

## 2020-05-21 ENCOUNTER — OFFICE VISIT (OUTPATIENT)
Dept: PODIATRY | Facility: CLINIC | Age: 45
End: 2020-05-21
Payer: COMMERCIAL

## 2020-05-21 ENCOUNTER — MYC MEDICAL ADVICE (OUTPATIENT)
Dept: FAMILY MEDICINE | Facility: OTHER | Age: 45
End: 2020-05-21

## 2020-05-21 VITALS
BODY MASS INDEX: 33.07 KG/M2 | WEIGHT: 205.8 LBS | RESPIRATION RATE: 16 BRPM | OXYGEN SATURATION: 99 % | TEMPERATURE: 98 F | HEART RATE: 75 BPM | HEIGHT: 66 IN | SYSTOLIC BLOOD PRESSURE: 122 MMHG | DIASTOLIC BLOOD PRESSURE: 84 MMHG

## 2020-05-21 VITALS — BODY MASS INDEX: 32.53 KG/M2 | SYSTOLIC BLOOD PRESSURE: 120 MMHG | DIASTOLIC BLOOD PRESSURE: 80 MMHG | WEIGHT: 200 LBS

## 2020-05-21 DIAGNOSIS — M47.816 OSTEOARTHRITIS OF LUMBAR SPINE, UNSPECIFIED SPINAL OSTEOARTHRITIS COMPLICATION STATUS: ICD-10-CM

## 2020-05-21 DIAGNOSIS — M72.2 PLANTAR FASCIITIS OF RIGHT FOOT: ICD-10-CM

## 2020-05-21 DIAGNOSIS — G54.0 BRACHIAL PLEXOPATHY: Primary | ICD-10-CM

## 2020-05-21 DIAGNOSIS — M79.671 RIGHT FOOT PAIN: ICD-10-CM

## 2020-05-21 DIAGNOSIS — M25.571 RIGHT ANKLE PAIN, UNSPECIFIED CHRONICITY: Primary | ICD-10-CM

## 2020-05-21 PROCEDURE — 99214 OFFICE O/P EST MOD 30 MIN: CPT | Performed by: PHYSICIAN ASSISTANT

## 2020-05-21 PROCEDURE — 20550 NJX 1 TENDON SHEATH/LIGAMENT: CPT | Performed by: PODIATRIST

## 2020-05-21 ASSESSMENT — MIFFLIN-ST. JEOR: SCORE: 1578.12

## 2020-05-21 NOTE — NURSING NOTE
Amelia Coker's chief complaint for this visit includes:  Chief Complaint   Patient presents with     RECHECK     follow up right heel pain      PCP: Otoniel Nelson    Referring Provider:  No referring provider defined for this encounter.    /80   Wt 90.7 kg (200 lb)   BMI 32.53 kg/m    Data Unavailable     Do you need any medication refills at today's visit? no

## 2020-05-21 NOTE — LETTER
5/21/2020         RE: Amelia Coker  [address] [facility] [id number]        Dear Colleague,    Thank you for referring your patient, Amelia Coker, to the [facility]. Please see a copy of my visit note below.    Past Medical History:   Diagnosis Date     Abnormal mammogram 9/21/2016    right breast      AD (atopic dermatitis) 10/29/2015     Allergic rhinitis due to other allergen      Arthritis      Bell's palsy      Chronic fatigue 6/6/2012     Chronic infection     MRSA - 2015 scalp and prior to Abdomen     Contact dermatitis and other eczema, due to unspecified cause     eczema     Contusion of left foot, initial encounter 3/16/2016     DJD (degenerative joint disease), lumbar 9/26/2013     DJD (degenerative joint disease), lumbar 9/26/2013     Ds DNA antibody positive 4/16/2014    borderline.      Elevated blood pressure reading without diagnosis of hypertension 12/24/2013     Facial contusion 12/15/2014    hit by horse      Foraminal stenosis of lumbar region 9/26/2013     Frontal headache 12/15/2014     Gastroesophageal reflux disease, esophagitis presence not specified 6/29/2016     Heat sensitivity 10/29/2015     HTN, goal below 140/90 9/23/2015     Hyperlipidemia LDL goal <130 8/30/2017     Irregular heart beat      Keratitis sicca 10/31/2012     Left shoulder pain 9/26/2013     Lumbar radiculopathy 1/22/2014     Migraine headache 10/23/2013     Migraine, unspecified, with intractable migraine, so stated, without mention of status migrainosus      Motion sickness      MRSA infection 10/20/2015     Muscular wasting and disuse atrophy, not elsewhere classified 6/9/2014    right SCM, right wrist,       Numbness and tingling     both legs anf feet greater on the left     PAC (premature atrial contraction) 7/15/2010     Plantar fasciitis 9/23/2015     PONV (postoperative nausea and vomiting)      Skin lesion 10/20/2015    posterior superior scalp      Spasm of muscle 7/11/2017      Telangiectasia of face 12/19/2014     Tooth pain 10/20/2015    left lower primary molar      Patient Active Problem List   Diagnosis     Migraine, unspecified, with intractable migraine, so stated, without mention of status migrainosus     Bell's palsy     Contact dermatitis and other eczema, due to unspecified cause     Allergic rhinitis due to other allergen     Scalp lesion     Lyme disease     PAC (premature atrial contraction)     Palpitations     Raynaud phenomenon     Chronic fatigue     Hair loss     Abnormal PFT     Shoulder joint instability     Family history of melanoma     Dry eyes     Keratitis sicca     Family history of colon cancer     Pain in joint, upper arm     FLORIDALMA positive     Plantar fascial fibromatosis     Foraminal stenosis of lumbar region     DJD (degenerative joint disease), lumbar     Migraine without aura and without status migrainosus, not intractable     Lumbar radiculopathy     Ds DNA antibody positive     Muscular wasting and disuse atrophy, not elsewhere classified     Frontal headache     Telangiectasia of face     Lateral epicondylitis     Right shoulder pain     Plantar fasciitis     HTN, goal below 140/90     Skin lesion     AD (atopic dermatitis)     Heat sensitivity     Rotator cuff arthropathy, right     Gastroesophageal reflux disease, esophagitis presence not specified     Abnormal mammogram     Sternocleidomastoid muscle tenderness     Acute cervical myofascial strain, initial encounter     Spasm of muscle     Hyperlipidemia LDL goal <130     Biceps tendinopathy, right     Superficial swelling of scalp     Intractable right heel pain     Intractable episodic tension-type headache     Jaycob complexion     Lip lesion     Epigastric pain     Abdominal distension     PONV (postoperative nausea and vomiting)     Menorrhalgia     Uterine polyp     Loose stools     Hyperactive bowel sounds     Abnormality of nail surface     Dry hair     Dry skin     Malaise and fatigue      Lymphadenopathy     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     Endometrium, hyperplasia - on recent CT scan     Pain in joint involving pelvic region and thigh, right     Right lateral abdominal pain     Right foot pain     Dental abscess - left mandible molar     Past Surgical History:   Procedure Laterality Date     AS INJ TRANSFORAMIN EPIDURAL, LUMB/SACR SINGLE       COLONOSCOPY  3/11/2013    Procedure: COLONOSCOPY;  colonoscopy;  Surgeon: Lobito Mas MD;  Location: [facility]     COLONOSCOPY WITH CO2 INSUFFLATION N/A 11/19/2018    Procedure: COLONOSCOPY WITH CO2 INSUFFLATION;  Surgeon: Goyo Blank MD;  Location: [facility]     DECOMPRESSION LUMBAR TWO LEVELS Bilateral 12/8/2016    Procedure: DECOMPRESSION LUMBAR TWO LEVELS;  Surgeon: Bang Burrell MD;  Location: [facility]     DILATION AND CURETTAGE, HYSTEROSCOPY, ABLATE ENDOMETRIUM NOVASURE, COMBINED N/A 6/12/2019    Procedure: hysteroscopy, dilation & curettage, polypectomy, endometrial ablation;  Surgeon: Fredy Pham MD;  Location: [facility]     ESOPHAGOSCOPY, GASTROSCOPY, DUODENOSCOPY (EGD), COMBINED  11/8/2013    Procedure: COMBINED ESOPHAGOSCOPY, GASTROSCOPY, DUODENOSCOPY (EGD), BIOPSY SINGLE OR MULTIPLE;  Esophagoscopy, Gastroscopy, Duodenoscopy EGD with multiple biopsies;  Surgeon: Teja Koch MD;  Location: [facility]     HC REMOVAL OF TONSILS,<13 Y/O      Tonsils <10y.o.     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   Social Needs     Financial resource strain: Not on file     Food insecurity     Worry: Not on file     Inability: Not on file     Transportation needs     Medical: Not on file     Non-medical: Not on file   Tobacco Use     Smoking status: Never Smoker     Smokeless tobacco: Never Used   Substance and Sexual Activity     Alcohol use: No     Comment: social     Drug use: No     Sexual activity: Yes     Partners: Male     Birth control/protection: Surgical     Comment: vasectomy   Lifestyle     Physical activity     Days per week: Not on file     Minutes per session: Not on file     Stress: Not on file   Relationships     Social connections     Talks on phone: Not on file     Gets together: Not on file     Attends Restorationist service: Not on file     Active member of club or organization: Not on file     Attends meetings of clubs or organizations: Not on file     Relationship status: Not on file     Intimate partner violence     Fear of current or ex partner: Not on file     Emotionally abused: Not on file     Physically abused: Not on file     Forced sexual activity: Not on file   Other Topics Concern      Service No     Blood Transfusions No     Caffeine Concern No     Comment: 0     Occupational Exposure No     Hobby Hazards Yes     Comment: horses     Sleep Concern No     Stress Concern No     Weight Concern Yes     Special Diet Yes     Comment: nhung's     Back Care No     Exercise Yes     Comment: occ     Bike Helmet Not Asked     Comment: na     Seat Belt Yes     Self-Exams Yes     Comment: occ     Parent/sibling w/ CABG, MI or angioplasty before 65F 55M? Not Asked   Social History Narrative     Not on file     Family History   Problem Relation Age of Onset     Diabetes Mother         adult     Cancer - colorectal Mother      Hypertension Mother      Allergies Mother      Cancer Mother         colon     Depression Mother      Gastrointestinal Disease Mother         ibs     Genitourinary  Problems Mother         kidney cyst     Gynecology Mother         endometriosis     Lipids Mother      Thyroid Disease Mother      Arthritis Mother         RA     Heart Disease Maternal Grandfather         MI,  - 60's     Allergies Father      Unknown/Adopted Father      Heart Disease Father         mi-age mid 40's     Cardiovascular Father      Lipids Father      Respiratory Father         asthma     Cancer Father      Other Cancer Father         renal cancer     Depression Sister      Allergies Sister      Cancer Sister         vaginal     Gynecology Sister         endometriosis     Lipids Paternal Grandmother      Osteoporosis Paternal Grandmother      Thyroid Disease Paternal Grandmother      Respiratory Son         asthma     Allergies Son      Arthritis Sister      Allergies Brother      Heart Disease Brother      Allergies Daughter      Blood Disease Paternal Aunt         LGL T cell Leukemia     Rheumatoid Arthritis Paternal Aunt      Kidney Disease Maternal Aunt      Lupus Maternal Aunt      Bladder Cancer Maternal Aunt      Lab Results   Component Value Date    WBC 5.5 2020     Lab Results   Component Value Date    RBC 4.14 2020     Lab Results   Component Value Date    HGB 12.7 2020     Lab Results   Component Value Date    HCT 38.4 2020     No components found for: MCT  Lab Results   Component Value Date    MCV 93 2020     Lab Results   Component Value Date    MCH 30.7 2020     Lab Results   Component Value Date    MCHC 33.1 2020     Lab Results   Component Value Date    RDW 13.2 2020     Lab Results   Component Value Date     2020     Last Comprehensive Metabolic Panel:  Sodium   Date Value Ref Range Status   2020 139 133 - 144 mmol/L Final     Potassium   Date Value Ref Range Status   2020 3.7 3.4 - 5.3 mmol/L Final     Chloride   Date Value Ref Range Status   2020 107 94 - 109 mmol/L Final     Carbon Dioxide   Date  Value Ref Range Status   04/17/2020 28 20 - 32 mmol/L Final     Anion Gap   Date Value Ref Range Status   04/17/2020 4 3 - 14 mmol/L Final     Glucose   Date Value Ref Range Status   04/17/2020 100 (H) 70 - 99 mg/dL Final     Urea Nitrogen   Date Value Ref Range Status   04/17/2020 18 7 - 30 mg/dL Final     Creatinine   Date Value Ref Range Status   04/17/2020 0.64 0.52 - 1.04 mg/dL Final     GFR Estimate   Date Value Ref Range Status   04/17/2020 >90 >60 mL/min/[1.73_m2] Final     Comment:     Non  GFR Calc  Starting 12/18/2018, serum creatinine based estimated GFR (eGFR) will be   calculated using the Chronic Kidney Disease Epidemiology Collaboration   (CKD-EPI) equation.       Calcium   Date Value Ref Range Status   04/17/2020 8.7 8.5 - 10.1 mg/dL Final     SUBJECTIVE FINDINGS:  A 45-year-old female returns to clinic for right foot pain, plantar fasciitis and ankle pain.  She relates that the right heel shot did not do any good; it did not really help.  The right dorsal lateral foot, it helped that, but she is also getting ankle pain right in the anterior ankle and that is not any better.  Relates she has got a left skin lesion and feels the scar tissue in her left plantar heel.  She had surgery for a spur and plantar fasciitis in that foot in about 2015.  She relates she did physical therapy, and it still bothers her.  No specific injuries.  No other specific relieving or aggravating factors.  Previous notes reviewed.      OBJECTIVE FINDINGS:  DP and PT are 2/4 bilaterally.  She has a left plantar heel small area of hyperkeratotic tissue buildup in line with the scarring, some subcutaneous fat pad prominence which would be also consistent with scarring.  There is no erythema, no drainage, no odor, no calor.  She has pain on palpation of the right plantar heel, right anterior ankle.  There are no gross tendon  voids.  No erythema, no drainage, no odor, no calor, right foot and ankle.       ASSESSMENT AND PLAN:   1.  Right foot pain.   2.  Right ankle pain.   3.  Plantar fasciitis, right.   4.  She has got scar tissue on the left heel.      Diagnosis and treatment options discussed with the patient.  The patient requests corticosteroid injection.  Procedure, potential risks, expected benefits, expected outcomes and followup discussed with the patient.  Consent signed today.  The right anterior ankle was injected with 1 mL of dexamethasone sodium phosphate 4 mg per mL and 4 mL of 1% lidocaine plain mixed.  The right plantar heel was injected with 0.5 mL of dexamethasone sodium phosphate 4 mg per mL and 2.5 mL of 1% lidocaine plain mixed.  Both areas were prepped prior to injection.  The patient relates to feeling the area is starting to get numb with injections.  She tolerated the injection with no complications.  Diagnosis and treatment options were discussed with her.  She will return to clinic to see me as needed or if she wants further treatment options.  She opted for no physical therapy today.  She relates she has been wearing her ankle brace.  She is not wearing it today, but she relates she has been wearing it.  She stopped wearing it a little bit and kind of stepped wrong in the yard and aggravated the foot and ankle, so then she started wearing it again.         Again, thank you for allowing me to participate in the care of your patient.        Sincerely,        Ramon Wright DPM

## 2020-05-21 NOTE — PROGRESS NOTES
Past Medical History:   Diagnosis Date     Abnormal mammogram 9/21/2016    right breast      AD (atopic dermatitis) 10/29/2015     Allergic rhinitis due to other allergen      Arthritis      Bell's palsy      Chronic fatigue 6/6/2012     Chronic infection     MRSA - 2015 scalp and prior to Abdomen     Contact dermatitis and other eczema, due to unspecified cause     eczema     Contusion of left foot, initial encounter 3/16/2016     DJD (degenerative joint disease), lumbar 9/26/2013     DJD (degenerative joint disease), lumbar 9/26/2013     Ds DNA antibody positive 4/16/2014    borderline.      Elevated blood pressure reading without diagnosis of hypertension 12/24/2013     Facial contusion 12/15/2014    hit by horse      Foraminal stenosis of lumbar region 9/26/2013     Frontal headache 12/15/2014     Gastroesophageal reflux disease, esophagitis presence not specified 6/29/2016     Heat sensitivity 10/29/2015     HTN, goal below 140/90 9/23/2015     Hyperlipidemia LDL goal <130 8/30/2017     Irregular heart beat      Keratitis sicca 10/31/2012     Left shoulder pain 9/26/2013     Lumbar radiculopathy 1/22/2014     Migraine headache 10/23/2013     Migraine, unspecified, with intractable migraine, so stated, without mention of status migrainosus      Motion sickness      MRSA infection 10/20/2015     Muscular wasting and disuse atrophy, not elsewhere classified 6/9/2014    right SCM, right wrist,       Numbness and tingling     both legs anf feet greater on the left     PAC (premature atrial contraction) 7/15/2010     Plantar fasciitis 9/23/2015     PONV (postoperative nausea and vomiting)      Skin lesion 10/20/2015    posterior superior scalp      Spasm of muscle 7/11/2017     Telangiectasia of face 12/19/2014     Tooth pain 10/20/2015    left lower primary molar      Patient Active Problem List   Diagnosis     Migraine, unspecified, with intractable migraine, so stated, without mention of status migrainosus      Bell's palsy     Contact dermatitis and other eczema, due to unspecified cause     Allergic rhinitis due to other allergen     Scalp lesion     Lyme disease     PAC (premature atrial contraction)     Palpitations     Raynaud phenomenon     Chronic fatigue     Hair loss     Abnormal PFT     Shoulder joint instability     Family history of melanoma     Dry eyes     Keratitis sicca     Family history of colon cancer     Pain in joint, upper arm     FLORIDALMA positive     Plantar fascial fibromatosis     Foraminal stenosis of lumbar region     DJD (degenerative joint disease), lumbar     Migraine without aura and without status migrainosus, not intractable     Lumbar radiculopathy     Ds DNA antibody positive     Muscular wasting and disuse atrophy, not elsewhere classified     Frontal headache     Telangiectasia of face     Lateral epicondylitis     Right shoulder pain     Plantar fasciitis     HTN, goal below 140/90     Skin lesion     AD (atopic dermatitis)     Heat sensitivity     Rotator cuff arthropathy, right     Gastroesophageal reflux disease, esophagitis presence not specified     Abnormal mammogram     Sternocleidomastoid muscle tenderness     Acute cervical myofascial strain, initial encounter     Spasm of muscle     Hyperlipidemia LDL goal <130     Biceps tendinopathy, right     Superficial swelling of scalp     Intractable right heel pain     Intractable episodic tension-type headache     Jaycob complexion     Lip lesion     Epigastric pain     Abdominal distension     PONV (postoperative nausea and vomiting)     Menorrhalgia     Uterine polyp     Loose stools     Hyperactive bowel sounds     Abnormality of nail surface     Dry hair     Dry skin     Malaise and fatigue     Lymphadenopathy     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     Endometrium, hyperplasia - on recent CT scan     Pain in joint involving pelvic region and thigh, right     Right lateral abdominal pain     Right foot pain     Dental  abscess - left mandible molar     Past Surgical History:   Procedure Laterality Date     AS INJ TRANSFORAMIN EPIDURAL, LUMB/SACR SINGLE       COLONOSCOPY  3/11/2013    Procedure: COLONOSCOPY;  colonoscopy;  Surgeon: Lobito Mas MD;  Location: [facility]     COLONOSCOPY WITH CO2 INSUFFLATION N/A 11/19/2018    Procedure: COLONOSCOPY WITH CO2 INSUFFLATION;  Surgeon: Goyo Blank MD;  Location: [facility]     DECOMPRESSION LUMBAR TWO LEVELS Bilateral 12/8/2016    Procedure: DECOMPRESSION LUMBAR TWO LEVELS;  Surgeon: Bang Burrell MD;  Location: [facility]     DILATION AND CURETTAGE, HYSTEROSCOPY, ABLATE ENDOMETRIUM NOVASURE, COMBINED N/A 6/12/2019    Procedure: hysteroscopy, dilation & curettage, polypectomy, endometrial ablation;  Surgeon: Fredy Pham MD;  Location: [facility]     ESOPHAGOSCOPY, GASTROSCOPY, DUODENOSCOPY (EGD), COMBINED  11/8/2013    Procedure: COMBINED ESOPHAGOSCOPY, GASTROSCOPY, DUODENOSCOPY (EGD), BIOPSY SINGLE OR MULTIPLE;  Esophagoscopy, Gastroscopy, Duodenoscopy EGD with multiple biopsies;  Surgeon: Teja Koch MD;  Location: [facility]     HC REMOVAL OF TONSILS,<11 Y/O      Tonsils <10y.o.     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   Social Needs     Financial resource strain: Not on file     Food insecurity     Worry: Not on file     Inability: Not on file     Transportation needs     Medical: Not on file     Non-medical: Not on file   Tobacco Use     Smoking  status: Never Smoker     Smokeless tobacco: Never Used   Substance and Sexual Activity     Alcohol use: No     Comment: social     Drug use: No     Sexual activity: Yes     Partners: Male     Birth control/protection: Surgical     Comment: vasectomy   Lifestyle     Physical activity     Days per week: Not on file     Minutes per session: Not on file     Stress: Not on file   Relationships     Social connections     Talks on phone: Not on file     Gets together: Not on file     Attends Zoroastrianism service: Not on file     Active member of club or organization: Not on file     Attends meetings of clubs or organizations: Not on file     Relationship status: Not on file     Intimate partner violence     Fear of current or ex partner: Not on file     Emotionally abused: Not on file     Physically abused: Not on file     Forced sexual activity: Not on file   Other Topics Concern      Service No     Blood Transfusions No     Caffeine Concern No     Comment: 0     Occupational Exposure No     Hobby Hazards Yes     Comment: horses     Sleep Concern No     Stress Concern No     Weight Concern Yes     Special Diet Yes     Comment: nhung's     Back Care No     Exercise Yes     Comment: occ     Bike Helmet Not Asked     Comment: na     Seat Belt Yes     Self-Exams Yes     Comment: occ     Parent/sibling w/ CABG, MI or angioplasty before 65F 55M? Not Asked   Social History Narrative     Not on file     Family History   Problem Relation Age of Onset     Diabetes Mother         adult     Cancer - colorectal Mother      Hypertension Mother      Allergies Mother      Cancer Mother         colon     Depression Mother      Gastrointestinal Disease Mother         ibs     Genitourinary Problems Mother         kidney cyst     Gynecology Mother         endometriosis     Lipids Mother      Thyroid Disease Mother      Arthritis Mother         RA     Heart Disease Maternal Grandfather         MI,  - 60's     Allergies Father       Unknown/Adopted Father      Heart Disease Father         mi-age mid 40's     Cardiovascular Father      Lipids Father      Respiratory Father         asthma     Cancer Father      Other Cancer Father         renal cancer     Depression Sister      Allergies Sister      Cancer Sister         vaginal     Gynecology Sister         endometriosis     Lipids Paternal Grandmother      Osteoporosis Paternal Grandmother      Thyroid Disease Paternal Grandmother      Respiratory Son         asthma     Allergies Son      Arthritis Sister      Allergies Brother      Heart Disease Brother      Allergies Daughter      Blood Disease Paternal Aunt         LGL T cell Leukemia     Rheumatoid Arthritis Paternal Aunt      Kidney Disease Maternal Aunt      Lupus Maternal Aunt      Bladder Cancer Maternal Aunt      Lab Results   Component Value Date    WBC 5.5 04/17/2020     Lab Results   Component Value Date    RBC 4.14 04/17/2020     Lab Results   Component Value Date    HGB 12.7 04/17/2020     Lab Results   Component Value Date    HCT 38.4 04/17/2020     No components found for: MCT  Lab Results   Component Value Date    MCV 93 04/17/2020     Lab Results   Component Value Date    MCH 30.7 04/17/2020     Lab Results   Component Value Date    MCHC 33.1 04/17/2020     Lab Results   Component Value Date    RDW 13.2 04/17/2020     Lab Results   Component Value Date     04/17/2020     Last Comprehensive Metabolic Panel:  Sodium   Date Value Ref Range Status   04/17/2020 139 133 - 144 mmol/L Final     Potassium   Date Value Ref Range Status   04/17/2020 3.7 3.4 - 5.3 mmol/L Final     Chloride   Date Value Ref Range Status   04/17/2020 107 94 - 109 mmol/L Final     Carbon Dioxide   Date Value Ref Range Status   04/17/2020 28 20 - 32 mmol/L Final     Anion Gap   Date Value Ref Range Status   04/17/2020 4 3 - 14 mmol/L Final     Glucose   Date Value Ref Range Status   04/17/2020 100 (H) 70 - 99 mg/dL Final     Urea Nitrogen   Date  Value Ref Range Status   04/17/2020 18 7 - 30 mg/dL Final     Creatinine   Date Value Ref Range Status   04/17/2020 0.64 0.52 - 1.04 mg/dL Final     GFR Estimate   Date Value Ref Range Status   04/17/2020 >90 >60 mL/min/[1.73_m2] Final     Comment:     Non  GFR Calc  Starting 12/18/2018, serum creatinine based estimated GFR (eGFR) will be   calculated using the Chronic Kidney Disease Epidemiology Collaboration   (CKD-EPI) equation.       Calcium   Date Value Ref Range Status   04/17/2020 8.7 8.5 - 10.1 mg/dL Final     SUBJECTIVE FINDINGS:  A 45-year-old female returns to clinic for right foot pain, plantar fasciitis and ankle pain.  She relates that the right heel shot did not do any good; it did not really help.  The right dorsal lateral foot, it helped that, but she is also getting ankle pain right in the anterior ankle and that is not any better.  Relates she has got a left skin lesion and feels the scar tissue in her left plantar heel.  She had surgery for a spur and plantar fasciitis in that foot in about 2015.  She relates she did physical therapy, and it still bothers her.  No specific injuries.  No other specific relieving or aggravating factors.  Previous notes reviewed.      OBJECTIVE FINDINGS:  DP and PT are 2/4 bilaterally.  She has a left plantar heel small area of hyperkeratotic tissue buildup in line with the scarring, some subcutaneous fat pad prominence which would be also consistent with scarring.  There is no erythema, no drainage, no odor, no calor.  She has pain on palpation of the right plantar heel, right anterior ankle.  There are no gross tendon  voids.  No erythema, no drainage, no odor, no calor, right foot and ankle.      ASSESSMENT AND PLAN:   1.  Right foot pain.   2.  Right ankle pain.   3.  Plantar fasciitis, right.   4.  She has got scar tissue on the left heel.      Diagnosis and treatment options discussed with the patient.  The patient requests corticosteroid  injection.  Procedure, potential risks, expected benefits, expected outcomes and followup discussed with the patient.  Consent signed today.  The right anterior ankle was injected with 1 mL of dexamethasone sodium phosphate 4 mg per mL and 4 mL of 1% lidocaine plain mixed.  The right plantar heel was injected with 0.5 mL of dexamethasone sodium phosphate 4 mg per mL and 2.5 mL of 1% lidocaine plain mixed.  Both areas were prepped prior to injection.  The patient relates to feeling the area is starting to get numb with injections.  She tolerated the injection with no complications.  Diagnosis and treatment options were discussed with her.  She will return to clinic to see me as needed or if she wants further treatment options.  She opted for no physical therapy today.  She relates she has been wearing her ankle brace.  She is not wearing it today, but she relates she has been wearing it.  She stopped wearing it a little bit and kind of stepped wrong in the yard and aggravated the foot and ankle, so then she started wearing it again.

## 2020-05-21 NOTE — PATIENT INSTRUCTIONS
Patient Education     Treatment for Thoracic Outlet Syndrome  Thoracic outlet syndrome is a set of symptoms in the shoulder, arm, or hand. It occurs from a narrowing of the thoracic outlet. This is the space between your collarbone and your first rib. It can result from injury, disease, or a problem present from birth. Thoracic outlet syndrome is not common. It can occur in people of any age.   Types of treatment  Treatments for thoracic outlet syndrome may be nonsurgical (conservative) or surgical, and may include:    Physical therapy to help strengthen shoulder muscles, improve posture, and enlarge the thoracic outlet space    Over-the-counter pain medicine to relieve pain and swelling    Loss of excess weight    Changes to daily activities that bring on symptoms    Botulinum toxin shots (injections)    Surgical decompression, which may be removing a part of a muscle (anterior scalene), or the first rib, or a fibrous band    Other treatments, when the condition is caused by a tumor (sometimes called Pancoast syndrome)  Possible complications of thoracic outlet syndrome  In some cases thoracic outlet syndrome can cause a blood clot to form in a vein in your arm. This blocks blood flow and may make your arm very swollen. The clot may also move to the lungs and cause a pulmonary embolism. This is a blood clot in an artery in your lung. Or a clot can move somewhere else in your body. You may need to take blood-thinner medicine to prevent clotting. You may need a procedure to remove the clot using a thin tube (catheter) inserted through a vein.  A blood clot may also form in one of the arteries of your arm. This may cause a sudden decrease in blood flow to your arm. The clot may be treated with blood thinners or a catheter inserted through an artery. In some cases, surgery may be done to remove the clot.  Living with thoracic outlet syndrome  There are things you can do to help prevent symptoms. Don t put heavy bags  over your shoulder. This increases pressure on the thoracic outlet. Also practice your physical therapy exercises. This will help keep your shoulder muscles strong.  You may have a poor response to nonsurgical treatment and a good response to Botulinum toxin shots. In that case, you may be a good candidate for surgical decompression.  When to call your healthcare provider  Call your healthcare provider right away if you have any of these:    Arm or hand that is suddenly cool, lighter in color, or swollen    Sudden weakness of your hand    Symptoms that don t get better with therapy   Date Last Reviewed: 12/1/2017 2000-2019 [employer]. [address] [place] [id number]. All rights reserved. This information is not intended as a substitute for professional medical care. Always follow your healthcare professional's instructions.           Patient Education     Understanding Thoracic Outlet Syndrome  Thoracic outlet syndrome is a set of symptoms in the shoulder, arm, or hand. It occurs from a narrowing of the thoracic outlet. This is the space between your collarbone and your first rib. It can result from injury, disease, or a problem present from birth. Thoracic outlet syndrome is not common. It can occur in people of any age.  What is the thoracic outlet?  The thoracic outlet is a narrow space between your collarbone (clavicle) and your first rib. Nerves and blood vessels pass from your chest to your arm through this area. The nerves and blood vessels include:    A bundle of nerves that serve your shoulder, arm, and hand    A large and important vein (subclavian vein)    A very large important artery (subclavian artery)  What causes thoracic outlet syndrome?  Your shoulder muscles normally keep your clavicle raised and in place. But with thoracic outlet syndrome, the upper rib and clavicle are closer. This makes the thoracic outlet smaller. Nerves and blood vessels going through the area can  be compressed. Conditions that can cause thoracic outlet syndrome include:    Having an extra rib at birth    An abnormality in the neck muscles at birth    Neck injury    Injury to the first rib or collarbone    Repetitive overhead arm movements that may cause inflammatory changes  Poor posture and obesity may raise your risk for thoracic outlet syndrome. People who do repetitive overhead arm movements, such as swimmers or pitchers, may also have a higher risk.  Symptoms of thoracic outlet syndrome  Your symptoms may come and go. This may be partly based on your activity level. Overhead activities may make your symptoms worse. You most likely have symptoms on only 1 side. In some cases thoracic outlet syndrome can cause symptoms on both sides of the body.  Symptoms can include:    Aching in your neck, shoulder, arm, or hand    Pain, numbness, or tingling of your forearm or fingers    Hand weakness    Limited range of motion of your arm    A depression in your shoulder    Pain in your neck muscles    Swelling and redness of your arm    Pale and cool arm and hand  Diagnosing thoracic outlet syndrome  Thoracic outlet syndrome is often more difficult to diagnose than other shoulder problems. Your healthcare provider will ask about your health history and your symptoms. You will also have a physical exam. Your healthcare provider may move your hand and arm in different positions.  A specific test may be done to help diagnose thoracic outlet syndrome. Your healthcare provider may have you raise your arms and then open and close your fist for a few minutes. This often brings on symptoms.  You may have other tests, such as:    Nerve conduction tests. This is done to see how your nerves are affected.    Doppler ultrasound. This looks at blood flow through your arm and hand.    Chest X-ray. This is done to check for abnormal bone such as an extra rib.    CT scan. This may be done if your healthcare provider needs to see more  detail.    CT angiography. This is done to get more information about blood flow through your arm.  Date Last Reviewed: 6/1/2017 2000-2019 [employer]. [address] [place] [address], [place] [address]. All rights reserved. This information is not intended as a substitute for professional medical care. Always follow your healthcare professional's instructions.

## 2020-05-22 RX ORDER — DULOXETIN HYDROCHLORIDE 60 MG/1
CAPSULE, DELAYED RELEASE ORAL
Qty: 90 CAPSULE | Refills: 0 | Status: SHIPPED | OUTPATIENT
Start: 2020-05-22 | End: 2020-07-09

## 2020-05-22 NOTE — TELEPHONE ENCOUNTER
Pending Prescriptions:                       Disp   Refills    DULoxetine (CYMBALTA) 60 MG capsule [Phar*90 cap*0            Sig: TAKE ONE CAPSULE BY MOUTH ONCE DAILY    Routing refill request to provider for review/approval because:  Diagnosis not anxiety or depression, provider to review    Rachelle Colin, BSN, RN, PHN

## 2020-06-01 ENCOUNTER — MYC MEDICAL ADVICE (OUTPATIENT)
Dept: FAMILY MEDICINE | Facility: OTHER | Age: 45
End: 2020-06-01

## 2020-06-01 DIAGNOSIS — M67.921 BICEPS TENDINOPATHY, RIGHT: ICD-10-CM

## 2020-06-01 DIAGNOSIS — G54.0 BRACHIAL PLEXOPATHY: Primary | ICD-10-CM

## 2020-06-01 DIAGNOSIS — S16.1XXA ACUTE CERVICAL MYOFASCIAL STRAIN, INITIAL ENCOUNTER: Primary | ICD-10-CM

## 2020-06-01 DIAGNOSIS — G89.29 CHRONIC RIGHT SHOULDER PAIN: ICD-10-CM

## 2020-06-01 DIAGNOSIS — G54.0 BRACHIAL PLEXOPATHY: ICD-10-CM

## 2020-06-01 DIAGNOSIS — M25.511 CHRONIC RIGHT SHOULDER PAIN: ICD-10-CM

## 2020-06-01 DIAGNOSIS — R51.9 FRONTAL HEADACHE: ICD-10-CM

## 2020-06-01 RX ORDER — CELECOXIB 200 MG/1
200 CAPSULE ORAL 2 TIMES DAILY
Qty: 60 CAPSULE | Refills: 0 | Status: SHIPPED | OUTPATIENT
Start: 2020-06-01 | End: 2020-07-09

## 2020-06-04 ENCOUNTER — ANCILLARY PROCEDURE (OUTPATIENT)
Dept: ULTRASOUND IMAGING | Facility: CLINIC | Age: 45
End: 2020-06-04
Attending: FAMILY MEDICINE
Payer: COMMERCIAL

## 2020-06-04 ENCOUNTER — VIRTUAL VISIT (OUTPATIENT)
Dept: ONCOLOGY | Facility: CLINIC | Age: 45
End: 2020-06-04
Attending: OBSTETRICS & GYNECOLOGY
Payer: COMMERCIAL

## 2020-06-04 DIAGNOSIS — Z80.8 FAMILY HISTORY OF MALIGNANT MELANOMA: ICD-10-CM

## 2020-06-04 DIAGNOSIS — R20.0 NUMBNESS AND TINGLING OF RIGHT ARM: ICD-10-CM

## 2020-06-04 DIAGNOSIS — Z80.0 FAMILY HISTORY OF MALIGNANT NEOPLASM OF COLON: Primary | ICD-10-CM

## 2020-06-04 DIAGNOSIS — R20.2 NUMBNESS AND TINGLING OF RIGHT ARM: ICD-10-CM

## 2020-06-04 DIAGNOSIS — Z80.51 FAMILY HISTORY OF MALIGNANT NEOPLASM OF KIDNEY: ICD-10-CM

## 2020-06-04 PROCEDURE — 96040 ZZC GENETIC COUNSELING, EACH 30 MIN: CPT | Mod: 95 | Performed by: GENETIC COUNSELOR, MS

## 2020-06-04 NOTE — LETTER
Assessing Cancer Risk  Only about 5-10% of cancers are thought to be due to an inherited cancer susceptibility gene.    These families often have:  ? Several people with the same or related types of cancer  ? Cancers diagnosed at a young age (before age 50)  ? Individuals with more than one primary cancer  ? Multiple generations of the family affected with cancer    Comprehensive Colon Cancer Panel  We each inherit two copies of every gene in our bodies: one from our mother, and one from our father.  Each gene has a specific job to do.  When a gene has a mistake or  mutation  in it, it does not work like it should.      This handout will review common hereditary colon cancer syndromes, and other genes related to an increased risk for colon cancer.  The genes that will be discussed in this handout are: APC, BMPR1A, CDH1, CHEK2, EPCAM, GREM1, MLH1, MSH2, MSH6, MUTYH, PMS2, POLD1, POLE, PTEN, SMAD4, STK11, and TP53.  These genes are clinically actionable, meaning there are published guidelines for cancer screening and management for individuals who are found to carry mutations in these genes. Inheriting a mutation does not mean a person will develop cancer, but it does significantly increase his or her risk above the general population risk.      Familial Adenomatous Polyposis (FAP)  FAP is a hereditary cancer syndrome caused by mutations in the APC gene. The condition is known to cause hundreds to thousands of adenomatous polyps in the colon, creating a  carpet  of polyps. Some individuals have what is called attenuated FAP (AFAP), a milder form of FAP with fewer polyps and typically later onset. Individuals with an APC gene mutation are at an increased risk for colon, thyroid, and duodenal cancers, as well as several other types of cancer1.  Other features of this condition may include: osteomas, dental anomalies, benign skin lesions, CHRPE ( freckle  on the inside of the eye), and desmoid tumors.      Lifetime  Cancer Risks     Cancer Type General Population FAP   Colon  5% near 100%   Thyroid (papillary) 1% 1-12%   Duodenal <1% 5%   Liver  <1% 1-2% before age 5   Pancreas <1% 1%   Stomach <1% 1%       Juvenile Polyposis Syndrome (JPS)  JPS is characterized by hamartomatous polyps, called juvenile polyps, in the gastrointestinal tract.  Juvenile  refers to the type of polyps seen in this hereditary cancer condition, not the age of onset. Currently, mutations in two genes are known to cause JPS: BMPR1A and SMAD4. Of individuals clinically diagnosed with JPS, only 40% have an identifiable mutation in one of these genes, suggesting there are other genes that cause JPS that have not been discovered yet. Individuals with JPS are at an increased risk for colon cancer and stomach cancer 2,3,4. Pancreatic and small bowel cancers have also been reported in JPS, but the actual risks are unknown.         Lifetime Cancer Risks    Cancer Type General Population JPS   Colon 5% 40-50%   Gastric/Duodenal <1% 10-21%     Some individuals with SMAD4 mutations have a condition called JPS/HHT (Juvenile Polyposis/Hereditary Hemorrhagic Telangiectasia) where in addition to JPS, individuals may have nose bleeds and clotting issues.     Hereditary Diffuse Gastric Cancer (HDGC)  Currently, mutations in one gene are known to cause Hereditary Diffuse Gastric Cancer: CDH1.  Individuals with HDGC are at increased risk for diffuse gastric cancer and lobular breast cancer. Of people diagnosed with HDGC, 30-50% have a mutation in the CDH1 gene.  This suggests there are likely mutations in other genes that may cause HDGC that have not been identified yet. Individuals with HDGC may also be at increased risk for colon cancer.      Lifetime Cancer Risks    Cancer Type General Population HDGC   Diffuse Gastric <1% 67-83%   Breast 12% 39-52%       Sullivan syndrome  Mutations in five different genes are known to cause Sullivan syndrome: MLH1, MSH2, MSH6, PMS2, and  EPCAM. Individuals with Sullivan syndrome have an increased risk for colon, uterine, ovarian, small bowel, stomach, urinary tract, and brain cancer, as well as several other types of cancer. The exact lifetime cancer risks are dependent upon the gene in which the mutation was identified.      Lifetime Cancer Risks    Cancer Type General Population Sullivan syndrome   Colon 5% 12-52%   Uterine 2-3% Up to 57%   Stomach <1% Up to 16%   Ovarian 2% Up to 38%   Urinary tract <1% 1-7%   Hepatobiliary tract <1% 1-4%   Small bowel <1% Up to 11%   Brain/CNS <1% Not well established   Pancreas <1% Up to 6%       MUTYH-Associated Polyposis (MAP)  MAP is a hereditary cancer syndrome caused by mutations in the MUTYH gene. Unlike the other hereditary cancer genes discussed in this handout, two mutations in the MUTYH gene cause MAP and increase cancer risk. Those affected with MAP typically have between  adenomatous polyps. This syndrome also increases the risk for colon and duodenal cancer. Current research suggests that other cancers may be associated with MUTYH mutations, as well. The table below includes the risk that someone with two MUTYH gene mutations would develop cancer in their lifetime; of note, there is also an increased colon cancer risk for individuals who carry only one MUTYH gene mutation5,6,7.       Lifetime Cancer Risks    Cancer Type General Population MAP   Colon 5% %   Duodenal  <1% 5%       Cowden syndrome  Cowden syndrome is a hereditary condition that increases the risk for breast, thyroid, endometrial, colon, and kidney cancer.  A single mutation in the PTEN gene causes Cowden syndrome and increases cancer risk.  The table below shows the chance that someone with a PTEN mutation would develop cancer in their lifetime8,9.  Other benign features seen in some individuals with Cowden syndrome include benign skin lesions (facial papules, keratoses, lipomas), learning disabilities, autism, thyroid nodules,  hamartomatous colon polyps, and larger head size.      Lifetime Cancer Risks   Cancer Type General Population Cowden Syndrome   Breast 12% 25-50%*   Thyroid 1% 35%   Renal 1-2% 35%   Endometrial 2-3% 28%   Colon 5% 9%   Melanoma 2-3% >5%     *One recent study found breast cancer risk to be increased to 85%    Peutz-Jeghers syndrome (PJS)  PJS is a hereditary cancer syndrome caused by mutations in the STK11 gene. This condition can be distinguished from other hereditary syndromes by the presence of hamartomatous polyps in the gastrointestinal tract and freckles present in unusual places such as the hands, feet, neck, and lips. Individuals with Peutz-Jeghers syndrome have an increased risk for colon, breast, pancreatic, and other cancers3.  Men are at risk for testicular tumors which can affect hormones in the body. Women are at risk for sex cord tumors of the ovaries and a rare aggressive type of cervical cancer.     Lifetime Cancer Risks    Cancer Type General Population PJS   Breast 12% 45-50%   Colon 5% 39%   Stomach <1% 29%   Pancreas 1.5% 11-36%   Small Intestine <1% 13%     Ovarian  2%  18%   Lung 6-7% 15-17%     Additional Genes Associated with Increased Colon Cancer Risk  CHEK2  CHEK2 is a moderate-risk breast cancer gene.  Women who have a mutation in CHEK2 have around a 2-fold increased risk for breast cancer compared to the general population, and this risk may be higher depending upon family history.12,13,14.  Mutations in CHEK2 have also been shown to increase the risk of a number of other cancers, including colon and prostate, however these cancer risks are currently not well understood.     GREM1  GREM1 is a moderate-risk colon polyposis gene. Duplications of this gene are more commonly found in individuals with Ashkenazi Alevism [id number]. Mutations in GREM1 are associated with colon polyps and therefore an increased risk of colon cancer; however the estimated cancer risk is not well [id number].      POLD1 and POLE  POLD1 and POLE are moderate-risk colon cancer genes. Carriers of a mutation in one of these genes increases the lifetime risk of colorectal brnetw77,18,19,20. Mutations in these genes may also be associated with increased risk for other cancers including: endometrial cancer, duodenal adenomas and carcinomas, and brain tumors.    TP53  Li Fraumeni syndrome (LFS) is a hereditary cancer predisposition syndrome. LFS is caused by a mutation in the TP53 gene. A single mutation in one of the copies of TP53 increases the risk for multiple cancers. Individuals with LFS are at an increased risk for developing cancer at a young age. The general lifetime risk for development of cancer is 50% by age 30 and 90% by age 60.      Core Cancers: Sarcomas, Breast, Brain, Lung, Leukemias/Lymphomas, Adrenocortical carcinomas  Other Cancers: Gastrointestinal, Thyroid, Skin, Genitourinary    Genetic Testing  Genetic testing involves a simple blood test and will look at the genetic information in genes associated with an increased risk of colon cancer. The tests look for any harmful mutations that are associated with increased cancer risk.  If possible, it is recommended that the person(s) who has had cancer be tested before other family members.  That person will give us the most useful information about whether or not a specific gene mutation is associated with the cancer in the family.     Results  There are three possible results from genetic testing:  ? Positive--a harmful mutation was identified  ? Negative--no mutation was identified  ? Variant of unknown significance--a variation in one of the genes was identified, but it is unclear how this impacts cancer risk in the family  Advantages and Disadvantages  There are advantages and disadvantages to genetic testing of these genes.    Advantages  ? May clarify your cancer risk  ? Can help you make medical decisions  ? May explain the cancers in your family  ? May  give useful information to your family members (if you share your results)    Disadvantages  ? Possible negative emotional impact of learning about inherited cancer risk  ? Uncertainty in interpreting a negative test result in some situations  ? Possible genetic discrimination concerns (see below)    Inheritance   Most mutations in the genes outlined above are inherited in an autosomal dominant pattern.  This means that if a parent has a mutation, each of his or her children will have a 50% chance of inheriting that same mutation.  Therefore, each child--male or female--would have a 50% chance of being at increased risk for developing cancer.                                            Image obtained from [employer] Reference, 2013     In the case of MUTYH-Associated Polyposis (MAP) this hereditary cancer syndrome is inherited in an autosomal recessive pattern. This means that each parent of an individual with MAP is a carrier of MAP, meaning that they have only one mutation in MUTYH. They still have one functioning copy of their gene.  Carriers are at a slightly higher risk for colon cancer than the general population. If each parent is a carrier for MAP, they have a 25% of having a child who is affected with MAP, meaning the child inherited both gene mutations - one from each parent.       Image obtained from [email or web address] Reference, 2016    Genetic Information Nondiscrimination Act (BRIDGETT)  BRIDGETT is a federal law that protects individuals from health insurance or employment discrimination based on a genetic test result alone.  Although rare, there are currently no legal protections in terms of life insurance, long term care, or disability insurances.  Visit the National Human Genome Research [place] at Genome.gov/28684504 to learn more.    Reducing Cancer Risk  Each of the genes listed within this handout have nationally recognized cancer screening guidelines that would be recommended for individuals who test  positive.  In addition to increased cancer screening, surgeries may be offered or recommended to reduce cancer risk in certain cases.  Recommendations are based upon an individual s genetic test result as well as their personal and family history of cancer.    Questions to Think About Regarding Genetic Testing  ? What effect will the test result have on me and my relationship with my family members if I have an inherited gene mutation?  If I don t have a gene mutation?  ? Should I share my test results, and how will my family react to this news, which may also affect them?  ? Are my children ready to learn new information that may one day affect their own health?    Resources    PTEN World [email or web address]   No Stomach for Cancer, Inc. [email or web address]   Stomach Cancer Relief Network scrnet.org   Collaborative Group of the Americas on Inherited Colorectal Cancer (CGA) cgaicc.com   Cancer Care cancercare.org   American Cancer Society (ACS) cancer.org   National Cancer [place] (NCI) cancer.org   Sullivan Syndrome International [email or web address]       Please call us if you have any questions or concerns.     Cancer Risk Management Program [phone]-CANCER ([phone])  ? Billy Porter, MS, [facility] [phone]  ? Na Thakkar, MS, [facility]  [phone]  ? Trudy Castro, MS, [facility]  [phone]  ? Kassidy Alas, MS, [facility]  [phone]  ? Aminata Gama, MS, [facility] [phone]  ? Lolly Marie, MS, [facility] [phone]  ? Anu Blair, MS, [facility]  [phone]    References    1. Benny KIRK, Rafia J, Derrick G, Roddy E, Marylou J, et al. The Prevalence of thyroid cancer and benign thyroid disease in patients with familial adenomatous polyposis may be higher than previously recognized. Clin Colorectal Cancer. 2012;11:304-308.  2. Adrián L, Adithya Sue A, Shamar L, Enrico C, Mayra K, et al. Risk of colorectal cancer in juvenile polyposis. Gut. 2007;56:965-967.  3. Frantz FG, Hilda MN, Akira CA. Colorectal cancer risk in  hamartomatous polyposis syndromes. World Journal of Gastrointestinal Surgery. 2015;27:25-32  4. Angeline MG. Guidance on gastrointestinal surveillance for hereditary non-polyposis colorectal cancer, familial adenomatous polypolis, juvenile polyposis, and Peutz-Jeghers syndrome. Gut. 2002;51:21-27.  5. Kirby AK, Presley WALDEMAR, Sofie JG et al. Risk of extracolonic cancers for people with biallelic and monoallelic mutations in MUTYH. Int J of Cancer. 2016;139:1113-9991.  6. Cheng S, Arnold S, Jerica H, Sun K, Villegas M, et al. MUTYH-associated polyposis: 70 of 71 patients with biallelic mutations present with an attenuated or atypical phenotype. Int J of Cancer. 2006;119:807-814.  7. Maik G, Supa F, Acosta I, Preston M, Maik H, et al. MUTYH mutation carriers have increased breast cancer risk. Cancer. 2012;2483-7578.  8. Regan MH, Love J, Zee J, Elliott LA, Tee MS, Eng C. Lifetime cancer risks in individuals with germline PTEN mutations. Clin Cancer Res. 2012;18:400-7.  9. Silvia SELF. Cowden Syndrome: A Critical Review of the Clinical Literature. J Sariah . 2009:18:13-27.  10. National Comprehensive Cancer Network. Clinical practice guidelines in oncology, colorectal cancer screening. Available online (registration required). 2013.  11. National Cancer [place]. SEER Cancer Stat Fact Sheets.  December 2013.  12. CHEK2 Breast Cancer Case-Control Consortium. CHEK2*1100delC and susceptibility to breast cancer: A collaborative analysis involving 10,860 breast cancer cases and 9,065 controls from 10 studies. Am J Hum Sariah, 74 (2004), pp. 0811-8698  13. Alexandro T, Lowell S, Vickie K, et al. Spectrum of Mutations in BRCA1, BRCA2, CHEK2, and TP53 in Families at High Risk of Breast Cancer. JOHN. 2006;295(12):6157-5119.  14. Soo OLMSTEAD, Johnny D, Mounika CHIU, et al. Risk of breast cancer in women with a CHEK2 mutation with and without a family history of breast cancer. J Clin Oncol.  2011;29:5690-0467.  15. Maribel TRIPATHI, Mari E, Sammy J, Shubham N, Phyllis M et al. Defining the polyposis/colorectal cancer phenotype associated with the GREM1 duplication: counseling and management guidelines. Sariah .Res. 2016;98:1-5.  16. Jonathan KIRK, Gwen S, Brenton A, Freya King, et al. Hereditary mixed polyposis syndrome is caused by a 40kb upstream duplication that leads to increased and ectopic expression of the BMP antagonist GREM1. Maribeth Sariah. 2015;44:699-703.  17. ISHAN Wu. et al. Germline mutations affecting the proofreading domains of POLE and POLD1 predispose to colorectal adenomas and carcinomas. Maribeth. Sariah. 45, 136-44 (2013).  18. JOSE Escobar. et al. POLE and POLD1 mutations in 529 tenzin with familial colorectal cancer and/or polyposis: review of reported cases and recommendations for genetic testing and surveillance. Sariah. Med. (2015). doi:10.1038/gim.2015.75  19. LAISHA Salter. et al. New insights into POLE and POLD1 germline mutations in familial colorectal cancer and polyposis. Hum. Mol. Sariah. 23, 9634-12 (2014).  20. HAILEE Mei. et al. Frequency and phenotypic spectrum of germline mutations in POLE and seven other polymerase genes in 266 patients with colorectal adenomas and carcinomas. Int. J. Cancer 137, 320-31 (2015).

## 2020-06-04 NOTE — Clinical Note
"    6/4/2020         RE: Amelia Coker  [address] [facility] [address]        Dear Colleague,    Thank you for referring your patient, Amelia Coker, to the [facility]. Please see a copy of my visit note below.    6/4/2020    Amelia Coker is a 45 year old female who is being evaluated via a billable video visit.      The patient has been notified of following:     \"This video visit will be conducted via a call between you and your physician/provider. We have found that certain health care needs can be provided without the need for an in-person physical exam.  This service lets us provide the care you need with a video conversation.  If a prescription is necessary we can send it directly to your pharmacy.  If lab work is needed we can place an order for that and you can then stop by our lab to have the test done at a later time.    Video visits are billed at different rates depending on your insurance coverage.  Please reach out to your insurance provider with any questions.    If during the course of the call the physician/provider feels a video visit is not appropriate, you will not be charged for this service.\"    Patient has given verbal consent for Video visit? Yes    How would you like to obtain your AVS? Mail a copy    Patient would like the video invitation sent by: Text to cell phone: [phone]    Will anyone else be joining your video visit? No  {If patient encounters technical issues they should call [phone] :[id number]}      Video-Visit Details    Type of service:  Video Visit    Video Start Time: {video visit start/end time:152948}  Video End Time: {video visit start/end time:152948}    Originating Location (pt. Location): Other Car    Distant Location (provider location):  [facility]     Platform used for Video Visit: [employer]    Referring Provider: Fredy Pham MD    Presenting Information:   Given concerns regarding the potential for COVID-19 exposure " during a clinic visit, Amelia elected for a ieo genetic counseling visit through the [facility] to discuss her family history of uveal melanoma, vaginal, colon, kidney, bladder, and gallbladder cancer. Today we reviewed this history, cancer screening recommendations, and available genetic testing options.    Personal History:  Amelia is a 45 year old year old female. She does not have any personal history of cancer. She had her first menstrual period at age 9, her first child at age 18, and is premenopausal. Amelia has her ovaries, fallopian tubes and uterus in place, and she has had no ovarian cancer screening to date. She reports that she has not used hormone replacement therapy.      She has annual clinical breast exams and mammograms; her most recent mammogram in 2019 was normal. Amleia began having colonoscopies at the age of 38. Her most recent colonoscopy in 2018 found two 2-3 mm colon polyps, one of which was a tubular adenoma. Follow-up was recommended in 5 years. She does not regularly do any other cancer screening at this time.    Family History: (Please see scanned pedigree for detailed family history information)    Amelia's sister was diagnosed with vaginal cancer in her mid 30's. She was also diagnosed with uveal (eye) melanoma in her early 40's and skin cancer.     Amelia's father was diagnosed with kidney cancer at age 66 and  at age 67, Amelia reported that one of her father's physicians reported that this was a genetic kidney cancer. No records of this were available for this visit. I informed Amelia to contact me if she is able to get any records on her father's diagnosis and/or genetic testing.    Amelia's paternal grandmother was diagnosed with thyroid cancer at an unknown age and  at age 93.    Amelia's mother was diagnosed with colon cancer at age 50 and  at age 61.    A maternal aunt was diagnosed with liver or pancreatic  cancer and  at age 58.    A maternal aunt was diagnosed with bladder and cervical cancer at age 59.    Amelia's maternal grandmother was diagnosed with gallbladder cancer in her late 40's and  in her early 50's.    Her maternal ethnicity is Ivorian, Saudi Arabian, Turkmen, and Yoruba Filipino. Her paternal ethnicity is Wolof, Sandra, Turkmen, and Ivorian. There is no known Ashkenazi Baptism ancestry on either side of her family. There is no reported consanguinity.    Discussion:    Amelia's family history of uveal melanoma, vaginal, colon, kidney, bladder, and gallbladder is suggestive of a hereditary cancer syndrome.    We reviewed the features of sporadic, familial, and hereditary cancers. We discussed that Sullivan syndrome could be possible hereditary explanations for her family history of colon, kidney, bladder, and gallbladder cancer. Sullivan syndrome can be caused by a mutation in one of five genes: MLH1, MSH2, MSH6, PMS2, and EPCAM.  The highest cancer risks associated with Sullivan syndrome include colon cancer, endometrial/uterine cancer, gastric cancer, bladder cancer, prostate cancer, and ovarian cancer.  Other cancers have also been reported with Sullivan syndrome, such as urinary tract, pancreas, bile duct, and other cancers.    We discussed the natural history and genetics of hereditary uveal melanoma, vaginal, colon, kidney, bladder, and gallbladder. A detailed handout regarding hereditary cancer, along with the other information we discussed, will be mailed to Amelia at the end of our appointment today and can be found in the after visit summary. Topics included: inheritance pattern, cancer risks, cancer screening recommendations, and also risks, benefits and limitations of testing.    Based on her personal and family history, Amelia meets current National Comprehensive Cancer Network (NCCN) criteria for genetic testing of Sullivan syndrome.      We discussed that there are additional genes that could  cause increased risk for uveal melanoma, vaginal, colon, kidney, bladder, and/or gallbladder cancer. As many of these genes present with overlapping features in a family and accurate cancer risk cannot always be established based upon the pedigree analysis alone, it would be reasonable for Amelia to consider panel genetic testing to analyze multiple genes at once.    We reviewed genetic testing options for the cancers seen in Amelia s family history that suggest a hereditary cause: an actionable high/moderate risk gene custom panel and an expanded high and moderate risk custom panel. Amelia expressed an interest in learning as much information as possible from the testing. She opted for the expanded panel.    The CustomNext-Cancer panel we discussed is a combination of the following panels from [employer]: CancerNext and RenalNext.    Genetic testing is available for 46 genes associated with hereditary cancer: APC, KEILA, BAP1, BARD1, BMPR1A, BRCA1, BRCA2, BRIP1, CDH1, CDK4, CDKN2A, CHEK2, DICER1, EPCAM, FH, FLCN, GREM1, HOXB13, MET, MITF, MLH1, MRE11A, MSH2, MSH6, MUTYH, NBN, NF1, PALB2, PMS2, POLD1, POLE, PTEN, RAD50, RAD51C, RAD51D, SDHA, SDHB, SDHC, SDHD, SMAD4, SMARCA4, STK11, TP53, TSC1, TSC2, and VHL).    We discussed that many of the genes in the CustomNext-Cancer panel are associated with specific hereditary cancer syndromes and published management guidelines: Hereditary Breast and Ovarian Cancer syndrome (BRCA1, BRCA2), Sullivan syndrome (MLH1, MSH2, MSH6, PMS2, EPCAM), Familial Adenomatous Polyposis (APC), Hereditary Leiomyomatosis and Renal Cell Cancer (FH), Gzwx-Zxwk-Nbvh (FLCN), Hereditary Diffuse Gastric Cancer (CDH1), Familial Atypical Multiple Mole Melanoma syndrome (CDK4, CDKN2A), Juvenile Polyposis syndrome (BMPR1A, SMAD4), Cowden syndrome (PTEN), Li Fraumeni syndrome (TP53), Hereditary Paraganglioma and Pheochromocytoma syndrome (SDHA, SDHB, SDHC, SDHD), Tuberous sclerosis complex (TSC1,  TSC2), and Von Hippel-Lindau disease (VHL), Peutz-Jeghers syndrome (STK11), MUTYH Associated Polyposis (MUTYH), and Neurofibromatosis type 1 (NF1).     The KEILA, BRIP1, CHEK2, GREM1, NBN, PALB2, POLD1, POLE, RAD51C, and RAD51D genes are associated with increased cancer risk and have published management guidelines for certain cancers.      The remaining genes (BAP1, BARD1, DICER1, HOXB13, MET, MITF, MRE11A, RAD50, and SMARCA4) are associated with increased cancer risk and may allow us to make medical recommendations when mutations are identified.      Due to COVID-19 restrictions, we now can send saliva kits from [employer] to patients. Amelia will receive a kit directly to her home with directions on how to collect a saliva sample. We discussed that the success of the testing depends on how well she follows the directions and that there is a small chance for sample failure. mAelia verbalized understanding of this. Once the sample is collected, she will send it to [employer] using the return envelope and prepaid shipping label.     Verbal consent was given over the phone and written on the consent form due to COVID-19 restrictions. Turnaround time is 4-5 weeks once the lab receives the saliva sample.    Medical Management: For Amelia, we reviewed that the information from genetic testing may determine:    additional cancer screening for which Amelia may qualify (i.e. mammogram and breast MRI, more frequent colonoscopies, more frequent dermatologic exams, etc.),    options for risk reducing surgeries Amelia could consider (i.e. bilateral mastectomy, surgery to remove her ovaries and/or uterus, etc.),      and targeted chemotherapies for Amelia if she were to develop certain cancers in the future (i.e. immunotherapy for individuals with Sullivan syndrome, PARP inhibitors, etc.).     These recommendations and possible targeted chemotherapies will be discussed in detail once genetic testing is completed.      Plan:  1) Today Amelia elected to proceed with CustomNext-Cancer (CancerNext + RenalNext + BAP1).  2) A copy of the consent form and the after visit summary will be mailed to Amelia.  3) This information should be available in 4-5 weeks, once the lab receives the sample.  4) I will call Amelia with the results once they become available.    Time spent on the phone: 40 minutes (9:05- 9:45 AM)    Billy Porter MS [facility] [place]  Licensed Genetic Counselor  Phone: [phone]  Fax: [phone]    ***    Again, thank you for allowing me to participate in the care of your patient.        Sincerely,        Billy Porter GC

## 2020-06-04 NOTE — PROGRESS NOTES
"6/4/2020    Amelia Coker is a 45 year old female who is being evaluated via a billable video visit.      The patient has been notified of following:     \"This video visit will be conducted via a call between you and your physician/provider. We have found that certain health care needs can be provided without the need for an in-person physical exam.  This service lets us provide the care you need with a video conversation.  If a prescription is necessary we can send it directly to your pharmacy.  If lab work is needed we can place an order for that and you can then stop by our lab to have the test done at a later time.    Video visits are billed at different rates depending on your insurance coverage.  Please reach out to your insurance provider with any questions.    If during the course of the call the physician/provider feels a video visit is not appropriate, you will not be charged for this service.\"    Patient has given verbal consent for Video visit? Yes    How would you like to obtain your AVS? Mail a copy    Patient would like the video invitation sent by: Text to cell phone: [phone]    Will anyone else be joining your video visit? No        Video-Visit Details    Type of service:  Video Visit    Originating Location (pt. Location): Other Car    Distant Location (provider location):  [facility]     Platform used for Video Visit: [employer]    Referring Provider: Fredy Pham MD    Presenting Information:   Given concerns regarding the potential for COVID-19 exposure during a clinic visit, Amelia elected for a video genetic counseling visit through the Cancer Risk Management Program to discuss her family history of uveal melanoma, vaginal, colon, kidney, bladder, and gallbladder cancer. Today we reviewed this history, cancer screening recommendations, and available genetic testing options.    Personal History:  Amelia is a 45 year old year old female. She does not have any personal " history of cancer. She had her first menstrual period at age 9, her first child at age 18, and is premenopausal. Amelia has her ovaries, fallopian tubes and uterus in place, and she has had no ovarian cancer screening to date. She reports that she has not used hormone replacement therapy.      She has annual clinical breast exams and mammograms; her most recent mammogram in 2019 was normal. Amelia began having colonoscopies at the age of 38. Her most recent colonoscopy in 2018 found two 2-3 mm colon polyps, one of which was a tubular adenoma. Follow-up was recommended in 5 years. She does not regularly do any other cancer screening at this time.    Family History: (Please see scanned pedigree for detailed family history information)    Amelia's sister was diagnosed with vaginal cancer in her mid 30's. She was also diagnosed with uveal (eye) melanoma in her early 40's and skin cancer.     Amelia's father was diagnosed with kidney cancer at age 66 and  at age 67, Amelia reported that one of her father's physicians reported that this was a genetic kidney cancer. No records of this were available for this visit. I informed Amelia to contact me if she is able to get any records on her father's diagnosis and/or genetic testing.    Amelia's paternal grandmother was diagnosed with thyroid cancer at an unknown age and  at age 93.    Amelia's mother was diagnosed with colon cancer at age 50 and  at age 61.    A maternal aunt was diagnosed with liver or pancreatic cancer and  at age 58.    A maternal aunt was diagnosed with bladder and cervical cancer at age 59.    Amelia's maternal grandmother was diagnosed with gallbladder cancer in her late 40's and  in her early 50's.    Her maternal ethnicity is Ethiopian, Mauritian, Tajik, and Croatian Macedonian. Her paternal ethnicity is Bulgarian, Sandra, Tajik, and Ethiopian. There is no known Ashkenazi Jew ancestry on either side of  her family. There is no reported consanguinity.    Discussion:    Amelia's family history of uveal melanoma, vaginal, colon, kidney, bladder, and gallbladder is suggestive of a hereditary cancer syndrome.    We reviewed the features of sporadic, familial, and hereditary cancers. We discussed that Sullivan syndrome could be possible hereditary explanations for her family history of colon, kidney, bladder, and gallbladder cancer. Sullivan syndrome can be caused by a mutation in one of five genes: MLH1, MSH2, MSH6, PMS2, and EPCAM. The highest cancer risks associated with Sullivan syndrome include colon cancer, endometrial/uterine cancer, gastric cancer, bladder cancer, prostate cancer, and ovarian cancer. Other cancers have also been reported with Sullivan syndrome, such as urinary tract, pancreas, bile duct, and other cancers.    We discussed the natural history and genetics of hereditary uveal melanoma, vaginal, colon, kidney, bladder, and gallbladder. A detailed handout regarding hereditary cancer, along with the other information we discussed, will be mailed to Amelia at the end of our appointment today and can be found in the after visit summary. Topics included: inheritance pattern, cancer risks, cancer screening recommendations, and also risks, benefits and limitations of testing.    Based on her personal and family history, Amelia meets current National Comprehensive Cancer Network (NCCN) criteria for genetic testing of Sullivan syndrome.      We discussed that there are additional genes that could cause increased risk for uveal melanoma, vaginal, colon, kidney, bladder, and/or gallbladder cancer. As many of these genes present with overlapping features in a family and accurate cancer risk cannot always be established based upon the pedigree analysis alone, it would be reasonable for Amelia to consider panel genetic testing to analyze multiple genes at once.    We reviewed genetic testing options for the cancers seen  in Amelia s family history that suggest a hereditary cause: an actionable high/moderate risk gene custom panel and an expanded high and moderate risk custom panel. Amelia expressed an interest in learning as much information as possible from the testing. She opted for the expanded panel.    The CustomNext-Cancer panel we discussed is a combination of the following panels from [employer]: CancerNext and RenalNext.    Genetic testing is available for 46 genes associated with hereditary cancer: APC, KEILA, BAP1, BARD1, BMPR1A, BRCA1, BRCA2, BRIP1, CDH1, CDK4, CDKN2A, CHEK2, DICER1, EPCAM, FH, FLCN, GREM1, HOXB13, MET, MITF, MLH1, MRE11A, MSH2, MSH6, MUTYH, NBN, NF1, PALB2, PMS2, POLD1, POLE, PTEN, RAD50, RAD51C, RAD51D, SDHA, SDHB, SDHC, SDHD, SMAD4, SMARCA4, STK11, TP53, TSC1, TSC2, and VHL).    We discussed that many of the genes in the CustomNext-Cancer panel are associated with specific hereditary cancer syndromes and published management guidelines: Hereditary Breast and Ovarian Cancer syndrome (BRCA1, BRCA2), Sullivan syndrome (MLH1, MSH2, MSH6, PMS2, EPCAM), Familial Adenomatous Polyposis (APC), Hereditary Leiomyomatosis and Renal Cell Cancer (FH), Omgj-Xouy-Yezw (FLCN), Hereditary Diffuse Gastric Cancer (CDH1), Familial Atypical Multiple Mole Melanoma syndrome (CDK4, CDKN2A), Juvenile Polyposis syndrome (BMPR1A, SMAD4), Cowden syndrome (PTEN), Li Fraumeni syndrome (TP53), Hereditary Paraganglioma and Pheochromocytoma syndrome (SDHA, SDHB, SDHC, SDHD), Tuberous sclerosis complex (TSC1, TSC2), and Von Hippel-Lindau disease (VHL), Peutz-Jeghers syndrome (STK11), MUTYH Associated Polyposis (MUTYH), and Neurofibromatosis type 1 (NF1).     The KEILA, BRIP1, CHEK2, GREM1, NBN, PALB2, POLD1, POLE, RAD51C, and RAD51D genes are associated with increased cancer risk and have published management guidelines for certain cancers.      The remaining genes (BAP1, BARD1, DICER1, HOXB13, MET, MITF, MRE11A, RAD50, and SMARCA4)  are associated with increased cancer risk and may allow us to make medical recommendations when mutations are identified.      Amelia elected to have her blood drawn at the [facility] in [place]. I will call the lab to get more information about the process for getting her blood drawn and ensure that the information is sent correctly to [facility].     Verbal consent was given over the phone and written on the consent form due to COVID-19 restrictions. Turnaround time is 4-5 weeks once the lab receives the sample.    Medical Management: For Amelia, we reviewed that the information from genetic testing may determine:    additional cancer screening for which Amelia may qualify (i.e. mammogram and breast MRI, more frequent colonoscopies, more frequent dermatologic exams, etc.),    options for risk reducing surgeries Amelia could consider (i.e. bilateral mastectomy, surgery to remove her ovaries and/or uterus, etc.),      and targeted chemotherapies for Amelia if she were to develop certain cancers in the future (i.e. immunotherapy for individuals with Sullivan syndrome, PARP inhibitors, etc.).     These recommendations and possible targeted chemotherapies will be discussed in detail once genetic testing is completed.     Plan:  1) Today Amelia elected to proceed with CustomNext-Cancer (CancerNext + RenalNext).  2) A copy of the consent form and the after visit summary will be mailed to Amelia.  3) This information should be available in 4-5 weeks, once the lab receives the sample.  4) I will call Amelia with the results once they become available.    Time spent on the phone: 40 minutes (9:05- 9:45 AM)    Billy Porter MS [facility] [place]  Licensed Genetic Counselor  Phone: [phone]  Fax: [phone]

## 2020-06-04 NOTE — LETTER
Cancer Risk Management  Program Locations    [facility]  [facility]  Mailing Address  Cancer Risk Management Program  [facility]  [address]  [place] [address]    New patient appointments  [phone]  June 4, 2020    Amelia Coker  [address]  [place] [address]      Dear Amelia,    It was a pleasure speaking with you over video on 6/4/2020. Here is a copy of the progress note from our discussion. If you have any additional questions, please feel free to call.    Referring Provider: Fredy Pham MD    Presenting Information:   Given concerns regarding the potential for COVID-19 exposure during a clinic visit, Amelia elected for a video genetic counseling visit through the Cancer Risk Management Program to discuss her family history of uveal melanoma, vaginal, colon, kidney, bladder, and gallbladder cancer. Today we reviewed this history, cancer screening recommendations, and available genetic testing options.    Personal History:  Amelia is a 45 year old year old female. She does not have any personal history of cancer. She had her first menstrual period at age 9, her first child at age 18, and is premenopausal. Amelia has her ovaries, fallopian tubes and uterus in place, and she has had no ovarian cancer screening to date. She reports that she has not used hormone replacement therapy.      She has annual clinical breast exams and mammograms; her most recent mammogram in August 2019 was normal. Amelia began having colonoscopies at the age of 38. Her most recent colonoscopy in November 2018 found two 2-3 mm colon polyps, one of which was a tubular adenoma. Follow-up was recommended in 5 years. She does not regularly do any other cancer screening at this time.    Family History: (Please see  scanned pedigree for detailed family history information)    Amelia's sister was diagnosed with vaginal cancer in her mid 30's. She was also diagnosed with uveal (eye) melanoma in her early 40's and skin cancer.     Amelia's father was diagnosed with kidney cancer at age 66 and  at age 67, Amelia reported that one of her father's physicians reported that this was a genetic kidney cancer. No records of this were available for this visit. I informed Amelia to contact me if she is able to get any records on her father's diagnosis and/or genetic testing.    Amelia's paternal grandmother was diagnosed with thyroid cancer at an unknown age and  at age 93.    Amelia's mother was diagnosed with colon cancer at age 50 and  at age 61.    A maternal aunt was diagnosed with liver or pancreatic cancer and  at age 58.    A maternal aunt was diagnosed with bladder and cervical cancer at age 59.    Amelia's maternal grandmother was diagnosed with gallbladder cancer in her late 40's and  in her early 50's.    Her maternal ethnicity is [place], [place], [place]ian, and [place]ian [place]ian. Her paternal ethnicity is Upper sorbian, [place], [place]ian, and [place]ian. There is no known Ashkenazi Adventism ancestry on either side of her family. There is no reported consanguinity.    Discussion:    Amelia's family history of uveal melanoma, vaginal, colon, kidney, bladder, and gallbladder is suggestive of a hereditary cancer syndrome.    We reviewed the features of sporadic, familial, and hereditary cancers. We discussed that Sullivan syndrome could be possible hereditary explanations for her family history of colon, kidney, bladder, and gallbladder cancer. Sullivan syndrome can be caused by a mutation in one of five genes: MLH1, MSH2, MSH6, PMS2, and EPCAM. The highest cancer risks associated with Sullivan syndrome include colon cancer, endometrial/uterine cancer, gastric cancer, bladder cancer, prostate cancer, and ovarian cancer.  Other cancers have also been reported with Sullivan syndrome, such as urinary tract, pancreas, bile duct, and other cancers.    We discussed the natural history and genetics of hereditary uveal melanoma, vaginal, colon, kidney, bladder, and gallbladder. A detailed handout regarding hereditary cancer, along with the other information we discussed, will be mailed to Amelia at the end of our appointment today and can be found in the after visit summary. Topics included: inheritance pattern, cancer risks, cancer screening recommendations, and also risks, benefits and limitations of testing.    Based on her personal and family history, Amelia meets current National Comprehensive Cancer Network (NCCN) criteria for genetic testing of Sullivan syndrome.      We discussed that there are additional genes that could cause increased risk for uveal melanoma, vaginal, colon, kidney, bladder, and/or gallbladder cancer. As many of these genes present with overlapping features in a family and accurate cancer risk cannot always be established based upon the pedigree analysis alone, it would be reasonable for Amelia to consider panel genetic testing to analyze multiple genes at once.    We reviewed genetic testing options for the cancers seen in Amelia s family history that suggest a hereditary cause: an actionable high/moderate risk gene custom panel and an expanded high and moderate risk custom panel. Amelia expressed an interest in learning as much information as possible from the testing. She opted for the expanded panel.    The CustomNext-Cancer panel we discussed is a combination of the following panels from [facility]: CancerNext and RenalNext.    Genetic testing is available for 46 genes associated with hereditary cancer: APC, KEILA, BAP1, BARD1, BMPR1A, BRCA1, BRCA2, BRIP1, CDH1, CDK4, CDKN2A, CHEK2, DICER1, EPCAM, FH, FLCN, GREM1, HOXB13, MET, MITF, MLH1, MRE11A, MSH2, MSH6, MUTYH, NBN, NF1, PALB2, PMS2, POLD1, POLE,  PTEN, RAD50, RAD51C, RAD51D, SDHA, SDHB, SDHC, SDHD, SMAD4, SMARCA4, STK11, TP53, TSC1, TSC2, and VHL).    We discussed that many of the genes in the CustomNext-Cancer panel are associated with specific hereditary cancer syndromes and published management guidelines: Hereditary Breast and Ovarian Cancer syndrome (BRCA1, BRCA2), Sullivan syndrome (MLH1, MSH2, MSH6, PMS2, EPCAM), Familial Adenomatous Polyposis (APC), Hereditary Leiomyomatosis and Renal Cell Cancer (FH), Dvph-Pqbp-Ayft (FLCN), Hereditary Diffuse Gastric Cancer (CDH1), Familial Atypical Multiple Mole Melanoma syndrome (CDK4, CDKN2A), Juvenile Polyposis syndrome (BMPR1A, SMAD4), Cowden syndrome (PTEN), Li Fraumeni syndrome (TP53), Hereditary Paraganglioma and Pheochromocytoma syndrome (SDHA, SDHB, SDHC, SDHD), Tuberous sclerosis complex (TSC1, TSC2), and Von Hippel-Lindau disease (VHL), Peutz-Jeghers syndrome (STK11), MUTYH Associated Polyposis (MUTYH), and Neurofibromatosis type 1 (NF1).     The KEILA, BRIP1, CHEK2, GREM1, NBN, PALB2, POLD1, POLE, RAD51C, and RAD51D genes are associated with increased cancer risk and have published management guidelines for certain cancers.      The remaining genes (BAP1, BARD1, DICER1, HOXB13, MET, MITF, MRE11A, RAD50, and SMARCA4) are associated with increased cancer risk and may allow us to make medical recommendations when mutations are identified.      Amelia elected to have her blood drawn at the [facility] in [place]. I will call the lab to get more information about the process for getting her blood drawn and ensure that the information is sent correctly to [facility].     Verbal consent was given over the phone and written on the consent form due to COVID-19 restrictions. Turnaround time is 4-5 weeks once the lab receives the sample.    Medical Management: For Amelia, we reviewed that the information from genetic testing may determine:    additional cancer screening for which Amelia may qualify  (i.e. mammogram and breast MRI, more frequent colonoscopies, more frequent dermatologic exams, etc.),    options for risk reducing surgeries Amelia could consider (i.e. bilateral mastectomy, surgery to remove her ovaries and/or uterus, etc.),      and targeted chemotherapies for Amelia if she were to develop certain cancers in the future (i.e. immunotherapy for individuals with Sullivan syndrome, PARP inhibitors, etc.).     These recommendations and possible targeted chemotherapies will be discussed in detail once genetic testing is completed.     Plan:  1) Today Amelia elected to proceed with [facility]Next-Cancer (CancerNext + RenalNext).  2) A copy of the consent form and the after visit summary will be mailed to Amelia.  3) This information should be available in 4-5 weeks, once the lab receives the sample.  4) I will call Amelia with the results once they become available.    Time spent on the phone: 40 minutes (9:05- 9:45 AM)    Billy Porter MS [facility]  Licensed Genetic Counselor  Phone: [phone]  Fax: [phone]

## 2020-06-05 NOTE — PATIENT INSTRUCTIONS
Assessing Cancer Risk  Only about 5-10% of cancers are thought to be due to an inherited cancer susceptibility gene.    These families often have:  ? Several people with the same or related types of cancer  ? Cancers diagnosed at a young age (before age 50)  ? Individuals with more than one primary cancer  ? Multiple generations of the family affected with cancer    Comprehensive Colon Cancer Panel  We each inherit two copies of every gene in our bodies: one from our mother, and one from our father.  Each gene has a specific job to do.  When a gene has a mistake or  mutation  in it, it does not work like it should.      This handout will review common hereditary colon cancer syndromes, and other genes related to an increased risk for colon cancer.  The genes that will be discussed in this handout are: APC, BMPR1A, CDH1, CHEK2, EPCAM, GREM1, MLH1, MSH2, MSH6, MUTYH, PMS2, POLD1, POLE, PTEN, SMAD4, STK11, and TP53.  These genes are clinically actionable, meaning there are published guidelines for cancer screening and management for individuals who are found to carry mutations in these genes. Inheriting a mutation does not mean a person will develop cancer, but it does significantly increase his or her risk above the general population risk.      Familial Adenomatous Polyposis (FAP)  FAP is a hereditary cancer syndrome caused by mutations in the APC gene. The condition is known to cause hundreds to thousands of adenomatous polyps in the colon, creating a  carpet  of polyps. Some individuals have what is called attenuated FAP (AFAP), a milder form of FAP with fewer polyps and typically later onset. Individuals with an APC gene mutation are at an increased risk for colon, thyroid, and duodenal cancers, as well as several other types of cancer1.  Other features of this condition may include: osteomas, dental anomalies, benign skin lesions, CHRPE ( freckle  on the inside of the eye), and desmoid tumors.      Lifetime  Cancer Risks     Cancer Type General Population FAP   Colon  5% near 100%   Thyroid (papillary) 1% 1-12%   Duodenal <1% 5%   Liver  <1% 1-2% before age 5   Pancreas <1% 1%   Stomach <1% 1%       Juvenile Polyposis Syndrome (JPS)  JPS is characterized by hamartomatous polyps, called juvenile polyps, in the gastrointestinal tract.  Juvenile  refers to the type of polyps seen in this hereditary cancer condition, not the age of onset. Currently, mutations in two genes are known to cause JPS: BMPR1A and SMAD4. Of individuals clinically diagnosed with JPS, only 40% have an identifiable mutation in one of these genes, suggesting there are other genes that cause JPS that have not been discovered yet. Individuals with JPS are at an increased risk for colon cancer and stomach cancer 2,3,4. Pancreatic and small bowel cancers have also been reported in JPS, but the actual risks are unknown.         Lifetime Cancer Risks    Cancer Type General Population JPS   Colon 5% 40-50%   Gastric/Duodenal <1% 10-21%     Some individuals with SMAD4 mutations have a condition called JPS/HHT (Juvenile Polyposis/Hereditary Hemorrhagic Telangiectasia) where in addition to JPS, individuals may have nose bleeds and clotting issues.     Hereditary Diffuse Gastric Cancer (HDGC)  Currently, mutations in one gene are known to cause Hereditary Diffuse Gastric Cancer: CDH1.  Individuals with HDGC are at increased risk for diffuse gastric cancer and lobular breast cancer. Of people diagnosed with HDGC, 30-50% have a mutation in the CDH1 gene.  This suggests there are likely mutations in other genes that may cause HDGC that have not been identified yet. Individuals with HDGC may also be at increased risk for colon cancer.      Lifetime Cancer Risks    Cancer Type General Population HDGC   Diffuse Gastric <1% 67-83%   Breast 12% 39-52%     Sullivan syndrome  Mutations in five different genes are known to cause Sullivan syndrome: MLH1, MSH2, MSH6, PMS2, and  EPCAM. Individuals with Sullivan syndrome have an increased risk for colon, uterine, ovarian, small bowel, stomach, urinary tract, and brain cancer, as well as several other types of cancer. The exact lifetime cancer risks are dependent upon the gene in which the mutation was identified.      Lifetime Cancer Risks    Cancer Type General Population Sullivan syndrome   Colon 5% 12-52%   Uterine 2-3% Up to 57%   Stomach <1% Up to 16%   Ovarian 2% Up to 38%   Urinary tract <1% 1-7%   Hepatobiliary tract <1% 1-4%   Small bowel <1% Up to 11%   Brain/CNS <1% Not well established   Pancreas <1% Up to 6%       MUTYH-Associated Polyposis (MAP)  MAP is a hereditary cancer syndrome caused by mutations in the MUTYH gene. Unlike the other hereditary cancer genes discussed in this handout, two mutations in the MUTYH gene cause MAP and increase cancer risk. Those affected with MAP typically have between  adenomatous polyps. This syndrome also increases the risk for colon and duodenal cancer. Current research suggests that other cancers may be associated with MUTYH mutations, as well. The table below includes the risk that someone with two MUTYH gene mutations would develop cancer in their lifetime; of note, there is also an increased colon cancer risk for individuals who carry only one MUTYH gene mutation5,6,7.       Lifetime Cancer Risks    Cancer Type General Population MAP   Colon 5% %   Duodenal  <1% 5%       Cowden syndrome  Cowden syndrome is a hereditary condition that increases the risk for breast, thyroid, endometrial, colon, and kidney cancer.  A single mutation in the PTEN gene causes Cowden syndrome and increases cancer risk.  The table below shows the chance that someone with a PTEN mutation would develop cancer in their lifetime8,9.  Other benign features seen in some individuals with Cowden syndrome include benign skin lesions (facial papules, keratoses, lipomas), learning disabilities, autism, thyroid nodules,  hamartomatous colon polyps, and larger head size.      Lifetime Cancer Risks   Cancer Type General Population Cowden Syndrome   Breast 12% 25-50%*   Thyroid 1% 35%   Renal 1-2% 35%   Endometrial 2-3% 28%   Colon 5% 9%   Melanoma 2-3% >5%     *One recent study found breast cancer risk to be increased to 85%    Peutz-Jeghers syndrome (PJS)  PJS is a hereditary cancer syndrome caused by mutations in the STK11 gene. This condition can be distinguished from other hereditary syndromes by the presence of hamartomatous polyps in the gastrointestinal tract and freckles present in unusual places such as the hands, feet, neck, and lips. Individuals with Peutz-Jeghers syndrome have an increased risk for colon, breast, pancreatic, and other cancers3.  Men are at risk for testicular tumors which can affect hormones in the body. Women are at risk for sex cord tumors of the ovaries and a rare aggressive type of cervical cancer.     Lifetime Cancer Risks    Cancer Type General Population PJS   Breast 12% 45-50%   Colon 5% 39%   Stomach <1% 29%   Pancreas 1.5% 11-36%   Small Intestine <1% 13%     Ovarian  2%  18%   Lung 6-7% 15-17%     Additional Genes Associated with Increased Colon Cancer Risk  CHEK2  CHEK2 is a moderate-risk breast cancer gene.  Women who have a mutation in CHEK2 have around a 2-fold increased risk for breast cancer compared to the general population, and this risk may be higher depending upon family history.12,13,14.  Mutations in CHEK2 have also been shown to increase the risk of a number of other cancers, including colon and prostate, however these cancer risks are currently not well understood.     GREM1  GREM1 is a moderate-risk colon polyposis gene. Duplications of this gene are more commonly found in individuals with Ashkenazi Quaker [id number]. Mutations in GREM1 are associated with colon polyps and therefore an increased risk of colon cancer; however the estimated cancer risk is not well [id number].      POLD1 and POLE  POLD1 and POLE are moderate-risk colon cancer genes. Carriers of a mutation in one of these genes increases the lifetime risk of colorectal [id number],18,19,20. Mutations in these genes may also be associated with increased risk for other cancers including: endometrial cancer, duodenal adenomas and carcinomas, and brain tumors.    TP53  Li Fraumeni syndrome (LFS) is a hereditary cancer predisposition syndrome. LFS is caused by a mutation in the TP53 gene. A single mutation in one of the copies of TP53 increases the risk for multiple cancers. Individuals with LFS are at an increased risk for developing cancer at a young age. The general lifetime risk for development of cancer is 50% by age 30 and 90% by age 60.      Core Cancers: Sarcomas, Breast, Brain, Lung, Leukemias/Lymphomas, Adrenocortical carcinomas  Other Cancers: Gastrointestinal, Thyroid, Skin, Genitourinary    Genetic Testing  Genetic testing involves a simple blood test and will look at the genetic information in genes associated with an increased risk of colon cancer. The tests look for any harmful mutations that are associated with increased cancer risk.  If possible, it is recommended that the person(s) who has had cancer be tested before other family members.  That person will give us the most useful information about whether or not a specific gene mutation is associated with the cancer in the family.     Results  There are three possible results from genetic testing:  ? Positive--a harmful mutation was identified  ? Negative--no mutation was identified  ? Variant of unknown significance--a variation in one of the genes was identified, but it is unclear how this impacts cancer risk in the family  Advantages and Disadvantages  There are advantages and disadvantages to genetic testing of these genes.    Advantages  ? May clarify your cancer risk  ? Can help you make medical decisions  ? May explain the cancers in your family  ? May  give useful information to your family members (if you share your results)    Disadvantages  ? Possible negative emotional impact of learning about inherited cancer risk  ? Uncertainty in interpreting a negative test result in some situations  ? Possible genetic discrimination concerns (see below)    Inheritance   Most mutations in the genes outlined above are inherited in an autosomal dominant pattern.  This means that if a parent has a mutation, each of his or her children will have a 50% chance of inheriting that same mutation.  Therefore, each child--male or female--would have a 50% chance of being at increased risk for developing cancer.                                            Image obtained from [place] [employer], 2013     In the case of MUTYH-Associated Polyposis (MAP) this hereditary cancer syndrome is inherited in an autosomal recessive pattern. This means that each parent of an individual with MAP is a carrier of MAP, meaning that they have only one mutation in MUTYH. They still have one functioning copy of their gene.  Carriers are at a slightly higher risk for colon cancer than the general population. If each parent is a carrier for MAP, they have a 25% of having a child who is affected with MAP, meaning the child inherited both gene mutations - one from each parent.       Image obtained from [place] [employer], 2016    Genetic Information Nondiscrimination Act (BRIDGETT)  BRIDGETT is a federal law that protects individuals from health insurance or employment discrimination based on a genetic test result alone.  Although rare, there are currently no legal protections in terms of life insurance, long term care, or disability insurances.  Visit the National Human Genome Research [place] at Genome.gov/17760406 to learn more.    Reducing Cancer Risk  Each of the genes listed within this handout have nationally recognized cancer screening guidelines that would be recommended for individuals who test  positive.  In addition to increased cancer screening, surgeries may be offered or recommended to reduce cancer risk in certain cases.  Recommendations are based upon an individual s genetic test result as well as their personal and family history of cancer.    Questions to Think About Regarding Genetic Testing  ? What effect will the test result have on me and my relationship with my family members if I have an inherited gene mutation?  If I don t have a gene mutation?  ? Should I share my test results, and how will my family react to this news, which may also affect them?  ? Are my children ready to learn new information that may one day affect their own health?    Resources    PTEN World [email or web address]   No Stomach for Cancer, Inc. nostomachforcancer.[email or web address]   Stomach Cancer Relief Network scrnet.org   Collaborative Group of the Americas on Inherited Colorectal Cancer (CGA) cgaicc.com   Cancer Care cancercare.org   American Cancer Society (ACS) cancer.org   National Cancer [place] (NCI) cancer.org   Sullivan Syndrome International [email or web address]       Please call us if you have any questions or concerns.     Cancer Risk Management Program [phone]-CANCER ([phone])  ? Billy Porter, MS, [facility] [phone]  ? Na Thakkar, MS, [facility]  [phone]  ? Trudy Castro, MS, [facility]  [phone]  ? Kassidy Alas, MS, [facility]  [phone]  ? Aminata Gama, MS, [facility] [phone]  ? Lolly Marie, MS, [facility] [phone]  ? Anu Blair, MS, [facility]  [phone]    References    1. Benny KIRK, Rafia J, Derrick G, Roddy E, Marylou J, et al. The Prevalence of thyroid cancer and benign thyroid disease in patients with familial adenomatous polyposis may be higher than previously recognized. Clin Colorectal Cancer. 2012;11:304-308.  2. Adrián L, Adithya Sue A, Shamar L, Enrico C, Mayra K, et al. Risk of colorectal cancer in juvenile polyposis. Gut. 2007;56:965-967.  3. Frantz FG, Hilda MN, Akira CA. Colorectal cancer risk in  hamartomatous polyposis syndromes. World Journal of Gastrointestinal Surgery. 2015;27:25-32  4. Angeline MG. Guidance on gastrointestinal surveillance for hereditary non-polyposis colorectal cancer, familial adenomatous polypolis, juvenile polyposis, and Peutz-Jeghers syndrome. Gut. 2002;51:21-27.  5. Kirby AK, Presley WALDEMAR, Sofie JG et al. Risk of extracolonic cancers for people with biallelic and monoallelic mutations in MUTYH. Int J of Cancer. 2016;139:1396-0789.  6. Cheng S, Arnold S, Jerica H, Sun K, Villegas M, et al. MUTYH-associated polyposis: 70 of 71 patients with biallelic mutations present with an attenuated or atypical phenotype. Int J of Cancer. 2006;119:807-814.  7. Maik G, Supa F, Acosta I, Preston M, Maik H, et al. MUTYH mutation carriers have increased breast cancer risk. Cancer. 2012;7906-7383.  8. Regan MH, Love J, Zee J, Elliott LA, Tee MS, Eng C. Lifetime cancer risks in individuals with germline PTEN mutations. Clin Cancer Res. 2012;18:400-7.  9. Silvia SELF. Cowden Syndrome: A Critical Review of the Clinical Literature. J Sariah . 2009:18:13-27.  10. National Comprehensive Cancer Network. Clinical practice guidelines in oncology, colorectal cancer screening. Available online (registration required). 2013.  11. National Cancer [place]. SEER Cancer Stat Fact Sheets.  December 2013.  12. CHEK2 Breast Cancer Case-Control Consortium. CHEK2*1100delC and susceptibility to breast cancer: A collaborative analysis involving 10,860 breast cancer cases and 9,065 controls from 10 studies. Am J Hum Sariah, 74 (2004), pp. 9357-0020  13. Alexandro T, Lowell S, Vickie K, et al. Spectrum of Mutations in BRCA1, BRCA2, CHEK2, and TP53 in Families at High Risk of Breast Cancer. JOHN. 2006;295(12):4616-4303.  14. Soo OLMSTEAD, Johnny D, Mounika CHIU, et al. Risk of breast cancer in women with a CHEK2 mutation with and without a family history of breast cancer. J Clin Oncol.  2011;29:5394-2172.  15. Maribel TRIPATHI, Mari E, Sammy J, Shubham N, Phyllis M et al. Defining the polyposis/colorectal cancer phenotype associated with the GREM1 duplication: counseling and management guidelines. Sariah .Res. 2016;98:1-5.  16. Jonathan KIRK, Gwen S, Brenton A, Freya King, et al. Hereditary mixed polyposis syndrome is caused by a 40kb upstream duplication that leads to increased and ectopic expression of the BMP antagonist GREM1. Maribeth Sariah. 2015;44:699-703.  17. ISHAN Wu. et al. Germline mutations affecting the proofreading domains of POLE and POLD1 predispose to colorectal adenomas and carcinomas. Maribeth. Sariah. 45, 136-44 (2013).  18. JOSE Escobar. et al. POLE and POLD1 mutations in 529 tenzin with familial colorectal cancer and/or polyposis: review of reported cases and recommendations for genetic testing and surveillance. Sariah. Med. (2015). doi:10.1038/gim.2015.75  19. LAISHA Salter. et al. New insights into POLE and POLD1 germline mutations in familial colorectal cancer and polyposis. Hum. Mol. Sariah. 23, 3572-12 (2014).  20. HAILEE Mei. et al. Frequency and phenotypic spectrum of germline mutations in POLE and seven other polymerase genes in 266 patients with colorectal adenomas and carcinomas. Int. J. Cancer 137, 320-31 (2015).

## 2020-06-08 ENCOUNTER — MYC MEDICAL ADVICE (OUTPATIENT)
Dept: ORTHOPEDICS | Facility: CLINIC | Age: 45
End: 2020-06-08

## 2020-06-08 DIAGNOSIS — M79.601 RIGHT ARM PAIN: ICD-10-CM

## 2020-06-08 DIAGNOSIS — R20.0 NUMBNESS AND TINGLING OF RIGHT ARM: Primary | ICD-10-CM

## 2020-06-08 DIAGNOSIS — R20.2 NUMBNESS AND TINGLING OF RIGHT ARM: Primary | ICD-10-CM

## 2020-06-09 ENCOUNTER — TELEPHONE (OUTPATIENT)
Dept: ORTHOPEDICS | Facility: CLINIC | Age: 45
End: 2020-06-09

## 2020-06-09 ENCOUNTER — TELEPHONE (OUTPATIENT)
Dept: VASCULAR SURGERY | Facility: CLINIC | Age: 45
End: 2020-06-09

## 2020-06-09 DIAGNOSIS — M25.511 RIGHT SHOULDER PAIN: Primary | ICD-10-CM

## 2020-06-09 NOTE — TELEPHONE ENCOUNTER
6/9 Called and left voicemail. Provided phone number [phone] to schedule with vascular.     Madison Pena   Procedure    Ortho/Sports Med/Ent/Eye   [facility]   [phone]  ----- Message -----  From: Sandra Pritchett ATC  Sent: 6/8/2020   1:54 PM CDT  To: Madison Pena    Can you help her schedule with vascular.

## 2020-06-09 NOTE — TELEPHONE ENCOUNTER
[facility]    Phone Message    May a detailed message be left on voicemail: yes     Reason for Call: Appointment Intake    Referring Provider Name: Dr. Patel Martínez  Diagnosis and/or Symptoms: Numbness and tingling of right arm  Right arm pain    Action Taken: Message routed to:  [facility]  vascular    Travel Screening: Not Applicable

## 2020-06-10 ENCOUNTER — TELEPHONE (OUTPATIENT)
Dept: VASCULAR SURGERY | Facility: CLINIC | Age: 45
End: 2020-06-10

## 2020-06-10 NOTE — TELEPHONE ENCOUNTER
[facility] Call [facility]    Phone Message    May a detailed message be left on voicemail: yes     Reason for Call: Other: Pt is being urgently referred by Dr Patel Martínez at [facility] to Vascular Surgery for thoracic outlet syndrome, numbness, pain and tingling in rt arm. Clinic states that Dr Martínez wants pt seen as soon as possible. Please review records and referral in Epic and reach out to pt, thanks!     Action Taken: Message routed to:  [facility] Vascular Surgery    Travel Screening: Not Applicable

## 2020-06-11 ENCOUNTER — MYC MEDICAL ADVICE (OUTPATIENT)
Dept: FAMILY MEDICINE | Facility: OTHER | Age: 45
End: 2020-06-11
Payer: COMMERCIAL

## 2020-06-11 ENCOUNTER — HOSPITAL ENCOUNTER (OUTPATIENT)
Dept: GENERAL RADIOLOGY | Facility: CLINIC | Age: 45
Discharge: HOME | End: 2020-06-11
Attending: SURGERY | Admitting: SURGERY
Payer: COMMERCIAL

## 2020-06-11 DIAGNOSIS — M47.816 SPONDYLOSIS OF LUMBAR REGION WITHOUT MYELOPATHY OR RADICULOPATHY: Primary | ICD-10-CM

## 2020-06-11 DIAGNOSIS — M54.16 LUMBAR RADICULOPATHY: ICD-10-CM

## 2020-06-11 DIAGNOSIS — R20.2 NUMBNESS AND TINGLING OF RIGHT ARM: ICD-10-CM

## 2020-06-11 DIAGNOSIS — R20.0 NUMBNESS AND TINGLING OF RIGHT ARM: ICD-10-CM

## 2020-06-11 DIAGNOSIS — M25.511 RIGHT SHOULDER PAIN: ICD-10-CM

## 2020-06-11 PROCEDURE — 71046 X-RAY EXAM CHEST 2 VIEWS: CPT | Mod: TC

## 2020-06-13 DIAGNOSIS — Z80.51 FAMILY HISTORY OF MALIGNANT NEOPLASM OF KIDNEY: ICD-10-CM

## 2020-06-13 DIAGNOSIS — Z80.0 FAMILY HISTORY OF MALIGNANT NEOPLASM OF COLON: ICD-10-CM

## 2020-06-13 DIAGNOSIS — Z80.8 FAMILY HISTORY OF MALIGNANT MELANOMA: ICD-10-CM

## 2020-06-13 LAB — MISCELLANEOUS TEST: NORMAL

## 2020-06-16 NOTE — TELEPHONE ENCOUNTER
RECORDS RECEIVED FROM: Thoracic Outlet Syndrome//[phone]   DATE RECEIVED: 6.17.20   NOTES STATUS DETAILS   OFFICE NOTE from referring provider Internal 6.8.20  Dr. Patel Martínez  [facility]   OFFICE NOTE from other specialist Internal 5.21.20  SONAM Lisa River   OPERATIVE REPORT N/A    MEDICATION LIST Internal    PERTINENT LABS Internal    CTA (CT ANGIOGRAPHY) N/A    CT N/A    MRI N/A    ULTRASOUND Internal 6.4.20  US Upper Ext Arterial TOS Ozzy

## 2020-06-17 ENCOUNTER — VIRTUAL VISIT (OUTPATIENT)
Dept: VASCULAR SURGERY | Facility: CLINIC | Age: 45
End: 2020-06-17
Payer: COMMERCIAL

## 2020-06-17 ENCOUNTER — PRE VISIT (OUTPATIENT)
Dept: VASCULAR SURGERY | Facility: CLINIC | Age: 45
End: 2020-06-17

## 2020-06-17 DIAGNOSIS — M79.601 RIGHT ARM PAIN: ICD-10-CM

## 2020-06-17 DIAGNOSIS — R20.2 NUMBNESS AND TINGLING OF RIGHT ARM: ICD-10-CM

## 2020-06-17 DIAGNOSIS — G54.0 THORACIC OUTLET SYNDROME OF BOTH THORACIC OUTLETS: Primary | ICD-10-CM

## 2020-06-17 DIAGNOSIS — R20.0 NUMBNESS AND TINGLING OF RIGHT ARM: ICD-10-CM

## 2020-06-17 ASSESSMENT — PAIN SCALES - GENERAL: PAINLEVEL: MODERATE PAIN (4)
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
## 2020-06-17 NOTE — NURSING NOTE
Vascular Rooming Note     Amelia Coker's goals for this visit include:   Chief Complaint   Patient presents with     Consult     Bhupinder, is participating in a video visit today for a consult regarding TOS, right arm is worse than the left arm aching and numbness, as reported by patient.     Maryan Toscano LPN

## 2020-06-19 ENCOUNTER — OFFICE VISIT (OUTPATIENT)
Dept: NEUROSURGERY | Facility: CLINIC | Age: 45
End: 2020-06-19
Attending: PHYSICIAN ASSISTANT
Payer: COMMERCIAL

## 2020-06-19 VITALS
OXYGEN SATURATION: 97 % | WEIGHT: 204 LBS | TEMPERATURE: 98.7 F | HEART RATE: 82 BPM | HEIGHT: 66 IN | SYSTOLIC BLOOD PRESSURE: 129 MMHG | DIASTOLIC BLOOD PRESSURE: 80 MMHG | BODY MASS INDEX: 32.78 KG/M2

## 2020-06-19 DIAGNOSIS — M47.816 SPONDYLOSIS OF LUMBAR REGION WITHOUT MYELOPATHY OR RADICULOPATHY: ICD-10-CM

## 2020-06-19 DIAGNOSIS — M54.16 LUMBAR RADICULOPATHY: ICD-10-CM

## 2020-06-19 PROCEDURE — G0463 HOSPITAL OUTPT CLINIC VISIT: HCPCS

## 2020-06-19 PROCEDURE — 99213 OFFICE O/P EST LOW 20 MIN: CPT | Performed by: PHYSICIAN ASSISTANT

## 2020-06-19 ASSESSMENT — PAIN SCALES - GENERAL: PAINLEVEL: SEVERE PAIN (6)

## 2020-06-19 ASSESSMENT — MIFFLIN-ST. JEOR: SCORE: 1573.12

## 2020-06-19 NOTE — LETTER
6/19/2020         RE: Amelia Coker  [address] [facility] [address]        Dear Colleague,    Thank you for referring your patient, Amelia Coker, to the [facility] NEUROSURGERY CLINIC. Please see a copy of my visit note below.    NEUROSURGERY CLINIC CONSULT NOTE     DATE OF VISIT: 6/19/2020     SUBJECTIVE:     Amelia Coker is a pleasant 45 year old female who presents to the clinic today for consultation on low back pain, SI pain and left leg pain. She is referred to the Neurosurgery Clinic by Mr. Mar PA-C, in . Pertinent medical history consists of a L4-5 cystectomy and L5-S1 discectomy.   Today, Ms. Coker reports a 4 year history of symptoms. She describes constant, sharp, aching pain that initiates in the midline to left low lumbar/SI region and radiates distally in the S1 distribution. This pain is not accompanied by paresthesias and numbness in the same distribution. Prolonged walking and standing aggravate the symptoms, while alleviation is obtained by positional changes such as squatting. No mechanism of injury such as trauma or a fall is associated with the onset of the pain. There are no bowel or bladder changes.    She has been evaluated by [facility], Dr. Burrell and [facility]. She has been offered a multi level fusion as well as non-operative pain management treatments     The patient has participated in conservative therapies to include physical therapy which did not provide any significant long term relief.       Current Outpatient Medications:      Acetaminophen (TYLENOL PO), Take 1,000 mg by mouth every 8 hours as needed , Disp: , Rfl:      DULoxetine (CYMBALTA) 60 MG capsule, TAKE ONE CAPSULE BY MOUTH ONCE DAILY, Disp: 90 capsule, Rfl: 0     meloxicam (MOBIC) 15 MG tablet, TAKE ONE TABLET BY MOUTH EVERY DAY (PATIENT WANTS UNICHEM BRAND ONLY), Disp: 90 tablet, Rfl: 2     acyclovir (ZOVIRAX) 5 % ointment, Apply topically 6 times daily (Patient not taking: Reported on  6/19/2020), Disp: 15 g, Rfl: 3     celecoxib (CELEBREX) 200 MG capsule, Take 1 capsule (200 mg) by mouth 2 times daily (Patient not taking: Reported on 6/17/2020), Disp: 60 capsule, Rfl: 0     clobetasol (TEMOVATE) 0.05 % external cream, Apply topically 2 times daily (Patient not taking: Reported on 6/19/2020), Disp: 60 g, Rfl: 1     diazepam (VALIUM) 5 MG tablet, Take 1 tablet (5 mg) by mouth 3 times daily as needed for muscle spasms (Patient not taking: Reported on 5/21/2020), Disp: 30 tablet, Rfl: 0     fexofenadine (ALLEGRA) 180 MG tablet, Take 1 tablet by mouth daily Reported on 4/5/2017, Disp: , Rfl:      mupirocin (BACTROBAN) 2 % external ointment, Apply topically 3 times daily (Patient not taking: Reported on 6/19/2020), Disp: 30 g, Rfl: 1     order for DME, Equipment being ordered: DME [id number] $70  Ankle Support MD Figure 8, Lace up, Disp: 1 Device, Rfl: 0     rizatriptan (MAXALT-MLT) 5 MG ODT, Take 1 tablet (5 mg) by mouth at onset of headache for migraine May repeat in 2 hours. Max 6 tablets/24 hours. (Patient not taking: Reported on 6/19/2020), Disp: 30 tablet, Rfl: 1     sucralfate (CARAFATE) 1 GM tablet, Take 1 tablet (1 g) by mouth 4 times daily (Patient not taking: Reported on 6/19/2020), Disp: 40 tablet, Rfl: 0     Allergies   Allergen Reactions     Ceftriaxone Anaphylaxis     Hives, rash, racing heart beat     Bactrim [Sulfamethoxazole W/Trimethoprim] Hives     Ciprofloxacin Other (See Comments)     Tendon Issues     Codeine Nausea and Vomiting     Copper      Rash       Doxycycline      Loss of skin pigmentation, skin loss.     Gold      Rash       Iodine-131 Hives     Levaquin [Levofloxacin] Other (See Comments)     Tendon issues with levaquin and cipro     Nickel      rash     Steel [Staples]      Rash from staples       Latex Rash     Penicillins Rash        Past Medical History:   Diagnosis Date     Abnormal mammogram 9/21/2016    right breast      AD (atopic dermatitis) 10/29/2015      Allergic rhinitis due to other allergen      Arthritis      Bell's palsy      Chronic fatigue 6/6/2012     Chronic infection     MRSA - 2015 scalp and prior to Abdomen     Contact dermatitis and other eczema, due to unspecified cause     eczema     Contusion of left foot, initial encounter 3/16/2016     DJD (degenerative joint disease), lumbar 9/26/2013     DJD (degenerative joint disease), lumbar 9/26/2013     Ds DNA antibody positive 4/16/2014    borderline.      Elevated blood pressure reading without diagnosis of hypertension 12/24/2013     Facial contusion 12/15/2014    hit by horse      Foraminal stenosis of lumbar region 9/26/2013     Frontal headache 12/15/2014     Gastroesophageal reflux disease, esophagitis presence not specified 6/29/2016     Heat sensitivity 10/29/2015     HTN, goal below 140/90 9/23/2015     Hyperlipidemia LDL goal <130 8/30/2017     Irregular heart beat      Keratitis sicca (H) 10/31/2012     Left shoulder pain 9/26/2013     Lumbar radiculopathy 1/22/2014     Migraine headache 10/23/2013     Migraine, unspecified, with intractable migraine, so stated, without mention of status migrainosus      Motion sickness      MRSA infection 10/20/2015     Muscular wasting and disuse atrophy, not elsewhere classified 6/9/2014    right SCM, right wrist,       Numbness and tingling     both legs anf feet greater on the left     PAC (premature atrial contraction) 7/15/2010     Plantar fasciitis 9/23/2015     PONV (postoperative nausea and vomiting)      Skin lesion 10/20/2015    posterior superior scalp      Spasm of muscle 7/11/2017     Telangiectasia of face 12/19/2014     Tooth pain 10/20/2015    left lower primary molar         ROS: 10 point review of symptoms are negative other than the symptoms noted above in the HPI.     Family History has been reviewed with the patient, there are no pertinent findings to presenting concern.     Past Surgical History:   Procedure Laterality Date     AS INJ  "TRANSFORAMIN EPIDURAL, LUMB/SACR SINGLE       COLONOSCOPY  3/11/2013    Procedure: COLONOSCOPY;  colonoscopy;  Surgeon: Lobito Mas MD;  [facility]: [facility]     COLONOSCOPY WITH CO2 INSUFFLATION N/A 11/19/2018    Procedure: COLONOSCOPY WITH CO2 INSUFFLATION;  Surgeon: Goyo Blank MD;  [facility]: [facility]     DECOMPRESSION LUMBAR TWO LEVELS Bilateral 12/8/2016    Procedure: DECOMPRESSION LUMBAR TWO LEVELS;  Surgeon: Bang Burrell MD;  Location: [facility]     DILATION AND CURETTAGE, HYSTEROSCOPY, ABLATE ENDOMETRIUM NOVASURE, COMBINED N/A 6/12/2019    Procedure: hysteroscopy, dilation & curettage, polypectomy, endometrial ablation;  Surgeon: Fredy Pham MD;  [facility]     ESOPHAGOSCOPY, GASTROSCOPY, DUODENOSCOPY (EGD), COMBINED  11/8/2013    Procedure: COMBINED ESOPHAGOSCOPY, GASTROSCOPY, DUODENOSCOPY (EGD), BIOPSY SINGLE OR MULTIPLE;  Esophagoscopy, Gastroscopy, Duodenoscopy EGD with multiple biopsies;  Surgeon: Teja Koch MD;  Location: [facility]     HC REMOVAL OF TONSILS,<11 Y/O      Tonsils <10y.o.     HC TOOTH EXTRACTION W/FORCEP       REPAIR TENDON ELBOW  7/9/2014    Procedure: REPAIR TENDON ELBOW;  Surgeon: Chris Johnston MD;  [facility]: [facility]     ULTRASONIC REMOVAL SOFT TISSUE (LOCATION) Right 11/21/2018    Procedure: Tenex right foot;  Surgeon: Patel Martínez DO;  Location: [facility]        Social History     Tobacco Use     Smoking status: Never Smoker     Smokeless tobacco: Never Used   Substance Use Topics     Alcohol use: No     Comment: social     Drug use: No        OBJECTIVE:   /80   Pulse 82   Temp 98.7  F (37.1  C)   Ht 1.67 m (5' 5.75\")   Wt 92.5 kg (204 lb)   SpO2 97%   BMI 33.18 kg/m     Body mass index is 33.18 kg/m .     Imaging:     Reviewed reports, no imaging available.     Full radiological report in chart. Imaging reviewed with with patient today.     Exam:     Patient appears comfortable, conversational, and in no apparent distress. "   Head: Normocephalic, without obvious abnormality, atraumatic, no facial asymmetry.   Eyes: conjunctivae/corneas clear. PERRL, EOM's intact.   Throat: lips, mucosa, and tongue normal; teeth and gums normal.   Neck: supple, symmetrical, trachea midline, no adenopathy and thyroid: not enlarged, symmetric, no tenderness/mass/nodules.   Lungs: clear to auscultation bilaterally.   Heart: regular rate and rhythm.   Abdomen: soft, non-tender; bowel sounds normal; no masses, no organomegaly.   Pulses: 2+ and symmetric.   Skin: Skin color, texture, turgor normal. No rashes or lesions.     CN II-XII grossly intact, alert and appropriate with conversation and following commands.   Gait is non-antalgic. Able to tandem walk. Able to walk on toes and heels without difficulty.   Cervical spine is non tender to palpation. Appropriate range of motion of neck, not concerning for lhermitte's phenomenon.   Bilateral bicep 2/4 and tricep reflexes 1/4. Sensation intact throughout upper extremities.     UE muscle strength  Right  Left    Deltoid  5/5  5/5    Biceps  5/5  5/5    Triceps  5/5  5/5    Hand intrinsics  5/5  5/5    Hand grasp  5/5  5/5    Toscano signs  neg  neg      Lumbar spine is tender to palpation at left paraspinal region and SI joint.   Intact sensation throughout lower extremities.   Bilateral patellar 2/4 and achilles reflex 1/4. Positive for pain with straight leg raise on left.     LE muscle strength  Right  Left    Iliopsoas (hip flexion)  5/5  5/5    Quad (knee extension)  5/5  5/5    Hamstring (knee flexion)  5/5  5/5    Gastrocnemius (PF)  5/5  5/5    Tibialis Ant. (DF)  5/5  5/5    EHL  5/5  5/5      Negative Babinski bilaterally. Negative for clonus.   Calves are soft and non-tender bilaterally.     ASSESSMENT/PLAN:     Amelia Coker is a 45 year old female who presents to the clinic for consultation on low back pain, SI pain and left leg pain. The patient's most recent imaging was reviewed with her today.  On exam, the patient is noted to have tenderness to palpation at the left paraspinal region and SI joint.   Intact sensation throughout lower extremities with a Positive SLR on the left.    Based on her physical exam, imaging review and past treatments, we feel that it would be in Ms. Coker's best interest to try a conservative approach by participating in a program initiated by our colleagues at the [facility]. She did inquire about possible treatment options that may be consider so we briefly discussed core stretching and strengthening exercises in conjunction with SI injections or even ablations as possibilities, but again, we explained that treatments will be determined by the team at the [facility] [place]. We would like to see her back as needed to further discuss possible surgical interventions. In the event that patient's symptoms worsen or change we would like to see her sooner.        Respectfully,     SHEREEN Lambert PA-C  [facility] Neurosurgery  [facility]  Tel: [phone]  Pager: [phone]     Exam, imaging, and plan reviewed by Dr. Kong.     Again, thank you for allowing me to participate in the care of your patient.        Sincerely,        Raymond Yeh PA-C

## 2020-06-19 NOTE — NURSING NOTE
"Amelia Coker is a 45 year old female who presents for:  Chief Complaint   Patient presents with     Neurologic Problem     Spondylosis of lumbar         Initial Vitals:  /80   Pulse 82   Temp 98.7  F (37.1  C)   Ht 5' 5.75\" (1.67 m)   Wt 204 lb (92.5 kg)   SpO2 97%   BMI 33.18 kg/m   Estimated body mass index is 33.18 kg/m  as calculated from the following:    Height as of this encounter: 5' 5.75\" (1.67 m).    Weight as of this encounter: 204 lb (92.5 kg).. Body surface area is 2.07 meters squared. BP completed using cuff size: large  Severe Pain (6)    Nursing Comments: Patient presents w/ LBP radiates left to foot sharp pains    Jose Carlos Ulrich MA  "

## 2020-06-21 NOTE — TELEPHONE ENCOUNTER
Called and spoke with patient. Patient reports that she has had PATCH testing done. She reacted to yellow gold, white gold, copper, nickel and 2 other metals that she cannot recall. She also has reacted to surgical staples and glasses that have titanium in them.     Patient needs to have a spinal fusion and is wondering if Dr. Jhonson can perform any other tests to further evaluate her allergy. Routing to provider to advise.     Jessie Carrillo RN    
Contacted patient and notified her of Dr. Johnson's recommendation, no further questions. Jen Barbosa RN on 9/30/2019 at 4:48 PM    
Metal testing through dermatology is the way to go. They have metal panels if that needed. Thanks.   
Reason for Call:  Other metal testing    Detailed comments: patient is wondering if Dr Johnson does metal testing    Phone Number Patient can be reached at: Home number on file [phone] (home)    Best Time: any    Can we leave a detailed message on this number? YES    Call taken on 9/30/2019 at 9:26 AM by Marli De Souza      
[phone]

## 2020-06-23 ENCOUNTER — MYC MEDICAL ADVICE (OUTPATIENT)
Dept: VASCULAR SURGERY | Facility: CLINIC | Age: 45
End: 2020-06-23

## 2020-06-23 DIAGNOSIS — G54.0 THORACIC OUTLET SYNDROME OF BOTH THORACIC OUTLETS: ICD-10-CM

## 2020-06-23 DIAGNOSIS — R20.2 NUMBNESS AND TINGLING OF RIGHT ARM: Primary | ICD-10-CM

## 2020-06-23 DIAGNOSIS — R20.0 NUMBNESS AND TINGLING OF RIGHT ARM: Primary | ICD-10-CM

## 2020-06-25 LAB — LAB SCANNED RESULT: NORMAL

## 2020-06-30 ENCOUNTER — MYC MEDICAL ADVICE (OUTPATIENT)
Dept: FAMILY MEDICINE | Facility: OTHER | Age: 45
End: 2020-06-30

## 2020-06-30 DIAGNOSIS — M48.061 FORAMINAL STENOSIS OF LUMBAR REGION: Primary | ICD-10-CM

## 2020-06-30 RX ORDER — DULOXETIN HYDROCHLORIDE 20 MG/1
CAPSULE, DELAYED RELEASE ORAL
Qty: 21 CAPSULE | Refills: 0 | Status: SHIPPED | OUTPATIENT
Start: 2020-06-30 | End: 2020-07-09

## 2020-07-01 ENCOUNTER — HOSPITAL ENCOUNTER (OUTPATIENT)
Dept: PHYSICAL THERAPY | Facility: CLINIC | Age: 45
Setting detail: THERAPIES SERIES
End: 2020-07-01
Attending: SURGERY
Payer: COMMERCIAL

## 2020-07-01 DIAGNOSIS — G54.0 THORACIC OUTLET SYNDROME OF BOTH THORACIC OUTLETS: ICD-10-CM

## 2020-07-01 DIAGNOSIS — R20.2 NUMBNESS AND TINGLING OF RIGHT ARM: ICD-10-CM

## 2020-07-01 DIAGNOSIS — R20.0 NUMBNESS AND TINGLING OF RIGHT ARM: ICD-10-CM

## 2020-07-01 PROCEDURE — 97162 PT EVAL MOD COMPLEX 30 MIN: CPT | Mod: GP | Performed by: PHYSICAL THERAPIST

## 2020-07-01 PROCEDURE — 97140 MANUAL THERAPY 1/> REGIONS: CPT | Mod: GP | Performed by: PHYSICAL THERAPIST

## 2020-07-01 PROCEDURE — 97110 THERAPEUTIC EXERCISES: CPT | Mod: GP | Performed by: PHYSICAL THERAPIST

## 2020-07-01 NOTE — PROGRESS NOTES
"   07/01/20 0700   General Information   Type of Visit Initial OP Ortho PT Evaluation   Start of Care Date 07/01/20   Referring Physician Dr.Amy Mock   Patient/Family Goals Statement To not have the right arm fall asleep.    Orders Evaluate and Treat   Date of Order 06/23/20   Certification Required? No   Medical Diagnosis Numbness and tingling of the right arm, Thoracic outlet syndrome of both thoracic outlets   Surgical/Medical history reviewed Yes   Precautions/Limitations no known precautions/limitations   Body Part(s)   Body Part(s) Cervical Spine   Presentation and Etiology   Pertinent history of current problem (include personal factors and/or comorbidities that impact the POC) Patient has been diagnosed with thoracic outlet B. Right is worse than the left. Started 1 year ago with incidious onset. Symptoms include right hand \"goes to sleep\" the fingers include the first 3 digits, go dead many times per day. Patient will extend right arm behind her and it will cause the feeling to come back. Patient is right handed. PMHX: dislocations on the right shoulder-many. Toren rotator cuff on the right. Ortho said no surgery on the shoulder but may have to have rib and scalene resection if PT does not help. HX of LBP. Does pain on the side of her neck. EMG is negative. Had US for vascular and blood flow decreased with arm at 180. she is an . But in the Fall has to do wood, hay, and other strenuous activities. Patient states that she has double D cups with her bras but measures as a \"F\" size breast.    Impairments A. Pain;E. Decreased flexibility;F. Decreased strength and endurance;J. Burning;K. Numbness;L. Tingling   Functional Limitations perform activities of daily living;perform required work activities;perform desired leisure / sports activities   Onset date of current episode/exacerbation 07/01/19   Chronicity Chronic   Pain rating (0-10 point scale)   (tingling in the right fingers a #4-5 today) "   Pain/symptoms exacerbated by   (reaching forward and over head)   Current / Previous Interventions   Diagnostic Tests:   (EMG negative, but blood flow is decreased)   Prior Level of Function   Prior Level of Function-Mobility independent   Current Level of Function   Patient role/employment history A. Employed   Living environment [place]   Fall Risk Screen   Fall screen completed by PT   Have you fallen 2 or more times in the past year? No   Have you fallen and had an injury in the past year? No   Is patient a fall risk? No   Cervical Spine   Observation Dowegers hump, forward head   Cervical Flexion ROM WNL   Cervical Extension ROM WNL   Cervical Right Side Bending ROM WNL   Cervical Left Side Bending ROM WNL   Cervical Right Rotation ROM WNL   Cervical Left Rotation ROM WNL, pulling on the right side of the neck   Shoulder AROM Screen WNL, slightly hypermobile   Shoulder/Wrist/Hand Strength Comments Shoulder strength is WNL, but compensates with UT's, pects.    Upper Trapezius Flexibility tightness bilateral   Scalene Flexibility pt cannot fire the scalene on the right, has not been able to do this for years.    Cervical/Shoulder Special Tests Comments numbness and tingling increases fast in the right hand with having the hand over her head over head. Inferior/medial trap is weak, and scapular humeral rhythm is decreased on the right   Palpation pain in the UT right, C7-T1, right rib 1 is elevated at the costovertebral junction   Thoracic Outlet Syndrome TOS Tests   Adson's (scalene outlet) When attempting pt had immediate tingling in the hand and fingers with shoulder over 90 degrees   Planned Therapy Interventions   Planned Therapy Interventions joint mobilization;manual therapy;neuromuscular re-education;ROM;strengthening;stretching   Clinical Impression   Criteria for Skilled Therapeutic Interventions Met yes, treatment indicated   PT Diagnosis thoracic outlet worse on the right. pain. numbness and  tingling right hand   Influenced by the following impairments hypermobility in the shoulder, first rib high   Functional limitations due to impairments Patient is having tingling and numbness in the right hand all the time, and pain to some degree in the right UT   Clinical Presentation Evolving/Changing   Clinical Presentation Rationale many issues and complex HX, with hypermobility   Clinical Decision Making (Complexity) Moderate complexity   Therapy Frequency 1 time/week   Predicted Duration of Therapy Intervention (days/wks) 10 wks   Risk & Benefits of therapy have been explained Yes   Patient, Family & other staff in agreement with plan of care Yes   Clinical Impression Comments Patient has hypermobility in the right shoulder joint along with a 1st rib that seems elevated, thus causing compression in the thoracic outlet , pt also has very large breast tissue that is pulling the upper trunk forward and causing tightness and pain at the lower cervical and upper thoracic, this is contributing to the thoracic outlet sx's also.    Education Assessment   Preferred Learning Style Listening;Reading;Demonstration   Barriers to Learning No barriers   ORTHO GOALS   PT Ortho Eval Goals 1;2   Ortho Goal 1   Goal Identifier 1   Goal Description Patient is able to sleep at night with 50% less tingling, numbness in the right UE   Target Date 09/09/20   Ortho Goal 2   Goal Identifier 2   Goal Description Patient has 70% decrease in right UE sx's in standing and while being active at work and home   Target Date 09/09/20   Total Evaluation Time   PT Yudi, Moderate Complexity Minutes (92905) 15   Thank you for the referral,            Ashleigh Martínez PT

## 2020-07-08 NOTE — PROGRESS NOTES
"Amelia Coker is a 45 year old female who is being evaluated via a billable video visit.      The patient has been notified of following:     \"This video visit will be conducted via a call between you and your physician/provider. We have found that certain health care needs can be provided without the need for an in-person physical exam.  This service lets us provide the care you need with a video conversation.  If a prescription is necessary we can send it directly to your pharmacy.  If lab work is needed we can place an order for that and you can then stop by our lab to have the test done at a later time.    Video visits are billed at different rates depending on your insurance coverage.  Please reach out to your insurance provider with any questions.    If during the course of the call the physician/provider feels a video visit is not appropriate, you will not be charged for this service.\"    Patient has given verbal consent for Video visit? Yes  How would you like to [facility] your [facility]  Patient would like the video invitation sent by: Text to cell phone: [phone]  Will anyone else be joining your video visit? [facility] Consultation      This patient is being seen in consultation at the request of her provider LANRE Araiza    Primary Care Provider is Otoniel Nelson.    Please see the [facility] health questionnaire which the patient completed and reviewed with me in detail    CHIEF COMPLAINT:  SI joint-bilateral vacuum phenomena-left leg    HISTORY OF PRESENT ILLNESS:  Amelia Coker is a 45 year old female with history of low back pain.    She states that her pain started in 2011. She denies any accident or injury with the pain started. She states that she was doing a lot of bending at the time and it irritated the issue. Since then she has done various PT and took NSAIDs and muscle relaxers. She states that the only thing that has " helped in the past was doing a course of steroid, but lately that isn't helping any more. Surgery helped for about 2 months.     She states that after surgery the low back pain was better and the collapsing of the right leg. She states that around her SI area, she has a sharp, pinching sensation. She gets a deep ache down to her calf. The left side is worse than the right, but she does have some discomfort on the right as well. She states taht when she walks she feels a sharp pinching on both sides.         Pain Information:   Onset/Progression:  Pain started 2011.   Pain quality: Aching, Sharp and pinching    Pain timing: Constant, worse in the afternoon     Pain rating: intensity averages 7/10 on a 0-10 scale.   Aggravating factors include: varies   Relieving factors include: varies      Past Pain Treatments:   Pain Clinic:  No   PT: Yes    Psychologist: No  Relaxation techniques/biofeedback: No  Chiropractor: Yes  Acupuncture: Yes  Pharmacotherapy:    Opioids: No     Non-opioids:  Yes   TENs Unit:Yes  Injections:   -epidural L4-L5, S1 x12 (with Dr. Goetz)-these were before surgery  -S1 nerve injection (at [facility])-Fall 2019-not helpful-made pain worse  Surgeries related to pain:   -12/08/2016 Lumbar decompression L4-5 right, L5-S1 left    Current Pain Relevant Medications:    Tylenol 650mg: takes 2 pills twice daily  Cymbalta-tapering off-currently on 20mg  Mobic 15mg takes once daily  Valium 5mg: uses sparingly-last used in the fall 2019      Previous Pain Relevant Medications: (H--helped; HI--Helped initially; SWH--Somewhat helpful; NH--No help; W--worse; SE--side effects; ?--Unsure if helpful)   NOTE: This medication information taken from patient's intake form, not medical records.   Opiates: none  NSAIDS: Celebrex NH, Mobic H, Ibuprofen [facility] SE stomach upset, Aleve NH SE hand swelling, Nabumetone NH, Voltaren NH  Anti-migraine mediations: Maxalt H  Muscle Relaxants: Valium H, Flexeril NH, Tizanidine NH, Robaxin  NH  Neuropathics: Gabapentin NH,   Anti-depressants: Cymbalta NH, Amitriptyline NH   Anxiolytics: Valium H SE hangover effect  Topicals: CBD NH, Biofreeze NH, Lidocaine NH, Voltaren NH, Magnesium oil NH  Other medications not covered above: Tylenol [facility]    FAMILY MEDICAL HISTORY:  Chronic pain: Yes, all of her siblings have back pain, dad with back pain      PAST MEDICAL HISTORY:   Past Medical History:   Diagnosis Date     Abnormal mammogram 9/21/2016    right breast      AD (atopic dermatitis) 10/29/2015     Allergic rhinitis due to other allergen      Arthritis      Bell's palsy      Chronic fatigue 6/6/2012     Chronic infection     MRSA - 2015 scalp and prior to Abdomen     Contact dermatitis and other eczema, due to unspecified cause     eczema     Contusion of left foot, initial encounter 3/16/2016     DJD (degenerative joint disease), lumbar 9/26/2013     DJD (degenerative joint disease), lumbar 9/26/2013     Ds DNA antibody positive 4/16/2014    borderline.      Elevated blood pressure reading without diagnosis of hypertension 12/24/2013     Facial contusion 12/15/2014    hit by horse      Foraminal stenosis of lumbar region 9/26/2013     Frontal headache 12/15/2014     Gastroesophageal reflux disease, esophagitis presence not specified 6/29/2016     Heat sensitivity 10/29/2015     HTN, goal below 140/90 9/23/2015     Hyperlipidemia LDL goal <130 8/30/2017     Irregular heart beat      Keratitis sicca (H) 10/31/2012     Left shoulder pain 9/26/2013     Lumbar radiculopathy 1/22/2014     Migraine headache 10/23/2013     Migraine, unspecified, with intractable migraine, so stated, without mention of status migrainosus      Motion sickness      MRSA infection 10/20/2015     Muscular wasting and disuse atrophy, not elsewhere classified 6/9/2014    right SCM, right wrist,       Numbness and tingling     both legs anf feet greater on the left     PAC (premature atrial contraction) 7/15/2010     Plantar fasciitis  9/23/2015     PONV (postoperative nausea and vomiting)      Skin lesion 10/20/2015    posterior superior scalp      Spasm of muscle 7/11/2017     Telangiectasia of face 12/19/2014     Tooth pain 10/20/2015    left lower primary molar          HEALTH AND LIFESTYLE PRACTICES:  Have you ever had any problems with alcohol or drug use? no      SLEEP:  Do you snore heavily? no  Have you been diagnosed with sleep apnea? no  Do you wear a CPAP? no      ALLERGIES:  Allergies   Allergen Reactions     Ceftriaxone Anaphylaxis     Hives, rash, racing heart beat     Bactrim [Sulfamethoxazole W/Trimethoprim] Hives     Ciprofloxacin Other (See Comments)     Tendon Issues     Codeine Nausea and Vomiting     Copper      Rash       Doxycycline      Loss of skin pigmentation, skin loss.     Gold      Rash       Iodine-131 Hives     Levaquin [Levofloxacin] Other (See Comments)     Tendon issues with levaquin and cipro     Nickel      rash     Steel [Staples]      Rash from staples       Latex Rash     Penicillins Rash       MEDICATIONS:  Current Outpatient Medications   Medication Sig Dispense Refill     Acetaminophen (TYLENOL PO) Take 1,000 mg by mouth every 8 hours as needed        acyclovir (ZOVIRAX) 5 % ointment Apply topically 6 times daily (Patient not taking: Reported on 6/19/2020) 15 g 3     celecoxib (CELEBREX) 200 MG capsule Take 1 capsule (200 mg) by mouth 2 times daily (Patient not taking: Reported on 6/17/2020) 60 capsule 0     clobetasol (TEMOVATE) 0.05 % external cream Apply topically 2 times daily (Patient not taking: Reported on 6/19/2020) 60 g 1     diazepam (VALIUM) 5 MG tablet Take 1 tablet (5 mg) by mouth 3 times daily as needed for muscle spasms (Patient not taking: Reported on 5/21/2020) 30 tablet 0     DULoxetine (CYMBALTA) 20 MG capsule Take 2 capsules (40 mg) by mouth daily for 7 days, THEN 1 capsule (20 mg) daily for 7 days. 21 capsule 0     DULoxetine (CYMBALTA) 60 MG capsule TAKE ONE CAPSULE BY MOUTH ONCE  DAILY 90 capsule 0     fexofenadine (ALLEGRA) 180 MG tablet Take 1 tablet by mouth daily Reported on 4/5/2017       meloxicam (MOBIC) 15 MG tablet TAKE ONE TABLET BY MOUTH EVERY DAY (PATIENT WANTS UNICHEM BRAND ONLY) 90 tablet 2     mupirocin (BACTROBAN) 2 % external ointment Apply topically 3 times daily (Patient not taking: Reported on 6/19/2020) 30 g 1     order for DME Equipment being ordered: DME [id number] $70  Ankle Support MD Figure 8, Lace up 1 Device 0     rizatriptan (MAXALT-MLT) 5 MG ODT Take 1 tablet (5 mg) by mouth at onset of headache for migraine May repeat in 2 hours. Max 6 tablets/24 hours. (Patient not taking: Reported on 6/19/2020) 30 tablet 1     sucralfate (CARAFATE) 1 GM tablet Take 1 tablet (1 g) by mouth 4 times daily (Patient not taking: Reported on 6/19/2020) 40 tablet 0         REVIEW OF SYSTEMS:   Constitutional:  Negative  Eyes/Head: Dizziness, Headache and Vision Changes  Ears/Nose/Throat: Negative  Allergy/Immune: Immune deficiency (FLORIDALMA +), and allergies  Skin:Rash (eczema), itching on the left foot  Hematologic/Lymphatic/Immunologic:Negative  Respiratory: Negative  Cardiovascular: Palpitations  Gastrointestinal: Nausea and Vomiting  Endocrine: Negative  Musculoskeletal: Back pain, Joint pain, Neck pain and Stiffness  Urinary:  Negative   Any bowel or bladder incontinence: Denies   Neurologic: Numbness/Tingling  Psychiatric: Negative    CURRENT FAMILY/SOCIAL SITUATION:  Living situation: Patient lives with her  and son  Support system: She reports having a good support system  Occupation: patient is an , she is working 40-44hours/week    SUBSTANCE USE:  Any illicit drug use: marijuana as a teenager  EtOH use: rarely, few times year  Nicotine use: none  Any use of prescriptions other than how they were prescribed: none    PHYSICAL EXAM    Vitals: no vitals were taken for this exam    Appearance:     A&O. Patient is appropriate.   Patient is in NAD.   Patient is well  groomed and appears stated age.     HEENT:   Normocephalic, atraumatic. Hearing is adequate for exam.  Respiratory: No shortness of breath with normal conversation  Skin:  No rashes, erythema, breakdowns, lesions to exposed skin.   Hematologic:  No bruises, petechiae or ecchymosis to exposed areas.  Psychiatric:  mentation appears normal., affect and mood normal  Musculoskeletal:  Cervical spine:   Flex:  30 degrees, painful    Ext: 30 degrees, painful   Lumbar/Sacral spine:   Flexion:  90 degrees, pain free   Ext: 20 degrees, painful      Gait pattern:  Patient has a normal gait.     Previous Diagnostic Tests:   Imaging Studies:   MRI Lumbar Spine 9/12/2019        [place] Board of Pharmacy Data Base Reviewed:    YES; No concern for abuse or misuse of controlled medications based on this report.       ASSESSMENT:   Amelia Coker is a 45 year old female who presents today with the complaints of:    1. Chronic SI joint pain  2. Chronic low back pain  3. Lumbar facet joint pain  4. Myofascial pain    We discussed that a multidisciplinary approach to pain management typically includes physical therapy, behavioral health, medication management and injection therapy.     We discussed trying Topamax. She is currently coming off of Cymbalta. Would plan to wait until she is done with this before starting. Discussed the importance of drinking a lot of water with this medication. She denies any history of glaucoma.     We discussed a lateral branch block preceeding to a transection of the nerve as well as a LMBB preceeding to a RFA. She would like to start with the lateral branch block. If this is not helpful, would try the LMBB.    PLAN:    Diagnosis reviewed, treatment option addressed, and risk/benefits discussed.  Self-care instructions given.  I am recommending a multidisciplinary treatment plan to help this patient better manage her pain.       1. Physical Therapy:  Not at this time, patient is aware she would need  this prior to any RFA  2. Clinical Health Psychologist to address issues of relaxation, behavorial change, coping style, and other factors important to improvement: not at this time  3. Diagnostic Studies:  None at this time  4. Medication Management:   1. Plan to start Topamax at 25mg at bedtime for 1 week, then 25mg BID x1week, then 25mg in AM and 50mg at bedtime for 1 week, then 50mg BID once she has completed her taper of Cymbalta   5. Potential procedures: Referred for a left lateral branch block   6. Recommendations to PCP: see above    Follow up: 6-8 Weeks       Video-Visit Details    Type of service:  Video Visit    Video Start Time: 9:01am  Video End Time: 9:42am    Originating Location (pt. Location): Home    Distant Location (provider location):  [facility]     Platform used for Video Visit: María Angulo [facility]

## 2020-07-09 ENCOUNTER — VIRTUAL VISIT (OUTPATIENT)
Dept: ONCOLOGY | Facility: CLINIC | Age: 45
End: 2020-07-09
Payer: COMMERCIAL

## 2020-07-09 ENCOUNTER — VIRTUAL VISIT (OUTPATIENT)
Dept: PALLIATIVE MEDICINE | Facility: CLINIC | Age: 45
End: 2020-07-09
Payer: COMMERCIAL

## 2020-07-09 ENCOUNTER — MYC MEDICAL ADVICE (OUTPATIENT)
Dept: FAMILY MEDICINE | Facility: OTHER | Age: 45
End: 2020-07-09

## 2020-07-09 ENCOUNTER — HOSPITAL ENCOUNTER (OUTPATIENT)
Dept: PHYSICAL THERAPY | Facility: CLINIC | Age: 45
Setting detail: THERAPIES SERIES
End: 2020-07-09
Attending: SURGERY
Payer: COMMERCIAL

## 2020-07-09 VITALS — BODY MASS INDEX: 32.14 KG/M2 | HEIGHT: 66 IN | WEIGHT: 200 LBS

## 2020-07-09 DIAGNOSIS — Z80.8 FAMILY HISTORY OF MALIGNANT MELANOMA: ICD-10-CM

## 2020-07-09 DIAGNOSIS — G89.29 CHRONIC SI JOINT PAIN: Primary | ICD-10-CM

## 2020-07-09 DIAGNOSIS — Z80.51 FAMILY HISTORY OF MALIGNANT NEOPLASM OF KIDNEY: ICD-10-CM

## 2020-07-09 DIAGNOSIS — M48.061 FORAMINAL STENOSIS OF LUMBAR REGION: ICD-10-CM

## 2020-07-09 DIAGNOSIS — M53.3 CHRONIC SI JOINT PAIN: Primary | ICD-10-CM

## 2020-07-09 DIAGNOSIS — Z80.0 FAMILY HISTORY OF MALIGNANT NEOPLASM OF COLON: Primary | ICD-10-CM

## 2020-07-09 PROCEDURE — 99214 OFFICE O/P EST MOD 30 MIN: CPT | Mod: 95 | Performed by: PHYSICIAN ASSISTANT

## 2020-07-09 PROCEDURE — 97110 THERAPEUTIC EXERCISES: CPT | Mod: GP | Performed by: PHYSICAL THERAPIST

## 2020-07-09 PROCEDURE — 97140 MANUAL THERAPY 1/> REGIONS: CPT | Mod: GP | Performed by: PHYSICAL THERAPIST

## 2020-07-09 PROCEDURE — 99207 ZZC NO BILLABLE SERVICE THIS VISIT: CPT | Performed by: GENETIC COUNSELOR, MS

## 2020-07-09 RX ORDER — DULOXETIN HYDROCHLORIDE 20 MG/1
20 CAPSULE, DELAYED RELEASE ORAL DAILY
Qty: 21 CAPSULE | Refills: 0 | Status: SHIPPED | OUTPATIENT
Start: 2020-07-09 | End: 2020-11-05

## 2020-07-09 ASSESSMENT — MIFFLIN-ST. JEOR: SCORE: 1554.97

## 2020-07-09 NOTE — PATIENT INSTRUCTIONS
After Visit Instructions:     Thank you for coming to [facility] for your care. It is my goal to partner with you to help you reach your optimal state of health.     I am recommending multidisciplinary care at this time.  The focus of care will be to continue gradual rehabilitation and pain management with medication adjustments as needed.    Continue daily self-care, identifying contributing factors, and monitoring variations in pain level. Continue to integrate self-care into your life.        Schedule follow-up with Sabiha Angulo PA-C in 6-8 weeks. You will need to make this appointment.   Procedures recommended: Referred for a left lateral branch block. To call and schedule your procedure, you can call: [phone], then hit option 2    Medication recommendations:     Plan to start Topamax at 25mg at bedtime for 1 week, then 25mg BID x1week, then 25mg in AM and 50mg at bedtime for 1 week, then 50mg BID once she has completed her taper of Cymbalta       Sabiha Angulo PA-C  [facility]  [place]    Contact information: [facility]  Clinic Number:  [phone]     Call with any questions about your care and for scheduling assistance.     Calls are returned Monday through Friday between 8 AM and 4:30 PM. We usually get back to you within 2 business days depending on the issue/request.    If we are prescribing your medications:    For opioid medication refills, call the clinic or send a [facility] message 7 days in advance.  Please include:    Name of requested medication    Name of the pharmacy.    For non-opioid medications, call your pharmacy directly to request a refill. Please allow 3-4 days to be processed.     Per [place] State Law:    All controlled substance prescriptions must be filled within 30 days of being written.      For those controlled substances allowing refills, pickup must occur within 30 days of last fill.      We  believe regular attendance is key to your success in our program!      Any time you are unable to keep your appointment we ask that you call us at least 24 hours in advance to cancel.This will allow us to offer the appointment time to another patient.   Multiple missed appointments may lead to dismissal from the clinic.

## 2020-07-09 NOTE — Clinical Note
"Hello,    Please enclose a copy of the test report from the laboratory tab titled \"send out misc test\" dated 6/13/2020 (Order [id number]) to send to the patient along with the letter.    Thank you,  Billy"

## 2020-07-09 NOTE — LETTER
Cancer Risk Management  Program [facility]  [facility]  [facility]  Mailing Address  Cancer Risk Management Program  [facility]  [address]  [place] [address]    New patient appointments  [phone]  July 9, 2020    Amelia Coker  [address]  [facility] [address]      Dear Amelia,    It was a pleasure speaking with you over video on 7/9/2020. Here is a copy of the progress note from our discussion. If you have any additional questions, please feel free to call.    Referring Provider: Fredy Pham MD    Presenting Information:  I spoke to Amelia by video today to discuss her genetic testing results. Her blood was drawn on 6/13/2020. CustomNext-Cancer panel was ordered from [employer]. This testing was done because of Amelia's family history of uveal melanoma, vaginal, colon, kidney, bladder, and gallbladder cancer.    Genetic Testing Result: NEGATIVE  Amelia is negative for mutations in the APC, KEILA, BAP1, BARD1, BMPR1A, BRCA1, BRCA2, BRIP1, CDH1, CDK4, CDKN2A, CHEK2, DICER1, EPCAM, FH, FLCN, GREM1, HOXB13, MET, MITF, MLH1, MRE11A, MSH2, MSH6, MUTYH, NBN, NF1, PALB2, PMS2, POLD1, POLE, PTEN, RAD50, RAD51C, RAD51D, SDHA, SDHB, SDHC, SDHD, SMAD4, SMARCA4, STK11, TP53, TSC1, TSC2, and VHL genes. No mutations were found in any of the 46 genes analyzed. This test involved sequencing and deletion/duplication analysis of all genes with the exception of EPCAM and GREM1 (deletions only) and MITF (sequencing only).    Testing did not detect an identifiable mutation associated with Hereditary Breast and Ovarian Cancer syndrome (BRCA1, BRCA2), Sullivan syndrome (MLH1, MSH2, MSH6, PMS2, EPCAM), Familial Adenomatous Polyposis (APC), Hereditary Leiomyomatosis and Renal Cell Cancer (FH), Ntbb-Iqjf-Lkwq (FLCN),  "Hereditary Diffuse Gastric Cancer (CDH1), Familial Atypical Multiple Mole Melanoma syndrome (CDK4, CDKN2A), Juvenile Polyposis syndrome (BMPR1A, SMAD4), Cowden syndrome (PTEN), Li Fraumeni syndrome (TP53), Hereditary Paraganglioma and Pheochromocytoma syndrome (SDHA, SDHB, SDHC, SDHD), Tuberous sclerosis complex (TSC1, TSC2), and Von Hippel-Lindau disease (VHL), Peutz-Jeghers syndrome (STK11), MUTYH Associated Polyposis (MUTYH), and Neurofibromatosis type 1 (NF1).       A copy of the test report can be found in the Laboratory tab, dated 6/13/2020, and named \"[facility]\". The report is scanned in as a linked document.    Interpretation:  We discussed several different interpretations of this negative test result.    1. One explanation may be that there is a different gene or combination of genes and environment that are associated with the cancers in this family.  2. It is possible that her mother or father did have a mutation in one of these genes and she did not inherit it.  3. There is also a small possibility that there is a mutation in one of these genes, and the testing laboratory could not find it with their current testing methods.       Screening:  Based on this negative test result, it is important for Amelia and her relatives to refer back to the family history for appropriate cancer screening.    Per National Comprehensive Cancer Network (NCCN) guidelines, individuals with a first degree relative with colorectal cancer diagnosed at any age should begin colonoscopy at age 40, or 10 years before the earliest diagnosis of colorectal cancer, whichever is first.  In this family, colonoscopy should start at age 40. Colonoscopy should be repeated every 5 years, or based on colonoscopy findings. This would apply to Amelia and her siblings. These recommendations may change based on personal and family history as well as personal preference, and should be discussed with an individuals physician.    "     Other population cancer screening options, such as those recommended by the American Cancer Society and the National Comprehensive Cancer Network (NCCN), are also appropriate for Amelia and her family. These screening recommendations may change if there are changes to Amelia's personal and/or family history of cancer. Final screening recommendations should be made by each individual's managing physician.    Inheritance:  We reviewed autosomal dominant inheritance. We discussed that Amelia did not pass on an identifiable mutation in these genes to her children based on this test result. Mutations in these genes do not skip generations.      Additional Testing Considerations:  Although Amelia's genetic testing result was negative, other relatives may still carry a gene mutation associated with uveal melanoma, vaginal, colon, kidney, bladder, and/or gallbladder cancer cancer. Genetic counseling is recommended for her siblings and other close maternal relatives to discuss genetic testing options. If any of these relatives do pursue genetic testing, Amelia is encouraged to contact me so that we may review the impact of their test results on her.    Summary:  We do not have an explanation for Amelia's family history of cancer. While no genetic changes were identified, Amelia may still be at risk for certain cancers due to family history, environmental factors, or other genetic causes not identified by this test.  Because of that, it is important that she continue with cancer screening based on her personal and family history as discussed above.    Genetic testing is rapidly advancing, and new cancer susceptibility genes will most likely be identified in the future.  Therefore, I encouraged Amelia to contact me annually or if there are changes in her personal or family history.  This may change how we assess her cancer risk, screening, and the testing we would offer.    Plan:  1. A copy of the test  results will be mailed to Amelia.  2. She plans to follow-up with her physicians.  3. She should contact me annually, or sooner if her family history changes.    If Amelia has any further questions, I encouraged her to contact me at [phone].    Time spent on the phone: 5 minutes.    Billy Porter MS [facility]  Licensed Genetic Counselor  Phone: [phone]  Fax: [phone]

## 2020-07-09 NOTE — PROGRESS NOTES
"7/9/2020    Referring Provider: Fredy Pham MD    Presenting Information:  I spoke to Amelia by video today to discuss her genetic testing results. Her blood was drawn on 6/13/2020. CustomNext-Cancer panel was ordered from [employer]. This testing was done because of Amelia's family history of uveal melanoma, vaginal, colon, kidney, bladder, and gallbladder cancer.    Genetic Testing Result: NEGATIVE  Amelia is negative for mutations in the APC, KEILA, BAP1, BARD1, BMPR1A, BRCA1, BRCA2, BRIP1, CDH1, CDK4, CDKN2A, CHEK2, DICER1, EPCAM, FH, FLCN, GREM1, HOXB13, MET, MITF, MLH1, MRE11A, MSH2, MSH6, MUTYH, NBN, NF1, PALB2, PMS2, POLD1, POLE, PTEN, RAD50, RAD51C, RAD51D, SDHA, SDHB, SDHC, SDHD, SMAD4, SMARCA4, STK11, TP53, TSC1, TSC2, and VHL genes. No mutations were found in any of the 46 genes analyzed. This test involved sequencing and deletion/duplication analysis of all genes with the exception of EPCAM and GREM1 (deletions only) and MITF (sequencing only).    Testing did not detect an identifiable mutation associated with Hereditary Breast and Ovarian Cancer syndrome (BRCA1, BRCA2), Sullivan syndrome (MLH1, MSH2, MSH6, PMS2, EPCAM), Familial Adenomatous Polyposis (APC), Hereditary Leiomyomatosis and Renal Cell Cancer (FH), Ttop-Rvbe-Khsx (FLCN), Hereditary Diffuse Gastric Cancer (CDH1), Familial Atypical Multiple Mole Melanoma syndrome (CDK4, CDKN2A), Juvenile Polyposis syndrome (BMPR1A, SMAD4), Cowden syndrome (PTEN), Li Fraumeni syndrome (TP53), Hereditary Paraganglioma and Pheochromocytoma syndrome (SDHA, SDHB, SDHC, SDHD), Tuberous sclerosis complex (TSC1, TSC2), and Von Hippel-Lindau disease (VHL), Peutz-Jeghers syndrome (STK11), MUTYH Associated Polyposis (MUTYH), and Neurofibromatosis type 1 (NF1).       A copy of the test report can be found in the Laboratory tab, dated 6/13/2020, and named \"SEND OUTS MISC TEST\". The report is scanned in as a linked document.    Interpretation:  We discussed several " different interpretations of this negative test result.    1. One explanation may be that there is a different gene or combination of genes and environment that are associated with the cancers in this family.  2. It is possible that her mother or father did have a mutation in one of these genes and she did not inherit it.  3. There is also a small possibility that there is a mutation in one of these genes, and the testing laboratory could not find it with their current testing methods.       Screening:  Based on this negative test result, it is important for Amelia and her relatives to refer back to the family history for appropriate cancer screening.    Per National Comprehensive Cancer Network (NCCN) guidelines, individuals with a first degree relative with colorectal cancer diagnosed at any age should begin colonoscopy at age 40, or 10 years before the earliest diagnosis of colorectal cancer, whichever is first.  In this family, colonoscopy should start at age 40. Colonoscopy should be repeated every 5 years, or based on colonoscopy findings. This would apply to Amelia and her siblings. These recommendations may change based on personal and family history as well as personal preference, and should be discussed with an individuals physician.        Other population cancer screening options, such as those recommended by the American Cancer Society and the National Comprehensive Cancer Network (NCCN), are also appropriate for Amelia and her family. These screening recommendations may change if there are changes to Amelia's personal and/or family history of cancer. Final screening recommendations should be made by each individual's managing physician.    Inheritance:  We reviewed autosomal dominant inheritance. We discussed that Amelia did not pass on an identifiable mutation in these genes to her children based on this test result. Mutations in these genes do not skip generations.      Additional Testing  Considerations:  Although Amelia's genetic testing result was negative, other relatives may still carry a gene mutation associated with uveal melanoma, vaginal, colon, kidney, bladder, and/or gallbladder cancer cancer. Genetic counseling is recommended for her siblings and other close maternal relatives to discuss genetic testing options. If any of these relatives do pursue genetic testing, Amelia is encouraged to contact me so that we may review the impact of their test results on her.    Summary:  We do not have an explanation for Amelia's family history of cancer. While no genetic changes were identified, Amelia may still be at risk for certain cancers due to family history, environmental factors, or other genetic causes not identified by this test.  Because of that, it is important that she continue with cancer screening based on her personal and family history as discussed above.    Genetic testing is rapidly advancing, and new cancer susceptibility genes will most likely be identified in the future.  Therefore, I encouraged mAelia to contact me annually or if there are changes in her personal or family history.  This may change how we assess her cancer risk, screening, and the testing we would offer.    Plan:  1. A copy of the test results will be mailed to Amelia.  2. She plans to follow-up with her physicians.  3. She should contact me annually, or sooner if her family history changes.    If Amelia has any further questions, I encouraged her to contact me at [phone].    Time spent on the phone: 5 minutes.    Billy Porter MS [facility]  Licensed Genetic Counselor  Phone: [phone]  Fax: [phone]

## 2020-07-13 ENCOUNTER — TELEPHONE (OUTPATIENT)
Dept: SURGERY | Facility: CLINIC | Age: 45
End: 2020-07-13

## 2020-07-13 DIAGNOSIS — Z11.59 ENCOUNTER FOR SCREENING FOR OTHER VIRAL DISEASES: Primary | ICD-10-CM

## 2020-07-13 NOTE — TELEPHONE ENCOUNTER
Pt scheduled for [facility]   Date: 7/23/20  Time: 1000  Dr. Goetz    Patient informed of COVID testing process.

## 2020-07-13 NOTE — PROGRESS NOTES
Subjective     Amelia Coker is a 45 year old female who presents to clinic today for the following health issues:    HPI   Patients BP today 90/64 states she feels disoriented.      Concern - Leg wound   Onset: 5 weeks    Description:   Right lower leg puncture wound that seems to be healing from the inside out. Wound is white and weeping clear fluid    Intensity: moderate    Progression of Symptoms:  waxing and waning    Accompanying Signs & Symptoms:  Very painful if area is touched. Swelling     Previous history of similar problem:   none    Precipitating factors:   Worsened by: touching the area     Alleviating factors:  Improved by: Bactroban     Therapies Tried and outcome: Bactroban   Patient Active Problem List   Diagnosis     Migraine, unspecified, with intractable migraine, so stated, without mention of status migrainosus     Bell's palsy     Contact dermatitis and other eczema, due to unspecified cause     Allergic rhinitis due to other allergen     Scalp lesion     Lyme disease     PAC (premature atrial contraction)     Palpitations     Raynaud phenomenon     Chronic fatigue     Hair loss     Abnormal PFT     Shoulder joint instability     Family history of melanoma     Dry eyes     Keratitis sicca (H)     Family history of colon cancer     Pain in joint, upper arm     FLORIDALMA positive     Plantar fascial fibromatosis     Foraminal stenosis of lumbar region     DJD (degenerative joint disease), lumbar     Migraine without aura and without status migrainosus, not intractable     Lumbar radiculopathy     Ds DNA antibody positive     Muscular wasting and disuse atrophy, not elsewhere classified     Frontal headache     Telangiectasia of face     Lateral epicondylitis     Right shoulder pain     Plantar fasciitis     HTN, goal below 140/90     Skin lesion     AD (atopic dermatitis)     Heat sensitivity     Rotator cuff arthropathy, right     Gastroesophageal reflux disease, esophagitis presence not specified      Abnormal mammogram     Sternocleidomastoid muscle tenderness     Acute cervical myofascial strain, initial encounter     Spasm of muscle     Hyperlipidemia LDL goal <130     Biceps tendinopathy, right     Superficial swelling of scalp     Intractable right heel pain     Intractable episodic tension-type headache     Jaycob complexion     Lip lesion     Epigastric pain     Abdominal distension     PONV (postoperative nausea and vomiting)     Menorrhalgia     Uterine polyp     Loose stools     Hyperactive bowel sounds     Abnormality of nail surface     Dry hair     Dry skin     Malaise and fatigue     Lymphadenopathy     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     Endometrium, hyperplasia - on recent CT scan     Pain in joint involving pelvic region and thigh, right     Right lateral abdominal pain     Right foot pain     Dental abscess - left mandible molar     Brachial plexopathy - right shoulder     Past Surgical History:   Procedure Laterality Date     AS INJ TRANSFORAMIN EPIDURAL, LUMB/SACR SINGLE       COLONOSCOPY  3/11/2013    Procedure: COLONOSCOPY;  colonoscopy;  Surgeon: Lobito Mas MD;  Location: [facility]     COLONOSCOPY WITH CO2 INSUFFLATION N/A 11/19/2018    Procedure: COLONOSCOPY WITH CO2 INSUFFLATION;  Surgeon: Goyo Blank MD;  Location: [facility]     DECOMPRESSION LUMBAR TWO LEVELS Bilateral 12/8/2016    Procedure: DECOMPRESSION LUMBAR TWO LEVELS;  Surgeon: Bang Burrell MD;  Location: [facility]     DILATION AND CURETTAGE, HYSTEROSCOPY, ABLATE ENDOMETRIUM NOVASURE, COMBINED N/A 6/12/2019    Procedure: hysteroscopy, dilation & curettage, polypectomy, endometrial ablation;  Surgeon: Fredy Pham MD;  Location: [facility]     ESOPHAGOSCOPY, GASTROSCOPY, DUODENOSCOPY (EGD), COMBINED  11/8/2013    Procedure: COMBINED ESOPHAGOSCOPY, GASTROSCOPY, DUODENOSCOPY (EGD), BIOPSY SINGLE OR MULTIPLE;  Esophagoscopy, Gastroscopy, Duodenoscopy EGD with multiple biopsies;  Surgeon:  Teja Koch MD;  Location: [facility]     HC REMOVAL OF TONSILS,<13 Y/O      Tonsils <10y.o.     HC TOOTH EXTRACTION W/FORCEP       REPAIR TENDON ELBOW  2014    Procedure: REPAIR TENDON ELBOW;  Surgeon: Chris Johnston MD;  Location: [facility]     ULTRASONIC REMOVAL SOFT TISSUE (LOCATION) Right 2018    Procedure: Tenex right foot;  Surgeon: Patel Martínez DO;  Location: [facility]       Social History     Tobacco Use     Smoking status: Never Smoker     Smokeless tobacco: Never Used   Substance Use Topics     Alcohol use: No     Comment: social     Family History   Problem Relation Age of Onset     Diabetes Mother         adult     Cancer - colorectal Mother      Hypertension Mother      Allergies Mother      Cancer Mother         colon     Depression Mother      Gastrointestinal Disease Mother         ibs     Genitourinary Problems Mother         kidney cyst     Gynecology Mother         endometriosis     Lipids Mother      Thyroid Disease Mother      Arthritis Mother         RA     Heart Disease Maternal Grandfather         MI,  - 60's     Allergies Father      Unknown/Adopted Father      Heart Disease Father         mi-age mid 40's     Cardiovascular Father      Lipids Father      Respiratory Father         asthma     Cancer Father      Other Cancer Father         renal cancer     Depression Sister      Allergies Sister      Cancer Sister         vaginal     Gynecology Sister         endometriosis     Lipids Paternal Grandmother      Osteoporosis Paternal Grandmother      Thyroid Disease Paternal Grandmother      Respiratory Son         asthma     Allergies Son      Arthritis Sister      Allergies Brother      Heart Disease Brother      Allergies Daughter      Blood Disease Paternal Aunt         LGL T cell Leukemia     Rheumatoid Arthritis Paternal Aunt      Kidney Disease Maternal Aunt      Lupus Maternal Aunt      Bladder Cancer Maternal Aunt          Current Outpatient Medications    Medication Sig Dispense Refill     Acetaminophen (TYLENOL PO) Take 1,000 mg by mouth every 8 hours as needed        clindamycin (CLEOCIN) 300 MG capsule Take 1 capsule (300 mg) by mouth 3 times daily 30 capsule 0     clobetasol (TEMOVATE) 0.05 % external cream Apply topically 2 times daily 60 g 1     DULoxetine (CYMBALTA) 20 MG capsule Take 1 capsule (20 mg) by mouth daily for 21 days As directed. 21 capsule 0     meloxicam (MOBIC) 15 MG tablet TAKE ONE TABLET BY MOUTH EVERY DAY (PATIENT WANTS UNICHEM BRAND ONLY) 90 tablet 2     mupirocin (BACTROBAN) 2 % external ointment Apply topically 3 times daily 30 g 1     rizatriptan (MAXALT-MLT) 5 MG ODT Take 1 tablet (5 mg) by mouth at onset of headache for migraine May repeat in 2 hours. Max 6 tablets/24 hours. 30 tablet 1     sucralfate (CARAFATE) 1 GM tablet Take 1 tablet (1 g) by mouth 4 times daily 40 tablet 0     acyclovir (ZOVIRAX) 5 % ointment Apply topically 6 times daily (Patient not taking: Reported on 6/19/2020) 15 g 3     diazepam (VALIUM) 5 MG tablet Take 1 tablet (5 mg) by mouth 3 times daily as needed for muscle spasms (Patient not taking: Reported on 5/21/2020) 30 tablet 0     fexofenadine (ALLEGRA) 180 MG tablet Take 1 tablet by mouth daily Reported on 4/5/2017       order for DME Equipment being ordered: DME [id number] $70  Ankle Support MD Figure 8, Lace up 1 Device 0     Allergies   Allergen Reactions     Ceftriaxone Anaphylaxis     Hives, rash, racing heart beat     Bactrim [Sulfamethoxazole W/Trimethoprim] Hives     Ciprofloxacin Other (See Comments)     Tendon Issues     Codeine Nausea and Vomiting     Copper      Rash       Doxycycline      Loss of skin pigmentation, skin loss.     Gold      Rash       Iodine-131 Hives     Levaquin [Levofloxacin] Other (See Comments)     Tendon issues with levaquin and cipro     Nickel      rash     Steel [Staples]      Rash from staples       Latex Rash     Penicillins Rash     Recent Labs   Lab Test  "04/17/20  1136 03/05/20  0749 02/07/20  1440 10/07/19  1505 08/29/19  0835  02/08/19  0817 12/31/18  0805  06/08/16  0745   A1C  --   --   --   --   --   --   --   --   --  5.5   LDL  --  125*  --   --   --   --  93 113*   < >  --    HDL  --  79  --   --   --   --  75 86   < >  --    TRIG  --  51  --   --   --   --  40 49   < >  --    ALT  --  30 26  --  21   < > 27 23   < >  --    CR 0.64 0.58 0.56  --  0.56   < >  --  0.69   < > 0.54   GFRESTIMATED >90 >90 >90  --  >90   < >  --  >90   < > >90  Non  GFR Calc     GFRESTBLACK >90 >90 >90  --  >90   < >  --  >90   < > >90  African American GFR Calc     POTASSIUM 3.7 4.4 3.9  --  4.2   < >  --  4.0   < > 4.2   TSH  --  1.07  --  0.94 1.02   < >  --   --    < >  --     < > = values in this interval not displayed.      BP Readings from Last 3 Encounters:   07/16/20 90/64   06/19/20 129/80   05/21/20 122/84    Wt Readings from Last 3 Encounters:   07/16/20 94.9 kg (209 lb 4 oz)   07/09/20 90.7 kg (200 lb)   06/19/20 92.5 kg (204 lb)                    Reviewed and updated as needed this visit by Provider         Review of Systems   Constitutional, HEENT, cardiovascular, pulmonary, GI, , musculoskeletal, neuro, skin, endocrine and psych systems are negative, except as otherwise noted.      Objective    BP 90/64   Pulse 92   Temp 98.5  F (36.9  C) (Temporal)   Resp 16   Ht 1.543 m (5' 0.75\")   Wt 94.9 kg (209 lb 4 oz)   BMI 39.86 kg/m    Body mass index is 39.86 kg/m .  Physical Exam   GENERAL: healthy, alert and no distress  NECK: no adenopathy, no asymmetry, masses, or scars and thyroid normal to palpation  RESP: lungs clear to auscultation - no rales, rhonchi or wheezes  CV: regular rate and rhythm, normal S1 S2, no S3 or S4, no murmur, click or rub, no peripheral edema and peripheral pulses strong  ABDOMEN: soft, nontender, no hepatosplenomegaly, no masses and bowel sounds normal  MS: no gross musculoskeletal defects noted, no edema  SKIN: no " suspicious lesions or rashes to exposed skin with exception of evidence of a poorly healing puncture site to the lower third of the anterior right leg.  Mild inflammation and cellulitic component around one puncture site as well.  Tenderness noted.  No discharge present today.  PSYCH: mentation appears normal, affect normal/bright    Diagnostic Test Results:  Labs reviewed in Epic  No results found. However, due to the size of the patient record, not all encounters were searched. Please check Results Review for a complete set of results.        Assessment & Plan     1. Puncture wound of right lower leg, initial encounter  2. Cellulitis and abscess of leg  Treat as noted since she has so many allergies to medications and follow-up PRN.  - clindamycin (CLEOCIN) 300 MG capsule; Take 1 capsule (300 mg) by mouth 3 times daily  Dispense: 30 capsule; Refill: 0       Work on weight loss  Regular exercise  Return in about 2 weeks (around 7/30/2020) for Medication Recheck, recheck of current condition, if symptoms do not improve.    Otoniel Manuel PA-C  [facility]

## 2020-07-16 ENCOUNTER — OFFICE VISIT (OUTPATIENT)
Dept: FAMILY MEDICINE | Facility: OTHER | Age: 45
End: 2020-07-16
Payer: COMMERCIAL

## 2020-07-16 VITALS
DIASTOLIC BLOOD PRESSURE: 64 MMHG | SYSTOLIC BLOOD PRESSURE: 90 MMHG | WEIGHT: 209.25 LBS | RESPIRATION RATE: 16 BRPM | BODY MASS INDEX: 39.5 KG/M2 | HEART RATE: 92 BPM | HEIGHT: 61 IN | TEMPERATURE: 98.5 F

## 2020-07-16 DIAGNOSIS — L03.119 CELLULITIS AND ABSCESS OF LEG: ICD-10-CM

## 2020-07-16 DIAGNOSIS — L02.419 CELLULITIS AND ABSCESS OF LEG: ICD-10-CM

## 2020-07-16 DIAGNOSIS — S81.831A PUNCTURE WOUND OF RIGHT LOWER LEG, INITIAL ENCOUNTER: Primary | ICD-10-CM

## 2020-07-16 PROCEDURE — 99213 OFFICE O/P EST LOW 20 MIN: CPT | Performed by: PHYSICIAN ASSISTANT

## 2020-07-16 RX ORDER — CLINDAMYCIN HCL 300 MG
300 CAPSULE ORAL 3 TIMES DAILY
Qty: 30 CAPSULE | Refills: 0 | Status: SHIPPED | OUTPATIENT
Start: 2020-07-16 | End: 2020-09-24

## 2020-07-16 ASSESSMENT — MIFFLIN-ST. JEOR: SCORE: 1517.56

## 2020-07-16 ASSESSMENT — PAIN SCALES - GENERAL: PAINLEVEL: MODERATE PAIN (4)

## 2020-07-17 ENCOUNTER — MYC MEDICAL ADVICE (OUTPATIENT)
Dept: FAMILY MEDICINE | Facility: OTHER | Age: 45
End: 2020-07-17

## 2020-07-17 ENCOUNTER — HOSPITAL ENCOUNTER (OUTPATIENT)
Dept: PHYSICAL THERAPY | Facility: CLINIC | Age: 45
Setting detail: THERAPIES SERIES
End: 2020-07-17
Attending: SURGERY
Payer: COMMERCIAL

## 2020-07-17 ENCOUNTER — OFFICE VISIT (OUTPATIENT)
Dept: FAMILY MEDICINE | Facility: OTHER | Age: 45
End: 2020-07-17
Payer: COMMERCIAL

## 2020-07-17 VITALS
BODY MASS INDEX: 40.29 KG/M2 | RESPIRATION RATE: 16 BRPM | DIASTOLIC BLOOD PRESSURE: 76 MMHG | SYSTOLIC BLOOD PRESSURE: 116 MMHG | WEIGHT: 211.5 LBS | HEART RATE: 66 BPM | TEMPERATURE: 98.3 F

## 2020-07-17 DIAGNOSIS — R00.2 PALPITATIONS: ICD-10-CM

## 2020-07-17 DIAGNOSIS — Z82.49 FAMILY HISTORY OF ISCHEMIC HEART DISEASE: ICD-10-CM

## 2020-07-17 DIAGNOSIS — B37.31 YEAST INFECTION OF THE VAGINA: Primary | ICD-10-CM

## 2020-07-17 DIAGNOSIS — R53.81 MALAISE AND FATIGUE: Primary | ICD-10-CM

## 2020-07-17 DIAGNOSIS — R26.89 BALANCE PROBLEMS: ICD-10-CM

## 2020-07-17 DIAGNOSIS — I49.8 SINUS ARRHYTHMIA SEEN ON ELECTROCARDIOGRAM: ICD-10-CM

## 2020-07-17 DIAGNOSIS — R53.83 MALAISE AND FATIGUE: Primary | ICD-10-CM

## 2020-07-17 PROBLEM — E66.01 MORBID OBESITY (H): Status: ACTIVE | Noted: 2020-07-17

## 2020-07-17 PROCEDURE — 99214 OFFICE O/P EST MOD 30 MIN: CPT | Performed by: PHYSICIAN ASSISTANT

## 2020-07-17 PROCEDURE — 93000 ELECTROCARDIOGRAM COMPLETE: CPT | Performed by: PHYSICIAN ASSISTANT

## 2020-07-17 PROCEDURE — 97140 MANUAL THERAPY 1/> REGIONS: CPT | Mod: GP | Performed by: PHYSICAL THERAPIST

## 2020-07-17 RX ORDER — FLUCONAZOLE 150 MG/1
150 TABLET ORAL DAILY
Qty: 3 TABLET | Refills: 0 | Status: SHIPPED | OUTPATIENT
Start: 2020-07-17 | End: 2020-07-20

## 2020-07-17 ASSESSMENT — PAIN SCALES - GENERAL: PAINLEVEL: SEVERE PAIN (6)

## 2020-07-17 NOTE — PROGRESS NOTES
"Subjective     Amelia Coker is a 45 year old female who presents to clinic today for the following health issues:    HPI       Concern - Irregular heart rate  Onset: Ongoing off and on     Description:   Patient reports changes in heart rate, blood pressure and feeling dizzy and nauseous. \" Feels like brain is short circuiting.\"    Intensity: moderate, severe    Progression of Symptoms:  worsening    Patient Active Problem List   Diagnosis     Migraine, unspecified, with intractable migraine, so stated, without mention of status migrainosus     Bell's palsy     Contact dermatitis and other eczema, due to unspecified cause     Allergic rhinitis due to other allergen     Scalp lesion     Lyme disease     PAC (premature atrial contraction)     Palpitations     Raynaud phenomenon     Chronic fatigue     Hair loss     Abnormal PFT     Shoulder joint instability     Family history of melanoma     Dry eyes     Keratitis sicca (H)     Family history of colon cancer     Pain in joint, upper arm     FLORIDALMA positive     Plantar fascial fibromatosis     Foraminal stenosis of lumbar region     DJD (degenerative joint disease), lumbar     Migraine without aura and without status migrainosus, not intractable     Lumbar radiculopathy     Ds DNA antibody positive     Muscular wasting and disuse atrophy, not elsewhere classified     Frontal headache     Telangiectasia of face     Lateral epicondylitis     Right shoulder pain     Plantar fasciitis     HTN, goal below 140/90     Skin lesion     AD (atopic dermatitis)     Heat sensitivity     Rotator cuff arthropathy, right     Gastroesophageal reflux disease, esophagitis presence not specified     Abnormal mammogram     Sternocleidomastoid muscle tenderness     Acute cervical myofascial strain, initial encounter     Spasm of muscle     Hyperlipidemia LDL goal <130     Biceps tendinopathy, right     Superficial swelling of scalp     Intractable right heel pain     Intractable episodic " tension-type headache     Jaycob complexion     Lip lesion     Epigastric pain     Abdominal distension     PONV (postoperative nausea and vomiting)     Menorrhalgia     Uterine polyp     Loose stools     Hyperactive bowel sounds     Abnormality of nail surface     Dry hair     Dry skin     Malaise and fatigue     Lymphadenopathy     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     Endometrium, hyperplasia - on recent CT scan     Pain in joint involving pelvic region and thigh, right     Right lateral abdominal pain     Right foot pain     Dental abscess - left mandible molar     Brachial plexopathy - right shoulder     Balance problems     Morbid obesity (H)     Sinus arrhythmia seen on electrocardiogram     Family history of ischemic heart disease - father at 46 massive MI     Past Surgical History:   Procedure Laterality Date     AS INJ TRANSFORAMIN EPIDURAL, LUMB/SACR SINGLE       COLONOSCOPY  3/11/2013    Procedure: COLONOSCOPY;  colonoscopy;  Surgeon: Lobito Mas MD;  Location: [facility]     COLONOSCOPY WITH CO2 INSUFFLATION N/A 11/19/2018    Procedure: COLONOSCOPY WITH CO2 INSUFFLATION;  Surgeon: Goyo Blank MD;  Location: [facility]     DECOMPRESSION LUMBAR TWO LEVELS Bilateral 12/8/2016    Procedure: DECOMPRESSION LUMBAR TWO LEVELS;  Surgeon: Bang Burrell MD;  Location: [facility]     DILATION AND CURETTAGE, HYSTEROSCOPY, ABLATE ENDOMETRIUM NOVASURE, COMBINED N/A 6/12/2019    Procedure: hysteroscopy, dilation & curettage, polypectomy, endometrial ablation;  Surgeon: Fredy Pham MD;  Location: [facility]     ESOPHAGOSCOPY, GASTROSCOPY, DUODENOSCOPY (EGD), COMBINED  11/8/2013    Procedure: COMBINED ESOPHAGOSCOPY, GASTROSCOPY, DUODENOSCOPY (EGD), BIOPSY SINGLE OR MULTIPLE;  Esophagoscopy, Gastroscopy, Duodenoscopy EGD with multiple biopsies;  Surgeon: Teja Koch MD;  Location: [facility]     HC REMOVAL OF TONSILS,<13 Y/O      Tonsils <10y.o.     HC TOOTH EXTRACTION W/FORCEP        REPAIR TENDON ELBOW  2014    Procedure: REPAIR TENDON ELBOW;  Surgeon: Chris Johnston MD;  Location: [facility]     ULTRASONIC REMOVAL SOFT TISSUE (LOCATION) Right 2018    Procedure: Tenex right foot;  Surgeon: Patel Martínez DO;  Location: [facility]       Social History     Tobacco Use     Smoking status: Never Smoker     Smokeless tobacco: Never Used   Substance Use Topics     Alcohol use: No     Comment: social     Family History   Problem Relation Age of Onset     Diabetes Mother         adult     Cancer - colorectal Mother      Hypertension Mother      Allergies Mother      Cancer Mother         colon     Depression Mother      Gastrointestinal Disease Mother         ibs     Genitourinary Problems Mother         kidney cyst     Gynecology Mother         endometriosis     Lipids Mother      Thyroid Disease Mother      Arthritis Mother         RA     Heart Disease Maternal Grandfather         MI,  - 60's     Allergies Father      Unknown/Adopted Father      Heart Disease Father         mi-age mid 40's     Cardiovascular Father      Lipids Father      Respiratory Father         asthma     Cancer Father      Other Cancer Father         renal cancer     Depression Sister      Allergies Sister      Cancer Sister         vaginal     Gynecology Sister         endometriosis     Lipids Paternal Grandmother      Osteoporosis Paternal Grandmother      Thyroid Disease Paternal Grandmother      Respiratory Son         asthma     Allergies Son      Arthritis Sister      Allergies Brother      Heart Disease Brother      Allergies Daughter      Blood Disease Paternal Aunt         LGL T cell Leukemia     Rheumatoid Arthritis Paternal Aunt      Kidney Disease Maternal Aunt      Lupus Maternal Aunt      Bladder Cancer Maternal Aunt          Current Outpatient Medications   Medication Sig Dispense Refill     Acetaminophen (TYLENOL PO) Take 1,000 mg by mouth every 8 hours as needed        clindamycin (CLEOCIN)  300 MG capsule Take 1 capsule (300 mg) by mouth 3 times daily 30 capsule 0     clobetasol (TEMOVATE) 0.05 % external cream Apply topically 2 times daily 60 g 1     DULoxetine (CYMBALTA) 20 MG capsule Take 1 capsule (20 mg) by mouth daily for 21 days As directed. 21 capsule 0     fexofenadine (ALLEGRA) 180 MG tablet Take 1 tablet by mouth daily Reported on 4/5/2017       fluconazole (DIFLUCAN) 150 MG tablet Take 1 tablet (150 mg) by mouth daily for 3 days 3 tablet 0     meloxicam (MOBIC) 15 MG tablet TAKE ONE TABLET BY MOUTH EVERY DAY (PATIENT WANTS UNICHEM BRAND ONLY) 90 tablet 2     mupirocin (BACTROBAN) 2 % external ointment Apply topically 3 times daily 30 g 1     order for [facility] Equipment being ordered: DME [id number] $70  Ankle Support MD Figure 8, Lace up 1 Device 0     rizatriptan (MAXALT-MLT) 5 MG ODT Take 1 tablet (5 mg) by mouth at onset of headache for migraine May repeat in 2 hours. Max 6 tablets/24 hours. 30 tablet 1     sucralfate (CARAFATE) 1 GM tablet Take 1 tablet (1 g) by mouth 4 times daily 40 tablet 0     acyclovir (ZOVIRAX) 5 % ointment Apply topically 6 times daily (Patient not taking: Reported on 6/19/2020) 15 g 3     diazepam (VALIUM) 5 MG tablet Take 1 tablet (5 mg) by mouth 3 times daily as needed for muscle spasms (Patient not taking: Reported on 5/21/2020) 30 tablet 0     Allergies   Allergen Reactions     Ceftriaxone Anaphylaxis     Hives, rash, racing heart beat     Bactrim [Sulfamethoxazole W/Trimethoprim] Hives     Ciprofloxacin Other (See Comments)     Tendon Issues     Codeine Nausea and Vomiting     Copper      Rash       Doxycycline      Loss of skin pigmentation, skin loss.     Gold      Rash       Iodine-131 Hives     Levaquin [Levofloxacin] Other (See Comments)     Tendon issues with levaquin and cipro     Nickel      rash     Steel [Staples]      Rash from staples       Latex Rash     Penicillins Rash     Recent Labs   Lab Test 04/17/20  1136 03/05/20  0749 02/07/20  1440  10/07/19  1505 08/29/19  0835  02/08/19  0817 12/31/18  0805  06/08/16  0745   A1C  --   --   --   --   --   --   --   --   --  5.5   LDL  --  125*  --   --   --   --  93 113*   < >  --    HDL  --  79  --   --   --   --  75 86   < >  --    TRIG  --  51  --   --   --   --  40 49   < >  --    ALT  --  30 26  --  21   < > 27 23   < >  --    CR 0.64 0.58 0.56  --  0.56   < >  --  0.69   < > 0.54   GFRESTIMATED >90 >90 >90  --  >90   < >  --  >90   < > >90  Non  GFR Calc     GFRESTBLACK >90 >90 >90  --  >90   < >  --  >90   < > >90  African American GFR Calc     POTASSIUM 3.7 4.4 3.9  --  4.2   < >  --  4.0   < > 4.2   TSH  --  1.07  --  0.94 1.02   < >  --   --    < >  --     < > = values in this interval not displayed.      BP Readings from Last 3 Encounters:   07/17/20 116/76   07/16/20 90/64   06/19/20 129/80    Wt Readings from Last 3 Encounters:   07/17/20 95.9 kg (211 lb 8 oz)   07/16/20 94.9 kg (209 lb 4 oz)   07/09/20 90.7 kg (200 lb)                    Reviewed and updated as needed this visit by Provider         Review of Systems   Constitutional, HEENT, cardiovascular, pulmonary, GI, , musculoskeletal, neuro, skin, endocrine and psych systems are negative, except as otherwise noted.      Objective    /76   Pulse 66   Temp 98.3  F (36.8  C) (Temporal)   Resp 16   Wt 95.9 kg (211 lb 8 oz)   BMI 40.29 kg/m    Body mass index is 40.29 kg/m .  Physical Exam   GENERAL: healthy, alert and no distress  NECK: no adenopathy, no asymmetry, masses, or scars and thyroid normal to palpation  RESP: lungs clear to auscultation - no rales, rhonchi or wheezes  CV: regular rate and rhythm, normal S1 S2, no S3 or S4, no murmur, click or rub, no peripheral edema and peripheral pulses strong  ABDOMEN: soft, nontender, no hepatosplenomegaly, no masses and bowel sounds normal  MS: no gross musculoskeletal defects noted, no edema    Diagnostic Test Results:  Labs reviewed in Epic  No results found.  However, due to the size of the patient record, not all encounters were searched. Please check Results Review for a complete set of results.      EKG showed normal sinus rhythm with a sinus arrhythmia that I suspect is due to respiratory variant.  No evidence of atrial fibrillation or ischemia is noted.    Assessment & Plan     1. Malaise and fatigue  2. Palpitations  3. Balance problems  4. Sinus arrhythmia seen on electrocardiogram  5. Family history of ischemic heart disease - father at 46 massive MI  Further evaluation take a look at the cardiac echo as well as carotid flow to be done soon.  Do note that she is having thoracic outlet syndrome and further evaluation is being done for that as well.  Etiology of this problem is uncertain at this point time.  Further assessment will be based on results.  - EKG 12-lead complete w/read - Clinics  - Echocardiogram Exercise Stress; Future  - US Carotid Bilateral; Future  - Zio Patch Holter Adult Pediatric Greater than 48 hrs; Future     Return in about 2 weeks (around 7/31/2020) for recheck of current condition, if symptoms do not improve.    Otoniel Manuel PA-C  [facility]

## 2020-07-21 ENCOUNTER — HOSPITAL ENCOUNTER (OUTPATIENT)
Dept: PHYSICAL THERAPY | Facility: CLINIC | Age: 45
Setting detail: THERAPIES SERIES
End: 2020-07-21
Attending: SURGERY
Payer: COMMERCIAL

## 2020-07-21 DIAGNOSIS — Z11.59 ENCOUNTER FOR SCREENING FOR OTHER VIRAL DISEASES: ICD-10-CM

## 2020-07-21 PROCEDURE — 97140 MANUAL THERAPY 1/> REGIONS: CPT | Mod: GP | Performed by: PHYSICAL THERAPIST

## 2020-07-21 PROCEDURE — U0003 INFECTIOUS AGENT DETECTION BY NUCLEIC ACID (DNA OR RNA); SEVERE ACUTE RESPIRATORY SYNDROME CORONAVIRUS 2 (SARS-COV-2) (CORONAVIRUS DISEASE [COVID-19]), AMPLIFIED PROBE TECHNIQUE, MAKING USE OF HIGH THROUGHPUT TECHNOLOGIES AS DESCRIBED BY CMS-2020-01-R: HCPCS | Performed by: ANESTHESIOLOGY

## 2020-07-22 ENCOUNTER — HOSPITAL ENCOUNTER (OUTPATIENT)
Dept: CARDIOLOGY | Facility: CLINIC | Age: 45
End: 2020-07-22
Attending: PHYSICIAN ASSISTANT
Payer: COMMERCIAL

## 2020-07-22 ENCOUNTER — HOSPITAL ENCOUNTER (OUTPATIENT)
Dept: CARDIOLOGY | Facility: CLINIC | Age: 45
Discharge: HOME | End: 2020-07-22
Attending: PHYSICIAN ASSISTANT | Admitting: PHYSICIAN ASSISTANT
Payer: COMMERCIAL

## 2020-07-22 ENCOUNTER — HOSPITAL ENCOUNTER (OUTPATIENT)
Dept: ULTRASOUND IMAGING | Facility: CLINIC | Age: 45
End: 2020-07-22
Attending: PHYSICIAN ASSISTANT
Payer: COMMERCIAL

## 2020-07-22 DIAGNOSIS — R00.2 PALPITATIONS: ICD-10-CM

## 2020-07-22 DIAGNOSIS — R53.81 MALAISE AND FATIGUE: ICD-10-CM

## 2020-07-22 DIAGNOSIS — R26.89 BALANCE PROBLEMS: ICD-10-CM

## 2020-07-22 DIAGNOSIS — I49.8 SINUS ARRHYTHMIA SEEN ON ELECTROCARDIOGRAM: ICD-10-CM

## 2020-07-22 DIAGNOSIS — Z82.49 FAMILY HISTORY OF ISCHEMIC HEART DISEASE: ICD-10-CM

## 2020-07-22 DIAGNOSIS — R53.83 MALAISE AND FATIGUE: ICD-10-CM

## 2020-07-22 LAB
SARS-COV-2 RNA SPEC QL NAA+PROBE: NOT DETECTED
SPECIMEN SOURCE: NORMAL

## 2020-07-22 PROCEDURE — 93018 CV STRESS TEST I&R ONLY: CPT | Performed by: INTERNAL MEDICINE

## 2020-07-22 PROCEDURE — 93350 STRESS TTE ONLY: CPT | Mod: 26 | Performed by: INTERNAL MEDICINE

## 2020-07-22 PROCEDURE — 0296T LEADLESS EKG MONITOR 3 TO 14 DAYS: CPT

## 2020-07-22 PROCEDURE — 93325 DOPPLER ECHO COLOR FLOW MAPG: CPT | Mod: 26 | Performed by: INTERNAL MEDICINE

## 2020-07-22 PROCEDURE — 93016 CV STRESS TEST SUPVJ ONLY: CPT | Performed by: FAMILY MEDICINE

## 2020-07-22 PROCEDURE — 93880 EXTRACRANIAL BILAT STUDY: CPT

## 2020-07-22 PROCEDURE — 93321 DOPPLER ECHO F-UP/LMTD STD: CPT | Mod: 26 | Performed by: INTERNAL MEDICINE

## 2020-07-22 PROCEDURE — 93321 DOPPLER ECHO F-UP/LMTD STD: CPT | Mod: TC

## 2020-07-23 ENCOUNTER — HOSPITAL ENCOUNTER (OUTPATIENT)
Facility: CLINIC | Age: 45
Discharge: HOME | End: 2020-07-23
Attending: ANESTHESIOLOGY | Admitting: ANESTHESIOLOGY
Payer: COMMERCIAL

## 2020-07-23 ENCOUNTER — HOSPITAL ENCOUNTER (OUTPATIENT)
Dept: GENERAL RADIOLOGY | Facility: CLINIC | Age: 45
End: 2020-07-23
Attending: ANESTHESIOLOGY | Admitting: ANESTHESIOLOGY
Payer: COMMERCIAL

## 2020-07-23 ENCOUNTER — MYC MEDICAL ADVICE (OUTPATIENT)
Dept: FAMILY MEDICINE | Facility: OTHER | Age: 45
End: 2020-07-23

## 2020-07-23 VITALS
SYSTOLIC BLOOD PRESSURE: 117 MMHG | OXYGEN SATURATION: 97 % | TEMPERATURE: 99.1 F | HEART RATE: 66 BPM | RESPIRATION RATE: 18 BRPM | DIASTOLIC BLOOD PRESSURE: 73 MMHG

## 2020-07-23 DIAGNOSIS — M53.3 CHRONIC SI JOINT PAIN: ICD-10-CM

## 2020-07-23 DIAGNOSIS — R53.81 MALAISE AND FATIGUE: ICD-10-CM

## 2020-07-23 DIAGNOSIS — G43.009 MIGRAINE WITHOUT AURA AND WITHOUT STATUS MIGRAINOSUS, NOT INTRACTABLE: ICD-10-CM

## 2020-07-23 DIAGNOSIS — R53.83 MALAISE AND FATIGUE: ICD-10-CM

## 2020-07-23 DIAGNOSIS — R53.82 CHRONIC FATIGUE: ICD-10-CM

## 2020-07-23 DIAGNOSIS — R26.89 BALANCE PROBLEMS: Primary | ICD-10-CM

## 2020-07-23 DIAGNOSIS — G89.29 CHRONIC SI JOINT PAIN: ICD-10-CM

## 2020-07-23 PROCEDURE — 64495 INJ PARAVERT F JNT L/S 3 LEV: CPT | Mod: 50 | Performed by: ANESTHESIOLOGY

## 2020-07-23 PROCEDURE — 25000128 H RX IP 250 OP 636: Performed by: ANESTHESIOLOGY

## 2020-07-23 PROCEDURE — 64493 INJ PARAVERT F JNT L/S 1 LEV: CPT | Mod: 50 | Performed by: ANESTHESIOLOGY

## 2020-07-23 PROCEDURE — 64494 INJ PARAVERT F JNT L/S 2 LEV: CPT | Mod: 50 | Performed by: ANESTHESIOLOGY

## 2020-07-23 PROCEDURE — 64495 INJ PARAVERT F JNT L/S 3 LEV: CPT | Performed by: ANESTHESIOLOGY

## 2020-07-23 PROCEDURE — 64493 INJ PARAVERT F JNT L/S 1 LEV: CPT | Performed by: ANESTHESIOLOGY

## 2020-07-23 PROCEDURE — 40000277 XR FLUORO NEEDLE PLACEMENT SPINE (WITH PROCEDURE)

## 2020-07-23 PROCEDURE — 64494 INJ PARAVERT F JNT L/S 2 LEV: CPT | Performed by: ANESTHESIOLOGY

## 2020-07-23 RX ORDER — BUPIVACAINE HYDROCHLORIDE 7.5 MG/ML
INJECTION, SOLUTION EPIDURAL; RETROBULBAR PRN
Status: DISCONTINUED | OUTPATIENT
Start: 2020-07-23 | End: 2020-07-23 | Stop reason: HOSPADM

## 2020-07-23 RX ORDER — IOPAMIDOL 612 MG/ML
INJECTION, SOLUTION INTRATHECAL PRN
Status: DISCONTINUED | OUTPATIENT
Start: 2020-07-23 | End: 2020-07-23 | Stop reason: HOSPADM

## 2020-07-23 NOTE — OP NOTE
PRE-OP DIAGNOSIS: Low back pain/buttock pain secondary to lumbarsacral spondylosis    POST-OP DIAGNOSIS:  same    PROCEDURE: Bilateral lumbar 5 dorsal rami nerve blocks in conjunction with bilateral S1, S2, S3 lateral branch blocks under fluoroscopic guidance with a contrast control    ANESTHESIA:  Local    PROCEDURE DETAILS: After discussing the risks, benefits, and alternatives to the procedure, the patient expressed understanding and wished to proceed. Written consent was obtained.  The patient was escorted to the fluoroscopy suite and placed in prone position on the procedure table.  A procedural pause was conducted to verify the correct: patient identity, procedure to be performed, side, site, patient position, and any special requirements. The skin was aseptically prepped and draped in the usual fashion. The skin and subcutaneous tissue were anesthetized with 1% lidocaine.   Using intermittent fluoroscopic guidance, a 25 gauge 3    spinal needle was advanced to her bilateral sacral ala's as well as just lateral to each S1, S2, S3 sacral foramina.  At each location after bony contact was made Omnipaque contrast was injected which showed proper spread of medication over each nerve.  I then follow this up by injecting 1/2 cc of 0.75% bupivacaine.  There is good washout regenerative and then the needles were removed and sterile bandages placed over the needle insertion sites.  The patient did not receive sedation during the procedure. The patient was monitored with blood pressure and pulse oximetry machines with the assistance of an RN throughout the procedure.  The patient was alert and responsive to questions throughout the procedure.   The patient tolerated the procedure well and was observed in the post-procedural area.  The patient was dismissed without apparent complications.     PLAN:  1. Performed medial and lateral branch blocks to anesthetize her sacral location. if there is benefit with appropriate time  duration (at least 1 hour in the 80 to 100% pain relief range) of relief for both blocks, I would recommend a confirmatory block with lidocaine.  If both bupivacaine and lidocaine blocks are diagnostically positive then I would recommend referral for an endoscopic nerve transection surgery.  If either block is diagnostically negative then I would presume that she is suffering from discogenic pain or instability in her low back given her ligament laxity.  2. The patient was instructed to fax a pain relief sheet back to us for our diagnostic impression.  If there is any doubt which provider to send the pain relief form back to I would recommend sending the pain form to [facility] same-day surgery so that I can diagnostically interpret the form and determine the next step.    Maurisio Goetz MD  Diplomate of the American Board of Anesthesiology, Pain Medicine

## 2020-07-23 NOTE — DISCHARGE INSTRUCTIONS
Home Care Instructions     Please fax or mail this form to the location that is selected at the bottom of your pain relief form.  Do not try to send the form back to Dr. Goetz's clinic in [place].  If you are unsure of which location to send this form back to, please send it back to the [facility] same-day surgery department.  Dr. Goetz will then evaluate your form and determine the next step of your care.            Procedure:  Diagnostic Block (which includes medial branch blocks, SI joint injection blocks, and nerve root injections)    Activity:  The injection was used to identify the cause of your pain:    Resume normal activity  You are to engage in activity that would have normally caused you discomfort to determine the effectiveness of the block/injection.  It is imperative to evaluate and rate your pain the first 2 hours after the injection.     Pain:    You may experience soreness at the injection site for one or two days    You may use an ice pack for 20 minutes every 2 hours for the first 24 hours    You may use a heating pad after the first 24 hours    You may use Tylenol  (acetaminophen) every 4 hours or other pain medicines as directed by your physician after your block wears off.    Safety  You may experience numbness radiating into your legs or arms, (depending on the procedure location)  This numbness may last several hours.  Until the numb sensation returns to normal please use caution in walking, climbing stairs, stepping out of your vehicle, etc.    Please contact us if you have:  Severe pain   Fever more than 101.5 degrees Fahrenheit  Signs of infection (redness, swelling or drainage)       If you need immediate attention we recommend that you go to a hospital emergency room or dial 911.    _____ Please contact our office one week after your injection to report your percent of pain relief.   See attached One Week Procedure Injection Report  __X__ Please return your Nerve Block Pain  Relief Form the day after your injection.     See attached Nerve Block Pain Relief Form             ## PLEASE SEND NERVE BLOCK PAIN RELIEF REPORT WITH PATIENT ##

## 2020-07-27 ENCOUNTER — MYC MEDICAL ADVICE (OUTPATIENT)
Dept: FAMILY MEDICINE | Facility: OTHER | Age: 45
End: 2020-07-27

## 2020-07-27 DIAGNOSIS — R26.89 BALANCE PROBLEMS: ICD-10-CM

## 2020-07-27 DIAGNOSIS — R53.81 MALAISE AND FATIGUE: ICD-10-CM

## 2020-07-27 DIAGNOSIS — R53.83 MALAISE AND FATIGUE: ICD-10-CM

## 2020-07-27 DIAGNOSIS — R53.82 CHRONIC FATIGUE: ICD-10-CM

## 2020-07-27 DIAGNOSIS — G43.009 MIGRAINE WITHOUT AURA AND WITHOUT STATUS MIGRAINOSUS, NOT INTRACTABLE: ICD-10-CM

## 2020-07-27 LAB
ALBUMIN SERPL-MCNC: 3.8 G/DL (ref 3.4–5)
ALP SERPL-CCNC: 62 U/L (ref 40–150)
ALT SERPL W P-5'-P-CCNC: 21 U/L (ref 0–50)
ANION GAP SERPL CALCULATED.3IONS-SCNC: 7 MMOL/L (ref 3–14)
AST SERPL W P-5'-P-CCNC: 13 U/L (ref 0–45)
BILIRUB SERPL-MCNC: 0.6 MG/DL (ref 0.2–1.3)
BUN SERPL-MCNC: 23 MG/DL (ref 7–30)
CALCIUM SERPL-MCNC: 8.6 MG/DL (ref 8.5–10.1)
CHLORIDE SERPL-SCNC: 105 MMOL/L (ref 94–109)
CO2 SERPL-SCNC: 27 MMOL/L (ref 20–32)
CREAT SERPL-MCNC: 0.58 MG/DL (ref 0.52–1.04)
ERYTHROCYTE [DISTWIDTH] IN BLOOD BY AUTOMATED COUNT: 12.8 % (ref 10–15)
ESTRADIOL SERPL-MCNC: 24 PG/ML
FSH SERPL-ACNC: 9.9 IU/L
GFR SERPL CREATININE-BSD FRML MDRD: >90 ML/MIN/{1.73_M2}
GLUCOSE SERPL-MCNC: 93 MG/DL (ref 70–99)
HCT VFR BLD AUTO: 35.3 % (ref 35–47)
HGB BLD-MCNC: 11.8 G/DL (ref 11.7–15.7)
LH SERPL-ACNC: 5.5 IU/L
MCH RBC QN AUTO: 31 PG (ref 26.5–33)
MCHC RBC AUTO-ENTMCNC: 33.4 G/DL (ref 31.5–36.5)
MCV RBC AUTO: 93 FL (ref 78–100)
PLATELET # BLD AUTO: 280 10E9/L (ref 150–450)
POTASSIUM SERPL-SCNC: 3.9 MMOL/L (ref 3.4–5.3)
PROT SERPL-MCNC: 7.2 G/DL (ref 6.8–8.8)
RBC # BLD AUTO: 3.81 10E12/L (ref 3.8–5.2)
SODIUM SERPL-SCNC: 139 MMOL/L (ref 133–144)
TSH SERPL DL<=0.005 MIU/L-ACNC: 1.03 MU/L (ref 0.4–4)
WBC # BLD AUTO: 5.2 10E9/L (ref 4–11)

## 2020-07-27 PROCEDURE — 36415 COLL VENOUS BLD VENIPUNCTURE: CPT | Performed by: PHYSICIAN ASSISTANT

## 2020-07-27 PROCEDURE — 86666 EHRLICHIA ANTIBODY: CPT | Mod: 90 | Performed by: PHYSICIAN ASSISTANT

## 2020-07-27 PROCEDURE — 86757 RICKETTSIA ANTIBODY: CPT | Mod: 90 | Performed by: PHYSICIAN ASSISTANT

## 2020-07-27 PROCEDURE — 85027 COMPLETE CBC AUTOMATED: CPT | Performed by: PHYSICIAN ASSISTANT

## 2020-07-27 PROCEDURE — 83002 ASSAY OF GONADOTROPIN (LH): CPT | Performed by: PHYSICIAN ASSISTANT

## 2020-07-27 PROCEDURE — 84443 ASSAY THYROID STIM HORMONE: CPT | Performed by: PHYSICIAN ASSISTANT

## 2020-07-27 PROCEDURE — 82670 ASSAY OF TOTAL ESTRADIOL: CPT | Performed by: PHYSICIAN ASSISTANT

## 2020-07-27 PROCEDURE — 80053 COMPREHEN METABOLIC PANEL: CPT | Performed by: PHYSICIAN ASSISTANT

## 2020-07-27 PROCEDURE — 86618 LYME DISEASE ANTIBODY: CPT | Performed by: PHYSICIAN ASSISTANT

## 2020-07-27 PROCEDURE — 99000 SPECIMEN HANDLING OFFICE-LAB: CPT | Performed by: PHYSICIAN ASSISTANT

## 2020-07-27 PROCEDURE — 83001 ASSAY OF GONADOTROPIN (FSH): CPT | Performed by: PHYSICIAN ASSISTANT

## 2020-07-28 LAB — B BURGDOR IGG+IGM SER QL: 0.28 (ref 0–0.89)

## 2020-07-29 ENCOUNTER — HOSPITAL ENCOUNTER (OUTPATIENT)
Dept: PHYSICAL THERAPY | Facility: CLINIC | Age: 45
Setting detail: THERAPIES SERIES
End: 2020-07-29
Attending: SURGERY
Payer: COMMERCIAL

## 2020-07-29 ENCOUNTER — MYC MEDICAL ADVICE (OUTPATIENT)
Dept: FAMILY MEDICINE | Facility: OTHER | Age: 45
End: 2020-07-29

## 2020-07-29 ENCOUNTER — TELEPHONE (OUTPATIENT)
Dept: PALLIATIVE MEDICINE | Facility: CLINIC | Age: 45
End: 2020-07-29

## 2020-07-29 DIAGNOSIS — R53.81 MALAISE AND FATIGUE: ICD-10-CM

## 2020-07-29 DIAGNOSIS — R53.83 MALAISE AND FATIGUE: ICD-10-CM

## 2020-07-29 DIAGNOSIS — N62 LARGE BREASTS: Primary | ICD-10-CM

## 2020-07-29 DIAGNOSIS — G54.0 BRACHIAL PLEXOPATHY: ICD-10-CM

## 2020-07-29 LAB
R RICKETTSI IGG TITR SER IF: NORMAL {TITER}
R RICKETTSI IGM TITR SER IF: NORMAL {TITER}

## 2020-07-29 PROCEDURE — 97140 MANUAL THERAPY 1/> REGIONS: CPT | Mod: GP | Performed by: PHYSICAL THERAPIST

## 2020-07-29 NOTE — TELEPHONE ENCOUNTER
"Received pain dairy regarding S1, S2,S3 an L5 blocks. She reports 50% pain relief from 10:15-11:45 then pain decreased from there. She wrote a note stating, \"notes a few things were better but the sharpest nerve pinch little to no improvement (buttcheek to calf)\". This would indicate a negative response and would not proceed any further.    Sabiha Angulo PA-C on 7/29/2020 at 1:33 PM    "

## 2020-07-30 ENCOUNTER — ALLIED HEALTH/NURSE VISIT (OUTPATIENT)
Dept: FAMILY MEDICINE | Facility: CLINIC | Age: 45
End: 2020-07-30
Payer: COMMERCIAL

## 2020-07-30 DIAGNOSIS — R26.89 BALANCE PROBLEMS: Primary | ICD-10-CM

## 2020-07-30 PROCEDURE — 99207 ZZC NO CHARGE NURSE ONLY: CPT

## 2020-07-30 NOTE — NURSING NOTE
Pt came in for orthostatic blood pressures per PCP. I have documents results under vitals. I will route chart to PCP for direction. Zoe Morgan CMA ([facility])

## 2020-07-31 ENCOUNTER — MYC MEDICAL ADVICE (OUTPATIENT)
Dept: PALLIATIVE MEDICINE | Facility: CLINIC | Age: 45
End: 2020-07-31

## 2020-07-31 ENCOUNTER — MYC MEDICAL ADVICE (OUTPATIENT)
Dept: FAMILY MEDICINE | Facility: OTHER | Age: 45
End: 2020-07-31

## 2020-07-31 DIAGNOSIS — S16.1XXA ACUTE CERVICAL MYOFASCIAL STRAIN, INITIAL ENCOUNTER: Primary | ICD-10-CM

## 2020-07-31 DIAGNOSIS — M47.816 OSTEOARTHRITIS OF LUMBAR SPINE, UNSPECIFIED SPINAL OSTEOARTHRITIS COMPLICATION STATUS: ICD-10-CM

## 2020-07-31 LAB
A PHAGOCYTOPH IGG TITR SER IF: NORMAL {TITER}
A PHAGOCYTOPH IGM TITR SER IF: NORMAL {TITER}

## 2020-07-31 RX ORDER — METHYLPREDNISOLONE 4 MG
TABLET, DOSE PACK ORAL
Qty: 21 TABLET | Refills: 0 | Status: SHIPPED | OUTPATIENT
Start: 2020-07-31 | End: 2020-09-10

## 2020-08-07 ENCOUNTER — HOSPITAL ENCOUNTER (OUTPATIENT)
Dept: PHYSICAL THERAPY | Facility: CLINIC | Age: 45
Setting detail: THERAPIES SERIES
End: 2020-08-07
Attending: PHYSICIAN ASSISTANT
Payer: COMMERCIAL

## 2020-08-07 PROCEDURE — 97110 THERAPEUTIC EXERCISES: CPT | Mod: GP | Performed by: PHYSICAL THERAPIST

## 2020-08-11 ENCOUNTER — HOSPITAL ENCOUNTER (OUTPATIENT)
Dept: PHYSICAL THERAPY | Facility: CLINIC | Age: 45
Setting detail: THERAPIES SERIES
End: 2020-08-11
Attending: SURGERY
Payer: COMMERCIAL

## 2020-08-11 PROCEDURE — 97110 THERAPEUTIC EXERCISES: CPT | Mod: GP | Performed by: PHYSICAL THERAPIST

## 2020-08-11 PROCEDURE — 97140 MANUAL THERAPY 1/> REGIONS: CPT | Mod: GP | Performed by: PHYSICAL THERAPIST

## 2020-08-19 ENCOUNTER — MYC MEDICAL ADVICE (OUTPATIENT)
Dept: FAMILY MEDICINE | Facility: OTHER | Age: 45
End: 2020-08-19

## 2020-08-19 NOTE — LETTER
[address]  [facility] [address]  [phone]        August 20, 2020  In regards to:  Amelia Coker  [address] [facility] [address]          To whom it may concern,    Amelia Coker has been a patient of mine for quite some time.  She most recently has been suffering from neck and upper back pain as well as what is thought to be thoracic outlet syndrome.  There has been quite challenging to help her gain control this.  Of note during recent physical therapy visit the therapist helped her by independently supporting her large breasts and relieving the pressure of the bra strap at the shoulder and upper back.  She noted significant relief of her signs and symptoms.  Recommendations from a thoracic outlet syndrome have included surgical intervention to remove bony structure that is thought to be part of the problem.  When her breasts are independently supported she has release of symptoms almost 2 its entirety.  Please evaluate and consider what surgical options may be applicable for breast reduction to assist with her pain control.  At this point in time I would consider breast reduction wise and within the realm of medical necessity.  In my opinion it has less risk and more benefit than bony structure removal.    Sincerely,        SHEREEN Yañez PA-C

## 2020-08-19 NOTE — TELEPHONE ENCOUNTER
Flagging for provider are you willing to write a letter? Please advise  Thanks  Sandi Alas RT (R)

## 2020-08-20 NOTE — TELEPHONE ENCOUNTER
I have drafted a letter and would appreciate physical therapy's input on it.  If they would like to add at what I have said and add their remarks to it I am willing to sign my name on the bottom.  Please contact me with any questions so that we can accomplish this for the patient before she goes to her specialist next Monday.  Electronically signed:    Otoniel Manuel PA-C

## 2020-08-21 ENCOUNTER — HOSPITAL ENCOUNTER (OUTPATIENT)
Dept: PHYSICAL THERAPY | Facility: CLINIC | Age: 45
Setting detail: THERAPIES SERIES
End: 2020-08-21
Attending: SURGERY
Payer: COMMERCIAL

## 2020-08-21 PROCEDURE — 97140 MANUAL THERAPY 1/> REGIONS: CPT | Mod: GP | Performed by: PHYSICAL THERAPIST

## 2020-08-25 ENCOUNTER — ANCILLARY PROCEDURE (OUTPATIENT)
Dept: MAMMOGRAPHY | Facility: OTHER | Age: 45
End: 2020-08-25
Attending: PHYSICIAN ASSISTANT
Payer: COMMERCIAL

## 2020-08-25 DIAGNOSIS — Z12.31 VISIT FOR SCREENING MAMMOGRAM: ICD-10-CM

## 2020-08-25 PROCEDURE — 77063 BREAST TOMOSYNTHESIS BI: CPT | Mod: TC

## 2020-08-25 PROCEDURE — 77067 SCR MAMMO BI INCL CAD: CPT | Mod: TC

## 2020-08-27 ENCOUNTER — HOSPITAL ENCOUNTER (OUTPATIENT)
Dept: PHYSICAL THERAPY | Facility: CLINIC | Age: 45
Setting detail: THERAPIES SERIES
End: 2020-08-27
Attending: SURGERY
Payer: COMMERCIAL

## 2020-08-27 ENCOUNTER — MYC MEDICAL ADVICE (OUTPATIENT)
Dept: FAMILY MEDICINE | Facility: OTHER | Age: 45
End: 2020-08-27

## 2020-08-27 DIAGNOSIS — Z20.822 EXPOSURE TO COVID-19 VIRUS: Primary | ICD-10-CM

## 2020-08-27 PROCEDURE — 97140 MANUAL THERAPY 1/> REGIONS: CPT | Mod: GP | Performed by: PHYSICAL THERAPIST

## 2020-08-27 NOTE — PROGRESS NOTES
Patient exposed to another person who is showing obvious signs and symptoms consistent with COVID-19.  She was with that patient face-to-face with less than social distancing for extended period of time.  She is now developing a headache malaise and fatigue.  Orders placed for COVID-19 testing.  Electronically signed:    Otoniel Manuel PA-C

## 2020-08-28 DIAGNOSIS — Z20.822 EXPOSURE TO COVID-19 VIRUS: ICD-10-CM

## 2020-08-28 PROCEDURE — U0003 INFECTIOUS AGENT DETECTION BY NUCLEIC ACID (DNA OR RNA); SEVERE ACUTE RESPIRATORY SYNDROME CORONAVIRUS 2 (SARS-COV-2) (CORONAVIRUS DISEASE [COVID-19]), AMPLIFIED PROBE TECHNIQUE, MAKING USE OF HIGH THROUGHPUT TECHNOLOGIES AS DESCRIBED BY CMS-2020-01-R: HCPCS | Performed by: PHYSICIAN ASSISTANT

## 2020-08-29 LAB
SARS-COV-2 RNA SPEC QL NAA+PROBE: NOT DETECTED
SPECIMEN SOURCE: NORMAL

## 2020-09-09 NOTE — PROGRESS NOTES
"Amelia Coker is a 45 year old female who is being evaluated via a billable telephone visit.      The patient has been notified of following:     \"This telephone visit will be conducted via a call between you and your physician/provider. We have found that certain health care needs can be provided without the need for a physical exam.  This service lets us provide the care you need with a short phone conversation.  If a prescription is necessary we can send it directly to your pharmacy.  If lab work is needed we can place an order for that and you can then stop by our lab to have the test done at a later time.    Telephone visits are billed at different rates depending on your insurance coverage. During this emergency period, for some insurers they may be billed the same as an in-person visit.  Please reach out to your insurance provider with any questions.    If during the course of the call the physician/provider feels a telephone visit is not appropriate, you will not be charged for this service.\"    Patient has given verbal consent for Telephone visit?  Yes    What phone number would you like to be contacted at? [phone]    How would you like to obtain your AVS? [facility]    CHIEF COMPLAINT:   Low back pain    INTERVAL HISTORY:  Last seen on 7/9/2020.        Recommendations/plan at the last visit included:  1. Physical Therapy:  Not at this time, patient is aware she would need this prior to any RFA  2. Clinical Health Psychologist to address issues of relaxation, behavorial change, coping style, and other factors important to improvement: not at this time  3. Diagnostic Studies:  None at this time  4. Medication Management:   1. Plan to start Topamax at 25mg at bedtime for 1 week, then 25mg BID x1week, then 25mg in AM and 50mg at bedtime for 1 week, then 50mg BID once she has completed her taper of Cymbalta   5. Potential procedures: Referred for a left lateral " branch block   6. Recommendations to PCP: see above     Follow up: 6-8 Weeks    Since her last visit, Amelia Coker reports:  -her back continues to be problem some. She states that by the end of the day she is unable to sleep. She tries to walk but states that her back gets worse.     She had Sacral ,2,3 lateral branch blocks and L5 medial branch blocks bilaterally. She did not achieve significant pain relief. She states that it did not help her leg pain or the pain at her SI joint.      She did not start the Topamax as she had other medical issues that she was dealing with.    Pain Information:     Pain today 5/10      CURRENT RELEVANT PAIN MEDICATIONS:   Tylenol 650mg: takes 2 pills twice daily  Mobic 15mg takes once daily  Valium 5mg: uses sparingly-last used in the fall 2019    Patient is using the medication as prescribed:  YES  Is your medication helpful? NO   Medication side effects? no side effect    Previous Medications: (H--helped; HI--Helped initially; SWH-- somewhat helpful, NH--No help; W--worse; SE--side effects)   Opiates: none  NSAIDS: Celebrex NH, Mobic H, Ibuprofen SW SE stomach upset, Aleve NH SE hand swelling, Nabumetone NH, Voltaren NH  Anti-migraine mediations: Maxalt H  Muscle Relaxants: Valium H, Flexeril NH, Tizanidine NH, Robaxin NH  Neuropathics: Gabapentin NH,   Anti-depressants: Cymbalta NH, Amitriptyline NH   Anxiolytics: Valium H SE hangover effect  Topicals: CBD NH, Biofreeze NH, Lidocaine NH, Voltaren NH, Magnesium oil NH  Other medications not covered above: Tylenol SWH    Past Pain Treatments:  Pain Clinic:  No   PT: Yes    Psychologist: No  Relaxation techniques/biofeedback: No  Chiropractor: Yes  Acupuncture: Yes  Pharmacotherapy:               Opioids: No                Non-opioids:    Yes   TENs Unit:Yes  Injections:   -epidural L4-L5, S1 x12 (with Dr. Goetz)-these were before surgery  -S1 nerve injection (at [facility])-Fall 2019-not helpful-made pain worse  7/23/2020 Sacral 1,  2,3  Lateral branch blocks and L5 medial branch blocks bilaterally (not helpful)  Surgeries related to pain:   -12/08/2016 Lumbar decompression L4-5 right, L5-S1 left    [place] Board of Pharmacy Data Base Reviewed:    NO; (if yes, As expected, no concern for misuse/abuse of controlled medications based on this report.)        Medications:  Current Outpatient Medications   Medication Sig Dispense Refill     Acetaminophen (TYLENOL PO) Take 1,000 mg by mouth every 8 hours as needed        acyclovir (ZOVIRAX) 5 % ointment Apply topically 6 times daily (Patient not taking: Reported on 6/19/2020) 15 g 3     clindamycin (CLEOCIN) 300 MG capsule Take 1 capsule (300 mg) by mouth 3 times daily 30 capsule 0     clobetasol (TEMOVATE) 0.05 % external cream Apply topically 2 times daily 60 g 1     diazepam (VALIUM) 5 MG tablet Take 1 tablet (5 mg) by mouth 3 times daily as needed for muscle spasms (Patient not taking: Reported on 5/21/2020) 30 tablet 0     DULoxetine (CYMBALTA) 20 MG capsule Take 1 capsule (20 mg) by mouth daily for 21 days As directed. 21 capsule 0     fexofenadine (ALLEGRA) 180 MG tablet Take 1 tablet by mouth daily Reported on 4/5/2017       meloxicam (MOBIC) 15 MG tablet TAKE ONE TABLET BY MOUTH EVERY DAY (PATIENT WANTS UNICHEM BRAND ONLY) 90 tablet 2     methylPREDNISolone (MEDROL DOSEPAK) 4 MG tablet therapy pack Follow Package Directions 21 tablet 0     mupirocin (BACTROBAN) 2 % external ointment Apply topically 3 times daily 30 g 1     order for [facility] Equipment being ordered: [facility] [id number] $70  Ankle Support MD Figure 8, Lace up 1 Device 0     rizatriptan (MAXALT-MLT) 5 MG ODT Take 1 tablet (5 mg) by mouth at onset of headache for migraine May repeat in 2 hours. Max 6 tablets/24 hours. 30 tablet 1     sucralfate (CARAFATE) 1 GM tablet Take 1 tablet (1 g) by mouth 4 times daily 40 tablet 0       PHYSICAL EXAM:     Vitals: There were no vitals taken for this visit.      Psychiatric/mental status:  Alert, without lethargy or stupor. Appropriate affect. Mood normal.     DIAGNOSTIC TESTS:  Imaging Studies:   No new imaging to review    Assessment:  Amelia Coker is a 45 year old female who presents today for follow up regarding her:    1. Lumbar facet arthropathy  2. Chronic SI joint pain  3. Myofascial pain      Persistent low back pain. No pain relief with the lateral branch blocks. Will try LMBB preceding to RFA. If not helpful, would recommend considering a L5-S1 TLESI.     Will hold off on starting Topamax now. If she decides she wants to try it, she will contact me.     Plan:    Diagnosis reviewed, treatment option addressed, and risk/benifits discussed.  Self-care instructions given.  I am recommending a multidisciplinary treatment plan to help this patient better manage pain.      1. Physical Therapy:  NO, but this will need to be ordered of successful block   2. Clinical Health Psychologist:  NO    3. Diagnostic Studies:  None at this time  4. Medication Management:    1. Consider starting Topamax  5. Further procedures recommended: referred for LMBB preceding to RFA. Consider TLESI at L5-S1  6. Recommendations to PCP. See above    Follow up with this provider:  2-3 Months     Phone call duration: 17 minutes      Sabiha Angulo PA-C   [facility]

## 2020-09-10 ENCOUNTER — VIRTUAL VISIT (OUTPATIENT)
Dept: PALLIATIVE MEDICINE | Facility: CLINIC | Age: 45
End: 2020-09-10
Payer: COMMERCIAL

## 2020-09-10 ENCOUNTER — TELEPHONE (OUTPATIENT)
Dept: SURGERY | Facility: CLINIC | Age: 45
End: 2020-09-10

## 2020-09-10 DIAGNOSIS — Z11.59 ENCOUNTER FOR SCREENING FOR OTHER VIRAL DISEASES: Primary | ICD-10-CM

## 2020-09-10 DIAGNOSIS — G89.29 CHRONIC SI JOINT PAIN: ICD-10-CM

## 2020-09-10 DIAGNOSIS — M53.3 CHRONIC SI JOINT PAIN: ICD-10-CM

## 2020-09-10 DIAGNOSIS — M47.816 LUMBAR FACET ARTHROPATHY: Primary | ICD-10-CM

## 2020-09-10 DIAGNOSIS — M79.18 MYOFASCIAL PAIN: ICD-10-CM

## 2020-09-10 PROCEDURE — 64484 NJX AA&/STRD TFRM EPI L/S EA: CPT | Performed by: ANESTHESIOLOGY

## 2020-09-10 PROCEDURE — 99213 OFFICE O/P EST LOW 20 MIN: CPT | Mod: 95 | Performed by: PHYSICIAN ASSISTANT

## 2020-09-10 NOTE — TELEPHONE ENCOUNTER
Pt scheduled for MBB  Date: 9/24/20  Time: 0900   Dr. Goetz    Instructed pt to have a  for procedure.  Patient informed of COVID testing process.

## 2020-09-10 NOTE — PATIENT INSTRUCTIONS
After Visit Instructions:     Thank you for coming to [facility] [place] for your care. It is my goal to partner with you to help you reach your optimal state of health.     I am recommending multidisciplinary care at this time.  The focus of care will be to continue gradual rehabilitation and pain management with medication adjustments as needed.    Continue daily self-care, identifying contributing factors, and monitoring variations in pain level. Continue to integrate self-care into your life.        Schedule follow-up with Sabiha Angulo PA-C in 8-12 weeks. You will need to make this appointment.   Procedures recommended: referred for lumbar medial branch blocks preceding to a radiofrequency ablation. To call and schedule your procedure, you can call: [phone], then hit option 2       Sabiha Angulo PA-C  [facility]    Contact information: [facility] [place]  Clinic Number:  [phone]     Call with any questions about your care and for scheduling assistance.     Calls are returned Monday through Friday between 8 AM and 4:30 PM. We usually get back to you within 2 business days depending on the issue/request.    If we are prescribing your medications:    For opioid medication refills, call the clinic or send a Contractually message 7 days in advance.  Please include:    Name of requested medication    Name of the pharmacy.    For non-opioid medications, call your pharmacy directly to request a refill. Please allow 3-4 days to be processed.     Per [place] State Law:    All controlled substance prescriptions must be filled within 30 days of being written.      For those controlled substances allowing refills, pickup must occur within 30 days of last fill.      We believe regular attendance is key to your success in our program!      Any time you are unable to keep your appointment we ask that you call us at least 24 hours in advance to  cancel.This will allow us to offer the appointment time to another patient.   Multiple missed appointments may lead to dismissal from the clinic.

## 2020-09-18 ENCOUNTER — TELEPHONE (OUTPATIENT)
Dept: FAMILY MEDICINE | Facility: OTHER | Age: 45
End: 2020-09-18

## 2020-09-18 NOTE — TELEPHONE ENCOUNTER
Lm for patient to call us back if needing help in scheduling this or if she had been seen by them already.

## 2020-09-18 NOTE — TELEPHONE ENCOUNTER
You placed a referral to [facility] on 7/24/20.    It is unclear if the patient has scheduled yet, not finding a report showing they were seen.     Please forward to your team if further follow up is needed to see if they have made this appointment.      Thank you!   Lola/Referral Representative for [facility]

## 2020-09-21 DIAGNOSIS — Z11.59 ENCOUNTER FOR SCREENING FOR OTHER VIRAL DISEASES: ICD-10-CM

## 2020-09-21 PROCEDURE — U0003 INFECTIOUS AGENT DETECTION BY NUCLEIC ACID (DNA OR RNA); SEVERE ACUTE RESPIRATORY SYNDROME CORONAVIRUS 2 (SARS-COV-2) (CORONAVIRUS DISEASE [COVID-19]), AMPLIFIED PROBE TECHNIQUE, MAKING USE OF HIGH THROUGHPUT TECHNOLOGIES AS DESCRIBED BY CMS-2020-01-R: HCPCS | Performed by: ANESTHESIOLOGY

## 2020-09-22 LAB
SARS-COV-2 RNA SPEC QL NAA+PROBE: NOT DETECTED
SPECIMEN SOURCE: NORMAL

## 2020-09-24 ENCOUNTER — HOSPITAL ENCOUNTER (OUTPATIENT)
Dept: GENERAL RADIOLOGY | Facility: CLINIC | Age: 45
End: 2020-09-24
Attending: ANESTHESIOLOGY | Admitting: ANESTHESIOLOGY
Payer: COMMERCIAL

## 2020-09-24 ENCOUNTER — MYC MEDICAL ADVICE (OUTPATIENT)
Dept: FAMILY MEDICINE | Facility: OTHER | Age: 45
End: 2020-09-24

## 2020-09-24 ENCOUNTER — HOSPITAL ENCOUNTER (OUTPATIENT)
Facility: CLINIC | Age: 45
Discharge: HOME | End: 2020-09-24
Attending: ANESTHESIOLOGY | Admitting: ANESTHESIOLOGY
Payer: COMMERCIAL

## 2020-09-24 ENCOUNTER — OFFICE VISIT (OUTPATIENT)
Dept: FAMILY MEDICINE | Facility: OTHER | Age: 45
End: 2020-09-24
Payer: COMMERCIAL

## 2020-09-24 ENCOUNTER — ANESTHESIA EVENT (OUTPATIENT)
Dept: SURGERY | Facility: CLINIC | Age: 45
End: 2020-09-24
Payer: COMMERCIAL

## 2020-09-24 ENCOUNTER — ANESTHESIA (OUTPATIENT)
Dept: SURGERY | Facility: CLINIC | Age: 45
End: 2020-09-24
Payer: COMMERCIAL

## 2020-09-24 VITALS
HEIGHT: 66 IN | WEIGHT: 209.5 LBS | HEART RATE: 76 BPM | OXYGEN SATURATION: 100 % | DIASTOLIC BLOOD PRESSURE: 80 MMHG | SYSTOLIC BLOOD PRESSURE: 138 MMHG | BODY MASS INDEX: 33.67 KG/M2 | TEMPERATURE: 97.5 F

## 2020-09-24 VITALS
OXYGEN SATURATION: 93 % | SYSTOLIC BLOOD PRESSURE: 111 MMHG | HEART RATE: 60 BPM | DIASTOLIC BLOOD PRESSURE: 74 MMHG | RESPIRATION RATE: 18 BRPM | TEMPERATURE: 98.3 F

## 2020-09-24 VITALS
SYSTOLIC BLOOD PRESSURE: 133 MMHG | DIASTOLIC BLOOD PRESSURE: 75 MMHG | HEART RATE: 75 BPM | OXYGEN SATURATION: 97 % | RESPIRATION RATE: 16 BRPM | TEMPERATURE: 99 F

## 2020-09-24 DIAGNOSIS — M54.16 CHRONIC LEFT LUMBAR RADICULOPATHY: Primary | ICD-10-CM

## 2020-09-24 DIAGNOSIS — M54.16 CHRONIC LEFT LUMBAR RADICULOPATHY: ICD-10-CM

## 2020-09-24 DIAGNOSIS — M48.061 FORAMINAL STENOSIS OF LUMBAR REGION: ICD-10-CM

## 2020-09-24 DIAGNOSIS — M47.26 OSTEOARTHRITIS OF SPINE WITH RADICULOPATHY, LUMBAR REGION: ICD-10-CM

## 2020-09-24 DIAGNOSIS — R03.0 ELEVATED BLOOD PRESSURE READING WITHOUT DIAGNOSIS OF HYPERTENSION: ICD-10-CM

## 2020-09-24 DIAGNOSIS — I73.00 RAYNAUD'S PHENOMENON WITHOUT GANGRENE: ICD-10-CM

## 2020-09-24 DIAGNOSIS — Z01.818 PREOP GENERAL PHYSICAL EXAM: Primary | ICD-10-CM

## 2020-09-24 DIAGNOSIS — M54.16 LUMBAR RADICULOPATHY: ICD-10-CM

## 2020-09-24 DIAGNOSIS — M54.16 LUMBAR RADICULOPATHY, CHRONIC: Primary | ICD-10-CM

## 2020-09-24 DIAGNOSIS — M51.27 HERNIATION OF INTERVERTEBRAL DISC BETWEEN L5 AND S1: ICD-10-CM

## 2020-09-24 PROCEDURE — 40000879 ZZH CANCELLED SURGERY UP TO 16-30 MINS: Performed by: ANESTHESIOLOGY

## 2020-09-24 PROCEDURE — 25000128 H RX IP 250 OP 636: Performed by: NURSE ANESTHETIST, CERTIFIED REGISTERED

## 2020-09-24 PROCEDURE — 64483 NJX AA&/STRD TFRM EPI L/S 1: CPT | Mod: LT | Performed by: ANESTHESIOLOGY

## 2020-09-24 PROCEDURE — 25000125 ZZHC RX 250: Performed by: NURSE ANESTHETIST, CERTIFIED REGISTERED

## 2020-09-24 PROCEDURE — 25000128 H RX IP 250 OP 636: Performed by: ANESTHESIOLOGY

## 2020-09-24 PROCEDURE — 99214 OFFICE O/P EST MOD 30 MIN: CPT | Performed by: PHYSICIAN ASSISTANT

## 2020-09-24 PROCEDURE — 64483 NJX AA&/STRD TFRM EPI L/S 1: CPT | Mod: LT

## 2020-09-24 PROCEDURE — 40000277 XR SURGERY CARM FLUORO LESS THAN 5 MIN W STILLS: Mod: TC

## 2020-09-24 PROCEDURE — 25000125 ZZHC RX 250: Performed by: ANESTHESIOLOGY

## 2020-09-24 PROCEDURE — 37000008 ZZH ANESTHESIA TECHNICAL FEE, 1ST 30 MIN: Performed by: ANESTHESIOLOGY

## 2020-09-24 RX ORDER — IOPAMIDOL 612 MG/ML
INJECTION, SOLUTION INTRATHECAL PRN
Status: DISCONTINUED | OUTPATIENT
Start: 2020-09-24 | End: 2020-09-24 | Stop reason: HOSPADM

## 2020-09-24 RX ORDER — PROPOFOL 10 MG/ML
INJECTION, EMULSION INTRAVENOUS PRN
Status: DISCONTINUED | OUTPATIENT
Start: 2020-09-24 | End: 2020-09-24

## 2020-09-24 RX ORDER — LIDOCAINE HYDROCHLORIDE 40 MG/ML
INJECTION, SOLUTION RETROBULBAR PRN
Status: DISCONTINUED | OUTPATIENT
Start: 2020-09-24 | End: 2020-09-24 | Stop reason: HOSPADM

## 2020-09-24 RX ORDER — PREDNISONE 20 MG/1
20 TABLET ORAL DAILY
Qty: 6 TABLET | Refills: 1 | Status: SHIPPED | OUTPATIENT
Start: 2020-09-24 | End: 2020-11-05

## 2020-09-24 RX ORDER — TRIAMCINOLONE ACETONIDE 40 MG/ML
INJECTION, SUSPENSION INTRA-ARTICULAR; INTRAMUSCULAR PRN
Status: DISCONTINUED | OUTPATIENT
Start: 2020-09-24 | End: 2020-09-24 | Stop reason: HOSPADM

## 2020-09-24 RX ORDER — KETOROLAC TROMETHAMINE 30 MG/ML
30 INJECTION, SOLUTION INTRAMUSCULAR; INTRAVENOUS ONCE
Status: COMPLETED | OUTPATIENT
Start: 2020-09-24 | End: 2020-09-24

## 2020-09-24 RX ORDER — LIDOCAINE HYDROCHLORIDE 20 MG/ML
INJECTION, SOLUTION INFILTRATION; PERINEURAL PRN
Status: DISCONTINUED | OUTPATIENT
Start: 2020-09-24 | End: 2020-09-24

## 2020-09-24 RX ADMIN — KETOROLAC TROMETHAMINE 30 MG: 30 INJECTION, SOLUTION INTRAMUSCULAR at 17:30

## 2020-09-24 RX ADMIN — LIDOCAINE HYDROCHLORIDE 60 MG: 20 INJECTION, SOLUTION INFILTRATION; PERINEURAL at 16:44

## 2020-09-24 RX ADMIN — PROPOFOL 30 MG: 10 INJECTION, EMULSION INTRAVENOUS at 16:55

## 2020-09-24 RX ADMIN — PROPOFOL 50 MG: 10 INJECTION, EMULSION INTRAVENOUS at 16:46

## 2020-09-24 RX ADMIN — PROPOFOL 60 MG: 10 INJECTION, EMULSION INTRAVENOUS at 16:44

## 2020-09-24 RX ADMIN — PROPOFOL 30 MG: 10 INJECTION, EMULSION INTRAVENOUS at 16:48

## 2020-09-24 RX ADMIN — PROPOFOL 40 MG: 10 INJECTION, EMULSION INTRAVENOUS at 16:47

## 2020-09-24 RX ADMIN — PROPOFOL 30 MG: 10 INJECTION, EMULSION INTRAVENOUS at 16:51

## 2020-09-24 SDOH — HEALTH STABILITY: MENTAL HEALTH: CURRENT SMOKER: 0

## 2020-09-24 ASSESSMENT — MIFFLIN-ST. JEOR: SCORE: 1594.91

## 2020-09-24 ASSESSMENT — PAIN SCALES - GENERAL: PAINLEVEL: SEVERE PAIN (6)

## 2020-09-24 ASSESSMENT — ENCOUNTER SYMPTOMS: DYSRHYTHMIAS: 1

## 2020-09-24 NOTE — ANESTHESIA PREPROCEDURE EVALUATION
Anesthesia Pre-Procedure Evaluation    Patient: Amelia Coker   MRN: [id number] : 1975            Past Medical History:   Diagnosis Date     Abnormal mammogram 2016    right breast      AD (atopic dermatitis) 10/29/2015     Allergic rhinitis due to other allergen      Arthritis      Bell's palsy      Chronic fatigue 2012     Chronic infection     MRSA -  scalp and prior to Abdomen     Contact dermatitis and other eczema, due to unspecified cause     eczema     Contusion of left foot, initial encounter 3/16/2016     DJD (degenerative joint disease), lumbar 2013     DJD (degenerative joint disease), lumbar 2013     Ds DNA antibody positive 2014    borderline.      Elevated blood pressure reading without diagnosis of hypertension 2013     Facial contusion 12/15/2014    hit by horse      Foraminal stenosis of lumbar region 2013     Frontal headache 12/15/2014     Gastroesophageal reflux disease, esophagitis presence not specified 2016     Heat sensitivity 10/29/2015     HTN, goal below 140/90 2015     Hyperlipidemia LDL goal <130 2017     Irregular heart beat      Keratitis sicca (H) 10/31/2012     Left shoulder pain 2013     Lumbar radiculopathy 2014     Migraine headache 10/23/2013     Migraine, unspecified, with intractable migraine, so stated, without mention of status migrainosus      Motion sickness      MRSA infection 10/20/2015     Muscular wasting and disuse atrophy, 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    right SCM, right wrist,       Numbness and tingling     both legs anf feet greater on the left     PAC (premature atrial contraction) 7/15/2010     Plantar fasciitis 2015     PONV (postoperative nausea and vomiting)      Skin lesion 10/20/2015    posterior superior scalp      Spasm of muscle 2017     Telangiectasia of face 2014     Tooth pain 10/20/2015    left lower primary molar      Past Surgical History:   Procedure  Laterality Date     AS INJ TRANSFORAMIN EPIDURAL, LUMB/SACR SINGLE       COLONOSCOPY  3/11/2013    Procedure: COLONOSCOPY;  colonoscopy;  Surgeon: Lobito Mas MD;  Location: [facility]     COLONOSCOPY WITH CO2 INSUFFLATION N/A 11/19/2018    Procedure: COLONOSCOPY WITH CO2 INSUFFLATION;  Surgeon: Goyo Blank MD;  Location: [facility]     DECOMPRESSION LUMBAR TWO LEVELS Bilateral 12/8/2016    Procedure: DECOMPRESSION LUMBAR TWO LEVELS;  Surgeon: Bang Burrell MD;  Location: [facility]     DILATION AND CURETTAGE, HYSTEROSCOPY, ABLATE ENDOMETRIUM NOVASURE, COMBINED N/A 6/12/2019    Procedure: hysteroscopy, dilation & curettage, polypectomy, endometrial ablation;  Surgeon: Fredy Pham MD;  Location: [facility]     ESOPHAGOSCOPY, GASTROSCOPY, DUODENOSCOPY (EGD), COMBINED  11/8/2013    Procedure: COMBINED ESOPHAGOSCOPY, GASTROSCOPY, DUODENOSCOPY (EGD), BIOPSY SINGLE OR MULTIPLE;  Esophagoscopy, Gastroscopy, Duodenoscopy EGD with multiple biopsies;  Surgeon: Teja Koch MD;  Location: [facility]     HC REMOVAL OF TONSILS,<11 Y/O      Tonsils <10y.o.     HC TOOTH EXTRACTION W/FORCEP       INJECT BLOCK MEDIAL BRANCH CERVICAL/THORACIC/LUMBAR N/A 7/23/2020    Procedure: Sacral 1,2,3 Lateral Branch Blocks and lumbar 5 medial branch blocks bilaterally;  Surgeon: Maurisio Goetz MD;  Location: [facility]     REPAIR TENDON ELBOW  7/9/2014    Procedure: REPAIR TENDON ELBOW;  Surgeon: Chris Johnston MD;  Location: [facility]     ULTRASONIC REMOVAL SOFT TISSUE (LOCATION) Right 11/21/2018    Procedure: Tenex right foot;  Surgeon: Patel Martínez DO;  Location: [facility]       Anesthesia Evaluation     . Pt has had prior anesthetic. Type: MAC and General    History of anesthetic complications   - PONV        ROS/MED HX    ENT/Pulmonary:     (+)allergic rhinitis, , . .    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 : . . . :. dysrhythmias Other, Irregular Heartbeat/Palpitations, . Previous cardiac testing       METS/Exercise Tolerance:     Hematologic:  - neg hematologic  ROS       Musculoskeletal: Comment: Raynauds  (+) arthritis,  -       GI/Hepatic:     (+) GERD       Renal/Genitourinary:  - ROS Renal section negative       Endo:     (+) Obesity, .      Psychiatric:         Infectious Disease:  - neg infectious disease ROS       Malignancy:      - no malignancy   Other:    (+) No chance of pregnancy C-spine cleared: N/A, H/O Chronic Pain,no other significant disability                           Physical Exam      Airway   Mallampati: I  TM distance: >3 FB  Neck ROM: full    Dental     Cardiovascular   Rhythm and rate: regular and normal      Pulmonary    breath sounds clear to auscultation            Lab Results   Component Value Date    WBC 5.2 07/27/2020    HGB 11.8 07/27/2020    HCT 35.3 07/27/2020     07/27/2020    CRP <2.9 03/27/2018    SED 9 08/29/2013     07/27/2020    POTASSIUM 3.9 07/27/2020    CHLORIDE 105 07/27/2020    CO2 27 07/27/2020    BUN 23 07/27/2020    CR 0.58 07/27/2020    GLC 93 07/27/2020    JELENA 8.6 07/27/2020    PHOS 3.0 11/19/2014    MAG 2.1 06/08/2016    ALBUMIN 3.8 07/27/2020    PROTTOTAL 7.2 07/27/2020    ALT 21 07/27/2020    AST 13 07/27/2020    ALKPHOS 62 07/27/2020    BILITOTAL 0.6 07/27/2020    BILIDIRECT 0 04/04/2003    LIPASE 180 08/06/2018    AMYLASE 51 08/06/2018    INR 1.0 07/14/2014    TSH 1.03 07/27/2020    T4 0.92 10/07/2019    T3 90 10/07/2019    HCG Negative 06/10/2019       Preop Vitals  BP Readings from Last 3 Encounters:   09/24/20 122/79   09/24/20 138/80   09/24/20 133/75    Pulse Readings from Last 3 Encounters:   09/24/20 76   09/24/20 75   07/23/20 66      Resp Readings from Last 3 Encounters:   09/24/20 16   09/24/20 16   07/23/20  "18    SpO2 Readings from Last 3 Encounters:   09/24/20 100%   09/24/20 100%   09/24/20 97%      Temp Readings from Last 1 Encounters:   09/24/20 98.3  F (36.8  C) (Oral)    Ht Readings from Last 1 Encounters:   09/24/20 1.665 m (5' 5.55\")      Wt Readings from Last 1 Encounters:   09/24/20 95 kg (209 lb 8 oz)    Estimated body mass index is 34.28 kg/m  as calculated from the following:    Height as of an earlier encounter on 9/24/20: 1.665 m (5' 5.55\").    Weight as of an earlier encounter on 9/24/20: 95 kg (209 lb 8 oz).       Anesthesia Plan      History & Physical Review  History and physical reviewed and following examination; no interval change.    ASA Status:  2 .    NPO Status:  > 8 hours    Plan for MAC with Intravenous and Propofol induction. Maintenance will be TIVA.  Reason for MAC:  Deep or markedly invasive procedure (G8)    dramamine   The patient is not a current smoker      Postoperative Care      Consents  Anesthetic plan, risks, benefits and alternatives discussed with:  Patient.  Use of blood products discussed: No .   Use of blood products discussed with Patient and Spouse.  .                   LAURI Torres CRNA  "

## 2020-09-24 NOTE — ANESTHESIA POSTPROCEDURE EVALUATION
Patient: Amelia Coker    Procedure(s):  Left Lumbar 5 Sacral 1 nerve root injections.    Diagnosis:Chronic left lumbar radiculopathy [M54.16]  Diagnosis Additional Information: No value filed.    Anesthesia Type:  MAC    Note:  Anesthesia Post Evaluation    Patient location during evaluation: Phase 2  Patient participation: Able to fully participate in evaluation  Level of consciousness: awake  Pain management: adequate  Airway patency: patent  Cardiovascular status: acceptable and hemodynamically stable  Respiratory status: acceptable, room air and nonlabored ventilation  Hydration status: stable  PONV: none     Anesthetic complications: None    Comments: Patient was happy with the anesthesia care received and no anesthesia related complications were noted.  I will follow up with the patient again if it is needed.        Last vitals:  Vitals:    09/24/20 1523 09/24/20 1703   BP: 122/79 115/79   Resp: 16 18   Temp: 98.3  F (36.8  C)    SpO2: 100%          Electronically Signed By: LAURI Torres CRNA  September 24, 2020  5:07 PM

## 2020-09-24 NOTE — PROGRESS NOTES
[address]  [facility] [address]  Phone: [phone]  Primary Provider: Otoniel Nelson  Pre-op Performing Provider: OTONIEL NELSON    PREOPERATIVE EVALUATION:  Today's date: 9/24/2020    Amelia Coker is a 45 year old female who presents for a preoperative evaluation.    Surgical Information:  Surgery Details 9/24/2020   Surgery/Procedure: -   Surgery Location: -   Surgeon: -   Surgery Date: 9/24   Time of Surgery: 3   Where patient plans to recover: At home with family   Additional recovery plan details: N/A     Fax number for surgical facility: Note does not need to be faxed, will be available electronically in Epic.  Type of Anesthesia Anticipated: to be determined- propofol     Subjective     HPI related to upcoming procedure: low back pain in need of intervention  Preop Questions 9/24/2020   1. Have you ever had a heart attack or stroke? No   2. Have you ever had surgery on your heart or blood vessels, such as a stent placement, a coronary artery bypass, or surgery on an artery in your head, neck, heart, or legs? No   3. Do you have chest pain with activity? No   4. Do you have a history of  heart failure? No   5. Do you currently have a cold, bronchitis or symptoms of other infection? No   6. Do you have a cough, shortness of breath, or wheezing? No   7. Do you or anyone in your family have previous history of blood clots? No   8. Do you or does anyone in your family have a serious bleeding problem such as prolonged bleeding following surgeries or cuts? No   9. Have you ever had problems with anemia or been told to take iron pills? No   10. Have you had any abnormal blood loss such as black, tarry or bloody stools, or abnormal vaginal bleeding? No   11. Have you ever had a blood transfusion? No   Are you willing to have a blood transfusion if it is medically needed before, during, or after your surgery? Yes   13. Have you or any of your relatives ever had  problems with anesthesia? YES - puking    14. Do you have sleep apnea, excessive snoring or daytime drowsiness? No   15. Do you have any artifical heart valves or other implanted medical devices like a pacemaker, defibrillator, or continuous glucose monitor? No   16. Do you have artificial joints? No   17. Are you allergic to latex? YES:   18. Is there any chance that you may be pregnant? No     Patient does not have a Health Care Directive or Living Will: Discussed advance care planning with patient; however, patient declined at this time.    RX monitoring [place] ([place]) reviewed:     See problem list for active medical problems.  Problems all longstanding and stable, except as noted/documented.  See ROS for pertinent symptoms related to these conditions.      Review of Systems  CONSTITUTIONAL: NEGATIVE for fever, chills, change in weight  INTEGUMENTARY/SKIN: NEGATIVE for worrisome rashes, moles or lesions  EYES: NEGATIVE for vision changes or irritation  ENT/MOUTH: NEGATIVE for ear, mouth and throat problems  RESP: NEGATIVE for significant cough or SOB  BREAST: NEGATIVE for masses, tenderness or discharge  CV: NEGATIVE for chest pain, palpitations or peripheral edema  GI: NEGATIVE for nausea, abdominal pain, heartburn, or change in bowel habits  : NEGATIVE for frequency, dysuria, or hematuria  MUSCULOSKELETAL: NEGATIVE for significant arthralgias or myalgia  NEURO: NEGATIVE for weakness, dizziness or paresthesias  ENDOCRINE: NEGATIVE for temperature intolerance, skin/hair changes  HEME: NEGATIVE for bleeding problems  PSYCHIATRIC: NEGATIVE for changes in mood or affect    Patient Active Problem List    Diagnosis Date Noted     Balance problems 07/17/2020     Priority: Medium     Morbid obesity (H) 07/17/2020     Priority: Medium     Sinus arrhythmia seen on electrocardiogram 07/17/2020     Priority: Medium     Family history of ischemic heart disease - father at 46 massive MI 07/17/2020     Priority: Medium      Brachial plexopathy - right shoulder 05/21/2020     Priority: Medium     Dental abscess - left mandible molar 03/05/2020     Priority: Medium     Endometrium, hyperplasia - on recent CT scan 02/10/2020     Priority: Medium     Pain in joint involving pelvic region and thigh, right 02/10/2020     Priority: Medium     Right lateral abdominal pain 02/10/2020     Priority: Medium     Right foot pain 02/10/2020     Priority: Medium     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 10/10/2019     Priority: Medium     Abnormality of nail surface 08/29/2019     Priority: Medium     Dry hair 08/29/2019     Priority: Medium     Dry skin 08/29/2019     Priority: Medium     Malaise and fatigue 08/29/2019     Priority: Medium     Lymphadenopathy 08/29/2019     Priority: Medium     PONV (postoperative nausea and vomiting) 06/10/2019     Priority: Medium     Menorrhalgia 06/10/2019     Priority: Medium     Uterine polyp 06/10/2019     Priority: Medium     Loose stools 06/10/2019     Priority: Medium     Hyperactive bowel sounds 06/10/2019     Priority: Medium     Jaycob complexion 05/23/2019     Priority: Medium     Lip lesion 05/23/2019     Priority: Medium     Epigastric pain 05/23/2019     Priority: Medium     Abdominal distension 05/23/2019     Priority: Medium     Intractable episodic tension-type headache 03/11/2019     Priority: Medium     Intractable right heel pain 05/29/2018     Priority: Medium     Superficial swelling of scalp 04/12/2018     Priority: Medium     Biceps tendinopathy, right 03/22/2018     Priority: Medium     Hyperlipidemia LDL goal <130 08/30/2017     Priority: Medium     Spasm of muscle 07/11/2017     Priority: Medium     Sternocleidomastoid muscle tenderness 04/05/2017     Priority: Medium     left         Acute cervical myofascial strain, initial encounter 04/05/2017     Priority: Medium     Abnormal mammogram 09/21/2016     Priority: Medium     right breast       Gastroesophageal reflux  disease, esophagitis presence not specified 06/29/2016     Priority: Medium     Rotator cuff arthropathy, right 03/07/2016     Priority: Medium     AD (atopic dermatitis) 10/29/2015     Priority: Medium     Heat sensitivity 10/29/2015     Priority: Medium     Skin lesion 10/20/2015     Priority: Medium     posterior superior scalp       Plantar fasciitis 09/23/2015     Priority: Medium     HTN, goal below 140/90 09/23/2015     Priority: Medium     Lateral epicondylitis 03/16/2015     Priority: Medium     Problem list name updated by automated process. Provider to review       Right shoulder pain 03/16/2015     Priority: Medium     Telangiectasia of face 12/19/2014     Priority: Medium     Frontal headache 12/15/2014     Priority: Medium     Muscular wasting and disuse atrophy, not elsewhere classified 06/09/2014     Priority: Medium     right SCM, right wrist,        Ds DNA antibody positive 04/16/2014     Priority: Medium     borderline.       Lumbar radiculopathy 01/22/2014     Priority: Medium     Migraine without aura and without status migrainosus, not intractable 10/23/2013     Priority: Medium     Foraminal stenosis of lumbar region 09/26/2013     Priority: Medium     DJD (degenerative joint disease), lumbar 09/26/2013     Priority: Medium     FLORIDALMA positive 07/18/2013     Priority: Medium     Plantar fascial fibromatosis 07/18/2013     Priority: Medium     Pain in joint, upper arm 04/15/2013     Priority: Medium     Family history of colon cancer 03/05/2013     Priority: Medium     mother       Keratitis sicca (H) 10/31/2012     Priority: Medium     Dry eyes 10/15/2012     Priority: Medium     Family history of melanoma 08/24/2012     Priority: Medium     Shoulder joint instability 08/15/2012     Priority: Medium     Abnormal PFT 07/31/2012     Priority: Medium     question of left heart failure and/or shunting in need of further investigation       Chronic fatigue 06/06/2012     Priority: Medium     Hair loss  06/06/2012     Priority: Medium     Raynaud phenomenon 04/11/2012     Priority: Medium     PAC (premature atrial contraction) 07/15/2010     Priority: Medium     Palpitations 07/15/2010     Priority: Medium     Lyme disease 06/07/2010     Priority: Medium     Scalp lesion 05/26/2010     Priority: Medium     Migraine, unspecified, with intractable migraine, so stated, without mention of status migrainosus 04/04/2003     Priority: Medium     Problem list name updated by automated process. Provider to review       Bell's palsy 04/04/2003     Priority: Medium     Contact dermatitis and other eczema, due to unspecified cause 04/04/2003     Priority: Medium     Allergic rhinitis due to other allergen 04/04/2003     Priority: Medium      Past Medical History:   Diagnosis Date     Abnormal mammogram 9/21/2016    right breast      AD (atopic dermatitis) 10/29/2015     Allergic rhinitis due to other allergen      Arthritis      Bell's palsy      Chronic fatigue 6/6/2012     Chronic infection     MRSA - 2015 scalp and prior to Abdomen     Contact dermatitis and other eczema, due to unspecified cause     eczema     Contusion of left foot, initial encounter 3/16/2016     DJD (degenerative joint disease), lumbar 9/26/2013     DJD (degenerative joint disease), lumbar 9/26/2013     Ds DNA antibody positive 4/16/2014    borderline.      Elevated blood pressure reading without diagnosis of hypertension 12/24/2013     Facial contusion 12/15/2014    hit by horse      Foraminal stenosis of lumbar region 9/26/2013     Frontal headache 12/15/2014     Gastroesophageal reflux disease, esophagitis presence not specified 6/29/2016     Heat sensitivity 10/29/2015     HTN, goal below 140/90 9/23/2015     Hyperlipidemia LDL goal <130 8/30/2017     Irregular heart beat      Keratitis sicca (H) 10/31/2012     Left shoulder pain 9/26/2013     Lumbar radiculopathy 1/22/2014     Migraine headache 10/23/2013     Migraine, unspecified, with intractable  migraine, so stated, without mention of status migrainosus      Motion sickness      MRSA infection 10/20/2015     Muscular wasting and disuse atrophy, not elsewhere classified 6/9/2014    right SCM, right wrist,       Numbness and tingling     both legs anf feet greater on the left     PAC (premature atrial contraction) 7/15/2010     Plantar fasciitis 9/23/2015     PONV (postoperative nausea and vomiting)      Skin lesion 10/20/2015    posterior superior scalp      Spasm of muscle 7/11/2017     Telangiectasia of face 12/19/2014     Tooth pain 10/20/2015    left lower primary molar      Past Surgical History:   Procedure Laterality Date     AS INJ TRANSFORAMIN EPIDURAL, LUMB/SACR SINGLE       COLONOSCOPY  3/11/2013    Procedure: COLONOSCOPY;  colonoscopy;  Surgeon: Lobito Mas MD;  Location: [facility]     COLONOSCOPY WITH CO2 INSUFFLATION N/A 11/19/2018    Procedure: COLONOSCOPY WITH CO2 INSUFFLATION;  Surgeon: Goyo Blank MD;  Location: [facility]     DECOMPRESSION LUMBAR TWO LEVELS Bilateral 12/8/2016    Procedure: DECOMPRESSION LUMBAR TWO LEVELS;  Surgeon: Bang Burrell MD;  Location: [facility]     DILATION AND CURETTAGE, HYSTEROSCOPY, ABLATE ENDOMETRIUM NOVASURE, COMBINED N/A 6/12/2019    Procedure: hysteroscopy, dilation & curettage, polypectomy, endometrial ablation;  Surgeon: Fredy Pham MD;  Location: [facility]     ESOPHAGOSCOPY, GASTROSCOPY, DUODENOSCOPY (EGD), COMBINED  11/8/2013    Procedure: COMBINED ESOPHAGOSCOPY, GASTROSCOPY, DUODENOSCOPY (EGD), BIOPSY SINGLE OR MULTIPLE;  Esophagoscopy, Gastroscopy, Duodenoscopy EGD with multiple biopsies;  Surgeon: Teja Koch MD;  Location: [facility]     HC REMOVAL OF TONSILS,<11 Y/O      Tonsils <10y.o.     HC TOOTH EXTRACTION W/FORCEP       INJECT BLOCK MEDIAL BRANCH CERVICAL/THORACIC/LUMBAR N/A 7/23/2020    Procedure: Sacral 1,2,3 Lateral Branch Blocks and lumbar 5 medial branch blocks bilaterally;  Surgeon: Maurisio Goetz MD;   [facility]: [facility]     REPAIR TENDON ELBOW  7/9/2014    Procedure: REPAIR TENDON ELBOW;  Surgeon: Chris Johnston MD;  Location: [facility]     ULTRASONIC REMOVAL SOFT TISSUE (LOCATION) Right 11/21/2018    Procedure: Tenex right foot;  Surgeon: Patel Martínez DO;  Location: [facility]     Current Outpatient Medications   Medication Sig Dispense Refill     Acetaminophen (TYLENOL PO) Take 1,000 mg by mouth every 8 hours as needed        clobetasol (TEMOVATE) 0.05 % external cream Apply topically 2 times daily 60 g 1     fexofenadine (ALLEGRA) 180 MG tablet Take 1 tablet by mouth daily Reported on 4/5/2017       meloxicam (MOBIC) 15 MG tablet TAKE ONE TABLET BY MOUTH EVERY DAY (PATIENT WANTS UNICHEM BRAND ONLY) 90 tablet 2     mupirocin (BACTROBAN) 2 % external ointment Apply topically 3 times daily 30 g 1     rizatriptan (MAXALT-MLT) 5 MG ODT Take 1 tablet (5 mg) by mouth at onset of headache for migraine May repeat in 2 hours. Max 6 tablets/24 hours. 30 tablet 1     sucralfate (CARAFATE) 1 GM tablet Take 1 tablet (1 g) by mouth 4 times daily 40 tablet 0     acyclovir (ZOVIRAX) 5 % ointment Apply topically 6 times daily (Patient not taking: Reported on 6/19/2020) 15 g 3     clindamycin (CLEOCIN) 300 MG capsule Take 1 capsule (300 mg) by mouth 3 times daily (Patient not taking: Reported on 9/24/2020) 30 capsule 0     diazepam (VALIUM) 5 MG tablet Take 1 tablet (5 mg) by mouth 3 times daily as needed for muscle spasms (Patient not taking: Reported on 5/21/2020) 30 tablet 0     DULoxetine (CYMBALTA) 20 MG capsule Take 1 capsule (20 mg) by mouth daily for 21 days As directed. 21 capsule 0     order for DME Equipment being ordered: DME [id number] $70  Ankle Support MD Figure 8, Lace up (Patient not taking: Reported on 9/24/2020) 1 Device 0       Allergies   Allergen Reactions     Ceftriaxone Anaphylaxis     Hives, rash, racing heart beat     Bactrim [Sulfamethoxazole W/Trimethoprim] Hives     Ciprofloxacin Other (See  Comments)     Tendon Issues     Codeine Nausea and Vomiting     Copper      Rash       Doxycycline      Loss of skin pigmentation, skin loss.     Gold      Rash       Iodine-131 Hives     Levaquin [Levofloxacin] Other (See Comments)     Tendon issues with levaquin and cipro     Nickel      rash     Steel [Staples]      Rash from staples       Latex Rash     Penicillins Rash        Social History     Tobacco Use     Smoking status: Never Smoker     Smokeless tobacco: Never Used   Substance Use Topics     Alcohol use: No     Comment: social     Family History   Problem Relation Age of Onset     Diabetes Mother         adult     Cancer - colorectal Mother      Hypertension Mother      Allergies Mother      Cancer Mother         colon     Depression Mother      Gastrointestinal Disease Mother         ibs     Genitourinary Problems Mother         kidney cyst     Gynecology Mother         endometriosis     Lipids Mother      Thyroid Disease Mother      Arthritis Mother         RA     Heart Disease Maternal Grandfather         MI,  - 60's     Allergies Father      Unknown/Adopted Father      Heart Disease Father         mi-age mid 40's     Cardiovascular Father      Lipids Father      Respiratory Father         asthma     Cancer Father      Other Cancer Father         renal cancer     Depression Sister      Allergies Sister      Cancer Sister         vaginal     Gynecology Sister         endometriosis     Lipids Paternal Grandmother      Osteoporosis Paternal Grandmother      Thyroid Disease Paternal Grandmother      Respiratory Son         asthma     Allergies Son      Arthritis Sister      Allergies Brother      Heart Disease Brother      Allergies Daughter      Blood Disease Paternal Aunt         LGL T cell Leukemia     Rheumatoid Arthritis Paternal Aunt      Kidney Disease Maternal Aunt      Lupus Maternal Aunt      Bladder Cancer Maternal Aunt      History   Drug Use No            Objective   Pulse 76   Temp  "97.5  F (36.4  C) (Temporal)   Ht 1.665 m (5' 5.55\")   Wt 95 kg (209 lb 8 oz)   SpO2 100%   BMI 34.28 kg/m    Physical Exam    GENERAL APPEARANCE: healthy, alert and no distress     EYES: EOMI, PERRL     HENT: ear canals and TM's normal and nose and mouth without ulcers or lesions     NECK: no adenopathy, no asymmetry, masses, or scars and thyroid normal to palpation     RESP: lungs clear to auscultation - no rales, rhonchi or wheezes     CV: regular rates and rhythm, normal S1 S2, no S3 or S4 and no murmur, click or rub     ABDOMEN:  soft, nontender, no HSM or masses and bowel sounds normal     MS: back pain is stable at this time.  Extremities WNL.     SKIN: no suspicious lesions or rashes     NEURO: Normal strength and tone, sensory exam grossly normal, mentation intact and speech normal     PSYCH: mentation appears normal. and affect normal/bright     LYMPHATICS: No cervical adenopathy    Recent Labs   Lab Test 07/27/20  1547 04/17/20  1136   HGB 11.8 12.7    276    139   POTASSIUM 3.9 3.7   CR 0.58 0.64        PRE-OP Diagnostics:  No labs were ordered during this visit.  No EKG required for low risk surgery (cataract, skin procedure, breast biopsy, etc).         Assessment & Plan   The proposed surgical procedure is considered LOW risk.    REVISED CARDIAC RISK INDEX  The patient has the following serious cardiovascular risks for perioperative complications:  No serious cardiac risks = 0 points    INTERPRETATION: 1 point: Class II (low risk - 0.9% complication rate)       1. Preop general physical exam  2. Elevated blood pressure reading without diagnosis of hypertension  3. Lumbar radiculopathy  4. Osteoarthritis of spine with radiculopathy, lumbar region  5. Foraminal stenosis of lumbar region  6. 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  7. Raynaud's phenomenon without gangrene  Clearance for surgical intervention and diagnostic approach to take a look at her back pain is improved " at this point in time.  Based on the fact that her surgical specialist has recommended an MRI and a second opinion I have placed the MRI for future application prior to seeing a second opinion specialist.    The patient has the following additional risks and recommendations for perioperative complications:     - No identified additional risk factors other than previously addressed     MEDICATION INSTRUCTIONS:  hold meds at this time.    RECOMMENDATION:  APPROVAL GIVEN to proceed with proposed procedure, without further diagnostic evaluation.    No follow-ups on file.    Signed Electronically by: Otoniel Manuel PA-C    Copy of this evaluation report is provided to requesting physician.    [facility] Guidelines    Revised Cardiac Risk Index

## 2020-09-24 NOTE — DISCHARGE INSTRUCTIONS
Home Care Instructions                Procedure: Epidural injection or joint injection    Activity:    Rest today    Do not work today    Resume normal activity tomorrow    Pain:    You may experience soreness at the injection site for 1 to 3 days.    You may use an ice pack for 20 minutes every 2 hours for the first 24 hours    You may use a heating pad after the first 24 hours    You may use Tylenol  (acetaminophen) every 4 hours or other pain medicines as directed by your physician    Safety  Sedation medicine, if given may remain active for many hours.    It is important for the next 24 hours that you do not:    Drive a car    Operate machines or power tools    Consume alcohol, including beer    Sign any important papers or legal documents    You may experience numbness radiating into your legs or arms, (depending on the procedure location)  This numbness may last several hours.  Until the numb sensation returns to normal please use caution in walking, climbing stairs, stepping out of your vehicle, etc.    Common side effects of steroids:  Not everyone will experience corticosteroid side effects. If side effects are experienced they will gradually subside in the 7-10 day period following an injection.    Most common side effects include:    Flushed face and/or chest    Feeling of warmth, particularly in face but could be overall feeling of warmth    Increased blood sugar in diabetic patients    Menstrual irregularities may occur.  If taking hormone based birth control an alternate method of birth control is recommended    Sleep disturbances and/or mood swings are possible    Leg cramps    Please contact us if you have:  Severe pain   Fever more than 101.5 degrees Fahrenheit  Signs of infection (redness, swelling or drainage)      If you have questions during normal business hours (8am-5pm Monday-Friday) contact the [facility] at [phone]. If you need help after hours, we recommend that you go to a  hospital emergency room or dial 911.Home Care Instructions     Please fax or mail this form to the location that is selected at the bottom of your pain relief form.  Do not try to send the form back to Dr. Goetz's clinic in [place].  If you are unsure of which location to send this form back to, please send it back to the [facility] same-day surgery department.  Dr. Goetz will then evaluate your form and determine the next step of your care.            Procedure:  Diagnostic Block (which includes medial branch blocks, SI joint injection blocks, and nerve root injections)    Activity:  The injection was used to identify the cause of your pain:    Resume normal activity  You are to engage in activity that would have normally caused you discomfort to determine the effectiveness of the block/injection.  It is imperative to evaluate and rate your pain the first 2 hours after the injection.     Pain:    You may experience soreness at the injection site for one or two days    You may use an ice pack for 20 minutes every 2 hours for the first 24 hours    You may use a heating pad after the first 24 hours    You may use Tylenol  (acetaminophen) every 4 hours or other pain medicines as directed by your physician after your block wears off.    Safety  You may experience numbness radiating into your legs or arms, (depending on the procedure location)  This numbness may last several hours.  Until the numb sensation returns to normal please use caution in walking, climbing stairs, stepping out of your vehicle, etc.    Please contact us if you have:  Severe pain   Fever more than 101.5 degrees Fahrenheit  Signs of infection (redness, swelling or drainage)       If you need immediate attention we recommend that you go to a hospital emergency room or dial 911.    _____ Please contact our office one week after your injection to report your percent of pain relief.   See attached One Week Procedure Injection Report  __X__  Please return your Nerve Block Pain Relief Form the day after your injection.     See attached Nerve Block Pain Relief Form             ## PLEASE SEND NERVE BLOCK PAIN RELIEF REPORT WITH PATIENT ##

## 2020-09-24 NOTE — ANESTHESIA CARE TRANSFER NOTE
Patient: Amelia Coker    Procedure(s):  Left Lumbar 5 Sacral 1 nerve root injections.    Diagnosis: Chronic left lumbar radiculopathy [M54.16]  Diagnosis Additional Information: No value filed.    Anesthesia Type:   MAC     Note:  Airway :Nasal Cannula  Patient transferred to:Phase II  Handoff Report: Identifed the Patient, Identified the Reponsible Provider, Reviewed the pertinent medical history, Discussed the surgical course, Reviewed Intra-OP anesthesia mangement and issues during anesthesia, Set expectations for post-procedure period and Allowed opportunity for questions and acknowledgement of understanding      Vitals: (Last set prior to Anesthesia Care Transfer)    CRNA VITALS  9/24/2020 1628 - 9/24/2020 1701      9/24/2020             Resp Rate (observed):  21                Electronically Signed By: LAURI Allison CRNA  September 24, 2020  5:01 PM

## 2020-09-24 NOTE — TELEPHONE ENCOUNTER
Pt is calling and really would like get in today with any provider before noon so she can have a nerve toot sedation but she needs a pre-op before she can do that. Please advise.

## 2020-09-24 NOTE — PATIENT INSTRUCTIONS

## 2020-09-24 NOTE — OP NOTE
Preoperative diagnosis: Left leg pain and left buttock pain secondary to a lumbosacral radiculopathy      Postoperative diagnoses: Same as preoperative diagnoses    Anesthesia: Monitored anesthetic care utilizing only propofol    Procedure: Diagnostic and therapeutic left L5 and S1 selective nerve root blocks (low-volume transforaminal epidural injections at the left L5 and S1 nerve roots).  All under fluoroscopic guidance with a contrast control.    Technique: After written and verbal informed consent was obtained including risks of infection, bleeding, no help, or worse pain she is brought to the procedure area placed in the prone position.  Her back was prepped with ChloraPrep.  Subcutaneous anesthesia with 1/4 cc of 1% lidocaine was used for skin.  I then from the left oblique approach at approximately 25 degrees left oblique I it advanced a 22-gauge Quincke needle in towards the left L5-S1 foramen and walked into the posterior aspect of the foramen in the lateral fluoroscopic image.  A separate 22-gauge Quincke needle was advanced into her left S1 sacral foramina and walked past the sacral border in the lateral fluoroscopic image.  Omnipaque contrast was then injected and the needle tips were manipulated until I had proper spread in isolation of just her left L5 nerve root and her left S1 nerve root.  Once this was achieved with no vascular uptake I then injected 1/2 cc of 4% lidocaine with 4 mg/cc of triamcinolone there is good washout over the left L5 dorsal root ganglion in the left S1 dorsal root ganglion.  I had very good isolation of just these nerve roots with no major central epidural spread.  Once the injection was completed the needles were removed and a sterile bandage placed over the needle insertion sites and she was brought to the recovery room in stable condition    Postoperative reevaluation: In the recovery room I examined her she clearly had isolation of her L5 and S1 nerve roots and they were  fully anesthetized.  Her pain relief was approximately 80% in the recovery area making this a diagnostically positive nerve root injection.  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  I think likely this is coming from lateral recess stenosis and the foraminal stenosis.  I would like her to seek a surgical opinion regarding possible decompression at just this level.

## 2020-09-24 NOTE — DISCHARGE INSTRUCTIONS
Home Care Instructions                Procedure: Epidural injection or joint injection    Activity:    Rest today    Do not work today    Resume normal activity tomorrow    Pain:    You may experience soreness at the injection site for 1 to 3 days.    You may use an ice pack for 20 minutes every 2 hours for the first 24 hours    You may use a heating pad after the first 24 hours    You may use Tylenol  (acetaminophen) every 4 hours or other pain medicines as directed by your physician    Safety  Sedation medicine, if given may remain active for many hours.    It is important for the next 24 hours that you do not:    Drive a car    Operate machines or power tools    Consume alcohol, including beer    Sign any important papers or legal documents    You may experience numbness radiating into your legs or arms, (depending on the procedure location)  This numbness may last several hours.  Until the numb sensation returns to normal please use caution in walking, climbing stairs, stepping out of your vehicle, etc.    Common side effects of steroids:  Not everyone will experience corticosteroid side effects. If side effects are experienced they will gradually subside in the 7-10 day period following an injection.    Most common side effects include:    Flushed face and/or chest    Feeling of warmth, particularly in face but could be overall feeling of warmth    Increased blood sugar in diabetic patients    Menstrual irregularities may occur.  If taking hormone based birth control an alternate method of birth control is recommended    Sleep disturbances and/or mood swings are possible    Leg cramps    Please contact us if you have:  Severe pain   Fever more than 101.5 degrees Fahrenheit  Signs of infection (redness, swelling or drainage)      If you have questions during normal business hours (8am-5pm Monday-Friday) contact the [facility] at [phone]. If you need help after hours, we recommend that you go to a  hospital emergency room or dial 911.

## 2020-10-01 ENCOUNTER — HOSPITAL ENCOUNTER (OUTPATIENT)
Dept: MRI IMAGING | Facility: CLINIC | Age: 45
Discharge: HOME | End: 2020-10-01
Attending: PHYSICIAN ASSISTANT | Admitting: PHYSICIAN ASSISTANT
Payer: COMMERCIAL

## 2020-10-01 DIAGNOSIS — M51.27 HERNIATION OF INTERVERTEBRAL DISC BETWEEN L5 AND S1: ICD-10-CM

## 2020-10-01 DIAGNOSIS — M54.16 LUMBAR RADICULOPATHY: ICD-10-CM

## 2020-10-01 DIAGNOSIS — M48.061 FORAMINAL STENOSIS OF LUMBAR REGION: ICD-10-CM

## 2020-10-01 DIAGNOSIS — R03.0 ELEVATED BLOOD PRESSURE READING WITHOUT DIAGNOSIS OF HYPERTENSION: ICD-10-CM

## 2020-10-01 DIAGNOSIS — M47.26 OSTEOARTHRITIS OF SPINE WITH RADICULOPATHY, LUMBAR REGION: ICD-10-CM

## 2020-10-01 PROCEDURE — A9585 GADOBUTROL INJECTION: HCPCS | Performed by: PHYSICIAN ASSISTANT

## 2020-10-01 PROCEDURE — 255N000002 HC RX 255 OP 636: Performed by: PHYSICIAN ASSISTANT

## 2020-10-01 PROCEDURE — 72158 MRI LUMBAR SPINE W/O & W/DYE: CPT

## 2020-10-01 RX ORDER — GADOBUTROL 604.72 MG/ML
10 INJECTION INTRAVENOUS ONCE
Status: COMPLETED | OUTPATIENT
Start: 2020-10-01 | End: 2020-10-01

## 2020-10-01 RX ADMIN — GADOBUTROL 9 ML: 604.72 INJECTION INTRAVENOUS at 09:01

## 2020-10-06 ENCOUNTER — MYC MEDICAL ADVICE (OUTPATIENT)
Dept: FAMILY MEDICINE | Facility: OTHER | Age: 45
End: 2020-10-06

## 2020-10-08 ENCOUNTER — MYC MEDICAL ADVICE (OUTPATIENT)
Dept: FAMILY MEDICINE | Facility: OTHER | Age: 45
End: 2020-10-08

## 2020-10-12 ENCOUNTER — MYC MEDICAL ADVICE (OUTPATIENT)
Dept: FAMILY MEDICINE | Facility: OTHER | Age: 45
End: 2020-10-12

## 2020-10-14 ENCOUNTER — MYC REFILL (OUTPATIENT)
Dept: FAMILY MEDICINE | Facility: OTHER | Age: 45
End: 2020-10-14

## 2020-10-14 DIAGNOSIS — K13.0 CRACKED LIPS: ICD-10-CM

## 2020-10-14 NOTE — TELEPHONE ENCOUNTER
Pending Prescriptions:                       Disp   Refills    mupirocin (BACTROBAN) 2 % external [id number] g   1        Sig: Apply topically 3 times daily        Routing refill request to provider for review/approval because:  Drug not on the G refill protocol   Last Seen: 09/24/20  Last Refilled: 01/30/20  Next Visit: VIC Jalloh RN  October 14, 2020

## 2020-10-15 ENCOUNTER — HOSPITAL ENCOUNTER (OUTPATIENT)
Dept: PHYSICAL THERAPY | Facility: CLINIC | Age: 45
Setting detail: THERAPIES SERIES
End: 2020-10-15
Attending: SURGERY
Payer: COMMERCIAL

## 2020-10-15 PROCEDURE — 97140 MANUAL THERAPY 1/> REGIONS: CPT | Mod: GP | Performed by: PHYSICAL THERAPIST

## 2020-10-19 RX ORDER — MUPIROCIN 20 MG/G
OINTMENT TOPICAL 3 TIMES DAILY
Qty: 30 G | Refills: 0 | Status: SHIPPED | OUTPATIENT
Start: 2020-10-19 | End: 2022-05-19

## 2020-10-20 NOTE — PROGRESS NOTES
Outpatient Physical Therapy Progress Note     Patient: Amelia Coker  : 1975    Beginning/End Dates of Reporting Period:  20 to 10/20/2020    Referring Provider: Dr.Amy Mock    Therapy Diagnosis: arm pain, numbness, tingling, neck pain.     Client Self Report: Patient last seen on , was place on hold because she was doing better. however about 3 wks ago the tingling, cold and aching started getting worse on the right UE. Since last seen had branch blocks in the LB, and steriod injections in the LB.  Having LB surgery on  fior-lami.  breast reduction.     Objective Measurements:  Objective Measure: % of day has tingling/numbness right UE  Details: constant all day       Goals:  Goal Identifier 1   Goal Description Patient is able to sleep at night with 50% less tingling, numbness in the right UE(was met, but now the sx's are worsening)   Target Date 20   Date Met      Progress:     Goal Identifier 2   Goal Description Patient has 70% decrease in right UE sx's in standing and while being active at work and home(50% or less at this time)   Target Date 20   Date Met      Progress:       Progress Toward Goals:   Progress this reporting period: Patient was on hold, doing better over all and then the tingling and numbness in the right UE worsened along with neck pain. Patient is now back for PT, and is scheduled for a breast reduction on           Plan:  Continue therapy per current plan of care.    Discharge:  No    Thank you for the referral,            Ashleigh Martínez PT

## 2020-10-21 ENCOUNTER — HOSPITAL ENCOUNTER (OUTPATIENT)
Dept: PHYSICAL THERAPY | Facility: CLINIC | Age: 45
Setting detail: THERAPIES SERIES
End: 2020-10-21
Attending: SURGERY
Payer: COMMERCIAL

## 2020-10-21 PROCEDURE — 97140 MANUAL THERAPY 1/> REGIONS: CPT | Mod: GP | Performed by: PHYSICAL THERAPIST

## 2020-11-03 ENCOUNTER — MYC MEDICAL ADVICE (OUTPATIENT)
Dept: FAMILY MEDICINE | Facility: OTHER | Age: 45
End: 2020-11-03

## 2020-11-04 NOTE — PROGRESS NOTES
[address]  [facility] [address]  Phone: [phone]  Primary Provider: Otoniel Nelson  Pre-op Performing Provider: OTONIEL NELSON    PREOPERATIVE EVALUATION:  Today's date: 11/5/2020    Amelia Coker is a 45 year old female who presents for a preoperative evaluation.    Surgical Information:  Surgery/Procedure: left Ls-S1 revision Hemilaminectomy, foraminotomy, microdiscectomy   Surgery Location: [facility]   Surgeon: Dr. Sotomayor   Surgery Date: 11/12/2020  Time of Surgery: 7AM  Where patient plans to recover: At home with family  Fax number for surgical facility: [phone]    Covid results fax to: [phone]    Type of Anesthesia Anticipated: Choice    Subjective     HPI related to upcoming procedure: low back pain in need of intervention      Preop Questions 11/5/2020   1. Have you ever had a heart attack or stroke? No   2. Have you ever had surgery on your heart or blood vessels, such as a stent placement, a coronary artery bypass, or surgery on an artery in your head, neck, heart, or legs? No   3. Do you have chest pain with activity? No   4. Do you have a history of  heart failure? No   5. Do you currently have a cold, bronchitis or symptoms of other infection? No   6. Do you have a cough, shortness of breath, or wheezing? No   7. Do you or anyone in your family have previous history of blood clots? No   8. Do you or does anyone in your family have a serious bleeding problem such as prolonged bleeding following surgeries or cuts? No   9. Have you ever had problems with anemia or been told to take iron pills? No   10. Have you had any abnormal blood loss such as black, tarry or bloody stools, or abnormal vaginal bleeding? No   11. Have you ever had a blood transfusion? No   12. Are you willing to have a blood transfusion if it is medically needed before, during, or after your surgery? Yes   13. Have you or any of your relatives ever  had problems with anesthesia? No   14. Do you have sleep apnea, excessive snoring or daytime drowsiness? No   15. Do you have any artifical heart valves or other implanted medical devices like a pacemaker, defibrillator, or continuous glucose monitor? No   16. Do you have artificial joints? No   17. Are you allergic to latex? YES:    18. Is there any chance that you may be pregnant? No     Health Care Directive:  Patient does not have a Health Care Directive or Living Will: Discussed advance care planning with patient; however, patient declined at this time.    Preoperative Review of :    Status of Chronic Conditions:  See problem list for active medical problems.  Problems all longstanding and stable, except as noted/documented.  See ROS for pertinent symptoms related to these conditions.      Review of Systems  CONSTITUTIONAL: NEGATIVE for fever, chills, change in weight  INTEGUMENTARY/SKIN: NEGATIVE for worrisome rashes, moles or lesions  EYES: NEGATIVE for vision changes or irritation  ENT/MOUTH: NEGATIVE for ear, mouth and throat problems  RESP: NEGATIVE for significant cough or SOB  BREAST: NEGATIVE for masses, tenderness or discharge  CV: NEGATIVE for chest pain, palpitations or peripheral edema  GI: NEGATIVE for nausea, abdominal pain, heartburn, or change in bowel habits  : NEGATIVE for frequency, dysuria, or hematuria  MUSCULOSKELETAL: NEGATIVE for significant arthralgias or myalgia  NEURO: NEGATIVE for weakness, dizziness or paresthesias  ENDOCRINE: NEGATIVE for temperature intolerance, skin/hair changes  HEME: NEGATIVE for bleeding problems  PSYCHIATRIC: NEGATIVE for changes in mood or affect    Patient Active Problem List    Diagnosis Date Noted     Elevated blood pressure reading without diagnosis of hypertension 09/24/2020     Priority: Medium     Balance problems 07/17/2020     Priority: Medium     Morbid obesity (H) 07/17/2020     Priority: Medium     Sinus arrhythmia seen on electrocardiogram  07/17/2020     Priority: Medium     Family history of ischemic heart disease - father at 46 massive MI 07/17/2020     Priority: Medium     Brachial plexopathy - right shoulder 05/21/2020     Priority: Medium     Dental abscess - left mandible molar 03/05/2020     Priority: Medium     Endometrium, hyperplasia - on recent CT scan 02/10/2020     Priority: Medium     Pain in joint involving pelvic region and thigh, right 02/10/2020     Priority: Medium     Right lateral abdominal pain 02/10/2020     Priority: Medium     Right foot pain 02/10/2020     Priority: Medium     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 10/10/2019     Priority: Medium     Abnormality of nail surface 08/29/2019     Priority: Medium     Dry hair 08/29/2019     Priority: Medium     Dry skin 08/29/2019     Priority: Medium     Malaise and fatigue 08/29/2019     Priority: Medium     Lymphadenopathy 08/29/2019     Priority: Medium     PONV (postoperative nausea and vomiting) 06/10/2019     Priority: Medium     Menorrhalgia 06/10/2019     Priority: Medium     Uterine polyp 06/10/2019     Priority: Medium     Loose stools 06/10/2019     Priority: Medium     Hyperactive bowel sounds 06/10/2019     Priority: Medium     Jaycob complexion 05/23/2019     Priority: Medium     Lip lesion 05/23/2019     Priority: Medium     Epigastric pain 05/23/2019     Priority: Medium     Abdominal distension 05/23/2019     Priority: Medium     Intractable episodic tension-type headache 03/11/2019     Priority: Medium     Intractable right heel pain 05/29/2018     Priority: Medium     Superficial swelling of scalp 04/12/2018     Priority: Medium     Biceps tendinopathy, right 03/22/2018     Priority: Medium     Hyperlipidemia LDL goal <130 08/30/2017     Priority: Medium     Spasm of muscle 07/11/2017     Priority: Medium     Sternocleidomastoid muscle tenderness 04/05/2017     Priority: Medium     left         Acute cervical myofascial strain, initial encounter  04/05/2017     Priority: Medium     Abnormal mammogram 09/21/2016     Priority: Medium     right breast       Gastroesophageal reflux disease, esophagitis presence not specified 06/29/2016     Priority: Medium     IMO Regulatory Load OCT 2020       Rotator cuff arthropathy, right 03/07/2016     Priority: Medium     AD (atopic dermatitis) 10/29/2015     Priority: Medium     Heat sensitivity 10/29/2015     Priority: Medium     Skin lesion 10/20/2015     Priority: Medium     posterior superior scalp       Plantar fasciitis 09/23/2015     Priority: Medium     HTN, goal below 140/90 09/23/2015     Priority: Medium     Lateral epicondylitis 03/16/2015     Priority: Medium     Problem list name updated by automated process. Provider to review       Right shoulder pain 03/16/2015     Priority: Medium     Telangiectasia of face 12/19/2014     Priority: Medium     Frontal headache 12/15/2014     Priority: Medium     Muscular wasting and disuse atrophy, not elsewhere classified 06/09/2014     Priority: Medium     right SCM, right wrist,        Ds DNA antibody positive 04/16/2014     Priority: Medium     borderline.       Lumbar radiculopathy 01/22/2014     Priority: Medium     Migraine without aura and without status migrainosus, not intractable 10/23/2013     Priority: Medium     Foraminal stenosis of lumbar region 09/26/2013     Priority: Medium     DJD (degenerative joint disease), lumbar 09/26/2013     Priority: Medium     FLORIDALMA positive 07/18/2013     Priority: Medium     Plantar fascial fibromatosis 07/18/2013     Priority: Medium     Pain in joint, upper arm 04/15/2013     Priority: Medium     Family history of colon cancer 03/05/2013     Priority: Medium     mother       Keratitis sicca (H) 10/31/2012     Priority: Medium     Dry eyes 10/15/2012     Priority: Medium     Family history of melanoma 08/24/2012     Priority: Medium     Shoulder joint instability 08/15/2012     Priority: Medium     Abnormal PFT 07/31/2012      Priority: Medium     question of left heart failure and/or shunting in need of further investigation       Chronic fatigue 06/06/2012     Priority: Medium     Hair loss 06/06/2012     Priority: Medium     Raynaud phenomenon 04/11/2012     Priority: Medium     PAC (premature atrial contraction) 07/15/2010     Priority: Medium     Palpitations 07/15/2010     Priority: Medium     Lyme disease 06/07/2010     Priority: Medium     Scalp lesion 05/26/2010     Priority: Medium     Migraine, unspecified, with intractable migraine, so stated, without mention of status migrainosus 04/04/2003     Priority: Medium     Problem list name updated by automated process. Provider to review  IMO Regulatory Load OCT 2020       Bell's palsy 04/04/2003     Priority: Medium     Contact dermatitis and other eczema, due to unspecified cause 04/04/2003     Priority: Medium     Allergic rhinitis due to other allergen 04/04/2003     Priority: Medium      Past Medical History:   Diagnosis Date     Abnormal mammogram 9/21/2016    right breast      AD (atopic dermatitis) 10/29/2015     Allergic rhinitis due to other allergen      Arthritis      Bell's palsy      Chronic fatigue 6/6/2012     Chronic infection     MRSA - 2015 scalp and prior to Abdomen     Contact dermatitis and other eczema, due to unspecified cause     eczema     Contusion of left foot, initial encounter 3/16/2016     DJD (degenerative joint disease), lumbar 9/26/2013     DJD (degenerative joint disease), lumbar 9/26/2013     Ds DNA antibody positive 4/16/2014    borderline.      Elevated blood pressure reading without diagnosis of hypertension 12/24/2013     Facial contusion 12/15/2014    hit by horse      Foraminal stenosis of lumbar region 9/26/2013     Frontal headache 12/15/2014     Gastroesophageal reflux disease, esophagitis presence not specified 6/29/2016     Heat sensitivity 10/29/2015     HTN, goal below 140/90 9/23/2015     Hyperlipidemia LDL goal <130 8/30/2017      Irregular heart beat      Keratitis sicca (H) 10/31/2012     Left shoulder pain 9/26/2013     Lumbar radiculopathy 1/22/2014     Migraine headache 10/23/2013     Migraine, unspecified, with intractable migraine, so stated, without mention of status migrainosus      Motion sickness      MRSA infection 10/20/2015     Muscular wasting and disuse atrophy, not elsewhere classified 6/9/2014    right SCM, right wrist,       Numbness and tingling     both legs anf feet greater on the left     PAC (premature atrial contraction) 7/15/2010     Plantar fasciitis 9/23/2015     PONV (postoperative nausea and vomiting)      Skin lesion 10/20/2015    posterior superior scalp      Spasm of muscle 7/11/2017     Telangiectasia of face 12/19/2014     Tooth pain 10/20/2015    left lower primary molar      Past Surgical History:   Procedure Laterality Date     AS INJ TRANSFORAMIN EPIDURAL, LUMB/SACR SINGLE       COLONOSCOPY  3/11/2013    Procedure: COLONOSCOPY;  colonoscopy;  Surgeon: Lobito Mas MD;  Location: [facility]     COLONOSCOPY WITH CO2 INSUFFLATION N/A 11/19/2018    Procedure: COLONOSCOPY WITH CO2 INSUFFLATION;  Surgeon: Goyo Blank MD;  Location: [facility]     DECOMPRESSION LUMBAR TWO LEVELS Bilateral 12/8/2016    Procedure: DECOMPRESSION LUMBAR TWO LEVELS;  Surgeon: Bang Burrell MD;  Location: [facility]     DILATION AND CURETTAGE, HYSTEROSCOPY, ABLATE ENDOMETRIUM NOVASURE, COMBINED N/A 6/12/2019    Procedure: hysteroscopy, dilation & curettage, polypectomy, endometrial ablation;  Surgeon: Fredy Pham MD;  Location: [facility]     ESOPHAGOSCOPY, GASTROSCOPY, DUODENOSCOPY (EGD), COMBINED  11/8/2013    Procedure: COMBINED ESOPHAGOSCOPY, GASTROSCOPY, DUODENOSCOPY (EGD), BIOPSY SINGLE OR MULTIPLE;  Esophagoscopy, Gastroscopy, Duodenoscopy EGD with multiple biopsies;  Surgeon: Teja Koch MD;  Location: [facility]     HC REMOVAL OF TONSILS,<11 Y/O      Tonsils <10y.o.     HC TOOTH EXTRACTION W/FORCEP        INJECT BLOCK MEDIAL BRANCH CERVICAL/THORACIC/LUMBAR N/A 7/23/2020    Procedure: Sacral 1,2,3 Lateral Branch Blocks and lumbar 5 medial branch blocks bilaterally;  Surgeon: Maurisio Goetz MD;  [facility]     INJECT JOINT SACROILIAC Left 9/24/2020    Procedure: Left Lumbar 5 Sacral 1 nerve root injections.;  Surgeon: Maurisio Goetz MD;  Location[facility]     REPAIR TENDON ELBOW  7/9/2014    Procedure: REPAIR TENDON ELBOW;  Surgeon: Chris Johnston MD;  Location: [facility]     ULTRASONIC REMOVAL SOFT TISSUE (LOCATION) Right 11/21/2018    Procedure: Tenex right foot;  Surgeon: Patel Martínez DO;  Location: [facility]     Current Outpatient Medications   Medication Sig Dispense Refill     Acetaminophen (TYLENOL PO) Take 1,000 mg by mouth every 8 hours as needed        clobetasol (TEMOVATE) 0.05 % external cream Apply topically 2 times daily 60 g 1     DULoxetine (CYMBALTA) 20 MG capsule Take 1 capsule (20 mg) by mouth daily for 21 days As directed. 21 capsule 0     fexofenadine (ALLEGRA) 180 MG tablet Take 1 tablet by mouth daily Reported on 4/5/2017       meloxicam (MOBIC) 15 MG tablet TAKE ONE TABLET BY MOUTH EVERY DAY (PATIENT WANTS UNICHEM BRAND ONLY) 90 tablet 2     mupirocin (BACTROBAN) 2 % external ointment Apply topically 3 times daily 30 g 0     predniSONE (DELTASONE) 20 MG tablet Take 1 tablet (20 mg) by mouth daily 6 tablet 1     rizatriptan (MAXALT-MLT) 5 MG ODT Take 1 tablet (5 mg) by mouth at onset of headache for migraine May repeat in 2 hours. Max 6 tablets/24 hours. 30 tablet 1     sucralfate (CARAFATE) 1 GM tablet Take 1 tablet (1 g) by mouth 4 times daily 40 tablet 0       Allergies   Allergen Reactions     Ceftriaxone Anaphylaxis     Hives, rash, racing heart beat     Bactrim [Sulfamethoxazole W/Trimethoprim] Hives     Ciprofloxacin Other (See Comments)     Tendon Issues     Codeine Nausea and Vomiting     Copper      Rash       Doxycycline      Loss of skin pigmentation, skin loss.     Gold       Rash       Iodine-131 Hives     Levaquin [Levofloxacin] Other (See Comments)     Tendon issues with levaquin and cipro     Nickel      rash     Steel [Staples]      Rash from staples       Latex Rash     Penicillins Rash        Social History     Tobacco Use     Smoking status: Never Smoker     Smokeless tobacco: Never Used   Substance Use Topics     Alcohol use: No     Comment: social     Family History   Problem Relation Age of Onset     Diabetes Mother         adult     Cancer - colorectal Mother      Hypertension Mother      Allergies Mother      Cancer Mother         colon     Depression Mother      Gastrointestinal Disease Mother         ibs     Genitourinary Problems Mother         kidney cyst     Gynecology Mother         endometriosis     Lipids Mother      Thyroid Disease Mother      Arthritis Mother         RA     Heart Disease Maternal Grandfather         MI,  - 60's     Allergies Father      Unknown/Adopted Father      Heart Disease Father         mi-age mid 40's     Cardiovascular Father      Lipids Father      Respiratory Father         asthma     Cancer Father      Other Cancer Father         renal cancer     Depression Sister      Allergies Sister      Cancer Sister         vaginal     Gynecology Sister         endometriosis     Lipids Paternal Grandmother      Osteoporosis Paternal Grandmother      Thyroid Disease Paternal Grandmother      Respiratory Son         asthma     Allergies Son      Arthritis Sister      Allergies Brother      Heart Disease Brother      Allergies Daughter      Blood Disease Paternal Aunt         LGL T cell Leukemia     Rheumatoid Arthritis Paternal Aunt      Kidney Disease Maternal Aunt      Lupus Maternal Aunt      Bladder Cancer Maternal Aunt      History   Drug Use No         Objective     There were no vitals taken for this visit.    Physical Exam    GENERAL APPEARANCE: healthy, alert and no distress     EYES: EOMI, PERRL     HENT: ear canals and TM's  normal and nose and mouth without ulcers or lesions     NECK: no adenopathy, no asymmetry, masses, or scars and thyroid normal to palpation     RESP: lungs clear to auscultation - no rales, rhonchi or wheezes     CV: regular rates and rhythm, normal S1 S2, no S3 or S4 and no murmur, click or rub     ABDOMEN:  soft, nontender, no HSM or masses and bowel sounds normal     MS: extremities normal- no gross deformities noted, no evidence of inflammation in joints, FROM in all extremities.     SKIN: no suspicious lesions or rashes     NEURO: Normal strength and tone, sensory exam grossly normal, mentation intact and speech normal     PSYCH: mentation appears normal. and affect normal/bright     LYMPHATICS: No cervical adenopathy    Recent Labs   Lab Test 07/27/20  1547 04/17/20  1136   HGB 11.8 12.7    276    139   POTASSIUM 3.9 3.7   CR 0.58 0.64        Diagnostics:  Labs pending at this time.  Results will be reviewed when available.   No EKG required, no history of coronary heart disease, significant arrhythmia, peripheral arterial disease or other structural heart disease.    Revised Cardiac Risk Index (RCRI):  The patient has the following serious cardiovascular risks for perioperative complications:   - No serious cardiac risks = 0 points     RCRI Interpretation: 1 point: Class II (low risk - 0.9% complication rate)         Assessment & Plan   The proposed surgical procedure is considered INTERMEDIATE risk.    Amelia was seen today for pre-op exam.    Diagnoses and all orders for this visit:    Preop general physical exam  -     Comprehensive metabolic panel  -     Asymptomatic COVID-19 Virus (Coronavirus) by PCR; Future  -     HCG Qual, Urine ([id number])    Osteoarthritis of spine with radiculopathy, lumbar region    Foraminal stenosis of lumbar region    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    Hypertrophy of breast    Bell's palsy    Lumbar radiculopathy    Migraine without aura and  without status migrainosus, not intractable    Gastroesophageal reflux disease with esophagitis without hemorrhage    PONV (postoperative nausea and vomiting) - historically    Hyperlipidemia LDL goal <130    Elevated blood pressure reading without diagnosis of hypertension - historically    PAC (premature atrial contraction) - historically           Risks and Recommendations:  The patient has the following additional risks and recommendations for perioperative complications:   - No identified additional risk factors other than previously addressed    Medication Instructions:   - meloxicam (Mobic): HOLD 10 days before surgery.     RECOMMENDATION:  APPROVAL GIVEN to proceed with proposed procedure, without further diagnostic evaluation.    Signed Electronically by: Otoniel Manuel PA-C    Copy of this evaluation report is provided to requesting physician.    Preop [facility] Preop Guidelines    Revised Cardiac Risk Index

## 2020-11-04 NOTE — PATIENT INSTRUCTIONS

## 2020-11-05 ENCOUNTER — OFFICE VISIT (OUTPATIENT)
Dept: FAMILY MEDICINE | Facility: OTHER | Age: 45
End: 2020-11-05
Payer: COMMERCIAL

## 2020-11-05 VITALS
HEIGHT: 65 IN | BODY MASS INDEX: 35.07 KG/M2 | DIASTOLIC BLOOD PRESSURE: 78 MMHG | HEART RATE: 75 BPM | TEMPERATURE: 97.5 F | WEIGHT: 210.5 LBS | OXYGEN SATURATION: 92 % | SYSTOLIC BLOOD PRESSURE: 120 MMHG | RESPIRATION RATE: 18 BRPM

## 2020-11-05 DIAGNOSIS — Z98.890 PONV (POSTOPERATIVE NAUSEA AND VOMITING): ICD-10-CM

## 2020-11-05 DIAGNOSIS — M51.27 HERNIATION OF INTERVERTEBRAL DISC BETWEEN L5 AND S1: ICD-10-CM

## 2020-11-05 DIAGNOSIS — G43.009 MIGRAINE WITHOUT AURA AND WITHOUT STATUS MIGRAINOSUS, NOT INTRACTABLE: ICD-10-CM

## 2020-11-05 DIAGNOSIS — I49.1 PAC (PREMATURE ATRIAL CONTRACTION): ICD-10-CM

## 2020-11-05 DIAGNOSIS — M54.16 LUMBAR RADICULOPATHY: ICD-10-CM

## 2020-11-05 DIAGNOSIS — R03.0 ELEVATED BLOOD PRESSURE READING WITHOUT DIAGNOSIS OF HYPERTENSION: ICD-10-CM

## 2020-11-05 DIAGNOSIS — G51.0 BELL'S PALSY: ICD-10-CM

## 2020-11-05 DIAGNOSIS — Z01.818 PREOP GENERAL PHYSICAL EXAM: Primary | ICD-10-CM

## 2020-11-05 DIAGNOSIS — Z86.19 HISTORY OF COLD SORES: ICD-10-CM

## 2020-11-05 DIAGNOSIS — M47.26 OSTEOARTHRITIS OF SPINE WITH RADICULOPATHY, LUMBAR REGION: ICD-10-CM

## 2020-11-05 DIAGNOSIS — M48.061 FORAMINAL STENOSIS OF LUMBAR REGION: ICD-10-CM

## 2020-11-05 DIAGNOSIS — E78.5 HYPERLIPIDEMIA LDL GOAL <130: ICD-10-CM

## 2020-11-05 DIAGNOSIS — R11.2 PONV (POSTOPERATIVE NAUSEA AND VOMITING): ICD-10-CM

## 2020-11-05 DIAGNOSIS — N62 HYPERTROPHY OF BREAST: ICD-10-CM

## 2020-11-05 DIAGNOSIS — K21.00 GASTROESOPHAGEAL REFLUX DISEASE WITH ESOPHAGITIS WITHOUT HEMORRHAGE: ICD-10-CM

## 2020-11-05 LAB
ALBUMIN SERPL-MCNC: 4.3 G/DL (ref 3.4–5)
ALP SERPL-CCNC: 54 U/L (ref 40–150)
ALT SERPL W P-5'-P-CCNC: 32 U/L (ref 0–50)
ANION GAP SERPL CALCULATED.3IONS-SCNC: 5 MMOL/L (ref 3–14)
AST SERPL W P-5'-P-CCNC: 18 U/L (ref 0–45)
BILIRUB SERPL-MCNC: 0.7 MG/DL (ref 0.2–1.3)
BUN SERPL-MCNC: 19 MG/DL (ref 7–30)
CALCIUM SERPL-MCNC: 9.5 MG/DL (ref 8.5–10.1)
CHLORIDE SERPL-SCNC: 106 MMOL/L (ref 94–109)
CO2 SERPL-SCNC: 28 MMOL/L (ref 20–32)
CREAT SERPL-MCNC: 0.73 MG/DL (ref 0.52–1.04)
GFR SERPL CREATININE-BSD FRML MDRD: >90 ML/MIN/{1.73_M2}
GLUCOSE SERPL-MCNC: 98 MG/DL (ref 70–99)
HCG UR QL: NEGATIVE
POTASSIUM SERPL-SCNC: 3.8 MMOL/L (ref 3.4–5.3)
PROT SERPL-MCNC: 8 G/DL (ref 6.8–8.8)
SODIUM SERPL-SCNC: 139 MMOL/L (ref 133–144)

## 2020-11-05 PROCEDURE — 80053 COMPREHEN METABOLIC PANEL: CPT | Performed by: PHYSICIAN ASSISTANT

## 2020-11-05 PROCEDURE — 81025 URINE PREGNANCY TEST: CPT | Performed by: PHYSICIAN ASSISTANT

## 2020-11-05 PROCEDURE — 99214 OFFICE O/P EST MOD 30 MIN: CPT | Performed by: PHYSICIAN ASSISTANT

## 2020-11-05 PROCEDURE — 36415 COLL VENOUS BLD VENIPUNCTURE: CPT | Performed by: PHYSICIAN ASSISTANT

## 2020-11-05 ASSESSMENT — MIFFLIN-ST. JEOR: SCORE: 1596.31

## 2020-11-05 NOTE — PROGRESS NOTES
[address]  [facility] [address]  Phone: [phone]  Primary Provider: Otoniel Nelson  Pre-op Performing Provider: OTONIEL NELSON    PREOPERATIVE EVALUATION:  Today's date: 11/5/2020    Amelia Coker is a 45 year old female who presents for a preoperative evaluation.    Surgical Information:  Surgery/Procedure: Breast reduction   Surgery Location: [facility]  Surgeon: Dr. Key   Surgery Date: 11/25/2020  Time of Surgery: 10:25AM  Where patient plans to recover: At home with family  Fax number for surgical facility: [phone]    Type of Anesthesia Anticipated: to be determined    Subjective     HPI related to upcoming procedure: large breasts in need of rduction    Preop Questions 11/5/2020   1. Have you ever had a heart attack or stroke? No   2. Have you ever had surgery on your heart or blood vessels, such as a stent placement, a coronary artery bypass, or surgery on an artery in your head, neck, heart, or legs? No   3. Do you have chest pain with activity? No   4. Do you have a history of  heart failure? No   5. Do you currently have a cold, bronchitis or symptoms of other infection? No   6. Do you have a cough, shortness of breath, or wheezing? No   7. Do you or anyone in your family have previous history of blood clots? No   8. Do you or does anyone in your family have a serious bleeding problem such as prolonged bleeding following surgeries or cuts? No   9. Have you ever had problems with anemia or been told to take iron pills? No   10. Have you had any abnormal blood loss such as black, tarry or bloody stools, or abnormal vaginal bleeding? No   11. Have you ever had a blood transfusion? No   12. Are you willing to have a blood transfusion if it is medically needed before, during, or after your surgery? Yes   13. Have you or any of your relatives ever had problems with anesthesia? No   14. Do you have sleep apnea, excessive  snoring or daytime drowsiness? No   15. Do you have any artifical heart valves or other implanted medical devices like a pacemaker, defibrillator, or continuous glucose monitor? No   16. Do you have artificial joints? No   17. Are you allergic to latex? YES:    18. Is there any chance that you may be pregnant? No       Health Care Directive:  Patient does not have a Health Care Directive or Living Will: Discussed advance care planning with patient; however, patient declined at this time.    Preoperative Review of :    Status of Chronic Conditions:  See problem list for active medical problems.  Problems all longstanding and stable, except as noted/documented.  See ROS for pertinent symptoms related to these conditions.      Review of Systems  CONSTITUTIONAL: NEGATIVE for fever, chills, change in weight  INTEGUMENTARY/SKIN: NEGATIVE for worrisome rashes, moles or lesions  EYES: NEGATIVE for vision changes or irritation  ENT/MOUTH: NEGATIVE for ear, mouth and throat problems  RESP: NEGATIVE for significant cough or SOB  BREAST: NEGATIVE for masses, tenderness or discharge  CV: NEGATIVE for chest pain, palpitations or peripheral edema  GI: NEGATIVE for nausea, abdominal pain, heartburn, or change in bowel habits  : NEGATIVE for frequency, dysuria, or hematuria  MUSCULOSKELETAL: NEGATIVE for significant arthralgias or myalgia  NEURO: NEGATIVE for weakness, dizziness or paresthesias  ENDOCRINE: NEGATIVE for temperature intolerance, skin/hair changes  HEME: NEGATIVE for bleeding problems  PSYCHIATRIC: NEGATIVE for changes in mood or affect    Patient Active Problem List    Diagnosis Date Noted     Elevated blood pressure reading without diagnosis of hypertension 09/24/2020     Priority: Medium     Balance problems 07/17/2020     Priority: Medium     Morbid obesity (H) 07/17/2020     Priority: Medium     Sinus arrhythmia seen on electrocardiogram 07/17/2020     Priority: Medium     Family history of ischemic heart  disease - father at 46 massive MI 07/17/2020     Priority: Medium     Brachial plexopathy - right shoulder 05/21/2020     Priority: Medium     Dental abscess - left mandible molar 03/05/2020     Priority: Medium     Endometrium, hyperplasia - on recent CT scan 02/10/2020     Priority: Medium     Pain in joint involving pelvic region and thigh, right 02/10/2020     Priority: Medium     Right lateral abdominal pain 02/10/2020     Priority: Medium     Right foot pain 02/10/2020     Priority: Medium     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 10/10/2019     Priority: Medium     Abnormality of nail surface 08/29/2019     Priority: Medium     Dry hair 08/29/2019     Priority: Medium     Dry skin 08/29/2019     Priority: Medium     Malaise and fatigue 08/29/2019     Priority: Medium     Lymphadenopathy 08/29/2019     Priority: Medium     PONV (postoperative nausea and vomiting) 06/10/2019     Priority: Medium     Menorrhalgia 06/10/2019     Priority: Medium     Uterine polyp 06/10/2019     Priority: Medium     Loose stools 06/10/2019     Priority: Medium     Hyperactive bowel sounds 06/10/2019     Priority: Medium     Jaycob complexion 05/23/2019     Priority: Medium     Lip lesion 05/23/2019     Priority: Medium     Epigastric pain 05/23/2019     Priority: Medium     Abdominal distension 05/23/2019     Priority: Medium     Intractable episodic tension-type headache 03/11/2019     Priority: Medium     Intractable right heel pain 05/29/2018     Priority: Medium     Superficial swelling of scalp 04/12/2018     Priority: Medium     Biceps tendinopathy, right 03/22/2018     Priority: Medium     Hyperlipidemia LDL goal <130 08/30/2017     Priority: Medium     Spasm of muscle 07/11/2017     Priority: Medium     Sternocleidomastoid muscle tenderness 04/05/2017     Priority: Medium     left         Acute cervical myofascial strain, initial encounter 04/05/2017     Priority: Medium     Abnormal mammogram 09/21/2016      Priority: Medium     right breast       Gastroesophageal reflux disease, esophagitis presence not specified 06/29/2016     Priority: Medium     IMO Regulatory Load OCT 2020       Rotator cuff arthropathy, right 03/07/2016     Priority: Medium     AD (atopic dermatitis) 10/29/2015     Priority: Medium     Heat sensitivity 10/29/2015     Priority: Medium     Skin lesion 10/20/2015     Priority: Medium     posterior superior scalp       Plantar fasciitis 09/23/2015     Priority: Medium     HTN, goal below 140/90 09/23/2015     Priority: Medium     Lateral epicondylitis 03/16/2015     Priority: Medium     Problem list name updated by automated process. Provider to review       Right shoulder pain 03/16/2015     Priority: Medium     Telangiectasia of face 12/19/2014     Priority: Medium     Frontal headache 12/15/2014     Priority: Medium     Muscular wasting and disuse atrophy, not elsewhere classified 06/09/2014     Priority: Medium     right SCM, right wrist,        Ds DNA antibody positive 04/16/2014     Priority: Medium     borderline.       Lumbar radiculopathy 01/22/2014     Priority: Medium     Migraine without aura and without status migrainosus, not intractable 10/23/2013     Priority: Medium     Foraminal stenosis of lumbar region 09/26/2013     Priority: Medium     DJD (degenerative joint disease), lumbar 09/26/2013     Priority: Medium     FLORIDALMA positive 07/18/2013     Priority: Medium     Plantar fascial fibromatosis 07/18/2013     Priority: Medium     Pain in joint, upper arm 04/15/2013     Priority: Medium     Family history of colon cancer 03/05/2013     Priority: Medium     mother       Keratitis sicca (H) 10/31/2012     Priority: Medium     Dry eyes 10/15/2012     Priority: Medium     Family history of melanoma 08/24/2012     Priority: Medium     Shoulder joint instability 08/15/2012     Priority: Medium     Abnormal PFT 07/31/2012     Priority: Medium     question of left heart failure and/or shunting  in need of further investigation       Chronic fatigue 06/06/2012     Priority: Medium     Hair loss 06/06/2012     Priority: Medium     Raynaud phenomenon 04/11/2012     Priority: Medium     PAC (premature atrial contraction) 07/15/2010     Priority: Medium     Palpitations 07/15/2010     Priority: Medium     Lyme disease 06/07/2010     Priority: Medium     Scalp lesion 05/26/2010     Priority: Medium     Migraine, unspecified, with intractable migraine, so stated, without mention of status migrainosus 04/04/2003     Priority: Medium     Problem list name updated by automated process. Provider to review  IMO Regulatory Load OCT 2020       Bell's palsy 04/04/2003     Priority: Medium     Contact dermatitis and other eczema, due to unspecified cause 04/04/2003     Priority: Medium     Allergic rhinitis due to other allergen 04/04/2003     Priority: Medium      Past Medical History:   Diagnosis Date     Abnormal mammogram 9/21/2016    right breast      AD (atopic dermatitis) 10/29/2015     Allergic rhinitis due to other allergen      Arthritis      Bell's palsy      Chronic fatigue 6/6/2012     Chronic infection     MRSA - 2015 scalp and prior to Abdomen     Contact dermatitis and other eczema, due to unspecified cause     eczema     Contusion of left foot, initial encounter 3/16/2016     DJD (degenerative joint disease), lumbar 9/26/2013     DJD (degenerative joint disease), lumbar 9/26/2013     Ds DNA antibody positive 4/16/2014    borderline.      Elevated blood pressure reading without diagnosis of hypertension 12/24/2013     Facial contusion 12/15/2014    hit by horse      Foraminal stenosis of lumbar region 9/26/2013     Frontal headache 12/15/2014     Gastroesophageal reflux disease, esophagitis presence not specified 6/29/2016     Heat sensitivity 10/29/2015     HTN, goal below 140/90 9/23/2015     Hyperlipidemia LDL goal <130 8/30/2017     Irregular heart beat      Keratitis sicca (H) 10/31/2012     Left  shoulder pain 9/26/2013     Lumbar radiculopathy 1/22/2014     Migraine headache 10/23/2013     Migraine, unspecified, with intractable migraine, so stated, without mention of status migrainosus      Motion sickness      MRSA infection 10/20/2015     Muscular wasting and disuse atrophy, not elsewhere classified 6/9/2014    right SCM, right wrist,       Numbness and tingling     both legs anf feet greater on the left     PAC (premature atrial contraction) 7/15/2010     Plantar fasciitis 9/23/2015     PONV (postoperative nausea and vomiting)      Skin lesion 10/20/2015    posterior superior scalp      Spasm of muscle 7/11/2017     Telangiectasia of face 12/19/2014     Tooth pain 10/20/2015    left lower primary molar      Past Surgical History:   Procedure Laterality Date     AS INJ TRANSFORAMIN EPIDURAL, LUMB/SACR SINGLE       COLONOSCOPY  3/11/2013    Procedure: COLONOSCOPY;  colonoscopy;  Surgeon: Lobito Mas MD;  Location: [facility]     COLONOSCOPY WITH CO2 INSUFFLATION N/A 11/19/2018    Procedure: COLONOSCOPY WITH CO2 INSUFFLATION;  Surgeon: Goyo Blank MD;  Location: [facility]     DECOMPRESSION LUMBAR TWO LEVELS Bilateral 12/8/2016    Procedure: DECOMPRESSION LUMBAR TWO LEVELS;  Surgeon: Bang Burrell MD;  Location: [facility]     DILATION AND CURETTAGE, HYSTEROSCOPY, ABLATE ENDOMETRIUM NOVASURE, COMBINED N/A 6/12/2019    Procedure: hysteroscopy, dilation & curettage, polypectomy, endometrial ablation;  Surgeon: Fredy Pham MD;  Location: [facility]     ESOPHAGOSCOPY, GASTROSCOPY, DUODENOSCOPY (EGD), COMBINED  11/8/2013    Procedure: COMBINED ESOPHAGOSCOPY, GASTROSCOPY, DUODENOSCOPY (EGD), BIOPSY SINGLE OR MULTIPLE;  Esophagoscopy, Gastroscopy, Duodenoscopy EGD with multiple biopsies;  Surgeon: Teja Koch MD;  Location: [facility]     HC REMOVAL OF TONSILS,<9 Y/O      Tonsils <10y.o.     HC TOOTH EXTRACTION W/FORCEP       INJECT BLOCK MEDIAL BRANCH CERVICAL/THORACIC/LUMBAR N/A  7/23/2020    Procedure: Sacral 1,2,3 Lateral Branch Blocks and lumbar 5 medial branch blocks bilaterally;  Surgeon: Maurisio Goetz MD;  [facility]: [facility]     INJECT JOINT SACROILIAC Left 9/24/2020    Procedure: Left Lumbar 5 Sacral 1 nerve root injections.;  Surgeon: Maurisio Goetz MD;  Location: [facility]     REPAIR TENDON ELBOW  7/9/2014    Procedure: REPAIR TENDON ELBOW;  Surgeon: Chris Johnston MD;  Location: [facility]     ULTRASONIC REMOVAL SOFT TISSUE (LOCATION) Right 11/21/2018    Procedure: Tenex right foot;  Surgeon: Patel Martínez DO;  Location: [facility]     Current Outpatient Medications   Medication Sig Dispense Refill     Acetaminophen (TYLENOL PO) Take 1,000 mg by mouth every 8 hours as needed        clobetasol (TEMOVATE) 0.05 % external cream Apply topically 2 times daily 60 g 1     meloxicam (MOBIC) 15 MG tablet TAKE ONE TABLET BY MOUTH EVERY DAY (PATIENT WANTS UNICHEM BRAND ONLY) 90 tablet 2     mupirocin (BACTROBAN) 2 % external ointment Apply topically 3 times daily 30 g 0     DULoxetine (CYMBALTA) 20 MG capsule Take 1 capsule (20 mg) by mouth daily for 21 days As directed. 21 capsule 0     fexofenadine (ALLEGRA) 180 MG tablet Take 1 tablet by mouth daily Reported on 4/5/2017       predniSONE (DELTASONE) 20 MG tablet Take 1 tablet (20 mg) by mouth daily 6 tablet 1     rizatriptan (MAXALT-MLT) 5 MG ODT Take 1 tablet (5 mg) by mouth at onset of headache for migraine May repeat in 2 hours. Max 6 tablets/24 hours. (Patient not taking: Reported on 11/5/2020) 30 tablet 1     sucralfate (CARAFATE) 1 GM tablet Take 1 tablet (1 g) by mouth 4 times daily (Patient not taking: Reported on 11/5/2020) 40 tablet 0       Allergies   Allergen Reactions     Ceftriaxone Anaphylaxis     Hives, rash, racing heart beat     Bactrim [Sulfamethoxazole W/Trimethoprim] Hives     Ciprofloxacin Other (See Comments)     Tendon Issues     Codeine Nausea and Vomiting     Copper      Rash       Doxycycline      Loss of skin  "pigmentation, skin loss.     Gold      Rash       Iodine-131 Hives     Levaquin [Levofloxacin] Other (See Comments)     Tendon issues with levaquin and cipro     Nickel      rash     Steel [Staples]      Rash from staples       Latex Rash     Penicillins Rash        Social History     Tobacco Use     Smoking status: Never Smoker     Smokeless tobacco: Never Used   Substance Use Topics     Alcohol use: No     Comment: social     Family History   Problem Relation Age of Onset     Diabetes Mother         adult     Cancer - colorectal Mother      Hypertension Mother      Allergies Mother      Cancer Mother         colon     Depression Mother      Gastrointestinal Disease Mother         ibs     Genitourinary Problems Mother         kidney cyst     Gynecology Mother         endometriosis     Lipids Mother      Thyroid Disease Mother      Arthritis Mother         RA     Heart Disease Maternal Grandfather         MI,  - 60's     Allergies Father      Unknown/Adopted Father      Heart Disease Father         mi-age mid 40's     Cardiovascular Father      Lipids Father      Respiratory Father         asthma     Cancer Father      Other Cancer Father         renal cancer     Depression Sister      Allergies Sister      Cancer Sister         vaginal     Gynecology Sister         endometriosis     Lipids Paternal Grandmother      Osteoporosis Paternal Grandmother      Thyroid Disease Paternal Grandmother      Respiratory Son         asthma     Allergies Son      Arthritis Sister      Allergies Brother      Heart Disease Brother      Allergies Daughter      Blood Disease Paternal Aunt         LGL T cell Leukemia     Rheumatoid Arthritis Paternal Aunt      Kidney Disease Maternal Aunt      Lupus Maternal Aunt      Bladder Cancer Maternal Aunt      History   Drug Use No         Objective     /78   Pulse 75   Temp 97.5  F (36.4  C) (Temporal)   Resp 18   Ht 1.66 m (5' 5.35\")   Wt 95.5 kg (210 lb 8 oz)   SpO2 92%  "  BMI 34.65 kg/m      Physical Exam    GENERAL APPEARANCE: healthy, alert and no distress     EYES: EOMI, PERRL     HENT: ear canals and TM's normal and nose and mouth without ulcers or lesions     NECK: no adenopathy, no asymmetry, masses, or scars and thyroid normal to palpation     RESP: lungs clear to auscultation - no rales, rhonchi or wheezes     CV: regular rates and rhythm, normal S1 S2, no S3 or S4 and no murmur, click or rub     ABDOMEN:  soft, nontender, no HSM or masses and bowel sounds normal     MS: extremities normal- no gross deformities noted, no evidence of inflammation in joints, FROM in all extremities.     SKIN: no suspicious lesions or rashes     NEURO: Normal strength and tone, sensory exam grossly normal, mentation intact and speech normal     PSYCH: mentation appears normal. and affect normal/bright     LYMPHATICS: No cervical adenopathy    Recent Labs   Lab Test 07/27/20  1547 04/17/20  1136   HGB 11.8 12.7    276    139   POTASSIUM 3.9 3.7   CR 0.58 0.64        Diagnostics:  Labs pending at this time.  Results will be reviewed when available.   No EKG required, no history of coronary heart disease, significant arrhythmia, peripheral arterial disease or other structural heart disease.    Revised Cardiac Risk Index (RCRI):  The patient has the following serious cardiovascular risks for perioperative complications:   - No serious cardiac risks = 0 points     RCRI Interpretation: 1 point: Class II (low risk - 0.9% complication rate)         Assessment & Plan   The proposed surgical procedure is considered INTERMEDIATE risk.    Amelia was seen today for pre-op exam.    Diagnoses and all orders for this visit:    Preop general physical exam  -     Comprehensive metabolic panel  -     Asymptomatic COVID-19 Virus (Coronavirus) by PCR; Future  -     HCG Qual, Urine ([id number])    Osteoarthritis of spine with radiculopathy, lumbar region    Foraminal stenosis of lumbar  region    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    Hypertrophy of breast    Bell's palsy    Lumbar radiculopathy    Migraine without aura and without status migrainosus, not intractable    Gastroesophageal reflux disease with esophagitis without hemorrhage    PONV (postoperative nausea and vomiting) - historically    Hyperlipidemia LDL goal <130    Elevated blood pressure reading without diagnosis of hypertension - historically    PAC (premature atrial contraction) - historically           Risks and Recommendations:  The patient has the following additional risks and recommendations for perioperative complications:   - No identified additional risk factors other than previously addressed    Medication Instructions:   - meloxicam (Mobic): HOLD 10 days before surgery.     RECOMMENDATION:  APPROVAL GIVEN to proceed with proposed procedure, without further diagnostic evaluation.    Signed Electronically by: Otoniel Manuel PA-C    Copy of this evaluation report is provided to requesting physician.    Preop [facility] Preop Guidelines    Revised Cardiac Risk Index

## 2020-11-09 DIAGNOSIS — Z01.818 PREOP GENERAL PHYSICAL EXAM: ICD-10-CM

## 2020-11-09 PROCEDURE — U0003 INFECTIOUS AGENT DETECTION BY NUCLEIC ACID (DNA OR RNA); SEVERE ACUTE RESPIRATORY SYNDROME CORONAVIRUS 2 (SARS-COV-2) (CORONAVIRUS DISEASE [COVID-19]), AMPLIFIED PROBE TECHNIQUE, MAKING USE OF HIGH THROUGHPUT TECHNOLOGIES AS DESCRIBED BY CMS-2020-01-R: HCPCS | Performed by: PHYSICIAN ASSISTANT

## 2020-11-10 LAB
SARS-COV-2 RNA SPEC QL NAA+PROBE: NOT DETECTED
SPECIMEN SOURCE: NORMAL

## 2020-11-16 ENCOUNTER — MYC MEDICAL ADVICE (OUTPATIENT)
Dept: FAMILY MEDICINE | Facility: OTHER | Age: 45
End: 2020-11-16

## 2020-11-18 ENCOUNTER — MYC MEDICAL ADVICE (OUTPATIENT)
Dept: FAMILY MEDICINE | Facility: OTHER | Age: 45
End: 2020-11-18

## 2020-11-18 DIAGNOSIS — R11.0 NAUSEA: Primary | ICD-10-CM

## 2020-11-19 ENCOUNTER — MYC MEDICAL ADVICE (OUTPATIENT)
Dept: FAMILY MEDICINE | Facility: OTHER | Age: 45
End: 2020-11-19

## 2020-11-19 RX ORDER — SCOLOPAMINE TRANSDERMAL SYSTEM 1 MG/1
1 PATCH, EXTENDED RELEASE TRANSDERMAL
Qty: 1 PATCH | Refills: 3 | Status: SHIPPED | OUTPATIENT
Start: 2020-11-19 | End: 2020-12-08

## 2020-11-23 ENCOUNTER — MYC MEDICAL ADVICE (OUTPATIENT)
Dept: FAMILY MEDICINE | Facility: OTHER | Age: 45
End: 2020-11-23

## 2020-11-25 ENCOUNTER — HOSPITAL PATHOLOGY (OUTPATIENT)
Dept: OTHER | Facility: CLINIC | Age: 45
End: 2020-11-25

## 2020-11-30 LAB — COPATH REPORT: NORMAL

## 2020-12-03 ENCOUNTER — HOSPITAL ENCOUNTER (OUTPATIENT)
Dept: MRI IMAGING | Facility: CLINIC | Age: 45
Discharge: HOME | End: 2020-12-03
Attending: ORTHOPAEDIC SURGERY | Admitting: ORTHOPAEDIC SURGERY
Payer: COMMERCIAL

## 2020-12-03 ENCOUNTER — MYC MEDICAL ADVICE (OUTPATIENT)
Dept: FAMILY MEDICINE | Facility: OTHER | Age: 45
End: 2020-12-03

## 2020-12-03 DIAGNOSIS — M54.16 RADICULOPATHY, LUMBAR REGION: ICD-10-CM

## 2020-12-03 PROCEDURE — 72158 MRI LUMBAR SPINE W/O & W/DYE: CPT

## 2020-12-03 PROCEDURE — A9585 GADOBUTROL INJECTION: HCPCS | Performed by: ORTHOPAEDIC SURGERY

## 2020-12-03 PROCEDURE — 255N000002 HC RX 255 OP 636: Performed by: ORTHOPAEDIC SURGERY

## 2020-12-03 RX ORDER — GADOBUTROL 604.72 MG/ML
10 INJECTION INTRAVENOUS ONCE
Status: COMPLETED | OUTPATIENT
Start: 2020-12-03 | End: 2020-12-03

## 2020-12-03 RX ADMIN — GADOBUTROL 9.5 ML: 604.72 INJECTION INTRAVENOUS at 14:58

## 2020-12-08 ENCOUNTER — OFFICE VISIT (OUTPATIENT)
Dept: FAMILY MEDICINE | Facility: OTHER | Age: 45
End: 2020-12-08
Payer: COMMERCIAL

## 2020-12-08 ENCOUNTER — MYC MEDICAL ADVICE (OUTPATIENT)
Dept: FAMILY MEDICINE | Facility: OTHER | Age: 45
End: 2020-12-08

## 2020-12-08 VITALS
DIASTOLIC BLOOD PRESSURE: 80 MMHG | RESPIRATION RATE: 16 BRPM | OXYGEN SATURATION: 99 % | SYSTOLIC BLOOD PRESSURE: 120 MMHG | WEIGHT: 210 LBS | HEART RATE: 95 BPM | TEMPERATURE: 98.7 F | BODY MASS INDEX: 34.57 KG/M2

## 2020-12-08 DIAGNOSIS — L57.0 AK (ACTINIC KERATOSIS): Primary | ICD-10-CM

## 2020-12-08 PROCEDURE — 17000 DESTRUCT PREMALG LESION: CPT | Performed by: PHYSICIAN ASSISTANT

## 2020-12-08 PROCEDURE — 17003 DESTRUCT PREMALG LES 2-14: CPT | Performed by: PHYSICIAN ASSISTANT

## 2020-12-08 ASSESSMENT — PAIN SCALES - GENERAL: PAINLEVEL: NO PAIN (0)

## 2020-12-08 NOTE — PROGRESS NOTES
Subjective     Amelia Coker is a 45 year old female who presents to clinic today for the following health issues:    HPI            Concern -   Skin lesions on both hands  Onset: Ongoing  Description: pink, red, waxy flakey  Intensity: mild, moderate  Progression of Symptoms:  worsening  Accompanying Signs & Symptoms:    They crack and bleed  Previous history of similar problem: Yes  Precipitating factors:        Worsened by:  NA  Alleviating factors:        Improved by: NA  Therapies tried and outcome:  Tried creams etc...      Review of Systems   Constitutional, HEENT, cardiovascular, pulmonary, GI, , musculoskeletal, neuro, skin, endocrine and psych systems are negative, except as otherwise noted.      Objective    There were no vitals taken for this visit.  There is no height or weight on file to calculate BMI.  Physical Exam   GENERAL: healthy, alert and no distress  MS: no gross musculoskeletal defects noted, no edema  SKIN: no suspicious lesions or rashes but she does have a couple areas of actinic keratosis to the bilateral hands at the base of both thumbs.  These are treated 3 times with cryotherapy with a thaw cycle between.  A spot to the left neck anteriorly is treated similarly today.  All of the spots are roughly 3 to 5 mm in rough diameter.  NEURO: Normal strength and tone, mentation intact and speech normal  PSYCH: mentation appears normal, affect normal/bright    No results found. However, due to the size of the patient record, not all encounters were searched. Please check Results Review for a complete set of results.        Assessment & Plan     Amelia was seen today for derm problem.    Diagnoses and all orders for this visit:    AK (actinic keratosis) - both hands at the lateral thenar aspect.  -     DESTRUCT BENIGN LESION, UP TO 14    5 spots were individually treated to the hands to to both hands and then a spot to the neck as well.     BMI:   Estimated body mass index is 34.57 kg/m  as  "calculated from the following:    Height as of 11/5/20: 1.66 m (5' 5.35\").    Weight as of this encounter: 95.3 kg (210 lb).   Weight management plan: Discussed healthy diet and exercise guidelines       Return in about 3 weeks (around 12/29/2020) for recheck of current condition.    Otoniel Manuel PA-C  [facility]    "

## 2021-01-06 DIAGNOSIS — M62.838 MUSCLE SPASM: ICD-10-CM

## 2021-01-06 DIAGNOSIS — M54.2 CERVICALGIA: ICD-10-CM

## 2021-01-07 ENCOUNTER — MYC MEDICAL ADVICE (OUTPATIENT)
Dept: FAMILY MEDICINE | Facility: OTHER | Age: 46
End: 2021-01-07

## 2021-01-07 RX ORDER — MELOXICAM 15 MG/1
TABLET ORAL
Qty: 90 TABLET | Refills: 2 | Status: SHIPPED | OUTPATIENT
Start: 2021-01-07 | End: 2021-05-14

## 2021-01-10 ENCOUNTER — HEALTH MAINTENANCE LETTER (OUTPATIENT)
Age: 46
End: 2021-01-10

## 2021-01-18 ENCOUNTER — MYC MEDICAL ADVICE (OUTPATIENT)
Dept: FAMILY MEDICINE | Facility: OTHER | Age: 46
End: 2021-01-18

## 2021-01-18 DIAGNOSIS — S16.1XXA ACUTE CERVICAL MYOFASCIAL STRAIN, INITIAL ENCOUNTER: Primary | ICD-10-CM

## 2021-01-18 DIAGNOSIS — J01.01 ACUTE RECURRENT MAXILLARY SINUSITIS: ICD-10-CM

## 2021-01-18 RX ORDER — AZITHROMYCIN 250 MG/1
TABLET, FILM COATED ORAL
Qty: 6 TABLET | Refills: 1 | Status: ON HOLD | OUTPATIENT
Start: 2021-01-18 | End: 2021-02-18

## 2021-01-29 ENCOUNTER — MYC MEDICAL ADVICE (OUTPATIENT)
Dept: FAMILY MEDICINE | Facility: OTHER | Age: 46
End: 2021-01-29

## 2021-01-29 DIAGNOSIS — K21.00 GASTROESOPHAGEAL REFLUX DISEASE WITH ESOPHAGITIS, UNSPECIFIED WHETHER HEMORRHAGE: Primary | ICD-10-CM

## 2021-01-29 RX ORDER — SUCRALFATE 1 G/1
1 TABLET ORAL 4 TIMES DAILY
Qty: 32 TABLET | Refills: 0 | Status: SHIPPED | OUTPATIENT
Start: 2021-01-29 | End: 2021-05-14

## 2021-02-01 ENCOUNTER — MYC MEDICAL ADVICE (OUTPATIENT)
Dept: FAMILY MEDICINE | Facility: OTHER | Age: 46
End: 2021-02-01

## 2021-02-01 DIAGNOSIS — R10.13 ABDOMINAL PAIN, EPIGASTRIC: ICD-10-CM

## 2021-02-01 DIAGNOSIS — K21.00 GASTROESOPHAGEAL REFLUX DISEASE WITH ESOPHAGITIS, UNSPECIFIED WHETHER HEMORRHAGE: ICD-10-CM

## 2021-02-03 ENCOUNTER — TELEPHONE (OUTPATIENT)
Dept: FAMILY MEDICINE | Facility: OTHER | Age: 46
End: 2021-02-03

## 2021-02-05 NOTE — TELEPHONE ENCOUNTER
Left message for patient to return call to schedule EGD/colonoscopy. If Radha or Eboni are not available, please transfer to same day surgery           Problem: Respiratory Impairment - Respiratory Therapy 253  Intervention: Volume expansion techniques  Intervention Status  Done  Intervention: Airway clearance techniques  Intervention Status  Done

## 2021-02-08 ENCOUNTER — HOSPITAL ENCOUNTER (OUTPATIENT)
Dept: PHYSICAL THERAPY | Facility: CLINIC | Age: 46
Setting detail: THERAPIES SERIES
End: 2021-02-08
Attending: PHYSICIAN ASSISTANT
Payer: COMMERCIAL

## 2021-02-08 DIAGNOSIS — Z11.59 ENCOUNTER FOR SCREENING FOR OTHER VIRAL DISEASES: ICD-10-CM

## 2021-02-08 DIAGNOSIS — S16.1XXA ACUTE CERVICAL MYOFASCIAL STRAIN, INITIAL ENCOUNTER: ICD-10-CM

## 2021-02-08 PROCEDURE — 97162 PT EVAL MOD COMPLEX 30 MIN: CPT | Mod: GP | Performed by: PHYSICAL THERAPIST

## 2021-02-08 PROCEDURE — 97140 MANUAL THERAPY 1/> REGIONS: CPT | Mod: GP | Performed by: PHYSICAL THERAPIST

## 2021-02-08 NOTE — TELEPHONE ENCOUNTER
Date of procedure: 2/18  EGD  Surgeon: Dr. Ba  Prep:EGD  Packet:Colonoscopy/EGD instructions were sent to the patient in [facility].   Date: 2/8/2021      Surgery Scheduler

## 2021-02-08 NOTE — PROGRESS NOTES
02/08/21 0700   General Information   Type of Visit Initial OP Ortho PT Evaluation   Start of Care Date 02/08/21   Referring Physician Otoniel Han PA-C   Patient/Family Goals Statement To turn her head, no pain in the right UE, no HA's.    Orders Evaluate and Treat   Date of Order 02/08/21   Certification Required? No   Medical Diagnosis Acute cervical myofascial strain, initial encounter   Surgical/Medical history reviewed Yes   Precautions/Limitations no known precautions/limitations   Body Part(s)   Body Part(s) Cervical Spine   Presentation and Etiology   Pertinent history of current problem (include personal factors and/or comorbidities that impact the POC) Patient was last seen in Oct 2020 please see note for HX. Since that last visit pt has had a breast reduction on 11/25/20 this went well and she was reduced to a goal of C. On November 12/2020 had a fior-lami on the left L5-S1 and possibly L4-5 with partial dysectomy and foraminotoy and since that 3 steriod injections. Symptoms : right UE ache to the fingers (first 3). HA's, neck pain, shoulder blade pain on the right, and double vision. The right sided sxs were better after the breast surgery but came back about 3 wks ago. Since the surgery the LBP is not better. and the Leg pain left is the same. The injections help this a little.    Impairments A. Pain;B. Decreased WB tolerance;D. Decreased ROM;E. Decreased flexibility;K. Numbness;L. Tingling;M. Locking or catching;N. Headaches;O. Blurred vision   Functional Limitations perform activities of daily living;perform required work activities;perform desired leisure / sports activities   Onset date of current episode/exacerbation 01/18/21   Chronicity Recurrent   Pain/symptoms exacerbated by G. Certain positions;H. Overhead reach;I. Bending;D. Carrying;C. Lifting;L. Work tasks;K. Home tasks;J. ADL   Pain/symptoms eased by F. Certain positions   Prior Level of Function   Prior Level of Function-Mobility  independent   Current Level of Function   Patient role/employment history A. Employed   Living environment [employer]   Fall Risk Screen   Fall screen completed by PT   Have you fallen 2 or more times in the past year? No   Have you fallen and had an injury in the past year? No   Is patient a fall risk? No   Abuse Screen (yes response referral indicated)   Feels Unsafe at Home or Work/School no   Feels Threatened by Someone no   Does Anyone Try to Keep You From Having Contact with Others or Doing Things Outside Your Home? no   Physical Signs of Abuse Present no   Cervical Spine   Observation forward head yet   Integumentary  healed incisions for the breasts.    Posture with standing against the wall PT can fit 2 fingers behind the head   Cervical Flexion ROM WNL   Cervical Extension ROM 60%   Cervical Right Side Bending ROM 70%   Cervical Left Side Bending ROM 60%   Cervical Right Rotation ROM 80%   Cervical Left Rotation ROM 80%   Shoulder/Wrist/Hand Strength Comments right UE WNL   Upper Trapezius Flexibility tightness B   Levator Scapula Flexibility tightness B   Scalene Flexibility tightness B   Pectoralis Minor Flexibility tightness B   Cervical Flexibility Comments Tightness at the base of the skull and the entire cervical and thoracic outlet on the right   Cervical/Shoulder Special Tests Comments Thoracic outlet right   Palpation tightness and pain throughout the cerivcal and the OCB   Thoracic Outlet Syndrome   (yes does exhibit this right)   Planned Therapy Interventions   Planned Therapy Interventions joint mobilization;manual therapy;neuromuscular re-education;ROM;strengthening;stretching   Clinical Impression   Criteria for Skilled Therapeutic Interventions Met yes, treatment indicated   PT Diagnosis neck pain, LBP, rihgt UE pain, right UE parasthesias   Influenced by the following impairments forward head and weakness in the posterior neck and posterior thoracic area. LBP   Functional limitations  due to impairments see NDI 33.3%   Clinical Presentation Evolving/Changing   Clinical Presentation Rationale many complaints, surgeries injuries   Clinical Decision Making (Complexity) Moderate complexity   Therapy Frequency 1 time/week   Predicted Duration of Therapy Intervention (days/wks) 90 days   Risk & Benefits of therapy have been explained Yes   Patient, Family & other staff in agreement with plan of care Yes   Clinical Impression Comments Patient had a breast reduction that helped her posture a lot, however she has weakness in the cervical and the thoracid and tightness in the anterior chest and cervical. This is causing forward head and possible thoracic outlset. Patient has LBP also see subjective.    Education Assessment   Preferred Learning Style Listening;Reading;Demonstration   Barriers to Learning No barriers   ORTHO GOALS   PT Ortho Eval Goals 1;2;3   Ortho Goal 1   Goal Identifier 1   Goal Description Patient has 50% decrease in neck and UE sx's as seen by a NDI of 16%   Target Date 05/08/21   Ortho Goal 2   Goal Identifier 2   Goal Description Patient is able to reach with her right UE for home and work tasks without pain, tingling or numbness in the right UE   Target Date 05/08/21   Ortho Goal 3   Goal Identifier 3   Goal Description Patient has a HEP to control the LBP and the referral pain to the left LE   Target Date 05/08/21   Total Evaluation Time   PT Erickaal, Moderate Complexity Minutes (23137) 15   Thank you for the referral,            Ashleigh Martínez PT

## 2021-02-14 ENCOUNTER — HOSPITAL ENCOUNTER (OUTPATIENT)
Dept: MRI IMAGING | Facility: CLINIC | Age: 46
Discharge: HOME | End: 2021-02-14
Attending: ANESTHESIOLOGY | Admitting: ANESTHESIOLOGY
Payer: COMMERCIAL

## 2021-02-14 DIAGNOSIS — Z11.59 ENCOUNTER FOR SCREENING FOR OTHER VIRAL DISEASES: ICD-10-CM

## 2021-02-14 DIAGNOSIS — M54.17 RADICULOPATHY, LUMBOSACRAL REGION: ICD-10-CM

## 2021-02-14 LAB
SARS-COV-2 RNA RESP QL NAA+PROBE: NORMAL
SPECIMEN SOURCE: NORMAL

## 2021-02-14 PROCEDURE — U0003 INFECTIOUS AGENT DETECTION BY NUCLEIC ACID (DNA OR RNA); SEVERE ACUTE RESPIRATORY SYNDROME CORONAVIRUS 2 (SARS-COV-2) (CORONAVIRUS DISEASE [COVID-19]), AMPLIFIED PROBE TECHNIQUE, MAKING USE OF HIGH THROUGHPUT TECHNOLOGIES AS DESCRIBED BY CMS-2020-01-R: HCPCS | Performed by: SURGERY

## 2021-02-14 PROCEDURE — 72195 MRI PELVIS W/O DYE: CPT | Mod: 26 | Performed by: RADIOLOGY

## 2021-02-14 PROCEDURE — 72195 MRI PELVIS W/O DYE: CPT

## 2021-02-14 PROCEDURE — U0005 INFEC AGEN DETEC AMPLI PROBE: HCPCS | Performed by: SURGERY

## 2021-02-15 LAB
LABORATORY COMMENT REPORT: NORMAL
SARS-COV-2 RNA RESP QL NAA+PROBE: NEGATIVE
SPECIMEN SOURCE: NORMAL

## 2021-02-16 ENCOUNTER — HOSPITAL ENCOUNTER (OUTPATIENT)
Dept: PHYSICAL THERAPY | Facility: CLINIC | Age: 46
Setting detail: THERAPIES SERIES
End: 2021-02-16
Attending: PHYSICIAN ASSISTANT
Payer: COMMERCIAL

## 2021-02-16 PROCEDURE — 97140 MANUAL THERAPY 1/> REGIONS: CPT | Mod: GP | Performed by: PHYSICAL THERAPIST

## 2021-02-18 ENCOUNTER — ANESTHESIA EVENT (OUTPATIENT)
Dept: GASTROENTEROLOGY | Facility: CLINIC | Age: 46
End: 2021-02-18
Payer: COMMERCIAL

## 2021-02-18 ENCOUNTER — ANESTHESIA (OUTPATIENT)
Dept: GASTROENTEROLOGY | Facility: CLINIC | Age: 46
End: 2021-02-18
Payer: COMMERCIAL

## 2021-02-18 ENCOUNTER — HOSPITAL ENCOUNTER (OUTPATIENT)
Facility: CLINIC | Age: 46
Discharge: HOME | End: 2021-02-18
Attending: SURGERY | Admitting: SURGERY
Payer: COMMERCIAL

## 2021-02-18 VITALS
OXYGEN SATURATION: 98 % | DIASTOLIC BLOOD PRESSURE: 80 MMHG | TEMPERATURE: 98.7 F | HEART RATE: 67 BPM | SYSTOLIC BLOOD PRESSURE: 128 MMHG | RESPIRATION RATE: 20 BRPM

## 2021-02-18 LAB — UPPER GI ENDOSCOPY: NORMAL

## 2021-02-18 PROCEDURE — 258N000003 HC RX IP 258 OP 636: Performed by: SURGERY

## 2021-02-18 PROCEDURE — 43239 EGD BIOPSY SINGLE/MULTIPLE: CPT | Performed by: SURGERY

## 2021-02-18 PROCEDURE — 250N000009 HC RX 250: Performed by: SURGERY

## 2021-02-18 PROCEDURE — 250N000011 HC RX IP 250 OP 636: Performed by: NURSE ANESTHETIST, CERTIFIED REGISTERED

## 2021-02-18 PROCEDURE — 88305 TISSUE EXAM BY PATHOLOGIST: CPT | Mod: TC | Performed by: SURGERY

## 2021-02-18 PROCEDURE — 250N000009 HC RX 250: Performed by: NURSE ANESTHETIST, CERTIFIED REGISTERED

## 2021-02-18 PROCEDURE — 88305 TISSUE EXAM BY PATHOLOGIST: CPT | Mod: 26 | Performed by: PATHOLOGY

## 2021-02-18 PROCEDURE — 370N000017 HC ANESTHESIA TECHNICAL FEE, PER MIN: Performed by: SURGERY

## 2021-02-18 RX ORDER — LIDOCAINE 40 MG/G
CREAM TOPICAL
Status: DISCONTINUED | OUTPATIENT
Start: 2021-02-18 | End: 2021-02-18 | Stop reason: HOSPADM

## 2021-02-18 RX ORDER — PROPOFOL 10 MG/ML
INJECTION, EMULSION INTRAVENOUS CONTINUOUS PRN
Status: DISCONTINUED | OUTPATIENT
Start: 2021-02-18 | End: 2021-02-18

## 2021-02-18 RX ORDER — LIDOCAINE HYDROCHLORIDE 20 MG/ML
INJECTION, SOLUTION INFILTRATION; PERINEURAL PRN
Status: DISCONTINUED | OUTPATIENT
Start: 2021-02-18 | End: 2021-02-18

## 2021-02-18 RX ORDER — PROPOFOL 10 MG/ML
INJECTION, EMULSION INTRAVENOUS PRN
Status: DISCONTINUED | OUTPATIENT
Start: 2021-02-18 | End: 2021-02-18

## 2021-02-18 RX ORDER — SODIUM CHLORIDE, SODIUM LACTATE, POTASSIUM CHLORIDE, CALCIUM CHLORIDE 600; 310; 30; 20 MG/100ML; MG/100ML; MG/100ML; MG/100ML
INJECTION, SOLUTION INTRAVENOUS CONTINUOUS
Status: DISCONTINUED | OUTPATIENT
Start: 2021-02-18 | End: 2021-02-18 | Stop reason: HOSPADM

## 2021-02-18 RX ADMIN — LIDOCAINE HYDROCHLORIDE 1 ML: 10 INJECTION, SOLUTION EPIDURAL; INFILTRATION; INTRACAUDAL; PERINEURAL at 06:50

## 2021-02-18 RX ADMIN — PROPOFOL 80 MG: 10 INJECTION, EMULSION INTRAVENOUS at 07:36

## 2021-02-18 RX ADMIN — SODIUM CHLORIDE, POTASSIUM CHLORIDE, SODIUM LACTATE AND CALCIUM CHLORIDE: 600; 310; 30; 20 INJECTION, SOLUTION INTRAVENOUS at 06:50

## 2021-02-18 RX ADMIN — LIDOCAINE HYDROCHLORIDE 100 MG: 20 INJECTION, SOLUTION INFILTRATION; PERINEURAL at 07:36

## 2021-02-18 RX ADMIN — PROPOFOL 150 MCG/KG/MIN: 10 INJECTION, EMULSION INTRAVENOUS at 07:36

## 2021-02-18 RX ADMIN — PROPOFOL 20 MG: 10 INJECTION, EMULSION INTRAVENOUS at 07:44

## 2021-02-18 RX ADMIN — TOPICAL ANESTHETIC 1 EACH: 200 SPRAY DENTAL; PERIODONTAL at 07:24

## 2021-02-18 NOTE — H&P
[facility]    Pre-Endoscopy History and Physical     Amelia Coker MRN# [id number]   YOB: 1975 Age: 46 year old     Date of Procedure: 2/18/2021  Primary care provider: Otoniel Nelson  Type of Endoscopy: Esophagogastroduodenoscopy with possible biopsy, possible dilation, possible foreign body removal  Reason for Procedure: epigastric pain and reflux  Type of Anesthesia Anticipated: MAC    HPI:    Amelia is a 46 year old female who will be undergoing the above procedure.  GERD; last EGD 2013 - normal at the time; on carafate.  On mobic also    A history and physical has been performed. The patient's medications and allergies have been reviewed. The risks and benefits of the procedure and the sedation options and risks were discussed with the patient.  All questions were answered and informed consent was obtained.      She denies a personal or family history of anesthesia complications or bleeding disorders.     Patient Active Problem List   Diagnosis     Migraine, unspecified, with intractable migraine, so stated, without mention of status migrainosus     Bell's palsy     Contact dermatitis and other eczema, due to unspecified cause     Allergic rhinitis due to other allergen     Scalp lesion     Lyme disease     PAC (premature atrial contraction)     Palpitations     Raynaud phenomenon     Chronic fatigue     Hair loss     Abnormal PFT     Shoulder joint instability     Family history of melanoma     Dry eyes     Keratitis sicca (H)     Family history of colon cancer     Pain in joint, upper arm     FLORIDALMA positive     Plantar fascial fibromatosis     Foraminal stenosis of lumbar region     DJD (degenerative joint disease), lumbar     Migraine without aura and without status migrainosus, not intractable     Lumbar radiculopathy     Ds DNA antibody positive     Muscular wasting and disuse atrophy, not elsewhere classified     Frontal headache     Telangiectasia of face     Lateral  epicondylitis     Right shoulder pain     Plantar fasciitis     Skin lesion     AD (atopic dermatitis)     Heat sensitivity     Rotator cuff arthropathy, right     Gastroesophageal reflux disease, esophagitis presence not specified     Abnormal mammogram     Sternocleidomastoid muscle tenderness     Acute cervical myofascial strain, initial encounter     Spasm of muscle     Hyperlipidemia LDL goal <130     Biceps tendinopathy, right     Superficial swelling of scalp     Intractable right heel pain     Intractable episodic tension-type headache     Jaycob complexion     Lip lesion     Epigastric pain     Abdominal distension     PONV (postoperative nausea and vomiting)     Menorrhalgia     Uterine polyp     Loose stools     Hyperactive bowel sounds     Abnormality of nail surface     Dry hair     Dry skin     Malaise and fatigue     Lymphadenopathy     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     Endometrium, hyperplasia - on recent CT scan     Pain in joint involving pelvic region and thigh, right     Right lateral abdominal pain     Right foot pain     Dental abscess - left mandible molar     Brachial plexopathy - right shoulder     Balance problems     Morbid obesity (H)     Sinus arrhythmia seen on electrocardiogram     Family history of ischemic heart disease - father at 46 massive MI     Elevated blood pressure reading without diagnosis of hypertension     Hypertrophy of breast     History of cold sores     AK (actinic keratosis) - both hands at the lateral thenar aspect.        Past Medical History:   Diagnosis Date     Abnormal mammogram 9/21/2016    right breast      AD (atopic dermatitis) 10/29/2015     Allergic rhinitis due to other allergen      Arthritis      Bell's palsy      Chronic fatigue 6/6/2012     Chronic infection     MRSA - 2015 scalp and prior to Abdomen     Contact dermatitis and other eczema, due to unspecified cause     eczema     Contusion of left foot, initial encounter  3/16/2016     DJD (degenerative joint disease), lumbar 9/26/2013     DJD (degenerative joint disease), lumbar 9/26/2013     Ds DNA antibody positive 4/16/2014    borderline.      Elevated blood pressure reading without diagnosis of hypertension 12/24/2013     Facial contusion 12/15/2014    hit by horse      Foraminal stenosis of lumbar region 9/26/2013     Frontal headache 12/15/2014     Gastroesophageal reflux disease, esophagitis presence not specified 6/29/2016     Heat sensitivity 10/29/2015     HTN, goal below 140/90 9/23/2015     Hyperlipidemia LDL goal <130 8/30/2017     Irregular heart beat      Keratitis sicca (H) 10/31/2012     Left shoulder pain 9/26/2013     Lumbar radiculopathy 1/22/2014     Migraine headache 10/23/2013     Migraine, unspecified, with intractable migraine, so stated, without mention of status migrainosus      Motion sickness      MRSA infection 10/20/2015     Muscular wasting and disuse atrophy, not elsewhere classified 6/9/2014    right SCM, right wrist,       Numbness and tingling     both legs anf feet greater on the left     PAC (premature atrial contraction) 7/15/2010     Plantar fasciitis 9/23/2015     PONV (postoperative nausea and vomiting)      Skin lesion 10/20/2015    posterior superior scalp      Spasm of muscle 7/11/2017     Telangiectasia of face 12/19/2014     Tooth pain 10/20/2015    left lower primary molar         Past Surgical History:   Procedure Laterality Date     AS INJ TRANSFORAMIN EPIDURAL, LUMB/SACR SINGLE       COLONOSCOPY  3/11/2013    Procedure: COLONOSCOPY;  colonoscopy;  Surgeon: Lobito Mas MD;  Location: [facility]     COLONOSCOPY WITH CO2 INSUFFLATION N/A 11/19/2018    Procedure: COLONOSCOPY WITH CO2 INSUFFLATION;  Surgeon: Goyo Blank MD;  Location: [facility]     DECOMPRESSION LUMBAR TWO LEVELS Bilateral 12/8/2016    Procedure: DECOMPRESSION LUMBAR TWO LEVELS;  Surgeon: Bang Burrell MD;  Location: [facility]     DILATION AND CURETTAGE,  HYSTEROSCOPY, ABLATE ENDOMETRIUM NOVASURE, COMBINED N/A 2019    Procedure: hysteroscopy, dilation & curettage, polypectomy, endometrial ablation;  Surgeon: Fredy Pham MD;  Location: [facility]     ESOPHAGOSCOPY, GASTROSCOPY, DUODENOSCOPY (EGD), COMBINED  2013    Procedure: COMBINED ESOPHAGOSCOPY, GASTROSCOPY, DUODENOSCOPY (EGD), BIOPSY SINGLE OR MULTIPLE;  Esophagoscopy, Gastroscopy, Duodenoscopy EGD with multiple biopsies;  Surgeon: Teja Koch MD;  Location: [facility]     HC REMOVAL OF TONSILS,<11 Y/O      Tonsils <10y.o.     HC TOOTH EXTRACTION W/FORCEP       INJECT BLOCK MEDIAL BRANCH CERVICAL/THORACIC/LUMBAR N/A 2020    Procedure: Sacral 1,2,3 Lateral Branch Blocks and lumbar 5 medial branch blocks bilaterally;  Surgeon: Maurisio Goetz MD;  Location: [facility]     INJECT JOINT SACROILIAC Left 2020    Procedure: Left Lumbar 5 Sacral 1 nerve root injections.;  Surgeon: Maurisio Goetz MD;  Location: [facility]     REPAIR TENDON ELBOW  2014    Procedure: REPAIR TENDON ELBOW;  Surgeon: Chris Johnston MD;  Location: [facility]     ULTRASONIC REMOVAL SOFT TISSUE (LOCATION) Right 2018    Procedure: Tenex right foot;  Surgeon: Patel Martínez DO;  Location: [facility]       Social History     Tobacco Use     Smoking status: Never Smoker     Smokeless tobacco: Never Used   Substance Use Topics     Alcohol use: No     Comment: social       Family History   Problem Relation Age of Onset     Diabetes Mother         adult     Cancer - colorectal Mother      Hypertension Mother      Allergies Mother      Cancer Mother         colon     Depression Mother      Gastrointestinal Disease Mother         ibs     Genitourinary Problems Mother         kidney cyst     Gynecology Mother         endometriosis     Lipids Mother      Thyroid Disease Mother      Arthritis Mother         RA     Heart Disease Maternal Grandfather         MI,  - 60's     Allergies Father      Unknown/Adopted Father       Heart Disease Father         mi-age mid 40's     Cardiovascular Father      Lipids Father      Respiratory Father         asthma     Cancer Father      Other Cancer Father         renal cancer     Depression Sister      Allergies Sister      Cancer Sister         vaginal     Gynecology Sister         endometriosis     Lipids Paternal Grandmother      Osteoporosis Paternal Grandmother      Thyroid Disease Paternal Grandmother      Respiratory Son         asthma     Allergies Son      Arthritis Sister      Allergies Brother      Heart Disease Brother      Allergies Daughter      Blood Disease Paternal Aunt         LGL T cell Leukemia     Rheumatoid Arthritis Paternal Aunt      Kidney Disease Maternal Aunt      Lupus Maternal Aunt      Bladder Cancer Maternal Aunt        Prior to Admission medications    Medication Sig Start Date End Date Taking? Authorizing Provider   Acetaminophen (TYLENOL PO) Take 1,000 mg by mouth every 8 hours as needed    Yes Reported, Patient   meloxicam (MOBIC) 15 MG tablet TAKE ONE TABLET BY MOUTH ONCE DAILY 1/7/21  Yes Otonile Nelson PA-C   clobetasol (TEMOVATE) 0.05 % external cream Apply topically 2 times daily  Patient not taking: Reported on 12/8/2020 4/22/20   Otoniel Nelson PA-C   fexofenadine (ALLEGRA) 180 MG tablet Take 1 tablet by mouth daily Reported on 4/5/2017    Reported, Patient   mupirocin (BACTROBAN) 2 % external ointment Apply topically 3 times daily  Patient not taking: Reported on 12/8/2020 10/19/20   Daysi Mayorga PA-C   rizatriptan (MAXALT-MLT) 5 MG ODT Take 1 tablet (5 mg) by mouth at onset of headache for migraine May repeat in 2 hours. Max 6 tablets/24 hours.  Patient not taking: Reported on 11/5/2020 3/5/20   Otoniel Nelson PA-C   sucralfate (CARAFATE) 1 GM tablet Take 1 tablet (1 g) by mouth 4 times daily 1/29/21   Otoniel Nelson PA-C   sucralfate (CARAFATE) 1 GM tablet Take 1 tablet (1 g) by mouth 4 times daily 4/22/20   Otoniel Nelson PA-C       Allergies  "  Allergen Reactions     Ceftriaxone Anaphylaxis     Hives, rash, racing heart beat     Bactrim [Sulfamethoxazole W/Trimethoprim] Hives     Ciprofloxacin Other (See Comments)     Tendon Issues     Codeine Nausea and Vomiting     Copper      Rash       Doxycycline      Loss of skin pigmentation, skin loss.     Gold      Rash       Iodine-131 Hives     Levaquin [Levofloxacin] Other (See Comments)     Tendon issues with levaquin and cipro     Nickel      rash     Steel [Staples]      Rash from staples       Latex Rash     Penicillins Rash        REVIEW OF SYSTEMS:   5 point ROS negative except as noted above in HPI, including Gen., Resp., CV, GI &  system review.    PHYSICAL EXAM:   /85   Temp 98.7  F (37.1  C) (Oral)   Resp 20   SpO2 96%  Estimated body mass index is 34.57 kg/m  as calculated from the following:    Height as of 11/5/20: 1.66 m (5' 5.35\").    Weight as of 12/8/20: 95.3 kg (210 lb).   Constitutional: Awake, alert, no acute distress.  Eyes: No scleral icterus.  Conjunctiva are without injection.  ENMT: Mucous membranes moist, dentition and gums are intact.   Neck: Soft, supple, trachea midline.    Endocrine: n/a   Lymphatic: There is no cervical, submandibularadenopathy.  Respiratory: Lungs are clear to auscultation and percussion bilaterally.   Cardiovascular: Regular rate and rhythm. No murmurs, rubs, or gallops.    Abdomen: Non-distended, non-tender, normoactive bowel sounds present, No masses,  Musculoskeletal: No spinal or CVA tenderness. Full range of motion in the upper and lower extremities.    Skin: No skin rashes or lesions to inspection.  No petechia.    Neurologic: Cranial nerves II through XII are grossly intact and symmetric.  Psychiatric: The patient is alert and oriented times 3.  The patient's affect is not blunted and mood is appropriate.  DIAGNOSTICS:    Not indicated    IMPRESSION   ASA Class 2 - Mild systemic disease    PLAN:   Plan for Esophagogastroduodenoscopy with " possible biopsy, possible dilation, possible foreign body removal. We discussed the risks, benefits and alternatives and the patient wished to proceed.  Patient is cleared for the above procedure.    The above has been forwarded to the consulting provider.    [facility], [facility]

## 2021-02-18 NOTE — DISCHARGE INSTRUCTIONS
[facility]    Home Care Following Endoscopy          Activity:    You have just undergone an endoscopic procedure usually performed with conscious sedation.  Do not work or operate machinery (including a car) for at least 12 hours.      I encourage you to walk and attempt to pass this air as soon as possible.    Diet:    Return to the diet you were on before your procedure but eat lightly for the first 12-24 hours.    Drink plenty of water.    Resume any regular medications unless otherwise advised by your physician.  Please begin any new medication prescribed as a result of your procedure as directed by your physician.     If you had any biopsy or polyp removed please refrain from aspirin or aspirin products for 2 days.  If on Coumadin please restart as instructed by your physician.   Pain:    You may take Tylenol as needed for pain.  Expected Recovery:    You can expect some mild abdominal fullness and/or discomfort due to the air used to inflate your intestinal tract. It is also normal to have a mild sore throat after upper endoscopy.    Call Your Physician if You Have:    After Upper Endoscopy:  o Shoulder, back or chest pain.  o Difficulty breathing or swallowing.  o Vomiting blood.  Any questions or concerns about your recovery, please call [phone] or after hours [phone] Nurse Advice Line.    Follow-up Care:    You should receive a call or letter with your results within 1 week. Please call if you have not received a notification of your results.

## 2021-02-18 NOTE — ANESTHESIA POSTPROCEDURE EVALUATION
Patient: Amelia Coker    Procedure(s):  ESOPHAGOGASTRODUODENOSCOPY, WITH BIOPSY    Diagnosis:Gastroesophageal reflux disease with esophagitis, unspecified whether hemorrhage [K21.00]  Abdominal pain, epigastric [R10.13]  Diagnosis Additional Information: No value filed.    Anesthesia Type:  MAC    Note:  Disposition: Outpatient   Postop Pain Control: Uneventful            Sign Out: Well controlled pain   PONV: No   Neuro/Psych: Uneventful            Sign Out: Acceptable/Baseline neuro status   Airway/Respiratory: Uneventful            Sign Out: Acceptable/Baseline resp. status   CV/Hemodynamics: Uneventful            Sign Out: Acceptable CV status   Other NRE: NONE   DID A NON-ROUTINE EVENT OCCUR? No    Event details/Postop Comments:  Pt was happy with anesthesia care.  No complications.  I will follow up with the pt if needed.         Last vitals:  Vitals:    02/18/21 0750 02/18/21 0800 02/18/21 0810   BP: 132/74 123/81 128/80   Pulse: 71 64 67   Resp:      Temp:      SpO2: 98% 99% 98%       Last vitals prior to Anesthesia Care Transfer:  CRNA VITALS  2/18/2021 0719 - 2/18/2021 0819      2/18/2021             SpO2:  97 %    Resp Rate (observed):  13          Electronically Signed By: LAURI Torres CRNA  February 18, 2021  9:33 AM

## 2021-02-18 NOTE — ANESTHESIA CARE TRANSFER NOTE
Patient: Amelia Coker    Procedure(s):  ESOPHAGOGASTRODUODENOSCOPY (EGD)    Diagnosis: Gastroesophageal reflux disease with esophagitis, unspecified whether hemorrhage [K21.00]  Abdominal pain, epigastric [R10.13]  Diagnosis Additional Information: No value filed.    Anesthesia Type:   MAC     Note:    Oropharynx: oropharynx clear of all foreign objects and spontaneously breathing  Level of Consciousness: drowsy  Oxygen Supplementation: face mask    Independent Airway: airway patency satisfactory and stable  Dentition: dentition unchanged  Vital Signs Stable: post-procedure vital signs reviewed and stable  Report to RN Given: handoff report given  Patient transferred to: Phase II    Handoff Report: Identifed the Patient, Identified the Reponsible Provider, Reviewed the pertinent medical history, Discussed the surgical course, Reviewed Intra-OP anesthesia mangement and issues during anesthesia, Set expectations for post-procedure period and Allowed opportunity for questions and acknowledgement of understanding      Vitals: (Last set prior to Anesthesia Care Transfer)  CRNA VITALS  2/18/2021 0719 - 2/18/2021 0753      2/18/2021             SpO2:  97 %    Resp Rate (observed):  13        Electronically Signed By: LAURI Torres CRNA  February 18, 2021  7:53 AM

## 2021-02-18 NOTE — ANESTHESIA PREPROCEDURE EVALUATION
Anesthesia Pre-Procedure Evaluation    Patient: Amelia Coker   MRN: [id number] : 1975        Preoperative Diagnosis: Gastroesophageal reflux disease with esophagitis, unspecified whether hemorrhage [K21.00]  Abdominal pain, epigastric [R10.13]   Procedure : Procedure(s):  ESOPHAGOGASTRODUODENOSCOPY (EGD)     Past Medical History:   Diagnosis Date     Abnormal mammogram 2016    right breast      AD (atopic dermatitis) 10/29/2015     Allergic rhinitis due to other allergen      Arthritis      Bell's palsy      Chronic fatigue 2012     Chronic infection     MRSA -  scalp and prior to Abdomen     Contact dermatitis and other eczema, due to unspecified cause     eczema     Contusion of left foot, initial encounter 3/16/2016     DJD (degenerative joint disease), lumbar 2013     DJD (degenerative joint disease), lumbar 2013     Ds DNA antibody positive 2014    borderline.      Elevated blood pressure reading without diagnosis of hypertension 2013     Facial contusion 12/15/2014    hit by horse      Foraminal stenosis of lumbar region 2013     Frontal headache 12/15/2014     Gastroesophageal reflux disease, esophagitis presence not specified 2016     Heat sensitivity 10/29/2015     HTN, goal below 140/90 2015     Hyperlipidemia LDL goal <130 2017     Irregular heart beat      Keratitis sicca (H) 10/31/2012     Left shoulder pain 2013     Lumbar radiculopathy 2014     Migraine headache 10/23/2013     Migraine, unspecified, with intractable migraine, so stated, without mention of status migrainosus      Motion sickness      MRSA infection 10/20/2015     Muscular wasting and disuse atrophy, 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    right SCM, right wrist,       Numbness and tingling     both legs anf feet greater on the left     PAC (premature atrial contraction) 7/15/2010     Plantar fasciitis 2015     PONV (postoperative nausea and vomiting)      Skin  lesion 10/20/2015    posterior superior scalp      Spasm of muscle 7/11/2017     Telangiectasia of face 12/19/2014     Tooth pain 10/20/2015    left lower primary molar       Past Surgical History:   Procedure Laterality Date     AS INJ TRANSFORAMIN EPIDURAL, LUMB/SACR SINGLE       COLONOSCOPY  3/11/2013    Procedure: COLONOSCOPY;  colonoscopy;  Surgeon: Lobito Mas MD;  [facility]     COLONOSCOPY WITH CO2 INSUFFLATION N/A 11/19/2018    Procedure: COLONOSCOPY WITH CO2 INSUFFLATION;  Surgeon: Goyo Blank MD;  [facility]     DECOMPRESSION LUMBAR TWO LEVELS Bilateral 12/8/2016    Procedure: DECOMPRESSION LUMBAR TWO LEVELS;  Surgeon: Bang Burrell MD;  Location: [facility]     DILATION AND CURETTAGE, HYSTEROSCOPY, ABLATE ENDOMETRIUM NOVASURE, COMBINED N/A 6/12/2019    Procedure: hysteroscopy, dilation & curettage, polypectomy, endometrial ablation;  Surgeon: Fredy Pham MD;  [facility]     ESOPHAGOSCOPY, GASTROSCOPY, DUODENOSCOPY (EGD), COMBINED  11/8/2013    Procedure: COMBINED ESOPHAGOSCOPY, GASTROSCOPY, DUODENOSCOPY (EGD), BIOPSY SINGLE OR MULTIPLE;  Esophagoscopy, Gastroscopy, Duodenoscopy EGD with multiple biopsies;  Surgeon: Teja Koch MD;  [facility]     HC REMOVAL OF TONSILS,<13 Y/O      Tonsils <10y.o.     HC TOOTH EXTRACTION W/FORCEP       INJECT BLOCK MEDIAL BRANCH CERVICAL/THORACIC/LUMBAR N/A 7/23/2020    Procedure: Sacral 1,2,3 Lateral Branch Blocks and lumbar 5 medial branch blocks bilaterally;  Surgeon: Maurisio Goetz MD;  [facility]     INJECT JOINT SACROILIAC Left 9/24/2020    Procedure: Left Lumbar 5 Sacral 1 nerve root injections.;  Surgeon: Maurisio Goetz MD;  [facility]     REPAIR TENDON ELBOW  7/9/2014    Procedure: REPAIR TENDON ELBOW;  Surgeon: Chris Johnston MD;  [facility]     ULTRASONIC REMOVAL SOFT TISSUE (LOCATION) Right 11/21/2018    Procedure: Tenex right foot;  Surgeon: Patel Martínez DO;  [facility]      Allergies   Allergen Reactions     Ceftriaxone Anaphylaxis     Hives, rash, racing heart beat     Bactrim [Sulfamethoxazole W/Trimethoprim] Hives     Ciprofloxacin Other (See Comments)     Tendon Issues     Codeine Nausea and Vomiting     Copper      Rash       Doxycycline      Loss of skin pigmentation, skin loss.     Gold      Rash       Iodine-131 Hives     Levaquin [Levofloxacin] Other (See Comments)     Tendon issues with levaquin and cipro     Nickel      rash     Steel [Staples]      Rash from staples       Latex Rash     Penicillins Rash      Social History     Tobacco Use     Smoking status: Never Smoker     Smokeless tobacco: Never Used   Substance Use Topics     Alcohol use: No     Comment: social      Wt Readings from Last 1 Encounters:   12/08/20 95.3 kg (210 lb)        Anesthesia Evaluation   Pt has had prior anesthetic. Type: General and MAC.    History of anesthetic complications  - PONV.      ROS/MED HX  ENT/Pulmonary:  - neg pulmonary ROS     Neurologic: Comment: Henry palsy     (+) migraines,     Cardiovascular:     (+) Dyslipidemia hypertension-----Irregular Heartbeat/Palpitations,     METS/Exercise Tolerance:     Hematologic:  - neg hematologic  ROS     Musculoskeletal: Comment: Lumbar radiculopathy, DJD      GI/Hepatic:     (+) GERD, Symptomatic,     Renal/Genitourinary:  - neg Renal ROS     Endo:     (+) Obesity,     Psychiatric/Substance Use:       Infectious Disease:  - neg infectious disease ROS     Malignancy:  - neg malignancy ROS     Other:  - neg other ROS          Physical Exam    Airway  airway exam normal           Respiratory Devices and Support         Dental           Cardiovascular   cardiovascular exam normal          Pulmonary   pulmonary exam normal                OUTSIDE LABS:  CBC:   Lab Results   Component Value Date    WBC 5.2 07/27/2020    WBC 5.5 04/17/2020    HGB 11.8 07/27/2020    HGB 12.7 04/17/2020    HCT 35.3 07/27/2020    HCT 38.4 04/17/2020      07/27/2020     04/17/2020     BMP:   Lab Results   Component Value Date     11/05/2020     07/27/2020    POTASSIUM 3.8 11/05/2020    POTASSIUM 3.9 07/27/2020    CHLORIDE 106 11/05/2020    CHLORIDE 105 07/27/2020    CO2 28 11/05/2020    CO2 27 07/27/2020    BUN 19 11/05/2020    BUN 23 07/27/2020    CR 0.73 11/05/2020    CR 0.58 07/27/2020    GLC 98 11/05/2020    GLC 93 07/27/2020     COAGS:   Lab Results   Component Value Date    INR 1.0 07/14/2014     POC:   Lab Results   Component Value Date    HCG Negative 11/05/2020     HEPATIC:   Lab Results   Component Value Date    ALBUMIN 4.3 11/05/2020    PROTTOTAL 8.0 11/05/2020    ALT 32 11/05/2020    AST 18 11/05/2020    ALKPHOS 54 11/05/2020    BILITOTAL 0.7 11/05/2020    BILIDIRECT 0 04/04/2003     OTHER:   Lab Results   Component Value Date    PH 5.0 04/04/2003    A1C 5.5 06/08/2016    JELENA 9.5 11/05/2020    PHOS 3.0 11/19/2014    MAG 2.1 06/08/2016    LIPASE 180 08/06/2018    AMYLASE 51 08/06/2018    TSH 1.03 07/27/2020    T4 0.92 10/07/2019    T3 90 10/07/2019    CRP <2.9 03/27/2018    SED 9 08/29/2013       Anesthesia Plan    ASA Status:  2   NPO Status:  NPO Appropriate    Anesthesia Type: MAC.     - Reason for MAC: Difficulty with conscious sedation (QS), Deep or markedly invasive procedure (G8)   Induction: Intravenous, Propofol.   Maintenance: TIVA.        Consents    Anesthesia Plan(s) and associated risks, benefits, and realistic alternatives discussed. Questions answered and patient/representative(s) expressed understanding.     - Discussed with:  Patient    Use of blood products discussed: No .     Postoperative Care            Comments:    H and P by Dr Ba prior to anesthetic            LAURI Torres CRNA

## 2021-02-18 NOTE — LETTER
Amelia Coker  [address] [place] [id number]  February 19, 2021    Dear Amelia,   This letter is to inform you of the results of your pathology report on your upper endoscopy (EGD).    Your pathology report was:  SPECIMEN(S):   A: Duodenal biopsy   B: Stomach body biopsy     FINAL DIAGNOSIS:   A. Duodenal biopsy:   - Duodenal mucosa with no diagnostic alterations.     B. Stomach, body, biopsy   - Gastric body-type mucosa with mild edema, congestion and reactive   changes.   - No evidence of inflammation, intestinal metaplasia, dysplasia or   malignancy   - No Helicobacter pylori identified.   Showed findings consistent with a mildly inflamed stomach. If you continue to have symptoms please follow up with your primary care doctor or with myself.    If you have any questions or concerns please do not hesitate to call my office at [phone].  Sincerely,     [facility],   [facility]

## 2021-02-19 DIAGNOSIS — K21.00 GASTROESOPHAGEAL REFLUX DISEASE WITH ESOPHAGITIS, UNSPECIFIED WHETHER HEMORRHAGE: ICD-10-CM

## 2021-02-19 DIAGNOSIS — I78.1 TELANGIECTASIA OF FACE: ICD-10-CM

## 2021-02-19 DIAGNOSIS — R23.8 RUDDY COMPLEXION: ICD-10-CM

## 2021-02-19 DIAGNOSIS — R76.8 ANA POSITIVE: ICD-10-CM

## 2021-02-19 DIAGNOSIS — L65.9 HAIR LOSS: ICD-10-CM

## 2021-02-19 DIAGNOSIS — Z86.19 HISTORY OF COLD SORES: ICD-10-CM

## 2021-02-19 DIAGNOSIS — I73.00 RAYNAUD'S PHENOMENON WITHOUT GANGRENE: ICD-10-CM

## 2021-02-19 DIAGNOSIS — R10.13 EPIGASTRIC PAIN: Primary | ICD-10-CM

## 2021-02-19 DIAGNOSIS — L60.9 ABNORMALITY OF NAIL SURFACE: ICD-10-CM

## 2021-02-19 DIAGNOSIS — L20.9 ATOPIC DERMATITIS, UNSPECIFIED TYPE: ICD-10-CM

## 2021-02-19 DIAGNOSIS — L98.9 SCALP LESION: ICD-10-CM

## 2021-02-19 LAB — COPATH REPORT: NORMAL

## 2021-02-22 ENCOUNTER — HOSPITAL ENCOUNTER (OUTPATIENT)
Dept: PHYSICAL THERAPY | Facility: CLINIC | Age: 46
Setting detail: THERAPIES SERIES
End: 2021-02-22
Attending: PHYSICIAN ASSISTANT
Payer: COMMERCIAL

## 2021-02-22 PROCEDURE — 97110 THERAPEUTIC EXERCISES: CPT | Mod: GP | Performed by: PHYSICAL THERAPIST

## 2021-02-22 PROCEDURE — 97140 MANUAL THERAPY 1/> REGIONS: CPT | Mod: GP | Performed by: PHYSICAL THERAPIST

## 2021-02-25 ENCOUNTER — MYC MEDICAL ADVICE (OUTPATIENT)
Dept: FAMILY MEDICINE | Facility: OTHER | Age: 46
End: 2021-02-25

## 2021-03-01 ENCOUNTER — HOSPITAL ENCOUNTER (OUTPATIENT)
Dept: PHYSICAL THERAPY | Facility: CLINIC | Age: 46
Setting detail: THERAPIES SERIES
End: 2021-03-01
Attending: PHYSICIAN ASSISTANT
Payer: COMMERCIAL

## 2021-03-01 PROCEDURE — 97140 MANUAL THERAPY 1/> REGIONS: CPT | Mod: GP | Performed by: PHYSICAL THERAPIST

## 2021-03-01 PROCEDURE — 97530 THERAPEUTIC ACTIVITIES: CPT | Mod: GP | Performed by: PHYSICAL THERAPIST

## 2021-03-02 NOTE — PROGRESS NOTES
Outpatient Physical Therapy Discharge Note     Patient: Amelia Coker  : 1975    Beginning/End Dates of Reporting Period:  20 to 10/21/20    Referring Provider: Stacie Mock MD    Therapy Diagnosis: thoracic outlet     Client Self Report: less ache in the right UE, tender at the base of the head.Patient went in for a breast reduction therefore discontinue from PT    Objective Measurements:  Objective Measure: % of day has tingling/numbness right UE  Details: constant but less intense.       Goals:  Goals not met when last seen    Plan:  Discharge from therapy.    Discharge:    Reason for Discharge: Patient going in for breast reduction        Discharge Plan: Breast reduction    Thank you for the referral,            Ashleigh Martínez PT

## 2021-03-03 ENCOUNTER — OFFICE VISIT (OUTPATIENT)
Dept: ALLERGY | Facility: OTHER | Age: 46
End: 2021-03-03
Attending: PHYSICIAN ASSISTANT
Payer: COMMERCIAL

## 2021-03-03 VITALS
HEIGHT: 65 IN | BODY MASS INDEX: 34.99 KG/M2 | OXYGEN SATURATION: 95 % | SYSTOLIC BLOOD PRESSURE: 116 MMHG | DIASTOLIC BLOOD PRESSURE: 78 MMHG | WEIGHT: 210 LBS | HEART RATE: 87 BPM

## 2021-03-03 DIAGNOSIS — L30.9 DERMATITIS: ICD-10-CM

## 2021-03-03 DIAGNOSIS — M79.18 MUSCULOSKELETAL PAIN: Primary | ICD-10-CM

## 2021-03-03 DIAGNOSIS — R10.13 ABDOMINAL PAIN, EPIGASTRIC: ICD-10-CM

## 2021-03-03 DIAGNOSIS — R53.83 FATIGUE, UNSPECIFIED TYPE: ICD-10-CM

## 2021-03-03 PROCEDURE — 82784 ASSAY IGA/IGD/IGG/IGM EACH: CPT | Performed by: ALLERGY & IMMUNOLOGY

## 2021-03-03 PROCEDURE — 86256 FLUORESCENT ANTIBODY TITER: CPT | Mod: 90 | Performed by: ALLERGY & IMMUNOLOGY

## 2021-03-03 PROCEDURE — 83520 IMMUNOASSAY QUANT NOS NONAB: CPT | Performed by: ALLERGY & IMMUNOLOGY

## 2021-03-03 PROCEDURE — 36415 COLL VENOUS BLD VENIPUNCTURE: CPT | Performed by: ALLERGY & IMMUNOLOGY

## 2021-03-03 PROCEDURE — 86003 ALLG SPEC IGE CRUDE XTRC EA: CPT | Mod: 90 | Performed by: ALLERGY & IMMUNOLOGY

## 2021-03-03 PROCEDURE — 81376 HLA II TYPING 1 LOCUS LR: CPT | Performed by: ALLERGY & IMMUNOLOGY

## 2021-03-03 PROCEDURE — 99000 SPECIMEN HANDLING OFFICE-LAB: CPT | Performed by: ALLERGY & IMMUNOLOGY

## 2021-03-03 PROCEDURE — 99243 OFF/OP CNSLTJ NEW/EST LOW 30: CPT | Performed by: ALLERGY & IMMUNOLOGY

## 2021-03-03 PROCEDURE — 83516 IMMUNOASSAY NONANTIBODY: CPT | Performed by: ALLERGY & IMMUNOLOGY

## 2021-03-03 ASSESSMENT — MIFFLIN-ST. JEOR: SCORE: 1583.43

## 2021-03-03 NOTE — ASSESSMENT & PLAN NOTE
Patient endorses symptoms suggestive of contact dermatitis.  This has occurred with metals.    -Referral to Dr. Barrett for contact dermatitis evaluation.

## 2021-03-03 NOTE — ASSESSMENT & PLAN NOTE
Diffuse musculoskeletal pain, fatigue, reaction to numerous antibiotics and topical products.  Chronic abdominal discomfort with intermittent diarrhea.    -Serum tryptase.  -Celiac panel.  -Discussed with patient that I do not think her symptoms are secondary to food allergies.  Celiac panel was obtained secondary to associated abdominal discomfort.  She did endorse some abdominal discomfort, skin rash and diarrhea after eating pork.  Therefore pork IgE was sent as well.

## 2021-03-03 NOTE — PATIENT INSTRUCTIONS
Allergy Staff Appt Hours Shot Hours Locations    Physician     Ahmet Johnson DO       Support Staff     INDIGO Jaeger CMA  Tuesday:        [place] 7-5 Wednesday:        [place]: 7-5 Thursday:                    [place] 7-6     Friday:  [place]  7-2   [place]        Thursday: 8-5:20        Friday: 7-12     [place]        Tuesday: 7- 3:20 Wednesday: 7-4:20     [place] Monday: 7-2:20 Tuesday: 9-5:20         [facility]  [address] [place] [address]  Appt Line: [phone]  Allergy RN:  [phone]    [facility]  [address] [place] [address]  Appt Line: [phone]  Allergy RN:  [phone]       Important Scheduling Information  Aspirin Desensitization: Appt will last 2 clinic days. Please call the Allergy RN line for your clinic to schedule. Discontinue antihistamines 7 days prior to the appointment.     Food Challenges: Appt will last 3-4 hours. Please call the Allergy RN line for your clinic to schedule. Discontinue antihistamines 7 days prior to the appointment.     Penicillin Testing: Appt will last 2-3 hours. Please call the Allergy RN line for your clinic to schedule. Discontinue antihistamines 7 days prior to the appointment.     Skin Testing: Appt will about 40 minutes. Call the appointment line for your clinic to schedule. Discontinue antihistamines 7 days prior to the appointment.     Venom Testing: Appt will last 2-3 hours. Please call the Allergy RN line for your clinic to schedule. Discontinue antihistamines 7 days prior to the appointment.     Thank you for trusting us with your Allergy, Asthma, and Immunology care. Please feel free to contact us with any questions or concerns you may have.      - Blood testing today.   - See Pio Young with [facility]

## 2021-03-03 NOTE — PROGRESS NOTES
Amelia Coker is a 46 year old White female with previous medical history significant for contact dermatitis, abdominal pain, musculoskeletal pain and allergic rhinitis. Amelia Coker is being seen today for evaluation of seasonal allergies. The patient is being seen in consultation at the request of Otoniel Nelson PA-C.       The patient reports for years having erythematous, pruritic rashes after exposure to metals.  This could occur with metal from her eyeglasses touching her skin or from a button on her jeans.  She does follow with dermatology.  She has never had patch testing.  She may be in need of surgical metal in the near future and she would like this further evaluated.    She reports that she has had abdominal discomfort.  Feels like this is worse after consuming wheat products.  Otherwise has occurred after numerous other foods that she has been unable to reproducibly ascertain which foods cause symptoms.  She additionally has diffuse musculoskeletal pain and joint pain.  She is concerned that foods may be contributing to the symptoms.  She denies any IgE mediated food allergy symptoms otherwise.  Potentially made worse with pork.    ENVIRONMENTAL HISTORY: [place] The home is heated with a forced air. They do have central air conditioning. The patient's bedroom is furnished with carpeting in bedroom and fabric window coverings.  Pets inside the house include 1 dog(s), outdoor cats/chickens/horses There is history of cockroach or mice infestation. There is/are 0 smokers in the house.  The house does not have a damp basement.       Past Medical History:   Diagnosis Date     Abnormal mammogram 9/21/2016    right breast      AD (atopic dermatitis) 10/29/2015     Allergic rhinitis due to other allergen      Arthritis      Bell's palsy      Chronic fatigue 6/6/2012     Chronic infection     MRSA - 2015 scalp and prior to Abdomen     Contact dermatitis and other  eczema, due to unspecified cause     eczema     Contusion of left foot, initial encounter 3/16/2016     DJD (degenerative joint disease), lumbar 9/26/2013     DJD (degenerative joint disease), lumbar 9/26/2013     Ds DNA antibody positive 4/16/2014    borderline.      Elevated blood pressure reading without diagnosis of hypertension 12/24/2013     Facial contusion 12/15/2014    hit by horse      Foraminal stenosis of lumbar region 9/26/2013     Frontal headache 12/15/2014     Gastroesophageal reflux disease, esophagitis presence not specified 6/29/2016     Heat sensitivity 10/29/2015     HTN, goal below 140/90 9/23/2015     Hyperlipidemia LDL goal <130 8/30/2017     Irregular heart beat      Keratitis sicca (H) 10/31/2012     Left shoulder pain 9/26/2013     Lumbar radiculopathy 1/22/2014     Migraine headache 10/23/2013     Migraine, unspecified, with intractable migraine, so stated, without mention of status migrainosus      Motion sickness      MRSA infection 10/20/2015     Muscular wasting and disuse atrophy, not elsewhere classified 6/9/2014    right SCM, right wrist,       Numbness and tingling     both legs anf feet greater on the left     PAC (premature atrial contraction) 7/15/2010     Plantar fasciitis 9/23/2015     PONV (postoperative nausea and vomiting)      Skin lesion 10/20/2015    posterior superior scalp      Spasm of muscle 7/11/2017     Telangiectasia of face 12/19/2014     Tooth pain 10/20/2015    left lower primary molar      Family History   Problem Relation Age of Onset     Diabetes Mother         adult     Cancer - colorectal Mother      Hypertension Mother      Allergies Mother      Cancer Mother         colon     Depression Mother      Gastrointestinal Disease Mother         ibs     Genitourinary Problems Mother         kidney cyst     Gynecology Mother         endometriosis     Lipids Mother      Thyroid Disease Mother      Arthritis Mother         RA     Heart Disease Maternal Grandfather          MI,  - 60's     Allergies Father      Unknown/Adopted Father      Heart Disease Father         mi-age mid 40's     Cardiovascular Father      Lipids Father      Respiratory Father         asthma     Cancer Father      Other Cancer Father         renal cancer     Depression Sister      Allergies Sister      Cancer Sister         vaginal     Gynecology Sister         endometriosis     Lipids Paternal Grandmother      Osteoporosis Paternal Grandmother      Thyroid Disease Paternal Grandmother      Respiratory Son         asthma     Allergies Son      Arthritis Sister      Allergies Brother      Heart Disease Brother      Allergies Daughter      Blood Disease Paternal Aunt         LGL T cell Leukemia     Rheumatoid Arthritis Paternal Aunt      Kidney Disease Maternal Aunt      Lupus Maternal Aunt      Bladder Cancer Maternal Aunt      Past Surgical History:   Procedure Laterality Date     AS INJ TRANSFORAMIN EPIDURAL, LUMB/SACR SINGLE       COLONOSCOPY  3/11/2013    Procedure: COLONOSCOPY;  colonoscopy;  Surgeon: Lobito Mas MD;  [facility]     COLONOSCOPY WITH CO2 INSUFFLATION N/A 2018    Procedure: COLONOSCOPY WITH CO2 INSUFFLATION;  Surgeon: Goyo Blank MD;  [facility]     DECOMPRESSION LUMBAR TWO LEVELS Bilateral 2016    Procedure: DECOMPRESSION LUMBAR TWO LEVELS;  Surgeon: Bang Burrell MD;  [facility]     DILATION AND CURETTAGE, HYSTEROSCOPY, ABLATE ENDOMETRIUM NOVASURE, COMBINED N/A 2019    Procedure: hysteroscopy, dilation & curettage, polypectomy, endometrial ablation;  Surgeon: Fredy Pham MD;  [facility]     ESOPHAGOSCOPY, GASTROSCOPY, DUODENOSCOPY (EGD), COMBINED  2013    Procedure: COMBINED ESOPHAGOSCOPY, GASTROSCOPY, DUODENOSCOPY (EGD), BIOPSY SINGLE OR MULTIPLE;  Esophagoscopy, Gastroscopy, Duodenoscopy EGD with multiple biopsies;  Surgeon: Teja Koch MD;  [facility]     ESOPHAGOSCOPY,  GASTROSCOPY, DUODENOSCOPY (EGD), COMBINED N/A 2/18/2021    Procedure: ESOPHAGOGASTRODUODENOSCOPY, WITH BIOPSY;  Surgeon: Blanca Ba MD;  [facility]     HC REMOVAL OF TONSILS,<11 Y/O      Tonsils <10y.o.     HC TOOTH EXTRACTION W/FORCEP       INJECT BLOCK MEDIAL BRANCH CERVICAL/THORACIC/LUMBAR N/A 7/23/2020    Procedure: Sacral 1,2,3 Lateral Branch Blocks and lumbar 5 medial branch blocks bilaterally;  Surgeon: Maurisio Goetz MD;  [facility]     INJECT JOINT SACROILIAC Left 9/24/2020    Procedure: Left Lumbar 5 Sacral 1 nerve root injections.;  Surgeon: Maurisio Goetz MD;  [facility]     REPAIR TENDON ELBOW  7/9/2014    Procedure: REPAIR TENDON ELBOW;  Surgeon: Chris Johnston MD;  [facility]     ULTRASONIC REMOVAL SOFT TISSUE (LOCATION) Right 11/21/2018    Procedure: Tenex right foot;  Surgeon: Patel Martínez DO;  Location: [facility]       REVIEW OF SYSTEMS:  General: negative for weight gain. negative for weight loss. negative for changes in sleep.   Ears: negative for fullness. negative for hearing loss. negative for dizziness.   Nose: negative for snoring.negative for changes in smell. negative for drainage.   Eyes: negative for eye watering. negative for eye itching. negative for vision changes. negative for eye redness.  Throat: negative for hoarseness. negative for sore throat. negative for trouble swallowing.   Lungs: negative for shortness of breath.negative for wheezing. negative for sputum production.   Cardiovascular: negative for chest pain. negative for swelling of ankles. negative for fast or irregular heartbeat.   Gastrointestinal: negative for nausea. negative for heartburn. negative for acid reflux.   Musculoskeletal: negative for joint pain. negative for joint stiffness. negative for joint swelling.   Neurologic: negative for seizures. negative for fainting. negative for weakness.   Psychiatric: negative for changes in mood. negative for anxiety.   Endocrine: negative  for cold intolerance. negative for heat intolerance. negative for tremors.   Lymphatic: negative for lower extremity swelling. negative for lymph node swelling.   Hematologic: negative for easy bruising. negative for easy bleeding.  Integumentary: negative for rash. negative for scaling. negative for nail changes.       Current Outpatient Medications:      Acetaminophen (TYLENOL PO), Take 1,000 mg by mouth every 8 hours as needed , Disp: , Rfl:      clobetasol (TEMOVATE) 0.05 % external cream, Apply topically 2 times daily, Disp: 60 g, Rfl: 1     fexofenadine (ALLEGRA) 180 MG tablet, Take 1 tablet by mouth daily Reported on 4/5/2017, Disp: , Rfl:      meloxicam (MOBIC) 15 MG tablet, TAKE ONE TABLET BY MOUTH ONCE DAILY, Disp: 90 tablet, Rfl: 2     mupirocin (BACTROBAN) 2 % external ointment, Apply topically 3 times daily, Disp: 30 g, Rfl: 0     rizatriptan (MAXALT-MLT) 5 MG ODT, Take 1 tablet (5 mg) by mouth at onset of headache for migraine May repeat in 2 hours. Max 6 tablets/24 hours., Disp: 30 tablet, Rfl: 1     sucralfate (CARAFATE) 1 GM tablet, Take 1 tablet (1 g) by mouth 4 times daily, Disp: 32 tablet, Rfl: 0     sucralfate (CARAFATE) 1 GM tablet, Take 1 tablet (1 g) by mouth 4 times daily, Disp: 40 tablet, Rfl: 0  Immunization History   Administered Date(s) Administered     Influenza Vaccine IM > 6 months Valent IIV4 10/22/2013     TD (ADULT, 7+) 06/28/2002, 06/08/2018     TDAP Vaccine (Adacel) 04/24/2008, 05/14/2013     Allergies   Allergen Reactions     Ceftriaxone Anaphylaxis     Hives, rash, racing heart beat     Bactrim [Sulfamethoxazole W/Trimethoprim] Hives     Ciprofloxacin Other (See Comments)     Tendon Issues     Codeine Nausea and Vomiting     Copper      Rash       Doxycycline      Loss of skin pigmentation, skin loss.     Gold      Rash       Iodine-131 Hives     Levaquin [Levofloxacin] Other (See Comments)     Tendon issues with levaquin and cipro     Nickel      rash     Steel [[place]]       Rash from staples       Latex Rash     Penicillins Rash         EXAM:   Constitutional:  Appears well-developed and well-nourished. No distress.   HEENT:   Head: Normocephalic.   Nasal tissue pink and normal appearing.  No rhinorrhea noted.    Eyes: Conjunctivae are non-erythematous   Cardiovascular: Normal rate, regular rhythm and normal heart sounds. Exam reveals no gallop and no friction rub.   No murmur heard.  Respiratory: Effort normal and breath sounds normal. No respiratory distress. No wheezes. No rales.   Musculoskeletal: Normal range of motion.   Neuro: Oriented to person, place, and time.  Skin: Skin is warm and dry. No rash noted.   Psychiatric: Normal mood and affect.     Nursing note and vitals reviewed.    ASSESSMENT/PLAN:  Problem List Items Addressed This Visit        Nervous and Auditory    Abdominal pain, epigastric     Celiac panel.          Relevant Orders    Tryptase (Completed)    IgA [LAB73] (Completed)    Tissue transglutaminase sandee IgA and IgG [[id number]] (Completed)    Endomysial Antibody IgA by IFA [[id number]] (Completed)    HLA DQB1 for Celiac Disease [[id number]] (Completed)    Musculoskeletal pain - Primary     Diffuse musculoskeletal pain, fatigue, reaction to numerous antibiotics and topical products.  Chronic abdominal discomfort with intermittent diarrhea.    -Serum tryptase.  -Celiac panel.  -Discussed with patient that I do not think her symptoms are secondary to food allergies.  Celiac panel was obtained secondary to associated abdominal discomfort.  She did endorse some abdominal discomfort, skin rash and diarrhea after eating pork.  Therefore pork IgE was sent as well.         Relevant Orders    Tryptase (Completed)       Musculoskeletal and Integumentary    Dermatitis     Patient endorses symptoms suggestive of contact dermatitis.  This has occurred with metals.    -Referral to Dr. Barrett for contact dermatitis evaluation.          Relevant Orders    DERMATOLOGY ADULT REFERRAL        Other    Fatigue, unspecified type    Relevant Orders    Tryptase (Completed)    Allergen Pork (Completed)          Chart documentation with Dragon Voice recognition Software. Although reviewed after completion, some words and grammatical errors may remain.    Ahmet Johnson DO [id number]  Medical Director for Allergy/Immunology at [place], [place]

## 2021-03-03 NOTE — LETTER
3/3/2021         RE: Amelia Coker  [address] [facility] [facility] [address]        Dear Colleague,    Thank you for referring your patient, Amelia Coker, to the [facility]. Please see a copy of my visit note below.    Amelia Coker is a 46 year old White female with previous medical history significant for contact dermatitis, abdominal pain, musculoskeletal pain and allergic rhinitis. Amelia Coker is being seen today for evaluation of seasonal allergies. The patient is being seen in consultation at the request of Otoniel Nelson PA-C.       The patient reports for years having erythematous, pruritic rashes after exposure to metals.  This could occur with metal from her eyeglasses touching her skin or from a button on her jeans.  She does follow with dermatology.  She has never had patch testing.  She may be in need of surgical metal in the near future and she would like this further evaluated.    She reports that she has had abdominal discomfort.  Feels like this is worse after consuming wheat products.  Otherwise has occurred after numerous other foods that she has been unable to reproducibly ascertain which foods cause symptoms.  She additionally has diffuse musculoskeletal pain and joint pain.  She is concerned that foods may be contributing to the symptoms.  She denies any IgE mediated food allergy symptoms otherwise.  Potentially made worse with pork.    ENVIRONMENTAL HISTORY: [place] The home is heated with a forced air. They do have central air conditioning. The patient's bedroom is furnished with carpeting in bedroom and fabric window coverings.  Pets inside the house include 1 dog(s), outdoor cats/chickens/horses There is history of cockroach or mice infestation. There is/are 0 smokers in the house.  The house does not have a damp basement.       Past Medical History:   Diagnosis Date     Abnormal mammogram 9/21/2016    right breast       AD (atopic dermatitis) 10/29/2015     Allergic rhinitis due to other allergen      Arthritis      Bell's palsy      Chronic fatigue 6/6/2012     Chronic infection     MRSA - 2015 scalp and prior to Abdomen     Contact dermatitis and other eczema, due to unspecified cause     eczema     Contusion of left foot, initial encounter 3/16/2016     DJD (degenerative joint disease), lumbar 9/26/2013     DJD (degenerative joint disease), lumbar 9/26/2013     Ds DNA antibody positive 4/16/2014    borderline.      Elevated blood pressure reading without diagnosis of hypertension 12/24/2013     Facial contusion 12/15/2014    hit by horse      Foraminal stenosis of lumbar region 9/26/2013     Frontal headache 12/15/2014     Gastroesophageal reflux disease, esophagitis presence not specified 6/29/2016     Heat sensitivity 10/29/2015     HTN, goal below 140/90 9/23/2015     Hyperlipidemia LDL goal <130 8/30/2017     Irregular heart beat      Keratitis sicca (H) 10/31/2012     Left shoulder pain 9/26/2013     Lumbar radiculopathy 1/22/2014     Migraine headache 10/23/2013     Migraine, unspecified, with intractable migraine, so stated, without mention of status migrainosus      Motion sickness      MRSA infection 10/20/2015     Muscular wasting and disuse atrophy, not elsewhere classified 6/9/2014    right SCM, right wrist,       Numbness and tingling     both legs anf feet greater on the left     PAC (premature atrial contraction) 7/15/2010     Plantar fasciitis 9/23/2015     PONV (postoperative nausea and vomiting)      Skin lesion 10/20/2015    posterior superior scalp      Spasm of muscle 7/11/2017     Telangiectasia of face 12/19/2014     Tooth pain 10/20/2015    left lower primary molar      Family History   Problem Relation Age of Onset     Diabetes Mother         adult     Cancer - colorectal Mother      Hypertension Mother      Allergies Mother      Cancer Mother         colon     Depression Mother      Gastrointestinal  Disease Mother         ibs     Genitourinary Problems Mother         kidney cyst     Gynecology Mother         endometriosis     Lipids Mother      Thyroid Disease Mother      Arthritis Mother         RA     Heart Disease Maternal Grandfather         MI,  - 60's     Allergies Father      Unknown/Adopted Father      Heart Disease Father         mi-age mid 40's     Cardiovascular Father      Lipids Father      Respiratory Father         asthma     Cancer Father      Other Cancer Father         renal cancer     Depression Sister      Allergies Sister      Cancer Sister         vaginal     Gynecology Sister         endometriosis     Lipids Paternal Grandmother      Osteoporosis Paternal Grandmother      Thyroid Disease Paternal Grandmother      Respiratory Son         asthma     Allergies Son      Arthritis Sister      Allergies Brother      Heart Disease Brother      Allergies Daughter      Blood Disease Paternal Aunt         LGL T cell Leukemia     Rheumatoid Arthritis Paternal Aunt      Kidney Disease Maternal Aunt      Lupus Maternal Aunt      Bladder Cancer Maternal Aunt      Past Surgical History:   Procedure Laterality Date     AS INJ TRANSFORAMIN EPIDURAL, LUMB/SACR SINGLE       COLONOSCOPY  3/11/2013    Procedure: COLONOSCOPY;  colonoscopy;  Surgeon: Lobito Mas MD;  Location: [facility]     COLONOSCOPY WITH CO2 INSUFFLATION N/A 2018    Procedure: COLONOSCOPY WITH CO2 INSUFFLATION;  Surgeon: Goyo Blank MD;  Location: [facility]     DECOMPRESSION LUMBAR TWO LEVELS Bilateral 2016    Procedure: DECOMPRESSION LUMBAR TWO LEVELS;  Surgeon: Bang Burrell MD;  Location: [facility]     DILATION AND CURETTAGE, HYSTEROSCOPY, ABLATE ENDOMETRIUM NOVASURE, COMBINED N/A 2019    Procedure: hysteroscopy, dilation & curettage, polypectomy, endometrial ablation;  Surgeon: Fredy Pham MD;  Location: [facility]     ESOPHAGOSCOPY, GASTROSCOPY, DUODENOSCOPY (EGD), COMBINED  2013     Procedure: COMBINED ESOPHAGOSCOPY, GASTROSCOPY, DUODENOSCOPY (EGD), BIOPSY SINGLE OR MULTIPLE;  Esophagoscopy, Gastroscopy, Duodenoscopy EGD with multiple biopsies;  Surgeon: Teja Koch MD;  [facility]     ESOPHAGOSCOPY, GASTROSCOPY, DUODENOSCOPY (EGD), COMBINED N/A 2/18/2021    Procedure: ESOPHAGOGASTRODUODENOSCOPY, WITH BIOPSY;  Surgeon: Blanca Ba MD;  Location: [facility]     HC REMOVAL OF TONSILS,<11 Y/O      Tonsils <10y.o.     HC TOOTH EXTRACTION W/FORCEP       INJECT BLOCK MEDIAL BRANCH CERVICAL/THORACIC/LUMBAR N/A 7/23/2020    Procedure: Sacral 1,2,3 Lateral Branch Blocks and lumbar 5 medial branch blocks bilaterally;  Surgeon: Maurisio Goetz MD;  [facility]     INJECT JOINT SACROILIAC Left 9/24/2020    Procedure: Left Lumbar 5 Sacral 1 nerve root injections.;  Surgeon: Maurisio Goetz MD;  [facility]     REPAIR TENDON ELBOW  7/9/2014    Procedure: REPAIR TENDON ELBOW;  Surgeon: Chris Johnston MD;  Location: [facility]     ULTRASONIC REMOVAL SOFT TISSUE (LOCATION) Right 11/21/2018    Procedure: Tenex right foot;  Surgeon: Patel Martínez DO;  Location: [facility]       REVIEW OF SYSTEMS:  General: negative for weight gain. negative for weight loss. negative for changes in sleep.   Ears: negative for fullness. negative for hearing loss. negative for dizziness.   Nose: negative for snoring.negative for changes in smell. negative for drainage.   Eyes: negative for eye watering. negative for eye itching. negative for vision changes. negative for eye redness.  Throat: negative for hoarseness. negative for sore throat. negative for trouble swallowing.   Lungs: negative for shortness of breath.negative for wheezing. negative for sputum production.   Cardiovascular: negative for chest pain. negative for swelling of ankles. negative for fast or irregular heartbeat.   Gastrointestinal: negative for nausea. negative for heartburn. negative for acid reflux.   Musculoskeletal: negative for joint  pain. negative for joint stiffness. negative for joint swelling.   Neurologic: negative for seizures. negative for fainting. negative for weakness.   Psychiatric: negative for changes in mood. negative for anxiety.   Endocrine: negative for cold intolerance. negative for heat intolerance. negative for tremors.   Lymphatic: negative for lower extremity swelling. negative for lymph node swelling.   Hematologic: negative for easy bruising. negative for easy bleeding.  Integumentary: negative for rash. negative for scaling. negative for nail changes.       Current Outpatient Medications:      Acetaminophen (TYLENOL PO), Take 1,000 mg by mouth every 8 hours as needed , Disp: , Rfl:      clobetasol (TEMOVATE) 0.05 % external cream, Apply topically 2 times daily, Disp: 60 g, Rfl: 1     fexofenadine (ALLEGRA) 180 MG tablet, Take 1 tablet by mouth daily Reported on 4/5/2017, Disp: , Rfl:      meloxicam (MOBIC) 15 MG tablet, TAKE ONE TABLET BY MOUTH ONCE DAILY, Disp: 90 tablet, Rfl: 2     mupirocin (BACTROBAN) 2 % external ointment, Apply topically 3 times daily, Disp: 30 g, Rfl: 0     rizatriptan (MAXALT-MLT) 5 MG ODT, Take 1 tablet (5 mg) by mouth at onset of headache for migraine May repeat in 2 hours. Max 6 tablets/24 hours., Disp: 30 tablet, Rfl: 1     sucralfate (CARAFATE) 1 GM tablet, Take 1 tablet (1 g) by mouth 4 times daily, Disp: 32 tablet, Rfl: 0     sucralfate (CARAFATE) 1 GM tablet, Take 1 tablet (1 g) by mouth 4 times daily, Disp: 40 tablet, Rfl: 0  Immunization History   Administered Date(s) Administered     Influenza Vaccine IM > 6 months Valent IIV4 10/22/2013     TD (ADULT, 7+) 06/28/2002, 06/08/2018     TDAP Vaccine (Adacel) 04/24/2008, 05/14/2013     Allergies   Allergen Reactions     Ceftriaxone Anaphylaxis     Hives, rash, racing heart beat     Bactrim [Sulfamethoxazole W/Trimethoprim] Hives     Ciprofloxacin Other (See Comments)     Tendon Issues     Codeine Nausea and Vomiting     Copper      Rash        Doxycycline      Loss of skin pigmentation, skin loss.     Gold      Rash       Iodine-131 Hives     Levaquin [Levofloxacin] Other (See Comments)     Tendon issues with levaquin and cipro     Nickel      rash     Steel [Staples]      Rash from staples       Latex Rash     Penicillins Rash         EXAM:   Constitutional:  Appears well-developed and well-nourished. No distress.   HEENT:   Head: Normocephalic.   Nasal tissue pink and normal appearing.  No rhinorrhea noted.    Eyes: Conjunctivae are non-erythematous   Cardiovascular: Normal rate, regular rhythm and normal heart sounds. Exam reveals no gallop and no friction rub.   No murmur heard.  Respiratory: Effort normal and breath sounds normal. No respiratory distress. No wheezes. No rales.   Musculoskeletal: Normal range of motion.   Neuro: Oriented to person, place, and time.  Skin: Skin is warm and dry. No rash noted.   Psychiatric: Normal mood and affect.     Nursing note and vitals reviewed.    ASSESSMENT/PLAN:  Problem List Items Addressed This Visit        Nervous and Auditory    Abdominal pain, epigastric     Celiac panel.          Relevant Orders    Tryptase (Completed)    IgA [LAB73] (Completed)    Tissue transglutaminase sandee IgA and IgG [WVL5582] (Completed)    Endomysial Antibody IgA by IFA [CSE9893] (Completed)    HLA DQB1 for Celiac Disease [FUU7892] (Completed)    Musculoskeletal pain - Primary     Diffuse musculoskeletal pain, fatigue, reaction to numerous antibiotics and topical products.  Chronic abdominal discomfort with intermittent diarrhea.    -Serum tryptase.  -Celiac panel.  -Discussed with patient that I do not think her symptoms are secondary to food allergies.  Celiac panel was obtained secondary to associated abdominal discomfort.  She did endorse some abdominal discomfort, skin rash and diarrhea after eating pork.  Therefore pork IgE was sent as well.         Relevant Orders    Tryptase (Completed)       Musculoskeletal and  Integumentary    Dermatitis     Patient endorses symptoms suggestive of contact dermatitis.  This has occurred with metals.    -Referral to Dr. Barrett for contact dermatitis evaluation.          Relevant Orders    DERMATOLOGY ADULT REFERRAL       Other    Fatigue, unspecified type    Relevant Orders    Tryptase (Completed)    Allergen Pork (Completed)          Chart documentation with Dragon Voice recognition Software. Although reviewed after completion, some words and grammatical errors may remain.    Ahmet Johnson DO FAAAAI  Medical Director for Allergy/Immunology at [place], [place]        Again, thank you for allowing me to participate in the care of your patient.        Sincerely,        Ahmet Johnson, DO

## 2021-03-04 LAB — IGA SERPL-MCNC: 84 MG/DL (ref 84–499)

## 2021-03-05 LAB
DEPRECATED MISC ALLERGEN IGE RAST QL: NORMAL
DQA1*: NORMAL
DQA1*LOCUS: NORMAL
DQB1* LOCUS: NORMAL
DQB1*: NORMAL
DRSSO COMMENTS: NORMAL
DRSSO TEST METHOD: NORMAL
ENDOMYSIUM IGA TITR SER IF: NORMAL {TITER}
PORK IGE QN: <0.1 KU/L
TTG IGA SER-ACNC: <1 U/ML
TTG IGG SER-ACNC: <1 U/ML

## 2021-03-08 ENCOUNTER — OFFICE VISIT (OUTPATIENT)
Dept: FAMILY MEDICINE | Facility: OTHER | Age: 46
End: 2021-03-08
Payer: COMMERCIAL

## 2021-03-08 ENCOUNTER — HOSPITAL ENCOUNTER (OUTPATIENT)
Dept: PHYSICAL THERAPY | Facility: CLINIC | Age: 46
Setting detail: THERAPIES SERIES
End: 2021-03-08
Attending: PHYSICIAN ASSISTANT
Payer: COMMERCIAL

## 2021-03-08 VITALS
DIASTOLIC BLOOD PRESSURE: 80 MMHG | SYSTOLIC BLOOD PRESSURE: 126 MMHG | TEMPERATURE: 98.2 F | BODY MASS INDEX: 36.28 KG/M2 | HEART RATE: 82 BPM | OXYGEN SATURATION: 98 % | WEIGHT: 218 LBS

## 2021-03-08 DIAGNOSIS — G54.0 BRACHIAL PLEXOPATHY: ICD-10-CM

## 2021-03-08 DIAGNOSIS — M79.674 GREAT TOE PAIN, RIGHT: Primary | ICD-10-CM

## 2021-03-08 DIAGNOSIS — S16.1XXA ACUTE CERVICAL MYOFASCIAL STRAIN, INITIAL ENCOUNTER: ICD-10-CM

## 2021-03-08 LAB — TRYPTASE SERPL-MCNC: 3.2 UG/L

## 2021-03-08 PROCEDURE — 97530 THERAPEUTIC ACTIVITIES: CPT | Mod: GP | Performed by: PHYSICAL THERAPIST

## 2021-03-08 PROCEDURE — 97140 MANUAL THERAPY 1/> REGIONS: CPT | Mod: GP | Performed by: PHYSICAL THERAPIST

## 2021-03-08 PROCEDURE — 99214 OFFICE O/P EST MOD 30 MIN: CPT | Performed by: PHYSICIAN ASSISTANT

## 2021-03-08 ASSESSMENT — PAIN SCALES - GENERAL: PAINLEVEL: MODERATE PAIN (5)

## 2021-03-08 NOTE — PROGRESS NOTES
Assessment & Plan     Great toe pain, right  Etiology of this is yet to be explained.  She describes it as a throbbing burning pain that been going on for about a week.  No evidence of rashes noted.  There may be a certain amount of Raynaud's phenomenon here or more than likely it is coming from her low back since her L4-L5 L5-S1 region has quite a bit of degenerative changes and other related pathology.  Advised that she monitor this carefully and consider further evaluation in the near future with neurology.    Brachial plexopathy - right shoulder  Acute cervical myofascial strain, initial encounter  Patient has been seen physical therapy with some success but there is concern that she has further radiculopathy that this coming from her cervical spine.    MRI has been requested and I can support this request.  - MR Cervical Spine w/o Contrast; Future    No follow-ups on file.    Otoniel Manuel PA-C  [facility] YVES Roe is a 46 year old who presents for the following health issues     HPI       Follow up from physical therapy, recommend MRI of neck     Right, great toe pain  Onset: 1 week   Description: Painful, denies discoloration, denies swelling     Review of Systems   Constitutional, HEENT, cardiovascular, pulmonary, GI, , musculoskeletal, neuro, skin, endocrine and psych systems are negative, except as otherwise noted.      Objective    /80   Pulse 82   Temp 98.2  F (36.8  C) (Temporal)   Wt 218 lb (98.9 kg)   SpO2 98%   BMI 36.28 kg/m    Body mass index is 36.28 kg/m .  Physical Exam   GENERAL: healthy, alert and no distress  NECK: no adenopathy, no asymmetry, masses, or scars and thyroid normal to palpation, she does have some radicular signs and symptoms if we press on the right lateral spine in the cervical region.  I cannot find the exact region today but she states that the physical therapist did find it during therapy earlier this week.  MS: no gross  musculoskeletal defects noted, no edema  SKIN: no suspicious lesions or rashes to exposed visible skin today.  Slight pallor in this region of the right great toe was noted on exam.  NEURO: Normal strength and tone, mentation intact and speech normal, there is some sensory deficit to the right great toe.  He finds it hard to explain.  PSYCH: mentation appears normal, affect normal/bright

## 2021-03-09 ENCOUNTER — MYC MEDICAL ADVICE (OUTPATIENT)
Dept: FAMILY MEDICINE | Facility: OTHER | Age: 46
End: 2021-03-09

## 2021-03-09 NOTE — TELEPHONE ENCOUNTER
Will route to [facility] to review/write letter if appropriate  Sabiha Lawrence CMA     - - -

## 2021-03-09 NOTE — RESULT ENCOUNTER NOTE
Negative testing for celiac disease. Negative testing for pork. Negative testing for mast cell disease. Thanks.     Dr. Johnson

## 2021-03-09 NOTE — LETTER
[address]  [facility] [address]  [phone]        March 9, 2021  In regards to:  Amelia Coker  [address] [facility] [address]          To whom it may concern,    Please be advised that Amelia Coker had been suffering from long term neck and upper back pain/discomfort.  Her pain had not been relieved with the use of anti-inflammatory medications and muscle relaxers.  In addition she had not improved significantly with physical therapy/and/or chiropractic treatment.  Upon further review of her signs and symptoms I and physical therapy made the recommendation for breast reduction to help with the pain discomfort.    It should be noted that Amelia Coker has had excellent results with breast reduction and removal of extra weight from across the shoulders secondary to breast reduction surgery.    Please reconsider your approach for her cares.    Sincerely,        Otoniel Quiroz, MPAPETE MASTERS

## 2021-03-11 ENCOUNTER — HOSPITAL ENCOUNTER (OUTPATIENT)
Dept: MRI IMAGING | Facility: CLINIC | Age: 46
Discharge: HOME | End: 2021-03-11
Attending: PHYSICIAN ASSISTANT | Admitting: PHYSICIAN ASSISTANT
Payer: COMMERCIAL

## 2021-03-11 ENCOUNTER — MYC MEDICAL ADVICE (OUTPATIENT)
Dept: FAMILY MEDICINE | Facility: OTHER | Age: 46
End: 2021-03-11

## 2021-03-11 DIAGNOSIS — L20.89 OTHER ATOPIC DERMATITIS: Primary | ICD-10-CM

## 2021-03-11 DIAGNOSIS — S16.1XXA ACUTE CERVICAL MYOFASCIAL STRAIN, INITIAL ENCOUNTER: ICD-10-CM

## 2021-03-11 DIAGNOSIS — G54.0 BRACHIAL PLEXOPATHY: ICD-10-CM

## 2021-03-11 PROCEDURE — 72141 MRI NECK SPINE W/O DYE: CPT

## 2021-03-11 RX ORDER — HYDROCORTISONE VALERATE 2 MG/G
OINTMENT TOPICAL 2 TIMES DAILY
Qty: 60 G | Refills: 1 | Status: SHIPPED | OUTPATIENT
Start: 2021-03-11 | End: 2021-05-14

## 2021-03-12 DIAGNOSIS — M54.12 CERVICAL RADICULOPATHY: Primary | ICD-10-CM

## 2021-03-12 DIAGNOSIS — E04.1 THYROID NODULE: Primary | ICD-10-CM

## 2021-03-12 DIAGNOSIS — M50.30 DDD (DEGENERATIVE DISC DISEASE), CERVICAL: ICD-10-CM

## 2021-03-15 ENCOUNTER — HOSPITAL ENCOUNTER (OUTPATIENT)
Dept: PHYSICAL THERAPY | Facility: CLINIC | Age: 46
Setting detail: THERAPIES SERIES
End: 2021-03-15
Attending: PHYSICIAN ASSISTANT
Payer: COMMERCIAL

## 2021-03-15 PROCEDURE — 97110 THERAPEUTIC EXERCISES: CPT | Mod: GP | Performed by: PHYSICAL THERAPIST

## 2021-03-16 ENCOUNTER — TELEPHONE (OUTPATIENT)
Dept: FAMILY MEDICINE | Facility: OTHER | Age: 46
End: 2021-03-16

## 2021-03-16 NOTE — TELEPHONE ENCOUNTER
Patient requested (meloxicam 15mg tablet).  Insurance is requiring try and fail.    Alternative medications include:    Diclofenac NA  Etodolac  Ibuprofen  Indomethacin  Ketoprofen  Nabumetone  Naproxen NA  Piroxicam  Sulindac    Pharmacy - [facility] [phone]    Bhavya Lewis XRO/

## 2021-03-17 NOTE — TELEPHONE ENCOUNTER
I sent her a [employer] message to see which ones she can remember.  Ibuprofen does not work and I think she has had Relafen in the past as well.  Electronically signed:    Otoniel Manuel PA-C

## 2021-03-19 ENCOUNTER — HOSPITAL ENCOUNTER (OUTPATIENT)
Dept: ULTRASOUND IMAGING | Facility: CLINIC | Age: 46
Discharge: HOME | End: 2021-03-19
Attending: PHYSICIAN ASSISTANT | Admitting: PHYSICIAN ASSISTANT
Payer: COMMERCIAL

## 2021-03-19 DIAGNOSIS — E04.1 THYROID NODULE: ICD-10-CM

## 2021-03-19 PROCEDURE — 76536 US EXAM OF HEAD AND NECK: CPT

## 2021-03-22 ENCOUNTER — MYC MEDICAL ADVICE (OUTPATIENT)
Dept: FAMILY MEDICINE | Facility: OTHER | Age: 46
End: 2021-03-22

## 2021-03-22 DIAGNOSIS — R20.0 NUMBNESS AND TINGLING OF RIGHT ARM: Primary | ICD-10-CM

## 2021-03-22 DIAGNOSIS — R20.2 NUMBNESS AND TINGLING OF RIGHT ARM: Primary | ICD-10-CM

## 2021-03-23 NOTE — TELEPHONE ENCOUNTER
Here is the list of RX's I have been given for my ailments.  Meloxicam- by far does better then any of them!!   Tylenol minimal help at best.  Diclofenac minimal if any help  Tordal( works ok, but major gi issues acid reflux)  Ibuprofen (gi issues  2nd best to meloxicam but after a few days stomach gets angry)  Prednisone (works great, hard on acid reflux)  Naproxen(made hands swell minimal pain help)  Others not sure if same as some of those on the list  but names I recall or have on hand  Gabapentin-worthless did nothing  Duloxitine -dizziness eye focus issues  Pregabalin- shut down bowels and affected vision  Celebrex- NO help!  Indomethacin- no help  Voltaren no help        Also have tried lidocaine patches, and creams as well as NSAID creams no help.

## 2021-03-23 NOTE — TELEPHONE ENCOUNTER
Central Prior Authorization Team  Phone: [phone]    PA Initiation    Medication: meloxicam (MOBIC) 15 MG tablet  Insurance Company: [employer] [place] - Phone [phone] Fax [phone]  Pharmacy Filling the Rx: [id number] - [place], [place] - [address] [place] [address]  Filling Pharmacy Phone: [phone]  Filling Pharmacy Fax:    Start Date: 3/23/2021

## 2021-03-24 NOTE — TELEPHONE ENCOUNTER
No pa needed- This medication is covered by the plan without needing a pa. The pharmacy received a paid claim.

## 2021-03-29 ENCOUNTER — MYC MEDICAL ADVICE (OUTPATIENT)
Dept: FAMILY MEDICINE | Facility: OTHER | Age: 46
End: 2021-03-29

## 2021-03-30 ENCOUNTER — TELEPHONE (OUTPATIENT)
Dept: PHYSICAL MEDICINE AND REHAB | Facility: CLINIC | Age: 46
End: 2021-03-30

## 2021-03-30 NOTE — TELEPHONE ENCOUNTER
[facility]    Phone Message    May a detailed message be left on voicemail: yes     Reason for Call: Other: Bhupinder calling to return Marcus to discuss scheduling options. Writer's first available in PMR in clinic was not until 8/6/21. Bhupinder stated that she is not going to wait that long and will go to [facility] instead. Please call Bhupinder to discuss scheduling options at your earliest convenience.    Action Taken: Message routed to:  [facility] ([facility]    Travel Screening: Not Applicable

## 2021-03-30 NOTE — TELEPHONE ENCOUNTER
VM left asking for a return call.     The clinic is able to schedule the appointment in May with Dr. Dennis. Direct phone number given asking for a return call if she would like to move forward with scheduling.

## 2021-03-30 NOTE — TELEPHONE ENCOUNTER
VM left asking for a return call.     Referral placed to PMR for TOS. The patient can be scheduled as a new patient with Dr. Dennis as an in person appointment.

## 2021-04-02 ENCOUNTER — TELEPHONE (OUTPATIENT)
Dept: OTHER | Facility: CLINIC | Age: 46
End: 2021-04-02

## 2021-04-02 ENCOUNTER — OFFICE VISIT (OUTPATIENT)
Dept: NEUROSURGERY | Facility: CLINIC | Age: 46
End: 2021-04-02
Payer: COMMERCIAL

## 2021-04-02 VITALS — HEART RATE: 80 BPM | SYSTOLIC BLOOD PRESSURE: 125 MMHG | DIASTOLIC BLOOD PRESSURE: 78 MMHG | RESPIRATION RATE: 16 BRPM

## 2021-04-02 PROCEDURE — 99204 OFFICE O/P NEW MOD 45 MIN: CPT | Performed by: NEUROLOGICAL SURGERY

## 2021-04-02 NOTE — LETTER
4/2/2021         RE: Amelia Coker  [address] [facility] [address]        Dear Colleague,    Thank you for referring your patient, Amelia Coker, to the [facility] [place]. Please see a copy of my visit note below.    Ms. Castro is a 46-year-old woman seen today for evaluation of a slightly greater than 1 year history of progressive difficulty with her dominant right upper extremity.  Specifically, she complains of paresthesias involving her entire right hand, extensor surface of the right forearm and at times her proximal right upper extremity.  This problem is to some degree associated with increased activity and neck movement but also strongly related to movement of her right shoulder.  She reports that she is forced to hold her right shoulder in a dorsally extended position to relieve symptoms into the right side of her neck, shoulder and down her right arm.  She reports that allowing her shoulder to come into a normal position or moving her shoulder ventrally immediately causes a paresthesia type sensation through all the fingers of her right hand.  She notes no specific focal weakness of her right upper extremity but does report that she is dropping objects more frequently and that this has become a functionally limiting problem.  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  This occurred more than several years previously.  She has been evaluated for possible vascular thoracic outlet syndrome.  She underwent an EMG more than a year ago which showed no evidence of neurologic involvement.  She has continued to follow with a vascular surgeon, Dr. Mock, at the [facility] and is apparently scheduled for evaluation by a physiatrist  to perform a Botox injection of an anterior scalene muscle as part of the work-up for this presumed thoracic  outlet syndrome.  Recently, she has undergone a cervical MRI scan which shows evidence of a small focal disc protrusion at right C7-T1.  There is concerned that she suffers from a right C8 radiculopathy.    On examination, she is noted to be right-hand dominant.  There is no evidence of muscular atrophy involving her bilateral upper extremities.  She does have deficient anterior cervical contour related presumably to atrophy of the platysmas muscle.  Her right sternocleidomastoid muscle however is normal-appearing and has good strength with cervical rotation.  Lhermitte's phenomenon is absent.  Spurling sign is negative.  Cervical range of motion appears to be pain-free and does not elicit symptoms into the right hand.  Motor examination of her bilateral upper extremity shows 5/5 strength throughout including deltoids, biceps, triceps, brachioradialis, DrTimothy Digiti quinti, hand  1 distal finger flexors.  There is no evidence of first dorsal interosseous atrophy and flexor carpi ulnaris appears to be normal in profile and motor testing.  She has slight tenderness at the medial epicondyle of the right elbow.  However Tinel's sign is negative.  Deep tendon reflexes are 1+ and equal to biceps, triceps and brachioradialis bilaterally.  There are no long tract findings noted.  Gait is normal.  Distribution of her paresthesias and decreased sensation in her right hand is outside of dermatomal distribution.  She is quite clear that with slumping her shoulder, increased activity, etc. and that the symptoms involve her entire hand circumferentially and equally as well as the dorsum and distal aspect of her right forearm.  She notes that external rotation at the right shoulder is comfortable and tends to alleviate her symptoms and the internal rotation or ventral displacement of the shoulder immediately causes the symptoms.    Her recent cervical MRI scan is reviewed.  The study shows a small disc protrusion in the proximal  right C7-T1 neural foramen.  The foramen is narrowed.  There are minor spondylitic changes involving the adjacent levels but no compelling evidence otherwise of focal neurologic impingement.  Spinal canal is adequate and there is no evidence of spinal cord compression or intrinsic signal change within the cervical spinal cord at any level.  Cervical alignment is satisfactory.    Assessment: Reproducible and progressive symptoms involving this otherwise active and healthy 46-year-old woman's dominant right upper extremity.  This does not appear to match expected distribution of a C8 radiculopathy and I believe is more consistent with thoracic outlet syndrome or plexopathy.  I have asked her to undergo repeat imaging to determine if there has been any significant interval change compared to the study obtained a year ago.  We will try to expedite evaluation by physiatry.  Patient has requested a second opinion from a vascular surgeon.  She is unhappy that to this point all her vascular therapy evaluation and appointments have been virtual.  She has requested an in person visit with Dr. Mock but this has reportedly been denied.    I will plan to see her back in the office when EMG study has been completed.    More than 40 minutes was spent direct contact with the patient majority reviewing the clinical and radiographic findings, management alternatives, risks and benefits.      Again, thank you for allowing me to participate in the care of your patient.        Sincerely,        Tomy Grant MD

## 2021-04-02 NOTE — PROGRESS NOTES
Ms. Castro is a 46-year-old woman seen today for evaluation of a slightly greater than 1 year history of progressive difficulty with her dominant right upper extremity.  Specifically, she complains of paresthesias involving her entire right hand, extensor surface of the right forearm and at times her proximal right upper extremity.  This problem is to some degree associated with increased activity and neck movement but also strongly related to movement of her right shoulder.  She reports that she is forced to hold her right shoulder in a dorsally extended position to relieve symptoms into the right side of her neck, shoulder and down her right arm.  She reports that allowing her shoulder to come into a normal position or moving her shoulder ventrally immediately causes a paresthesia type sensation through all the fingers of her right hand.  She notes no specific focal weakness of her right upper extremity but does report that she is dropping objects more frequently and that this has become a functionally limiting problem.  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  This occurred more than several years previously.  She has been evaluated for possible vascular thoracic outlet syndrome.  She underwent an EMG more than a year ago which showed no evidence of neurologic involvement.  She has continued to follow with a vascular surgeon, Dr. Mock, at the [facility] and is apparently scheduled for evaluation by a physiatrist  to perform a Botox injection of an anterior scalene muscle as part of the work-up for this presumed thoracic outlet syndrome.  Recently, she has undergone a cervical MRI scan which shows evidence of a small focal disc protrusion at right C7-T1.  There is concerned that she suffers from a right C8 radiculopathy.    On examination, she is noted to be right-hand dominant.   There is no evidence of muscular atrophy involving her bilateral upper extremities.  She does have deficient anterior cervical contour related presumably to atrophy of the platysmas muscle.  Her right sternocleidomastoid muscle however is normal-appearing and has good strength with cervical rotation.  Lhermitte's phenomenon is absent.  Spurling sign is negative.  Cervical range of motion appears to be pain-free and does not elicit symptoms into the right hand.  Motor examination of her bilateral upper extremity shows 5/5 strength throughout including deltoids, biceps, triceps, brachioradialis, DrTimothy humphrey, hand  1 distal finger flexors.  There is no evidence of first dorsal interosseous atrophy and flexor carpi ulnaris appears to be normal in profile and motor testing.  She has slight tenderness at the medial epicondyle of the right elbow.  However Tinel's sign is negative.  Deep tendon reflexes are 1+ and equal to biceps, triceps and brachioradialis bilaterally.  There are no long tract findings noted.  Gait is normal.  Distribution of her paresthesias and decreased sensation in her right hand is outside of dermatomal distribution.  She is quite clear that with slumping her shoulder, increased activity, etc. and that the symptoms involve her entire hand circumferentially and equally as well as the dorsum and distal aspect of her right forearm.  She notes that external rotation at the right shoulder is comfortable and tends to alleviate her symptoms and the internal rotation or ventral displacement of the shoulder immediately causes the symptoms.    Her recent cervical MRI scan is reviewed.  The study shows a small disc protrusion in the proximal right C7-T1 neural foramen.  The foramen is narrowed.  There are minor spondylitic changes involving the adjacent levels but no compelling evidence otherwise of focal neurologic impingement.  Spinal canal is adequate and there is no evidence of spinal cord  compression or intrinsic signal change within the cervical spinal cord at any level.  Cervical alignment is satisfactory.    Assessment: Reproducible and progressive symptoms involving this otherwise active and healthy 46-year-old woman's dominant right upper extremity.  This does not appear to match expected distribution of a C8 radiculopathy and I believe is more consistent with thoracic outlet syndrome or plexopathy.  I have asked her to undergo repeat imaging to determine if there has been any significant interval change compared to the study obtained a year ago.  We will try to expedite evaluation by physiatry.  Patient has requested a second opinion from a vascular surgeon.  She is unhappy that to this point all her vascular therapy evaluation and appointments have been virtual.  She has requested an in person visit with Dr. Mock but this has reportedly been denied.    I will plan to see her back in the office when EMG study has been completed.    More than 40 minutes was spent direct contact with the patient majority reviewing the clinical and radiographic findings, management alternatives, risks and benefits.

## 2021-04-02 NOTE — TELEPHONE ENCOUNTER
The patient returned call to schedule an appointment. She was seen by Dr. Grant on 4/2. She has another appointment with him on 5/28/21. She is hoping to have an appointment scheduled with Dr. Dennis before her return f/u.   Message routed to MD for review.

## 2021-04-02 NOTE — PATIENT INSTRUCTIONS
Someone will be in touch with you regarding an appointment with vascular surgery and to make a sooner appointment with vascular surgery.

## 2021-04-02 NOTE — TELEPHONE ENCOUNTER
Pt referred to [facility] by Tomy Grant MD  for TOS.     Patient has no imagining.     Pt needs to be scheduled for in-person consult with Dr. Quick or Dr. Dutta.  Will route to scheduling to coordinate an appointment at next available.    Mary Alice ARGUELLO, RN    [facility]  Vascular [facility]  Office: [phone]  Fax: [phone]

## 2021-04-02 NOTE — TELEPHONE ENCOUNTER
FUTURE VISIT INFORMATION      FUTURE VISIT INFORMATION:    Date: 5.5.21    Time: 10:00    Location: [facility]  REFERRAL INFORMATION:    Referring provider:  Dr. Ahmet Johnson    Referring providers clinic:  [facility]    Reason for visit/diagnosis  Consult for patch testing, per pt metals, skin cremes    RECORDS REQUESTED FROM:       Clinic name Comments Records Status Imaging Status   [facility] 3.3.21  Dr. Johnson Epic na

## 2021-04-05 NOTE — TELEPHONE ENCOUNTER
Appt scheduled for 4/16 at 730am. Patient may need to reschedule due to finding a sitter.     Reviewed that the initial appt was consult only. The patient was upset stating she did not have time for multiple appointments. Reviewed and explained the process and reasoning for consult and diagnostic testing is for documentation and insurance purposes for the PA.  Explained if time was available then may be able to complete diagnostic testing with Lidocaine same day. She is skeptical to try Botox. She was told she needs to do Botox before surgery.   Appt will be kept as scheduled.

## 2021-04-07 ENCOUNTER — MYC MEDICAL ADVICE (OUTPATIENT)
Dept: FAMILY MEDICINE | Facility: OTHER | Age: 46
End: 2021-04-07

## 2021-04-07 DIAGNOSIS — Z20.822 EXPOSURE TO COVID-19 VIRUS: Primary | ICD-10-CM

## 2021-04-08 ENCOUNTER — MYC MEDICAL ADVICE (OUTPATIENT)
Dept: FAMILY MEDICINE | Facility: OTHER | Age: 46
End: 2021-04-08

## 2021-04-08 DIAGNOSIS — Z20.822 EXPOSURE TO COVID-19 VIRUS: ICD-10-CM

## 2021-04-08 LAB
SARS-COV-2 RNA RESP QL NAA+PROBE: NORMAL
SPECIMEN SOURCE: NORMAL

## 2021-04-08 PROCEDURE — U0005 INFEC AGEN DETEC AMPLI PROBE: HCPCS | Performed by: PHYSICIAN ASSISTANT

## 2021-04-08 PROCEDURE — U0003 INFECTIOUS AGENT DETECTION BY NUCLEIC ACID (DNA OR RNA); SEVERE ACUTE RESPIRATORY SYNDROME CORONAVIRUS 2 (SARS-COV-2) (CORONAVIRUS DISEASE [COVID-19]), AMPLIFIED PROBE TECHNIQUE, MAKING USE OF HIGH THROUGHPUT TECHNOLOGIES AS DESCRIBED BY CMS-2020-01-R: HCPCS | Performed by: PHYSICIAN ASSISTANT

## 2021-04-09 ENCOUNTER — MYC MEDICAL ADVICE (OUTPATIENT)
Dept: FAMILY MEDICINE | Facility: OTHER | Age: 46
End: 2021-04-09

## 2021-04-12 ENCOUNTER — MYC MEDICAL ADVICE (OUTPATIENT)
Dept: FAMILY MEDICINE | Facility: OTHER | Age: 46
End: 2021-04-12

## 2021-04-16 ENCOUNTER — OFFICE VISIT (OUTPATIENT)
Dept: PODIATRY | Facility: CLINIC | Age: 46
End: 2021-04-16
Payer: COMMERCIAL

## 2021-04-16 ENCOUNTER — OFFICE VISIT (OUTPATIENT)
Dept: PHYSICAL MEDICINE AND REHAB | Facility: CLINIC | Age: 46
End: 2021-04-16
Payer: COMMERCIAL

## 2021-04-16 ENCOUNTER — ANCILLARY PROCEDURE (OUTPATIENT)
Dept: GENERAL RADIOLOGY | Facility: CLINIC | Age: 46
End: 2021-04-16
Attending: PODIATRIST
Payer: COMMERCIAL

## 2021-04-16 VITALS — HEART RATE: 57 BPM | DIASTOLIC BLOOD PRESSURE: 85 MMHG | OXYGEN SATURATION: 99 % | SYSTOLIC BLOOD PRESSURE: 130 MMHG

## 2021-04-16 VITALS
WEIGHT: 223 LBS | SYSTOLIC BLOOD PRESSURE: 138 MMHG | OXYGEN SATURATION: 97 % | DIASTOLIC BLOOD PRESSURE: 93 MMHG | HEART RATE: 78 BPM | BODY MASS INDEX: 37.11 KG/M2

## 2021-04-16 DIAGNOSIS — G54.0 TOS (THORACIC OUTLET SYNDROME): Primary | ICD-10-CM

## 2021-04-16 DIAGNOSIS — M79.672 LEFT FOOT PAIN: Primary | ICD-10-CM

## 2021-04-16 DIAGNOSIS — M79.2 NEUROPATHIC PAIN: ICD-10-CM

## 2021-04-16 DIAGNOSIS — M77.8 CAPSULITIS OF RIGHT FOOT: ICD-10-CM

## 2021-04-16 PROCEDURE — 99205 OFFICE O/P NEW HI 60 MIN: CPT | Mod: 24 | Performed by: PHYSICAL MEDICINE & REHABILITATION

## 2021-04-16 PROCEDURE — 76942 ECHO GUIDE FOR BIOPSY: CPT | Performed by: PHYSICAL MEDICINE & REHABILITATION

## 2021-04-16 PROCEDURE — 73630 X-RAY EXAM OF FOOT: CPT | Mod: LT | Performed by: RADIOLOGY

## 2021-04-16 PROCEDURE — 99214 OFFICE O/P EST MOD 30 MIN: CPT | Performed by: PODIATRIST

## 2021-04-16 PROCEDURE — 64450 NJX AA&/STRD OTHER PN/BRANCH: CPT | Mod: RT | Performed by: PHYSICAL MEDICINE & REHABILITATION

## 2021-04-16 ASSESSMENT — PAIN SCALES - GENERAL: PAINLEVEL: SEVERE PAIN (7)

## 2021-04-16 NOTE — LETTER
4/16/2021       RE: Amelia Coker  [address] [place] [place] [id number]     Dear Colleague,    Thank you for referring your patient, Amelia Coker, to the [facility] [place] at [facility]. Please see a copy of my visit note below.    Patient seen at the request of Dr. Stacie Mock found for an opinion and evaluation of thoracic outlet syndrome and consideration of scalene muscle block.      HISTORY OF PRESENT ILLNESS:  Amelia Coker is a 46 year old RHD female with history of [place] palsy, lumbosacral pain s/p surgery who presents with a chief complaint of right upper extremity pain and numbness/tinging.     Briefly, pain began approximately one year ago and is not associated with trauma. She has seen multiple specialists including vascular surgery, neurosurgery, and primary care sports medicine to further evaluate cervical versus thoracic outlet -type etiology. She has completed several months of physical therapy without improvement in her symptoms. She also had breast reduction surgery in November 2020 without improvement.     Currently, pain is localized to the right neck, anterior shoulder/pecs with radiating symptoms to the posterior arm and forearm affecting all digits of the hands with assoc numbness/tingling; described as constant aching and intermittently sharp in nature - describes as a 'deep constant nagging headache'. Can be cold and tingling. Pain is reported as 0/10 at rest today and up to 7/10 at worst in the last week. Symptoms are worse with side-bending of her neck, shoulder flexion>abduction; and improved with positional change, particularly with shoulder extension and postural control. she does report pain-related sleep disturbance.       PRIOR INJURIES/TREATMENT:   Ice/Heat - significant relief.   Physical Therapy:   Completed PT March 2021 for cervical and TOS w/ craniosacral manipulation      -  Current Pain Medications -   Medical cannabis helps w/ sleep  Mobic 15mg daily prn   Tylenol prn   Valium prn     - Prior/Trialed Pain Medications -   AED - Gabapentin - did not help, lyrica - GI issues and blurred vision  SNRI - cymbalta - dizziness, blurred vision    Prior Procedures:  Advanced pain and spine - continues w/ intermittent TFESI at L5 and S1 levels         Prior Related Surgery:   11/2020 - breast reduction surgery   11/2020 - hemilaminectomy, foraminotomy, discectomy L4-S1 w/o improvement   2014 - Right elbow surgery      Other (acupuncture, OMT, CMM, TENS, DME, etc.):   +CMM     Specialists Seen - (with most recent, available notes and clinic visits reviewed)   1. Vascular surgery - Dr. Stacie Mock  2. Neurosurgery - Dr. Tomy Grant    3. Primary care sports medicine - Dr. Patel Martínez     IMAGING - reviewed   03/11/2021 - MR C spine   FINDINGS:  Straightening of the normal cervical lordosis. Normal  vertebral body heights. Presumed intraosseous hemangiomas in the C6  vertebral body, as before. Possible tiny intraosseous hemangioma in  the left aspect of the C5 vertebral body. Mild Modic type I  degenerative endplate changes at C5-C6 and C6-C7. Otherwise  unremarkable marrow signal. No abnormal spinal cord signal. There may  be a small 6 mm nodule in the left thyroid lobe without definite  aggressive features, not necessarily requiring follow-up by imaging  criteria. The visualized paravertebral soft tissues otherwise appear  normal.     Segmental analysis:  Craniovertebral Junction/C1-C2: Mild degenerative changes at the  median atlantoaxial joint, as before.     C2-C3: Normal disc height. No herniation. Normal facets. No spinal  canal or neural foraminal stenosis. No change.     C3-C4: Normal disc height. No herniation. Normal facets. No  significant spinal canal or neural foraminal stenosis. No change.     C4-C5: Normal disc height. No herniation. Normal facets. No spinal  canal or neural  foraminal stenosis. No change.     C5-C6: Moderate disc height loss. Again seen is a disc bulge with  superimposed small caudally migrating left central disc extrusion.  Posterior endplate osteophytic ridging and bilateral uncovertebral  arthropathy. Normal facets. Mild spinal canal stenosis. Moderate to  severe right and moderate left neural foraminal stenosis. Overall, no  significant change.     C6-C7: Moderate disc height loss. Again seen is a disc bulge eccentric  to the right with superimposed right central disc protrusion. Mild  bilateral uncovertebral arthropathy. Normal facets. No spinal canal  stenosis. Mild bilateral neural foraminal stenosis. Overall, no  change.     C7-T1: Mild to moderate disc height loss. Disc bulge eccentric to the  right with increased size of a superimposed caudally migrating right  central disc extrusion. Mild mass effect on the ventral thecal sac and  new minimal indentation of the right ventral aspect of the cord.  Moderate to severe right and mild left neural foraminal stenosis. The  degree of right neural foraminal stenosis appears increased compared  to the prior exam.                                                                      IMPRESSION:  1. Increased size of a caudally migrating right central disc extrusion  at C7-T1. Disc bulge with posterior endplate osteophytic ridging and  right more than left uncovertebral arthropathy at that level also  appear increased, now resulting in moderate/moderate to severe right  neural foraminal stenosis. Correlate clinically for possible right C8  radiculopathy.  2. Persistent moderate to severe right neural foraminal stenosis at  C5-C6. Lesser degrees of neural foraminal narrowing elsewhere do not  appear significantly changed.  3. Multilevel degenerative disc disease and uncovertebral arthropathy  of the cervical spine, as detailed in the body of the report. Please  see the body of the report for additional details and  level-by-level  findings.    06/04/2020 - Vascular US RUE  1. Venous: No evidence of venous thoracic outlet syndrome.     2. Arterial: Right greater than left, severely diminished arterial PPG  waveforms with arms 180 degrees. Arterial waveforms otherwise  unremarkable.     Review Of Systems:   I am responding to those symptoms which are directly relevant to the specific indication for my consultation. I recommend that the patient follow up with their primary or referring provider to pursue any other symptoms which may be of concern.      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), Elevated blood pressure reading without diagnosis of hypertension (12/24/2013), Facial contusion (12/15/2014), Foraminal stenosis of lumbar region (9/26/2013), Frontal headache (12/15/2014), Gastroesophageal reflux disease, esophagitis presence not specified (6/29/2016), Heat sensitivity (10/29/2015), HTN, goal below 140/90 (9/23/2015), Hyperlipidemia LDL goal <130 (8/30/2017), Irregular heart beat, Keratitis sicca (H) (10/31/2012), Left shoulder pain (9/26/2013), Lumbar radiculopathy (1/22/2014), Migraine headache (10/23/2013), Migraine, unspecified, with intractable migraine, so stated, without mention of status migrainosus, Motion sickness, MRSA infection (10/20/2015), Muscular wasting and disuse atrophy, not elsewhere classified (6/9/2014), Numbness and tingling, PAC (premature atrial contraction) (7/15/2010), Plantar fasciitis (9/23/2015), PONV (postoperative nausea and vomiting), Skin  lesion (10/20/2015), Spasm of muscle (7/11/2017), 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     She  has a past surgical history that includes REMOVAL OF TONSILS,<9 Y/O; TOOTH EXTRACTION W/FORCEP; Colonoscopy (3/11/2013); Esophagoscopy, gastroscopy, duodenoscopy (EGD), combined (11/8/2013); Repair Tendon Elbow (7/9/2014); Decompression lumbar two levels (Bilateral, 12/8/2016); Colonoscopy with CO2 insufflation (N/A, 11/19/2018); Ultrasonic Removal Soft Tissue (Location) (Right, 11/21/2018); Dilation and curettage, hysteroscopy, ablate endometrium novasure, combined (N/A, 6/12/2019); INJ TRANSFORAMIN EPIDURAL, LUMB/SACR SINGLE; Inject block medial branch cervical/thoracic/lumbar (N/A, 7/23/2020); Inject joint sacroiliac (Left, 9/24/2020); and Esophagoscopy, gastroscopy, duodenoscopy (EGD), combined (N/A, 2/18/2021).      Family History  her family history includes Allergies in her brother, daughter, father, mother, sister, and son; Arthritis in her mother and sister; Bladder Cancer in her maternal aunt; Blood Disease in her paternal aunt; Cancer in her father, mother, and sister; Cancer - colorectal in her mother; Cardiovascular in her father; Depression in her mother and sister; Diabetes in her mother; Gastrointestinal Disease in her mother; Genitourinary Problems in her mother; Gynecology in her mother and sister; Heart Disease in her brother, father, and maternal grandfather; Hypertension in her mother; Kidney Disease in her maternal aunt; Lipids in her father, mother, and paternal grandmother; Lupus in her maternal aunt; Osteoporosis in her paternal grandmother; Other Cancer in her father; Respiratory in her father and son; Rheumatoid Arthritis in her paternal aunt; Thyroid Disease in her mother and paternal grandmother; Unknown/Adopted in her father.       Social History:  Work:  - full time   Current living situation: Lives in [place]   Enjoys taking care of horses   She  reports  that she has never smoked. She has never used smokeless tobacco. She reports that she does not drink alcohol or use drugs.      Current Medications:   She has a current medication list which includes the following prescription(s): acetaminophen, clobetasol, fexofenadine, hydrocortisone valerate, medical cannabis, meloxicam, mupirocin, rizatriptan, sucralfate, and sucralfate.       Allergies:    -- Ceftriaxone -- Anaphylaxis    --  Hives, rash, racing heart beat   -- Bactrim [Sulfamethoxazole W/Trimethoprim] -- Hives   -- Ciprofloxacin -- Other (See Comments)    --  Tendon Issues   -- Codeine -- Nausea and Vomiting   -- Copper     --  Rash   -- Doxycycline     --  Loss of skin pigmentation, skin loss.   -- Gold     --  Rash   -- Iodine-131 -- Hives   -- Levaquin [Levofloxacin] -- Other (See Comments)    --  Tendon issues with levaquin and cipro   -- Nickel     --  rash   -- Steel [Staples]     --  Rash from staples   -- Latex -- Rash   -- Penicillins -- Rash    PHYSICAL EXAMINATION:  BP (!) 138/93 (BP Location: Left arm, Patient Position: Sitting, Cuff Size: Adult Regular)   Pulse 78   Wt 101.2 kg (223 lb)   SpO2 97%   BMI 37.11 kg/m     General: Pleasant, straightforward, WDWN individual.  Mental Status: Pleasant, direct, appropriate mood and affect  Resp: breathing is unlabored without audible wheeze  Vascular: Palpable radial pulses, she does have some increased pallor and slight cyanosis with provacative shoulder/neck movements  Heme: no visible ecchymosis or erythema on extremities  Skin: No notable rash    Neurologic:  Cranial nerves: Pos [place] palsy on the Right     Strength: All major muscle groups of the bilateral upper extremities have normal and symmetric muscle strength    Sensation: SILT in upper left ext C5-T1, reports diffusely diminshed sensation to LT circumferential (at least non-dermatomal) distribution in the right mid-forearm to hand    DTRs: bilateral upper extremity stretch reflexes are  equal and symmetric   Hoffmans: none    Musculoskeletal:  Cervical Spine: ROM mildly reduced, Posture kyphotic. Asymmetric muscle grouping in anterior neck with platysma atrophy on right compared to left on activation. She does have concordant pain with both Spurling and right side-bending    Full shoulder aROM  Tender at Pec minor and insertion at coracoid     +Romulo when shoulder abducted and slightly flexed  Adson's with sl diminished radial pulse and concordant symptoms.         ASSESSMENT:  Aemlia Coker is a pleasant 46 year old right hand dominant female who presents with:    #. Right upper extremity neuropathic pain and symptoms. Somewhat unclear etiology but she does have findings consistent with thoracic outlet syndrome, less consistent picture for radiculopathy given multiple dermatomal distribution.      She has experienced incomplete/inadequate symptomatic relief from multiple pharmacologic trials, protracted course of physical therapy, interventional pain procedures, and w/ ongoing surgical evaluation     PLAN:  - Sonographically-guided right anterior scalene diagnostic block performed today (see detailed procedure note)   - Agree with EMG/NCS of RUE.  - Follow-up with vascular surgery and neurosurgery as scheduled.   - Follow-up will be determined after review of pain diary. We can consider US-guided right PecMinor block in the future depending on her response      Ready to learn, no apparent learning barriers.  Education provided on treatment plan according to patient's preferred learning style.  Patient verbalizes understanding.   __________________________________  Brandi Dennis MD  Physical Medicine & Rehabilitation        67 minutes spent on the date of the encounter doing chart review, review of outside records, review of test results, patient visit and documentation        Chief Complaint   Patient presents with     Consult       Again, thank you for allowing me to participate in the care of  your patient.      Sincerely,    Brandi Dennis MD

## 2021-04-16 NOTE — PROGRESS NOTES
Patient seen at the request of Dr. Stacie Mock found for an opinion and evaluation of thoracic outlet syndrome and consideration of scalene muscle block.      HISTORY OF PRESENT ILLNESS:  Amelia Coker is a 46 year old RHD female with history of Wil[place] palsy, lumbosacral pain s/p surgery who presents with a chief complaint of right upper extremity pain and numbness/tinging.     Briefly, pain began approximately one year ago and is not associated with trauma. She has seen multiple specialists including vascular surgery, neurosurgery, and primary care sports medicine to further evaluate cervical versus thoracic outlet -type etiology. She has completed several months of physical therapy without improvement in her symptoms. She also had breast reduction surgery in November 2020 without improvement.     Currently, pain is localized to the right neck, anterior shoulder/pecs with radiating symptoms to the posterior arm and forearm affecting all digits of the hands with assoc numbness/tingling; described as constant aching and intermittently sharp in nature - describes as a 'deep constant nagging headache'. Can be cold and tingling. Pain is reported as 0/10 at rest today and up to 7/10 at worst in the last week. Symptoms are worse with side-bending of her neck, shoulder flexion>abduction; and improved with positional change, particularly with shoulder extension and postural control. she does report pain-related sleep disturbance.       PRIOR INJURIES/TREATMENT:   Ice/Heat - significant relief.   Physical Therapy:   Completed PT March 2021 for cervical and TOS w/ craniosacral manipulation      - Current Pain Medications -   Medical cannabis helps w/ sleep  Mobic 15mg daily prn   Tylenol prn   Valium prn     - Prior/Trialed Pain Medications -   AED - Gabapentin - did not help, lyrica - GI issues and blurred vision  SNRI - cymbalta - dizziness, blurred vision    Prior Procedures:  Advanced pain and spine - continues w/  intermittent TFESI at L5 and S1 levels         Prior Related Surgery:   11/2020 - breast reduction surgery   11/2020 - hemilaminectomy, foraminotomy, discectomy L4-S1 w/o improvement   2014 - Right elbow surgery      Other (acupuncture, OMT, CMM, TENS, DME, etc.):   +CMM     Specialists Seen - (with most recent, available notes and clinic visits reviewed)   1. Vascular surgery - Dr. Stacie Mock  2. Neurosurgery - Dr. Tomy Grant    3. Primary care sports medicine - Dr. Patel Martínez     IMAGING - reviewed   03/11/2021 - MR C spine   FINDINGS:  Straightening of the normal cervical lordosis. Normal  vertebral body heights. Presumed intraosseous hemangiomas in the C6  vertebral body, as before. Possible tiny intraosseous hemangioma in  the left aspect of the C5 vertebral body. Mild Modic type I  degenerative endplate changes at C5-C6 and C6-C7. Otherwise  unremarkable marrow signal. No abnormal spinal cord signal. There may  be a small 6 mm nodule in the left thyroid lobe without definite  aggressive features, not necessarily requiring follow-up by imaging  criteria. The visualized paravertebral soft tissues otherwise appear  normal.     Segmental analysis:  Craniovertebral Junction/C1-C2: Mild degenerative changes at the  median atlantoaxial joint, as before.     C2-C3: Normal disc height. No herniation. Normal facets. No spinal  canal or neural foraminal stenosis. No change.     C3-C4: Normal disc height. No herniation. Normal facets. No  significant spinal canal or neural foraminal stenosis. No change.     C4-C5: Normal disc height. No herniation. Normal facets. No spinal  canal or neural foraminal stenosis. No change.     C5-C6: Moderate disc height loss. Again seen is a disc bulge with  superimposed small caudally migrating left central disc extrusion.  Posterior endplate osteophytic ridging and bilateral uncovertebral  arthropathy. Normal facets. Mild spinal canal stenosis. Moderate to  severe right and moderate  left neural foraminal stenosis. Overall, no  significant change.     C6-C7: Moderate disc height loss. Again seen is a disc bulge eccentric  to the right with superimposed right central disc protrusion. Mild  bilateral uncovertebral arthropathy. Normal facets. No spinal canal  stenosis. Mild bilateral neural foraminal stenosis. Overall, no  change.     C7-T1: Mild to moderate disc height loss. Disc bulge eccentric to the  right with increased size of a superimposed caudally migrating right  central disc extrusion. Mild mass effect on the ventral thecal sac and  new minimal indentation of the right ventral aspect of the cord.  Moderate to severe right and mild left neural foraminal stenosis. The  degree of right neural foraminal stenosis appears increased compared  to the prior exam.                                                                      IMPRESSION:  1. Increased size of a caudally migrating right central disc extrusion  at C7-T1. Disc bulge with posterior endplate osteophytic ridging and  right more than left uncovertebral arthropathy at that level also  appear increased, now resulting in moderate/moderate to severe right  neural foraminal stenosis. Correlate clinically for possible right C8  radiculopathy.  2. Persistent moderate to severe right neural foraminal stenosis at  C5-C6. Lesser degrees of neural foraminal narrowing elsewhere do not  appear significantly changed.  3. Multilevel degenerative disc disease and uncovertebral arthropathy  of the cervical spine, as detailed in the body of the report. Please  see the body of the report for additional details and level-by-level  findings.    06/04/2020 - Vascular US RUE  1. Venous: No evidence of venous thoracic outlet syndrome.     2. Arterial: Right greater than left, severely diminished arterial PPG  waveforms with arms 180 degrees. Arterial waveforms otherwise  unremarkable.     Review Of Systems:   I am responding to those symptoms which are  directly relevant to the specific indication for my consultation. I recommend that the patient follow up with their primary or referring provider to pursue any other symptoms which may be of concern.      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), Elevated blood pressure reading without diagnosis of hypertension (12/24/2013), Facial contusion (12/15/2014), Foraminal stenosis of lumbar region (9/26/2013), Frontal headache (12/15/2014), Gastroesophageal reflux disease, esophagitis presence not specified (6/29/2016), Heat sensitivity (10/29/2015), HTN, goal below 140/90 (9/23/2015), Hyperlipidemia LDL goal <130 (8/30/2017), Irregular heart beat, Keratitis sicca (H) (10/31/2012), Left shoulder pain (9/26/2013), Lumbar radiculopathy (1/22/2014), Migraine headache (10/23/2013), Migraine, unspecified, with intractable migraine, so stated, without mention of status migrainosus, Motion sickness, MRSA infection (10/20/2015), Muscular wasting and disuse atrophy, not elsewhere classified (6/9/2014), Numbness and tingling, PAC (premature atrial contraction) (7/15/2010), Plantar fasciitis (9/23/2015), PONV (postoperative nausea and vomiting), Skin lesion (10/20/2015), Spasm of muscle (7/11/2017), 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     She  has a past surgical history that includes REMOVAL OF TONSILS,<11 Y/O; TOOTH EXTRACTION W/FORCEP; Colonoscopy (3/11/2013); Esophagoscopy, gastroscopy, duodenoscopy (EGD), combined (11/8/2013); Repair Tendon Elbow  (7/9/2014); Decompression lumbar two levels (Bilateral, 12/8/2016); Colonoscopy with CO2 insufflation (N/A, 11/19/2018); Ultrasonic Removal Soft Tissue (Location) (Right, 11/21/2018); Dilation and curettage, hysteroscopy, ablate endometrium novasure, combined (N/A, 6/12/2019); INJ TRANSFORAMIN EPIDURAL, LUMB/SACR SINGLE; Inject block medial branch cervical/thoracic/lumbar (N/A, 7/23/2020); Inject joint sacroiliac (Left, 9/24/2020); and Esophagoscopy, gastroscopy, duodenoscopy (EGD), combined (N/A, 2/18/2021).      Family History  her family history includes Allergies in her brother, daughter, father, mother, sister, and son; Arthritis in her mother and sister; Bladder Cancer in her maternal aunt; Blood Disease in her paternal aunt; Cancer in her father, mother, and sister; Cancer - colorectal in her mother; Cardiovascular in her father; Depression in her mother and sister; Diabetes in her mother; Gastrointestinal Disease in her mother; Genitourinary Problems in her mother; Gynecology in her mother and sister; Heart Disease in her brother, father, and maternal grandfather; Hypertension in her mother; Kidney Disease in her maternal aunt; Lipids in her father, mother, and paternal grandmother; Lupus in her maternal aunt; Osteoporosis in her paternal grandmother; Other Cancer in her father; Respiratory in her father and son; Rheumatoid Arthritis in her paternal aunt; Thyroid Disease in her mother and paternal grandmother; Unknown/Adopted in her father.       Social History:  Work:  - full time   Current living situation: Lives in [place]   Enjoys taking care of horses   She  reports that she has never smoked. She has never used smokeless tobacco. She reports that she does not drink alcohol or use drugs.      Current Medications:   She has a current medication list which includes the following prescription(s): acetaminophen, clobetasol, fexofenadine, hydrocortisone valerate, medical cannabis, meloxicam, mupirocin,  rizatriptan, sucralfate, and sucralfate.       Allergies:    -- Ceftriaxone -- Anaphylaxis    --  Hives, rash, racing heart beat   -- Bactrim [Sulfamethoxazole W/Trimethoprim] -- Hives   -- Ciprofloxacin -- Other (See Comments)    --  Tendon Issues   -- Codeine -- Nausea and Vomiting   -- Copper     --  Rash   -- Doxycycline     --  Loss of skin pigmentation, skin loss.   -- Gold     --  Rash   -- Iodine-131 -- Hives   -- Levaquin [Levofloxacin] -- Other (See Comments)    --  Tendon issues with levaquin and cipro   -- Nickel     --  rash   -- Steel [Staples]     --  Rash from staples   -- Latex -- Rash   -- Penicillins -- Rash    PHYSICAL EXAMINATION:  BP (!) 138/93 (BP Location: Left arm, Patient Position: Sitting, Cuff Size: Adult Regular)   Pulse 78   Wt 101.2 kg (223 lb)   SpO2 97%   BMI 37.11 kg/m     General: Pleasant, straightforward, WDWN individual.  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 Asymmetric muscle grouping in anterior neck with platysma atrophy on right compared to left on activation. She does have concordant pain with both Spurling and right side-bending    Full shoulder aROM  Tender at Pec minor and  insertion at coracoid     +Romulo when shoulder abducted and slightly flexed  Adson's with sl diminished radial pulse and concordant symptoms.         ASSESSMENT:  Amelia Coker is a pleasant 46 year old right hand dominant female who presents with:    #. Right upper extremity neuropathic pain and symptoms. Somewhat unclear etiology but she does have findings consistent with thoracic outlet syndrome, less consistent picture for radiculopathy given multiple dermatomal distribution.      She has experienced incomplete/inadequate symptomatic relief from multiple pharmacologic trials, protracted course of physical therapy, interventional pain procedures, and w/ ongoing surgical evaluation     PLAN:  - Sonographically-guided right anterior scalene diagnostic block performed today (see detailed procedure note)   - Agree with EMG/NCS of RUE.  - Follow-up with vascular surgery and neurosurgery as scheduled.   - Follow-up will be determined after review of pain diary. We can consider US-guided right PecMinor block in the future depending on her response      Ready to learn, no apparent learning barriers.  Education provided on treatment plan according to patient's preferred learning style.  Patient verbalizes understanding.   __________________________________  Brandi Dennis MD  Physical Medicine & Rehabilitation        67 minutes spent on the date of the encounter doing chart review, review of outside records, review of test results, patient visit and documentation

## 2021-04-16 NOTE — NURSING NOTE
Amelia Coker's chief complaint for this visit includes:  Chief Complaint   Patient presents with     RECHECK     pain and swelling in left 2nd & 3rd toes     PCP: Otoniel Nelson    Referring Provider:  No referring provider defined for this encounter.    /85 (BP Location: Left arm, Patient Position: Sitting, Cuff Size: Adult Regular)   Pulse 57   SpO2 99%   Severe Pain (7)     Do you need any medication refills at today's visit? No    Mable Vail CMA

## 2021-04-16 NOTE — LETTER
4/16/2021         RE: Amelia Coker  [address] [facility] [address]        Dear Colleague,    Thank you for referring your patient, Amelia Coker, to the [facility]. Please see a copy of my visit note below.    Past Medical History:   Diagnosis Date     Abnormal mammogram 9/21/2016    right breast      AD (atopic dermatitis) 10/29/2015     Allergic rhinitis due to other allergen      Arthritis      Bell's palsy      Chronic fatigue 6/6/2012     Chronic infection     MRSA - 2015 scalp and prior to Abdomen     Contact dermatitis and other eczema, due to unspecified cause     eczema     Contusion of left foot, initial encounter 3/16/2016     DJD (degenerative joint disease), lumbar 9/26/2013     DJD (degenerative joint disease), lumbar 9/26/2013     Ds DNA antibody positive 4/16/2014    borderline.      Elevated blood pressure reading without diagnosis of hypertension 12/24/2013     Facial contusion 12/15/2014    hit by horse      Foraminal stenosis of lumbar region 9/26/2013     Frontal headache 12/15/2014     Gastroesophageal reflux disease, esophagitis presence not specified 6/29/2016     Heat sensitivity 10/29/2015     HTN, goal below 140/90 9/23/2015     Hyperlipidemia LDL goal <130 8/30/2017     Irregular heart beat      Keratitis sicca (H) 10/31/2012     Left shoulder pain 9/26/2013     Lumbar radiculopathy 1/22/2014     Migraine headache 10/23/2013     Migraine, unspecified, with intractable migraine, so stated, without mention of status migrainosus      Motion sickness      MRSA infection 10/20/2015     Muscular wasting and disuse atrophy, not elsewhere classified 6/9/2014    right SCM, right wrist,       Numbness and tingling     both legs anf feet greater on the left     PAC (premature atrial contraction) 7/15/2010     Plantar fasciitis 9/23/2015     PONV (postoperative nausea and vomiting)      Skin lesion 10/20/2015    posterior superior scalp      Spasm of muscle  7/11/2017     Telangiectasia of face 12/19/2014     Tooth pain 10/20/2015    left lower primary molar      Patient Active Problem List   Diagnosis     Migraine, unspecified, with intractable migraine, so stated, without mention of status migrainosus     Bell's palsy     Contact dermatitis and other eczema, due to unspecified cause     Scalp lesion     Lyme disease     PAC (premature atrial contraction)     Palpitations     Raynaud phenomenon     Chronic fatigue     Hair loss     Abnormal PFT     Shoulder joint instability     Family history of melanoma     Dry eyes     Keratitis sicca (H)     Family history of colon cancer     Pain in joint, upper arm     FLORIDALMA positive     Plantar fascial fibromatosis     Foraminal stenosis of lumbar region     DJD (degenerative joint disease), lumbar     Migraine without aura and without status migrainosus, not intractable     Lumbar radiculopathy     Ds DNA antibody positive     Muscular wasting and disuse atrophy, not elsewhere classified     Frontal headache     Telangiectasia of face     Lateral epicondylitis     Right shoulder pain     Plantar fasciitis     Skin lesion     AD (atopic dermatitis)     Heat sensitivity     Rotator cuff arthropathy, right     Gastroesophageal reflux disease, esophagitis presence not specified     Abnormal mammogram     Sternocleidomastoid muscle tenderness     Acute cervical myofascial strain, initial encounter     Spasm of muscle     Hyperlipidemia LDL goal <130     Biceps tendinopathy, right     Superficial swelling of scalp     Intractable right heel pain     Intractable episodic tension-type headache     Jaycob complexion     Lip lesion     Abdominal pain, epigastric     Abdominal distension     PONV (postoperative nausea and vomiting)     Menorrhalgia     Uterine polyp     Loose stools     Hyperactive bowel sounds     Abnormality of nail surface     Dry hair     Dry skin     Malaise and fatigue     Lymphadenopathy     Herniation of  intervertebral disc between L5 and S1     Endometrium, hyperplasia - on recent CT scan     Pain in joint involving pelvic region and thigh, right     Right lateral abdominal pain     Right foot pain     Dental abscess - left mandible molar     Brachial plexopathy - right shoulder     Balance problems     Morbid obesity (H)     Sinus arrhythmia seen on electrocardiogram     Family history of ischemic heart disease - father at 46 massive MI     Elevated blood pressure reading without diagnosis of hypertension     Hypertrophy of breast     History of cold sores     AK (actinic keratosis) - both hands at the lateral thenar aspect.     Dermatitis     Musculoskeletal pain     Fatigue, unspecified type     Past Surgical History:   Procedure Laterality Date     AS INJ TRANSFORAMIN EPIDURAL, LUMB/SACR SINGLE       COLONOSCOPY  3/11/2013    Procedure: COLONOSCOPY;  colonoscopy;  Surgeon: Lobito Mas MD;  Location: [facility]     COLONOSCOPY WITH CO2 INSUFFLATION N/A 11/19/2018    Procedure: COLONOSCOPY WITH CO2 INSUFFLATION;  Surgeon: Goyo Blank MD;  Location: [facility]     DECOMPRESSION LUMBAR TWO LEVELS Bilateral 12/8/2016    Procedure: DECOMPRESSION LUMBAR TWO LEVELS;  Surgeon: Bang Burrell MD;  Location: [facility]     DILATION AND CURETTAGE, HYSTEROSCOPY, ABLATE ENDOMETRIUM NOVASURE, COMBINED N/A 6/12/2019    Procedure: hysteroscopy, dilation & curettage, polypectomy, endometrial ablation;  Surgeon: Fredy Pham MD;  Location: [facility]     ESOPHAGOSCOPY, GASTROSCOPY, DUODENOSCOPY (EGD), COMBINED  11/8/2013    Procedure: COMBINED ESOPHAGOSCOPY, GASTROSCOPY, DUODENOSCOPY (EGD), BIOPSY SINGLE OR MULTIPLE;  Esophagoscopy, Gastroscopy, Duodenoscopy EGD with multiple biopsies;  Surgeon: Teja Koch MD;  Location: [facility]     ESOPHAGOSCOPY, GASTROSCOPY, DUODENOSCOPY (EGD), COMBINED N/A 2/18/2021    Procedure: ESOPHAGOGASTRODUODENOSCOPY, WITH BIOPSY;  Surgeon: Blanca Ba MD;  Location: [facility]      HC REMOVAL OF TONSILS,<11 Y/O      Tonsils <10y.o.     HC TOOTH EXTRACTION W/FORCEP       INJECT BLOCK MEDIAL BRANCH CERVICAL/THORACIC/LUMBAR N/A 7/23/2020    Procedure: Sacral 1,2,3 Lateral Branch Blocks and lumbar 5 medial branch blocks bilaterally;  Surgeon: Maurisio Goetz MD;  [facility]: [facility]     INJECT JOINT SACROILIAC Left 9/24/2020    Procedure: Left Lumbar 5 Sacral 1 nerve root injections.;  Surgeon: Maurisio Goetz MD;  Location: [facility]     REPAIR TENDON ELBOW  7/9/2014    Procedure: REPAIR TENDON ELBOW;  Surgeon: Chris Johnston MD;  Location: [facility]     ULTRASONIC REMOVAL SOFT TISSUE (LOCATION) Right 11/21/2018    Procedure: Tenex right foot;  Surgeon: Patel Martínez DO;  Location: [facility]     Social History     Socioeconomic History     Marital status:      Spouse name: Robert     Number of children: 2     Years of education: 12     Highest education level: Not on file   Occupational History     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   Social Needs     Financial resource strain: Not on file     Food insecurity     Worry: Not on file     Inability: Not on file     Transportation needs     Medical: Not on file     Non-medical: Not on file   Tobacco Use     Smoking status: Never Smoker     Smokeless tobacco: Never Used   Substance and Sexual Activity     Alcohol use: No     Comment: social     Drug use: No     Sexual activity: Yes     Partners: Male     Birth control/protection: Surgical     Comment: vasectomy   Lifestyle     Physical activity     Days per week: Not on file     Minutes per session: Not on file     Stress: Not on file   Relationships     Social connections     Talks on phone: Not on file     Gets together: Not on file     Attends [employer] service: Not on file     Active member of club or organization: Not on file     Attends meetings of clubs or organizations: Not on file     Relationship status: Not on file     Intimate partner violence     Fear of current or ex  partner: Not on file     Emotionally abused: Not on file     Physically abused: Not on file     Forced sexual activity: Not on file   Other Topics Concern      Service No     Blood Transfusions No     Caffeine Concern No     Comment: 0     Occupational Exposure No     Hobby Hazards Yes     Comment: horses     Sleep Concern No     Stress Concern No     Weight Concern Yes     Special Diet Yes     Comment: nhung's     Back Care No     Exercise Yes     Comment: occ     Bike Helmet Not Asked     Comment: na     Seat Belt Yes     Self-Exams Yes     Comment: occ     Parent/sibling w/ CABG, MI or angioplasty before 65F 55M? Not Asked   Social History Narrative     Not on file     Family History   Problem Relation Age of Onset     Diabetes Mother         adult     Cancer - colorectal Mother      Hypertension Mother      Allergies Mother      Cancer Mother         colon     Depression Mother      Gastrointestinal Disease Mother         ibs     Genitourinary Problems Mother         kidney cyst     Gynecology Mother         endometriosis     Lipids Mother      Thyroid Disease Mother      Arthritis Mother         RA     Heart Disease Maternal Grandfather         MI,  - 60's     Allergies Father      Unknown/Adopted Father      Heart Disease Father         mi-age mid 40's     Cardiovascular Father      Lipids Father      Respiratory Father         asthma     Cancer Father      Other Cancer Father         renal cancer     Depression Sister      Allergies Sister      Cancer Sister         vaginal     Gynecology Sister         endometriosis     Lipids Paternal Grandmother      Osteoporosis Paternal Grandmother      Thyroid Disease Paternal Grandmother      Respiratory Son         asthma     Allergies Son      Arthritis Sister      Allergies Brother      Heart Disease Brother      Allergies Daughter      Blood Disease Paternal Aunt         LGL T cell Leukemia     Rheumatoid Arthritis Paternal Aunt      Kidney Disease  Maternal Aunt      Lupus Maternal Aunt      Bladder Cancer Maternal Aunt      SUBJECTIVE FINDINGS:  A 46-year-old female returns to clinic for left foot pain.  She relates it hurts in the second and third MPJs, to a lesser degree the fourth and fifth, mostly in the second, though.  She relates it kind of feels like it pops at times.  She relates that this has gone on for about 1-2 months and worsening.  She relates that she tried a pad in her shoe and that seemed to make it worse.  She relates she has used orthotics in the past.  She relates she has had numbness and weakness in her foot since her back surgery in November, and that numbness is in the second and third toes, up the dorsolateral foot, to a lesser degree the fourth and fifth toes, and up into the leg.  She relates she does have a little bit of a drop foot.  They offered her a brace, but she does not want to wear a brace so she doesn't have that.  She relates she does have CAM Boots at home because she has had surgeries in the past.  In that foot she had a midfoot bone cyst removed in the past.  She had plantar fascia partially released.  She relates that the CAM Boot hurts her back, though, when she wears it.  She relates she did get a corticosteroid injection yesterday in her back.  She relates she has tried CBD oil, lidocaine, Voltaren gel and none of that has helped.  She is not supposed to be taking NSAIDs.  She has taken meloxicam for this.      OBJECTIVE FINDINGS:  DP and PT are 2/4, left.  She has pain on palpation of the left second and third greater than fourth and fifth MPJs.  There is no erythema, no drainage, no odor.  She does have some edema of the second and third MPJs.  She relates this gets worse as the day goes on.  There is no palpable click or shooting pain in the interspaces.  No gross tendon voids.        X-rays were reviewed with the patient in clinic today.  There is no fracture.  Joint and cortical margins are intact.  She does  have some mild cortical irregularity at the second proximal phalanx.  She has slightly medially deviated second and third toes.  She has laterally deviated sesamoids, plantar and posterior calcaneal spurring, anterior break in the cyma lines, some joint space narrowing and some chondral sclerosis of the talonavicular joint, elevated first metatarsal noted.  She also has what appears to be a bony fragment of the proximal fourth metatarsal.  She relates that is kind of the area they took the cyst from previously.       ASSESSMENT AND PLAN:  Left foot pain.  Capsulitis, left foot.  She does have some drop foot symptoms as well.  Diagnosis and treatment options were discussed with her.  Advised her to start her CAM Boot again.  We will give this a try.  This is quite painful.  We dispensed a shoe lift for the right to even it out.  She relates the imbalance with the CAM Boot makes this worse.  Metatarsal pad dispensed and use discussed with her.  Discussed with her corticosteroid injection.  We will hold off on that, and I will see her back in 2 weeks.  Previous notes reviewed.           Again, thank you for allowing me to participate in the care of your patient.        Sincerely,        Ramon Wright DPM

## 2021-04-16 NOTE — PATIENT INSTRUCTIONS
Thanks for coming today.  [facility]  [address] [place] [address]    To schedule future appointments in Ortho Clinic, you may call [phone].    To schedule ordered imaging by your provider:   Call [facility] [phone]    To schedule an injection ordered by your provider:  Call [facility] Injection scheduling line: [phone]  [facility] available online at:  [email or web address]    Please call if any further questions or concerns ([phone]).  Clinic hours 8 am to 5 pm.    Return to clinic (call) if symptoms worsen or fail to improve.

## 2021-04-16 NOTE — PROGRESS NOTES
Past Medical History:   Diagnosis Date     Abnormal mammogram 9/21/2016    right breast      AD (atopic dermatitis) 10/29/2015     Allergic rhinitis due to other allergen      Arthritis      Bell's palsy      Chronic fatigue 6/6/2012     Chronic infection     MRSA - 2015 scalp and prior to Abdomen     Contact dermatitis and other eczema, due to unspecified cause     eczema     Contusion of left foot, initial encounter 3/16/2016     DJD (degenerative joint disease), lumbar 9/26/2013     DJD (degenerative joint disease), lumbar 9/26/2013     Ds DNA antibody positive 4/16/2014    borderline.      Elevated blood pressure reading without diagnosis of hypertension 12/24/2013     Facial contusion 12/15/2014    hit by horse      Foraminal stenosis of lumbar region 9/26/2013     Frontal headache 12/15/2014     Gastroesophageal reflux disease, esophagitis presence not specified 6/29/2016     Heat sensitivity 10/29/2015     HTN, goal below 140/90 9/23/2015     Hyperlipidemia LDL goal <130 8/30/2017     Irregular heart beat      Keratitis sicca (H) 10/31/2012     Left shoulder pain 9/26/2013     Lumbar radiculopathy 1/22/2014     Migraine headache 10/23/2013     Migraine, unspecified, with intractable migraine, so stated, without mention of status migrainosus      Motion sickness      MRSA infection 10/20/2015     Muscular wasting and disuse atrophy, not elsewhere classified 6/9/2014    right SCM, right wrist,       Numbness and tingling     both legs anf feet greater on the left     PAC (premature atrial contraction) 7/15/2010     Plantar fasciitis 9/23/2015     PONV (postoperative nausea and vomiting)      Skin lesion 10/20/2015    posterior superior scalp      Spasm of muscle 7/11/2017     Telangiectasia of face 12/19/2014     Tooth pain 10/20/2015    left lower primary molar      Patient Active Problem List   Diagnosis     Migraine, unspecified, with intractable migraine, so stated, without mention of status migrainosus      Bell's palsy     Contact dermatitis and other eczema, due to unspecified cause     Scalp lesion     Lyme disease     PAC (premature atrial contraction)     Palpitations     Raynaud phenomenon     Chronic fatigue     Hair loss     Abnormal PFT     Shoulder joint instability     Family history of melanoma     Dry eyes     Keratitis sicca (H)     Family history of colon cancer     Pain in joint, upper arm     FLORIDALMA positive     Plantar fascial fibromatosis     Foraminal stenosis of lumbar region     DJD (degenerative joint disease), lumbar     Migraine without aura and without status migrainosus, not intractable     Lumbar radiculopathy     Ds DNA antibody positive     Muscular wasting and disuse atrophy, not elsewhere classified     Frontal headache     Telangiectasia of face     Lateral epicondylitis     Right shoulder pain     Plantar fasciitis     Skin lesion     AD (atopic dermatitis)     Heat sensitivity     Rotator cuff arthropathy, right     Gastroesophageal reflux disease, esophagitis presence not specified     Abnormal mammogram     Sternocleidomastoid muscle tenderness     Acute cervical myofascial strain, initial encounter     Spasm of muscle     Hyperlipidemia LDL goal <130     Biceps tendinopathy, right     Superficial swelling of scalp     Intractable right heel pain     Intractable episodic tension-type headache     Jaycob complexion     Lip lesion     Abdominal pain, epigastric     Abdominal distension     PONV (postoperative nausea and vomiting)     Menorrhalgia     Uterine polyp     Loose stools     Hyperactive bowel sounds     Abnormality of nail surface     Dry hair     Dry skin     Malaise and fatigue     Lymphadenopathy     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     Endometrium, hyperplasia - on recent CT scan     Pain in joint involving pelvic region and thigh, right     Right lateral abdominal pain     Right foot pain     Dental abscess - left mandible molar     Brachial plexopathy -  right shoulder     Balance problems     Morbid obesity (H)     Sinus arrhythmia seen on electrocardiogram     Family history of ischemic heart disease - father at 46 massive MI     Elevated blood pressure reading without diagnosis of hypertension     Hypertrophy of breast     History of cold sores     AK (actinic keratosis) - both hands at the lateral thenar aspect.     Dermatitis     Musculoskeletal pain     Fatigue, unspecified type     Past Surgical History:   Procedure Laterality Date     AS INJ TRANSFORAMIN EPIDURAL, LUMB/SACR SINGLE       COLONOSCOPY  3/11/2013    Procedure: COLONOSCOPY;  colonoscopy;  Surgeon: Lobito Mas MD;  Location: [facility]     COLONOSCOPY WITH CO2 INSUFFLATION N/A 11/19/2018    Procedure: COLONOSCOPY WITH CO2 INSUFFLATION;  Surgeon: Goyo Blank MD;  Location: [facility]     DECOMPRESSION LUMBAR TWO LEVELS Bilateral 12/8/2016    Procedure: DECOMPRESSION LUMBAR TWO LEVELS;  Surgeon: Bang Burrell MD;  Location: [facility]     DILATION AND CURETTAGE, HYSTEROSCOPY, ABLATE ENDOMETRIUM NOVASURE, COMBINED N/A 6/12/2019    Procedure: hysteroscopy, dilation & curettage, polypectomy, endometrial ablation;  Surgeon: Fredy Pham MD;  Location: [facility]     ESOPHAGOSCOPY, GASTROSCOPY, DUODENOSCOPY (EGD), COMBINED  11/8/2013    Procedure: COMBINED ESOPHAGOSCOPY, GASTROSCOPY, DUODENOSCOPY (EGD), BIOPSY SINGLE OR MULTIPLE;  Esophagoscopy, Gastroscopy, Duodenoscopy EGD with multiple biopsies;  Surgeon: Teja Koch MD;  [facility]: [facility]     ESOPHAGOSCOPY, GASTROSCOPY, DUODENOSCOPY (EGD), COMBINED N/A 2/18/2021    Procedure: ESOPHAGOGASTRODUODENOSCOPY, WITH BIOPSY;  Surgeon: Blanca Ba MD;  Location: [facility]     HC REMOVAL OF TONSILS,<9 Y/O      Tonsils <10y.o.     HC TOOTH EXTRACTION W/FORCEP       INJECT BLOCK MEDIAL BRANCH CERVICAL/THORACIC/LUMBAR N/A 7/23/2020    Procedure: Sacral 1,2,3 Lateral Branch Blocks and lumbar 5 medial branch blocks bilaterally;  Surgeon:  Maurisio Goetz MD;  [facility]     INJECT JOINT SACROILIAC Left 9/24/2020    Procedure: Left Lumbar 5 Sacral 1 nerve root injections.;  Surgeon: Maurisio Goetz MD;  [facility]     REPAIR TENDON ELBOW  7/9/2014    Procedure: REPAIR TENDON ELBOW;  Surgeon: Chris Johnston MD;  [facility]: [facility]     ULTRASONIC REMOVAL SOFT TISSUE (LOCATION) Right 11/21/2018    Procedure: Tenex right foot;  Surgeon: Patel Martínez DO;  Location: [facility]     Social History     Socioeconomic History     Marital status:      Spouse name: Robert     Number of children: 2     Years of education: 12     Highest education level: Not on file   Occupational History     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   Social Needs     Financial resource strain: Not on file     Food insecurity     Worry: Not on file     Inability: Not on file     Transportation needs     Medical: Not on file     Non-medical: Not on file   Tobacco Use     Smoking status: Never Smoker     Smokeless tobacco: Never Used   Substance and Sexual Activity     Alcohol use: No     Comment: social     Drug use: No     Sexual activity: Yes     Partners: Male     Birth control/protection: Surgical     Comment: vasectomy   Lifestyle     Physical activity     Days per week: Not on file     Minutes per session: Not on file     Stress: Not on file   Relationships     Social connections     Talks on phone: Not on file     Gets together: Not on file     Attends Buddhist service: Not on file     Active member of club or organization: Not on file     Attends meetings of clubs or organizations: Not on file     Relationship status: Not on file     Intimate partner violence     Fear of current or ex partner: Not on file     Emotionally abused: Not on file     Physically abused: Not on file     Forced sexual activity: Not on file   Other Topics Concern      Service No     Blood Transfusions No     Caffeine Concern No     Comment: 0     Occupational Exposure  No     Hobby Hazards Yes     Comment: horses     Sleep Concern No     Stress Concern No     Weight Concern Yes     Special Diet Yes     Comment: nhung's     Back Care No     Exercise Yes     Comment: occ     Bike Helmet Not Asked     Comment: na     Seat Belt Yes     Self-Exams Yes     Comment: occ     Parent/sibling w/ CABG, MI or angioplasty before 65F 55M? Not Asked   Social History Narrative     Not on file     Family History   Problem Relation Age of Onset     Diabetes Mother         adult     Cancer - colorectal Mother      Hypertension Mother      Allergies Mother      Cancer Mother         colon     Depression Mother      Gastrointestinal Disease Mother         ibs     Genitourinary Problems Mother         kidney cyst     Gynecology Mother         endometriosis     Lipids Mother      Thyroid Disease Mother      Arthritis Mother         RA     Heart Disease Maternal Grandfather         MI,  - 60's     Allergies Father      Unknown/Adopted Father      Heart Disease Father         mi-age mid 40's     Cardiovascular Father      Lipids Father      Respiratory Father         asthma     Cancer Father      Other Cancer Father         renal cancer     Depression Sister      Allergies Sister      Cancer Sister         vaginal     Gynecology Sister         endometriosis     Lipids Paternal Grandmother      Osteoporosis Paternal Grandmother      Thyroid Disease Paternal Grandmother      Respiratory Son         asthma     Allergies Son      Arthritis Sister      Allergies Brother      Heart Disease Brother      Allergies Daughter      Blood Disease Paternal Aunt         LGL T cell Leukemia     Rheumatoid Arthritis Paternal Aunt      Kidney Disease Maternal Aunt      Lupus Maternal Aunt      Bladder Cancer Maternal Aunt      SUBJECTIVE FINDINGS:  A 46-year-old female returns to clinic for left foot pain.  She relates it hurts in the second and third MPJs, to a lesser degree the fourth and fifth, mostly in the  second, though.  She relates it kind of feels like it pops at times.  She relates that this has gone on for about 1-2 months and worsening.  She relates that she tried a pad in her shoe and that seemed to make it worse.  She relates she has used orthotics in the past.  She relates she has had numbness and weakness in her foot since her back surgery in November, and that numbness is in the second and third toes, up the dorsolateral foot, to a lesser degree the fourth and fifth toes, and up into the leg.  She relates she does have a little bit of a drop foot.  They offered her a brace, but she does not want to wear a brace so she doesn't have that.  She relates she does have CAM Boots at home because she has had surgeries in the past.  In that foot she had a midfoot bone cyst removed in the past.  She had plantar fascia partially released.  She relates that the CAM Boot hurts her back, though, when she wears it.  She relates she did get a corticosteroid injection yesterday in her back.  She relates she has tried CBD oil, lidocaine, Voltaren gel and none of that has helped.  She is not supposed to be taking NSAIDs.  She has taken meloxicam for this.      OBJECTIVE FINDINGS:  DP and PT are 2/4, left.  She has pain on palpation of the left second and third greater than fourth and fifth MPJs.  There is no erythema, no drainage, no odor.  She does have some edema of the second and third MPJs.  She relates this gets worse as the day goes on.  There is no palpable click or shooting pain in the interspaces.  No gross tendon voids.        X-rays were reviewed with the patient in clinic today.  There is no fracture.  Joint and cortical margins are intact.  She does have some mild cortical irregularity at the second proximal phalanx.  She has slightly medially deviated second and third toes.  She has laterally deviated sesamoids, plantar and posterior calcaneal spurring, anterior break in the cyma lines, some joint space  narrowing and some chondral sclerosis of the talonavicular joint, elevated first metatarsal noted.  She also has what appears to be a bony fragment of the proximal fourth metatarsal.  She relates that is kind of the area they took the cyst from previously.       ASSESSMENT AND PLAN:  Left foot pain.  Capsulitis, left foot.  She does have some drop foot symptoms as well.  Diagnosis and treatment options were discussed with her.  Advised her to start her CAM Boot again.  We will give this a try.  This is quite painful.  We dispensed a shoe lift for the right to even it out.  She relates the imbalance with the CAM Boot makes this worse.  Metatarsal pad dispensed and use discussed with her.  Discussed with her corticosteroid injection.  We will hold off on that, and I will see her back in 2 weeks.  Previous notes reviewed.

## 2021-04-16 NOTE — PROCEDURES
PROCEDURE NOTE: Anterior Scalene Block Under Ultrasound Guidance    PROCEDURE DATE: 4/16/2021    PATIENT NAME: Amelia Coker  YOB: 1975    ATTENDING PHYSICIAN: Brandi Dennis MD  FELLOW/RESIDENT PHYSICIAN: None    PREOPERATIVE DIAGNOSIS:   1. TOS (thoracic outlet syndrome)  (primary encounter diagnosis)  2. Neuropathic pain   POSTOPERATIVE DIAGNOSIS: same    PROCEDURE PERFORMED: Right Anterior Scalene Diagnostic Block Under Ultrasound Guidance    ULTRASOUND WAS USED.    INDICATIONS FOR THE PROCEDURE:  Amelia Coker is a 46 year old female who presents with TOS and neuropathic pain affecting the right upper limb.      PROCEDURE AND FINDINGS:  She was greeted in the clinic. The risk, benefits and alternatives to the procedure were again reviewed with her and informed consent was obtained and the patient agreed to proceed. A time-out was performed. Following review alternatives, benefits and risks, the procedure was carried out under sterile prep with sterile gel. The use of direct sonographic guidance was used to ensure accurate placement of the needle (rather than non-guided injection) and required to minimize the risk of bleeding or injury to nearby neurovascular structures.     A 15-6MHz ultrasound transducer was used to visualize the relevant structures and determine the optimal needle path for the procedure. A 25 gauge  2-inch needle was advanced from utilizing an out-of-plane approach in short axis, under continuous ultrasound guidance to the anterior scalene muscle on the right. After negative aspiration, slow injection of the treatment solution 2mL total consisting of 2% Lidocaine was instilled into affected area. The tip of the needle was visualized intermittently throughout the procedure. The remainder of the single-use vials were discarded.      She tolerated the procedure well, was discharged home in stable condition.     Follow-up will be determined after review of pain diary.      COMPLICATIONS: None    COMMENTS: None

## 2021-05-04 NOTE — PROGRESS NOTES
[place] [facility]    Amelia Coker was referred to see us today for evaluation of potential neurogenic TOS.  This 47-year-old right-handed woman with chronic neck and shoulder who started developing symptoms much more with her right hand left arm over a year ago.  She has some chronic numbness or other issues to the ulnar nerve distribution of her hand and does notice in positions that she will develop tingling numbness more of the median nerve distribution of her hand (-primarily second and third digits).   Carpal tunnel syndrome has been ruled out by EMG.  Mild disc disease but felt not related this by her neck surgeons.  She actually notices that her symptoms are worse when her arms are at her side and forward and she actually feels better when her arm is out to her side and directed posteriorly with abduction.  Likely has a history of Raynaud's disease that is relatively asymptomatic.    She did have an injection into her anterior scalene muscle on 4/16/2021 that gave her no relief and actually some more numbness in her neck region.  The physicians involved with this were discussing the possibility of a block of the pectoralis minor muscle in case this was the etiology particular with the position that causes her symptoms.  It also talked about Botox injections.    Previous testing include no evidence of venous compression on 6/4/2020.  However the PPG in the arteries with maneuvers showed significant compression much more in the right than left arm.  This did imply a narrowed thoracic outlet.    She is undergone extensive physical therapy with no improvement.  Noted to have hypermobility in her neck and clavicle area.  History of multiple shoulder dislocations and still hypermobile in the shoulder also.    Patient had a virtual visit with Dr. Mock of the vascular surgery department on 6/17/2020.  At that time she discussed the possibility of a transaxillary first rib resection and scalenectomy  for this neurogenic TOS since her was evidence of arterial compression and thus indirectly implies that there may be compression of the brachial plexus.      PMH: Medications: Allegra, Carafate, Mobic, Maxalt as needed migraine     History of Bell's palsy over 20 years ago.  Still feel that the used to affect her right more than left neck muscles.       7/22/2020  Carotid duplex revealed mild disease bilaterally.  Right ICA PSV= 123 cm/s and left ICA PSV= 120 cm/s.  Normal end-diastolic velocity and ratio.    No cervical rib on CT of head and chest 10/30/2017    Exam: Alert and appropriate.  Normal affect.             Blood pressure 114/75.  Pulse 63.             HEENT= unremarkable.  Normal clavicle.             Chest= clear   Cardiovascular= regular rate              Upper extremities are unremarkable.  Full range of motion.    +3 radial pulses.  No decrease in pulse with arm elevated and externally rotated  On the right with her arm elevated she does notice numbness and tingling in her                             median nerve digits.  No swelling.    I reviewed the images of the CT scans and chest x-rays along with carotid duplex and upper extremity venous studies with maneuvers and discussed with patient.    Impression: Does have symptoms compatible with neurogenic right TOS.  Her subclavian artery compression with arms elevated does not play there is a narrow thoracic outlet which certainly could cause compression on her brachial plexus.  However, her symptoms are very unusual since her physician that she is more comfortable this with her arm to her side and 90 degrees and externally rotated which usually will cause more irritation of the brachial plexus if this occurring at the thoracic outlet.           This does raise the possibility of pectoralis minor syndrome.  This is a more difficult diagnosis due to its rarity and thus more difficult to determine whether it is the exact cause and we discussed this at  length.  Obviously treatment is different between this syndrome more treatment is dividing the attachment of the pectoralis minor muscle versus the transaxillary rib resection and scalenectomy for the more classical TOS.  This was discussed at length today.  It may be reasonable for them to perform the block of the pectoralis minor muscle to see if she does notice at least temporary relief which certainly could be prognostic postsurgical location.  Long-term morbidity of dividing the pectoralis minor muscle is relatively limited but obviously if this is not because he will not be any better.  I do not feel that Botox injections in either the anterior scalene muscle or pectoralis minor muscle and the needle in her situation and this was also discussed.      We spent 45 minutes today discussing these issues.  She will follow up with us following the pectoralis minor block and make further decisions.    Davie Dutta MD    CC: LC Carr PA-C

## 2021-05-05 ENCOUNTER — PRE VISIT (OUTPATIENT)
Dept: ALLERGY | Facility: CLINIC | Age: 46
End: 2021-05-05

## 2021-05-05 ENCOUNTER — OFFICE VISIT (OUTPATIENT)
Dept: ALLERGY | Facility: CLINIC | Age: 46
End: 2021-05-05
Attending: ALLERGY & IMMUNOLOGY
Payer: COMMERCIAL

## 2021-05-05 DIAGNOSIS — L20.89 OTHER ATOPIC DERMATITIS: ICD-10-CM

## 2021-05-05 DIAGNOSIS — J31.0 CHRONIC RHINITIS: ICD-10-CM

## 2021-05-05 DIAGNOSIS — L30.9 DERMATITIS: ICD-10-CM

## 2021-05-05 DIAGNOSIS — L23.89 ALLERGIC CONTACT DERMATITIS DUE TO OTHER AGENTS: ICD-10-CM

## 2021-05-05 DIAGNOSIS — Z88.9 DRUG ALLERGY, MULTIPLE: Primary | ICD-10-CM

## 2021-05-05 DIAGNOSIS — Z88.9 ATOPY: ICD-10-CM

## 2021-05-05 PROCEDURE — 99204 OFFICE O/P NEW MOD 45 MIN: CPT | Mod: 25 | Performed by: DERMATOLOGY

## 2021-05-05 PROCEDURE — 95004 PERQ TESTS W/ALRGNC XTRCS: CPT | Mod: GC | Performed by: DERMATOLOGY

## 2021-05-05 RX ORDER — SULFAMETHOXAZOLE AND TRIMETHOPRIM 80; 16 MG/ML; MG/ML
80 INJECTION INTRAVENOUS ONCE
Status: DISCONTINUED | OUTPATIENT
Start: 2021-06-21 | End: 2021-07-27

## 2021-05-05 RX ORDER — CEFTAZIDIME 1 G/1
INJECTION, POWDER, FOR SOLUTION INTRAMUSCULAR; INTRAVENOUS
Qty: 1 EACH | Refills: 0 | Status: ON HOLD | OUTPATIENT
Start: 2021-06-21 | End: 2021-07-27

## 2021-05-05 RX ORDER — CEFAZOLIN SODIUM 1 G
500 VIAL (EA) INJECTION ONCE
Status: DISCONTINUED | OUTPATIENT
Start: 2021-06-21 | End: 2021-07-27

## 2021-05-05 RX ORDER — AMPICILLIN 1 G/1
1 INJECTION, POWDER, FOR SOLUTION INTRAMUSCULAR; INTRAVENOUS ONCE
Status: DISCONTINUED | OUTPATIENT
Start: 2021-06-21 | End: 2021-07-27

## 2021-05-05 RX ORDER — PENICILLIN G POTASSIUM 5000000 [IU]/1
5000000 INJECTION, POWDER, FOR SOLUTION INTRAMUSCULAR; INTRAVENOUS ONCE
Status: DISCONTINUED | OUTPATIENT
Start: 2021-06-21 | End: 2021-07-27

## 2021-05-05 RX ORDER — CEFTRIAXONE SODIUM 1 G
250 VIAL (EA) INJECTION ONCE
Status: DISCONTINUED | OUTPATIENT
Start: 2021-06-21 | End: 2021-07-27

## 2021-05-05 NOTE — NURSING NOTE
Dermatology Rooming Note    Amelia Coker's goals for this visit include:   Chief Complaint   Patient presents with     Allergy Consult     skin allergies, self reported (metals) - needs a spinal fusion - concerned about allergies to metals. Wants to find out if there are any metals I am not allergic to as well as some other products like soaps,lotations, ect.

## 2021-05-05 NOTE — PROGRESS NOTES
"[facility] Dermato-allergology Note  Office visit  Encounter Date: May 5, 2021  ____________________________________________    CC: No chief complaint on file.      HPI:  Ms. Amelia Coker is a(n) 46 year old female who presents today as a new patient for allergy consultation.  - carries a diagnosis of eczema. Appears in flexural folds, neck, eyelids. Uses topical steroids: clobetasol and triamcinolone as needed for flares.   - Saw an allergist in [place] who recommended she be seen here for allergy testing for a potential explanation of the sores on her scalp and skin allergies. Sensitive to lotions, shampoos, laundry detergent, dressings, and soaps and skin will \"break out.\" Prior allergy testing with allergy to cats, dogs, pollen, trees, grass, dust mites, mold, guinea pigs. Has a sore on her scalp that has been present for 4 months. A biopsy was done by PCP in [facility] that showed \"inflammatory cells.\"  - Also planning a spinal fusion and is allergic to nickel, white gold, copper, yellow gold, alfie silver and possibly ?other metals. When these contact her skin, she itchy red skin changes as well as blisters. For spinal fusion, plan is to use a  \"titanium alloy with nickel\" (Dr Adames, [facility] Has had allergy to surgical staples in the past. Has had allergy testing to 6 metals (unknown which) with dentistry and was allergic to all 6 so uses porcelain crowns.   - hx latex allergy    She has been holding topical steroids and antihistamines prior to this visit.       Physical exam:  General: In no acute distress, well-developed, well-nourished  Eyes: no conjunctivitis  ENT: no signs of rhinitis   Pulmonary: no wheezing or coughing  Skin: Focused examination of the skin on test sites was performed = see test results below  - 5mm brown crust on a pink macule on R frontal-parietal scalp  - L>R hand has scale and xerosis and pinkness on side of fingers, between digits, " and dorsal wrist.    Past Medical History:   Patient Active Problem List   Diagnosis     Migraine, unspecified, with intractable migraine, so stated, without mention of status migrainosus     Bell's palsy     Contact dermatitis and other eczema, due to unspecified cause     Scalp lesion     Lyme disease     PAC (premature atrial contraction)     Palpitations     Raynaud phenomenon     Chronic fatigue     Hair loss     Abnormal PFT     Shoulder joint instability     Family history of melanoma     Dry eyes     Keratitis sicca (H)     Family history of colon cancer     Pain in joint, upper arm     FLORIDALMA positive     Plantar fascial fibromatosis     Foraminal stenosis of lumbar region     DJD (degenerative joint disease), lumbar     Migraine without aura and without status migrainosus, not intractable     Lumbar radiculopathy     Ds DNA antibody positive     Muscular wasting and disuse atrophy, not elsewhere classified     Frontal headache     Telangiectasia of face     Lateral epicondylitis     Right shoulder pain     Plantar fasciitis     Skin lesion     AD (atopic dermatitis)     Heat sensitivity     Rotator cuff arthropathy, right     Gastroesophageal reflux disease, esophagitis presence not specified     Abnormal mammogram     Sternocleidomastoid muscle tenderness     Acute cervical myofascial strain, initial encounter     Spasm of muscle     Hyperlipidemia LDL goal <130     Biceps tendinopathy, right     Superficial swelling of scalp     Intractable right heel pain     Intractable episodic tension-type headache     Jaycob complexion     Lip lesion     Abdominal pain, epigastric     Abdominal distension     PONV (postoperative nausea and vomiting)     Menorrhalgia     Uterine polyp     Loose stools     Hyperactive bowel sounds     Abnormality of nail surface     Dry hair     Dry skin     Malaise and fatigue     Lymphadenopathy     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     Endometrium, hyperplasia - on  recent CT scan     Pain in joint involving pelvic region and thigh, right     Right lateral abdominal pain     Right foot pain     Dental abscess - left mandible molar     Brachial plexopathy - right shoulder     Balance problems     Morbid obesity (H)     Sinus arrhythmia seen on electrocardiogram     Family history of ischemic heart disease - father at 46 massive MI     Elevated blood pressure reading without diagnosis of hypertension     Hypertrophy of breast     History of cold sores     AK (actinic keratosis) - both hands at the lateral thenar aspect.     Dermatitis     Musculoskeletal pain     Fatigue, unspecified type     Past Medical History:   Diagnosis Date     Abnormal mammogram 9/21/2016    right breast      AD (atopic dermatitis) 10/29/2015     Allergic rhinitis due to other allergen      Arthritis      Bell's palsy      Chronic fatigue 6/6/2012     Chronic infection     MRSA - 2015 scalp and prior to Abdomen     Contact dermatitis and other eczema, due to unspecified cause     eczema     Contusion of left foot, initial encounter 3/16/2016     DJD (degenerative joint disease), lumbar 9/26/2013     DJD (degenerative joint disease), lumbar 9/26/2013     Ds DNA antibody positive 4/16/2014    borderline.      Elevated blood pressure reading without diagnosis of hypertension 12/24/2013     Facial contusion 12/15/2014    hit by horse      Foraminal stenosis of lumbar region 9/26/2013     Frontal headache 12/15/2014     Gastroesophageal reflux disease, esophagitis presence not specified 6/29/2016     Heat sensitivity 10/29/2015     HTN, goal below 140/90 9/23/2015     Hyperlipidemia LDL goal <130 8/30/2017     Irregular heart beat      Keratitis sicca (H) 10/31/2012     Left shoulder pain 9/26/2013     Lumbar radiculopathy 1/22/2014     Migraine headache 10/23/2013     Migraine, unspecified, with intractable migraine, so stated, without mention of status migrainosus      Motion sickness      MRSA infection  10/20/2015     Muscular wasting and disuse atrophy, not elsewhere classified 6/9/2014    right SCM, right wrist,       Numbness and tingling     both legs anf feet greater on the left     PAC (premature atrial contraction) 7/15/2010     Plantar fasciitis 9/23/2015     PONV (postoperative nausea and vomiting)      Skin lesion 10/20/2015    posterior superior scalp      Spasm of muscle 7/11/2017     Telangiectasia of face 12/19/2014     Tooth pain 10/20/2015    left lower primary molar        Allergies:  Allergies   Allergen Reactions     Ceftriaxone Anaphylaxis     Hives, rash, racing heart beat     Bactrim [Sulfamethoxazole W/Trimethoprim] Hives     Ciprofloxacin Other (See Comments)     Tendon Issues     Codeine Nausea and Vomiting     Copper      Rash       Doxycycline      Loss of skin pigmentation, skin loss.     Gold      Rash       Iodine-131 Hives     Levaquin [Levofloxacin] Other (See Comments)     Tendon issues with levaquin and cipro     Nickel      rash     Steel [Staples]      Rash from staples       Latex Rash     Penicillins Rash       Medications:  Current Outpatient Medications   Medication     Acetaminophen (TYLENOL PO)     clobetasol (TEMOVATE) 0.05 % external cream     fexofenadine (ALLEGRA) 180 MG tablet     hydrocortisone valerate (WEST-DENNIS) 0.2 % external ointment     medical cannabis (Patient's own supply)     meloxicam (MOBIC) 15 MG tablet     mupirocin (BACTROBAN) 2 % external ointment     rizatriptan (MAXALT-MLT) 5 MG ODT     sucralfate (CARAFATE) 1 GM tablet     sucralfate (CARAFATE) 1 GM tablet     No current facility-administered medications for this visit.        Social History:  The patient . Patient has the following hobbies or non-occupational exposure: taking care of horses.     Family History:  Family History   Problem Relation Age of Onset     Diabetes Mother         adult     Cancer - colorectal Mother      Hypertension Mother      Allergies Mother      Cancer Mother          colon     Depression Mother      Gastrointestinal Disease Mother         ibs     Genitourinary Problems Mother         kidney cyst     Gynecology Mother         endometriosis     Lipids Mother      Thyroid Disease Mother      Arthritis Mother         RA     Heart Disease Maternal Grandfather         MI,  - 60's     Allergies Father      Unknown/Adopted Father      Heart Disease Father         mi-age mid 40's     Cardiovascular Father      Lipids Father      Respiratory Father         asthma     Cancer Father      Other Cancer Father         renal cancer     Depression Sister      Allergies Sister      Cancer Sister         vaginal     Gynecology Sister         endometriosis     Lipids Paternal Grandmother      Osteoporosis Paternal Grandmother      Thyroid Disease Paternal Grandmother      Respiratory Son         asthma     Allergies Son      Arthritis Sister      Allergies Brother      Heart Disease Brother      Allergies Daughter      Blood Disease Paternal Aunt         LGL T cell Leukemia     Rheumatoid Arthritis Paternal Aunt      Kidney Disease Maternal Aunt      Lupus Maternal Aunt      Bladder Cancer Maternal Aunt        Previous Labs, Allergy Tests, Dermatopathology, Imaging:  See HPI    Referred By: Ahmet Johnson, DO  [address]  [place] [address]     Allergy Tests:    Past Allergy Test: see HPI    Order for Future Allergy Testing:    [x] Outpatient  [] Inpatient: Sandy..../ Bed ....       Skin Atopy (atopic dermatitis) [x] Yes   [] No  Contact allergies:   [x] Yes   [] No (soaps)  Urticaria/Angioedema  [x] Yes   [] No (hives and ?anaphylaxis from sulfa and ceftriaxone. Lip blisters from doxycycline.)  Rhinitis/Sinusitis:   [x] Yes   [] No   [x] seasonal [] perennial      (spring and fall)      Allergic Asthma:   [] Yes   [x] (No strong family history)  Pets :  [x] Yes   [] No  Dog (sleeps in basement in a crate. Mild allergy if she bathes him.)  Food Allergy:   [x] Yes   [] No  (tested to pork by allergist in [place] and this was negative. Home test kit showed strong allergy to egg white and yolks.)  Drug allergies:   [x] Yes   [] No see chart  Leg ulcers:   [] Yes   [x] No  Hand eczema:   [x] Yes   [] No ?possibly   Leading hand:   [x] R   [] L       [] Ambidextrous                        Order for PATCH TESTS  Reason for tests (suspected allergy):  Possible allergic contact dermatitis to additives, metals, fragrances.  Known previous allergies: see HPI  Standardized panels  [x] Standard panel (40 tests)  [x] Preservatives & Antimicrobials (31 tests)  [x] Emulsifiers & Additives (25 tests)   [x] Perfumes/Flavours & Plants (25 tests)  [] Hairdresser panel (12 tests)  [] Rubber Chemicals (22 tests)  [x] Plastics (26 tests)  [] Colorants/Dyes/Food additives (20 tests)  [x] Metals (implants/dental) (24 tests)  [] Local anaesthetics/NSAIDs (13 tests)  [] Antibiotics & Antimycotics (14 tests)   [] Corticosteroids (15 tests)   [] Photopatch test (62 tests)   [] others: ...      [] Patient's own products: ...    DO NOT test if chemical or biological identity is unknown!     always ask from patient the product information and safety sheets (MSDS)       Order for PRICK TESTS    Reason for tests (suspected allergy): atopic predisposition and possible allergy to eggs.   Known previous allergies: see HPI    Standardized prick panels  [x] Atopic panel (20 tests)  [] Pediatric Panel (12 tests)  [x] Milk, Meat, Eggs, Grains (20 tests) --> EGG WHITE & EGG YOLK  [] Dust, Epithelia, Feathers (10 tests)  [] Fish, Seafood, Shellfish (17 tests)  [] Nuts, Beans (14 tests)  [] Spice, Vegetable, Fruit (17 tests)  [x] Others: EGG WHITE & EGG YOLK      [] Patient's own products: ...    DO NOT test if chemical or biological identity is unknown!     always ask from patient the product information and safety sheets (MSDS)     Standardized intradermal tests  [] Penicillium notatum [] Aspergillus fumigatus [] House  dust mites  [] Bee venom   [] Wasp venom !!Specific protocol with dilutions!!  [] Others: ...      Order for Drug allergy tests (prick & Intradermal & patch tests)  [x] Penicillin G  [x] Ampicillin [x] Cefazolin  [x] Ceftriaxone  [x] Ceftazidime  [x] Others: Bactrim      [] Patient needs consultation with Allergy team (changes of tests may apply)  [] Tests discussed with Allergy team (can have direct appointment for allergy tests)       Atopy Screen (Placed 05/05/21 )    No Substance Readings (15 min) Evaluation   POS Histamine 1mg/ml ++    NEG NaCl 0.9% -      No Substance Readings (15 min) Evaluation   1 Alternaria alternata (tenuis)  (+)    2 Cladosporium herbarum (+)    3 Aspergillus fumigatus -    4 Penicillium notatum -    5 Dermatophagoides pteronyssinus ++    6 Dermatophagoides farinae ++    7 Dog epithelium (canis spp) -    8 Cat hair (alexis catus) -    9 Cockroach   (Blatella americana & germanica) -    10 Grass mix midwest   (Aubrie, Orchard, Redtop, Carlito) -    11 Morgan grass (sorghum halepense) -    12 Weed mix   (common Cocklebur, Lamb s quarters, rough redroot Pigweed, Dock/Sorrel) -    13 Mug wort (artemisia vulgare) -    14 Ragweed giant/short (ambrosia spp) -    15 English Plantain (plantago lanceolata) -    16 Tree mix 1 (Pecan, Maple BHR, Oak RVW, american [place], black [place]) -    17 Red cedar (juniperus virginia) -    18 Tree mix 2   (white Rigoberto, river/red Birch, black Mason, common [place], american Elm) -    19 Box elder/Maple mix (acer spp) -    20 [place] shagbark (carya ovata) -     21 Egg white #6 -    22 Egg yolk #7 -      Conclusion: patient is atopic with immediate type sensitization to house dust mites    ________________________________    Assessment & Plan:    ==> Final Diagnosis:     # Atopic predisposition    Atopic dermatitis with sensitive skin    Intermittent Rhinitis during changing season with postnasal drip = sensitization to house dust mite (info given)    Metal  sensitization    *Chronic condition with progression/exacerbation    # Immediate and delayed drug allergies  *Chronic condition with progression/exacerbation    # Possible allergic contact dermatitis to additives, metals, fragrances.  *Chronic condition with progression/exacerbation    ==> Treatment Plan:  - Today: prick test with atopy screen and eggs white+yolk  - If prick is negative, can consider intradermal test to molds and dust mites   .-schedule for patch test & allergy drug testing (see checklist above for specific panels and drugs)    Procedures Performed:   Prick test 5/5/21    Follow-up in Derm-Allergy clinic     Dr Arnold Sterling MD PGY-2    Staff Physician Comments:  I saw and evaluated the patient with the resident and I agree with the assessment and plan as documented in the resident's note.    Pio Barrett MD  Professor  Head of Dermato-Allergy Division  Department of Dermatology  [facility]      I spent a total of 45 minutes with Amelia Coker. This time was spent counseling the patient and/or coordinating care, explaining the allergy tests or procedures,performing allergy tests and assessing the clinical relevance.

## 2021-05-05 NOTE — LETTER
"    5/5/2021         RE: Amelia Coker  [address] [facility] [id number]        Dear Colleague,    Thank you for referring your patient, Amelia Coker, to the [facility] [place]. Please see a copy of my visit note below.    [facility] Dermato-allergology Note  Office visit  Encounter Date: May 5, 2021  ____________________________________________    CC: No chief complaint on file.      HPI:  Ms. Amelia Coker is a(n) 46 year old female who presents today as a new patient for allergy consultation.  - carries a diagnosis of eczema. Appears in flexural folds, neck, eyelids. Uses topical steroids: clobetasol and triamcinolone as needed for flares.   - Saw an allergist in [place] who recommended she be seen here for allergy testing for a potential explanation of the sores on her scalp and skin allergies. Sensitive to lotions, shampoos, laundry detergent, dressings, and soaps and skin will \"break out.\" Prior allergy testing with allergy to cats, dogs, pollen, trees, grass, dust mites, mold, guinea pigs. Has a sore on her scalp that has been present for 4 months. A biopsy was done by PCP in [facility] that showed \"inflammatory cells.\"  - Also planning a spinal fusion and is allergic to nickel, white gold, copper, yellow gold, alfie silver and possibly ?other metals. When these contact her skin, she itchy red skin changes as well as blisters. For spinal fusion, plan is to use a  \"titanium alloy with nickel\" (Dr Adames, [facility] Has had allergy to surgical staples in the past. Has had allergy testing to 6 metals (unknown which) with dentistry and was allergic to all 6 so uses porcelain crowns.   - hx latex allergy    She has been holding topical steroids and antihistamines prior to this visit.       Physical exam:  General: In no acute distress, well-developed, well-nourished  Eyes: no conjunctivitis  ENT: no signs of rhinitis   Pulmonary: no " wheezing or coughing  Skin: Focused examination of the skin on test sites was performed = see test results below  - 5mm brown crust on a pink macule on R frontal-parietal scalp  - L>R hand has scale and xerosis and pinkness on side of fingers, between digits, and dorsal wrist.    Past Medical History:   Patient Active Problem List   Diagnosis     Migraine, unspecified, with intractable migraine, so stated, without mention of status migrainosus     Bell's palsy     Contact dermatitis and other eczema, due to unspecified cause     Scalp lesion     Lyme disease     PAC (premature atrial contraction)     Palpitations     Raynaud phenomenon     Chronic fatigue     Hair loss     Abnormal PFT     Shoulder joint instability     Family history of melanoma     Dry eyes     Keratitis sicca (H)     Family history of colon cancer     Pain in joint, upper arm     FLORIDALMA positive     Plantar fascial fibromatosis     Foraminal stenosis of lumbar region     DJD (degenerative joint disease), lumbar     Migraine without aura and without status migrainosus, not intractable     Lumbar radiculopathy     Ds DNA antibody positive     Muscular wasting and disuse atrophy, not elsewhere classified     Frontal headache     Telangiectasia of face     Lateral epicondylitis     Right shoulder pain     Plantar fasciitis     Skin lesion     AD (atopic dermatitis)     Heat sensitivity     Rotator cuff arthropathy, right     Gastroesophageal reflux disease, esophagitis presence not specified     Abnormal mammogram     Sternocleidomastoid muscle tenderness     Acute cervical myofascial strain, initial encounter     Spasm of muscle     Hyperlipidemia LDL goal <130     Biceps tendinopathy, right     Superficial swelling of scalp     Intractable right heel pain     Intractable episodic tension-type headache     Jaycob complexion     Lip lesion     Abdominal pain, epigastric     Abdominal distension     PONV (postoperative nausea and vomiting)      Menorrhalgia     Uterine polyp     Loose stools     Hyperactive bowel sounds     Abnormality of nail surface     Dry hair     Dry skin     Malaise and fatigue     Lymphadenopathy     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     Endometrium, hyperplasia - on recent CT scan     Pain in joint involving pelvic region and thigh, right     Right lateral abdominal pain     Right foot pain     Dental abscess - left mandible molar     Brachial plexopathy - right shoulder     Balance problems     Morbid obesity (H)     Sinus arrhythmia seen on electrocardiogram     Family history of ischemic heart disease - father at 46 massive MI     Elevated blood pressure reading without diagnosis of hypertension     Hypertrophy of breast     History of cold sores     AK (actinic keratosis) - both hands at the lateral thenar aspect.     Dermatitis     Musculoskeletal pain     Fatigue, unspecified type     Past Medical History:   Diagnosis Date     Abnormal mammogram 9/21/2016    right breast      AD (atopic dermatitis) 10/29/2015     Allergic rhinitis due to other allergen      Arthritis      Bell's palsy      Chronic fatigue 6/6/2012     Chronic infection     MRSA - 2015 scalp and prior to Abdomen     Contact dermatitis and other eczema, due to unspecified cause     eczema     Contusion of left foot, initial encounter 3/16/2016     DJD (degenerative joint disease), lumbar 9/26/2013     DJD (degenerative joint disease), lumbar 9/26/2013     Ds DNA antibody positive 4/16/2014    borderline.      Elevated blood pressure reading without diagnosis of hypertension 12/24/2013     Facial contusion 12/15/2014    hit by horse      Foraminal stenosis of lumbar region 9/26/2013     Frontal headache 12/15/2014     Gastroesophageal reflux disease, esophagitis presence not specified 6/29/2016     Heat sensitivity 10/29/2015     HTN, goal below 140/90 9/23/2015     Hyperlipidemia LDL goal <130 8/30/2017     Irregular heart beat      Keratitis  sicca (H) 10/31/2012     Left shoulder pain 9/26/2013     Lumbar radiculopathy 1/22/2014     Migraine headache 10/23/2013     Migraine, unspecified, with intractable migraine, so stated, without mention of status migrainosus      Motion sickness      MRSA infection 10/20/2015     Muscular wasting and disuse atrophy, not elsewhere classified 6/9/2014    right SCM, right wrist,       Numbness and tingling     both legs anf feet greater on the left     PAC (premature atrial contraction) 7/15/2010     Plantar fasciitis 9/23/2015     PONV (postoperative nausea and vomiting)      Skin lesion 10/20/2015    posterior superior scalp      Spasm of muscle 7/11/2017     Telangiectasia of face 12/19/2014     Tooth pain 10/20/2015    left lower primary molar        Allergies:  Allergies   Allergen Reactions     Ceftriaxone Anaphylaxis     Hives, rash, racing heart beat     Bactrim [Sulfamethoxazole W/Trimethoprim] Hives     Ciprofloxacin Other (See Comments)     Tendon Issues     Codeine Nausea and Vomiting     Copper      Rash       Doxycycline      Loss of skin pigmentation, skin loss.     Gold      Rash       Iodine-131 Hives     Levaquin [Levofloxacin] Other (See Comments)     Tendon issues with levaquin and cipro     Nickel      rash     Steel [Staples]      Rash from staples       Latex Rash     Penicillins Rash       Medications:  Current Outpatient Medications   Medication     Acetaminophen (TYLENOL PO)     clobetasol (TEMOVATE) 0.05 % external cream     fexofenadine (ALLEGRA) 180 MG tablet     hydrocortisone valerate (WEST-DENNIS) 0.2 % external ointment     medical cannabis (Patient's own supply)     meloxicam (MOBIC) 15 MG tablet     mupirocin (BACTROBAN) 2 % external ointment     rizatriptan (MAXALT-MLT) 5 MG ODT     sucralfate (CARAFATE) 1 GM tablet     sucralfate (CARAFATE) 1 GM tablet     No current facility-administered medications for this visit.        Social History:  The patient . Patient has the  following hobbies or non-occupational exposure: taking care of horses.     Family History:  Family History   Problem Relation Age of Onset     Diabetes Mother         adult     Cancer - colorectal Mother      Hypertension Mother      Allergies Mother      Cancer Mother         colon     Depression Mother      Gastrointestinal Disease Mother         ibs     Genitourinary Problems Mother         kidney cyst     Gynecology Mother         endometriosis     Lipids Mother      Thyroid Disease Mother      Arthritis Mother         RA     Heart Disease Maternal Grandfather         MI,  - 60's     Allergies Father      Unknown/Adopted Father      Heart Disease Father         mi-age mid 40's     Cardiovascular Father      Lipids Father      Respiratory Father         asthma     Cancer Father      Other Cancer Father         renal cancer     Depression Sister      Allergies Sister      Cancer Sister         vaginal     Gynecology Sister         endometriosis     Lipids Paternal Grandmother      Osteoporosis Paternal Grandmother      Thyroid Disease Paternal Grandmother      Respiratory Son         asthma     Allergies Son      Arthritis Sister      Allergies Brother      Heart Disease Brother      Allergies Daughter      Blood Disease Paternal Aunt         LGL T cell Leukemia     Rheumatoid Arthritis Paternal Aunt      Kidney Disease Maternal Aunt      Lupus Maternal Aunt      Bladder Cancer Maternal Aunt        Previous Labs, Allergy Tests, Dermatopathology, Imaging:  See HPI    Referred By: Ahmet Johnson, DO  [address] [address]     Allergy Tests:    Past Allergy Test: see HPI    Order for Future Allergy Testing:    [x] Outpatient  [] Inpatient: Sandy..../ Bed ....       Skin Atopy (atopic dermatitis) [x] Yes   [] No  Contact allergies:   [x] Yes   [] No (soaps)  Urticaria/Angioedema  [x] Yes   [] No (hives and ?anaphylaxis from sulfa and ceftriaxone. Lip blisters from  doxycycline.)  Rhinitis/Sinusitis:   [x] Yes   [] No   [x] seasonal [] perennial      (spring and fall)      Allergic Asthma:   [] Yes   [x] (No strong family history)  Pets :  [x] Yes   [] No  Dog (sleeps in basement in a crate. Mild allergy if she bathes him.)  Food Allergy:   [x] Yes   [] No (tested to pork by allergist in [place] and this was negative. Home test kit showed strong allergy to egg white and yolks.)  Drug allergies:   [x] Yes   [] No see chart  Leg ulcers:   [] Yes   [x] No  Hand eczema:   [x] Yes   [] No ?possibly   Leading hand:   [x] R   [] L       [] Ambidextrous                        Order for PATCH TESTS  Reason for tests (suspected allergy):  Possible allergic contact dermatitis to additives, metals, fragrances.  Known previous allergies: see HPI  Standardized panels  [x] Standard panel (40 tests)  [x] Preservatives & Antimicrobials (31 tests)  [x] Emulsifiers & Additives (25 tests)   [x] Perfumes/Flavours & Plants (25 tests)  [] Hairdresser panel (12 tests)  [] Rubber Chemicals (22 tests)  [x] Plastics (26 tests)  [] Colorants/Dyes/Food additives (20 tests)  [x] Metals (implants/dental) (24 tests)  [] Local anaesthetics/NSAIDs (13 tests)  [] Antibiotics & Antimycotics (14 tests)   [] Corticosteroids (15 tests)   [] Photopatch test (62 tests)   [] others: ...      [] Patient's own products: ...    DO NOT test if chemical or biological identity is unknown!     always ask from patient the product information and safety sheets (MSDS)       Order for PRICK TESTS    Reason for tests (suspected allergy): atopic predisposition and possible allergy to eggs.   Known previous allergies: see HPI    Standardized prick panels  [x] Atopic panel (20 tests)  [] Pediatric Panel (12 tests)  [x] Milk, Meat, Eggs, Grains (20 tests) --> EGG WHITE & EGG YOLK  [] Dust, Epithelia, Feathers (10 tests)  [] Fish, Seafood, Shellfish (17 tests)  [] Nuts, Beans (14 tests)  [] Spice, Vegetable, Fruit (17 tests)  [x]  Others: EGG WHITE & EGG YOLK      [] Patient's own products: ...    DO NOT test if chemical or biological identity is unknown!     always ask from patient the product information and safety sheets (MSDS)     Standardized intradermal tests  [] Penicillium notatum [] Aspergillus fumigatus [] House dust mites  [] Bee venom   [] Wasp venom !!Specific protocol with dilutions!!  [] Others: ...      Order for Drug allergy tests (prick & Intradermal & patch tests)  [x] Penicillin G  [x] Ampicillin [x] Cefazolin  [x] Ceftriaxone  [x] Ceftazidime  [x] Others: Bactrim      [] Patient needs consultation with Allergy team (changes of tests may apply)  [] Tests discussed with Allergy team (can have direct appointment for allergy tests)       Atopy Screen (Placed 05/05/21 )    No Substance Readings (15 min) Evaluation   POS Histamine 1mg/ml ++    NEG NaCl 0.9% -      No Substance Readings (15 min) Evaluation   1 Alternaria alternata (tenuis)  (+)    2 Cladosporium herbarum (+)    3 Aspergillus fumigatus -    4 Penicillium notatum -    5 Dermatophagoides pteronyssinus ++    6 Dermatophagoides farinae ++    7 Dog epithelium (canis spp) -    8 Cat hair (alexis catus) -    9 Cockroach   (Blatella americana & germanica) -    10 Grass mix midwest   (Aubrie, Orchard, Redtop, Carlito) -    11 Morgan grass (sorghum halepense) -    12 Weed mix   (common Cocklebur, Lamb s quarters, rough redroot Pigweed, Dock/Sorrel) -    13 Mug wort (artemisia vulgare) -    14 Ragweed giant/short (ambrosia spp) -    15 English Plantain (plantago lanceolata) -    16 Tree mix 1 (Pecan, Maple BHR, Oak RVW, american [place], black Organ) -    17 Red cedar (juniperus virginia) -    18 Tree mix 2   (white Rigoberto, river/red Birch, black Palatine, common [place], american Elm) -    19 Box elder/Maple mix (acer spp) -    20 Brandon shagbark (carya ovata) -     21 Egg white #6 -    22 Egg yolk #7 -      Conclusion: patient is atopic with immediate type sensitization to  house dust mites    ________________________________    Assessment & Plan:    ==> Final Diagnosis:     # Atopic predisposition    Atopic dermatitis with sensitive skin    Intermittent Rhinitis during changing season with postnasal drip = sensitization to house dust mite (info given)    Metal sensitization    *Chronic condition with progression/exacerbation    # Immediate and delayed drug allergies  *Chronic condition with progression/exacerbation    # Possible allergic contact dermatitis to additives, metals, fragrances.  *Chronic condition with progression/exacerbation    ==> Treatment Plan:  - Today: prick test with atopy screen and eggs white+yolk  - If prick is negative, can consider intradermal test to molds and dust mites   .-schedule for patch test & allergy drug testing (see checklist above for specific panels and drugs)    Procedures Performed:   Prick test 5/5/21    Follow-up in Derm-Allergy clinic     Dr Arnold Sterling MD PGY-2    Staff Physician Comments:  I saw and evaluated the patient with the resident and I agree with the assessment and plan as documented in the resident's note.    Pio Barrett MD  Professor  Head of Dermato-Allergy Division  Department of Dermatology  [facility]      I spent a total of 45 minutes with Amelia Valdesos. This time was spent counseling the patient and/or coordinating care, explaining the allergy tests or procedures,performing allergy tests and assessing the clinical relevance.        Again, thank you for allowing me to participate in the care of your patient.        Sincerely,        Pio Barrett MD

## 2021-05-06 ENCOUNTER — OFFICE VISIT (OUTPATIENT)
Dept: OTHER | Facility: CLINIC | Age: 46
End: 2021-05-06
Attending: NEUROLOGICAL SURGERY
Payer: COMMERCIAL

## 2021-05-06 VITALS
DIASTOLIC BLOOD PRESSURE: 75 MMHG | RESPIRATION RATE: 16 BRPM | HEART RATE: 63 BPM | OXYGEN SATURATION: 100 % | SYSTOLIC BLOOD PRESSURE: 114 MMHG | WEIGHT: 223 LBS | HEIGHT: 65 IN | BODY MASS INDEX: 37.15 KG/M2

## 2021-05-06 DIAGNOSIS — G54.0 NEUROGENIC THORACIC OUTLET SYNDROME OF RIGHT BRACHIAL PLEXUS: Primary | ICD-10-CM

## 2021-05-06 PROCEDURE — G0463 HOSPITAL OUTPT CLINIC VISIT: HCPCS

## 2021-05-06 PROCEDURE — 99204 OFFICE O/P NEW MOD 45 MIN: CPT | Performed by: SURGERY

## 2021-05-06 ASSESSMENT — MIFFLIN-ST. JEOR: SCORE: 1642.4

## 2021-05-06 NOTE — PROGRESS NOTES
"Amelia Coker is a 46 year old female who presents for:  Chief Complaint   Patient presents with     RECHECK     Ref by Tomy Grant MD for TOS; prev seen by Dr. Mock  *vg R/S from 04/22/21 LMB        Vitals:    Vitals:    05/06/21 1101   BP: 114/75   BP Location: Left arm   Patient Position: Chair   Cuff Size: Adult Large   Pulse: 63   Resp: 16   SpO2: 100%   Weight: 223 lb (101.2 kg)   Height: 5' 5\" (1.651 m)       BMI:  Estimated body mass index is 37.11 kg/m  as calculated from the following:    Height as of this encounter: 5' 5\" (1.651 m).    Weight as of this encounter: 223 lb (101.2 kg).    Pain Score:  Data Unavailable        Torie Gilmore CMA    "

## 2021-05-08 ENCOUNTER — HEALTH MAINTENANCE LETTER (OUTPATIENT)
Age: 46
End: 2021-05-08

## 2021-05-13 ENCOUNTER — HOSPITAL ENCOUNTER (OUTPATIENT)
Dept: MRI IMAGING | Facility: CLINIC | Age: 46
Discharge: HOME | End: 2021-05-13
Attending: ORTHOPAEDIC SURGERY | Admitting: ORTHOPAEDIC SURGERY
Payer: COMMERCIAL

## 2021-05-13 DIAGNOSIS — M48.062 SPINAL STENOSIS, LUMBAR REGION WITH NEUROGENIC CLAUDICATION: ICD-10-CM

## 2021-05-13 PROCEDURE — A9585 GADOBUTROL INJECTION: HCPCS | Performed by: ORTHOPAEDIC SURGERY

## 2021-05-13 PROCEDURE — 255N000002 HC RX 255 OP 636: Performed by: ORTHOPAEDIC SURGERY

## 2021-05-13 PROCEDURE — 72158 MRI LUMBAR SPINE W/O & W/DYE: CPT

## 2021-05-13 RX ORDER — GADOBUTROL 604.72 MG/ML
10 INJECTION INTRAVENOUS ONCE
Status: COMPLETED | OUTPATIENT
Start: 2021-05-13 | End: 2021-05-13

## 2021-05-13 RX ADMIN — GADOBUTROL 10 ML: 604.72 INJECTION INTRAVENOUS at 17:18

## 2021-05-14 ENCOUNTER — OFFICE VISIT (OUTPATIENT)
Dept: FAMILY MEDICINE | Facility: OTHER | Age: 46
End: 2021-05-14
Payer: COMMERCIAL

## 2021-05-14 VITALS
RESPIRATION RATE: 16 BRPM | OXYGEN SATURATION: 98 % | DIASTOLIC BLOOD PRESSURE: 60 MMHG | BODY MASS INDEX: 36.9 KG/M2 | HEIGHT: 65 IN | TEMPERATURE: 98.6 F | HEART RATE: 90 BPM | SYSTOLIC BLOOD PRESSURE: 110 MMHG | WEIGHT: 221.5 LBS

## 2021-05-14 DIAGNOSIS — H16.229 KERATITIS SICCA: ICD-10-CM

## 2021-05-14 DIAGNOSIS — Z13.220 SCREENING FOR HYPERLIPIDEMIA: Primary | ICD-10-CM

## 2021-05-14 DIAGNOSIS — E66.01 MORBID OBESITY (H): ICD-10-CM

## 2021-05-14 DIAGNOSIS — H69.92 DYSFUNCTION OF LEFT EUSTACHIAN TUBE: ICD-10-CM

## 2021-05-14 PROCEDURE — 99213 OFFICE O/P EST LOW 20 MIN: CPT | Performed by: PHYSICIAN ASSISTANT

## 2021-05-14 RX ORDER — METHYLPREDNISOLONE 4 MG
TABLET, DOSE PACK ORAL
Qty: 21 TABLET | Refills: 0 | Status: SHIPPED | OUTPATIENT
Start: 2021-05-14 | End: 2021-05-25

## 2021-05-14 ASSESSMENT — MIFFLIN-ST. JEOR: SCORE: 1642.47

## 2021-05-14 ASSESSMENT — PAIN SCALES - GENERAL: PAINLEVEL: MILD PAIN (2)

## 2021-05-14 NOTE — PROGRESS NOTES
"Assessment & Plan     Screening for hyperlipidemia  Future labs when she can be fasting.    Dysfunction of left eustachian tube  Trial of medication as noted below and follow-up as needed.  - methylPREDNISolone (MEDROL DOSEPAK) 4 MG tablet therapy pack; Follow Package Directions    Keratitis sicca (H)  Morbid obesity (H)  Historically noted no new concerns.  Body mass index still over 35.  Advised increased physical exercise and dietary changes.  She continues to work through multiple other problems that limit her ability to do so.    No follow-ups on file.    [facility]     Amelia Coker is a 46 year old female who presents to clinic today for the following health issues accompanied by her ears    History of Present Illness       She eats 2-3 servings of fruits and vegetables daily.She consumes 0 sweetened beverage(s) daily.She exercises with enough effort to increase her heart rate 30 to 60 minutes per day.    She is taking medications regularly.       Acute Illness  Acute illness concerns: Left ear pain  Onset/Duration: a week ago  Symptoms:  Fever: no  Chills/Sweats: no  Headache (location?): no  Sinus Pressure: no  Conjunctivitis:  no  Ear Pain: YES: left  Rhinorrhea: no  Congestion: no  Sore Throat: no  Cough: no  Wheeze: no  Decreased Appetite: no  Nausea: no  Vomiting: no  Diarrhea: no  Dysuria/Freq.: no  Dysuria or Hematuria: no  Fatigue/Achiness: YES  Sick/Strep Exposure: no  Therapies tried and outcome: None    Review of Systems   Constitutional, HEENT, cardiovascular, pulmonary, GI, , musculoskeletal, neuro, skin, endocrine and psych systems are negative, except as otherwise noted.      Objective    /60   Pulse 90   Temp 98.6  F (37  C) (Temporal)   Resp 16   Ht 1.662 m (5' 5.43\")   Wt 100.5 kg (221 lb 8 oz)   SpO2 98%   BMI 36.37 kg/m    Body mass index is 36.37 kg/m .  Physical Exam   GENERAL: healthy, alert and no " distress  NECK: no adenopathy, no asymmetry, masses, or scars and thyroid normal to palpation  RESP: lungs clear to auscultation - no rales, rhonchi or wheezes  CV: regular rate and rhythm, normal S1 S2, no S3 or S4, no murmur, click or rub, no peripheral edema and peripheral pulses strong  ABDOMEN: soft, nontender, no hepatosplenomegaly, no masses and bowel sounds normal  MS: no gross musculoskeletal defects noted, no edema      Results for orders placed or performed during the hospital encounter of 05/13/21 (from the past 24 hour(s))   MR Lumbar Spine w/o & w Contrast    Narrative    MR LUMBAR SPINE WITHOUT AND WITH CONTRAST 5/13/2021 5:47 PM     HISTORY: Spinal stenosis, lumbar region with neurogenic claudication.  Back pain with left-sided leg pain, numbness and weakness and  bilateral leg tingling.    TECHNIQUE: Multiplanar multisequence images were obtained through the  lumbar spine without and with contrast. 10 mL of Gadavist given.    COMPARISON: 12/3/2020.    FINDINGS: Five lumbar-type vertebral bodies are presumed. Posterior  alignment is normal. Vertebral body heights are maintained. Discogenic  marrow changes are present at L5-S1 and to lesser extent at L2-L3.  Bone marrow signal intensity is otherwise normal. The conus medullaris  is normal in appearance with its tip at the L1-L2 level. Paraspinal  soft tissues and visualized bony pelvis are within normal limits.  Postcontrast images do not show any abnormal intradural enhancement.  There is some enhancement in the left L5-S1 neural foramen as well as  a left facet joint region and posterior soft tissues due to prior  surgical procedure.    T12-L1: Normal.    L1-L2: Normal.    L2-L3: Minimal disc bulging. Normal facets. No stenosis.    L3-L4: Minimal disc bulging and moderate facet and ligamentum flavum  hypertrophy is present but there is no significant stenosis.    L4-L5: There is a right posterolateral disc protrusion and moderate  facet hypertrophy  causing moderate right-sided neural foraminal  stenosis. Fluid is seen in the right L5-S1 facet joint. No significant  central canal or left-sided foraminal stenosis is present. There  appears to be prior right-sided hemilaminotomy procedure at this  level.    L5-S1: There has been prior left-sided hemilaminectomy and partial  facetectomy. There is enhancing tissue in the left neural foramen  which is presumably granulation tissue. There is some broad-based disc  bulging and/or osteophyte formation along with some facet hypertrophy.  There is no significant central canal stenosis. There is some moderate  right-sided foraminal stenosis.      Impression    IMPRESSION:  1. Prior left-sided L5-S1 hemilaminectomy and partial facetectomy.  There is enhancing tissue in the left neural foramen which is  presumably granulation tissue or scarring. There does not appear to be  any significant left-sided foraminal stenosis. There is moderate  degenerative right-sided foraminal stenosis at this level.  2. Prior right-sided posterior surgical procedure at L4-L5. There is  an associated right posterolateral L4-L5 disc protrusion and facet  hypertrophy results in moderate central canal stenosis but no central  canal or left-sided foraminal stenosis. Fluid is also seen in the  right-sided facet joint.  3. Moderate facet hypertrophy at L3-L4 without stenosis.    RAKESH HOLT MD     *Note: Due to a large number of results and/or encounters for the requested time period, some results have not been displayed. A complete set of results can be found in Results Review.

## 2021-05-22 NOTE — PROGRESS NOTES
SUBJECTIVE:   CC: Amelia Coker is an 46 year old woman who presents for preventive health visit.       Patient has been advised of split billing requirements and indicates understanding: Yes  Healthy Habits:     Getting at least 3 servings of Calcium per day:  Yes    Bi-annual eye exam:  Yes    Dental care twice a year:  Yes    Sleep apnea or symptoms of sleep apnea:  Daytime drowsiness    Diet:  Carbohydrate counting    Frequency of exercise:  4-5 days/week    Duration of exercise:  15-30 minutes    Taking medications regularly:  Yes    Medication side effects:  Not applicable    PHQ-2 Total Score: 0    Additional concerns today:  Yes      Today's PHQ-2 Score:   PHQ-2 ( 1999 [employer]) 5/14/2021   Q1: Little interest or pleasure in doing things 0   Q2: Feeling down, depressed or hopeless 0   PHQ-2 Score 0   Q1: Little interest or pleasure in doing things Not at all   Q2: Feeling down, depressed or hopeless Not at all   PHQ-2 Score 0       Abuse: Current or Past (Physical, Sexual or Emotional) - No  Do you feel safe in your environment? Yes    Have you ever done Advance Care Planning? (For example, a Health Directive, POLST, or a discussion with a medical provider or your loved ones about your wishes): No, advance care planning information given to patient to review.  Patient plans to discuss their wishes with loved ones or provider.      Social History     Tobacco Use     Smoking status: Never Smoker     Smokeless tobacco: Never Used   Substance Use Topics     Alcohol use: No     Comment: social     If you drink alcohol do you typically have >3 drinks per day or >7 drinks per week? No    Alcohol Use 3/5/2020   Prescreen: >3 drinks/day or >7 drinks/week? No   Prescreen: >3 drinks/day or >7 drinks/week? -       Reviewed orders with patient.  Reviewed health maintenance and updated orders accordingly - Yes  Lab work is in process  Labs reviewed in EPIC  BP Readings from Last 3 Encounters:   05/25/21 126/70    05/24/21 129/79   05/14/21 110/60    Wt Readings from Last 3 Encounters:   05/25/21 99.2 kg (218 lb 12 oz)   05/24/21 100.2 kg (221 lb)   05/14/21 100.5 kg (221 lb 8 oz)                  Patient Active Problem List   Diagnosis     Migraine, unspecified, with intractable migraine, so stated, without mention of status migrainosus     Bell's palsy     Contact dermatitis and other eczema, due to unspecified cause     Scalp lesion     Lyme disease     PAC (premature atrial contraction)     Palpitations     Raynaud phenomenon     Chronic fatigue     Hair loss     Abnormal PFT     Shoulder joint instability     Family history of melanoma     Dry eyes     Keratitis sicca (H)     Family history of colon cancer     Pain in joint, upper arm     FLORIDALMA positive     Plantar fascial fibromatosis     Foraminal stenosis of lumbar region     DJD (degenerative joint disease), lumbar     Migraine without aura and without status migrainosus, not intractable     Lumbar radiculopathy     Ds DNA antibody positive     Muscular wasting and disuse atrophy, not elsewhere classified     Frontal headache     Telangiectasia of face     Lateral epicondylitis     Right shoulder pain     Plantar fasciitis     Skin lesion     AD (atopic dermatitis)     Heat sensitivity     Rotator cuff arthropathy, right     Gastroesophageal reflux disease, esophagitis presence not specified     Abnormal mammogram     Sternocleidomastoid muscle tenderness     Acute cervical myofascial strain, initial encounter     Spasm of muscle     Hyperlipidemia LDL goal <130     Biceps tendinopathy, right     Superficial swelling of scalp     Intractable right heel pain     Intractable episodic tension-type headache     Jaycob complexion     Lip lesion     Abdominal pain, epigastric     Abdominal distension     PONV (postoperative nausea and vomiting)     Menorrhalgia     Uterine polyp     Loose stools     Hyperactive bowel sounds     Abnormality of nail surface     Dry hair      Dry skin     Malaise and fatigue     Lymphadenopathy     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     Endometrium, hyperplasia - on recent CT scan     Pain in joint involving pelvic region and thigh, right     Right lateral abdominal pain     Right foot pain     Dental abscess - left mandible molar     Brachial plexopathy - right shoulder     Balance problems     Morbid obesity (H)     Sinus arrhythmia seen on electrocardiogram     Family history of ischemic heart disease - father at 46 massive MI     Elevated blood pressure reading without diagnosis of hypertension     Hypertrophy of breast     History of cold sores     AK (actinic keratosis) - both hands at the lateral thenar aspect.     Dermatitis     Musculoskeletal pain     Fatigue, unspecified type     Past Surgical History:   Procedure Laterality Date     AS INJ TRANSFORAMIN EPIDURAL, LUMB/SACR SINGLE       COLONOSCOPY  3/11/2013    Procedure: COLONOSCOPY;  colonoscopy;  Surgeon: Lobito Mas MD;  [facility]     COLONOSCOPY WITH CO2 INSUFFLATION N/A 11/19/2018    Procedure: COLONOSCOPY WITH CO2 INSUFFLATION;  Surgeon: Goyo Blank MD;  [facility]     DECOMPRESSION LUMBAR TWO LEVELS Bilateral 12/8/2016    Procedure: DECOMPRESSION LUMBAR TWO LEVELS;  Surgeon: Bang Burrell MD;  Location: [facility]     DILATION AND CURETTAGE, HYSTEROSCOPY, ABLATE ENDOMETRIUM NOVASURE, COMBINED N/A 6/12/2019    Procedure: hysteroscopy, dilation & curettage, polypectomy, endometrial ablation;  Surgeon: Fredy Pham MD;  [facility]     ESOPHAGOSCOPY, GASTROSCOPY, DUODENOSCOPY (EGD), COMBINED  11/8/2013    Procedure: COMBINED ESOPHAGOSCOPY, GASTROSCOPY, DUODENOSCOPY (EGD), BIOPSY SINGLE OR MULTIPLE;  Esophagoscopy, Gastroscopy, Duodenoscopy EGD with multiple biopsies;  Surgeon: Teja Koch MD;  [facility]     ESOPHAGOSCOPY, GASTROSCOPY, DUODENOSCOPY (EGD), COMBINED N/A 2/18/2021    Procedure:  ESOPHAGOGASTRODUODENOSCOPY, WITH BIOPSY;  Surgeon: Blanca Ba MD;  [facility]     HC REMOVAL OF TONSILS,<11 Y/O      Tonsils <10y.o.     HC TOOTH EXTRACTION W/FORCEP       INJECT BLOCK MEDIAL BRANCH CERVICAL/THORACIC/LUMBAR N/A 2020    Procedure: Sacral 1,2,3 Lateral Branch Blocks and lumbar 5 medial branch blocks bilaterally;  Surgeon: Maurisio Goetz MD;  [facility]     INJECT JOINT SACROILIAC Left 2020    Procedure: Left Lumbar 5 Sacral 1 nerve root injections.;  Surgeon: Maurisio Goetz MD;  [facility]     REPAIR TENDON ELBOW  2014    Procedure: REPAIR TENDON ELBOW;  Surgeon: Chris Johnston MD;  [facility]     ULTRASONIC REMOVAL SOFT TISSUE (LOCATION) Right 2018    Procedure: Tenex right foot;  Surgeon: Patel Martínez DO;  Location: [facility]       Social History     Tobacco Use     Smoking status: Never Smoker     Smokeless tobacco: Never Used   Substance Use Topics     Alcohol use: No     Comment: social     Family History   Problem Relation Age of Onset     Diabetes Mother         adult     Cancer - colorectal Mother      Hypertension Mother      Allergies Mother      Cancer Mother         colon     Depression Mother      Gastrointestinal Disease Mother         ibs     Genitourinary Problems Mother         kidney cyst     Gynecology Mother         endometriosis     Lipids Mother      Thyroid Disease Mother      Arthritis Mother         RA     Heart Disease Maternal Grandfather         MI,  - 60's     Allergies Father      Unknown/Adopted Father      Heart Disease Father         mi-age mid 40's     Cardiovascular Father      Lipids Father      Respiratory Father         asthma     Cancer Father      Other Cancer Father         renal cancer     Depression Sister      Allergies Sister      Cancer Sister         vaginal     Gynecology Sister         endometriosis     Lipids Paternal Grandmother      Osteoporosis Paternal Grandmother      Thyroid Disease  Paternal Grandmother      Respiratory Son         asthma     Allergies Son      Arthritis Sister      Allergies Brother      Heart Disease Brother      Allergies Daughter      Blood Disease Paternal Aunt         LGL T cell Leukemia     Rheumatoid Arthritis Paternal Aunt      Kidney Disease Maternal Aunt      Lupus Maternal Aunt      Bladder Cancer Maternal Aunt          Current Outpatient Medications   Medication Sig Dispense Refill     Acetaminophen (TYLENOL PO) Take 1,000 mg by mouth every 8 hours as needed        hydrocortisone valerate (WEST-DENNIS) 0.2 % external ointment APPLY TOPICALLY 2 TIMES DAILY       medical cannabis (Patient's own supply) Take 0.7 Doses by mouth See Admin Instructions (The purpose of this order is to document that the patient reports taking medical cannabis.  This is not a prescription, and is not used to certify that the patient has a qualifying medical condition.)       mupirocin (BACTROBAN) 2 % external ointment Apply topically 3 times daily 30 g 0     rizatriptan (MAXALT-MLT) 5 MG ODT Take 1 tablet (5 mg) by mouth at onset of headache for migraine May repeat in 2 hours. Max 6 tablets/24 hours. 30 tablet 1     sucralfate (CARAFATE) 1 GM tablet Take 1 tablet (1 g) by mouth 4 times daily 40 tablet 0     [START ON 6/21/2021] cefTAZidime (FORTAZ) 1 GM vial For Allergy Testing in Allergy Clinic Only (Patient not taking: Reported on 5/14/2021) 1 each 0     clobetasol (TEMOVATE) 0.05 % external cream Apply topically 2 times daily 60 g 1     fexofenadine (ALLEGRA) 180 MG tablet Take 1 tablet by mouth daily Reported on 4/5/2017       meloxicam (MOBIC) 15 MG tablet Take 15 mg by mouth daily       Allergies   Allergen Reactions     Ceftriaxone Anaphylaxis     Hives, rash, racing heart beat     Bactrim [Sulfamethoxazole W/Trimethoprim] Hives     Ciprofloxacin Other (See Comments)     Tendon Issues     Codeine Nausea and Vomiting     Codeine      Copper      Rash       Doxycycline      Loss of skin  pigmentation, skin loss.     Gold      Rash       Iodine      Iodine-131 Hives     Levaquin [Levofloxacin] Other (See Comments)     Tendon issues with levaquin and cipro     Nickel      rash     Steel [Staples]      Rash from staples       Sulfa Drugs      Latex Rash     Penicillins Rash     Recent Labs   Lab Test 11/05/20  1646 07/27/20  1547 03/05/20  0749 03/05/20  0749 02/08/19  0817 02/08/19  0817 12/31/18  0805 06/08/16  0745 06/08/16  0745   A1C  --   --   --   --   --   --   --   --  5.5   LDL  --   --   --  125*  --  93 113*   < >  --    HDL  --   --   --  79  --  75 86   < >  --    TRIG  --   --   --  51  --  40 49   < >  --    ALT 32 21  --  30   < > 27 23   < >  --    CR 0.73 0.58   < > 0.58   < >  --  0.69   < > 0.54   GFRESTIMATED >90 >90   < > >90   < >  --  >90   < > >90  Non  GFR Calc     GFRESTBLACK >90 >90   < > >90   < >  --  >90   < > >90  African American GFR Calc     POTASSIUM 3.8 3.9   < > 4.4   < >  --  4.0   < > 4.2   TSH  --  1.03  --  1.07   < >  --   --    < >  --     < > = values in this interval not displayed.        Breast Cancer Screening:  Any new diagnosis of family breast, ovarian, or bowel cancer? No    FSH-7: No flowsheet data found.    Pertinent mammograms are reviewed under the imaging tab.    History of abnormal Pap smear:   Last 3 Pap and HPV Results:   PAP / HPV Latest Ref Rng & Units 12/31/2018 12/2/2015 8/31/2012   PAP - NIL NIL NIL   HPV 16 DNA NEG:Negative Negative Negative -   HPV 18 DNA NEG:Negative Negative Negative -   OTHER HR HPV NEG:Negative Negative Negative -     PAP / HPV Latest Ref Rng & Units 12/31/2018 12/2/2015 8/31/2012   PAP - NIL NIL NIL   HPV 16 DNA NEG:Negative Negative Negative -   HPV 18 DNA NEG:Negative Negative Negative -   OTHER HR HPV NEG:Negative Negative Negative -     Reviewed and updated as needed this visit by clinical staff                 Reviewed and updated as needed this visit by Provider                Past Medical  History:   Diagnosis Date     Abnormal mammogram 9/21/2016    right breast      AD (atopic dermatitis) 10/29/2015     Allergic rhinitis due to other allergen      Arthritis      Bell's palsy      Chronic fatigue 6/6/2012     Chronic infection     MRSA - 2015 scalp and prior to Abdomen     Contact dermatitis and other eczema, due to unspecified cause     eczema     Contusion of left foot, initial encounter 3/16/2016     DJD (degenerative joint disease), lumbar 9/26/2013     DJD (degenerative joint disease), lumbar 9/26/2013     Ds DNA antibody positive 4/16/2014    borderline.      Elevated blood pressure reading without diagnosis of hypertension 12/24/2013     Facial contusion 12/15/2014    hit by horse      Foraminal stenosis of lumbar region 9/26/2013     Frontal headache 12/15/2014     Gastroesophageal reflux disease, esophagitis presence not specified 6/29/2016     Heat sensitivity 10/29/2015     HTN, goal below 140/90 9/23/2015     Hyperlipidemia LDL goal <130 8/30/2017     Irregular heart beat      Keratitis sicca (H) 10/31/2012     Left shoulder pain 9/26/2013     Lumbar radiculopathy 1/22/2014     Migraine headache 10/23/2013     Migraine, unspecified, with intractable migraine, so stated, without mention of status migrainosus      Motion sickness      MRSA infection 10/20/2015     Muscular wasting and disuse atrophy, not elsewhere classified 6/9/2014    right SCM, right wrist,       Numbness and tingling     both legs anf feet greater on the left     PAC (premature atrial contraction) 7/15/2010     Plantar fasciitis 9/23/2015     PONV (postoperative nausea and vomiting)      Skin lesion 10/20/2015    posterior superior scalp      Spasm of muscle 7/11/2017     Telangiectasia of face 12/19/2014     Tooth pain 10/20/2015    left lower primary molar       Past Surgical History:   Procedure Laterality Date     AS INJ TRANSFORAMIN EPIDURAL, LUMB/SACR SINGLE       COLONOSCOPY  3/11/2013    Procedure: COLONOSCOPY;   colonoscopy;  Surgeon: Lobito Mas MD;  [facility]     COLONOSCOPY WITH CO2 INSUFFLATION N/A 11/19/2018    Procedure: COLONOSCOPY WITH CO2 INSUFFLATION;  Surgeon: Goyo Blank MD;  [facility]     DECOMPRESSION LUMBAR TWO LEVELS Bilateral 12/8/2016    Procedure: DECOMPRESSION LUMBAR TWO LEVELS;  Surgeon: Bang Burrell MD;  [facility]     DILATION AND CURETTAGE, HYSTEROSCOPY, ABLATE ENDOMETRIUM NOVASURE, COMBINED N/A 6/12/2019    Procedure: hysteroscopy, dilation & curettage, polypectomy, endometrial ablation;  Surgeon: Fredy Pham MD;  [facility]     ESOPHAGOSCOPY, GASTROSCOPY, DUODENOSCOPY (EGD), COMBINED  11/8/2013    Procedure: COMBINED ESOPHAGOSCOPY, GASTROSCOPY, DUODENOSCOPY (EGD), BIOPSY SINGLE OR MULTIPLE;  Esophagoscopy, Gastroscopy, Duodenoscopy EGD with multiple biopsies;  Surgeon: Teja Koch MD;  [facility]     ESOPHAGOSCOPY, GASTROSCOPY, DUODENOSCOPY (EGD), COMBINED N/A 2/18/2021    Procedure: ESOPHAGOGASTRODUODENOSCOPY, WITH BIOPSY;  Surgeon: Blanca Ba MD;  [facility]     HC REMOVAL OF TONSILS,<11 Y/O      Tonsils <10y.o.     HC TOOTH EXTRACTION W/FORCEP       INJECT BLOCK MEDIAL BRANCH CERVICAL/THORACIC/LUMBAR N/A 7/23/2020    Procedure: Sacral 1,2,3 Lateral Branch Blocks and lumbar 5 medial branch blocks bilaterally;  Surgeon: Maurisio Goetz MD;  [facility]     INJECT JOINT SACROILIAC Left 9/24/2020    Procedure: Left Lumbar 5 Sacral 1 nerve root injections.;  Surgeon: Maurisio Goetz MD;  [facility]     REPAIR TENDON ELBOW  7/9/2014    Procedure: REPAIR TENDON ELBOW;  Surgeon: Chris Johnston MD;  [facility]     ULTRASONIC REMOVAL SOFT TISSUE (LOCATION) Right 11/21/2018    Procedure: Tenex right foot;  Surgeon: Patel Martínez DO;  Location: [facility]       Review of Systems   Constitutional: Negative for chills and fever.   HENT: Negative for congestion, ear pain, hearing loss and sore throat.   "  Eyes: Negative for pain and visual disturbance.   Respiratory: Negative for cough and shortness of breath.    Cardiovascular: Negative for chest pain, palpitations and peripheral edema.   Gastrointestinal: Negative for abdominal pain, constipation, diarrhea, heartburn, hematochezia and nausea.   Breasts:  Negative for tenderness, breast mass and discharge.   Genitourinary: Negative for dysuria, frequency, genital sores, hematuria, pelvic pain, urgency, vaginal bleeding and vaginal discharge.   Musculoskeletal: Positive for arthralgias. Negative for joint swelling and myalgias.   Skin: Negative for rash.   Neurological: Positive for headaches and paresthesias. Negative for dizziness.   Psychiatric/Behavioral: Negative for mood changes. The patient is not nervous/anxious.      CONSTITUTIONAL: NEGATIVE for fever, chills, change in weight  INTEGUMENTARU/SKIN: NEGATIVE for worrisome rashes, moles or lesions  EYES: NEGATIVE for vision changes or irritation  ENT: NEGATIVE for ear, mouth and throat problems  RESP: NEGATIVE for significant cough or SOB  BREAST: NEGATIVE for masses, tenderness or discharge  CV: NEGATIVE for chest pain, palpitations or peripheral edema  GI: NEGATIVE for nausea, abdominal pain, heartburn, or change in bowel habits  : NEGATIVE for unusual urinary or vaginal symptoms. Periods are regular.  MUSCULOSKELETAL: NEGATIVE for significant arthralgias or myalgia  NEURO: NEGATIVE for weakness, dizziness or paresthesias  PSYCHIATRIC: NEGATIVE for changes in mood or affect     OBJECTIVE:   /70   Pulse 72   Temp 97.4  F (36.3  C) (Temporal)   Resp 16   Ht 1.67 m (5' 5.75\")   Wt 99.2 kg (218 lb 12 oz)   SpO2 99%   BMI 35.58 kg/m    Physical Exam  GENERAL APPEARANCE: healthy, alert and no distress  EYES: Eyes grossly normal to inspection, PERRL and conjunctivae and sclerae normal  HENT: ear canals and TM's normal, nose and mouth without ulcers or lesions, oropharynx clear and oral mucous " membranes moist  NECK: no adenopathy, no asymmetry, masses, or scars and thyroid normal to palpation  RESP: lungs clear to auscultation - no rales, rhonchi or wheezes  BREAST: normal without masses, tenderness or nipple discharge and no palpable axillary masses or adenopathy  CV: regular rate and rhythm, normal S1 S2, no S3 or S4, no murmur, click or rub, no peripheral edema and peripheral pulses strong  ABDOMEN: soft, nontender, no hepatosplenomegaly, no masses and bowel sounds normal  MS: no musculoskeletal defects are noted and gait is age appropriate without ataxia  SKIN: no suspicious lesions or rashes  NEURO: Normal strength and tone, sensory exam grossly normal, mentation intact and speech normal  PSYCH: mentation appears normal and affect normal/bright    Diagnostic Test Results:  Labs reviewed in Epic  No results found. However, due to the size of the patient record, not all encounters were searched. Please check Results Review for a complete set of results.    ASSESSMENT/PLAN:   1. Routine general medical examination at a health care facility  - Lipid panel reflex to direct LDL Fasting  - CBC with platelets  - Comprehensive metabolic panel  - TSH with free T4 reflex    2. Screening for hyperlipidemia  3. Hyperlipidemia LDL goal <130  4. Gastroesophageal reflux disease with esophagitis, unspecified whether hemorrhage  Doing well no new concerns follow-up as needed.    5. Thyroid nodule  Patient would like to see Dr. Perales which in regards to ultrasound of the thyroid with reported nodule.  - OTOLARYNGOLOGY REFERRAL    Patient has been advised of split billing requirements and indicates understanding: Yes  COUNSELING:  Reviewed preventive health counseling, as reflected in patient instructions       Regular exercise       Healthy diet/nutrition       Vision screening       Hearing screening       Aspirin prophylaxis       Alcohol Use       Contraception       Family planning       Osteoporosis prevention/bone  "health    Estimated body mass index is 36.37 kg/m  as calculated from the following:    Height as of 5/14/21: 1.662 m (5' 5.43\").    Weight as of 5/14/21: 100.5 kg (221 lb 8 oz).    Weight management plan: Discussed healthy diet and exercise guidelines     She reports that she has never smoked. She has never used smokeless tobacco.      Counseling Resources:  ATP IV Guidelines  Pooled Cohorts Equation Calculator  Breast Cancer Risk Calculator  BRCA-Related Cancer Risk Assessment: FHS-7 Tool  FRAX Risk Assessment  ICSI Preventive Guidelines  Dietary Guidelines for Americans, 2010  USDA's MyPlate  ASA Prophylaxis  Lung CA Screening    Otoniel Manuel PA-C  [facility]  "

## 2021-05-24 ENCOUNTER — OFFICE VISIT (OUTPATIENT)
Dept: NEUROLOGY | Facility: CLINIC | Age: 46
End: 2021-05-24
Payer: COMMERCIAL

## 2021-05-24 ENCOUNTER — OFFICE VISIT (OUTPATIENT)
Dept: PHYSICAL MEDICINE AND REHAB | Facility: CLINIC | Age: 46
End: 2021-05-24
Payer: COMMERCIAL

## 2021-05-24 VITALS
SYSTOLIC BLOOD PRESSURE: 129 MMHG | DIASTOLIC BLOOD PRESSURE: 79 MMHG | BODY MASS INDEX: 36.82 KG/M2 | HEART RATE: 80 BPM | OXYGEN SATURATION: 96 % | HEIGHT: 65 IN | RESPIRATION RATE: 16 BRPM | WEIGHT: 221 LBS

## 2021-05-24 DIAGNOSIS — G54.0 TOS (THORACIC OUTLET SYNDROME): Primary | ICD-10-CM

## 2021-05-24 DIAGNOSIS — R20.2 RIGHT HAND PARESTHESIA: Primary | ICD-10-CM

## 2021-05-24 DIAGNOSIS — M79.2 NEUROPATHIC PAIN: ICD-10-CM

## 2021-05-24 PROCEDURE — 95886 MUSC TEST DONE W/N TEST COMP: CPT | Performed by: PSYCHIATRY & NEUROLOGY

## 2021-05-24 PROCEDURE — 76942 ECHO GUIDE FOR BIOPSY: CPT | Performed by: PHYSICAL MEDICINE & REHABILITATION

## 2021-05-24 PROCEDURE — 64450 NJX AA&/STRD OTHER PN/BRANCH: CPT | Mod: RT | Performed by: PHYSICAL MEDICINE & REHABILITATION

## 2021-05-24 PROCEDURE — 95910 NRV CNDJ TEST 7-8 STUDIES: CPT | Performed by: PSYCHIATRY & NEUROLOGY

## 2021-05-24 RX ORDER — HYDROCORTISONE VALERATE 2 MG/G
OINTMENT TOPICAL
Status: ON HOLD | COMMUNITY
Start: 2021-03-13 | End: 2021-07-27

## 2021-05-24 RX ORDER — MELOXICAM 15 MG/1
15 TABLET ORAL DAILY
COMMUNITY
Start: 2021-03-24 | End: 2022-05-06

## 2021-05-24 ASSESSMENT — PAIN SCALES - GENERAL: PAINLEVEL: MODERATE PAIN (5)

## 2021-05-24 ASSESSMENT — MIFFLIN-ST. JEOR: SCORE: 1633.33

## 2021-05-24 NOTE — PROGRESS NOTES
PROCEDURE NOTE: Pectoralis Minor Muscle Injection Under Ultrasound Guidance    PROCEDURE DATE: 5/24/2021    PATIENT NAME: Amelia Coker  YOB: 1975    ATTENDING PHYSICIAN: Brandi Dennis MD  FELLOW/RESIDENT PHYSICIAN: None    PREOPERATIVE DIAGNOSIS:   #. TOS (thoracic outlet syndrome)  (primary encounter diagnosis)  #. Neuropathic pain    POSTOPERATIVE DIAGNOSIS: same    PROCEDURE PERFORMED: Right Pectoralis Minor Muscle Block Under Ultrasound Guidance    ULTRASOUND WAS USED.     INDICATIONS FOR THE PROCEDURE:  Amelia Coker is a 46 year old female who presents with thoracic outlet syndrome.     PROCEDURE AND FINDINGS:  She was greeted in the clinic. The risk, benefits and alternatives to the procedure were again reviewed with her and informed consent was obtained and the patient agreed to proceed. A time-out was performed. Following review alternatives, benefits and risks, the procedure was carried out under sterile prep with sterile gel. The use of direct sonographic guidance was used to ensure accurate placement of the needle (rather than non-guided injection) and required to minimize the risk of bleeding or injury to nearby neurovascular structures.     A 15-6MHz ultrasound transducer was used to visualize the relevant structures and determine the optimal needle path for the procedure. A 22 gauge 3.5 inch needle was advanced from medial to lateral utilizing an in-plane approach, under continuous ultrasound guidance to the pec minor muscle. After negative aspiration, slow injection of the treatment solution 2.5mL total consisting of 2% Lidocaine  was instilled into affected area. The tip of the needle was visualized throughout the procedure. The remainder of the single-use vials were discarded.      she tolerated the procedure well, was discharged home in stable condition.     Follow-up will be determined after review of pain diary.     COMPLICATIONS: None    COMMENTS: None

## 2021-05-24 NOTE — NURSING NOTE
Chief Complaint   Patient presents with     RECHECK     [facility] Return - US Guided Pec Minor Block     Jose Eduardo Saavedra

## 2021-05-24 NOTE — PROGRESS NOTES
[facility]  Electrodiagnostic Laboratory    Nerve Conduction & EMG Report          Patient: Amelia Coker YOB: 1975  Patient ID: [id number] Age: 46 Years 4 Months  Gender: Female        History & Examination:  Amelia Coker is a 46 year old woman with positional paresthesias in the first three digits of the right hand. She is referred for evaluation of possible true neurogenic thoracic outlet syndrome.     Techniques:  Sensory and motor conduction studies were done with surface recording electrodes. EMG was done with a concentric needle electrode.     Results:  Right median, ulnar, radial, and medial antebrachial cutaneous sensory conduction studies, as well as a right median palmar mixed nerve conduction study, were normal. Right median and ulnar motor conduction studies demonstrated mild absolute and relative conduction slowing across the elbow when recording from abductor digiti minimi and first dorsal interosseous, respectively, and were otherwise normal. Electromyography of the right upper limb was normal.     Interpretation:  This is a mildly abnormal study, demonstrating electrophysiologic evidence of the followin. Mild right ulnar neuropathy at the elbow.  2. No electrophysiologic evidence of true neurogenic thoracic outlet syndrome, cervical radiculopathy, or median neuropathy.    Patel Flowers M.D.      Sensory NCS      Nerve / Sites Rec. Site Onset Peak Ref. NP Amp Ref. PP Amp Dist Pineda Ref. Temp     ms ms ms  V  V  V cm m/s m/s  C   R MEDIAN - Dig II      Dig II Wrist 2.34 3.02  24.3 10.0 23.1 14 59.7 48.0 32.5      Palm Wrist 1.30 1.77  59.5  81.9 8 61.4 48.0 32.1   R ULNAR - Dig V      Dig V Wrist 2.40 3.13  11.0 8.0 13.7 12.5 52.2 48.0 32.5   R RADIAL - Snuff      Forearm Snuff 1.98 2.60  22.3 15.0 28.0 12.5 63.2 48.0 32.5   R MED CUT N FORE      Elbow Forearm 1.67 2.24 3.30 14.4  17.6 10.5 63.0  32.4       Motor NCS      Nerve / Sites Rec. Site Lat Ref. Amp Ref. Rel  Amp Dist Pineda Ref. Dur. Area Temp.     ms ms mV mV % cm m/s m/s ms %  C   R MEDIAN - APB      Wrist APB 3.23 4.40 11.3 5.0 100 8   6.04 100 32.5      Elbow APB 7.29  11.4  100 22 54.2 48.0 6.20 96.9 32.5   R ULNAR - ADM      Wrist ADM 2.86 3.50 9.1 5.0 100 8   4.79 100 32.4      B.Elbow ADM 5.89  8.7  95.5 18 59.6 48.0 4.95 95.5 32.4      A.Elbow ADM 7.81  8.6  95.1 8 41.5 48.0 4.90 93.5 32.5   R MEDIAN - II Lumb      Median II Lumb 3.07  1.8  100 10   6.51 100 32.4      Ulnar Palm Int 2.76  4.1  223 10   5.05 141 32.4   R ULNAR - FDI      Wrist FDI 3.80  10.6  100    4.53 100 32.5      B.Elbow FDI 6.88  9.2  86.5 19.5 63.5  4.43 78.1 32.5      A.Elbow FDI 8.85  8.9  83.6 9.5 48.0  4.43 77.4 32.5       F  Wave      Nerve Min F Lat Max F Lat Mean FLat Temp.    ms ms ms  C   R ULNAR 27.08 29.11 28.31 32.6       EMG Summary Table     Spontaneous MUAP Recruitment    IA Fib/PSW Fasc H.F. Amp Dur. PPP Pattern   R. PRON TERES N None None None N N None Normal   R. FLEX CARPI ULN N None None None N N None Normal   R. FLEX POLL LONG N None None None N N None Normal   R. EXT INDICIS N None None None N N None Normal   R. FIRST D INTEROSS N None None None N N None Normal   R. BICEPS N None None None N N None Normal   R. TRICEPS N None None None N N None Normal   R. C7 PSP N None None None N N None Normal

## 2021-05-24 NOTE — LETTER
5/24/2021       RE: Amelia Coker  [address] [facility] [address]     Dear Colleague,    Thank you for referring your patient, Amelia Coker, to the [facility] PHYSICAL MEDICINE AND REHABILITATION CLINIC [place] at [facility]. Please see a copy of my visit note below.    PROCEDURE NOTE: Pectoralis Minor Muscle Injection Under Ultrasound Guidance    PROCEDURE DATE: 5/24/2021    PATIENT NAME: Amelia Coker  YOB: 1975    ATTENDING PHYSICIAN: Brandi Dennis MD  FELLOW/RESIDENT PHYSICIAN: None    PREOPERATIVE DIAGNOSIS:   #. TOS (thoracic outlet syndrome)  (primary encounter diagnosis)  #. Neuropathic pain    POSTOPERATIVE DIAGNOSIS: same    PROCEDURE PERFORMED: Right Pectoralis Minor Muscle Block Under Ultrasound Guidance    ULTRASOUND WAS USED.     INDICATIONS FOR THE PROCEDURE:  Amelia Coker is a 46 year old female who presents with thoracic outlet syndrome.     PROCEDURE AND FINDINGS:  She was greeted in the clinic. The risk, benefits and alternatives to the procedure were again reviewed with her and informed consent was obtained and the patient agreed to proceed. A time-out was performed. Following review alternatives, benefits and risks, the procedure was carried out under sterile prep with sterile gel. The use of direct sonographic guidance was used to ensure accurate placement of the needle (rather than non-guided injection) and required to minimize the risk of bleeding or injury to nearby neurovascular structures.     A 15-6MHz ultrasound transducer was used to visualize the relevant structures and determine the optimal needle path for the procedure. A 22 gauge 3.5 inch needle was advanced from medial to lateral utilizing an in-plane approach, under continuous ultrasound guidance to the pec minor muscle. After negative aspiration, slow injection of the treatment solution 2.5mL total consisting of 2% Lidocaine  was instilled  into affected area. The tip of the needle was visualized throughout the procedure. The remainder of the single-use vials were discarded.      she tolerated the procedure well, was discharged home in stable condition.     Follow-up will be determined after review of pain diary.     COMPLICATIONS: None    COMMENTS: None    Again, thank you for allowing me to participate in the care of your patient.      Sincerely,    Brandi Dennis MD

## 2021-05-24 NOTE — LETTER
2021         RE: Amelia Coker  [address] [facility] [address]        Dear Colleague,    Thank you for referring your patient, Amelia Coker, to the [facility] NEUROLOGY CLINIC [place]. Please see a copy of my visit note below.    [facility]  Electrodiagnostic Laboratory    Nerve Conduction & EMG Report          Patient: Amelia Coker YOB: 1975  Patient ID: [id number] Age: 46 Years 4 Months  Gender: Female        History & Examination:  Amelia Coker is a 46 year old woman with positional paresthesias in the first three digits of the right hand. She is referred for evaluation of possible true neurogenic thoracic outlet syndrome.     Techniques:  Sensory and motor conduction studies were done with surface recording electrodes. EMG was done with a concentric needle electrode.     Results:  Right median, ulnar, radial, and medial antebrachial cutaneous sensory conduction studies, as well as a right median palmar mixed nerve conduction study, were normal. Right median and ulnar motor conduction studies demonstrated mild absolute and relative conduction slowing across the elbow when recording from abductor digiti minimi and first dorsal interosseous, respectively, and were otherwise normal. Electromyography of the right upper limb was normal.     Interpretation:  This is a mildly abnormal study, demonstrating electrophysiologic evidence of the followin. Mild right ulnar neuropathy at the elbow.  2. No electrophysiologic evidence of true neurogenic thoracic outlet syndrome, cervical radiculopathy, or median neuropathy.    Patel Flowers M.D.      Sensory NCS      Nerve / Sites Rec. Site Onset Peak Ref. NP Amp Ref. PP Amp Dist Pineda Ref. Temp     ms ms ms  V  V  V cm m/s m/s  C   R MEDIAN - Dig II      Dig II Wrist 2.34 3.02  24.3 10.0 23.1 14 59.7 48.0 32.5      Palm Wrist 1.30 1.77  59.5  81.9 8 61.4 48.0 32.1   R ULNAR - Dig V      Dig V Wrist 2.40 3.13  11.0  8.0 13.7 12.5 52.2 48.0 32.5   R RADIAL - Snuff      Forearm Snuff 1.98 2.60  22.3 15.0 28.0 12.5 63.2 48.0 32.5   R MED CUT N FORE      Elbow Forearm 1.67 2.24 3.30 14.4  17.6 10.5 63.0  32.4       Motor NCS      Nerve / Sites Rec. Site Lat Ref. Amp Ref. Rel Amp Dist Pineda Ref. Dur. Area Temp.     ms ms mV mV % cm m/s m/s ms %  C   R MEDIAN - APB      Wrist APB 3.23 4.40 11.3 5.0 100 8   6.04 100 32.5      Elbow APB 7.29  11.4  100 22 54.2 48.0 6.20 96.9 32.5   R ULNAR - ADM      Wrist ADM 2.86 3.50 9.1 5.0 100 8   4.79 100 32.4      B.Elbow ADM 5.89  8.7  95.5 18 59.6 48.0 4.95 95.5 32.4      A.Elbow ADM 7.81  8.6  95.1 8 41.5 48.0 4.90 93.5 32.5   R MEDIAN - II Lumb      Median II Lumb 3.07  1.8  100 10   6.51 100 32.4      Ulnar Palm Int 2.76  4.1  223 10   5.05 141 32.4   R ULNAR - FDI      Wrist FDI 3.80  10.6  100    4.53 100 32.5      B.Elbow FDI 6.88  9.2  86.5 19.5 63.5  4.43 78.1 32.5      A.Elbow FDI 8.85  8.9  83.6 9.5 48.0  4.43 77.4 32.5       F  Wave      Nerve Min F Lat Max F Lat Mean FLat Temp.    ms ms ms  C   R ULNAR 27.08 29.11 28.31 32.6       EMG Summary Table     Spontaneous MUAP Recruitment    IA Fib/PSW Fasc H.F. Amp Dur. PPP Pattern   R. PRON TERES N None None None N N None Normal   R. FLEX CARPI ULN N None None None N N None Normal   R. FLEX POLL LONG N None None None N N None Normal   R. EXT INDICIS N None None None N N None Normal   R. FIRST D INTEROSS N None None None N N None Normal   R. BICEPS N None None None N N None Normal   R. TRICEPS N None None None N N None Normal   R. C7 PSP N None None None N N None Normal                                    Again, thank you for allowing me to participate in the care of your patient.        Sincerely,        Patel Flowers MD

## 2021-05-25 ENCOUNTER — OFFICE VISIT (OUTPATIENT)
Dept: FAMILY MEDICINE | Facility: OTHER | Age: 46
End: 2021-05-25
Payer: COMMERCIAL

## 2021-05-25 VITALS
RESPIRATION RATE: 16 BRPM | TEMPERATURE: 97.4 F | HEART RATE: 72 BPM | BODY MASS INDEX: 35.15 KG/M2 | SYSTOLIC BLOOD PRESSURE: 126 MMHG | WEIGHT: 218.75 LBS | HEIGHT: 66 IN | OXYGEN SATURATION: 99 % | DIASTOLIC BLOOD PRESSURE: 70 MMHG

## 2021-05-25 DIAGNOSIS — Z13.220 SCREENING FOR HYPERLIPIDEMIA: ICD-10-CM

## 2021-05-25 DIAGNOSIS — K21.00 GASTROESOPHAGEAL REFLUX DISEASE WITH ESOPHAGITIS, UNSPECIFIED WHETHER HEMORRHAGE: ICD-10-CM

## 2021-05-25 DIAGNOSIS — Z00.00 ROUTINE GENERAL MEDICAL EXAMINATION AT A HEALTH CARE FACILITY: Primary | ICD-10-CM

## 2021-05-25 DIAGNOSIS — E04.1 THYROID NODULE: ICD-10-CM

## 2021-05-25 DIAGNOSIS — E78.5 HYPERLIPIDEMIA LDL GOAL <130: ICD-10-CM

## 2021-05-25 LAB
ALBUMIN SERPL-MCNC: 4.2 G/DL (ref 3.4–5)
ALP SERPL-CCNC: 50 U/L (ref 40–150)
ALT SERPL W P-5'-P-CCNC: 27 U/L (ref 0–50)
ANION GAP SERPL CALCULATED.3IONS-SCNC: 5 MMOL/L (ref 3–14)
AST SERPL W P-5'-P-CCNC: 17 U/L (ref 0–45)
BILIRUB SERPL-MCNC: 0.7 MG/DL (ref 0.2–1.3)
BUN SERPL-MCNC: 15 MG/DL (ref 7–30)
CALCIUM SERPL-MCNC: 9 MG/DL (ref 8.5–10.1)
CHLORIDE SERPL-SCNC: 109 MMOL/L (ref 94–109)
CHOLEST SERPL-MCNC: 202 MG/DL
CO2 SERPL-SCNC: 26 MMOL/L (ref 20–32)
CREAT SERPL-MCNC: 0.62 MG/DL (ref 0.52–1.04)
ERYTHROCYTE [DISTWIDTH] IN BLOOD BY AUTOMATED COUNT: 13.5 % (ref 10–15)
GFR SERPL CREATININE-BSD FRML MDRD: >90 ML/MIN/{1.73_M2}
GLUCOSE SERPL-MCNC: 97 MG/DL (ref 70–99)
HCT VFR BLD AUTO: 39.2 % (ref 35–47)
HDLC SERPL-MCNC: 68 MG/DL
HGB BLD-MCNC: 13.2 G/DL (ref 11.7–15.7)
LDLC SERPL CALC-MCNC: 118 MG/DL
MCH RBC QN AUTO: 30.4 PG (ref 26.5–33)
MCHC RBC AUTO-ENTMCNC: 33.7 G/DL (ref 31.5–36.5)
MCV RBC AUTO: 90 FL (ref 78–100)
NONHDLC SERPL-MCNC: 134 MG/DL
PLATELET # BLD AUTO: 315 10E9/L (ref 150–450)
POTASSIUM SERPL-SCNC: 3.9 MMOL/L (ref 3.4–5.3)
PROT SERPL-MCNC: 7.9 G/DL (ref 6.8–8.8)
RBC # BLD AUTO: 4.34 10E12/L (ref 3.8–5.2)
SODIUM SERPL-SCNC: 140 MMOL/L (ref 133–144)
TRIGL SERPL-MCNC: 81 MG/DL
TSH SERPL DL<=0.005 MIU/L-ACNC: 0.9 MU/L (ref 0.4–4)
WBC # BLD AUTO: 5.2 10E9/L (ref 4–11)

## 2021-05-25 PROCEDURE — 99396 PREV VISIT EST AGE 40-64: CPT | Performed by: PHYSICIAN ASSISTANT

## 2021-05-25 PROCEDURE — 84443 ASSAY THYROID STIM HORMONE: CPT | Performed by: PHYSICIAN ASSISTANT

## 2021-05-25 PROCEDURE — 85027 COMPLETE CBC AUTOMATED: CPT | Performed by: PHYSICIAN ASSISTANT

## 2021-05-25 PROCEDURE — 36415 COLL VENOUS BLD VENIPUNCTURE: CPT | Performed by: PHYSICIAN ASSISTANT

## 2021-05-25 PROCEDURE — 80053 COMPREHEN METABOLIC PANEL: CPT | Performed by: PHYSICIAN ASSISTANT

## 2021-05-25 PROCEDURE — 80061 LIPID PANEL: CPT | Performed by: PHYSICIAN ASSISTANT

## 2021-05-25 ASSESSMENT — MIFFLIN-ST. JEOR: SCORE: 1634.99

## 2021-05-25 ASSESSMENT — ENCOUNTER SYMPTOMS
HEMATOCHEZIA: 0
PALPITATIONS: 0
CHILLS: 0
SHORTNESS OF BREATH: 0
HEMATURIA: 0
ARTHRALGIAS: 1
FREQUENCY: 0
MYALGIAS: 0
NERVOUS/ANXIOUS: 0
HEADACHES: 1
NAUSEA: 0
BREAST MASS: 0
CONSTIPATION: 0
DYSURIA: 0
SORE THROAT: 0
EYE PAIN: 0
COUGH: 0
JOINT SWELLING: 0
ABDOMINAL PAIN: 0
HEARTBURN: 0
DIARRHEA: 0
DIZZINESS: 0
PARESTHESIAS: 1
FEVER: 0

## 2021-05-25 ASSESSMENT — PAIN SCALES - GENERAL: PAINLEVEL: MODERATE PAIN (5)

## 2021-05-28 ENCOUNTER — MYC MEDICAL ADVICE (OUTPATIENT)
Dept: OTHER | Facility: CLINIC | Age: 46
End: 2021-05-28

## 2021-05-28 ENCOUNTER — OFFICE VISIT (OUTPATIENT)
Dept: NEUROSURGERY | Facility: CLINIC | Age: 46
End: 2021-05-28
Payer: COMMERCIAL

## 2021-05-28 VITALS
HEART RATE: 90 BPM | DIASTOLIC BLOOD PRESSURE: 72 MMHG | WEIGHT: 218 LBS | BODY MASS INDEX: 35.46 KG/M2 | SYSTOLIC BLOOD PRESSURE: 132 MMHG

## 2021-05-28 PROCEDURE — 99212 OFFICE O/P EST SF 10 MIN: CPT | Performed by: NEUROLOGICAL SURGERY

## 2021-05-28 ASSESSMENT — PAIN SCALES - GENERAL: PAINLEVEL: MODERATE PAIN (4)

## 2021-05-28 NOTE — LETTER
5/28/2021         RE: Amelia Coker  [address] [facility] [address]        Dear Colleague,    Thank you for referring your patient, Amelia Coker, to the [facility] NEUROSURGERY CLINIC [place]. Please see a copy of my visit note below.    Ms. Coker returns following an EMG of her right upper extremity which she does not show evidence of cervical radiculopathy or brachial plexopathy to account for her continuing right upper extremity symptoms.  Dr. Dennis also performed a right anterior scalene injection which was not particularly helpful.  He then performed an injection into medial portion of her pectoralis muscle with some significant symptomatic improvement.  I have reviewed the cervical MRI scan findings once again and examined the patient.  I am unconvinced that this represents a T1 radiculopathy or cervical radiculopathy at any level.  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  She has recently been seen by a vascular surgeon for this diagnosis and plans to return for follow-up and possible surgery for alleviation of this problem.    Approximately 15 minutes was spent direct contact with the patient the majority reviewing recent diagnostic injections and studies, differential diagnosis and management alternatives.      Again, thank you for allowing me to participate in the care of your patient.        Sincerely,        Tomy Grant MD

## 2021-05-28 NOTE — PROGRESS NOTES
Ms. Coker returns following an EMG of her right upper extremity which she does not show evidence of cervical radiculopathy or brachial plexopathy to account for her continuing right upper extremity symptoms.  Dr. Dennis also performed a right anterior scalene injection which was not particularly helpful.  He then performed an injection into medial portion of her pectoralis muscle with some significant symptomatic improvement.  I have reviewed the cervical MRI scan findings once again and examined the patient.  I am unconvinced that this represents a T1 radiculopathy or cervical radiculopathy at any level.  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  She has recently been seen by a vascular surgeon for this diagnosis and plans to return for follow-up and possible surgery for alleviation of this problem.    Approximately 15 minutes was spent direct contact with the patient the majority reviewing recent diagnostic injections and studies, differential diagnosis and management alternatives.

## 2021-06-02 NOTE — PROGRESS NOTES
[facility]    Amelia Coker had recently been evaluated on 5/6/2021 for possible neurogenic TOS in her dominant right arm.  Some temporary relief with an anterior scalene muscle block on 4/16/2021.  Previous evaluation on 6/4/2020 no evidence of venous compression but significant arterial compression right greater than the left with maneuvers.  No improvement provement with physical therapy.  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  It usually causes irritation brachial plexus problem TOS but could be related to more compression about the pectoralis minor.  Discussed possibility of injection therapy to see if he had any improvement to help clarify the diagnosis and treatment.    Mild asymptomatic bilateral carotid disease on 7/23/2020 carotid duplex.    Patient did undergo injection of the right pectoralis minor insertion last week.  She did notice marked improvement of the achiness with range of motion of her arm which he did extensively did test this out following the injection.  But as expected once the injection wore off her symptoms did return.    Exam: Alert and appropriate.  Normal affect.   Blood pressure 114/76.  Pulse 77 regular   Chest= clear   Cardiovascular= regular rate   Extremities= unremarkable.    Impression: Neurogenic TOS symptoms of her dominant right arm.  Unusual in the fact that her arm is actually better at 90 degrees straight out to her side and even externally rotated which is quite unusual for the standard neurogenic TOS with compression of the brachial plexus at the thoracic outlet between the first rib and scalene muscles.  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    With these  findings we will proceed with surgery to divide the pectoralis minor attachment to the coracoid process in the right shoulder region.  This will be performed via general anesthetic as an outpatient.  No specific restrictions to activities following the surgery.  She is aware that only time will tell whether this will improve her symptoms or whether she would need a more standard first rib resection and scalenectomy if her symptoms do not improve.    25 minutes with patient today revealing past evaluations and more recent nerve block and plan for surgery.      Davie Dutta MD    CC: Otoniel Nelson PA-C

## 2021-06-03 ENCOUNTER — TELEPHONE (OUTPATIENT)
Dept: OTHER | Facility: CLINIC | Age: 46
End: 2021-06-03

## 2021-06-03 ENCOUNTER — OFFICE VISIT (OUTPATIENT)
Dept: OTHER | Facility: CLINIC | Age: 46
End: 2021-06-03
Attending: SURGERY
Payer: COMMERCIAL

## 2021-06-03 VITALS — RESPIRATION RATE: 16 BRPM | SYSTOLIC BLOOD PRESSURE: 114 MMHG | DIASTOLIC BLOOD PRESSURE: 76 MMHG | HEART RATE: 77 BPM

## 2021-06-03 DIAGNOSIS — G54.0 TOS (THORACIC OUTLET SYNDROME): Primary | ICD-10-CM

## 2021-06-03 DIAGNOSIS — I77.89 PECTORALIS MINOR SYNDROME (H): ICD-10-CM

## 2021-06-03 PROCEDURE — G0463 HOSPITAL OUTPT CLINIC VISIT: HCPCS

## 2021-06-03 PROCEDURE — 99214 OFFICE O/P EST MOD 30 MIN: CPT | Performed by: SURGERY

## 2021-06-03 NOTE — PROGRESS NOTES
[facility] Vascular & Wound [facility]      Patient is in clinic today to see Dr Dutta.      Patient is here for a  follow up  to discus about TOS(right)      Patient's condition is stable.    /76 (BP Location: Left arm, Patient Position: Chair, Cuff Size: Adult Regular)   Pulse 77   Resp 16     The provider has been notified that the patient has no concerns.     Questions patient would like addressed today are: N/A.    Refills are needed: No    At the end of the visit the patient was provided with education both verbally and on their AVS regarding follow up appointment(s).        Torie Gilmore, CMA

## 2021-06-03 NOTE — NURSING NOTE
Patient Education    Procedure: DIVISION RIGHT PECTORALIS MUSCLE  Diagnosis: TOS  Anticoagulation Instruction: N/A  Pre-Operative Physical Exam: You need to have a pre-op physical exam within 30 days of your procedure. Your procedure may be cancelled if you do not have a current History and Physical. Call your PCP's office to schedule.  Allergies:  Updated in [facility]  Bowel Prep: N/A  NPO per protocol.   Post Procedure Education: [facility] patient post-procedure fact sheet reviewed with patient.    COVID-19 instructions for isolation and pre testing reviewed with pt.    Surgery scheduler will call to coordinate date/time.     Learner(s):patient  Method: Listening  Barriers to Learning:No Barrier  Outcome: Patient did verbalize understanding of above education.    Case request in Epic.       SAUNDRA Gutierrez, RN  [facility]  Office:  [phone] Fax: [phone]

## 2021-06-04 ENCOUNTER — OFFICE VISIT (OUTPATIENT)
Dept: FAMILY MEDICINE | Facility: OTHER | Age: 46
End: 2021-06-04
Payer: COMMERCIAL

## 2021-06-04 VITALS
WEIGHT: 218 LBS | RESPIRATION RATE: 16 BRPM | HEIGHT: 66 IN | HEART RATE: 84 BPM | BODY MASS INDEX: 35.03 KG/M2 | DIASTOLIC BLOOD PRESSURE: 88 MMHG | TEMPERATURE: 98.2 F | OXYGEN SATURATION: 98 % | SYSTOLIC BLOOD PRESSURE: 126 MMHG

## 2021-06-04 DIAGNOSIS — K21.9 GASTROESOPHAGEAL REFLUX DISEASE, UNSPECIFIED WHETHER ESOPHAGITIS PRESENT: ICD-10-CM

## 2021-06-04 DIAGNOSIS — I49.1 PAC (PREMATURE ATRIAL CONTRACTION): ICD-10-CM

## 2021-06-04 DIAGNOSIS — G54.0 BRACHIAL PLEXOPATHY: ICD-10-CM

## 2021-06-04 DIAGNOSIS — Z01.818 PREOP GENERAL PHYSICAL EXAM: Primary | ICD-10-CM

## 2021-06-04 PROCEDURE — 99214 OFFICE O/P EST MOD 30 MIN: CPT | Performed by: PHYSICIAN ASSISTANT

## 2021-06-04 ASSESSMENT — MIFFLIN-ST. JEOR: SCORE: 1628.46

## 2021-06-04 NOTE — PATIENT INSTRUCTIONS

## 2021-06-04 NOTE — PROGRESS NOTES
[address]  [facility] [address]  Phone: [phone]  Primary Provider: Otoniel Nelson  Pre-op Performing Provider: OTONIEL NELSON    PREOPERATIVE EVALUATION:  Today's date: 6/4/2021    Amelia Coker is a 46 year old female who presents for a preoperative evaluation.    Surgical Information:  Surgery/Procedure: right side pectoral minor release  Surgery Location: [facility]  Surgeon: Dr. Dutta  Surgery Date: 6/11/2021  Time of Surgery: TBD  Where patient plans to recover: At home with family  Fax number for surgical facility: Note does not need to be faxed, will be available electronically in Epic.    Type of Anesthesia Anticipated: to be determined    Assessment & Plan     The proposed surgical procedure is considered LOW risk.    Preop general physical exam  Brachial plexopathy - right shoulder  Gastroesophageal reflux disease, unspecified whether esophagitis present  PAC (premature atrial contraction)  Only historical problematic stable issues present today.  Surgical intervention is approved.       Risks and Recommendations:  The patient has the following additional risks and recommendations for perioperative complications:   - No identified additional risk factors other than previously addressed    Medication Instructions:  Patient is to take all scheduled medications on the day of surgery   Holding meloxicam already    RECOMMENDATION:  APPROVAL GIVEN to proceed with proposed procedure, without further diagnostic evaluation.      Subjective     HPI related to upcoming procedure: right pectoralis minor release      Preop Questions 6/4/2021   1. Have you ever had a heart attack or stroke? No   2. Have you ever had surgery on your heart or blood vessels, such as a stent placement, a coronary artery bypass, or surgery on an artery in your head, neck, heart, or legs? No   3. Do you have chest pain with activity? No   4. Do you have a history of  heart  failure? No   5. Do you currently have a cold, bronchitis or symptoms of other infection? No   6. Do you have a cough, shortness of breath, or wheezing? No   7. Do you or anyone in your family have previous history of blood clots? No   8. Do you or does anyone in your family have a serious bleeding problem such as prolonged bleeding following surgeries or cuts? No   9. Have you ever had problems with anemia or been told to take iron pills? No   10. Have you had any abnormal blood loss such as black, tarry or bloody stools, or abnormal vaginal bleeding? No   11. Have you ever had a blood transfusion? No   12. Are you willing to have a blood transfusion if it is medically needed before, during, or after your surgery? Yes   13. Have you or any of your relatives ever had problems with anesthesia? No   14. Do you have sleep apnea, excessive snoring or daytime drowsiness? No   15. Do you have any artifical heart valves or other implanted medical devices like a pacemaker, defibrillator, or continuous glucose monitor? No   16. Do you have artificial joints? No   17. Are you allergic to latex? YES:    18. Is there any chance that you may be pregnant? No     Health Care Directive:  Patient does not have a Health Care Directive or Living Will: Discussed advance care planning with patient; however, patient declined at this time.    Preoperative Review of :   reviewed - no record of controlled substances prescribed.      Status of Chronic Conditions:  See problem list for active medical problems.  Problems all longstanding and stable, except as noted/documented.  See ROS for pertinent symptoms related to these conditions.      Review of Systems  CONSTITUTIONAL: NEGATIVE for fever, chills, change in weight  INTEGUMENTARY/SKIN: NEGATIVE for worrisome rashes, moles or lesions  EYES: NEGATIVE for vision changes or irritation  ENT/MOUTH: NEGATIVE for ear, mouth and throat problems  RESP: NEGATIVE for significant cough or  SOB  BREAST: NEGATIVE for masses, tenderness or discharge  CV: NEGATIVE for chest pain, palpitations or peripheral edema  GI: NEGATIVE for nausea, abdominal pain, heartburn, or change in bowel habits  : NEGATIVE for frequency, dysuria, or hematuria  MUSCULOSKELETAL: NEGATIVE for significant arthralgias or myalgia  NEURO: NEGATIVE for weakness, dizziness or paresthesias  ENDOCRINE: NEGATIVE for temperature intolerance, skin/hair changes  HEME: NEGATIVE for bleeding problems  PSYCHIATRIC: NEGATIVE for changes in mood or affect    Patient Active Problem List    Diagnosis Date Noted     Dermatitis 03/03/2021     Priority: Medium     Musculoskeletal pain 03/03/2021     Priority: Medium     Fatigue, unspecified type 03/03/2021     Priority: Medium     AK (actinic keratosis) - both hands at the lateral thenar aspect. 12/08/2020     Priority: Medium     Hypertrophy of breast 11/05/2020     Priority: Medium     History of cold sores 11/05/2020     Priority: Medium     Elevated blood pressure reading without diagnosis of hypertension 09/24/2020     Priority: Medium     Balance problems 07/17/2020     Priority: Medium     Morbid obesity (H) 07/17/2020     Priority: Medium     Sinus arrhythmia seen on electrocardiogram 07/17/2020     Priority: Medium     Family history of ischemic heart disease - father at 46 massive MI 07/17/2020     Priority: Medium     Brachial plexopathy - right shoulder 05/21/2020     Priority: Medium     Dental abscess - left mandible molar 03/05/2020     Priority: Medium     Endometrium, hyperplasia - on recent CT scan 02/10/2020     Priority: Medium     Pain in joint involving pelvic region and thigh, right 02/10/2020     Priority: Medium     Right lateral abdominal pain 02/10/2020     Priority: Medium     Right foot pain 02/10/2020     Priority: Medium     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 10/10/2019     Priority: Medium     Abnormality of nail surface 08/29/2019     Priority:  Medium     Dry hair 08/29/2019     Priority: Medium     Dry skin 08/29/2019     Priority: Medium     Malaise and fatigue 08/29/2019     Priority: Medium     Lymphadenopathy 08/29/2019     Priority: Medium     PONV (postoperative nausea and vomiting) 06/10/2019     Priority: Medium     Menorrhalgia 06/10/2019     Priority: Medium     Uterine polyp 06/10/2019     Priority: Medium     Loose stools 06/10/2019     Priority: Medium     Hyperactive bowel sounds 06/10/2019     Priority: Medium     Jaycob complexion 05/23/2019     Priority: Medium     Lip lesion 05/23/2019     Priority: Medium     Abdominal pain, epigastric 05/23/2019     Priority: Medium     Abdominal distension 05/23/2019     Priority: Medium     Intractable episodic tension-type headache 03/11/2019     Priority: Medium     Intractable right heel pain 05/29/2018     Priority: Medium     Superficial swelling of scalp 04/12/2018     Priority: Medium     Biceps tendinopathy, right 03/22/2018     Priority: Medium     Hyperlipidemia LDL goal <130 08/30/2017     Priority: Medium     Spasm of muscle 07/11/2017     Priority: Medium     Sternocleidomastoid muscle tenderness 04/05/2017     Priority: Medium     left         Acute cervical myofascial strain, initial encounter 04/05/2017     Priority: Medium     Abnormal mammogram 09/21/2016     Priority: Medium     right breast       Gastroesophageal reflux disease, esophagitis presence not specified 06/29/2016     Priority: Medium     IMO Regulatory Load OCT 2020       Rotator cuff arthropathy, right 03/07/2016     Priority: Medium     AD (atopic dermatitis) 10/29/2015     Priority: Medium     Heat sensitivity 10/29/2015     Priority: Medium     Skin lesion 10/20/2015     Priority: Medium     posterior superior scalp       Plantar fasciitis 09/23/2015     Priority: Medium     Lateral epicondylitis 03/16/2015     Priority: Medium     Problem list name updated by automated process. Provider to review       Right  shoulder pain 03/16/2015     Priority: Medium     Telangiectasia of face 12/19/2014     Priority: Medium     Frontal headache 12/15/2014     Priority: Medium     Muscular wasting and disuse atrophy, not elsewhere classified 06/09/2014     Priority: Medium     right SCM, right wrist,        Ds DNA antibody positive 04/16/2014     Priority: Medium     borderline.       Lumbar radiculopathy 01/22/2014     Priority: Medium     Migraine without aura and without status migrainosus, not intractable 10/23/2013     Priority: Medium     Foraminal stenosis of lumbar region 09/26/2013     Priority: Medium     DJD (degenerative joint disease), lumbar 09/26/2013     Priority: Medium     FLORIDALMA positive 07/18/2013     Priority: Medium     Plantar fascial fibromatosis 07/18/2013     Priority: Medium     Pain in joint, upper arm 04/15/2013     Priority: Medium     Family history of colon cancer 03/05/2013     Priority: Medium     mother       Keratitis sicca (H) 10/31/2012     Priority: Medium     Dry eyes 10/15/2012     Priority: Medium     Family history of melanoma 08/24/2012     Priority: Medium     Shoulder joint instability 08/15/2012     Priority: Medium     Abnormal PFT 07/31/2012     Priority: Medium     question of left heart failure and/or shunting in need of further investigation       Chronic fatigue 06/06/2012     Priority: Medium     Hair loss 06/06/2012     Priority: Medium     Raynaud phenomenon 04/11/2012     Priority: Medium     PAC (premature atrial contraction) 07/15/2010     Priority: Medium     Palpitations 07/15/2010     Priority: Medium     Lyme disease 06/07/2010     Priority: Medium     Scalp lesion 05/26/2010     Priority: Medium     Migraine, unspecified, with intractable migraine, so stated, without mention of status migrainosus 04/04/2003     Priority: Medium     Problem list name updated by automated process. Provider to review  IMO Regulatory Load OCT 2020       Bell's palsy 04/04/2003     Priority:  Medium     Contact dermatitis and other eczema, due to unspecified cause 04/04/2003     Priority: Medium      Past Medical History:   Diagnosis Date     Abnormal mammogram 9/21/2016    right breast      AD (atopic dermatitis) 10/29/2015     Allergic rhinitis due to other allergen      Arthritis      Bell's palsy      Chronic fatigue 6/6/2012     Chronic infection     MRSA - 2015 scalp and prior to Abdomen     Contact dermatitis and other eczema, due to unspecified cause     eczema     Contusion of left foot, initial encounter 3/16/2016     DJD (degenerative joint disease), lumbar 9/26/2013     DJD (degenerative joint disease), lumbar 9/26/2013     Ds DNA antibody positive 4/16/2014    borderline.      Elevated blood pressure reading without diagnosis of hypertension 12/24/2013     Facial contusion 12/15/2014    hit by horse      Foraminal stenosis of lumbar region 9/26/2013     Frontal headache 12/15/2014     Gastroesophageal reflux disease, esophagitis presence not specified 6/29/2016     Heat sensitivity 10/29/2015     HTN, goal below 140/90 9/23/2015     Hyperlipidemia LDL goal <130 8/30/2017     Irregular heart beat      Keratitis sicca (H) 10/31/2012     Left shoulder pain 9/26/2013     Lumbar radiculopathy 1/22/2014     Migraine headache 10/23/2013     Migraine, unspecified, with intractable migraine, so stated, without mention of status migrainosus      Motion sickness      MRSA infection 10/20/2015     Muscular wasting and disuse atrophy, not elsewhere classified 6/9/2014    right SCM, right wrist,       Numbness and tingling     both legs anf feet greater on the left     PAC (premature atrial contraction) 7/15/2010     Plantar fasciitis 9/23/2015     PONV (postoperative nausea and vomiting)      Skin lesion 10/20/2015    posterior superior scalp      Spasm of muscle 7/11/2017     Telangiectasia of face 12/19/2014     Tooth pain 10/20/2015    left lower primary molar      Past Surgical History:   Procedure  Laterality Date     AS INJ TRANSFORAMIN EPIDURAL, LUMB/SACR SINGLE       COLONOSCOPY  3/11/2013    Procedure: COLONOSCOPY;  colonoscopy;  Surgeon: Lobito Mas MD;  Location: [facility]     COLONOSCOPY WITH CO2 INSUFFLATION N/A 11/19/2018    Procedure: COLONOSCOPY WITH CO2 INSUFFLATION;  Surgeon: Goyo Blank MD;  Location: [facility]     DECOMPRESSION LUMBAR TWO LEVELS Bilateral 12/8/2016    Procedure: DECOMPRESSION LUMBAR TWO LEVELS;  Surgeon: Bang Burrell MD;  Location: [facility]     DILATION AND CURETTAGE, HYSTEROSCOPY, ABLATE ENDOMETRIUM NOVASURE, COMBINED N/A 6/12/2019    Procedure: hysteroscopy, dilation & curettage, polypectomy, endometrial ablation;  Surgeon: Fredy Pham MD;  Location: [facility]     ESOPHAGOSCOPY, GASTROSCOPY, DUODENOSCOPY (EGD), COMBINED  11/8/2013    Procedure: COMBINED ESOPHAGOSCOPY, GASTROSCOPY, DUODENOSCOPY (EGD), BIOPSY SINGLE OR MULTIPLE;  Esophagoscopy, Gastroscopy, Duodenoscopy EGD with multiple biopsies;  Surgeon: Teja Koch MD;  Location: [facility]     ESOPHAGOSCOPY, GASTROSCOPY, DUODENOSCOPY (EGD), COMBINED N/A 2/18/2021    Procedure: ESOPHAGOGASTRODUODENOSCOPY, WITH BIOPSY;  Surgeon: Blanca Ba MD;  Location: [facility]     HC REMOVAL OF TONSILS,<11 Y/O      Tonsils <10y.o.     HC TOOTH EXTRACTION W/FORCEP       INJECT BLOCK MEDIAL BRANCH CERVICAL/THORACIC/LUMBAR N/A 7/23/2020    Procedure: Sacral 1,2,3 Lateral Branch Blocks and lumbar 5 medial branch blocks bilaterally;  Surgeon: Maurisio Goetz MD;  [facility]: [facility]     INJECT JOINT SACROILIAC Left 9/24/2020    Procedure: Left Lumbar 5 Sacral 1 nerve root injections.;  Surgeon: Maurisio Goetz MD;  Location: [facility]     REPAIR TENDON ELBOW  7/9/2014    Procedure: REPAIR TENDON ELBOW;  Surgeon: Chris Johnston MD;  Location: [facility]     ULTRASONIC REMOVAL SOFT TISSUE (LOCATION) Right 11/21/2018    Procedure: Tenex right foot;  Surgeon: Patel Martínez DO;  Location: [facility]     Current Outpatient  Medications   Medication Sig Dispense Refill     Acetaminophen (TYLENOL PO) Take 1,000 mg by mouth every 8 hours as needed        [START ON 6/21/2021] cefTAZidime (FORTAZ) 1 GM vial For Allergy Testing in Allergy Clinic Only 1 each 0     clobetasol (TEMOVATE) 0.05 % external cream Apply topically 2 times daily 60 g 1     fexofenadine (ALLEGRA) 180 MG tablet Take 1 tablet by mouth daily Reported on 4/5/2017       hydrocortisone valerate (WEST-DENNIS) 0.2 % external ointment APPLY TOPICALLY 2 TIMES DAILY       medical cannabis (Patient's own supply) Take 0.7 Doses by mouth See Admin Instructions (The purpose of this order is to document that the patient reports taking medical cannabis.  This is not a prescription, and is not used to certify that the patient has a qualifying medical condition.)       meloxicam (MOBIC) 15 MG tablet Take 15 mg by mouth daily       mupirocin (BACTROBAN) 2 % external ointment Apply topically 3 times daily 30 g 0     rizatriptan (MAXALT-MLT) 5 MG ODT Take 1 tablet (5 mg) by mouth at onset of headache for migraine May repeat in 2 hours. Max 6 tablets/24 hours. 30 tablet 1     sucralfate (CARAFATE) 1 GM tablet Take 1 tablet (1 g) by mouth 4 times daily 40 tablet 0       Allergies   Allergen Reactions     Ceftriaxone Anaphylaxis     Hives, rash, racing heart beat     Bactrim [Sulfamethoxazole W/Trimethoprim] Hives     Ciprofloxacin Other (See Comments)     Tendon Issues     Codeine Nausea and Vomiting     Codeine      Copper      Rash       Doxycycline      Loss of skin pigmentation, skin loss.     Gold      Rash       Iodine      Iodine-131 Hives     Levaquin [Levofloxacin] Other (See Comments)     Tendon issues with levaquin and cipro     Nickel      rash     Steel [Staples]      Rash from staples       Sulfa Drugs      Latex Rash     Penicillins Rash        Social History     Tobacco Use     Smoking status: Never Smoker     Smokeless tobacco: Never Used   Substance Use Topics     Alcohol use:  "No     Comment: social     Family History   Problem Relation Age of Onset     Diabetes Mother         adult     Cancer - colorectal Mother      Hypertension Mother      Allergies Mother      Cancer Mother         colon     Depression Mother      Gastrointestinal Disease Mother         ibs     Genitourinary Problems Mother         kidney cyst     Gynecology Mother         endometriosis     Lipids Mother      Thyroid Disease Mother      Arthritis Mother         RA     Heart Disease Maternal Grandfather         MI,  - 60's     Allergies Father      Unknown/Adopted Father      Heart Disease Father         mi-age mid 40's     Cardiovascular Father      Lipids Father      Respiratory Father         asthma     Cancer Father      Other Cancer Father         renal cancer     Depression Sister      Allergies Sister      Cancer Sister         vaginal     Gynecology Sister         endometriosis     Lipids Paternal Grandmother      Osteoporosis Paternal Grandmother      Thyroid Disease Paternal Grandmother      Respiratory Son         asthma     Allergies Son      Arthritis Sister      Allergies Brother      Heart Disease Brother      Allergies Daughter      Blood Disease Paternal Aunt         LGL T cell Leukemia     Rheumatoid Arthritis Paternal Aunt      Kidney Disease Maternal Aunt      Lupus Maternal Aunt      Bladder Cancer Maternal Aunt      History   Drug Use No         Objective     /88   Pulse 84   Temp 98.2  F (36.8  C) (Temporal)   Resp 16   Ht 1.665 m (5' 5.55\")   Wt 98.9 kg (218 lb)   SpO2 98%   BMI 35.67 kg/m      Physical Exam    GENERAL APPEARANCE: healthy, alert and no distress     EYES: EOMI, PERRL     HENT: ear canals and TM's normal and nose and mouth without ulcers or lesions     NECK: no adenopathy, no asymmetry, masses, or scars and thyroid normal to palpation     RESP: lungs clear to auscultation - no rales, rhonchi or wheezes     CV: regular rates and rhythm, normal S1 S2, no S3 or " S4 and no murmur, click or rub     ABDOMEN:  soft, nontender, no HSM or masses and bowel sounds normal     MS: extremities normal- no gross deformities noted, no evidence of inflammation in joints, FROM in all extremities.     SKIN: no suspicious lesions or rashes     NEURO: Normal strength and tone, sensory exam grossly normal, mentation intact and speech normal     PSYCH: mentation appears normal. and affect normal/bright     LYMPHATICS: No cervical adenopathy    Recent Labs   Lab Test 05/25/21  0740 11/05/20  1646 07/27/20  1547   HGB 13.2  --  11.8     --  280    139 139   POTASSIUM 3.9 3.8 3.9   CR 0.62 0.73 0.58        Diagnostics:  No labs were ordered during this visit.   No EKG required for low risk surgery (cataract, skin procedure, breast biopsy, etc).    Revised Cardiac Risk Index (RCRI):  The patient has the following serious cardiovascular risks for perioperative complications:   - No serious cardiac risks = 0 points     RCRI Interpretation: 1 point: Class II (low risk - 0.9% complication rate)           Signed Electronically by: Otoniel Manuel PA-C  Copy of this evaluation report is provided to requesting physician.

## 2021-06-07 ENCOUNTER — TELEPHONE (OUTPATIENT)
Dept: OTHER | Facility: CLINIC | Age: 46
End: 2021-06-07

## 2021-06-07 NOTE — TELEPHONE ENCOUNTER
Type of surgery: DIVISION RIGHT PECTORALIS MUSCLE   Location of surgery: [facility]  Date and time of surgery: 7/27/2021 11:30am  Surgeon: DR. YADAV  Pre-Op Appt Date: TO BE SCHEDULED BY PATIENT  Post-Op Appt Date: 8/12/2021 CALOS   Packet sent out: MAILED 6/7/2021  Pre-cert/Authorization completed:  Yes  Date: 6/7/2021 VG

## 2021-06-10 ENCOUNTER — OFFICE VISIT (OUTPATIENT)
Dept: OTOLARYNGOLOGY | Facility: CLINIC | Age: 46
End: 2021-06-10
Payer: COMMERCIAL

## 2021-06-10 ENCOUNTER — OFFICE VISIT (OUTPATIENT)
Dept: SURGERY | Facility: CLINIC | Age: 46
End: 2021-06-10
Payer: COMMERCIAL

## 2021-06-10 VITALS
BODY MASS INDEX: 35.52 KG/M2 | SYSTOLIC BLOOD PRESSURE: 112 MMHG | DIASTOLIC BLOOD PRESSURE: 74 MMHG | OXYGEN SATURATION: 100 % | HEIGHT: 66 IN | HEART RATE: 71 BPM | WEIGHT: 221 LBS

## 2021-06-10 DIAGNOSIS — Z11.59 ENCOUNTER FOR SCREENING FOR OTHER VIRAL DISEASES: ICD-10-CM

## 2021-06-10 DIAGNOSIS — R13.13 PHARYNGEAL DYSPHAGIA: Primary | ICD-10-CM

## 2021-06-10 DIAGNOSIS — E04.1 THYROID NODULE: ICD-10-CM

## 2021-06-10 DIAGNOSIS — R49.0 DYSPHONIA: ICD-10-CM

## 2021-06-10 DIAGNOSIS — E04.1 THYROID NODULE: Primary | ICD-10-CM

## 2021-06-10 PROCEDURE — 31575 DIAGNOSTIC LARYNGOSCOPY: CPT | Performed by: OTOLARYNGOLOGY

## 2021-06-10 PROCEDURE — 10005 FNA BX W/US GDN 1ST LES: CPT | Performed by: SURGERY

## 2021-06-10 PROCEDURE — 99203 OFFICE O/P NEW LOW 30 MIN: CPT | Mod: 25 | Performed by: OTOLARYNGOLOGY

## 2021-06-10 PROCEDURE — 88173 CYTOPATH EVAL FNA REPORT: CPT

## 2021-06-10 ASSESSMENT — MIFFLIN-ST. JEOR: SCORE: 1641.26

## 2021-06-10 NOTE — LETTER
6/10/2021         RE: Amelia Coker  [address] [facility] [address]        Dear Colleague,    Thank you for referring your patient, Amelia Coker, to the [facility]. Please see a copy of my visit note below.    ENT Consultation    Amelia Coker is a 46 year old female who is seen in consultation at the request of self.      History of Present Illness - Amelia Coker is a 46 year old female presents for evaluation of thyroid nodule recently discovered incidentally on MRI of the cervical spine since patient has a lot of cervical issues.  She concerned about it even though she was reassured that the size was sub-1 cm did not need to be worked up.  There is strong family history of thyroid disease and thyroid cancer and patient is very concerned.  She had an ultrasound done at [facility].  ULTRASOUND THYROID March 19, 2021 7:39 AM     CLINICAL HISTORY: Possible thyroid nodule with positive family history  of thyroid cancer. Thyroid nodule.     TECHNIQUE: Thyroid ultrasound.      COMPARISON: CT neck dated 12/11/2019, ultrasound thyroid dated  12/28/2009.      FINDINGS:  RIGHT lobe: 4.6 x 1.8 x 1.5 cm. Homogeneous echotexture.  Isthmus: 3 mm.  LEFT lobe: 4.5 x 1.4 x 1.5 cm. Homogeneous echotexture.     NECK: No cervical lymphadenopathy is identified in the area around the  thyroid.     NODULES:     Nodule 1: Mid left lobe thyroid nodule measures 0.9 x 0.6 x 0.5 cm   Composition: Solid or almost completely solid, 2 points   Echogenicity: Hypoechoic, 2 points   Shape: Wider-than-tall, 0 points   Margin: Smooth, 0 points   Echogenic Foci: None, or large comet-tail artifacts, 0 points   Point Total: 4-6 points. TI-RADS 4. If 1.5 cm or larger, recommend  FNA; if 1 cm or larger, follow up US (annually for 5 years).                                                                       IMPRESSION:  1.  Solid nodule mid left lobe of thyroid measuring slightly less than  1 cm  in diameter with a TI-RADS score of 4. This nodule was not  described on prior thyroid ultrasound from 2009. Recommendations as  above.  2.  Otherwise negative thyroid ultrasound.     Nodules are characterized per  ACR Thyroid Imaging, Reporting and Data System (TI-RADS): White Paper  of the ACR TI-RADS Committee  Brenden Amaya et al. Journal of the American College of  Radiology 2017. Volume 14 (2017), Issue 5, 976-070.      Patient second issue is some dysphagia more with liquids potentially feeling of aspiration and cough.  She does have facial nerve weakness on the right side of the Bell's palsy and lately has had also more well weakness in the right side of the neck.  This was possibly attributed to cervical disc issue.  She also feels her voice is a little bit more raspy than it used to be.    Past Medical History -   Past Medical History:   Diagnosis Date     Abnormal mammogram 9/21/2016    right breast      AD (atopic dermatitis) 10/29/2015     Allergic rhinitis due to other allergen      Arthritis      Bell's palsy      Chronic fatigue 6/6/2012     Chronic infection     MRSA - 2015 scalp and prior to Abdomen     Contact dermatitis and other eczema, due to unspecified cause     eczema     Contusion of left foot, initial encounter 3/16/2016     DJD (degenerative joint disease), lumbar 9/26/2013     DJD (degenerative joint disease), lumbar 9/26/2013     Ds DNA antibody positive 4/16/2014    borderline.      Elevated blood pressure reading without diagnosis of hypertension 12/24/2013     Facial contusion 12/15/2014    hit by horse      Foraminal stenosis of lumbar region 9/26/2013     Frontal headache 12/15/2014     Gastroesophageal reflux disease, esophagitis presence not specified 6/29/2016     Heat sensitivity 10/29/2015     HTN, goal below 140/90 9/23/2015     Hyperlipidemia LDL goal <130 8/30/2017     Irregular heart beat      Keratitis sicca (H) 10/31/2012     Left shoulder pain 9/26/2013      Lumbar radiculopathy 1/22/2014     Migraine headache 10/23/2013     Migraine, unspecified, with intractable migraine, so stated, without mention of status migrainosus      Motion sickness      MRSA infection 10/20/2015     Muscular wasting and disuse atrophy, not elsewhere classified 6/9/2014    right SCM, right wrist,       Numbness and tingling     both legs anf feet greater on the left     PAC (premature atrial contraction) 7/15/2010     Plantar fasciitis 9/23/2015     PONV (postoperative nausea and vomiting)      Skin lesion 10/20/2015    posterior superior scalp      Spasm of muscle 7/11/2017     Telangiectasia of face 12/19/2014     Tooth pain 10/20/2015    left lower primary molar        Current Medications -   Current Outpatient Medications:      Acetaminophen (TYLENOL PO), Take 1,000 mg by mouth every 8 hours as needed , Disp: , Rfl:      [START ON 6/21/2021] cefTAZidime (FORTAZ) 1 GM vial, For Allergy Testing in Allergy Clinic Only, Disp: 1 each, Rfl: 0     clobetasol (TEMOVATE) 0.05 % external cream, Apply topically 2 times daily, Disp: 60 g, Rfl: 1     fexofenadine (ALLEGRA) 180 MG tablet, Take 1 tablet by mouth daily Reported on 4/5/2017, Disp: , Rfl:      hydrocortisone valerate (WEST-DENNIS) 0.2 % external ointment, APPLY TOPICALLY 2 TIMES DAILY, Disp: , Rfl:      medical cannabis (Patient's own supply), Take 0.7 Doses by mouth See Admin Instructions (The purpose of this order is to document that the patient reports taking medical cannabis.  This is not a prescription, and is not used to certify that the patient has a qualifying medical condition.), Disp: , Rfl:      meloxicam (MOBIC) 15 MG tablet, Take 15 mg by mouth daily, Disp: , Rfl:      mupirocin (BACTROBAN) 2 % external ointment, Apply topically 3 times daily, Disp: 30 g, Rfl: 0     rizatriptan (MAXALT-MLT) 5 MG ODT, Take 1 tablet (5 mg) by mouth at onset of headache for migraine May repeat in 2 hours. Max 6 tablets/24 hours., Disp: 30 tablet,  Rfl: 1     sucralfate (CARAFATE) 1 GM tablet, Take 1 tablet (1 g) by mouth 4 times daily, Disp: 40 tablet, Rfl: 0    Current Facility-Administered Medications:      [START ON 6/21/2021] ampicillin (OMNIPEN) 1 g vial INTRADERMAL, 1 g, Intradermal, Once, Pio Barrett MD     [START ON 6/21/2021] ceFAZolin (ANCEF) injection 500 mg, 500 mg, Intramuscular, Once, Pio Barrett MD     [START ON 6/21/2021] cefTRIAXone (ROCEPHIN) injection 250 mg, 250 mg, Intramuscular, Once, Pio Barrett MD     [START ON 6/21/2021] penicillin g potassium [id number] unit vial INTRADERMAL, 5,000,000 Units, Intradermal, Once, Pio Barrett MD     [START ON 6/21/2021] sulfamethoxazole-trimethoprim (BACTRIM) injection 80 mg, 80 mg, Intravenous, Once, Pio Barrett MD    Allergies -   Allergies   Allergen Reactions     Ceftriaxone Anaphylaxis     Hives, rash, racing heart beat     Bactrim [Sulfamethoxazole W/Trimethoprim] Hives     Ciprofloxacin Other (See Comments)     Tendon Issues     Codeine Nausea and Vomiting     Codeine      Copper      Rash       Doxycycline      Loss of skin pigmentation, skin loss.     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   Social Needs     Financial resource strain: None     Food insecurity     Worry: None     Inability: None     Transportation needs     Medical: None     Non-medical: None   Tobacco Use     Smoking status: Never Smoker     Smokeless tobacco: Never Used   Substance and Sexual Activity      Alcohol use: No     Comment: social     Drug use: No     Sexual activity: Yes     Partners: Male     Birth control/protection: Surgical     Comment: vasectomy   Lifestyle     Physical activity     Days per week: None     Minutes per session: None     Stress: None   Relationships     Social connections     Talks on phone: None     Gets together: None     Attends Holiness service: None     Active member of club or organization: None     Attends meetings of clubs or organizations: None     Relationship status: None     Intimate partner violence     Fear of current or ex partner: None     Emotionally abused: None     Physically abused: None     Forced sexual activity: None   Other Topics Concern      Service No     Blood Transfusions No     Caffeine Concern No     Comment: 0     Occupational Exposure No     Hobby Hazards Yes     Comment: horses     Sleep Concern No     Stress Concern No     Weight Concern Yes     Special Diet Yes     Comment: nhung's     Back Care No     Exercise Yes     Comment: occ     Bike Helmet Not Asked     Comment: na     Seat Belt Yes     Self-Exams Yes     Comment: occ     Parent/sibling w/ CABG, MI or angioplasty before 65F 55M? Not Asked   Social History Narrative     None       Family History -   Family History   Problem Relation Age of Onset     Diabetes Mother         adult     Cancer - colorectal Mother      Hypertension Mother      Allergies Mother      Cancer Mother         colon     Depression Mother      Gastrointestinal Disease Mother         ibs     Genitourinary Problems Mother         kidney cyst     Gynecology Mother         endometriosis     Lipids Mother      Thyroid Disease Mother      Arthritis Mother         RA     Heart Disease Maternal Grandfather         MI,  - 60's     Allergies Father      Unknown/Adopted Father      Heart Disease Father         mi-age mid 40's     Cardiovascular Father      Lipids Father      Respiratory Father         asthma      "Cancer Father      Other Cancer Father         renal cancer     Depression Sister      Allergies Sister      Cancer Sister         vaginal     Gynecology Sister         endometriosis     Lipids Paternal Grandmother      Osteoporosis Paternal Grandmother      Thyroid Disease Paternal Grandmother      Respiratory Son         asthma     Allergies Son      Arthritis Sister      Allergies Brother      Heart Disease Brother      Allergies Daughter      Blood Disease Paternal Aunt         LGL T cell Leukemia     Rheumatoid Arthritis Paternal Aunt      Kidney Disease Maternal Aunt      Lupus Maternal Aunt      Bladder Cancer Maternal Aunt        Review of Systems - As per HPI and PMHx, otherwise review of system review of the head and neck negative. Otherwise 10+ review systems negative.    Physical Exam  /74   Pulse 71   Ht 1.664 m (5' 5.5\")   Wt 100.2 kg (221 lb)   SpO2 100%   BMI 36.22 kg/m    BMI: Body mass index is 36.22 kg/m .    General - The patient is well nourished and well developed, and appears to have good nutritional status.  Alert and oriented to person and place, answers questions and cooperates with examination appropriately.    SKIN - No suspicious lesions or rashes.  Respiration - No respiratory distress.  Head and Face - Normocephalic and atraumatic, with no gross asymmetry noted of the contour of the facial features.  The facial nerve is intact, with strong symmetric movements.    Voice and Breathing - The patient was breathing comfortably without the use of accessory muscles. The patients voice was somewhat raspy quality.    Ears - Bilateral pinna and EACs with normal appearing overlying skin. Tympanic membrane intact with good mobility on pneumatic otoscopy bilaterally. Bony landmarks of the ossicular chain are normal. The tympanic membranes are normal in appearance. No retraction, perforation, or masses.  No fluid or purulence was seen in the external canal or the middle ear.     Eyes - " Extraocular movements intact.  Sclera were not icteric or injected, conjunctiva were pink and moist.    Mouth - Examination of the oral cavity showed pink, healthy oral mucosa. No lesions or ulcerations noted.  The tongue was mobile and midline, and the dentition were in good condition.      Throat - The walls of the oropharynx were smooth, pink, moist, symmetric, and had no lesions or ulcerations.  The tonsillar pillars and soft palate were symmetric.  The uvula was midline on elevation.    Neck - Normal midline excursion of the laryngotracheal complex during swallowing.  Full range of motion on passive movement.  Palpation of the occipital, submental, submandibular, internal jugular chain, and supraclavicular nodes did not demonstrate any abnormal lymph nodes or masses.  The carotid pulse was palpable bilaterally.  Palpation of the thyroid was soft and smooth, with no nodules or goiter appreciated.  The trachea was mobile and midline.  Of note was weakness of the platysma on the right side is compared to the left.    Nose - External contour is symmetric, no gross deflection or scars.  Nasal mucosa is pink and moist with no abnormal mucus.  The septum was midline and non-obstructive, turbinates of normal size and position.  No polyps, masses, or purulence noted on examination.    Neuro - Nonfocal neuro exam is normal, CN 2 through 12 intact, except for facial nerve function on the right house Brackmann scale 3 out of 6 weakness.  Normal gait and muscle tone.      Performed in clinic today:  Attempts at mirror laryngoscopy were not possible due to gag reflex.  Therefore I proceeded with a fiberoptic examination.  First I sprayed both sides of the nose with a mixture of lidocaine and neosynephrine.  I then passed the scope through the nasal cavity.  The nasal cavity was unremarkable.  The nasopharynx was mucosally covered and symmetric.  The Eustachian tube openings were unobstructed.  Going further down I had a  clear view of the base of tongue which had normal appearing lingual tonsillar tissue.  The base of tongue was free of lesions, and the vallecula was open.  The epiglottis was smooth and mucosally covered.  The supraglottic larynx was then clearly visualized.  The vocal cords moved smoothly and symmetrically, they were pearly white and no lesions were seen.  The pyriform sinuses were open, and the limited view of the postcricoid region did not show any lesions.  The only finding was some false cord localization bilaterally and hyperfunction. Yellow Flexible scope - [employer] - [id number]      A/P - Amelia Coker is a 46 year old female patient with a concerning finding of 0.9 cm nodule.  She is concerned because of strong family history of thyroid cancer as well as the fact that the nodule was not there on routine CT scan done a year and a half ago.  Patient was referred to see Dr. Hall for evaluation and ultrasound-guided biopsy of the lesion.  Dr. Kemp will follow the patient afterward.  In regard to her dysphagia mild dysphonia we discussed some speech therapy for dysphonia which patient is not very concerned about but as far as dysphagia and possible aspiration will order swallow study and then follow-up with the patient afterward.  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  Patient will see me in about a month.      Nam Goins MD      Again, thank you for allowing me to participate in the care of your patient.        Sincerely,        Nam Goins MD, MD

## 2021-06-10 NOTE — LETTER
6/10/2021         RE: Amelia Coker  [address] [facility] [address]        Dear Colleague,    Thank you for referring your patient, Amelia Coker, to the [facility]. Please see a copy of my visit note below.    Thyroid nodule FNA.   Using an ultrasound machine multiple gray scale images of the neck were obtained in both the transverse and longitudinal planes with the patient supine in the examination chair after informed consent was obtained. This revealed 1 thyroid nodule(s) that almost met the current SISSY guidelines for FNA, given her family history, possible rapid growth.  The skin was prepped and a total of 3 mL of 1% lidocaine with epinephrine was injected into the planned biopsy site(s).  Using the US for needle guidance, 3 passes were made into the left mid thyroid nodule using 25g needles.   The patient tolerated the procedure well. No blood loss or complications. The slides were prepared and sent to pathology. Will call the patient with results.       Again, thank you for allowing me to participate in the care of your patient.        Sincerely,        Toi Hall, DO

## 2021-06-10 NOTE — PROGRESS NOTES
ENT Consultation    Amelia Coker is a 46 year old female who is seen in consultation at the request of self.      History of Present Illness - Amelia Coker is a 46 year old female presents for evaluation of thyroid nodule recently discovered incidentally on MRI of the cervical spine since patient has a lot of cervical issues.  She concerned about it even though she was reassured that the size was sub-1 cm did not need to be worked up.  There is strong family history of thyroid disease and thyroid cancer and patient is very concerned.  She had an ultrasound done at [facility].  ULTRASOUND THYROID March 19, 2021 7:39 AM     CLINICAL HISTORY: Possible thyroid nodule with positive family history  of thyroid cancer. Thyroid nodule.     TECHNIQUE: Thyroid ultrasound.      COMPARISON: CT neck dated 12/11/2019, ultrasound thyroid dated  12/28/2009.      FINDINGS:  RIGHT lobe: 4.6 x 1.8 x 1.5 cm. Homogeneous echotexture.  Isthmus: 3 mm.  LEFT lobe: 4.5 x 1.4 x 1.5 cm. Homogeneous echotexture.     NECK: No cervical lymphadenopathy is identified in the area around the  thyroid.     NODULES:     Nodule 1: Mid left lobe thyroid nodule measures 0.9 x 0.6 x 0.5 cm   Composition: Solid or almost completely solid, 2 points   Echogenicity: Hypoechoic, 2 points   Shape: Wider-than-tall, 0 points   Margin: Smooth, 0 points   Echogenic Foci: None, or large comet-tail artifacts, 0 points   Point Total: 4-6 points. TI-RADS 4. If 1.5 cm or larger, recommend  FNA; if 1 cm or larger, follow up US (annually for 5 years).                                                                       IMPRESSION:  1.  Solid nodule mid left lobe of thyroid measuring slightly less than  1 cm in diameter with a TI-RADS score of 4. This nodule was not  described on prior thyroid ultrasound from 2009. Recommendations as  above.  2.  Otherwise negative thyroid ultrasound.     Nodules are characterized per  ACR Thyroid Imaging, Reporting  and Data System (TI-RADS): White Paper  of the ACR TI-RADS Committee  Brenden Amaya et al. Journal of the American College of  Radiology 2017. Volume 14 (2017), Issue 5, 587-721.      Patient second issue is some dysphagia more with liquids potentially feeling of aspiration and cough.  She does have facial nerve weakness on the right side of the Bell's palsy and lately has had also more well weakness in the right side of the neck.  This was possibly attributed to cervical disc issue.  She also feels her voice is a little bit more raspy than it used to be.    Past Medical History -   Past Medical History:   Diagnosis Date     Abnormal mammogram 9/21/2016    right breast      AD (atopic dermatitis) 10/29/2015     Allergic rhinitis due to other allergen      Arthritis      Bell's palsy      Chronic fatigue 6/6/2012     Chronic infection     MRSA - 2015 scalp and prior to Abdomen     Contact dermatitis and other eczema, due to unspecified cause     eczema     Contusion of left foot, initial encounter 3/16/2016     DJD (degenerative joint disease), lumbar 9/26/2013     DJD (degenerative joint disease), lumbar 9/26/2013     Ds DNA antibody positive 4/16/2014    borderline.      Elevated blood pressure reading without diagnosis of hypertension 12/24/2013     Facial contusion 12/15/2014    hit by horse      Foraminal stenosis of lumbar region 9/26/2013     Frontal headache 12/15/2014     Gastroesophageal reflux disease, esophagitis presence not specified 6/29/2016     Heat sensitivity 10/29/2015     HTN, goal below 140/90 9/23/2015     Hyperlipidemia LDL goal <130 8/30/2017     Irregular heart beat      Keratitis sicca (H) 10/31/2012     Left shoulder pain 9/26/2013     Lumbar radiculopathy 1/22/2014     Migraine headache 10/23/2013     Migraine, unspecified, with intractable migraine, so stated, without mention of status migrainosus      Motion sickness      MRSA infection 10/20/2015     Muscular wasting and disuse  atrophy, not elsewhere classified 6/9/2014    right SCM, right wrist,       Numbness and tingling     both legs anf feet greater on the left     PAC (premature atrial contraction) 7/15/2010     Plantar fasciitis 9/23/2015     PONV (postoperative nausea and vomiting)      Skin lesion 10/20/2015    posterior superior scalp      Spasm of muscle 7/11/2017     Telangiectasia of face 12/19/2014     Tooth pain 10/20/2015    left lower primary molar        Current Medications -   Current Outpatient Medications:      Acetaminophen (TYLENOL PO), Take 1,000 mg by mouth every 8 hours as needed , Disp: , Rfl:      [START ON 6/21/2021] cefTAZidime (FORTAZ) 1 GM vial, For Allergy Testing in Allergy Clinic Only, Disp: 1 each, Rfl: 0     clobetasol (TEMOVATE) 0.05 % external cream, Apply topically 2 times daily, Disp: 60 g, Rfl: 1     fexofenadine (ALLEGRA) 180 MG tablet, Take 1 tablet by mouth daily Reported on 4/5/2017, Disp: , Rfl:      hydrocortisone valerate (WEST-DENNIS) 0.2 % external ointment, APPLY TOPICALLY 2 TIMES DAILY, Disp: , Rfl:      medical cannabis (Patient's own supply), Take 0.7 Doses by mouth See Admin Instructions (The purpose of this order is to document that the patient reports taking medical cannabis.  This is not a prescription, and is not used to certify that the patient has a qualifying medical condition.), Disp: , Rfl:      meloxicam (MOBIC) 15 MG tablet, Take 15 mg by mouth daily, Disp: , Rfl:      mupirocin (BACTROBAN) 2 % external ointment, Apply topically 3 times daily, Disp: 30 g, Rfl: 0     rizatriptan (MAXALT-MLT) 5 MG ODT, Take 1 tablet (5 mg) by mouth at onset of headache for migraine May repeat in 2 hours. Max 6 tablets/24 hours., Disp: 30 tablet, Rfl: 1     sucralfate (CARAFATE) 1 GM tablet, Take 1 tablet (1 g) by mouth 4 times daily, Disp: 40 tablet, Rfl: 0    Current Facility-Administered Medications:      [START ON 6/21/2021] ampicillin (OMNIPEN) 1 g vial INTRADERMAL, 1 g, Intradermal, Once,  Pio Barrett MD     [START ON 6/21/2021] ceFAZolin (ANCEF) injection 500 mg, 500 mg, Intramuscular, Once, Pio Barrett MD     [START ON 6/21/2021] cefTRIAXone (ROCEPHIN) injection 250 mg, 250 mg, Intramuscular, Once, Pio Barrett MD     [START ON 6/21/2021] penicillin g potassium [id number] unit vial INTRADERMAL, 5,000,000 Units, Intradermal, Once, Pio Barrett MD     [START ON 6/21/2021] sulfamethoxazole-trimethoprim (BACTRIM) injection 80 mg, 80 mg, Intravenous, Once, Pio Barrett MD    Allergies -   Allergies   Allergen Reactions     Ceftriaxone Anaphylaxis     Hives, rash, racing heart beat     Bactrim [Sulfamethoxazole W/Trimethoprim] Hives     Ciprofloxacin Other (See Comments)     Tendon Issues     Codeine Nausea and Vomiting     Codeine      Copper      Rash       Doxycycline      Loss of skin pigmentation, skin loss.     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   Social Needs     Financial resource strain: None     Food insecurity     Worry: None     Inability: None     Transportation needs     Medical: None     Non-medical: None   Tobacco Use     Smoking status: Never Smoker     Smokeless tobacco: Never Used   Substance and Sexual Activity     Alcohol use: No     Comment: social     Drug use: No     Sexual activity: Yes     Partners: Male     Birth control/protection: Surgical     Comment: vasectomy   Lifestyle     Physical activity     Days per week: None     Minutes per session: None      Stress: None   Relationships     Social connections     Talks on phone: None     Gets together: None     Attends Mu-ism service: None     Active member of club or organization: None     Attends meetings of clubs or organizations: None     Relationship status: None     Intimate partner violence     Fear of current or ex partner: None     Emotionally abused: None     Physically abused: None     Forced sexual activity: None   Other Topics Concern      Service No     Blood Transfusions No     Caffeine Concern No     Comment: 0     Occupational Exposure No     Hobby Hazards Yes     Comment: horses     Sleep Concern No     Stress Concern No     Weight Concern Yes     Special Diet Yes     Comment: nhung's     Back Care No     Exercise Yes     Comment: occ     Bike Helmet Not Asked     Comment: na     Seat Belt Yes     Self-Exams Yes     Comment: occ     Parent/sibling w/ CABG, MI or angioplasty before 65F 55M? Not Asked   Social History Narrative     None       Family History -   Family History   Problem Relation Age of Onset     Diabetes Mother         adult     Cancer - colorectal Mother      Hypertension Mother      Allergies Mother      Cancer Mother         colon     Depression Mother      Gastrointestinal Disease Mother         ibs     Genitourinary Problems Mother         kidney cyst     Gynecology Mother         endometriosis     Lipids Mother      Thyroid Disease Mother      Arthritis Mother         RA     Heart Disease Maternal Grandfather         MI,  - 60's     Allergies Father      Unknown/Adopted Father      Heart Disease Father         mi-age mid 40's     Cardiovascular Father      Lipids Father      Respiratory Father         asthma     Cancer Father      Other Cancer Father         renal cancer     Depression Sister      Allergies Sister      Cancer Sister         vaginal     Gynecology Sister         endometriosis     Lipids Paternal Grandmother      Osteoporosis Paternal Grandmother   "    Thyroid Disease Paternal Grandmother      Respiratory Son         asthma     Allergies Son      Arthritis Sister      Allergies Brother      Heart Disease Brother      Allergies Daughter      Blood Disease Paternal Aunt         LGL T cell Leukemia     Rheumatoid Arthritis Paternal Aunt      Kidney Disease Maternal Aunt      Lupus Maternal Aunt      Bladder Cancer Maternal Aunt        Review of Systems - As per HPI and PMHx, otherwise review of system review of the head and neck negative. Otherwise 10+ review systems negative.    Physical Exam  /74   Pulse 71   Ht 1.664 m (5' 5.5\")   Wt 100.2 kg (221 lb)   SpO2 100%   BMI 36.22 kg/m    BMI: Body mass index is 36.22 kg/m .    General - The patient is well nourished and well developed, and appears to have good nutritional status.  Alert and oriented to person and place, answers questions and cooperates with examination appropriately.    SKIN - No suspicious lesions or rashes.  Respiration - No respiratory distress.  Head and Face - Normocephalic and atraumatic, with no gross asymmetry noted of the contour of the facial features.  The facial nerve is intact, with strong symmetric movements.    Voice and Breathing - The patient was breathing comfortably without the use of accessory muscles. The patients voice was somewhat raspy quality.    Ears - Bilateral pinna and EACs with normal appearing overlying skin. Tympanic membrane intact with good mobility on pneumatic otoscopy bilaterally. Bony landmarks of the ossicular chain are normal. The tympanic membranes are normal in appearance. No retraction, perforation, or masses.  No fluid or purulence was seen in the external canal or the middle ear.     Eyes - Extraocular movements intact.  Sclera were not icteric or injected, conjunctiva were pink and moist.    Mouth - Examination of the oral cavity showed pink, healthy oral mucosa. No lesions or ulcerations noted.  The tongue was mobile and midline, and the " dentition were in good condition.      Throat - The walls of the oropharynx were smooth, pink, moist, symmetric, and had no lesions or ulcerations.  The tonsillar pillars and soft palate were symmetric.  The uvula was midline on elevation.    Neck - Normal midline excursion of the laryngotracheal complex during swallowing.  Full range of motion on passive movement.  Palpation of the occipital, submental, submandibular, internal jugular chain, and supraclavicular nodes did not demonstrate any abnormal lymph nodes or masses.  The carotid pulse was palpable bilaterally.  Palpation of the thyroid was soft and smooth, with no nodules or goiter appreciated.  The trachea was mobile and midline.  Of note was weakness of the platysma on the right side is compared to the left.    Nose - External contour is symmetric, no gross deflection or scars.  Nasal mucosa is pink and moist with no abnormal mucus.  The septum was midline and non-obstructive, turbinates of normal size and position.  No polyps, masses, or purulence noted on examination.    Neuro - Nonfocal neuro exam is normal, CN 2 through 12 intact, except for facial nerve function on the right house Brackmann scale 3 out of 6 weakness.  Normal gait and muscle tone.      Performed in clinic today:  Attempts at mirror laryngoscopy were not possible due to gag reflex.  Therefore I proceeded with a fiberoptic examination.  First I sprayed both sides of the nose with a mixture of lidocaine and neosynephrine.  I then passed the scope through the nasal cavity.  The nasal cavity was unremarkable.  The nasopharynx was mucosally covered and symmetric.  The Eustachian tube openings were unobstructed.  Going further down I had a clear view of the base of tongue which had normal appearing lingual tonsillar tissue.  The base of tongue was free of lesions, and the vallecula was open.  The epiglottis was smooth and mucosally covered.  The supraglottic larynx was then clearly visualized.   The vocal cords moved smoothly and symmetrically, they were pearly white and no lesions were seen.  The pyriform sinuses were open, and the limited view of the postcricoid region did not show any lesions.  The only finding was some false cord localization bilaterally and hyperfunction. Yellow Flexible scope - [employer] - [id number]      A/P - Amelia Coker is a 46 year old female patient with a concerning finding of 0.9 cm nodule.  She is concerned because of strong family history of thyroid cancer as well as the fact that the nodule was not there on routine CT scan done a year and a half ago.  Patient was referred to see Dr. Hall for evaluation and ultrasound-guided biopsy of the lesion.  Dr. Kemp will follow the patient afterward.  In regard to her dysphagia mild dysphonia we discussed some speech therapy for dysphonia which patient is not very concerned about but as far as dysphagia and possible aspiration will order swallow study and then follow-up with the patient afterward.  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  Patient will see me in about a month.      Nam Goins MD

## 2021-06-11 LAB — COPATH REPORT: NORMAL

## 2021-06-19 DIAGNOSIS — Z88.9 DRUG ALLERGY, MULTIPLE: Primary | ICD-10-CM

## 2021-06-19 RX ORDER — PENICILLIN G POTASSIUM 5000000 [IU]/1
5000000 INJECTION, POWDER, FOR SOLUTION INTRAMUSCULAR; INTRAVENOUS ONCE
Status: DISCONTINUED | OUTPATIENT
Start: 2021-06-21 | End: 2021-07-27

## 2021-06-19 RX ORDER — SULFAMETHOXAZOLE AND TRIMETHOPRIM 80; 16 MG/ML; MG/ML
80 INJECTION INTRAVENOUS ONCE
Status: DISCONTINUED | OUTPATIENT
Start: 2021-06-21 | End: 2021-07-27

## 2021-06-19 RX ORDER — AMPICILLIN 1 G/1
1 INJECTION, POWDER, FOR SOLUTION INTRAMUSCULAR; INTRAVENOUS ONCE
Status: DISCONTINUED | OUTPATIENT
Start: 2021-06-21 | End: 2021-07-27

## 2021-06-19 RX ORDER — CEFTAZIDIME 1 G/1
INJECTION, POWDER, FOR SOLUTION INTRAMUSCULAR; INTRAVENOUS
Qty: 1 EACH | Refills: 0 | Status: ON HOLD | OUTPATIENT
Start: 2021-06-21 | End: 2021-07-27

## 2021-06-19 RX ORDER — CEFAZOLIN SODIUM 1 G
500 VIAL (EA) INJECTION ONCE
Status: DISCONTINUED | OUTPATIENT
Start: 2021-06-21 | End: 2021-07-27

## 2021-06-19 RX ORDER — CEFTRIAXONE SODIUM 1 G
250 VIAL (EA) INJECTION ONCE
Status: DISCONTINUED | OUTPATIENT
Start: 2021-06-21 | End: 2021-07-27

## 2021-06-20 NOTE — PROGRESS NOTES
"[facility] Dermato-allergology Note  Office visit  Encounter Date: Jun 21, 2021  ____________________________________________    CC: No chief complaint on file.      HPI:  Ms. Amelia Coker is a(n) 46 year old female who presents today as a return patient for allergy consultation  - for patch and drug allergy tests as previously planned  - otherwise feeling well in usual state of health    Physical exam:  General: In no acute distress, well-developed, well-nourished  Eyes: no conjunctivitis  ENT: no signs of rhinitis   Pulmonary: no wheezing or coughing  Skin: Focused examination of the skin on test sites was performed = see test results below  No active eczematous skin lesions on tests sites, particularly back    Earlier History and Allergy exams:  - carries a diagnosis of eczema. Appears in flexural folds, neck, eyelids. Uses topical steroids: clobetasol and triamcinolone as needed for flares.   - Saw an allergist in [place] who recommended she be seen here for allergy testing for a potential explanation of the sores on her scalp and skin allergies. Sensitive to lotions, shampoos, laundry detergent, dressings, and soaps and skin will \"break out.\" Prior allergy testing with allergy to cats, dogs, pollen, trees, grass, dust mites, mold, guinea pigs. Has a sore on her scalp that has been present for 4 months. A biopsy was done by PCP in [facility] that showed \"inflammatory cells.\"  - Also planning a spinal fusion and is allergic to nickel, white gold, copper, yellow gold, alfie silver and possibly ?other metals. When these contact her skin, she itchy red skin changes as well as blisters. For spinal fusion, plan is to use a  \"titanium alloy with nickel\" (Dr Adames, [facility] Has had allergy to surgical staples in the past. Has had allergy testing to 6 metals (unknown which) with dentistry and was allergic to all 6 so uses porcelain crowns.   - hx latex allergy  .   - 5mm " brown crust on a pink macule on R frontal-parietal scalp  - L>R hand has scale and xerosis and pinkness on side of fingers, between digits, and dorsal wrist.    Past Medical History:   Patient Active Problem List   Diagnosis     Migraine, unspecified, with intractable migraine, so stated, without mention of status migrainosus     Bell's palsy     Contact dermatitis and other eczema, due to unspecified cause     Scalp lesion     Lyme disease     PAC (premature atrial contraction)     Palpitations     Raynaud phenomenon     Chronic fatigue     Hair loss     Abnormal PFT     Shoulder joint instability     Family history of melanoma     Dry eyes     Keratitis sicca (H)     Family history of colon cancer     Pain in joint, upper arm     FLORIDALMA positive     Plantar fascial fibromatosis     Foraminal stenosis of lumbar region     DJD (degenerative joint disease), lumbar     Migraine without aura and without status migrainosus, not intractable     Lumbar radiculopathy     Ds DNA antibody positive     Muscular wasting and disuse atrophy, not elsewhere classified     Frontal headache     Telangiectasia of face     Lateral epicondylitis     Right shoulder pain     Plantar fasciitis     Skin lesion     AD (atopic dermatitis)     Heat sensitivity     Rotator cuff arthropathy, right     Gastroesophageal reflux disease, esophagitis presence not specified     Abnormal mammogram     Sternocleidomastoid muscle tenderness     Acute cervical myofascial strain, initial encounter     Spasm of muscle     Hyperlipidemia LDL goal <130     Biceps tendinopathy, right     Superficial swelling of scalp     Intractable right heel pain     Intractable episodic tension-type headache     Jaycob complexion     Lip lesion     Abdominal pain, epigastric     Abdominal distension     PONV (postoperative nausea and vomiting)     Menorrhalgia     Uterine polyp     Loose stools     Hyperactive bowel sounds     Abnormality of nail surface     Dry hair     Dry  skin     Malaise and fatigue     Lymphadenopathy     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     Endometrium, hyperplasia - on recent CT scan     Pain in joint involving pelvic region and thigh, right     Right lateral abdominal pain     Right foot pain     Dental abscess - left mandible molar     Brachial plexopathy - right shoulder     Balance problems     Morbid obesity (H)     Sinus arrhythmia seen on electrocardiogram     Family history of ischemic heart disease - father at 46 massive MI     Elevated blood pressure reading without diagnosis of hypertension     Hypertrophy of breast     History of cold sores     AK (actinic keratosis) - both hands at the lateral thenar aspect.     Dermatitis     Musculoskeletal pain     Fatigue, unspecified type     TOS (thoracic outlet syndrome)     Past Medical History:   Diagnosis Date     Abnormal mammogram 9/21/2016    right breast      AD (atopic dermatitis) 10/29/2015     Allergic rhinitis due to other allergen      Arthritis      Bell's palsy      Chronic fatigue 6/6/2012     Chronic infection     MRSA - 2015 scalp and prior to Abdomen     Contact dermatitis and other eczema, due to unspecified cause     eczema     Contusion of left foot, initial encounter 3/16/2016     DJD (degenerative joint disease), lumbar 9/26/2013     DJD (degenerative joint disease), lumbar 9/26/2013     Ds DNA antibody positive 4/16/2014    borderline.      Elevated blood pressure reading without diagnosis of hypertension 12/24/2013     Facial contusion 12/15/2014    hit by horse      Foraminal stenosis of lumbar region 9/26/2013     Frontal headache 12/15/2014     Gastroesophageal reflux disease, esophagitis presence not specified 6/29/2016     Heat sensitivity 10/29/2015     HTN, goal below 140/90 9/23/2015     Hyperlipidemia LDL goal <130 8/30/2017     Irregular heart beat      Keratitis sicca (H) 10/31/2012     Left shoulder pain 9/26/2013     Lumbar radiculopathy 1/22/2014      Migraine headache 10/23/2013     Migraine, unspecified, with intractable migraine, so stated, without mention of status migrainosus      Motion sickness      MRSA infection 10/20/2015     Muscular wasting and disuse atrophy, not elsewhere classified 6/9/2014    right SCM, right wrist,       Numbness and tingling     both legs anf feet greater on the left     PAC (premature atrial contraction) 7/15/2010     Plantar fasciitis 9/23/2015     PONV (postoperative nausea and vomiting)      Skin lesion 10/20/2015    posterior superior scalp      Spasm of muscle 7/11/2017     Telangiectasia of face 12/19/2014     Tooth pain 10/20/2015    left lower primary molar        Allergies:  Allergies   Allergen Reactions     Ceftriaxone Anaphylaxis     Hives, rash, racing heart beat     Bactrim [Sulfamethoxazole W/Trimethoprim] Hives     Ciprofloxacin Other (See Comments)     Tendon Issues     Codeine Nausea and Vomiting     Codeine      Copper      Rash       Doxycycline      Loss of skin pigmentation, skin loss.     Gold      Rash       Iodine      Iodine-131 Hives     Levaquin [Levofloxacin] Other (See Comments)     Tendon issues with levaquin and cipro     Nickel      rash     Steel [Staples]      Rash from staples       Sulfa Drugs      Latex Rash     Penicillins Rash       Medications:  Current Outpatient Medications   Medication     Acetaminophen (TYLENOL PO)     [START ON 6/21/2021] cefTAZidime (FORTAZ) 1 GM vial     clobetasol (TEMOVATE) 0.05 % external cream     fexofenadine (ALLEGRA) 180 MG tablet     hydrocortisone valerate (WEST-DENNIS) 0.2 % external ointment     medical cannabis (Patient's own supply)     meloxicam (MOBIC) 15 MG tablet     mupirocin (BACTROBAN) 2 % external ointment     rizatriptan (MAXALT-MLT) 5 MG ODT     sucralfate (CARAFATE) 1 GM tablet     Current Facility-Administered Medications   Medication     [START ON 6/21/2021] ampicillin (OMNIPEN) 1 g vial INTRADERMAL     [START ON 6/21/2021] ceFAZolin  (ANCEF) injection 500 mg     [START ON 2021] cefTRIAXone (ROCEPHIN) injection 250 mg     [START ON 2021] penicillin g potassium [id number] unit vial INTRADERMAL     [START ON 2021] sulfamethoxazole-trimethoprim (BACTRIM) injection 80 mg       Social History:  The patient . Patient has the following hobbies or non-occupational exposure: taking care of horses.     Family History:  Family History   Problem Relation Age of Onset     Diabetes Mother         adult     Cancer - colorectal Mother      Hypertension Mother      Allergies Mother      Cancer Mother         colon     Depression Mother      Gastrointestinal Disease Mother         ibs     Genitourinary Problems Mother         kidney cyst     Gynecology Mother         endometriosis     Lipids Mother      Thyroid Disease Mother      Arthritis Mother         RA     Heart Disease Maternal Grandfather         MI,  - 60's     Allergies Father      Unknown/Adopted Father      Heart Disease Father         mi-age mid 40's     Cardiovascular Father      Lipids Father      Respiratory Father         asthma     Cancer Father      Other Cancer Father         renal cancer     Depression Sister      Allergies Sister      Cancer Sister         vaginal     Gynecology Sister         endometriosis     Lipids Paternal Grandmother      Osteoporosis Paternal Grandmother      Thyroid Disease Paternal Grandmother      Respiratory Son         asthma     Allergies Son      Arthritis Sister      Allergies Brother      Heart Disease Brother      Allergies Daughter      Blood Disease Paternal Aunt         LGL T cell Leukemia     Rheumatoid Arthritis Paternal Aunt      Kidney Disease Maternal Aunt      Lupus Maternal Aunt      Bladder Cancer Maternal Aunt        Previous Labs, Allergy Tests, Dermatopathology, Imaging:  See HPI    Referred By: Ahmet Johnson, DO  [address] [address]     Allergy Tests:    Past Allergy Test: see HPI    Order  for Future Allergy Testing:    [x] Outpatient  [] Inpatient: Sandy..../ Bed ....       Skin Atopy (atopic dermatitis) [x] Yes   [] No  Contact allergies:   [x] Yes   [] No (soaps)  Urticaria/Angioedema  [x] Yes   [] No (hives and ?anaphylaxis from sulfa and ceftriaxone. Lip blisters from doxycycline.)  Rhinitis/Sinusitis:   [x] Yes   [] No   [x] seasonal [] perennial      (spring and fall)      Allergic Asthma:   [] Yes   [x] (No strong family history)  Pets :  [x] Yes   [] No  Dog (sleeps in basement in a crate. Mild allergy if she bathes him.)  Food Allergy:   [x] Yes   [] No (tested to pork by allergist in [place] and this was negative. Home test kit showed strong allergy to egg white and yolks.)  Drug allergies:   [x] Yes   [] No see chart  Leg ulcers:   [] Yes   [x] No  Hand eczema:   [x] Yes   [] No ?possibly   Leading hand:   [x] R   [] L       [] Ambidextrous                        Order for PATCH TESTS  Reason for tests (suspected allergy):  Possible allergic contact dermatitis to additives, metals, fragrances.  Known previous allergies: see HPI  Standardized panels  [x] Standard panel (40 tests)  [x] Preservatives & Antimicrobials (31 tests)  [x] Emulsifiers & Additives (25 tests)   [x] Perfumes/Flavours & Plants (25 tests)  [] Hairdresser panel (12 tests)  [] Rubber Chemicals (22 tests)  [x] Plastics (26 tests)  [] Colorants/Dyes/Food additives (20 tests)  [x] Metals (implants/dental) (24 tests)  [] Local anaesthetics/NSAIDs (13 tests)  [] Antibiotics & Antimycotics (14 tests)   [] Corticosteroids (15 tests)   [] Photopatch test (62 tests)   [] others: ...      [] Patient's own products: ...    DO NOT test if chemical or biological identity is unknown!     always ask from patient the product information and safety sheets (MSDS)       Order for PRICK TESTS    Reason for tests (suspected allergy): atopic predisposition and possible allergy to eggs.   Known previous allergies: see HPI    Standardized  prick panels  [x] Atopic panel (20 tests)  [] Pediatric Panel (12 tests)  [x] Milk, Meat, Eggs, Grains (20 tests) --> EGG WHITE & EGG YOLK  [] Dust, Epithelia, Feathers (10 tests)  [] Fish, Seafood, Shellfish (17 tests)  [] Nuts, Beans (14 tests)  [] Spice, Vegetable, Fruit (17 tests)  [x] Others: EGG WHITE & EGG YOLK      [] Patient's own products: ...    DO NOT test if chemical or biological identity is unknown!     always ask from patient the product information and safety sheets (MSDS)     Standardized intradermal tests  [] Penicillium notatum [] Aspergillus fumigatus [] House dust mites  [] Bee venom   [] Wasp venom !!Specific protocol with dilutions!!  [] Others: ...      Order for Drug allergy tests (prick & Intradermal & patch tests)  [x] Penicillin G  [x] Ampicillin [x] Cefazolin  [x] Ceftriaxone  [x] Ceftazidime  [x] Others: Bactrim      Atopy Screen (Placed 05/05/21 )    No Substance Readings (15 min) Evaluation   POS Histamine 1mg/ml ++    NEG NaCl 0.9% -      No Substance Readings (15 min) Evaluation   1 Alternaria alternata (tenuis)  (+)    2 Cladosporium herbarum (+)    3 Aspergillus fumigatus -    4 Penicillium notatum -    5 Dermatophagoides pteronyssinus ++    6 Dermatophagoides farinae ++    7 Dog epithelium (canis spp) -    8 Cat hair (alexis catus) -    9 Cockroach   (Blatella americana & germanica) -    10 Grass mix midwest   (Aubrie, Orchard, Redtop, Carlito) -    11 Morgan grass (sorghum halepense) -    12 Weed mix   (common Cocklebur, Lamb s quarters, rough redroot Pigweed, Dock/Sorrel) -    13 Mug wort (artemisia vulgare) -    14 Ragweed giant/short (ambrosia spp) -    15 English Plantain (plantago lanceolata) -    16 Tree mix 1 (Pecan, Maple BHR, Oak RVW, american Mathias, black Cunningham) -    17 Red cedar (juniperus virginia) -    18 Tree mix 2   (white Rigoberto, river/red Birch, black Charleston, common Big [place], american Elm) -    19 Box elder/Maple mix (acer spp) -    20 Beaverhead shagbark (carya  ovata) -     21 Egg white #6 -    22 Egg yolk #7 -      Conclusion: patient is atopic with immediate type sensitization to house dust mites    ________________________________  RESULTS & EVALUATION of PATCH TESTS    Patch test readings after     [x] 2 days, [] 3 days [x] 4 days, [] 5 days    Jun 21, 2021 application of patch tests with results:    STANDARD Series        No Substance 2 days 4 days remarks   1 1 Almas Mix [C] - -     2 Colophony - -     3  2-Mercaptobenzothiazole  - -      4 Methylisothiazolinone - -    5 5 Carba Mix - -     6 Thiuram Mix [A] - -     7 Bisphenol A Epoxy Resin - -     8 P-Pvsy-Eskdabkakkk-Formaldehyde Resin - -     9 Mercapto Mix [A] - -    10 10 Black Rubber Mix- PPD [B] - -     11 Potassium Dichromate  -  -     12 Balsam of Peru (Myroxylon Pereirae Resin) - -     13 Nickel Sulphate Hexahydrate - -     14 Mixed Dialkyl Thiourea - -    15 15 Paraben Mix [B] - -     16 Methyldibromo Glutaronitrile - -     17 Fragrance Mix - -     18 2-Bromo-2-Nitropropane-1,3-Diol (Bronopol) NA NA     19 Lyral - -    20 20 Tixocortol-21- Pivalate - -     21 Diazolidiyl Urea (Germall II) - -     22 Methyl Methacrylate NA NA     23 Cobalt (II) Chloride Hexahydrate - -     24 Fragrance Mix II  - -    25 25 Compositae Mix - -     26 Benzoyl Peroxide - -     27 Bacitracin - -     28 Formaldehyde - -     29 Methylchloroisothiazolinone / Methylisothiazolinone - -    30 30 Corticosteroid Mix - -     31 Sodium Lauryl Sulfate - -     32 Lanolin Alcohol - -     33 Turpentine - -     34 Cetylstearylalcohol - -    35 35 Chlorhexidine Dicluconate - -     36 Budenoside - -     37 Imidazolidinyl Urea  - -     38 Ethyl-2 Cyanoacrylate - -     39 Quaternium 15 (Dowicil 200) - -    40 40 Decyl Glucoside - -    PRESERVATIVES & ANTIMICROBIALS        No Substance 2 days 4 days remarks   41 1  1,2-Benzisothiazoline-3-One, Sodium Salt - -     2  1,3,5-Tyler (2-Hydroxyethyl) - Hexahydrotriazine (Angie PILLAI) - -     3  9-Nrijciywwtqfl-6-Nitro-1, 3-Propanediol - -     4  3, 4, 4' - Triclocarban - -    45 5 4 - Chloro - 3 - Cresol - -     6 4 - Chloro - 4 - Xylenol (PCMX) - -     7 7-Ethylbicyclooxazolidine (Bioban QX4309) - -     8 Benzalkonium Chloride - -     9 Benzyl Alcohol - -    50 10 Cetalkonium Chloride - -     11 Cetylpyrimidine Chloride  - -     12 Chloroacetamide - -     13 DMDM Hydantoin - -     14 Glutaraldehyde - -    55 15 Triclosan - -     16 Glyoxal Trimeric Dihydrate - -     17 Iodopropynyl Butylcarbamate - -     18 Octylisothiazoline - -     19 Iodoform NA NA    60 20 (Nitrobutyl) Morpholine/(Ethylnitro-Trimethylene) Dimorpholine ([employer] P 1487) - -     21 Phenoxyethanol - -     22 Phenyl Salicylate - -     23 Povidone Iodine - -     24 Sodium Benzoate - -    65 25 Sodium Disulfite - -     26 Sorbic Acid - -     27 Thimerosal       28 Melamine Formaldehyde Resin       29 Ethylenediamine Dihydrochloride        Parabens      70 30 Butyl-P-Hydroxybenzoate - -     31 Ethyl-P-Hydroxybenzoate - -     32 Methyl-P-Hydroxybenzoate - -    73 33 Propyl-P-Hydroxybenzoate - -    EMULSIFIERS & ADDITIVES       No Substance 2 days 4 days remarks   74 1 Polyethylene Glycol-400 - -    75 2 Cocamidopropyl Betaine NA NA     3 Amerchol L101 - -     4 Propylene Glycol - -     5 Triethanolamine - -     6 Sorbitane Sesquiolate NA NA    80 7 Isopropylmyristate - -     8 Polysorbate 80  - -     9 Amidoamine   (Stearamidopropyl Dimethylamine) - -     10 Oleamidopropyl Dimethylamine NA NA     11 Lauryl Glucoside - -    85 12 Coconut Diethanolamide  - -     13 2-Hydroxy-4-Methoxy Benzophenone (Oxybenzone) - -     14 Benzophenone-4 (Sulisobenzon) - -     15 Propolis - -     16 Dexpanthenol - -    90 17 Carboxymethyl Cellulose Sodium NA NA     18 Abitol - -     19 Tert-Butylhydroquinone - -     20 Benzyl Salicylate - -      Antioxidant       21 Dodecyl Gallate - -    95 22 Butylhydroxyanisole (BHA) - -     23 Butylhydroxytoluene (BHT) - -      24 Di-Alpha-Tocopherol (Vit E) - -     25 Propyl Gallate - -     26 Zinc Pyrithione NA nA    100 27 Dimethylaminopropylamin (DMAPA) NA NA    PERFUMES, FLAVORS & PLANTS        No Substance 2 days 4 days remarks   101 1 Benzyl Cinnamate - -     2 Di-Limonene (Dipentene) - -     3 Cananga Odorata (Vincent Kaur) (I) - -     4 Lichen Acid Mix - -    105 5 Mentha Piperita Oil (Peppermint Oil) - -     6 Sesquiterpenelactone mix - -     7 Tea Tree Oil, Oxidized - -     8 Wood Tar Mix - -     9 Abietic Acid - -    110 10 Lavendula Angustifolia Oil (Lavender Oil) - -     11 Camphor  - -      Fragrance Mix I       12 Oakmoss Absolute - -     13 Eugenol - -     14 Geraniol - -    115 15 Hydroxycitronellal - -     16 Isoeugenol - -     17 Cinnamic Aldehyde - -     18 Cinnamic Alcohol  - -      Fragrance mix II       19 Citronellol - -    120 20 Alpha-Hexylcinnamic Aldehyde    - -     21 Citral - -     22 Farnesol - -    123 23 Coumarin - -    PLASTICS        No Substance 2 days 4 days remarks     Acrylates - -    124 1 2-Hydroxyethyl Methacrylate (HEMA) - -    125 2 1,4-Butandioldimethacrylate (BUDMA) - -     3  2-Ethylhexyl Acrylate - -     4 Bisphenol-A-Dimethacrylate  - -     5 Diurethane-Dimethacrylate - -     6 Ethyleneglycoldimethacrylate (EGDMA) - -    130 7 Pentaerythritoltriacrylate (MAYRA) - -     8 Triethylene Glycol Dimethacrylate (TEGDMA) - -      Synthetic material/additives        9 Z-Aafv-Pkkdydpmdvu - -     10 Tricresyl Phosphate - -     11 0-Rgus-Pcjlcahooextn - -    135 12 Bis (2-Ethylhexyl) Phthalate - -     13 Dibutylphthalate - -     14 Dimethylphthalate - -     15 Toluene-2,4-Diisocyanate - -     16 Diphenylmethane-4,4''-Diisocyanate - -      EPOXY RESIN SYSTEMS        Reactive Solvents - -    140 17 Cresyl Glycidyl Ether - -     18 Butyl Glycidyl Ether - -     19 Phenyl Glycidyl Ether - -     20 1,4-Butanediol Diglycidyl Ether - -     21 1,6-Hexanediole Diglycidyl Ether - -      Hardener / Accelerator - -     145 22 Triethylenetetramine - -     23 Diethylenetriamine - -     24 Isophorone Diamine (IPD) - -    148 25 N,N-Dimethyl-P-Toluidine - -    METALS (Implants / Dental)        No Substance 2 days 4 days remarks   149 1 Ammonium Heptamolybdate (IV) - -    150 2 Ammonium Tetrachloroplatinate - -     3 Indium (III) Chloride - -     4 Iridium (III) Chloride - -     5 Ferric Chloride - -     6 Manganese (II) Chloride - -    155 7 Niobium (V) Chloride - -     8 Palladium Chloride - -     9 Silver Nitrate - -     10 Gold Sodium Thiosulfate - -     11 Tantal - -    160 12 Tin (II) Chloride - -     13 Titanium (IV) Oxide - -     14 Titanium - -     15 Tungstic Acid, Sod Salt Dihydrat - -     16 Vanadium Pentoxide - -    165 17 Wolfram - -     18 Zinc Chloride - -     19 Zirconium (IV) Oxide - -     20 Ammoniated Murcury - -     21 Copper Sulfate Pentahydrate - -    170 22 Amalgam  - -     23 Aluminum Hydroxide - -    172 24 Platinum Tetrachloride - -      Results of patch tests:                         Interpretation:  - Negative                    A    = Allergic      (+) Erythema    TI   = Toxic/irritant   + E + Infiltration    RaP = Relevance at Present     ++ E/I + Papulovesicle   Rpr  = Relevance Previously     +++ E/I/P + Blister     nR   = No Relevance    Order for Drug allergy tests (prick & Intradermal & patch tests)  [x] Penicillin G  [x] Ampicillin [x] Cefazolin  [x] Ceftriaxone  [x] Ceftazidime  [x] Others: Bactrim    DRUG ALLERGY TEST SERIES 06/21/2021)         Prick Tests         Substance/ Allergen Conc Result (20 min) Remarks    Histamine Hydrochloride (ALK) 0.1 mg/ml ++     NaCl 0.9% - Some dermographism    Cefazolin[1] 100 mg/ml -     Ceftriaxone* [2] 100 mg/ml -     Ceftazidime[3] 100 mg/ml -     Benzylpenicillin (Penicillin G) 1 Sam IU/ml (600 mg) -     Ampicillin 100mg/ml -     Bactrim 80/400 mg/ml -     Egg yellow fresh  -     Egg yolk fresh  -       Intradermal Tests   immed immed delay delay       Substance Conc 1st dil  2nd dil  days  days remarks               Cefazolin[1] 1:5 -        Ceftriaxone* [2] 1:5 -        Ceftazidime[3] 1:5 -        Benzylpenicillin (Penicillin G) 1:100 -        Ampicillin 1:10 -        Bactrim 1:100 -           Patch Tests  as is as is 1:2 1:2     As Is  Vas Substance Conc days days days days remarks   173 174 Cefazolin[1] 100 mg/ml        175 176 Ceftriaxone* [2] 100 mg/ml        177 178 Ceftazidime[3] 100 mg/ml        179 180 Benzylpenicillin (Penicillin G) 1 [place] IU/ml (600 mg)        181 182 Ampicillin 100 mg/ml        183 184 Bactrim 80/400 mg/ml        [1]  Cefalexin/Cefazolin-group        [2]    Cefotaxim-group     [3]     Cefuroxim-group    OTHER PRODUCTS        No Substance Conc  % solv 2 days 4 days remarks   185 1 Fresh egg yolk        186 2 Fresh egg white          [] No relevant allergic reaction observed    [] Allergic reaction diagnosed against following allergens:      Interpretation/ remarks:   See later    [] Patient information given   [] ACDS CAMP information's (# ....) to following compounds: .....   [] General information's to following compounds: ......      Assessment & Plan:    ==> Final Diagnosis:     # Atopic predisposition    Atopic dermatitis with sensitive skin    Intermittent Rhinitis during changing season with postnasal drip = sensitization to house dust mite (info given)    Metal sensitization  *Chronic condition with progression/exacerbation    # Immediate and delayed drug allergies  *Chronic condition with progression/exacerbation    # Possible allergic contact dermatitis to additives, metals, fragrances.  *Chronic condition with progression/exacerbation    These conclusions are made at the best of one's knowledge and belief based on the provided evidence such as patient's history and allergy test results and they can change over time or can be incomplete because of missing information's.    ==> Treatment Plan:  - Today: prick test with atopy  screen and eggs white+yolk  - If prick is negative, can consider intradermal test to molds and dust mites   .-schedule for patch test & allergy drug testing (see checklist above for specific panels and drugs)    Procedures Performed:  Allergy tests, including patch tests    Staff: : provider    Follow-up in Derm-Allergy clinic for 1st readings of patch tests after 2 days (virtual) and 2nd readings and final conclusions after 4 days (in person)   ___________________________      I spent a total of 36 minutes with Amelia Coker during today s  visit. This time was spent discussing all the individual test results, correlating them to the clinical relevance, counseling the patient and/or coordinating care and performing allergy tests

## 2021-06-21 ENCOUNTER — OFFICE VISIT (OUTPATIENT)
Dept: ALLERGY | Facility: CLINIC | Age: 46
End: 2021-06-21
Payer: COMMERCIAL

## 2021-06-21 DIAGNOSIS — L20.89 OTHER ATOPIC DERMATITIS: ICD-10-CM

## 2021-06-21 DIAGNOSIS — L23.89 ALLERGIC CONTACT DERMATITIS DUE TO OTHER AGENTS: Primary | ICD-10-CM

## 2021-06-21 DIAGNOSIS — Z88.9 MULTIPLE DRUG ALLERGIES: ICD-10-CM

## 2021-06-21 PROCEDURE — 95024 IQ TESTS W/ALLERGENIC XTRCS: CPT | Performed by: DERMATOLOGY

## 2021-06-21 PROCEDURE — 95044 PATCH/APPLICATION TESTS: CPT | Mod: 59 | Performed by: DERMATOLOGY

## 2021-06-21 PROCEDURE — 95018 ALL TSTG PERQ&IQ DRUGS/BIOL: CPT | Performed by: DERMATOLOGY

## 2021-06-21 NOTE — LETTER
"    6/21/2021         RE: Amelia Coker  [address] [facility] [id number]        Dear Colleague,    Thank you for referring your patient, Amelia Coker, to the [facility] [place]. Please see a copy of my visit note below.    [facility] Dermato-allergology Note  Office visit  Encounter Date: Jun 21, 2021  ____________________________________________    CC: No chief complaint on file.      HPI:  Ms. Amelia Coker is a(n) 46 year old female who presents today as a return patient for allergy consultation  - for patch and drug allergy tests as previously planned  - otherwise feeling well in usual state of health    Physical exam:  General: In no acute distress, well-developed, well-nourished  Eyes: no conjunctivitis  ENT: no signs of rhinitis   Pulmonary: no wheezing or coughing  Skin: Focused examination of the skin on test sites was performed = see test results below  No active eczematous skin lesions on tests sites, particularly back    Earlier History and Allergy exams:  - carries a diagnosis of eczema. Appears in flexural folds, neck, eyelids. Uses topical steroids: clobetasol and triamcinolone as needed for flares.   - Saw an allergist in [place] who recommended she be seen here for allergy testing for a potential explanation of the sores on her scalp and skin allergies. Sensitive to lotions, shampoos, laundry detergent, dressings, and soaps and skin will \"break out.\" Prior allergy testing with allergy to cats, dogs, pollen, trees, grass, dust mites, mold, guinea pigs. Has a sore on her scalp that has been present for 4 months. A biopsy was done by PCP in [facility] that showed \"inflammatory cells.\"  - Also planning a spinal fusion and is allergic to nickel, white gold, copper, yellow gold, alfie silver and possibly ?other metals. When these contact her skin, she itchy red skin changes as well as blisters. For spinal fusion, plan is to use a  \"titanium " "alloy with nickel\" (Dr Adames, [facility] Has had allergy to surgical staples in the past. Has had allergy testing to 6 metals (unknown which) with dentistry and was allergic to all 6 so uses porcelain crowns.   - hx latex allergy  .   - 5mm brown crust on a pink macule on R frontal-parietal scalp  - L>R hand has scale and xerosis and pinkness on side of fingers, between digits, and dorsal wrist.    Past Medical History:   Patient Active Problem List   Diagnosis     Migraine, unspecified, with intractable migraine, so stated, without mention of status migrainosus     Bell's palsy     Contact dermatitis and other eczema, due to unspecified cause     Scalp lesion     Lyme disease     PAC (premature atrial contraction)     Palpitations     Raynaud phenomenon     Chronic fatigue     Hair loss     Abnormal PFT     Shoulder joint instability     Family history of melanoma     Dry eyes     Keratitis sicca (H)     Family history of colon cancer     Pain in joint, upper arm     FLORIDALMA positive     Plantar fascial fibromatosis     Foraminal stenosis of lumbar region     DJD (degenerative joint disease), lumbar     Migraine without aura and without status migrainosus, not intractable     Lumbar radiculopathy     Ds DNA antibody positive     Muscular wasting and disuse atrophy, not elsewhere classified     Frontal headache     Telangiectasia of face     Lateral epicondylitis     Right shoulder pain     Plantar fasciitis     Skin lesion     AD (atopic dermatitis)     Heat sensitivity     Rotator cuff arthropathy, right     Gastroesophageal reflux disease, esophagitis presence not specified     Abnormal mammogram     Sternocleidomastoid muscle tenderness     Acute cervical myofascial strain, initial encounter     Spasm of muscle     Hyperlipidemia LDL goal <130     Biceps tendinopathy, right     Superficial swelling of scalp     Intractable right heel pain     Intractable episodic tension-type headache     Jaycob " complexion     Lip lesion     Abdominal pain, epigastric     Abdominal distension     PONV (postoperative nausea and vomiting)     Menorrhalgia     Uterine polyp     Loose stools     Hyperactive bowel sounds     Abnormality of nail surface     Dry hair     Dry skin     Malaise and fatigue     Lymphadenopathy     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     Endometrium, hyperplasia - on recent CT scan     Pain in joint involving pelvic region and thigh, right     Right lateral abdominal pain     Right foot pain     Dental abscess - left mandible molar     Brachial plexopathy - right shoulder     Balance problems     Morbid obesity (H)     Sinus arrhythmia seen on electrocardiogram     Family history of ischemic heart disease - father at 46 massive MI     Elevated blood pressure reading without diagnosis of hypertension     Hypertrophy of breast     History of cold sores     AK (actinic keratosis) - both hands at the lateral thenar aspect.     Dermatitis     Musculoskeletal pain     Fatigue, unspecified type     TOS (thoracic outlet syndrome)     Past Medical History:   Diagnosis Date     Abnormal mammogram 9/21/2016    right breast      AD (atopic dermatitis) 10/29/2015     Allergic rhinitis due to other allergen      Arthritis      Bell's palsy      Chronic fatigue 6/6/2012     Chronic infection     MRSA - 2015 scalp and prior to Abdomen     Contact dermatitis and other eczema, due to unspecified cause     eczema     Contusion of left foot, initial encounter 3/16/2016     DJD (degenerative joint disease), lumbar 9/26/2013     DJD (degenerative joint disease), lumbar 9/26/2013     Ds DNA antibody positive 4/16/2014    borderline.      Elevated blood pressure reading without diagnosis of hypertension 12/24/2013     Facial contusion 12/15/2014    hit by horse      Foraminal stenosis of lumbar region 9/26/2013     Frontal headache 12/15/2014     Gastroesophageal reflux disease, esophagitis presence not  specified 6/29/2016     Heat sensitivity 10/29/2015     HTN, goal below 140/90 9/23/2015     Hyperlipidemia LDL goal <130 8/30/2017     Irregular heart beat      Keratitis sicca (H) 10/31/2012     Left shoulder pain 9/26/2013     Lumbar radiculopathy 1/22/2014     Migraine headache 10/23/2013     Migraine, unspecified, with intractable migraine, so stated, without mention of status migrainosus      Motion sickness      MRSA infection 10/20/2015     Muscular wasting and disuse atrophy, not elsewhere classified 6/9/2014    right SCM, right wrist,       Numbness and tingling     both legs anf feet greater on the left     PAC (premature atrial contraction) 7/15/2010     Plantar fasciitis 9/23/2015     PONV (postoperative nausea and vomiting)      Skin lesion 10/20/2015    posterior superior scalp      Spasm of muscle 7/11/2017     Telangiectasia of face 12/19/2014     Tooth pain 10/20/2015    left lower primary molar        Allergies:  Allergies   Allergen Reactions     Ceftriaxone Anaphylaxis     Hives, rash, racing heart beat     Bactrim [Sulfamethoxazole W/Trimethoprim] Hives     Ciprofloxacin Other (See Comments)     Tendon Issues     Codeine Nausea and Vomiting     Codeine      Copper      Rash       Doxycycline      Loss of skin pigmentation, skin loss.     Gold      Rash       Iodine      Iodine-131 Hives     Levaquin [Levofloxacin] Other (See Comments)     Tendon issues with levaquin and cipro     Nickel      rash     Steel [Staples]      Rash from staples       Sulfa Drugs      Latex Rash     Penicillins Rash       Medications:  Current Outpatient Medications   Medication     Acetaminophen (TYLENOL PO)     [START ON 6/21/2021] cefTAZidime (FORTAZ) 1 GM vial     clobetasol (TEMOVATE) 0.05 % external cream     fexofenadine (ALLEGRA) 180 MG tablet     hydrocortisone valerate (WEST-DENNIS) 0.2 % external ointment     medical cannabis (Patient's own supply)     meloxicam (MOBIC) 15 MG tablet     mupirocin (BACTROBAN)  2 % external ointment     rizatriptan (MAXALT-MLT) 5 MG ODT     sucralfate (CARAFATE) 1 GM tablet     Current Facility-Administered Medications   Medication     [START ON 2021] ampicillin (OMNIPEN) 1 g vial INTRADERMAL     [START ON 2021] ceFAZolin (ANCEF) injection 500 mg     [START ON 2021] cefTRIAXone (ROCEPHIN) injection 250 mg     [START ON 2021] penicillin g potassium [id number] unit vial INTRADERMAL     [START ON 2021] sulfamethoxazole-trimethoprim (BACTRIM) injection 80 mg       Social History:  The patient . Patient has the following hobbies or non-occupational exposure: taking care of horses.     Family History:  Family History   Problem Relation Age of Onset     Diabetes Mother         adult     Cancer - colorectal Mother      Hypertension Mother      Allergies Mother      Cancer Mother         colon     Depression Mother      Gastrointestinal Disease Mother         ibs     Genitourinary Problems Mother         kidney cyst     Gynecology Mother         endometriosis     Lipids Mother      Thyroid Disease Mother      Arthritis Mother         RA     Heart Disease Maternal Grandfather         MI,  - 60's     Allergies Father      Unknown/Adopted Father      Heart Disease Father         mi-age mid 40's     Cardiovascular Father      Lipids Father      Respiratory Father         asthma     Cancer Father      Other Cancer Father         renal cancer     Depression Sister      Allergies Sister      Cancer Sister         vaginal     Gynecology Sister         endometriosis     Lipids Paternal Grandmother      Osteoporosis Paternal Grandmother      Thyroid Disease Paternal Grandmother      Respiratory Son         asthma     Allergies Son      Arthritis Sister      Allergies Brother      Heart Disease Brother      Allergies Daughter      Blood Disease Paternal Aunt         LGL T cell Leukemia     Rheumatoid Arthritis Paternal Aunt      Kidney Disease Maternal Aunt      Lupus  Maternal Aunt      Bladder Cancer Maternal Aunt        Previous Labs, Allergy Tests, Dermatopathology, Imaging:  See HPI    Referred By: Ahmet Johnson, DO  [address] [address]     Allergy Tests:    Past Allergy Test: see HPI    Order for Future Allergy Testing:    [x] Outpatient  [] Inpatient: Sandy..../ Bed ....       Skin Atopy (atopic dermatitis) [x] Yes   [] No  Contact allergies:   [x] Yes   [] No (soaps)  Urticaria/Angioedema  [x] Yes   [] No (hives and ?anaphylaxis from sulfa and ceftriaxone. Lip blisters from doxycycline.)  Rhinitis/Sinusitis:   [x] Yes   [] No   [x] seasonal [] perennial      (spring and fall)      Allergic Asthma:   [] Yes   [x] (No strong family history)  Pets :  [x] Yes   [] No  Dog (sleeps in basement in a crate. Mild allergy if she bathes him.)  Food Allergy:   [x] Yes   [] No (tested to pork by allergist in [place] and this was negative. Home test kit showed strong allergy to egg white and yolks.)  Drug allergies:   [x] Yes   [] No see chart  Leg ulcers:   [] Yes   [x] No  Hand eczema:   [x] Yes   [] No ?possibly   Leading hand:   [x] R   [] L       [] Ambidextrous                        Order for PATCH TESTS  Reason for tests (suspected allergy):  Possible allergic contact dermatitis to additives, metals, fragrances.  Known previous allergies: see HPI  Standardized panels  [x] Standard panel (40 tests)  [x] Preservatives & Antimicrobials (31 tests)  [x] Emulsifiers & Additives (25 tests)   [x] Perfumes/Flavours & Plants (25 tests)  [] Hairdresser panel (12 tests)  [] Rubber Chemicals (22 tests)  [x] Plastics (26 tests)  [] Colorants/Dyes/Food additives (20 tests)  [x] Metals (implants/dental) (24 tests)  [] Local anaesthetics/NSAIDs (13 tests)  [] Antibiotics & Antimycotics (14 tests)   [] Corticosteroids (15 tests)   [] Photopatch test (62 tests)   [] others: ...      [] Patient's own products: ...    DO NOT test if chemical or biological identity is  unknown!     always ask from patient the product information and safety sheets (MSDS)       Order for PRICK TESTS    Reason for tests (suspected allergy): atopic predisposition and possible allergy to eggs.   Known previous allergies: see HPI    Standardized prick panels  [x] Atopic panel (20 tests)  [] Pediatric Panel (12 tests)  [x] Milk, Meat, Eggs, Grains (20 tests) --> EGG WHITE & EGG YOLK  [] Dust, Epithelia, Feathers (10 tests)  [] Fish, Seafood, Shellfish (17 tests)  [] Nuts, Beans (14 tests)  [] Spice, Vegetable, Fruit (17 tests)  [x] Others: EGG WHITE & EGG YOLK      [] Patient's own products: ...    DO NOT test if chemical or biological identity is unknown!     always ask from patient the product information and safety sheets (MSDS)     Standardized intradermal tests  [] Penicillium notatum [] Aspergillus fumigatus [] House dust mites  [] Bee venom   [] Wasp venom !!Specific protocol with dilutions!!  [] Others: ...      Order for Drug allergy tests (prick & Intradermal & patch tests)  [x] Penicillin G  [x] Ampicillin [x] Cefazolin  [x] Ceftriaxone  [x] Ceftazidime  [x] Others: Bactrim      Atopy Screen (Placed 05/05/21 )    No Substance Readings (15 min) Evaluation   POS Histamine 1mg/ml ++    NEG NaCl 0.9% -      No Substance Readings (15 min) Evaluation   1 Alternaria alternata (tenuis)  (+)    2 Cladosporium herbarum (+)    3 Aspergillus fumigatus -    4 Penicillium notatum -    5 Dermatophagoides pteronyssinus ++    6 Dermatophagoides farinae ++    7 Dog epithelium (canis spp) -    8 Cat hair (alexis catus) -    9 Cockroach   (Blatella americana & germanica) -    10 Grass mix midwest   (Aubrie, Orchard, Redtop, Carlito) -    11 Morgan grass (sorghum halepense) -    12 Weed mix   (common Cocklebur, Lamb s quarters, rough redroot Pigweed, Dock/Sorrel) -    13 Mug wort (artemisia vulgare) -    14 Ragweed giant/short (ambrosia spp) -    15 English Plantain (plantago lanceolata) -    16 Tree mix 1 (Pecan,  Maple BHR, Cashton RVW, american Whitefield, black Kingston) -    17 Red cedar (juniperus virginia) -    18 Tree mix 2   (white Rigoberto, river/red Birch, black Oxford, common Fairfax, american Elm) -    19 Box elder/Maple mix (acer spp) -    20 Westfield shagbark (carya ovata) -     21 Egg white #6 -    22 Egg yolk #7 -      Conclusion: patient is atopic with immediate type sensitization to house dust mites    ________________________________  RESULTS & EVALUATION of PATCH TESTS    Patch test readings after     [x] 2 days, [] 3 days [x] 4 days, [] 5 days    Jun 21, 2021 application of patch tests with results:    STANDARD Series        No Substance 2 days 4 days remarks   1 1 Almas Mix [C] - -     2 Colophony - -     3  2-Mercaptobenzothiazole  - -      4 Methylisothiazolinone - -    5 5 Carba Mix - -     6 Thiuram Mix [A] - -     7 Bisphenol A Epoxy Resin - -     8 K-Spun-Oyghpqobeqi-Formaldehyde Resin - -     9 Mercapto Mix [A] - -    10 10 Black Rubber Mix- PPD [B] - -     11 Potassium Dichromate  -  -     12 Balsam of Peru (Myroxylon Pereirae Resin) - -     13 Nickel Sulphate Hexahydrate - -     14 Mixed Dialkyl Thiourea - -    15 15 Paraben Mix [B] - -     16 Methyldibromo Glutaronitrile - -     17 Fragrance Mix - -     18 2-Bromo-2-Nitropropane-1,3-Diol (Bronopol) NA NA     19 Lyral - -    20 20 Tixocortol-21- Pivalate - -     21 Diazolidiyl Urea (Germall II) - -     22 Methyl Methacrylate NA NA     23 Cobalt (II) Chloride Hexahydrate - -     24 Fragrance Mix II  - -    25 25 Compositae Mix - -     26 Benzoyl Peroxide - -     27 Bacitracin - -     28 Formaldehyde - -     29 Methylchloroisothiazolinone / Methylisothiazolinone - -    30 30 Corticosteroid Mix - -     31 Sodium Lauryl Sulfate - -     32 Lanolin Alcohol - -     33 Turpentine - -     34 Cetylstearylalcohol - -    35 35 Chlorhexidine Dicluconate - -     36 Budenoside - -     37 Imidazolidinyl Urea  - -     38 Ethyl-2 Cyanoacrylate - -     39 Quaternium 15  (Dowicil 200) - -    40 40 Decyl Glucoside - -    PRESERVATIVES & ANTIMICROBIALS        No Substance 2 days 4 days remarks   41 1  1,2-Benzisothiazoline-3-One, Sodium Salt - -     2  1,3,5-Tyler (2-Hydroxyethyl) - Hexahydrotriazine (Grotan BK) - -     3 3-Qtdhtezywnffh-4-Nitro-1, 3-Propanediol - -     4  3, 4, 4' - Triclocarban - -    45 5 4 - Chloro - 3 - Cresol - -     6 4 - Chloro - 4 - Xylenol (PCMX) - -     7 7-Ethylbicyclooxazolidine (Bioban CP1087) - -     8 Benzalkonium Chloride - -     9 Benzyl Alcohol - -    50 10 Cetalkonium Chloride - -     11 Cetylpyrimidine Chloride  - -     12 Chloroacetamide - -     13 DMDM Hydantoin - -     14 Glutaraldehyde - -    55 15 Triclosan - -     16 Glyoxal Trimeric Dihydrate - -     17 Iodopropynyl Butylcarbamate - -     18 Octylisothiazoline - -     19 Iodoform NA NA    60 20 (Nitrobutyl) Morpholine/(Ethylnitro-Trimethylene) Dimorpholine (Bioban P 1487) - -     21 Phenoxyethanol - -     22 Phenyl Salicylate - -     23 Povidone Iodine - -     24 Sodium Benzoate - -    65 25 Sodium Disulfite - -     26 Sorbic Acid - -     27 Thimerosal       28 Melamine Formaldehyde Resin       29 Ethylenediamine Dihydrochloride        Parabens      70 30 Butyl-P-Hydroxybenzoate - -     31 Ethyl-P-Hydroxybenzoate - -     32 Methyl-P-Hydroxybenzoate - -    73 33 Propyl-P-Hydroxybenzoate - -    EMULSIFIERS & ADDITIVES       No Substance 2 days 4 days remarks   74 1 Polyethylene Glycol-400 - -    75 2 Cocamidopropyl Betaine NA NA     3 Amerchol L101 - -     4 Propylene Glycol - -     5 Triethanolamine - -     6 Sorbitane Sesquiolate NA NA    80 7 Isopropylmyristate - -     8 Polysorbate 80  - -     9 Amidoamine   (Stearamidopropyl Dimethylamine) - -     10 Oleamidopropyl Dimethylamine NA NA     11 Lauryl Glucoside - -    85 12 Coconut Diethanolamide  - -     13 2-Hydroxy-4-Methoxy Benzophenone (Oxybenzone) - -     14 Benzophenone-4 (Sulisobenzon) - -     15 Propolis - -     16 Dexpanthenol - -     90 17 Carboxymethyl Cellulose Sodium NA NA     18 Abitol - -     19 Tert-Butylhydroquinone - -     20 Benzyl Salicylate - -      Antioxidant       21 Dodecyl Gallate - -    95 22 Butylhydroxyanisole (BHA) - -     23 Butylhydroxytoluene (BHT) - -     24 Di-Alpha-Tocopherol (Vit E) - -     25 Propyl Gallate - -     26 Zinc Pyrithione NA nA    100 27 Dimethylaminopropylamin (DMAPA) NA NA    PERFUMES, FLAVORS & PLANTS        No Substance 2 days 4 days remarks   101 1 Benzyl Cinnamate - -     2 Di-Limonene (Dipentene) - -     3 Cananga Odorata (Vincent Kaur) (I) - -     4 Lichen Acid Mix - -    105 5 Mentha Piperita Oil (Peppermint Oil) - -     6 Sesquiterpenelactone mix - -     7 Tea Tree Oil, Oxidized - -     8 Wood Tar Mix - -     9 Abietic Acid - -    110 10 Lavendula Angustifolia Oil (Lavender Oil) - -     11 Camphor  - -      Fragrance Mix I       12 Oakmoss Absolute - -     13 Eugenol - -     14 Geraniol - -    115 15 Hydroxycitronellal - -     16 Isoeugenol - -     17 Cinnamic Aldehyde - -     18 Cinnamic Alcohol  - -      Fragrance mix II       19 Citronellol - -    120 20 Alpha-Hexylcinnamic Aldehyde    - -     21 Citral - -     22 Farnesol - -    123 23 Coumarin - -    PLASTICS        No Substance 2 days 4 days remarks     Acrylates - -    124 1 2-Hydroxyethyl Methacrylate (HEMA) - -    125 2 1,4-Butandioldimethacrylate (BUDMA) - -     3  2-Ethylhexyl Acrylate - -     4 Bisphenol-A-Dimethacrylate  - -     5 Diurethane-Dimethacrylate - -     6 Ethyleneglycoldimethacrylate (EGDMA) - -    130 7 Pentaerythritoltriacrylate (MAYRA) - -     8 Triethylene Glycol Dimethacrylate (TEGDMA) - -      Synthetic material/additives        9 S-Fvcv-Wrttyevhzya - -     10 Tricresyl Phosphate - -     11 3-Aiay-Kdhocashkkgzu - -    135 12 Bis (2-Ethylhexyl) Phthalate - -     13 Dibutylphthalate - -     14 Dimethylphthalate - -     15 Toluene-2,4-Diisocyanate - -     16 Diphenylmethane-4,4''-Diisocyanate - -      EPOXY RESIN  SYSTEMS        Reactive Solvents - -    140 17 Cresyl Glycidyl Ether - -     18 Butyl Glycidyl Ether - -     19 Phenyl Glycidyl Ether - -     20 1,4-Butanediol Diglycidyl Ether - -     21 1,6-Hexanediole Diglycidyl Ether - -      Hardener / Accelerator - -    145 22 Triethylenetetramine - -     23 Diethylenetriamine - -     24 Isophorone Diamine (IPD) - -    148 25 N,N-Dimethyl-P-Toluidine - -    METALS (Implants / Dental)        No Substance 2 days 4 days remarks   149 1 Ammonium Heptamolybdate (IV) - -    150 2 Ammonium Tetrachloroplatinate - -     3 Indium (III) Chloride - -     4 Iridium (III) Chloride - -     5 Ferric Chloride - -     6 Manganese (II) Chloride - -    155 7 Niobium (V) Chloride - -     8 Palladium Chloride - -     9 Silver Nitrate - -     10 Gold Sodium Thiosulfate - -     11 Tantal - -    160 12 Tin (II) Chloride - -     13 Titanium (IV) Oxide - -     14 Titanium - -     15 Tungstic Acid, Sod Salt Dihydrat - -     16 Vanadium Pentoxide - -    165 17 Wolfram - -     18 Zinc Chloride - -     19 Zirconium (IV) Oxide - -     20 Ammoniated Murcury - -     21 Copper Sulfate Pentahydrate - -    170 22 Amalgam  - -     23 Aluminum Hydroxide - -    172 24 Platinum Tetrachloride - -      Results of patch tests:                         Interpretation:  - Negative                    A    = Allergic      (+) Erythema    TI   = Toxic/irritant   + E + Infiltration    RaP = Relevance at Present     ++ E/I + Papulovesicle   Rpr  = Relevance Previously     +++ E/I/P + Blister     nR   = No Relevance    Order for Drug allergy tests (prick & Intradermal & patch tests)  [x] Penicillin G  [x] Ampicillin [x] Cefazolin  [x] Ceftriaxone  [x] Ceftazidime  [x] Others: Bactri    DRUG ALLERGY TEST SERIES 06/21/2021)         Prick Tests         Substance/ Allergen Conc Result (20 min) Remarks    Histamine Hydrochloride (ALK) 0.1 mg/ml ++     NaCl 0.9% - Some dermographism    Cefazolin[1] 100 mg/ml -     Ceftriaxone* [2]  100 mg/ml -     Ceftazidime[3] 100 mg/ml -     Benzylpenicillin (Penicillin G) 1 Sam IU/ml (600 mg) -     Ampicillin 100mg/ml -     Bactrim 80/400 mg/ml -     Egg yellow fresh  -     Egg yolk fresh  -       Intradermal Tests   immed immed delay delay      Substance Conc 1st dil  2nd dil  days  days remarks               Cefazolin[1] 1:5 -        Ceftriaxone* [2] 1:5 -        Ceftazidime[3] 1:5 -        Benzylpenicillin (Penicillin G) 1:100 -        Ampicillin 1:10 -        Bactrim 1:100 -           Patch Tests  as is as is 1:2 1:2     As Is  Vas Substance Conc days days days days remarks   173 174 Cefazolin[1] 100 mg/ml        175 176 Ceftriaxone* [2] 100 mg/ml        177 178 Ceftazidime[3] 100 mg/ml        179 180 Benzylpenicillin (Penicillin G) 1 Sam IU/ml (600 mg)        181 182 Ampicillin 100 mg/ml        183 184 Bactrim 80/400 mg/ml        [1]  Cefalexin/Cefazolin-group        [2]    Cefotaxim-group     [3]     Cefuroxim-group    OTHER PRODUCTS        No Substance Conc  % solv 2 days 4 days remarks   185 1 Fresh egg yolk        186 2 Fresh egg white          [] No relevant allergic reaction observed    [] Allergic reaction diagnosed against following allergens:      Interpretation/ remarks:   See later    [] Patient information given   [] ACDS CAMP information's (# ....) to following compounds: .....   [] General information's to following compounds: ......      Assessment & Plan:    ==> Final Diagnosis:     # Atopic predisposition    Atopic dermatitis with sensitive skin    Intermittent Rhinitis during changing season with postnasal drip = sensitization to house dust mite (info given)    Metal sensitization  *Chronic condition with progression/exacerbation    # Immediate and delayed drug allergies  *Chronic condition with progression/exacerbation    # Possible allergic contact dermatitis to additives, metals, fragrances.  *Chronic condition with progression/exacerbation    These conclusions are made at the  best of one's knowledge and belief based on the provided evidence such as patient's history and allergy test results and they can change over time or can be incomplete because of missing information's.    ==> Treatment Plan:  - Today: prick test with atopy screen and eggs white+yolk  - If prick is negative, can consider intradermal test to molds and dust mites   .-schedule for patch test & allergy drug testing (see checklist above for specific panels and drugs)    Procedures Performed:  Allergy tests, including patch tests    Staff: : provider    Follow-up in Derm-Allergy clinic for 1st readings of patch tests after 2 days (virtual) and 2nd readings and final conclusions after 4 days (in person)   ___________________________      I spent a total of 36 minutes with Amelia Coker during today s  visit. This time was spent discussing all the individual test results, correlating them to the clinical relevance, counseling the patient and/or coordinating care and performing allergy tests            Again, thank you for allowing me to participate in the care of your patient.        Sincerely,        Pio Barrett MD

## 2021-06-21 NOTE — PATIENT INSTRUCTIONS
Removal of Patch Tests on Day 3:    1. Remove patches and tape from one test area (one rectangle)          2. Using the purple surgical markers provided (or other permanent marker), draw a grid around the test area so that the circular indentation is in the center of each square, as below. Try to be as neat as possible and keep the lines as straight as you can (you can use a ruler if you need to)          3. Redraw the number that was underneath the tape above the grids, as shown below. Try to print clearly.          4. Repeat the process for the remaining test areas.          5. Photograph the entire area then take close-up photos of any possible reactions. Examples of possible reactions are spots that look like these:          6. Return to clinic for evaluation as instructed. You should continue to avoid getting the area wet (no showering or strenuous exercise), exposing the area to UV light, using topical steroids, or scratching.         Who should I call with questions?    [facility], [place]: [phone]    For urgent needs outside of business hours call the [facility], [place] at [phone] and ask for the dermatology resident on call      If you develop any serious or adverse allergic reaction after office hours please seek immediate medical assistance at the nearest clinic or emergency room

## 2021-06-23 ENCOUNTER — VIRTUAL VISIT (OUTPATIENT)
Dept: ALLERGY | Facility: CLINIC | Age: 46
End: 2021-06-23
Payer: COMMERCIAL

## 2021-06-23 DIAGNOSIS — L23.89 ALLERGIC CONTACT DERMATITIS DUE TO OTHER AGENTS: Primary | ICD-10-CM

## 2021-06-23 DIAGNOSIS — L20.89 OTHER ATOPIC DERMATITIS: ICD-10-CM

## 2021-06-23 PROCEDURE — 99207 PR NO CHARGE LOS: CPT | Mod: 95 | Performed by: DERMATOLOGY

## 2021-06-23 NOTE — PROGRESS NOTES
[facility] Dermato-allergology Note  Virtual visit: store and forward video (MyChart connected)  Encounter Date: Jun 23, 2021  ____________________________________________    CC: No chief complaint on file.      HPI:  Ms. Amelia Coker is a(n) 46 year old female who presents today as a return patient for allergy consultation  - Follow-up in Derm-Allergy clinic for 1st readings of patch tests after 2 days (virtual)   - otherwise feeling well in usual state of health    Physical exam:  General: In no acute distress, well-developed, well-nourished  Eyes: no conjunctivitis  ENT: no signs of rhinitis   Pulmonary: no wheezing or coughing  Skin:Focused examination of the skin on test sites was performed = see test results below    Earlier History and Allergy exams:  [facility] Dermato-allergology Note  Office visit  Encounter Date: Jun 23, 2021  ____________________________________________    CC: No chief complaint on file.      HPI:  Ms. Amelia Coker is a(n) 46 year old female who presents today as a return patient for allergy consultation  - for patch and drug allergy tests as previously planned  - otherwise feeling well in usual state of health    Physical exam:  General: In no acute distress, well-developed, well-nourished  Eyes: no conjunctivitis  ENT: no signs of rhinitis   Pulmonary: no wheezing or coughing  Skin: Focused examination of the skin on test sites was performed = see test results below  No active eczematous skin lesions on tests sites, particularly back    Earlier History and Allergy exams:  - carries a diagnosis of eczema. Appears in flexural folds, neck, eyelids. Uses topical steroids: clobetasol and triamcinolone as needed for flares.   - Saw an allergist in [place] who recommended she be seen here for allergy testing for a potential explanation of the sores on her scalp and skin allergies. Sensitive to lotions, shampoos, laundry detergent, dressings,  "and soaps and skin will \"break out.\" Prior allergy testing with allergy to cats, dogs, pollen, trees, grass, dust mites, mold, guinea pigs. Has a sore on her scalp that has been present for 4 months. A biopsy was done by PCP in [facility] that showed \"inflammatory cells.\"  - Also planning a spinal fusion and is allergic to nickel, white gold, copper, yellow gold, alfie silver and possibly ?other metals. When these contact her skin, she itchy red skin changes as well as blisters. For spinal fusion, plan is to use a  \"titanium alloy with nickel\" (Dr Adames, [facility] Has had allergy to surgical staples in the past. Has had allergy testing to 6 metals (unknown which) with dentistry and was allergic to all 6 so uses porcelain crowns.   - hx latex allergy  .   - 5mm brown crust on a pink macule on R frontal-parietal scalp  - L>R hand has scale and xerosis and pinkness on side of fingers, between digits, and dorsal wrist.    Past Medical History:   Patient Active Problem List   Diagnosis     Migraine, unspecified, with intractable migraine, so stated, without mention of status migrainosus     Bell's palsy     Contact dermatitis and other eczema, due to unspecified cause     Scalp lesion     Lyme disease     PAC (premature atrial contraction)     Palpitations     Raynaud phenomenon     Chronic fatigue     Hair loss     Abnormal PFT     Shoulder joint instability     Family history of melanoma     Dry eyes     Keratitis sicca (H)     Family history of colon cancer     Pain in joint, upper arm     FLORIDALMA positive     Plantar fascial fibromatosis     Foraminal stenosis of lumbar region     DJD (degenerative joint disease), lumbar     Migraine without aura and without status migrainosus, not intractable     Lumbar radiculopathy     Ds DNA antibody positive     Muscular wasting and disuse atrophy, not elsewhere classified     Frontal headache     Telangiectasia of face     Lateral epicondylitis     Right " shoulder pain     Plantar fasciitis     Skin lesion     AD (atopic dermatitis)     Heat sensitivity     Rotator cuff arthropathy, right     Gastroesophageal reflux disease, esophagitis presence not specified     Abnormal mammogram     Sternocleidomastoid muscle tenderness     Acute cervical myofascial strain, initial encounter     Spasm of muscle     Hyperlipidemia LDL goal <130     Biceps tendinopathy, right     Superficial swelling of scalp     Intractable right heel pain     Intractable episodic tension-type headache     Jaycob complexion     Lip lesion     Abdominal pain, epigastric     Abdominal distension     PONV (postoperative nausea and vomiting)     Menorrhalgia     Uterine polyp     Loose stools     Hyperactive bowel sounds     Abnormality of nail surface     Dry hair     Dry skin     Malaise and fatigue     Lymphadenopathy     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     Endometrium, hyperplasia - on recent CT scan     Pain in joint involving pelvic region and thigh, right     Right lateral abdominal pain     Right foot pain     Dental abscess - left mandible molar     Brachial plexopathy - right shoulder     Balance problems     Morbid obesity (H)     Sinus arrhythmia seen on electrocardiogram     Family history of ischemic heart disease - father at 46 massive MI     Elevated blood pressure reading without diagnosis of hypertension     Hypertrophy of breast     History of cold sores     AK (actinic keratosis) - both hands at the lateral thenar aspect.     Dermatitis     Musculoskeletal pain     Fatigue, unspecified type     TOS (thoracic outlet syndrome)     Past Medical History:   Diagnosis Date     Abnormal mammogram 9/21/2016    right breast      AD (atopic dermatitis) 10/29/2015     Allergic rhinitis due to other allergen      Arthritis      Bell's palsy      Chronic fatigue 6/6/2012     Chronic infection     MRSA - 2015 scalp and prior to Abdomen     Contact dermatitis and other eczema,  due to unspecified cause     eczema     Contusion of left foot, initial encounter 3/16/2016     DJD (degenerative joint disease), lumbar 9/26/2013     DJD (degenerative joint disease), lumbar 9/26/2013     Ds DNA antibody positive 4/16/2014    borderline.      Elevated blood pressure reading without diagnosis of hypertension 12/24/2013     Facial contusion 12/15/2014    hit by horse      Foraminal stenosis of lumbar region 9/26/2013     Frontal headache 12/15/2014     Gastroesophageal reflux disease, esophagitis presence not specified 6/29/2016     Heat sensitivity 10/29/2015     HTN, goal below 140/90 9/23/2015     Hyperlipidemia LDL goal <130 8/30/2017     Irregular heart beat      Keratitis sicca (H) 10/31/2012     Left shoulder pain 9/26/2013     Lumbar radiculopathy 1/22/2014     Migraine headache 10/23/2013     Migraine, unspecified, with intractable migraine, so stated, without mention of status migrainosus      Motion sickness      MRSA infection 10/20/2015     Muscular wasting and disuse atrophy, not elsewhere classified 6/9/2014    right SCM, right wrist,       Numbness and tingling     both legs anf feet greater on the left     PAC (premature atrial contraction) 7/15/2010     Plantar fasciitis 9/23/2015     PONV (postoperative nausea and vomiting)      Skin lesion 10/20/2015    posterior superior scalp      Spasm of muscle 7/11/2017     Telangiectasia of face 12/19/2014     Tooth pain 10/20/2015    left lower primary molar        Allergies:  Allergies   Allergen Reactions     Ceftriaxone Anaphylaxis     Hives, rash, racing heart beat     Bactrim [Sulfamethoxazole W/Trimethoprim] Hives     Ciprofloxacin Other (See Comments)     Tendon Issues     Codeine Nausea and Vomiting     Codeine      Copper      Rash       Doxycycline      Loss of skin pigmentation, skin loss.     Gold      Rash       Iodine      Iodine-131 Hives     Levaquin [Levofloxacin] Other (See Comments)     Tendon issues with levaquin and  cipro     Nickel      rash     Steel [Staples]      Rash from staples       Sulfa Drugs      Latex Rash     Penicillins Rash       Medications:  Current Outpatient Medications   Medication     Acetaminophen (TYLENOL PO)     cefTAZidime (FORTAZ) 1 GM vial     cefTAZidime (FORTAZ) 1 GM vial     clobetasol (TEMOVATE) 0.05 % external cream     fexofenadine (ALLEGRA) 180 MG tablet     hydrocortisone valerate (WEST-DENNIS) 0.2 % external ointment     medical cannabis (Patient's own supply)     meloxicam (MOBIC) 15 MG tablet     mupirocin (BACTROBAN) 2 % external ointment     rizatriptan (MAXALT-MLT) 5 MG ODT     sucralfate (CARAFATE) 1 GM tablet     Current Facility-Administered Medications   Medication     ampicillin (OMNIPEN) 1 g vial INTRADERMAL     ampicillin (OMNIPEN) 1 g vial INTRADERMAL     ceFAZolin (ANCEF) injection 500 mg     ceFAZolin (ANCEF) injection 500 mg     cefTRIAXone (ROCEPHIN) injection 250 mg     cefTRIAXone (ROCEPHIN) injection 250 mg     penicillin g potassium [id number] unit vial INTRADERMAL     penicillin g potassium [id number] unit vial INTRADERMAL     sulfamethoxazole-trimethoprim (BACTRIM) injection 80 mg     sulfamethoxazole-trimethoprim (BACTRIM) injection 80 mg       Social History:  The patient . Patient has the following hobbies or non-occupational exposure: taking care of horses.     Family History:  Family History   Problem Relation Age of Onset     Diabetes Mother         adult     Cancer - colorectal Mother      Hypertension Mother      Allergies Mother      Cancer Mother         colon     Depression Mother      Gastrointestinal Disease Mother         ibs     Genitourinary Problems Mother         kidney cyst     Gynecology Mother         endometriosis     Lipids Mother      Thyroid Disease Mother      Arthritis Mother         RA     Heart Disease Maternal Grandfather         MI,  - 60's     Allergies Father      Unknown/Adopted Father      Heart Disease Father         mi-age  mid 40's     Cardiovascular Father      Lipids Father      Respiratory Father         asthma     Cancer Father      Other Cancer Father         renal cancer     Depression Sister      Allergies Sister      Cancer Sister         vaginal     Gynecology Sister         endometriosis     Lipids Paternal Grandmother      Osteoporosis Paternal Grandmother      Thyroid Disease Paternal Grandmother      Respiratory Son         asthma     Allergies Son      Arthritis Sister      Allergies Brother      Heart Disease Brother      Allergies Daughter      Blood Disease Paternal Aunt         LGL T cell Leukemia     Rheumatoid Arthritis Paternal Aunt      Kidney Disease Maternal Aunt      Lupus Maternal Aunt      Bladder Cancer Maternal Aunt        Previous Labs, Allergy Tests, Dermatopathology, Imaging:  See HPI    Referred By: Ahmet Johnson, DO  [address] [address]     Allergy Tests:    Past Allergy Test: see HPI    Order for Future Allergy Testing:    [x] Outpatient  [] Inpatient: [facility]..../ Bed ....       Skin Atopy (atopic dermatitis) [x] Yes   [] No  Contact allergies:   [x] Yes   [] No (soaps)  Urticaria/Angioedema  [x] Yes   [] No (hives and ?anaphylaxis from sulfa and ceftriaxone. Lip blisters from doxycycline.)  Rhinitis/Sinusitis:   [x] Yes   [] No   [x] seasonal [] perennial      (spring and fall)      Allergic Asthma:   [] Yes   [x] (No strong family history)  Pets :  [x] Yes   [] No  Dog (sleeps in basement in a crate. Mild allergy if she bathes him.)  Food Allergy:   [x] Yes   [] No (tested to pork by allergist in [place] and this was negative. Home test kit showed strong allergy to egg white and yolks.)  Drug allergies:   [x] Yes   [] No see chart  Leg ulcers:   [] Yes   [x] No  Hand eczema:   [x] Yes   [] No ?possibly   Leading hand:   [x] R   [] L       [] Ambidextrous                        Order for PATCH TESTS  Reason for tests (suspected allergy):  Possible allergic contact  dermatitis to additives, metals, fragrances.  Known previous allergies: see HPI  Standardized panels  [x] Standard panel (40 tests)  [x] Preservatives & Antimicrobials (31 tests)  [x] Emulsifiers & Additives (25 tests)   [x] Perfumes/Flavours & Plants (25 tests)  [] Hairdresser panel (12 tests)  [] Rubber Chemicals (22 tests)  [x] Plastics (26 tests)  [] Colorants/Dyes/Food additives (20 tests)  [x] Metals (implants/dental) (24 tests)  [] Local anaesthetics/NSAIDs (13 tests)  [] Antibiotics & Antimycotics (14 tests)   [] Corticosteroids (15 tests)   [] Photopatch test (62 tests)   [] others: ...      [] Patient's own products: ...    DO NOT test if chemical or biological identity is unknown!     always ask from patient the product information and safety sheets (MSDS)       Order for PRICK TESTS    Reason for tests (suspected allergy): atopic predisposition and possible allergy to eggs.   Known previous allergies: see HPI    Standardized prick panels  [x] Atopic panel (20 tests)  [] Pediatric Panel (12 tests)  [x] Milk, Meat, Eggs, Grains (20 tests) --> EGG WHITE & EGG YOLK  [] Dust, Epithelia, Feathers (10 tests)  [] Fish, Seafood, Shellfish (17 tests)  [] Nuts, Beans (14 tests)  [] Spice, Vegetable, Fruit (17 tests)  [x] Others: EGG WHITE & EGG YOLK      [] Patient's own products: ...    DO NOT test if chemical or biological identity is unknown!     always ask from patient the product information and safety sheets (MSDS)     Standardized intradermal tests  [] Penicillium notatum [] Aspergillus fumigatus [] House dust mites  [] Bee venom   [] Wasp venom !!Specific protocol with dilutions!!  [] Others: ...      Order for Drug allergy tests (prick & Intradermal & patch tests)  [x] Penicillin G  [x] Ampicillin [x] Cefazolin  [x] Ceftriaxone  [x] Ceftazidime  [x] Others: Bactrim      Atopy Screen (Placed 05/05/21 )    No Substance Readings (15 min) Evaluation   POS Histamine 1mg/ml ++    NEG NaCl 0.9% -      No  Substance Readings (15 min) Evaluation   1 Alternaria alternata (tenuis)  (+)    2 Cladosporium herbarum (+)    3 Aspergillus fumigatus -    4 Penicillium notatum -    5 Dermatophagoides pteronyssinus ++    6 Dermatophagoides farinae ++    7 Dog epithelium (canis spp) -    8 Cat hair (alexis catus) -    9 Cockroach   (Blatella americana & germanica) -    10 Grass mix midwest   (Aubrie, Orchard, Redtop, Carlito) -    11 Morgan grass (sorghum halepense) -    12 Weed mix   (common Cocklebur, Lamb s quarters, rough redroot Pigweed, Dock/Sorrel) -    13 Mug wort (artemisia vulgare) -    14 Ragweed giant/short (ambrosia spp) -    15 English Plantain (plantago lanceolata) -    16 Tree mix 1 (Pecan, Maple BHR, Oak RVW, american Gibbon, black [place]) -    17 Red cedar (juniperus virginia) -    18 Tree mix 2   (white Rigoberto, river/red Birch, black Park [place], common [place], american Elm) -    19 Box elder/Maple mix (acer spp) -    20 [place] shagbark (carya ovata) -     21 Egg white #6 -    22 Egg yolk #7 -      Conclusion: patient is atopic with immediate type sensitization to house dust mites    ________________________________  RESULTS & EVALUATION of PATCH TESTS    Patch test readings after     [x] 2 days, [] 3 days [x] 4 days, [] 5 days    Jun 23, 2021 application of patch tests with results:    STANDARD Series        No Substance 2 days 4 days remarks   1 1 Almas Mix [C] - -     2 Colophony - -     3  2-Mercaptobenzothiazole  - -      4 Methylisothiazolinone - -    5 5 Carba Mix - -     6 Thiuram Mix [A] - -     7 Bisphenol A Epoxy Resin - -     8 G-Kjrx-Xluqphqfnjj-Formaldehyde Resin - -     9 Mercapto Mix [A] - -    10 10 Black Rubber Mix- PPD [B] - -     11 Potassium Dichromate  -  -     12 Balsam of Peru (Myroxylon Pereirae Resin) - -     13 Nickel Sulphate Hexahydrate - -     14 Mixed Dialkyl Thiourea - -    15 15 Paraben Mix [B] - -     16 Methyldibromo Glutaronitrile - -     17 Fragrance Mix - -     18  2-Bromo-2-Nitropropane-1,3-Diol (Bronopol) NA NA     19 Lyral - -    20 20 Tixocortol-21- Pivalate - -     21 Diazolidiyl Urea (Germall II) - -     22 Methyl Methacrylate NA NA     23 Cobalt (II) Chloride Hexahydrate - -     24 Fragrance Mix II  - -    25 25 Compositae Mix - -     26 Benzoyl Peroxide - -     27 Bacitracin - -     28 Formaldehyde - -     29 Methylchloroisothiazolinone / Methylisothiazolinone - -    30 30 Corticosteroid Mix - -     31 Sodium Lauryl Sulfate - -     32 Lanolin Alcohol - -     33 Turpentine - -     34 Cetylstearylalcohol - -    35 35 Chlorhexidine Dicluconate - -     36 Budenoside - -     37 Imidazolidinyl Urea  - -     38 Ethyl-2 Cyanoacrylate - -     39 Quaternium 15 (Dowicil 200) - -    40 40 Decyl Glucoside - -    PRESERVATIVES & ANTIMICROBIALS        No Substance 2 days 4 days remarks   41 1  1,2-Benzisothiazoline-3-One, Sodium Salt - -     2  1,3,5-Tyler (2-Hydroxyethyl) - Hexahydrotriazine (Grotan BK) - -     3 1-Pkintotahjhoy-0-Nitro-1, 3-Propanediol - -     4  3, 4, 4' - Triclocarban - -    45 5 4 - Chloro - 3 - Cresol - -     6 4 - Chloro - 4 - Xylenol (PCMX) - -     7 7-Ethylbicyclooxazolidine (Bioban [id number]) - -     8 Benzalkonium Chloride - -     9 Benzyl Alcohol - -    50 10 Cetalkonium Chloride - -     11 Cetylpyrimidine Chloride  - -     12 Chloroacetamide - -     13 DMDM Hydantoin - -     14 Glutaraldehyde - -    55 15 Triclosan - -     16 Glyoxal Trimeric Dihydrate - -     17 Iodopropynyl Butylcarbamate - -     18 Octylisothiazoline - -     19 Iodoform NA NA    60 20 (Nitrobutyl) Morpholine/(Ethylnitro-Trimethylene) Dimorpholine (Bioban P 1487) - -     21 Phenoxyethanol - -     22 Phenyl Salicylate - -     23 Povidone Iodine - -     24 Sodium Benzoate - -    65 25 Sodium Disulfite - -     26 Sorbic Acid - -     27 Thimerosal       28 Melamine Formaldehyde Resin       29 Ethylenediamine Dihydrochloride        Parabens      70 30 Butyl-P-Hydroxybenzoate - -     31  Ethyl-P-Hydroxybenzoate - -     32 Methyl-P-Hydroxybenzoate - -    73 33 Propyl-P-Hydroxybenzoate - -    EMULSIFIERS & ADDITIVES       No Substance 2 days 4 days remarks   74 1 Polyethylene Glycol-400 - -    75 2 Cocamidopropyl Betaine NA NA     3 Amerchol L101 - -     4 Propylene Glycol - -     5 Triethanolamine - -     6 Sorbitane Sesquiolate NA NA    80 7 Isopropylmyristate - -     8 Polysorbate 80  - -     9 Amidoamine   (Stearamidopropyl Dimethylamine) - -     10 Oleamidopropyl Dimethylamine NA NA     11 Lauryl Glucoside - -    85 12 Coconut Diethanolamide  - -     13 2-Hydroxy-4-Methoxy Benzophenone (Oxybenzone) - -     14 Benzophenone-4 (Sulisobenzon) - -     15 Propolis - -     16 Dexpanthenol - -    90 17 Carboxymethyl Cellulose Sodium NA NA     18 Abitol - -     19 Tert-Butylhydroquinone - -     20 Benzyl Salicylate - -      Antioxidant       21 Dodecyl Gallate - -    95 22 Butylhydroxyanisole (BHA) - -     23 Butylhydroxytoluene (BHT) - -     24 Di-Alpha-Tocopherol (Vit E) - -     25 Propyl Gallate - -     26 Zinc Pyrithione NA nA    100 27 Dimethylaminopropylamin (DMAPA) NA NA    PERFUMES, FLAVORS & PLANTS        No Substance 2 days 4 days remarks   101 1 Benzyl Cinnamate - -     2 Di-Limonene (Dipentene) - -     3 Cananga Odorata (Vincent Kaur) (I) - -     4 Lichen Acid Mix - -    105 5 Mentha Piperita Oil (Peppermint Oil) - -     6 Sesquiterpenelactone mix - -     7 Tea Tree Oil, Oxidized - -     8 Wood Tar Mix - -     9 Abietic Acid - -    110 10 Lavendula Angustifolia Oil (Lavender Oil) - -     11 Camphor  - -      Fragrance Mix I       12 Oakmoss Absolute - -     13 Eugenol - -     14 Geraniol - -    115 15 Hydroxycitronellal - -     16 Isoeugenol - -     17 Cinnamic Aldehyde - -     18 Cinnamic Alcohol  - -      Fragrance mix II       19 Citronellol - -    120 20 Alpha-Hexylcinnamic Aldehyde    - -     21 Citral - -     22 Farnesol - -    123 23 Coumarin - -    PLASTICS        No Substance 2 days 4  days remarks     Acrylates - -    124 1 2-Hydroxyethyl Methacrylate (HEMA) - -    125 2 1,4-Butandioldimethacrylate (BUDMA) - -     3  2-Ethylhexyl Acrylate - -     4 Bisphenol-A-Dimethacrylate  - -     5 Diurethane-Dimethacrylate - -     6 Ethyleneglycoldimethacrylate (EGDMA) - -    130 7 Pentaerythritoltriacrylate (MAYRA) - -     8 Triethylene Glycol Dimethacrylate (TEGDMA) - -      Synthetic material/additives        9 V-Zuso-Bczduycaqvs - -     10 Tricresyl Phosphate - -     11 1-Rcyn-Xhwodokycxlfn - -    135 12 Bis (2-Ethylhexyl) Phthalate - -     13 Dibutylphthalate - -     14 Dimethylphthalate - -     15 Toluene-2,4-Diisocyanate - -     16 Diphenylmethane-4,4''-Diisocyanate - -      EPOXY RESIN SYSTEMS        Reactive Solvents - -    140 17 Cresyl Glycidyl Ether - -     18 Butyl Glycidyl Ether - -     19 Phenyl Glycidyl Ether - -     20 1,4-Butanediol Diglycidyl Ether - -     21 1,6-Hexanediole Diglycidyl Ether - -      Hardener / Accelerator - -    145 22 Triethylenetetramine - -     23 Diethylenetriamine - -     24 Isophorone Diamine (IPD) - -    148 25 N,N-Dimethyl-P-Toluidine - -    METALS (Implants / Dental)        No Substance 2 days 4 days remarks   149 1 Ammonium Heptamolybdate (IV) - -    150 2 Ammonium Tetrachloroplatinate - -     3 Indium (III) Chloride - -     4 Iridium (III) Chloride - -     5 Ferric Chloride - -     6 Manganese (II) Chloride - -    155 7 Niobium (V) Chloride - -     8 Palladium Chloride - -     9 Silver Nitrate - -     10 Gold Sodium Thiosulfate - -     11 Tantal - -    160 12 Tin (II) Chloride - -     13 Titanium (IV) Oxide - -     14 Titanium - -     15 Tungstic Acid, Sod Salt Dihydrat - -     16 Vanadium Pentoxide - -    165 17 Wolfram - -     18 Zinc Chloride - -     19 Zirconium (IV) Oxide - -     20 Ammoniated Murcury - -     21 Copper Sulfate Pentahydrate - -    170 22 Amalgam  - -     23 Aluminum Hydroxide - -    172 24 Platinum Tetrachloride - -      Results of patch  tests:                         Interpretation:  - Negative                    A    = Allergic      (+) Erythema    TI   = Toxic/irritant   + E + Infiltration    RaP = Relevance at Present     ++ E/I + Papulovesicle   Rpr  = Relevance Previously     +++ E/I/P + Blister     nR   = No Relevance    Order for Drug allergy tests (prick & Intradermal & patch tests)  [x] Penicillin G  [x] Ampicillin [x] Cefazolin  [x] Ceftriaxone  [x] Ceftazidime  [x] Others: Bactrim    DRUG ALLERGY TEST SERIES 06/21/2021)         Prick Tests         Substance/ Allergen Conc Result (20 min) Remarks    Histamine Hydrochloride (ALK) 0.1 mg/ml ++     NaCl 0.9% - Some dermographism    Cefazolin[1] 100 mg/ml -     Ceftriaxone* [2] 100 mg/ml -     Ceftazidime[3] 100 mg/ml -     Benzylpenicillin (Penicillin G) 1 [place] IU/ml (600 mg) -     Ampicillin 100mg/ml -     Bactrim 80/400 mg/ml -     Egg yellow fresh  -     Egg yolk fresh  -       Intradermal Tests   immed immed delay delay      Substance Conc 1st dil  2nd dil  days  days remarks               Cefazolin[1] 1:5 -        Ceftriaxone* [2] 1:5 -        Ceftazidime[3] 1:5 -        Benzylpenicillin (Penicillin G) 1:100 -        Ampicillin 1:10 -        Bactrim 1:100 -           Patch Tests  as is as is 1:2 1:2     As Is  Vas Substance Conc 2 days 4 days 2 days 4 days remarks   173 174 Cefazolin[1] 100 mg/ml -  -     175 176 Ceftriaxone* [2] 100 mg/ml -  -     177 178 Ceftazidime[3] 100 mg/ml -  -     179 180 Benzylpenicillin (Penicillin G) 1 Sam IU/ml (600 mg) -  -     181 182 Ampicillin 100 mg/ml -  -     183 184 Bactrim 80/400 mg/ml -  -     [1]  Cefalexin/Cefazolin-group        [2]    Cefotaxim-group     [3]     Cefuroxim-group    OTHER PRODUCTS        No Substance Conc  % solv 2 days 4 days remarks   185 1 Fresh egg yolk   - -    186 2 Fresh egg white   - -      [x] No relevant allergic reaction observed    [] Allergic reaction diagnosed against following allergens:      Interpretation/  remarks:   See later    [] Patient information given   [] ACDS CAMP information's (# ....) to following compounds: .....   [] General information's to following compounds: ......      Assessment & Plan:    ==> Final Diagnosis:     # Atopic predisposition    Atopic dermatitis with sensitive skin    Intermittent Rhinitis during changing season with postnasal drip = sensitization to house dust mite (info given)    Metal sensitization  *Chronic condition with progression/exacerbation    # Immediate and delayed drug allergies  *Chronic condition with progression/exacerbation    # Possible allergic contact dermatitis to additives, metals, fragrances.  *Chronic condition with progression/exacerbation    These conclusions are made at the best of one's knowledge and belief based on the provided evidence such as patient's history and allergy test results and they can change over time or can be incomplete because of missing information's.    ==> Treatment Plan:  - Today: prick test with atopy screen and eggs white+yolk  - If prick is negative, can consider intradermal test to molds and dust mites   .-schedule for patch test & allergy drug testing (see checklist above for specific panels and drugs)    Staff: : provider    Follow-up in [facility] for 2nd readings and final conclusions after 4 days (in person)   ___________________________    Start time: 7:03am  End time: 7:18am    I spent a total of 15 minutes with Amelia Coker during today s  visit. This time was spent discussing all the individual test results, correlating them to the clinical relevance, counseling the patient and/or coordinating care and performing allergy tests or procedures.

## 2021-06-23 NOTE — NURSING NOTE
Chief Complaint   Patient presents with     Allergy Testing Followup     Patch testing day 3      Charmaine Day RN

## 2021-06-23 NOTE — LETTER
6/23/2021         RE: Amelia Coker  [address] [facility] [id number]        Dear Colleague,    Thank you for referring your patient, Amelia Coker, to the [facility] [place]. Please see a copy of my visit note below.    [facility] Dermato-allergology Note  Virtual visit: store and forward video (MyChart connected)  Encounter Date: Jun 23, 2021  ____________________________________________    CC: No chief complaint on file.      HPI:  Ms. Amelia Coker is a(n) 46 year old female who presents today as a return patient for allergy consultation  - Follow-up in Derm-Allergy clinic for 1st readings of patch tests after 2 days (virtual)   - otherwise feeling well in usual state of health    Physical exam:  General: In no acute distress, well-developed, well-nourished  Eyes: no conjunctivitis  ENT: no signs of rhinitis   Pulmonary: no wheezing or coughing  Skin:Focused examination of the skin on test sites was performed = see test results below    Earlier History and Allergy exams:  [facility] Dermato-allergology Note  Office visit  Encounter Date: Jun 23, 2021  ____________________________________________    CC: No chief complaint on file.      HPI:  Ms. Amelia Coker is a(n) 46 year old female who presents today as a return patient for allergy consultation  - for patch and drug allergy tests as previously planned  - otherwise feeling well in usual state of health    Physical exam:  General: In no acute distress, well-developed, well-nourished  Eyes: no conjunctivitis  ENT: no signs of rhinitis   Pulmonary: no wheezing or coughing  Skin: Focused examination of the skin on test sites was performed = see test results below  No active eczematous skin lesions on tests sites, particularly back    Earlier History and Allergy exams:  - carries a diagnosis of eczema. Appears in flexural folds, neck, eyelids. Uses topical steroids: clobetasol and  "triamcinolone as needed for flares.   - Saw an allergist in [place] who recommended she be seen here for allergy testing for a potential explanation of the sores on her scalp and skin allergies. Sensitive to lotions, shampoos, laundry detergent, dressings, and soaps and skin will \"break out.\" Prior allergy testing with allergy to cats, dogs, pollen, trees, grass, dust mites, mold, guinea pigs. Has a sore on her scalp that has been present for 4 months. A biopsy was done by PCP in [facility] that showed \"inflammatory cells.\"  - Also planning a spinal fusion and is allergic to nickel, white gold, copper, yellow gold, alfie silver and possibly ?other metals. When these contact her skin, she itchy red skin changes as well as blisters. For spinal fusion, plan is to use a  \"titanium alloy with nickel\" (Dr Adames, [facility] Has had allergy to surgical staples in the past. Has had allergy testing to 6 metals (unknown which) with dentistry and was allergic to all 6 so uses porcelain crowns.   - hx latex allergy  .   - 5mm brown crust on a pink macule on R frontal-parietal scalp  - L>R hand has scale and xerosis and pinkness on side of fingers, between digits, and dorsal wrist.    Past Medical History:   Patient Active Problem List   Diagnosis     Migraine, unspecified, with intractable migraine, so stated, without mention of status migrainosus     Bell's palsy     Contact dermatitis and other eczema, due to unspecified cause     Scalp lesion     Lyme disease     PAC (premature atrial contraction)     Palpitations     Raynaud phenomenon     Chronic fatigue     Hair loss     Abnormal PFT     Shoulder joint instability     Family history of melanoma     Dry eyes     Keratitis sicca (H)     Family history of colon cancer     Pain in joint, upper arm     FLORIDALMA positive     Plantar fascial fibromatosis     Foraminal stenosis of lumbar region     DJD (degenerative joint disease), lumbar     Migraine without " aura and without status migrainosus, not intractable     Lumbar radiculopathy     Ds DNA antibody positive     Muscular wasting and disuse atrophy, not elsewhere classified     Frontal headache     Telangiectasia of face     Lateral epicondylitis     Right shoulder pain     Plantar fasciitis     Skin lesion     AD (atopic dermatitis)     Heat sensitivity     Rotator cuff arthropathy, right     Gastroesophageal reflux disease, esophagitis presence not specified     Abnormal mammogram     Sternocleidomastoid muscle tenderness     Acute cervical myofascial strain, initial encounter     Spasm of muscle     Hyperlipidemia LDL goal <130     Biceps tendinopathy, right     Superficial swelling of scalp     Intractable right heel pain     Intractable episodic tension-type headache     Jaycob complexion     Lip lesion     Abdominal pain, epigastric     Abdominal distension     PONV (postoperative nausea and vomiting)     Menorrhalgia     Uterine polyp     Loose stools     Hyperactive bowel sounds     Abnormality of nail surface     Dry hair     Dry skin     Malaise and fatigue     Lymphadenopathy     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     Endometrium, hyperplasia - on recent CT scan     Pain in joint involving pelvic region and thigh, right     Right lateral abdominal pain     Right foot pain     Dental abscess - left mandible molar     Brachial plexopathy - right shoulder     Balance problems     Morbid obesity (H)     Sinus arrhythmia seen on electrocardiogram     Family history of ischemic heart disease - father at 46 massive MI     Elevated blood pressure reading without diagnosis of hypertension     Hypertrophy of breast     History of cold sores     AK (actinic keratosis) - both hands at the lateral thenar aspect.     Dermatitis     Musculoskeletal pain     Fatigue, unspecified type     TOS (thoracic outlet syndrome)     Past Medical History:   Diagnosis Date     Abnormal mammogram 9/21/2016    right  breast      AD (atopic dermatitis) 10/29/2015     Allergic rhinitis due to other allergen      Arthritis      Bell's palsy      Chronic fatigue 6/6/2012     Chronic infection     MRSA - 2015 scalp and prior to Abdomen     Contact dermatitis and other eczema, due to unspecified cause     eczema     Contusion of left foot, initial encounter 3/16/2016     DJD (degenerative joint disease), lumbar 9/26/2013     DJD (degenerative joint disease), lumbar 9/26/2013     Ds DNA antibody positive 4/16/2014    borderline.      Elevated blood pressure reading without diagnosis of hypertension 12/24/2013     Facial contusion 12/15/2014    hit by horse      Foraminal stenosis of lumbar region 9/26/2013     Frontal headache 12/15/2014     Gastroesophageal reflux disease, esophagitis presence not specified 6/29/2016     Heat sensitivity 10/29/2015     HTN, goal below 140/90 9/23/2015     Hyperlipidemia LDL goal <130 8/30/2017     Irregular heart beat      Keratitis sicca (H) 10/31/2012     Left shoulder pain 9/26/2013     Lumbar radiculopathy 1/22/2014     Migraine headache 10/23/2013     Migraine, unspecified, with intractable migraine, so stated, without mention of status migrainosus      Motion sickness      MRSA infection 10/20/2015     Muscular wasting and disuse atrophy, not elsewhere classified 6/9/2014    right SCM, right wrist,       Numbness and tingling     both legs anf feet greater on the left     PAC (premature atrial contraction) 7/15/2010     Plantar fasciitis 9/23/2015     PONV (postoperative nausea and vomiting)      Skin lesion 10/20/2015    posterior superior scalp      Spasm of muscle 7/11/2017     Telangiectasia of face 12/19/2014     Tooth pain 10/20/2015    left lower primary molar        Allergies:  Allergies   Allergen Reactions     Ceftriaxone Anaphylaxis     Hives, rash, racing heart beat     Bactrim [Sulfamethoxazole W/Trimethoprim] Hives     Ciprofloxacin Other (See Comments)     Tendon Issues      Codeine Nausea and Vomiting     Codeine      Copper      Rash       Doxycycline      Loss of skin pigmentation, skin loss.     Gold      Rash       Iodine      Iodine-131 Hives     Levaquin [Levofloxacin] Other (See Comments)     Tendon issues with levaquin and cipro     Nickel      rash     Steel [Staples]      Rash from staples       Sulfa Drugs      Latex Rash     Penicillins Rash       Medications:  Current Outpatient Medications   Medication     Acetaminophen (TYLENOL PO)     cefTAZidime (FORTAZ) 1 GM vial     cefTAZidime (FORTAZ) 1 GM vial     clobetasol (TEMOVATE) 0.05 % external cream     fexofenadine (ALLEGRA) 180 MG tablet     hydrocortisone valerate (WEST-DENNIS) 0.2 % external ointment     medical cannabis (Patient's own supply)     meloxicam (MOBIC) 15 MG tablet     mupirocin (BACTROBAN) 2 % external ointment     rizatriptan (MAXALT-MLT) 5 MG ODT     sucralfate (CARAFATE) 1 GM tablet     Current Facility-Administered Medications   Medication     ampicillin (OMNIPEN) 1 g vial INTRADERMAL     ampicillin (OMNIPEN) 1 g vial INTRADERMAL     ceFAZolin (ANCEF) injection 500 mg     ceFAZolin (ANCEF) injection 500 mg     cefTRIAXone (ROCEPHIN) injection 250 mg     cefTRIAXone (ROCEPHIN) injection 250 mg     penicillin g potassium 6003007 unit vial INTRADERMAL     penicillin g potassium [id number] unit vial INTRADERMAL     sulfamethoxazole-trimethoprim (BACTRIM) injection 80 mg     sulfamethoxazole-trimethoprim (BACTRIM) injection 80 mg       Social History:  The patient . Patient has the following hobbies or non-occupational exposure: taking care of horses.     Family History:  Family History   Problem Relation Age of Onset     Diabetes Mother         adult     Cancer - colorectal Mother      Hypertension Mother      Allergies Mother      Cancer Mother         colon     Depression Mother      Gastrointestinal Disease Mother         ibs     Genitourinary Problems Mother         kidney cyst     Gynecology  Mother         endometriosis     Lipids Mother      Thyroid Disease Mother      Arthritis Mother         RA     Heart Disease Maternal Grandfather         MI,  - 60's     Allergies Father      Unknown/Adopted Father      Heart Disease Father         mi-age mid 40's     Cardiovascular Father      Lipids Father      Respiratory Father         asthma     Cancer Father      Other Cancer Father         renal cancer     Depression Sister      Allergies Sister      Cancer Sister         vaginal     Gynecology Sister         endometriosis     Lipids Paternal Grandmother      Osteoporosis Paternal Grandmother      Thyroid Disease Paternal Grandmother      Respiratory Son         asthma     Allergies Son      Arthritis Sister      Allergies Brother      Heart Disease Brother      Allergies Daughter      Blood Disease Paternal Aunt         LGL T cell Leukemia     Rheumatoid Arthritis Paternal Aunt      Kidney Disease Maternal Aunt      Lupus Maternal Aunt      Bladder Cancer Maternal Aunt        Previous Labs, Allergy Tests, Dermatopathology, Imaging:  See HPI    Referred By: Ahmet Johnson, DO  [address] [facility] [address]  [place] [address]     Allergy Tests:    Past Allergy Test: see HPI    Order for Future Allergy Testing:    [x] Outpatient  [] Inpatient: Sandy..../ Bed ....       Skin Atopy (atopic dermatitis) [x] Yes   [] No  Contact allergies:   [x] Yes   [] No (soaps)  Urticaria/Angioedema  [x] Yes   [] No (hives and ?anaphylaxis from sulfa and ceftriaxone. Lip blisters from doxycycline.)  Rhinitis/Sinusitis:   [x] Yes   [] No   [x] seasonal [] perennial      (spring and fall)      Allergic Asthma:   [] Yes   [x] (No strong family history)  Pets :  [x] Yes   [] No  Dog (sleeps in basement in a crate. Mild allergy if she bathes him.)  Food Allergy:   [x] Yes   [] No (tested to pork by allergist in [place] and this was negative. Home test kit showed strong allergy to egg white and yolks.)  Drug allergies:   [x]  Yes   [] No see chart  Leg ulcers:   [] Yes   [x] No  Hand eczema:   [x] Yes   [] No ?possibly   Leading hand:   [x] R   [] L       [] Ambidextrous                        Order for PATCH TESTS  Reason for tests (suspected allergy):  Possible allergic contact dermatitis to additives, metals, fragrances.  Known previous allergies: see HPI  Standardized panels  [x] Standard panel (40 tests)  [x] Preservatives & Antimicrobials (31 tests)  [x] Emulsifiers & Additives (25 tests)   [x] Perfumes/Flavours & Plants (25 tests)  [] Hairdresser panel (12 tests)  [] Rubber Chemicals (22 tests)  [x] Plastics (26 tests)  [] Colorants/Dyes/Food additives (20 tests)  [x] Metals (implants/dental) (24 tests)  [] Local anaesthetics/NSAIDs (13 tests)  [] Antibiotics & Antimycotics (14 tests)   [] Corticosteroids (15 tests)   [] Photopatch test (62 tests)   [] others: ...      [] Patient's own products: ...    DO NOT test if chemical or biological identity is unknown!     always ask from patient the product information and safety sheets (MSDS)       Order for PRICK TESTS    Reason for tests (suspected allergy): atopic predisposition and possible allergy to eggs.   Known previous allergies: see HPI    Standardized prick panels  [x] Atopic panel (20 tests)  [] Pediatric Panel (12 tests)  [x] Milk, Meat, Eggs, Grains (20 tests) --> EGG WHITE & EGG YOLK  [] Dust, Epithelia, Feathers (10 tests)  [] Fish, Seafood, Shellfish (17 tests)  [] Nuts, Beans (14 tests)  [] Spice, Vegetable, Fruit (17 tests)  [x] Others: EGG WHITE & EGG YOLK      [] Patient's own products: ...    DO NOT test if chemical or biological identity is unknown!     always ask from patient the product information and safety sheets (MSDS)     Standardized intradermal tests  [] Penicillium notatum [] Aspergillus fumigatus [] House dust mites  [] Bee venom   [] Wasp venom !!Specific protocol with dilutions!!  [] Others: ...      Order for Drug allergy tests (prick & Intradermal &  patch tests)  [x] Penicillin G  [x] Ampicillin [x] Cefazolin  [x] Ceftriaxone  [x] Ceftazidime  [x] Others: Bactrim      Atopy Screen (Placed 05/05/21 )    No Substance Readings (15 min) Evaluation   POS Histamine 1mg/ml ++    NEG NaCl 0.9% -      No Substance Readings (15 min) Evaluation   1 Alternaria alternata (tenuis)  (+)    2 Cladosporium herbarum (+)    3 Aspergillus fumigatus -    4 Penicillium notatum -    5 Dermatophagoides pteronyssinus ++    6 Dermatophagoides farinae ++    7 Dog epithelium (canis spp) -    8 Cat hair (alexis catus) -    9 Cockroach   (Blatella americana & germanica) -    10 Grass mix midwest   (Aubrie, Orchard, Redtop, Carlito) -    11 Morgan grass (sorghum halepense) -    12 Weed mix   (common Cocklebur, Lamb s quarters, rough redroot Pigweed, Dock/Sorrel) -    13 Mug wort (artemisia vulgare) -    14 Ragweed giant/short (ambrosia spp) -    15 English Plantain (plantago lanceolata) -    16 Tree mix 1 (Pecan, Maple BHR, Oak RVW, american [place], black [place]) -    17 Red cedar (juniperus virginia) -    18 Tree mix 2   (white Rigoberto, river/red Birch, black [place], common [place], american Elm) -    19 Box elder/Maple mix (acer spp) -    20 Sweet Grass shagbark (carya ovata) -     21 Egg white #6 -    22 Egg yolk #7 -      Conclusion: patient is atopic with immediate type sensitization to house dust mites    ________________________________  RESULTS & EVALUATION of PATCH TESTS    Patch test readings after     [x] 2 days, [] 3 days [x] 4 days, [] 5 days    Jun 23, 2021 application of patch tests with results:    STANDARD Series        No Substance 2 days 4 days remarks   1 1 Almas Mix [C] - -     2 Colophony - -     3  2-Mercaptobenzothiazole  - -      4 Methylisothiazolinone - -    5 5 Carba Mix - -     6 Thiuram Mix [A] - -     7 Bisphenol A Epoxy Resin - -     8 A-Bwpj-Hjwyizqcues-Formaldehyde Resin - -     9 Mercapto Mix [A] - -    10 10 Black Rubber Mix- PPD [B] - -     11 Potassium  Dichromate  -  -     12 Balsam of Peru (Myroxylon Pereirae Resin) - -     13 Nickel Sulphate Hexahydrate - -     14 Mixed Dialkyl Thiourea - -    15 15 Paraben Mix [B] - -     16 Methyldibromo Glutaronitrile - -     17 Fragrance Mix - -     18 2-Bromo-2-Nitropropane-1,3-Diol (Bronopol) NA NA     19 Lyral - -    20 20 Tixocortol-21- Pivalate - -     21 Diazolidiyl Urea (Germall II) - -     22 Methyl Methacrylate NA NA     23 Cobalt (II) Chloride Hexahydrate - -     24 Fragrance Mix II  - -    25 25 Compositae Mix - -     26 Benzoyl Peroxide - -     27 Bacitracin - -     28 Formaldehyde - -     29 Methylchloroisothiazolinone / Methylisothiazolinone - -    30 30 Corticosteroid Mix - -     31 Sodium Lauryl Sulfate - -     32 Lanolin Alcohol - -     33 Turpentine - -     34 Cetylstearylalcohol - -    35 35 Chlorhexidine Dicluconate - -     36 Budenoside - -     37 Imidazolidinyl Urea  - -     38 Ethyl-2 Cyanoacrylate - -     39 Quaternium 15 (Dowicil 200) - -    40 40 Decyl Glucoside - -    PRESERVATIVES & ANTIMICROBIALS        No Substance 2 days 4 days remarks   41 1  1,2-Benzisothiazoline-3-One, Sodium Salt - -     2  1,3,5-Tyler (2-Hydroxyethyl) - Hexahydrotriazine (Grotan BK) - -     3 5-Mxflzqcdmqdci-3-Nitro-1, 3-Propanediol - -     4  3, 4, 4' - Triclocarban - -    45 5 4 - Chloro - 3 - Cresol - -     6 4 - Chloro - 4 - Xylenol (PCMX) - -     7 7-Ethylbicyclooxazolidine (Bioban RI6504) - -     8 Benzalkonium Chloride - -     9 Benzyl Alcohol - -    50 10 Cetalkonium Chloride - -     11 Cetylpyrimidine Chloride  - -     12 Chloroacetamide - -     13 DMDM Hydantoin - -     14 Glutaraldehyde - -    55 15 Triclosan - -     16 Glyoxal Trimeric Dihydrate - -     17 Iodopropynyl Butylcarbamate - -     18 Octylisothiazoline - -     19 Iodoform NA NA    60 20 (Nitrobutyl) Morpholine/(Ethylnitro-Trimethylene) Dimorpholine ([facility]87) - -     21 Phenoxyethanol - -     22 Phenyl Salicylate - -     23 Povidone Iodine - -      24 Sodium Benzoate - -    65 25 Sodium Disulfite - -     26 Sorbic Acid - -     27 Thimerosal       28 Melamine Formaldehyde Resin       29 Ethylenediamine Dihydrochloride        Parabens      70 30 Butyl-P-Hydroxybenzoate - -     31 Ethyl-P-Hydroxybenzoate - -     32 Methyl-P-Hydroxybenzoate - -    73 33 Propyl-P-Hydroxybenzoate - -    EMULSIFIERS & ADDITIVES       No Substance 2 days 4 days remarks   74 1 Polyethylene Glycol-400 - -    75 2 Cocamidopropyl Betaine NA NA     3 Amerchol L101 - -     4 Propylene Glycol - -     5 Triethanolamine - -     6 Sorbitane Sesquiolate NA NA    80 7 Isopropylmyristate - -     8 Polysorbate 80  - -     9 Amidoamine   (Stearamidopropyl Dimethylamine) - -     10 Oleamidopropyl Dimethylamine NA NA     11 Lauryl Glucoside - -    85 12 Coconut Diethanolamide  - -     13 2-Hydroxy-4-Methoxy Benzophenone (Oxybenzone) - -     14 Benzophenone-4 (Sulisobenzon) - -     15 Propolis - -     16 Dexpanthenol - -    90 17 Carboxymethyl Cellulose Sodium NA NA     18 Abitol - -     19 Tert-Butylhydroquinone - -     20 Benzyl Salicylate - -      Antioxidant       21 Dodecyl Gallate - -    95 22 Butylhydroxyanisole (BHA) - -     23 Butylhydroxytoluene (BHT) - -     24 Di-Alpha-Tocopherol (Vit E) - -     25 Propyl Gallate - -     26 Zinc Pyrithione NA nA    100 27 Dimethylaminopropylamin (DMAPA) NA NA    PERFUMES, FLAVORS & PLANTS        No Substance 2 days 4 days remarks   101 1 Benzyl Cinnamate - -     2 Di-Limonene (Dipentene) - -     3 Cananga Odorata (Ylang Ylang) (I) - -     4 Lichen Acid Mix - -    105 5 Mentha Piperita Oil (Peppermint Oil) - -     6 Sesquiterpenelactone mix - -     7 Tea Tree Oil, Oxidized - -     8 Wood Tar Mix - -     9 Abietic Acid - -    110 10 Lavendula Angustifolia Oil (Lavender Oil) - -     11 Camphor  - -      Fragrance Mix I       12 Oakmoss Absolute - -     13 Eugenol - -     14 Geraniol - -    115 15 Hydroxycitronellal - -     16 Isoeugenol - -     17 Cinnamic  Aldehyde - -     18 Cinnamic Alcohol  - -      Fragrance mix II       19 Citronellol - -    120 20 Alpha-Hexylcinnamic Aldehyde    - -     21 Citral - -     22 Farnesol - -    123 23 Coumarin - -    PLASTICS        No Substance 2 days 4 days remarks     Acrylates - -    124 1 2-Hydroxyethyl Methacrylate (HEMA) - -    125 2 1,4-Butandioldimethacrylate (BUDMA) - -     3  2-Ethylhexyl Acrylate - -     4 Bisphenol-A-Dimethacrylate  - -     5 Diurethane-Dimethacrylate - -     6 Ethyleneglycoldimethacrylate (EGDMA) - -    130 7 Pentaerythritoltriacrylate (MAYRA) - -     8 Triethylene Glycol Dimethacrylate (TEGDMA) - -      Synthetic material/additives        9 G-Wtcv-Dvnaejrlioj - -     10 Tricresyl Phosphate - -     11 0-Leha-Kwjkgeozmhmkk - -    135 12 Bis (2-Ethylhexyl) Phthalate - -     13 Dibutylphthalate - -     14 Dimethylphthalate - -     15 Toluene-2,4-Diisocyanate - -     16 Diphenylmethane-4,4''-Diisocyanate - -      EPOXY RESIN SYSTEMS        Reactive Solvents - -    140 17 Cresyl Glycidyl Ether - -     18 Butyl Glycidyl Ether - -     19 Phenyl Glycidyl Ether - -     20 1,4-Butanediol Diglycidyl Ether - -     21 1,6-Hexanediole Diglycidyl Ether - -      Hardener / Accelerator - -    145 22 Triethylenetetramine - -     23 Diethylenetriamine - -     24 Isophorone Diamine (IPD) - -    148 25 N,N-Dimethyl-P-Toluidine - -    METALS (Implants / Dental)        No Substance 2 days 4 days remarks   149 1 Ammonium Heptamolybdate (IV) - -    150 2 Ammonium Tetrachloroplatinate - -     3 Indium (III) Chloride - -     4 Iridium (III) Chloride - -     5 Ferric Chloride - -     6 Manganese (II) Chloride - -    155 7 Niobium (V) Chloride - -     8 Palladium Chloride - -     9 Silver Nitrate - -     10 Gold Sodium Thiosulfate - -     11 Tantal - -    160 12 Tin (II) Chloride - -     13 Titanium (IV) Oxide - -     14 Titanium - -     15 Tungstic Acid, Sod Salt Dihydrat - -     16 Vanadium Pentoxide - -    165 17 Clever Sense - -      18 Zinc Chloride - -     19 Zirconium (IV) Oxide - -     20 Ammoniated Murcury - -     21 Copper Sulfate Pentahydrate - -    170 22 Amalgam  - -     23 Aluminum Hydroxide - -    172 24 Platinum Tetrachloride - -      Results of patch tests:                         Interpretation:  - Negative                    A    = Allergic      (+) Erythema    TI   = Toxic/irritant   + E + Infiltration    RaP = Relevance at Present     ++ E/I + Papulovesicle   Rpr  = Relevance Previously     +++ E/I/P + Blister     nR   = No Relevance    Order for Drug allergy tests (prick & Intradermal & patch tests)  [x] Penicillin G  [x] Ampicillin [x] Cefazolin  [x] Ceftriaxone  [x] Ceftazidime  [x] Others: Bactrim    DRUG ALLERGY TEST SERIES 06/21/2021)         Prick Tests         Substance/ Allergen Conc Result (20 min) Remarks    Histamine Hydrochloride (ALK) 0.1 mg/ml ++     NaCl 0.9% - Some dermographism    Cefazolin[1] 100 mg/ml -     Ceftriaxone* [2] 100 mg/ml -     Ceftazidime[3] 100 mg/ml -     Benzylpenicillin (Penicillin G) 1 [place] IU/ml (600 mg) -     Ampicillin 100mg/ml -     Bactrim 80/400 mg/ml -     Egg yellow fresh  -     Egg yolk fresh  -       Intradermal Tests   immed immed delay delay      Substance Conc 1st dil  2nd dil  days  days remarks               Cefazolin[1] 1:5 -        Ceftriaxone* [2] 1:5 -        Ceftazidime[3] 1:5 -        Benzylpenicillin (Penicillin G) 1:100 -        Ampicillin 1:10 -        Bactrim 1:100 -           Patch Tests  as is as is 1:2 1:2     As Is  Vas Substance Conc 2 days 4 days 2 days 4 days remarks   173 174 Cefazolin[1] 100 mg/ml -  -     175 176 Ceftriaxone* [2] 100 mg/ml -  -     177 178 Ceftazidime[3] 100 mg/ml -  -     179 180 Benzylpenicillin (Penicillin G) 1 [place] IU/ml (600 mg) -  -     181 182 Ampicillin 100 mg/ml -  -     183 184 Bactrim 80/400 mg/ml -  -     [1]  Cefalexin/Cefazolin-group        [2]    Cefotaxim-group     [3]     Cefuroxim-group    OTHER PRODUCTS        No Substance  Conc  % solv 2 days 4 days remarks   185 1 Fresh egg yolk   - -    186 2 Fresh egg white   - -      [x] No relevant allergic reaction observed    [] Allergic reaction diagnosed against following allergens:      Interpretation/ remarks:   See later    [] Patient information given   [] ACDS CAMP information's (# ....) to following compounds: .....   [] General information's to following compounds: ......      Assessment & Plan:    ==> Final Diagnosis:     # Atopic predisposition    Atopic dermatitis with sensitive skin    Intermittent Rhinitis during changing season with postnasal drip = sensitization to house dust mite (info given)    Metal sensitization  *Chronic condition with progression/exacerbation    # Immediate and delayed drug allergies  *Chronic condition with progression/exacerbation    # Possible allergic contact dermatitis to additives, metals, fragrances.  *Chronic condition with progression/exacerbation    These conclusions are made at the best of one's knowledge and belief based on the provided evidence such as patient's history and allergy test results and they can change over time or can be incomplete because of missing information's.    ==> Treatment Plan:  - Today: prick test with atopy screen and eggs white+yolk  - If prick is negative, can consider intradermal test to molds and dust mites   .-schedule for patch test & allergy drug testing (see checklist above for specific panels and drugs)    Staff: : provider    Follow-up in [facility] for 2nd readings and final conclusions after 4 days (in person)   ___________________________    Start time: 7:03am  End time: 7:18am    I spent a total of 15 minutes with Amelia Coker during today s  visit. This time was spent discussing all the individual test results, correlating them to the clinical relevance, counseling the patient and/or coordinating care and performing allergy tests or procedures.           Again, thank you for allowing me to  participate in the care of your patient.        Sincerely,        Pio Barrett MD

## 2021-06-24 NOTE — PROGRESS NOTES
"[facility] Note  Office visit  Encounter Date: Jun 25, 2021  ____________________________________________    CC: No chief complaint on file.      HPI:  Ms. Amelia Coker is a(n) 46 year old female who presents today as a return patient for allergy consultation  - Follow-up in Derm-Allergy clinic for 2nd readings and final conclusions after 4 days (in person)   - otherwise feeling well in usual state of health    Physical exam:  General: In no acute distress, well-developed, well-nourished  Eyes: no conjunctivitis  ENT: no signs of rhinitis   Pulmonary: no wheezing or coughing  Skin:Focused examination of the skin on test sites was performed = see test results below    Earlier History and Allergy exams:    - carries a diagnosis of eczema. Appears in flexural folds, neck, eyelids. Uses topical steroids: clobetasol and triamcinolone as needed for flares.   - Saw an allergist in [place] who recommended she be seen here for allergy testing for a potential explanation of the sores on her scalp and skin allergies. Sensitive to lotions, shampoos, laundry detergent, dressings, and soaps and skin will \"break out.\" Prior allergy testing with allergy to cats, dogs, pollen, trees, grass, dust mites, mold, guinea pigs. Has a sore on her scalp that has been present for 4 months. A biopsy was done by PCP in [facility] that showed \"inflammatory cells.\"  - Also planning a spinal fusion and is allergic to nickel, white gold, copper, yellow gold, alfie silver and possibly ?other metals. When these contact her skin, she itchy red skin changes as well as blisters. For spinal fusion, plan is to use a  \"titanium alloy with nickel\" (Dr Adames, [facility] Has had allergy to surgical staples in the past. Has had allergy testing to 6 metals (unknown which) with dentistry and was allergic to all 6 so uses porcelain crowns.   - hx latex allergy  .   - 5mm brown crust on a pink " macule on R frontal-parietal scalp  - L>R hand has scale and xerosis and pinkness on side of fingers, between digits, and dorsal wrist.    Past Medical History:   Patient Active Problem List   Diagnosis     Migraine, unspecified, with intractable migraine, so stated, without mention of status migrainosus     Bell's palsy     Contact dermatitis and other eczema, due to unspecified cause     Scalp lesion     Lyme disease     PAC (premature atrial contraction)     Palpitations     Raynaud phenomenon     Chronic fatigue     Hair loss     Abnormal PFT     Shoulder joint instability     Family history of melanoma     Dry eyes     Keratitis sicca (H)     Family history of colon cancer     Pain in joint, upper arm     FLORIDALMA positive     Plantar fascial fibromatosis     Foraminal stenosis of lumbar region     DJD (degenerative joint disease), lumbar     Migraine without aura and without status migrainosus, not intractable     Lumbar radiculopathy     Ds DNA antibody positive     Muscular wasting and disuse atrophy, not elsewhere classified     Frontal headache     Telangiectasia of face     Lateral epicondylitis     Right shoulder pain     Plantar fasciitis     Skin lesion     AD (atopic dermatitis)     Heat sensitivity     Rotator cuff arthropathy, right     Gastroesophageal reflux disease, esophagitis presence not specified     Abnormal mammogram     Sternocleidomastoid muscle tenderness     Acute cervical myofascial strain, initial encounter     Spasm of muscle     Hyperlipidemia LDL goal <130     Biceps tendinopathy, right     Superficial swelling of scalp     Intractable right heel pain     Intractable episodic tension-type headache     Jaycob complexion     Lip lesion     Abdominal pain, epigastric     Abdominal distension     PONV (postoperative nausea and vomiting)     Menorrhalgia     Uterine polyp     Loose stools     Hyperactive bowel sounds     Abnormality of nail surface     Dry hair     Dry skin     Malaise and  fatigue     Lymphadenopathy     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     Endometrium, hyperplasia - on recent CT scan     Pain in joint involving pelvic region and thigh, right     Right lateral abdominal pain     Right foot pain     Dental abscess - left mandible molar     Brachial plexopathy - right shoulder     Balance problems     Morbid obesity (H)     Sinus arrhythmia seen on electrocardiogram     Family history of ischemic heart disease - father at 46 massive MI     Elevated blood pressure reading without diagnosis of hypertension     Hypertrophy of breast     History of cold sores     AK (actinic keratosis) - both hands at the lateral thenar aspect.     Dermatitis     Musculoskeletal pain     Fatigue, unspecified type     TOS (thoracic outlet syndrome)     Past Medical History:   Diagnosis Date     Abnormal mammogram 9/21/2016    right breast      AD (atopic dermatitis) 10/29/2015     Allergic rhinitis due to other allergen      Arthritis      Bell's palsy      Chronic fatigue 6/6/2012     Chronic infection     MRSA - 2015 scalp and prior to Abdomen     Contact dermatitis and other eczema, due to unspecified cause     eczema     Contusion of left foot, initial encounter 3/16/2016     DJD (degenerative joint disease), lumbar 9/26/2013     DJD (degenerative joint disease), lumbar 9/26/2013     Ds DNA antibody positive 4/16/2014    borderline.      Elevated blood pressure reading without diagnosis of hypertension 12/24/2013     Facial contusion 12/15/2014    hit by horse      Foraminal stenosis of lumbar region 9/26/2013     Frontal headache 12/15/2014     Gastroesophageal reflux disease, esophagitis presence not specified 6/29/2016     Heat sensitivity 10/29/2015     HTN, goal below 140/90 9/23/2015     Hyperlipidemia LDL goal <130 8/30/2017     Irregular heart beat      Keratitis sicca (H) 10/31/2012     Left shoulder pain 9/26/2013     Lumbar radiculopathy 1/22/2014     Migraine headache  10/23/2013     Migraine, unspecified, with intractable migraine, so stated, without mention of status migrainosus      Motion sickness      MRSA infection 10/20/2015     Muscular wasting and disuse atrophy, not elsewhere classified 6/9/2014    right SCM, right wrist,       Numbness and tingling     both legs anf feet greater on the left     PAC (premature atrial contraction) 7/15/2010     Plantar fasciitis 9/23/2015     PONV (postoperative nausea and vomiting)      Skin lesion 10/20/2015    posterior superior scalp      Spasm of muscle 7/11/2017     Telangiectasia of face 12/19/2014     Tooth pain 10/20/2015    left lower primary molar        Allergies:  Allergies   Allergen Reactions     Ceftriaxone Anaphylaxis     Hives, rash, racing heart beat     Bactrim [Sulfamethoxazole W/Trimethoprim] Hives     Ciprofloxacin Other (See Comments)     Tendon Issues     Codeine Nausea and Vomiting     Codeine      Copper      Rash       Doxycycline      Loss of skin pigmentation, skin loss.     Gold      Rash       Iodine      Iodine-131 Hives     Levaquin [Levofloxacin] Other (See Comments)     Tendon issues with levaquin and cipro     Nickel      rash     Steel [Staples]      Rash from staples       Sulfa Drugs      Latex Rash     Penicillins Rash       Medications:  Current Outpatient Medications   Medication     Acetaminophen (TYLENOL PO)     cefTAZidime (FORTAZ) 1 GM vial     cefTAZidime (FORTAZ) 1 GM vial     clobetasol (TEMOVATE) 0.05 % external cream     fexofenadine (ALLEGRA) 180 MG tablet     hydrocortisone valerate (WEST-DENNIS) 0.2 % external ointment     medical cannabis (Patient's own supply)     meloxicam (MOBIC) 15 MG tablet     mupirocin (BACTROBAN) 2 % external ointment     rizatriptan (MAXALT-MLT) 5 MG ODT     sucralfate (CARAFATE) 1 GM tablet     Current Facility-Administered Medications   Medication     ampicillin (OMNIPEN) 1 g vial INTRADERMAL     ampicillin (OMNIPEN) 1 g vial INTRADERMAL     ceFAZolin  (ANCEF) injection 500 mg     ceFAZolin (ANCEF) injection 500 mg     cefTRIAXone (ROCEPHIN) injection 250 mg     cefTRIAXone (ROCEPHIN) injection 250 mg     penicillin g potassium [id number] unit vial INTRADERMAL     penicillin g potassium [id number] unit vial INTRADERMAL     sulfamethoxazole-trimethoprim (BACTRIM) injection 80 mg     sulfamethoxazole-trimethoprim (BACTRIM) injection 80 mg       Social History:  The patient . Patient has the following hobbies or non-occupational exposure: taking care of horses.     Family History:  Family History   Problem Relation Age of Onset     Diabetes Mother         adult     Cancer - colorectal Mother      Hypertension Mother      Allergies Mother      Cancer Mother         colon     Depression Mother      Gastrointestinal Disease Mother         ibs     Genitourinary Problems Mother         kidney cyst     Gynecology Mother         endometriosis     Lipids Mother      Thyroid Disease Mother      Arthritis Mother         RA     Heart Disease Maternal Grandfather         MI,  - 60's     Allergies Father      Unknown/Adopted Father      Heart Disease Father         mi-age mid 40's     Cardiovascular Father      Lipids Father      Respiratory Father         asthma     Cancer Father      Other Cancer Father         renal cancer     Depression Sister      Allergies Sister      Cancer Sister         vaginal     Gynecology Sister         endometriosis     Lipids Paternal Grandmother      Osteoporosis Paternal Grandmother      Thyroid Disease Paternal Grandmother      Respiratory Son         asthma     Allergies Son      Arthritis Sister      Allergies Brother      Heart Disease Brother      Allergies Daughter      Blood Disease Paternal Aunt         LGL T cell Leukemia     Rheumatoid Arthritis Paternal Aunt      Kidney Disease Maternal Aunt      Lupus Maternal Aunt      Bladder Cancer Maternal Aunt        Previous Labs, Allergy Tests, Dermatopathology, Imaging:  See  HPI    Referred By: Ahmet Johnson, DO  [address] [facility] [address]  [place] [address]     Allergy Tests:    Past Allergy Test: see HPI    Order for Future Allergy Testing:    [x] Outpatient  [] Inpatient: Sandy..../ Bed ....       Skin Atopy (atopic dermatitis) [x] Yes   [] No  Contact allergies:   [x] Yes   [] No (soaps)  Urticaria/Angioedema  [x] Yes   [] No (hives and ?anaphylaxis from sulfa and ceftriaxone. Lip blisters from doxycycline.)  Rhinitis/Sinusitis:   [x] Yes   [] No   [x] seasonal [] perennial      (spring and fall)      Allergic Asthma:   [] Yes   [x] (No strong family history)  Pets :  [x] Yes   [] No  Dog (sleeps in basement in a crate. Mild allergy if she bathes him.)  Food Allergy:   [x] Yes   [] No (tested to pork by allergist in [place] and this was negative. Home test kit showed strong allergy to egg white and yolks.)  Drug allergies:   [x] Yes   [] No see chart  Leg ulcers:   [] Yes   [x] No  Hand eczema:   [x] Yes   [] No ?possibly   Leading hand:   [x] R   [] L       [] Ambidextrous                        Order for PATCH TESTS  Reason for tests (suspected allergy):  Possible allergic contact dermatitis to additives, metals, fragrances.  Known previous allergies: see HPI  Standardized panels  [x] Standard panel (40 tests)  [x] Preservatives & Antimicrobials (31 tests)  [x] Emulsifiers & Additives (25 tests)   [x] Perfumes/Flavours & Plants (25 tests)  [] Hairdresser panel (12 tests)  [] Rubber Chemicals (22 tests)  [x] Plastics (26 tests)  [] Colorants/Dyes/Food additives (20 tests)  [x] Metals (implants/dental) (24 tests)  [] Local anaesthetics/NSAIDs (13 tests)  [] Antibiotics & Antimycotics (14 tests)   [] Corticosteroids (15 tests)   [] Photopatch test (62 tests)   [] others: ...      [] Patient's own products: ...    DO NOT test if chemical or biological identity is unknown!     always ask from patient the product information and safety sheets (MSDS)       Order for PRICK  TESTS    Reason for tests (suspected allergy): atopic predisposition and possible allergy to eggs.   Known previous allergies: see HPI    Standardized prick panels  [x] Atopic panel (20 tests)  [] Pediatric Panel (12 tests)  [x] Milk, Meat, Eggs, Grains (20 tests) --> EGG WHITE & EGG YOLK  [] Dust, Epithelia, Feathers (10 tests)  [] Fish, Seafood, Shellfish (17 tests)  [] Nuts, Beans (14 tests)  [] Spice, Vegetable, Fruit (17 tests)  [x] Others: EGG WHITE & EGG YOLK      [] Patient's own products: ...    DO NOT test if chemical or biological identity is unknown!     always ask from patient the product information and safety sheets (MSDS)     Standardized intradermal tests  [] Penicillium notatum [] Aspergillus fumigatus [] House dust mites  [] Bee venom   [] Wasp venom !!Specific protocol with dilutions!!  [] Others: ...      Order for Drug allergy tests (prick & Intradermal & patch tests)  [x] Penicillin G  [x] Ampicillin [x] Cefazolin  [x] Ceftriaxone  [x] Ceftazidime  [x] Others: Bactrim      Atopy Screen (Placed 05/05/21 )    No Substance Readings (15 min) Evaluation   POS Histamine 1mg/ml ++    NEG NaCl 0.9% -      No Substance Readings (15 min) Evaluation   1 Alternaria alternata (tenuis)  (+)    2 Cladosporium herbarum (+)    3 Aspergillus fumigatus -    4 Penicillium notatum -    5 Dermatophagoides pteronyssinus ++    6 Dermatophagoides farinae ++    7 Dog epithelium (canis spp) -    8 Cat hair (alexis catus) -    9 Cockroach   (Blatella americana & germanica) -    10 Grass mix midwest   (Aubrie, Orchard, Redtop, Carlito) -    11 Morgan grass (sorghum halepense) -    12 Weed mix   (common Cocklebur, Lamb s quarters, rough redroot Pigweed, Dock/Sorrel) -    13 Mug wort (artemisia vulgare) -    14 Ragweed giant/short (ambrosia spp) -    15 English Plantain (plantago lanceolata) -    16 Tree mix 1 (Pecan, Maple BHR, Oak RVW, american Thornton, black Crowley) -    17 Red cedar (juniperus virginia) -    18 Tree mix  2   (white Rigoberto, river/red Birch, black Wilson, common Cedarhurst, american Elm) -    19 Box elder/Maple mix (acer spp) -    20 Orwell shagbark (carya ovata) -     21 Egg white #6 -    22 Egg yolk #7 -      Conclusion: patient is atopic with immediate type sensitization to house dust mites    ________________________________  RESULTS & EVALUATION of PATCH TESTS    Patch test readings after     [x] 2 days, [] 3 days [x] 4 days, [] 5 days    Jun 25, 2021 application of patch tests with results:    STANDARD Series        No Substance 2 days 4 days remarks   1 1 Almas Mix [C] - -     2 Colophony - -     3  2-Mercaptobenzothiazole  - -      4 Methylisothiazolinone - -    5 5 Carba Mix - -     6 Thiuram Mix [A] - -     7 Bisphenol A Epoxy Resin - -     8 V-Ffdu-Dwtedervfed-Formaldehyde Resin - -     9 Mercapto Mix [A] - -    10 10 Black Rubber Mix- PPD [B] - -     11 Potassium Dichromate  -  -     12 Balsam of Peru (Myroxylon Pereirae Resin) - -     13 Nickel Sulphate Hexahydrate - -     14 Mixed Dialkyl Thiourea - -    15 15 Paraben Mix [B] - -     16 Methyldibromo Glutaronitrile - -     17 Fragrance Mix - -     18 2-Bromo-2-Nitropropane-1,3-Diol (Bronopol) NA NA     19 Lyral - -    20 20 Tixocortol-21- Pivalate - -     21 Diazolidiyl Urea (Germall II) - -     22 Methyl Methacrylate NA NA     23 Cobalt (II) Chloride Hexahydrate - -     24 Fragrance Mix II  - -    25 25 Compositae Mix - -     26 Benzoyl Peroxide - -     27 Bacitracin - -     28 Formaldehyde - -     29 Methylchloroisothiazolinone / Methylisothiazolinone - -    30 30 Corticosteroid Mix - -     31 Sodium Lauryl Sulfate - -     32 Lanolin Alcohol - -     33 Turpentine - -     34 Cetylstearylalcohol - -    35 35 Chlorhexidine Dicluconate - -     36 Budenoside - -     37 Imidazolidinyl Urea  - -     38 Ethyl-2 Cyanoacrylate - -     39 Quaternium 15 (Dowicil 200) - -    40 40 Decyl Glucoside - -    PRESERVATIVES & ANTIMICROBIALS        No Substance 2 days 4 days  remarks   41 1  1,2-Benzisothiazoline-3-One, Sodium Salt - -     2  1,3,5-Tyler (2-Hydroxyethyl) - Hexahydrotriazine (Grotan BK) - -     3 2-Ilappsucnkwjr-0-Nitro-1, 3-Propanediol - -     4  3, 4, 4' - Triclocarban - -    45 5 4 - Chloro - 3 - Cresol - -     6 4 - Chloro - 4 - Xylenol (PCMX) - -     7 7-Ethylbicyclooxazolidine (Bioban [id number]) - -     8 Benzalkonium Chloride - -     9 Benzyl Alcohol - -    50 10 Cetalkonium Chloride - -     11 Cetylpyrimidine Chloride  - -     12 Chloroacetamide - -     13 DMDM Hydantoin - -     14 Glutaraldehyde - -    55 15 Triclosan - -     16 Glyoxal Trimeric Dihydrate - -     17 Iodopropynyl Butylcarbamate - -     18 Octylisothiazoline - -     19 Iodoform NA NA    60 20 (Nitrobutyl) Morpholine/(Ethylnitro-Trimethylene) Dimorpholine (Bioban P 1487) - -     21 Phenoxyethanol - -     22 Phenyl Salicylate - -     23 Povidone Iodine - -     24 Sodium Benzoate - -    65 25 Sodium Disulfite - -     26 Sorbic Acid - -     27 Thimerosal       28 Melamine Formaldehyde Resin       29 Ethylenediamine Dihydrochloride        Parabens      70 30 Butyl-P-Hydroxybenzoate - -     31 Ethyl-P-Hydroxybenzoate - -     32 Methyl-P-Hydroxybenzoate - -    73 33 Propyl-P-Hydroxybenzoate - -    EMULSIFIERS & ADDITIVES       No Substance 2 days 4 days remarks   74 1 Polyethylene Glycol-400 - -    75 2 Cocamidopropyl Betaine NA NA     3 Amerchol L101 - -     4 Propylene Glycol - -     5 Triethanolamine - -     6 Sorbitane Sesquiolate NA NA    80 7 Isopropylmyristate - -     8 Polysorbate 80  - -     9 Amidoamine   (Stearamidopropyl Dimethylamine) - -     10 Oleamidopropyl Dimethylamine NA NA     11 Lauryl Glucoside - -    85 12 Coconut Diethanolamide  - -     13 2-Hydroxy-4-Methoxy Benzophenone (Oxybenzone) - -     14 Benzophenone-4 (Sulisobenzon) - -     15 Propolis - -     16 Dexpanthenol - -    90 17 Carboxymethyl Cellulose Sodium NA NA     18 Abitol - -     19 Tert-Butylhydroquinone - -     20 Benzyl  Salicylate - -      Antioxidant       21 Dodecyl Gallate - -    95 22 Butylhydroxyanisole (BHA) - -     23 Butylhydroxytoluene (BHT) - -     24 Di-Alpha-Tocopherol (Vit E) - -     25 Propyl Gallate - -     26 Zinc Pyrithione NA nA    100 27 Dimethylaminopropylamin (DMAPA) NA NA    PERFUMES, FLAVORS & PLANTS        No Substance 2 days 4 days remarks   101 1 Benzyl Cinnamate - -     2 Di-Limonene (Dipentene) - -     3 Cananga Odorata (Vincent Kaur) (I) - -     4 Lichen Acid Mix - -    105 5 Mentha Piperita Oil (Peppermint Oil) - -     6 Sesquiterpenelactone mix - -     7 Tea Tree Oil, Oxidized - -     8 Wood Tar Mix - -     9 Abietic Acid - -    110 10 Lavendula Angustifolia Oil (Lavender Oil) - -     11 Camphor  - -      Fragrance Mix I       12 Oakmoss Absolute - -     13 Eugenol - -     14 Geraniol - -    115 15 Hydroxycitronellal - -     16 Isoeugenol - -     17 Cinnamic Aldehyde - -     18 Cinnamic Alcohol  - -      Fragrance mix II       19 Citronellol - -    120 20 Alpha-Hexylcinnamic Aldehyde    - -     21 Citral - -     22 Farnesol - -    123 23 Coumarin - -    PLASTICS        No Substance 2 days 4 days remarks     Acrylates - -    124 1 2-Hydroxyethyl Methacrylate (HEMA) - -    125 2 1,4-Butandioldimethacrylate (BUDMA) - -     3  2-Ethylhexyl Acrylate - -     4 Bisphenol-A-Dimethacrylate  - -     5 Diurethane-Dimethacrylate - -     6 Ethyleneglycoldimethacrylate (EGDMA) - -    130 7 Pentaerythritoltriacrylate (MAYRA) - -     8 Triethylene Glycol Dimethacrylate (TEGDMA) - -      Synthetic material/additives        9 U-Jwnm-Cefzvqowgjr - -     10 Tricresyl Phosphate - -     11 7-Ttmj-Xnwccrhdvdqoi - -    135 12 Bis (2-Ethylhexyl) Phthalate - -     13 Dibutylphthalate - -     14 Dimethylphthalate - -     15 Toluene-2,4-Diisocyanate - -     16 Diphenylmethane-4,4''-Diisocyanate - -      EPOXY RESIN SYSTEMS        Reactive Solvents - -    140 17 Cresyl Glycidyl Ether - -     18 Butyl Glycidyl Ether - -     19  Phenyl Glycidyl Ether - -     20 1,4-Butanediol Diglycidyl Ether - -     21 1,6-Hexanediole Diglycidyl Ether - -      Hardener / Accelerator - -    145 22 Triethylenetetramine - -     23 Diethylenetriamine - -     24 Isophorone Diamine (IPD) - -    148 25 N,N-Dimethyl-P-Toluidine - -    METALS (Implants / Dental)        No Substance 2 days 4 days remarks   149 1 Ammonium Heptamolybdate (IV) - -    150 2 Ammonium Tetrachloroplatinate - -     3 Indium (III) Chloride - -     4 Iridium (III) Chloride - -     5 Ferric Chloride - -     6 Manganese (II) Chloride - -    155 7 Niobium (V) Chloride - -     8 Palladium Chloride - -     9 Silver Nitrate - -     10 Gold Sodium Thiosulfate - -     11 Tantal - -    160 12 Tin (II) Chloride - -     13 Titanium (IV) Oxide - -     14 Titanium - -     15 Tungstic Acid, Sod Salt Dihydrat - -     16 Vanadium Pentoxide - -    165 17 Wolfram - -     18 Zinc Chloride - -     19 Zirconium (IV) Oxide - -     20 Ammoniated Murcury - -     21 Copper Sulfate Pentahydrate - -    170 22 Amalgam  - -     23 Aluminum Hydroxide - -    172 24 Platinum Tetrachloride - -      Results of patch tests:                         Interpretation:  - Negative                    A    = Allergic      (+) Erythema    TI   = Toxic/irritant   + E + Infiltration    RaP = Relevance at Present     ++ E/I + Papulovesicle   Rpr  = Relevance Previously     +++ E/I/P + Blister     nR   = No Relevance    Order for Drug allergy tests (prick & Intradermal & patch tests)  [x] Penicillin G  [x] Ampicillin [x] Cefazolin  [x] Ceftriaxone  [x] Ceftazidime  [x] Others: Bactri    DRUG ALLERGY TEST SERIES 06/21/2021)         Prick Tests         Substance/ Allergen Conc Result (20 min) Remarks    Histamine Hydrochloride (ALK) 0.1 mg/ml ++     NaCl 0.9% - Some dermographism    Cefazolin[1] 100 mg/ml -     Ceftriaxone* [2] 100 mg/ml -     Ceftazidime[3] 100 mg/ml -     Benzylpenicillin (Penicillin G) 1 [place] IU/ml (600 mg) -      Ampicillin 100mg/ml -     Bactrim 80/400 mg/ml -     Egg yellow fresh  -     Egg yolk fresh  -       Intradermal Tests   immed immed delay delay      Substance Conc 1st dil  2nd dil  days  days remarks               Cefazolin[1] 1:5 -        Ceftriaxone* [2] 1:5 -        Ceftazidime[3] 1:5 -        Benzylpenicillin (Penicillin G) 1:100 -        Ampicillin 1:10 -        Bactrim 1:100 -           Patch Tests  as is as is 1:2 1:2     As Is  Vas Substance Conc 2 days 4 days 2 days 4 days remarks   173 174 Cefazolin[1] 100 mg/ml - - - -    175 176 Ceftriaxone* [2] 100 mg/ml - - - -    177 178 Ceftazidime[3] 100 mg/ml - - - -    179 180 Benzylpenicillin (Penicillin G) 1 Sam IU/ml (600 mg) - - - -    181 182 Ampicillin 100 mg/ml - - - -    183 184 Bactrim 80/400 mg/ml - - - -    [1]  Cefalexin/Cefazolin-group        [2]    Cefotaxim-group     [3]     Cefuroxim-group    OTHER PRODUCTS        No Substance Conc  % solv 2 days 4 days remarks   185 1 Fresh egg yolk   - -    186 2 Fresh egg white   - -      Re-testing the metal on the arm (06/25/2021)        No Substance Conc  % solv 2 days 4 days remarks    1 Nickel (#13S)         2 Gold (#10M)         3 Palladium (#8M)         4 Copper (#21M)         5 Titanium (#14 M)         6 Dunmore (#23 S)         7 Titanium oxide (#13M)         8          9          10         Take away on Sunday, make photo on Sunday and Monday/Tuesday and next week virtual consult to conclude ==>  all negative based on photos sent by patient 06/29/2021!  [x] No relevant allergic reaction observed      Interpretation/ remarks:   Patient has an immediate type sensitization to house dust mites, which could indicate and atopic predisposition for atopic dermatitis    [] Patient information given   [] ACDS CAMP information's (# ....) to following compounds: .....   [x] General information's to following compounds: house dust mites      Assessment & Plan:    ==> Final Diagnosis:     # Atopic  predisposition    Atopic dermatitis with sensitive skin    Intermittent Rhinitis during changing season with postnasal drip = sensitization to house dust mite (info given)    Metal sensitization  *Chronic condition with progression/exacerbation    # Immediate and delayed drug allergies  *Chronic condition with progression/exacerbation    # Possible allergic contact dermatitis to additives, metals, fragrances.  *Chronic condition with progression/exacerbation    These conclusions are made at the best of one's knowledge and belief based on the provided evidence such as patient's history and allergy test results and they can change over time or can be incomplete because of missing information's.    ==> Treatment Plan:  - try Vanicream cream 2-3 times daily and Aveeno to rebuild the skin barrier  - if again reactions, then can try as well Protopic 0.1%      Procedures Performed:  Allergy tests, including patch tests    Staff: : provider    Follow-up in [facility] virtually  ___________________________    I spent a total of 36 minutes with Amelia Coker during today s  visit. This time was spent discussing all the individual test results, correlating them to the clinical relevance, counseling the patient and/or coordinating care and performing allergy tests or procedures.

## 2021-06-25 ENCOUNTER — OFFICE VISIT (OUTPATIENT)
Dept: ALLERGY | Facility: CLINIC | Age: 46
End: 2021-06-25
Payer: COMMERCIAL

## 2021-06-25 DIAGNOSIS — L20.89 OTHER ATOPIC DERMATITIS: Primary | ICD-10-CM

## 2021-06-25 DIAGNOSIS — L30.9 DERMATITIS: ICD-10-CM

## 2021-06-25 DIAGNOSIS — Z88.9 DRUG ALLERGY, MULTIPLE: ICD-10-CM

## 2021-06-25 DIAGNOSIS — R10.13 ABDOMINAL PAIN, EPIGASTRIC: ICD-10-CM

## 2021-06-25 PROCEDURE — 95044 PATCH/APPLICATION TESTS: CPT | Performed by: DERMATOLOGY

## 2021-06-25 PROCEDURE — 99214 OFFICE O/P EST MOD 30 MIN: CPT | Mod: 25 | Performed by: DERMATOLOGY

## 2021-06-25 RX ORDER — EMOLLIENT BASE
CREAM (GRAM) TOPICAL 2 TIMES DAILY
Qty: 453 G | Refills: 3 | Status: ON HOLD | OUTPATIENT
Start: 2021-06-25 | End: 2021-07-27

## 2021-06-25 RX ORDER — TACROLIMUS 1 MG/G
OINTMENT TOPICAL 2 TIMES DAILY
Qty: 60 G | Refills: 3 | Status: SHIPPED | OUTPATIENT
Start: 2021-06-25 | End: 2024-06-13

## 2021-06-25 NOTE — PATIENT INSTRUCTIONS
House Dust Mite Allergy        The house dust mite is an arachnid about 0.3 mm in size and not visible to the naked eye. There are around 150 species of house dust mites in the world. One mite produces up to 40 fecal droppings a day. One teaspoonful of bedroom dust contains an average of nearly 1000 mites and 250,000 minute droppings.    Causes and triggers of house dust mite allergy  The house dust mite requires a warm, moist environment without light in order to live and reproduce. Our beds are ideal. The mite feeds on human and animal skin scales. The allergen is mainly contained in the mite's feces. The feces contain allergy-triggering constituents which are spread in fine dust, are breathed in and can cause an allergic reaction.    Symptoms  When the allergens come into contact with the mucous membranes in the eyes, nose, mouth and throat, sufferers develop symptoms typical of an allergic cold (allergic rhinitis) or an allergic inflammation of the conjunctiva (allergic conjunctivitis): blocked or runny nose, sneezing, red, itchy eyes. If all of these symptoms are present, then the condition is also known as rhinoconjunctivitis. Often, the upper respiratory tract becomes chronically inflamed, primarily because house dust mites are present all year round.  The symptoms of house dust mite allergy typically occur in the morning and are more frequent in the cold months of the year.    Therapy and treatment  As a first step, mattress, pillows and duvet/comforter should be placed in mite-proof or anti-mite covers, sometimes known as encasings. Alternatively you can use pillows or comforter that can be washed at over 130 F monthly. At the same time, house dust should be minimized. If necessary, the symptoms can be treated with medication, for example antihistamines in the form of nasal sprays, eye drops and tablets. Desensitization/specific immunotherapy (SIT) is recommended for house dust allergy if all the measures  "above are not sufficient.    Tips and tricks:    Keep room temperature at 66-70 F and relative air humidity at a maximum of 50%.    Ideally, thoroughly air your home two to three times a day for 5 to 10 minutes each time.    Wash bed linens in at least 130 F every week.    Remove stuffed animals or freeze them every other week.    Keep ceiling fans off in the bedroom as they can stir up dust mite allergens.    Remove dust from furniture with a damp cloth and regularly wet mop floors.    Do not put pot and hydroponic plants in the bedroom and also avoid putting too many in living areas, as they increase room humidity.    When staying overnight in other accommodations, we recommend taking your own bed linen and the above anti-mite mattress covers with you.    Remove upholstered furniture from the bedroom and consider removing the carpets. Ideally, use sealed parquet or laminate fahad, cork tiles or fahad made of wood, novilon or PVC.    Maybe additionally reduce dust mites in mattress, upholstery, or fahad using hot steam .      Modified from \"House Dust Mite Allergy\" by [employer] [facility].    "

## 2021-06-25 NOTE — LETTER
"    6/25/2021         RE: Amelia Coker  [address] [facility] [id number]        Dear Colleague,    Thank you for referring your patient, Amelia Coker, to the [facility] [place]. Please see a copy of my visit note below.    [facility] Dermato-allergology Note  Office visit  Encounter Date: Jun 25, 2021  ____________________________________________    CC: No chief complaint on file.      HPI:  Ms. Amelia Coker is a(n) 46 year old female who presents today as a return patient for allergy consultation  - Follow-up in Derm-Allergy clinic for 2nd readings and final conclusions after 4 days (in person)   - otherwise feeling well in usual state of health    Physical exam:  General: In no acute distress, well-developed, well-nourished  Eyes: no conjunctivitis  ENT: no signs of rhinitis   Pulmonary: no wheezing or coughing  Skin:Focused examination of the skin on test sites was performed = see test results below    Earlier History and Allergy exams:    - carries a diagnosis of eczema. Appears in flexural folds, neck, eyelids. Uses topical steroids: clobetasol and triamcinolone as needed for flares.   - Saw an allergist in [place] who recommended she be seen here for allergy testing for a potential explanation of the sores on her scalp and skin allergies. Sensitive to lotions, shampoos, laundry detergent, dressings, and soaps and skin will \"break out.\" Prior allergy testing with allergy to cats, dogs, pollen, trees, grass, dust mites, mold, guinea pigs. Has a sore on her scalp that has been present for 4 months. A biopsy was done by PCP in [facility] that showed \"inflammatory cells.\"  - Also planning a spinal fusion and is allergic to nickel, white gold, copper, yellow gold, alfie silver and possibly ?other metals. When these contact her skin, she itchy red skin changes as well as blisters. For spinal fusion, plan is to use a  \"titanium alloy with nickel\" (Dr" Zacarias, [facility] Has had allergy to surgical staples in the past. Has had allergy testing to 6 metals (unknown which) with dentistry and was allergic to all 6 so uses porcelain crowns.   - hx latex allergy  .   - 5mm brown crust on a pink macule on R frontal-parietal scalp  - L>R hand has scale and xerosis and pinkness on side of fingers, between digits, and dorsal wrist.    Past Medical History:   Patient Active Problem List   Diagnosis     Migraine, unspecified, with intractable migraine, so stated, without mention of status migrainosus     Bell's palsy     Contact dermatitis and other eczema, due to unspecified cause     Scalp lesion     Lyme disease     PAC (premature atrial contraction)     Palpitations     Raynaud phenomenon     Chronic fatigue     Hair loss     Abnormal PFT     Shoulder joint instability     Family history of melanoma     Dry eyes     Keratitis sicca (H)     Family history of colon cancer     Pain in joint, upper arm     FLORIDALMA positive     Plantar fascial fibromatosis     Foraminal stenosis of lumbar region     DJD (degenerative joint disease), lumbar     Migraine without aura and without status migrainosus, not intractable     Lumbar radiculopathy     Ds DNA antibody positive     Muscular wasting and disuse atrophy, not elsewhere classified     Frontal headache     Telangiectasia of face     Lateral epicondylitis     Right shoulder pain     Plantar fasciitis     Skin lesion     AD (atopic dermatitis)     Heat sensitivity     Rotator cuff arthropathy, right     Gastroesophageal reflux disease, esophagitis presence not specified     Abnormal mammogram     Sternocleidomastoid muscle tenderness     Acute cervical myofascial strain, initial encounter     Spasm of muscle     Hyperlipidemia LDL goal <130     Biceps tendinopathy, right     Superficial swelling of scalp     Intractable right heel pain     Intractable episodic tension-type headache     Jaycob complexion     Lip lesion      Abdominal pain, epigastric     Abdominal distension     PONV (postoperative nausea and vomiting)     Menorrhalgia     Uterine polyp     Loose stools     Hyperactive bowel sounds     Abnormality of nail surface     Dry hair     Dry skin     Malaise and fatigue     Lymphadenopathy     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     Endometrium, hyperplasia - on recent CT scan     Pain in joint involving pelvic region and thigh, right     Right lateral abdominal pain     Right foot pain     Dental abscess - left mandible molar     Brachial plexopathy - right shoulder     Balance problems     Morbid obesity (H)     Sinus arrhythmia seen on electrocardiogram     Family history of ischemic heart disease - father at 46 massive MI     Elevated blood pressure reading without diagnosis of hypertension     Hypertrophy of breast     History of cold sores     AK (actinic keratosis) - both hands at the lateral thenar aspect.     Dermatitis     Musculoskeletal pain     Fatigue, unspecified type     TOS (thoracic outlet syndrome)     Past Medical History:   Diagnosis Date     Abnormal mammogram 9/21/2016    right breast      AD (atopic dermatitis) 10/29/2015     Allergic rhinitis due to other allergen      Arthritis      Bell's palsy      Chronic fatigue 6/6/2012     Chronic infection     MRSA - 2015 scalp and prior to Abdomen     Contact dermatitis and other eczema, due to unspecified cause     eczema     Contusion of left foot, initial encounter 3/16/2016     DJD (degenerative joint disease), lumbar 9/26/2013     DJD (degenerative joint disease), lumbar 9/26/2013     Ds DNA antibody positive 4/16/2014    borderline.      Elevated blood pressure reading without diagnosis of hypertension 12/24/2013     Facial contusion 12/15/2014    hit by horse      Foraminal stenosis of lumbar region 9/26/2013     Frontal headache 12/15/2014     Gastroesophageal reflux disease, esophagitis presence not specified 6/29/2016     Heat  sensitivity 10/29/2015     HTN, goal below 140/90 9/23/2015     Hyperlipidemia LDL goal <130 8/30/2017     Irregular heart beat      Keratitis sicca (H) 10/31/2012     Left shoulder pain 9/26/2013     Lumbar radiculopathy 1/22/2014     Migraine headache 10/23/2013     Migraine, unspecified, with intractable migraine, so stated, without mention of status migrainosus      Motion sickness      MRSA infection 10/20/2015     Muscular wasting and disuse atrophy, not elsewhere classified 6/9/2014    right SCM, right wrist,       Numbness and tingling     both legs anf feet greater on the left     PAC (premature atrial contraction) 7/15/2010     Plantar fasciitis 9/23/2015     PONV (postoperative nausea and vomiting)      Skin lesion 10/20/2015    posterior superior scalp      Spasm of muscle 7/11/2017     Telangiectasia of face 12/19/2014     Tooth pain 10/20/2015    left lower primary molar        Allergies:  Allergies   Allergen Reactions     Ceftriaxone Anaphylaxis     Hives, rash, racing heart beat     Bactrim [Sulfamethoxazole W/Trimethoprim] Hives     Ciprofloxacin Other (See Comments)     Tendon Issues     Codeine Nausea and Vomiting     Codeine      Copper      Rash       Doxycycline      Loss of skin pigmentation, skin loss.     Gold      Rash       Iodine      Iodine-131 Hives     Levaquin [Levofloxacin] Other (See Comments)     Tendon issues with levaquin and cipro     Nickel      rash     Steel [Staples]      Rash from staples       Sulfa Drugs      Latex Rash     Penicillins Rash       Medications:  Current Outpatient Medications   Medication     Acetaminophen (TYLENOL PO)     cefTAZidime (FORTAZ) 1 GM vial     cefTAZidime (FORTAZ) 1 GM vial     clobetasol (TEMOVATE) 0.05 % external cream     fexofenadine (ALLEGRA) 180 MG tablet     hydrocortisone valerate (WEST-DENNIS) 0.2 % external ointment     medical cannabis (Patient's own supply)     meloxicam (MOBIC) 15 MG tablet     mupirocin (BACTROBAN) 2 % external  ointment     rizatriptan (MAXALT-MLT) 5 MG ODT     sucralfate (CARAFATE) 1 GM tablet     Current Facility-Administered Medications   Medication     ampicillin (OMNIPEN) 1 g vial INTRADERMAL     ampicillin (OMNIPEN) 1 g vial INTRADERMAL     ceFAZolin (ANCEF) injection 500 mg     ceFAZolin (ANCEF) injection 500 mg     cefTRIAXone (ROCEPHIN) injection 250 mg     cefTRIAXone (ROCEPHIN) injection 250 mg     penicillin g potassium [id number] unit vial INTRADERMAL     penicillin g potassium [id number] unit vial INTRADERMAL     sulfamethoxazole-trimethoprim (BACTRIM) injection 80 mg     sulfamethoxazole-trimethoprim (BACTRIM) injection 80 mg       Social History:  The patient . Patient has the following hobbies or non-occupational exposure: taking care of horses.     Family History:  Family History   Problem Relation Age of Onset     Diabetes Mother         adult     Cancer - colorectal Mother      Hypertension Mother      Allergies Mother      Cancer Mother         colon     Depression Mother      Gastrointestinal Disease Mother         ibs     Genitourinary Problems Mother         kidney cyst     Gynecology Mother         endometriosis     Lipids Mother      Thyroid Disease Mother      Arthritis Mother         RA     Heart Disease Maternal Grandfather         MI,  - 60's     Allergies Father      Unknown/Adopted Father      Heart Disease Father         mi-age mid 40's     Cardiovascular Father      Lipids Father      Respiratory Father         asthma     Cancer Father      Other Cancer Father         renal cancer     Depression Sister      Allergies Sister      Cancer Sister         vaginal     Gynecology Sister         endometriosis     Lipids Paternal Grandmother      Osteoporosis Paternal Grandmother      Thyroid Disease Paternal Grandmother      Respiratory Son         asthma     Allergies Son      Arthritis Sister      Allergies Brother      Heart Disease Brother      Allergies Daughter      Blood Disease  Paternal Aunt         LGL T cell Leukemia     Rheumatoid Arthritis Paternal Aunt      Kidney Disease Maternal Aunt      Lupus Maternal Aunt      Bladder Cancer Maternal Aunt        Previous Labs, Allergy Tests, Dermatopathology, Imaging:  See HPI    Referred By: Ahmet Johnson,   [address] [address]     Allergy Tests:    Past Allergy Test: see HPI    Order for Future Allergy Testing:    [x] Outpatient  [] Inpatient: Sandy..../ Bed ....       Skin Atopy (atopic dermatitis) [x] Yes   [] No  Contact allergies:   [x] Yes   [] No (soaps)  Urticaria/Angioedema  [x] Yes   [] No (hives and ?anaphylaxis from sulfa and ceftriaxone. Lip blisters from doxycycline.)  Rhinitis/Sinusitis:   [x] Yes   [] No   [x] seasonal [] perennial      (spring and fall)      Allergic Asthma:   [] Yes   [x] (No strong family history)  Pets :  [x] Yes   [] No  Dog (sleeps in basement in a crate. Mild allergy if she bathes him.)  Food Allergy:   [x] Yes   [] No (tested to pork by allergist in [place] and this was negative. Home test kit showed strong allergy to egg white and yolks.)  Drug allergies:   [x] Yes   [] No see chart  Leg ulcers:   [] Yes   [x] No  Hand eczema:   [x] Yes   [] No ?possibly   Leading hand:   [x] R   [] L       [] Ambidextrous                        Order for PATCH TESTS  Reason for tests (suspected allergy):  Possible allergic contact dermatitis to additives, metals, fragrances.  Known previous allergies: see HPI  Standardized panels  [x] Standard panel (40 tests)  [x] Preservatives & Antimicrobials (31 tests)  [x] Emulsifiers & Additives (25 tests)   [x] Perfumes/Flavours & Plants (25 tests)  [] Hairdresser panel (12 tests)  [] Rubber Chemicals (22 tests)  [x] Plastics (26 tests)  [] Colorants/Dyes/Food additives (20 tests)  [x] Metals (implants/dental) (24 tests)  [] Local anaesthetics/NSAIDs (13 tests)  [] Antibiotics & Antimycotics (14 tests)   [] Corticosteroids (15 tests)   [] Photopatch  test (62 tests)   [] others: ...      [] Patient's own products: ...    DO NOT test if chemical or biological identity is unknown!     always ask from patient the product information and safety sheets (MSDS)       Order for PRICK TESTS    Reason for tests (suspected allergy): atopic predisposition and possible allergy to eggs.   Known previous allergies: see HPI    Standardized prick panels  [x] Atopic panel (20 tests)  [] Pediatric Panel (12 tests)  [x] Milk, Meat, Eggs, Grains (20 tests) --> EGG WHITE & EGG YOLK  [] Dust, Epithelia, Feathers (10 tests)  [] Fish, Seafood, Shellfish (17 tests)  [] Nuts, Beans (14 tests)  [] Spice, Vegetable, Fruit (17 tests)  [x] Others: EGG WHITE & EGG YOLK      [] Patient's own products: ...    DO NOT test if chemical or biological identity is unknown!     always ask from patient the product information and safety sheets (MSDS)     Standardized intradermal tests  [] Penicillium notatum [] Aspergillus fumigatus [] House dust mites  [] Bee venom   [] Wasp venom !!Specific protocol with dilutions!!  [] Others: ...      Order for Drug allergy tests (prick & Intradermal & patch tests)  [x] Penicillin G  [x] Ampicillin [x] Cefazolin  [x] Ceftriaxone  [x] Ceftazidime  [x] Others: Bactrim      Atopy Screen (Placed 05/05/21 )    No Substance Readings (15 min) Evaluation   POS Histamine 1mg/ml ++    NEG NaCl 0.9% -      No Substance Readings (15 min) Evaluation   1 Alternaria alternata (tenuis)  (+)    2 Cladosporium herbarum (+)    3 Aspergillus fumigatus -    4 Penicillium notatum -    5 Dermatophagoides pteronyssinus ++    6 Dermatophagoides farinae ++    7 Dog epithelium (canis spp) -    8 Cat hair (alexis catus) -    9 Cockroach   (Blatella americana & germanica) -    10 Grass mix midwest   (Aubrie, Orchard, Redtop, Carlito) -    11 Morgan grass (sorghum halepense) -    12 Weed mix   (common Cocklebur, Lamb s quarters, rough redroot Pigweed, Dock/Sorrel) -    13 Mug wort (artemisia  vulgare) -    14 Ragweed giant/short (ambrosia spp) -    15 English Plantain (plantago lanceolata) -    16 Tree mix 1 (Pecan, Maple BHR, Oak RVW, american Bridgeton, black [place]) -    17 Red cedar (juniperus virginia) -    18 Tree mix 2   (white Rigoberto, river/red Birch, black Eugene, common [place], american Elm) -    19 Box elder/Maple mix (acer spp) -    20 [place] shagbark (carya ovata) -     21 Egg white #6 -    22 Egg yolk #7 -      Conclusion: patient is atopic with immediate type sensitization to house dust mites    ________________________________  RESULTS & EVALUATION of PATCH TESTS    Patch test readings after     [x] 2 days, [] 3 days [x] 4 days, [] 5 days    Jun 25, 2021 application of patch tests with results:    STANDARD Series        No Substance 2 days 4 days remarks   1 1 Almas Mix [C] - -     2 Colophony - -     3  2-Mercaptobenzothiazole  - -      4 Methylisothiazolinone - -    5 5 Carba Mix - -     6 Thiuram Mix [A] - -     7 Bisphenol A Epoxy Resin - -     8 T-Rxps-Qdanpcufigk-Formaldehyde Resin - -     9 Mercapto Mix [A] - -    10 10 Black Rubber Mix- PPD [B] - -     11 Potassium Dichromate  -  -     12 Balsam of [place] (Myroxylon Pereirae Resin) - -     13 Nickel Sulphate Hexahydrate - -     14 Mixed Dialkyl Thiourea - -    15 15 Paraben Mix [B] - -     16 Methyldibromo Glutaronitrile - -     17 Fragrance Mix - -     18 2-Bromo-2-Nitropropane-1,3-Diol (Bronopol) NA NA     19 Lyral - -    20 20 Tixocortol-21- Pivalate - -     21 Diazolidiyl Urea (Germall II) - -     22 Methyl Methacrylate NA NA     23 Cobalt (II) Chloride Hexahydrate - -     24 Fragrance Mix II  - -    25 25 Compositae Mix - -     26 Benzoyl Peroxide - -     27 Bacitracin - -     28 Formaldehyde - -     29 Methylchloroisothiazolinone / Methylisothiazolinone - -    30 30 Corticosteroid Mix - -     31 Sodium Lauryl Sulfate - -     32 Lanolin Alcohol - -     33 Turpentine - -     34 Cetylstearylalcohol - -    35 35 Chlorhexidine  Dicluconate - -     36 Budenoside - -     37 Imidazolidinyl Urea  - -     38 Ethyl-2 Cyanoacrylate - -     39 Quaternium 15 (Dowicil 200) - -    40 40 Decyl Glucoside - -    PRESERVATIVES & ANTIMICROBIALS        No Substance 2 days 4 days remarks   41 1  1,2-Benzisothiazoline-3-One, Sodium Salt - -     2  1,3,5-Tyler (2-Hydroxyethyl) - Hexahydrotriazine (Grotan BK) - -     3 7-Ilisuzzszcupg-0-Nitro-1, 3-Propanediol - -     4  3, 4, 4' - Triclocarban - -    45 5 4 - Chloro - 3 - Cresol - -     6 4 - Chloro - 4 - Xylenol (PCMX) - -     7 7-Ethylbicyclooxazolidine (MarkTendan VX9920) - -     8 Benzalkonium Chloride - -     9 Benzyl Alcohol - -    50 10 Cetalkonium Chloride - -     11 Cetylpyrimidine Chloride  - -     12 Chloroacetamide - -     13 DMDM Hydantoin - -     14 Glutaraldehyde - -    55 15 Triclosan - -     16 Glyoxal Trimeric Dihydrate - -     17 Iodopropynyl Butylcarbamate - -     18 Octylisothiazoline - -     19 Iodoform NA NA    60 20 (Nitrobutyl) Morpholine/(Ethylnitro-Trimethylene) Dimorpholine (MarkTendan P 1487) - -     21 Phenoxyethanol - -     22 Phenyl Salicylate - -     23 Povidone Iodine - -     24 Sodium Benzoate - -    65 25 Sodium Disulfite - -     26 Sorbic Acid - -     27 Thimerosal       28 Melamine Formaldehyde Resin       29 Ethylenediamine Dihydrochloride        Parabens      70 30 Butyl-P-Hydroxybenzoate - -     31 Ethyl-P-Hydroxybenzoate - -     32 Methyl-P-Hydroxybenzoate - -    73 33 Propyl-P-Hydroxybenzoate - -    EMULSIFIERS & ADDITIVES       No Substance 2 days 4 days remarks   74 1 Polyethylene Glycol-400 - -    75 2 Cocamidopropyl Betaine NA NA     3 Amerchol L101 - -     4 Propylene Glycol - -     5 Triethanolamine - -     6 Sorbitane Sesquiolate NA NA    80 7 Isopropylmyristate - -     8 Polysorbate 80  - -     9 Amidoamine   (Stearamidopropyl Dimethylamine) - -     10 Oleamidopropyl Dimethylamine NA NA     11 Lauryl Glucoside - -    85 12 Coconut Diethanolamide  - -     13  2-Hydroxy-4-Methoxy Benzophenone (Oxybenzone) - -     14 Benzophenone-4 (Sulisobenzon) - -     15 Propolis - -     16 Dexpanthenol - -    90 17 Carboxymethyl Cellulose Sodium NA NA     18 Abitol - -     19 Tert-Butylhydroquinone - -     20 Benzyl Salicylate - -      Antioxidant       21 Dodecyl Gallate - -    95 22 Butylhydroxyanisole (BHA) - -     23 Butylhydroxytoluene (BHT) - -     24 Di-Alpha-Tocopherol (Vit E) - -     25 Propyl Gallate - -     26 Zinc Pyrithione NA nA    100 27 Dimethylaminopropylamin (DMAPA) NA NA    PERFUMES, FLAVORS & PLANTS        No Substance 2 days 4 days remarks   101 1 Benzyl Cinnamate - -     2 Di-Limonene (Dipentene) - -     3 Cananga Odorata (Vincent Kaur) (I) - -     4 Lichen Acid Mix - -    105 5 Mentha Piperita Oil (Peppermint Oil) - -     6 Sesquiterpenelactone mix - -     7 Tea Tree Oil, Oxidized - -     8 Wood Tar Mix - -     9 Abietic Acid - -    110 10 Lavendula Angustifolia Oil (Lavender Oil) - -     11 Camphor  - -      Fragrance Mix I       12 Oakmoss Absolute - -     13 Eugenol - -     14 Geraniol - -    115 15 Hydroxycitronellal - -     16 Isoeugenol - -     17 Cinnamic Aldehyde - -     18 Cinnamic Alcohol  - -      Fragrance mix II       19 Citronellol - -    120 20 Alpha-Hexylcinnamic Aldehyde    - -     21 Citral - -     22 Farnesol - -    123 23 Coumarin - -    PLASTICS        No Substance 2 days 4 days remarks     Acrylates - -    124 1 2-Hydroxyethyl Methacrylate (HEMA) - -    125 2 1,4-Butandioldimethacrylate (BUDMA) - -     3  2-Ethylhexyl Acrylate - -     4 Bisphenol-A-Dimethacrylate  - -     5 Diurethane-Dimethacrylate - -     6 Ethyleneglycoldimethacrylate (EGDMA) - -    130 7 Pentaerythritoltriacrylate (MAYRA) - -     8 Triethylene Glycol Dimethacrylate (TEGDMA) - -      Synthetic material/additives        9 N-Alms-Xhpxcenbxcf - -     10 Tricresyl Phosphate - -     11 0-Rinq-Rzmkoogmyrnsh - -    135 12 Bis (2-Ethylhexyl) Phthalate - -     13 Dibutylphthalate -  -     14 Dimethylphthalate - -     15 Toluene-2,4-Diisocyanate - -     16 Diphenylmethane-4,4''-Diisocyanate - -      EPOXY RESIN SYSTEMS        Reactive Solvents - -    140 17 Cresyl Glycidyl Ether - -     18 Butyl Glycidyl Ether - -     19 Phenyl Glycidyl Ether - -     20 1,4-Butanediol Diglycidyl Ether - -     21 1,6-Hexanediole Diglycidyl Ether - -      Hardener / Accelerator - -    145 22 Triethylenetetramine - -     23 Diethylenetriamine - -     24 Isophorone Diamine (IPD) - -    148 25 N,N-Dimethyl-P-Toluidine - -    METALS (Implants / Dental)        No Substance 2 days 4 days remarks   149 1 Ammonium Heptamolybdate (IV) - -    150 2 Ammonium Tetrachloroplatinate - -     3 Indium (III) Chloride - -     4 Iridium (III) Chloride - -     5 Ferric Chloride - -     6 Manganese (II) Chloride - -    155 7 Niobium (V) Chloride - -     8 Palladium Chloride - -     9 Silver Nitrate - -     10 Gold Sodium Thiosulfate - -     11 Tantal - -    160 12 Tin (II) Chloride - -     13 Titanium (IV) Oxide - -     14 Titanium - -     15 Tungstic Acid, Sod Salt Dihydrat - -     16 Vanadium Pentoxide - -    165 17 Wolfram - -     18 Zinc Chloride - -     19 Zirconium (IV) Oxide - -     20 Ammoniated Murcury - -     21 Copper Sulfate Pentahydrate - -    170 22 Amalgam  - -     23 Aluminum Hydroxide - -    172 24 Platinum Tetrachloride - -      Results of patch tests:                         Interpretation:  - Negative                    A    = Allergic      (+) Erythema    TI   = Toxic/irritant   + E + Infiltration    RaP = Relevance at Present     ++ E/I + Papulovesicle   Rpr  = Relevance Previously     +++ E/I/P + Blister     nR   = No Relevance    Order for Drug allergy tests (prick & Intradermal & patch tests)  [x] Penicillin G  [x] Ampicillin [x] Cefazolin  [x] Ceftriaxone  [x] Ceftazidime  [x] Others: Bactrim    DRUG ALLERGY TEST SERIES 06/21/2021)         Prick Tests         Substance/ Allergen Conc Result (20 min) Remarks     Histamine Hydrochloride (ALK) 0.1 mg/ml ++     NaCl 0.9% - Some dermographism    Cefazolin[1] 100 mg/ml -     Ceftriaxone* [2] 100 mg/ml -     Ceftazidime[3] 100 mg/ml -     Benzylpenicillin (Penicillin G) 1 [place] IU/ml (600 mg) -     Ampicillin 100mg/ml -     Bactrim 80/400 mg/ml -     Egg yellow fresh  -     Egg yolk fresh  -       Intradermal Tests   immed immed delay delay      Substance Conc 1st dil  2nd dil  days  days remarks               Cefazolin[1] 1:5 -        Ceftriaxone* [2] 1:5 -        Ceftazidime[3] 1:5 -        Benzylpenicillin (Penicillin G) 1:100 -        Ampicillin 1:10 -        Bactrim 1:100 -           Patch Tests  as is as is 1:2 1:2     As Is  Vas Substance Conc 2 days 4 days 2 days 4 days remarks   173 174 Cefazolin[1] 100 mg/ml - - - -    175 176 Ceftriaxone* [2] 100 mg/ml - - - -    177 178 Ceftazidime[3] 100 mg/ml - - - -    179 180 Benzylpenicillin (Penicillin G) 1 [place] IU/ml (600 mg) - - - -    181 182 Ampicillin 100 mg/ml - - - -    183 184 Bactrim 80/400 mg/ml - - - -    [1]  Cefalexin/Cefazolin-group        [2]    Cefotaxim-group     [3]     Cefuroxim-group    OTHER PRODUCTS        No Substance Conc  % solv 2 days 4 days remarks   185 1 Fresh egg yolk   - -    186 2 Fresh egg white   - -      Re-testing the metal on the arm (06/25/2021)        No Substance Conc  % solv 2 days 4 days remarks    1 Nickel (#13S)         2 Gold (#10M)         3 Palladium (#8M)         4 Copper (#21M)         5 Titanium (#14 M)         6 [place] (#23 S)         7 Titanium oxide (#13M)         8          9          10         Take away on Sunday, make photo on Sunday and Monday/Tuesday and next week virtual consult to conclude  [x] No relevant allergic reaction observed      Interpretation/ remarks:   Patient has an immediate type sensitization to house dust mites, which could indicate and atopic predisposition for atopic dermatitis    [] Patient information given   [] ACDS CAMP information's (# ....) to  following compounds: .....   [x] General information's to following compounds: house dust mites      Assessment & Plan:    ==> Final Diagnosis:     # Atopic predisposition    Atopic dermatitis with sensitive skin    Intermittent Rhinitis during changing season with postnasal drip = sensitization to house dust mite (info given)    Metal sensitization  *Chronic condition with progression/exacerbation    # Immediate and delayed drug allergies  *Chronic condition with progression/exacerbation    # Possible allergic contact dermatitis to additives, metals, fragrances.  *Chronic condition with progression/exacerbation    These conclusions are made at the best of one's knowledge and belief based on the provided evidence such as patient's history and allergy test results and they can change over time or can be incomplete because of missing information's.    ==> Treatment Plan:  - try Vanicream cream 2-3 times daily and Aveeno to rebuild the skin barrier  - if again reactions, then can try as well Protopic 0.1%      Procedures Performed:  Allergy tests, including patch tests    Staff: : provider    Follow-up in [facility] virtually  ___________________________    I spent a total of 36 minutes with Amelia Coker during today s  visit. This time was spent discussing all the individual test results, correlating them to the clinical relevance, counseling the patient and/or coordinating care and performing allergy tests or procedures.           Again, thank you for allowing me to participate in the care of your patient.        Sincerely,        Pio Barrett MD

## 2021-06-30 ENCOUNTER — VIRTUAL VISIT (OUTPATIENT)
Dept: ALLERGY | Facility: CLINIC | Age: 46
End: 2021-06-30
Payer: COMMERCIAL

## 2021-06-30 DIAGNOSIS — L20.89 OTHER ATOPIC DERMATITIS: Primary | ICD-10-CM

## 2021-06-30 DIAGNOSIS — L23.89 ALLERGIC CONTACT DERMATITIS DUE TO OTHER AGENTS: ICD-10-CM

## 2021-06-30 PROCEDURE — 99213 OFFICE O/P EST LOW 20 MIN: CPT | Mod: 95 | Performed by: DERMATOLOGY

## 2021-06-30 NOTE — LETTER
"    6/30/2021         RE: Amelia Coker  [address] [facility] [id number]        Dear Colleague,    Thank you for referring your patient, Amelia Coker, to the [facility] [place]. Please see a copy of my visit note below.    [facility] Dermato-allergology Note  Virtual visit: store and forward video (Tappxt connected) "[employer] phone with pictures in Epic, had problems with [employer]  Encounter Date: Jun 30, 2021  ____________________________________________    CC: No chief complaint on file.      HPI:  Ms. Amelia Coker is a(n) 46 year old female who presents today as a return patient for allergy consultation  - Follow-up in Derm-Allergy clinic virtually to discuss results of repeat patch tests  - otherwise feeling well in usual state of health    Physical exam:  General: In no acute distress, well-developed, well-nourished  Eyes: no conjunctivitis  ENT: no signs of rhinitis   Pulmonary: no wheezing or coughing  Skin:Focused examination of the skin on test sites was performed = see test results below    Earlier History and Allergy exams:    - carries a diagnosis of eczema. Appears in flexural folds, neck, eyelids. Uses topical steroids: clobetasol and triamcinolone as needed for flares.   - Saw an allergist in [place] who recommended she be seen here for allergy testing for a potential explanation of the sores on her scalp and skin allergies. Sensitive to lotions, shampoos, laundry detergent, dressings, and soaps and skin will \"break out.\" Prior allergy testing with allergy to cats, dogs, pollen, trees, grass, dust mites, mold, guinea pigs. Has a sore on her scalp that has been present for 4 months. A biopsy was done by PCP in [facility] that showed \"inflammatory cells.\"  - Also planning a spinal fusion and is allergic to nickel, white gold, copper, yellow gold, alfie silver and possibly ?other metals. When these contact her skin, she itchy red skin changes " "as well as blisters. For spinal fusion, plan is to use a  \"titanium alloy with nickel\" (Dr Adames, [facility] Has had allergy to surgical staples in the past. Has had allergy testing to 6 metals (unknown which) with dentistry and was allergic to all 6 so uses porcelain crowns.   - hx latex allergy  .   - 5mm brown crust on a pink macule on R frontal-parietal scalp  - L>R hand has scale and xerosis and pinkness on side of fingers, between digits, and dorsal wrist.    Past Medical History:   Patient Active Problem List   Diagnosis     Migraine, unspecified, with intractable migraine, so stated, without mention of status migrainosus     Bell's palsy     Contact dermatitis and other eczema, due to unspecified cause     Scalp lesion     Lyme disease     PAC (premature atrial contraction)     Palpitations     Raynaud phenomenon     Chronic fatigue     Hair loss     Abnormal PFT     Shoulder joint instability     Family history of melanoma     Dry eyes     Keratitis sicca (H)     Family history of colon cancer     Pain in joint, upper arm     FLORIDALMA positive     Plantar fascial fibromatosis     Foraminal stenosis of lumbar region     DJD (degenerative joint disease), lumbar     Migraine without aura and without status migrainosus, not intractable     Lumbar radiculopathy     Ds DNA antibody positive     Muscular wasting and disuse atrophy, not elsewhere classified     Frontal headache     Telangiectasia of face     Lateral epicondylitis     Right shoulder pain     Plantar fasciitis     Skin lesion     AD (atopic dermatitis)     Heat sensitivity     Rotator cuff arthropathy, right     Gastroesophageal reflux disease, esophagitis presence not specified     Abnormal mammogram     Sternocleidomastoid muscle tenderness     Acute cervical myofascial strain, initial encounter     Spasm of muscle     Hyperlipidemia LDL goal <130     Biceps tendinopathy, right     Superficial swelling of scalp     Intractable right " heel pain     Intractable episodic tension-type headache     Jaycob complexion     Lip lesion     Abdominal pain, epigastric     Abdominal distension     PONV (postoperative nausea and vomiting)     Menorrhalgia     Uterine polyp     Loose stools     Hyperactive bowel sounds     Abnormality of nail surface     Dry hair     Dry skin     Malaise and fatigue     Lymphadenopathy     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     Endometrium, hyperplasia - on recent CT scan     Pain in joint involving pelvic region and thigh, right     Right lateral abdominal pain     Right foot pain     Dental abscess - left mandible molar     Brachial plexopathy - right shoulder     Balance problems     Morbid obesity (H)     Sinus arrhythmia seen on electrocardiogram     Family history of ischemic heart disease - father at 46 massive MI     Elevated blood pressure reading without diagnosis of hypertension     Hypertrophy of breast     History of cold sores     AK (actinic keratosis) - both hands at the lateral thenar aspect.     Dermatitis     Musculoskeletal pain     Fatigue, unspecified type     TOS (thoracic outlet syndrome)     Past Medical History:   Diagnosis Date     Abnormal mammogram 9/21/2016    right breast      AD (atopic dermatitis) 10/29/2015     Allergic rhinitis due to other allergen      Arthritis      Bell's palsy      Chronic fatigue 6/6/2012     Chronic infection     MRSA - 2015 scalp and prior to Abdomen     Contact dermatitis and other eczema, due to unspecified cause     eczema     Contusion of left foot, initial encounter 3/16/2016     DJD (degenerative joint disease), lumbar 9/26/2013     DJD (degenerative joint disease), lumbar 9/26/2013     Ds DNA antibody positive 4/16/2014    borderline.      Elevated blood pressure reading without diagnosis of hypertension 12/24/2013     Facial contusion 12/15/2014    hit by horse      Foraminal stenosis of lumbar region 9/26/2013     Frontal headache 12/15/2014      Gastroesophageal reflux disease, esophagitis presence not specified 6/29/2016     Heat sensitivity 10/29/2015     HTN, goal below 140/90 9/23/2015     Hyperlipidemia LDL goal <130 8/30/2017     Irregular heart beat      Keratitis sicca (H) 10/31/2012     Left shoulder pain 9/26/2013     Lumbar radiculopathy 1/22/2014     Migraine headache 10/23/2013     Migraine, unspecified, with intractable migraine, so stated, without mention of status migrainosus      Motion sickness      MRSA infection 10/20/2015     Muscular wasting and disuse atrophy, not elsewhere classified 6/9/2014    right SCM, right wrist,       Numbness and tingling     both legs anf feet greater on the left     PAC (premature atrial contraction) 7/15/2010     Plantar fasciitis 9/23/2015     PONV (postoperative nausea and vomiting)      Skin lesion 10/20/2015    posterior superior scalp      Spasm of muscle 7/11/2017     Telangiectasia of face 12/19/2014     Tooth pain 10/20/2015    left lower primary molar        Allergies:  Allergies   Allergen Reactions     Ceftriaxone Anaphylaxis     Hives, rash, racing heart beat     Bactrim [Sulfamethoxazole W/Trimethoprim] Hives     Ciprofloxacin Other (See Comments)     Tendon Issues     Codeine Nausea and Vomiting     Codeine      Copper      Rash       Doxycycline      Loss of skin pigmentation, skin loss.     Gold      Rash       Iodine      Iodine-131 Hives     Levaquin [Levofloxacin] Other (See Comments)     Tendon issues with levaquin and cipro     Nickel      rash     Steel [Staples]      Rash from staples       Sulfa Drugs      Latex Rash     Penicillins Rash       Medications:  Current Outpatient Medications   Medication     Acetaminophen (TYLENOL PO)     cefTAZidime (FORTAZ) 1 GM vial     cefTAZidime (FORTAZ) 1 GM vial     clobetasol (TEMOVATE) 0.05 % external cream     emollient (VANICREAM) external cream     fexofenadine (ALLEGRA) 180 MG tablet     hydrocortisone valerate (WEST-DENNIS) 0.2 %  external ointment     medical cannabis (Patient's own supply)     meloxicam (MOBIC) 15 MG tablet     mupirocin (BACTROBAN) 2 % external ointment     rizatriptan (MAXALT-MLT) 5 MG ODT     sucralfate (CARAFATE) 1 GM tablet     tacrolimus (PROTOPIC) 0.1 % external ointment     Current Facility-Administered Medications   Medication     ampicillin (OMNIPEN) 1 g vial INTRADERMAL     ampicillin (OMNIPEN) 1 g vial INTRADERMAL     ceFAZolin (ANCEF) injection 500 mg     ceFAZolin (ANCEF) injection 500 mg     cefTRIAXone (ROCEPHIN) injection 250 mg     cefTRIAXone (ROCEPHIN) injection 250 mg     penicillin g potassium [id number] unit vial INTRADERMAL     penicillin g potassium [id number] unit vial INTRADERMAL     sulfamethoxazole-trimethoprim (BACTRIM) injection 80 mg     sulfamethoxazole-trimethoprim (BACTRIM) injection 80 mg       Social History:  The patient . Patient has the following hobbies or non-occupational exposure: taking care of horses.     Family History:  Family History   Problem Relation Age of Onset     Diabetes Mother         adult     Cancer - colorectal Mother      Hypertension Mother      Allergies Mother      Cancer Mother         colon     Depression Mother      Gastrointestinal Disease Mother         ibs     Genitourinary Problems Mother         kidney cyst     Gynecology Mother         endometriosis     Lipids Mother      Thyroid Disease Mother      Arthritis Mother         RA     Heart Disease Maternal Grandfather         MI,  - 60's     Allergies Father      Unknown/Adopted Father      Heart Disease Father         mi-age mid 40's     Cardiovascular Father      Lipids Father      Respiratory Father         asthma     Cancer Father      Other Cancer Father         renal cancer     Depression Sister      Allergies Sister      Cancer Sister         vaginal     Gynecology Sister         endometriosis     Lipids Paternal Grandmother      Osteoporosis Paternal Grandmother      Thyroid Disease  Paternal Grandmother      Respiratory Son         asthma     Allergies Son      Arthritis Sister      Allergies Brother      Heart Disease Brother      Allergies Daughter      Blood Disease Paternal Aunt         LGL T cell Leukemia     Rheumatoid Arthritis Paternal Aunt      Kidney Disease Maternal Aunt      Lupus Maternal Aunt      Bladder Cancer Maternal Aunt        Previous Labs, Allergy Tests, Dermatopathology, Imaging:  See HPI    Referred By: Ahmet Johnson, DO  [address] [address]     Allergy Tests:    Past Allergy Test: see HPI    Order for Future Allergy Testing:    [x] Outpatient  [] Inpatient: Sandy..../ Bed ....       Skin Atopy (atopic dermatitis) [x] Yes   [] No  Contact allergies:   [x] Yes   [] No (soaps)  Urticaria/Angioedema  [x] Yes   [] No (hives and ?anaphylaxis from sulfa and ceftriaxone. Lip blisters from doxycycline.)  Rhinitis/Sinusitis:   [x] Yes   [] No   [x] seasonal [] perennial      (spring and fall)      Allergic Asthma:   [] Yes   [x] (No strong family history)  Pets :  [x] Yes   [] No  Dog (sleeps in basement in a crate. Mild allergy if she bathes him.)  Food Allergy:   [x] Yes   [] No (tested to pork by allergist in [place] and this was negative. Home test kit showed strong allergy to egg white and yolks.)  Drug allergies:   [x] Yes   [] No see chart  Leg ulcers:   [] Yes   [x] No  Hand eczema:   [x] Yes   [] No ?possibly   Leading hand:   [x] R   [] L       [] Ambidextrous                        Order for PATCH TESTS  Reason for tests (suspected allergy):  Possible allergic contact dermatitis to additives, metals, fragrances.  Known previous allergies: see HPI  Standardized panels  [x] Standard panel (40 tests)  [x] Preservatives & Antimicrobials (31 tests)  [x] Emulsifiers & Additives (25 tests)   [x] Perfumes/Flavours & Plants (25 tests)  [] Hairdresser panel (12 tests)  [] Rubber Chemicals (22 tests)  [x] Plastics (26 tests)  [] Colorants/Dyes/Food  additives (20 tests)  [x] Metals (implants/dental) (24 tests)  [] Local anaesthetics/NSAIDs (13 tests)  [] Antibiotics & Antimycotics (14 tests)   [] Corticosteroids (15 tests)   [] Photopatch test (62 tests)   [] others: ...      [] Patient's own products: ...    DO NOT test if chemical or biological identity is unknown!     always ask from patient the product information and safety sheets (MSDS)       Order for PRICK TESTS    Reason for tests (suspected allergy): atopic predisposition and possible allergy to eggs.   Known previous allergies: see HPI    Standardized prick panels  [x] Atopic panel (20 tests)  [] Pediatric Panel (12 tests)  [x] Milk, Meat, Eggs, Grains (20 tests) --> EGG WHITE & EGG YOLK  [] Dust, Epithelia, Feathers (10 tests)  [] Fish, Seafood, Shellfish (17 tests)  [] Nuts, Beans (14 tests)  [] Spice, Vegetable, Fruit (17 tests)  [x] Others: EGG WHITE & EGG YOLK      [] Patient's own products: ...    DO NOT test if chemical or biological identity is unknown!     always ask from patient the product information and safety sheets (MSDS)     Standardized intradermal tests  [] Penicillium notatum [] Aspergillus fumigatus [] House dust mites  [] Bee venom   [] Wasp venom !!Specific protocol with dilutions!!  [] Others: ...      Order for Drug allergy tests (prick & Intradermal & patch tests)  [x] Penicillin G  [x] Ampicillin [x] Cefazolin  [x] Ceftriaxone  [x] Ceftazidime  [x] Others: Bactrim      Atopy Screen (Placed 05/05/21 )    No Substance Readings (15 min) Evaluation   POS Histamine 1mg/ml ++    NEG NaCl 0.9% -      No Substance Readings (15 min) Evaluation   1 Alternaria alternata (tenuis)  (+)    2 Cladosporium herbarum (+)    3 Aspergillus fumigatus -    4 Penicillium notatum -    5 Dermatophagoides pteronyssinus ++    6 Dermatophagoides farinae ++    7 Dog epithelium (canis spp) -    8 Cat hair (alexis catus) -    9 Cockroach   (Blatella americana & germanica) -    10 Grass mix midwest   (June,  Orchard, Redtop, Carlito) -    11 Morgan grass (sorghum halepense) -    12 Weed mix   (common Cocklebur, Lamb s quarters, rough redroot Pigweed, Dock/Sorrel) -    13 Mug wort (artemisia vulgare) -    14 Ragweed giant/short (ambrosia spp) -    15 English Plantain (plantago lanceolata) -    16 Tree mix 1 (Pecan, Maple BHR, Oak RVW, american [place], black [place]) -    17 Red cedar (juniperus virginia) -    18 Tree mix 2   (white Rigoberto, river/red Birch, black [place], common [place], american Elm) -    19 Box elder/Maple mix (acer spp) -    20 Panther shagbark (carya ovata) -     21 Egg white #6 -    22 Egg yolk #7 -      Conclusion: patient is atopic with immediate type sensitization to house dust mites    ________________________________  RESULTS & EVALUATION of PATCH TESTS    Patch test readings after     [x] 2 days, [] 3 days [x] 4 days, [] 5 days    Jun 30, 2021 application of patch tests with results:    STANDARD Series        No Substance 2 days 4 days remarks   1 1 Almas Mix [C] - -     2 Colophony - -     3  2-Mercaptobenzothiazole  - -      4 Methylisothiazolinone - -    5 5 Carba Mix - -     6 Thiuram Mix [A] - -     7 Bisphenol A Epoxy Resin - -     8 T-Gjzy-Gxzwvsowofl-Formaldehyde Resin - -     9 Mercapto Mix [A] - -    10 10 Black Rubber Mix- PPD [B] - -     11 Potassium Dichromate  -  -     12 Balsam of [place] (Myroxylon Pereirae Resin) - -     13 Nickel Sulphate Hexahydrate - -     14 Mixed Dialkyl Thiourea - -    15 15 Paraben Mix [B] - -     16 Methyldibromo Glutaronitrile - -     17 Fragrance Mix - -     18 2-Bromo-2-Nitropropane-1,3-Diol (Bronopol) NA NA     19 Lyral - -    20 20 Tixocortol-21- Pivalate - -     21 Diazolidiyl Urea (Germall II) - -     22 Methyl Methacrylate NA NA     23 Cobalt (II) Chloride Hexahydrate - -     24 Fragrance Mix II  - -    25 25 Compositae Mix - -     26 Benzoyl Peroxide - -     27 Bacitracin - -     28 Formaldehyde - -     29 Methylchloroisothiazolinone /  Methylisothiazolinone - -    30 30 Corticosteroid Mix - -     31 Sodium Lauryl Sulfate - -     32 Lanolin Alcohol - -     33 Turpentine - -     34 Cetylstearylalcohol - -    35 35 Chlorhexidine Dicluconate - -     36 Budenoside - -     37 Imidazolidinyl Urea  - -     38 Ethyl-2 Cyanoacrylate - -     39 Quaternium 15 (Dowicil 200) - -    40 40 Decyl Glucoside - -    PRESERVATIVES & ANTIMICROBIALS        No Substance 2 days 4 days remarks   41 1  1,2-Benzisothiazoline-3-One, Sodium Salt - -     2  1,3,5-Tyler (2-Hydroxyethyl) - Hexahydrotriazine (Grotan BK) - -     3 9-Ewmfnhkiryjxo-0-Nitro-1, 3-Propanediol - -     4  3, 4, 4' - Triclocarban - -    45 5 4 - Chloro - 3 - Cresol - -     6 4 - Chloro - 4 - Xylenol (PCMX) - -     7 7-Ethylbicyclooxazolidine (Bioban [id number]) - -     8 Benzalkonium Chloride - -     9 Benzyl Alcohol - -    50 10 Cetalkonium Chloride - -     11 Cetylpyrimidine Chloride  - -     12 Chloroacetamide - -     13 DMDM Hydantoin - -     14 Glutaraldehyde - -    55 15 Triclosan - -     16 Glyoxal Trimeric Dihydrate - -     17 Iodopropynyl Butylcarbamate - -     18 Octylisothiazoline - -     19 Iodoform NA NA    60 20 (Nitrobutyl) Morpholine/(Ethylnitro-Trimethylene) Dimorpholine (Bioban P 1487) - -     21 Phenoxyethanol - -     22 Phenyl Salicylate - -     23 Povidone Iodine - -     24 Sodium Benzoate - -    65 25 Sodium Disulfite - -     26 Sorbic Acid - -     27 Thimerosal       28 Melamine Formaldehyde Resin       29 Ethylenediamine Dihydrochloride        Parabens      70 30 Butyl-P-Hydroxybenzoate - -     31 Ethyl-P-Hydroxybenzoate - -     32 Methyl-P-Hydroxybenzoate - -    73 33 Propyl-P-Hydroxybenzoate - -    EMULSIFIERS & ADDITIVES       No Substance 2 days 4 days remarks   74 1 Polyethylene Glycol-400 - -    75 2 Cocamidopropyl Betaine NA NA     3 Amerchol L101 - -     4 Propylene Glycol - -     5 Triethanolamine - -     6 Sorbitane Sesquiolate NA NA    80 7 Isopropylmyristate - -     8  Polysorbate 80  - -     9 Amidoamine   (Stearamidopropyl Dimethylamine) - -     10 Oleamidopropyl Dimethylamine NA NA     11 Lauryl Glucoside - -    85 12 Coconut Diethanolamide  - -     13 2-Hydroxy-4-Methoxy Benzophenone (Oxybenzone) - -     14 Benzophenone-4 (Sulisobenzon) - -     15 Propolis - -     16 Dexpanthenol - -    90 17 Carboxymethyl Cellulose Sodium NA NA     18 Abitol - -     19 Tert-Butylhydroquinone - -     20 Benzyl Salicylate - -      Antioxidant       21 Dodecyl Gallate - -    95 22 Butylhydroxyanisole (BHA) - -     23 Butylhydroxytoluene (BHT) - -     24 Di-Alpha-Tocopherol (Vit E) - -     25 Propyl Gallate - -     26 Zinc Pyrithione NA nA    100 27 Dimethylaminopropylamin (DMAPA) NA NA    PERFUMES, FLAVORS & PLANTS        No Substance 2 days 4 days remarks   101 1 Benzyl Cinnamate - -     2 Di-Limonene (Dipentene) - -     3 Cananga Odorata (Vincent Kaur) (I) - -     4 Lichen Acid Mix - -    105 5 Mentha Piperita Oil (Peppermint Oil) - -     6 Sesquiterpenelactone mix - -     7 Tea Tree Oil, Oxidized - -     8 Wood Tar Mix - -     9 Abietic Acid - -    110 10 Lavendula Angustifolia Oil (Lavender Oil) - -     11 Camphor  - -      Fragrance Mix I       12 Oakmoss Absolute - -     13 Eugenol - -     14 Geraniol - -    115 15 Hydroxycitronellal - -     16 Isoeugenol - -     17 Cinnamic Aldehyde - -     18 Cinnamic Alcohol  - -      Fragrance mix II       19 Citronellol - -    120 20 Alpha-Hexylcinnamic Aldehyde    - -     21 Citral - -     22 Farnesol - -    123 23 Coumarin - -    PLASTICS        No Substance 2 days 4 days remarks     Acrylates - -    124 1 2-Hydroxyethyl Methacrylate (HEMA) - -    125 2 1,4-Butandioldimethacrylate (BUDMA) - -     3  2-Ethylhexyl Acrylate - -     4 Bisphenol-A-Dimethacrylate  - -     5 Diurethane-Dimethacrylate - -     6 Ethyleneglycoldimethacrylate (EGDMA) - -    130 7 Pentaerythritoltriacrylate (MAYRA) - -     8 Triethylene Glycol Dimethacrylate (TEGDMA) - -       Synthetic material/additives        9 Z-Cefc-Bqzhjacprsc - -     10 Tricresyl Phosphate - -     11 2-Piuf-Zsrwcimhjhjkv - -    135 12 Bis (2-Ethylhexyl) Phthalate - -     13 Dibutylphthalate - -     14 Dimethylphthalate - -     15 Toluene-2,4-Diisocyanate - -     16 Diphenylmethane-4,4''-Diisocyanate - -      EPOXY RESIN SYSTEMS        Reactive Solvents - -    140 17 Cresyl Glycidyl Ether - -     18 Butyl Glycidyl Ether - -     19 Phenyl Glycidyl Ether - -     20 1,4-Butanediol Diglycidyl Ether - -     21 1,6-Hexanediole Diglycidyl Ether - -      Hardener / Accelerator - -    145 22 Triethylenetetramine - -     23 Diethylenetriamine - -     24 Isophorone Diamine (IPD) - -    148 25 N,N-Dimethyl-P-Toluidine - -    METALS (Implants / Dental)        No Substance 2 days 4 days remarks   149 1 Ammonium Heptamolybdate (IV) - -    150 2 Ammonium Tetrachloroplatinate - -     3 Indium (III) Chloride - -     4 Iridium (III) Chloride - -     5 Ferric Chloride - -     6 Manganese (II) Chloride - -    155 7 Niobium (V) Chloride - -     8 Palladium Chloride - -     9 Silver Nitrate - -     10 Gold Sodium Thiosulfate - -     11 Tantal - -    160 12 Tin (II) Chloride - -     13 Titanium (IV) Oxide - -     14 Titanium - -     15 Tungstic Acid, Sod Salt Dihydrat - -     16 Vanadium Pentoxide - -    165 17 Wolfram - -     18 Zinc Chloride - -     19 Zirconium (IV) Oxide - -     20 Ammoniated Murcury - -     21 Copper Sulfate Pentahydrate - -    170 22 Amalgam  - -     23 Aluminum Hydroxide - -    172 24 Platinum Tetrachloride - -      Results of patch tests:                         Interpretation:  - Negative                    A    = Allergic      (+) Erythema    TI   = Toxic/irritant   + E + Infiltration    RaP = Relevance at Present     ++ E/I + Papulovesicle   Rpr  = Relevance Previously     +++ E/I/P + Blister     nR   = No Relevance    Order for Drug allergy tests (prick & Intradermal & patch tests)  [x] Penicillin G  [x]  Ampicillin [x] Cefazolin  [x] Ceftriaxone  [x] Ceftazidime  [x] Others: Bactrim    DRUG ALLERGY TEST SERIES 06/21/2021)         Prick Tests         Substance/ Allergen Conc Result (20 min) Remarks    Histamine Hydrochloride (ALK) 0.1 mg/ml ++     NaCl 0.9% - Some dermographism    Cefazolin[1] 100 mg/ml -     Ceftriaxone* [2] 100 mg/ml -     Ceftazidime[3] 100 mg/ml -     Benzylpenicillin (Penicillin G) 1 [place] IU/ml (600 mg) -     Ampicillin 100mg/ml -     Bactrim 80/400 mg/ml -     Egg yellow fresh  -     Egg yolk fresh  -       Intradermal Tests   immed immed delay delay      Substance Conc 1st dil  2nd dil  days  days remarks               Cefazolin[1] 1:5 -        Ceftriaxone* [2] 1:5 -        Ceftazidime[3] 1:5 -        Benzylpenicillin (Penicillin G) 1:100 -        Ampicillin 1:10 -        Bactrim 1:100 -           Patch Tests  as is as is 1:2 1:2     As Is  Vas Substance Conc 2 days 4 days 2 days 4 days remarks   173 174 Cefazolin[1] 100 mg/ml - - - -    175 176 Ceftriaxone* [2] 100 mg/ml - - - -    177 178 Ceftazidime[3] 100 mg/ml - - - -    179 180 Benzylpenicillin (Penicillin G) 1 [place] IU/ml (600 mg) - - - -    181 182 Ampicillin 100 mg/ml - - - -    183 184 Bactrim 80/400 mg/ml - - - -    [1]  Cefalexin/Cefazolin-group        [2]    Cefotaxim-group     [3]     Cefuroxim-group    OTHER PRODUCTS        No Substance Conc  % solv 2 days 4 days remarks   185 1 Fresh egg yolk   - -    186 2 Fresh egg white   - -      Re-testing the metal on the arm (06/25/2021)        No Substance Conc  % solv 2 days 4 days remarks    1 Nickel (#13S)   - (+)     2 Gold (#10M)   - -     3 Palladium (#8M)   - -     4 Copper (#21M)   - -     5 Titanium (#14 M)   - -     6 Cobalt (#23 S)   - (+)     7 Titanium oxide (#13M)   - -     8          9          10         Take away on Sunday, make photo on Sunday and Monday/Tuesday and next week virtual consult to conclude ==>  all negative based on photos sent by patient 06/29/2021 and  06/30/2021. Maybe slight infiltrate over test field #1 and # 6 = Nickel and Cobalt  [x] No relevant allergic reaction observedt      Interpretation/ remarks:   Patient has an immediate type sensitization to house dust mites, which could indicate and atopic predisposition for atopic dermatitis    [] Patient information given   [] ACDS CAMP information's (# ....) to following compounds: .....   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    ==> Treatment Plan:  - try Vanicream cream 2-3 times daily and Aveeno to rebuild the skin barrier  - Protopic 0.1% 2xdaily    ==> avoid for implants Nickel and Cobalt, but Titanium and Copper can used.      Staff: : provider    Follow-up in Derm[facility] clinic if necessary  ___________________________    Start time: 11:40am  End time: 12:00 am    I spent a total of 20 minutes with Amelia Coker during today s  visit. This time was spent discussing all the individual test results, correlating them to the clinical relevance, counseling the  patient and/or coordinating care and performing allergy tests or procedures.           Again, thank you for allowing me to participate in the care of your patient.        Sincerely,        Pio Barrett MD

## 2021-06-30 NOTE — NURSING NOTE
Chief Complaint   Patient presents with     Allergy Testing Followup     Patch testing, final read of arm     Charmaine Day RN

## 2021-06-30 NOTE — PROGRESS NOTES
"[facility] Note  Virtual visit: store and forward video (Hop Skip Connectt connected) DoxAutoNavi phone with pictures in Epic, had problems with Amwell  Encounter Date: Jun 30, 2021  ____________________________________________    CC: No chief complaint on file.      HPI:  Ms. Amelia Coker is a(n) 46 year old female who presents today as a return patient for allergy consultation  - Follow-up in Derm-Allergy clinic virtually to discuss results of repeat patch tests  - otherwise feeling well in usual state of health    Physical exam:  General: In no acute distress, well-developed, well-nourished  Eyes: no conjunctivitis  ENT: no signs of rhinitis   Pulmonary: no wheezing or coughing  Skin:Focused examination of the skin on test sites was performed = see test results below    Earlier History and Allergy exams:    - carries a diagnosis of eczema. Appears in flexural folds, neck, eyelids. Uses topical steroids: clobetasol and triamcinolone as needed for flares.   - Saw an allergist in [place] who recommended she be seen here for allergy testing for a potential explanation of the sores on her scalp and skin allergies. Sensitive to lotions, shampoos, laundry detergent, dressings, and soaps and skin will \"break out.\" Prior allergy testing with allergy to cats, dogs, pollen, trees, grass, dust mites, mold, guinea pigs. Has a sore on her scalp that has been present for 4 months. A biopsy was done by PCP in [facility] that showed \"inflammatory cells.\"  - Also planning a spinal fusion and is allergic to nickel, white gold, copper, yellow gold, alfie silver and possibly ?other metals. When these contact her skin, she itchy red skin changes as well as blisters. For spinal fusion, plan is to use a  \"titanium alloy with nickel\" (Dr Adames, [facility] Has had allergy to surgical staples in the past. Has had allergy testing to 6 metals (unknown which) with dentistry and was " allergic to all 6 so uses porcelain crowns.   - hx latex allergy  .   - 5mm brown crust on a pink macule on R frontal-parietal scalp  - L>R hand has scale and xerosis and pinkness on side of fingers, between digits, and dorsal wrist.    Past Medical History:   Patient Active Problem List   Diagnosis     Migraine, unspecified, with intractable migraine, so stated, without mention of status migrainosus     Bell's palsy     Contact dermatitis and other eczema, due to unspecified cause     Scalp lesion     Lyme disease     PAC (premature atrial contraction)     Palpitations     Raynaud phenomenon     Chronic fatigue     Hair loss     Abnormal PFT     Shoulder joint instability     Family history of melanoma     Dry eyes     Keratitis sicca (H)     Family history of colon cancer     Pain in joint, upper arm     FLORIDALMA positive     Plantar fascial fibromatosis     Foraminal stenosis of lumbar region     DJD (degenerative joint disease), lumbar     Migraine without aura and without status migrainosus, not intractable     Lumbar radiculopathy     Ds DNA antibody positive     Muscular wasting and disuse atrophy, not elsewhere classified     Frontal headache     Telangiectasia of face     Lateral epicondylitis     Right shoulder pain     Plantar fasciitis     Skin lesion     AD (atopic dermatitis)     Heat sensitivity     Rotator cuff arthropathy, right     Gastroesophageal reflux disease, esophagitis presence not specified     Abnormal mammogram     Sternocleidomastoid muscle tenderness     Acute cervical myofascial strain, initial encounter     Spasm of muscle     Hyperlipidemia LDL goal <130     Biceps tendinopathy, right     Superficial swelling of scalp     Intractable right heel pain     Intractable episodic tension-type headache     Jaycob complexion     Lip lesion     Abdominal pain, epigastric     Abdominal distension     PONV (postoperative nausea and vomiting)     Menorrhalgia     Uterine polyp     Loose stools      Hyperactive bowel sounds     Abnormality of nail surface     Dry hair     Dry skin     Malaise and fatigue     Lymphadenopathy     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     Endometrium, hyperplasia - on recent CT scan     Pain in joint involving pelvic region and thigh, right     Right lateral abdominal pain     Right foot pain     Dental abscess - left mandible molar     Brachial plexopathy - right shoulder     Balance problems     Morbid obesity (H)     Sinus arrhythmia seen on electrocardiogram     Family history of ischemic heart disease - father at 46 massive MI     Elevated blood pressure reading without diagnosis of hypertension     Hypertrophy of breast     History of cold sores     AK (actinic keratosis) - both hands at the lateral thenar aspect.     Dermatitis     Musculoskeletal pain     Fatigue, unspecified type     TOS (thoracic outlet syndrome)     Past Medical History:   Diagnosis Date     Abnormal mammogram 9/21/2016    right breast      AD (atopic dermatitis) 10/29/2015     Allergic rhinitis due to other allergen      Arthritis      Bell's palsy      Chronic fatigue 6/6/2012     Chronic infection     MRSA - 2015 scalp and prior to Abdomen     Contact dermatitis and other eczema, due to unspecified cause     eczema     Contusion of left foot, initial encounter 3/16/2016     DJD (degenerative joint disease), lumbar 9/26/2013     DJD (degenerative joint disease), lumbar 9/26/2013     Ds DNA antibody positive 4/16/2014    borderline.      Elevated blood pressure reading without diagnosis of hypertension 12/24/2013     Facial contusion 12/15/2014    hit by horse      Foraminal stenosis of lumbar region 9/26/2013     Frontal headache 12/15/2014     Gastroesophageal reflux disease, esophagitis presence not specified 6/29/2016     Heat sensitivity 10/29/2015     HTN, goal below 140/90 9/23/2015     Hyperlipidemia LDL goal <130 8/30/2017     Irregular heart beat      Keratitis sicca (H)  10/31/2012     Left shoulder pain 9/26/2013     Lumbar radiculopathy 1/22/2014     Migraine headache 10/23/2013     Migraine, unspecified, with intractable migraine, so stated, without mention of status migrainosus      Motion sickness      MRSA infection 10/20/2015     Muscular wasting and disuse atrophy, not elsewhere classified 6/9/2014    right SCM, right wrist,       Numbness and tingling     both legs anf feet greater on the left     PAC (premature atrial contraction) 7/15/2010     Plantar fasciitis 9/23/2015     PONV (postoperative nausea and vomiting)      Skin lesion 10/20/2015    posterior superior scalp      Spasm of muscle 7/11/2017     Telangiectasia of face 12/19/2014     Tooth pain 10/20/2015    left lower primary molar        Allergies:  Allergies   Allergen Reactions     Ceftriaxone Anaphylaxis     Hives, rash, racing heart beat     Bactrim [Sulfamethoxazole W/Trimethoprim] Hives     Ciprofloxacin Other (See Comments)     Tendon Issues     Codeine Nausea and Vomiting     Codeine      Copper      Rash       Doxycycline      Loss of skin pigmentation, skin loss.     Gold      Rash       Iodine      Iodine-131 Hives     Levaquin [Levofloxacin] Other (See Comments)     Tendon issues with levaquin and cipro     Nickel      rash     Steel [Staples]      Rash from staples       Sulfa Drugs      Latex Rash     Penicillins Rash       Medications:  Current Outpatient Medications   Medication     Acetaminophen (TYLENOL PO)     cefTAZidime (FORTAZ) 1 GM vial     cefTAZidime (FORTAZ) 1 GM vial     clobetasol (TEMOVATE) 0.05 % external cream     emollient (VANICREAM) external cream     fexofenadine (ALLEGRA) 180 MG tablet     hydrocortisone valerate (WEST-DENNIS) 0.2 % external ointment     medical cannabis (Patient's own supply)     meloxicam (MOBIC) 15 MG tablet     mupirocin (BACTROBAN) 2 % external ointment     rizatriptan (MAXALT-MLT) 5 MG ODT     sucralfate (CARAFATE) 1 GM tablet     tacrolimus (PROTOPIC) 0.1  % external ointment     Current Facility-Administered Medications   Medication     ampicillin (OMNIPEN) 1 g vial INTRADERMAL     ampicillin (OMNIPEN) 1 g vial INTRADERMAL     ceFAZolin (ANCEF) injection 500 mg     ceFAZolin (ANCEF) injection 500 mg     cefTRIAXone (ROCEPHIN) injection 250 mg     cefTRIAXone (ROCEPHIN) injection 250 mg     penicillin g potassium [id number] unit vial INTRADERMAL     penicillin g potassium [id number] unit vial INTRADERMAL     sulfamethoxazole-trimethoprim (BACTRIM) injection 80 mg     sulfamethoxazole-trimethoprim (BACTRIM) injection 80 mg       Social History:  The patient . Patient has the following hobbies or non-occupational exposure: taking care of horses.     Family History:  Family History   Problem Relation Age of Onset     Diabetes Mother         adult     Cancer - colorectal Mother      Hypertension Mother      Allergies Mother      Cancer Mother         colon     Depression Mother      Gastrointestinal Disease Mother         ibs     Genitourinary Problems Mother         kidney cyst     Gynecology Mother         endometriosis     Lipids Mother      Thyroid Disease Mother      Arthritis Mother         RA     Heart Disease Maternal Grandfather         MI,  - 60's     Allergies Father      Unknown/Adopted Father      Heart Disease Father         mi-age mid 40's     Cardiovascular Father      Lipids Father      Respiratory Father         asthma     Cancer Father      Other Cancer Father         renal cancer     Depression Sister      Allergies Sister      Cancer Sister         vaginal     Gynecology Sister         endometriosis     Lipids Paternal Grandmother      Osteoporosis Paternal Grandmother      Thyroid Disease Paternal Grandmother      Respiratory Son         asthma     Allergies Son      Arthritis Sister      Allergies Brother      Heart Disease Brother      Allergies Daughter      Blood Disease Paternal Aunt         LGL T cell Leukemia     Rheumatoid Arthritis  Paternal Aunt      Kidney Disease Maternal Aunt      Lupus Maternal Aunt      Bladder Cancer Maternal Aunt        Previous Labs, Allergy Tests, Dermatopathology, Imaging:  See HPI    Referred By: Ahmet Johnson, DO  [address] [address]     Allergy Tests:    Past Allergy Test: see HPI    Order for Future Allergy Testing:    [x] Outpatient  [] Inpatient: Sandy..../ Bed ....       Skin Atopy (atopic dermatitis) [x] Yes   [] No  Contact allergies:   [x] Yes   [] No (soaps)  Urticaria/Angioedema  [x] Yes   [] No (hives and ?anaphylaxis from sulfa and ceftriaxone. Lip blisters from doxycycline.)  Rhinitis/Sinusitis:   [x] Yes   [] No   [x] seasonal [] perennial      (spring and fall)      Allergic Asthma:   [] Yes   [x] (No strong family history)  Pets :  [x] Yes   [] No  Dog (sleeps in basement in a crate. Mild allergy if she bathes him.)  Food Allergy:   [x] Yes   [] No (tested to pork by allergist in [place] and this was negative. Home test kit showed strong allergy to egg white and yolks.)  Drug allergies:   [x] Yes   [] No see chart  Leg ulcers:   [] Yes   [x] No  Hand eczema:   [x] Yes   [] No ?possibly   Leading hand:   [x] R   [] L       [] Ambidextrous                        Order for PATCH TESTS  Reason for tests (suspected allergy):  Possible allergic contact dermatitis to additives, metals, fragrances.  Known previous allergies: see HPI  Standardized panels  [x] Standard panel (40 tests)  [x] Preservatives & Antimicrobials (31 tests)  [x] Emulsifiers & Additives (25 tests)   [x] Perfumes/Flavours & Plants (25 tests)  [] Hairdresser panel (12 tests)  [] Rubber Chemicals (22 tests)  [x] Plastics (26 tests)  [] Colorants/Dyes/Food additives (20 tests)  [x] Metals (implants/dental) (24 tests)  [] Local anaesthetics/NSAIDs (13 tests)  [] Antibiotics & Antimycotics (14 tests)   [] Corticosteroids (15 tests)   [] Photopatch test (62 tests)   [] others: ...      [] Patient's own products:  ...    DO NOT test if chemical or biological identity is unknown!     always ask from patient the product information and safety sheets (MSDS)       Order for PRICK TESTS    Reason for tests (suspected allergy): atopic predisposition and possible allergy to eggs.   Known previous allergies: see HPI    Standardized prick panels  [x] Atopic panel (20 tests)  [] Pediatric Panel (12 tests)  [x] Milk, Meat, Eggs, Grains (20 tests) --> EGG WHITE & EGG YOLK  [] Dust, Epithelia, Feathers (10 tests)  [] Fish, Seafood, Shellfish (17 tests)  [] Nuts, Beans (14 tests)  [] Spice, Vegetable, Fruit (17 tests)  [x] Others: EGG WHITE & EGG YOLK      [] Patient's own products: ...    DO NOT test if chemical or biological identity is unknown!     always ask from patient the product information and safety sheets (MSDS)     Standardized intradermal tests  [] Penicillium notatum [] Aspergillus fumigatus [] House dust mites  [] Bee venom   [] Wasp venom !!Specific protocol with dilutions!!  [] Others: ...      Order for Drug allergy tests (prick & Intradermal & patch tests)  [x] Penicillin G  [x] Ampicillin [x] Cefazolin  [x] Ceftriaxone  [x] Ceftazidime  [x] Others: Bactrim      Atopy Screen (Placed 05/05/21 )    No Substance Readings (15 min) Evaluation   POS Histamine 1mg/ml ++    NEG NaCl 0.9% -      No Substance Readings (15 min) Evaluation   1 Alternaria alternata (tenuis)  (+)    2 Cladosporium herbarum (+)    3 Aspergillus fumigatus -    4 Penicillium notatum -    5 Dermatophagoides pteronyssinus ++    6 Dermatophagoides farinae ++    7 Dog epithelium (canis spp) -    8 Cat hair (alexis catus) -    9 Cockroach   (Blatella americana & germanica) -    10 Grass mix midwest   (Aubrie, Orchard, Redtop, Carlito) -    11 Morgan grass (sorghum halepense) -    12 Weed mix   (common Cocklebur, Lamb s quarters, rough redroot Pigweed, Dock/Sorrel) -    13 Mug wort (artemisia vulgare) -    14 Ragweed giant/short (ambrosia spp) -    15 English  Plantain (plantago lanceolata) -    16 Tree mix 1 (Pecan, Maple BHR, Oak RVW, american [place], black [place]) -    17 Red cedar (juniperus virginia) -    18 Tree mix 2   (white Rigoberto, river/red Birch, black [place], common [place], american Elm) -    19 Box elder/Maple mix (acer spp) -    20 [place] shagbark (carya ovata) -     21 Egg white #6 -    22 Egg yolk #7 -      Conclusion: patient is atopic with immediate type sensitization to house dust mites    ________________________________  RESULTS & EVALUATION of PATCH TESTS    Patch test readings after     [x] 2 days, [] 3 days [x] 4 days, [] 5 days    Jun 30, 2021 application of patch tests with results:    STANDARD Series        No Substance 2 days 4 days remarks   1 1 Almas Mix [C] - -     2 Colophony - -     3  2-Mercaptobenzothiazole  - -      4 Methylisothiazolinone - -    5 5 Carba Mix - -     6 Thiuram Mix [A] - -     7 Bisphenol A Epoxy Resin - -     8 O-Wzvc-Oxomirwjsvw-Formaldehyde Resin - -     9 Mercapto Mix [A] - -    10 10 Black Rubber Mix- PPD [B] - -     11 Potassium Dichromate  -  -     12 Balsam of [place] (Myroxylon Pereirae Resin) - -     13 Nickel Sulphate Hexahydrate - -     14 Mixed Dialkyl Thiourea - -    15 15 Paraben Mix [B] - -     16 Methyldibromo Glutaronitrile - -     17 Fragrance Mix - -     18 2-Bromo-2-Nitropropane-1,3-Diol (Bronopol) NA NA     19 Lyral - -    20 20 Tixocortol-21- Pivalate - -     21 Diazolidiyl Urea (Germall II) - -     22 Methyl Methacrylate NA NA     23 Cobalt (II) Chloride Hexahydrate - -     24 Fragrance Mix II  - -    25 25 Compositae Mix - -     26 Benzoyl Peroxide - -     27 Bacitracin - -     28 Formaldehyde - -     29 Methylchloroisothiazolinone / Methylisothiazolinone - -    30 30 Corticosteroid Mix - -     31 Sodium Lauryl Sulfate - -     32 Lanolin Alcohol - -     33 Turpentine - -     34 Cetylstearylalcohol - -    35 35 Chlorhexidine Dicluconate - -     36 Budenoside - -     37 Imidazolidinyl Urea  - -      38 Ethyl-2 Cyanoacrylate - -     39 Quaternium 15 (Dowicil 200) - -    40 40 Decyl Glucoside - -    PRESERVATIVES & ANTIMICROBIALS        No Substance 2 days 4 days remarks   41 1  1,2-Benzisothiazoline-3-One, Sodium Salt - -     2  1,3,5-Tyler (2-Hydroxyethyl) - Hexahydrotriazine (Grotan BK) - -     3 4-Pvaazppsnruox-0-Nitro-1, 3-Propanediol - -     4  3, 4, 4' - Triclocarban - -    45 5 4 - Chloro - 3 - Cresol - -     6 4 - Chloro - 4 - Xylenol (PCMX) - -     7 7-Ethylbicyclooxazolidine (Bioban HN7565) - -     8 Benzalkonium Chloride - -     9 Benzyl Alcohol - -    50 10 Cetalkonium Chloride - -     11 Cetylpyrimidine Chloride  - -     12 Chloroacetamide - -     13 DMDM Hydantoin - -     14 Glutaraldehyde - -    55 15 Triclosan - -     16 Glyoxal Trimeric Dihydrate - -     17 Iodopropynyl Butylcarbamate - -     18 Octylisothiazoline - -     19 Iodoform NA NA    60 20 (Nitrobutyl) Morpholine/(Ethylnitro-Trimethylene) Dimorpholine (Bioban P 1487) - -     21 Phenoxyethanol - -     22 Phenyl Salicylate - -     23 Povidone Iodine - -     24 Sodium Benzoate - -    65 25 Sodium Disulfite - -     26 Sorbic Acid - -     27 Thimerosal       28 Melamine Formaldehyde Resin       29 Ethylenediamine Dihydrochloride        Parabens      70 30 Butyl-P-Hydroxybenzoate - -     31 Ethyl-P-Hydroxybenzoate - -     32 Methyl-P-Hydroxybenzoate - -    73 33 Propyl-P-Hydroxybenzoate - -    EMULSIFIERS & ADDITIVES       No Substance 2 days 4 days remarks   74 1 Polyethylene Glycol-400 - -    75 2 Cocamidopropyl Betaine NA NA     3 Amerchol L101 - -     4 Propylene Glycol - -     5 Triethanolamine - -     6 Sorbitane Sesquiolate NA NA    80 7 Isopropylmyristate - -     8 Polysorbate 80  - -     9 Amidoamine   (Stearamidopropyl Dimethylamine) - -     10 Oleamidopropyl Dimethylamine NA NA     11 Lauryl Glucoside - -    85 12 Coconut Diethanolamide  - -     13 2-Hydroxy-4-Methoxy Benzophenone (Oxybenzone) - -     14 Benzophenone-4  (Sulisobenzon) - -     15 Propolis - -     16 Dexpanthenol - -    90 17 Carboxymethyl Cellulose Sodium NA NA     18 Abitol - -     19 Tert-Butylhydroquinone - -     20 Benzyl Salicylate - -      Antioxidant       21 Dodecyl Gallate - -    95 22 Butylhydroxyanisole (BHA) - -     23 Butylhydroxytoluene (BHT) - -     24 Di-Alpha-Tocopherol (Vit E) - -     25 Propyl Gallate - -     26 Zinc Pyrithione NA nA    100 27 Dimethylaminopropylamin (DMAPA) NA NA    PERFUMES, FLAVORS & PLANTS        No Substance 2 days 4 days remarks   101 1 Benzyl Cinnamate - -     2 Di-Limonene (Dipentene) - -     3 Cananga Odorata (Vincent Kaur) (I) - -     4 Lichen Acid Mix - -    105 5 Mentha Piperita Oil (Peppermint Oil) - -     6 Sesquiterpenelactone mix - -     7 Tea Tree Oil, Oxidized - -     8 Wood Tar Mix - -     9 Abietic Acid - -    110 10 Lavendula Angustifolia Oil (Lavender Oil) - -     11 Camphor  - -      Fragrance Mix I       12 Oakmoss Absolute - -     13 Eugenol - -     14 Geraniol - -    115 15 Hydroxycitronellal - -     16 Isoeugenol - -     17 Cinnamic Aldehyde - -     18 Cinnamic Alcohol  - -      Fragrance mix II       19 Citronellol - -    120 20 Alpha-Hexylcinnamic Aldehyde    - -     21 Citral - -     22 Farnesol - -    123 23 Coumarin - -    PLASTICS        No Substance 2 days 4 days remarks     Acrylates - -    124 1 2-Hydroxyethyl Methacrylate (HEMA) - -    125 2 1,4-Butandioldimethacrylate (BUDMA) - -     3  2-Ethylhexyl Acrylate - -     4 Bisphenol-A-Dimethacrylate  - -     5 Diurethane-Dimethacrylate - -     6 Ethyleneglycoldimethacrylate (EGDMA) - -    130 7 Pentaerythritoltriacrylate (MAYRA) - -     8 Triethylene Glycol Dimethacrylate (TEGDMA) - -      Synthetic material/additives        9 F-Hdth-Rmepcrwkpyj - -     10 Tricresyl Phosphate - -     11 1-Zlri-Bwjfpmoxgruab - -    135 12 Bis (2-Ethylhexyl) Phthalate - -     13 Dibutylphthalate - -     14 Dimethylphthalate - -     15 Toluene-2,4-Diisocyanate - -      16 Diphenylmethane-4,4''-Diisocyanate - -      EPOXY RESIN SYSTEMS        Reactive Solvents - -    140 17 Cresyl Glycidyl Ether - -     18 Butyl Glycidyl Ether - -     19 Phenyl Glycidyl Ether - -     20 1,4-Butanediol Diglycidyl Ether - -     21 1,6-Hexanediole Diglycidyl Ether - -      Hardener / Accelerator - -    145 22 Triethylenetetramine - -     23 Diethylenetriamine - -     24 Isophorone Diamine (IPD) - -    148 25 N,N-Dimethyl-P-Toluidine - -    METALS (Implants / Dental)        No Substance 2 days 4 days remarks   149 1 Ammonium Heptamolybdate (IV) - -    150 2 Ammonium Tetrachloroplatinate - -     3 Indium (III) Chloride - -     4 Iridium (III) Chloride - -     5 Ferric Chloride - -     6 Manganese (II) Chloride - -    155 7 Niobium (V) Chloride - -     8 Palladium Chloride - -     9 Silver Nitrate - -     10 Gold Sodium Thiosulfate - -     11 Tantal - -    160 12 Tin (II) Chloride - -     13 Titanium (IV) Oxide - -     14 Titanium - -     15 Tungstic Acid, Sod Salt Dihydrat - -     16 Vanadium Pentoxide - -    165 17 Wolfram - -     18 Zinc Chloride - -     19 Zirconium (IV) Oxide - -     20 Ammoniated Murcury - -     21 Copper Sulfate Pentahydrate - -    170 22 Amalgam  - -     23 Aluminum Hydroxide - -    172 24 Platinum Tetrachloride - -      Results of patch tests:                         Interpretation:  - Negative                    A    = Allergic      (+) Erythema    TI   = Toxic/irritant   + E + Infiltration    RaP = Relevance at Present     ++ E/I + Papulovesicle   Rpr  = Relevance Previously     +++ E/I/P + Blister     nR   = No Relevance    Order for Drug allergy tests (prick & Intradermal & patch tests)  [x] Penicillin G  [x] Ampicillin [x] Cefazolin  [x] Ceftriaxone  [x] Ceftazidime  [x] Others: Bactrim    DRUG ALLERGY TEST SERIES 06/21/2021)         Prick Tests         Substance/ Allergen Conc Result (20 min) Remarks    Histamine Hydrochloride (ALK) 0.1 mg/ml ++     NaCl 0.9% - Some  dermographism    Cefazolin[1] 100 mg/ml -     Ceftriaxone* [2] 100 mg/ml -     Ceftazidime[3] 100 mg/ml -     Benzylpenicillin (Penicillin G) 1 Lillie IU/ml (600 mg) -     Ampicillin 100mg/ml -     Bactrim 80/400 mg/ml -     Egg yellow fresh  -     Egg yolk fresh  -       Intradermal Tests   immed immed delay delay      Substance Conc 1st dil  2nd dil  days  days remarks               Cefazolin[1] 1:5 -        Ceftriaxone* [2] 1:5 -        Ceftazidime[3] 1:5 -        Benzylpenicillin (Penicillin G) 1:100 -        Ampicillin 1:10 -        Bactrim 1:100 -           Patch Tests  as is as is 1:2 1:2     As Is  Vas Substance Conc 2 days 4 days 2 days 4 days remarks   173 174 Cefazolin[1] 100 mg/ml - - - -    175 176 Ceftriaxone* [2] 100 mg/ml - - - -    177 178 Ceftazidime[3] 100 mg/ml - - - -    179 180 Benzylpenicillin (Penicillin G) 1 Lillie IU/ml (600 mg) - - - -    181 182 Ampicillin 100 mg/ml - - - -    183 184 Bactrim 80/400 mg/ml - - - -    [1]  Cefalexin/Cefazolin-group        [2]    Cefotaxim-group     [3]     Cefuroxim-group    OTHER PRODUCTS        No Substance Conc  % solv 2 days 4 days remarks   185 1 Fresh egg yolk   - -    186 2 Fresh egg white   - -      Re-testing the metal on the arm (06/25/2021)        No Substance Conc  % solv 2 days 4 days remarks    1 Nickel (#13S)   - (+)     2 Gold (#10M)   - -     3 Palladium (#8M)   - -     4 Copper (#21M)   - -     5 Titanium (#14 M)   - -     6 Cobalt (#23 S)   - (+)     7 Titanium oxide (#13M)   - -     8          9          10         Take away on Sunday, make photo on Sunday and Monday/Tuesday and next week virtual consult to conclude ==>  all negative based on photos sent by patient 06/29/2021 and 06/30/2021. Maybe slight infiltrate over test field #1 and # 6 = Nickel and Cobalt  [x] No relevant allergic reaction observedt      Interpretation/ remarks:   Patient has an immediate type sensitization to house dust mites, which could indicate and atopic  predisposition for atopic dermatitis    [] Patient information given   [] ACDS CAMP information's (# ....) to following compounds: .....   [x] General information's to following compounds: house dust mites and slight reaction to Nickel and [place]      Assessment & Plan:    ==> Final Diagnosis:     # Atopic predisposition    Atopic dermatitis with sensitive skin    Intermittent Rhinitis during changing season with postnasal drip = sensitization to house dust mite (info given)  *Chronic condition with progression/exacerbation    # Immediate and delayed drug allergies  *Chronic condition with progression/exacerbation    # recurrent irritant dermatitis to fragrances and maybe metals with atopy    No signs in patch tests for specific sensitization to additives, fragrances    In metals slight reaction to Nickel and Cobalt, but NOT to other metals  *Chronic condition with progression/exacerbation    These conclusions are made at the best of one's knowledge and belief based on the provided evidence such as patient's history and allergy test results and they can change over time or can be incomplete because of missing information's.    ==> Treatment Plan:  - try Vanicream cream 2-3 times daily and Aveeno to rebuild the skin barrier  - Protopic 0.1% 2xdaily    ==> avoid for implants Nickel and Cobalt, but Titanium and Copper can used.      Staff: : provider    Follow-up in Derm-Allergy clinic if necessary  ___________________________    Start time: 11:40am  End time: 12:00 am    I spent a total of 20 minutes with Amelia Coker during today s  visit. This time was spent discussing all the individual test results, correlating them to the clinical relevance, counseling the patient and/or coordinating care and performing allergy tests or procedures.

## 2021-07-01 NOTE — PROGRESS NOTES
Mone) Thyroid nodule FNA.   Using an ultrasound machine multiple gray scale images of the neck were obtained in both the transverse and longitudinal planes with the patient supine in the examination chair after informed consent was obtained. This revealed 1 thyroid nodule(s) that almost met the current SISSY guidelines for FNA, given her family history, possible rapid growth.  The skin was prepped and a total of 3 mL of 1% lidocaine with epinephrine was injected into the planned biopsy site(s).  Using the US for needle guidance, 3 passes were made into the left mid thyroid nodule using 25g needles.   The patient tolerated the procedure well. No blood loss or complications. The slides were prepared and sent to pathology. Will call the patient with results.

## 2021-07-06 NOTE — PROGRESS NOTES
Outpatient Physical Therapy Discharge Note     Patient: Amelia Coker  : 1975    Beginning/End Dates of Reporting Period:  21 to 3/15/21    Referring Provider: Otoniel Nelson PA-C    Therapy Diagnosis: cervical pain, arm pain     Client Self Report: Patient had MRI of the cervical and has significant impingement from discal changes, seeing neuro surgeon on . Patient placed on hold to see spine surgeon and pt never returned to PT      Goals:  Not known it met pt did not return    Plan:  Discharge from therapy.    Discharge:    Reason for Discharge: Patient has failed to schedule further appointments.        Discharge Plan: Patient to continue home program.  Thank you for the referral,            Ashleigh Martínez PT

## 2021-07-12 NOTE — PATIENT INSTRUCTIONS

## 2021-07-12 NOTE — PROGRESS NOTES
[address]  [facility] [address]  Phone: [phone]  Primary Provider: Otoniel Nelson  Pre-op Performing Provider: OTONIEL NELSON    PREOPERATIVE EVALUATION:  Today's date: 7/13/2021    Amelia Coker is a 46 year old female who presents for a preoperative evaluation.    Surgical Information:  Surgery/Procedure: DIVISION RIGHT PECTORALIS MUSCLE  Surgery Location: [facility]  Surgeon: Luzmaria   Surgery Date: 07/27  Time of Surgery: 11:30AM  Where patient plans to recover: At home with family  Fax number for surgical facility: Note does not need to be faxed, will be available electronically in Epic.    Type of Anesthesia Anticipated: Choice    Assessment & Plan     The proposed surgical procedure is considered LOW risk.    Right pectoralis muscle intervention for thoracic outlet syndrome.  1. Preop general physical exam  2. Brachial plexopathy - right shoulder  3. Thoracic outlet syndrome  Cleared for surgical intervention.      Medication Instructions:   - meloxicam (Mobic): HOLD 10 days before surgery.     RECOMMENDATION:  APPROVAL GIVEN to proceed with proposed procedure, without further diagnostic evaluation.    Subjective     HPI related to upcoming procedure: Chronic right brachial plexopathy versus thoracic outlet syndrome problems      Preop Questions 7/13/2021   1. Have you ever had a heart attack or stroke? No   2. Have you ever had surgery on your heart or blood vessels, such as a stent placement, a coronary artery bypass, or surgery on an artery in your head, neck, heart, or legs? No   3. Do you have chest pain with activity? No   4. Do you have a history of  heart failure? No   5. Do you currently have a cold, bronchitis or symptoms of other infection? No   6. Do you have a cough, shortness of breath, or wheezing? No   7. Do you or anyone in your family have previous history of blood clots? No   8. Do you or does anyone in your family have a serious  bleeding problem such as prolonged bleeding following surgeries or cuts? No   9. Have you ever had problems with anemia or been told to take iron pills? No   10. Have you had any abnormal blood loss such as black, tarry or bloody stools, or abnormal vaginal bleeding? No   11. Have you ever had a blood transfusion? No   12. Are you willing to have a blood transfusion if it is medically needed before, during, or after your surgery? Yes   13. Have you or any of your relatives ever had problems with anesthesia? No   14. Do you have sleep apnea, excessive snoring or daytime drowsiness? No   15. Do you have any artifical heart valves or other implanted medical devices like a pacemaker, defibrillator, or continuous glucose monitor? No   16. Do you have artificial joints? No   17. Are you allergic to latex? YES:    18. Is there any chance that you may be pregnant? No     Health Care Directive:  Patient does not have a Health Care Directive or Living Will: Discussed advance care planning with patient; however, patient declined at this time.    Preoperative Review of :   reviewed - no record of controlled substances prescribed.    Review of Systems  CONSTITUTIONAL: NEGATIVE for fever, chills, change in weight  INTEGUMENTARY/SKIN: NEGATIVE for worrisome rashes, moles or lesions  EYES: NEGATIVE for vision changes or irritation  ENT/MOUTH: NEGATIVE for ear, mouth and throat problems  RESP: NEGATIVE for significant cough or SOB  BREAST: NEGATIVE for masses, tenderness or discharge  CV: NEGATIVE for chest pain, palpitations or peripheral edema  GI: NEGATIVE for nausea, abdominal pain, heartburn, or change in bowel habits  : NEGATIVE for frequency, dysuria, or hematuria  MUSCULOSKELETAL: NEGATIVE for significant arthralgias or myalgia  NEURO: NEGATIVE for weakness, dizziness or paresthesias  ENDOCRINE: NEGATIVE for temperature intolerance, skin/hair changes  HEME: NEGATIVE for bleeding problems  PSYCHIATRIC: NEGATIVE for  changes in mood or affect    Patient Active Problem List    Diagnosis Date Noted     TOS (thoracic outlet syndrome) 06/07/2021     Priority: Medium     Added automatically from request for surgery [id number]       Dermatitis 03/03/2021     Priority: Medium     Musculoskeletal pain 03/03/2021     Priority: Medium     Fatigue, unspecified type 03/03/2021     Priority: Medium     AK (actinic keratosis) - both hands at the lateral thenar aspect. 12/08/2020     Priority: Medium     Hypertrophy of breast 11/05/2020     Priority: Medium     History of cold sores 11/05/2020     Priority: Medium     Elevated blood pressure reading without diagnosis of hypertension 09/24/2020     Priority: Medium     Balance problems 07/17/2020     Priority: Medium     Morbid obesity (H) 07/17/2020     Priority: Medium     Sinus arrhythmia seen on electrocardiogram 07/17/2020     Priority: Medium     Family history of ischemic heart disease - father at 46 massive MI 07/17/2020     Priority: Medium     Brachial plexopathy - right shoulder 05/21/2020     Priority: Medium     Dental abscess - left mandible molar 03/05/2020     Priority: Medium     Endometrium, hyperplasia - on recent CT scan 02/10/2020     Priority: Medium     Pain in joint involving pelvic region and thigh, right 02/10/2020     Priority: Medium     Right lateral abdominal pain 02/10/2020     Priority: Medium     Right foot pain 02/10/2020     Priority: Medium     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 10/10/2019     Priority: Medium     Abnormality of nail surface 08/29/2019     Priority: Medium     Dry hair 08/29/2019     Priority: Medium     Dry skin 08/29/2019     Priority: Medium     Malaise and fatigue 08/29/2019     Priority: Medium     Lymphadenopathy 08/29/2019     Priority: Medium     PONV (postoperative nausea and vomiting) 06/10/2019     Priority: Medium     Menorrhalgia 06/10/2019     Priority: Medium     Uterine polyp 06/10/2019     Priority: Medium      Loose stools 06/10/2019     Priority: Medium     Hyperactive bowel sounds 06/10/2019     Priority: Medium     Jaycob complexion 05/23/2019     Priority: Medium     Lip lesion 05/23/2019     Priority: Medium     Abdominal pain, epigastric 05/23/2019     Priority: Medium     Abdominal distension 05/23/2019     Priority: Medium     Intractable episodic tension-type headache 03/11/2019     Priority: Medium     Intractable right heel pain 05/29/2018     Priority: Medium     Superficial swelling of scalp 04/12/2018     Priority: Medium     Biceps tendinopathy, right 03/22/2018     Priority: Medium     Hyperlipidemia LDL goal <130 08/30/2017     Priority: Medium     Spasm of muscle 07/11/2017     Priority: Medium     Sternocleidomastoid muscle tenderness 04/05/2017     Priority: Medium     left         Acute cervical myofascial strain, initial encounter 04/05/2017     Priority: Medium     Abnormal mammogram 09/21/2016     Priority: Medium     right breast       Gastroesophageal reflux disease, esophagitis presence not specified 06/29/2016     Priority: Medium     IMO Regulatory Load OCT 2020       Rotator cuff arthropathy, right 03/07/2016     Priority: Medium     AD (atopic dermatitis) 10/29/2015     Priority: Medium     Heat sensitivity 10/29/2015     Priority: Medium     Skin lesion 10/20/2015     Priority: Medium     posterior superior scalp       Plantar fasciitis 09/23/2015     Priority: Medium     Lateral epicondylitis 03/16/2015     Priority: Medium     Problem list name updated by automated process. Provider to review       Right shoulder pain 03/16/2015     Priority: Medium     Telangiectasia of face 12/19/2014     Priority: Medium     Frontal headache 12/15/2014     Priority: Medium     Muscular wasting and disuse atrophy, not elsewhere classified 06/09/2014     Priority: Medium     right SCM, right wrist,        Ds DNA antibody positive 04/16/2014     Priority: Medium     borderline.       Lumbar radiculopathy  01/22/2014     Priority: Medium     Migraine without aura and without status migrainosus, not intractable 10/23/2013     Priority: Medium     Foraminal stenosis of lumbar region 09/26/2013     Priority: Medium     DJD (degenerative joint disease), lumbar 09/26/2013     Priority: Medium     FLORIDALMA positive 07/18/2013     Priority: Medium     Plantar fascial fibromatosis 07/18/2013     Priority: Medium     Pain in joint, upper arm 04/15/2013     Priority: Medium     Family history of colon cancer 03/05/2013     Priority: Medium     mother       Keratitis sicca (H) 10/31/2012     Priority: Medium     Dry eyes 10/15/2012     Priority: Medium     Family history of melanoma 08/24/2012     Priority: Medium     Shoulder joint instability 08/15/2012     Priority: Medium     Abnormal PFT 07/31/2012     Priority: Medium     question of left heart failure and/or shunting in need of further investigation       Chronic fatigue 06/06/2012     Priority: Medium     Hair loss 06/06/2012     Priority: Medium     Raynaud phenomenon 04/11/2012     Priority: Medium     PAC (premature atrial contraction) 07/15/2010     Priority: Medium     Palpitations 07/15/2010     Priority: Medium     Lyme disease 06/07/2010     Priority: Medium     Scalp lesion 05/26/2010     Priority: Medium     Migraine, unspecified, with intractable migraine, so stated, without mention of status migrainosus 04/04/2003     Priority: Medium     Problem list name updated by automated process. Provider to review  IMO Regulatory Load OCT 2020       Bell's palsy 04/04/2003     Priority: Medium     Contact dermatitis and other eczema, due to unspecified cause 04/04/2003     Priority: Medium      Past Medical History:   Diagnosis Date     Abnormal mammogram 9/21/2016    right breast      AD (atopic dermatitis) 10/29/2015     Allergic rhinitis due to other allergen      Arthritis      Bell's palsy      Chronic fatigue 6/6/2012     Chronic infection     MRSA - 2015 scalp and  prior to Abdomen     Contact dermatitis and other eczema, due to unspecified cause     eczema     Contusion of left foot, initial encounter 3/16/2016     DJD (degenerative joint disease), lumbar 9/26/2013     DJD (degenerative joint disease), lumbar 9/26/2013     Ds DNA antibody positive 4/16/2014    borderline.      Elevated blood pressure reading without diagnosis of hypertension 12/24/2013     Facial contusion 12/15/2014    hit by horse      Foraminal stenosis of lumbar region 9/26/2013     Frontal headache 12/15/2014     Gastroesophageal reflux disease, esophagitis presence not specified 6/29/2016     Heat sensitivity 10/29/2015     HTN, goal below 140/90 9/23/2015     Hyperlipidemia LDL goal <130 8/30/2017     Irregular heart beat      Keratitis sicca (H) 10/31/2012     Left shoulder pain 9/26/2013     Lumbar radiculopathy 1/22/2014     Migraine headache 10/23/2013     Migraine, unspecified, with intractable migraine, so stated, without mention of status migrainosus      Motion sickness      MRSA infection 10/20/2015     Muscular wasting and disuse atrophy, not elsewhere classified 6/9/2014    right SCM, right wrist,       Numbness and tingling     both legs anf feet greater on the left     PAC (premature atrial contraction) 7/15/2010     Plantar fasciitis 9/23/2015     PONV (postoperative nausea and vomiting)      Skin lesion 10/20/2015    posterior superior scalp      Spasm of muscle 7/11/2017     Telangiectasia of face 12/19/2014     Tooth pain 10/20/2015    left lower primary molar      Past Surgical History:   Procedure Laterality Date     AS INJ TRANSFORAMIN EPIDURAL, LUMB/SACR SINGLE       COLONOSCOPY  3/11/2013    Procedure: COLONOSCOPY;  colonoscopy;  Surgeon: Lobito Mas MD;  Location: [facility]     COLONOSCOPY WITH CO2 INSUFFLATION N/A 11/19/2018    Procedure: COLONOSCOPY WITH CO2 INSUFFLATION;  Surgeon: Goyo Blank MD;  Location: [facility]     DECOMPRESSION LUMBAR TWO LEVELS Bilateral  12/8/2016    Procedure: DECOMPRESSION LUMBAR TWO LEVELS;  Surgeon: Bang Burrell MD;  Location: [facility]     DILATION AND CURETTAGE, HYSTEROSCOPY, ABLATE ENDOMETRIUM NOVASURE, COMBINED N/A 6/12/2019    Procedure: hysteroscopy, dilation & curettage, polypectomy, endometrial ablation;  Surgeon: Fredy Pham MD;  Location: [facility]     ESOPHAGOSCOPY, GASTROSCOPY, DUODENOSCOPY (EGD), COMBINED  11/8/2013    Procedure: COMBINED ESOPHAGOSCOPY, GASTROSCOPY, DUODENOSCOPY (EGD), BIOPSY SINGLE OR MULTIPLE;  Esophagoscopy, Gastroscopy, Duodenoscopy EGD with multiple biopsies;  Surgeon: Teja Koch MD;  Location: [facility]     ESOPHAGOSCOPY, GASTROSCOPY, DUODENOSCOPY (EGD), COMBINED N/A 2/18/2021    Procedure: ESOPHAGOGASTRODUODENOSCOPY, WITH BIOPSY;  Surgeon: Blanca Ba MD;  Location: [facility]     HC REMOVAL OF TONSILS,<13 Y/O      Tonsils <10y.o.     HC TOOTH EXTRACTION W/FORCEP       INJECT BLOCK MEDIAL BRANCH CERVICAL/THORACIC/LUMBAR N/A 7/23/2020    Procedure: Sacral 1,2,3 Lateral Branch Blocks and lumbar 5 medial branch blocks bilaterally;  Surgeon: Maurisio Goetz MD;  [facility]: [facility]     INJECT JOINT SACROILIAC Left 9/24/2020    Procedure: Left Lumbar 5 Sacral 1 nerve root injections.;  Surgeon: Maurisio Goetz MD;  Location: [facility]     REPAIR TENDON ELBOW  7/9/2014    Procedure: REPAIR TENDON ELBOW;  Surgeon: Chris Johnston MD;  Location: [facility]     ULTRASONIC REMOVAL SOFT TISSUE (LOCATION) Right 11/21/2018    Procedure: Tenex right foot;  Surgeon: Patel Martínez DO;  Location: [facility]     Current Outpatient Medications   Medication Sig Dispense Refill     Acetaminophen (TYLENOL PO) Take 1,000 mg by mouth every 8 hours as needed        cefTAZidime (FORTAZ) 1 GM vial For Allergy Testing in Allergy Clinic Only 1 each 0     cefTAZidime (FORTAZ) 1 GM vial For Allergy Testing in Allergy Clinic Only 1 each 0     clobetasol (TEMOVATE) 0.05 % external cream Apply topically 2 times daily 60 g 1      emollient (VANICREAM) external cream Apply topically 2 times daily 453 g 3     fexofenadine (ALLEGRA) 180 MG tablet Take 1 tablet by mouth daily Reported on 4/5/2017       hydrocortisone valerate (WEST-DENNIS) 0.2 % external ointment APPLY TOPICALLY 2 TIMES DAILY       medical cannabis (Patient's own supply) Take 0.7 Doses by mouth See Admin Instructions (The purpose of this order is to document that the patient reports taking medical cannabis.  This is not a prescription, and is not used to certify that the patient has a qualifying medical condition.)       meloxicam (MOBIC) 15 MG tablet Take 15 mg by mouth daily       mupirocin (BACTROBAN) 2 % external ointment Apply topically 3 times daily 30 g 0     rizatriptan (MAXALT-MLT) 5 MG ODT Take 1 tablet (5 mg) by mouth at onset of headache for migraine May repeat in 2 hours. Max 6 tablets/24 hours. 30 tablet 1     sucralfate (CARAFATE) 1 GM tablet Take 1 tablet (1 g) by mouth 4 times daily 40 tablet 0     tacrolimus (PROTOPIC) 0.1 % external ointment Apply topically 2 times daily On eczematous lesions 2xdaily 60 g 3       Allergies   Allergen Reactions     Ceftriaxone Anaphylaxis     Hives, rash, racing heart beat     Bactrim [Sulfamethoxazole W/Trimethoprim] Hives     Ciprofloxacin Other (See Comments)     Tendon Issues     Codeine Nausea and Vomiting     Codeine      Copper      Rash       Doxycycline      Loss of skin pigmentation, skin loss.     Gold      Rash       Iodine      Iodine-131 Hives     Levaquin [Levofloxacin] Other (See Comments)     Tendon issues with levaquin and cipro     Nickel      rash     Steel [Staples]      Rash from staples       Sulfa Drugs      Latex Rash     Penicillins Rash        Social History     Tobacco Use     Smoking status: Never Smoker     Smokeless tobacco: Never Used   Substance Use Topics     Alcohol use: No     Comment: social     Family History   Problem Relation Age of Onset     Diabetes Mother         adult     Cancer -  colorectal Mother      Hypertension Mother      Allergies Mother      Cancer Mother         colon     Depression Mother      Gastrointestinal Disease Mother         ibs     Genitourinary Problems Mother         kidney cyst     Gynecology Mother         endometriosis     Lipids Mother      Thyroid Disease Mother      Arthritis Mother         RA     Heart Disease Maternal Grandfather         MI,  - 60's     Allergies Father      Unknown/Adopted Father      Heart Disease Father         mi-age mid 40's     Cardiovascular Father      Lipids Father      Respiratory Father         asthma     Cancer Father      Other Cancer Father         renal cancer     Depression Sister      Allergies Sister      Cancer Sister         vaginal     Gynecology Sister         endometriosis     Lipids Paternal Grandmother      Osteoporosis Paternal Grandmother      Thyroid Disease Paternal Grandmother      Respiratory Son         asthma     Allergies Son      Arthritis Sister      Allergies Brother      Heart Disease Brother      Allergies Daughter      Blood Disease Paternal Aunt         LGL T cell Leukemia     Rheumatoid Arthritis Paternal Aunt      Kidney Disease Maternal Aunt      Lupus Maternal Aunt      Bladder Cancer Maternal Aunt      History   Drug Use No         Objective     There were no vitals taken for this visit.    Physical Exam    GENERAL APPEARANCE: healthy, alert and no distress     EYES: EOMI,  PERRL     HENT: ear canals and TM's normal and nose and mouth without ulcers or lesions     NECK: no adenopathy, no asymmetry, masses, or scars and thyroid normal to palpation     RESP: lungs clear to auscultation - no rales, rhonchi or wheezes     CV: regular rates and rhythm, normal S1 S2, no S3 or S4 and no murmur, click or rub     ABDOMEN:  soft, nontender, no HSM or masses and bowel sounds normal     MS: extremities normal- no gross deformities noted, no evidence of inflammation in joints, FROM in all extremities.      SKIN: no suspicious lesions or rashes     NEURO: Normal strength and tone, sensory exam grossly normal, mentation intact and speech normal     PSYCH: mentation appears normal. and affect normal/bright     LYMPHATICS: No cervical adenopathy    Recent Labs   Lab Test 05/25/21  0740 11/05/20  1646 07/27/20  1547   HGB 13.2  --  11.8     --  280    139 139   POTASSIUM 3.9 3.8 3.9   CR 0.62 0.73 0.58        Diagnostics:  Labs pending at this time.  Results will be reviewed when available.   No EKG required for low risk surgery (cataract, skin procedure, breast biopsy, etc).    Revised Cardiac Risk Index (RCRI):  The patient has the following serious cardiovascular risks for perioperative complications:   - No serious cardiac risks = 0 points     RCRI Interpretation: 1 point: Class II (low risk - 0.9% complication rate)           Signed Electronically by: Otoniel Manuel PA-C  Copy of this evaluation report is provided to requesting physician.

## 2021-07-12 NOTE — H&P (VIEW-ONLY)
[address]  [facility] [address]  Phone: [phone]  Primary Provider: Otoniel Nelson  Pre-op Performing Provider: OTONIEL NELSON    PREOPERATIVE EVALUATION:  Today's date: 7/13/2021    Amelia Coker is a 46 year old female who presents for a preoperative evaluation.    Surgical Information:  Surgery/Procedure: DIVISION RIGHT PECTORALIS MUSCLE  Surgery Location: [facility]  Surgeon: Luzmaria   Surgery Date: 07/27  Time of Surgery: 11:30AM  Where patient plans to recover: At home with family  Fax number for surgical facility: Note does not need to be faxed, will be available electronically in Epic.    Type of Anesthesia Anticipated: Choice    Assessment & Plan     The proposed surgical procedure is considered LOW risk.    Right pectoralis muscle intervention for thoracic outlet syndrome.  1. Preop general physical exam  2. Brachial plexopathy - right shoulder  3. Thoracic outlet syndrome  Cleared for surgical intervention.      Medication Instructions:   - meloxicam (Mobic): HOLD 10 days before surgery.     RECOMMENDATION:  APPROVAL GIVEN to proceed with proposed procedure, without further diagnostic evaluation.    Subjective     HPI related to upcoming procedure: Chronic right brachial plexopathy versus thoracic outlet syndrome problems      Preop Questions 7/13/2021   1. Have you ever had a heart attack or stroke? No   2. Have you ever had surgery on your heart or blood vessels, such as a stent placement, a coronary artery bypass, or surgery on an artery in your head, neck, heart, or legs? No   3. Do you have chest pain with activity? No   4. Do you have a history of  heart failure? No   5. Do you currently have a cold, bronchitis or symptoms of other infection? No   6. Do you have a cough, shortness of breath, or wheezing? No   7. Do you or anyone in your family have previous history of blood clots? No   8. Do you or does anyone in your family have a serious  bleeding problem such as prolonged bleeding following surgeries or cuts? No   9. Have you ever had problems with anemia or been told to take iron pills? No   10. Have you had any abnormal blood loss such as black, tarry or bloody stools, or abnormal vaginal bleeding? No   11. Have you ever had a blood transfusion? No   12. Are you willing to have a blood transfusion if it is medically needed before, during, or after your surgery? Yes   13. Have you or any of your relatives ever had problems with anesthesia? No   14. Do you have sleep apnea, excessive snoring or daytime drowsiness? No   15. Do you have any artifical heart valves or other implanted medical devices like a pacemaker, defibrillator, or continuous glucose monitor? No   16. Do you have artificial joints? No   17. Are you allergic to latex? YES:    18. Is there any chance that you may be pregnant? No     Health Care Directive:  Patient does not have a Health Care Directive or Living Will: Discussed advance care planning with patient; however, patient declined at this time.    Preoperative Review of :   reviewed - no record of controlled substances prescribed.    Review of Systems  CONSTITUTIONAL: NEGATIVE for fever, chills, change in weight  INTEGUMENTARY/SKIN: NEGATIVE for worrisome rashes, moles or lesions  EYES: NEGATIVE for vision changes or irritation  ENT/MOUTH: NEGATIVE for ear, mouth and throat problems  RESP: NEGATIVE for significant cough or SOB  BREAST: NEGATIVE for masses, tenderness or discharge  CV: NEGATIVE for chest pain, palpitations or peripheral edema  GI: NEGATIVE for nausea, abdominal pain, heartburn, or change in bowel habits  : NEGATIVE for frequency, dysuria, or hematuria  MUSCULOSKELETAL: NEGATIVE for significant arthralgias or myalgia  NEURO: NEGATIVE for weakness, dizziness or paresthesias  ENDOCRINE: NEGATIVE for temperature intolerance, skin/hair changes  HEME: NEGATIVE for bleeding problems  PSYCHIATRIC: NEGATIVE for  changes in mood or affect    Patient Active Problem List    Diagnosis Date Noted     TOS (thoracic outlet syndrome) 06/07/2021     Priority: Medium     Added automatically from request for surgery [id number]       Dermatitis 03/03/2021     Priority: Medium     Musculoskeletal pain 03/03/2021     Priority: Medium     Fatigue, unspecified type 03/03/2021     Priority: Medium     AK (actinic keratosis) - both hands at the lateral thenar aspect. 12/08/2020     Priority: Medium     Hypertrophy of breast 11/05/2020     Priority: Medium     History of cold sores 11/05/2020     Priority: Medium     Elevated blood pressure reading without diagnosis of hypertension 09/24/2020     Priority: Medium     Balance problems 07/17/2020     Priority: Medium     Morbid obesity (H) 07/17/2020     Priority: Medium     Sinus arrhythmia seen on electrocardiogram 07/17/2020     Priority: Medium     Family history of ischemic heart disease - father at 46 massive MI 07/17/2020     Priority: Medium     Brachial plexopathy - right shoulder 05/21/2020     Priority: Medium     Dental abscess - left mandible molar 03/05/2020     Priority: Medium     Endometrium, hyperplasia - on recent CT scan 02/10/2020     Priority: Medium     Pain in joint involving pelvic region and thigh, right 02/10/2020     Priority: Medium     Right lateral abdominal pain 02/10/2020     Priority: Medium     Right foot pain 02/10/2020     Priority: Medium     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 10/10/2019     Priority: Medium     Abnormality of nail surface 08/29/2019     Priority: Medium     Dry hair 08/29/2019     Priority: Medium     Dry skin 08/29/2019     Priority: Medium     Malaise and fatigue 08/29/2019     Priority: Medium     Lymphadenopathy 08/29/2019     Priority: Medium     PONV (postoperative nausea and vomiting) 06/10/2019     Priority: Medium     Menorrhalgia 06/10/2019     Priority: Medium     Uterine polyp 06/10/2019     Priority: Medium      Loose stools 06/10/2019     Priority: Medium     Hyperactive bowel sounds 06/10/2019     Priority: Medium     Jaycob complexion 05/23/2019     Priority: Medium     Lip lesion 05/23/2019     Priority: Medium     Abdominal pain, epigastric 05/23/2019     Priority: Medium     Abdominal distension 05/23/2019     Priority: Medium     Intractable episodic tension-type headache 03/11/2019     Priority: Medium     Intractable right heel pain 05/29/2018     Priority: Medium     Superficial swelling of scalp 04/12/2018     Priority: Medium     Biceps tendinopathy, right 03/22/2018     Priority: Medium     Hyperlipidemia LDL goal <130 08/30/2017     Priority: Medium     Spasm of muscle 07/11/2017     Priority: Medium     Sternocleidomastoid muscle tenderness 04/05/2017     Priority: Medium     left         Acute cervical myofascial strain, initial encounter 04/05/2017     Priority: Medium     Abnormal mammogram 09/21/2016     Priority: Medium     right breast       Gastroesophageal reflux disease, esophagitis presence not specified 06/29/2016     Priority: Medium     IMO Regulatory Load OCT 2020       Rotator cuff arthropathy, right 03/07/2016     Priority: Medium     AD (atopic dermatitis) 10/29/2015     Priority: Medium     Heat sensitivity 10/29/2015     Priority: Medium     Skin lesion 10/20/2015     Priority: Medium     posterior superior scalp       Plantar fasciitis 09/23/2015     Priority: Medium     Lateral epicondylitis 03/16/2015     Priority: Medium     Problem list name updated by automated process. Provider to review       Right shoulder pain 03/16/2015     Priority: Medium     Telangiectasia of face 12/19/2014     Priority: Medium     Frontal headache 12/15/2014     Priority: Medium     Muscular wasting and disuse atrophy, not elsewhere classified 06/09/2014     Priority: Medium     right SCM, right wrist,        Ds DNA antibody positive 04/16/2014     Priority: Medium     borderline.       Lumbar radiculopathy  01/22/2014     Priority: Medium     Migraine without aura and without status migrainosus, not intractable 10/23/2013     Priority: Medium     Foraminal stenosis of lumbar region 09/26/2013     Priority: Medium     DJD (degenerative joint disease), lumbar 09/26/2013     Priority: Medium     FLORIDALMA positive 07/18/2013     Priority: Medium     Plantar fascial fibromatosis 07/18/2013     Priority: Medium     Pain in joint, upper arm 04/15/2013     Priority: Medium     Family history of colon cancer 03/05/2013     Priority: Medium     mother       Keratitis sicca (H) 10/31/2012     Priority: Medium     Dry eyes 10/15/2012     Priority: Medium     Family history of melanoma 08/24/2012     Priority: Medium     Shoulder joint instability 08/15/2012     Priority: Medium     Abnormal PFT 07/31/2012     Priority: Medium     question of left heart failure and/or shunting in need of further investigation       Chronic fatigue 06/06/2012     Priority: Medium     Hair loss 06/06/2012     Priority: Medium     Raynaud phenomenon 04/11/2012     Priority: Medium     PAC (premature atrial contraction) 07/15/2010     Priority: Medium     Palpitations 07/15/2010     Priority: Medium     Lyme disease 06/07/2010     Priority: Medium     Scalp lesion 05/26/2010     Priority: Medium     Migraine, unspecified, with intractable migraine, so stated, without mention of status migrainosus 04/04/2003     Priority: Medium     Problem list name updated by automated process. Provider to review  IMO Regulatory Load OCT 2020       Bell's palsy 04/04/2003     Priority: Medium     Contact dermatitis and other eczema, due to unspecified cause 04/04/2003     Priority: Medium      Past Medical History:   Diagnosis Date     Abnormal mammogram 9/21/2016    right breast      AD (atopic dermatitis) 10/29/2015     Allergic rhinitis due to other allergen      Arthritis      Bell's palsy      Chronic fatigue 6/6/2012     Chronic infection     MRSA - 2015 scalp and  prior to Abdomen     Contact dermatitis and other eczema, due to unspecified cause     eczema     Contusion of left foot, initial encounter 3/16/2016     DJD (degenerative joint disease), lumbar 9/26/2013     DJD (degenerative joint disease), lumbar 9/26/2013     Ds DNA antibody positive 4/16/2014    borderline.      Elevated blood pressure reading without diagnosis of hypertension 12/24/2013     Facial contusion 12/15/2014    hit by horse      Foraminal stenosis of lumbar region 9/26/2013     Frontal headache 12/15/2014     Gastroesophageal reflux disease, esophagitis presence not specified 6/29/2016     Heat sensitivity 10/29/2015     HTN, goal below 140/90 9/23/2015     Hyperlipidemia LDL goal <130 8/30/2017     Irregular heart beat      Keratitis sicca (H) 10/31/2012     Left shoulder pain 9/26/2013     Lumbar radiculopathy 1/22/2014     Migraine headache 10/23/2013     Migraine, unspecified, with intractable migraine, so stated, without mention of status migrainosus      Motion sickness      MRSA infection 10/20/2015     Muscular wasting and disuse atrophy, not elsewhere classified 6/9/2014    right SCM, right wrist,       Numbness and tingling     both legs anf feet greater on the left     PAC (premature atrial contraction) 7/15/2010     Plantar fasciitis 9/23/2015     PONV (postoperative nausea and vomiting)      Skin lesion 10/20/2015    posterior superior scalp      Spasm of muscle 7/11/2017     Telangiectasia of face 12/19/2014     Tooth pain 10/20/2015    left lower primary molar      Past Surgical History:   Procedure Laterality Date     AS INJ TRANSFORAMIN EPIDURAL, LUMB/SACR SINGLE       COLONOSCOPY  3/11/2013    Procedure: COLONOSCOPY;  colonoscopy;  Surgeon: Lobito Mas MD;  Location: [facility]     COLONOSCOPY WITH CO2 INSUFFLATION N/A 11/19/2018    Procedure: COLONOSCOPY WITH CO2 INSUFFLATION;  Surgeon: Goyo Blank MD;  Location: [facility]     DECOMPRESSION LUMBAR TWO LEVELS Bilateral  12/8/2016    Procedure: DECOMPRESSION LUMBAR TWO LEVELS;  Surgeon: Bang Burrell MD;  Location: [facility]     DILATION AND CURETTAGE, HYSTEROSCOPY, ABLATE ENDOMETRIUM NOVASURE, COMBINED N/A 6/12/2019    Procedure: hysteroscopy, dilation & curettage, polypectomy, endometrial ablation;  Surgeon: Fredy Pham MD;  Location: [facility]     ESOPHAGOSCOPY, GASTROSCOPY, DUODENOSCOPY (EGD), COMBINED  11/8/2013    Procedure: COMBINED ESOPHAGOSCOPY, GASTROSCOPY, DUODENOSCOPY (EGD), BIOPSY SINGLE OR MULTIPLE;  Esophagoscopy, Gastroscopy, Duodenoscopy EGD with multiple biopsies;  Surgeon: Teja Koch MD;  Location: [facility]     ESOPHAGOSCOPY, GASTROSCOPY, DUODENOSCOPY (EGD), COMBINED N/A 2/18/2021    Procedure: ESOPHAGOGASTRODUODENOSCOPY, WITH BIOPSY;  Surgeon: Blanca Ba MD;  Location: [facility]     HC REMOVAL OF TONSILS,<9 Y/O      Tonsils <10y.o.     HC TOOTH EXTRACTION W/FORCEP       INJECT BLOCK MEDIAL BRANCH CERVICAL/THORACIC/LUMBAR N/A 7/23/2020    Procedure: Sacral 1,2,3 Lateral Branch Blocks and lumbar 5 medial branch blocks bilaterally;  Surgeon: Maurisio Goetz MD;  Location: [facility]     INJECT JOINT SACROILIAC Left 9/24/2020    Procedure: Left Lumbar 5 Sacral 1 nerve root injections.;  Surgeon: Maurisio Goetz MD;  Location: [facility]     REPAIR TENDON ELBOW  7/9/2014    Procedure: REPAIR TENDON ELBOW;  Surgeon: Chris Johnston MD;  Location: [facility]     ULTRASONIC REMOVAL SOFT TISSUE (LOCATION) Right 11/21/2018    Procedure: Tenex right foot;  Surgeon: Patel Martínez DO;  Location: [facility]     Current Outpatient Medications   Medication Sig Dispense Refill     Acetaminophen (TYLENOL PO) Take 1,000 mg by mouth every 8 hours as needed        cefTAZidime (FORTAZ) 1 GM vial For Allergy Testing in Allergy Clinic Only 1 each 0     cefTAZidime (FORTAZ) 1 GM vial For Allergy Testing in Allergy Clinic Only 1 each 0     clobetasol (TEMOVATE) 0.05 % external cream Apply topically 2 times daily 60 g 1      emollient (VANICREAM) external cream Apply topically 2 times daily 453 g 3     fexofenadine (ALLEGRA) 180 MG tablet Take 1 tablet by mouth daily Reported on 4/5/2017       hydrocortisone valerate (WEST-DENNIS) 0.2 % external ointment APPLY TOPICALLY 2 TIMES DAILY       medical cannabis (Patient's own supply) Take 0.7 Doses by mouth See Admin Instructions (The purpose of this order is to document that the patient reports taking medical cannabis.  This is not a prescription, and is not used to certify that the patient has a qualifying medical condition.)       meloxicam (MOBIC) 15 MG tablet Take 15 mg by mouth daily       mupirocin (BACTROBAN) 2 % external ointment Apply topically 3 times daily 30 g 0     rizatriptan (MAXALT-MLT) 5 MG ODT Take 1 tablet (5 mg) by mouth at onset of headache for migraine May repeat in 2 hours. Max 6 tablets/24 hours. 30 tablet 1     sucralfate (CARAFATE) 1 GM tablet Take 1 tablet (1 g) by mouth 4 times daily 40 tablet 0     tacrolimus (PROTOPIC) 0.1 % external ointment Apply topically 2 times daily On eczematous lesions 2xdaily 60 g 3       Allergies   Allergen Reactions     Ceftriaxone Anaphylaxis     Hives, rash, racing heart beat     Bactrim [Sulfamethoxazole W/Trimethoprim] Hives     Ciprofloxacin Other (See Comments)     Tendon Issues     Codeine Nausea and Vomiting     Codeine      Copper      Rash       Doxycycline      Loss of skin pigmentation, skin loss.     Gold      Rash       Iodine      Iodine-131 Hives     Levaquin [Levofloxacin] Other (See Comments)     Tendon issues with levaquin and cipro     Nickel      rash     Steel [Staples]      Rash from staples       Sulfa Drugs      Latex Rash     Penicillins Rash        Social History     Tobacco Use     Smoking status: Never Smoker     Smokeless tobacco: Never Used   Substance Use Topics     Alcohol use: No     Comment: social     Family History   Problem Relation Age of Onset     Diabetes Mother         adult     Cancer -  colorectal Mother      Hypertension Mother      Allergies Mother      Cancer Mother         colon     Depression Mother      Gastrointestinal Disease Mother         ibs     Genitourinary Problems Mother         kidney cyst     Gynecology Mother         endometriosis     Lipids Mother      Thyroid Disease Mother      Arthritis Mother         RA     Heart Disease Maternal Grandfather         MI,  - 60's     Allergies Father      Unknown/Adopted Father      Heart Disease Father         mi-age mid 40's     Cardiovascular Father      Lipids Father      Respiratory Father         asthma     Cancer Father      Other Cancer Father         renal cancer     Depression Sister      Allergies Sister      Cancer Sister         vaginal     Gynecology Sister         endometriosis     Lipids Paternal Grandmother      Osteoporosis Paternal Grandmother      Thyroid Disease Paternal Grandmother      Respiratory Son         asthma     Allergies Son      Arthritis Sister      Allergies Brother      Heart Disease Brother      Allergies Daughter      Blood Disease Paternal Aunt         LGL T cell Leukemia     Rheumatoid Arthritis Paternal Aunt      Kidney Disease Maternal Aunt      Lupus Maternal Aunt      Bladder Cancer Maternal Aunt      History   Drug Use No         Objective     There were no vitals taken for this visit.    Physical Exam    GENERAL APPEARANCE: healthy, alert and no distress     EYES: EOMI,  PERRL     HENT: ear canals and TM's normal and nose and mouth without ulcers or lesions     NECK: no adenopathy, no asymmetry, masses, or scars and thyroid normal to palpation     RESP: lungs clear to auscultation - no rales, rhonchi or wheezes     CV: regular rates and rhythm, normal S1 S2, no S3 or S4 and no murmur, click or rub     ABDOMEN:  soft, nontender, no HSM or masses and bowel sounds normal     MS: extremities normal- no gross deformities noted, no evidence of inflammation in joints, FROM in all extremities.      SKIN: no suspicious lesions or rashes     NEURO: Normal strength and tone, sensory exam grossly normal, mentation intact and speech normal     PSYCH: mentation appears normal. and affect normal/bright     LYMPHATICS: No cervical adenopathy    Recent Labs   Lab Test 05/25/21  0740 11/05/20  1646 07/27/20  1547   HGB 13.2  --  11.8     --  280    139 139   POTASSIUM 3.9 3.8 3.9   CR 0.62 0.73 0.58        Diagnostics:  Labs pending at this time.  Results will be reviewed when available.   No EKG required for low risk surgery (cataract, skin procedure, breast biopsy, etc).    Revised Cardiac Risk Index (RCRI):  The patient has the following serious cardiovascular risks for perioperative complications:   - No serious cardiac risks = 0 points     RCRI Interpretation: 1 point: Class II (low risk - 0.9% complication rate)           Signed Electronically by: Otoniel Manuel PA-C  Copy of this evaluation report is provided to requesting physician.

## 2021-07-13 ENCOUNTER — OFFICE VISIT (OUTPATIENT)
Dept: FAMILY MEDICINE | Facility: OTHER | Age: 46
End: 2021-07-13
Payer: COMMERCIAL

## 2021-07-13 VITALS
TEMPERATURE: 98.6 F | HEART RATE: 90 BPM | DIASTOLIC BLOOD PRESSURE: 68 MMHG | BODY MASS INDEX: 36.12 KG/M2 | SYSTOLIC BLOOD PRESSURE: 120 MMHG | WEIGHT: 220.4 LBS | OXYGEN SATURATION: 98 % | RESPIRATION RATE: 12 BRPM

## 2021-07-13 DIAGNOSIS — G54.0 BRACHIAL PLEXOPATHY: ICD-10-CM

## 2021-07-13 DIAGNOSIS — G54.0 TOS (THORACIC OUTLET SYNDROME): ICD-10-CM

## 2021-07-13 DIAGNOSIS — Z01.818 PREOP GENERAL PHYSICAL EXAM: Primary | ICD-10-CM

## 2021-07-13 PROCEDURE — 99214 OFFICE O/P EST MOD 30 MIN: CPT | Performed by: PHYSICIAN ASSISTANT

## 2021-07-14 ENCOUNTER — MYC MEDICAL ADVICE (OUTPATIENT)
Dept: SURGERY | Facility: PHYSICIAN GROUP | Age: 46
End: 2021-07-14

## 2021-07-19 ENCOUNTER — MYC MEDICAL ADVICE (OUTPATIENT)
Dept: SURGERY | Facility: PHYSICIAN GROUP | Age: 46
End: 2021-07-19

## 2021-07-19 ENCOUNTER — MYC MEDICAL ADVICE (OUTPATIENT)
Dept: FAMILY MEDICINE | Facility: OTHER | Age: 46
End: 2021-07-19

## 2021-07-19 NOTE — TELEPHONE ENCOUNTER
Patient is scheduled 7/27/21 for DIVISION RIGHT PECTORALIS MUSCLE    Needs covid test.  Verified Covid order is in place.    Provided patient with COVID testing number for scheduling.    Lillian Rodriguez RN BSN  [facility]  [phone]

## 2021-07-23 ENCOUNTER — MYC MEDICAL ADVICE (OUTPATIENT)
Dept: SURGERY | Facility: PHYSICIAN GROUP | Age: 46
End: 2021-07-23

## 2021-07-25 ENCOUNTER — LAB (OUTPATIENT)
Dept: LAB | Facility: CLINIC | Age: 46
End: 2021-07-25
Attending: SURGERY
Payer: COMMERCIAL

## 2021-07-25 DIAGNOSIS — Z11.59 ENCOUNTER FOR SCREENING FOR OTHER VIRAL DISEASES: ICD-10-CM

## 2021-07-25 PROCEDURE — U0003 INFECTIOUS AGENT DETECTION BY NUCLEIC ACID (DNA OR RNA); SEVERE ACUTE RESPIRATORY SYNDROME CORONAVIRUS 2 (SARS-COV-2) (CORONAVIRUS DISEASE [COVID-19]), AMPLIFIED PROBE TECHNIQUE, MAKING USE OF HIGH THROUGHPUT TECHNOLOGIES AS DESCRIBED BY CMS-2020-01-R: HCPCS

## 2021-07-25 PROCEDURE — U0005 INFEC AGEN DETEC AMPLI PROBE: HCPCS

## 2021-07-26 ENCOUNTER — TELEPHONE (OUTPATIENT)
Dept: OTHER | Facility: CLINIC | Age: 46
End: 2021-07-26

## 2021-07-26 LAB — SARS-COV-2 RNA RESP QL NAA+PROBE: NEGATIVE

## 2021-07-26 NOTE — TELEPHONE ENCOUNTER
[facility]    I called Amelia Coker about her right pectoralis minor muscle detachment surgery tomorrow related to her TOS.  We rediscussed the procedure and recovery.    She very likely will be able to go home following surgery.  However, she should take at least 1 additional day off work following the procedure and perhaps longer.  We will give her a slip for her employer saying that she is having surgery on 7/27/2021 and the earliest return to work would be on 7/29/2021.  Her activities are as tolerated at work though she may have some soreness if she uses her right arm but there is nothing that she can do to harm with surgical procedure.    All questions were answered today.       Davie Dutta MD

## 2021-07-27 ENCOUNTER — ANESTHESIA (OUTPATIENT)
Dept: SURGERY | Facility: CLINIC | Age: 46
End: 2021-07-27
Payer: COMMERCIAL

## 2021-07-27 ENCOUNTER — APPOINTMENT (OUTPATIENT)
Dept: SURGERY | Facility: PHYSICIAN GROUP | Age: 46
End: 2021-07-27
Payer: COMMERCIAL

## 2021-07-27 ENCOUNTER — ANESTHESIA EVENT (OUTPATIENT)
Dept: SURGERY | Facility: CLINIC | Age: 46
End: 2021-07-27
Payer: COMMERCIAL

## 2021-07-27 ENCOUNTER — HOSPITAL ENCOUNTER (OUTPATIENT)
Facility: CLINIC | Age: 46
Discharge: HOME | End: 2021-07-27
Attending: SURGERY | Admitting: SURGERY
Payer: COMMERCIAL

## 2021-07-27 VITALS
DIASTOLIC BLOOD PRESSURE: 66 MMHG | RESPIRATION RATE: 16 BRPM | BODY MASS INDEX: 34.38 KG/M2 | TEMPERATURE: 96.5 F | HEIGHT: 66 IN | SYSTOLIC BLOOD PRESSURE: 111 MMHG | OXYGEN SATURATION: 98 % | WEIGHT: 213.9 LBS | HEART RATE: 70 BPM

## 2021-07-27 DIAGNOSIS — M79.18 MUSCULOSKELETAL PAIN: Primary | ICD-10-CM

## 2021-07-27 DIAGNOSIS — G54.0 TOS (THORACIC OUTLET SYNDROME): ICD-10-CM

## 2021-07-27 LAB
ANION GAP SERPL CALCULATED.3IONS-SCNC: <1 MMOL/L (ref 3–14)
BUN SERPL-MCNC: 14 MG/DL (ref 7–30)
CALCIUM SERPL-MCNC: 9.1 MG/DL (ref 8.5–10.1)
CHLORIDE BLD-SCNC: 107 MMOL/L (ref 94–109)
CHOLEST SERPL-MCNC: 180 MG/DL
CO2 SERPL-SCNC: 32 MMOL/L (ref 20–32)
CREAT SERPL-MCNC: 0.63 MG/DL (ref 0.52–1.04)
FASTING STATUS PATIENT QL REPORTED: YES
GFR SERPL CREATININE-BSD FRML MDRD: >90 ML/MIN/1.73M2
GLUCOSE BLD-MCNC: 90 MG/DL (ref 70–99)
HBA1C MFR BLD: 5.3 % (ref 0–5.6)
HCG UR QL: NEGATIVE
HDLC SERPL-MCNC: 68 MG/DL
LDLC SERPL CALC-MCNC: 98 MG/DL
NONHDLC SERPL-MCNC: 112 MG/DL
POTASSIUM BLD-SCNC: 4.1 MMOL/L (ref 3.4–5.3)
SODIUM SERPL-SCNC: 139 MMOL/L (ref 133–144)
TRIGL SERPL-MCNC: 68 MG/DL

## 2021-07-27 PROCEDURE — 80048 BASIC METABOLIC PNL TOTAL CA: CPT | Performed by: SURGERY

## 2021-07-27 PROCEDURE — 80061 LIPID PANEL: CPT | Performed by: SURGERY

## 2021-07-27 PROCEDURE — 23929 UNLISTED PROCEDURE SHOULDER: CPT | Performed by: SURGERY

## 2021-07-27 PROCEDURE — 258N000003 HC RX IP 258 OP 636: Performed by: ANESTHESIOLOGY

## 2021-07-27 PROCEDURE — 710N000009 HC RECOVERY PHASE 1, LEVEL 1, PER MIN: Performed by: SURGERY

## 2021-07-27 PROCEDURE — 250N000009 HC RX 250: Performed by: ANESTHESIOLOGY

## 2021-07-27 PROCEDURE — 250N000011 HC RX IP 250 OP 636: Performed by: ANESTHESIOLOGY

## 2021-07-27 PROCEDURE — C1765 ADHESION BARRIER: HCPCS | Performed by: SURGERY

## 2021-07-27 PROCEDURE — 83036 HEMOGLOBIN GLYCOSYLATED A1C: CPT | Performed by: SURGERY

## 2021-07-27 PROCEDURE — 250N000011 HC RX IP 250 OP 636: Performed by: NURSE ANESTHETIST, CERTIFIED REGISTERED

## 2021-07-27 PROCEDURE — 272N000001 HC OR GENERAL SUPPLY STERILE: Performed by: SURGERY

## 2021-07-27 PROCEDURE — 250N000011 HC RX IP 250 OP 636: Performed by: SURGERY

## 2021-07-27 PROCEDURE — 250N000009 HC RX 250: Performed by: NURSE ANESTHETIST, CERTIFIED REGISTERED

## 2021-07-27 PROCEDURE — 999N000141 HC STATISTIC PRE-PROCEDURE NURSING ASSESSMENT: Performed by: SURGERY

## 2021-07-27 PROCEDURE — 360N000076 HC SURGERY LEVEL 3, PER MIN: Performed by: SURGERY

## 2021-07-27 PROCEDURE — 81025 URINE PREGNANCY TEST: CPT | Performed by: ANESTHESIOLOGY

## 2021-07-27 PROCEDURE — 36415 COLL VENOUS BLD VENIPUNCTURE: CPT | Performed by: SURGERY

## 2021-07-27 PROCEDURE — 710N000012 HC RECOVERY PHASE 2, PER MINUTE: Performed by: SURGERY

## 2021-07-27 PROCEDURE — 250N000013 HC RX MED GY IP 250 OP 250 PS 637: Performed by: SURGERY

## 2021-07-27 PROCEDURE — 250N000025 HC SEVOFLURANE, PER MIN: Performed by: SURGERY

## 2021-07-27 PROCEDURE — 250N000012 HC RX MED GY IP 250 OP 636 PS 637: Performed by: ANESTHESIOLOGY

## 2021-07-27 PROCEDURE — 370N000017 HC ANESTHESIA TECHNICAL FEE, PER MIN: Performed by: SURGERY

## 2021-07-27 PROCEDURE — 250N000009 HC RX 250: Performed by: SURGERY

## 2021-07-27 RX ORDER — OXYCODONE HCL 5 MG/5 ML
5 SOLUTION, ORAL ORAL EVERY 4 HOURS PRN
Status: DISCONTINUED | OUTPATIENT
Start: 2021-07-27 | End: 2021-07-27 | Stop reason: HOSPADM

## 2021-07-27 RX ORDER — ACETAMINOPHEN 325 MG/1
975 TABLET ORAL ONCE
Status: COMPLETED | OUTPATIENT
Start: 2021-07-27 | End: 2021-07-27

## 2021-07-27 RX ORDER — CLINDAMYCIN PHOSPHATE 900 MG/50ML
900 INJECTION, SOLUTION INTRAVENOUS
Status: COMPLETED | OUTPATIENT
Start: 2021-07-27 | End: 2021-07-27

## 2021-07-27 RX ORDER — BUPIVACAINE HYDROCHLORIDE 5 MG/ML
INJECTION, SOLUTION PERINEURAL PRN
Status: DISCONTINUED | OUTPATIENT
Start: 2021-07-27 | End: 2021-07-27 | Stop reason: HOSPADM

## 2021-07-27 RX ORDER — ACETAMINOPHEN 325 MG/1
650 TABLET ORAL EVERY 4 HOURS PRN
Qty: 50 TABLET | Refills: 0 | Status: SHIPPED | OUTPATIENT
Start: 2021-07-27 | End: 2021-08-27

## 2021-07-27 RX ORDER — LIDOCAINE HYDROCHLORIDE 20 MG/ML
INJECTION, SOLUTION INFILTRATION; PERINEURAL PRN
Status: DISCONTINUED | OUTPATIENT
Start: 2021-07-27 | End: 2021-07-27

## 2021-07-27 RX ORDER — APREPITANT 40 MG/1
40 CAPSULE ORAL DAILY
Status: DISCONTINUED | OUTPATIENT
Start: 2021-07-27 | End: 2021-07-27 | Stop reason: HOSPADM

## 2021-07-27 RX ORDER — SODIUM CHLORIDE, SODIUM LACTATE, POTASSIUM CHLORIDE, CALCIUM CHLORIDE 600; 310; 30; 20 MG/100ML; MG/100ML; MG/100ML; MG/100ML
INJECTION, SOLUTION INTRAVENOUS CONTINUOUS PRN
Status: DISCONTINUED | OUTPATIENT
Start: 2021-07-27 | End: 2021-07-27

## 2021-07-27 RX ORDER — HYDRALAZINE HYDROCHLORIDE 20 MG/ML
2.5-5 INJECTION INTRAMUSCULAR; INTRAVENOUS EVERY 10 MIN PRN
Status: DISCONTINUED | OUTPATIENT
Start: 2021-07-27 | End: 2021-07-27 | Stop reason: HOSPADM

## 2021-07-27 RX ORDER — ONDANSETRON 2 MG/ML
4 INJECTION INTRAMUSCULAR; INTRAVENOUS EVERY 30 MIN PRN
Status: DISCONTINUED | OUTPATIENT
Start: 2021-07-27 | End: 2021-07-27 | Stop reason: HOSPADM

## 2021-07-27 RX ORDER — DEXAMETHASONE SODIUM PHOSPHATE 4 MG/ML
INJECTION, SOLUTION INTRA-ARTICULAR; INTRALESIONAL; INTRAMUSCULAR; INTRAVENOUS; SOFT TISSUE PRN
Status: DISCONTINUED | OUTPATIENT
Start: 2021-07-27 | End: 2021-07-27

## 2021-07-27 RX ORDER — ONDANSETRON 2 MG/ML
INJECTION INTRAMUSCULAR; INTRAVENOUS PRN
Status: DISCONTINUED | OUTPATIENT
Start: 2021-07-27 | End: 2021-07-27

## 2021-07-27 RX ORDER — GLYCOPYRROLATE 0.2 MG/ML
INJECTION, SOLUTION INTRAMUSCULAR; INTRAVENOUS PRN
Status: DISCONTINUED | OUTPATIENT
Start: 2021-07-27 | End: 2021-07-27

## 2021-07-27 RX ORDER — ONDANSETRON 4 MG/1
4 TABLET, ORALLY DISINTEGRATING ORAL EVERY 30 MIN PRN
Status: DISCONTINUED | OUTPATIENT
Start: 2021-07-27 | End: 2021-07-27 | Stop reason: HOSPADM

## 2021-07-27 RX ORDER — HYDROMORPHONE HCL IN WATER/PF 6 MG/30 ML
0.4 PATIENT CONTROLLED ANALGESIA SYRINGE INTRAVENOUS EVERY 5 MIN PRN
Status: DISCONTINUED | OUTPATIENT
Start: 2021-07-27 | End: 2021-07-27 | Stop reason: HOSPADM

## 2021-07-27 RX ORDER — KETOROLAC TROMETHAMINE 30 MG/ML
15 INJECTION, SOLUTION INTRAMUSCULAR; INTRAVENOUS EVERY 6 HOURS PRN
Status: DISCONTINUED | OUTPATIENT
Start: 2021-07-27 | End: 2021-07-27 | Stop reason: HOSPADM

## 2021-07-27 RX ORDER — KETOROLAC TROMETHAMINE 30 MG/ML
INJECTION, SOLUTION INTRAMUSCULAR; INTRAVENOUS PRN
Status: DISCONTINUED | OUTPATIENT
Start: 2021-07-27 | End: 2021-07-27

## 2021-07-27 RX ORDER — SCOLOPAMINE TRANSDERMAL SYSTEM 1 MG/1
1 PATCH, EXTENDED RELEASE TRANSDERMAL
Status: DISCONTINUED | OUTPATIENT
Start: 2021-07-27 | End: 2021-07-27 | Stop reason: HOSPADM

## 2021-07-27 RX ORDER — FENTANYL CITRATE 50 UG/ML
50 INJECTION, SOLUTION INTRAMUSCULAR; INTRAVENOUS EVERY 5 MIN PRN
Status: DISCONTINUED | OUTPATIENT
Start: 2021-07-27 | End: 2021-07-27 | Stop reason: HOSPADM

## 2021-07-27 RX ORDER — MEPERIDINE HYDROCHLORIDE 25 MG/ML
12.5 INJECTION INTRAMUSCULAR; INTRAVENOUS; SUBCUTANEOUS
Status: DISCONTINUED | OUTPATIENT
Start: 2021-07-27 | End: 2021-07-27 | Stop reason: HOSPADM

## 2021-07-27 RX ORDER — PROPOFOL 10 MG/ML
INJECTION, EMULSION INTRAVENOUS CONTINUOUS PRN
Status: DISCONTINUED | OUTPATIENT
Start: 2021-07-27 | End: 2021-07-27

## 2021-07-27 RX ORDER — LABETALOL HYDROCHLORIDE 5 MG/ML
10 INJECTION, SOLUTION INTRAVENOUS
Status: DISCONTINUED | OUTPATIENT
Start: 2021-07-27 | End: 2021-07-27 | Stop reason: HOSPADM

## 2021-07-27 RX ORDER — SODIUM CHLORIDE, SODIUM LACTATE, POTASSIUM CHLORIDE, CALCIUM CHLORIDE 600; 310; 30; 20 MG/100ML; MG/100ML; MG/100ML; MG/100ML
INJECTION, SOLUTION INTRAVENOUS CONTINUOUS
Status: DISCONTINUED | OUTPATIENT
Start: 2021-07-27 | End: 2021-07-27 | Stop reason: HOSPADM

## 2021-07-27 RX ORDER — MAGNESIUM HYDROXIDE 1200 MG/15ML
LIQUID ORAL PRN
Status: DISCONTINUED | OUTPATIENT
Start: 2021-07-27 | End: 2021-07-27 | Stop reason: HOSPADM

## 2021-07-27 RX ORDER — PROPOFOL 10 MG/ML
INJECTION, EMULSION INTRAVENOUS PRN
Status: DISCONTINUED | OUTPATIENT
Start: 2021-07-27 | End: 2021-07-27

## 2021-07-27 RX ADMIN — SUGAMMADEX 200 MG: 100 INJECTION, SOLUTION INTRAVENOUS at 14:37

## 2021-07-27 RX ADMIN — SCOPALAMINE 1 PATCH: 1 PATCH, EXTENDED RELEASE TRANSDERMAL at 12:56

## 2021-07-27 RX ADMIN — APREPITANT 40 MG: 40 CAPSULE ORAL at 12:55

## 2021-07-27 RX ADMIN — GLYCOPYRROLATE 0.2 MG: 0.2 INJECTION, SOLUTION INTRAMUSCULAR; INTRAVENOUS at 13:47

## 2021-07-27 RX ADMIN — LIDOCAINE HYDROCHLORIDE 100 MG: 20 INJECTION, SOLUTION INFILTRATION; PERINEURAL at 13:45

## 2021-07-27 RX ADMIN — ONDANSETRON 4 MG: 2 INJECTION INTRAMUSCULAR; INTRAVENOUS at 14:37

## 2021-07-27 RX ADMIN — DEXMEDETOMIDINE 8 MCG: 100 INJECTION, SOLUTION, CONCENTRATE INTRAVENOUS at 14:09

## 2021-07-27 RX ADMIN — DEXAMETHASONE SODIUM PHOSPHATE 4 MG: 4 INJECTION, SOLUTION INTRA-ARTICULAR; INTRALESIONAL; INTRAMUSCULAR; INTRAVENOUS; SOFT TISSUE at 13:54

## 2021-07-27 RX ADMIN — SODIUM CHLORIDE, POTASSIUM CHLORIDE, SODIUM LACTATE AND CALCIUM CHLORIDE: 600; 310; 30; 20 INJECTION, SOLUTION INTRAVENOUS at 13:41

## 2021-07-27 RX ADMIN — PROPOFOL 250 MG: 10 INJECTION, EMULSION INTRAVENOUS at 13:45

## 2021-07-27 RX ADMIN — PROPOFOL 150 MCG/KG/MIN: 10 INJECTION, EMULSION INTRAVENOUS at 13:49

## 2021-07-27 RX ADMIN — CLINDAMYCIN PHOSPHATE 900 MG: 900 INJECTION, SOLUTION INTRAVENOUS at 13:41

## 2021-07-27 RX ADMIN — DEXMEDETOMIDINE 12 MCG: 100 INJECTION, SOLUTION, CONCENTRATE INTRAVENOUS at 13:49

## 2021-07-27 RX ADMIN — KETOROLAC TROMETHAMINE 15 MG: 30 INJECTION, SOLUTION INTRAMUSCULAR at 14:23

## 2021-07-27 RX ADMIN — ACETAMINOPHEN 975 MG: 325 TABLET, FILM COATED ORAL at 15:06

## 2021-07-27 RX ADMIN — ONDANSETRON 4 MG: 2 INJECTION INTRAMUSCULAR; INTRAVENOUS at 14:09

## 2021-07-27 RX ADMIN — ROCURONIUM BROMIDE 50 MG: 10 INJECTION INTRAVENOUS at 13:45

## 2021-07-27 RX ADMIN — DEXAMETHASONE SODIUM PHOSPHATE 4 MG: 4 INJECTION, SOLUTION INTRA-ARTICULAR; INTRALESIONAL; INTRAMUSCULAR; INTRAVENOUS; SOFT TISSUE at 13:57

## 2021-07-27 ASSESSMENT — MIFFLIN-ST. JEOR: SCORE: 1609.05

## 2021-07-27 NOTE — ANESTHESIA CARE TRANSFER NOTE
Patient: Amelia Coker    Procedure(s):  DIVISION RIGHT PECTORALIS MUSCLE    Diagnosis: TOS (thoracic outlet syndrome) [G54.0]  Diagnosis Additional Information: No value filed.    Anesthesia Type:   General     Note:    Oropharynx: oropharynx clear of all foreign objects and spontaneously breathing  Level of Consciousness: awake  Oxygen Supplementation: face mask  Level of Supplemental Oxygen (L/min / FiO2): 6  Independent Airway: airway patency satisfactory and stable  Dentition: dentition unchanged  Vital Signs Stable: post-procedure vital signs reviewed and stable  Report to RN Given: handoff report given  Patient transferred to: Phase II    Handoff Report: Identifed the Patient, Identified the Reponsible Provider, Reviewed the pertinent medical history, Discussed the surgical course, Reviewed Intra-OP anesthesia mangement and issues during anesthesia, Set expectations for post-procedure period and Allowed opportunity for questions and acknowledgement of understanding      Vitals:  Vitals Value Taken Time   /73 07/27/21 1448   Temp     Pulse 62 07/27/21 1453   Resp 23 07/27/21 1453   SpO2 99 % 07/27/21 1453   Vitals shown include unvalidated device data.    Electronically Signed By: LAURI Mcgrath CRNA  July 27, 2021  2:53 PM

## 2021-07-27 NOTE — ANESTHESIA PREPROCEDURE EVALUATION
Anesthesia Pre-Procedure Evaluation    Patient: Amelia Coker   MRN: [id number] : 1975        Preoperative Diagnosis: TOS (thoracic outlet syndrome) [G54.0]   Procedure : Procedure(s):  DIVISION RIGHT PECTORALIS MUSCLE     Past Medical History:   Diagnosis Date     Abnormal mammogram 2016    right breast      AD (atopic dermatitis) 10/29/2015     Allergic rhinitis due to other allergen      Arthritis      Bell's palsy      Chronic fatigue 2012     Chronic infection     MRSA -  scalp and prior to Abdomen     Contact dermatitis and other eczema, due to unspecified cause     eczema     Contusion of left foot, initial encounter 3/16/2016     DJD (degenerative joint disease), lumbar 2013     DJD (degenerative joint disease), lumbar 2013     Ds DNA antibody positive 2014    borderline.      Elevated blood pressure reading without diagnosis of hypertension 2013     Facial contusion 12/15/2014    hit by horse      Foraminal stenosis of lumbar region 2013     Frontal headache 12/15/2014     Gastroesophageal reflux disease, esophagitis presence not specified 2016     Heat sensitivity 10/29/2015     HTN, goal below 140/90 2015     Hyperlipidemia LDL goal <130 2017     Irregular heart beat      Keratitis sicca (H) 10/31/2012     Left shoulder pain 2013     Lumbar radiculopathy 2014     Migraine headache 10/23/2013     Migraine, unspecified, with intractable migraine, so stated, without mention of status migrainosus      Motion sickness      MRSA infection 10/20/2015     Muscular wasting and disuse atrophy, 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    right SCM, right wrist,       Numbness and tingling     both legs anf feet greater on the left     PAC (premature atrial contraction) 7/15/2010     Plantar fasciitis 2015     PONV (postoperative nausea and vomiting)      Skin lesion 10/20/2015    posterior superior scalp      Spasm of muscle 2017      Telangiectasia of face 12/19/2014     Tooth pain 10/20/2015    left lower primary molar       Past Surgical History:   Procedure Laterality Date     AS INJ TRANSFORAMIN EPIDURAL, LUMB/SACR SINGLE       COLONOSCOPY  3/11/2013    Procedure: COLONOSCOPY;  colonoscopy;  Surgeon: Lobito Mas MD;  Location: [facility]     COLONOSCOPY WITH CO2 INSUFFLATION N/A 11/19/2018    Procedure: COLONOSCOPY WITH CO2 INSUFFLATION;  Surgeon: Goyo Blank MD;  Location: [facility]     DECOMPRESSION LUMBAR TWO LEVELS Bilateral 12/8/2016    Procedure: DECOMPRESSION LUMBAR TWO LEVELS;  Surgeon: Bang Burrell MD;  Location: [facility]     DILATION AND CURETTAGE, HYSTEROSCOPY, ABLATE ENDOMETRIUM NOVASURE, COMBINED N/A 6/12/2019    Procedure: hysteroscopy, dilation & curettage, polypectomy, endometrial ablation;  Surgeon: Fredy Pham MD;  Location: [facility]     ESOPHAGOSCOPY, GASTROSCOPY, DUODENOSCOPY (EGD), COMBINED  11/8/2013    Procedure: COMBINED ESOPHAGOSCOPY, GASTROSCOPY, DUODENOSCOPY (EGD), BIOPSY SINGLE OR MULTIPLE;  Esophagoscopy, Gastroscopy, Duodenoscopy EGD with multiple biopsies;  Surgeon: Teja Koch MD;  [facility]     ESOPHAGOSCOPY, GASTROSCOPY, DUODENOSCOPY (EGD), COMBINED N/A 2/18/2021    Procedure: ESOPHAGOGASTRODUODENOSCOPY, WITH BIOPSY;  Surgeon: Blanca Ba MD;  Location: [facility]     HC REMOVAL OF TONSILS,<11 Y/O      Tonsils <10y.o.     HC TOOTH EXTRACTION W/FORCEP       INJECT BLOCK MEDIAL BRANCH CERVICAL/THORACIC/LUMBAR N/A 7/23/2020    Procedure: Sacral 1,2,3 Lateral Branch Blocks and lumbar 5 medial branch blocks bilaterally;  Surgeon: Maurisio Goetz MD;  [facility]     INJECT JOINT SACROILIAC Left 9/24/2020    Procedure: Left Lumbar 5 Sacral 1 nerve root injections.;  Surgeon: Maurisio Goetz MD;  [facility]     REPAIR TENDON ELBOW  7/9/2014    Procedure: REPAIR TENDON ELBOW;  Surgeon: Chris Johnston MD;  [facility]: [facility]     ULTRASONIC REMOVAL SOFT TISSUE  (LOCATION) Right 11/21/2018    Procedure: Tenex right foot;  Surgeon: Patel Martínez DO;  Location: [facility]      Allergies   Allergen Reactions     Ceftriaxone Anaphylaxis     Hives, rash, racing heart beat     Bactrim [Sulfamethoxazole W/Trimethoprim] Hives     Ciprofloxacin Other (See Comments)     Tendon Issues     Codeine Nausea and Vomiting     Codeine      Copper      Rash       Doxycycline      Loss of skin pigmentation, skin loss.     Gold      Rash       Iodine Hives     WELTS     Iodine-131 Hives     Levaquin [Levofloxacin] Other (See Comments)     Tendon issues with levaquin and cipro     Nickel      rash     Steel [Staples]      Rash from staples       Sulfa Drugs Hives     Full Body     Latex Rash     Penicillins Rash      Social History     Tobacco Use     Smoking status: Never Smoker     Smokeless tobacco: Never Used   Substance Use Topics     Alcohol use: No     Comment: social      Wt Readings from Last 1 Encounters:   07/27/21 97 kg (213 lb 14.4 oz)        Anesthesia Evaluation   Pt has had prior anesthetic.     History of anesthetic complications  - motion sickness and PONV.      ROS/MED HX  ENT/Pulmonary:    (-) sleep apnea   Neurologic: Comment: H/o Lyme disease  L5-S1 radiculopathy    (+) migraines,  (-) no CVA   Cardiovascular:     (+) hypertension-----Irregular Heartbeat/Palpitations,  (-) CAD   METS/Exercise Tolerance:     Hematologic:       Musculoskeletal:       GI/Hepatic:    (-) GERD   Renal/Genitourinary:       Endo:     (+) Obesity,  (-) Type II DM   Psychiatric/Substance Use:       Infectious Disease:       Malignancy:       Other:               OUTSIDE LABS:  CBC:   Lab Results   Component Value Date    WBC 5.2 05/25/2021    WBC 5.2 07/27/2020    HGB 13.2 05/25/2021    HGB 11.8 07/27/2020    HCT 39.2 05/25/2021    HCT 35.3 07/27/2020     05/25/2021     07/27/2020     BMP:   Lab Results   Component Value Date     07/27/2021     05/25/2021    POTASSIUM 4.1  07/27/2021    POTASSIUM 3.9 05/25/2021    CHLORIDE 107 07/27/2021    CHLORIDE 109 05/25/2021    CO2 32 07/27/2021    CO2 26 05/25/2021    BUN 14 07/27/2021    BUN 15 05/25/2021    CR 0.63 07/27/2021    CR 0.62 05/25/2021    GLC 90 07/27/2021    GLC 97 05/25/2021     COAGS:   Lab Results   Component Value Date    INR 1.0 07/14/2014     POC:   Lab Results   Component Value Date    HCG Negative 07/27/2021     HEPATIC:   Lab Results   Component Value Date    ALBUMIN 4.2 05/25/2021    PROTTOTAL 7.9 05/25/2021    ALT 27 05/25/2021    AST 17 05/25/2021    ALKPHOS 50 05/25/2021    BILITOTAL 0.7 05/25/2021    BILIDIRECT 0 04/04/2003     OTHER:   Lab Results   Component Value Date    PH 5.0 04/04/2003    A1C 5.5 06/08/2016    JELENA 9.1 07/27/2021    PHOS 3.0 11/19/2014    MAG 2.1 06/08/2016    LIPASE 180 08/06/2018    AMYLASE 51 08/06/2018    TSH 0.90 05/25/2021    T4 0.92 10/07/2019    T3 90 10/07/2019    CRP <2.9 03/27/2018    SED 9 08/29/2013       Anesthesia Plan    ASA Status:  2   NPO Status:  NPO Appropriate    Anesthesia Type: General.     - Airway: ETT   Induction: Intravenous.   Maintenance: Balanced.        Consents    Anesthesia Plan(s) and associated risks, benefits, and realistic alternatives discussed. Questions answered and patient/representative(s) expressed understanding.     - Discussed with:  Patient         Postoperative Care    Pain management: IV analgesics, Oral pain medications, Multi-modal analgesia.   PONV prophylaxis: Ondansetron (or other 5HT-3), Scopolamine patch, Dexamethasone or Solumedrol, Background Propofol Infusion, Aprepitant     Comments:                Maria Elena Alas MD, MD

## 2021-07-27 NOTE — DISCHARGE INSTRUCTIONS
You were given Emend, a medicine to prevent nausea.  This medication may decrease effectiveness of birth control.  We recommend using a backup method of birth control for 28  days. Continue to take your hormonal contraceptive during this period.    You were given the medication Sugammadex that may decrease effectiveness of birth control.  We recommend you use a backup method of birth control for 7 days after surgery.  Continue to take your hormonal contraceptive during this period.      Same Day Surgery Discharge Instructions for  Sedation and General Anesthesia       It's not unusual to feel dizzy, light-headed or faint for up to 24 hours after surgery or while taking pain medication.  If you have these symptoms: sit for a few minutes before standing and have someone assist you when you get up to walk or use the bathroom.      You should rest and relax for the next 24 hours. We recommend you make arrangements to have an adult stay with you for at least 24 hours after your discharge.  Avoid hazardous and strenuous activity.      DO NOT DRIVE any vehicle or operate mechanical equipment for 24 hours following the end of your surgery.  Even though you may feel normal, your reactions may be affected by the medication you have received.      Do not drink alcoholic beverages for 24 hours following surgery.       Slowly progress to your regular diet as you feel able. It's not unusual to feel nauseated and/or vomit after receiving anesthesia.  If you develop these symptoms, drink clear liquids (apple juice, ginger ale, broth, 7-up, etc. ) until you feel better.  If your nausea and vomiting persists for 24 hours, please notify your surgeon.        All narcotic pain medications, along with inactivity and anesthesia, can cause constipation. Drinking plenty of liquids and increasing fiber intake will help.      For any questions of a medical nature, call your surgeon.      Do not make important decisions for 24 hours.      If  you had general anesthesia, you may have a sore throat for a couple of days related to the breathing tube used during surgery.  You may use Cepacol lozenges to help with this discomfort.  If it worsens or if you develop a fever, contact your surgeon.       If you feel your pain is not well managed with the pain medications prescribed by your surgeon, please contact your surgeon's office to let them know so they can address your concerns.       CoVid 19 Information    We want to give you information regarding Covid. Please consult your primary care provider with any questions you might have.     Patient who have symptoms (cough, fever, or shortness of breath), need to isolate for 7 days from when symptoms started OR 72 hours after fever resolves (without fever reducing medications) AND improvement of respiratory symptoms (whichever is longer).      Isolate yourself at home (in own room/own bathroom if possible)    Do Not allow any visitors    Do Not go to work or school    Do Not go to [employer],  centers, shopping, or other public places.    Do Not shake hands.    Avoid close and intimate contact with others (hugging, kissing).    Follow CDC recommendations for household cleaning of frequently touched services.     After the initial 7 days, continue to isolate yourself from household members as much as possible. To continue decrease the risk of community spread and exposure, you and any members of your household should limit activities in public for 14 days after starting home isolation.     You can reference the following CDC link for helpful home isolation/care tips:  https://www.cdc.gov/coronavirus/2019-ncov/downloads/10Things.pdf    Protect Others:    Cover Your Mouth and Nose with a mask, disposable tissue or wash cloth to avoid spreading germs to others.    Wash your hands and face frequently with soap and water    Call Your Primary Doctor If: Breathing difficulty develops or you become worse.    For  more information about COVID19 and options for caring for yourself at home, please visit the CDC website at https://www.cdc.gov/coronavirus/2019-ncov/about/steps-when-sick.html  For more options for care at [facility], please visit our website at [email or web address]    Today you received Toradol, an antiinflammatory medication similar to Ibuprofen.  You should not take other antiinflammatory medication, such as Ibuprofen, Motrin, Advil, Aleve, Naprosyn, etc until 8:30 PM.          Today you were given 975 mg of Tylenol at 3:06 PM. The recommended daily maximum dose is 4000 mg.     Information for Patients Discharging with a Transderm Scopolamine Patch       Dry mouth is a common side effect.    Drowsiness is another common side effect especially when combined with pain medication.  Please avoid activities that require mental alertness such as driving a car or making important legal decisions.    Since Scopolamine can cause temporary dilation of the pupils and blurred vision if it comes in contact with the eyes; be sure to wash your hands thoroughly with soap and water immediately after handling the patch.   When you remove your patch, please stick it to a tissue or paper towel for disposal.      Remove the patch immediately and contact a physician in the unlikely event that you experience symptoms of acute glaucoma (pain and reddening of the eyes, accompanied by dilated pupils).    Remove the patch if you develop any difficulties urinating.  If you cannot urinate after removing your patch, please notify your surgeon.    Remove the patch 24 hours after surgery.        Reasons to contact your surgeon:    1. Signs of possible infection: Check your incision daily for redness, swelling, warmth, red streaks or foul drainage.   2. Elevated temperature.  3. Pain not controlled with pain medication and/or rest.   4. Uncontrolled nausea or vomiting.  5. Any questions or concerns.         **If you have questions or concerns about your procedure  call Dr Davie Dutta at [phone]**

## 2021-07-27 NOTE — ANESTHESIA PROCEDURE NOTES
Airway       Patient location during procedure: OR       Procedure Start/Stop Times: 7/27/2021 1:49 PM  Staff -        Anesthesiologist:  Maria Elena Alas MD       CRNA: Dominga Sanford APRN CRNA       Other Anesthesia Staff: Delvis Ovalle RN       Performed By: AIYANA and with CRNAs       Procedure performed by resident/fellow/CRNA in presence of a teaching physician.    Consent for Airway        Urgency: elective  Indications and Patient Condition       Indications for airway management: jin-procedural       Induction type:intravenous       Mask difficulty assessment: 1 - vent by mask    Final Airway Details       Final airway type: endotracheal airway       Successful airway: Oral and ETT - single  Endotracheal Airway Details        ETT size (mm): 7.0       Cuffed: yes       Successful intubation technique: direct laryngoscopy       DL Blade Type: Guillen 2       Grade View of Cords: 1       Position: Right       Measured from: lips       Secured at (cm): 22       Bite block used: None    Post intubation assessment        Placement verified by: capnometry, equal breath sounds and chest rise        Number of attempts at approach: 1       Number of other approaches attempted: 0       Secured with: pink tape       Ease of procedure: easy       Dentition: Intact and Unchanged

## 2021-07-27 NOTE — ANESTHESIA POSTPROCEDURE EVALUATION
Patient: Amelia Coker    Procedure(s):  DIVISION RIGHT PECTORALIS MUSCLE    Diagnosis:TOS (thoracic outlet syndrome) [G54.0]  Diagnosis Additional Information: No value filed.    Anesthesia Type:  General    Note:     Postop Pain Control: Uneventful            Sign Out: Well controlled pain   PONV: No   Neuro/Psych: Uneventful            Sign Out: Acceptable/Baseline neuro status   Airway/Respiratory: Uneventful            Sign Out: Acceptable/Baseline resp. status   CV/Hemodynamics: Uneventful            Sign Out: Acceptable CV status; No obvious hypovolemia; No obvious fluid overload   Other NRE: NONE   DID A NON-ROUTINE EVENT OCCUR? No           Last vitals:  Vitals Value Taken Time   /73 07/27/21 1515   Temp 36  C (96.8  F) 07/27/21 1515   Pulse 56 07/27/21 1515   Resp 20 07/27/21 1515   SpO2 98 % 07/27/21 1515   Vitals shown include unvalidated device data.    Electronically Signed By: Maria Elena Alas MD, MD  July 27, 2021  5:42 PM

## 2021-07-27 NOTE — BRIEF OP NOTE
[facility]    Brief Operative Note    Pre-operative diagnosis: TOS (thoracic outlet syndrome) [G54.0]  Post-operative diagnosis Same as pre-operative diagnosis    Procedure: Procedure(s):  DIVISION RIGHT PECTORALIS MUSCLE  Surgeon: Surgeon(s) and Role:     * Davie Dutta - Primary     * Herberth Gallagher MD - Assisting     * Luan Hummel MD - Resident - Assisting  Anesthesia: General   Estimated blood loss: 1 ml  Drains: None  Specimens: * No specimens in log *  Findings:   Right pectoralis minor dissected out near insertion on coracoid process with care taken to avoid injury to surrounding neurovascular structures. Muscle divided using electrocautery by excising 2 cm segment of muscle, ends covered with Seprafilm.  Complications: None.  Implants: * No implants in log *      Luan Hummel MD   7/27/2021 at 2:29 PM  General Surgery - PGY4  [phone]

## 2021-07-27 NOTE — OR NURSING
Discharge instructions reviewed with patient and  via phone per covid protocol , neither have further questions. Printed form sent with patient and additional ice pack.

## 2021-07-27 NOTE — OP NOTE
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    POSTOPERATIVE DIAGNOSIS:  Symptomatic neurogenic right  thoracic outlet syndrome secondary to pectoralis minor syndrome.    PROCEDURE:  Division/excision of right pectoralis minor muscle.    SURGEON:  Davie Dutta MD    ASSISTANT:  Herberth Gallagher MD     :  Luan Hummel MD ([facility] resident)    ANESTHESIA:  General.    PREOPERATIVE MEDICATIONS:  Cleocin 900 mg IV     DESCRIPTION PROCEDURE:  A 38-year-old patient has disabling right arm and neurogenic thoracic outlet syndrome.  She had very unusual presentation more consistent with impingement of the brachial plexus secondary to the pectoralis minor muscle.  She has undergone injections of this muscle, which did give her temporary relief.  Because of this, we planned surgical division of the pectoralis minor muscle under informed consent of the patient.    DESCRIPTION OF PROCEDURE:  The patient was brought to the operating room and induced under general anesthesia without difficulty.  Calf pneumatic compression boots were placed and a pillow under her knees.  She was placed in a partial sitting position with her right arm to her side and left arm out.  The right chest wall/shoulder region were prepped and draped.  Timeout was called, and the sites were identified.    A transverse incision was made inferior to the clavicle and its lateral one-third.  Dissection was carried with electrocautery to the fascia.  The fascia was divided.  We identified the cephalic vein and several branches, which were preserved and the deltopectoral groove as it dove down towards the subclavian vein.  We mobilized the superior border of the pectoralis major muscle, and this was retracted posteriorly.  We dissected down and identified the pectoralis minor muscle.  This was dissected free cautiously to its attachment  to the acromion.  Right angle clamp was brought under the pectoralis minor muscle, ensuring that we were well away from the vascular structures and brachial plexus.  A clamp was then placed on the more distal segment of the pectoralis minor muscle, and this was divided with electrocautery with excellent hemostasis and allowed to retract.  We then divided the attachment of the pectoralis minor muscle off the acromion.  Upon doing so, we had excellent hemostasis.  We minimally mobilized the neurovascular structures to help prevent scar tissue.  Very dry field was noted.  A small amount of Seprafilm was placed over the divided, retracted segment of the pectoralis minor muscle and off the acromial attachment to help prevent adhesions.    Retractors were removed.  Subcutaneous tissue was closed in layers with interrupted 3-0 Vicryl, and skin was closed with 4-0 Monocryl in subcuticular fashion after performing a field block with 0.5% Marcaine, including at the acromial attachment of the divided pectoralis minor muscle.  Skin was closed with 4-0 Monocryl in subcuticular fashion, followed by gauze, Tegaderm dressing.    The patient tolerated the procedure well.     ESTIMATED BLOOD LOSS was less than 5 mL.    COMPLICATIONS:  None.    Davie Dutta MD        D: 2021   T: 2021   MT: [facility]    Name:     SUSIE JALLOH  MRN:      [id number]        Account:        [id number]   :      1975           Procedure Date: 2021     Document: [id number]    cc:  Davie Dutta MD

## 2021-07-30 ENCOUNTER — TELEPHONE (OUTPATIENT)
Dept: OTHER | Facility: CLINIC | Age: 46
End: 2021-07-30

## 2021-07-30 NOTE — TELEPHONE ENCOUNTER
[place] [facility]      I called Amelia Coker after revision of her right pectoralis minor muscle earlier this week.  She is more sore than she expected.  He also notices some swelling at the incision which may represent a seroma.  She is applying ice to the area.    When she removed her dressing yesterday it was a slight split of the skin closure on the medial and lateral aspect.  She applied a Steri-Strip.  She notes a small amount of redness around the incision which is probably healing though she has had a history of MRSA in the past.  She is aware of the what of cellulitis looks like we will call me if there is any concerns.    We also discussed the seroma.  If this is not improved by this coming week she will call me and we will have her come into aspirate the seroma in the office.      Davie Dutta MD

## 2021-08-02 ENCOUNTER — MYC MEDICAL ADVICE (OUTPATIENT)
Dept: OTHER | Facility: CLINIC | Age: 46
End: 2021-08-02

## 2021-08-09 NOTE — PROGRESS NOTES
[place] [facility]    Amelia Coker had symptomatic neurogenic right TOS.  Symptoms were very unusual in the fact that her arm actually felt better when it was at 90 degrees which is not typical findings for TOS related to first rib and scalene muscles.  A block of the pectoralis minor muscle did give her temporary relief and thus the performed division/excision of the pectoralis minor muscle as an outpatient on 7/22/2021.    We spoke on the phone postoperatively.  She did notice some swelling of the incision which may have represented a seroma.  She used cold compresses for several days but then switched over to heat which seems to have made this better.    Still has soreness in the area exacerbated with moving her arm.  Difficult to her to tell if her symptoms from preoperative have resolved.    Has switched over to Mobic from Advil which was difficult on her stomach and this seems to be helping.    Exam: Alert and appropriate.  Very comfortable.   Blood pressure 116/76 pulse 76 regular   Right medial pectoral incision is healing well.      No evidence of a seroma or significant induration.      No infection.            She has some chronic issues with her right forearm and hand following avulsion of her muscle/tendon requiring surgery in the past over her lateral epicondyle which gives her some chronic issues.    Impression: Seems to be doing well following release of pectoralis minor for very atypical neurogenic TOS symptoms.  Unclear whether she will have relief of her symptoms and we discussed this today.  Continue with Mobic and heat to the area.  No restrictions to activities.  Plan video follow-up in approximately 2 months to see how she is doing.  Answered all questions today.      Davie Dutta MD

## 2021-08-12 ENCOUNTER — OFFICE VISIT (OUTPATIENT)
Dept: OTHER | Facility: CLINIC | Age: 46
End: 2021-08-12
Attending: SURGERY
Payer: COMMERCIAL

## 2021-08-12 VITALS — SYSTOLIC BLOOD PRESSURE: 116 MMHG | HEART RATE: 76 BPM | DIASTOLIC BLOOD PRESSURE: 76 MMHG

## 2021-08-12 DIAGNOSIS — I77.89 PECTORALIS MINOR SYNDROME (H): Primary | ICD-10-CM

## 2021-08-12 PROCEDURE — 99024 POSTOP FOLLOW-UP VISIT: CPT | Performed by: SURGERY

## 2021-08-12 PROCEDURE — G0463 HOSPITAL OUTPT CLINIC VISIT: HCPCS

## 2021-08-12 NOTE — PROGRESS NOTES
[facility]        Patient is here for a post op follow up     Pt is currently taking no meds that would impact our treatment plan.    Patient's condition is stable.    /76 (BP Location: Right arm, Patient Position: Chair, Cuff Size: Adult Regular)   Pulse 76   Breastfeeding No     The provider has been notified that the patient has concerns of upper shoulder having pain if its fluid or inflammation.     Questions patient would like addressed today are: N/A.    Refills are needed: No    Has homecare services and agency name:  Ale Crisostomo MA

## 2021-08-18 DIAGNOSIS — M51.27 HERNIATION OF INTERVERTEBRAL DISC BETWEEN L5 AND S1: ICD-10-CM

## 2021-08-18 DIAGNOSIS — M47.26 OSTEOARTHRITIS OF SPINE WITH RADICULOPATHY, LUMBAR REGION: Primary | ICD-10-CM

## 2021-08-18 DIAGNOSIS — M54.16 LUMBAR RADICULOPATHY: ICD-10-CM

## 2021-08-18 DIAGNOSIS — M48.061 FORAMINAL STENOSIS OF LUMBAR REGION: ICD-10-CM

## 2021-08-19 ENCOUNTER — TELEPHONE (OUTPATIENT)
Dept: NEUROSURGERY | Facility: OTHER | Age: 46
End: 2021-08-19

## 2021-08-19 DIAGNOSIS — I77.89 PECTORALIS MINOR SYNDROME (H): Primary | ICD-10-CM

## 2021-08-27 ENCOUNTER — HOSPITAL ENCOUNTER (OUTPATIENT)
Dept: CT IMAGING | Facility: CLINIC | Age: 46
Discharge: HOME | End: 2021-08-27
Attending: PHYSICIAN ASSISTANT | Admitting: PHYSICIAN ASSISTANT
Payer: COMMERCIAL

## 2021-08-27 ENCOUNTER — OFFICE VISIT (OUTPATIENT)
Dept: FAMILY MEDICINE | Facility: OTHER | Age: 46
End: 2021-08-27
Payer: COMMERCIAL

## 2021-08-27 ENCOUNTER — TELEPHONE (OUTPATIENT)
Dept: FAMILY MEDICINE | Facility: OTHER | Age: 46
End: 2021-08-27

## 2021-08-27 ENCOUNTER — MYC MEDICAL ADVICE (OUTPATIENT)
Dept: FAMILY MEDICINE | Facility: OTHER | Age: 46
End: 2021-08-27

## 2021-08-27 ENCOUNTER — LAB (OUTPATIENT)
Dept: LAB | Facility: CLINIC | Age: 46
End: 2021-08-27
Payer: COMMERCIAL

## 2021-08-27 ENCOUNTER — ANCILLARY PROCEDURE (OUTPATIENT)
Dept: GENERAL RADIOLOGY | Facility: OTHER | Age: 46
End: 2021-08-27
Attending: PHYSICIAN ASSISTANT
Payer: COMMERCIAL

## 2021-08-27 VITALS
SYSTOLIC BLOOD PRESSURE: 118 MMHG | DIASTOLIC BLOOD PRESSURE: 88 MMHG | OXYGEN SATURATION: 100 % | BODY MASS INDEX: 34.82 KG/M2 | RESPIRATION RATE: 16 BRPM | HEART RATE: 88 BPM | WEIGHT: 212.5 LBS | TEMPERATURE: 98.8 F

## 2021-08-27 DIAGNOSIS — R11.0 NAUSEA: ICD-10-CM

## 2021-08-27 DIAGNOSIS — R10.11 RUQ ABDOMINAL PAIN: Primary | ICD-10-CM

## 2021-08-27 DIAGNOSIS — R10.31 RLQ ABDOMINAL PAIN: ICD-10-CM

## 2021-08-27 DIAGNOSIS — R10.11 ABDOMINAL PAIN, RIGHT UPPER QUADRANT: Primary | ICD-10-CM

## 2021-08-27 DIAGNOSIS — R14.0 ABDOMINAL DISTENSION: Primary | ICD-10-CM

## 2021-08-27 DIAGNOSIS — R10.11 RUQ ABDOMINAL PAIN: ICD-10-CM

## 2021-08-27 DIAGNOSIS — K52.9 NON-SPECIFIC COLITIS: ICD-10-CM

## 2021-08-27 DIAGNOSIS — K52.9 COLITIS: ICD-10-CM

## 2021-08-27 DIAGNOSIS — R10.31 ABDOMINAL PAIN, RIGHT LOWER QUADRANT: ICD-10-CM

## 2021-08-27 DIAGNOSIS — R10.13 ABDOMINAL PAIN, EPIGASTRIC: ICD-10-CM

## 2021-08-27 LAB
ALBUMIN UR-MCNC: NEGATIVE MG/DL
APPEARANCE UR: CLEAR
BASOPHILS # BLD AUTO: 0 10E3/UL (ref 0–0.2)
BASOPHILS NFR BLD AUTO: 0 %
BILIRUB UR QL STRIP: NEGATIVE
COLOR UR AUTO: YELLOW
EOSINOPHIL # BLD AUTO: 0.1 10E3/UL (ref 0–0.7)
EOSINOPHIL NFR BLD AUTO: 2 %
ERYTHROCYTE [DISTWIDTH] IN BLOOD BY AUTOMATED COUNT: 13.1 % (ref 10–15)
GLUCOSE UR STRIP-MCNC: NEGATIVE MG/DL
HCT VFR BLD AUTO: 37.9 % (ref 35–47)
HGB BLD-MCNC: 12.7 G/DL (ref 11.7–15.7)
HGB UR QL STRIP: NEGATIVE
KETONES UR STRIP-MCNC: NEGATIVE MG/DL
LEUKOCYTE ESTERASE UR QL STRIP: NEGATIVE
LYMPHOCYTES # BLD AUTO: 1.1 10E3/UL (ref 0.8–5.3)
LYMPHOCYTES NFR BLD AUTO: 14 %
MCH RBC QN AUTO: 30.2 PG (ref 26.5–33)
MCHC RBC AUTO-ENTMCNC: 33.5 G/DL (ref 31.5–36.5)
MCV RBC AUTO: 90 FL (ref 78–100)
MONOCYTES # BLD AUTO: 0.8 10E3/UL (ref 0–1.3)
MONOCYTES NFR BLD AUTO: 10 %
NEUTROPHILS # BLD AUTO: 5.7 10E3/UL (ref 1.6–8.3)
NEUTROPHILS NFR BLD AUTO: 75 %
NITRATE UR QL: NEGATIVE
PH UR STRIP: 5.5 [PH] (ref 5–7)
PLATELET # BLD AUTO: 249 10E3/UL (ref 150–450)
RBC # BLD AUTO: 4.2 10E6/UL (ref 3.8–5.2)
SP GR UR STRIP: 1.01 (ref 1–1.03)
UROBILINOGEN UR STRIP-MCNC: NORMAL MG/DL
WBC # BLD AUTO: 7.7 10E3/UL (ref 4–11)

## 2021-08-27 PROCEDURE — 85025 COMPLETE CBC W/AUTO DIFF WBC: CPT | Performed by: PHYSICIAN ASSISTANT

## 2021-08-27 PROCEDURE — 250N000011 HC RX IP 250 OP 636: Performed by: RADIOLOGY

## 2021-08-27 PROCEDURE — 36415 COLL VENOUS BLD VENIPUNCTURE: CPT | Performed by: PHYSICIAN ASSISTANT

## 2021-08-27 PROCEDURE — 99214 OFFICE O/P EST MOD 30 MIN: CPT | Performed by: PHYSICIAN ASSISTANT

## 2021-08-27 PROCEDURE — 74019 RADEX ABDOMEN 2 VIEWS: CPT | Performed by: RADIOLOGY

## 2021-08-27 PROCEDURE — 250N000009 HC RX 250: Performed by: RADIOLOGY

## 2021-08-27 PROCEDURE — 74178 CT ABD&PLV WO CNTR FLWD CNTR: CPT

## 2021-08-27 PROCEDURE — 81003 URINALYSIS AUTO W/O SCOPE: CPT

## 2021-08-27 RX ORDER — IOPAMIDOL 755 MG/ML
500 INJECTION, SOLUTION INTRAVASCULAR ONCE
Status: COMPLETED | OUTPATIENT
Start: 2021-08-27 | End: 2021-08-27

## 2021-08-27 RX ORDER — CIPROFLOXACIN 500 MG/1
500 TABLET, FILM COATED ORAL 2 TIMES DAILY
Qty: 20 TABLET | Refills: 0 | Status: ON HOLD | OUTPATIENT
Start: 2021-08-27 | End: 2021-09-13

## 2021-08-27 RX ORDER — METRONIDAZOLE 500 MG/1
500 TABLET ORAL 2 TIMES DAILY
Qty: 20 TABLET | Refills: 0 | Status: SHIPPED | OUTPATIENT
Start: 2021-08-27 | End: 2021-09-06

## 2021-08-27 RX ADMIN — IOPAMIDOL 100 ML: 755 INJECTION, SOLUTION INTRAVENOUS at 15:26

## 2021-08-27 RX ADMIN — SODIUM CHLORIDE 60 ML: 9 INJECTION, SOLUTION INTRAVENOUS at 15:27

## 2021-08-27 NOTE — TELEPHONE ENCOUNTER
Abdominal pain with radiation to the right upper quadrant.  We will offer 1320 visit today.  Electronically signed:    Otoniel Manuel PA-C
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
## 2021-08-29 ENCOUNTER — MYC MEDICAL ADVICE (OUTPATIENT)
Dept: FAMILY MEDICINE | Facility: OTHER | Age: 46
End: 2021-08-29

## 2021-08-29 DIAGNOSIS — R10.11 RUQ ABDOMINAL PAIN: Primary | ICD-10-CM

## 2021-08-30 ENCOUNTER — MYC MEDICAL ADVICE (OUTPATIENT)
Dept: FAMILY MEDICINE | Facility: OTHER | Age: 46
End: 2021-08-30

## 2021-08-30 ENCOUNTER — LAB (OUTPATIENT)
Dept: LAB | Facility: CLINIC | Age: 46
End: 2021-08-30
Payer: COMMERCIAL

## 2021-08-30 DIAGNOSIS — R10.31 ABDOMINAL PAIN, RIGHT LOWER QUADRANT: ICD-10-CM

## 2021-08-30 DIAGNOSIS — R10.13 ABDOMINAL PAIN, EPIGASTRIC: ICD-10-CM

## 2021-08-30 DIAGNOSIS — R11.0 NAUSEA: ICD-10-CM

## 2021-08-30 DIAGNOSIS — K52.9 COLITIS: Primary | ICD-10-CM

## 2021-08-30 DIAGNOSIS — R10.11 ABDOMINAL PAIN, RIGHT UPPER QUADRANT: ICD-10-CM

## 2021-08-30 DIAGNOSIS — R19.7 DIARRHEA OF PRESUMED INFECTIOUS ORIGIN: ICD-10-CM

## 2021-08-30 DIAGNOSIS — R10.11 RUQ ABDOMINAL PAIN: ICD-10-CM

## 2021-08-30 DIAGNOSIS — R10.13 ABDOMINAL PAIN, EPIGASTRIC: Primary | ICD-10-CM

## 2021-08-30 LAB
ALBUMIN SERPL-MCNC: 3.5 G/DL (ref 3.4–5)
ALP SERPL-CCNC: 53 U/L (ref 40–150)
ALT SERPL W P-5'-P-CCNC: 21 U/L (ref 0–50)
AMYLASE SERPL-CCNC: 48 U/L (ref 30–110)
ANION GAP SERPL CALCULATED.3IONS-SCNC: 3 MMOL/L (ref 3–14)
AST SERPL W P-5'-P-CCNC: 14 U/L (ref 0–45)
BASOPHILS # BLD AUTO: 0 10E3/UL (ref 0–0.2)
BASOPHILS NFR BLD AUTO: 0 %
BILIRUB SERPL-MCNC: 0.5 MG/DL (ref 0.2–1.3)
BUN SERPL-MCNC: 11 MG/DL (ref 7–30)
CALCIUM SERPL-MCNC: 8.8 MG/DL (ref 8.5–10.1)
CHLORIDE BLD-SCNC: 108 MMOL/L (ref 94–109)
CO2 SERPL-SCNC: 27 MMOL/L (ref 20–32)
CREAT SERPL-MCNC: 0.57 MG/DL (ref 0.52–1.04)
CRP SERPL-MCNC: 53.3 MG/L (ref 0–8)
EOSINOPHIL # BLD AUTO: 0.2 10E3/UL (ref 0–0.7)
EOSINOPHIL NFR BLD AUTO: 3 %
ERYTHROCYTE [DISTWIDTH] IN BLOOD BY AUTOMATED COUNT: 12.4 % (ref 10–15)
GFR SERPL CREATININE-BSD FRML MDRD: >90 ML/MIN/1.73M2
GLUCOSE BLD-MCNC: 100 MG/DL (ref 70–99)
HCT VFR BLD AUTO: 38.1 % (ref 35–47)
HGB BLD-MCNC: 13 G/DL (ref 11.7–15.7)
IMM GRANULOCYTES # BLD: 0 10E3/UL
IMM GRANULOCYTES NFR BLD: 0 %
LIPASE SERPL-CCNC: 172 U/L (ref 73–393)
LYMPHOCYTES # BLD AUTO: 0.8 10E3/UL (ref 0.8–5.3)
LYMPHOCYTES NFR BLD AUTO: 15 %
MCH RBC QN AUTO: 30.5 PG (ref 26.5–33)
MCHC RBC AUTO-ENTMCNC: 34.1 G/DL (ref 31.5–36.5)
MCV RBC AUTO: 89 FL (ref 78–100)
MONOCYTES # BLD AUTO: 0.6 10E3/UL (ref 0–1.3)
MONOCYTES NFR BLD AUTO: 11 %
NEUTROPHILS # BLD AUTO: 3.9 10E3/UL (ref 1.6–8.3)
NEUTROPHILS NFR BLD AUTO: 71 %
NRBC # BLD AUTO: 0 10E3/UL
NRBC BLD AUTO-RTO: 0 /100
PLATELET # BLD AUTO: 227 10E3/UL (ref 150–450)
POTASSIUM BLD-SCNC: 3.8 MMOL/L (ref 3.4–5.3)
PROT SERPL-MCNC: 7.7 G/DL (ref 6.8–8.8)
RBC # BLD AUTO: 4.26 10E6/UL (ref 3.8–5.2)
SODIUM SERPL-SCNC: 138 MMOL/L (ref 133–144)
WBC # BLD AUTO: 5.5 10E3/UL (ref 4–11)

## 2021-08-30 PROCEDURE — 83690 ASSAY OF LIPASE: CPT

## 2021-08-30 PROCEDURE — 36415 COLL VENOUS BLD VENIPUNCTURE: CPT

## 2021-08-30 PROCEDURE — 82150 ASSAY OF AMYLASE: CPT

## 2021-08-30 PROCEDURE — 85025 COMPLETE CBC W/AUTO DIFF WBC: CPT

## 2021-08-30 PROCEDURE — 86140 C-REACTIVE PROTEIN: CPT

## 2021-08-30 PROCEDURE — 80053 COMPREHEN METABOLIC PANEL: CPT

## 2021-08-30 RX ORDER — METHYLPREDNISOLONE 4 MG
TABLET, DOSE PACK ORAL
Qty: 21 TABLET | Refills: 0 | Status: ON HOLD | OUTPATIENT
Start: 2021-08-30 | End: 2021-09-13

## 2021-08-30 NOTE — PROGRESS NOTES
Contact [facility] spoke with one of the GI specialist there.  Agreed to do further testing and consider colonoscopy within the next 7 to 10 days.  We are advised to have the patient continue antibiotics for at least 7 days prior to colonoscopy.  Relay this information to the patient and advised testing as noted herein.  Electronically signed:    Otoniel Manuel PA-C

## 2021-08-31 ENCOUNTER — HOSPITAL ENCOUNTER (EMERGENCY)
Facility: CLINIC | Age: 46
Discharge: HOME | End: 2021-08-31
Attending: EMERGENCY MEDICINE | Admitting: EMERGENCY MEDICINE
Payer: COMMERCIAL

## 2021-08-31 ENCOUNTER — MYC MEDICAL ADVICE (OUTPATIENT)
Dept: FAMILY MEDICINE | Facility: OTHER | Age: 46
End: 2021-08-31

## 2021-08-31 VITALS
BODY MASS INDEX: 33.37 KG/M2 | OXYGEN SATURATION: 99 % | WEIGHT: 207.6 LBS | DIASTOLIC BLOOD PRESSURE: 85 MMHG | HEART RATE: 56 BPM | TEMPERATURE: 98.4 F | HEIGHT: 66 IN | RESPIRATION RATE: 20 BRPM | SYSTOLIC BLOOD PRESSURE: 130 MMHG

## 2021-08-31 DIAGNOSIS — B37.31 YEAST INFECTION OF THE VAGINA: Primary | ICD-10-CM

## 2021-08-31 DIAGNOSIS — E86.0 DEHYDRATION: ICD-10-CM

## 2021-08-31 DIAGNOSIS — R19.7 DIARRHEA, UNSPECIFIED TYPE: ICD-10-CM

## 2021-08-31 LAB
ALBUMIN UR-MCNC: NEGATIVE MG/DL
ANION GAP SERPL CALCULATED.3IONS-SCNC: 4 MMOL/L (ref 3–14)
APPEARANCE UR: CLEAR
BASOPHILS # BLD AUTO: 0 10E3/UL (ref 0–0.2)
BASOPHILS NFR BLD AUTO: 0 %
BILIRUB UR QL STRIP: NEGATIVE
BUN SERPL-MCNC: 14 MG/DL (ref 7–30)
C COLI+JEJUNI+LARI FUSA STL QL NAA+PROBE: NOT DETECTED
C DIFF TOX B STL QL: NEGATIVE
CALCIUM SERPL-MCNC: 9.1 MG/DL (ref 8.5–10.1)
CHLORIDE BLD-SCNC: 108 MMOL/L (ref 94–109)
CO2 SERPL-SCNC: 28 MMOL/L (ref 20–32)
COLOR UR AUTO: YELLOW
CREAT SERPL-MCNC: 0.5 MG/DL (ref 0.52–1.04)
EC STX1 GENE STL QL NAA+PROBE: NOT DETECTED
EC STX2 GENE STL QL NAA+PROBE: NOT DETECTED
EOSINOPHIL # BLD AUTO: 0.1 10E3/UL (ref 0–0.7)
EOSINOPHIL NFR BLD AUTO: 1 %
ERYTHROCYTE [DISTWIDTH] IN BLOOD BY AUTOMATED COUNT: 12.3 % (ref 10–15)
GFR SERPL CREATININE-BSD FRML MDRD: >90 ML/MIN/1.73M2
GLUCOSE BLD-MCNC: 103 MG/DL (ref 70–99)
GLUCOSE UR STRIP-MCNC: NEGATIVE MG/DL
HCT VFR BLD AUTO: 36.8 % (ref 35–47)
HGB BLD-MCNC: 12.7 G/DL (ref 11.7–15.7)
HGB UR QL STRIP: NEGATIVE
IMM GRANULOCYTES # BLD: 0 10E3/UL
IMM GRANULOCYTES NFR BLD: 1 %
KETONES UR STRIP-MCNC: 20 MG/DL
LEUKOCYTE ESTERASE UR QL STRIP: ABNORMAL
LYMPHOCYTES # BLD AUTO: 1 10E3/UL (ref 0.8–5.3)
LYMPHOCYTES NFR BLD AUTO: 17 %
MCH RBC QN AUTO: 30.5 PG (ref 26.5–33)
MCHC RBC AUTO-ENTMCNC: 34.5 G/DL (ref 31.5–36.5)
MCV RBC AUTO: 88 FL (ref 78–100)
MONOCYTES # BLD AUTO: 0.6 10E3/UL (ref 0–1.3)
MONOCYTES NFR BLD AUTO: 10 %
MUCOUS THREADS #/AREA URNS LPF: PRESENT /LPF
NEUTROPHILS # BLD AUTO: 4.1 10E3/UL (ref 1.6–8.3)
NEUTROPHILS NFR BLD AUTO: 71 %
NITRATE UR QL: NEGATIVE
NOROV GI+II ORF1-ORF2 JNC STL QL NAA+PR: NOT DETECTED
NRBC # BLD AUTO: 0 10E3/UL
NRBC BLD AUTO-RTO: 0 /100
PH UR STRIP: 6 [PH] (ref 5–7)
PLAT MORPH BLD: NORMAL
PLATELET # BLD AUTO: 262 10E3/UL (ref 150–450)
POTASSIUM BLD-SCNC: 3.8 MMOL/L (ref 3.4–5.3)
RBC # BLD AUTO: 4.17 10E6/UL (ref 3.8–5.2)
RBC MORPH BLD: NORMAL
RBC URINE: 1 /HPF
RVA NSP5 STL QL NAA+PROBE: NOT DETECTED
SALMONELLA SP RPOD STL QL NAA+PROBE: NOT DETECTED
SHIGELLA SP+EIEC IPAH STL QL NAA+PROBE: NOT DETECTED
SODIUM SERPL-SCNC: 140 MMOL/L (ref 133–144)
SP GR UR STRIP: 1.02 (ref 1–1.03)
SQUAMOUS EPITHELIAL: 3 /HPF
UROBILINOGEN UR STRIP-MCNC: NORMAL MG/DL
V CHOL+PARA RFBL+TRKH+TNAA STL QL NAA+PR: NOT DETECTED
WBC # BLD AUTO: 5.7 10E3/UL (ref 4–11)
WBC URINE: 1 /HPF
Y ENTERO RECN STL QL NAA+PROBE: NOT DETECTED

## 2021-08-31 PROCEDURE — 250N000011 HC RX IP 250 OP 636: Performed by: EMERGENCY MEDICINE

## 2021-08-31 PROCEDURE — 96361 HYDRATE IV INFUSION ADD-ON: CPT | Performed by: EMERGENCY MEDICINE

## 2021-08-31 PROCEDURE — 96376 TX/PRO/DX INJ SAME DRUG ADON: CPT | Performed by: EMERGENCY MEDICINE

## 2021-08-31 PROCEDURE — 36415 COLL VENOUS BLD VENIPUNCTURE: CPT | Performed by: EMERGENCY MEDICINE

## 2021-08-31 PROCEDURE — 82374 ASSAY BLOOD CARBON DIOXIDE: CPT | Performed by: EMERGENCY MEDICINE

## 2021-08-31 PROCEDURE — 96366 THER/PROPH/DIAG IV INF ADDON: CPT | Performed by: EMERGENCY MEDICINE

## 2021-08-31 PROCEDURE — 258N000003 HC RX IP 258 OP 636: Performed by: EMERGENCY MEDICINE

## 2021-08-31 PROCEDURE — 85025 COMPLETE CBC W/AUTO DIFF WBC: CPT | Performed by: EMERGENCY MEDICINE

## 2021-08-31 PROCEDURE — 87493 C DIFF AMPLIFIED PROBE: CPT | Performed by: PHYSICIAN ASSISTANT

## 2021-08-31 PROCEDURE — 87209 SMEAR COMPLEX STAIN: CPT | Performed by: PHYSICIAN ASSISTANT

## 2021-08-31 PROCEDURE — 87329 GIARDIA AG IA: CPT | Performed by: PHYSICIAN ASSISTANT

## 2021-08-31 PROCEDURE — 250N000012 HC RX MED GY IP 250 OP 636 PS 637: Performed by: EMERGENCY MEDICINE

## 2021-08-31 PROCEDURE — 96365 THER/PROPH/DIAG IV INF INIT: CPT | Performed by: EMERGENCY MEDICINE

## 2021-08-31 PROCEDURE — 99285 EMERGENCY DEPT VISIT HI MDM: CPT | Performed by: EMERGENCY MEDICINE

## 2021-08-31 PROCEDURE — 96375 TX/PRO/DX INJ NEW DRUG ADDON: CPT | Performed by: EMERGENCY MEDICINE

## 2021-08-31 PROCEDURE — 51798 US URINE CAPACITY MEASURE: CPT | Performed by: EMERGENCY MEDICINE

## 2021-08-31 PROCEDURE — 99285 EMERGENCY DEPT VISIT HI MDM: CPT | Mod: 25 | Performed by: EMERGENCY MEDICINE

## 2021-08-31 PROCEDURE — 87506 IADNA-DNA/RNA PROBE TQ 6-11: CPT | Performed by: PHYSICIAN ASSISTANT

## 2021-08-31 PROCEDURE — 96372 THER/PROPH/DIAG INJ SC/IM: CPT | Performed by: EMERGENCY MEDICINE

## 2021-08-31 PROCEDURE — 81001 URINALYSIS AUTO W/SCOPE: CPT | Performed by: EMERGENCY MEDICINE

## 2021-08-31 PROCEDURE — 250N000011 HC RX IP 250 OP 636

## 2021-08-31 RX ORDER — SODIUM CHLORIDE 9 MG/ML
INJECTION, SOLUTION INTRAVENOUS CONTINUOUS
Status: DISCONTINUED | OUTPATIENT
Start: 2021-08-31 | End: 2021-08-31 | Stop reason: HOSPADM

## 2021-08-31 RX ORDER — SUMATRIPTAN 6 MG/.5ML
6 INJECTION, SOLUTION SUBCUTANEOUS ONCE
Status: COMPLETED | OUTPATIENT
Start: 2021-08-31 | End: 2021-08-31

## 2021-08-31 RX ORDER — ONDANSETRON 2 MG/ML
4 INJECTION INTRAMUSCULAR; INTRAVENOUS EVERY 30 MIN PRN
Status: DISCONTINUED | OUTPATIENT
Start: 2021-08-31 | End: 2021-08-31 | Stop reason: HOSPADM

## 2021-08-31 RX ORDER — MAGNESIUM SULFATE 1 G/100ML
1 INJECTION INTRAVENOUS ONCE
Status: COMPLETED | OUTPATIENT
Start: 2021-08-31 | End: 2021-08-31

## 2021-08-31 RX ORDER — LORAZEPAM 2 MG/ML
1 INJECTION INTRAMUSCULAR ONCE
Status: DISCONTINUED | OUTPATIENT
Start: 2021-08-31 | End: 2021-08-31 | Stop reason: HOSPADM

## 2021-08-31 RX ORDER — KETOROLAC TROMETHAMINE 30 MG/ML
30 INJECTION, SOLUTION INTRAMUSCULAR; INTRAVENOUS ONCE
Status: COMPLETED | OUTPATIENT
Start: 2021-08-31 | End: 2021-08-31

## 2021-08-31 RX ADMIN — SODIUM CHLORIDE 1000 ML: 9 INJECTION, SOLUTION INTRAVENOUS at 08:01

## 2021-08-31 RX ADMIN — ONDANSETRON 4 MG: 2 INJECTION INTRAMUSCULAR; INTRAVENOUS at 08:02

## 2021-08-31 RX ADMIN — MAGNESIUM SULFATE HEPTAHYDRATE 1 G: 1 INJECTION, SOLUTION INTRAVENOUS at 08:05

## 2021-08-31 RX ADMIN — KETOROLAC TROMETHAMINE 30 MG: 30 INJECTION, SOLUTION INTRAMUSCULAR; INTRAVENOUS at 08:04

## 2021-08-31 RX ADMIN — ONDANSETRON 4 MG: 2 INJECTION INTRAMUSCULAR; INTRAVENOUS at 10:31

## 2021-08-31 RX ADMIN — SUMATRIPTAN 6 MG: 6 INJECTION, SOLUTION SUBCUTANEOUS at 10:06

## 2021-08-31 RX ADMIN — SODIUM CHLORIDE 1000 ML: 9 INJECTION, SOLUTION INTRAVENOUS at 10:30

## 2021-08-31 ASSESSMENT — MIFFLIN-ST. JEOR: SCORE: 1588.42

## 2021-08-31 NOTE — ED TRIAGE NOTES
Pt in with Upper right quadrant pain on Friday.  Today presents with Headache and Nausea complains unable to Function.  Pt vomiting this am.

## 2021-08-31 NOTE — DISCHARGE INSTRUCTIONS
The stool cultures will be the most helpful to determine if your diarrhea needs antibiotics or other treatments.  In the meantime you could try some Imodium to see if that will slow down the stooling.  For the rest of the day take small sips of water, Pedialyte, or Gatorade every 15 to 20 minutes.  Hopefully that will absorb rather than just go through quickly.  The results of your stool cultures may be available later this evening or more likely tomorrow and can be viewed on [employer].  If positive the nurse line usually will contact you and arrange follow-up treatment.
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
## 2021-09-01 ENCOUNTER — PATIENT OUTREACH (OUTPATIENT)
Dept: FAMILY MEDICINE | Facility: OTHER | Age: 46
End: 2021-09-01

## 2021-09-01 ENCOUNTER — TELEPHONE (OUTPATIENT)
Dept: GASTROENTEROLOGY | Facility: CLINIC | Age: 46
End: 2021-09-01

## 2021-09-01 DIAGNOSIS — Z11.59 ENCOUNTER FOR SCREENING FOR OTHER VIRAL DISEASES: ICD-10-CM

## 2021-09-01 LAB
C PARVUM AG STL QL IA: NEGATIVE
G LAMBLIA AG STL QL IA: NEGATIVE
O+P STL MICRO: NEGATIVE

## 2021-09-01 NOTE — TELEPHONE ENCOUNTER
Screening Questions  1. Are you active on mychart?    2. What insurance is in the chart? BCBS    2.  Ordering/Referring Provider:     3. BMI 34.9    4. Are you on daily home oxygen? No     5. Do you have a history of difficult airway?  no    6. Have you had a heart, lung, or liver transplant?  no    7. Are you currently on dialysis?  no    8. Have you had a stroke or Transient ischemic atttack (TIA) within 6 months?  No     9. In the past 6 months, have you had any heart related issues including cardiomyopathy or heart attack?         If yes, did it require cardiac stenting or other implantable device? no    10. Do you have any implantable devices in your body (pacemaker, defib, LVAD)? No     11. Do you take nitroglycerin? If yes, how often?  No     12. Are you currently taking any blood thinners? no    13. Are you a diabetic?  No     14. (Females) Are you currently pregnant?   If yes, how many weeks?    15. Have you had a procedure in the past that was difficult to tolerate with conscious sedation? Any allergies to Fentanyl or Versed  No (nausea when on narcotics)    16. Are you taking any scheduled prescription narcotics more than once daily?  No     17. Do you have any chemical dependencies such as alcohol, street drugs, or methadone? no    18. Do you have any history of post-traumatic stress syndrome or mental health issues?  No     19. Do you transfer independently?  yes    20.  Do you have any issues with constipation? NO    21. Preferred Pharmacy for Pre Prescription [facility] [address]     Scheduling Details    Which Colonoscopy Prep was Sent?: Miralax  Procedure Scheduled: colon  Provider/Surgeon: norris  Date of Procedure: 9/13/2021  Location:   Caller (Please ask for phone number if not scheduled by patient):  Bhupinder Coker      Sedation Type: MAC-pt does not tolerate any narcotics. She stated she has done MAC before and has tolerated it.   Conscious Sedation- Needs   for 6 hours after the procedure  MAC/General-Needs  for 24 hours after procedure    Pre-op Required at [facility] [place] and OR for MAC sedation:   (if yes advise patient they will need a pre-op prior to procedure)      Is patient on blood thinners? -no (If yes- inform patient to follow up with PCP or provider for follow up instructions)     Informed patient they will need an adult  yes  Cannot take any type of public or medical transportation alone    Informed Patient of COVID Test Requirement yes    Confirmed Nurse will call to complete assessment yes    Additional comments: no

## 2021-09-01 NOTE — TELEPHONE ENCOUNTER
"Routing to provider-    Patient is requesting a work-in request for pre-op and ER f/u, still having symptoms. Procedure will be on 9/13. States she is available anytime tomorrow 9/2 will just need a 30 min warning. Otherwise, early morning or later evening time slots work because she is trying to get back to working. As of now first available was on 9/9 and patient is scheduled for this at 12 pm.     Please review for work-in request and let patient know.     ED for acute condition Discharge Protocol    \"Hi, my name is Blanquita Carbajal, a registered nurse, and I am calling from [facility].  I am calling to follow up and see how things are going for you after your recent emergency visit.\"    Tell me how you are doing now that you are home?\"     Still has residual headache, weak, shaky. Was able to eat something and drinking only 4 oz at a time. No diarrhea or vomiting. Does not know if she is dehydrated but is urinating. Advised to continue drinking fluids as much as she can handle.       Discharge Instructions    \"Let's review your discharge instructions.  What is/are the follow-up recommendations?  Pt. Response: Colonoscopy set for 9/13    \"Has an appointment with your primary care provider been scheduled?\"  No (needed - schedule appointment and remind to bring meds) Scheduled with pre-op    Medications    \"Tell me what changed about your medicines when you discharged?\"    None    \"What questions do you have about your medications?\"   None        Call Summary    \"What questions or concerns do you have about your recent visit and your follow-up care?\"     none    \"If you have questions or things don't continue to improve, we encourage you contact us through the main clinic number (give number).  Even if the clinic is not open, triage nurses are available 24/7 to help you.     We would like you to know that our clinic has extended hours (provide information).  We also have urgent care (provide details " "on closest location and hours/contact info)\"    \"Thank you for your time and take care!\"          INDIGO Tucker/[facility]        "

## 2021-09-01 NOTE — TELEPHONE ENCOUNTER
Reason for follow up call: Amelia Coker appeared on our list for being seen in and recenlty discharge from the Emergency Room/In Patient Hospital Admission.    Chief Complaint   Patient presents with     Hospital F/U     11% green     Headache  Weakness              TCM Episode no    Encounter routed for Clinic Triage RN to call for follow up

## 2021-09-02 ENCOUNTER — LAB (OUTPATIENT)
Dept: LAB | Facility: CLINIC | Age: 46
End: 2021-09-02
Payer: COMMERCIAL

## 2021-09-02 ENCOUNTER — MYC MEDICAL ADVICE (OUTPATIENT)
Dept: FAMILY MEDICINE | Facility: OTHER | Age: 46
End: 2021-09-02

## 2021-09-02 DIAGNOSIS — K52.9 COLITIS: ICD-10-CM

## 2021-09-02 DIAGNOSIS — R10.13 ABDOMINAL PAIN, EPIGASTRIC: ICD-10-CM

## 2021-09-02 DIAGNOSIS — R14.0 ABDOMINAL DISTENSION: ICD-10-CM

## 2021-09-02 DIAGNOSIS — R14.0 ABDOMINAL DISTENSION: Primary | ICD-10-CM

## 2021-09-02 LAB
ALBUMIN SERPL-MCNC: 3.8 G/DL (ref 3.4–5)
ALP SERPL-CCNC: 53 U/L (ref 40–150)
ALT SERPL W P-5'-P-CCNC: 26 U/L (ref 0–50)
ANION GAP SERPL CALCULATED.3IONS-SCNC: 3 MMOL/L (ref 3–14)
AST SERPL W P-5'-P-CCNC: 19 U/L (ref 0–45)
BASOPHILS # BLD AUTO: 0 10E3/UL (ref 0–0.2)
BASOPHILS NFR BLD AUTO: 0 %
BILIRUB SERPL-MCNC: 0.4 MG/DL (ref 0.2–1.3)
BUN SERPL-MCNC: 12 MG/DL (ref 7–30)
CALCIUM SERPL-MCNC: 9.1 MG/DL (ref 8.5–10.1)
CHLORIDE BLD-SCNC: 106 MMOL/L (ref 94–109)
CO2 SERPL-SCNC: 31 MMOL/L (ref 20–32)
CREAT SERPL-MCNC: 0.72 MG/DL (ref 0.52–1.04)
EOSINOPHIL # BLD AUTO: 0.1 10E3/UL (ref 0–0.7)
EOSINOPHIL NFR BLD AUTO: 1 %
ERYTHROCYTE [DISTWIDTH] IN BLOOD BY AUTOMATED COUNT: 12.3 % (ref 10–15)
GFR SERPL CREATININE-BSD FRML MDRD: >90 ML/MIN/1.73M2
GLUCOSE BLD-MCNC: 131 MG/DL (ref 70–99)
HCT VFR BLD AUTO: 35.9 % (ref 35–47)
HGB BLD-MCNC: 12.3 G/DL (ref 11.7–15.7)
IMM GRANULOCYTES # BLD: 0 10E3/UL
IMM GRANULOCYTES NFR BLD: 1 %
LYMPHOCYTES # BLD AUTO: 1.6 10E3/UL (ref 0.8–5.3)
LYMPHOCYTES NFR BLD AUTO: 27 %
MCH RBC QN AUTO: 30.4 PG (ref 26.5–33)
MCHC RBC AUTO-ENTMCNC: 34.3 G/DL (ref 31.5–36.5)
MCV RBC AUTO: 89 FL (ref 78–100)
MONOCYTES # BLD AUTO: 0.3 10E3/UL (ref 0–1.3)
MONOCYTES NFR BLD AUTO: 6 %
NEUTROPHILS # BLD AUTO: 3.8 10E3/UL (ref 1.6–8.3)
NEUTROPHILS NFR BLD AUTO: 65 %
NRBC # BLD AUTO: 0 10E3/UL
NRBC BLD AUTO-RTO: 0 /100
PLAT MORPH BLD: NORMAL
PLATELET # BLD AUTO: 295 10E3/UL (ref 150–450)
POTASSIUM BLD-SCNC: 3.8 MMOL/L (ref 3.4–5.3)
PROT SERPL-MCNC: 7.7 G/DL (ref 6.8–8.8)
RBC # BLD AUTO: 4.04 10E6/UL (ref 3.8–5.2)
RBC MORPH BLD: NORMAL
SODIUM SERPL-SCNC: 140 MMOL/L (ref 133–144)
WBC # BLD AUTO: 5.9 10E3/UL (ref 4–11)

## 2021-09-02 PROCEDURE — 85025 COMPLETE CBC W/AUTO DIFF WBC: CPT

## 2021-09-02 PROCEDURE — 36415 COLL VENOUS BLD VENIPUNCTURE: CPT

## 2021-09-02 PROCEDURE — 80053 COMPREHEN METABOLIC PANEL: CPT

## 2021-09-02 RX ORDER — FLUCONAZOLE 150 MG/1
150 TABLET ORAL ONCE
Qty: 1 TABLET | Refills: 0 | Status: SHIPPED | OUTPATIENT
Start: 2021-09-02 | End: 2021-09-02

## 2021-09-09 ENCOUNTER — LAB (OUTPATIENT)
Dept: LAB | Facility: CLINIC | Age: 46
End: 2021-09-09
Payer: COMMERCIAL

## 2021-09-09 ENCOUNTER — OFFICE VISIT (OUTPATIENT)
Dept: FAMILY MEDICINE | Facility: OTHER | Age: 46
End: 2021-09-09
Payer: COMMERCIAL

## 2021-09-09 VITALS
TEMPERATURE: 98 F | HEIGHT: 66 IN | RESPIRATION RATE: 16 BRPM | BODY MASS INDEX: 33.75 KG/M2 | WEIGHT: 210 LBS | OXYGEN SATURATION: 98 % | SYSTOLIC BLOOD PRESSURE: 134 MMHG | DIASTOLIC BLOOD PRESSURE: 72 MMHG | HEART RATE: 89 BPM

## 2021-09-09 DIAGNOSIS — R03.0 ELEVATED BLOOD PRESSURE READING WITHOUT DIAGNOSIS OF HYPERTENSION: ICD-10-CM

## 2021-09-09 DIAGNOSIS — Z01.818 PRE-OPERATIVE EXAMINATION: ICD-10-CM

## 2021-09-09 DIAGNOSIS — Z12.31 ENCOUNTER FOR SCREENING MAMMOGRAM FOR BREAST CANCER: Primary | ICD-10-CM

## 2021-09-09 DIAGNOSIS — R93.3 ABNORMAL CT SCAN, COLON: ICD-10-CM

## 2021-09-09 DIAGNOSIS — Z80.0 FAMILY HISTORY OF COLON CANCER: ICD-10-CM

## 2021-09-09 DIAGNOSIS — K52.9 COLITIS: ICD-10-CM

## 2021-09-09 DIAGNOSIS — I49.1 PAC (PREMATURE ATRIAL CONTRACTION): ICD-10-CM

## 2021-09-09 DIAGNOSIS — Z11.59 ENCOUNTER FOR SCREENING FOR OTHER VIRAL DISEASES: ICD-10-CM

## 2021-09-09 DIAGNOSIS — R10.13 ABDOMINAL PAIN, EPIGASTRIC: ICD-10-CM

## 2021-09-09 LAB
ALBUMIN SERPL-MCNC: 4 G/DL (ref 3.4–5)
ALP SERPL-CCNC: 48 U/L (ref 40–150)
ALT SERPL W P-5'-P-CCNC: 34 U/L (ref 0–50)
ANION GAP SERPL CALCULATED.3IONS-SCNC: 6 MMOL/L (ref 3–14)
AST SERPL W P-5'-P-CCNC: 16 U/L (ref 0–45)
BILIRUB SERPL-MCNC: 0.6 MG/DL (ref 0.2–1.3)
BUN SERPL-MCNC: 14 MG/DL (ref 7–30)
CALCIUM SERPL-MCNC: 9 MG/DL (ref 8.5–10.1)
CHLORIDE BLD-SCNC: 106 MMOL/L (ref 94–109)
CO2 SERPL-SCNC: 27 MMOL/L (ref 20–32)
CREAT SERPL-MCNC: 0.63 MG/DL (ref 0.52–1.04)
CRP SERPL-MCNC: <2.9 MG/L (ref 0–8)
ERYTHROCYTE [DISTWIDTH] IN BLOOD BY AUTOMATED COUNT: 13.4 % (ref 10–15)
GFR SERPL CREATININE-BSD FRML MDRD: >90 ML/MIN/1.73M2
GLUCOSE BLD-MCNC: 93 MG/DL (ref 70–99)
HCT VFR BLD AUTO: 37.8 % (ref 35–47)
HGB BLD-MCNC: 12.5 G/DL (ref 11.7–15.7)
MCH RBC QN AUTO: 30 PG (ref 26.5–33)
MCHC RBC AUTO-ENTMCNC: 33.1 G/DL (ref 31.5–36.5)
MCV RBC AUTO: 91 FL (ref 78–100)
PLATELET # BLD AUTO: 359 10E3/UL (ref 150–450)
POTASSIUM BLD-SCNC: 3.9 MMOL/L (ref 3.4–5.3)
PROT SERPL-MCNC: 7.5 G/DL (ref 6.8–8.8)
RBC # BLD AUTO: 4.17 10E6/UL (ref 3.8–5.2)
SODIUM SERPL-SCNC: 139 MMOL/L (ref 133–144)
WBC # BLD AUTO: 6.3 10E3/UL (ref 4–11)

## 2021-09-09 PROCEDURE — U0003 INFECTIOUS AGENT DETECTION BY NUCLEIC ACID (DNA OR RNA); SEVERE ACUTE RESPIRATORY SYNDROME CORONAVIRUS 2 (SARS-COV-2) (CORONAVIRUS DISEASE [COVID-19]), AMPLIFIED PROBE TECHNIQUE, MAKING USE OF HIGH THROUGHPUT TECHNOLOGIES AS DESCRIBED BY CMS-2020-01-R: HCPCS

## 2021-09-09 PROCEDURE — 80053 COMPREHEN METABOLIC PANEL: CPT | Performed by: PHYSICIAN ASSISTANT

## 2021-09-09 PROCEDURE — U0005 INFEC AGEN DETEC AMPLI PROBE: HCPCS

## 2021-09-09 PROCEDURE — 85027 COMPLETE CBC AUTOMATED: CPT | Performed by: PHYSICIAN ASSISTANT

## 2021-09-09 PROCEDURE — 36415 COLL VENOUS BLD VENIPUNCTURE: CPT | Performed by: PHYSICIAN ASSISTANT

## 2021-09-09 PROCEDURE — 99214 OFFICE O/P EST MOD 30 MIN: CPT | Performed by: PHYSICIAN ASSISTANT

## 2021-09-09 PROCEDURE — 86140 C-REACTIVE PROTEIN: CPT | Performed by: PHYSICIAN ASSISTANT

## 2021-09-09 RX ORDER — FLUCONAZOLE 150 MG/1
TABLET ORAL
Status: ON HOLD | COMMUNITY
Start: 2021-09-02 | End: 2021-09-13

## 2021-09-09 ASSESSMENT — MIFFLIN-ST. JEOR: SCORE: 1591.36

## 2021-09-09 ASSESSMENT — PAIN SCALES - GENERAL: PAINLEVEL: NO PAIN (0)

## 2021-09-09 NOTE — PROGRESS NOTES
[address]  [facility] [address]  Phone: [phone]  Primary Provider: Otoniel Nelson  Pre-op Performing Provider: OTONIEL NELSON      PREOPERATIVE EVALUATION:  Today's date: 9/9/2021    Amelia Coker is a 46 year old female who presents for a preoperative evaluation.    Surgical Information:  Surgery/Procedure: Colonoscopy  Surgery Location: [facility]  Surgeon: Dr. Liz  Surgery Date: 09/13/21  Time of Surgery: 1:00 pm  Where patient plans to recover: At home with family  Fax number for surgical facility: Note does not need to be faxed, will be available electronically in Epic.    Type of Anesthesia Anticipated: Choice    Assessment & Plan     The proposed surgical procedure is considered LOW risk.    Pre-operative examination  Abdominal pain, epigastric  Colitis  Abnormal CT scan, colon  Family history of colon cancer  PAC (premature atrial contraction)  Elevated blood pressure reading without diagnosis of hypertension  Cleared for surgical intervention based on assessment today.  Declines urine pregnancy test.  Has not had her menstrual period in quite some time.  Advised that she may need to do this at time of procedure.  - CRP, inflammation; Future  - Comprehensive metabolic panel (BMP + Alb, Alk Phos, ALT, AST, Total. Bili, TP); Future  - CBC with platelets; Future    Encounter for screening mammogram for breast cancer  - MA SCREENING DIGITAL BILAT - Future  (s+30); Future         Risks and Recommendations:  The patient has the following additional risks and recommendations for perioperative complications:   - No identified additional risk factors other than previously addressed    Medication Instructions:  Patient is on no chronic medications    RECOMMENDATION:  APPROVAL GIVEN to proceed with proposed procedure, without further diagnostic evaluation.      Subjective     HPI related to upcoming procedure: Colonoscopy to evaluate for reason behind  colitis.  Pain continues but is much improved.  Family history of colon cancer noted in her mother at age 50.      Health Care Directive:  Patient does not have a Health Care Directive or Living Will: Discussed advance care planning with patient; however, patient declined at this time.    Preoperative Review of :   reviewed - controlled substances reflected in medication list.    Status of Chronic Conditions:    Review of Systems  CONSTITUTIONAL: NEGATIVE for fever, chills, change in weight  INTEGUMENTARY/SKIN: NEGATIVE for worrisome rashes, moles or lesions  EYES: NEGATIVE for vision changes or irritation  ENT/MOUTH: NEGATIVE for ear, mouth and throat problems  RESP: NEGATIVE for significant cough or SOB  CV: NEGATIVE for chest pain, palpitations or peripheral edema  GI: NEGATIVE for nausea, abdominal pain, heartburn, or change in bowel habits  : NEGATIVE for frequency, dysuria, or hematuria  MUSCULOSKELETAL: NEGATIVE for significant arthralgias or myalgia  NEURO: NEGATIVE for weakness, dizziness or paresthesias  ENDOCRINE: NEGATIVE for temperature intolerance, skin/hair changes  HEME: NEGATIVE for bleeding problems  PSYCHIATRIC: NEGATIVE for changes in mood or affect    Patient Active Problem List    Diagnosis Date Noted     TOS (thoracic outlet syndrome) 06/07/2021     Priority: Medium     Added automatically from request for surgery [id number]       Dermatitis 03/03/2021     Priority: Medium     Musculoskeletal pain 03/03/2021     Priority: Medium     Fatigue, unspecified type 03/03/2021     Priority: Medium     AK (actinic keratosis) - both hands at the lateral thenar aspect. 12/08/2020     Priority: Medium     Hypertrophy of breast 11/05/2020     Priority: Medium     History of cold sores 11/05/2020     Priority: Medium     Elevated blood pressure reading without diagnosis of hypertension 09/24/2020     Priority: Medium     Balance problems 07/17/2020     Priority: Medium     Morbid obesity (H) 07/17/2020      Priority: Medium     Sinus arrhythmia seen on electrocardiogram 07/17/2020     Priority: Medium     Family history of ischemic heart disease - father at 46 massive MI 07/17/2020     Priority: Medium     Brachial plexopathy - right shoulder 05/21/2020     Priority: Medium     Dental abscess - left mandible molar 03/05/2020     Priority: Medium     Endometrium, hyperplasia - on recent CT scan 02/10/2020     Priority: Medium     Pain in joint involving pelvic region and thigh, right 02/10/2020     Priority: Medium     Right lateral abdominal pain 02/10/2020     Priority: Medium     Right foot pain 02/10/2020     Priority: Medium     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 10/10/2019     Priority: Medium     Abnormality of nail surface 08/29/2019     Priority: Medium     Dry hair 08/29/2019     Priority: Medium     Dry skin 08/29/2019     Priority: Medium     Malaise and fatigue 08/29/2019     Priority: Medium     Lymphadenopathy 08/29/2019     Priority: Medium     PONV (postoperative nausea and vomiting) 06/10/2019     Priority: Medium     Menorrhalgia 06/10/2019     Priority: Medium     Uterine polyp 06/10/2019     Priority: Medium     Loose stools 06/10/2019     Priority: Medium     Hyperactive bowel sounds 06/10/2019     Priority: Medium     Jaycob complexion 05/23/2019     Priority: Medium     Lip lesion 05/23/2019     Priority: Medium     Abdominal pain, epigastric 05/23/2019     Priority: Medium     Abdominal distension 05/23/2019     Priority: Medium     Intractable episodic tension-type headache 03/11/2019     Priority: Medium     Intractable right heel pain 05/29/2018     Priority: Medium     Superficial swelling of scalp 04/12/2018     Priority: Medium     Biceps tendinopathy, right 03/22/2018     Priority: Medium     Hyperlipidemia LDL goal <130 08/30/2017     Priority: Medium     Spasm of muscle 07/11/2017     Priority: Medium     Sternocleidomastoid muscle tenderness 04/05/2017     Priority:  Medium     left         Acute cervical myofascial strain, initial encounter 04/05/2017     Priority: Medium     Abnormal mammogram 09/21/2016     Priority: Medium     right breast       Gastroesophageal reflux disease, esophagitis presence not specified 06/29/2016     Priority: Medium     IMO Regulatory Load OCT 2020       Rotator cuff arthropathy, right 03/07/2016     Priority: Medium     AD (atopic dermatitis) 10/29/2015     Priority: Medium     Heat sensitivity 10/29/2015     Priority: Medium     Skin lesion 10/20/2015     Priority: Medium     posterior superior scalp       Plantar fasciitis 09/23/2015     Priority: Medium     Lateral epicondylitis 03/16/2015     Priority: Medium     Problem list name updated by automated process. Provider to review       Right shoulder pain 03/16/2015     Priority: Medium     Telangiectasia of face 12/19/2014     Priority: Medium     Frontal headache 12/15/2014     Priority: Medium     Muscular wasting and disuse atrophy, not elsewhere classified 06/09/2014     Priority: Medium     right SCM, right wrist,        Ds DNA antibody positive 04/16/2014     Priority: Medium     borderline.       Lumbar radiculopathy 01/22/2014     Priority: Medium     Migraine without aura and without status migrainosus, not intractable 10/23/2013     Priority: Medium     Foraminal stenosis of lumbar region 09/26/2013     Priority: Medium     DJD (degenerative joint disease), lumbar 09/26/2013     Priority: Medium     FLORIDALMA positive 07/18/2013     Priority: Medium     Plantar fascial fibromatosis 07/18/2013     Priority: Medium     Pain in joint, upper arm 04/15/2013     Priority: Medium     Family history of colon cancer 03/05/2013     Priority: Medium     mother       Keratitis sicca (H) 10/31/2012     Priority: Medium     Dry eyes 10/15/2012     Priority: Medium     Family history of melanoma 08/24/2012     Priority: Medium     Shoulder joint instability 08/15/2012     Priority: Medium     Abnormal  PFT 07/31/2012     Priority: Medium     question of left heart failure and/or shunting in need of further investigation       Chronic fatigue 06/06/2012     Priority: Medium     Hair loss 06/06/2012     Priority: Medium     Raynaud phenomenon 04/11/2012     Priority: Medium     PAC (premature atrial contraction) 07/15/2010     Priority: Medium     Palpitations 07/15/2010     Priority: Medium     Lyme disease 06/07/2010     Priority: Medium     Scalp lesion 05/26/2010     Priority: Medium     Migraine, unspecified, with intractable migraine, so stated, without mention of status migrainosus 04/04/2003     Priority: Medium     Problem list name updated by automated process. Provider to review  IMO Regulatory Load OCT 2020       Bell's palsy 04/04/2003     Priority: Medium     Contact dermatitis and other eczema, due to unspecified cause 04/04/2003     Priority: Medium      Past Medical History:   Diagnosis Date     Abnormal mammogram 9/21/2016    right breast      AD (atopic dermatitis) 10/29/2015     Allergic rhinitis due to other allergen      Arthritis      Bell's palsy      Chronic fatigue 6/6/2012     Chronic infection     MRSA - 2015 scalp and prior to Abdomen     Contact dermatitis and other eczema, due to unspecified cause     eczema     Contusion of left foot, initial encounter 3/16/2016     DJD (degenerative joint disease), lumbar 9/26/2013     DJD (degenerative joint disease), lumbar 9/26/2013     Ds DNA antibody positive 4/16/2014    borderline.      Elevated blood pressure reading without diagnosis of hypertension 12/24/2013     Facial contusion 12/15/2014    hit by horse      Foraminal stenosis of lumbar region 9/26/2013     Frontal headache 12/15/2014     Gastroesophageal reflux disease, esophagitis presence not specified 6/29/2016     Heat sensitivity 10/29/2015     HTN, goal below 140/90 9/23/2015     Hyperlipidemia LDL goal <130 8/30/2017     Irregular heart beat      Keratitis sicca (H) 10/31/2012      Left shoulder pain 9/26/2013     Lumbar radiculopathy 1/22/2014     Migraine headache 10/23/2013     Migraine, unspecified, with intractable migraine, so stated, without mention of status migrainosus      Motion sickness      MRSA infection 10/20/2015     Muscular wasting and disuse atrophy, not elsewhere classified 6/9/2014    right SCM, right wrist,       Numbness and tingling     both legs anf feet greater on the left     PAC (premature atrial contraction) 7/15/2010     Plantar fasciitis 9/23/2015     PONV (postoperative nausea and vomiting)      Skin lesion 10/20/2015    posterior superior scalp      Spasm of muscle 7/11/2017     Telangiectasia of face 12/19/2014     Tooth pain 10/20/2015    left lower primary molar      Past Surgical History:   Procedure Laterality Date     AS INJ TRANSFORAMIN EPIDURAL, LUMB/SACR SINGLE       COLONOSCOPY  3/11/2013    Procedure: COLONOSCOPY;  colonoscopy;  Surgeon: Lobito Mas MD;  Location: [facility]     COLONOSCOPY WITH CO2 INSUFFLATION N/A 11/19/2018    Procedure: COLONOSCOPY WITH CO2 INSUFFLATION;  Surgeon: Goyo Blank MD;  Location: [facility]     DECOMPRESSION LUMBAR TWO LEVELS Bilateral 12/8/2016    Procedure: DECOMPRESSION LUMBAR TWO LEVELS;  Surgeon: Bang Burrell MD;  Location: [facility]     DILATION AND CURETTAGE, HYSTEROSCOPY, ABLATE ENDOMETRIUM NOVASURE, COMBINED N/A 6/12/2019    Procedure: hysteroscopy, dilation & curettage, polypectomy, endometrial ablation;  Surgeon: Fredy Pham MD;  Location: [facility]     ESOPHAGOSCOPY, GASTROSCOPY, DUODENOSCOPY (EGD), COMBINED  11/8/2013    Procedure: COMBINED ESOPHAGOSCOPY, GASTROSCOPY, DUODENOSCOPY (EGD), BIOPSY SINGLE OR MULTIPLE;  Esophagoscopy, Gastroscopy, Duodenoscopy EGD with multiple biopsies;  Surgeon: Teja Koch MD;  [facility]: [facility]     ESOPHAGOSCOPY, GASTROSCOPY, DUODENOSCOPY (EGD), COMBINED N/A 2/18/2021    Procedure: ESOPHAGOGASTRODUODENOSCOPY, WITH BIOPSY;  Surgeon: Jesenia  MD Blanca;  Location: [facility]     HC REMOVAL OF TONSILS,<11 Y/O      Tonsils <10y.o.     HC TOOTH EXTRACTION W/FORCEP       INJECT BLOCK MEDIAL BRANCH CERVICAL/THORACIC/LUMBAR N/A 2020    Procedure: Sacral 1,2,3 Lateral Branch Blocks and lumbar 5 medial branch blocks bilaterally;  Surgeon: Maurisio Goetz MD;  Location: [facility]     INJECT JOINT SACROILIAC Left 2020    Procedure: Left Lumbar 5 Sacral 1 nerve root injections.;  Surgeon: Maurisio Goetz MD;  Location: [facility]     REPAIR MUSCLE UPPER EXTREMITY Right 2021    Procedure: DIVISION RIGHT PECTORALIS MUSCLE;  Surgeon: Davie Dutta;  Location: [facility]     REPAIR TENDON ELBOW  2014    Procedure: REPAIR TENDON ELBOW;  Surgeon: Chris Johnston MD;  Location: [facility]     ULTRASONIC REMOVAL SOFT TISSUE (LOCATION) Right 2018    Procedure: Tenex right foot;  Surgeon: Patel Martínez DO;  Location: [facility]     Current Outpatient Medications   Medication Sig Dispense Refill     clobetasol (TEMOVATE) 0.05 % external cream Apply topically 2 times daily 60 g 1     fluconazole (DIFLUCAN) 150 MG tablet TAKE 1 TABLET (150 MG) BY MOUTH ONCE FOR 1 DOSE       medical cannabis (Patient's own supply) Take 1 Dose by mouth See Admin Instructions 1/2 to 2 tablets every 3-6 hours as needed (for pain/sleep)    THC:CBD 2.5m.5mg    (The purpose of this order is to document that the patient reports taking medical cannabis.  This is not a prescription, and is not used to certify that the patient has a qualifying medical condition.)       meloxicam (MOBIC) 15 MG tablet Take 15 mg by mouth daily       methylPREDNISolone (MEDROL DOSEPAK) 4 MG tablet therapy pack Follow Package Directions 21 tablet 0     mupirocin (BACTROBAN) 2 % external ointment Apply topically 3 times daily 30 g 0     rizatriptan (MAXALT-MLT) 5 MG ODT Take 1 tablet (5 mg) by mouth at onset of headache for migraine May repeat in 2 hours. Max 6 tablets/24 hours. 30 tablet 1     sucralfate  (CARAFATE) 1 GM tablet Take 1 tablet (1 g) by mouth 4 times daily 40 tablet 0     tacrolimus (PROTOPIC) 0.1 % external ointment Apply topically 2 times daily On eczematous lesions 2xdaily (Patient taking differently: Apply topically 2 times daily as needed On eczematous lesions 2xdaily) 60 g 3     ciprofloxacin (CIPRO) 500 MG tablet Take 1 tablet (500 mg) by mouth 2 times daily (Patient not taking: Reported on 2021) 20 tablet 0       Allergies   Allergen Reactions     Ceftriaxone Anaphylaxis     Hives, rash, racing heart beat     Bactrim [Sulfamethoxazole W/Trimethoprim] Hives     Ciprofloxacin Other (See Comments)     Tendon Issues     Codeine Nausea and Vomiting     Codeine      Copper      Rash       Doxycycline      Loss of skin pigmentation, skin loss.     Gold      Rash       Iodine Hives     WELTS     Iodine-131 Hives     Levaquin [Levofloxacin] Other (See Comments)     Tendon issues with levaquin and cipro     Nickel      rash     Steel [Staples]      Rash from staples       Sulfa Drugs Hives     Full Body     Latex Rash     Penicillins Rash        Social History     Tobacco Use     Smoking status: Never Smoker     Smokeless tobacco: Never Used   Substance Use Topics     Alcohol use: No     Comment: social     Family History   Problem Relation Age of Onset     Diabetes Mother         adult     Cancer - colorectal Mother      Hypertension Mother      Allergies Mother      Cancer Mother         colon     Depression Mother      Gastrointestinal Disease Mother         ibs     Genitourinary Problems Mother         kidney cyst     Gynecology Mother         endometriosis     Lipids Mother      Thyroid Disease Mother      Arthritis Mother         RA     Heart Disease Maternal Grandfather         MI,  - 60's     Allergies Father      Unknown/Adopted Father      Heart Disease Father         mi-age mid 40's     Cardiovascular Father      Lipids Father      Respiratory Father         asthma     Cancer Father       Other Cancer Father         renal cancer     Depression Sister      Allergies Sister      Cancer Sister         vaginal     Gynecology Sister         endometriosis     Lipids Paternal Grandmother      Osteoporosis Paternal Grandmother      Thyroid Disease Paternal Grandmother      Respiratory Son         asthma     Allergies Son      Arthritis Sister      Allergies Brother      Heart Disease Brother      Allergies Daughter      Blood Disease Paternal Aunt         LGL T cell Leukemia     Rheumatoid Arthritis Paternal Aunt      Kidney Disease Maternal Aunt      Lupus Maternal Aunt      Bladder Cancer Maternal Aunt      History   Drug Use No         Objective     There were no vitals taken for this visit.    Physical Exam    GENERAL APPEARANCE: healthy, alert and no distress     EYES: EOMI, PERRL     HENT: ear canals and TM's normal and nose and mouth without ulcers or lesions     NECK: no adenopathy, no asymmetry, masses, or scars and thyroid normal to palpation     RESP: lungs clear to auscultation - no rales, rhonchi or wheezes     CV: regular rates and rhythm, normal S1 S2, no S3 or S4 and no murmur, click or rub     ABDOMEN:  soft, nontender, no HSM or masses and bowel sounds normal     MS: extremities normal- no gross deformities noted, no evidence of inflammation in joints, FROM in all extremities.     SKIN: no suspicious lesions or rashes     NEURO: Normal strength and tone, sensory exam grossly normal, mentation intact and speech normal     PSYCH: mentation appears normal. and affect normal/bright     LYMPHATICS: No cervical adenopathy    Recent Labs   Lab Test 09/02/21  1342 08/31/21  0810 08/27/21  1339 07/27/21  1208   HGB 12.3 12.7  --   --     262  --   --     140   < > 139   POTASSIUM 3.8 3.8   < > 4.1   CR 0.72 0.50*   < > 0.63   A1C  --   --   --  5.3    < > = values in this interval not displayed.        Diagnostics:  Labs pending at this time.  Results will be reviewed when  available.   No EKG required, no history of coronary heart disease, significant arrhythmia, peripheral arterial disease or other structural heart disease.    Revised Cardiac Risk Index (RCRI):  The patient has the following serious cardiovascular risks for perioperative complications:   - No serious cardiac risks = 0 points     RCRI Interpretation: 1 point: Class II (low risk - 0.9% complication rate)           Signed Electronically by: Otoniel Manuel PA-C  Copy of this evaluation report is provided to requesting physician.

## 2021-09-09 NOTE — H&P (VIEW-ONLY)
[address]  [facility] [address]  Phone: [phone]  Primary Provider: Otoniel Nelson  Pre-op Performing Provider: OTONIEL NELSON      PREOPERATIVE EVALUATION:  Today's date: 9/9/2021    Amelia Coker is a 46 year old female who presents for a preoperative evaluation.    Surgical Information:  Surgery/Procedure: Colonoscopy  Surgery Location: [facility]  Surgeon: Dr. Liz  Surgery Date: 09/13/21  Time of Surgery: 1:00 pm  Where patient plans to recover: At home with family  Fax number for surgical facility: Note does not need to be faxed, will be available electronically in Epic.    Type of Anesthesia Anticipated: Choice    Assessment & Plan     The proposed surgical procedure is considered LOW risk.    Pre-operative examination  Abdominal pain, epigastric  Colitis  Abnormal CT scan, colon  Family history of colon cancer  PAC (premature atrial contraction)  Elevated blood pressure reading without diagnosis of hypertension  Cleared for surgical intervention based on assessment today.  Declines urine pregnancy test.  Has not had her menstrual period in quite some time.  Advised that she may need to do this at time of procedure.  - CRP, inflammation; Future  - Comprehensive metabolic panel (BMP + Alb, Alk Phos, ALT, AST, Total. Bili, TP); Future  - CBC with platelets; Future    Encounter for screening mammogram for breast cancer  - MA SCREENING DIGITAL BILAT - Future  (s+30); Future         Risks and Recommendations:  The patient has the following additional risks and recommendations for perioperative complications:   - No identified additional risk factors other than previously addressed    Medication Instructions:  Patient is on no chronic medications    RECOMMENDATION:  APPROVAL GIVEN to proceed with proposed procedure, without further diagnostic evaluation.      Subjective     HPI related to upcoming procedure: Colonoscopy to evaluate for reason behind  colitis.  Pain continues but is much improved.  Family history of colon cancer noted in her mother at age 50.      Health Care Directive:  Patient does not have a Health Care Directive or Living Will: Discussed advance care planning with patient; however, patient declined at this time.    Preoperative Review of :   reviewed - controlled substances reflected in medication list.    Status of Chronic Conditions:    Review of Systems  CONSTITUTIONAL: NEGATIVE for fever, chills, change in weight  INTEGUMENTARY/SKIN: NEGATIVE for worrisome rashes, moles or lesions  EYES: NEGATIVE for vision changes or irritation  ENT/MOUTH: NEGATIVE for ear, mouth and throat problems  RESP: NEGATIVE for significant cough or SOB  CV: NEGATIVE for chest pain, palpitations or peripheral edema  GI: NEGATIVE for nausea, abdominal pain, heartburn, or change in bowel habits  : NEGATIVE for frequency, dysuria, or hematuria  MUSCULOSKELETAL: NEGATIVE for significant arthralgias or myalgia  NEURO: NEGATIVE for weakness, dizziness or paresthesias  ENDOCRINE: NEGATIVE for temperature intolerance, skin/hair changes  HEME: NEGATIVE for bleeding problems  PSYCHIATRIC: NEGATIVE for changes in mood or affect    Patient Active Problem List    Diagnosis Date Noted     TOS (thoracic outlet syndrome) 06/07/2021     Priority: Medium     Added automatically from request for surgery [id number]       Dermatitis 03/03/2021     Priority: Medium     Musculoskeletal pain 03/03/2021     Priority: Medium     Fatigue, unspecified type 03/03/2021     Priority: Medium     AK (actinic keratosis) - both hands at the lateral thenar aspect. 12/08/2020     Priority: Medium     Hypertrophy of breast 11/05/2020     Priority: Medium     History of cold sores 11/05/2020     Priority: Medium     Elevated blood pressure reading without diagnosis of hypertension 09/24/2020     Priority: Medium     Balance problems 07/17/2020     Priority: Medium     Morbid obesity (H) 07/17/2020      Priority: Medium     Sinus arrhythmia seen on electrocardiogram 07/17/2020     Priority: Medium     Family history of ischemic heart disease - father at 46 massive MI 07/17/2020     Priority: Medium     Brachial plexopathy - right shoulder 05/21/2020     Priority: Medium     Dental abscess - left mandible molar 03/05/2020     Priority: Medium     Endometrium, hyperplasia - on recent CT scan 02/10/2020     Priority: Medium     Pain in joint involving pelvic region and thigh, right 02/10/2020     Priority: Medium     Right lateral abdominal pain 02/10/2020     Priority: Medium     Right foot pain 02/10/2020     Priority: Medium     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 10/10/2019     Priority: Medium     Abnormality of nail surface 08/29/2019     Priority: Medium     Dry hair 08/29/2019     Priority: Medium     Dry skin 08/29/2019     Priority: Medium     Malaise and fatigue 08/29/2019     Priority: Medium     Lymphadenopathy 08/29/2019     Priority: Medium     PONV (postoperative nausea and vomiting) 06/10/2019     Priority: Medium     Menorrhalgia 06/10/2019     Priority: Medium     Uterine polyp 06/10/2019     Priority: Medium     Loose stools 06/10/2019     Priority: Medium     Hyperactive bowel sounds 06/10/2019     Priority: Medium     Jaycob complexion 05/23/2019     Priority: Medium     Lip lesion 05/23/2019     Priority: Medium     Abdominal pain, epigastric 05/23/2019     Priority: Medium     Abdominal distension 05/23/2019     Priority: Medium     Intractable episodic tension-type headache 03/11/2019     Priority: Medium     Intractable right heel pain 05/29/2018     Priority: Medium     Superficial swelling of scalp 04/12/2018     Priority: Medium     Biceps tendinopathy, right 03/22/2018     Priority: Medium     Hyperlipidemia LDL goal <130 08/30/2017     Priority: Medium     Spasm of muscle 07/11/2017     Priority: Medium     Sternocleidomastoid muscle tenderness 04/05/2017     Priority:  Medium     left         Acute cervical myofascial strain, initial encounter 04/05/2017     Priority: Medium     Abnormal mammogram 09/21/2016     Priority: Medium     right breast       Gastroesophageal reflux disease, esophagitis presence not specified 06/29/2016     Priority: Medium     IMO Regulatory Load OCT 2020       Rotator cuff arthropathy, right 03/07/2016     Priority: Medium     AD (atopic dermatitis) 10/29/2015     Priority: Medium     Heat sensitivity 10/29/2015     Priority: Medium     Skin lesion 10/20/2015     Priority: Medium     posterior superior scalp       Plantar fasciitis 09/23/2015     Priority: Medium     Lateral epicondylitis 03/16/2015     Priority: Medium     Problem list name updated by automated process. Provider to review       Right shoulder pain 03/16/2015     Priority: Medium     Telangiectasia of face 12/19/2014     Priority: Medium     Frontal headache 12/15/2014     Priority: Medium     Muscular wasting and disuse atrophy, not elsewhere classified 06/09/2014     Priority: Medium     right SCM, right wrist,        Ds DNA antibody positive 04/16/2014     Priority: Medium     borderline.       Lumbar radiculopathy 01/22/2014     Priority: Medium     Migraine without aura and without status migrainosus, not intractable 10/23/2013     Priority: Medium     Foraminal stenosis of lumbar region 09/26/2013     Priority: Medium     DJD (degenerative joint disease), lumbar 09/26/2013     Priority: Medium     FLORIDALMA positive 07/18/2013     Priority: Medium     Plantar fascial fibromatosis 07/18/2013     Priority: Medium     Pain in joint, upper arm 04/15/2013     Priority: Medium     Family history of colon cancer 03/05/2013     Priority: Medium     mother       Keratitis sicca (H) 10/31/2012     Priority: Medium     Dry eyes 10/15/2012     Priority: Medium     Family history of melanoma 08/24/2012     Priority: Medium     Shoulder joint instability 08/15/2012     Priority: Medium     Abnormal  PFT 07/31/2012     Priority: Medium     question of left heart failure and/or shunting in need of further investigation       Chronic fatigue 06/06/2012     Priority: Medium     Hair loss 06/06/2012     Priority: Medium     Raynaud phenomenon 04/11/2012     Priority: Medium     PAC (premature atrial contraction) 07/15/2010     Priority: Medium     Palpitations 07/15/2010     Priority: Medium     Lyme disease 06/07/2010     Priority: Medium     Scalp lesion 05/26/2010     Priority: Medium     Migraine, unspecified, with intractable migraine, so stated, without mention of status migrainosus 04/04/2003     Priority: Medium     Problem list name updated by automated process. Provider to review  IMO Regulatory Load OCT 2020       Bell's palsy 04/04/2003     Priority: Medium     Contact dermatitis and other eczema, due to unspecified cause 04/04/2003     Priority: Medium      Past Medical History:   Diagnosis Date     Abnormal mammogram 9/21/2016    right breast      AD (atopic dermatitis) 10/29/2015     Allergic rhinitis due to other allergen      Arthritis      Bell's palsy      Chronic fatigue 6/6/2012     Chronic infection     MRSA - 2015 scalp and prior to Abdomen     Contact dermatitis and other eczema, due to unspecified cause     eczema     Contusion of left foot, initial encounter 3/16/2016     DJD (degenerative joint disease), lumbar 9/26/2013     DJD (degenerative joint disease), lumbar 9/26/2013     Ds DNA antibody positive 4/16/2014    borderline.      Elevated blood pressure reading without diagnosis of hypertension 12/24/2013     Facial contusion 12/15/2014    hit by horse      Foraminal stenosis of lumbar region 9/26/2013     Frontal headache 12/15/2014     Gastroesophageal reflux disease, esophagitis presence not specified 6/29/2016     Heat sensitivity 10/29/2015     HTN, goal below 140/90 9/23/2015     Hyperlipidemia LDL goal <130 8/30/2017     Irregular heart beat      Keratitis sicca (H) 10/31/2012      Left shoulder pain 9/26/2013     Lumbar radiculopathy 1/22/2014     Migraine headache 10/23/2013     Migraine, unspecified, with intractable migraine, so stated, without mention of status migrainosus      Motion sickness      MRSA infection 10/20/2015     Muscular wasting and disuse atrophy, not elsewhere classified 6/9/2014    right SCM, right wrist,       Numbness and tingling     both legs anf feet greater on the left     PAC (premature atrial contraction) 7/15/2010     Plantar fasciitis 9/23/2015     PONV (postoperative nausea and vomiting)      Skin lesion 10/20/2015    posterior superior scalp      Spasm of muscle 7/11/2017     Telangiectasia of face 12/19/2014     Tooth pain 10/20/2015    left lower primary molar      Past Surgical History:   Procedure Laterality Date     AS INJ TRANSFORAMIN EPIDURAL, LUMB/SACR SINGLE       COLONOSCOPY  3/11/2013    Procedure: COLONOSCOPY;  colonoscopy;  Surgeon: Lobito Mas MD;  Location: [facility]     COLONOSCOPY WITH CO2 INSUFFLATION N/A 11/19/2018    Procedure: COLONOSCOPY WITH CO2 INSUFFLATION;  Surgeon: Goyo Blank MD;  Location: [facility]     DECOMPRESSION LUMBAR TWO LEVELS Bilateral 12/8/2016    Procedure: DECOMPRESSION LUMBAR TWO LEVELS;  Surgeon: Bang Burrell MD;  Location: [facility]     DILATION AND CURETTAGE, HYSTEROSCOPY, ABLATE ENDOMETRIUM NOVASURE, COMBINED N/A 6/12/2019    Procedure: hysteroscopy, dilation & curettage, polypectomy, endometrial ablation;  Surgeon: Fredy Pham MD;  Location: [facility]     ESOPHAGOSCOPY, GASTROSCOPY, DUODENOSCOPY (EGD), COMBINED  11/8/2013    Procedure: COMBINED ESOPHAGOSCOPY, GASTROSCOPY, DUODENOSCOPY (EGD), BIOPSY SINGLE OR MULTIPLE;  Esophagoscopy, Gastroscopy, Duodenoscopy EGD with multiple biopsies;  Surgeon: Teja Koch MD;  Location: [facility]     ESOPHAGOSCOPY, GASTROSCOPY, DUODENOSCOPY (EGD), COMBINED N/A 2/18/2021    Procedure: ESOPHAGOGASTRODUODENOSCOPY, WITH BIOPSY;  Surgeon: Jesenia  MD Blanca;  Location: [facility]     HC REMOVAL OF TONSILS,<11 Y/O      Tonsils <10y.o.     HC TOOTH EXTRACTION W/FORCEP       INJECT BLOCK MEDIAL BRANCH CERVICAL/THORACIC/LUMBAR N/A 2020    Procedure: Sacral 1,2,3 Lateral Branch Blocks and lumbar 5 medial branch blocks bilaterally;  Surgeon: Maurisio Goetz MD;  [facility]     INJECT JOINT SACROILIAC Left 2020    Procedure: Left Lumbar 5 Sacral 1 nerve root injections.;  Surgeon: Maurisio Goetz MD;  Location: [facility]     REPAIR MUSCLE UPPER EXTREMITY Right 2021    Procedure: DIVISION RIGHT PECTORALIS MUSCLE;  Surgeon: Davie Dutta;  Location: [facility]     REPAIR TENDON ELBOW  2014    Procedure: REPAIR TENDON ELBOW;  Surgeon: Chris Johnston MD;  Location: [facility]     ULTRASONIC REMOVAL SOFT TISSUE (LOCATION) Right 2018    Procedure: Tenex right foot;  Surgeon: Patel Martínez DO;  Location: [facility]     Current Outpatient Medications   Medication Sig Dispense Refill     clobetasol (TEMOVATE) 0.05 % external cream Apply topically 2 times daily 60 g 1     fluconazole (DIFLUCAN) 150 MG tablet TAKE 1 TABLET (150 MG) BY MOUTH ONCE FOR 1 DOSE       medical cannabis (Patient's own supply) Take 1 Dose by mouth See Admin Instructions 1/2 to 2 tablets every 3-6 hours as needed (for pain/sleep)    THC:CBD 2.5m.5mg    (The purpose of this order is to document that the patient reports taking medical cannabis.  This is not a prescription, and is not used to certify that the patient has a qualifying medical condition.)       meloxicam (MOBIC) 15 MG tablet Take 15 mg by mouth daily       methylPREDNISolone (MEDROL DOSEPAK) 4 MG tablet therapy pack Follow Package Directions 21 tablet 0     mupirocin (BACTROBAN) 2 % external ointment Apply topically 3 times daily 30 g 0     rizatriptan (MAXALT-MLT) 5 MG ODT Take 1 tablet (5 mg) by mouth at onset of headache for migraine May repeat in 2 hours. Max 6 tablets/24 hours. 30 tablet 1     sucralfate  (CARAFATE) 1 GM tablet Take 1 tablet (1 g) by mouth 4 times daily 40 tablet 0     tacrolimus (PROTOPIC) 0.1 % external ointment Apply topically 2 times daily On eczematous lesions 2xdaily (Patient taking differently: Apply topically 2 times daily as needed On eczematous lesions 2xdaily) 60 g 3     ciprofloxacin (CIPRO) 500 MG tablet Take 1 tablet (500 mg) by mouth 2 times daily (Patient not taking: Reported on 2021) 20 tablet 0       Allergies   Allergen Reactions     Ceftriaxone Anaphylaxis     Hives, rash, racing heart beat     Bactrim [Sulfamethoxazole W/Trimethoprim] Hives     Ciprofloxacin Other (See Comments)     Tendon Issues     Codeine Nausea and Vomiting     Codeine      Copper      Rash       Doxycycline      Loss of skin pigmentation, skin loss.     Gold      Rash       Iodine Hives     WELTS     Iodine-131 Hives     Levaquin [Levofloxacin] Other (See Comments)     Tendon issues with levaquin and cipro     Nickel      rash     Steel [Staples]      Rash from staples       Sulfa Drugs Hives     Full Body     Latex Rash     Penicillins Rash        Social History     Tobacco Use     Smoking status: Never Smoker     Smokeless tobacco: Never Used   Substance Use Topics     Alcohol use: No     Comment: social     Family History   Problem Relation Age of Onset     Diabetes Mother         adult     Cancer - colorectal Mother      Hypertension Mother      Allergies Mother      Cancer Mother         colon     Depression Mother      Gastrointestinal Disease Mother         ibs     Genitourinary Problems Mother         kidney cyst     Gynecology Mother         endometriosis     Lipids Mother      Thyroid Disease Mother      Arthritis Mother         RA     Heart Disease Maternal Grandfather         MI,  - 60's     Allergies Father      Unknown/Adopted Father      Heart Disease Father         mi-age mid 40's     Cardiovascular Father      Lipids Father      Respiratory Father         asthma     Cancer Father       Other Cancer Father         renal cancer     Depression Sister      Allergies Sister      Cancer Sister         vaginal     Gynecology Sister         endometriosis     Lipids Paternal Grandmother      Osteoporosis Paternal Grandmother      Thyroid Disease Paternal Grandmother      Respiratory Son         asthma     Allergies Son      Arthritis Sister      Allergies Brother      Heart Disease Brother      Allergies Daughter      Blood Disease Paternal Aunt         LGL T cell Leukemia     Rheumatoid Arthritis Paternal Aunt      Kidney Disease Maternal Aunt      Lupus Maternal Aunt      Bladder Cancer Maternal Aunt      History   Drug Use No         Objective     There were no vitals taken for this visit.    Physical Exam    GENERAL APPEARANCE: healthy, alert and no distress     EYES: EOMI, PERRL     HENT: ear canals and TM's normal and nose and mouth without ulcers or lesions     NECK: no adenopathy, no asymmetry, masses, or scars and thyroid normal to palpation     RESP: lungs clear to auscultation - no rales, rhonchi or wheezes     CV: regular rates and rhythm, normal S1 S2, no S3 or S4 and no murmur, click or rub     ABDOMEN:  soft, nontender, no HSM or masses and bowel sounds normal     MS: extremities normal- no gross deformities noted, no evidence of inflammation in joints, FROM in all extremities.     SKIN: no suspicious lesions or rashes     NEURO: Normal strength and tone, sensory exam grossly normal, mentation intact and speech normal     PSYCH: mentation appears normal. and affect normal/bright     LYMPHATICS: No cervical adenopathy    Recent Labs   Lab Test 09/02/21  1342 08/31/21  0810 08/27/21  1339 07/27/21  1208   HGB 12.3 12.7  --   --     262  --   --     140   < > 139   POTASSIUM 3.8 3.8   < > 4.1   CR 0.72 0.50*   < > 0.63   A1C  --   --   --  5.3    < > = values in this interval not displayed.        Diagnostics:  Labs pending at this time.  Results will be reviewed when  available.   No EKG required, no history of coronary heart disease, significant arrhythmia, peripheral arterial disease or other structural heart disease.    Revised Cardiac Risk Index (RCRI):  The patient has the following serious cardiovascular risks for perioperative complications:   - No serious cardiac risks = 0 points     RCRI Interpretation: 1 point: Class II (low risk - 0.9% complication rate)           Signed Electronically by: Otoniel Manuel PA-C  Copy of this evaluation report is provided to requesting physician.

## 2021-09-10 LAB — SARS-COV-2 RNA RESP QL NAA+PROBE: NEGATIVE

## 2021-09-10 NOTE — TELEPHONE ENCOUNTER
Appointment made - pt. Notified   Jessie New M.A.     Impression: Type 2 diab with severe nonp rtnop with macular edema, bi: [id number]. Plan: Exam and OCT reveal severe NPDR OS>OD with DME. The diagnosis, natural history, and prognosis of DR were discussed at length. The importance of blood sugar, blood pressure, and cholesterol control and their relationship to progression of diabetic retinopathy were reviewed. The presence of DME is a threat to vision and requires treatment. We discussed that treatment and stabilization of the DME often requires the combination of antiVEGF injections and laser treatment. Recommend intravitreal Avastin injection OU today. R/B/A discussed and patient elects to proceed. Intravitreal Avastin injection was administered Avastin in clinic without complication.   

RTC 4 wks re-eval with OCT OU, poss Avastin

## 2021-09-13 ENCOUNTER — HOSPITAL ENCOUNTER (OUTPATIENT)
Facility: CLINIC | Age: 46
Discharge: HOME | End: 2021-09-13
Attending: INTERNAL MEDICINE | Admitting: INTERNAL MEDICINE
Payer: COMMERCIAL

## 2021-09-13 ENCOUNTER — ANESTHESIA (OUTPATIENT)
Dept: GASTROENTEROLOGY | Facility: CLINIC | Age: 46
End: 2021-09-13
Payer: COMMERCIAL

## 2021-09-13 ENCOUNTER — ANESTHESIA EVENT (OUTPATIENT)
Dept: GASTROENTEROLOGY | Facility: CLINIC | Age: 46
End: 2021-09-13
Payer: COMMERCIAL

## 2021-09-13 VITALS
OXYGEN SATURATION: 100 % | HEIGHT: 65 IN | BODY MASS INDEX: 34.99 KG/M2 | SYSTOLIC BLOOD PRESSURE: 113 MMHG | DIASTOLIC BLOOD PRESSURE: 80 MMHG | WEIGHT: 210 LBS | RESPIRATION RATE: 12 BRPM | HEART RATE: 58 BPM

## 2021-09-13 LAB — COLONOSCOPY: NORMAL

## 2021-09-13 PROCEDURE — 258N000003 HC RX IP 258 OP 636: Performed by: NURSE ANESTHETIST, CERTIFIED REGISTERED

## 2021-09-13 PROCEDURE — 250N000009 HC RX 250: Performed by: ANESTHESIOLOGY

## 2021-09-13 PROCEDURE — 999N000010 HC STATISTIC ANES STAT CODE-CRNA PER MINUTE: Performed by: INTERNAL MEDICINE

## 2021-09-13 PROCEDURE — 370N000017 HC ANESTHESIA TECHNICAL FEE, PER MIN: Performed by: INTERNAL MEDICINE

## 2021-09-13 PROCEDURE — 250N000009 HC RX 250: Performed by: NURSE ANESTHETIST, CERTIFIED REGISTERED

## 2021-09-13 PROCEDURE — 250N000011 HC RX IP 250 OP 636: Performed by: NURSE ANESTHETIST, CERTIFIED REGISTERED

## 2021-09-13 PROCEDURE — 45380 COLONOSCOPY AND BIOPSY: CPT | Mod: PT | Performed by: INTERNAL MEDICINE

## 2021-09-13 PROCEDURE — 88305 TISSUE EXAM BY PATHOLOGIST: CPT | Mod: TC | Performed by: INTERNAL MEDICINE

## 2021-09-13 RX ORDER — PROPOFOL 10 MG/ML
INJECTION, EMULSION INTRAVENOUS CONTINUOUS PRN
Status: DISCONTINUED | OUTPATIENT
Start: 2021-09-13 | End: 2021-09-13

## 2021-09-13 RX ORDER — SODIUM CHLORIDE, SODIUM LACTATE, POTASSIUM CHLORIDE, CALCIUM CHLORIDE 600; 310; 30; 20 MG/100ML; MG/100ML; MG/100ML; MG/100ML
INJECTION, SOLUTION INTRAVENOUS CONTINUOUS PRN
Status: DISCONTINUED | OUTPATIENT
Start: 2021-09-13 | End: 2021-09-13

## 2021-09-13 RX ORDER — SODIUM CHLORIDE, SODIUM LACTATE, POTASSIUM CHLORIDE, CALCIUM CHLORIDE 600; 310; 30; 20 MG/100ML; MG/100ML; MG/100ML; MG/100ML
INJECTION, SOLUTION INTRAVENOUS CONTINUOUS
Status: CANCELLED | OUTPATIENT
Start: 2021-09-13

## 2021-09-13 RX ORDER — ONDANSETRON 2 MG/ML
INJECTION INTRAMUSCULAR; INTRAVENOUS PRN
Status: DISCONTINUED | OUTPATIENT
Start: 2021-09-13 | End: 2021-09-13

## 2021-09-13 RX ORDER — LIDOCAINE HYDROCHLORIDE 20 MG/ML
INJECTION, SOLUTION INFILTRATION; PERINEURAL PRN
Status: DISCONTINUED | OUTPATIENT
Start: 2021-09-13 | End: 2021-09-13

## 2021-09-13 RX ORDER — LIDOCAINE 40 MG/G
CREAM TOPICAL
Status: CANCELLED | OUTPATIENT
Start: 2021-09-13

## 2021-09-13 RX ADMIN — PROPOFOL 175 MCG/KG/MIN: 10 INJECTION, EMULSION INTRAVENOUS at 14:15

## 2021-09-13 RX ADMIN — SODIUM CHLORIDE, POTASSIUM CHLORIDE, SODIUM LACTATE AND CALCIUM CHLORIDE: 600; 310; 30; 20 INJECTION, SOLUTION INTRAVENOUS at 14:15

## 2021-09-13 RX ADMIN — ONDANSETRON 4 MG: 2 INJECTION INTRAMUSCULAR; INTRAVENOUS at 14:15

## 2021-09-13 RX ADMIN — LIDOCAINE HYDROCHLORIDE 40 MG: 20 INJECTION, SOLUTION INFILTRATION; PERINEURAL at 14:15

## 2021-09-13 RX ADMIN — FAMOTIDINE 20 MG: 10 INJECTION, SOLUTION INTRAVENOUS at 14:05

## 2021-09-13 ASSESSMENT — LIFESTYLE VARIABLES: TOBACCO_USE: 0

## 2021-09-13 ASSESSMENT — MIFFLIN-ST. JEOR: SCORE: 1583.43

## 2021-09-13 NOTE — ANESTHESIA PREPROCEDURE EVALUATION
Anesthesia Pre-Procedure Evaluation    Patient: Amelia Coker   MRN: [id number] : 1975        Preoperative Diagnosis: Abdominal pain, epigastric [R10.13]   Procedure : Procedure(s):  COLONOSCOPY     Past Medical History:   Diagnosis Date     Abnormal mammogram 2016    right breast      AD (atopic dermatitis) 10/29/2015     Allergic rhinitis due to other allergen      Arthritis      Bell's palsy      Chronic fatigue 2012     Chronic infection     MRSA -  scalp and prior to Abdomen     Contact dermatitis and other eczema, due to unspecified cause     eczema     Contusion of left foot, initial encounter 3/16/2016     DJD (degenerative joint disease), lumbar 2013     DJD (degenerative joint disease), lumbar 2013     Ds DNA antibody positive 2014    borderline.      Elevated blood pressure reading without diagnosis of hypertension 2013     Facial contusion 12/15/2014    hit by horse      Foraminal stenosis of lumbar region 2013     Frontal headache 12/15/2014     Gastroesophageal reflux disease, esophagitis presence not specified 2016     Heat sensitivity 10/29/2015     HTN, goal below 140/90 2015     Hyperlipidemia LDL goal <130 2017     Irregular heart beat      Keratitis sicca (H) 10/31/2012     Left shoulder pain 2013     Lumbar radiculopathy 2014     Migraine headache 10/23/2013     Migraine, unspecified, with intractable migraine, so stated, without mention of status migrainosus      Motion sickness      MRSA infection 10/20/2015     Muscular wasting and disuse atrophy, 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    right SCM, right wrist,       Numbness and tingling     both legs anf feet greater on the left     PAC (premature atrial contraction) 7/15/2010     Plantar fasciitis 2015     PONV (postoperative nausea and vomiting)      Skin lesion 10/20/2015    posterior superior scalp      Spasm of muscle 2017     Telangiectasia of face  12/19/2014     Tooth pain 10/20/2015    left lower primary molar       Past Surgical History:   Procedure Laterality Date     AS INJ TRANSFORAMIN EPIDURAL, LUMB/SACR SINGLE       COLONOSCOPY  3/11/2013    Procedure: COLONOSCOPY;  colonoscopy;  Surgeon: Lobito Mas MD;  Location: [facility]     COLONOSCOPY WITH CO2 INSUFFLATION N/A 11/19/2018    Procedure: COLONOSCOPY WITH CO2 INSUFFLATION;  Surgeon: Goyo Blank MD;  Location: [facility]     DECOMPRESSION LUMBAR TWO LEVELS Bilateral 12/8/2016    Procedure: DECOMPRESSION LUMBAR TWO LEVELS;  Surgeon: Bang Burrell MD;  Location: [facility]     DILATION AND CURETTAGE, HYSTEROSCOPY, ABLATE ENDOMETRIUM NOVASURE, COMBINED N/A 6/12/2019    Procedure: hysteroscopy, dilation & curettage, polypectomy, endometrial ablation;  Surgeon: Fredy Pham MD;  Location: [facility]     ESOPHAGOSCOPY, GASTROSCOPY, DUODENOSCOPY (EGD), COMBINED  11/8/2013    Procedure: COMBINED ESOPHAGOSCOPY, GASTROSCOPY, DUODENOSCOPY (EGD), BIOPSY SINGLE OR MULTIPLE;  Esophagoscopy, Gastroscopy, Duodenoscopy EGD with multiple biopsies;  Surgeon: Teja Koch MD;  Location: [facility]     ESOPHAGOSCOPY, GASTROSCOPY, DUODENOSCOPY (EGD), COMBINED N/A 2/18/2021    Procedure: ESOPHAGOGASTRODUODENOSCOPY, WITH BIOPSY;  Surgeon: Blanca Ba MD;  Location: [facility]     HC REMOVAL OF TONSILS,<13 Y/O      Tonsils <10y.o.     HC TOOTH EXTRACTION W/FORCEP       INJECT BLOCK MEDIAL BRANCH CERVICAL/THORACIC/LUMBAR N/A 7/23/2020    Procedure: Sacral 1,2,3 Lateral Branch Blocks and lumbar 5 medial branch blocks bilaterally;  Surgeon: Maurisio Goetz MD;  [facility]     INJECT JOINT SACROILIAC Left 9/24/2020    Procedure: Left Lumbar 5 Sacral 1 nerve root injections.;  Surgeon: Maurisio Goetz MD;  [facility]     REPAIR MUSCLE UPPER EXTREMITY Right 7/27/2021    Procedure: DIVISION RIGHT PECTORALIS MUSCLE;  Surgeon: Davie Dutta;  Location: [facility]     REPAIR TENDON ELBOW  7/9/2014     Procedure: REPAIR TENDON ELBOW;  Surgeon: Chris Johnston MD;  [facility]     ULTRASONIC REMOVAL SOFT TISSUE (LOCATION) Right 11/21/2018    Procedure: Tenex right foot;  Surgeon: Patel Martínez DO;  Location: [facility]     VASCULAR SURGERY      Thoracic Outlet Syndrome      Allergies   Allergen Reactions     Ceftriaxone Anaphylaxis     Hives, rash, racing heart beat     Bactrim [Sulfamethoxazole W/Trimethoprim] Hives     Ciprofloxacin Other (See Comments)     Tendon Issues     Codeine Nausea and Vomiting     Codeine      Copper      Rash       Doxycycline      Loss of skin pigmentation, skin loss.     Gold      Rash       Iodine Hives     WELTS     Iodine-131 Hives     Levaquin [Levofloxacin] Other (See Comments)     Tendon issues with levaquin and cipro     Nickel      rash     Steel [Staples]      Rash from staples       Sulfa Drugs Hives     Full Body     Latex Rash     Penicillins Rash      Social History     Tobacco Use     Smoking status: Never Smoker     Smokeless tobacco: Never Used   Substance Use Topics     Alcohol use: No     Comment: social      Wt Readings from Last 1 Encounters:   09/09/21 95.3 kg (210 lb)        Anesthesia Evaluation   Pt has had prior anesthetic.     History of anesthetic complications  - PONV.      ROS/MED HX  ENT/Pulmonary:    (-) tobacco use, asthma and sleep apnea   Neurologic:       Cardiovascular:     (+) hypertension-----Irregular Heartbeat/Palpitations,     METS/Exercise Tolerance:     Hematologic:       Musculoskeletal:       GI/Hepatic:     (+) GERD, Asymptomatic on medication,     Renal/Genitourinary:       Endo:     (+) Obesity,     Psychiatric/Substance Use:       Infectious Disease:       Malignancy:       Other:            Physical Exam    Airway        Mallampati: I   TM distance: > 3 FB   Neck ROM: full   Mouth opening: > 3 cm    Respiratory Devices and Support         Dental  no notable dental history         Cardiovascular   cardiovascular exam normal           Pulmonary   pulmonary exam normal                OUTSIDE LABS:  CBC:   Lab Results   Component Value Date    WBC 6.3 09/09/2021    WBC 5.9 09/02/2021    HGB 12.5 09/09/2021    HGB 12.3 09/02/2021    HCT 37.8 09/09/2021    HCT 35.9 09/02/2021     09/09/2021     09/02/2021     BMP:   Lab Results   Component Value Date     09/09/2021     09/02/2021    POTASSIUM 3.9 09/09/2021    POTASSIUM 3.8 09/02/2021    CHLORIDE 106 09/09/2021    CHLORIDE 106 09/02/2021    CO2 27 09/09/2021    CO2 31 09/02/2021    BUN 14 09/09/2021    BUN 12 09/02/2021    CR 0.63 09/09/2021    CR 0.72 09/02/2021    GLC 93 09/09/2021     (H) 09/02/2021     COAGS:   Lab Results   Component Value Date    INR 1.0 07/14/2014     POC:   Lab Results   Component Value Date    HCG Negative 07/27/2021     HEPATIC:   Lab Results   Component Value Date    ALBUMIN 4.0 09/09/2021    PROTTOTAL 7.5 09/09/2021    ALT 34 09/09/2021    AST 16 09/09/2021    ALKPHOS 48 09/09/2021    BILITOTAL 0.6 09/09/2021    BILIDIRECT 0 04/04/2003     OTHER:   Lab Results   Component Value Date    PH 5.0 04/04/2003    A1C 5.3 07/27/2021    JELENA 9.0 09/09/2021    PHOS 3.0 11/19/2014    MAG 2.1 06/08/2016    LIPASE 172 08/30/2021    AMYLASE 48 08/30/2021    TSH 0.90 05/25/2021    T4 0.92 10/07/2019    T3 90 10/07/2019    CRP <2.9 09/09/2021    SED 9 08/29/2013       Anesthesia Plan    ASA Status:  2      Anesthesia Type: MAC.   Induction: Intravenous.           Consents    Anesthesia Plan(s) and associated risks, benefits, and realistic alternatives discussed. Questions answered and patient/representative(s) expressed understanding.     - Discussed with:  Patient         Postoperative Care    Pain management: IV analgesics.   PONV prophylaxis: Ondansetron (or other 5HT-3)     Comments:    NO NARCOTICS    Pepcid preop 20mg IV            Stacie Townsend

## 2021-09-13 NOTE — ANESTHESIA CARE TRANSFER NOTE
Patient: Amelia Coker    Procedure(s):  COLONOSCOPY    Diagnosis: Abdominal pain, epigastric [R10.13]  Diagnosis Additional Information: No value filed.    Anesthesia Type:   MAC     Note:    Oropharynx: oropharynx clear of all foreign objects and spontaneously breathing  Level of Consciousness: drowsy  Oxygen Supplementation: room air    Independent Airway: airway patency satisfactory and stable  Dentition: dentition unchanged  Vital Signs Stable: post-procedure vital signs reviewed and stable  Report to RN Given: handoff report given  Patient transferred to: PACU    Handoff Report: Identifed the Patient, Identified the Reponsible Provider, Reviewed the pertinent medical history, Discussed the surgical course, Reviewed Intra-OP anesthesia mangement and issues during anesthesia, Set expectations for post-procedure period and Allowed opportunity for questions and acknowledgement of understanding      Vitals:  Vitals Value Taken Time   BP     Temp     Pulse 84 09/13/21 1442   Resp 19 09/13/21 1442   SpO2     Vitals shown include unvalidated device data.    Electronically Signed By: LAURI Arvizu CRNA  September 13, 2021  2:43 PM

## 2021-09-13 NOTE — ANESTHESIA POSTPROCEDURE EVALUATION
Patient: Amelia Coker    Procedure(s):  COLONOSCOPY    Diagnosis:Abdominal pain, epigastric [R10.13]  Diagnosis Additional Information: No value filed.    Anesthesia Type:  MAC    Note:  Disposition: Outpatient   Postop Pain Control: Uneventful            Sign Out: Well controlled pain   PONV: No   Neuro/Psych: Uneventful            Sign Out: Acceptable/Baseline neuro status   Airway/Respiratory: Uneventful            Sign Out: Acceptable/Baseline resp. status   CV/Hemodynamics: Uneventful            Sign Out: Acceptable CV status; No obvious hypovolemia; No obvious fluid overload   Other NRE: NONE   DID A NON-ROUTINE EVENT OCCUR?            Last vitals:  Vitals Value Taken Time   /80 09/13/21 1500   Temp     Pulse 61 09/13/21 1505   Resp 44 09/13/21 1505   SpO2 100 % 09/13/21 1506   Vitals shown include unvalidated device data.    Electronically Signed By: Stacie Townsend  September 13, 2021  3:12 PM

## 2021-09-14 LAB
PATH REPORT.COMMENTS IMP SPEC: NORMAL
PATH REPORT.COMMENTS IMP SPEC: NORMAL
PATH REPORT.FINAL DX SPEC: NORMAL
PATH REPORT.GROSS SPEC: NORMAL
PATH REPORT.MICROSCOPIC SPEC OTHER STN: NORMAL
PATH REPORT.RELEVANT HX SPEC: NORMAL
PHOTO IMAGE: NORMAL

## 2021-09-14 PROCEDURE — 88305 TISSUE EXAM BY PATHOLOGIST: CPT | Mod: 26 | Performed by: PATHOLOGY

## 2021-09-16 ENCOUNTER — MYC MEDICAL ADVICE (OUTPATIENT)
Dept: FAMILY MEDICINE | Facility: OTHER | Age: 46
End: 2021-09-16

## 2021-09-17 ENCOUNTER — TELEPHONE (OUTPATIENT)
Dept: FAMILY MEDICINE | Facility: OTHER | Age: 46
End: 2021-09-17

## 2021-09-17 ENCOUNTER — HOSPITAL ENCOUNTER (OUTPATIENT)
Dept: MAMMOGRAPHY | Facility: CLINIC | Age: 46
Discharge: HOME | End: 2021-09-17
Attending: PHYSICIAN ASSISTANT | Admitting: PHYSICIAN ASSISTANT
Payer: COMMERCIAL

## 2021-09-17 DIAGNOSIS — R93.3 ABNORMAL CT SCAN, COLON: ICD-10-CM

## 2021-09-17 DIAGNOSIS — Z12.31 ENCOUNTER FOR SCREENING MAMMOGRAM FOR BREAST CANCER: ICD-10-CM

## 2021-09-17 DIAGNOSIS — K52.9 COLITIS: ICD-10-CM

## 2021-09-17 DIAGNOSIS — R10.13 ABDOMINAL PAIN, EPIGASTRIC: ICD-10-CM

## 2021-09-17 DIAGNOSIS — R14.0 ABDOMINAL DISTENSION: Primary | ICD-10-CM

## 2021-09-17 PROCEDURE — 77063 BREAST TOMOSYNTHESIS BI: CPT

## 2021-09-17 NOTE — TELEPHONE ENCOUNTER
Requested referral forGastrointestinal specialist.  Spending way too much time on the phone with  freee to accomplish this.  Advised she could consider [facility].  Electronically signed:    Otoniel Manuel PA-C

## 2021-09-22 NOTE — PROGRESS NOTES
INITIAL NEUROLOGY CONSULTATION    DATE OF VISIT: 9/23/2021  CLINIC LOCATION: [facility]  MRN: [id number]  PATIENT NAME: Amelia Coker  YOB: 1975    PRIMARY CARE PROVIDER: Otoniel Manuel PA-C     REASON FOR VISIT:   Chief Complaint   Patient presents with     Back Pain     lumber surgery x2 already      Spasms     left leg down into foot      HISTORY OF PRESENT ILLNESS:                                                    Ms. Amelia Coker is 46 year old right handed female patient with past medical history of migraine, Bell's palsy, Lyme disease, hypertension, vascular thoracic outlet syndrome, and hyperlipidemia, who was seen in consultation today requested by Otoniel Manuel PA-C, for left lower extremity symptoms and to assess the need for EMG.    Per patient's report, several years ago ( before 1st spinal surgery) she developed intermittent left leg spasms, mainly below the knee, but occasionally into the thigh.  Symptoms improved after her first surgery, but then return back.  They improved after second surgery, but again recurred.  She feels that epidural steroid injections help temporarily.  There is also associated loss of movement during that time and feeling of numbness in the left foot (dorsal surface and great toe).  Initially they occurred predominantly at night, but over the last 1 to 2 months also started to happen during the day if she sits, reclines, or stretches wrong.  Muscle spasms affect her night sleep because she is up 1-5 times per night for up to 15 to 20 minutes in the excruciating pain, pacing around the house because the muscles are cramping and causing toe flexion with foot inversion.  It tends to occur when she lies on her back or on the left side.  She tried medical cannabis, Valium, electrolytes, magnesium, Epsom salts, and massage without help.  She reached out to her primary care provider, who referred her to neurology with question of need for  EMG.  She had decompressive lumbar surgery in 2016 with revision in November 2020.    She had history of right upper extremity paresthesias and pain.  Her EMG was negative on 5/18/2020.  It was repeated again on 5/24/2021 and demonstrated mild right ulnar neuropathy at the elbow without evidence of true neurogenic thoracic outlet syndrome, cervical radiculopathy, or medial neuropathy.  Eventually, she underwent pectoralis minor muscle diagnostic block (which was helpful) and eventually division/excision on 7/22/2021.  Denies additional focal neurological symptoms.    Lumbar spine MRI from 5/13/2021 demonstrated postoperative changes related to left L5-S1 hemilaminectomy and partial facetectomy with enhancing tissue in the left neural foramen that felt to likely represent granulation tissue or scarring without causing significant left-sided stenosis.  Moderate right-sided foraminal stenosis is noted at that level.  In addition, prior right posterior surgical procedure at L4-5 was noted with associated right posterior lateral L4-5 disc protrusion and facet hypertrophy resulting in moderate right-sided foraminal stenosis without evidence of central canal stenosis or left-sided narrowing.  Images were personally reviewed and independently interpreted.  It appears that left L5 nerve root is in contact with granulation tissue and remaining L5-S1 disc.  According to early MRI from December 2020, there was severe left foraminal stenosis with effacement of left foraminal fat and enhancement within the left foramen surrounding the exiting left L5 nerve root and traversing left S1 nerve root, but S1 nerve root seems to be not involved at the most recent study.  I also discussed these findings with neuroradiologist.    No additional useful information is available in Care Everywhere, which was reviewed.    Review of Systems - the patient endorses recent weight gain, hay fever, vision changes/diplopia, arthritis, joint swelling,  muscle tenderness, irregular heartbeat, limb paresthesias, difficulty swallowing, and rash.  All of them have been previously discussed with other medical providers. Otherwise, she denies any other complaints on 14-point comprehensive review of systems.  PAST MEDICAL/SURGICAL HISTORY:                                                    I personally reviewed patient's past medical and surgical history with the patient at today's visit.  MEDICATIONS:                                                    I personally reviewed patient's medications and allergies with the patient at today's visit.  clobetasol (TEMOVATE) 0.05 % external cream, Apply topically 2 times daily  medical cannabis (Patient's own supply), Take 1 Dose by mouth See Admin Instructions 1/2 to 2 tablets every 3-6 hours as needed (for pain/sleep)    THC:CBD 2.5m.5mg    (The purpose of this order is to document that the patient reports taking medical cannabis.  This is not a prescription, and is not used to certify that the patient has a qualifying medical condition.)  meloxicam (MOBIC) 15 MG tablet, Take 15 mg by mouth daily  mupirocin (BACTROBAN) 2 % external ointment, Apply topically 3 times daily  rizatriptan (MAXALT-MLT) 5 MG ODT, Take 1 tablet (5 mg) by mouth at onset of headache for migraine May repeat in 2 hours. Max 6 tablets/24 hours.  sucralfate (CARAFATE) 1 GM tablet, Take 1 tablet (1 g) by mouth 4 times daily  tacrolimus (PROTOPIC) 0.1 % external ointment, Apply topically 2 times daily On eczematous lesions 2xdaily (Patient taking differently: Apply topically 2 times daily as needed On eczematous lesions 2xdaily)    No current facility-administered medications on file prior to visit.    ALLERGIES:                                                      Allergies   Allergen Reactions     Ceftriaxone Anaphylaxis     Hives, rash, racing heart beat     Bactrim [Sulfamethoxazole W/Trimethoprim] Hives     Ciprofloxacin Other (See Comments)     Tendon  Issues     Codeine Nausea and Vomiting     Codeine      Copper      Rash       Doxycycline      Loss of skin pigmentation, skin loss.     Gold      Rash       Iodine Hives     WELTS     Iodine-131 Hives     Levaquin [Levofloxacin] Other (See Comments)     Tendon issues with levaquin and cipro     Nickel      rash     Steel [Staples]      Rash from staples       Sulfa Drugs Hives     Full Body     Latex Rash     Penicillins Rash     FAMILY/SOCIAL HISTORY:                                                    Family and social history was reviewed with the patient at today's visit.   Family history is positive for brain aneurysm, migraine, and Parkinson's disease.  , lives with .  Works full-time.  Never smoker.  Vaping cannabis for medical reasons.  REVIEW OF SYSTEMS:                                                    Patient has completed a [facility] Patient Health History, including a 14-system review, which was personally reviewed, and pertinent positives are listed in HPI. She denies any additional problems on the further questioning.  EXAM:                                                    VITAL SIGNS:   BP (!) 142/84   Pulse 65   Wt 95.6 kg (210 lb 12.8 oz)   SpO2 100%   BMI 35.08 kg/m    Mini-Cog Assessment:  Mini Cog Assessment  Clock Draw Score: 2 Normal  3 Item Recall: 3 objects recalled  Mini Cog Total Score: 5  Administered by: : Dominga WISE    General: pt is in NAD, cooperative.  Skin: normal turgor, moist mucous membranes, no lesions/rashes noticed.  HEENT: ATNC, EOMI, PERRL, white sclera, normal conjunctiva, no nystagmus or ptosis. No carotid bruits bilaterally.  Respiratory: lung sounds clear to auscultation bilaterally, no crackles, wheezes, rhonchi. Symmetric lung excursion, no accessory respiratory muscle use.  Cardiovascular: normal S1/S2, no murmurs/rubs/gallops.   Abdomen: Not distended.  : deferred.    Neurological:  Mental: alert, follows commands, Mini Cog Total  Score: 5/5 with 3/3 on memory recall, no aphasia or dysarthria. Fund of knowledge is appropriate for age.  Cranial Nerves:  CN II: visual acuity - able to accurately count fingers with each eye. Visual fields intact, fundi: discs sharp, no papilledema and normal vessels bilaterally.  CN III, IV, VI: EOM intact, pupils equal and reactive  CN V: facial sensation nl  CN VII: Chronic right facial droop (has history of Bell's palsy with incomplete recovery)  CN VIII: hearing normal  CN IX: palate elevation symmetric, uvula at midline  CN XI SCM normal, shoulder shrug nl  CN XII: tongue midline  Motor: Strength: 5/5 in all major groups of all extremities, except left extensor houses longus, which is 4/5. Normal tone. No abnormal movements. No pronator drift b/l.  Reflexes: Triceps, biceps, patellar, and achilles reflexes normal and symmetric, right brachioradialis reflex might be slightly lower than left. No clonus noted. Toes are down-going b/l.   Sensory:  light touch, pinprick, and vibration is reduced in the left foot, mainly in L5 dermatome. Romberg: negative.  Coordination: FNF and heel-shin tests intact b/l.   Gait:  Normal, able to tandem, toe, and heel walk.  DATA:   LABS/IMAGING/OTHER STUDIES: I reviewed pertinent medical records, including personal review of lumbar spine MRI images, prior EMG reports, and Care Everywhere, as detailed in the history of present illness.  ASESSMENT AND PLAN:      ASSESSMENT: Amelia Coker is a 46 year old female patient with listed above past medical history, who presents with left lower extremity paresthesia, muscle spasms and pain in context of chronic low back pain and 2 prior lumbar surgeries.    We had a detailed discussion with the patient regarding her presenting complaints.  The neurological exam today is suggestive of L5 radiculopathy, though peroneal neuropathy is also included in the differential.  I reviewed her most recent MRI from March 2021 and compared it to MRI  from December 2020 (shortly after her last lumbar surgery).  It appears that left L5 nerve root could be still in contact or irritated by remainder of the L5-S1 disc and surrounding scar tissue.  Her left S1 does not look to be involved on MRI.  I think it would be reasonable to proceed with EMG to evaluate for ongoing irritation of the left L5 and less likely S1 nerve roots versus alternative causes followed by discussion about additional surgical options.    We also briefly discussed her positive family history of brain aneurysm in her paternal aunt.  We discussed that she should consider CTA or MRA at some point to assess that possibility.  She wanted more time to think about it.    Bhupinder to follow up with Primary Care provider regarding elevated blood pressure.     DIAGNOSES:    ICD-10-CM    1. Paresthesia of left lower extremity  R20.2 EMG   2. Muscle spasm of left lower extremity  M62.838 EMG   3. 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  M51.27 Adult Neurology Referral   4. Lumbar radiculopathy  M54.16 EMG     Adult Neurology Referral     PLAN: At today's visit we thoroughly discussed various diagnostic possibilities for patient's symptoms, necessary evaluation, and the plan, which includes:  Orders Placed This Encounter   Procedures     EMG     No new medications.     Additional recommendations after the work-up.    Next follow-up appointment is in the next 4-6 weeks or earlier if needed.    Total Time: 71 minutes spent on the date of the encounter doing chart review, history and exam, documentation and further activities per the note.    Lanre Bae MD  [facility] Neurology  (Chart documentation was completed in part with Dragon voice-recognition software. Even though reviewed, some grammatical, spelling, and word errors may remain.)

## 2021-09-23 ENCOUNTER — MYC MEDICAL ADVICE (OUTPATIENT)
Dept: FAMILY MEDICINE | Facility: OTHER | Age: 46
End: 2021-09-23
Payer: COMMERCIAL

## 2021-09-23 ENCOUNTER — OFFICE VISIT (OUTPATIENT)
Dept: NEUROLOGY | Facility: CLINIC | Age: 46
End: 2021-09-23
Attending: PHYSICIAN ASSISTANT
Payer: COMMERCIAL

## 2021-09-23 VITALS
SYSTOLIC BLOOD PRESSURE: 137 MMHG | WEIGHT: 210.8 LBS | HEART RATE: 99 BPM | DIASTOLIC BLOOD PRESSURE: 84 MMHG | BODY MASS INDEX: 35.08 KG/M2 | OXYGEN SATURATION: 99 %

## 2021-09-23 DIAGNOSIS — Z82.49 FAMILY HISTORY OF CEREBRAL ANEURYSM: ICD-10-CM

## 2021-09-23 DIAGNOSIS — R93.3 ABNORMAL CT SCAN, COLON: ICD-10-CM

## 2021-09-23 DIAGNOSIS — M62.838 MUSCLE SPASM OF LEFT LOWER EXTREMITY: ICD-10-CM

## 2021-09-23 DIAGNOSIS — K52.9 COLITIS: ICD-10-CM

## 2021-09-23 DIAGNOSIS — M54.16 LUMBAR RADICULOPATHY: ICD-10-CM

## 2021-09-23 DIAGNOSIS — R14.0 ABDOMINAL DISTENSION: Primary | ICD-10-CM

## 2021-09-23 DIAGNOSIS — R20.2 PARESTHESIA OF LEFT LOWER EXTREMITY: Primary | ICD-10-CM

## 2021-09-23 DIAGNOSIS — Z80.0 FAMILY HISTORY OF COLON CANCER: ICD-10-CM

## 2021-09-23 DIAGNOSIS — M51.27 HERNIATION OF INTERVERTEBRAL DISC BETWEEN L5 AND S1: ICD-10-CM

## 2021-09-23 DIAGNOSIS — R10.13 ABDOMINAL PAIN, EPIGASTRIC: ICD-10-CM

## 2021-09-23 PROCEDURE — 99207 E-CONSULT TO GASTROENTEROLOGY (ADULT OUTPT PROVIDER TO SPECIALIST WRITTEN QUESTION & RESPONSE): CPT | Performed by: PHYSICIAN ASSISTANT

## 2021-09-23 PROCEDURE — G0463 HOSPITAL OUTPT CLINIC VISIT: HCPCS

## 2021-09-23 PROCEDURE — 99205 OFFICE O/P NEW HI 60 MIN: CPT | Performed by: PSYCHIATRY & NEUROLOGY

## 2021-09-23 NOTE — LETTER
9/23/2021         RE: Amelia Coker  [address] [facility] [address]        Dear Colleague,    Thank you for referring your patient, Amelia Coker, to the [facility] [place]. Please see a copy of my visit note below.    INITIAL NEUROLOGY CONSULTATION    DATE OF VISIT: 9/23/2021  CLINIC LOCATION: [facility]  MRN: [id number]  PATIENT NAME: Amelia Coker  YOB: 1975    PRIMARY CARE PROVIDER: Otoniel Manuel PA-C     REASON FOR VISIT:   Chief Complaint   Patient presents with     Back Pain     lumber surgery x2 already      Spasms     left leg down into foot      HISTORY OF PRESENT ILLNESS:                                                    Ms. Amelia Coker is 46 year old right handed female patient with past medical history of migraine, Bell's palsy, Lyme disease, hypertension, vascular thoracic outlet syndrome, and hyperlipidemia, who was seen in consultation today requested by Otoniel Manuel PA-C, for left lower extremity symptoms and to assess the need for EMG.    Per patient's report, several years ago ( before 1st spinal surgery) she developed intermittent left leg spasms, mainly below the knee, but occasionally into the thigh.  Symptoms improved after her first surgery, but then return back.  They improved after second surgery, but again recurred.  She feels that epidural steroid injections help temporarily.  There is also associated loss of movement during that time and feeling of numbness in the left foot (dorsal surface and great toe).  Initially they occurred predominantly at night, but over the last 1 to 2 months also started to happen during the day if she sits, reclines, or stretches wrong.  Muscle spasms affect her night sleep because she is up 1-5 times per night for up to 15 to 20 minutes in the excruciating pain, pacing around the house because the muscles are cramping and causing toe flexion with foot inversion.  It tends to  occur when she lies on her back or on the left side.  She tried medical cannabis, Valium, electrolytes, magnesium, Epsom salts, and massage without help.  She reached out to her primary care provider, who referred her to neurology with question of need for EMG.  She had decompressive lumbar surgery in 2016 with revision in November 2020.    She had history of right upper extremity paresthesias and pain.  Her EMG was negative on 5/18/2020.  It was repeated again on 5/24/2021 and demonstrated mild right ulnar neuropathy at the elbow without evidence of true neurogenic thoracic outlet syndrome, cervical radiculopathy, or medial neuropathy.  Eventually, she underwent pectoralis minor muscle diagnostic block (which was helpful) and eventually division/excision on 7/22/2021.  Denies additional focal neurological symptoms.    Lumbar spine MRI from 5/13/2021 demonstrated postoperative changes related to left L5-S1 hemilaminectomy and partial facetectomy with enhancing tissue in the left neural foramen that felt to likely represent granulation tissue or scarring without causing significant left-sided stenosis.  Moderate right-sided foraminal stenosis is noted at that level.  In addition, prior right posterior surgical procedure at L4-5 was noted with associated right posterior lateral L4-5 disc protrusion and facet hypertrophy resulting in moderate right-sided foraminal stenosis without evidence of central canal stenosis or left-sided narrowing.  Images were personally reviewed and independently interpreted.  It appears that left L5 nerve root is in contact with granulation tissue and remaining L5-S1 disc.  According to early MRI from December 2020, there was severe left foraminal stenosis with effacement of left foraminal fat and enhancement within the left foramen surrounding the exiting left L5 nerve root and traversing left S1 nerve root, but S1 nerve root seems to be not involved at the most recent study.  I also  discussed these findings with neuroradiologist.    No additional useful information is available in Care Everywhere, which was reviewed.    Review of Systems - the patient endorses recent weight gain, hay fever, vision changes/diplopia, arthritis, joint swelling, muscle tenderness, irregular heartbeat, limb paresthesias, difficulty swallowing, and rash.  All of them have been previously discussed with other medical providers. Otherwise, she denies any other complaints on 14-point comprehensive review of systems.  PAST MEDICAL/SURGICAL HISTORY:                                                    I personally reviewed patient's past medical and surgical history with the patient at today's visit.  MEDICATIONS:                                                    I personally reviewed patient's medications and allergies with the patient at today's visit.  clobetasol (TEMOVATE) 0.05 % external cream, Apply topically 2 times daily  medical cannabis (Patient's own supply), Take 1 Dose by mouth See Admin Instructions 1/2 to 2 tablets every 3-6 hours as needed (for pain/sleep)    THC:CBD 2.5m.5mg    (The purpose of this order is to document that the patient reports taking medical cannabis.  This is not a prescription, and is not used to certify that the patient has a qualifying medical condition.)  meloxicam (MOBIC) 15 MG tablet, Take 15 mg by mouth daily  mupirocin (BACTROBAN) 2 % external ointment, Apply topically 3 times daily  rizatriptan (MAXALT-MLT) 5 MG ODT, Take 1 tablet (5 mg) by mouth at onset of headache for migraine May repeat in 2 hours. Max 6 tablets/24 hours.  sucralfate (CARAFATE) 1 GM tablet, Take 1 tablet (1 g) by mouth 4 times daily  tacrolimus (PROTOPIC) 0.1 % external ointment, Apply topically 2 times daily On eczematous lesions 2xdaily (Patient taking differently: Apply topically 2 times daily as needed On eczematous lesions 2xdaily)    No current facility-administered medications on file prior to  visit.    ALLERGIES:                                                      Allergies   Allergen Reactions     Ceftriaxone Anaphylaxis     Hives, rash, racing heart beat     Bactrim [Sulfamethoxazole W/Trimethoprim] Hives     Ciprofloxacin Other (See Comments)     Tendon Issues     Codeine Nausea and Vomiting     Codeine      Copper      Rash       Doxycycline      Loss of skin pigmentation, skin loss.     Gold      Rash       Iodine Hives     WELTS     Iodine-131 Hives     Levaquin [Levofloxacin] Other (See Comments)     Tendon issues with levaquin and cipro     Nickel      rash     Steel [Staples]      Rash from staples       Sulfa Drugs Hives     Full Body     Latex Rash     Penicillins Rash     FAMILY/SOCIAL HISTORY:                                                    Family and social history was reviewed with the patient at today's visit.   Family history is positive for brain aneurysm, migraine, and Parkinson's disease.  , lives with .  Works full-time.  Never smoker.  Vaping cannabis for medical reasons.  REVIEW OF SYSTEMS:                                                    Patient has completed a [facility] Patient Health History, including a 14-system review, which was personally reviewed, and pertinent positives are listed in HPI. She denies any additional problems on the further questioning.  EXAM:                                                    VITAL SIGNS:   BP (!) 142/84   Pulse 65   Wt 95.6 kg (210 lb 12.8 oz)   SpO2 100%   BMI 35.08 kg/m    Mini-Cog Assessment:  Mini Cog Assessment  Clock Draw Score: 2 Normal  3 Item Recall: 3 objects recalled  Mini Cog Total Score: 5  Administered by: : Dominga WISE    General: pt is in NAD, cooperative.  Skin: normal turgor, moist mucous membranes, no lesions/rashes noticed.  HEENT: ATNC, EOMI, PERRL, white sclera, normal conjunctiva, no nystagmus or ptosis. No carotid bruits bilaterally.  Respiratory: lung sounds clear to auscultation  bilaterally, no crackles, wheezes, rhonchi. Symmetric lung excursion, no accessory respiratory muscle use.  Cardiovascular: normal S1/S2, no murmurs/rubs/gallops.   Abdomen: Not distended.  : deferred.    Neurological:  Mental: alert, follows commands, Mini Cog Total Score: 5/5 with 3/3 on memory recall, no aphasia or dysarthria. Fund of knowledge is appropriate for age.  Cranial Nerves:  CN II: visual acuity - able to accurately count fingers with each eye. Visual fields intact, fundi: discs sharp, no papilledema and normal vessels bilaterally.  CN III, IV, VI: EOM intact, pupils equal and reactive  CN V: facial sensation nl  CN VII: Chronic right facial droop (has history of Bell's palsy with incomplete recovery)  CN VIII: hearing normal  CN IX: palate elevation symmetric, uvula at midline  CN XI SCM normal, shoulder shrug nl  CN XII: tongue midline  Motor: Strength: 5/5 in all major groups of all extremities, except left extensor houses longus, which is 4/5. Normal tone. No abnormal movements. No pronator drift b/l.  Reflexes: Triceps, biceps, patellar, and achilles reflexes normal and symmetric, right brachioradialis reflex might be slightly lower than left. No clonus noted. Toes are down-going b/l.   Sensory:  light touch, pinprick, and vibration is reduced in the left foot, mainly in L5 dermatome. Romberg: negative.  Coordination: FNF and heel-shin tests intact b/l.   Gait:  Normal, able to tandem, toe, and heel walk.  DATA:   LABS/IMAGING/OTHER STUDIES: I reviewed pertinent medical records, including personal review of lumbar spine MRI images, prior EMG reports, and Care Everywhere, as detailed in the history of present illness.  ASESSMENT AND PLAN:      ASSESSMENT: Amelia Coker is a 46 year old female patient with listed above past medical history, who presents with left lower extremity paresthesia, muscle spasms and pain in context of chronic low back pain and 2 prior lumbar surgeries.    We had a  detailed discussion with the patient regarding her presenting complaints.  The neurological exam today is suggestive of L5 radiculopathy, though peroneal neuropathy is also included in the differential.  I reviewed her most recent MRI from March 2021 and compared it to MRI from December 2020 (shortly after her last lumbar surgery).  It appears that left L5 nerve root could be still in contact or irritated by remainder of the L5-S1 disc and surrounding scar tissue.  Her left S1 does not look to be involved on MRI.  I think it would be reasonable to proceed with EMG to evaluate for ongoing irritation of the left L5 and less likely S1 nerve roots versus alternative causes followed by discussion about additional surgical options.    Bhupinder to follow up with Primary Care provider regarding elevated blood pressure.     DIAGNOSES:    ICD-10-CM    1. Paresthesia of left lower extremity  R20.2 EMG   2. Muscle spasm of left lower extremity  M62.838 EMG   3. 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  M51.27 Adult Neurology Referral   4. Lumbar radiculopathy  M54.16 EMG     Adult Neurology Referral     PLAN: At today's visit we thoroughly discussed various diagnostic possibilities for patient's symptoms, necessary evaluation, and the plan, which includes:  Orders Placed This Encounter   Procedures     EMG     No new medications.     Additional recommendations after the work-up.    Next follow-up appointment is in the next 4-6 weeks or earlier if needed.    Total Time: 71 minutes spent on the date of the encounter doing chart review, history and exam, documentation and further activities per the note.    Lanre Bae MD  [facility] Neurology  (Chart documentation was completed in part with Dragon voice-recognition software. Even though reviewed, some grammatical, spelling, and word errors may remain.)              Again, thank you for allowing me to participate in the care of your patient.         Sincerely,        Lanre Bae MD

## 2021-09-23 NOTE — PATIENT INSTRUCTIONS
AFTER VISIT SUMMARY (AVS):    At today's visit we thoroughly discussed various diagnostic possibilities for your symptoms, necessary evaluation, and the plan, which includes:  Orders Placed This Encounter   Procedures     EMG     No new medications.     Additional recommendations after the work-up.    Next follow-up appointment is in the next 4-6 weeks or earlier if needed.    Please do not hesitate to call me with any questions or concerns.    Thanks.

## 2021-09-27 ENCOUNTER — E-CONSULT (OUTPATIENT)
Dept: GASTROENTEROLOGY | Facility: CLINIC | Age: 46
End: 2021-09-27
Payer: COMMERCIAL

## 2021-09-27 ENCOUNTER — MYC MEDICAL ADVICE (OUTPATIENT)
Dept: FAMILY MEDICINE | Facility: OTHER | Age: 46
End: 2021-09-27

## 2021-09-27 ENCOUNTER — PATIENT OUTREACH (OUTPATIENT)
Dept: GASTROENTEROLOGY | Facility: CLINIC | Age: 46
End: 2021-09-27

## 2021-09-27 DIAGNOSIS — R10.13 ABDOMINAL PAIN, EPIGASTRIC: ICD-10-CM

## 2021-09-27 DIAGNOSIS — K52.9 COLITIS: ICD-10-CM

## 2021-09-27 DIAGNOSIS — R14.0 ABDOMINAL DISTENSION: Primary | ICD-10-CM

## 2021-09-27 DIAGNOSIS — R93.3 ABNORMAL CT SCAN, COLON: ICD-10-CM

## 2021-09-27 PROCEDURE — 99207 PR NO BILLABLE SERVICE THIS VISIT: CPT | Performed by: INTERNAL MEDICINE

## 2021-09-27 RX ORDER — DICYCLOMINE HYDROCHLORIDE 10 MG/1
10 CAPSULE ORAL
Qty: 120 CAPSULE | Refills: 1 | Status: SHIPPED | OUTPATIENT
Start: 2021-09-27 | End: 2021-12-20

## 2021-09-27 NOTE — TELEPHONE ENCOUNTER
Contacted patient an appointment with Dr. Jimenez on October 6 at 1220 in person  Patient aware of check in time and location and in person visit  No outside records

## 2021-09-27 NOTE — PROGRESS NOTES
ALL SMARTFIELDS MUST BE COMPLETED FOR PATIENT CARE AND BILLING    9/27/2021     E-Consult has been denied due to: Complexity of question, needs in-person referral.    Interprofessional consultation requested by:  Otoniel Nelson PA-C      Clinical Question/Purpose: MY CLINICAL QUESTION IS: Colitis concerns multiple labs and visits without clear etiology.  Has had a colonoscopy without clear etiology as well.  Further treatment options for her ongoing abdominal pain and diarrheal incidents.    Patient assessment and information reviewed:     1. Abnormal CT scan 8/2021 - thickening of hepatic flexure with stranding  2. Normal ileo-colonoscopy with normal colon biopsies  3. Normal CBC, LFTs, BMP, CRP    Pt has requested to see a GI provider.    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    As she has requested meeting a GI provider (I saw this on her recent [facility] message) we will help arrange a visit within the next 1-2 weeks.    Recommendations:   -Trial citrucel fiber powder 1 tablespoon daily in glass of water to help with bowel regularity  -Trial dicyclomine 10 mg q 6 hours as needed for abdominal cramping (this requires prescription)  -Trial over the counter Gas X for bloating     We will contact patient to schedule appointment with GI clinic. This is not typically possible from an E-consult but an exception is made for this instance.      The recommendations provided in this E-Consult are based on the clinical data available to me at this time, and are furnished without the benefit of a comprehensive in-person or virtual patient evaluation, Any new clinical issues or changes in patient status since the filing of this E-Consult will need to be taken into account when assessing these recommendations. Please contact me if you have further questions.    My total time spent  reviewing clinical information and formulating assessment was 15 minutes.    Report sent automatically to requesting provider once signed.     Charge codes - [id number] (5+ minutes) or [id number] (No charge code)    Fredy Jimenez MD

## 2021-09-30 ENCOUNTER — MYC MEDICAL ADVICE (OUTPATIENT)
Dept: FAMILY MEDICINE | Facility: OTHER | Age: 46
End: 2021-09-30

## 2021-10-04 NOTE — TELEPHONE ENCOUNTER
REFERRAL INFORMATION:    Referring Provider:  Otoniel Nelson PA-C    Referring Clinic:  [facility]     Reason for Visit/Diagnosis: Diarrhea, Colitis      FUTURE VISIT INFORMATION:    Appointment Date: 10/6/2021    Appointment Time: 12:20 PM      NOTES STATUS DETAILS   OFFICE NOTE from Referring Provider Internal 8/27/2021, 5/23/19, 8/6/18 Office visit with Otoniel Nelson PA-C     OFFICE NOTE from Other Specialist Internal 9/27/2021 E-consult with Dr. Fredy Jimenez ([facility] GI)      HOSPITAL DISCHARGE SUMMARY/  ED VISITS Internal 8/31/2021 ([facility])    OPERATIVE REPORT N/A    MEDICATION LIST Internal          ENDOSCOPY  Internal EGD: 2/18/2021, 11/8/13   COLONOSCOPY Internal/ Received  9/13/2021, 11/19/18  3/11/13 ([facility])     ERCP N/A    EUS N/A    STOOL TESTING Internal 8/31/2021, 6/14/19   PERTINENT LABS Internal    PATHOLOGY REPORTS (RELATED) Internal 9/13/2021   IMAGING (CT, MRI, EGD, MRCP, Small Bowel Follow Through/SBT, MR/CT Enterography) Internal CT Abdomen Pelvis: 8/27/2021, 2/7/2020, 5/23/19  US Abdomen: 8/7/18

## 2021-10-06 ENCOUNTER — LAB (OUTPATIENT)
Dept: LAB | Facility: CLINIC | Age: 46
End: 2021-10-06
Payer: COMMERCIAL

## 2021-10-06 ENCOUNTER — PRE VISIT (OUTPATIENT)
Dept: GASTROENTEROLOGY | Facility: CLINIC | Age: 46
End: 2021-10-06

## 2021-10-06 ENCOUNTER — OFFICE VISIT (OUTPATIENT)
Dept: GASTROENTEROLOGY | Facility: CLINIC | Age: 46
End: 2021-10-06
Payer: COMMERCIAL

## 2021-10-06 VITALS
DIASTOLIC BLOOD PRESSURE: 84 MMHG | OXYGEN SATURATION: 97 % | HEIGHT: 66 IN | WEIGHT: 207.7 LBS | BODY MASS INDEX: 33.38 KG/M2 | HEART RATE: 72 BPM | SYSTOLIC BLOOD PRESSURE: 141 MMHG

## 2021-10-06 DIAGNOSIS — K52.9 CHRONIC DIARRHEA: ICD-10-CM

## 2021-10-06 DIAGNOSIS — R10.84 ABDOMINAL PAIN, GENERALIZED: ICD-10-CM

## 2021-10-06 DIAGNOSIS — R10.84 ABDOMINAL PAIN, GENERALIZED: Primary | ICD-10-CM

## 2021-10-06 PROCEDURE — 99205 OFFICE O/P NEW HI 60 MIN: CPT | Performed by: INTERNAL MEDICINE

## 2021-10-06 ASSESSMENT — MIFFLIN-ST. JEOR: SCORE: 1580.93

## 2021-10-06 ASSESSMENT — PAIN SCALES - GENERAL: PAINLEVEL: MODERATE PAIN (4)

## 2021-10-06 NOTE — LETTER
"    10/6/2021         RE: Amelia Coker  [address] [facility] [id number]        Dear Colleague,    Thank you for referring your patient, Amelia Coker, to the [facility] [place]. Please see a copy of my visit note below.    GI CLINIC VISIT    CC/REFERRING MD:  No ref. provider found  REASON FOR CONSULTATION:   No ref. provider found for   Chief Complaint   Patient presents with     Consult     Consultation Abdominal Pain       ASSESSMENT/PLAN:    This patient is a pleasant 46-year-old female with history of chronic low back pain on intermittent NSAIDs who is here today to discuss 5 to 6 weeks of ongoing right-sided abdominal pain and loose stools.    CT scan at the end of August did show thickening of the colon near the hepatic flexure.  CRP was elevated at that time.  Extensive infectious stool studies have been negative for infection.  Follow-up ileocolonoscopy was unremarkable with normal right colon biopsies.  Repeat CRP has normalized.    Taking everything into consideration she likely had an acute insult to her GI tract which may have been an infectious colitis or an episode of ischemic colitis.  This appears to be a transient process as her colonoscopy is now normal and her CRP has normalized.  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  We discussed the natural progression of this and how it can take weeks or even months for this to resolve completely.  Sometimes bowel habits never returned to normal.  The mainstay of treatment for this is supportive cares and time.  I do recommend that she retry a lower dose fiber.  I recommend that she take Citrucel 1 teaspoon daily in a glass of water.  She can slowly titrate this up to 1 tablespoon daily.  We will also start her on amitriptyline 25 mg at bedtime.  She can start with half a tablet and after 3 to 4 days increase up to a full tablet.  She can continue to take her dicyclomine 10 mg " every 6 hours as needed.  She will update us on her symptoms in 1 to 2 weeks.  If she is still not tolerating the fiber we can discuss a trial of colestipol.    The differential diagnosis does include inflammatory bowel disease.  This is much less likely given the normal ileocolonoscopy and normalized CRP.  However, we will move forward with an MR enterography to make sure there is no evidence of isolated small bowel Crohn's.  We will check a fecal calprotectin to make sure there is no evidence of ongoing inflammation of the bowel.  The differential diagnosis also includes exocrine pancreatic insufficiency.  We will check a fecal elastase and a spot fecal fat.  The differential diagnosis includes celiac disease she has had duodenal biopsies and serum TTG's that have been normal.  The differential diagnosis also includes microscopic colitis, however, she has had colon biopsies that were unremarkable.    -Start low-dose Citrucel at 1 teaspoon daily and slowly titrate up  -If she does not respond to fiber we can consider trying colestipol  -Start amitriptyline 25 mg at bedtime  -Schedule MR enterography  -Check fecal calprotectin, fecal elastase, and fecal fat.  -Continue dicyclomine 10 mg every 6 hours    Call with update in 1 to 2 weeks.    RTC 2 months    Thank you for this consultation.  It was a pleasure to participate in the care of this patient; please contact us with any further questions.  A total of 40 minutes, face to face, was spent with this patient, >50% of which was counseling regarding the above delineated issues.    This note was created with voice recognition software, and while reviewed for accuracy, typos may remain.     Fredy Jimenez MD  Inflammatory Bowel Disease Program  Division of Gastroenterology, Hepatology and Nutrition  [facility]  Pager: [phone]      HPI:    Bhupinder is a very pleasant 46-year-old female with a past medical history of chronic back pain (on chronic intermittent  NSAIDs) who is here today to discuss 5 to 6 weeks of ongoing right-sided abdominal discomfort and loose stools.    She reports that her symptoms started abruptly on August 25.  It began with right upper quadrant discomfort.  She then developed more loose stools and then significant diarrhea.  She was eventually seen in the emergency room.  She underwent a CT scan which showed some thickening at her hepatic flexure.  She was initially treated with Cipro and Flagyl.  However, this did not improve her symptoms.  Stool studies were done and C. difficile was negative, enteric stool panel was negative, ova and parasites was negative, and Cryptosporidium was negative.  CBC, LFTs, and BMP were unremarkable.  CRP was elevated in the 50s. She was discharged from the hospital and had an outpatient colonoscopy by Dr. JUAN J Liz.  The ileum and colon were normal apart from some diverticulosis in the sigmoid colon.  Biopsies of right colon were obtained to evaluate for microscopic colitis.  Repeat CRP had normalized.  Repeat labs are also unremarkable.    The patient has continued to have ongoing right-sided abdominal discomfort.  This is described as pinching sensation.  It is worse if she eats something and it is also worse leading up to a bowel movement.  After she has a bowel movement she will have some relief in her pain.  She also has ongoing loose stools.  These have been up to 30 times per day.  However, in the last few days they have decreased and have been down to 6-10.  Stools are watery and bright yellow in color.  There has perhaps been more consistency in her stools in the last 2 to 3 days.  Prior to the last few days she was also having ongoing nocturnal stools.    She denies any blood in her stools.  She denies any weight loss.  She has no significant nausea or vomiting.  She does have a history of using meloxicam for chronic low back pain due to degenerative disc disease.  She has not used this very much in the last  6 weeks.  She also uses ibuprofen intermittently but again does not use this very much in the last few weeks.    She has been started on dicyclomine 10 mg every 6 hours.  This does help control the pain.  She feels it only last for about 4 hours.  She did not want to use more of this because it does give her the sensation of difficulty passing bowel movements.    She also tried starting Metamucil 1 tablespoon twice daily.  However, this did add bulk to her stool which actually made it more difficult for her to pass a stool and cause increased abdominal discomfort.    She does have chronic low back pain and has tried amitriptyline in the past for this and did not find it significantly helpful.  She is open to trying it again for her symptoms now.    She did undergo an upper endoscopy in February 2021 for epigastric discomfort.  She was taking significant amount of NSAIDs at that time.  There was some erythema in the gastric body but otherwise the upper endoscopy was unremarkable.  Biopsies of the stomach showed reactive gastropathy.  Biopsies of the duodenum were unremarkable        ROS:    No fevers or chills  No weight loss  No blurry vision, double vision or change in vision  No sore throat  No lymphadenopathy  No headache, paraesthesias, or weakness in a limb  No shortness of breath or wheezing  No chest pain or pressure  No arthralgias or myalgias  No rashes or skin changes  No odynophagia or dysphagia  No BRBPR, hematochezia, melena  No dysuria, frequency or urgency  No hot/cold intolerance or polyria  No anxiety or depression    PREVIOUS ENDOSCOPY:  EGD in February 2021 as above.  Ileocolonoscopy in September 2021 with biopsies as above    PERTINENT RELEVANT IMAGING OR LABS:  CBC, LFTs, BMP, TTG all unremarkable.    Elevated CRP at the end of August during ER stay.  This is subsequently normalized.    ALLERGIES:     Allergies   Allergen Reactions     Ceftriaxone Anaphylaxis     Hives, rash, racing heart beat      Bactrim [Sulfamethoxazole W/Trimethoprim] Hives     Ciprofloxacin Other (See Comments)     Tendon Issues     Codeine Nausea and Vomiting     Codeine      Copper      Rash       Doxycycline      Loss of skin pigmentation, skin loss.     Gold      Rash       Iodine Hives     WELTS     Iodine-131 Hives     Levaquin [Levofloxacin] Other (See Comments)     Tendon issues with levaquin and cipro     Nickel      rash     Steel [Staples]      Rash from staples       Sulfa Drugs Hives     Full Body     Latex Rash     Penicillins Rash       PERTINENT MEDICATIONS:    Current Outpatient Medications:      clobetasol (TEMOVATE) 0.05 % external cream, Apply topically 2 times daily, Disp: 60 g, Rfl: 1     dicyclomine (BENTYL) 10 MG capsule, Take 1 capsule (10 mg) by mouth 4 times daily (before meals and nightly), Disp: 120 capsule, Rfl: 1     medical cannabis (Patient's own supply), Take 1 Dose by mouth See Admin Instructions 1/2 to 2 tablets every 3-6 hours as needed (for pain/sleep)  THC:CBD 2.5m.5mg  (The purpose of this order is to document that the patient reports taking medical cannabis.  This is not a prescription, and is not used to certify that the patient has a qualifying medical condition.), Disp: , Rfl:      meloxicam (MOBIC) 15 MG tablet, Take 15 mg by mouth daily, Disp: , Rfl:      mupirocin (BACTROBAN) 2 % external ointment, Apply topically 3 times daily (Patient not taking: Reported on 2021), Disp: 30 g, Rfl: 0     rizatriptan (MAXALT-MLT) 5 MG ODT, Take 1 tablet (5 mg) by mouth at onset of headache for migraine May repeat in 2 hours. Max 6 tablets/24 hours., Disp: 30 tablet, Rfl: 1     sucralfate (CARAFATE) 1 GM tablet, Take 1 tablet (1 g) by mouth 4 times daily, Disp: 40 tablet, Rfl: 0     tacrolimus (PROTOPIC) 0.1 % external ointment, Apply topically 2 times daily On eczematous lesions 2xdaily (Patient taking differently: Apply topically 2 times daily as needed On eczematous lesions 2xdaily), Disp: 60  g, Rfl: 3    PROBLEM LIST  Patient Active Problem List    Diagnosis Date Noted     Colitis 09/09/2021     Priority: Medium     Abnormal CT scan, colon 09/09/2021     Priority: Medium     TOS (thoracic outlet syndrome) 06/07/2021     Priority: Medium     Added automatically from request for surgery [id number]       Dermatitis 03/03/2021     Priority: Medium     Musculoskeletal pain 03/03/2021     Priority: Medium     Fatigue, unspecified type 03/03/2021     Priority: Medium     AK (actinic keratosis) - both hands at the lateral thenar aspect. 12/08/2020     Priority: Medium     Hypertrophy of breast 11/05/2020     Priority: Medium     History of cold sores 11/05/2020     Priority: Medium     Elevated blood pressure reading without diagnosis of hypertension 09/24/2020     Priority: Medium     Balance problems 07/17/2020     Priority: Medium     Morbid obesity (H) 07/17/2020     Priority: Medium     Sinus arrhythmia seen on electrocardiogram 07/17/2020     Priority: Medium     Family history of ischemic heart disease - father at 46 massive MI 07/17/2020     Priority: Medium     Brachial plexopathy - right shoulder 05/21/2020     Priority: Medium     Dental abscess - left mandible molar 03/05/2020     Priority: Medium     Endometrium, hyperplasia - on recent CT scan 02/10/2020     Priority: Medium     Pain in joint involving pelvic region and thigh, right 02/10/2020     Priority: Medium     Right lateral abdominal pain 02/10/2020     Priority: Medium     Right foot pain 02/10/2020     Priority: Medium     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 10/10/2019     Priority: Medium     Abnormality of nail surface 08/29/2019     Priority: Medium     Dry hair 08/29/2019     Priority: Medium     Dry skin 08/29/2019     Priority: Medium     Malaise and fatigue 08/29/2019     Priority: Medium     Lymphadenopathy 08/29/2019     Priority: Medium     PONV (postoperative nausea and vomiting) 06/10/2019     Priority: Medium      Menorrhalgia 06/10/2019     Priority: Medium     Uterine polyp 06/10/2019     Priority: Medium     Loose stools 06/10/2019     Priority: Medium     Hyperactive bowel sounds 06/10/2019     Priority: Medium     Jaycob complexion 05/23/2019     Priority: Medium     Lip lesion 05/23/2019     Priority: Medium     Abdominal pain, epigastric 05/23/2019     Priority: Medium     Abdominal distension 05/23/2019     Priority: Medium     Intractable episodic tension-type headache 03/11/2019     Priority: Medium     Intractable right heel pain 05/29/2018     Priority: Medium     Superficial swelling of scalp 04/12/2018     Priority: Medium     Biceps tendinopathy, right 03/22/2018     Priority: Medium     Hyperlipidemia LDL goal <130 08/30/2017     Priority: Medium     Spasm of muscle 07/11/2017     Priority: Medium     Sternocleidomastoid muscle tenderness 04/05/2017     Priority: Medium     left         Acute cervical myofascial strain, initial encounter 04/05/2017     Priority: Medium     Abnormal mammogram 09/21/2016     Priority: Medium     right breast       Gastroesophageal reflux disease, esophagitis presence not specified 06/29/2016     Priority: Medium     IMO Regulatory Load OCT 2020       Rotator cuff arthropathy, right 03/07/2016     Priority: Medium     AD (atopic dermatitis) 10/29/2015     Priority: Medium     Heat sensitivity 10/29/2015     Priority: Medium     Skin lesion 10/20/2015     Priority: Medium     posterior superior scalp       Plantar fasciitis 09/23/2015     Priority: Medium     Lateral epicondylitis 03/16/2015     Priority: Medium     Problem list name updated by automated process. Provider to review       Right shoulder pain 03/16/2015     Priority: Medium     Telangiectasia of face 12/19/2014     Priority: Medium     Frontal headache 12/15/2014     Priority: Medium     Muscular wasting and disuse atrophy, not elsewhere classified 06/09/2014     Priority: Medium     right SCM, right wrist,         Ds DNA antibody positive 04/16/2014     Priority: Medium     borderline.       Lumbar radiculopathy 01/22/2014     Priority: Medium     Migraine without aura and without status migrainosus, not intractable 10/23/2013     Priority: Medium     Foraminal stenosis of lumbar region 09/26/2013     Priority: Medium     DJD (degenerative joint disease), lumbar 09/26/2013     Priority: Medium     FLORIDALMA positive 07/18/2013     Priority: Medium     Plantar fascial fibromatosis 07/18/2013     Priority: Medium     Pain in joint, upper arm 04/15/2013     Priority: Medium     Family history of colon cancer 03/05/2013     Priority: Medium     mother       Keratitis sicca (H) 10/31/2012     Priority: Medium     Dry eyes 10/15/2012     Priority: Medium     Family history of melanoma 08/24/2012     Priority: Medium     Shoulder joint instability 08/15/2012     Priority: Medium     Abnormal PFT 07/31/2012     Priority: Medium     question of left heart failure and/or shunting in need of further investigation       Chronic fatigue 06/06/2012     Priority: Medium     Hair loss 06/06/2012     Priority: Medium     Raynaud phenomenon 04/11/2012     Priority: Medium     PAC (premature atrial contraction) 07/15/2010     Priority: Medium     Palpitations 07/15/2010     Priority: Medium     Lyme disease 06/07/2010     Priority: Medium     Scalp lesion 05/26/2010     Priority: Medium     Migraine, unspecified, with intractable migraine, so stated, without mention of status migrainosus 04/04/2003     Priority: Medium     Problem list name updated by automated process. Provider to review  IMO Regulatory Load OCT 2020       Bell's palsy 04/04/2003     Priority: Medium     Contact dermatitis and other eczema, due to unspecified cause 04/04/2003     Priority: Medium       PERTINENT PAST MEDICAL HISTORY:  Past Medical History:   Diagnosis Date     Abnormal mammogram 9/21/2016    right breast      AD (atopic dermatitis) 10/29/2015     Allergic  rhinitis due to other allergen      Arthritis      Bell's palsy      Chronic fatigue 6/6/2012     Chronic infection     MRSA - 2015 scalp and prior to Abdomen     Contact dermatitis and other eczema, due to unspecified cause     eczema     Contusion of left foot, initial encounter 3/16/2016     DJD (degenerative joint disease), lumbar 9/26/2013     DJD (degenerative joint disease), lumbar 9/26/2013     Ds DNA antibody positive 4/16/2014    borderline.      Elevated blood pressure reading without diagnosis of hypertension 12/24/2013     Facial contusion 12/15/2014    hit by horse      Foraminal stenosis of lumbar region 9/26/2013     Frontal headache 12/15/2014     Gastroesophageal reflux disease, esophagitis presence not specified 6/29/2016     Heat sensitivity 10/29/2015     HTN, goal below 140/90 9/23/2015     Hyperlipidemia LDL goal <130 8/30/2017     Irregular heart beat      Keratitis sicca (H) 10/31/2012     Left shoulder pain 9/26/2013     Lumbar radiculopathy 1/22/2014     Migraine headache 10/23/2013     Migraine, unspecified, with intractable migraine, so stated, without mention of status migrainosus      Motion sickness      MRSA infection 10/20/2015     Muscular wasting and disuse atrophy, not elsewhere classified 6/9/2014    right SCM, right wrist,       Numbness and tingling     both legs anf feet greater on the left     PAC (premature atrial contraction) 7/15/2010     Plantar fasciitis 9/23/2015     PONV (postoperative nausea and vomiting)      Skin lesion 10/20/2015    posterior superior scalp      Spasm of muscle 7/11/2017     Telangiectasia of face 12/19/2014     Tooth pain 10/20/2015    left lower primary molar        PREVIOUS SURGERIES:  Past Surgical History:   Procedure Laterality Date     AS INJ TRANSFORAMIN EPIDURAL, LUMB/SACR SINGLE       COLONOSCOPY  3/11/2013    Procedure: COLONOSCOPY;  colonoscopy;  Surgeon: Lobito Mas MD;  Location: [facility]     COLONOSCOPY N/A 9/13/2021     Procedure: COLONOSCOPY, WITH POLYPECTOMY AND BIOPSY;  Surgeon: Sam Liz MD;  Location: [facility]     COLONOSCOPY WITH CO2 INSUFFLATION N/A 11/19/2018    Procedure: COLONOSCOPY WITH CO2 INSUFFLATION;  Surgeon: Goyo Blank MD;  Location: [facility]     DECOMPRESSION LUMBAR TWO LEVELS Bilateral 12/8/2016    Procedure: DECOMPRESSION LUMBAR TWO LEVELS;  Surgeon: Bang Burrell MD;  Location: [facility]     DILATION AND CURETTAGE, HYSTEROSCOPY, ABLATE ENDOMETRIUM NOVASURE, COMBINED N/A 6/12/2019    Procedure: hysteroscopy, dilation & curettage, polypectomy, endometrial ablation;  Surgeon: Fredy Pham MD;  Location: [facility]     ESOPHAGOSCOPY, GASTROSCOPY, DUODENOSCOPY (EGD), COMBINED  11/8/2013    Procedure: COMBINED ESOPHAGOSCOPY, GASTROSCOPY, DUODENOSCOPY (EGD), BIOPSY SINGLE OR MULTIPLE;  Esophagoscopy, Gastroscopy, Duodenoscopy EGD with multiple biopsies;  Surgeon: Teja Koch MD;  [facility]     ESOPHAGOSCOPY, GASTROSCOPY, DUODENOSCOPY (EGD), COMBINED N/A 2/18/2021    Procedure: ESOPHAGOGASTRODUODENOSCOPY, WITH BIOPSY;  Surgeon: Blanca Ba MD;  Location: [facility]     HC REMOVAL OF TONSILS,<13 Y/O      Tonsils <10y.o.     HC TOOTH EXTRACTION W/FORCEP       INJECT BLOCK MEDIAL BRANCH CERVICAL/THORACIC/LUMBAR N/A 7/23/2020    Procedure: Sacral 1,2,3 Lateral Branch Blocks and lumbar 5 medial branch blocks bilaterally;  Surgeon: Maurisio Goetz MD;  [facility]     INJECT JOINT SACROILIAC Left 9/24/2020    Procedure: Left Lumbar 5 Sacral 1 nerve root injections.;  Surgeon: Maurisio Goetz MD;  Location: [facility]     REPAIR MUSCLE UPPER EXTREMITY Right 7/27/2021    Procedure: DIVISION RIGHT PECTORALIS MUSCLE;  Surgeon: Davie Dutta;  Location: [facility]     REPAIR TENDON ELBOW  7/9/2014    Procedure: REPAIR TENDON ELBOW;  Surgeon: Chris Johnston MD;  [facility]: [facility]     ULTRASONIC REMOVAL SOFT TISSUE (LOCATION) Right 11/21/2018    Procedure: Tenex right foot;  Surgeon:  Patel Martínez DO;  Location: [facility]     VASCULAR SURGERY      Thoracic Outlet Syndrome       SOCIAL HISTORY:  Social History     Socioeconomic History     Marital status:      Spouse name: Robert     Number of children: 2     Years of education: 12     Highest education level: Not on file   Occupational History     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   Tobacco Use     Smoking status: Never Smoker     Smokeless tobacco: Never Used   Vaping Use     Vaping Use: Every day     Substances: THC     Devices: Disposable, Pre-filled or refillable cartridge   Substance and Sexual Activity     Alcohol use: No     Comment: social     Drug use: No     Types: Marijuana     Comment: Medical marijuana     Sexual activity: Yes     Partners: Male     Birth control/protection: Surgical     Comment: vasectomy   Other Topics Concern      Service No     Blood Transfusions No     Caffeine Concern No     Comment: 0     Occupational Exposure No     Hobby Hazards Yes     Comment: horses     Sleep Concern No     Stress Concern No     Weight Concern Yes     Special Diet Yes     Comment: nhung's     Back Care No     Exercise Yes     Comment: occ     Bike Helmet Not Asked     Comment: na     Seat Belt Yes     Self-Exams Yes     Comment: occ     Parent/sibling w/ CABG, MI or angioplasty before 65F 55M? Not Asked   Social History Narrative     Not on file     Social Determinants of Health     Financial Resource Strain:      Difficulty of Paying Living Expenses:    Food Insecurity:      Worried About Running Out of Food in the Last Year:      Ran Out of Food in the Last Year:    Transportation Needs:      Lack of Transportation (Medical):      Lack of Transportation (Non-Medical):    Physical Activity:      Days of Exercise per Week:      Minutes of Exercise per Session:    Stress:      Feeling of Stress :    Social Connections:      Frequency of Communication with Friends and Family:      Frequency of Social Gatherings  with Friends and Family:      Attends Judaism Services:      Active Member of Clubs or Organizations:      Attends Club or Organization Meetings:      Marital Status:    Intimate Partner Violence:      Fear of Current or Ex-Partner:      Emotionally Abused:      Physically Abused:      Sexually Abused:        FAMILY HISTORY:  Family History   Problem Relation Age of Onset     Diabetes Mother         adult     Cancer - colorectal Mother      Hypertension Mother      Allergies Mother      Cancer Mother         colon     Depression Mother      Gastrointestinal Disease Mother         ibs     Genitourinary Problems Mother         kidney cyst     Gynecology Mother         endometriosis     Lipids Mother      Thyroid Disease Mother      Arthritis Mother         RA     Heart Disease Maternal Grandfather         MI,  - 60's     Allergies Father      Unknown/Adopted Father      Heart Disease Father         mi-age mid 40's     Cardiovascular Father      Lipids Father      Respiratory Father         asthma     Cancer Father      Other Cancer Father         renal cancer     Depression Sister      Allergies Sister      Cancer Sister         vaginal     Gynecology Sister         endometriosis     Lipids Paternal Grandmother      Osteoporosis Paternal Grandmother      Thyroid Disease Paternal Grandmother      Respiratory Son         asthma     Allergies Son      Arthritis Sister      Allergies Brother      Heart Disease Brother      Allergies Daughter      Blood Disease Paternal Aunt         LGL T cell Leukemia     Rheumatoid Arthritis Paternal Aunt      Kidney Disease Maternal Aunt      Lupus Maternal Aunt      Bladder Cancer Maternal Aunt        Past/family/social history reviewed and no changes    PHYSICAL EXAMINATION:  Constitutional: aaox3, cooperative, pleasant, not dyspneic/diaphoretic, no acute distress  Vitals reviewed: BP (!) 141/84 (BP Location: Left arm, Patient Position: Chair, Cuff Size: Adult Regular)    "Pulse 72   Ht 1.664 m (5' 5.5\")   Wt 94.2 kg (207 lb 11.2 oz)   SpO2 97%   BMI 34.04 kg/m    Wt:   Wt Readings from Last 2 Encounters:   10/06/21 94.2 kg (207 lb 11.2 oz)   09/23/21 95.6 kg (210 lb 12.8 oz)      Eyes: Sclera anicteric/injected  Ears/nose/mouth/throat: Normal oropharynx without ulcers or exudate, mucus membranes moist, hearing intact  Neck: supple, thyroid normal size  CV: No edema  Respiratory: Unlabored breathing  Lymph: No axillary, submandibular, supraclavicular or inguinal lymphadenopathy  Abd:  Nondistended, +bs, no hepatosplenomegaly, mild tenderness to deep palpation in right abdomen. Equivocal Carnet sign  Skin: warm, perfused, no jaundice  Psych: Normal affect  MSK: Normal gait      Again, thank you for allowing me to participate in the care of your patient.      Sincerely,    Fredy Jimenez MD    "

## 2021-10-06 NOTE — PROGRESS NOTES
"GI CLINIC VISIT    CC/REFERRING MD:  No ref. provider found  REASON FOR CONSULTATION:   No ref. provider found for   Chief Complaint   Patient presents with     Consult     Consultation Abdominal Pain       ASSESSMENT/PLAN:    This patient is a pleasant 46-year-old female with history of chronic low back pain on intermittent NSAIDs who is here today to discuss 5 to 6 weeks of ongoing right-sided abdominal pain and loose stools.    CT scan at the end of August did show thickening of the colon near the hepatic flexure.  CRP was elevated at that time.  Extensive infectious stool studies have been negative for infection.  Follow-up ileocolonoscopy was unremarkable with normal right colon biopsies.  Repeat CRP has normalized.    Taking everything into consideration she likely had an acute insult to her GI tract which may have been an infectious colitis or an episode of ischemic colitis.  This appears to be a transient process as her colonoscopy is now normal and her CRP has normalized.  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  We discussed the natural progression of this and how it can take weeks or even months for this to resolve completely.  Sometimes bowel habits never returned to normal.  The mainstay of treatment for this is supportive cares and time.  I do recommend that she retry a lower dose fiber.  I recommend that she take Citrucel 1 teaspoon daily in a glass of water.  She can slowly titrate this up to 1 tablespoon daily.  We will also start her on amitriptyline 25 mg at bedtime.  She can start with half a tablet and after 3 to 4 days increase up to a full tablet.  She can continue to take her dicyclomine 10 mg every 6 hours as needed.  She will update us on her symptoms in 1 to 2 weeks.  If she is still not tolerating the fiber we can discuss a trial of colestipol.    The differential diagnosis does include inflammatory bowel disease.  This is much less likely given the " normal ileocolonoscopy and normalized CRP.  However, we will move forward with an MR enterography to make sure there is no evidence of isolated small bowel Crohn's.  We will check a fecal calprotectin to make sure there is no evidence of ongoing inflammation of the bowel.  The differential diagnosis also includes exocrine pancreatic insufficiency.  We will check a fecal elastase and a spot fecal fat.  The differential diagnosis includes celiac disease she has had duodenal biopsies and serum TTG's that have been normal.  The differential diagnosis also includes microscopic colitis, however, she has had colon biopsies that were unremarkable.    -Start low-dose Citrucel at 1 teaspoon daily and slowly titrate up  -If she does not respond to fiber we can consider trying colestipol  -Start amitriptyline 25 mg at bedtime  -Schedule MR enterography  -Check fecal calprotectin, fecal elastase, and fecal fat.  -Continue dicyclomine 10 mg every 6 hours    Call with update in 1 to 2 weeks.    RTC 2 months    Thank you for this consultation.  It was a pleasure to participate in the care of this patient; please contact us with any further questions.  A total of 40 minutes, face to face, was spent with this patient, >50% of which was counseling regarding the above delineated issues.    This note was created with voice recognition software, and while reviewed for accuracy, typos may remain.     rFedy Jimenez MD  Inflammatory Bowel Disease Program  Division of Gastroenterology, Hepatology and Nutrition  [facility]  Pager: [phone]      HPI:    Bhupinder is a very pleasant 46-year-old female with a past medical history of chronic back pain (on chronic intermittent NSAIDs) who is here today to discuss 5 to 6 weeks of ongoing right-sided abdominal discomfort and loose stools.    She reports that her symptoms started abruptly on August 25.  It began with right upper quadrant discomfort.  She then developed more loose stools and then  significant diarrhea.  She was eventually seen in the emergency room.  She underwent a CT scan which showed some thickening at her hepatic flexure.  She was initially treated with Cipro and Flagyl.  However, this did not improve her symptoms.  Stool studies were done and C. difficile was negative, enteric stool panel was negative, ova and parasites was negative, and Cryptosporidium was negative.  CBC, LFTs, and BMP were unremarkable.  CRP was elevated in the 50s. She was discharged from the hospital and had an outpatient colonoscopy by Dr. JUAN J Liz.  The ileum and colon were normal apart from some diverticulosis in the sigmoid colon.  Biopsies of right colon were obtained to evaluate for microscopic colitis.  Repeat CRP had normalized.  Repeat labs are also unremarkable.    The patient has continued to have ongoing right-sided abdominal discomfort.  This is described as pinching sensation.  It is worse if she eats something and it is also worse leading up to a bowel movement.  After she has a bowel movement she will have some relief in her pain.  She also has ongoing loose stools.  These have been up to 30 times per day.  However, in the last few days they have decreased and have been down to 6-10.  Stools are watery and bright yellow in color.  There has perhaps been more consistency in her stools in the last 2 to 3 days.  Prior to the last few days she was also having ongoing nocturnal stools.    She denies any blood in her stools.  She denies any weight loss.  She has no significant nausea or vomiting.  She does have a history of using meloxicam for chronic low back pain due to degenerative disc disease.  She has not used this very much in the last 6 weeks.  She also uses ibuprofen intermittently but again does not use this very much in the last few weeks.    She has been started on dicyclomine 10 mg every 6 hours.  This does help control the pain.  She feels it only last for about 4 hours.  She did not want to  use more of this because it does give her the sensation of difficulty passing bowel movements.    She also tried starting Metamucil 1 tablespoon twice daily.  However, this did add bulk to her stool which actually made it more difficult for her to pass a stool and cause increased abdominal discomfort.    She does have chronic low back pain and has tried amitriptyline in the past for this and did not find it significantly helpful.  She is open to trying it again for her symptoms now.    She did undergo an upper endoscopy in February 2021 for epigastric discomfort.  She was taking significant amount of NSAIDs at that time.  There was some erythema in the gastric body but otherwise the upper endoscopy was unremarkable.  Biopsies of the stomach showed reactive gastropathy.  Biopsies of the duodenum were unremarkable        ROS:    No fevers or chills  No weight loss  No blurry vision, double vision or change in vision  No sore throat  No lymphadenopathy  No headache, paraesthesias, or weakness in a limb  No shortness of breath or wheezing  No chest pain or pressure  No arthralgias or myalgias  No rashes or skin changes  No odynophagia or dysphagia  No BRBPR, hematochezia, melena  No dysuria, frequency or urgency  No hot/cold intolerance or polyria  No anxiety or depression    PREVIOUS ENDOSCOPY:  EGD in February 2021 as above.  Ileocolonoscopy in September 2021 with biopsies as above    PERTINENT RELEVANT IMAGING OR LABS:  CBC, LFTs, BMP, TTG all unremarkable.    Elevated CRP at the end of August during ER stay.  This is subsequently normalized.    ALLERGIES:     Allergies   Allergen Reactions     Ceftriaxone Anaphylaxis     Hives, rash, racing heart beat     Bactrim [Sulfamethoxazole W/Trimethoprim] Hives     Ciprofloxacin Other (See Comments)     Tendon Issues     Codeine Nausea and Vomiting     Codeine      Copper      Rash       Doxycycline      Loss of skin pigmentation, skin loss.     Gold      Rash       Iodine  Hives     WELTS     Iodine-131 Hives     Levaquin [Levofloxacin] Other (See Comments)     Tendon issues with levaquin and cipro     Nickel      rash     Steel [Staples]      Rash from staples       Sulfa Drugs Hives     Full Body     Latex Rash     Penicillins Rash       PERTINENT MEDICATIONS:    Current Outpatient Medications:      clobetasol (TEMOVATE) 0.05 % external cream, Apply topically 2 times daily, Disp: 60 g, Rfl: 1     dicyclomine (BENTYL) 10 MG capsule, Take 1 capsule (10 mg) by mouth 4 times daily (before meals and nightly), Disp: 120 capsule, Rfl: 1     medical cannabis (Patient's own supply), Take 1 Dose by mouth See Admin Instructions 1/2 to 2 tablets every 3-6 hours as needed (for pain/sleep)  THC:CBD 2.5m.5mg  (The purpose of this order is to document that the patient reports taking medical cannabis.  This is not a prescription, and is not used to certify that the patient has a qualifying medical condition.), Disp: , Rfl:      meloxicam (MOBIC) 15 MG tablet, Take 15 mg by mouth daily, Disp: , Rfl:      mupirocin (BACTROBAN) 2 % external ointment, Apply topically 3 times daily (Patient not taking: Reported on 2021), Disp: 30 g, Rfl: 0     rizatriptan (MAXALT-MLT) 5 MG ODT, Take 1 tablet (5 mg) by mouth at onset of headache for migraine May repeat in 2 hours. Max 6 tablets/24 hours., Disp: 30 tablet, Rfl: 1     sucralfate (CARAFATE) 1 GM tablet, Take 1 tablet (1 g) by mouth 4 times daily, Disp: 40 tablet, Rfl: 0     tacrolimus (PROTOPIC) 0.1 % external ointment, Apply topically 2 times daily On eczematous lesions 2xdaily (Patient taking differently: Apply topically 2 times daily as needed On eczematous lesions 2xdaily), Disp: 60 g, Rfl: 3    PROBLEM LIST  Patient Active Problem List    Diagnosis Date Noted     Colitis 2021     Priority: Medium     Abnormal CT scan, colon 2021     Priority: Medium     TOS (thoracic outlet syndrome) 2021     Priority: Medium     Added  automatically from request for surgery [id number]       Dermatitis 03/03/2021     Priority: Medium     Musculoskeletal pain 03/03/2021     Priority: Medium     Fatigue, unspecified type 03/03/2021     Priority: Medium     AK (actinic keratosis) - both hands at the lateral thenar aspect. 12/08/2020     Priority: Medium     Hypertrophy of breast 11/05/2020     Priority: Medium     History of cold sores 11/05/2020     Priority: Medium     Elevated blood pressure reading without diagnosis of hypertension 09/24/2020     Priority: Medium     Balance problems 07/17/2020     Priority: Medium     Morbid obesity (H) 07/17/2020     Priority: Medium     Sinus arrhythmia seen on electrocardiogram 07/17/2020     Priority: Medium     Family history of ischemic heart disease - father at 46 massive MI 07/17/2020     Priority: Medium     Brachial plexopathy - right shoulder 05/21/2020     Priority: Medium     Dental abscess - left mandible molar 03/05/2020     Priority: Medium     Endometrium, hyperplasia - on recent CT scan 02/10/2020     Priority: Medium     Pain in joint involving pelvic region and thigh, right 02/10/2020     Priority: Medium     Right lateral abdominal pain 02/10/2020     Priority: Medium     Right foot pain 02/10/2020     Priority: Medium     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 10/10/2019     Priority: Medium     Abnormality of nail surface 08/29/2019     Priority: Medium     Dry hair 08/29/2019     Priority: Medium     Dry skin 08/29/2019     Priority: Medium     Malaise and fatigue 08/29/2019     Priority: Medium     Lymphadenopathy 08/29/2019     Priority: Medium     PONV (postoperative nausea and vomiting) 06/10/2019     Priority: Medium     Menorrhalgia 06/10/2019     Priority: Medium     Uterine polyp 06/10/2019     Priority: Medium     Loose stools 06/10/2019     Priority: Medium     Hyperactive bowel sounds 06/10/2019     Priority: Medium     Jaycob complexion 05/23/2019     Priority: Medium      Lip lesion 05/23/2019     Priority: Medium     Abdominal pain, epigastric 05/23/2019     Priority: Medium     Abdominal distension 05/23/2019     Priority: Medium     Intractable episodic tension-type headache 03/11/2019     Priority: Medium     Intractable right heel pain 05/29/2018     Priority: Medium     Superficial swelling of scalp 04/12/2018     Priority: Medium     Biceps tendinopathy, right 03/22/2018     Priority: Medium     Hyperlipidemia LDL goal <130 08/30/2017     Priority: Medium     Spasm of muscle 07/11/2017     Priority: Medium     Sternocleidomastoid muscle tenderness 04/05/2017     Priority: Medium     left         Acute cervical myofascial strain, initial encounter 04/05/2017     Priority: Medium     Abnormal mammogram 09/21/2016     Priority: Medium     right breast       Gastroesophageal reflux disease, esophagitis presence not specified 06/29/2016     Priority: Medium     IMO Regulatory Load OCT 2020       Rotator cuff arthropathy, right 03/07/2016     Priority: Medium     AD (atopic dermatitis) 10/29/2015     Priority: Medium     Heat sensitivity 10/29/2015     Priority: Medium     Skin lesion 10/20/2015     Priority: Medium     posterior superior scalp       Plantar fasciitis 09/23/2015     Priority: Medium     Lateral epicondylitis 03/16/2015     Priority: Medium     Problem list name updated by automated process. Provider to review       Right shoulder pain 03/16/2015     Priority: Medium     Telangiectasia of face 12/19/2014     Priority: Medium     Frontal headache 12/15/2014     Priority: Medium     Muscular wasting and disuse atrophy, not elsewhere classified 06/09/2014     Priority: Medium     right SCM, right wrist,        Ds DNA antibody positive 04/16/2014     Priority: Medium     borderline.       Lumbar radiculopathy 01/22/2014     Priority: Medium     Migraine without aura and without status migrainosus, not intractable 10/23/2013     Priority: Medium     Foraminal stenosis  of lumbar region 09/26/2013     Priority: Medium     DJD (degenerative joint disease), lumbar 09/26/2013     Priority: Medium     FLORIDALMA positive 07/18/2013     Priority: Medium     Plantar fascial fibromatosis 07/18/2013     Priority: Medium     Pain in joint, upper arm 04/15/2013     Priority: Medium     Family history of colon cancer 03/05/2013     Priority: Medium     mother       Keratitis sicca (H) 10/31/2012     Priority: Medium     Dry eyes 10/15/2012     Priority: Medium     Family history of melanoma 08/24/2012     Priority: Medium     Shoulder joint instability 08/15/2012     Priority: Medium     Abnormal PFT 07/31/2012     Priority: Medium     question of left heart failure and/or shunting in need of further investigation       Chronic fatigue 06/06/2012     Priority: Medium     Hair loss 06/06/2012     Priority: Medium     Raynaud phenomenon 04/11/2012     Priority: Medium     PAC (premature atrial contraction) 07/15/2010     Priority: Medium     Palpitations 07/15/2010     Priority: Medium     Lyme disease 06/07/2010     Priority: Medium     Scalp lesion 05/26/2010     Priority: Medium     Migraine, unspecified, with intractable migraine, so stated, without mention of status migrainosus 04/04/2003     Priority: Medium     Problem list name updated by automated process. Provider to review  IMO Regulatory Load OCT 2020       Bell's palsy 04/04/2003     Priority: Medium     Contact dermatitis and other eczema, due to unspecified cause 04/04/2003     Priority: Medium       PERTINENT PAST MEDICAL HISTORY:  Past Medical History:   Diagnosis Date     Abnormal mammogram 9/21/2016    right breast      AD (atopic dermatitis) 10/29/2015     Allergic rhinitis due to other allergen      Arthritis      Bell's palsy      Chronic fatigue 6/6/2012     Chronic infection     MRSA - 2015 scalp and prior to Abdomen     Contact dermatitis and other eczema, due to unspecified cause     eczema     Contusion of left foot, initial  encounter 3/16/2016     DJD (degenerative joint disease), lumbar 9/26/2013     DJD (degenerative joint disease), lumbar 9/26/2013     Ds DNA antibody positive 4/16/2014    borderline.      Elevated blood pressure reading without diagnosis of hypertension 12/24/2013     Facial contusion 12/15/2014    hit by horse      Foraminal stenosis of lumbar region 9/26/2013     Frontal headache 12/15/2014     Gastroesophageal reflux disease, esophagitis presence not specified 6/29/2016     Heat sensitivity 10/29/2015     HTN, goal below 140/90 9/23/2015     Hyperlipidemia LDL goal <130 8/30/2017     Irregular heart beat      Keratitis sicca (H) 10/31/2012     Left shoulder pain 9/26/2013     Lumbar radiculopathy 1/22/2014     Migraine headache 10/23/2013     Migraine, unspecified, with intractable migraine, so stated, without mention of status migrainosus      Motion sickness      MRSA infection 10/20/2015     Muscular wasting and disuse atrophy, not elsewhere classified 6/9/2014    right SCM, right wrist,       Numbness and tingling     both legs anf feet greater on the left     PAC (premature atrial contraction) 7/15/2010     Plantar fasciitis 9/23/2015     PONV (postoperative nausea and vomiting)      Skin lesion 10/20/2015    posterior superior scalp      Spasm of muscle 7/11/2017     Telangiectasia of face 12/19/2014     Tooth pain 10/20/2015    left lower primary molar        PREVIOUS SURGERIES:  Past Surgical History:   Procedure Laterality Date     AS INJ TRANSFORAMIN EPIDURAL, LUMB/SACR SINGLE       COLONOSCOPY  3/11/2013    Procedure: COLONOSCOPY;  colonoscopy;  Surgeon: Lobito Mas MD;  Location: [facility]     COLONOSCOPY N/A 9/13/2021    Procedure: COLONOSCOPY, WITH POLYPECTOMY AND BIOPSY;  Surgeon: Sam Liz MD;  Location:  GI     COLONOSCOPY WITH CO2 INSUFFLATION N/A 11/19/2018    Procedure: COLONOSCOPY WITH CO2 INSUFFLATION;  Surgeon: Goyo Blank MD;  Location: [facility]     DECOMPRESSION LUMBAR  TWO LEVELS Bilateral 12/8/2016    Procedure: DECOMPRESSION LUMBAR TWO LEVELS;  Surgeon: Bang Burrell MD;  Location: [facility]     DILATION AND CURETTAGE, HYSTEROSCOPY, ABLATE ENDOMETRIUM NOVASURE, COMBINED N/A 6/12/2019    Procedure: hysteroscopy, dilation & curettage, polypectomy, endometrial ablation;  Surgeon: Fredy Pham MD;  Location: [facility]     ESOPHAGOSCOPY, GASTROSCOPY, DUODENOSCOPY (EGD), COMBINED  11/8/2013    Procedure: COMBINED ESOPHAGOSCOPY, GASTROSCOPY, DUODENOSCOPY (EGD), BIOPSY SINGLE OR MULTIPLE;  Esophagoscopy, Gastroscopy, Duodenoscopy EGD with multiple biopsies;  Surgeon: Teja Koch MD;  [facility]: [facility]     ESOPHAGOSCOPY, GASTROSCOPY, DUODENOSCOPY (EGD), COMBINED N/A 2/18/2021    Procedure: ESOPHAGOGASTRODUODENOSCOPY, WITH BIOPSY;  Surgeon: Blanca Ba MD;  Location: [facility]     HC REMOVAL OF TONSILS,<9 Y/O      Tonsils <10y.o.     HC TOOTH EXTRACTION W/FORCEP       INJECT BLOCK MEDIAL BRANCH CERVICAL/THORACIC/LUMBAR N/A 7/23/2020    Procedure: Sacral 1,2,3 Lateral Branch Blocks and lumbar 5 medial branch blocks bilaterally;  Surgeon: Maurisio Goetz MD;  [facility]     INJECT JOINT SACROILIAC Left 9/24/2020    Procedure: Left Lumbar 5 Sacral 1 nerve root injections.;  Surgeon: Maurisio Goetz MD;  Location: [facility]     REPAIR MUSCLE UPPER EXTREMITY Right 7/27/2021    Procedure: DIVISION RIGHT PECTORALIS MUSCLE;  Surgeon: Davie Dutta;  Location: [facility]     REPAIR TENDON ELBOW  7/9/2014    Procedure: REPAIR TENDON ELBOW;  Surgeon: Chris Johnston MD;  [facility]: [facility]     ULTRASONIC REMOVAL SOFT TISSUE (LOCATION) Right 11/21/2018    Procedure: Tenex right foot;  Surgeon: Patel Martínez DO;  Location: [facility]     VASCULAR SURGERY      Thoracic Outlet Syndrome       SOCIAL HISTORY:  Social History     Socioeconomic History     Marital status:      Spouse name: Robert     Number of children: 2     Years of education: 12     Highest education  level: Not on file   Occupational History     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   Tobacco Use     Smoking status: Never Smoker     Smokeless tobacco: Never Used   Vaping Use     Vaping Use: Every day     Substances: THC     Devices: Disposable, Pre-filled or refillable cartridge   Substance and Sexual Activity     Alcohol use: No     Comment: social     Drug use: No     Types: Marijuana     Comment: Medical marijuana     Sexual activity: Yes     Partners: Male     Birth control/protection: Surgical     Comment: vasectomy   Other Topics Concern      Service No     Blood Transfusions No     Caffeine Concern No     Comment: 0     Occupational Exposure No     Hobby Hazards Yes     Comment: horses     Sleep Concern No     Stress Concern No     Weight Concern Yes     Special Diet Yes     Comment: nhung's     Back Care No     Exercise Yes     Comment: occ     Bike Helmet Not Asked     Comment: na     Seat Belt Yes     Self-Exams Yes     Comment: occ     Parent/sibling w/ CABG, MI or angioplasty before 65F 55M? Not Asked   Social History Narrative     Not on file     Social Determinants of Health     Financial Resource Strain:      Difficulty of Paying Living Expenses:    Food Insecurity:      Worried About Running Out of Food in the Last Year:      Ran Out of Food in the Last Year:    Transportation Needs:      Lack of Transportation (Medical):      Lack of Transportation (Non-Medical):    Physical Activity:      Days of Exercise per Week:      Minutes of Exercise per Session:    Stress:      Feeling of Stress :    Social Connections:      Frequency of Communication with Friends and Family:      Frequency of Social Gatherings with Friends and Family:      Attends [employer] Services:      Active Member of Clubs or Organizations:      Attends Club or Organization Meetings:      Marital Status:    Intimate Partner Violence:      Fear of Current or Ex-Partner:      Emotionally Abused:      Physically  "Abused:      Sexually Abused:        FAMILY HISTORY:  Family History   Problem Relation Age of Onset     Diabetes Mother         adult     Cancer - colorectal Mother      Hypertension Mother      Allergies Mother      Cancer Mother         colon     Depression Mother      Gastrointestinal Disease Mother         ibs     Genitourinary Problems Mother         kidney cyst     Gynecology Mother         endometriosis     Lipids Mother      Thyroid Disease Mother      Arthritis Mother         RA     Heart Disease Maternal Grandfather         MI,  - 60's     Allergies Father      Unknown/Adopted Father      Heart Disease Father         mi-age mid 40's     Cardiovascular Father      Lipids Father      Respiratory Father         asthma     Cancer Father      Other Cancer Father         renal cancer     Depression Sister      Allergies Sister      Cancer Sister         vaginal     Gynecology Sister         endometriosis     Lipids Paternal Grandmother      Osteoporosis Paternal Grandmother      Thyroid Disease Paternal Grandmother      Respiratory Son         asthma     Allergies Son      Arthritis Sister      Allergies Brother      Heart Disease Brother      Allergies Daughter      Blood Disease Paternal Aunt         LGL T cell Leukemia     Rheumatoid Arthritis Paternal Aunt      Kidney Disease Maternal Aunt      Lupus Maternal Aunt      Bladder Cancer Maternal Aunt        Past/family/social history reviewed and no changes    PHYSICAL EXAMINATION:  Constitutional: aaox3, cooperative, pleasant, not dyspneic/diaphoretic, no acute distress  Vitals reviewed: BP (!) 141/84 (BP Location: Left arm, Patient Position: Chair, Cuff Size: Adult Regular)   Pulse 72   Ht 1.664 m (5' 5.5\")   Wt 94.2 kg (207 lb 11.2 oz)   SpO2 97%   BMI 34.04 kg/m    Wt:   Wt Readings from Last 2 Encounters:   10/06/21 94.2 kg (207 lb 11.2 oz)   21 95.6 kg (210 lb 12.8 oz)      Eyes: Sclera anicteric/injected  Ears/nose/mouth/throat: " Normal oropharynx without ulcers or exudate, mucus membranes moist, hearing intact  Neck: supple, thyroid normal size  CV: No edema  Respiratory: Unlabored breathing  Lymph: No axillary, submandibular, supraclavicular or inguinal lymphadenopathy  Abd:  Nondistended, +bs, no hepatosplenomegaly, mild tenderness to deep palpation in right abdomen. Equivocal Carnet sign  Skin: warm, perfused, no jaundice  Psych: Normal affect  MSK: Normal gait

## 2021-10-06 NOTE — PATIENT INSTRUCTIONS
It is good to meet you today. I have outlined my recommendations and plan below.    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 This should improve with time. But there are some additional things I want to make sure are not going on.    Schedule the MRI of your abdomen    Get some additional stools studies    Start amitriptyline 25 mg at bedtime. Take 1/2 pill for 3 days and then increase to full dose.    Start citrucel 1 tsp daily in glass of water. After 7 days increase to 2 tsp daily. After 7 days increase to 1 TABLESPOON DAILY. Then can increase to 1 tablesoon twice daily after another 10 days.    If you do not tolerate this slow increase in the fiber (citrucel) let us know and we can start a medication called colestipol.    Call with update in 2 weeks and 4 weeks    Follow up in 2 months    Call or mychart with questions

## 2021-10-06 NOTE — NURSING NOTE
"Chief Complaint   Patient presents with     Consult     Consultation Abdominal Pain       Vitals:    10/06/21 1213   BP: (!) 141/84   BP Location: Left arm   Patient Position: Chair   Cuff Size: Adult Regular   Pulse: 72   SpO2: 97%   Weight: 94.2 kg (207 lb 11.2 oz)   Height: 1.664 m (5' 5.5\")       Body mass index is 34.04 kg/m .                          "

## 2021-10-08 ENCOUNTER — APPOINTMENT (OUTPATIENT)
Dept: LAB | Facility: CLINIC | Age: 46
End: 2021-10-08
Payer: COMMERCIAL

## 2021-10-08 PROCEDURE — 82656 EL-1 FECAL QUAL/SEMIQ: CPT | Mod: 90 | Performed by: PATHOLOGY

## 2021-10-08 PROCEDURE — 83993 ASSAY FOR CALPROTECTIN FECAL: CPT | Mod: 90 | Performed by: PATHOLOGY

## 2021-10-08 PROCEDURE — 82705 FATS/LIPIDS FECES QUAL: CPT | Mod: 90 | Performed by: PATHOLOGY

## 2021-10-10 LAB
FAT STL QL: NORMAL
NEUTRAL FAT STL QL: NORMAL

## 2021-10-11 LAB — CALPROTECTIN STL-MCNT: <5 MG/KG (ref 0–49.9)

## 2021-10-12 ENCOUNTER — PATIENT OUTREACH (OUTPATIENT)
Dept: GASTROENTEROLOGY | Facility: CLINIC | Age: 46
End: 2021-10-12
Payer: COMMERCIAL

## 2021-10-12 NOTE — TELEPHONE ENCOUNTER
MRE   Scheduled on Oct 14     Stool studies completed    Follow-up in 2 months in basket to team

## 2021-10-13 LAB — ELASTASE PANC STL-MCNT: >800 UG/G

## 2021-10-15 ENCOUNTER — TELEPHONE (OUTPATIENT)
Dept: GASTROENTEROLOGY | Facility: CLINIC | Age: 46
End: 2021-10-15

## 2021-10-15 NOTE — TELEPHONE ENCOUNTER
LVM, pt to schedule 2 month f/u around 12/6/21 with either Charmaine Odell or Dr. Jimenez. Can be virtual or in person.

## 2021-10-18 ENCOUNTER — MYC MEDICAL ADVICE (OUTPATIENT)
Dept: FAMILY MEDICINE | Facility: OTHER | Age: 46
End: 2021-10-18

## 2021-10-18 NOTE — TELEPHONE ENCOUNTER
At this point I would recommend a visit.       LAURI Long CNP  Questions or concerns please feel free to send me a [employer] message or call me  Phone : [phone]

## 2021-10-23 ENCOUNTER — HEALTH MAINTENANCE LETTER (OUTPATIENT)
Age: 46
End: 2021-10-23

## 2021-10-25 ENCOUNTER — OFFICE VISIT (OUTPATIENT)
Dept: NEUROLOGY | Facility: CLINIC | Age: 46
End: 2021-10-25
Attending: PSYCHIATRY & NEUROLOGY
Payer: COMMERCIAL

## 2021-10-25 DIAGNOSIS — M54.16 LUMBAR RADICULOPATHY: ICD-10-CM

## 2021-10-25 DIAGNOSIS — M62.838 MUSCLE SPASM OF LEFT LOWER EXTREMITY: ICD-10-CM

## 2021-10-25 DIAGNOSIS — R20.2 PARESTHESIA OF LEFT LOWER EXTREMITY: ICD-10-CM

## 2021-10-25 PROCEDURE — 95908 NRV CNDJ TST 3-4 STUDIES: CPT | Mod: GC | Performed by: PSYCHIATRY & NEUROLOGY

## 2021-10-25 PROCEDURE — 95886 MUSC TEST DONE W/N TEST COMP: CPT | Mod: GC | Performed by: PSYCHIATRY & NEUROLOGY

## 2021-10-25 NOTE — LETTER
10/25/2021       RE: Amelia Coker  [address] [facility] [address]     Dear Colleague,    Thank you for referring your patient, Amelia Coker, to the [facility] EMG CLINIC [place] at [facility]. Please see a copy of my visit note below.         [facility]  Electrodiagnostic Laboratory                 Department of Neurology  Test Date:  10/25/2021    Patient: Bhupinder Coker : 1975 Physician: Dr. Branch   Sex: Male AGE: 46 year Ref Phys: Dr. Bae   ID#: [id number]   Technician:      Patient Complaints:  46-year-old woman presenting with left-sided leg numbness and pain as well as low back pain with history of 2 prior lumbar surgeries. Evaluate for radiculopathy.     Medications:  Motor and sensory conduction studies were done with surface recording electrodes. EMG was done with a concentric needle electrode.     Results:  Nerve conduction studies:  1.  Left sural and superficial peroneal sensory nerves are normal.  2.  Left fibular-EDB and tibial-AH motor responses are normal.  3.  Left tibial minimum F wave latency is normal.    Needle EM. No abnormal spontaneous activity was appreciated in the sampled muscles.   2.  Increased amplitude motor unit potentials with increased polyphasia were seen in the left tibialis anterior, gluteus medius, and tibialis posterior muscles.   3. Recruitment patterns were normal.     Impression:  This is an abnormal study.  There is electrophysiologic evidence of a chronic left-sided lumbar radiculopathy. The presence of chronic reinnervation changes in the muscles as discussed above implicates involvement of the left L5 nerve root.  There is no evidence of a large fiber polyneuropathy or focal neuropathy affecting the left lower limb on the basis of this study.  Clinical correlation is recommended.    Cyrus Marshall MD  Neurophysiology Fellow    I was present for all key portions of the  NCS/EMG study. All data and waveforms personally reviewed. I agree with the results and interpretation as above.    Robert Branch MD  Department of Neurology    Nerve Conduction Studies  Motor Nerve Results      Latency Amplitude F-Lat Segment Distance CV Comment   Site (ms) Norm (mV) Norm (ms)  (cm) (m/s) Norm    Left Fibular (EDB) Motor   Ankle 4.4  < 6.1 2.1  > 2.0         Bel Fib Head 11.7 - 1.62 -  Bel Fib Head-Ankle 33 45  > 38    Pop Fossa 13.9 - 1.13 -  Pop Fossa-Bel Fib Head 11 50  > 42    Left Tibial (AHB) Motor   Ankle 3.1  < 6.1 7.4  > 4.4 49.4        Knee 11.7 - 3.5 -  Knee-Ankle 35 41  > 39      Sensory Nerve Results      Latency (Peak) Amplitude (P-P) Segment Distance CV Comment   Site (ms) Norm ( V) Norm  (cm) (m/s) Norm    Left Superficial Fibular Sensory   14 cm-Ankle 3.8 - 11  > 5 14 cm-Ankle 14 37  > 32    Left Sural Sensory   Calf-Lat Mall 3.4  < 4.0 18  > 5 Calf-Lat Mall 14 41  > 35        Electromyography     Side Muscle Nerve Root Ins Act Fibs Psw Amp Dur Poly Recrt Int Pat Comment   Left Vastus Med Femoral L2-L4 Nml Nml Nml Nml Nml *1+ Nml Nml    Left Tib Anterior Fibular,  Deep Fibula... L4-L5 Nml Nml Nml 1+ Nml *2+ Nml Nml    Left Gastroc Tibial S1-S2 Nml Nml Nml Nml Nml 0 Nml Nml    Left Tib Posterior Tibial L5-S1 Nml Nml Nml Nml 1+ *2+ Nml Nml    Left Gluteus Med Sup Gluteal L5-S1 Nml Nml Nml Nml 1+ *1+ Nml Nml          NCS Waveforms:    Motor         F-Wave       Sensory               Again, thank you for allowing me to participate in the care of your patient.      Sincerely,    Robert Branch MD

## 2021-10-25 NOTE — PROGRESS NOTES
[facility]  Electrodiagnostic Laboratory                 Department of Neurology                                                                                                         Test Date:  10/25/2021    Patient: Bhupinder Coker : 1975 Physician: Dr. Branch   Sex: Male AGE: 46 year Ref Phys: Dr. Bae   ID#: [id number]   Technician:      Patient Complaints:  46-year-old woman presenting with left-sided leg numbness and pain as well as low back pain with history of 2 prior lumbar surgeries. Evaluate for radiculopathy.     Medications:  Motor and sensory conduction studies were done with surface recording electrodes. EMG was done with a concentric needle electrode.     Results:  Nerve conduction studies:  1.  Left sural and superficial peroneal sensory nerves are normal.  2.  Left fibular-EDB and tibial-AH motor responses are normal.  3.  Left tibial minimum F wave latency is normal.    Needle EM. No abnormal spontaneous activity was appreciated in the sampled muscles.   2.  Increased amplitude motor unit potentials with increased polyphasia were seen in the left tibialis anterior, gluteus medius, and tibialis posterior muscles.   3. Recruitment patterns were normal.     Impression:  This is an abnormal study.  There is electrophysiologic evidence of a chronic left-sided lumbar radiculopathy. The presence of chronic reinnervation changes in the muscles as discussed above implicates involvement of the left L5 nerve root.  There is no evidence of a large fiber polyneuropathy or focal neuropathy affecting the left lower limb on the basis of this study.  Clinical correlation is recommended.    Cyrus Marshall MD  Neurophysiology Fellow    I was present for all key portions of the NCS/EMG study. All data and waveforms personally reviewed. I agree with the results and interpretation as above.    Robert Branch MD  Department of Neurology      Nerve Conduction  Studies  Motor Nerve Results      Latency Amplitude F-Lat Segment Distance CV Comment   Site (ms) Norm (mV) Norm (ms)  (cm) (m/s) Norm    Left Fibular (EDB) Motor   Ankle 4.4  < 6.1 2.1  > 2.0         Bel Fib Head 11.7 - 1.62 -  Bel Fib Head-Ankle 33 45  > 38    Pop Fossa 13.9 - 1.13 -  Pop Fossa-Bel Fib Head 11 50  > 42    Left Tibial (AHB) Motor   Ankle 3.1  < 6.1 7.4  > 4.4 49.4        Knee 11.7 - 3.5 -  Knee-Ankle 35 41  > 39      Sensory Nerve Results      Latency (Peak) Amplitude (P-P) Segment Distance CV Comment   Site (ms) Norm ( V) Norm  (cm) (m/s) Norm    Left Superficial Fibular Sensory   14 cm-Ankle 3.8 - 11  > 5 14 cm-Ankle 14 37  > 32    Left Sural Sensory   Calf-Lat Mall 3.4  < 4.0 18  > 5 Calf-Lat Mall 14 41  > 35        Electromyography     Side Muscle Nerve Root Ins Act Fibs Psw Amp Dur Poly Recrt Int Pat Comment   Left Vastus Med Femoral L2-L4 Nml Nml Nml Nml Nml *1+ Nml Nml    Left Tib Anterior Fibular,  Deep Fibula... L4-L5 Nml Nml Nml 1+ Nml *2+ Nml Nml    Left Gastroc Tibial S1-S2 Nml Nml Nml Nml Nml 0 Nml Nml    Left Tib Posterior Tibial L5-S1 Nml Nml Nml Nml 1+ *2+ Nml Nml    Left Gluteus Med Sup Gluteal L5-S1 Nml Nml Nml Nml 1+ *1+ Nml Nml          NCS Waveforms:    Motor         F-Wave       Sensory

## 2021-10-27 NOTE — PATIENT INSTRUCTIONS
AFTER VISIT SUMMARY (AVS):    At today's visit we thoroughly discussed current symptoms, EMG results (chronic left L5 radiculopathy without other findings), available treatment options, and the plan.     Next follow-up appointment is on as needed basis.    Please do not hesitate to call me with any questions or concerns.    Thanks.

## 2021-10-27 NOTE — PROGRESS NOTES
ESTABLISHED PATIENT NEUROLOGY NOTE    DATE OF VISIT: 10/28/2021  CLINIC LOCATION: [facility]  MRN: [id number]  PATIENT NAME: Amelia Coker  YOB: 1975    PCP: Otoniel Manuel PA-C    REASON FOR VISIT:   Chief Complaint   Patient presents with     Follow Up     Review EMG      SUBJECTIVE:                                                      HISTORY OF PRESENT ILLNESS: Patient is here to follow up regarding left lower extremity paresthesia. Please refer to my initial/other prior notes for further information.    Since the last visit, the patient reports that her symptoms are getting worse because she cannot do any more epidural steroid injections until the end of the year due to insurance limitations.  She states that she tried physical therapy and multiple medications in the past.  She denies interval development of new focal neurological symptoms.    EMG from 10/25/2021 was abnormal with evidence of chronic left-sided L5 radiculopathy without active denervation.  There was no evidence of large fiber polyneuropathy or focal neuropathy affecting left lower limb.  Tabulated data from EMG report were personally reviewed and independently interpreted.    On review of systems, patient endorses no additional active complaints. Medications, allergies, family and social history were also reviewed. There are no changes reported by patient.  REVIEW OF SYSTEMS:                                                    10-system review was completed. Pertinent positives are included in HPI. The remainder of ROS is negative.  EXAM:                                                    Physical Exam:   Vitals: /77   Pulse 75   SpO2 97%     General: pt is in NAD, cooperative.  Skin: normal turgor, moist mucous membranes, no lesions/rashes noticed.  HEENT: ATNC, white sclera, normal conjunctiva.  Respiratory: Symmetric lung excursion, no accessory respiratory muscle use.  Abdomen: Non  distended.  Neurological: awake, cooperative, follows commands, the rest of the exam was deferred today.  ASSESSMENT AND PLAN:                                                    Assessment: 46-year-old female patient with left lower extremity paresthesias, muscle spasms, and pain presents for follow-up after the EMG that demonstrated chronic left-sided L5 radiculopathy without other findings.  I reviewed these results with the patient.  The absence of active denervation argues against active impingement of the left L5 nerve root, though ongoing irritation by granulation tissue is possible.  No additional etiology was found.  We discussed that epidural steroid injections or physical therapy should be continued for her ongoing symptoms.  She could also discuss additional surgical options with her surgeon given her intractable pain.  She might consider additional muscle relaxants for muscle spasms.    Diagnoses:    ICD-10-CM    1. Lumbar radiculopathy  M54.16    2. Paresthesia of left lower extremity  R20.2      Plan: At today's visit we thoroughly discussed current symptoms, EMG results (chronic left L5 radiculopathy without other findings), available treatment options, and the plan.     Next follow-up appointment is on as needed basis.    Total Time: 21 minutes spent on the date of the encounter doing chart review, history and exam, documentation and further activities per the note.    Lanre Bae MD  [facility]  (Chart documentation was completed in part with Dragon voice-recognition software. Even though reviewed, some grammatical, spelling, and word errors may remain.)

## 2021-10-28 ENCOUNTER — OFFICE VISIT (OUTPATIENT)
Dept: NEUROLOGY | Facility: CLINIC | Age: 46
End: 2021-10-28
Attending: PSYCHIATRY & NEUROLOGY
Payer: COMMERCIAL

## 2021-10-28 VITALS — HEART RATE: 75 BPM | SYSTOLIC BLOOD PRESSURE: 115 MMHG | OXYGEN SATURATION: 97 % | DIASTOLIC BLOOD PRESSURE: 77 MMHG

## 2021-10-28 DIAGNOSIS — R20.2 PARESTHESIA OF LEFT LOWER EXTREMITY: ICD-10-CM

## 2021-10-28 DIAGNOSIS — M54.16 LUMBAR RADICULOPATHY: Primary | ICD-10-CM

## 2021-10-28 PROCEDURE — 99213 OFFICE O/P EST LOW 20 MIN: CPT | Performed by: PSYCHIATRY & NEUROLOGY

## 2021-10-28 PROCEDURE — G0463 HOSPITAL OUTPT CLINIC VISIT: HCPCS

## 2021-10-28 NOTE — LETTER
10/28/2021         RE: Amelia Coker  [address] [facility] [address]        Dear Colleague,    Thank you for referring your patient, Amelia Coker, to the [facility] NEUROLOGY CLINIC [place]. Please see a copy of my visit note below.    ESTABLISHED PATIENT NEUROLOGY NOTE    DATE OF VISIT: 10/28/2021  CLINIC LOCATION: [facility]  MRN: [id number]  PATIENT NAME: Amelia Coker  YOB: 1975    PCP: Otoniel Manuel PA-C    REASON FOR VISIT:   Chief Complaint   Patient presents with     Follow Up     Review EMG      SUBJECTIVE:                                                      HISTORY OF PRESENT ILLNESS: Patient is here to follow up regarding left lower extremity paresthesia. Please refer to my initial/other prior notes for further information.    Since the last visit, the patient reports that her symptoms are getting worse because she cannot do any more epidural steroid injections until the end of the year due to insurance limitations.  She states that she tried physical therapy and multiple medications in the past.  She denies interval development of new focal neurological symptoms.    EMG from 10/25/2021 was abnormal with evidence of chronic left-sided L5 radiculopathy without active denervation.  There was no evidence of large fiber polyneuropathy or focal neuropathy affecting left lower limb.  Tabulated data from EMG report were personally reviewed and independently interpreted.    On review of systems, patient endorses no additional active complaints. Medications, allergies, family and social history were also reviewed. There are no changes reported by patient.  REVIEW OF SYSTEMS:                                                    10-system review was completed. Pertinent positives are included in HPI. The remainder of ROS is negative.  EXAM:                                                    Physical Exam:   Vitals: /77   Pulse 75   SpO2 97%      General: pt is in NAD, cooperative.  Skin: normal turgor, moist mucous membranes, no lesions/rashes noticed.  HEENT: ATNC, white sclera, normal conjunctiva.  Respiratory: Symmetric lung excursion, no accessory respiratory muscle use.  Abdomen: Non distended.  Neurological: awake, cooperative, follows commands, the rest of the exam was deferred today.  ASSESSMENT AND PLAN:                                                    Assessment: 46-year-old female patient with left lower extremity paresthesias, muscle spasms, and pain presents for follow-up after the EMG that demonstrated chronic left-sided L5 radiculopathy without other findings.  I reviewed these results with the patient.  The absence of active denervation argues against active impingement of the left L5 nerve root, though ongoing irritation by granulation tissue is possible.  No additional etiology was found.  We discussed that epidural steroid injections or physical therapy should be continued for her ongoing symptoms.  She could also discuss additional surgical options with her surgeon given her intractable pain.  She might consider additional muscle relaxants for muscle spasms.    Diagnoses:    ICD-10-CM    1. Lumbar radiculopathy  M54.16    2. Paresthesia of left lower extremity  R20.2      Plan: At today's visit we thoroughly discussed current symptoms, EMG results (chronic left L5 radiculopathy without other findings), available treatment options, and the plan.     Next follow-up appointment is on as needed basis.    Total Time: 21 minutes spent on the date of the encounter doing chart review, history and exam, documentation and further activities per the note.    Lanre Bae MD  [facility] Neurology  (Chart documentation was completed in part with Dragon voice-recognition software. Even though reviewed, some grammatical, spelling, and word errors may remain.)      Again, thank you for allowing me to participate in the care of  your patient.        Sincerely,        Lanre Bae MD

## 2021-11-04 ENCOUNTER — OFFICE VISIT (OUTPATIENT)
Dept: FAMILY MEDICINE | Facility: OTHER | Age: 46
End: 2021-11-04
Payer: COMMERCIAL

## 2021-11-04 VITALS
OXYGEN SATURATION: 98 % | SYSTOLIC BLOOD PRESSURE: 120 MMHG | BODY MASS INDEX: 35.23 KG/M2 | RESPIRATION RATE: 16 BRPM | HEART RATE: 68 BPM | TEMPERATURE: 98.4 F | WEIGHT: 215 LBS | DIASTOLIC BLOOD PRESSURE: 66 MMHG

## 2021-11-04 DIAGNOSIS — L98.9 SKIN LESION: ICD-10-CM

## 2021-11-04 DIAGNOSIS — L57.0 ACTINIC KERATOSIS: Primary | ICD-10-CM

## 2021-11-04 PROCEDURE — 17110 DESTRUCTION B9 LES UP TO 14: CPT | Performed by: PHYSICIAN ASSISTANT

## 2021-11-04 PROCEDURE — 17003 DESTRUCT PREMALG LES 2-14: CPT | Mod: 59 | Performed by: PHYSICIAN ASSISTANT

## 2021-11-04 PROCEDURE — 17000 DESTRUCT PREMALG LESION: CPT | Mod: 59 | Performed by: PHYSICIAN ASSISTANT

## 2021-11-04 ASSESSMENT — PAIN SCALES - GENERAL: PAINLEVEL: NO PAIN (0)

## 2021-11-04 NOTE — PROGRESS NOTES
Assessment & Plan     Actinic keratosis  She has 8 small actinic keratosis noted to the hands and arms.  These are subjected to cryotherapy x3 with thaw cycle in between.  She can simply observe these for now.  - DESTRUCT BENIGN LESION, UP TO 14    Skin lesion - right lower lateral leg  He does have a lesion to the right lateral lower leg that is reminiscent of my opinion of pyoderma however it is not as open as what I have seen in the past.  She has tried over-the-counter antibiotic ointment and had it worsen.  She has tried over-the-counter hydrocortisone and prescription Westcort with minimal help other than less itchy.  She has tried Tinactin and Lotrimin with minimal change.  She has tried Lotrisone with no change.  She has tried a combination of Bactroban and Lotrimin and alternate days without any change.  She has tried prescription Protopic which made it worse after 3 days.  She has tried over-the-counter Lamisil alternating with Westcort with minimal change.  I do have to wonder about this lesion but we do subjected to cryotherapy today.  If this does not respond to the cryotherapy then I would have her back as the last patient of my morning or the last patient of my afternoon and we will do a shave lesion and send off to pathology for help establishing direction of treatment.  She has scarred rather badly in the past 2 other surgical intervention so we would like to avoid this if we can.  - DESTRUCT BENIGN LESION, UP TO 14     No follow-ups on file.    SONAM [facility] YVES Roe is a 46 year old who presents for the following health issues     HPI     Skin Lesion  Onset/Duration: 2 months  Description  Location: right lower leg  Color: brown and pink  Border description: irregular border  Character: blotchy  Itching: severe  Bleeding:  YES  Intensity:  moderate  Progression of Symptoms:  worsening  Accompanying signs and symptoms:   Bleeding: no  Scaling:  no  Excessive sun exposure/tanning: no  Sunscreen used: no  History:           Any previous history of skin cancer: no  Any family history of melanoma: no  Previous episodes of similar lesion: no  Precipitating or alleviating factors: none  Therapies tried and outcome: none    Review of Systems   Constitutional, HEENT, cardiovascular, pulmonary, GI, , musculoskeletal, neuro, skin, endocrine and psych systems are negative, except as otherwise noted.      Objective    /66   Pulse 68   Temp 98.4  F (36.9  C) (Temporal)   Resp 16   Wt 97.5 kg (215 lb)   SpO2 98%   BMI 35.23 kg/m    Body mass index is 35.23 kg/m .  Physical Exam   Cardiac:  Normal, RRR.  Negative murmur, S1 and S2 present.    Respiratory:  BS CTA T/O.  Negative retractions or accessory muscle use noted.  Pt has a good non-productive cough.  NEURO:  DTRs are normal and symmetric throughout.  Cranial nerves 2-12 grossly intact.  Sensation intact to light touch.  SKIN: Multiple actinic keratosis lesions to the hands and arms varying in size from 3 mm to 5 mm in rough diameter. They all appear to be benign.  She does have a 1 cm round erythematous patch to the right lateral lower leg that in my opinion is reminiscent of pyoderma.  I do reiterate that she should try warm soaks with Epsom salts twice daily for the next week to 10 days.  We do treat with cryotherapy x3 all lesions.  Psychological:   Negative for memory, mood or flight of ideas at this time with appropriate affect.  Good recall of date, time and place.    No results found. However, due to the size of the patient record, not all encounters were searched. Please check Results Review for a complete set of results.

## 2021-11-10 ENCOUNTER — E-VISIT (OUTPATIENT)
Dept: URGENT CARE | Facility: CLINIC | Age: 46
End: 2021-11-10
Payer: COMMERCIAL

## 2021-11-10 ENCOUNTER — LAB (OUTPATIENT)
Dept: FAMILY MEDICINE | Facility: CLINIC | Age: 46
End: 2021-11-10
Attending: NURSE PRACTITIONER
Payer: COMMERCIAL

## 2021-11-10 DIAGNOSIS — Z20.822 SUSPECTED COVID-19 VIRUS INFECTION: ICD-10-CM

## 2021-11-10 DIAGNOSIS — Z20.822 SUSPECTED COVID-19 VIRUS INFECTION: Primary | ICD-10-CM

## 2021-11-10 LAB — SARS-COV-2 RNA RESP QL NAA+PROBE: POSITIVE

## 2021-11-10 PROCEDURE — U0003 INFECTIOUS AGENT DETECTION BY NUCLEIC ACID (DNA OR RNA); SEVERE ACUTE RESPIRATORY SYNDROME CORONAVIRUS 2 (SARS-COV-2) (CORONAVIRUS DISEASE [COVID-19]), AMPLIFIED PROBE TECHNIQUE, MAKING USE OF HIGH THROUGHPUT TECHNOLOGIES AS DESCRIBED BY CMS-2020-01-R: HCPCS

## 2021-11-10 PROCEDURE — 99421 OL DIG E/M SVC 5-10 MIN: CPT | Performed by: NURSE PRACTITIONER

## 2021-11-10 PROCEDURE — U0005 INFEC AGEN DETEC AMPLI PROBE: HCPCS

## 2021-11-10 NOTE — PATIENT INSTRUCTIONS
Dear Amelia Coker,    Your symptoms show that you may have coronavirus (COVID-19). This illness can cause fever, cough and trouble breathing. Many people get a mild case and get better on their own. Some people can get very sick.    Will I be tested for COVID-19?  We would like to test you for Covid-19 virus. I have placed orders for this test.     To schedule: go to your [employer] home page and scroll down to the section that says  You have an appointment that needs to be scheduled  and click the large green button that says  Schedule Now  and follow the steps to find the next available openings.    If you are unable to complete these [employer] scheduling steps, please call [phone] to schedule your testing.     Return to work/school/ guidance:  Please let your workplace manager and staffing office know when your quarantine ends     We can t give you an exact date as it depends on the above. You can calculate this on your own or work with your manager/staffing office to calculate this. (For example if you were exposed on 10/4, you would have to quarantine for 14 full days. That would be through 10/18. You could return on 10/19.)      If you receive a positive COVID-19 test result, follow the guidance of the those who are giving you the results. Usually the return to work is 10 (or in some cases 20 days from symptom onset.) If you work at [facility], you must also be cleared by Employee Occupational Health and Safety to return to work.        If you receive a negative COVID-19 test result and did not have a high risk exposure to someone with a known positive COVID-19 test, you can return to work once you're free of fever for 24 hours without fever-reducing medication and your symptoms are improving or resolved.      If you receive a negative COVID-19 test and If you had a high risk exposure to someone who has tested positive for COVID-19 then you can return to work 14 days after your last contact  with the positive individual    Note: If you have ongoing exposure to the covid positive person, this quarantine period may be more than 14 days. (For example, if you are continued to be exposed to your child who tested positive and cannot isolate from them, then the quarantine of 7-14 days can't start until your child is no longer contagious. This is typically 10 days from onset of the child's symptoms. So the total duration may be 17-24 days in this case.)    Sign up for [employer].   We know it's scary to hear that you might have COVID-19. We want to track your symptoms to make sure you're okay over the next 2 weeks. Please look for an email from [employer]--this is a free, online program that we'll use to keep in touch. To sign up, follow the link in the email you will receive. Learn more at [email or web address]    How can I take care of myself?    Get lots of rest. Drink extra fluids (unless a doctor has told you not to)    Take Tylenol (acetaminophen) or ibuprofen for fever or pain. If you have liver or kidney problems, ask your family doctor if it's okay to take Tylenol o ibuprofen    If you have other health problems (like cancer, heart failure, an organ transplant or severe kidney disease): Call your specialty clinic if you don't feel better in the next 2 days.    Know when to call 911. Emergency warning signs include:  o Trouble breathing or shortness of breath  o Pain or pressure in the chest that doesn't go away  o Feeling confused like you haven't felt before, or not being able to wake up  o Bluish-colored lips or face    Where can I get more information?  [facility] - About COVID-19:   [email or web address]    CDC - What to Do If You're Sick:   www.cdc.gov/coronavirus/2019-ncov/about/steps-when-sick.html    November 10, 2021  RE:  Amelia Coker                                                                                                                  [id number]  [facility] [id number]      To whom it may concern:    I evaluated Amelia Coker on November 10, 2021. Amelia Coker should be excused from work/school.     They should let their workplace manager and staffing office know when their quarantine ends.    We can not give an exact date as it depends on the information below. They can calculate this on their own or work with their manager/staffing office to calculate this. (For example if they were exposed on 10/04, they would have to quarantine for 14 full days. That would be through 10/18. They could return on 10/19.)    Quarantine Guidelines:      If patient receives a positive COVID-19 test result, they should follow the guidance of those who are giving the results. Usually the return to work is 10 (or in some cases 20 days from symptom onset.) If they work at [employer], they must be cleared by Employee Occupational Health and Safety to return to work.        If patient receives a negative COVID-19 test result and did not have a high risk exposure to someone with a known positive COVID-19 test, they can return to work once they're free of fever for 24 hours without fever-reducing medication and their symptoms are improving or resolved.      If patient receives a negative COVID-19 test and if they had a high risk exposure to someone who has tested positive for COVID-19 then they can return to work 14 days after their last contact with the positive individual    Note: If there is ongoing exposure to the covid positive person, this quarantine period may be longer than 14 days. (For example, if they are continually exposed to their child, who tested positive and cannot isolate from them, then the quarantine of 7-14 days can't start until their child is no longer contagious. This is typically 10 days from onset to the child's symptoms. So the total duration may be 17-24 days in this case.)     Sincerely,  LAURI Brennan CNP

## 2021-11-11 ENCOUNTER — MYC MEDICAL ADVICE (OUTPATIENT)
Dept: FAMILY MEDICINE | Facility: OTHER | Age: 46
End: 2021-11-11
Payer: COMMERCIAL

## 2021-11-11 DIAGNOSIS — U07.1 INFECTION DUE TO 2019 NOVEL CORONAVIRUS: Primary | ICD-10-CM

## 2021-11-11 RX ORDER — PREDNISONE 20 MG/1
TABLET ORAL
Qty: 20 TABLET | Refills: 0 | Status: SHIPPED | OUTPATIENT
Start: 2021-11-11 | End: 2022-05-12

## 2021-11-11 RX ORDER — FLUTICASONE PROPIONATE 110 UG/1
1 AEROSOL, METERED RESPIRATORY (INHALATION) 2 TIMES DAILY
Qty: 12 G | Refills: 0 | Status: SHIPPED | OUTPATIENT
Start: 2021-11-11 | End: 2022-05-12

## 2021-11-11 RX ORDER — ALBUTEROL SULFATE 90 UG/1
2 AEROSOL, METERED RESPIRATORY (INHALATION) EVERY 4 HOURS PRN
Qty: 18 G | Refills: 1 | Status: SHIPPED | OUTPATIENT
Start: 2021-11-11 | End: 2022-05-12

## 2021-11-12 ENCOUNTER — NURSE TRIAGE (OUTPATIENT)
Dept: CARE COORDINATION | Facility: CLINIC | Age: 46
End: 2021-11-12
Payer: COMMERCIAL

## 2021-11-12 NOTE — TELEPHONE ENCOUNTER
"Received notification of patient's report of worsening shortness of breath via [employer]. Called patient. She is doing OK. Has mild shortness of breath with laying flat and walking stairs. Managing other symptoms with alternating tylenol and ibuprofen with relief. Will send patient information via [employer] for home treatment measures for management of cough, worrisome signs/symptoms that require urgent medical evaluation and 24/7 [facility] phone number should patient have questions or concerns after hours. Patient verbalizes agreement with plan.        Answer Assessment - Initial Assessment Questions  1. COVID-19 ONSET: \"When did the symptoms of COVID-19 first start?\"      3 days ago  2. DIAGNOSIS CONFIRMATION: \"How were you diagnosed?\" (e.g., COVID-19 oral or nasal viral test; COVID-19 antibody test; doctor visit)      Not asked  3. MAIN SYMPTOM:  \"What is your main concern or symptom right now?\" (e.g., breathing difficulty, cough, fatigue. loss of smell)     Fever and body aches and cough  4. SYMPTOM ONSET: \"When did the  Fever and body aches  start?\"      3 days ago  5. BETTER-SAME-WORSE: \"Are you getting better, staying the same, or getting worse over the last 1 to 2 weeks?\"      Better since provider prescribed albuterol and flovent inhalers. Has rx for prednisone but was instructed not to take this if cough managed by inhalers  6. RECENT MEDICAL VISIT: \"Have you been seen by a healthcare provider (doctor, NP, PA) for these persisting COVID-19 symptoms?\" If Yes, ask: \"When were you seen?\" (e.g., date)      no  7. COUGH: \"Do you have a cough?\" If Yes, ask: \"How bad is the cough?\"        Yes. Dry, hacking. Managing with inhalers and cough drops  8. FEVER: \"Do you have a fever?\" If Yes, ask: \"What is your temperature, how was it measured, and when did it start?\"      101 orally  9. BREATHING DIFFICULTY: \"Are you having any trouble breathing?\" If Yes, ask: \"How bad is your breathing?\" " "(e.g., mild, moderate, severe)     - MILD: No SOB at rest, mild SOB with walking, speaks normally in sentences, can lay down, no retractions, pulse < 100.     - MODERATE: SOB at rest, SOB with minimal exertion and prefers to sit, cannot lie down flat, speaks in phrases, mild retractions, audible wheezing, pulse 100-120.     - SEVERE: Very SOB at rest, speaks in single words, struggling to breathe, sitting hunched forward, retractions, pulse > 120        mild  10. HIGH RISK DISEASE: \"Do you have any chronic medical problems?\" (e.g., asthma, heart or lung disease, weak immune system, obesity, etc.)        Morbid obesity  11. PREGNANCY: \"Is there any chance you are pregnant?\" \"When was your last menstrual period?\"        none  12. OTHER SYMPTOMS: \"Do you have any other symptoms?\"  (e.g., fatigue, headache, muscle pain, weakness)        As above    Protocols used: CORONAVIRUS (COVID-19) PERSISTING SYMPTOMS FOLLOW-UP CALL-A- 8.25.2021      "

## 2021-11-18 ENCOUNTER — MYC MEDICAL ADVICE (OUTPATIENT)
Dept: FAMILY MEDICINE | Facility: OTHER | Age: 46
End: 2021-11-18
Payer: COMMERCIAL

## 2021-11-18 DIAGNOSIS — U07.1 INFECTION DUE TO 2019 NOVEL CORONAVIRUS: Primary | ICD-10-CM

## 2021-11-18 RX ORDER — PREDNISONE 20 MG/1
TABLET ORAL
Qty: 20 TABLET | Refills: 0 | Status: SHIPPED | OUTPATIENT
Start: 2021-11-18 | End: 2022-05-12

## 2021-11-19 ENCOUNTER — MYC MEDICAL ADVICE (OUTPATIENT)
Dept: FAMILY MEDICINE | Facility: OTHER | Age: 46
End: 2021-11-19
Payer: COMMERCIAL

## 2021-11-22 ENCOUNTER — NURSE TRIAGE (OUTPATIENT)
Dept: CARE COORDINATION | Facility: CLINIC | Age: 46
End: 2021-11-22
Payer: COMMERCIAL

## 2021-11-22 NOTE — TELEPHONE ENCOUNTER
"Received message from patient in [facility] reporting sharp pain in her back with taking deep breaths and home oximetry reading of 90%. Called patient. She had MyChart conversation with her PCP 3 days ago with similar complaint and he thought related to pleurisy. She states area of pain is spreading. Advised this can be consistent with pleurisy. Recommended to continue NSAIDS. Meeker well enough to go to the grocery store today but exhausted afterward. She is considering return to work possibly tomorrow if allowed by her employer. Reviewed CDC return to work guidelines. Patient with no further questions or concerns    Reason for Disposition    [1] PERSISTING SYMPTOMS OF COVID-19 AND [2] symptoms BETTER (improving)     See chart note    [1] PERSISTING SYMPTOMS OF COVID-19 AND [2] NEW symptom AND [3] that sounds mild     See chart note    Additional Information    Negative: Long COVID (e.g., post-COVID syndrome), questions about    Answer Assessment - Initial Assessment Questions  1. COVID-19 ONSET: \"When did the symptoms of COVID-19 first start?\"      Over 2 weeks ago  2. DIAGNOSIS CONFIRMATION: \"How were you diagnosed?\" (e.g., COVID-19 oral or nasal viral test; COVID-19 antibody test; doctor visit)      Not asked  3. MAIN SYMPTOM:  \"What is your main concern or symptom right now?\" (e.g., breathing difficulty, cough, fatigue. loss of smell)      Sharp back pain with inspiration  4. SYMPTOM ONSET: \"When did the back pain start?\"      3-4 days ago  5. BETTER-SAME-WORSE: \"Are you getting better, staying the same, or getting worse over the last 1 to 2 weeks?\"      Area of back pain seems to be \"spreading\"  6. RECENT MEDICAL VISIT: \"Have you been seen by a healthcare provider (doctor, NP, PA) for these persisting COVID-19 symptoms?\" If Yes, ask: \"When were you seen?\" (e.g., date)      Sent message to her PCP who suspects pain is related to pleurisy secondary to COVID-19  7. COUGH: \"Do you have a cough?\" If Yes, ask: \"How " "bad is the cough?\"        Intermittent, mild  8. FEVER: \"Do you have a fever?\" If Yes, ask: \"What is your temperature, how was it measured, and when did it start?\"      No. 99.2 today  9. BREATHING DIFFICULTY: \"Are you having any trouble breathing?\" If Yes, ask: \"How bad is your breathing?\" (e.g., mild, moderate, severe)     - MILD: No SOB at rest, mild SOB with walking, speaks normally in sentences, can lay down, no retractions, pulse < 100.     - MODERATE: SOB at rest, SOB with minimal exertion and prefers to sit, cannot lie down flat, speaks in phrases, mild retractions, audible wheezing, pulse 100-120.     - SEVERE: Very SOB at rest, speaks in single words, struggling to breathe, sitting hunched forward, retractions, pulse > 120        Gets winded after walking up stairs but resolves with rest  10. HIGH RISK DISEASE: \"Do you have any chronic medical problems?\" (e.g., asthma, heart or lung disease, weak immune system, obesity, etc.)        no  11. PREGNANCY: \"Is there any chance you are pregnant?\" \"When was your last menstrual period?\"        No  12. OTHER SYMPTOMS: \"Do you have any other symptoms?\"  (e.g., fatigue, headache, muscle pain, weakness)        no    Protocols used: CORONAVIRUS (COVID-19) PERSISTING SYMPTOMS FOLLOW-UP CALL-A- 8.25.2021    Josefina Lewis RN  [facility]    "

## 2021-12-02 ENCOUNTER — TELEPHONE (OUTPATIENT)
Dept: FAMILY MEDICINE | Facility: OTHER | Age: 46
End: 2021-12-02
Payer: COMMERCIAL

## 2021-12-02 PROBLEM — Z86.16 PERSONAL HISTORY OF COVID-19: Status: ACTIVE | Noted: 2021-12-02

## 2021-12-02 NOTE — TELEPHONE ENCOUNTER
Given to MA for completion and return to the patient.  Electronically signed:    Otoneil Manuel PA-C

## 2021-12-02 NOTE — TELEPHONE ENCOUNTER
Reason for Call:  Form, our goal is to have forms completed with 72 hours, however, some forms may require a visit or additional information.    Type of letter, form or note:  medical    [facility] please explain[facility]    Where did the form come from: form was faxed in    What clinic location was the form placed at?: [facility] [phone]    Where the form was placed: Team D Box/Folder    What number is listed as a contact on the form?: [phone]       Additional comments: Please complete form and return to [phone]    Call taken on 12/2/2021 at 3:16 PM by Kylah Ferguson

## 2021-12-09 ENCOUNTER — TRANSCRIBE ORDERS (OUTPATIENT)
Dept: PALLIATIVE MEDICINE | Facility: CLINIC | Age: 46
End: 2021-12-09
Payer: COMMERCIAL

## 2021-12-09 DIAGNOSIS — M54.16 LUMBAR RADICULOPATHY: Primary | ICD-10-CM

## 2021-12-14 ENCOUNTER — HOSPITAL ENCOUNTER (OUTPATIENT)
Dept: PHYSICAL THERAPY | Facility: CLINIC | Age: 46
Setting detail: THERAPIES SERIES
End: 2021-12-14
Attending: ANESTHESIOLOGY
Payer: COMMERCIAL

## 2021-12-14 DIAGNOSIS — M54.16 LUMBAR RADICULOPATHY: ICD-10-CM

## 2021-12-14 PROCEDURE — 97161 PT EVAL LOW COMPLEX 20 MIN: CPT | Mod: GP | Performed by: PHYSICAL THERAPIST

## 2021-12-14 PROCEDURE — 97140 MANUAL THERAPY 1/> REGIONS: CPT | Mod: GP | Performed by: PHYSICAL THERAPIST

## 2021-12-14 PROCEDURE — 97110 THERAPEUTIC EXERCISES: CPT | Mod: GP | Performed by: PHYSICAL THERAPIST

## 2021-12-16 ENCOUNTER — MYC MEDICAL ADVICE (OUTPATIENT)
Dept: FAMILY MEDICINE | Facility: OTHER | Age: 46
End: 2021-12-16
Payer: COMMERCIAL

## 2021-12-20 ENCOUNTER — OFFICE VISIT (OUTPATIENT)
Dept: FAMILY MEDICINE | Facility: OTHER | Age: 46
End: 2021-12-20
Payer: COMMERCIAL

## 2021-12-20 VITALS
BODY MASS INDEX: 34.58 KG/M2 | RESPIRATION RATE: 18 BRPM | DIASTOLIC BLOOD PRESSURE: 80 MMHG | SYSTOLIC BLOOD PRESSURE: 120 MMHG | HEART RATE: 77 BPM | TEMPERATURE: 97.8 F | OXYGEN SATURATION: 99 % | WEIGHT: 211 LBS

## 2021-12-20 DIAGNOSIS — R10.11 RUQ ABDOMINAL PAIN: Primary | ICD-10-CM

## 2021-12-20 DIAGNOSIS — R93.5 ABNORMAL ABDOMINAL CT SCAN: ICD-10-CM

## 2021-12-20 DIAGNOSIS — U09.9 POST-COVID-19 SYNDROME: ICD-10-CM

## 2021-12-20 PROCEDURE — 99214 OFFICE O/P EST MOD 30 MIN: CPT | Performed by: PHYSICIAN ASSISTANT

## 2021-12-20 ASSESSMENT — ANXIETY QUESTIONNAIRES
2. NOT BEING ABLE TO STOP OR CONTROL WORRYING: NOT AT ALL
4. TROUBLE RELAXING: NOT AT ALL
7. FEELING AFRAID AS IF SOMETHING AWFUL MIGHT HAPPEN: NOT AT ALL
GAD7 TOTAL SCORE: 0
GAD7 TOTAL SCORE: 0
3. WORRYING TOO MUCH ABOUT DIFFERENT THINGS: NOT AT ALL
7. FEELING AFRAID AS IF SOMETHING AWFUL MIGHT HAPPEN: NOT AT ALL
5. BEING SO RESTLESS THAT IT IS HARD TO SIT STILL: NOT AT ALL
1. FEELING NERVOUS, ANXIOUS, OR ON EDGE: NOT AT ALL
GAD7 TOTAL SCORE: 0
6. BECOMING EASILY ANNOYED OR IRRITABLE: NOT AT ALL

## 2021-12-20 ASSESSMENT — PATIENT HEALTH QUESTIONNAIRE - PHQ9
10. IF YOU CHECKED OFF ANY PROBLEMS, HOW DIFFICULT HAVE THESE PROBLEMS MADE IT FOR YOU TO DO YOUR WORK, TAKE CARE OF THINGS AT HOME, OR GET ALONG WITH OTHER PEOPLE: NOT DIFFICULT AT ALL
SUM OF ALL RESPONSES TO PHQ QUESTIONS 1-9: 0
SUM OF ALL RESPONSES TO PHQ QUESTIONS 1-9: 0

## 2021-12-20 NOTE — PROGRESS NOTES
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  Advised that she begin to take her Flovent on a regular basis as directed and follow-up in 2 to 3 weeks.  Would consider recheck of lab and x-ray at that point time.    RUQ abdominal pain  Abnormal abdominal CT scan  Right upper quadrant pain remains.  She is tender to touch underneath the rib margin and to the inferior aspect of the liver in this region as well.  I do feel a couple of subcutaneous lumps that may indeed be fat hernias.  We do note that she is continues to have irregular bowel movements varying from loose stools to stool of a ribbon quality.  We remain concerned about CT findings and unanswered questions as to why she has discomfort in this region.  Advised that she should be seen by gastroenterology for further evaluation and treatment options.  She would like a second opinion and will consider going to [place] [facility] for this.  - US Abdomen Limited; Future  - Adult Gastro Ref - Consult Only; Future     BMI:   Estimated body mass index is 34.58 kg/m  as calculated from the following:    Height as of 10/6/21: 1.664 m (5' 5.5\").    Weight as of this encounter: 95.7 kg (211 lb).     Regular exercise  No follow-ups on file.    Otoniel Manuel PA-C  [facility] YVES Roe is a 46 year old who presents for the following health issues     History of Present Illness       She eats 4 or more servings of fruits and vegetables daily.She consumes 0 sweetened beverage(s) daily.She exercises with enough effort to increase her heart rate 20 to 29 minutes per day.  She exercises with enough effort to increase her heart rate 7 days per week. She is missing 2 dose(s) of medications per week.         How are you feeling today? No change  In the past 24 hours have you had shortness of breath when speaking, walking, or climbing stairs? My breathing issues have stayed the same  Do " you have a cough? Yes, I have a severe cough  When is the last time you had a fever greater than 100? 2 weeks  Are you having any other symptoms? Diarrhea, Fatigue and Pain or pressure in chest   Do you have any other stressors you would like to discuss with your provider? No      PHQ Assesment Total Score(s) 12/20/2021   PHQ-2 Score 0   PHQ-9 Score 0   Some recent data might be hidden       DYLON-7 Results 4/12/2018 12/20/2021   DYLON 7 TOTAL SCORE 0 (minimal anxiety) 0 (minimal anxiety)   Some recent data might be hidden     No flowsheet data found.      Review of Systems   Constitutional, HEENT, cardiovascular, pulmonary, GI, , musculoskeletal, neuro, skin, endocrine and psych systems are negative, except as otherwise noted.      Objective    /80   Pulse 77   Temp 97.8  F (36.6  C) (Temporal)   Resp 18   Wt 95.7 kg (211 lb)   SpO2 99%   BMI 34.58 kg/m    Body mass index is 34.58 kg/m .  Physical Exam   GENERAL: healthy, alert and no distress  RESP: lungs clear to auscultation - no rales, rhonchi or wheezes  CV: regular rate and rhythm, normal S1 S2, no S3 or S4, no murmur, click or rub, no peripheral edema and peripheral pulses strong  ABDOMEN: soft, nontender, no hepatosplenomegaly, no masses and bowel sounds normal  ABDOMEN: soft, nontender, without hepatosplenomegaly or masses, tenderness RUQ and bowel sounds normal  MS: no gross musculoskeletal defects noted, no edema  SKIN: no suspicious lesions or rashes   NEURO: Normal strength and tone, mentation intact and speech normal  PSYCH: mentation appears normal, affect normal/bright    No results found. However, due to the size of the patient record, not all encounters were searched. Please check Results Review for a complete set of results.            Answers for HPI/ROS submitted by the patient on 12/20/2021  If you checked off any problems, how difficult have these problems made it for you to do your work, take care of things at home, or get along  with other people?: Not difficult at all  PHQ9 TOTAL SCORE: 0  DYLON 7 TOTAL SCORE: 0

## 2021-12-20 NOTE — PROGRESS NOTES
12/14/21 0700   Signing Clinician's Name / Credentials   Signing clinician's name / credentials Jen Mora, PT, DPT, OCS, CSCS   Session Number   Session Number 1   Additional Session Number [employer]BS   Authorization status no cert required   Progress Note/Recertification   Progress Note Due Date 01/25/22   Adult Goals   PT Ortho Eval Goals 1;2   Ortho Goal 1   Goal Identifier 1   Goal Description The patient will be able to demonstrate independence with HEP in order to improve ability to manage symptoms at home.    Target Date 01/25/22   Ortho Goal 2   Goal Identifier 2   Goal Description The patient will be able to report consistent relief of symptoms 25% lower than start of treatment in order to improve tolerance of work duties   Target Date 01/25/22   Subjective Report   Subjective Report Pt reports having had physical therapy in past over 10 years without lasting relief. Patient now requires physical therapy for insurance requirements prior to trial of spinal cord stimulator.  Pt reports foot drop on (L) side; aptient reports increasing pain on (R) side after sleeping or standing pain. Pt reports deep ache to lateral calf, and altered sensation from 2nd toe to lateral foot   Objective Measures   Objective Measures Objective Measure 1;Objective Measure 2   Objective Measure 1   Objective Measure radiating symptoms   Details deep ache to lateral calf, and altered sensation from 2nd toe to lateral foot   Objective Measure 2   Objective Measure pain   Details slight pain to (R) buttockm; most pain on (L)   System Outcome Measures   Outcome Measures Low Back Pain (see Oswestry and Jonah)   Treatment Interventions   Interventions Manual Therapy;Therapeutic Procedure/Exercise   Therapeutic Procedure/exercise   Therapeutic Procedures: strength, endurance, ROM, flexibillity minutes (21644) 25   Skilled Intervention education   Patient Response Patient reports understanding of education to simplify exercises in  previous HEP.     Treatment Detail education on pathomechanics of symptoms and associated anatomy; review of previous HEP, significant education on technique in order to complete only 1 task at a time and unable to stretch in balance and strengthening position, signficiant education on centralization of symptoms with regards to frequency and intensity   Manual Therapy   Manual Therapy: Mobilization, MFR, MLD, friction massage minutes (32079) 15   Skilled Intervention mobilization, manual traction   Patient Response Patient reports appropriate tolerance of trial of traction, no symptoms into (R) buttock and educated to assess if frequency of radiating symptoms has changed   Treatment Detail manual traction with SB and strap; MET for sacral base dysfunction (L)   Education   Learner Patient   Readiness Acceptance   Method Explanation   Response Verbalizes Understanding;Demonstrates Understanding;Needs Reinforcement   Education Comments focus on simplizing exercises (ex: was completed semisquat piriformis squat position, for stretch, and balance exercise with no UE support)   Plan   Homework centralization of symptoms, stretch in rest position; insurance call to check requirement   Plan for next session review specific HEP and alterations as required; glut med strengthening   Total Session Time   Timed Code Treatment Minutes 40   Total Treatment Time (sum of timed and untimed services) 60   AMBULATORY CLINICS ONLY-MEDICAL AND TREATMENT DIAGNOSIS   Medical Diagnosis lumbar radiculopathy   PT Diagnosis low back pain with radicular symptoms

## 2021-12-21 ENCOUNTER — HOSPITAL ENCOUNTER (OUTPATIENT)
Dept: PHYSICAL THERAPY | Facility: CLINIC | Age: 46
Setting detail: THERAPIES SERIES
End: 2021-12-21
Attending: ANESTHESIOLOGY
Payer: COMMERCIAL

## 2021-12-21 PROCEDURE — 97110 THERAPEUTIC EXERCISES: CPT | Mod: GP | Performed by: PHYSICAL THERAPIST

## 2021-12-21 PROCEDURE — 97140 MANUAL THERAPY 1/> REGIONS: CPT | Mod: GP | Performed by: PHYSICAL THERAPIST

## 2021-12-21 ASSESSMENT — PATIENT HEALTH QUESTIONNAIRE - PHQ9: SUM OF ALL RESPONSES TO PHQ QUESTIONS 1-9: 0

## 2021-12-21 ASSESSMENT — ANXIETY QUESTIONNAIRES: GAD7 TOTAL SCORE: 0

## 2021-12-24 ENCOUNTER — HOSPITAL ENCOUNTER (OUTPATIENT)
Dept: ULTRASOUND IMAGING | Facility: CLINIC | Age: 46
Discharge: HOME | End: 2021-12-24
Attending: PHYSICIAN ASSISTANT | Admitting: PHYSICIAN ASSISTANT
Payer: COMMERCIAL

## 2021-12-24 DIAGNOSIS — R10.11 RUQ ABDOMINAL PAIN: ICD-10-CM

## 2021-12-24 PROCEDURE — 76705 ECHO EXAM OF ABDOMEN: CPT

## 2021-12-28 ENCOUNTER — HOSPITAL ENCOUNTER (OUTPATIENT)
Dept: PHYSICAL THERAPY | Facility: CLINIC | Age: 46
Setting detail: THERAPIES SERIES
End: 2021-12-28
Attending: ANESTHESIOLOGY
Payer: COMMERCIAL

## 2021-12-28 PROCEDURE — 97140 MANUAL THERAPY 1/> REGIONS: CPT | Mod: GP | Performed by: PHYSICAL THERAPIST

## 2021-12-28 PROCEDURE — 97110 THERAPEUTIC EXERCISES: CPT | Mod: GP | Performed by: PHYSICAL THERAPIST

## 2021-12-28 PROCEDURE — 97530 THERAPEUTIC ACTIVITIES: CPT | Mod: GP | Performed by: PHYSICAL THERAPIST

## 2022-01-04 ENCOUNTER — HOSPITAL ENCOUNTER (OUTPATIENT)
Dept: PHYSICAL THERAPY | Facility: CLINIC | Age: 47
Setting detail: THERAPIES SERIES
End: 2022-01-04
Attending: ANESTHESIOLOGY
Payer: COMMERCIAL

## 2022-01-04 PROCEDURE — 97110 THERAPEUTIC EXERCISES: CPT | Mod: GP | Performed by: PHYSICAL THERAPIST

## 2022-01-04 PROCEDURE — 97140 MANUAL THERAPY 1/> REGIONS: CPT | Mod: GP | Performed by: PHYSICAL THERAPIST

## 2022-01-19 ENCOUNTER — HOSPITAL ENCOUNTER (OUTPATIENT)
Dept: PHYSICAL THERAPY | Facility: CLINIC | Age: 47
Setting detail: THERAPIES SERIES
End: 2022-01-19
Attending: ANESTHESIOLOGY
Payer: COMMERCIAL

## 2022-01-19 PROCEDURE — 97110 THERAPEUTIC EXERCISES: CPT | Mod: GP | Performed by: PHYSICAL THERAPIST

## 2022-01-19 PROCEDURE — 97140 MANUAL THERAPY 1/> REGIONS: CPT | Mod: GP | Performed by: PHYSICAL THERAPIST

## 2022-03-07 ENCOUNTER — MYC MEDICAL ADVICE (OUTPATIENT)
Dept: FAMILY MEDICINE | Facility: OTHER | Age: 47
End: 2022-03-07
Payer: COMMERCIAL

## 2022-03-07 DIAGNOSIS — B02.9 HERPES ZOSTER WITHOUT COMPLICATION: Primary | ICD-10-CM

## 2022-03-07 RX ORDER — VALACYCLOVIR HYDROCHLORIDE 1 G/1
1000 TABLET, FILM COATED ORAL 3 TIMES DAILY
Qty: 21 TABLET | Refills: 0 | Status: SHIPPED | OUTPATIENT
Start: 2022-03-07 | End: 2022-05-12

## 2022-03-16 ENCOUNTER — MYC MEDICAL ADVICE (OUTPATIENT)
Dept: FAMILY MEDICINE | Facility: OTHER | Age: 47
End: 2022-03-16
Payer: COMMERCIAL

## 2022-03-16 DIAGNOSIS — R10.11 RUQ ABDOMINAL PAIN: Primary | ICD-10-CM

## 2022-03-17 ENCOUNTER — LAB (OUTPATIENT)
Dept: LAB | Facility: CLINIC | Age: 47
End: 2022-03-17
Payer: COMMERCIAL

## 2022-03-17 DIAGNOSIS — R10.11 RUQ ABDOMINAL PAIN: ICD-10-CM

## 2022-03-17 LAB
ALBUMIN SERPL-MCNC: 3.9 G/DL (ref 3.4–5)
ALP SERPL-CCNC: 79 U/L (ref 40–150)
ALT SERPL W P-5'-P-CCNC: 32 U/L (ref 0–50)
AMYLASE SERPL-CCNC: 49 U/L (ref 30–110)
ANION GAP SERPL CALCULATED.3IONS-SCNC: 1 MMOL/L (ref 3–14)
AST SERPL W P-5'-P-CCNC: 18 U/L (ref 0–45)
BILIRUB SERPL-MCNC: 0.4 MG/DL (ref 0.2–1.3)
BUN SERPL-MCNC: 19 MG/DL (ref 7–30)
CALCIUM SERPL-MCNC: 9 MG/DL (ref 8.5–10.1)
CHLORIDE BLD-SCNC: 109 MMOL/L (ref 94–109)
CO2 SERPL-SCNC: 29 MMOL/L (ref 20–32)
CREAT SERPL-MCNC: 0.66 MG/DL (ref 0.52–1.04)
CRP SERPL-MCNC: <2.9 MG/L (ref 0–8)
ERYTHROCYTE [DISTWIDTH] IN BLOOD BY AUTOMATED COUNT: 14 % (ref 10–15)
GFR SERPL CREATININE-BSD FRML MDRD: >90 ML/MIN/1.73M2
GLUCOSE BLD-MCNC: 104 MG/DL (ref 70–99)
HCT VFR BLD AUTO: 36.7 % (ref 35–47)
HGB BLD-MCNC: 12.1 G/DL (ref 11.7–15.7)
LIPASE SERPL-CCNC: 159 U/L (ref 73–393)
MCH RBC QN AUTO: 30.6 PG (ref 26.5–33)
MCHC RBC AUTO-ENTMCNC: 33 G/DL (ref 31.5–36.5)
MCV RBC AUTO: 93 FL (ref 78–100)
PLATELET # BLD AUTO: 272 10E3/UL (ref 150–450)
POTASSIUM BLD-SCNC: 4.1 MMOL/L (ref 3.4–5.3)
PROT SERPL-MCNC: 7.3 G/DL (ref 6.8–8.8)
RBC # BLD AUTO: 3.96 10E6/UL (ref 3.8–5.2)
SODIUM SERPL-SCNC: 139 MMOL/L (ref 133–144)
WBC # BLD AUTO: 5.8 10E3/UL (ref 4–11)

## 2022-03-17 PROCEDURE — 83690 ASSAY OF LIPASE: CPT

## 2022-03-17 PROCEDURE — 80053 COMPREHEN METABOLIC PANEL: CPT

## 2022-03-17 PROCEDURE — 86140 C-REACTIVE PROTEIN: CPT

## 2022-03-17 PROCEDURE — 85027 COMPLETE CBC AUTOMATED: CPT

## 2022-03-17 PROCEDURE — 82150 ASSAY OF AMYLASE: CPT

## 2022-03-17 PROCEDURE — 36415 COLL VENOUS BLD VENIPUNCTURE: CPT

## 2022-03-23 NOTE — PATIENT INSTRUCTIONS
Thanks for coming today.  Ortho/Sports Medicine Clinic  [address] [place] [address]    To schedule future appointments in Ortho Clinic, you may call [phone].    To schedule ordered imaging by your provider:   Call [facility] [phone]    To schedule an injection ordered by your provider:  Call [facility] Injection scheduling line: [phone]  [employer] available online at:  [email or web address]    Please call if any further questions or concerns ([phone]).  Clinic hours 8 am to 5 pm.    Return to clinic (call) if symptoms worsen or fail to improve.  
Corbin Arguello

## 2022-03-31 ENCOUNTER — OFFICE VISIT (OUTPATIENT)
Dept: GASTROENTEROLOGY | Facility: CLINIC | Age: 47
End: 2022-03-31
Attending: PHYSICIAN ASSISTANT
Payer: COMMERCIAL

## 2022-03-31 ENCOUNTER — LAB (OUTPATIENT)
Dept: LAB | Facility: CLINIC | Age: 47
End: 2022-03-31

## 2022-03-31 VITALS
DIASTOLIC BLOOD PRESSURE: 83 MMHG | HEART RATE: 70 BPM | SYSTOLIC BLOOD PRESSURE: 143 MMHG | OXYGEN SATURATION: 100 % | WEIGHT: 218.3 LBS | HEIGHT: 66 IN | BODY MASS INDEX: 35.08 KG/M2

## 2022-03-31 DIAGNOSIS — R93.5 ABNORMAL ABDOMINAL CT SCAN: ICD-10-CM

## 2022-03-31 DIAGNOSIS — R10.11 RUQ ABDOMINAL PAIN: ICD-10-CM

## 2022-03-31 PROCEDURE — 99215 OFFICE O/P EST HI 40 MIN: CPT | Performed by: INTERNAL MEDICINE

## 2022-03-31 RX ORDER — DICYCLOMINE HYDROCHLORIDE 10 MG/1
CAPSULE ORAL
Qty: 120 CAPSULE | Refills: 3 | Status: SHIPPED | OUTPATIENT
Start: 2022-03-31 | End: 2022-05-19

## 2022-03-31 ASSESSMENT — PAIN SCALES - GENERAL: PAINLEVEL: MODERATE PAIN (4)

## 2022-03-31 NOTE — PROGRESS NOTES
GI CLINIC VISIT    CC/REFERRING MD:  Otoniel Nelson  REASON FOR CONSULTATION:   Otoniel Nelson for   Chief Complaint   Patient presents with     Follow Up   Right upper quadrant pain and irregular bowel habits    ASSESSMENT/PLAN:  47-year-old female with history of chronic back pain on chronic meloxicam here today to discuss persistent right upper quadrant pain and irregular bowel habits.    1.  Right upper quadrant pain-in listening to her symptoms today I am more concerned for an upper GI pathology.  Some of her symptoms are consistent with biliary colic.  Over the years, she has had her gallbladder evaluated.  She reports a remote HIDA scan that was unremarkable.  Last year she did have a right upper quadrant ultrasound which showed no gallstones and a normal-appearing gallbladder.  I do think it is reasonable to repeat a HIDA scan now.  If this is abnormal we will refer her to a general surgeon to discuss cholecystectomy.  She certainly is at risk for gallbladder pathology given her age and weight.  I do also note that she does take NSAIDs regularly.  She is at risk for gastritis or peptic ulcer disease.  We will schedule an upper endoscopy.  We discussed a trial of PPI but she would like to hold off on this for now until we have the results of the upper endoscopy.  At this time would also like to repeat a CT scan of the abdomen pelvis to make sure there is no other inflammation or abnormalities in the abdomen.  We will perform it with a CT enterography protocol.  Patient does have a history of allergy to iodine but has tolerated IV contrast dye well in the past.  Prior evaluation showed no evidence of pancreatic disease, but we will get another look at the CT scan.  Given her normal ileocolonoscopy and now normal CRP and normal fecal calprotectin I think inflammatory bowel disease is unlikely.  However, if her CT enterography is abnormal we may consider repeating an ileocolonoscopy.  In the future we could also  consider hydrogen breath test to evaluate for small intestinal bacterial overgrowth and referral to nutritionist. Exam is negative for Carnett's sign. Radicular source from back pain possible but less likely.    In the meantime I will give her another prescription for the antispasmodic medication to see if this helps.    It is certainly possible that she continues to have issues with visceral hypersensitivity.  The antispasmodic may be helpful for this.  We have discussed a trial of amitriptyline in the past but she has not respond to this for her chronic back..    If our entire evaluation is unremarkable we may need to consider having her meet with a surgeon to discuss an empiric cholecystectomy to see if this improves her symptoms.    -Schedule HIDA scan  -Schedule CT enterography  -Trial dicyclomine  - consider testing for SIBO in the future    2.  Irregular bowel habits-exact etiology is unclear.  Ileocolonoscopy was unremarkable.  Fecal calprotectin has been normal.  Pancreatic elastase and fecal fat studies are all negative.  There has been no evidence of celiac disease by blood work or duodenal biopsies.  There has been no evidence of inflammatory bowel disease.  At this point, I think an underlying process such as irritable bowel syndrome with visceral hypersensitivity is the most likely etiology.  We have trialed fiber on 2 different occasions with 2 different products but she has not had beneficial results.  We will recheck infectious stool studies and fecal calprotectin at this time.  We will perform the above evaluation.  We can consider repeat ileocolonoscopy the future, however I think this will be low yield given her very recent exam.  There have been some cases of patients with significant biliary colic that have been associated with irregular bowel habits.  We are working up the gallbladder as outlined above.    -Infectious stool studies  -Check fecal calprotectin  -Check H. pylori stool  antigen  -Trial dicyclomine    RTC 3 months    Thank you for this consultation.  It was a pleasure to participate in the care of this patient; please contact us with any further questions.  I spent a total of 60 minutes during the day of encounter performed chart review, meeting with patient, patient counseling, care coordination, and documentation.      This note was created with voice recognition software, and while reviewed for accuracy, typos may remain.     Fredy Jimenez MD  Inflammatory Bowel Disease Program  Division of Gastroenterology, Hepatology and Nutrition  [facility]  Pager: [phone]      HPI:    Bhupinder is a very pleasant 47-year-old female who is here today in GI clinic to follow-up persistent right upper quadrant discomfort and irregular bowel habits.  I direct you to my initial consult note dated 10/6/2021 for full details.    Briefly, at that time the patient had had 5 to 6 weeks of right-sided abdominal discomfort associated with loose and frequent stools.  At that time her symptoms had started in August 2021.  She was eventually seen in the ER and had a CT scan which showed some thickening in her colon near the hepatic flexure.  She was started on ciprofloxacin and Flagyl but this did not improve her symptoms.  Stool studies were done and were negative for infection.  CBC, LFTs, BMP were all unremarkable's.  CRP was elevated in the 50s (this is since resolved).  She was discharged from the hospital and did have an outpatient colonoscopy performed.  The ileum and the colon were normal apart from some diverticulosis in the sigmoid colon.  Biopsies of the right colon were obtained to evaluate for microscopic colitis and these were unremarkable.    She was seen by myself in clinic in October.  Her pain persisted but her stools appeared to be getting better at that time.  She was having some improvement in her discomfort with dicyclomine.  She had tried some Metamucil but this had added too  much bulk to her stool and made passing stools were difficult.    At that time we hypothesized that she had an acute insult upper GI tract that could have been infectious enterocolitis or an ischemic colitis.  We recommended supportive cares with a retrial of Citrucel fiber powder and continued dicyclomine.  We obtained a fecal calprotectin that was normal.  We obtained pancreatic elastase which was normal.  We obtained fecal fat testing that was normal.    Today, the patient is back in clinic to follow-up.  She reports that some things have improved but her abdominal discomfort and irregular stools have persisted.  The right upper quadrant pain is never completely gone away.  Sometimes it is quite low and does not bother her at all.  However, there have been episodes every few weeks where the pain worsens and causes significant distress.  This is centered in the right upper quadrant.  Sometimes it will radiate down to her bellybutton and then into her back just below her right scapula.  She does have some associated nausea.  Sometimes this is not associated with any bowel changes but usually if the pain persists it will eventually lead to more loose bowel movements.    Her bowel movements have never gone back to normal.  She typically has 4-5 loose bowel movements in the morning.  If she eats this will often trigger having more bowel movements.  She still does have a rare nocturnal bowel movements.  She denies any blood in her bowel movements.    She denies any weight loss.  She denies any reflux symptoms.  She denies any odynophagia or dysphagia.    She does have chronic back pain and does take meloxicam 4 times a week for this.  She occasionally will take ibuprofen on top of this but has not done this for several weeks.  She is scheduled to get a neurostimulator in place.  When she had the temporary neurostimulator and she does not know whether this changed her abdominal symptoms or.  She does not think it  did    ROS:    No fevers or chills  No weight loss  No blurry vision, double vision or change in vision  No sore throat  No lymphadenopathy  No headache, paraesthesias, or weakness in a limb  No shortness of breath or wheezing  No chest pain or pressure  No arthralgias or myalgias  No rashes or skin changes  No odynophagia or dysphagia  No BRBPR, hematochezia, melena  No dysuria, frequency or urgency  No hot/cold intolerance or polyria  No anxiety or depression    PREVIOUS ENDOSCOPY:  EGD in February 2021-erythematous mucosa in the lesser curve of the stomach.  Otherwise unremarkable exam.  Gastric biopsies only show reactive changes.  Duodenal biopsies normal.  Ileocolonoscopy 9/2021-terminal ileum and colon normal apart from some sigmoid diverticulosis.  Right colon biopsies unremarkable    PERTINENT RELEVANT IMAGING OR LABS:  CT scan as above    Right upper quadrant ultrasound-unremarkable with normal gallbladder    Labs as outlined above    ALLERGIES:     Allergies   Allergen Reactions     Ceftriaxone Anaphylaxis     Hives, rash, racing heart beat     Bactrim [Sulfamethoxazole W/Trimethoprim] Hives     Ciprofloxacin Other (See Comments)     Tendon Issues     Codeine Nausea and Vomiting     Codeine      Copper      Rash       Doxycycline      Loss of skin pigmentation, skin loss.     Gold      Rash       Iodine Hives     WELTS     Iodine-131 Hives     Levaquin [Levofloxacin] Other (See Comments)     Tendon issues with levaquin and cipro     Nickel      rash     Steel [Staples]      Rash from staples       Sulfa Drugs Hives     Full Body     Latex Rash     Penicillins Rash       PERTINENT MEDICATIONS:    Current Outpatient Medications:      albuterol (PROAIR HFA/PROVENTIL HFA/VENTOLIN HFA) 108 (90 Base) MCG/ACT inhaler, Inhale 2 puffs into the lungs every 4 hours as needed for shortness of breath / dyspnea or wheezing, Disp: 18 g, Rfl: 1     clobetasol (TEMOVATE) 0.05 % external cream, Apply topically 2 times  daily, Disp: 60 g, Rfl: 1     fluticasone (FLOVENT HFA) 110 MCG/ACT inhaler, Inhale 1 puff into the lungs 2 times daily, Disp: 12 g, Rfl: 0     medical cannabis (Patient's own supply), Take 1 Dose by mouth See Admin Instructions 1/2 to 2 tablets every 3-6 hours as needed (for pain/sleep)  THC:CBD 2.5m.5mg  (The purpose of this order is to document that the patient reports taking medical cannabis.  This is not a prescription, and is not used to certify that the patient has a qualifying medical condition.), Disp: , Rfl:      meloxicam (MOBIC) 15 MG tablet, Take 15 mg by mouth daily, Disp: , Rfl:      mupirocin (BACTROBAN) 2 % external ointment, Apply topically 3 times daily, Disp: 30 g, Rfl: 0     predniSONE (DELTASONE) 20 MG tablet, Take 3 tabs by mouth daily x 3 days, then 2 tabs daily x 3 days, then 1 tab daily x 3 days, then 1/2 tab daily x 3 days., Disp: 20 tablet, Rfl: 0     predniSONE (DELTASONE) 20 MG tablet, Take 3 tabs by mouth daily x 3 days, then 2 tabs daily x 3 days, then 1 tab daily x 3 days, then 1/2 tab daily x 3 days., Disp: 20 tablet, Rfl: 0     rizatriptan (MAXALT-MLT) 5 MG ODT, Take 1 tablet (5 mg) by mouth at onset of headache for migraine May repeat in 2 hours. Max 6 tablets/24 hours., Disp: 30 tablet, Rfl: 1     sucralfate (CARAFATE) 1 GM tablet, Take 1 tablet (1 g) by mouth 4 times daily, Disp: 40 tablet, Rfl: 0     tacrolimus (PROTOPIC) 0.1 % external ointment, Apply topically 2 times daily On eczematous lesions 2xdaily, Disp: 60 g, Rfl: 3     valACYclovir (VALTREX) 1000 mg tablet, Take 1 tablet (1,000 mg) by mouth 3 times daily for 7 days, Disp: 21 tablet, Rfl: 0    PROBLEM LIST  Patient Active Problem List    Diagnosis Date Noted     Personal history of COVID-19 2021     Priority: Medium     Colitis 2021     Priority: Medium     Abnormal CT scan, colon 2021     Priority: Medium     TOS (thoracic outlet syndrome) 2021     Priority: Medium     Added automatically  from request for surgery [id number]       Dermatitis 03/03/2021     Priority: Medium     Musculoskeletal pain 03/03/2021     Priority: Medium     Fatigue, unspecified type 03/03/2021     Priority: Medium     AK (actinic keratosis) - both hands at the lateral thenar aspect. 12/08/2020     Priority: Medium     Hypertrophy of breast 11/05/2020     Priority: Medium     History of cold sores 11/05/2020     Priority: Medium     Elevated blood pressure reading without diagnosis of hypertension 09/24/2020     Priority: Medium     Balance problems 07/17/2020     Priority: Medium     Morbid obesity (H) 07/17/2020     Priority: Medium     Sinus arrhythmia seen on electrocardiogram 07/17/2020     Priority: Medium     Family history of ischemic heart disease - father at 46 massive MI 07/17/2020     Priority: Medium     Brachial plexopathy - right shoulder 05/21/2020     Priority: Medium     Dental abscess - left mandible molar 03/05/2020     Priority: Medium     Endometrium, hyperplasia - on recent CT scan 02/10/2020     Priority: Medium     Pain in joint involving pelvic region and thigh, right 02/10/2020     Priority: Medium     Right lateral abdominal pain 02/10/2020     Priority: Medium     Right foot pain 02/10/2020     Priority: Medium     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 10/10/2019     Priority: Medium     Abnormality of nail surface 08/29/2019     Priority: Medium     Dry hair 08/29/2019     Priority: Medium     Dry skin 08/29/2019     Priority: Medium     Malaise and fatigue 08/29/2019     Priority: Medium     Lymphadenopathy 08/29/2019     Priority: Medium     PONV (postoperative nausea and vomiting) 06/10/2019     Priority: Medium     Menorrhalgia 06/10/2019     Priority: Medium     Uterine polyp 06/10/2019     Priority: Medium     Loose stools 06/10/2019     Priority: Medium     Hyperactive bowel sounds 06/10/2019     Priority: Medium     Jaycob complexion 05/23/2019     Priority: Medium     Lip lesion  05/23/2019     Priority: Medium     Abdominal pain, epigastric 05/23/2019     Priority: Medium     Abdominal distension 05/23/2019     Priority: Medium     Intractable episodic tension-type headache 03/11/2019     Priority: Medium     Intractable right heel pain 05/29/2018     Priority: Medium     Superficial swelling of scalp 04/12/2018     Priority: Medium     Biceps tendinopathy, right 03/22/2018     Priority: Medium     Hyperlipidemia LDL goal <130 08/30/2017     Priority: Medium     Spasm of muscle 07/11/2017     Priority: Medium     Sternocleidomastoid muscle tenderness 04/05/2017     Priority: Medium     left         Acute cervical myofascial strain, initial encounter 04/05/2017     Priority: Medium     Abnormal mammogram 09/21/2016     Priority: Medium     right breast       Gastroesophageal reflux disease, esophagitis presence not specified 06/29/2016     Priority: Medium     IMO Regulatory Load OCT 2020       Rotator cuff arthropathy, right 03/07/2016     Priority: Medium     AD (atopic dermatitis) 10/29/2015     Priority: Medium     Heat sensitivity 10/29/2015     Priority: Medium     Skin lesion 10/20/2015     Priority: Medium     posterior superior scalp       Plantar fasciitis 09/23/2015     Priority: Medium     Lateral epicondylitis 03/16/2015     Priority: Medium     Problem list name updated by automated process. Provider to review       Right shoulder pain 03/16/2015     Priority: Medium     Telangiectasia of face 12/19/2014     Priority: Medium     Frontal headache 12/15/2014     Priority: Medium     Muscular wasting and disuse atrophy, not elsewhere classified 06/09/2014     Priority: Medium     right SCM, right wrist,        Ds DNA antibody positive 04/16/2014     Priority: Medium     borderline.       Lumbar radiculopathy 01/22/2014     Priority: Medium     Migraine without aura and without status migrainosus, not intractable 10/23/2013     Priority: Medium     Foraminal stenosis of lumbar  region 09/26/2013     Priority: Medium     DJD (degenerative joint disease), lumbar 09/26/2013     Priority: Medium     FLORIDALMA positive 07/18/2013     Priority: Medium     Plantar fascial fibromatosis 07/18/2013     Priority: Medium     Pain in joint, upper arm 04/15/2013     Priority: Medium     Family history of colon cancer 03/05/2013     Priority: Medium     mother       Keratitis sicca (H) 10/31/2012     Priority: Medium     Dry eyes 10/15/2012     Priority: Medium     Family history of melanoma 08/24/2012     Priority: Medium     Shoulder joint instability 08/15/2012     Priority: Medium     Abnormal PFT 07/31/2012     Priority: Medium     question of left heart failure and/or shunting in need of further investigation       Chronic fatigue 06/06/2012     Priority: Medium     Hair loss 06/06/2012     Priority: Medium     Raynaud phenomenon 04/11/2012     Priority: Medium     PAC (premature atrial contraction) 07/15/2010     Priority: Medium     Palpitations 07/15/2010     Priority: Medium     Lyme disease 06/07/2010     Priority: Medium     Scalp lesion 05/26/2010     Priority: Medium     Migraine, unspecified, with intractable migraine, so stated, without mention of status migrainosus 04/04/2003     Priority: Medium     Problem list name updated by automated process. Provider to review  IMO Regulatory Load OCT 2020       Bell's palsy 04/04/2003     Priority: Medium     Contact dermatitis and other eczema, due to unspecified cause 04/04/2003     Priority: Medium       PERTINENT PAST MEDICAL HISTORY:  Past Medical History:   Diagnosis Date     Abnormal mammogram 9/21/2016    right breast      AD (atopic dermatitis) 10/29/2015     Allergic rhinitis due to other allergen      Arthritis      Bell's palsy      Chronic fatigue 6/6/2012     Chronic infection     MRSA - 2015 scalp and prior to Abdomen     Contact dermatitis and other eczema, due to unspecified cause     eczema     Contusion of left foot, initial encounter  3/16/2016     DJD (degenerative joint disease), lumbar 9/26/2013     DJD (degenerative joint disease), lumbar 9/26/2013     Ds DNA antibody positive 4/16/2014    borderline.      Elevated blood pressure reading without diagnosis of hypertension 12/24/2013     Facial contusion 12/15/2014    hit by horse      Foraminal stenosis of lumbar region 9/26/2013     Frontal headache 12/15/2014     Gastroesophageal reflux disease, esophagitis presence not specified 6/29/2016     Heat sensitivity 10/29/2015     HTN, goal below 140/90 9/23/2015     Hyperlipidemia LDL goal <130 8/30/2017     Irregular heart beat      Keratitis sicca (H) 10/31/2012     Left shoulder pain 9/26/2013     Lumbar radiculopathy 1/22/2014     Migraine headache 10/23/2013     Migraine, unspecified, with intractable migraine, so stated, without mention of status migrainosus      Motion sickness      MRSA infection 10/20/2015     Muscular wasting and disuse atrophy, not elsewhere classified 6/9/2014    right SCM, right wrist,       Numbness and tingling     both legs anf feet greater on the left     PAC (premature atrial contraction) 7/15/2010     Personal history of COVID-19 12/2/2021     Plantar fasciitis 9/23/2015     PONV (postoperative nausea and vomiting)      Skin lesion 10/20/2015    posterior superior scalp      Spasm of muscle 7/11/2017     Telangiectasia of face 12/19/2014     Tooth pain 10/20/2015    left lower primary molar        PREVIOUS SURGERIES:  Past Surgical History:   Procedure Laterality Date     AS INJ TRANSFORAMIN EPIDURAL, LUMB/SACR SINGLE       COLONOSCOPY  3/11/2013    Procedure: COLONOSCOPY;  colonoscopy;  Surgeon: Lobito Mas MD;  Location: [facility]     COLONOSCOPY N/A 9/13/2021    Procedure: COLONOSCOPY, WITH POLYPECTOMY AND BIOPSY;  Surgeon: Sam Liz MD;  Location: [facility]     COLONOSCOPY WITH CO2 INSUFFLATION N/A 11/19/2018    Procedure: COLONOSCOPY WITH CO2 INSUFFLATION;  Surgeon: Goyo Blank MD;   Location: [facility]     DECOMPRESSION LUMBAR TWO LEVELS Bilateral 12/8/2016    Procedure: DECOMPRESSION LUMBAR TWO LEVELS;  Surgeon: Bang Burrell MD;  Location: [facility]     DILATION AND CURETTAGE, HYSTEROSCOPY, ABLATE ENDOMETRIUM NOVASURE, COMBINED N/A 6/12/2019    Procedure: hysteroscopy, dilation & curettage, polypectomy, endometrial ablation;  Surgeon: Fredy Pham MD;  Location: [facility]     ESOPHAGOSCOPY, GASTROSCOPY, DUODENOSCOPY (EGD), COMBINED  11/8/2013    Procedure: COMBINED ESOPHAGOSCOPY, GASTROSCOPY, DUODENOSCOPY (EGD), BIOPSY SINGLE OR MULTIPLE;  Esophagoscopy, Gastroscopy, Duodenoscopy EGD with multiple biopsies;  Surgeon: Teja Koch MD;  [facility]     ESOPHAGOSCOPY, GASTROSCOPY, DUODENOSCOPY (EGD), COMBINED N/A 2/18/2021    Procedure: ESOPHAGOGASTRODUODENOSCOPY, WITH BIOPSY;  Surgeon: Blanca Ba MD;  Location: [facility]     HC REMOVAL OF TONSILS,<11 Y/O      Tonsils <10y.o.     HC TOOTH EXTRACTION W/FORCEP       INJECT BLOCK MEDIAL BRANCH CERVICAL/THORACIC/LUMBAR N/A 7/23/2020    Procedure: Sacral 1,2,3 Lateral Branch Blocks and lumbar 5 medial branch blocks bilaterally;  Surgeon: Maurisio Goetz MD;  [facility]     INJECT JOINT SACROILIAC Left 9/24/2020    Procedure: Left Lumbar 5 Sacral 1 nerve root injections.;  Surgeon: Maurisio Goetz MD;  [facility]     REPAIR MUSCLE UPPER EXTREMITY Right 7/27/2021    Procedure: DIVISION RIGHT PECTORALIS MUSCLE;  Surgeon: Davie Dutta;  Location: [facility]     REPAIR TENDON ELBOW  7/9/2014    Procedure: REPAIR TENDON ELBOW;  Surgeon: Chris Johnston MD;  [facility]     ULTRASONIC REMOVAL SOFT TISSUE (LOCATION) Right 11/21/2018    Procedure: Tenex right foot;  Surgeon: Patel Martínez DO;  Location: [facility]     VASCULAR SURGERY      Thoracic Outlet Syndrome       SOCIAL HISTORY:  Social History     Socioeconomic History     Marital status:      Spouse name: Robert     Number of children: 2     Years of  education: 12     Highest education level: Not on file   Occupational History     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   Tobacco Use     Smoking status: Never Smoker     Smokeless tobacco: Never Used   Vaping Use     Vaping Use: Every day     Substances: THC     Devices: Disposable, Pre-filled or refillable cartridge   Substance and Sexual Activity     Alcohol use: No     Comment: social     Drug use: No     Types: Marijuana     Comment: Medical marijuana     Sexual activity: Yes     Partners: Male     Birth control/protection: Surgical     Comment: vasectomy   Other Topics Concern      Service No     Blood Transfusions No     Caffeine Concern No     Comment: 0     Occupational Exposure No     Hobby Hazards Yes     Comment: horses     Sleep Concern No     Stress Concern No     Weight Concern Yes     Special Diet Yes     Comment: nhung's     Back Care No     Exercise Yes     Comment: occ     Bike Helmet Not Asked     Comment: na     Seat Belt Yes     Self-Exams Yes     Comment: occ     Parent/sibling w/ CABG, MI or angioplasty before 65F 55M? Not Asked   Social History Narrative     Not on file     Social Determinants of Health     Financial Resource Strain: Not on file   Food Insecurity: Not on file   Transportation Needs: Not on file   Physical Activity: Not on file   Stress: Not on file   Social Connections: Not on file   Intimate Partner Violence: Not on file   Housing Stability: Not on file       FAMILY HISTORY:  Family History   Problem Relation Age of Onset     Diabetes Mother         adult     Cancer - colorectal Mother      Hypertension Mother      Allergies Mother      Cancer Mother         colon     Depression Mother      Gastrointestinal Disease Mother         ibs     Genitourinary Problems Mother         kidney cyst     Gynecology Mother         endometriosis     Lipids Mother      Thyroid Disease Mother      Arthritis Mother         RA     Heart Disease Maternal Grandfather         MI,  " - 60's     Allergies Father      Unknown/Adopted Father      Heart Disease Father         mi-age mid 40's     Cardiovascular Father      Lipids Father      Respiratory Father         asthma     Cancer Father      Other Cancer Father         renal cancer     Depression Sister      Allergies Sister      Cancer Sister         vaginal     Gynecology Sister         endometriosis     Lipids Paternal Grandmother      Osteoporosis Paternal Grandmother      Thyroid Disease Paternal Grandmother      Respiratory Son         asthma     Allergies Son      Arthritis Sister      Allergies Brother      Heart Disease Brother      Allergies Daughter      Blood Disease Paternal Aunt         LGL T cell Leukemia     Rheumatoid Arthritis Paternal Aunt      Kidney Disease Maternal Aunt      Lupus Maternal Aunt      Bladder Cancer Maternal Aunt        Past/family/social history reviewed and no changes    PHYSICAL EXAMINATION:  Constitutional: aaox3, cooperative, pleasant, not dyspneic/diaphoretic, no acute distress  Vitals reviewed: BP (!) 143/83   Pulse 70   Ht 1.664 m (5' 5.5\")   Wt 99 kg (218 lb 4.8 oz)   SpO2 100%   BMI 35.77 kg/m    Wt:   Wt Readings from Last 2 Encounters:   22 99 kg (218 lb 4.8 oz)   21 95.7 kg (211 lb)      Eyes: Sclera anicteric/injected  Ears/nose/mouth/throat: Normal oropharynx without ulcers or exudate, mucus membranes moist, hearing intact  Neck: supple, thyroid normal size  CV: No edema  Respiratory: Unlabored breathing  Lymph: No axillary, submandibular, supraclavicular or inguinal lymphadenopathy  Abd:  Nondistended, +bs, no hepatosplenomegaly, nontender, no peritoneal signs. Negative carnetts  Skin: warm, perfused, no jaundice  Psych: Normal affect  MSK: Normal gait          "

## 2022-03-31 NOTE — LETTER
3/31/2022         RE: Amelia Coker  [address] [facility] [id number]        Dear Colleague,    Thank you for referring your patient, Amelia Coker, to the [facility] [place]. Please see a copy of my visit note below.    GI CLINIC VISIT    CC/REFERRING MD:  Otoniel Nelson  REASON FOR CONSULTATION:   Otoniel Nelson for   Chief Complaint   Patient presents with     Follow Up   Right upper quadrant pain and irregular bowel habits    ASSESSMENT/PLAN:  47-year-old female with history of chronic back pain on chronic meloxicam here today to discuss persistent right upper quadrant pain and irregular bowel habits.    1.  Right upper quadrant pain-in listening to her symptoms today I am more concerned for an upper GI pathology.  Some of her symptoms are consistent with biliary colic.  Over the years, she has had her gallbladder evaluated.  She reports a remote HIDA scan that was unremarkable.  Last year she did have a right upper quadrant ultrasound which showed no gallstones and a normal-appearing gallbladder.  I do think it is reasonable to repeat a HIDA scan now.  If this is abnormal we will refer her to a general surgeon to discuss cholecystectomy.  She certainly is at risk for gallbladder pathology given her age and weight.  I do also note that she does take NSAIDs regularly.  She is at risk for gastritis or peptic ulcer disease.  We will schedule an upper endoscopy.  We discussed a trial of PPI but she would like to hold off on this for now until we have the results of the upper endoscopy.  At this time would also like to repeat a CT scan of the abdomen pelvis to make sure there is no other inflammation or abnormalities in the abdomen.  We will perform it with a CT enterography protocol.  Patient does have a history of allergy to iodine but has tolerated IV contrast dye well in the past.  Prior evaluation showed no evidence of pancreatic disease, but we will get another look at  the CT scan.  Given her normal ileocolonoscopy and now normal CRP and normal fecal calprotectin I think inflammatory bowel disease is unlikely.  However, if her CT enterography is abnormal we may consider repeating an ileocolonoscopy.  In the future we could also consider hydrogen breath test to evaluate for small intestinal bacterial overgrowth and referral to nutritionist. Exam is negative for Carnett's sign. Radicular source from back pain possible but less likely.    In the meantime I will give her another prescription for the antispasmodic medication to see if this helps.    It is certainly possible that she continues to have issues with visceral hypersensitivity.  The antispasmodic may be helpful for this.  We have discussed a trial of amitriptyline in the past but she has not respond to this for her chronic back..    If our entire evaluation is unremarkable we may need to consider having her meet with a surgeon to discuss an empiric cholecystectomy to see if this improves her symptoms.    -Schedule HIDA scan  -Schedule CT enterography  -Trial dicyclomine  - consider testing for SIBO in the future    2.  Irregular bowel habits-exact etiology is unclear.  Ileocolonoscopy was unremarkable.  Fecal calprotectin has been normal.  Pancreatic elastase and fecal fat studies are all negative.  There has been no evidence of celiac disease by blood work or duodenal biopsies.  There has been no evidence of inflammatory bowel disease.  At this point, I think an underlying process such as irritable bowel syndrome with visceral hypersensitivity is the most likely etiology.  We have trialed fiber on 2 different occasions with 2 different products but she has not had beneficial results.  We will recheck infectious stool studies and fecal calprotectin at this time.  We will perform the above evaluation.  We can consider repeat ileocolonoscopy the future, however I think this will be low yield given her very recent exam.  There  have been some cases of patients with significant biliary colic that have been associated with irregular bowel habits.  We are working up the gallbladder as outlined above.    -Infectious stool studies  -Check fecal calprotectin  -Check H. pylori stool antigen  -Trial dicyclomine    RTC 3 months    Thank you for this consultation.  It was a pleasure to participate in the care of this patient; please contact us with any further questions.  I spent a total of 60 minutes during the day of encounter performed chart review, meeting with patient, patient counseling, care coordination, and documentation.      This note was created with voice recognition software, and while reviewed for accuracy, typos may remain.     Fredy Jimenez MD  Inflammatory Bowel Disease Program  Division of Gastroenterology, Hepatology and Nutrition  [facility]  Pager: [phone]      HPI:    Bhupinder is a very pleasant 47-year-old female who is here today in GI clinic to follow-up persistent right upper quadrant discomfort and irregular bowel habits.  I direct you to my initial consult note dated 10/6/2021 for full details.    Briefly, at that time the patient had had 5 to 6 weeks of right-sided abdominal discomfort associated with loose and frequent stools.  At that time her symptoms had started in August 2021.  She was eventually seen in the ER and had a CT scan which showed some thickening in her colon near the hepatic flexure.  She was started on ciprofloxacin and Flagyl but this did not improve her symptoms.  Stool studies were done and were negative for infection.  CBC, LFTs, BMP were all unremarkable's.  CRP was elevated in the 50s (this is since resolved).  She was discharged from the hospital and did have an outpatient colonoscopy performed.  The ileum and the colon were normal apart from some diverticulosis in the sigmoid colon.  Biopsies of the right colon were obtained to evaluate for microscopic colitis and these were  unremarkable.    She was seen by myself in clinic in October.  Her pain persisted but her stools appeared to be getting better at that time.  She was having some improvement in her discomfort with dicyclomine.  She had tried some Metamucil but this had added too much bulk to her stool and made passing stools were difficult.    At that time we hypothesized that she had an acute insult upper GI tract that could have been infectious enterocolitis or an ischemic colitis.  We recommended supportive cares with a retrial of Citrucel fiber powder and continued dicyclomine.  We obtained a fecal calprotectin that was normal.  We obtained pancreatic elastase which was normal.  We obtained fecal fat testing that was normal.    Today, the patient is back in clinic to follow-up.  She reports that some things have improved but her abdominal discomfort and irregular stools have persisted.  The right upper quadrant pain is never completely gone away.  Sometimes it is quite low and does not bother her at all.  However, there have been episodes every few weeks where the pain worsens and causes significant distress.  This is centered in the right upper quadrant.  Sometimes it will radiate down to her bellybutton and then into her back just below her right scapula.  She does have some associated nausea.  Sometimes this is not associated with any bowel changes but usually if the pain persists it will eventually lead to more loose bowel movements.    Her bowel movements have never gone back to normal.  She typically has 4-5 loose bowel movements in the morning.  If she eats this will often trigger having more bowel movements.  She still does have a rare nocturnal bowel movements.  She denies any blood in her bowel movements.    She denies any weight loss.  She denies any reflux symptoms.  She denies any odynophagia or dysphagia.    She does have chronic back pain and does take meloxicam 4 times a week for this.  She occasionally will take  ibuprofen on top of this but has not done this for several weeks.  She is scheduled to get a neurostimulator in place.  When she had the temporary neurostimulator and she does not know whether this changed her abdominal symptoms or.  She does not think it did    ROS:    No fevers or chills  No weight loss  No blurry vision, double vision or change in vision  No sore throat  No lymphadenopathy  No headache, paraesthesias, or weakness in a limb  No shortness of breath or wheezing  No chest pain or pressure  No arthralgias or myalgias  No rashes or skin changes  No odynophagia or dysphagia  No BRBPR, hematochezia, melena  No dysuria, frequency or urgency  No hot/cold intolerance or polyria  No anxiety or depression    PREVIOUS ENDOSCOPY:  EGD in February 2021-erythematous mucosa in the lesser curve of the stomach.  Otherwise unremarkable exam.  Gastric biopsies only show reactive changes.  Duodenal biopsies normal.  Ileocolonoscopy 9/2021-terminal ileum and colon normal apart from some sigmoid diverticulosis.  Right colon biopsies unremarkable    PERTINENT RELEVANT IMAGING OR LABS:  CT scan as above    Right upper quadrant ultrasound-unremarkable with normal gallbladder    Labs as outlined above    ALLERGIES:     Allergies   Allergen Reactions     Ceftriaxone Anaphylaxis     Hives, rash, racing heart beat     Bactrim [Sulfamethoxazole W/Trimethoprim] Hives     Ciprofloxacin Other (See Comments)     Tendon Issues     Codeine Nausea and Vomiting     Codeine      Copper      Rash       Doxycycline      Loss of skin pigmentation, skin loss.     Gold      Rash       Iodine Hives     WELTS     Iodine-131 Hives     Levaquin [Levofloxacin] Other (See Comments)     Tendon issues with levaquin and cipro     Nickel      rash     Steel [Staples]      Rash from staples       Sulfa Drugs Hives     Full Body     Latex Rash     Penicillins Rash       PERTINENT MEDICATIONS:    Current Outpatient Medications:      albuterol (PROAIR  HFA/PROVENTIL HFA/VENTOLIN HFA) 108 (90 Base) MCG/ACT inhaler, Inhale 2 puffs into the lungs every 4 hours as needed for shortness of breath / dyspnea or wheezing, Disp: 18 g, Rfl: 1     clobetasol (TEMOVATE) 0.05 % external cream, Apply topically 2 times daily, Disp: 60 g, Rfl: 1     fluticasone (FLOVENT HFA) 110 MCG/ACT inhaler, Inhale 1 puff into the lungs 2 times daily, Disp: 12 g, Rfl: 0     medical cannabis (Patient's own supply), Take 1 Dose by mouth See Admin Instructions 1/2 to 2 tablets every 3-6 hours as needed (for pain/sleep)  THC:CBD 2.5m.5mg  (The purpose of this order is to document that the patient reports taking medical cannabis.  This is not a prescription, and is not used to certify that the patient has a qualifying medical condition.), Disp: , Rfl:      meloxicam (MOBIC) 15 MG tablet, Take 15 mg by mouth daily, Disp: , Rfl:      mupirocin (BACTROBAN) 2 % external ointment, Apply topically 3 times daily, Disp: 30 g, Rfl: 0     predniSONE (DELTASONE) 20 MG tablet, Take 3 tabs by mouth daily x 3 days, then 2 tabs daily x 3 days, then 1 tab daily x 3 days, then 1/2 tab daily x 3 days., Disp: 20 tablet, Rfl: 0     predniSONE (DELTASONE) 20 MG tablet, Take 3 tabs by mouth daily x 3 days, then 2 tabs daily x 3 days, then 1 tab daily x 3 days, then 1/2 tab daily x 3 days., Disp: 20 tablet, Rfl: 0     rizatriptan (MAXALT-MLT) 5 MG ODT, Take 1 tablet (5 mg) by mouth at onset of headache for migraine May repeat in 2 hours. Max 6 tablets/24 hours., Disp: 30 tablet, Rfl: 1     sucralfate (CARAFATE) 1 GM tablet, Take 1 tablet (1 g) by mouth 4 times daily, Disp: 40 tablet, Rfl: 0     tacrolimus (PROTOPIC) 0.1 % external ointment, Apply topically 2 times daily On eczematous lesions 2xdaily, Disp: 60 g, Rfl: 3     valACYclovir (VALTREX) 1000 mg tablet, Take 1 tablet (1,000 mg) by mouth 3 times daily for 7 days, Disp: 21 tablet, Rfl: 0    PROBLEM LIST  Patient Active Problem List    Diagnosis Date Noted      Personal history of COVID-19 12/02/2021     Priority: Medium     Colitis 09/09/2021     Priority: Medium     Abnormal CT scan, colon 09/09/2021     Priority: Medium     TOS (thoracic outlet syndrome) 06/07/2021     Priority: Medium     Added automatically from request for surgery [id number]       Dermatitis 03/03/2021     Priority: Medium     Musculoskeletal pain 03/03/2021     Priority: Medium     Fatigue, unspecified type 03/03/2021     Priority: Medium     AK (actinic keratosis) - both hands at the lateral thenar aspect. 12/08/2020     Priority: Medium     Hypertrophy of breast 11/05/2020     Priority: Medium     History of cold sores 11/05/2020     Priority: Medium     Elevated blood pressure reading without diagnosis of hypertension 09/24/2020     Priority: Medium     Balance problems 07/17/2020     Priority: Medium     Morbid obesity (H) 07/17/2020     Priority: Medium     Sinus arrhythmia seen on electrocardiogram 07/17/2020     Priority: Medium     Family history of ischemic heart disease - father at 46 massive MI 07/17/2020     Priority: Medium     Brachial plexopathy - right shoulder 05/21/2020     Priority: Medium     Dental abscess - left mandible molar 03/05/2020     Priority: Medium     Endometrium, hyperplasia - on recent CT scan 02/10/2020     Priority: Medium     Pain in joint involving pelvic region and thigh, right 02/10/2020     Priority: Medium     Right lateral abdominal pain 02/10/2020     Priority: Medium     Right foot pain 02/10/2020     Priority: Medium     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 10/10/2019     Priority: Medium     Abnormality of nail surface 08/29/2019     Priority: Medium     Dry hair 08/29/2019     Priority: Medium     Dry skin 08/29/2019     Priority: Medium     Malaise and fatigue 08/29/2019     Priority: Medium     Lymphadenopathy 08/29/2019     Priority: Medium     PONV (postoperative nausea and vomiting) 06/10/2019     Priority: Medium     Menorrhalgia  06/10/2019     Priority: Medium     Uterine polyp 06/10/2019     Priority: Medium     Loose stools 06/10/2019     Priority: Medium     Hyperactive bowel sounds 06/10/2019     Priority: Medium     Jaycob complexion 05/23/2019     Priority: Medium     Lip lesion 05/23/2019     Priority: Medium     Abdominal pain, epigastric 05/23/2019     Priority: Medium     Abdominal distension 05/23/2019     Priority: Medium     Intractable episodic tension-type headache 03/11/2019     Priority: Medium     Intractable right heel pain 05/29/2018     Priority: Medium     Superficial swelling of scalp 04/12/2018     Priority: Medium     Biceps tendinopathy, right 03/22/2018     Priority: Medium     Hyperlipidemia LDL goal <130 08/30/2017     Priority: Medium     Spasm of muscle 07/11/2017     Priority: Medium     Sternocleidomastoid muscle tenderness 04/05/2017     Priority: Medium     left         Acute cervical myofascial strain, initial encounter 04/05/2017     Priority: Medium     Abnormal mammogram 09/21/2016     Priority: Medium     right breast       Gastroesophageal reflux disease, esophagitis presence not specified 06/29/2016     Priority: Medium     IMO Regulatory Load OCT 2020       Rotator cuff arthropathy, right 03/07/2016     Priority: Medium     AD (atopic dermatitis) 10/29/2015     Priority: Medium     Heat sensitivity 10/29/2015     Priority: Medium     Skin lesion 10/20/2015     Priority: Medium     posterior superior scalp       Plantar fasciitis 09/23/2015     Priority: Medium     Lateral epicondylitis 03/16/2015     Priority: Medium     Problem list name updated by automated process. Provider to review       Right shoulder pain 03/16/2015     Priority: Medium     Telangiectasia of face 12/19/2014     Priority: Medium     Frontal headache 12/15/2014     Priority: Medium     Muscular wasting and disuse atrophy, not elsewhere classified 06/09/2014     Priority: Medium     right SCM, right wrist,        Ds DNA antibody  positive 04/16/2014     Priority: Medium     borderline.       Lumbar radiculopathy 01/22/2014     Priority: Medium     Migraine without aura and without status migrainosus, not intractable 10/23/2013     Priority: Medium     Foraminal stenosis of lumbar region 09/26/2013     Priority: Medium     DJD (degenerative joint disease), lumbar 09/26/2013     Priority: Medium     FLORIDALMA positive 07/18/2013     Priority: Medium     Plantar fascial fibromatosis 07/18/2013     Priority: Medium     Pain in joint, upper arm 04/15/2013     Priority: Medium     Family history of colon cancer 03/05/2013     Priority: Medium     mother       Keratitis sicca (H) 10/31/2012     Priority: Medium     Dry eyes 10/15/2012     Priority: Medium     Family history of melanoma 08/24/2012     Priority: Medium     Shoulder joint instability 08/15/2012     Priority: Medium     Abnormal PFT 07/31/2012     Priority: Medium     question of left heart failure and/or shunting in need of further investigation       Chronic fatigue 06/06/2012     Priority: Medium     Hair loss 06/06/2012     Priority: Medium     Raynaud phenomenon 04/11/2012     Priority: Medium     PAC (premature atrial contraction) 07/15/2010     Priority: Medium     Palpitations 07/15/2010     Priority: Medium     Lyme disease 06/07/2010     Priority: Medium     Scalp lesion 05/26/2010     Priority: Medium     Migraine, unspecified, with intractable migraine, so stated, without mention of status migrainosus 04/04/2003     Priority: Medium     Problem list name updated by automated process. Provider to review  IMO Regulatory Load OCT 2020       Bell's palsy 04/04/2003     Priority: Medium     Contact dermatitis and other eczema, due to unspecified cause 04/04/2003     Priority: Medium       PERTINENT PAST MEDICAL HISTORY:  Past Medical History:   Diagnosis Date     Abnormal mammogram 9/21/2016    right breast      AD (atopic dermatitis) 10/29/2015     Allergic rhinitis due to other  allergen      Arthritis      Bell's palsy      Chronic fatigue 6/6/2012     Chronic infection     MRSA - 2015 scalp and prior to Abdomen     Contact dermatitis and other eczema, due to unspecified cause     eczema     Contusion of left foot, initial encounter 3/16/2016     DJD (degenerative joint disease), lumbar 9/26/2013     DJD (degenerative joint disease), lumbar 9/26/2013     Ds DNA antibody positive 4/16/2014    borderline.      Elevated blood pressure reading without diagnosis of hypertension 12/24/2013     Facial contusion 12/15/2014    hit by horse      Foraminal stenosis of lumbar region 9/26/2013     Frontal headache 12/15/2014     Gastroesophageal reflux disease, esophagitis presence not specified 6/29/2016     Heat sensitivity 10/29/2015     HTN, goal below 140/90 9/23/2015     Hyperlipidemia LDL goal <130 8/30/2017     Irregular heart beat      Keratitis sicca (H) 10/31/2012     Left shoulder pain 9/26/2013     Lumbar radiculopathy 1/22/2014     Migraine headache 10/23/2013     Migraine, unspecified, with intractable migraine, so stated, without mention of status migrainosus      Motion sickness      MRSA infection 10/20/2015     Muscular wasting and disuse atrophy, not elsewhere classified 6/9/2014    right SCM, right wrist,       Numbness and tingling     both legs anf feet greater on the left     PAC (premature atrial contraction) 7/15/2010     Personal history of COVID-19 12/2/2021     Plantar fasciitis 9/23/2015     PONV (postoperative nausea and vomiting)      Skin lesion 10/20/2015    posterior superior scalp      Spasm of muscle 7/11/2017     Telangiectasia of face 12/19/2014     Tooth pain 10/20/2015    left lower primary molar        PREVIOUS SURGERIES:  Past Surgical History:   Procedure Laterality Date     AS INJ TRANSFORAMIN EPIDURAL, LUMB/SACR SINGLE       COLONOSCOPY  3/11/2013    Procedure: COLONOSCOPY;  colonoscopy;  Surgeon: Lobito Mas MD;  Location: [facility]     COLONOSCOPY N/A  9/13/2021    Procedure: COLONOSCOPY, WITH POLYPECTOMY AND BIOPSY;  Surgeon: Sam Liz MD;  Location: [facility]     COLONOSCOPY WITH CO2 INSUFFLATION N/A 11/19/2018    Procedure: COLONOSCOPY WITH CO2 INSUFFLATION;  Surgeon: Goyo Blank MD;  Location: [facility]     DECOMPRESSION LUMBAR TWO LEVELS Bilateral 12/8/2016    Procedure: DECOMPRESSION LUMBAR TWO LEVELS;  Surgeon: Bang Burrell MD;  Location: [facility]     DILATION AND CURETTAGE, HYSTEROSCOPY, ABLATE ENDOMETRIUM NOVASURE, COMBINED N/A 6/12/2019    Procedure: hysteroscopy, dilation & curettage, polypectomy, endometrial ablation;  Surgeon: Fredy Pham MD;  Location: [facility]     ESOPHAGOSCOPY, GASTROSCOPY, DUODENOSCOPY (EGD), COMBINED  11/8/2013    Procedure: COMBINED ESOPHAGOSCOPY, GASTROSCOPY, DUODENOSCOPY (EGD), BIOPSY SINGLE OR MULTIPLE;  Esophagoscopy, Gastroscopy, Duodenoscopy EGD with multiple biopsies;  Surgeon: Teja Koch MD;  [facility]     ESOPHAGOSCOPY, GASTROSCOPY, DUODENOSCOPY (EGD), COMBINED N/A 2/18/2021    Procedure: ESOPHAGOGASTRODUODENOSCOPY, WITH BIOPSY;  Surgeon: Blanca Ba MD;  Location: [facility]     HC REMOVAL OF TONSILS,<9 Y/O      Tonsils <10y.o.     HC TOOTH EXTRACTION W/FORCEP       INJECT BLOCK MEDIAL BRANCH CERVICAL/THORACIC/LUMBAR N/A 7/23/2020    Procedure: Sacral 1,2,3 Lateral Branch Blocks and lumbar 5 medial branch blocks bilaterally;  Surgeon: Maurisio Goetz MD;  [facility]     INJECT JOINT SACROILIAC Left 9/24/2020    Procedure: Left Lumbar 5 Sacral 1 nerve root injections.;  Surgeon: Maurisio Goetz MD;  [facility]     REPAIR MUSCLE UPPER EXTREMITY Right 7/27/2021    Procedure: DIVISION RIGHT PECTORALIS MUSCLE;  Surgeon: Davie Dutta;  Location: [facility]     REPAIR TENDON ELBOW  7/9/2014    Procedure: REPAIR TENDON ELBOW;  Surgeon: Chris Johnston MD;  [facility]     ULTRASONIC REMOVAL SOFT TISSUE (LOCATION) Right 11/21/2018    Procedure: Tenex right foot;   Surgeon: Patel Martínez DO;  Location: [facility]     VASCULAR SURGERY      Thoracic Outlet Syndrome       SOCIAL HISTORY:  Social History     Socioeconomic History     Marital status:      Spouse name: Robert     Number of children: 2     Years of education: 12     Highest education level: Not on file   Occupational History     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   Tobacco Use     Smoking status: Never Smoker     Smokeless tobacco: Never Used   Vaping Use     Vaping Use: Every day     Substances: THC     Devices: Disposable, Pre-filled or refillable cartridge   Substance and Sexual Activity     Alcohol use: No     Comment: social     Drug use: No     Types: Marijuana     Comment: Medical marijuana     Sexual activity: Yes     Partners: Male     Birth control/protection: Surgical     Comment: vasectomy   Other Topics Concern      Service No     Blood Transfusions No     Caffeine Concern No     Comment: 0     Occupational Exposure No     Hobby Hazards Yes     Comment: horses     Sleep Concern No     Stress Concern No     Weight Concern Yes     Special Diet Yes     Comment: nhung's     Back Care No     Exercise Yes     Comment: occ     Bike Helmet Not Asked     Comment: na     Seat Belt Yes     Self-Exams Yes     Comment: occ     Parent/sibling w/ CABG, MI or angioplasty before 65F 55M? Not Asked   Social History Narrative     Not on file     Social Determinants of Health     Financial Resource Strain: Not on file   Food Insecurity: Not on file   Transportation Needs: Not on file   Physical Activity: Not on file   Stress: Not on file   Social Connections: Not on file   Intimate Partner Violence: Not on file   Housing Stability: Not on file       FAMILY HISTORY:  Family History   Problem Relation Age of Onset     Diabetes Mother         adult     Cancer - colorectal Mother      Hypertension Mother      Allergies Mother      Cancer Mother         colon     Depression Mother      Gastrointestinal  "Disease Mother         ibs     Genitourinary Problems Mother         kidney cyst     Gynecology Mother         endometriosis     Lipids Mother      Thyroid Disease Mother      Arthritis Mother         RA     Heart Disease Maternal Grandfather         MI,  - 60's     Allergies Father      Unknown/Adopted Father      Heart Disease Father         mi-age mid 40's     Cardiovascular Father      Lipids Father      Respiratory Father         asthma     Cancer Father      Other Cancer Father         renal cancer     Depression Sister      Allergies Sister      Cancer Sister         vaginal     Gynecology Sister         endometriosis     Lipids Paternal Grandmother      Osteoporosis Paternal Grandmother      Thyroid Disease Paternal Grandmother      Respiratory Son         asthma     Allergies Son      Arthritis Sister      Allergies Brother      Heart Disease Brother      Allergies Daughter      Blood Disease Paternal Aunt         LGL T cell Leukemia     Rheumatoid Arthritis Paternal Aunt      Kidney Disease Maternal Aunt      Lupus Maternal Aunt      Bladder Cancer Maternal Aunt        Past/family/social history reviewed and no changes    PHYSICAL EXAMINATION:  Constitutional: aaox3, cooperative, pleasant, not dyspneic/diaphoretic, no acute distress  Vitals reviewed: BP (!) 143/83   Pulse 70   Ht 1.664 m (5' 5.5\")   Wt 99 kg (218 lb 4.8 oz)   SpO2 100%   BMI 35.77 kg/m    Wt:   Wt Readings from Last 2 Encounters:   22 99 kg (218 lb 4.8 oz)   21 95.7 kg (211 lb)      Eyes: Sclera anicteric/injected  Ears/nose/mouth/throat: Normal oropharynx without ulcers or exudate, mucus membranes moist, hearing intact  Neck: supple, thyroid normal size  CV: No edema  Respiratory: Unlabored breathing  Lymph: No axillary, submandibular, supraclavicular or inguinal lymphadenopathy  Abd:  Nondistended, +bs, no hepatosplenomegaly, nontender, no peritoneal signs. Negative carnetts  Skin: warm, perfused, no " jaundice  Psych: Normal affect  MSK: Normal gait        Fredy Jimenez MD

## 2022-03-31 NOTE — PATIENT INSTRUCTIONS
It was very nice to see you again. I have outlined my recommendations below.    We will take a closer look at your gallbladder with a HIDA scan. My office will contact you schedule this.    My office will contact you to schedule the CT scan    My office will contact you to schedule the upper endoscopy    Check stool studies    You can take the dicyclomine (bentyl) as needed for pain    Follow up in 3 months

## 2022-03-31 NOTE — PROGRESS NOTES
[facility]    Outpatient Physical Therapy Discharge Note  Patient: Amelia Coker  : 1975    Beginning/End Dates of Reporting Period:  22 to 22    Referring Provider: Maurisio Goetz MD    Therapy Diagnosis: low back pain with radicular symptoms     Client Self Report: Pt had lapse in POC until determining insurance coverage    Objective Measurements:  Objective Measure: radiating symptoms  Details: no chnage  Objective Measure: pain   Details: no change       Goals:  Goal Identifier 1   Goal Description The patient will be able to demonstrate independence with HEP in order to improve ability to manage symptoms at home.    Target Date 22   Date Met   22   Progress (detail required for progress note):       Goal Identifier 2   Goal Description The patient will be able to report consistent relief of symptoms 25% lower than start of treatment in order to improve tolerance of work duties   Target Date 22   Date Met      Progress (detail required for progress note):  No chanage         Plan:  Discharge from therapy.    Discharge:    Reason for Discharge: Pt did not make progress and had reached insurance requirement prior to further intervention from MD.      Discharge Plan: Patient to continue home program.    Jen Mora, PT, DPT, OCS, CSCS  [facility]  [phone]

## 2022-04-01 ENCOUNTER — TELEPHONE (OUTPATIENT)
Dept: GASTROENTEROLOGY | Facility: CLINIC | Age: 47
End: 2022-04-01
Payer: COMMERCIAL

## 2022-04-01 DIAGNOSIS — Z11.59 ENCOUNTER FOR SCREENING FOR OTHER VIRAL DISEASES: Primary | ICD-10-CM

## 2022-04-01 LAB
C COLI+JEJUNI+LARI FUSA STL QL NAA+PROBE: NOT DETECTED
C DIFF TOX B STL QL: NEGATIVE
EC STX1 GENE STL QL NAA+PROBE: NOT DETECTED
EC STX2 GENE STL QL NAA+PROBE: NOT DETECTED
NOROV GI+II ORF1-ORF2 JNC STL QL NAA+PR: NOT DETECTED
RVA NSP5 STL QL NAA+PROBE: NOT DETECTED
SALMONELLA SP RPOD STL QL NAA+PROBE: NOT DETECTED
SHIGELLA SP+EIEC IPAH STL QL NAA+PROBE: NOT DETECTED
V CHOL+PARA RFBL+TRKH+TNAA STL QL NAA+PR: NOT DETECTED
Y ENTERO RECN STL QL NAA+PROBE: NOT DETECTED

## 2022-04-01 PROCEDURE — 87338 HPYLORI STOOL AG IA: CPT | Performed by: INTERNAL MEDICINE

## 2022-04-01 PROCEDURE — 87493 C DIFF AMPLIFIED PROBE: CPT | Mod: XU | Performed by: INTERNAL MEDICINE

## 2022-04-01 PROCEDURE — 87506 IADNA-DNA/RNA PROBE TQ 6-11: CPT | Performed by: INTERNAL MEDICINE

## 2022-04-01 PROCEDURE — 83993 ASSAY FOR CALPROTECTIN FECAL: CPT | Performed by: INTERNAL MEDICINE

## 2022-04-01 NOTE — TELEPHONE ENCOUNTER
Screening Questions  BlueKIND OF PREP RedLOCATION [review exclusion criteria] GreenSEDATION TYPE  1. Have you had a positive covid test in the last 90 days? N     Are you active on mychart? Y    2. What insurance is in the chart? [facility] [employer]  3.      3.   Ordering/Referring Provider: DR. CHRISTIAN    4. BMI 35.77 [BMI OVER 40-EXTENDED PREP]  If greater than 40 review exclusion criteria [PAC APPT IF @ [facility]]        5.  Respiratory Screening :  [If yes to any of the following HOSPITAL setting only]     Do you use daily home oxygen? N    Do you have mod to severe Obstructive Sleep Apnea? N  [OKAY @ [facility]]   Do you have Pulmonary Hypertension? N     Do you have UNCONTROLLED asthma? N        6.   Have you had a heart or lung transplant? N      7.   Are you currently on dialysis? N [ If yes, G-PREP & HOSPITAL setting only]     8.   Do you have chronic kidney disease? N [ If yes, G-PREP ]    9.   Have you had a stroke or Transient ischemic attack (TIA - aka  mini stroke ) within 6 months?  N (If yes, please review exclusion criteria)    10.   In the past 6 months, have you had any heart related issues including cardiomyopathy or heart attack? N           If yes, did it require cardiac stenting or other implantable device? N      11.   Do you have any implantable devices in your body (pacemaker, defib, LVAD)? N (If yes, please review exclusion criteria)    12.   Do you take nitroglycerin? N           If yes, how often? N  (if yes, HOSPITAL setting ONLY)    13.   Are you currently taking any blood thinners? N           [IF YES, INFORM PATIENT TO FOLLOW UP W/ ORDERING PROVIDER FOR BRIDGING INSTRUCTIONS]     14.   Do you have a diagnosis of diabetes? N   [ If yes, G-PREP ]    15.   [FEMALES] Are you currently pregnant? N    If yes, how many weeks? N    16.   Are you taking any prescription pain medications on a routine schedule?  N  [ If yes, EXTENDED PREP.] [If yes, MAC]    17.   Do you have any chemical  dependencies such as alcohol, street drugs, or methadone?  Y-MARIJUANA [If yes, MAC]    18.   Do you have any history of post-traumatic stress syndrome, severe anxiety or history of psychosis?  N  [If yes, MAC]    19.   Do you have a significant intellectual disability?  N [If yes, MAC]    20.   Do you transfer independently?  Y    21.  On a regular basis do you go 3-5 days between bowel movements? N   [ If yes, EXTENDED PREP.]    22.   Preferred LOCAL Pharmacy for Pre Prescription        [id number] - [address]      Scheduling Details      Caller : Amelia Coker  (Please ask for phone number if not scheduled by patient)    Type of Procedure Scheduled: EGD  Which Colonoscopy Prep was Sent?:   LANCE CF PATIENTS & GROEN'S PATIENTS NEEDS EXTENDED PREP  Surgeon: DR. CHRISTIAN  Date of Procedure: 04/11/2022  [place] [facility] [place]      Sedation Type: MAC  Conscious Sedation- Needs  for 6 hours after the procedure  MAC/General-Needs  for 24 hours after procedure    Pre-op Required at [facility] [place], [place] and OR for MAC sedation: N  (advise patient they will need a pre-op prior to procedure -)      Informed patient they will need an adult  Y  Cannot take any type of public or medical transportation alone    Pre-Procedure Covid test to be completed at [facility] or Externally: SCHEDULED    Confirmed Nurse will call to complete assessment Y    Additional comments: N

## 2022-04-04 LAB
CALPROTECTIN STL-MCNT: 10.2 MG/KG (ref 0–49.9)
H PYLORI AG STL QL IA: NEGATIVE

## 2022-04-06 ENCOUNTER — TELEPHONE (OUTPATIENT)
Dept: GASTROENTEROLOGY | Facility: CLINIC | Age: 47
End: 2022-04-06

## 2022-04-06 NOTE — TELEPHONE ENCOUNTER
Pre assessment questions completed for upcoming EGD procedure scheduled on 4/11/22    COVID test scheduled 4/7/22    Reviewed procedural arrival time 1430 and facility location [facility].    Designated  policy reviewed. Instructed to have someone stay 24 hours post procedure.     Reviewed EGD prep instructions with patient. NPO six hour prior to procedure.     Patient verbalized understanding and had no questions or concerns at this time.     Tari Martinez RN

## 2022-04-06 NOTE — TELEPHONE ENCOUNTER
Patient scheduled for EGD on 4.11.22.     Covid test scheduled: 4.7.22    Pre op exam scheduled: N/A     Arrival time: 1430    Fac[place] location: [facility] [place]    Sedation type: MAC    Indication for procedure: Abdominal pain    Pre visit planning completed.    Anaya Ford RN

## 2022-04-07 ENCOUNTER — HOSPITAL ENCOUNTER (OUTPATIENT)
Dept: CT IMAGING | Facility: CLINIC | Age: 47
Discharge: HOME | End: 2022-04-07
Attending: INTERNAL MEDICINE | Admitting: INTERNAL MEDICINE
Payer: COMMERCIAL

## 2022-04-07 ENCOUNTER — LAB (OUTPATIENT)
Dept: LAB | Facility: CLINIC | Age: 47
End: 2022-04-07
Payer: COMMERCIAL

## 2022-04-07 DIAGNOSIS — R93.5 ABNORMAL ABDOMINAL CT SCAN: ICD-10-CM

## 2022-04-07 DIAGNOSIS — Z11.59 ENCOUNTER FOR SCREENING FOR OTHER VIRAL DISEASES: ICD-10-CM

## 2022-04-07 DIAGNOSIS — R10.11 RUQ ABDOMINAL PAIN: ICD-10-CM

## 2022-04-07 LAB — SARS-COV-2 RNA RESP QL NAA+PROBE: NEGATIVE

## 2022-04-07 PROCEDURE — 250N000011 HC RX IP 250 OP 636: Performed by: RADIOLOGY

## 2022-04-07 PROCEDURE — U0005 INFEC AGEN DETEC AMPLI PROBE: HCPCS

## 2022-04-07 PROCEDURE — U0003 INFECTIOUS AGENT DETECTION BY NUCLEIC ACID (DNA OR RNA); SEVERE ACUTE RESPIRATORY SYNDROME CORONAVIRUS 2 (SARS-COV-2) (CORONAVIRUS DISEASE [COVID-19]), AMPLIFIED PROBE TECHNIQUE, MAKING USE OF HIGH THROUGHPUT TECHNOLOGIES AS DESCRIBED BY CMS-2020-01-R: HCPCS

## 2022-04-07 PROCEDURE — 74177 CT ABD & PELVIS W/CONTRAST: CPT

## 2022-04-07 PROCEDURE — 250N000009 HC RX 250: Performed by: RADIOLOGY

## 2022-04-07 RX ORDER — IOPAMIDOL 755 MG/ML
500 INJECTION, SOLUTION INTRAVASCULAR ONCE
Status: COMPLETED | OUTPATIENT
Start: 2022-04-07 | End: 2022-04-07

## 2022-04-07 RX ADMIN — IOPAMIDOL 100 ML: 755 INJECTION, SOLUTION INTRAVENOUS at 08:43

## 2022-04-07 RX ADMIN — SODIUM CHLORIDE 80 ML: 9 INJECTION, SOLUTION INTRAVENOUS at 08:43

## 2022-04-11 ENCOUNTER — HOSPITAL ENCOUNTER (OUTPATIENT)
Facility: AMBULATORY SURGERY CENTER | Age: 47
Discharge: HOME | End: 2022-04-11
Attending: INTERNAL MEDICINE
Payer: COMMERCIAL

## 2022-04-11 ENCOUNTER — ANESTHESIA (OUTPATIENT)
Dept: SURGERY | Facility: AMBULATORY SURGERY CENTER | Age: 47
End: 2022-04-11
Payer: COMMERCIAL

## 2022-04-11 ENCOUNTER — ANESTHESIA EVENT (OUTPATIENT)
Dept: SURGERY | Facility: AMBULATORY SURGERY CENTER | Age: 47
End: 2022-04-11
Payer: COMMERCIAL

## 2022-04-11 VITALS
RESPIRATION RATE: 18 BRPM | HEART RATE: 76 BPM | TEMPERATURE: 97.1 F | DIASTOLIC BLOOD PRESSURE: 86 MMHG | SYSTOLIC BLOOD PRESSURE: 134 MMHG | OXYGEN SATURATION: 94 %

## 2022-04-11 VITALS — HEART RATE: 73 BPM

## 2022-04-11 DIAGNOSIS — R10.11 RUQ PAIN: Primary | ICD-10-CM

## 2022-04-11 LAB — UPPER GI ENDOSCOPY: NORMAL

## 2022-04-11 PROCEDURE — 43239 EGD BIOPSY SINGLE/MULTIPLE: CPT

## 2022-04-11 PROCEDURE — 88305 TISSUE EXAM BY PATHOLOGIST: CPT | Mod: TC | Performed by: INTERNAL MEDICINE

## 2022-04-11 RX ORDER — GLYCOPYRROLATE 0.2 MG/ML
INJECTION, SOLUTION INTRAMUSCULAR; INTRAVENOUS PRN
Status: DISCONTINUED | OUTPATIENT
Start: 2022-04-11 | End: 2022-04-11

## 2022-04-11 RX ORDER — PROCHLORPERAZINE MALEATE 10 MG
10 TABLET ORAL EVERY 6 HOURS PRN
Status: CANCELLED | OUTPATIENT
Start: 2022-04-11

## 2022-04-11 RX ORDER — NALOXONE HYDROCHLORIDE 0.4 MG/ML
0.2 INJECTION, SOLUTION INTRAMUSCULAR; INTRAVENOUS; SUBCUTANEOUS
Status: CANCELLED | OUTPATIENT
Start: 2022-04-11

## 2022-04-11 RX ORDER — PROPOFOL 10 MG/ML
INJECTION, EMULSION INTRAVENOUS CONTINUOUS PRN
Status: DISCONTINUED | OUTPATIENT
Start: 2022-04-11 | End: 2022-04-11

## 2022-04-11 RX ORDER — ONDANSETRON 2 MG/ML
4 INJECTION INTRAMUSCULAR; INTRAVENOUS
Status: DISCONTINUED | OUTPATIENT
Start: 2022-04-11 | End: 2022-04-12 | Stop reason: HOSPADM

## 2022-04-11 RX ORDER — LIDOCAINE 40 MG/G
CREAM TOPICAL
Status: DISCONTINUED | OUTPATIENT
Start: 2022-04-11 | End: 2022-04-12 | Stop reason: HOSPADM

## 2022-04-11 RX ORDER — LIDOCAINE HYDROCHLORIDE 20 MG/ML
INJECTION, SOLUTION INFILTRATION; PERINEURAL PRN
Status: DISCONTINUED | OUTPATIENT
Start: 2022-04-11 | End: 2022-04-11

## 2022-04-11 RX ORDER — NALOXONE HYDROCHLORIDE 0.4 MG/ML
0.4 INJECTION, SOLUTION INTRAMUSCULAR; INTRAVENOUS; SUBCUTANEOUS
Status: CANCELLED | OUTPATIENT
Start: 2022-04-11

## 2022-04-11 RX ORDER — FLUMAZENIL 0.1 MG/ML
0.2 INJECTION, SOLUTION INTRAVENOUS
Status: CANCELLED | OUTPATIENT
Start: 2022-04-11 | End: 2022-04-12

## 2022-04-11 RX ORDER — ONDANSETRON 4 MG/1
4 TABLET, ORALLY DISINTEGRATING ORAL EVERY 6 HOURS PRN
Status: CANCELLED | OUTPATIENT
Start: 2022-04-11

## 2022-04-11 RX ORDER — PROPOFOL 10 MG/ML
INJECTION, EMULSION INTRAVENOUS PRN
Status: DISCONTINUED | OUTPATIENT
Start: 2022-04-11 | End: 2022-04-11

## 2022-04-11 RX ORDER — ONDANSETRON 2 MG/ML
4 INJECTION INTRAMUSCULAR; INTRAVENOUS EVERY 6 HOURS PRN
Status: CANCELLED | OUTPATIENT
Start: 2022-04-11

## 2022-04-11 RX ORDER — SODIUM CHLORIDE, SODIUM LACTATE, POTASSIUM CHLORIDE, CALCIUM CHLORIDE 600; 310; 30; 20 MG/100ML; MG/100ML; MG/100ML; MG/100ML
INJECTION, SOLUTION INTRAVENOUS CONTINUOUS
Status: DISCONTINUED | OUTPATIENT
Start: 2022-04-11 | End: 2022-04-12 | Stop reason: HOSPADM

## 2022-04-11 RX ORDER — OMEPRAZOLE 40 MG/1
40 CAPSULE, DELAYED RELEASE ORAL DAILY
Qty: 60 CAPSULE | Refills: 1 | Status: SHIPPED | OUTPATIENT
Start: 2022-04-11 | End: 2022-05-19

## 2022-04-11 RX ADMIN — PROPOFOL 150 MCG/KG/MIN: 10 INJECTION, EMULSION INTRAVENOUS at 14:50

## 2022-04-11 RX ADMIN — GLYCOPYRROLATE 0.2 MG: 0.2 INJECTION, SOLUTION INTRAMUSCULAR; INTRAVENOUS at 14:49

## 2022-04-11 RX ADMIN — SODIUM CHLORIDE, SODIUM LACTATE, POTASSIUM CHLORIDE, CALCIUM CHLORIDE: 600; 310; 30; 20 INJECTION, SOLUTION INTRAVENOUS at 14:47

## 2022-04-11 RX ADMIN — LIDOCAINE HYDROCHLORIDE 40 MG: 20 INJECTION, SOLUTION INFILTRATION; PERINEURAL at 14:57

## 2022-04-11 RX ADMIN — LIDOCAINE HYDROCHLORIDE 40 MG: 20 INJECTION, SOLUTION INFILTRATION; PERINEURAL at 14:50

## 2022-04-11 RX ADMIN — PROPOFOL 50 MG: 10 INJECTION, EMULSION INTRAVENOUS at 14:52

## 2022-04-11 NOTE — ANESTHESIA PREPROCEDURE EVALUATION
Anesthesia Pre-Procedure Evaluation    Patient: Amelia Coker   MRN: [id number] : 1975        Procedure : Procedure(s):  ESOPHAGOGASTRODUODENOSCOPY (EGD)          Past Medical History:   Diagnosis Date     Abnormal mammogram 2016    right breast      AD (atopic dermatitis) 10/29/2015     Allergic rhinitis due to other allergen      Arthritis      Bell's palsy      Chronic fatigue 2012     Chronic infection     MRSA -  scalp and prior to Abdomen     Contact dermatitis and other eczema, due to unspecified cause     eczema     Contusion of left foot, initial encounter 3/16/2016     DJD (degenerative joint disease), lumbar 2013     DJD (degenerative joint disease), lumbar 2013     Ds DNA antibody positive 2014    borderline.      Elevated blood pressure reading without diagnosis of hypertension 2013     Facial contusion 12/15/2014    hit by horse      Foraminal stenosis of lumbar region 2013     Frontal headache 12/15/2014     Gastroesophageal reflux disease, esophagitis presence not specified 2016     Heat sensitivity 10/29/2015     HTN, goal below 140/90 2015     Hyperlipidemia LDL goal <130 2017     Irregular heart beat      Keratitis sicca (H) 10/31/2012     Left shoulder pain 2013     Lumbar radiculopathy 2014     Migraine headache 10/23/2013     Migraine, unspecified, with intractable migraine, so stated, without mention of status migrainosus      Motion sickness      MRSA infection 10/20/2015     Muscular wasting and disuse atrophy, 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    right SCM, right wrist,       Numbness and tingling     both legs anf feet greater on the left     PAC (premature atrial contraction) 7/15/2010     Personal history of COVID-19 2021     Plantar fasciitis 2015     PONV (postoperative nausea and vomiting)      Skin lesion 10/20/2015    posterior superior scalp      Spasm of muscle 2017     Telangiectasia of face  12/19/2014     Tooth pain 10/20/2015    left lower primary molar       Past Surgical History:   Procedure Laterality Date     AS INJ TRANSFORAMIN EPIDURAL, LUMB/SACR SINGLE       COLONOSCOPY  3/11/2013    Procedure: COLONOSCOPY;  colonoscopy;  Surgeon: Lobito Mas MD;  Location: [facility]     COLONOSCOPY N/A 9/13/2021    Procedure: COLONOSCOPY, WITH POLYPECTOMY AND BIOPSY;  Surgeon: Sam Liz MD;  Location: [facility]     COLONOSCOPY WITH CO2 INSUFFLATION N/A 11/19/2018    Procedure: COLONOSCOPY WITH CO2 INSUFFLATION;  Surgeon: Goyo Blank MD;  Location: [facility]     DECOMPRESSION LUMBAR TWO LEVELS Bilateral 12/8/2016    Procedure: DECOMPRESSION LUMBAR TWO LEVELS;  Surgeon: Bang Burrell MD;  Location: [facility]     DILATION AND CURETTAGE, HYSTEROSCOPY, ABLATE ENDOMETRIUM NOVASURE, COMBINED N/A 6/12/2019    Procedure: hysteroscopy, dilation & curettage, polypectomy, endometrial ablation;  Surgeon: Fredy Pham MD;  Location: [facility]     ESOPHAGOSCOPY, GASTROSCOPY, DUODENOSCOPY (EGD), COMBINED  11/8/2013    Procedure: COMBINED ESOPHAGOSCOPY, GASTROSCOPY, DUODENOSCOPY (EGD), BIOPSY SINGLE OR MULTIPLE;  Esophagoscopy, Gastroscopy, Duodenoscopy EGD with multiple biopsies;  Surgeon: Teja Koch MD;  Location: [facility]     ESOPHAGOSCOPY, GASTROSCOPY, DUODENOSCOPY (EGD), COMBINED N/A 2/18/2021    Procedure: ESOPHAGOGASTRODUODENOSCOPY, WITH BIOPSY;  Surgeon: Blanca Ba MD;  Location: [facility]     HC REMOVAL OF TONSILS,<13 Y/O      Tonsils <10y.o.     HC TOOTH EXTRACTION W/FORCEP       INJECT BLOCK MEDIAL BRANCH CERVICAL/THORACIC/LUMBAR N/A 7/23/2020    Procedure: Sacral 1,2,3 Lateral Branch Blocks and lumbar 5 medial branch blocks bilaterally;  Surgeon: Maurisio Goetz MD;  [facility]: [facility]     INJECT JOINT SACROILIAC Left 9/24/2020    Procedure: Left Lumbar 5 Sacral 1 nerve root injections.;  Surgeon: Maurisio Goetz MD;  Location: [facility]     REPAIR MUSCLE UPPER EXTREMITY Right 7/27/2021     Procedure: DIVISION RIGHT PECTORALIS MUSCLE;  Surgeon: Davie Dutta;  Location: [facility]     REPAIR TENDON ELBOW  7/9/2014    Procedure: REPAIR TENDON ELBOW;  Surgeon: Chris Johnston MD;  Location: [facility]     ULTRASONIC REMOVAL SOFT TISSUE (LOCATION) Right 11/21/2018    Procedure: Tenex right foot;  Surgeon: Patel Martínez DO;  Location: [facility]     VASCULAR SURGERY      Thoracic Outlet Syndrome      Allergies   Allergen Reactions     Ceftriaxone Anaphylaxis     Hives, rash, racing heart beat     Bactrim [Sulfamethoxazole W/Trimethoprim] Hives     Ciprofloxacin Other (See Comments)     Tendon Issues     Codeine Nausea and Vomiting     Codeine      Copper      Rash       Doxycycline      Loss of skin pigmentation, skin loss.     Gold      Rash       Iodine Hives     WELTS     Iodine-131 Hives     Levaquin [Levofloxacin] Other (See Comments)     Tendon issues with levaquin and cipro     Nickel      rash     Steel [Staples]      Rash from staples       Sulfa Drugs Hives     Full Body     Latex Rash     Penicillins Rash      Social History     Tobacco Use     Smoking status: Never Smoker     Smokeless tobacco: Never Used   Substance Use Topics     Alcohol use: No     Comment: social      Wt Readings from Last 1 Encounters:   03/31/22 99 kg (218 lb 4.8 oz)        Anesthesia Evaluation   Pt has had prior anesthetic. Type: General.    History of anesthetic complications  - PONV.      ROS/MED HX  ENT/Pulmonary:  - neg pulmonary ROS     Neurologic:  - neg neurologic ROS     Cardiovascular:     (+) hypertension-----    METS/Exercise Tolerance:     Hematologic:  - neg hematologic  ROS     Musculoskeletal:   (+) arthritis,     GI/Hepatic:     (+) GERD,     Renal/Genitourinary:  - neg Renal ROS     Endo:  - neg endo ROS     Psychiatric/Substance Use:  - neg psychiatric ROS     Infectious Disease:  - neg infectious disease ROS     Malignancy:       Other:            Physical Exam    Airway  airway exam normal            Respiratory Devices and Support         Dental  no notable dental history         Cardiovascular   cardiovascular exam normal          Pulmonary   pulmonary exam normal                OUTSIDE LABS:  CBC:   Lab Results   Component Value Date    WBC 5.8 03/17/2022    WBC 6.3 09/09/2021    HGB 12.1 03/17/2022    HGB 12.5 09/09/2021    HCT 36.7 03/17/2022    HCT 37.8 09/09/2021     03/17/2022     09/09/2021     BMP:   Lab Results   Component Value Date     03/17/2022     09/09/2021    POTASSIUM 4.1 03/17/2022    POTASSIUM 3.9 09/09/2021    CHLORIDE 109 03/17/2022    CHLORIDE 106 09/09/2021    CO2 29 03/17/2022    CO2 27 09/09/2021    BUN 19 03/17/2022    BUN 14 09/09/2021    CR 0.66 03/17/2022    CR 0.63 09/09/2021     (H) 03/17/2022    GLC 93 09/09/2021     COAGS:   Lab Results   Component Value Date    INR 1.0 07/14/2014     POC:   Lab Results   Component Value Date    HCG Negative 07/27/2021     HEPATIC:   Lab Results   Component Value Date    ALBUMIN 3.9 03/17/2022    PROTTOTAL 7.3 03/17/2022    ALT 32 03/17/2022    AST 18 03/17/2022    ALKPHOS 79 03/17/2022    BILITOTAL 0.4 03/17/2022    BILIDIRECT 0 04/04/2003     OTHER:   Lab Results   Component Value Date    PH 5.0 04/04/2003    A1C 5.3 07/27/2021    JELENA 9.0 03/17/2022    PHOS 3.0 11/19/2014    MAG 2.1 06/08/2016    LIPASE 159 03/17/2022    AMYLASE 49 03/17/2022    TSH 0.90 05/25/2021    T4 0.92 10/07/2019    T3 90 10/07/2019    CRP <2.9 03/17/2022    SED 9 08/29/2013       Anesthesia Plan    ASA Status:  2   NPO Status:  NPO Appropriate    Anesthesia Type: MAC.     - Reason for MAC: straight local not clinically adequate   Induction: Intravenous.   Maintenance: TIVA.        Consents    Anesthesia Plan(s) and associated risks, benefits, and realistic alternatives discussed. Questions answered and patient/representative(s) expressed understanding.     - Discussed: Risks, Benefits and Alternatives for BOTH SEDATION and the  PROCEDURE were discussed     - Discussed with:  Patient         Postoperative Care    Pain management: Oral pain medications.   PONV prophylaxis: Ondansetron (or other 5HT-3), Dexamethasone or Solumedrol     Comments:                Tucker Sims MD, MD

## 2022-04-11 NOTE — H&P
Amelia Coker  [id number]  female  47 year old      Reason for procedure/surgery: abdominal pain and irregular bowel habits    Patient Active Problem List   Diagnosis     Migraine, unspecified, with intractable migraine, so stated, without mention of status migrainosus     Bell's palsy     Contact dermatitis and other eczema, due to unspecified cause     Scalp lesion     Lyme disease     PAC (premature atrial contraction)     Palpitations     Raynaud phenomenon     Chronic fatigue     Hair loss     Abnormal PFT     Shoulder joint instability     Family history of melanoma     Dry eyes     Keratitis sicca (H)     Family history of colon cancer     Pain in joint, upper arm     FLORIDALMA positive     Plantar fascial fibromatosis     Foraminal stenosis of lumbar region     DJD (degenerative joint disease), lumbar     Migraine without aura and without status migrainosus, not intractable     Lumbar radiculopathy     Ds DNA antibody positive     Muscular wasting and disuse atrophy, not elsewhere classified     Frontal headache     Telangiectasia of face     Lateral epicondylitis     Right shoulder pain     Plantar fasciitis     Skin lesion     AD (atopic dermatitis)     Heat sensitivity     Rotator cuff arthropathy, right     Gastroesophageal reflux disease, esophagitis presence not specified     Abnormal mammogram     Sternocleidomastoid muscle tenderness     Acute cervical myofascial strain, initial encounter     Spasm of muscle     Hyperlipidemia LDL goal <130     Biceps tendinopathy, right     Superficial swelling of scalp     Intractable right heel pain     Intractable episodic tension-type headache     Jaycob complexion     Lip lesion     Abdominal pain, epigastric     Abdominal distension     PONV (postoperative nausea and vomiting)     Menorrhalgia     Uterine polyp     Loose stools     Hyperactive bowel sounds     Abnormality of nail surface     Dry hair     Dry skin     Malaise and fatigue     Lymphadenopathy      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     Endometrium, hyperplasia - on recent CT scan     Pain in joint involving pelvic region and thigh, right     Right lateral abdominal pain     Right foot pain     Dental abscess - left mandible molar     Brachial plexopathy - right shoulder     Balance problems     Morbid obesity (H)     Sinus arrhythmia seen on electrocardiogram     Family history of ischemic heart disease - father at 46 massive MI     Elevated blood pressure reading without diagnosis of hypertension     Hypertrophy of breast     History of cold sores     AK (actinic keratosis) - both hands at the lateral thenar aspect.     Dermatitis     Musculoskeletal pain     Fatigue, unspecified type     TOS (thoracic outlet syndrome)     Colitis     Abnormal CT scan, colon     Personal history of COVID-19       Past Surgical History:    Past Surgical History:   Procedure Laterality Date     AS INJ TRANSFORAMIN EPIDURAL, LUMB/SACR SINGLE       COLONOSCOPY  3/11/2013    Procedure: COLONOSCOPY;  colonoscopy;  Surgeon: Lobito Mas MD;  Location: [facility]     COLONOSCOPY N/A 9/13/2021    Procedure: COLONOSCOPY, WITH POLYPECTOMY AND BIOPSY;  Surgeon: Sam Liz MD;  Location: [facility]     COLONOSCOPY WITH CO2 INSUFFLATION N/A 11/19/2018    Procedure: COLONOSCOPY WITH CO2 INSUFFLATION;  Surgeon: Goyo Blank MD;  Location: [facility]     DECOMPRESSION LUMBAR TWO LEVELS Bilateral 12/8/2016    Procedure: DECOMPRESSION LUMBAR TWO LEVELS;  Surgeon: Bang Burrell MD;  Location: [facility]     DILATION AND CURETTAGE, HYSTEROSCOPY, ABLATE ENDOMETRIUM NOVASURE, COMBINED N/A 6/12/2019    Procedure: hysteroscopy, dilation & curettage, polypectomy, endometrial ablation;  Surgeon: Fredy Pham MD;  Location: [facility]     ESOPHAGOSCOPY, GASTROSCOPY, DUODENOSCOPY (EGD), COMBINED  11/8/2013    Procedure: COMBINED ESOPHAGOSCOPY, GASTROSCOPY, DUODENOSCOPY (EGD), BIOPSY SINGLE OR MULTIPLE;  Esophagoscopy,  Gastroscopy, Duodenoscopy EGD with multiple biopsies;  Surgeon: Teja Koch MD;  Location: [facility]     ESOPHAGOSCOPY, GASTROSCOPY, DUODENOSCOPY (EGD), COMBINED N/A 2/18/2021    Procedure: ESOPHAGOGASTRODUODENOSCOPY, WITH BIOPSY;  Surgeon: Blanca Ba MD;  Location: [facility]     HC REMOVAL OF TONSILS,<11 Y/O      Tonsils <10y.o.     HC TOOTH EXTRACTION W/FORCEP       INJECT BLOCK MEDIAL BRANCH CERVICAL/THORACIC/LUMBAR N/A 7/23/2020    Procedure: Sacral 1,2,3 Lateral Branch Blocks and lumbar 5 medial branch blocks bilaterally;  Surgeon: Maurisio Goetz MD;  [facility]     INJECT JOINT SACROILIAC Left 9/24/2020    Procedure: Left Lumbar 5 Sacral 1 nerve root injections.;  Surgeon: Maurisio Goetz MD;  [facility]     REPAIR MUSCLE UPPER EXTREMITY Right 7/27/2021    Procedure: DIVISION RIGHT PECTORALIS MUSCLE;  Surgeon: Davie Dutta;  Location: [facility]     REPAIR TENDON ELBOW  7/9/2014    Procedure: REPAIR TENDON ELBOW;  Surgeon: Chris Johnston MD;  [facility]: [facility]     ULTRASONIC REMOVAL SOFT TISSUE (LOCATION) Right 11/21/2018    Procedure: Tenex right foot;  Surgeon: Patel Martínez DO;  Location: [facility]     VASCULAR SURGERY      Thoracic Outlet Syndrome       Past Medical History:   Past Medical History:   Diagnosis Date     Abnormal mammogram 9/21/2016    right breast      AD (atopic dermatitis) 10/29/2015     Allergic rhinitis due to other allergen      Arthritis      Bell's palsy      Chronic fatigue 6/6/2012     Chronic infection     MRSA - 2015 scalp and prior to Abdomen     Contact dermatitis and other eczema, due to unspecified cause     eczema     Contusion of left foot, initial encounter 3/16/2016     DJD (degenerative joint disease), lumbar 9/26/2013     DJD (degenerative joint disease), lumbar 9/26/2013     Ds DNA antibody positive 4/16/2014    borderline.      Elevated blood pressure reading without diagnosis of hypertension 12/24/2013     Facial contusion 12/15/2014    hit  by horse      Foraminal stenosis of lumbar region 2013     Frontal headache 12/15/2014     Gastroesophageal reflux disease, esophagitis presence not specified 2016     Heat sensitivity 10/29/2015     HTN, goal below 140/90 2015     Hyperlipidemia LDL goal <130 2017     Irregular heart beat      Keratitis sicca (H) 10/31/2012     Left shoulder pain 2013     Lumbar radiculopathy 2014     Migraine headache 10/23/2013     Migraine, unspecified, with intractable migraine, so stated, without mention of status migrainosus      Motion sickness      MRSA infection 10/20/2015     Muscular wasting and disuse atrophy, 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    right SCM, right wrist,       Numbness and tingling     both legs anf feet greater on the left     PAC (premature atrial contraction) 7/15/2010     Personal history of COVID-19 2021     Plantar fasciitis 2015     PONV (postoperative nausea and vomiting)      Skin lesion 10/20/2015    posterior superior scalp      Spasm of muscle 2017     Telangiectasia of face 2014     Tooth pain 10/20/2015    left lower primary molar        Social History:   Social History     Tobacco Use     Smoking status: Never Smoker     Smokeless tobacco: Never Used   Substance Use Topics     Alcohol use: No     Comment: social       Family History:   Family History   Problem Relation Age of Onset     Diabetes Mother         adult     Cancer - colorectal Mother      Hypertension Mother      Allergies Mother      Cancer Mother         colon     Depression Mother      Gastrointestinal Disease Mother         ibs     Genitourinary Problems Mother         kidney cyst     Gynecology Mother         endometriosis     Lipids Mother      Thyroid Disease Mother      Arthritis Mother         RA     Heart Disease Maternal Grandfather         MI,  - 60's     Allergies Father      Unknown/Adopted Father      Heart Disease Father         mi-age mid 40's      Cardiovascular Father      Lipids Father      Respiratory Father         asthma     Cancer Father      Other Cancer Father         renal cancer     Depression Sister      Allergies Sister      Cancer Sister         vaginal     Gynecology Sister         endometriosis     Lipids Paternal Grandmother      Osteoporosis Paternal Grandmother      Thyroid Disease Paternal Grandmother      Respiratory Son         asthma     Allergies Son      Arthritis Sister      Allergies Brother      Heart Disease Brother      Allergies Daughter      Blood Disease Paternal Aunt         LGL T cell Leukemia     Rheumatoid Arthritis Paternal Aunt      Kidney Disease Maternal Aunt      Lupus Maternal Aunt      Bladder Cancer Maternal Aunt        Allergies:   Allergies   Allergen Reactions     Ceftriaxone Anaphylaxis     Hives, rash, racing heart beat     Bactrim [Sulfamethoxazole W/Trimethoprim] Hives     Ciprofloxacin Other (See Comments)     Tendon Issues     Codeine Nausea and Vomiting     Codeine      Copper      Rash       Doxycycline      Loss of skin pigmentation, skin loss.     Gold      Rash       Iodine Hives     WELTS     Iodine-131 Hives     Levaquin [Levofloxacin] Other (See Comments)     Tendon issues with levaquin and cipro     Nickel      rash     Steel [Staples]      Rash from staples       Sulfa Drugs Hives     Full Body     Latex Rash     Penicillins Rash       Active Medications:   Current Outpatient Medications   Medication Sig Dispense Refill     albuterol (PROAIR HFA/PROVENTIL HFA/VENTOLIN HFA) 108 (90 Base) MCG/ACT inhaler Inhale 2 puffs into the lungs every 4 hours as needed for shortness of breath / dyspnea or wheezing 18 g 1     clobetasol (TEMOVATE) 0.05 % external cream Apply topically 2 times daily 60 g 1     dicyclomine (BENTYL) 10 MG capsule Take 1-2 capsules up to 4 times a day as needed for abdominal pain 120 capsule 3     fluticasone (FLOVENT HFA) 110 MCG/ACT inhaler Inhale 1 puff into the lungs 2 times  daily 12 g 0     medical cannabis (Patient's own supply) Take 1 Dose by mouth See Admin Instructions 1/2 to 2 tablets every 3-6 hours as needed (for pain/sleep)    THC:CBD 2.5m.5mg    (The purpose of this order is to document that the patient reports taking medical cannabis.  This is not a prescription, and is not used to certify that the patient has a qualifying medical condition.)       meloxicam (MOBIC) 15 MG tablet Take 15 mg by mouth daily       mupirocin (BACTROBAN) 2 % external ointment Apply topically 3 times daily 30 g 0     predniSONE (DELTASONE) 20 MG tablet Take 3 tabs by mouth daily x 3 days, then 2 tabs daily x 3 days, then 1 tab daily x 3 days, then 1/2 tab daily x 3 days. 20 tablet 0     predniSONE (DELTASONE) 20 MG tablet Take 3 tabs by mouth daily x 3 days, then 2 tabs daily x 3 days, then 1 tab daily x 3 days, then 1/2 tab daily x 3 days. 20 tablet 0     rizatriptan (MAXALT-MLT) 5 MG ODT Take 1 tablet (5 mg) by mouth at onset of headache for migraine May repeat in 2 hours. Max 6 tablets/24 hours. 30 tablet 1     sucralfate (CARAFATE) 1 GM tablet Take 1 tablet (1 g) by mouth 4 times daily 40 tablet 0     tacrolimus (PROTOPIC) 0.1 % external ointment Apply topically 2 times daily On eczematous lesions 2xdaily 60 g 3     valACYclovir (VALTREX) 1000 mg tablet Take 1 tablet (1,000 mg) by mouth 3 times daily for 7 days 21 tablet 0       Systemic Review:   CONSTITUTIONAL: NEGATIVE for fever, chills, change in weight  ENT/MOUTH: NEGATIVE for ear, mouth and throat problems  RESP: NEGATIVE for significant cough or SOB  CV: NEGATIVE for chest pain, palpitations or peripheral edema    Physical Examination:   Vital Signs: There were no vitals taken for this visit.  GENERAL: healthy, alert and no distress  NECK: no adenopathy, no asymmetry, masses, or scars  RESP: lungs clear to auscultation - no rales, rhonchi or wheezes  CV: regular rate and rhythm, normal S1 S2, no S3 or S4, no murmur, click or rub, no  peripheral edema and peripheral pulses strong  ABDOMEN: soft, nontender, no hepatosplenomegaly, no masses and bowel sounds normal  MS: no gross musculoskeletal defects noted, no edema    Plan: Appropriate to proceed as scheduled.      Fredy Jimenez MD  4/11/2022    PCP:  Otoniel Nelson

## 2022-04-11 NOTE — ANESTHESIA POSTPROCEDURE EVALUATION
Patient: Amelia Coker    Procedure: Procedure(s):  ESOPHAGOGASTRODUODENOSCOPY, WITH BIOPSY       Anesthesia Type:  MAC    Note:  Disposition: Outpatient   Postop Pain Control: Uneventful            Sign Out: Well controlled pain   PONV: No   Neuro/Psych: Uneventful            Sign Out: Acceptable/Baseline neuro status   Airway/Respiratory: Uneventful            Sign Out: Acceptable/Baseline resp. status   CV/Hemodynamics: Uneventful            Sign Out: Acceptable CV status; No obvious hypovolemia; No obvious fluid overload   Other NRE: NONE   DID A NON-ROUTINE EVENT OCCUR? No           Last vitals:  Vitals Value Taken Time   /87 04/11/22 1513   Temp 36.2  C (97.1  F) 04/11/22 1513   Pulse 74 04/11/22 1513   Resp 16 04/11/22 1513   SpO2 92 % 04/11/22 1513       Electronically Signed By: Tucker Sims MD, MD  April 11, 2022  3:22 PM

## 2022-04-11 NOTE — ANESTHESIA CARE TRANSFER NOTE
Patient: Amelia Coker    Procedure: Procedure(s):  ESOPHAGOGASTRODUODENOSCOPY, WITH BIOPSY       Diagnosis: RUQ abdominal pain [R10.11]  Diagnosis Additional Information: No value filed.    Anesthesia Type:   MAC     Note:    Oropharynx: oropharynx clear of all foreign objects and spontaneously breathing  Level of Consciousness: awake  Oxygen Supplementation: room air    Independent Airway: airway patency satisfactory and stable  Dentition: dentition unchanged  Vital Signs Stable: post-procedure vital signs reviewed and stable  Report to RN Given: handoff report given  Patient transferred to: Phase II    Handoff Report: Identifed the Patient, Identified the Reponsible Provider, Reviewed the pertinent medical history, Discussed the surgical course, Reviewed Intra-OP anesthesia mangement and issues during anesthesia, Set expectations for post-procedure period and Allowed opportunity for questions and acknowledgement of understanding      Vitals:  Vitals Value Taken Time   /87 04/11/22 1513   Temp 36.2  C (97.1  F) 04/11/22 1513   Pulse 74 04/11/22 1513   Resp 16 04/11/22 1513   SpO2 92 % 04/11/22 1513       Electronically Signed By: LAURI Malone CRNA  April 11, 2022  3:19 PM

## 2022-04-14 LAB
PATH REPORT.COMMENTS IMP SPEC: NORMAL
PATH REPORT.FINAL DX SPEC: NORMAL
PATH REPORT.GROSS SPEC: NORMAL
PATH REPORT.MICROSCOPIC SPEC OTHER STN: NORMAL
PATH REPORT.RELEVANT HX SPEC: NORMAL
PHOTO IMAGE: NORMAL

## 2022-04-14 PROCEDURE — 88305 TISSUE EXAM BY PATHOLOGIST: CPT | Mod: 26 | Performed by: PATHOLOGY

## 2022-05-06 DIAGNOSIS — S16.1XXA ACUTE CERVICAL MYOFASCIAL STRAIN, INITIAL ENCOUNTER: Primary | ICD-10-CM

## 2022-05-09 RX ORDER — MELOXICAM 15 MG/1
15 TABLET ORAL DAILY
Qty: 90 TABLET | Refills: 3 | Status: SHIPPED | OUTPATIENT
Start: 2022-05-09 | End: 2023-01-09

## 2022-05-12 ENCOUNTER — OFFICE VISIT (OUTPATIENT)
Dept: FAMILY MEDICINE | Facility: OTHER | Age: 47
End: 2022-05-12
Payer: COMMERCIAL

## 2022-05-12 VITALS
DIASTOLIC BLOOD PRESSURE: 74 MMHG | HEART RATE: 62 BPM | HEIGHT: 65 IN | BODY MASS INDEX: 34.79 KG/M2 | WEIGHT: 208.8 LBS | SYSTOLIC BLOOD PRESSURE: 123 MMHG | TEMPERATURE: 99.2 F | OXYGEN SATURATION: 98 %

## 2022-05-12 DIAGNOSIS — I77.89 PECTORALIS MINOR SYNDROME (H): ICD-10-CM

## 2022-05-12 DIAGNOSIS — Z01.818 PREOP GENERAL PHYSICAL EXAM: Primary | ICD-10-CM

## 2022-05-12 DIAGNOSIS — E04.1 THYROID NODULE: ICD-10-CM

## 2022-05-12 DIAGNOSIS — E66.01 MORBID OBESITY (H): ICD-10-CM

## 2022-05-12 DIAGNOSIS — M54.16 LUMBAR RADICULOPATHY: ICD-10-CM

## 2022-05-12 DIAGNOSIS — Z98.890 HISTORY OF ENDOMETRIAL ABLATION: ICD-10-CM

## 2022-05-12 DIAGNOSIS — R11.2 PONV (POSTOPERATIVE NAUSEA AND VOMITING): ICD-10-CM

## 2022-05-12 DIAGNOSIS — H16.229 KERATITIS SICCA: ICD-10-CM

## 2022-05-12 DIAGNOSIS — M51.27 HERNIATION OF INTERVERTEBRAL DISC BETWEEN L5 AND S1: ICD-10-CM

## 2022-05-12 DIAGNOSIS — M48.061 FORAMINAL STENOSIS OF LUMBAR REGION: ICD-10-CM

## 2022-05-12 DIAGNOSIS — Z98.890 PONV (POSTOPERATIVE NAUSEA AND VOMITING): ICD-10-CM

## 2022-05-12 DIAGNOSIS — G51.0 BELL'S PALSY: ICD-10-CM

## 2022-05-12 DIAGNOSIS — M47.26 OSTEOARTHRITIS OF SPINE WITH RADICULOPATHY, LUMBAR REGION: ICD-10-CM

## 2022-05-12 DIAGNOSIS — I49.1 PAC (PREMATURE ATRIAL CONTRACTION): ICD-10-CM

## 2022-05-12 PROCEDURE — 93000 ELECTROCARDIOGRAM COMPLETE: CPT | Performed by: PHYSICIAN ASSISTANT

## 2022-05-12 PROCEDURE — 99214 OFFICE O/P EST MOD 30 MIN: CPT | Performed by: PHYSICIAN ASSISTANT

## 2022-05-12 ASSESSMENT — PAIN SCALES - GENERAL: PAINLEVEL: SEVERE PAIN (6)

## 2022-05-12 NOTE — PROGRESS NOTES
[address]  [facility] [address]  Phone: [phone]  Primary Provider: Otoniel Nelson  Pre-op Performing Provider: OTONIEL NELSON      PREOPERATIVE EVALUATION:  Today's date: 5/12/2022    Amelia Coker is a 47 year old female who presents for a preoperative evaluation.    Surgical Information:  Surgery/Procedure: Spine stimulator insertion  Surgery Location: [facility]  Surgeon: Derrick  Surgery Date: May 23, 2022   Time of Surgery: UNK  Where patient plans to recover: At home with family  Fax number for surgical facility: [phone]    Type of Anesthesia Anticipated: Choice    Assessment & Plan     The proposed surgical procedure is considered LOW risk.    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  She is due for labs in the near future.  She is cleared for surgical intervention without concern at this point time.  She will need to arrange for COVID test for her surgeon prior to her surgical date.  - TSH WITH FREE T4 REFLEX; Future  - EKG 12-lead complete w/read - Clinics         Risks and Recommendations:  The patient has the following additional risks and recommendations for perioperative complications:   - No identified additional risk factors other than previously addressed    Medication Instructions:   - meloxicam (Mobic): HOLD 10 days before surgery.     RECOMMENDATION:  APPROVAL GIVEN to proceed with proposed procedure, without further diagnostic evaluation.    Subjective     HPI related to upcoming procedure: Nerve stimulator  placement    Preop Questions 5/12/2022   1. Have you ever had a heart attack or stroke? No   2. Have you ever had surgery on your heart or blood vessels, such as a stent placement, a coronary artery bypass, or surgery on an artery in your head, neck, heart, or legs? No   3. Do you have chest pain with activity? No   4. Do you have a history of  heart failure? No   5. Do you currently have a cold, bronchitis or symptoms of other infection? No   6. Do you have a cough, shortness of breath, or wheezing? No   7. Do you or anyone in your family have previous history of blood clots? No   8. Do you or does anyone in your family have a serious bleeding problem such as prolonged bleeding following surgeries or cuts? No   9. Have you ever had problems with anemia or been told to take iron pills? No   10. Have you had any abnormal blood loss such as black, tarry or bloody stools, or abnormal vaginal bleeding? No   11. Have you ever had a blood transfusion? No   12. Are you willing to have a blood transfusion if it is medically needed before, during, or after your surgery? Yes   13. Have you or any of your relatives ever had problems with anesthesia? YES -    14. Do you have sleep apnea, excessive snoring or daytime drowsiness? No   15. Do you have any artifical heart valves or other implanted medical devices like a pacemaker, defibrillator, or continuous glucose monitor? No   16. Do you have artificial joints? No   17. Are you allergic to latex? YES:    18. Is there any chance that you may be pregnant? No       Health Care Directive:  Patient does not have a Health Care Directive or Living Will:     Preoperative Review of :   reviewed - controlled substances reflected in medication list.    Review of Systems  CONSTITUTIONAL: NEGATIVE for fever, chills, change in weight  INTEGUMENTARY/SKIN: NEGATIVE for worrisome rashes, moles or lesions  EYES: NEGATIVE for vision changes or irritation  ENT/MOUTH: NEGATIVE for ear, mouth  and throat problems  RESP: NEGATIVE for significant cough or SOB  CV: NEGATIVE for chest pain, palpitations or peripheral edema  GI: NEGATIVE for nausea, abdominal pain, heartburn, or change in bowel habits  : NEGATIVE for frequency, dysuria, or hematuria  MUSCULOSKELETAL: NEGATIVE for significant arthralgias or myalgia  NEURO: NEGATIVE for weakness, dizziness or paresthesias  ENDOCRINE: NEGATIVE for temperature intolerance, skin/hair changes  HEME: NEGATIVE for bleeding problems  PSYCHIATRIC: NEGATIVE for changes in mood or affect    Patient Active Problem List    Diagnosis Date Noted     Personal history of COVID-19 12/02/2021     Priority: Medium     Colitis 09/09/2021     Priority: Medium     Abnormal CT scan, colon 09/09/2021     Priority: Medium     TOS (thoracic outlet syndrome) 06/07/2021     Priority: Medium     Added automatically from request for surgery [id number]       Dermatitis 03/03/2021     Priority: Medium     Musculoskeletal pain 03/03/2021     Priority: Medium     Fatigue, unspecified type 03/03/2021     Priority: Medium     AK (actinic keratosis) - both hands at the lateral thenar aspect. 12/08/2020     Priority: Medium     Hypertrophy of breast 11/05/2020     Priority: Medium     History of cold sores 11/05/2020     Priority: Medium     Elevated blood pressure reading without diagnosis of hypertension 09/24/2020     Priority: Medium     Balance problems 07/17/2020     Priority: Medium     Morbid obesity (H) 07/17/2020     Priority: Medium     Sinus arrhythmia seen on electrocardiogram 07/17/2020     Priority: Medium     Family history of ischemic heart disease - father at 46 massive MI 07/17/2020     Priority: Medium     Brachial plexopathy - right shoulder 05/21/2020     Priority: Medium     Dental abscess - left mandible molar 03/05/2020     Priority: Medium     Endometrium, hyperplasia - on recent CT scan 02/10/2020     Priority: Medium     Pain in joint involving pelvic region and thigh,  right 02/10/2020     Priority: Medium     Right lateral abdominal pain 02/10/2020     Priority: Medium     Right foot pain 02/10/2020     Priority: Medium     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 10/10/2019     Priority: Medium     Abnormality of nail surface 08/29/2019     Priority: Medium     Dry hair 08/29/2019     Priority: Medium     Dry skin 08/29/2019     Priority: Medium     Malaise and fatigue 08/29/2019     Priority: Medium     Lymphadenopathy 08/29/2019     Priority: Medium     PONV (postoperative nausea and vomiting) 06/10/2019     Priority: Medium     Menorrhalgia 06/10/2019     Priority: Medium     Uterine polyp 06/10/2019     Priority: Medium     Loose stools 06/10/2019     Priority: Medium     Hyperactive bowel sounds 06/10/2019     Priority: Medium     Jaycob complexion 05/23/2019     Priority: Medium     Lip lesion 05/23/2019     Priority: Medium     Abdominal pain, epigastric 05/23/2019     Priority: Medium     Abdominal distension 05/23/2019     Priority: Medium     Intractable episodic tension-type headache 03/11/2019     Priority: Medium     Intractable right heel pain 05/29/2018     Priority: Medium     Superficial swelling of scalp 04/12/2018     Priority: Medium     Biceps tendinopathy, right 03/22/2018     Priority: Medium     Hyperlipidemia LDL goal <130 08/30/2017     Priority: Medium     Spasm of muscle 07/11/2017     Priority: Medium     Sternocleidomastoid muscle tenderness 04/05/2017     Priority: Medium     left         Acute cervical myofascial strain, initial encounter 04/05/2017     Priority: Medium     Abnormal mammogram 09/21/2016     Priority: Medium     right breast       Gastroesophageal reflux disease, esophagitis presence not specified 06/29/2016     Priority: Medium     IMO Regulatory Load OCT 2020       Rotator cuff arthropathy, right 03/07/2016     Priority: Medium     AD (atopic dermatitis) 10/29/2015     Priority: Medium     Heat sensitivity 10/29/2015      Priority: Medium     Skin lesion 10/20/2015     Priority: Medium     posterior superior scalp       Plantar fasciitis 09/23/2015     Priority: Medium     Lateral epicondylitis 03/16/2015     Priority: Medium     Problem list name updated by automated process. Provider to review       Right shoulder pain 03/16/2015     Priority: Medium     Telangiectasia of face 12/19/2014     Priority: Medium     Frontal headache 12/15/2014     Priority: Medium     Muscular wasting and disuse atrophy, not elsewhere classified 06/09/2014     Priority: Medium     right SCM, right wrist,        Ds DNA antibody positive 04/16/2014     Priority: Medium     borderline.       Lumbar radiculopathy 01/22/2014     Priority: Medium     Migraine without aura and without status migrainosus, not intractable 10/23/2013     Priority: Medium     Foraminal stenosis of lumbar region 09/26/2013     Priority: Medium     DJD (degenerative joint disease), lumbar 09/26/2013     Priority: Medium     FLORIDALMA positive 07/18/2013     Priority: Medium     Plantar fascial fibromatosis 07/18/2013     Priority: Medium     Pain in joint, upper arm 04/15/2013     Priority: Medium     Family history of colon cancer 03/05/2013     Priority: Medium     mother       Keratitis sicca (H) 10/31/2012     Priority: Medium     Dry eyes 10/15/2012     Priority: Medium     Family history of melanoma 08/24/2012     Priority: Medium     Shoulder joint instability 08/15/2012     Priority: Medium     Abnormal PFT 07/31/2012     Priority: Medium     question of left heart failure and/or shunting in need of further investigation       Chronic fatigue 06/06/2012     Priority: Medium     Hair loss 06/06/2012     Priority: Medium     Raynaud phenomenon 04/11/2012     Priority: Medium     PAC (premature atrial contraction) 07/15/2010     Priority: Medium     Palpitations 07/15/2010     Priority: Medium     Lyme disease 06/07/2010     Priority: Medium     Scalp lesion 05/26/2010      Priority: Medium     Migraine, unspecified, with intractable migraine, so stated, without mention of status migrainosus 04/04/2003     Priority: Medium     Problem list name updated by automated process. Provider to review  IMO Regulatory Load OCT 2020       Bell's palsy 04/04/2003     Priority: Medium     Contact dermatitis and other eczema, due to unspecified cause 04/04/2003     Priority: Medium      Past Medical History:   Diagnosis Date     Abnormal mammogram 9/21/2016    right breast      AD (atopic dermatitis) 10/29/2015     Allergic rhinitis due to other allergen      Arthritis      Bell's palsy      Chronic fatigue 6/6/2012     Chronic infection     MRSA - 2015 scalp and prior to Abdomen     Contact dermatitis and other eczema, due to unspecified cause     eczema     Contusion of left foot, initial encounter 3/16/2016     DJD (degenerative joint disease), lumbar 9/26/2013     DJD (degenerative joint disease), lumbar 9/26/2013     Ds DNA antibody positive 4/16/2014    borderline.      Elevated blood pressure reading without diagnosis of hypertension 12/24/2013     Facial contusion 12/15/2014    hit by horse      Foraminal stenosis of lumbar region 9/26/2013     Frontal headache 12/15/2014     Gastroesophageal reflux disease, esophagitis presence not specified 6/29/2016     Heat sensitivity 10/29/2015     HTN, goal below 140/90 9/23/2015     Hyperlipidemia LDL goal <130 8/30/2017     Irregular heart beat      Keratitis sicca (H) 10/31/2012     Left shoulder pain 9/26/2013     Lumbar radiculopathy 1/22/2014     Migraine headache 10/23/2013     Migraine, unspecified, with intractable migraine, so stated, without mention of status migrainosus      Motion sickness      MRSA infection 10/20/2015     Muscular wasting and disuse atrophy, not elsewhere classified 6/9/2014    right SCM, right wrist,       Numbness and tingling     both legs anf feet greater on the left     PAC (premature atrial contraction) 7/15/2010      Personal history of COVID-19 12/2/2021     Plantar fasciitis 9/23/2015     PONV (postoperative nausea and vomiting)      Skin lesion 10/20/2015    posterior superior scalp      Spasm of muscle 7/11/2017     Telangiectasia of face 12/19/2014     Tooth pain 10/20/2015    left lower primary molar      Past Surgical History:   Procedure Laterality Date     AS INJ TRANSFORAMIN EPIDURAL, LUMB/SACR SINGLE       COLONOSCOPY  3/11/2013    Procedure: COLONOSCOPY;  colonoscopy;  Surgeon: Lobito Mas MD;  Location: [facility]     COLONOSCOPY N/A 9/13/2021    Procedure: COLONOSCOPY, WITH POLYPECTOMY AND BIOPSY;  Surgeon: Sam Liz MD;  Location: [facility]     COLONOSCOPY WITH CO2 INSUFFLATION N/A 11/19/2018    Procedure: COLONOSCOPY WITH CO2 INSUFFLATION;  Surgeon: Goyo Blank MD;  Location: [facility]     DECOMPRESSION LUMBAR TWO LEVELS Bilateral 12/8/2016    Procedure: DECOMPRESSION LUMBAR TWO LEVELS;  Surgeon: Bang Burrell MD;  Location: [facility]     DILATION AND CURETTAGE, HYSTEROSCOPY, ABLATE ENDOMETRIUM NOVASURE, COMBINED N/A 6/12/2019    Procedure: hysteroscopy, dilation & curettage, polypectomy, endometrial ablation;  Surgeon: Fredy Pham MD;  Location: [facility]     ESOPHAGOSCOPY, GASTROSCOPY, DUODENOSCOPY (EGD), COMBINED  11/8/2013    Procedure: COMBINED ESOPHAGOSCOPY, GASTROSCOPY, DUODENOSCOPY (EGD), BIOPSY SINGLE OR MULTIPLE;  Esophagoscopy, Gastroscopy, Duodenoscopy EGD with multiple biopsies;  Surgeon: Teja Koch MD;  Location: [facility]     ESOPHAGOSCOPY, GASTROSCOPY, DUODENOSCOPY (EGD), COMBINED N/A 2/18/2021    Procedure: ESOPHAGOGASTRODUODENOSCOPY, WITH BIOPSY;  Surgeon: Blanca Ba MD;  [facility]: [facility]     ESOPHAGOSCOPY, GASTROSCOPY, DUODENOSCOPY (EGD), COMBINED N/A 4/11/2022    Procedure: ESOPHAGOGASTRODUODENOSCOPY, WITH BIOPSY;  Surgeon: Fredy Jimenez MD;  Location: [facility]     HC REMOVAL OF TONSILS,<11 Y/O      Tonsils <10y.o.     HC TOOTH EXTRACTION  W/FORCEP       INJECT BLOCK MEDIAL BRANCH CERVICAL/THORACIC/LUMBAR N/A 2020    Procedure: Sacral 1,2,3 Lateral Branch Blocks and lumbar 5 medial branch blocks bilaterally;  Surgeon: Maurisio Gotez MD;  [facility]     INJECT JOINT SACROILIAC Left 2020    Procedure: Left Lumbar 5 Sacral 1 nerve root injections.;  Surgeon: Maurisio Goetz MD;  Location: [facility]     REPAIR MUSCLE UPPER EXTREMITY Right 2021    Procedure: DIVISION RIGHT PECTORALIS MUSCLE;  Surgeon: Davie Dutta;  Location: [facility]     REPAIR TENDON ELBOW  2014    Procedure: REPAIR TENDON ELBOW;  Surgeon: Chris Johnston MD;  [facility]: [facility]     ULTRASONIC REMOVAL SOFT TISSUE (LOCATION) Right 2018    Procedure: Tenex right foot;  Surgeon: Patel Martínez DO;  Location: [facility]     VASCULAR SURGERY      Thoracic Outlet Syndrome     Current Outpatient Medications   Medication Sig Dispense Refill     albuterol (PROAIR HFA/PROVENTIL HFA/VENTOLIN HFA) 108 (90 Base) MCG/ACT inhaler Inhale 2 puffs into the lungs every 4 hours as needed for shortness of breath / dyspnea or wheezing 18 g 1     clobetasol (TEMOVATE) 0.05 % external cream Apply topically 2 times daily 60 g 1     dicyclomine (BENTYL) 10 MG capsule Take 1-2 capsules up to 4 times a day as needed for abdominal pain 120 capsule 3     fluticasone (FLOVENT HFA) 110 MCG/ACT inhaler Inhale 1 puff into the lungs 2 times daily 12 g 0     medical cannabis (Patient's own supply) Take 1 Dose by mouth See Admin Instructions 1/2 to 2 tablets every 3-6 hours as needed (for pain/sleep)    THC:CBD 2.5m.5mg    (The purpose of this order is to document that the patient reports taking medical cannabis.  This is not a prescription, and is not used to certify that the patient has a qualifying medical condition.)       meloxicam (MOBIC) 15 MG tablet Take 1 tablet (15 mg) by mouth daily 90 tablet 3     mupirocin (BACTROBAN) 2 % external ointment Apply topically 3 times daily 30 g  0     omeprazole (PRILOSEC) 40 MG DR capsule Take 1 capsule (40 mg) by mouth in the morning. 60 capsule 1     predniSONE (DELTASONE) 20 MG tablet Take 3 tabs by mouth daily x 3 days, then 2 tabs daily x 3 days, then 1 tab daily x 3 days, then 1/2 tab daily x 3 days. 20 tablet 0     predniSONE (DELTASONE) 20 MG tablet Take 3 tabs by mouth daily x 3 days, then 2 tabs daily x 3 days, then 1 tab daily x 3 days, then 1/2 tab daily x 3 days. 20 tablet 0     rizatriptan (MAXALT-MLT) 5 MG ODT Take 1 tablet (5 mg) by mouth at onset of headache for migraine May repeat in 2 hours. Max 6 tablets/24 hours. 30 tablet 1     sucralfate (CARAFATE) 1 GM tablet Take 1 tablet (1 g) by mouth 4 times daily 40 tablet 0     tacrolimus (PROTOPIC) 0.1 % external ointment Apply topically 2 times daily On eczematous lesions 2xdaily 60 g 3     valACYclovir (VALTREX) 1000 mg tablet Take 1 tablet (1,000 mg) by mouth 3 times daily for 7 days 21 tablet 0       Allergies   Allergen Reactions     Ceftriaxone Anaphylaxis     Hives, rash, racing heart beat     Bactrim [Sulfamethoxazole W/Trimethoprim] Hives     Ciprofloxacin Other (See Comments)     Tendon Issues     Codeine Nausea and Vomiting     Codeine      Copper      Rash       Doxycycline      Loss of skin pigmentation, skin loss.     Gold      Rash       Iodine Hives     WELTS     Iodine-131 Hives     Levaquin [Levofloxacin] Other (See Comments)     Tendon issues with levaquin and cipro     Nickel      rash     Steel [Staples]      Rash from staples       Sulfa Drugs Hives     Full Body     Latex Rash     Penicillins Rash        Social History     Tobacco Use     Smoking status: Never Smoker     Smokeless tobacco: Never Used   Substance Use Topics     Alcohol use: No     Comment: social     Family History   Problem Relation Age of Onset     Diabetes Mother         adult     Cancer - colorectal Mother      Hypertension Mother      Allergies Mother      Cancer Mother         colon     Depression  Mother      Gastrointestinal Disease Mother         ibs     Genitourinary Problems Mother         kidney cyst     Gynecology Mother         endometriosis     Lipids Mother      Thyroid Disease Mother      Arthritis Mother         RA     Heart Disease Maternal Grandfather         MI,  - 60's     Allergies Father      Unknown/Adopted Father      Heart Disease Father         mi-age mid 40's     Cardiovascular Father      Lipids Father      Respiratory Father         asthma     Cancer Father      Other Cancer Father         renal cancer     Depression Sister      Allergies Sister      Cancer Sister         vaginal     Gynecology Sister         endometriosis     Lipids Paternal Grandmother      Osteoporosis Paternal Grandmother      Thyroid Disease Paternal Grandmother      Respiratory Son         asthma     Allergies Son      Arthritis Sister      Allergies Brother      Heart Disease Brother      Allergies Daughter      Blood Disease Paternal Aunt         LGL T cell Leukemia     Rheumatoid Arthritis Paternal Aunt      Kidney Disease Maternal Aunt      Lupus Maternal Aunt      Bladder Cancer Maternal Aunt      History   Drug Use No     Comment: Medical marijuana         Objective     There were no vitals taken for this visit.    Physical Exam    GENERAL APPEARANCE: healthy, alert and no distress     EYES: EOMI, PERRL     HENT: ear canals and TM's normal and nose and mouth without ulcers or lesions     NECK: no adenopathy, no asymmetry, masses, or scars and thyroid normal to palpation     RESP: lungs clear to auscultation - no rales, rhonchi or wheezes     CV: regular rates and rhythm, normal S1 S2, no S3 or S4 and no murmur, click or rub     ABDOMEN:  soft, nontender, no HSM or masses and bowel sounds normal     MS: extremities normal- no gross deformities noted, no evidence of inflammation in joints, FROM in all extremities.     SKIN: no suspicious lesions or rashes     NEURO: Normal strength and tone, sensory  exam grossly normal, mentation intact and speech normal     PSYCH: mentation appears normal. and affect normal/bright     LYMPHATICS: No cervical adenopathy    Recent Labs   Lab Test 03/17/22  1619 09/09/21  1232 08/27/21  1339 07/27/21  1208   HGB 12.1 12.5   < >  --     359   < >  --     139   < > 139   POTASSIUM 4.1 3.9   < > 4.1   CR 0.66 0.63   < > 0.63   A1C  --   --   --  5.3    < > = values in this interval not displayed.        Diagnostics:  Labs pending at this time.  Results will be reviewed when available.   EKG: sinus bradycardia, normal axis, normal intervals, no acute ST/T changes c/w ischemia, no LVH by voltage criteria, unchanged from previous tracings    Revised Cardiac Risk Index (RCRI):  The patient has the following serious cardiovascular risks for perioperative complications:   - No serious cardiac risks = 0 points     RCRI Interpretation: 1 point: Class II (low risk - 0.9% complication rate)           Signed Electronically by: Otoniel Manuel PA-C  Copy of this evaluation report is provided to requesting physician.

## 2022-05-17 ENCOUNTER — MYC MEDICAL ADVICE (OUTPATIENT)
Dept: FAMILY MEDICINE | Facility: OTHER | Age: 47
End: 2022-05-17
Payer: COMMERCIAL

## 2022-05-18 ENCOUNTER — NURSE TRIAGE (OUTPATIENT)
Dept: FAMILY MEDICINE | Facility: OTHER | Age: 47
End: 2022-05-18

## 2022-05-18 ENCOUNTER — LAB (OUTPATIENT)
Dept: LAB | Facility: CLINIC | Age: 47
End: 2022-05-18
Payer: COMMERCIAL

## 2022-05-18 DIAGNOSIS — Z20.822 ENCOUNTER FOR LABORATORY TESTING FOR COVID-19 VIRUS: ICD-10-CM

## 2022-05-18 DIAGNOSIS — E04.1 THYROID NODULE: ICD-10-CM

## 2022-05-18 LAB
SARS-COV-2 RNA RESP QL NAA+PROBE: NEGATIVE
TSH SERPL DL<=0.005 MIU/L-ACNC: 1.19 MU/L (ref 0.4–4)

## 2022-05-18 PROCEDURE — U0003 INFECTIOUS AGENT DETECTION BY NUCLEIC ACID (DNA OR RNA); SEVERE ACUTE RESPIRATORY SYNDROME CORONAVIRUS 2 (SARS-COV-2) (CORONAVIRUS DISEASE [COVID-19]), AMPLIFIED PROBE TECHNIQUE, MAKING USE OF HIGH THROUGHPUT TECHNOLOGIES AS DESCRIBED BY CMS-2020-01-R: HCPCS

## 2022-05-18 PROCEDURE — U0005 INFEC AGEN DETEC AMPLI PROBE: HCPCS

## 2022-05-18 PROCEDURE — 36415 COLL VENOUS BLD VENIPUNCTURE: CPT

## 2022-05-18 PROCEDURE — 84443 ASSAY THYROID STIM HORMONE: CPT

## 2022-05-18 NOTE — TELEPHONE ENCOUNTER
See triage encounter.    Aria Stewart, BSN, RN, PHN  Registered Nurse-Clinic Triage  [facility]  5/18/2022 at 9:59 AM

## 2022-05-18 NOTE — TELEPHONE ENCOUNTER
"MyChart message states: \"How do you know if you tore a lower ab muscle like hernia?  The other day I fell climbing over puppy fence, of course hurt everything!   ... the next day I sat up in bed using my abs, like normal , and there was a super sharp burning pain and since then I can't really use and as pain is stupid sharp!  I've had sore muscles from like work outs, etc, this is sharper and localized.  This was almost a week ago ..  I don't know if I have just a pulled or minor torn Muscle or if I should be worried about a hernia. Of course it's the lower belly where I have the most fat  So I can't tell if there's any lumps or holes in the muscle.   All I know is it makes rolling over in bed almost impossible...   So that being said, should I try to get in to be seen?  I am concerned about surgery for my back on Monday with the 2 incisions and bone grinding I am sure I will be plenty sore, and now this...  Might make everything almost impossible....   Thoughts???  It's ok to walk, sitting I can feel it irritated. But if I engage my lower ab muscles that's when it is super sharp.  On the Photo thumb is my navel, fingers is the area of pain.   If I should be seen I can pretty much make anything on Thursday work   Please let me know.\"     Provider message: \"Please triage this. Any green appointment time if she needs to be seen.   Electronically signed:     Otoniel Manuel PA-C  \"    Contacted pt. She states that the pain is half way between belly button and pelvic bone and slightly to the right. Denies urinary symptoms. Pt states that the pain is not constant. She only has pain when she sits or engages her lower abdomen. When she does this, she gets a sharp pain. Rates the pain sitting at 2-3/10 and a burning feeling. When she goes to engage the muscles, for example when getting out of bed, then she rates the pain 8/10. Denies any visual abnormalities. Last BM was this morning and was normal/soft. Denies fever, chest " pain, difficulty breathing. Pain does radiate down toward the pelvic bone when she engages her muscles. Pt has never had this happen before. No vomiting. No blood in stool or urine. Pt has numbness and tingling in her legs but this is usual for her. Her surgery on Monday is for a spinal stimulator to help with this.     Per protocol, recommended that pt be seen. Scheduled appointment. Advised pt that she be seen in UC/ED sooner if symptoms worsen or she develops additional symptoms. Pt verbalized understanding and is in agreement with plan. Denies further questions.     Aria Stewart, JESSYN, RN, PHN  Registered Nurse-Clinic Triage  [facility]  5/18/2022 at 9:59 AM      Reason for Disposition    MODERATE OR MILD pain that comes and goes (cramps) lasts > 24 hours    Additional Information    Negative: Passed out (i.e., fainted, collapsed and was not responding)    Negative: Shock suspected (e.g., cold/pale/clammy skin, too weak to stand, low BP, rapid pulse)    Negative: Sounds like a life-threatening emergency to the triager    Negative: Chest pain    Negative: Pain is mainly in upper abdomen (if needed ask: 'is it mainly above the belly button?')    Negative: Abdominal pain and pregnant > 20 weeks    Negative: Abdominal pain and pregnant < 20 weeks    Negative: SEVERE abdominal pain (e.g., excruciating)    Negative: Vomiting red blood or black (coffee ground) material    Negative: Bloody, black, or tarry bowel movements (Exception: chronic-unchanged black-grey bowel movements and is taking iron pills or Pepto-bismol)    Negative: Constant abdominal pain lasting > 2 hours    Negative: Vomiting bile (green color)    Negative: Patient sounds very sick or weak to the triager    Negative: Vomiting and abdomen looks much more swollen than usual    Negative: White of the eyes have turned yellow (i.e., jaundice)    Negative: Blood in urine (red, pink, or tea-colored)    Negative: Fever > 103 F  (39.4 C)    Negative: Fever > 101 F (38.3 C) and over 60 years of age    Negative: Fever > 100.0 F (37.8 C) and has diabetes mellitus or a weak immune system (e.g., HIV positive, cancer chemotherapy, organ transplant, splenectomy, chronic steroids)    Negative: Fever > 100.0 F (37.8 C) and bedridden (e.g., nursing home patient, stroke, chronic illness, recovering from surgery)    Negative: Pregnant or could be pregnant (i.e., missed last menstrual period)    Protocols used: ABDOMINAL PAIN - FEMALE-A-OH

## 2022-05-19 ENCOUNTER — OFFICE VISIT (OUTPATIENT)
Dept: FAMILY MEDICINE | Facility: OTHER | Age: 47
End: 2022-05-19
Payer: COMMERCIAL

## 2022-05-19 VITALS
TEMPERATURE: 98.1 F | WEIGHT: 206 LBS | SYSTOLIC BLOOD PRESSURE: 134 MMHG | HEART RATE: 61 BPM | BODY MASS INDEX: 33.91 KG/M2 | RESPIRATION RATE: 17 BRPM | OXYGEN SATURATION: 98 % | DIASTOLIC BLOOD PRESSURE: 76 MMHG

## 2022-05-19 DIAGNOSIS — R10.33 ABDOMINAL WALL PAIN IN PERIUMBILICAL REGION: Primary | ICD-10-CM

## 2022-05-19 DIAGNOSIS — E66.01 MORBID OBESITY (H): ICD-10-CM

## 2022-05-19 DIAGNOSIS — S39.011A ABDOMINAL WALL STRAIN, INITIAL ENCOUNTER: ICD-10-CM

## 2022-05-19 PROCEDURE — 99213 OFFICE O/P EST LOW 20 MIN: CPT | Performed by: PHYSICIAN ASSISTANT

## 2022-05-19 ASSESSMENT — PAIN SCALES - GENERAL: PAINLEVEL: MODERATE PAIN (4)

## 2022-05-19 NOTE — PROGRESS NOTES
"  Assessment & Plan     Abdominal wall pain in periumbilical region  Abdominal wall strain, initial encounter  Obesity BMI>30  To be potential diastases recti versus a wide hernia to the right abdominal wall.  Observation with exercises on a regular basis recommended.  Follow-up if any signs and symptoms of hernia which are discussed with her today occur.     BMI:   Estimated body mass index is 33.91 kg/m  as calculated from the following:    Height as of 5/12/22: 1.66 m (5' 5.35\").    Weight as of this encounter: 93.4 kg (206 lb).   Weight management plan: Discussed healthy diet and exercise guidelines    Work on weight loss  Regular exercise  No follow-ups on file.    SONAM Lisa [facility] YVES Roe is a 47 year old who presents for the following health issues     History of Present Illness       Reason for visit:  Fell and now sharp abdominal muscle pain  Symptom onset:  3-7 days ago    She eats 2-3 servings of fruits and vegetables daily.She consumes 0 sweetened beverage(s) daily.She exercises with enough effort to increase her heart rate 30 to 60 minutes per day.  She exercises with enough effort to increase her heart rate 7 days per week.   She is taking medications regularly.     Review of Systems   Constitutional, HEENT, cardiovascular, pulmonary, GI, , musculoskeletal, neuro, skin, endocrine and psych systems are negative, except as otherwise noted.      Objective    /76   Pulse 61   Temp 98.1  F (36.7  C) (Temporal)   Resp 17   Wt 93.4 kg (206 lb)   SpO2 98%   BMI 33.91 kg/m    Body mass index is 33.91 kg/m .  Physical Exam   GENERAL: healthy, alert and no distress  NECK: no adenopathy, no asymmetry, masses, or scars and thyroid normal to palpation  RESP: lungs clear to auscultation - no rales, rhonchi or wheezes  CV: regular rate and rhythm, normal S1 S2, no S3 or S4, no murmur, click or rub, no peripheral edema and peripheral pulses " strong  ABDOMEN: tenderness right lower abdomen is noted and possible diastasis recti vs long hernia noted though I do not appreciate a noticeable bulge and bowel sounds normal  MS: no gross musculoskeletal defects noted, no edema  NEURO: Normal strength and tone, mentation intact and speech normal  PSYCH: mentation appears normal, affect normal/bright

## 2022-06-06 ENCOUNTER — MYC MEDICAL ADVICE (OUTPATIENT)
Dept: FAMILY MEDICINE | Facility: OTHER | Age: 47
End: 2022-06-06
Payer: COMMERCIAL

## 2022-06-06 DIAGNOSIS — M54.12 CERVICAL RADICULOPATHY: ICD-10-CM

## 2022-06-06 DIAGNOSIS — M79.601 PAIN OF RIGHT UPPER EXTREMITY: Primary | ICD-10-CM

## 2022-06-06 DIAGNOSIS — M48.02 SPINAL STENOSIS IN CERVICAL REGION: ICD-10-CM

## 2022-06-06 DIAGNOSIS — M50.30 DDD (DEGENERATIVE DISC DISEASE), CERVICAL: ICD-10-CM

## 2022-06-06 RX ORDER — METHYLPREDNISOLONE 4 MG
TABLET, DOSE PACK ORAL
Qty: 21 TABLET | Refills: 0 | Status: SHIPPED | OUTPATIENT
Start: 2022-06-06 | End: 2022-07-07

## 2022-06-08 NOTE — TELEPHONE ENCOUNTER
Pt is on oral medication for allergies. Recommend Teodora Space, or pataday. Requested Thursday appointment.     Scheduled with Dr. Dutta on 4/22/2021.    Zoila HILL

## 2022-06-13 PROBLEM — M50.30 DDD (DEGENERATIVE DISC DISEASE), CERVICAL: Status: ACTIVE | Noted: 2022-06-13

## 2022-06-13 PROBLEM — M79.601 PAIN OF RIGHT UPPER EXTREMITY: Status: ACTIVE | Noted: 2022-06-13

## 2022-06-13 PROBLEM — M48.02 SPINAL STENOSIS IN CERVICAL REGION: Status: ACTIVE | Noted: 2022-06-13

## 2022-06-13 PROBLEM — M54.12 CERVICAL RADICULOPATHY: Status: ACTIVE | Noted: 2022-06-13

## 2022-06-20 ENCOUNTER — MYC MEDICAL ADVICE (OUTPATIENT)
Dept: FAMILY MEDICINE | Facility: OTHER | Age: 47
End: 2022-06-20

## 2022-06-20 DIAGNOSIS — M54.12 CERVICAL RADICULOPATHY: Primary | ICD-10-CM

## 2022-06-21 NOTE — TELEPHONE ENCOUNTER
[facility] patient, placed order for MRI at outside facility  [facility], please make sure referral gets to them and notify patient.     Maria Esther Kemp PA-C

## 2022-06-28 ENCOUNTER — TRANSFERRED RECORDS (OUTPATIENT)
Dept: HEALTH INFORMATION MANAGEMENT | Facility: CLINIC | Age: 47
End: 2022-06-28

## 2022-07-05 ENCOUNTER — MYC MEDICAL ADVICE (OUTPATIENT)
Dept: FAMILY MEDICINE | Facility: OTHER | Age: 47
End: 2022-07-05

## 2022-07-07 ENCOUNTER — OFFICE VISIT (OUTPATIENT)
Dept: FAMILY MEDICINE | Facility: OTHER | Age: 47
End: 2022-07-07
Payer: COMMERCIAL

## 2022-07-07 VITALS
SYSTOLIC BLOOD PRESSURE: 126 MMHG | DIASTOLIC BLOOD PRESSURE: 70 MMHG | HEART RATE: 74 BPM | RESPIRATION RATE: 18 BRPM | BODY MASS INDEX: 34.82 KG/M2 | TEMPERATURE: 98.1 F | WEIGHT: 209 LBS | OXYGEN SATURATION: 100 % | HEIGHT: 65 IN

## 2022-07-07 DIAGNOSIS — Z13.88 SCREENING FOR POISONING FROM CONTAMINATED WATER: ICD-10-CM

## 2022-07-07 DIAGNOSIS — M50.30 DDD (DEGENERATIVE DISC DISEASE), CERVICAL: ICD-10-CM

## 2022-07-07 DIAGNOSIS — Z13.220 SCREENING FOR HYPERLIPIDEMIA: Primary | ICD-10-CM

## 2022-07-07 DIAGNOSIS — M54.12 CERVICAL RADICULOPATHY: ICD-10-CM

## 2022-07-07 LAB
ALBUMIN SERPL-MCNC: 4 G/DL (ref 3.4–5)
ALP SERPL-CCNC: 67 U/L (ref 40–150)
ALT SERPL W P-5'-P-CCNC: 33 U/L (ref 0–50)
AMYLASE SERPL-CCNC: 57 U/L (ref 30–110)
ANION GAP SERPL CALCULATED.3IONS-SCNC: 5 MMOL/L (ref 3–14)
AST SERPL W P-5'-P-CCNC: 17 U/L (ref 0–45)
BILIRUB SERPL-MCNC: 0.5 MG/DL (ref 0.2–1.3)
BUN SERPL-MCNC: 23 MG/DL (ref 7–30)
CALCIUM SERPL-MCNC: 8.8 MG/DL (ref 8.5–10.1)
CHLORIDE BLD-SCNC: 107 MMOL/L (ref 94–109)
CHOLEST SERPL-MCNC: 207 MG/DL
CO2 SERPL-SCNC: 30 MMOL/L (ref 20–32)
CREAT SERPL-MCNC: 0.59 MG/DL (ref 0.52–1.04)
ERYTHROCYTE [DISTWIDTH] IN BLOOD BY AUTOMATED COUNT: 14 % (ref 10–15)
FASTING STATUS PATIENT QL REPORTED: NO
GFR SERPL CREATININE-BSD FRML MDRD: >90 ML/MIN/1.73M2
GLUCOSE BLD-MCNC: 96 MG/DL (ref 70–99)
HCT VFR BLD AUTO: 35.9 % (ref 35–47)
HDLC SERPL-MCNC: 72 MG/DL
HGB BLD-MCNC: 11.8 G/DL (ref 11.7–15.7)
LDLC SERPL CALC-MCNC: 116 MG/DL
LIPASE SERPL-CCNC: 159 U/L (ref 73–393)
MCH RBC QN AUTO: 30.2 PG (ref 26.5–33)
MCHC RBC AUTO-ENTMCNC: 32.9 G/DL (ref 31.5–36.5)
MCV RBC AUTO: 92 FL (ref 78–100)
NONHDLC SERPL-MCNC: 135 MG/DL
PLATELET # BLD AUTO: 343 10E3/UL (ref 150–450)
POTASSIUM BLD-SCNC: 3.8 MMOL/L (ref 3.4–5.3)
PROT SERPL-MCNC: 7.5 G/DL (ref 6.8–8.8)
RBC # BLD AUTO: 3.91 10E6/UL (ref 3.8–5.2)
SODIUM SERPL-SCNC: 142 MMOL/L (ref 133–144)
TRIGL SERPL-MCNC: 95 MG/DL
WBC # BLD AUTO: 5.3 10E3/UL (ref 4–11)

## 2022-07-07 PROCEDURE — 36415 COLL VENOUS BLD VENIPUNCTURE: CPT | Performed by: PHYSICIAN ASSISTANT

## 2022-07-07 PROCEDURE — 80061 LIPID PANEL: CPT | Performed by: PHYSICIAN ASSISTANT

## 2022-07-07 PROCEDURE — 82150 ASSAY OF AMYLASE: CPT | Performed by: PHYSICIAN ASSISTANT

## 2022-07-07 PROCEDURE — 80053 COMPREHEN METABOLIC PANEL: CPT | Performed by: PHYSICIAN ASSISTANT

## 2022-07-07 PROCEDURE — 99213 OFFICE O/P EST LOW 20 MIN: CPT | Performed by: PHYSICIAN ASSISTANT

## 2022-07-07 PROCEDURE — 83690 ASSAY OF LIPASE: CPT | Performed by: PHYSICIAN ASSISTANT

## 2022-07-07 PROCEDURE — 85027 COMPLETE CBC AUTOMATED: CPT | Performed by: PHYSICIAN ASSISTANT

## 2022-07-07 ASSESSMENT — PAIN SCALES - GENERAL: PAINLEVEL: MODERATE PAIN (4)

## 2022-07-07 NOTE — TELEPHONE ENCOUNTER
Called [facility] and reports should be faxed today to clinic and medical records. Images pushed through per medical records at [facility].

## 2022-07-07 NOTE — PROGRESS NOTES
Assessment & Plan     Screening for poisoning from contaminated water  Patient reports recent significant liver abnormalities to her 2 dogs within her home.  They have been noticing some problems within the last year or so related to horses that they used to have as well.  Elevation in liver function studies were noted via canine  and she has concerns whether or not she may indeed have this problem as well.  The only commonality that they have is drinking water.  We will evaluate and advise from there.    - Comprehensive metabolic panel (BMP + Alb, Alk Phos, ALT, AST, Total. Bili, TP); Future  - CBC with platelets; Future  - Amylase; Future  - Lipase; Future  - Comprehensive metabolic panel (BMP + Alb, Alk Phos, ALT, AST, Total. Bili, TP)  - CBC with platelets  - Amylase  - Lipase    DDD (degenerative disc disease), cervical  Cervical radiculopathy  Reviewed her recent cervical MRI showing C6-T1 foraminal narrowing and more than likely radicular type symptoms.  She has a follow-up appointment set up with a specialist in this regard.    Screening for hyperlipidemia  - Lipid panel reflex to direct LDL Fasting; Future  - Lipid panel reflex to direct LDL Fasting     No follow-ups on file.    SONAM Lisa [facility] YVES Roe is a 47 year old, presenting for the following health issues:  Elbow Pain      History of Present Illness       Reason for visit:  Arm shoulder blade elbow pain and liver blood test    She eats 4 or more servings of fruits and vegetables daily.She consumes 0 sweetened beverage(s) daily.She exercises with enough effort to increase her heart rate 20 to 29 minutes per day.  She exercises with enough effort to increase her heart rate 7 days per week.   She is taking medications regularly.     Wants a referral for Ortho     Review of Systems   Constitutional, HEENT, cardiovascular, pulmonary, GI, , musculoskeletal, neuro, skin, endocrine and psych  "systems are negative, except as otherwise noted.      Objective    /70 (Cuff Size: Adult Regular)   Pulse 74   Temp 98.1  F (36.7  C) (Temporal)   Resp 18   Ht 1.66 m (5' 5.35\")   Wt 94.8 kg (209 lb)   SpO2 100%   BMI 34.40 kg/m    Body mass index is 34.4 kg/m .  Physical Exam   GENERAL: healthy, alert and no distress  NECK: no adenopathy, no asymmetry, masses, or scars and thyroid normal to palpation  RESP: lungs clear to auscultation - no rales, rhonchi or wheezes  CV: regular rate and rhythm, normal S1 S2, no S3 or S4, no murmur, click or rub, no peripheral edema and peripheral pulses strong  ABDOMEN: soft, nontender, no hepatosplenomegaly, no masses and bowel sounds normal  MS: no gross musculoskeletal defects noted, no edema  NEURO:  DTRs are normal and symmetric throughout.  Cranial nerves 2-12 grossly intact.  Sensation intact to light touch.  She does note however that the nerve root distribution from C6-T1 to both arms is a little bit hypersensitive right at the moment.  She does not have any deficit with respect to control of the muscles and proprioception is within normal limits.      No results found. However, due to the size of the patient record, not all encounters were searched. Please check Results Review for a complete set of results.       .  ..  "

## 2022-07-08 DIAGNOSIS — M54.12 CERVICAL RADICULOPATHY: Primary | ICD-10-CM

## 2022-07-08 NOTE — TELEPHONE ENCOUNTER
SPINE PATIENTS - NEW PROTOCOL PREVISIT    RECORDS RECEIVED FROM: Internal   REASON FOR VISIT: antwan parsons   Date of Appt: 07/14/2022   NOTES (FOR ALL VISITS) STATUS DETAILS   OFFICE NOTE from referring provider Internal 06/06/2022 Dr Nelson [facility] my chart message   OFFICE NOTE from other specialist Internal 06/06/2022 Dr Christensen [facility]      DISCHARGE SUMMARY from hospital N/A    DISCHARGE REPORT from ER N/A    EMG REPORT N/A    MEDICATION LIST N/A    IMAGING  (FOR ALL VISITS)     MRI (HEAD, NECK, SPINE) Received 06/28/2022 cervical spine   XRAY (SPINE) *NEUROSURGERY* N/A    CT (HEAD, NECK, SPINE) N/A       Images in PACS  Georgtete Santamaria on 7/8/2022 at 1:04 PM

## 2022-07-08 NOTE — TELEPHONE ENCOUNTER
Called [facility] and they are going to refax to [phone]  Report received, place herbert [facility] basket. To provider for FYI

## 2022-07-11 ENCOUNTER — TELEPHONE (OUTPATIENT)
Dept: NEUROSURGERY | Facility: CLINIC | Age: 47
End: 2022-07-11

## 2022-07-11 NOTE — TELEPHONE ENCOUNTER
Pt needs to know if the actual images are available as her appt is on 7/14 and these need to be there prior to that. The images should be from [facility] in [facility], DOS 6/28. Please let her know if they are available and if not, can clinic contact them to get them as [facility] had told her several times they've been sent.

## 2022-07-14 ENCOUNTER — PRE VISIT (OUTPATIENT)
Dept: NEUROSURGERY | Facility: CLINIC | Age: 47
End: 2022-07-14

## 2022-07-14 ENCOUNTER — ANCILLARY PROCEDURE (OUTPATIENT)
Dept: GENERAL RADIOLOGY | Facility: CLINIC | Age: 47
End: 2022-07-14
Attending: STUDENT IN AN ORGANIZED HEALTH CARE EDUCATION/TRAINING PROGRAM
Payer: COMMERCIAL

## 2022-07-14 ENCOUNTER — OFFICE VISIT (OUTPATIENT)
Dept: NEUROSURGERY | Facility: CLINIC | Age: 47
End: 2022-07-14
Attending: PHYSICIAN ASSISTANT
Payer: COMMERCIAL

## 2022-07-14 VITALS
SYSTOLIC BLOOD PRESSURE: 142 MMHG | DIASTOLIC BLOOD PRESSURE: 73 MMHG | WEIGHT: 209 LBS | BODY MASS INDEX: 34.82 KG/M2 | HEIGHT: 65 IN

## 2022-07-14 DIAGNOSIS — M54.12 CERVICAL RADICULOPATHY: ICD-10-CM

## 2022-07-14 DIAGNOSIS — I77.89 PECTORALIS MINOR SYNDROME (H): ICD-10-CM

## 2022-07-14 DIAGNOSIS — M48.02 SPINAL STENOSIS IN CERVICAL REGION: ICD-10-CM

## 2022-07-14 DIAGNOSIS — M50.30 DDD (DEGENERATIVE DISC DISEASE), CERVICAL: ICD-10-CM

## 2022-07-14 DIAGNOSIS — M79.601 PAIN OF RIGHT UPPER EXTREMITY: ICD-10-CM

## 2022-07-14 PROCEDURE — 72082 X-RAY EXAM ENTIRE SPI 2/3 VW: CPT | Performed by: STUDENT IN AN ORGANIZED HEALTH CARE EDUCATION/TRAINING PROGRAM

## 2022-07-14 PROCEDURE — 72040 X-RAY EXAM NECK SPINE 2-3 VW: CPT | Performed by: RADIOLOGY

## 2022-07-14 PROCEDURE — 99214 OFFICE O/P EST MOD 30 MIN: CPT | Performed by: STUDENT IN AN ORGANIZED HEALTH CARE EDUCATION/TRAINING PROGRAM

## 2022-07-14 ASSESSMENT — PAIN SCALES - GENERAL: PAINLEVEL: SEVERE PAIN (6)

## 2022-07-14 NOTE — PROGRESS NOTES
"Amelia Coker's goals for this visit include:   Chief Complaint   Patient presents with     New Patient     Pain of right upper extremity, Cervical radiculopathy, DDD (degenerative disc   disease), cervical, Spinal stenosis in cervical region. MRI will be done prior   to appt. as ordered by PCP and will bring disc if she doesn't have it through   [facility].  Xrays ordered       She requests these members of her care team be copied on today's visit information: yes    PCP: Otoniel Nelson    Referring Provider:  Otoniel Nelson PA-C  [address] [place] [id number]    BP (!) 142/73 (BP Location: Right arm, Patient Position: Sitting, Cuff Size: Adult Regular)   Ht 1.651 m (5' 5\")   Wt 94.8 kg (209 lb)   BMI 34.78 kg/m      Do you need any medication refills at today's visit? No  FUAD Walsh., JAIMEE ([facility])      "

## 2022-07-14 NOTE — LETTER
"    7/14/2022         RE: Amelia Coker  [address] [facility] [address]        Dear Colleague,    Thank you for referring your patient, Amelia Coker, to the [facility] [place]. Please see a copy of my visit note below.    Amelia Coker's goals for this visit include:   Chief Complaint   Patient presents with     New Patient     Pain of right upper extremity, Cervical radiculopathy, DDD (degenerative disc   disease), cervical, Spinal stenosis in cervical region. MRI will be done prior   to appt. as ordered by PCP and will bring disc if she doesn't have it through   [facility].  Xrays ordered       She requests these members of her care team be copied on today's visit information: yes    PCP: Otoniel Nelson    Referring Provider:  Otoniel Nelson PA-C  [address] [place] [address]    BP (!) 142/73 (BP Location: Right arm, Patient Position: Sitting, Cuff Size: Adult Regular)   Ht 1.651 m (5' 5\")   Wt 94.8 kg (209 lb)   BMI 34.78 kg/m      Do you need any medication refills at today's visit? No  WENDY Walsh, JAIMEE ([facility])        HPI:  47-year-old female with 1 month of worsening right arm pain.  She has a history of chronic right arm pain and was diagnosed with thoracic outlet syndrome in the past and underwent a pectoral minor release which helped with symptoms in her thumb and index finger.  She is continue to have right arm pain slightly but over the past month has had pain now radiating down her neck into her fourth and fifth digits.  She is able to relieve the pain with rotation of the arm and raising above her head.  She is having more difficulty with dexterity in this arm due to the pain.  She is having to use her left arm to compensate as well.  She has a complicated lumbar history with multiple surgeries and chronic nerve damage and now has a spinal cord stimulator in her thoracic spine to help with left leg pain.  She had done " "physical therapy in the past for her right arm pain.  She has not had any injections recently into her cervical spine but has had them in the past.  Her most significant pain is at the wrist and into the hand.  She has had a prior EMG showing an ulnar nerve neuropathy however this was over a year ago before the current symptoms have started.  Current Outpatient Medications   Medication     acetaminophen (TYLENOL) 325 MG tablet     medical cannabis (Patient's own supply)     meloxicam (MOBIC) 15 MG tablet     rizatriptan (MAXALT-MLT) 5 MG ODT     tacrolimus (PROTOPIC) 0.1 % external ointment     No current facility-administered medications for this visit.      Physical Exam:  Vital signs:      BP: (!) 142/73             Height: 165.1 cm (5' 5\") Weight: 94.8 kg (209 lb)  Estimated body mass index is 34.78 kg/m  as calculated from the following:    Height as of this encounter: 1.651 m (5' 5\").    Weight as of this encounter: 94.8 kg (209 lb).   She has full strength in her bilateral upper extremities.  Sensation is intact to light touch throughout in her upper extremities.  No Anh sign present.  Biceps reflexes are 1+ bilaterally.  Results Reviewed:  I personally viewed the images of an MRI of the cervical spine showing multilevel foraminal stenosis at C5-6 C6-7 and C7-T1 on the right side with disc herniations most prevalent at C5-6 and C7-T1.  There is no central canal stenosis or cord compression present.  Assessment:  47-year-old female with right arm pain possibly a C8 radiculopathy versus an ulnar nerve neuropathy or possibly both.    Plan:  We discussed conservative and surgical management options going forward.  We will start with cervical epidural injections to see if this improves her symptoms.  She is hesitant to undergo any surgery given her long history of lumbar surgery with resulting left leg pain.  She also has multiple allergies to metals is hesitant to undergo any implanted devices in her neck.  " I will start with injections and if these do not have any significant impact I like to get an EMG to evaluate for the ulnar nerve and potential radiculopathies present before deciding on surgery options.  I will have her make another appointment with me after the EMG if she does not do well with the current conservative management options.    George Mesa MD      Again, thank you for allowing me to participate in the care of your patient.        Sincerely,        George Mesa MD

## 2022-07-14 NOTE — PROGRESS NOTES
"HPI:  47-year-old female with 1 month of worsening right arm pain.  She has a history of chronic right arm pain and was diagnosed with thoracic outlet syndrome in the past and underwent a pectoral minor release which helped with symptoms in her thumb and index finger.  She is continue to have right arm pain slightly but over the past month has had pain now radiating down her neck into her fourth and fifth digits.  She is able to relieve the pain with rotation of the arm and raising above her head.  She is having more difficulty with dexterity in this arm due to the pain.  She is having to use her left arm to compensate as well.  She has a complicated lumbar history with multiple surgeries and chronic nerve damage and now has a spinal cord stimulator in her thoracic spine to help with left leg pain.  She had done physical therapy in the past for her right arm pain.  She has not had any injections recently into her cervical spine but has had them in the past.  Her most significant pain is at the wrist and into the hand.  She has had a prior EMG showing an ulnar nerve neuropathy however this was over a year ago before the current symptoms have started.  Current Outpatient Medications   Medication     acetaminophen (TYLENOL) 325 MG tablet     medical cannabis (Patient's own supply)     meloxicam (MOBIC) 15 MG tablet     rizatriptan (MAXALT-MLT) 5 MG ODT     tacrolimus (PROTOPIC) 0.1 % external ointment     No current facility-administered medications for this visit.      Physical Exam:  Vital signs:      BP: (!) 142/73             Height: 165.1 cm (5' 5\") Weight: 94.8 kg (209 lb)  Estimated body mass index is 34.78 kg/m  as calculated from the following:    Height as of this encounter: 1.651 m (5' 5\").    Weight as of this encounter: 94.8 kg (209 lb).   She has full strength in her bilateral upper extremities.  Sensation is intact to light touch throughout in her upper extremities.  No Anh sign present.  Biceps " reflexes are 1+ bilaterally.  Results Reviewed:  I personally viewed the images of an MRI of the cervical spine showing multilevel foraminal stenosis at C5-6 C6-7 and C7-T1 on the right side with disc herniations most prevalent at C5-6 and C7-T1.  There is no central canal stenosis or cord compression present.  Assessment:  47-year-old female with right arm pain possibly a C8 radiculopathy versus an ulnar nerve neuropathy or possibly both.    Plan:  We discussed conservative and surgical management options going forward.  We will start with cervical epidural injections to see if this improves her symptoms.  She is hesitant to undergo any surgery given her long history of lumbar surgery with resulting left leg pain.  She also has multiple allergies to metals is hesitant to undergo any implanted devices in her neck.  I will start with injections and if these do not have any significant impact I like to get an EMG to evaluate for the ulnar nerve and potential radiculopathies present before deciding on surgery options.  I will have her make another appointment with me after the EMG if she does not do well with the current conservative management options.    George Mesa MD

## 2022-07-18 ENCOUNTER — TELEPHONE (OUTPATIENT)
Dept: NEUROSURGERY | Facility: CLINIC | Age: 47
End: 2022-07-18

## 2022-07-18 NOTE — TELEPHONE ENCOUNTER
[facility] Call Center    Phone Message    May a detailed message be left on voicemail: yes     Reason for Call: Other: Patient returning call to schedule Neurosurgery appt. Please contact patient to schedule.     Action Taken: Message routed to:  Other: [facility]    Travel Screening: Not Applicable

## 2022-07-18 NOTE — TELEPHONE ENCOUNTER
Left Voicemail (2nd Attempt) for the patient to call back and schedule the following:    Appointment type: New Patient Pain Management  Provider: Any Pain Mgmt Provider  Return date: TBD  Specialty phone number: [phone]  Additional appointment(s) needed: None  Additonal Notes: Next Available    [facility]   Neurology, NeuroSurgery, NeuroPsychology and Pain Management Specialties  [phone]

## 2022-07-18 NOTE — TELEPHONE ENCOUNTER
Left Voicemail (1st Attempt) for the patient to call back and schedule the following:    Appointment type: New Pain Management  Provider:   Return date: TBD  Specialty phone number: [phone]  Additional appointment(s) needed: None  Additonal Notes: referral for cervical radiculopathy; first available with  is 10/27/2022 at [facility]   Neurology, NeuroSurgery, NeuroPsychology and Pain Management Specialties  [phone]

## 2022-07-20 ENCOUNTER — MYC MEDICAL ADVICE (OUTPATIENT)
Dept: NEUROSURGERY | Facility: CLINIC | Age: 47
End: 2022-07-20

## 2022-07-20 ENCOUNTER — TELEPHONE (OUTPATIENT)
Dept: NEUROLOGY | Facility: CLINIC | Age: 47
End: 2022-07-20

## 2022-07-20 DIAGNOSIS — M54.12 CERVICAL RADICULOPATHY: Primary | ICD-10-CM

## 2022-07-20 RX ORDER — DIAZEPAM 5 MG
5 TABLET ORAL ONCE
Status: CANCELLED | OUTPATIENT
Start: 2022-07-20 | End: 2022-07-20

## 2022-07-20 NOTE — TELEPHONE ENCOUNTER
Faxed inj order, adt, imaging July 20, 2022 to fax number [phone] [facility]      Right Fax confirmed at [phone]    Lavern Santiago RN

## 2022-07-20 NOTE — TELEPHONE ENCOUNTER
Called and spoke with patient. Apologized for all the back and forth the patient has had. Patient want to schedule injection as soon as possible. I explained that I called and spoke with Dr. Goetz's office in [place], and the soonest opening there was 8/19. I explained that there were openings on 7/28 in [place] or 8/5 in [place]. Patient was going to call relatives and try to see if she could get a ride for 7/28. She also asked if I could call her back at 1pm to talk about what she wants. She also stated that she wanted to hear how soon [facility] could get her in. Agreed that I would call her back at 1pm today to discuss.

## 2022-07-20 NOTE — TELEPHONE ENCOUNTER
Sent Mychart (1st Attempt) for the patient to call back and schedule the following:    Appointment type: New Pain Management  Provider: SCOTTIE  Return date: TBD  Specialty phone number: [phone]  Additional appointment(s) needed: None  Additonal Notes: None    [facility]   Neurology, NeuroSurgery, NeuroPsychology and Pain Management Specialties  [phone]

## 2022-07-20 NOTE — TELEPHONE ENCOUNTER
Called and spoke with patient. At this time, she was not able to verify she'd have a , but she wanted to move forward with trying to get in on the 28th at [place]. She was also going to see if [facility] could get her in sooner. I gave her Conchita the [facility] procedure 's phone number so she could follow up if she has not heard from us in the next 1-2 days.

## 2022-07-21 ENCOUNTER — TELEPHONE (OUTPATIENT)
Dept: ANESTHESIOLOGY | Facility: CLINIC | Age: 47
End: 2022-07-21

## 2022-07-21 ENCOUNTER — TELEPHONE (OUTPATIENT)
Dept: NEUROSURGERY | Facility: CLINIC | Age: 47
End: 2022-07-21

## 2022-07-21 NOTE — TELEPHONE ENCOUNTER
judy called clinic -    Staff requesting type of injection and for us to refax the order. Injection type given per Dr. Mesa's recc and refaxed order. C5-6, 6-7, C7-T1 foraminal    Faxed order July 21, 2022 to fax number [phone]    Right Fax confirmed at [phone]    Lavern Santiago RN       Nasrni with [facility] called x2 to verify if we want a transforaminal FRANCISCO vs interlaminar FRANCSICO. Routed to Dr. Mesa to verify.

## 2022-07-21 NOTE — TELEPHONE ENCOUNTER
Dr. Mesa reports that he would like to try the C5-6, 6-7, C7-T1 interlaminar josue at this time. Placed call back to Nasrin to give the verbal order from Dr. Mesa.

## 2022-07-25 ENCOUNTER — TELEPHONE (OUTPATIENT)
Dept: ANESTHESIOLOGY | Facility: CLINIC | Age: 47
End: 2022-07-25

## 2022-07-25 NOTE — TELEPHONE ENCOUNTER
RN received staff message that case request was ordered. Procedure only referral received. Per Dr. Casanova, Meloxicam will require a hold prior to procedure. Per [facility]UNO guidelines, Meloxicam requires a 7 day hold. RN called patient and left a voicemail with this information and to call back [phone] to further discuss and schedule procedure if interested.     Reina Lynch RN

## 2022-07-30 ENCOUNTER — HEALTH MAINTENANCE LETTER (OUTPATIENT)
Age: 47
End: 2022-07-30

## 2022-08-26 ENCOUNTER — TELEPHONE (OUTPATIENT)
Dept: ANESTHESIOLOGY | Facility: CLINIC | Age: 47
End: 2022-08-26

## 2022-09-21 ENCOUNTER — HOSPITAL ENCOUNTER (OUTPATIENT)
Dept: MAMMOGRAPHY | Facility: CLINIC | Age: 47
Discharge: HOME | End: 2022-09-21
Attending: PHYSICIAN ASSISTANT | Admitting: PHYSICIAN ASSISTANT
Payer: COMMERCIAL

## 2022-09-21 DIAGNOSIS — Z12.31 VISIT FOR SCREENING MAMMOGRAM: ICD-10-CM

## 2022-09-21 PROCEDURE — 77067 SCR MAMMO BI INCL CAD: CPT

## 2022-09-30 ENCOUNTER — MYC MEDICAL ADVICE (OUTPATIENT)
Dept: FAMILY MEDICINE | Facility: OTHER | Age: 47
End: 2022-09-30

## 2022-10-03 ENCOUNTER — OFFICE VISIT (OUTPATIENT)
Dept: FAMILY MEDICINE | Facility: OTHER | Age: 47
End: 2022-10-03
Payer: COMMERCIAL

## 2022-10-03 ENCOUNTER — ANCILLARY PROCEDURE (OUTPATIENT)
Dept: GENERAL RADIOLOGY | Facility: OTHER | Age: 47
End: 2022-10-03
Attending: PHYSICIAN ASSISTANT
Payer: COMMERCIAL

## 2022-10-03 VITALS
WEIGHT: 211 LBS | OXYGEN SATURATION: 99 % | BODY MASS INDEX: 33.91 KG/M2 | DIASTOLIC BLOOD PRESSURE: 78 MMHG | TEMPERATURE: 98.9 F | SYSTOLIC BLOOD PRESSURE: 122 MMHG | HEART RATE: 73 BPM | HEIGHT: 66 IN

## 2022-10-03 DIAGNOSIS — M25.551 HIP PAIN, RIGHT: ICD-10-CM

## 2022-10-03 DIAGNOSIS — M25.551 HIP PAIN, RIGHT: Primary | ICD-10-CM

## 2022-10-03 PROCEDURE — 99214 OFFICE O/P EST MOD 30 MIN: CPT | Performed by: PHYSICIAN ASSISTANT

## 2022-10-03 PROCEDURE — 73522 X-RAY EXAM HIPS BI 3-4 VIEWS: CPT | Mod: TC | Performed by: RADIOLOGY

## 2022-10-03 RX ORDER — METHYLPREDNISOLONE 4 MG
TABLET, DOSE PACK ORAL
Qty: 21 TABLET | Refills: 0 | Status: SHIPPED | OUTPATIENT
Start: 2022-10-03 | End: 2022-12-20

## 2022-10-03 ASSESSMENT — PAIN SCALES - GENERAL: PAINLEVEL: EXTREME PAIN (8)

## 2022-10-03 NOTE — PROGRESS NOTES
Assessment & Plan     Hip pain, right  We do know that the patient has had some problems with some poor muscle tone to the right buttocks and right shoulder.  I do wonder whether or not the hip is being supported like it should be and is loose within the socket to certain extent.  Some mild degenerative changes are noted but nothing to explain the pain that she is having in the groin crease itself.  Some this pain then does radiate down the leg.  We must admit that her low back is not very good at this point in time and she does have a nerve stimulator for the left lower extremity from the back. The pain on the right hip is new versus the known left back and radicular signs and symptoms down the left leg.  Orthopedic consult for consideration of further evaluation and also may be even a joint injection.  Physical therapy is discussed but she has had multiple physical therapy events to the low back so additional PT for the hip may not be beneficial.  - XR Pelvis and Hip Bilateral 2 Views; Future  - methylPREDNISolone (MEDROL DOSEPAK) 4 MG tablet therapy pack; Follow Package Directions  - Orthopedic  Referral; Future     Work on weight loss  Regular exercise  No follow-ups on file.    SONAM Lisa [facility] YVES Roe is a 47 year old, presenting for the following health issues:  No chief complaint on file.      History of Present Illness       Reason for visit:  Right front of hip to inner knee pain  Symptom onset:  More than a month  Symptoms include:  Ache. Makes leg weak or give out  Symptom intensity:  Moderate  Symptom progression:  Worsening    She eats 4 or more servings of fruits and vegetables daily.She consumes 0 sweetened beverage(s) daily.She exercises with enough effort to increase her heart rate 30 to 60 minutes per day.    She is taking medications regularly.       Review of Systems   Constitutional, HEENT, cardiovascular, pulmonary, GI, ,  "musculoskeletal, neuro, skin, endocrine and psych systems are negative, except as otherwise noted.      Objective    /78 (Cuff Size: Adult Regular)   Pulse 73   Temp 98.9  F (37.2  C) (Oral)   Ht 1.665 m (5' 5.55\")   Wt 95.7 kg (211 lb)   SpO2 99%   BMI 34.52 kg/m    Body mass index is 34.52 kg/m .  Physical Exam   GENERAL: healthy, alert and no distress  RESP: lungs clear to auscultation - no rales, rhonchi or wheezes  CV: regular rate and rhythm, normal S1 S2, no S3 or S4, no murmur, click or rub, no peripheral edema and peripheral pulses strong  MS: no gross musculoskeletal defects noted, no edema, range of motion is maintained.  The left hip appears to be stable without any pain with stress to the joint upon passive exam.  The right hip is a completely different story.  With flexion of the hip and pressure on passive exam this elicits a fair amount of pain that she says is consistent with what she has been dealing with.  It does raise concerns for hip joint degradation in my opinion.  SKIN: no suspicious lesions or rashes to exposed visible skin today.  NEURO: Normal strength and tone, mentation intact and speech normal  PSYCH: mentation appears normal, affect normal/bright    X-ray: I did review a pelvis film from 2014.  Concerns for potential of degenerative changes at that point in time was noted.  Negative for concerning pathology to my independent review today.  Mild degenerative changes noted it will be overread by radiology.                "

## 2022-10-06 ENCOUNTER — MYC MEDICAL ADVICE (OUTPATIENT)
Dept: FAMILY MEDICINE | Facility: OTHER | Age: 47
End: 2022-10-06

## 2022-10-06 DIAGNOSIS — B37.31 CANDIDIASIS OF VAGINA: Primary | ICD-10-CM

## 2022-10-06 RX ORDER — FLUCONAZOLE 150 MG/1
150 TABLET ORAL ONCE
Qty: 1 TABLET | Refills: 0 | Status: SHIPPED | OUTPATIENT
Start: 2022-10-06 | End: 2022-10-06

## 2022-10-14 ENCOUNTER — MYC MEDICAL ADVICE (OUTPATIENT)
Dept: FAMILY MEDICINE | Facility: OTHER | Age: 47
End: 2022-10-14

## 2022-10-20 ENCOUNTER — MYC MEDICAL ADVICE (OUTPATIENT)
Dept: NEUROSURGERY | Facility: CLINIC | Age: 47
End: 2022-10-20

## 2022-10-20 DIAGNOSIS — M54.12 CERVICAL RADICULOPATHY: Primary | ICD-10-CM

## 2022-10-21 NOTE — TELEPHONE ENCOUNTER
Patient called clinic requesting to repeat injection.    Type of injection: C5-6, 6-7, C7-T1 interlaminar josue     Most recent injection date: July 26th, 2022    Most recent MRI: 6/28/22    How long did injection provide symptomatic relief: 2.5 months    Current symptoms: Same as prior to last injection    Number of injections in last 12 months: 1    Plan: Route to Providers for approval      Patient voiced understanding and agreement with plan.     Advised patient to call back with any questions or concerns.

## 2022-10-21 NOTE — TELEPHONE ENCOUNTER
Ok to repeat as per Provider. Order placed and faxed.    Faxed October 21, 2022 to [facility] at fax number [phone]    Right Fax confirmed at 1125 AM    Quang Garcia RN

## 2022-11-07 ENCOUNTER — MYC MEDICAL ADVICE (OUTPATIENT)
Dept: NEUROSURGERY | Facility: CLINIC | Age: 47
End: 2022-11-07

## 2022-11-07 DIAGNOSIS — M79.601 PAIN OF RIGHT UPPER EXTREMITY: Primary | ICD-10-CM

## 2022-11-07 DIAGNOSIS — M48.02 SPINAL STENOSIS IN CERVICAL REGION: ICD-10-CM

## 2022-11-07 DIAGNOSIS — M54.12 CERVICAL RADICULOPATHY: ICD-10-CM

## 2022-11-07 NOTE — TELEPHONE ENCOUNTER
"Last Visit: 7/14/22    Next Visit: None scheduled    Name of Provider:  Dr. Mesa    Assessment: Patient sent [facility] message reporting she had right T1-2 interlaminar FRANCISCO at [facility] on 10/27 and has had no relief. She would like to try another injection at a higher level which was recommended by performing provider. She would also like and order for EMG.    Patient saw Dr. Mesa on 7/14/22 who recommend cervical injection. If injection does not have significant impact he would like to get an EMG to evaluate for further ulnar nerve and potential radiculopathies. Dr. Mesa noted he would have patient make another appointment after EMG if she does not do well with conservative options.     Per [facility] provider portal - Patient had right T1-2 interlaminar epidural injection of cortisone with Dr. Don Gómez on 10/27/22. Dr. Gómez notes, \"If the patient reports a limited cortisone response, a right C7-T1 interlaminar epidural steroid injection can be attempted.\"    Recommendation given: Encounter routed to Dr. Mesa for review and recommendations.     Action needed from provider: Would you like to order right C7-T1 interlaminar FRANCISCO? Would you like to order RUE EMG?          "

## 2022-11-07 NOTE — TELEPHONE ENCOUNTER
Per Dr. Mesa ok to order right C7-T1 interlaminar FRANCISCO and RUE EMG.    Orders placed. Injection order faxed to [facility]. Patient updated via [facility]    Faxed injection order November 7, 2022 to fax number [phone] ([facility])    Right Fax confirmed at 2:54 PM

## 2022-11-15 ENCOUNTER — MYC MEDICAL ADVICE (OUTPATIENT)
Dept: FAMILY MEDICINE | Facility: OTHER | Age: 47
End: 2022-11-15

## 2022-11-15 DIAGNOSIS — E04.1 THYROID NODULE: Primary | ICD-10-CM

## 2022-11-15 NOTE — TELEPHONE ENCOUNTER
Fine needle aspirate done on 6/10/21. Results were benign - follow with US in 1 year. Do you want to place US orders for this?    Sabiha Alonzo BSN, RN

## 2022-11-17 ENCOUNTER — HOSPITAL ENCOUNTER (OUTPATIENT)
Dept: ULTRASOUND IMAGING | Facility: CLINIC | Age: 47
Discharge: HOME | End: 2022-11-17
Attending: PHYSICIAN ASSISTANT | Admitting: PHYSICIAN ASSISTANT
Payer: COMMERCIAL

## 2022-11-17 DIAGNOSIS — E04.1 THYROID NODULE: ICD-10-CM

## 2022-11-17 PROCEDURE — 76536 US EXAM OF HEAD AND NECK: CPT

## 2022-12-06 ENCOUNTER — TELEPHONE (OUTPATIENT)
Dept: SLEEP MEDICINE | Facility: CLINIC | Age: 47
End: 2022-12-06

## 2022-12-06 DIAGNOSIS — R53.83 FATIGUE, UNSPECIFIED TYPE: Primary | ICD-10-CM

## 2022-12-06 DIAGNOSIS — E04.1 THYROID NODULE: ICD-10-CM

## 2022-12-06 NOTE — TELEPHONE ENCOUNTER
Dr[facility] Goins [facility] 12[facility]6/2022 at 11:51 AM

## 2022-12-06 NOTE — TELEPHONE ENCOUNTER
Left message for patient to call back to relay provider referral.     Elisabeth Marquez RN on 12/6/2022 at 11:06 AM

## 2022-12-06 NOTE — TELEPHONE ENCOUNTER
[facility] Call Center    Phone Message    May a detailed message be left on voicemail: yes     Reason for Call: Symptoms or Concerns     If patient has red-flag symptoms, warm transfer to triage line    Current symptom or concern: Patient is wanting to know if Dr Goins can take a look at her MRI (June 29th 2022), CT (8/27/21) and Ultrasound (done on 11/17) to see if he feels she needs to be seen or if shes okay to hold off for now.     She said she was told it is now vascular but has not changed size at all.           Action Taken: Other: ENT    Travel Screening: Not Applicable

## 2022-12-06 NOTE — TELEPHONE ENCOUNTER
[facility] Call Center    Phone Message    May a detailed message be left on voicemail: yes     Reason for Call: Other: Pt is wanting to see if Dr Nielsen can take a look at her imaging first before scheduling an appt . she wants to know if she thinks a biopsy is needed if not she thinks coming all the way to [place] will be a waste of time . Please reach out to pt to discuss. thank you       If not Dr Nielsen any provider . She wants her imaging to be looked at before scheduling anywhere .   Action Taken: Message routed to:  [facility] ENT     Travel Screening: Not Applicable

## 2022-12-06 NOTE — TELEPHONE ENCOUNTER
Patient was to follow-up with Dr. Hall regarding patient's thyroid. Dr. Hall is no longer with [facility]. Does Dr. Goins have a new referral for a thyroid specialist?    Elisabeth Marquez RN on 12/6/2022 at 10:54 AM

## 2022-12-09 ENCOUNTER — MYC MEDICAL ADVICE (OUTPATIENT)
Dept: FAMILY MEDICINE | Facility: OTHER | Age: 47
End: 2022-12-09

## 2022-12-09 DIAGNOSIS — Z86.19 HISTORY OF STAPH INFECTION: Primary | ICD-10-CM

## 2022-12-09 RX ORDER — MUPIROCIN 20 MG/G
OINTMENT TOPICAL 3 TIMES DAILY
Qty: 30 G | Refills: 3 | Status: SHIPPED | OUTPATIENT
Start: 2022-12-09 | End: 2024-04-18

## 2022-12-09 NOTE — TELEPHONE ENCOUNTER
FUTURE VISIT INFORMATION      FUTURE VISIT INFORMATION:    Date: 2/6/23    Time: 2pm    [place] [facility] [place]FORMATION:    Referring provider:   LANRE Araiza     Referring providers clinic:  [facility]    Reason for visit/diagnosis  Ref by: LANRE Araiza //Dx: Thryroid Nodule // Sched per pt // [facility] location verified    RECORDS REQUESTED FROM:       Clinic name Comments Records Status Imaging Status   [facility]   7/7/22- note from LANRE Araiza  epic    Imaging  11/17/22- [facility]  Epic  pacs

## 2022-12-14 NOTE — TELEPHONE ENCOUNTER
DIAGNOSIS: R knee pain radiating into her R hip after an injury back in Aug   APPOINTMENT DATE: 12/17/2022   NOTES STATUS DETAILS   OFFICE NOTE from referring provider Internal 10/03/2022 Dr [facility]    OFFICE NOTE from other specialist N/A    DISCHARGE SUMMARY from hospital N/A    DISCHARGE REPORT from the ER N/A    OPERATIVE REPORT N/A    EMG report N/A    MEDICATION LIST N/A    MRI N/A    DEXA (osteoporosis/bone health) N/A    CT SCAN N/A    XRAYS (IMAGES & REPORTS) Internal 10/03/2022 pelvis hip

## 2022-12-17 ENCOUNTER — PRE VISIT (OUTPATIENT)
Dept: ORTHOPEDICS | Facility: CLINIC | Age: 47
End: 2022-12-17

## 2022-12-17 ENCOUNTER — OFFICE VISIT (OUTPATIENT)
Dept: ORTHOPEDICS | Facility: CLINIC | Age: 47
End: 2022-12-17
Attending: PHYSICIAN ASSISTANT
Payer: COMMERCIAL

## 2022-12-17 ENCOUNTER — ANCILLARY PROCEDURE (OUTPATIENT)
Dept: GENERAL RADIOLOGY | Facility: CLINIC | Age: 47
End: 2022-12-17
Attending: FAMILY MEDICINE
Payer: COMMERCIAL

## 2022-12-17 DIAGNOSIS — M25.551 HIP PAIN, RIGHT: ICD-10-CM

## 2022-12-17 DIAGNOSIS — M25.561 RIGHT KNEE PAIN: ICD-10-CM

## 2022-12-17 DIAGNOSIS — G89.29 CHRONIC PAIN OF RIGHT KNEE: Primary | ICD-10-CM

## 2022-12-17 DIAGNOSIS — M25.561 CHRONIC PAIN OF RIGHT KNEE: Primary | ICD-10-CM

## 2022-12-17 PROCEDURE — 73564 X-RAY EXAM KNEE 4 OR MORE: CPT | Mod: RT | Performed by: RADIOLOGY

## 2022-12-17 PROCEDURE — 99204 OFFICE O/P NEW MOD 45 MIN: CPT | Performed by: FAMILY MEDICINE

## 2022-12-17 ASSESSMENT — PAIN SCALES - GENERAL: PAINLEVEL: EXTREME PAIN (9)

## 2022-12-17 NOTE — PATIENT INSTRUCTIONS
You have some mild degenerative disease in the medial knee that is likely causing some of the pain on the inside part of her knee.  Additionally you are exhibiting some signs of impingement in the right hip which is likely a mechanical issue complicated by the inactivity of your gluteal muscles.  -To attempt to treat this we could consider diagnostic/therapeutic injections in the knee and/or hip to assess response.  -Additionally may consider revisiting with your spine doctors as your pattern of symptoms roughly fits in L4 distribution

## 2022-12-17 NOTE — LETTER
12/17/2022         RE: Amelia Coker  [address] [facility] [address]        Dear Colleague,    Thank you for referring your patient, Amelia Coker, to the [facility]. Please see a copy of my visit note below.      [facility]  SPORTS MEDICINE CLINIC VISIT     Dec 17, 2022        ASSESSMENT & PLAN    47-year-old with right hip and knee pain due to impingement and DJD respectively.  Is very possible given her spine history that some of the symptoms are radicular as they do seem to fit in L4 distribution based on her description    Reviewed imaging and assessment with patient in detail  We discussed the benefit of injections into either the knee or the hip which can serve a diagnostic or therapeutic purpose.  She should also consider discussing with her spine doctors regarding potential treatment for radiculopathy or review of recent imaging.    Ameya Nava MD  [facility]    Face-to-face time:34 minutes  Record review time: 10Minutes  Documentation time: 3Minutes  Total time: 47 Minutes    -----  Chief Complaint   Patient presents with     Right Hip - Pain     Right Knee - Pain       SUBJECTIVE  Amelia Coker is a/an 47 year old female who is seen in consultation at the request of  Otoniel Nelson PA-C for evaluation of right knee and hip.     The patient is seen by themselves.  The patient is Right handed    Onset: 4 month(s) ago. Patient describes injury as Long term hip issue and stepped in hole while jogging through a parking lot in august.  Location of Pain: right hip and knee  Worsened by: laying down, stairs   Better with: meds  Treatments tried: other medications: Meloxicam  Associated symptoms: tingling    Orthopedic/Surgical history: NO  Social History/Occupation: optictian      REVIEW OF SYSTEMS:    Do you have fever, chills, weight loss? No    Do you have any vision problems? No    Do you have any  chest pain or edema? No    Do you have any shortness of breath or wheezing?  No    Do you have stomach problems? No    Do you have any numbness or focal weakness? No    Do you have diabetes? No    Do you have problems with bleeding or clotting? No    Do you have an rashes or other skin lesions? No    OBJECTIVE:  There were no vitals taken for this visit.     Exam:  Patient is alert, in no acute distress, pleasant and conversational.      right Hip:  Supine PROM:  Flexion: Approximately 115 , no tenderness.  External rotation: approximately 60 , no tenderness.  Internal rotation: Approximately 30 , with pain      Strength Testing:  Hip flexion: 5/5.  With pain  Hip adduction: 5/5.  Hip abduction: 4+/5.      Palpation:  negative tenderness to palpation over the greater trochanter.  positive  tenderness to palpation over psoas    Special Tests:  negative Eliana's test.   positive  FRANCISCO JAVIER's test  positive  FADIR's test  positive  Scour test  negative Reported pain with resisted hip flexion to opposite shoulder       right knee:   Skin intact. No erythema or ecchymosis.  No effusion or soft tissue swelling.    AROM: Zero to approximately 135  with pain at terminal flexion    Palpation: No medial or lateral facet joint tenderness.  TTP along the medial joint line  No TTP along lateral joint line    Special Tests:  positive Karen's for pain  No ligamentous laxity or pain with valgus or varus stress.      Full Isometric quad strength, extensor mechanism in place            RADIOLOGY:    Three-view 4 view x-rays of the right knee are performed and reviewed independently demonstrating mild to moderate narrowing of the medial compartment.  Otherwise no significant DJD.  No acute findings.  See EMR for formal radiology report.     2 view x-rays of the right hip are performed on 10/3/2022 and reviewed independently which demonstrates no acute fracture or dislocation.  Likely slightly reduced head neck offset but otherwise no  significant DJD.  See EMR for radiology report.      Again, thank you for allowing me to participate in the care of your patient.        Sincerely,        Ameya Nava MD

## 2022-12-17 NOTE — PROGRESS NOTES
[facility]  SPORTS MEDICINE CLINIC VISIT     Dec 17, 2022        ASSESSMENT & PLAN    47-year-old with right hip and knee pain due to impingement and DJD respectively.  Is very possible given her spine history that some of the symptoms are radicular as they do seem to fit in L4 distribution based on her description    Reviewed imaging and assessment with patient in detail  We discussed the benefit of injections into either the knee or the hip which can serve a diagnostic or therapeutic purpose.  She should also consider discussing with her spine doctors regarding potential treatment for radiculopathy or review of recent imaging.    Ameya Nava MD  [facility]    Face-to-face time:34 minutes  Record review time: 10Minutes  Documentation time: 3Minutes  Total time: 47 Minutes    -----  Chief Complaint   Patient presents with     Right Hip - Pain     Right Knee - Pain       SUBJECTIVE  Amelia Coker is a/an 47 year old female who is seen in consultation at the request of  Otoniel Nelson PA-C for evaluation of right knee and hip.     The patient is seen by themselves.  The patient is Right handed    Onset: 4 month(s) ago. Patient describes injury as Long term hip issue and stepped in hole while jogging through a parking lot in august.  Location of Pain: right hip and knee  Worsened by: laying down, stairs   Better with: meds  Treatments tried: other medications: Meloxicam  Associated symptoms: tingling    Orthopedic/Surgical history: NO  Social History/Occupation: optictian      REVIEW OF SYSTEMS:    Do you have fever, chills, weight loss? No    Do you have any vision problems? No    Do you have any chest pain or edema? No    Do you have any shortness of breath or wheezing?  No    Do you have stomach problems? No    Do you have any numbness or focal weakness? No    Do you have diabetes? No    Do you have problems with bleeding or clotting? No    Do you have an  rashes or other skin lesions? No    OBJECTIVE:  There were no vitals taken for this visit.     Exam:  Patient is alert, in no acute distress, pleasant and conversational.      right Hip:  Supine PROM:  Flexion: Approximately 115 , no tenderness.  External rotation: approximately 60 , no tenderness.  Internal rotation: Approximately 30 , with pain      Strength Testing:  Hip flexion: 5/5.  With pain  Hip adduction: 5/5.  Hip abduction: 4+/5.      Palpation:  negative tenderness to palpation over the greater trochanter.  positive  tenderness to palpation over psoas    Special Tests:  negative Eliana's test.   positive  FRANCISCO JAVIER's test  positive  FADIR's test  positive  Scour test  negative Reported pain with resisted hip flexion to opposite shoulder       right knee:   Skin intact. No erythema or ecchymosis.  No effusion or soft tissue swelling.    AROM: Zero to approximately 135  with pain at terminal flexion    Palpation: No medial or lateral facet joint tenderness.  TTP along the medial joint line  No TTP along lateral joint line    Special Tests:  positive Karen's for pain  No ligamentous laxity or pain with valgus or varus stress.      Full Isometric quad strength, extensor mechanism in place            RADIOLOGY:    Three-view 4 view x-rays of the right knee are performed and reviewed independently demonstrating mild to moderate narrowing of the medial compartment.  Otherwise no significant DJD.  No acute findings.  See EMR for formal radiology report.     2 view x-rays of the right hip are performed on 10/3/2022 and reviewed independently which demonstrates no acute fracture or dislocation.  Likely slightly reduced head neck offset but otherwise no significant DJD.  See EMR for radiology report.

## 2022-12-17 NOTE — NURSING NOTE
Chief Complaint   Patient presents with     Right Hip - Pain     Right Knee - Pain       There were no vitals filed for this visit.    There is no height or weight on file to calculate BMI.      CHUY Jones NREMT

## 2022-12-19 ENCOUNTER — OFFICE VISIT (OUTPATIENT)
Dept: FAMILY MEDICINE | Facility: OTHER | Age: 47
End: 2022-12-19
Payer: COMMERCIAL

## 2022-12-19 VITALS
OXYGEN SATURATION: 96 % | HEIGHT: 65 IN | RESPIRATION RATE: 20 BRPM | DIASTOLIC BLOOD PRESSURE: 82 MMHG | SYSTOLIC BLOOD PRESSURE: 122 MMHG | HEART RATE: 91 BPM | BODY MASS INDEX: 35.49 KG/M2 | WEIGHT: 213 LBS | TEMPERATURE: 97.6 F

## 2022-12-19 DIAGNOSIS — R53.82 CHRONIC FATIGUE: ICD-10-CM

## 2022-12-19 DIAGNOSIS — R63.5 WEIGHT GAIN: Primary | ICD-10-CM

## 2022-12-19 DIAGNOSIS — E04.1 THYROID NODULE: ICD-10-CM

## 2022-12-19 DIAGNOSIS — E66.812 OBESITY, CLASS II, BMI 35-39.9: ICD-10-CM

## 2022-12-19 DIAGNOSIS — R73.9 HYPERGLYCEMIA: ICD-10-CM

## 2022-12-19 PROBLEM — E66.01 MORBID OBESITY (H): Status: RESOLVED | Noted: 2020-07-17 | Resolved: 2022-12-19

## 2022-12-19 PROBLEM — E66.01 MORBID OBESITY (H): Status: ACTIVE | Noted: 2022-12-19

## 2022-12-19 LAB
ALBUMIN SERPL-MCNC: 4 G/DL (ref 3.4–5)
ALP SERPL-CCNC: 72 U/L (ref 40–150)
ALT SERPL W P-5'-P-CCNC: 41 U/L (ref 0–50)
ANION GAP SERPL CALCULATED.3IONS-SCNC: 4 MMOL/L (ref 3–14)
AST SERPL W P-5'-P-CCNC: 20 U/L (ref 0–45)
BILIRUB SERPL-MCNC: 0.6 MG/DL (ref 0.2–1.3)
BUN SERPL-MCNC: 25 MG/DL (ref 7–30)
CALCIUM SERPL-MCNC: 8.5 MG/DL (ref 8.5–10.1)
CHLORIDE BLD-SCNC: 109 MMOL/L (ref 94–109)
CO2 SERPL-SCNC: 28 MMOL/L (ref 20–32)
CREAT SERPL-MCNC: 0.61 MG/DL (ref 0.52–1.04)
GFR SERPL CREATININE-BSD FRML MDRD: >90 ML/MIN/1.73M2
GLUCOSE BLD-MCNC: 103 MG/DL (ref 70–99)
LDLC SERPL CALC-MCNC: 132 MG/DL
POTASSIUM BLD-SCNC: 3.9 MMOL/L (ref 3.4–5.3)
PROT SERPL-MCNC: 7.8 G/DL (ref 6.8–8.8)
SODIUM SERPL-SCNC: 141 MMOL/L (ref 133–144)
TSH SERPL DL<=0.005 MIU/L-ACNC: 1.61 MU/L (ref 0.4–4)

## 2022-12-19 PROCEDURE — 99213 OFFICE O/P EST LOW 20 MIN: CPT | Performed by: PHYSICIAN ASSISTANT

## 2022-12-19 PROCEDURE — 80053 COMPREHEN METABOLIC PANEL: CPT | Performed by: PHYSICIAN ASSISTANT

## 2022-12-19 PROCEDURE — 84443 ASSAY THYROID STIM HORMONE: CPT | Performed by: PHYSICIAN ASSISTANT

## 2022-12-19 PROCEDURE — 83721 ASSAY OF BLOOD LIPOPROTEIN: CPT | Performed by: PHYSICIAN ASSISTANT

## 2022-12-19 PROCEDURE — 36415 COLL VENOUS BLD VENIPUNCTURE: CPT | Performed by: PHYSICIAN ASSISTANT

## 2022-12-19 ASSESSMENT — PAIN SCALES - GENERAL: PAINLEVEL: MODERATE PAIN (4)

## 2022-12-19 NOTE — PROGRESS NOTES
Assessment & Plan     Weight gain  Obesity, Class II, BMI 35-39.9  Chronic fatigue  Patient would like to try semaglutide for weight management and weight loss.  Patient has tried multiple weight loss programs including weight watchers, OfferIQ diet, dietary plans from online sources as well as nutritional consultation in the past without much for success.  She has had problems recently with chronic musculoskeletal pain which limits her ability to exercise.  If she fasts then she has more problems with headaches and feeling lightheaded and dizzy.  She requests consideration for Ozempic and prescription is placed for her today.  - Comprehensive metabolic panel (BMP + Alb, Alk Phos, ALT, AST, Total. Bili, TP); Future  - TSH with free T4 reflex; Future  - LDL cholesterol direct; Future  - Comprehensive metabolic panel (BMP + Alb, Alk Phos, ALT, AST, Total. Bili, TP)  - TSH with free T4 reflex  - LDL cholesterol direct    Patient report:    Okay as far as diets, and things I have tried in the past to lose weight.  In 2010 I tried HCG with 500 calories a day, which helped To shed some weight but in reality is unhealthy and not a sustainable way to live being always hungry feeling,  but the start in loss got me motivated so I started walking and then jogging, in late 2010 and 2011 I jogged 4 miles a day, 6 days a week and 1,200 calories or less a day and no carbs, and was able to lose substantial weight but not by the walking It had to be running the excessive.  Denisha tried intermittent fasting, also no weight loss but made me extremely shaky and cold to the point of shaking.  (Common occurrence of I skip small meals)  So between those two I was able to shed about 80# but Then Lyme disease kicked in and my body continues to degenerate,  so since then running has pretty much been a no.  I've done the gym thing using elliptical for cardio and walking and have been unable to do anything strenuous enough to truly get  super cardio in.  Elliptical became too much for my back also (leaning forward posture)  I've tried low/no fat with no luck, so eliminating carbohydrates for the most part.I do still eat more complex carbs like oatmeal, quinoa, and homemade flax breads and things,  (Currently following THM (trim healthy mama) guidelines of not mixing carbs and fat, separation of them per meal.  I do not eat closer then 3-4 hours, and do smaller meals vs large ones.  I do eat in the evenings after dinner and typically do not snack throughout the day.l, if I snack it's typically 3 hours after lunch if I know dinner will be a bit later, and is usually a cheese stick or a small handful of nuts.  I do not eat processed foods for the most part, I do not eat sugar or sugar items with refined flowers.  I am active as my body will allow for. I park further away at stores, I use stairs not elevators, I am not a sedate TV watcher at all.  I purposely take multiple trips up and down the stairs to get items from home vs all In one trip.  I purposely move logs on the log pile for the strength and exercise purpose.  I do not drink much alcohol, but a few times a year.  I have literally tried everything I can with no losses.  So short of weightoss surgery I do not know any other options to try, and am not a fan of the risk of surgery (I've had my share and likely more to come)  I know that if I could lose 20-40# my arthritic body would be happier.  Let me know if you need more information.           Thyroid nodule  Labs are due related to thyroid function.  Follow-up with specialist regards to thyroid nodule is advised.  We will review the ultrasound once again I cannot feel the nodule that has been noted.  Is there is an area of fullness to the thyroid region just above the clavicle that may indeed be this nodule but I cannot be sure.  - Comprehensive metabolic panel (BMP + Alb, Alk Phos, ALT, AST, Total. Bili, TP); Future  - TSH with free T4  "reflex; Future  - LDL cholesterol direct; Future  - Comprehensive metabolic panel (BMP + Alb, Alk Phos, ALT, AST, Total. Bili, TP)  - TSH with free T4 reflex  - LDL cholesterol direct     BMI:   Estimated body mass index is 35.34 kg/m  as calculated from the following:    Height as of this encounter: 1.654 m (5' 5.1\").    Weight as of this encounter: 96.6 kg (213 lb).   Weight management plan: Patient referred to endocrine and/or weight management specialty Discussed healthy diet and exercise guidelines    Regular exercise    No follow-ups on file.    SONAM Lisa [facility] YVES Roe is a 47 year old, presenting for the following health issues:  Follow Up      History of Present Illness       Reason for visit:  Med check may be blood labs    She eats 4 or more servings of fruits and vegetables daily.She consumes 0 sweetened beverage(s) daily.She exercises with enough effort to increase her heart rate 30 to 60 minutes per day.    She is taking medications regularly.       Medication Followup of Meloxicam    Taking Medication as prescribed: yes    Side Effects:  None    Medication Helping Symptoms:  yes    Review of Systems   Constitutional, HEENT, cardiovascular, pulmonary, GI, , musculoskeletal, neuro, skin, endocrine and psych systems are negative, except as otherwise noted.      Objective    /82   Pulse 91   Temp 97.6  F (36.4  C) (Temporal)   Resp 20   Ht 1.654 m (5' 5.1\")   Wt 96.6 kg (213 lb)   SpO2 96%   BMI 35.34 kg/m    Body mass index is 35.34 kg/m .  Physical Exam   GENERAL: healthy, alert and no distress  NECK: no adenopathy, no asymmetry, masses, or scars and thyroid normal to palpation  RESP: lungs clear to auscultation - no rales, rhonchi or wheezes  CV: regular rate and rhythm, normal S1 S2, no S3 or S4, no murmur, click or rub, no peripheral edema and peripheral pulses strong  ABDOMEN: soft, nontender, no hepatosplenomegaly, no masses and " bowel sounds normal  MS: no gross musculoskeletal defects noted, no edema    Results for orders placed or performed in visit on 12/19/22 (from the past 24 hour(s))   Comprehensive metabolic panel (BMP + Alb, Alk Phos, ALT, AST, Total. Bili, TP)   Result Value Ref Range    Sodium 141 133 - 144 mmol/L    Potassium 3.9 3.4 - 5.3 mmol/L    Chloride 109 94 - 109 mmol/L    Carbon Dioxide (CO2) 28 20 - 32 mmol/L    Anion Gap 4 3 - 14 mmol/L    Urea Nitrogen 25 7 - 30 mg/dL    Creatinine 0.61 0.52 - 1.04 mg/dL    Calcium 8.5 8.5 - 10.1 mg/dL    Glucose 103 (H) 70 - 99 mg/dL    Alkaline Phosphatase 72 40 - 150 U/L    AST 20 0 - 45 U/L    ALT 41 0 - 50 U/L    Protein Total 7.8 6.8 - 8.8 g/dL    Albumin 4.0 3.4 - 5.0 g/dL    Bilirubin Total 0.6 0.2 - 1.3 mg/dL    GFR Estimate >90 >60 mL/min/1.73m2   TSH with free T4 reflex   Result Value Ref Range    TSH 1.61 0.40 - 4.00 mU/L   LDL cholesterol direct   Result Value Ref Range    LDL Cholesterol Direct 132 (H) <100 mg/dL     *Note: Due to a large number of results and/or encounters for the requested time period, some results have not been displayed. A complete set of results can be found in Results Review.

## 2022-12-20 ENCOUNTER — MYC MEDICAL ADVICE (OUTPATIENT)
Dept: FAMILY MEDICINE | Facility: OTHER | Age: 47
End: 2022-12-20

## 2022-12-20 PROBLEM — R73.9 HYPERGLYCEMIA: Status: ACTIVE | Noted: 2022-12-20

## 2022-12-22 ENCOUNTER — TELEPHONE (OUTPATIENT)
Dept: FAMILY MEDICINE | Facility: OTHER | Age: 47
End: 2022-12-22

## 2022-12-22 DIAGNOSIS — R63.5 WEIGHT GAIN: Primary | ICD-10-CM

## 2022-12-22 NOTE — TELEPHONE ENCOUNTER
Central Prior Authorization Team  Phone: [phone]    PA Initiation    Medication: semaglutide (OZEMPIC) 2 MG/1.5ML SOPN pen  Insurance Company: [employer] ([facility]) - Phone [phone] Fax [phone]  Pharmacy Filling the Rx: [id number] - [place], [place] - [address]   Filling Pharmacy Phone: [phone]  Filling Pharmacy Fax:    Start Date: 12/22/2022

## 2022-12-22 NOTE — TELEPHONE ENCOUNTER
semaglutide (OZEMPIC) 2 MG/1.5ML SOPN pen        Prior Authorization Retail Medication Request    Medication/Dose:   ICD code (if different than what is on RX):    Previously Tried and Failed:    Rationale:      Insurance Name:    Insurance ID:        Pharmacy Information (if different than what is on RX)  Name:    Phone:

## 2022-12-22 NOTE — TELEPHONE ENCOUNTER
Copied and pasted your report to us into the medical record from Monday.  Hopefully it will help get coverage.  Electronically signed:    Otoniel Manuel PA-C

## 2022-12-26 RX ORDER — SEMAGLUTIDE 0.25 MG/.5ML
INJECTION, SOLUTION SUBCUTANEOUS
Qty: 3 ML | Refills: 3 | Status: SHIPPED | OUTPATIENT
Start: 2022-12-26 | End: 2023-01-12

## 2022-12-26 NOTE — TELEPHONE ENCOUNTER
Central Prior Authorization Team  Phone: [phone]    PRIOR AUTHORIZATION DENIED    Medication: semaglutide (OZEMPIC) 2 MG/1.5ML SOPN pen    Denial Date: 12/22/2022    Denial Rational:         Appeal Information: AVAILABLE ON CMM TO SUBMIT

## 2023-01-03 ENCOUNTER — MYC MEDICAL ADVICE (OUTPATIENT)
Dept: FAMILY MEDICINE | Facility: OTHER | Age: 48
End: 2023-01-03

## 2023-01-03 NOTE — LETTER
[facility]  [address]  [place] [address]  [phone]        January 5, 2023  In regards to:  Amelia Coker  [address] [place] [address]          To whom it may concern,    Amelia Coker has been a patient of mine for quite some time.  Recently she requested semaglutide/Wegovy to help her with weight loss and this was denied by her insurance coverage.  She has undergone multiple different approaches to diet and exercise changes without much for success.  I refer the reader to my office visit from 19 December 2022 for more detailed information.    Please reconsider your denial of medication.    Sincerely,        SHEREEN Yañez PA-C

## 2023-01-03 NOTE — TELEPHONE ENCOUNTER
To provider to assist/advise with patient mychart msg.   I did speak with Dominga at her pharmacy.  She states Patient insurance requires a prior authorization for Both the semaglutide/Ozempic (prescribed 12/20/22 for weight loss) and/or the Semaglutide/Wegovy (prescribed 12/26/22 for weight loss)  The PA was done for the semaglutide already and was denied;  She would like to appeal.  She states if can't get either approved will need written prescription for her to send to alternate pharmacy where can get it cheaper (she states [facility]).   Please advise.  Also routing to the PA group.  Josefina Das RN

## 2023-01-05 NOTE — TELEPHONE ENCOUNTER
The letter is signed (and supporting documentation from 19 December 2022) and in the MA task box (next to the printer) for completion and scan of the document into the medical record.  Electronically signed:    Otoniel Manuel PA-C

## 2023-01-07 DIAGNOSIS — S16.1XXA ACUTE CERVICAL MYOFASCIAL STRAIN, INITIAL ENCOUNTER: ICD-10-CM

## 2023-01-09 ENCOUNTER — MYC MEDICAL ADVICE (OUTPATIENT)
Dept: FAMILY MEDICINE | Facility: OTHER | Age: 48
End: 2023-01-09

## 2023-01-09 DIAGNOSIS — M25.511 CHRONIC RIGHT SHOULDER PAIN: ICD-10-CM

## 2023-01-09 DIAGNOSIS — M50.30 DDD (DEGENERATIVE DISC DISEASE), CERVICAL: ICD-10-CM

## 2023-01-09 DIAGNOSIS — G89.29 CHRONIC RIGHT SHOULDER PAIN: ICD-10-CM

## 2023-01-09 DIAGNOSIS — M79.642 BILATERAL HAND PAIN: Primary | ICD-10-CM

## 2023-01-09 DIAGNOSIS — M54.12 CERVICAL RADICULOPATHY: ICD-10-CM

## 2023-01-09 DIAGNOSIS — R52 GENERALIZED BODY ACHES: Primary | ICD-10-CM

## 2023-01-09 DIAGNOSIS — G44.211 INTRACTABLE EPISODIC TENSION-TYPE HEADACHE: ICD-10-CM

## 2023-01-09 DIAGNOSIS — M47.26 OSTEOARTHRITIS OF SPINE WITH RADICULOPATHY, LUMBAR REGION: ICD-10-CM

## 2023-01-09 DIAGNOSIS — M25.529 ARTHRALGIA OF UPPER ARM, UNSPECIFIED LATERALITY: ICD-10-CM

## 2023-01-09 DIAGNOSIS — M48.061 FORAMINAL STENOSIS OF LUMBAR REGION: ICD-10-CM

## 2023-01-09 DIAGNOSIS — M79.671 RIGHT FOOT PAIN: ICD-10-CM

## 2023-01-09 DIAGNOSIS — M79.601 PAIN OF RIGHT UPPER EXTREMITY: ICD-10-CM

## 2023-01-09 DIAGNOSIS — M79.641 BILATERAL HAND PAIN: Primary | ICD-10-CM

## 2023-01-09 RX ORDER — MELOXICAM 15 MG/1
15 TABLET ORAL DAILY
Qty: 90 TABLET | Refills: 3 | Status: SHIPPED | OUTPATIENT
Start: 2023-01-09 | End: 2023-10-27

## 2023-01-10 ENCOUNTER — LAB (OUTPATIENT)
Dept: LAB | Facility: CLINIC | Age: 48
End: 2023-01-10
Payer: COMMERCIAL

## 2023-01-10 DIAGNOSIS — M79.601 PAIN OF RIGHT UPPER EXTREMITY: ICD-10-CM

## 2023-01-10 DIAGNOSIS — G44.211 INTRACTABLE EPISODIC TENSION-TYPE HEADACHE: ICD-10-CM

## 2023-01-10 DIAGNOSIS — M25.511 CHRONIC RIGHT SHOULDER PAIN: ICD-10-CM

## 2023-01-10 DIAGNOSIS — M79.642 BILATERAL HAND PAIN: ICD-10-CM

## 2023-01-10 DIAGNOSIS — M79.671 RIGHT FOOT PAIN: ICD-10-CM

## 2023-01-10 DIAGNOSIS — M50.30 DDD (DEGENERATIVE DISC DISEASE), CERVICAL: ICD-10-CM

## 2023-01-10 DIAGNOSIS — M79.641 BILATERAL HAND PAIN: ICD-10-CM

## 2023-01-10 DIAGNOSIS — M48.061 FORAMINAL STENOSIS OF LUMBAR REGION: ICD-10-CM

## 2023-01-10 DIAGNOSIS — M25.529 ARTHRALGIA OF UPPER ARM, UNSPECIFIED LATERALITY: ICD-10-CM

## 2023-01-10 DIAGNOSIS — G89.29 CHRONIC RIGHT SHOULDER PAIN: ICD-10-CM

## 2023-01-10 DIAGNOSIS — R52 GENERALIZED BODY ACHES: ICD-10-CM

## 2023-01-10 DIAGNOSIS — M47.26 OSTEOARTHRITIS OF SPINE WITH RADICULOPATHY, LUMBAR REGION: ICD-10-CM

## 2023-01-10 DIAGNOSIS — M54.12 CERVICAL RADICULOPATHY: ICD-10-CM

## 2023-01-10 LAB
ALBUMIN SERPL-MCNC: 3.8 G/DL (ref 3.4–5)
ALP SERPL-CCNC: 64 U/L (ref 40–150)
ALT SERPL W P-5'-P-CCNC: 39 U/L (ref 0–50)
ANION GAP SERPL CALCULATED.3IONS-SCNC: 3 MMOL/L (ref 3–14)
AST SERPL W P-5'-P-CCNC: 17 U/L (ref 0–45)
BILIRUB SERPL-MCNC: 0.7 MG/DL (ref 0.2–1.3)
BUN SERPL-MCNC: 17 MG/DL (ref 7–30)
CALCIUM SERPL-MCNC: 9.4 MG/DL (ref 8.5–10.1)
CHLORIDE BLD-SCNC: 109 MMOL/L (ref 94–109)
CO2 SERPL-SCNC: 29 MMOL/L (ref 20–32)
CREAT SERPL-MCNC: 0.51 MG/DL (ref 0.52–1.04)
CRP SERPL-MCNC: <2.9 MG/L (ref 0–8)
DEPRECATED CALCIDIOL+CALCIFEROL SERPL-MC: 32 UG/L (ref 20–75)
ERYTHROCYTE [DISTWIDTH] IN BLOOD BY AUTOMATED COUNT: 13.2 % (ref 10–15)
ERYTHROCYTE [SEDIMENTATION RATE] IN BLOOD BY WESTERGREN METHOD: 7 MM/HR (ref 0–30)
GFR SERPL CREATININE-BSD FRML MDRD: >90 ML/MIN/1.73M2
GLUCOSE BLD-MCNC: 95 MG/DL (ref 70–99)
HCT VFR BLD AUTO: 37.7 % (ref 35–47)
HGB BLD-MCNC: 12.5 G/DL (ref 11.7–15.7)
MCH RBC QN AUTO: 29.4 PG (ref 26.5–33)
MCHC RBC AUTO-ENTMCNC: 33.2 G/DL (ref 31.5–36.5)
MCV RBC AUTO: 89 FL (ref 78–100)
PLATELET # BLD AUTO: 288 10E3/UL (ref 150–450)
POTASSIUM BLD-SCNC: 4.1 MMOL/L (ref 3.4–5.3)
PROT SERPL-MCNC: 7.4 G/DL (ref 6.8–8.8)
RBC # BLD AUTO: 4.25 10E6/UL (ref 3.8–5.2)
SODIUM SERPL-SCNC: 141 MMOL/L (ref 133–144)
TSH SERPL DL<=0.005 MIU/L-ACNC: 0.87 MU/L (ref 0.4–4)
WBC # BLD AUTO: 4.8 10E3/UL (ref 4–11)

## 2023-01-10 PROCEDURE — 86140 C-REACTIVE PROTEIN: CPT

## 2023-01-10 PROCEDURE — 82306 VITAMIN D 25 HYDROXY: CPT

## 2023-01-10 PROCEDURE — 36415 COLL VENOUS BLD VENIPUNCTURE: CPT

## 2023-01-10 PROCEDURE — 84443 ASSAY THYROID STIM HORMONE: CPT

## 2023-01-10 PROCEDURE — 85027 COMPLETE CBC AUTOMATED: CPT

## 2023-01-10 PROCEDURE — 80053 COMPREHEN METABOLIC PANEL: CPT

## 2023-01-10 PROCEDURE — 86431 RHEUMATOID FACTOR QUANT: CPT

## 2023-01-10 PROCEDURE — 85652 RBC SED RATE AUTOMATED: CPT

## 2023-01-10 PROCEDURE — 86618 LYME DISEASE ANTIBODY: CPT

## 2023-01-11 LAB
B BURGDOR IGG+IGM SER QL: 0.22
RHEUMATOID FACT SER NEPH-ACNC: 7 IU/ML

## 2023-01-12 DIAGNOSIS — R63.5 WEIGHT GAIN: ICD-10-CM

## 2023-01-12 DIAGNOSIS — R53.82 CHRONIC FATIGUE: ICD-10-CM

## 2023-01-12 DIAGNOSIS — R73.9 HYPERGLYCEMIA: ICD-10-CM

## 2023-01-12 DIAGNOSIS — E04.1 THYROID NODULE: ICD-10-CM

## 2023-01-12 DIAGNOSIS — E66.812 OBESITY, CLASS II, BMI 35-39.9: ICD-10-CM

## 2023-01-12 RX ORDER — SEMAGLUTIDE 0.25 MG/.5ML
INJECTION, SOLUTION SUBCUTANEOUS
Qty: 3 ML | Refills: 3 | Status: SHIPPED | OUTPATIENT
Start: 2023-01-12 | End: 2023-01-22

## 2023-01-12 NOTE — TELEPHONE ENCOUNTER
Central Prior Authorization Team  Phone: [phone]    Received this fax regarding pt medication

## 2023-01-16 ENCOUNTER — MYC MEDICAL ADVICE (OUTPATIENT)
Dept: FAMILY MEDICINE | Facility: OTHER | Age: 48
End: 2023-01-16

## 2023-01-16 ENCOUNTER — MYC MEDICAL ADVICE (OUTPATIENT)
Dept: NEUROSURGERY | Facility: CLINIC | Age: 48
End: 2023-01-16

## 2023-01-16 DIAGNOSIS — E04.1 THYROID NODULE: ICD-10-CM

## 2023-01-16 DIAGNOSIS — M79.601 PAIN IN BOTH UPPER EXTREMITIES: Primary | ICD-10-CM

## 2023-01-16 DIAGNOSIS — H04.123 DRY EYES: Primary | ICD-10-CM

## 2023-01-16 DIAGNOSIS — L85.3 DRY SKIN: ICD-10-CM

## 2023-01-16 DIAGNOSIS — L67.8 DRY HAIR: ICD-10-CM

## 2023-01-16 DIAGNOSIS — M79.602 PAIN IN BOTH UPPER EXTREMITIES: Primary | ICD-10-CM

## 2023-01-16 NOTE — CONFIDENTIAL NOTE
Last Visit: 7/4/22    Next Visit: none    Name of Provider:  Dr. Mesa     Assessment: Patient sent [facility] message with update/new symptoms and if LUE should have EMG as well as the right. RUE EMG scheduled on 1/26/23.    She said cervical FRANCISCO was helpful. States right arm symptoms slowly coming back. Reports stiff neck, mild headache. Left arm symptoms began a few wks ago. Pain in left arm is located at the top of her elbow, forearm to hand and slowly progressed to front of the shoulder/bicep. Describes pain as an ache and reports some tingling.     Recommendation given: Message routed to care team to review and advise.     Action needed from provider: Since she is reporting new LUE symptoms do you want to see if we can add LUE EMG as well? RUE EMG scheduled 1/26/23. Also, since new symptoms do you recommend any updated imaging ? Last cervical MRI done at [facility] on 6/28/22. Cervical and scoli XRs done on 7/14/22 and viewable in epic.

## 2023-01-16 NOTE — CONFIDENTIAL NOTE
Dr. Mesa said Yes to adding on LUE EMG and to Hold off on MRI for now.     Order placed for BUE EMG.     Called and spoke to Magaly with Neurology scheduling. She is able to add on LUE to current appt scheduled on 1/26/23. So patient will be having BUE EMG done on 1/26/23 now. Sent patient [facility] message with update.

## 2023-01-18 ENCOUNTER — HOSPITAL ENCOUNTER (OUTPATIENT)
Dept: ULTRASOUND IMAGING | Facility: CLINIC | Age: 48
Discharge: HOME | End: 2023-01-18
Attending: PHYSICIAN ASSISTANT | Admitting: PHYSICIAN ASSISTANT
Payer: COMMERCIAL

## 2023-01-18 DIAGNOSIS — L85.3 DRY SKIN: ICD-10-CM

## 2023-01-18 DIAGNOSIS — E04.1 THYROID NODULE: ICD-10-CM

## 2023-01-18 DIAGNOSIS — H04.123 DRY EYES: ICD-10-CM

## 2023-01-18 DIAGNOSIS — L67.8 DRY HAIR: ICD-10-CM

## 2023-01-18 PROCEDURE — 76536 US EXAM OF HEAD AND NECK: CPT

## 2023-01-21 ENCOUNTER — MYC MEDICAL ADVICE (OUTPATIENT)
Dept: FAMILY MEDICINE | Facility: OTHER | Age: 48
End: 2023-01-21
Payer: COMMERCIAL

## 2023-01-21 DIAGNOSIS — R63.4 WEIGHT LOSS: Primary | ICD-10-CM

## 2023-01-22 RX ORDER — SEMAGLUTIDE 0.25 MG/.5ML
1 INJECTION, SOLUTION SUBCUTANEOUS WEEKLY
Qty: 1 ML | Refills: 0 | Status: SHIPPED | OUTPATIENT
Start: 2023-01-22 | End: 2023-02-05

## 2023-01-22 RX ORDER — SEMAGLUTIDE 0.5 MG/.5ML
1 INJECTION, SOLUTION SUBCUTANEOUS WEEKLY
Qty: 6 ML | Refills: 0 | Status: SHIPPED | OUTPATIENT
Start: 2023-02-05 | End: 2023-04-16

## 2023-01-24 ENCOUNTER — TELEPHONE (OUTPATIENT)
Dept: FAMILY MEDICINE | Facility: OTHER | Age: 48
End: 2023-01-24

## 2023-01-24 NOTE — TELEPHONE ENCOUNTER
Prior Authorization Retail Medication Request    Medication/Dose: Semaglutide-Weight Management (WEGOVY) 0.25 MG/0.5ML SOAJ  ICD code (if different than what is on RX):    Previously Tried and Failed:    Rationale:      Insurance Name:    Insurance ID:        Pharmacy Information (if different than what is on RX)  Name:    Phone:

## 2023-01-26 ENCOUNTER — OFFICE VISIT (OUTPATIENT)
Dept: NEUROLOGY | Facility: CLINIC | Age: 48
End: 2023-01-26
Attending: STUDENT IN AN ORGANIZED HEALTH CARE EDUCATION/TRAINING PROGRAM
Payer: COMMERCIAL

## 2023-01-26 DIAGNOSIS — M79.601 PAIN IN BOTH UPPER EXTREMITIES: ICD-10-CM

## 2023-01-26 DIAGNOSIS — M79.601 PAIN OF RIGHT UPPER EXTREMITY: ICD-10-CM

## 2023-01-26 DIAGNOSIS — M79.602 PAIN IN BOTH UPPER EXTREMITIES: ICD-10-CM

## 2023-01-26 PROCEDURE — 95910 NRV CNDJ TEST 7-8 STUDIES: CPT | Performed by: INTERNAL MEDICINE

## 2023-01-26 PROCEDURE — 95886 MUSC TEST DONE W/N TEST COMP: CPT | Performed by: INTERNAL MEDICINE

## 2023-01-26 NOTE — TELEPHONE ENCOUNTER
Central Prior Authorization Team   Phone: [phone]      PA Initiation    Medication: Semaglutide-Weight Management (WEGOVY) 0.25 MG/0.5ML SOAJ - PA INITIATED  Insurance Company: [employer] ([facility]) - Phone [phone] Fax [phone]  Pharmacy Filling the Rx: [id number] - [place], [place] - [address] [place] [address]  Filling Pharmacy Phone: [phone]  Filling Pharmacy Fax:    Start Date: 1/26/2023

## 2023-01-26 NOTE — TELEPHONE ENCOUNTER
PRIOR AUTHORIZATION DENIED    Medication: Semaglutide-Weight Management (WEGOVY) 0.25 MG/0.5ML SOAJ - PA DENIED    Denial Date: 1/26/2023    Denial Rational: REQUESTED DRUG IS NOT COVERED BY THE PLAN      Appeal Information: IF PROVIDER WOULD LIKE TO APPEAL THIS DECISION PLEASE PROVIDE PA TEAM WITH LETTER OF MEDICAL NECESSITY

## 2023-01-26 NOTE — LETTER
2023         RE: Amelia Coker  [address] [place] [facility] [address]        Dear Colleague,    Thank you for referring your patient, Amelia Coker, to the [facility] [place]. Please see a copy of my visit note below.                        [facility]  Electrodiagnostic Laboratory                 Department of Neurology                                                                                                         Test Date:  2023    Patient: Bhupinder Coker : 1975 Physician: Tucker Del Valle MD   Sex: Female AGE: 48 year Ref Phys:    ID#: [id number]   Technician: Justo Khan     Clinical Information:  Patient is a 49 y/o female who presents for evaluation arm pains. EMG ordered to evaluate for radiculopathy versus focal neuropathy.     Techniques:  Motor and sensory conduction studies were done with surface recording electrodes. EMG was done with a concentric needle electrode.     Results:  All nerve conduction studies (as indicated in the following tables) were within normal limits.      All examined muscles (as indicated in the following table) showed no evidence of electrical instability.        Interpretation:  This is a normal study. In particular, there is no electrophysiologic evidence of cervical radiculopathy or focal neuropathy in the bilateral upper extremities.    ___________________________  Tucker Del Valle MD        Nerve Conduction Studies  Motor Sites      Latency Amplitude Neg. Amp Diff Segment Distance Velocity Neg. Dur Neg Area Diff Temperature Comment   Site (ms) Norm (mV) Norm %  cm m/s Norm ms %  C    Left Median (APB) Motor   Wrist 3.8  < 4.4 8.9  > 5.0  Wrist-APB 8   5.2  32.5    Elbow 7.0 - 8.4  < 54.0 -5.6 Elbow-Wrist 19 59  > 48 5.5 -1.54 32.6    Right Median (APB) Motor   Wrist 3.2  < 4.4 9.6  > 5.0  Wrist-APB 8   5.8  34.4    Elbow 7.3 - 9.3  < 54.0 -3.1 Elbow-Wrist 21 51  > 48 5.8 -2.6 34.5    Left Median/Ulnar (Lumb-Dors  Int II) Motor        Median (Lumb I)   Wrist 2.9 - 1.46 -      5.4  32.1         Ulnar (Dorsal Int (manus))   Wrist 2.7 - 3.3 -      4.9  32.3    Right Median/Ulnar (Lumb-Dors Int II) Motor        Median (Lumb I)   Wrist 3.0 - 2.8 -      5.4  33.7         Ulnar (Dorsal Int (manus))   Wrist 2.9 - 4.5 -      4.9  33.8    Left Ulnar (ADM) Motor   Wrist 2.3  < 3.5 11.1  > 5.0  Wrist-ADM 8   4.3  34    Bel Elbow 5.7 - 11.2 - 0.90 Bel Elbow-Wrist 18.8 55  > 48 4.2 -4.7 33.8    Abv Elbow 8.4 - 10.3 - -8.0 Abv Elbow-Bel Elbow 13.3 49  > 48 4.1 -7.4 33.4    Right Ulnar (ADM) Motor   Wrist 2.8  < 3.5 11.0  > 5.0  Wrist-ADM 8   4.2  33.5    Bel Elbow 6.5 - 10.5 - -4.5 Bel Elbow-Wrist 19.8 54  > 48 4.2 -3.4 33.4    Abv Elbow 9.0 - 10.3 - -1.90 Abv Elbow-Bel Elbow 12 48  > 48 4.0 -4.4 33.3    Left Ulnar (FDI) Motor   Wrist 3.2 - 12.3 -      4.4  32.7    Bel Elbow 6.5 - 12.4 - 0.81 Bel Elbow-Wrist 18.8 57  > 48 4.6 0.38 32.7    Abv Elbow 8.9 - 12.2 - -1.61 Abv Elbow-Bel Elbow 13.5 56  > 48 4.7 -1.14 32.7      Sensory Sites      Onset Lat Peak Lat Amp (O-P) Amp (P-P) Segment Distance Velocity Temperature Comment   Site ms ms  V Norm  V  cm m/s Norm  C    Left Median Sensory   Wrist-Dig II 2.3 3.0 51  > 10 85 Wrist-Dig II 14 61  > 48 32.5    Right Median Sensory   Wrist-Dig II 2.4 3.1 50  > 10 89 Wrist-Dig II 14 58  > 48 32.3    Left Median-Ulnar Palmar Sensory        Median   Palm-Wrist 1.18 1.65 48 - 57 Palm-Wrist 8 68 - 32.2         Ulnar   Palm-Wrist 1.33 1.88 16 - 21 Palm-Wrist 8 60 - 32.1    Right Median-Ulnar Palmar Sensory        Median   Palm-Wrist 1.23 1.75 45 - 49 Palm-Wrist 8 65 - 33.2         Ulnar   Palm-Wrist 1.40 1.90 16 - 28 Palm-Wrist 8 57 - 33.2    Left Ulnar Sensory   Wrist-Dig V 2.3 3.0 35  > 8 41 Wrist-Dig V 12.5 54  > 48 32.3    Right Ulnar Sensory   Wrist-Dig V 2.2 3.2 34  > 8 66 Wrist-Dig V 12.5 57  > 48 32.5      Inter-Nerve Comparisons     Nerve 1 Value 1 Nerve 2 Value 2 Parameter Result Normal   Sensory  Sites   R Median Palm-Wrist 1.8 ms R Ulnar Palm-Wrist 1.9 ms Peak Lat Diff 0.15 ms <0.40   L Median Palm-Wrist 1.7 ms L Ulnar Palm-Wrist 1.9 ms Peak Lat Diff 0.23 ms <0.40       Electromyography     Side Muscle Ins Act Fibs/PSW Fasc HF Amp Dur Poly Recrt Int Pat   Left Deltoid Nml None Nml 0 Nml Nml 0 Nml Nml   Left Triceps Nml None Nml 0 Nml Nml 0 Nml Nml   Left Biceps Nml None Nml 0 Nml Nml 0 Nml Nml   Left EDC Nml None Nml 0 Nml Nml 0 Nml Nml   Left FDI Nml None Nml 0 Nml Nml 0 Nml Nml   Left FCU Nml None Nml 0 Nml Nml 0 Nml Nml   Right Deltoid Nml None Nml 0 Nml Nml 0 Nml Nml   Right Biceps Nml None Nml 0 Nml Nml 0 Nml Nml   Right Triceps Nml None Nml 0 Nml Nml 0 Nml Nml   Right EDC Nml None Nml 0 Nml Nml 0 Nml Nml   Right FDI Nml None Nml 0 Nml Nml 0 Nml Nml   Right C8 Parasp Nml None Nml 0              NCS Waveforms:    Motor                         Sensory                            Again, thank you for allowing me to participate in the care of your patient.        Sincerely,        Tucker Del Valle MD

## 2023-01-26 NOTE — TELEPHONE ENCOUNTER
PA still shows pending in another enc as of today. However patient did get the denial and is asking for an appeal

## 2023-01-26 NOTE — PROGRESS NOTES
[facility]  Electrodiagnostic Laboratory                 Department of Neurology                                                                                                         Test Date:  2023    Patient: Bhupinder Coker : 1975 Physician: Tucker Del Valle MD   Sex: Female AGE: 48 year Ref Phys:    ID#: [id number]   Technician: Justo Khan     Clinical Information:  Patient is a 49 y/o female who presents for evaluation arm pains. EMG ordered to evaluate for radiculopathy versus focal neuropathy.     Techniques:  Motor and sensory conduction studies were done with surface recording electrodes. EMG was done with a concentric needle electrode.     Results:  All nerve conduction studies (as indicated in the following tables) were within normal limits.      All examined muscles (as indicated in the following table) showed no evidence of electrical instability.        Interpretation:  This is a normal study. In particular, there is no electrophysiologic evidence of cervical radiculopathy or focal neuropathy in the bilateral upper extremities.    ___________________________  Tucker Del Valle MD        Nerve Conduction Studies  Motor Sites      Latency Amplitude Neg. Amp Diff Segment Distance Velocity Neg. Dur Neg Area Diff Temperature Comment   Site (ms) Norm (mV) Norm %  cm m/s Norm ms %  C    Left Median (APB) Motor   Wrist 3.8  < 4.4 8.9  > 5.0  Wrist-APB 8   5.2  32.5    Elbow 7.0 - 8.4  < 54.0 -5.6 Elbow-Wrist 19 59  > 48 5.5 -1.54 32.6    Right Median (APB) Motor   Wrist 3.2  < 4.4 9.6  > 5.0  Wrist-APB 8   5.8  34.4    Elbow 7.3 - 9.3  < 54.0 -3.1 Elbow-Wrist 21 51  > 48 5.8 -2.6 34.5    Left Median/Ulnar (Lumb-Dors Int II) Motor        Median (Lumb I)   Wrist 2.9 - 1.46 -      5.4  32.1         Ulnar (Dorsal Int (manus))   Wrist 2.7 - 3.3 -      4.9  32.3    Right Median/Ulnar (Lumb-Dors Int II) Motor        Median (Lumb I)   Wrist 3.0 - 2.8 -      5.4  33.7          Ulnar (Dorsal Int (manus))   Wrist 2.9 - 4.5 -      4.9  33.8    Left Ulnar (ADM) Motor   Wrist 2.3  < 3.5 11.1  > 5.0  Wrist-ADM 8   4.3  34    Bel Elbow 5.7 - 11.2 - 0.90 Bel Elbow-Wrist 18.8 55  > 48 4.2 -4.7 33.8    Abv Elbow 8.4 - 10.3 - -8.0 Abv Elbow-Bel Elbow 13.3 49  > 48 4.1 -7.4 33.4    Right Ulnar (ADM) Motor   Wrist 2.8  < 3.5 11.0  > 5.0  Wrist-ADM 8   4.2  33.5    Bel Elbow 6.5 - 10.5 - -4.5 Bel Elbow-Wrist 19.8 54  > 48 4.2 -3.4 33.4    Abv Elbow 9.0 - 10.3 - -1.90 Abv Elbow-Bel Elbow 12 48  > 48 4.0 -4.4 33.3    Left Ulnar (FDI) Motor   Wrist 3.2 - 12.3 -      4.4  32.7    Bel Elbow 6.5 - 12.4 - 0.81 Bel Elbow-Wrist 18.8 57  > 48 4.6 0.38 32.7    Abv Elbow 8.9 - 12.2 - -1.61 Abv Elbow-Bel Elbow 13.5 56  > 48 4.7 -1.14 32.7      Sensory Sites      Onset Lat Peak Lat Amp (O-P) Amp (P-P) Segment Distance Velocity Temperature Comment   Site ms ms  V Norm  V  cm m/s Norm  C    Left Median Sensory   Wrist-Dig II 2.3 3.0 51  > 10 85 Wrist-Dig II 14 61  > 48 32.5    Right Median Sensory   Wrist-Dig II 2.4 3.1 50  > 10 89 Wrist-Dig II 14 58  > 48 32.3    Left Median-Ulnar Palmar Sensory        Median   Palm-Wrist 1.18 1.65 48 - 57 Palm-Wrist 8 68 - 32.2         Ulnar   Palm-Wrist 1.33 1.88 16 - 21 Palm-Wrist 8 60 - 32.1    Right Median-Ulnar Palmar Sensory        Median   Palm-Wrist 1.23 1.75 45 - 49 Palm-Wrist 8 65 - 33.2         Ulnar   Palm-Wrist 1.40 1.90 16 - 28 Palm-Wrist 8 57 - 33.2    Left Ulnar Sensory   Wrist-Dig V 2.3 3.0 35  > 8 41 Wrist-Dig V 12.5 54  > 48 32.3    Right Ulnar Sensory   Wrist-Dig V 2.2 3.2 34  > 8 66 Wrist-Dig V 12.5 57  > 48 32.5      Inter-Nerve Comparisons     Nerve 1 Value 1 Nerve 2 Value 2 Parameter Result Normal   Sensory Sites   R Median Palm-Wrist 1.8 ms R Ulnar Palm-Wrist 1.9 ms Peak Lat Diff 0.15 ms <0.40   L Median Palm-Wrist 1.7 ms L Ulnar Palm-Wrist 1.9 ms Peak Lat Diff 0.23 ms <0.40       Electromyography     Side Muscle Ins Act Fibs/PSW Fasc HF Amp Dur Poly Recrt  Int Pat   Left Deltoid Nml None Nml 0 Nml Nml 0 Nml Nml   Left Triceps Nml None Nml 0 Nml Nml 0 Nml Nml   Left Biceps Nml None Nml 0 Nml Nml 0 Nml Nml   Left EDC Nml None Nml 0 Nml Nml 0 Nml Nml   Left FDI Nml None Nml 0 Nml Nml 0 Nml Nml   Left FCU Nml None Nml 0 Nml Nml 0 Nml Nml   Right Deltoid Nml None Nml 0 Nml Nml 0 Nml Nml   Right Biceps Nml None Nml 0 Nml Nml 0 Nml Nml   Right Triceps Nml None Nml 0 Nml Nml 0 Nml Nml   Right EDC Nml None Nml 0 Nml Nml 0 Nml Nml   Right FDI Nml None Nml 0 Nml Nml 0 Nml Nml   Right C8 Parasp Nml None Nml 0              NCS Waveforms:    Motor                         Sensory

## 2023-01-31 NOTE — TELEPHONE ENCOUNTER
Per Vivek at [employer] - the options are to switch to a covered alternative or to appeal the request and provide additional information & more descriptive diagnosis.     I asked which alternatives are covered and the rep stated that they should all be covered but will require more in depth documentation for the diagnosis.

## 2023-01-31 NOTE — TELEPHONE ENCOUNTER
Medication Appeal Initiation    We have initiated an appeal for the requested medication:  Medication: Semaglutide-Weight Management (WEGOVY) 0.25 MG/0.5ML SOAJ - PA DENIED - APPEAL INITIATED  Appeal Start Date:  1/31/2023  Insurance Company: [employer] ([facility]) - Phone [phone] Fax [phone]  Comments:       FAXED LMN AND CHART NOTES

## 2023-02-01 NOTE — TELEPHONE ENCOUNTER
MEDICATION APPEAL DENIED    Medication: Semaglutide-Weight Management (WEGOVY) 0.25 MG/0.5ML SOAJ - PA DENIED - APPEAL DENIED    Denial Date: 1/31/2023    Denial Rational: The requested medication is excluded by the plan -  Patient does not meet the clinical requirements for coverage.    Second Level Appeal Information:       Second level appeals will be managed by the clinic staff and provider. Please contact the [facility] Prior Authorization Team if additional information about the denial is needed.

## 2023-02-06 ENCOUNTER — PRE VISIT (OUTPATIENT)
Dept: OTOLARYNGOLOGY | Facility: CLINIC | Age: 48
End: 2023-02-06

## 2023-02-06 ENCOUNTER — OFFICE VISIT (OUTPATIENT)
Dept: OTOLARYNGOLOGY | Facility: CLINIC | Age: 48
End: 2023-02-06
Attending: PHYSICIAN ASSISTANT
Payer: COMMERCIAL

## 2023-02-06 VITALS
BODY MASS INDEX: 35.41 KG/M2 | HEIGHT: 66 IN | OXYGEN SATURATION: 99 % | DIASTOLIC BLOOD PRESSURE: 72 MMHG | SYSTOLIC BLOOD PRESSURE: 111 MMHG | WEIGHT: 220.3 LBS | HEART RATE: 75 BPM

## 2023-02-06 DIAGNOSIS — E04.1 THYROID NODULE: ICD-10-CM

## 2023-02-06 DIAGNOSIS — R53.83 FATIGUE, UNSPECIFIED TYPE: ICD-10-CM

## 2023-02-06 PROCEDURE — 99202 OFFICE O/P NEW SF 15 MIN: CPT | Performed by: SURGERY

## 2023-02-06 ASSESSMENT — PAIN SCALES - GENERAL: PAINLEVEL: MODERATE PAIN (4)

## 2023-02-06 NOTE — LETTER
2/6/2023       RE: Amelia Coker  [address] [facility] [id number]     Dear Colleague,    Thank you for referring your patient, Amelia Coker, to the [facility] EAR NOSE AND THROAT CLINIC [place] at [facility]. Please see a copy of my visit note below.    SURGERY CLINIC CONSULTATION    REASON FOR CONSULTATION:  Amelia Coker was referred by Matias MASTERS for evaluation and discussion of treatment options for thyroid nodules     HISTORY OF PRESENT ILLNESS:  Amelia Coker is a 48 year old female who had an ultrasound done of the thyroid several years ago (2021).  The nodule at that time was 0.9 cm fine-needle aspiration was reportedly benign.  Follow-up ultrasound done in November 2022 identified the same nodule no change in size TR 2.  Patient's thyroid function tests are within normal limits.    Patient has cervical spine issues resulting in radiculopathy.  She has no problems with voice quality inspiration or swallowing.  No symptoms of hypo or hyperthyroidism.  She is on medication for weight loss.  No previous or family history of thyroid carcinoma.      REVIEW OF SYSTEMS:  ROS EXAM: 10 point review of systems is pertinent for that noted in the HPI  Patient Active Problem List   Diagnosis     Migraine, unspecified, with intractable migraine, so stated, without mention of status migrainosus     Bell's palsy     Contact dermatitis and other eczema, due to unspecified cause     Scalp lesion     Lyme disease     PAC (premature atrial contraction)     Palpitations     Raynaud phenomenon     Chronic fatigue     Hair loss     Abnormal PFT     Shoulder joint instability     Family history of melanoma     Dry eyes     Keratitis sicca (H)     Family history of colon cancer     Pain in joint, upper arm     FLORIDALMA positive     Plantar fascial fibromatosis     Foraminal stenosis of lumbar region     DJD (degenerative joint disease), lumbar     Migraine without  aura and without status migrainosus, not intractable     Lumbar radiculopathy     Ds DNA antibody positive     Muscular wasting and disuse atrophy, not elsewhere classified     Frontal headache     Telangiectasia of face     Lateral epicondylitis     Right shoulder pain     Plantar fasciitis     Skin lesion     AD (atopic dermatitis)     Heat sensitivity     Rotator cuff arthropathy, right     Gastroesophageal reflux disease, esophagitis presence not specified     Abnormal mammogram     Sternocleidomastoid muscle tenderness     Acute cervical myofascial strain, initial encounter     Spasm of muscle     Hyperlipidemia LDL goal <130     Biceps tendinopathy, right     Superficial swelling of scalp     Intractable right heel pain     Intractable episodic tension-type headache     Jaycob complexion     Lip lesion     Abdominal pain, epigastric     Abdominal distension     PONV (postoperative nausea and vomiting)     Menorrhalgia     Uterine polyp     Loose stools     Hyperactive bowel sounds     Abnormality of nail surface     Dry hair     Dry skin     Malaise and fatigue     Lymphadenopathy     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     Endometrium, hyperplasia - on recent CT scan     Pain in joint involving pelvic region and thigh, right     Right lateral abdominal pain     Right foot pain     Dental abscess - left mandible molar     Brachial plexopathy - right shoulder     Balance problems     Sinus arrhythmia seen on electrocardiogram     Family history of ischemic heart disease - father at 46 massive MI     Elevated blood pressure reading without diagnosis of hypertension     Hypertrophy of breast     History of cold sores     AK (actinic keratosis) - both hands at the lateral thenar aspect.     Dermatitis     Musculoskeletal pain     Fatigue, unspecified type     TOS (thoracic outlet syndrome)     Colitis     Abnormal CT scan, colon     Personal history of COVID-19     Pectoralis minor syndrome (H)      History of endometrial ablation     Thyroid nodule     Abdominal wall strain, initial encounter     Abdominal wall pain in periumbilical region     Cervical radiculopathy     DDD (degenerative disc disease), cervical     Spinal stenosis in cervical region     Pain of right upper extremity     Weight gain     Obesity, Class II, BMI 35-39.9     Morbid obesity (H)     Hyperglycemia       Past Surgical History:   Procedure Laterality Date     AS INJ TRANSFORAMIN EPIDURAL, LUMB/SACR SINGLE       COLONOSCOPY  3/11/2013    Procedure: COLONOSCOPY;  colonoscopy;  Surgeon: Lobito Mas MD;  [facility]     COLONOSCOPY N/A 9/13/2021    Procedure: COLONOSCOPY, WITH POLYPECTOMY AND BIOPSY;  Surgeon: Sam Liz MD;  [facility]: [facility]     COLONOSCOPY WITH CO2 INSUFFLATION N/A 11/19/2018    Procedure: COLONOSCOPY WITH CO2 INSUFFLATION;  Surgeon: Goyo Blank MD;  [facility]     DECOMPRESSION LUMBAR TWO LEVELS Bilateral 12/8/2016    Procedure: DECOMPRESSION LUMBAR TWO LEVELS;  Surgeon: Bang Burrell MD;  Location: [facility]     DILATION AND CURETTAGE, HYSTEROSCOPY, ABLATE ENDOMETRIUM NOVASURE, COMBINED N/A 6/12/2019    Procedure: hysteroscopy, dilation & curettage, polypectomy, endometrial ablation;  Surgeon: Fredy Pham MD;  Location:  OR     ESOPHAGOSCOPY, GASTROSCOPY, DUODENOSCOPY (EGD), COMBINED  11/8/2013    Procedure: COMBINED ESOPHAGOSCOPY, GASTROSCOPY, DUODENOSCOPY (EGD), BIOPSY SINGLE OR MULTIPLE;  Esophagoscopy, Gastroscopy, Duodenoscopy EGD with multiple biopsies;  Surgeon: Teja Koch MD;  [facility]     ESOPHAGOSCOPY, GASTROSCOPY, DUODENOSCOPY (EGD), COMBINED N/A 2/18/2021    Procedure: ESOPHAGOGASTRODUODENOSCOPY, WITH BIOPSY;  Surgeon: Blanca Ba MD;  [facility]     ESOPHAGOSCOPY, GASTROSCOPY, DUODENOSCOPY (EGD), COMBINED N/A 4/11/2022    Procedure: ESOPHAGOGASTRODUODENOSCOPY, WITH BIOPSY;  Surgeon: Fredy Jimenez MD;  [place]: [facility]      HC REMOVAL OF TONSILS,<11 Y/O      Tonsils <10y.o.     HC TOOTH EXTRACTION W/FORCEP       INJECT BLOCK MEDIAL BRANCH CERVICAL/THORACIC/LUMBAR N/A 7/23/2020    Procedure: Sacral 1,2,3 Lateral Branch Blocks and lumbar 5 medial branch blocks bilaterally;  Surgeon: Maurisio Goetz MD;  [facility]     INJECT JOINT SACROILIAC Left 9/24/2020    Procedure: Left Lumbar 5 Sacral 1 nerve root injections.;  Surgeon: Maurisio Goetz MD;  [facility]: [facility]     REPAIR MUSCLE UPPER EXTREMITY Right 7/27/2021    Procedure: DIVISION RIGHT PECTORALIS MUSCLE;  Surgeon: Davie Dutta;  Location: [facility]     REPAIR TENDON ELBOW  7/9/2014    Procedure: REPAIR TENDON ELBOW;  Surgeon: Chris Johnston MD;  [facility]: [facility]     ULTRASONIC REMOVAL SOFT TISSUE (LOCATION) Right 11/21/2018    Procedure: Tenex right foot;  Surgeon: Patel Martínez DO;  Location: [facility]     VASCULAR SURGERY      Thoracic Outlet Syndrome       Allergies   Allergen Reactions     Ceftriaxone Anaphylaxis     Hives, rash, racing heart beat     Bactrim [Sulfamethoxazole W/Trimethoprim] Hives     Ciprofloxacin Other (See Comments)     Tendon Issues     Codeine Nausea and Vomiting     Codeine      Copper      Rash       Doxycycline      Loss of skin pigmentation, skin loss.     Gold      Rash       Iodine Hives     WELTS     Iodine-131 Hives     Levaquin [Levofloxacin] Other (See Comments)     Tendon issues with levaquin and cipro     Nickel      rash     Steel [Staples]      Rash from staples       Sulfa Drugs Hives     Full Body     Latex Rash     Penicillins Rash       Medications reviewed in the EMR        Family History   Problem Relation Age of Onset     Diabetes Mother         adult     Cancer - colorectal Mother      Hypertension Mother      Allergies Mother      Cancer Mother         colon     Depression Mother      Gastrointestinal Disease Mother         ibs     Genitourinary Problems Mother         kidney cyst     Gynecology Mother          "endometriosis     Lipids Mother      Thyroid Disease Mother      Arthritis Mother         RA     Heart Disease Maternal Grandfather         MI,  - 60's     Allergies Father      Unknown/Adopted Father      Heart Disease Father         mi-age mid 40's     Cardiovascular Father      Lipids Father      Respiratory Father         asthma     Cancer Father      Other Cancer Father         renal cancer     Depression Sister      Allergies Sister      Cancer Sister         vaginal     Gynecology Sister         endometriosis     Lipids Paternal Grandmother      Osteoporosis Paternal Grandmother      Thyroid Disease Paternal Grandmother      Respiratory Son         asthma     Allergies Son      Arthritis Sister      Allergies Brother      Heart Disease Brother      Allergies Daughter      Blood Disease Paternal Aunt         LGL T cell Leukemia     Rheumatoid Arthritis Paternal Aunt      Kidney Disease Maternal Aunt      Lupus Maternal Aunt      Bladder Cancer Maternal Aunt         PHYSICAL EXAM:  /72 (BP Location: Right arm, Patient Position: Sitting, Cuff Size: Adult Large)   Pulse 75   Ht 1.664 m (5' 5.5\")   Wt 99.9 kg (220 lb 4.8 oz)   SpO2 99%   BMI 36.10 kg/m      Neck: Thyroid gland is within normal limits I am unable to palpate any thyroid nodules.  There is no cervical lymphadenopathy and trachea is midline.    I personally reviewed the radiographic images and laboratory data    ASSESSMENT:   1. Fatigue, unspecified type    2. Thyroid nodule        PLAN:   The patient had a biopsy of a nodule that essentially did not need biopsied as it was only 9 mm in size and TI-RADS 2 at that time.  He has no change in size and previous biopsies benign.  Based on the SISSY and ACR guidelines the patient needs no further work-up.  In fact she does not need an ultrasound unless clinically indicated i.e. nodules noted on palpation.  I recommend the patient follow-up with her primary care team          Again, thank " you for allowing me to participate in the care of your patient.      Sincerely,    Angela Nielsen MD

## 2023-02-09 ENCOUNTER — TRANSFERRED RECORDS (OUTPATIENT)
Dept: HEALTH INFORMATION MANAGEMENT | Facility: CLINIC | Age: 48
End: 2023-02-09

## 2023-02-09 ENCOUNTER — TELEPHONE (OUTPATIENT)
Dept: FAMILY MEDICINE | Facility: OTHER | Age: 48
End: 2023-02-09
Payer: COMMERCIAL

## 2023-02-09 ENCOUNTER — MYC MEDICAL ADVICE (OUTPATIENT)
Dept: FAMILY MEDICINE | Facility: OTHER | Age: 48
End: 2023-02-09
Payer: COMMERCIAL

## 2023-02-09 NOTE — TELEPHONE ENCOUNTER
Call from [facility] in [place].     Urgent Care provider Dr. Pio Mendoza would like to speak with PCP regarding patient.   Patient was seen this morning for weight loss and medications, requesting labs.     Please call Dr. Mendoza at [phone].     Sabiha JIMÉNEZN, RN  [facility]

## 2023-02-09 NOTE — TELEPHONE ENCOUNTER
Brief conversation had with provider of record.  They would like to add some other labs and we have done in the past.  Advised that if he drafted a  letter to us requesting labs we can certainly place them and follow-up from there.  Electronically signed:    Otoniel Manuel PA-C

## 2023-02-13 ENCOUNTER — TELEPHONE (OUTPATIENT)
Dept: FAMILY MEDICINE | Facility: OTHER | Age: 48
End: 2023-02-13
Payer: COMMERCIAL

## 2023-02-13 ENCOUNTER — LAB (OUTPATIENT)
Dept: LAB | Facility: CLINIC | Age: 48
End: 2023-02-13
Payer: COMMERCIAL

## 2023-02-13 DIAGNOSIS — L65.9 HAIR LOSS: ICD-10-CM

## 2023-02-13 DIAGNOSIS — R73.9 HYPERGLYCEMIA: ICD-10-CM

## 2023-02-13 DIAGNOSIS — L67.8 DRY HAIR: ICD-10-CM

## 2023-02-13 DIAGNOSIS — L85.3 DRY SKIN: ICD-10-CM

## 2023-02-13 DIAGNOSIS — L20.9 ATOPIC DERMATITIS, UNSPECIFIED TYPE: ICD-10-CM

## 2023-02-13 DIAGNOSIS — L30.9 DERMATITIS: ICD-10-CM

## 2023-02-13 DIAGNOSIS — R59.1 LYMPHADENOPATHY: ICD-10-CM

## 2023-02-13 DIAGNOSIS — R63.5 WEIGHT GAIN: ICD-10-CM

## 2023-02-13 DIAGNOSIS — R53.83 FATIGUE, UNSPECIFIED TYPE: ICD-10-CM

## 2023-02-13 DIAGNOSIS — R00.2 PALPITATIONS: ICD-10-CM

## 2023-02-13 DIAGNOSIS — R76.8 ANA POSITIVE: ICD-10-CM

## 2023-02-13 DIAGNOSIS — E04.1 THYROID NODULE: ICD-10-CM

## 2023-02-13 DIAGNOSIS — E66.01 MORBID OBESITY (H): ICD-10-CM

## 2023-02-13 DIAGNOSIS — R23.8 RUDDY COMPLEXION: ICD-10-CM

## 2023-02-13 DIAGNOSIS — E66.01 MORBID OBESITY (H): Primary | ICD-10-CM

## 2023-02-13 LAB
ESTRADIOL SERPL-MCNC: 95 PG/ML
FSH SERPL IRP2-ACNC: 14.7 MIU/ML
T3 SERPL-MCNC: 87 NG/DL (ref 85–202)
T3FREE SERPL-MCNC: 2.7 PG/ML (ref 2–4.4)
T4 FREE SERPL-MCNC: 1.2 NG/DL (ref 0.9–1.7)
TSH SERPL DL<=0.005 MIU/L-ACNC: 0.99 UIU/ML (ref 0.3–4.2)

## 2023-02-13 PROCEDURE — 83001 ASSAY OF GONADOTROPIN (FSH): CPT

## 2023-02-13 PROCEDURE — 84443 ASSAY THYROID STIM HORMONE: CPT

## 2023-02-13 PROCEDURE — 84403 ASSAY OF TOTAL TESTOSTERONE: CPT

## 2023-02-13 PROCEDURE — 36415 COLL VENOUS BLD VENIPUNCTURE: CPT

## 2023-02-13 PROCEDURE — 82670 ASSAY OF TOTAL ESTRADIOL: CPT

## 2023-02-13 PROCEDURE — 84439 ASSAY OF FREE THYROXINE: CPT

## 2023-02-13 PROCEDURE — 84481 FREE ASSAY (FT-3): CPT

## 2023-02-13 PROCEDURE — 86376 MICROSOMAL ANTIBODY EACH: CPT

## 2023-02-13 PROCEDURE — 84270 ASSAY OF SEX HORMONE GLOBUL: CPT

## 2023-02-13 NOTE — TELEPHONE ENCOUNTER
Long-term problems with her weight and a tertiary opinion for weight loss.  Electronically signed:    Otoniel Manuel PA-C

## 2023-02-14 LAB
SHBG SERPL-SCNC: 38 NMOL/L (ref 30–135)
THYROPEROXIDASE AB SERPL-ACNC: 19 IU/ML

## 2023-02-15 LAB
TESTOST FREE SERPL-MCNC: 0.23 NG/DL
TESTOST SERPL-MCNC: 14 NG/DL (ref 8–60)

## 2023-02-17 ENCOUNTER — MYC MEDICAL ADVICE (OUTPATIENT)
Dept: FAMILY MEDICINE | Facility: OTHER | Age: 48
End: 2023-02-17
Payer: COMMERCIAL

## 2023-02-17 DIAGNOSIS — R23.8 SCALP IRRITATION: Primary | ICD-10-CM

## 2023-02-17 RX ORDER — HYDROCORTISONE 10 MG/ML
LOTION TOPICAL 2 TIMES DAILY
Qty: 113 G | Refills: 0 | Status: SHIPPED | OUTPATIENT
Start: 2023-02-17 | End: 2023-10-26

## 2023-03-05 NOTE — PROGRESS NOTES
SURGERY CLINIC CONSULTATION    REASON FOR CONSULTATION:  Amelia Coker was referred by Matias MASTERS for evaluation and discussion of treatment options for thyroid nodules     HISTORY OF PRESENT ILLNESS:  Amelia Coker is a 48 year old female who had an ultrasound done of the thyroid several years ago (2021).  The nodule at that time was 0.9 cm fine-needle aspiration was reportedly benign.  Follow-up ultrasound done in November 2022 identified the same nodule no change in size TR 2.  Patient's thyroid function tests are within normal limits.    Patient has cervical spine issues resulting in radiculopathy.  She has no problems with voice quality inspiration or swallowing.  No symptoms of hypo or hyperthyroidism.  She is on medication for weight loss.  No previous or family history of thyroid carcinoma.      REVIEW OF SYSTEMS:  ROS EXAM: 10 point review of systems is pertinent for that noted in the HPI  Patient Active Problem List   Diagnosis     Migraine, unspecified, with intractable migraine, so stated, without mention of status migrainosus     Bell's palsy     Contact dermatitis and other eczema, due to unspecified cause     Scalp lesion     Lyme disease     PAC (premature atrial contraction)     Palpitations     Raynaud phenomenon     Chronic fatigue     Hair loss     Abnormal PFT     Shoulder joint instability     Family history of melanoma     Dry eyes     Keratitis sicca (H)     Family history of colon cancer     Pain in joint, upper arm     FLORIDALMA positive     Plantar fascial fibromatosis     Foraminal stenosis of lumbar region     DJD (degenerative joint disease), lumbar     Migraine without aura and without status migrainosus, not intractable     Lumbar radiculopathy     Ds DNA antibody positive     Muscular wasting and disuse atrophy, not elsewhere classified     Frontal headache     Telangiectasia of face     Lateral epicondylitis     Right shoulder pain     Plantar fasciitis     Skin lesion     AD (atopic  dermatitis)     Heat sensitivity     Rotator cuff arthropathy, right     Gastroesophageal reflux disease, esophagitis presence not specified     Abnormal mammogram     Sternocleidomastoid muscle tenderness     Acute cervical myofascial strain, initial encounter     Spasm of muscle     Hyperlipidemia LDL goal <130     Biceps tendinopathy, right     Superficial swelling of scalp     Intractable right heel pain     Intractable episodic tension-type headache     Jaycob complexion     Lip lesion     Abdominal pain, epigastric     Abdominal distension     PONV (postoperative nausea and vomiting)     Menorrhalgia     Uterine polyp     Loose stools     Hyperactive bowel sounds     Abnormality of nail surface     Dry hair     Dry skin     Malaise and fatigue     Lymphadenopathy     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     Endometrium, hyperplasia - on recent CT scan     Pain in joint involving pelvic region and thigh, right     Right lateral abdominal pain     Right foot pain     Dental abscess - left mandible molar     Brachial plexopathy - right shoulder     Balance problems     Sinus arrhythmia seen on electrocardiogram     Family history of ischemic heart disease - father at 46 massive MI     Elevated blood pressure reading without diagnosis of hypertension     Hypertrophy of breast     History of cold sores     AK (actinic keratosis) - both hands at the lateral thenar aspect.     Dermatitis     Musculoskeletal pain     Fatigue, unspecified type     TOS (thoracic outlet syndrome)     Colitis     Abnormal CT scan, colon     Personal history of COVID-19     Pectoralis minor syndrome (H)     History of endometrial ablation     Thyroid nodule     Abdominal wall strain, initial encounter     Abdominal wall pain in periumbilical region     Cervical radiculopathy     DDD (degenerative disc disease), cervical     Spinal stenosis in cervical region     Pain of right upper extremity     Weight gain     Obesity, Class  II, BMI 35-39.9     Morbid obesity (H)     Hyperglycemia       Past Surgical History:   Procedure Laterality Date     AS INJ TRANSFORAMIN EPIDURAL, LUMB/SACR SINGLE       COLONOSCOPY  3/11/2013    Procedure: COLONOSCOPY;  colonoscopy;  Surgeon: Lobito Mas MD;  [facility]     COLONOSCOPY N/A 9/13/2021    Procedure: COLONOSCOPY, WITH POLYPECTOMY AND BIOPSY;  Surgeon: Sam Liz MD;  [facility]     COLONOSCOPY WITH CO2 INSUFFLATION N/A 11/19/2018    Procedure: COLONOSCOPY WITH CO2 INSUFFLATION;  Surgeon: Goyo Blank MD;  [facility]: [facility]     DECOMPRESSION LUMBAR TWO LEVELS Bilateral 12/8/2016    Procedure: DECOMPRESSION LUMBAR TWO LEVELS;  Surgeon: Bang Burrell MD;  Location: [facility]     DILATION AND CURETTAGE, HYSTEROSCOPY, ABLATE ENDOMETRIUM NOVASURE, COMBINED N/A 6/12/2019    Procedure: hysteroscopy, dilation & curettage, polypectomy, endometrial ablation;  Surgeon: Fredy Pham MD;  [facility]: [facility]     ESOPHAGOSCOPY, GASTROSCOPY, DUODENOSCOPY (EGD), COMBINED  11/8/2013    Procedure: COMBINED ESOPHAGOSCOPY, GASTROSCOPY, DUODENOSCOPY (EGD), BIOPSY SINGLE OR MULTIPLE;  Esophagoscopy, Gastroscopy, Duodenoscopy EGD with multiple biopsies;  Surgeon: Teja Koch MD;  [facility]     ESOPHAGOSCOPY, GASTROSCOPY, DUODENOSCOPY (EGD), COMBINED N/A 2/18/2021    Procedure: ESOPHAGOGASTRODUODENOSCOPY, WITH BIOPSY;  Surgeon: Blnaca Ba MD;  [facility]     ESOPHAGOSCOPY, GASTROSCOPY, DUODENOSCOPY (EGD), COMBINED N/A 4/11/2022    Procedure: ESOPHAGOGASTRODUODENOSCOPY, WITH BIOPSY;  Surgeon: Fredy Jimenez MD;  Location: [facility] OR     HC REMOVAL OF TONSILS,<9 Y/O      Tonsils <10y.o.     HC TOOTH EXTRACTION W/FORCEP       INJECT BLOCK MEDIAL BRANCH CERVICAL/THORACIC/LUMBAR N/A 7/23/2020    Procedure: Sacral 1,2,3 Lateral Branch Blocks and lumbar 5 medial branch blocks bilaterally;  Surgeon: Maurisio Goetz MD;  [facility]     INJECT JOINT  SACROILIAC Left 2020    Procedure: Left Lumbar 5 Sacral 1 nerve root injections.;  Surgeon: Maurisio Goetz MD;  [facility]     REPAIR MUSCLE UPPER EXTREMITY Right 2021    Procedure: DIVISION RIGHT PECTORALIS MUSCLE;  Surgeon: Davie Dutta;  [facility]: [facility]     REPAIR TENDON ELBOW  2014    Procedure: REPAIR TENDON ELBOW;  Surgeon: Chris Johnston MD;  [facility]     ULTRASONIC REMOVAL SOFT TISSUE (LOCATION) Right 2018    Procedure: Tenex right foot;  Surgeon: Patel Martínez DO;  Location: [facility]     VASCULAR SURGERY      Thoracic Outlet Syndrome       Allergies   Allergen Reactions     Ceftriaxone Anaphylaxis     Hives, rash, racing heart beat     Bactrim [Sulfamethoxazole W/Trimethoprim] Hives     Ciprofloxacin Other (See Comments)     Tendon Issues     Codeine Nausea and Vomiting     Codeine      Copper      Rash       Doxycycline      Loss of skin pigmentation, skin loss.     Gold      Rash       Iodine Hives     WELTS     Iodine-131 Hives     Levaquin [Levofloxacin] Other (See Comments)     Tendon issues with levaquin and cipro     Nickel      rash     Steel [Staples]      Rash from staples       Sulfa Drugs Hives     Full Body     Latex Rash     Penicillins Rash       Medications reviewed in the EMR        Family History   Problem Relation Age of Onset     Diabetes Mother         adult     Cancer - colorectal Mother      Hypertension Mother      Allergies Mother      Cancer Mother         colon     Depression Mother      Gastrointestinal Disease Mother         ibs     Genitourinary Problems Mother         kidney cyst     Gynecology Mother         endometriosis     Lipids Mother      Thyroid Disease Mother      Arthritis Mother         RA     Heart Disease Maternal Grandfather         MI,  - 60's     Allergies Father      Unknown/Adopted Father      Heart Disease Father         mi-age mid 40's     Cardiovascular Father      Lipids Father      Respiratory  "Father         asthma     Cancer Father      Other Cancer Father         renal cancer     Depression Sister      Allergies Sister      Cancer Sister         vaginal     Gynecology Sister         endometriosis     Lipids Paternal Grandmother      Osteoporosis Paternal Grandmother      Thyroid Disease Paternal Grandmother      Respiratory Son         asthma     Allergies Son      Arthritis Sister      Allergies Brother      Heart Disease Brother      Allergies Daughter      Blood Disease Paternal Aunt         LGL T cell Leukemia     Rheumatoid Arthritis Paternal Aunt      Kidney Disease Maternal Aunt      Lupus Maternal Aunt      Bladder Cancer Maternal Aunt         PHYSICAL EXAM:  /72 (BP Location: Right arm, Patient Position: Sitting, Cuff Size: Adult Large)   Pulse 75   Ht 1.664 m (5' 5.5\")   Wt 99.9 kg (220 lb 4.8 oz)   SpO2 99%   BMI 36.10 kg/m      Neck: Thyroid gland is within normal limits I am unable to palpate any thyroid nodules.  There is no cervical lymphadenopathy and trachea is midline.    I personally reviewed the radiographic images and laboratory data    ASSESSMENT:   1. Fatigue, unspecified type    2. Thyroid nodule        PLAN:   The patient had a biopsy of a nodule that essentially did not need biopsied as it was only 9 mm in size and TI-RADS 2 at that time.  He has no change in size and previous biopsies benign.  Based on the SISSY and ACR guidelines the patient needs no further work-up.  In fact she does not need an ultrasound unless clinically indicated i.e. nodules noted on palpation.  I recommend the patient follow-up with her primary care team      Angela Nielsen MD      "

## 2023-03-21 DIAGNOSIS — J01.40 ACUTE PANSINUSITIS, RECURRENCE NOT SPECIFIED: Primary | ICD-10-CM

## 2023-03-21 RX ORDER — AZITHROMYCIN 250 MG/1
TABLET, FILM COATED ORAL
Qty: 6 TABLET | Refills: 0 | Status: SHIPPED | OUTPATIENT
Start: 2023-03-21 | End: 2023-05-01

## 2023-04-02 ENCOUNTER — MYC MEDICAL ADVICE (OUTPATIENT)
Dept: FAMILY MEDICINE | Facility: OTHER | Age: 48
End: 2023-04-02
Payer: COMMERCIAL

## 2023-04-02 DIAGNOSIS — R63.4 WEIGHT LOSS: ICD-10-CM

## 2023-04-03 RX ORDER — SEMAGLUTIDE 1.34 MG/ML
1 INJECTION, SOLUTION SUBCUTANEOUS
Qty: 9 ML | Refills: 1 | Status: SHIPPED | OUTPATIENT
Start: 2023-04-03 | End: 2023-10-26

## 2023-05-01 ENCOUNTER — OFFICE VISIT (OUTPATIENT)
Dept: FAMILY MEDICINE | Facility: OTHER | Age: 48
End: 2023-05-01
Payer: COMMERCIAL

## 2023-05-01 VITALS
HEART RATE: 80 BPM | RESPIRATION RATE: 20 BRPM | DIASTOLIC BLOOD PRESSURE: 80 MMHG | BODY MASS INDEX: 33.75 KG/M2 | SYSTOLIC BLOOD PRESSURE: 112 MMHG | TEMPERATURE: 97.7 F | WEIGHT: 210 LBS | HEIGHT: 66 IN | OXYGEN SATURATION: 98 %

## 2023-05-01 DIAGNOSIS — R63.8 WEIGHT DISORDER: Primary | ICD-10-CM

## 2023-05-01 DIAGNOSIS — R63.4 WEIGHT LOSS: ICD-10-CM

## 2023-05-01 DIAGNOSIS — L98.9 LESION OF SKIN OF SCALP: ICD-10-CM

## 2023-05-01 LAB
ALBUMIN SERPL BCG-MCNC: 4.6 G/DL (ref 3.5–5.2)
ALP SERPL-CCNC: 63 U/L (ref 35–104)
ALT SERPL W P-5'-P-CCNC: 20 U/L (ref 10–35)
ANION GAP SERPL CALCULATED.3IONS-SCNC: 11 MMOL/L (ref 7–15)
AST SERPL W P-5'-P-CCNC: 23 U/L (ref 10–35)
BILIRUB SERPL-MCNC: 0.7 MG/DL
BUN SERPL-MCNC: 18.3 MG/DL (ref 6–20)
CALCIUM SERPL-MCNC: 9.6 MG/DL (ref 8.6–10)
CHLORIDE SERPL-SCNC: 102 MMOL/L (ref 98–107)
CREAT SERPL-MCNC: 0.62 MG/DL (ref 0.51–0.95)
DEPRECATED HCO3 PLAS-SCNC: 26 MMOL/L (ref 22–29)
GFR SERPL CREATININE-BSD FRML MDRD: >90 ML/MIN/1.73M2
GLUCOSE SERPL-MCNC: 93 MG/DL (ref 70–99)
HBA1C MFR BLD: 5.3 % (ref 0–5.6)
POTASSIUM SERPL-SCNC: 3.9 MMOL/L (ref 3.4–5.3)
PROT SERPL-MCNC: 7.5 G/DL (ref 6.4–8.3)
SODIUM SERPL-SCNC: 139 MMOL/L (ref 136–145)

## 2023-05-01 PROCEDURE — 99213 OFFICE O/P EST LOW 20 MIN: CPT | Performed by: PHYSICIAN ASSISTANT

## 2023-05-01 PROCEDURE — 36415 COLL VENOUS BLD VENIPUNCTURE: CPT | Performed by: PHYSICIAN ASSISTANT

## 2023-05-01 PROCEDURE — 80053 COMPREHEN METABOLIC PANEL: CPT | Performed by: PHYSICIAN ASSISTANT

## 2023-05-01 PROCEDURE — 83036 HEMOGLOBIN GLYCOSYLATED A1C: CPT | Performed by: PHYSICIAN ASSISTANT

## 2023-05-01 RX ORDER — BETAMETHASONE VALERATE 0.1 %
LOTION (ML) TOPICAL 2 TIMES DAILY
Qty: 60 ML | Refills: 3 | Status: SHIPPED | OUTPATIENT
Start: 2023-05-01 | End: 2024-04-18

## 2023-05-01 ASSESSMENT — PAIN SCALES - GENERAL: PAINLEVEL: NO PAIN (0)

## 2023-05-01 NOTE — PROGRESS NOTES
"  Assessment & Plan     Weight disorder  Weight loss  Reevaluating related labs for Ozempic treatment that she has been doing for weight loss.  She has lost 10 pounds but has been a very slow process.  - Comprehensive metabolic panel (BMP + Alb, Alk Phos, ALT, AST, Total. Bili, TP); Future  - Hemoglobin A1c; Future    Lesion of skin of scalp  Suspected to be psoriasis.  At 1 point in time we had cultured out MRSA and other point time is inflammatory in nature.  Family history is very positive for psoriasis.  This has been responding fairly well to topical steroids but concerns for long-term use of this in the presence of her scalp is discussed.  Trial of medication follow-up..  - betamethasone valerate (VALISONE) 0.1 % external lotion; Apply topically 2 times daily     Otoniel Manuel PA-C  [facility] YVES Roe is a 48 year old, presenting for the following health issues:  labs and Lesion        5/1/2023     3:42 PM   Additional Questions   Roomed by jordon   Accompanied by alone         5/1/2023     3:42 PM   Patient Reported Additional Medications   Patient reports taking the following new medications none     History of Present Illness       Reason for visit:  Recheck after starting med and scalp lesions    She eats 4 or more servings of fruits and vegetables daily.She consumes 0 sweetened beverage(s) daily.She exercises with enough effort to increase her heart rate 30 to 60 minutes per day.  She exercises with enough effort to increase her heart rate 7 days per week.   She is taking medications regularly.     Review of Systems   Constitutional, HEENT, cardiovascular, pulmonary, GI, , musculoskeletal, neuro, skin, endocrine and psych systems are negative, except as otherwise noted.      Objective    /80   Pulse 80   Temp 97.7  F (36.5  C) (Temporal)   Resp 20   Ht 1.664 m (5' 5.5\")   Wt 95.3 kg (210 lb)   LMP  (LMP Unknown)   SpO2 98%   BMI 34.41 kg/m    Body mass " index is 34.41 kg/m .  Physical Exam   GENERAL: healthy, alert and no distress  NECK: no adenopathy, no asymmetry, masses, or scars and thyroid normal to palpation  RESP: lungs clear to auscultation - no rales, rhonchi or wheezes  CV: regular rate and rhythm, normal S1 S2, no S3 or S4, no murmur, click or rub, no peripheral edema and peripheral pulses strong  ABDOMEN: soft, nontender, no hepatosplenomegaly, no masses and bowel sounds normal  MS: no gross musculoskeletal defects noted, no edema  SKIN: no suspicious lesions or rashes other than a dry erythematous rash with slight presence of white plaque to the posterior superior scalp.  NEURO: Normal strength and tone, mentation intact and speech normal  PSYCH: mentation appears normal, affect normal/bright    No results found. However, due to the size of the patient record, not all encounters were searched. Please check Results Review for a complete set of results.

## 2023-06-15 ENCOUNTER — OFFICE VISIT (OUTPATIENT)
Dept: FAMILY MEDICINE | Facility: OTHER | Age: 48
End: 2023-06-15
Payer: COMMERCIAL

## 2023-06-15 VITALS
BODY MASS INDEX: 32.78 KG/M2 | WEIGHT: 204 LBS | SYSTOLIC BLOOD PRESSURE: 124 MMHG | HEIGHT: 66 IN | TEMPERATURE: 98.2 F | OXYGEN SATURATION: 97 % | DIASTOLIC BLOOD PRESSURE: 74 MMHG | HEART RATE: 73 BPM | RESPIRATION RATE: 20 BRPM

## 2023-06-15 DIAGNOSIS — L98.9 LESION OF SKIN OF SCALP: Primary | ICD-10-CM

## 2023-06-15 PROCEDURE — 88312 SPECIAL STAINS GROUP 1: CPT | Performed by: PATHOLOGY

## 2023-06-15 PROCEDURE — 11420 EXC H-F-NK-SP B9+MARG 0.5/<: CPT | Performed by: PHYSICIAN ASSISTANT

## 2023-06-15 PROCEDURE — 88305 TISSUE EXAM BY PATHOLOGIST: CPT | Performed by: PATHOLOGY

## 2023-06-15 ASSESSMENT — PAIN SCALES - GENERAL: PAINLEVEL: NO PAIN (0)

## 2023-06-15 NOTE — PROGRESS NOTES
"  Assessment & Plan     Lesion of skin of scalp  Biopsy in the usual fashion today.  Await results prior to changing treatment.  - Surgical Pathology Exam     BMI:   Estimated body mass index is 33.42 kg/m  as calculated from the following:    Height as of this encounter: 1.664 m (5' 5.51\").    Weight as of this encounter: 92.5 kg (204 lb).       SONAM Zimmer [facility] YVES Roe is a 48 year old, presenting for the following health issues:  Biopsy        6/15/2023     5:13 PM   Additional Questions   Roomed by Jeff     History of Present Illness       Reason for visit:  Scalp lesions    She eats 4 or more servings of fruits and vegetables daily.She consumes 0 sweetened beverage(s) daily.She exercises with enough effort to increase her heart rate 30 to 60 minutes per day.  She exercises with enough effort to increase her heart rate 7 days per week.   She is taking medications regularly.     Review of Systems   Constitutional, HEENT, cardiovascular, pulmonary, GI, , musculoskeletal, neuro, skin, endocrine and psych systems are negative, except as otherwise noted.      Objective    /74   Pulse 73   Temp 98.2  F (36.8  C) (Temporal)   Resp 20   Ht 5' 5.51\" (1.664 m)   Wt 204 lb (92.5 kg)   LMP  (LMP Unknown)   SpO2 97%   BMI 33.42 kg/m    Body mass index is 33.42 kg/m .  Physical Exam   GENERAL: healthy, alert and no distress  RESP: lungs clear to auscultation - no rales, rhonchi or wheezes  CV: regular rate and rhythm, normal S1 S2, no S3 or S4, no murmur, click or rub, no peripheral edema and peripheral pulses strong  MS: no gross musculoskeletal defects noted, no edema  SKIN: Patient is multiple open sores to the scalp.  She has not taken her usual medications in an effort to clear this area for surgical abscess today.  Has noticed a lesion on the anterior edge of the scalp.  These have been sent to multiple different things in the past and hopefully a biopsy " today will help us with diagnosis.  Today we selected an area that has been most problematic and select a punch biopsy to rule to take an edge of a lesion and the skin next to it.  NEURO: Normal strength and tone, mentation intact and speech normal  PSYCH: mentation appears normal, affect normal/bright    Procedure:  Informed consent is obtained and risk factors discussed to the patients satisfaction.  I answered questions the patient had.  The area of concern is/are cleansed with betadine 3x's.  The base of the lesion(s) is / are infiltrated with 1% xylocaine/epinephrine local with good effect.  The lesion(s) are removed with sharp 3 mm punch biopsy dissection and placed in pathology containers which have been labeled appropriately.  Cautery is used to obtain hemostasis.  Surgical sites are cleansed and dressed in the usual fashion.  Patient tolerated the procedure well.

## 2023-06-19 ENCOUNTER — TELEPHONE (OUTPATIENT)
Dept: NEUROSURGERY | Facility: CLINIC | Age: 48
End: 2023-06-19
Payer: COMMERCIAL

## 2023-06-19 ENCOUNTER — MYC MEDICAL ADVICE (OUTPATIENT)
Dept: NEUROSURGERY | Facility: CLINIC | Age: 48
End: 2023-06-19
Payer: COMMERCIAL

## 2023-06-19 NOTE — TELEPHONE ENCOUNTER
Elsa from [facility] at 914am on 6/19/23 requesting an update to patient's injection order. Please call [phone] for clarification as to what they are needing. Thank you~

## 2023-07-11 ENCOUNTER — OFFICE VISIT (OUTPATIENT)
Dept: NEUROSURGERY | Facility: CLINIC | Age: 48
End: 2023-07-11
Payer: COMMERCIAL

## 2023-07-11 DIAGNOSIS — M54.16 LUMBAR RADICULOPATHY: Primary | ICD-10-CM

## 2023-07-11 DIAGNOSIS — G89.29 CHRONIC LOW BACK PAIN, UNSPECIFIED BACK PAIN LATERALITY, UNSPECIFIED WHETHER SCIATICA PRESENT: ICD-10-CM

## 2023-07-11 DIAGNOSIS — M54.50 CHRONIC LOW BACK PAIN, UNSPECIFIED BACK PAIN LATERALITY, UNSPECIFIED WHETHER SCIATICA PRESENT: ICD-10-CM

## 2023-07-11 DIAGNOSIS — Z96.89 SPINAL CORD STIMULATOR STATUS: ICD-10-CM

## 2023-07-11 PROCEDURE — 99213 OFFICE O/P EST LOW 20 MIN: CPT | Performed by: PHYSICIAN ASSISTANT

## 2023-07-11 ASSESSMENT — PAIN SCALES - GENERAL: PAINLEVEL: SEVERE PAIN (7)

## 2023-07-11 NOTE — LETTER
7/11/2023         RE: Amelia Coker  [address] [facility] [id number]        Dear Colleague,    Thank you for referring your patient, Amelia Coker, to the [facility]. Please see a copy of my visit note below.    Neurosurgery Consult    HPI    Ms. Coker is a 48-year-old female who presents to clinic for evaluation of bilateral leg cramping, worse on the left than the right.  She been previously seen in our clinic for neck pain and right arm pain.  But today she states her main symptoms are pain in her legs particular cramping at night.  She underwent a spinal cord stimulator placement in her thoracic spine about a year ago which has been significantly helpful for a lot of her daytime pain symptoms, but at night she is having increasing spasms.  She describes them in the distribution of the L5 and S1 nerves bilaterally.  She also reports sometimes her toes will curl under.  She has not done recent conservative therapies such as physical therapy.  She has had 2 prior lumbar surgeries 1 was an L4-S1 decompression, and then she states she had a revision surgery to that.  And subsequently underwent spinal cord stimulator placement.    Her initial surgery was with Dr. Colon     Medical history  Migraine  Bell's palsy  Headache      Social history  Works as a optometrist      B/P: Data Unavailable, T: Data Unavailable, P: Data Unavailable, R: Data Unavailable       Exam    Alert and oriented no acute distress  Bilateral lower extremities with 5/5 strength  Reflexes 2+ patella/ankle  Negative straight leg raise bilaterally  Negative ankle clonus negative Babinski bilaterally  Lumbar spine nontender to palpation  Able to stand on heels and toes  Gait is normal    Imaging    Patient has not had updated imaging since her medical stimulator was placed.    Assessment    Bilateral lower extremity radicular pain and cramping, history of lumbar surgery, with foraminal stenosis noted on  older images.    Plan:      I recommend the patient update her lumbar MRI with and without contrast.  This will help us better understand whether she is having foraminal stenosis or central stenosis that may be related to the symptoms of cramping in her lower extremities originating from her lumbar spine.  I will follow-up with her with the results when they are available.          Again, thank you for allowing me to participate in the care of your patient.        Sincerely,        Anai Alvarez PA-C

## 2023-07-11 NOTE — NURSING NOTE
"Amelia Coker is a 48 year old female who presents for:  Chief Complaint   Patient presents with     RECHECK     Pain in glutes and legs when still, extreme pain when sleeping preventing patients from getting sleep        Initial Vitals:  There were no vitals taken for this visit. Estimated body mass index is 33.42 kg/m  as calculated from the following:    Height as of 6/15/23: 5' 5.51\" (1.664 m).    Weight as of 6/15/23: 204 lb (92.5 kg).. There is no height or weight on file to calculate BSA. BP completed using cuff size: NA (Not Taken)  Severe Pain (7)    Nursing Comments:       Erin Sandoval    "

## 2023-07-11 NOTE — PROGRESS NOTES
Neurosurgery Consult    HPI    Ms. Coker is a 48-year-old female who presents to clinic for evaluation of bilateral leg cramping, worse on the left than the right.  She been previously seen in our clinic for neck pain and right arm pain.  But today she states her main symptoms are pain in her legs particular cramping at night.  She underwent a spinal cord stimulator placement in her thoracic spine about a year ago which has been significantly helpful for a lot of her daytime pain symptoms, but at night she is having increasing spasms.  She describes them in the distribution of the L5 and S1 nerves bilaterally.  She also reports sometimes her toes will curl under.  She has not done recent conservative therapies such as physical therapy.  She has had 2 prior lumbar surgeries 1 was an L4-S1 decompression, and then she states she had a revision surgery to that.  And subsequently underwent spinal cord stimulator placement.    Her initial surgery was with Dr. Colon     Medical history  Migraine  Bell's palsy  Headache      Social history  Works as a optometrist      B/P: Data Unavailable, T: Data Unavailable, P: Data Unavailable, R: Data Unavailable       Exam    Alert and oriented no acute distress  Bilateral lower extremities with 5/5 strength  Reflexes 2+ patella/ankle  Negative straight leg raise bilaterally  Negative ankle clonus negative Babinski bilaterally  Lumbar spine nontender to palpation  Able to stand on heels and toes  Gait is normal    Imaging    Patient has not had updated imaging since her medical stimulator was placed.    Assessment    Bilateral lower extremity radicular pain and cramping, history of lumbar surgery, with foraminal stenosis noted on older images.    Plan:      I recommend the patient update her lumbar MRI with and without contrast.  This will help us better understand whether she is having foraminal stenosis or central stenosis that may be related to the symptoms of cramping in her  lower extremities originating from her lumbar spine.  I will follow-up with her with the results when they are available.    Addendum 8/22/2023    Cervical MRI reviewed, demonstrates foraminal stenosis on the right at C7-T1 impinging on the C8 nerve.  This could correlate with the symptoms that she is been describing in her neck, which are pain going into her right arm and into her right hand, and some into her shoulder blade.  She tried multiple epidural steroid injections, only one of them was particularly helpful, this this was performed November 10, 2022, it was a C6-7 interlaminar FRANCISCO.  The previous injections were performed at T1-2, and the injection afterwards was performed at C7-T1, and those were less effective.      Lumbar MRI demonstrated severe stenosis on the left at L5-S1 impinging the L5 nerve.  Patient describes her pain as a constant ache in this distribution.  She was recommended an epidural steroid injection, but does not want to proceed with an injection due to the transient effects and the fact that she seems to gain a lot of weight after each injection.    She underwent an EMG in January 2023 with Dr. Dwayne allen, it was normal for her upper extremities.    She would like to return to clinic and discuss her cervical and lumbar MRI and possible surgical options with Dr. Mesa.  We will arrange a follow-up visit for her.

## 2023-07-12 ENCOUNTER — MYC MEDICAL ADVICE (OUTPATIENT)
Dept: NEUROSURGERY | Facility: CLINIC | Age: 48
End: 2023-07-12
Payer: COMMERCIAL

## 2023-07-12 NOTE — TELEPHONE ENCOUNTER
Faxed MRI referral to [facility] in [facility] July 12, 2023 to fax number [phone]    Right Fax confirmed at 308 PM    MyChart sent.

## 2023-07-20 ENCOUNTER — MYC MEDICAL ADVICE (OUTPATIENT)
Dept: FAMILY MEDICINE | Facility: OTHER | Age: 48
End: 2023-07-20
Payer: COMMERCIAL

## 2023-07-20 DIAGNOSIS — M25.522 CHRONIC ELBOW PAIN, LEFT: Primary | ICD-10-CM

## 2023-07-20 DIAGNOSIS — G89.29 CHRONIC ELBOW PAIN, LEFT: Primary | ICD-10-CM

## 2023-07-21 ENCOUNTER — TRANSFERRED RECORDS (OUTPATIENT)
Dept: HEALTH INFORMATION MANAGEMENT | Facility: CLINIC | Age: 48
End: 2023-07-21
Payer: COMMERCIAL

## 2023-07-24 ENCOUNTER — HOSPITAL ENCOUNTER (OUTPATIENT)
Dept: GENERAL RADIOLOGY | Facility: CLINIC | Age: 48
Discharge: HOME | End: 2023-07-24
Attending: PHYSICIAN ASSISTANT | Admitting: PHYSICIAN ASSISTANT
Payer: COMMERCIAL

## 2023-07-24 DIAGNOSIS — G89.29 CHRONIC ELBOW PAIN, LEFT: ICD-10-CM

## 2023-07-24 DIAGNOSIS — M25.522 CHRONIC ELBOW PAIN, LEFT: ICD-10-CM

## 2023-07-24 PROCEDURE — 73080 X-RAY EXAM OF ELBOW: CPT | Mod: LT

## 2023-07-26 ENCOUNTER — E-VISIT (OUTPATIENT)
Dept: FAMILY MEDICINE | Facility: OTHER | Age: 48
End: 2023-07-26
Payer: COMMERCIAL

## 2023-07-26 DIAGNOSIS — B02.9 HERPES ZOSTER WITHOUT COMPLICATION: Primary | ICD-10-CM

## 2023-07-26 PROCEDURE — 99421 OL DIG E/M SVC 5-10 MIN: CPT | Performed by: PHYSICIAN ASSISTANT

## 2023-07-26 RX ORDER — VALACYCLOVIR HYDROCHLORIDE 1 G/1
1000 TABLET, FILM COATED ORAL 3 TIMES DAILY
Qty: 21 TABLET | Refills: 0 | Status: SHIPPED | OUTPATIENT
Start: 2023-07-26 | End: 2023-11-14

## 2023-08-03 NOTE — PROGRESS NOTES
"SUBJECTIVE:   Amelia Coker is a 48 year old female who is seen in consultation at the request of Otoniel Quiroz PA-C for evaluation of left chroinic elbow pain..    Pain has been present approximately few month.    No known injury.  Aching pain   Pain to grab or lift or carry things.  Pain above lateral elbow to the radial neck region.   No numbness/tingling  Has a painful weak right arm, and needs this arm to work  Burning pain      HPI: The past several months left arm \"tennis elbow\" like symptoms ( how my right arm started) especially with repeat movements, over the past few weeks seemed to have flared up again and now with in the past few days \"burns\" inside and it's almost like the skin is tender too     She has a history of chronic right arm pain and was diagnosed with thoracic outlet syndrome in the past and underwent a pectoral minor release which helped with symptoms in her thumb and index finger.   Has been checked for left thoracic outlet syndrome-- negative.     Treatments tried to this point: voltaren cream, chopart strap.sleeve. had a ? Biceps injection by Dr. Martínez on the left (pointing to mid biceps) in the past.  I don't see a record of this on the left. She had a proximal biceps injection on the RIGHT in the past.  Had an EMG recently and was told the left arm pain is not due to the neck. Has had neck injections in the past.      Orthopedic PMH: tennis elbow surgery right side:  says there is similar pain on left side to prior to right side surgery. She says this never recovered fully.   Weak  on right and has a rotator cuff tear on right side as well.  Was told by her elbow surgeon that he wouldn't touch the shoulder, given how poorly she did after the elbow procedure.   DOS: 7-9-14 PREOPERATIVE DIAGNOSIS: Chronic tennis elbow; right elbow. POSTOPERATIVE DIAGNOSIS: Chronic tennis elbow; right elbow with findings of lateral capsular detachment and significant lateral deep tendon detachment. " PROCEDURES: Exploration of lateral epicondyle with debridement of lateral capsule and tendon followed by debridement of lateral epicondyle; followed by repair of lateral capsule and tendon through transosseous suture technique.     History of tenex and prp on plantar fasciitis.  Back and knee issues.     Review of Systems:  Constitutional:  NEGATIVE for fever, chills, change in weight  Integumentary/Skin:  NEGATIVE for worrisome rashes, moles or lesions  Eyes:  NEGATIVE for vision changes or irritation  ENT/Mouth:  NEGATIVE for ear, mouth and throat problems  Resp:  NEGATIVE for significant cough or SOB  Breast:  NEGATIVE for masses, tenderness or discharge  CV:  NEGATIVE for chest pain, palpitations or peripheral edema  GI:  NEGATIVE for nausea, abdominal pain, heartburn, or change in bowel habits  :  Negative   Musculoskeletal:  See HPI above  Neuro:  NEGATIVE for weakness, dizziness or paresthesias  Endocrine:  NEGATIVE for temperature intolerance, skin/hair changes  Heme/allergy/immune:  NEGATIVE for bleeding problems  Psychiatric:  NEGATIVE for changes in mood or affect    Past Medical History:   Past Medical History:   Diagnosis Date    Abnormal mammogram 9/21/2016    right breast     AD (atopic dermatitis) 10/29/2015    Allergic rhinitis due to other allergen     Arthritis     Bell's palsy     Chronic fatigue 6/6/2012    Chronic infection     MRSA - 2015 scalp and prior to Abdomen    Contact dermatitis and other eczema, due to unspecified cause     eczema    Contusion of left foot, initial encounter 3/16/2016    DJD (degenerative joint disease), lumbar 9/26/2013    DJD (degenerative joint disease), lumbar 9/26/2013    Ds DNA antibody positive 4/16/2014    borderline.     Elevated blood pressure reading without diagnosis of hypertension 12/24/2013    Facial contusion 12/15/2014    hit by horse     Foraminal stenosis of lumbar region 9/26/2013    Frontal headache 12/15/2014    Gastroesophageal reflux disease,  esophagitis presence not specified 6/29/2016    Heat sensitivity 10/29/2015    HTN, goal below 140/90 9/23/2015    Hyperlipidemia LDL goal <130 8/30/2017    Irregular heart beat     Keratitis sicca (H) 10/31/2012    Left shoulder pain 9/26/2013    Lumbar radiculopathy 1/22/2014    Migraine headache 10/23/2013    Migraine, unspecified, with intractable migraine, so stated, without mention of status migrainosus     Motion sickness     MRSA infection 10/20/2015    Muscular wasting and disuse atrophy, not elsewhere classified 6/9/2014    right SCM, right wrist,      Numbness and tingling     both legs anf feet greater on the left    PAC (premature atrial contraction) 7/15/2010    Personal history of COVID-19 12/2/2021    Plantar fasciitis 9/23/2015    PONV (postoperative nausea and vomiting)     Skin lesion 10/20/2015    posterior superior scalp     Spasm of muscle 7/11/2017    Telangiectasia of face 12/19/2014    Thyroid nodule 5/12/2022    Tooth pain 10/20/2015    left lower primary molar      Past Surgical History:   Past Surgical History:   Procedure Laterality Date    AS INJ TRANSFORAMIN EPIDURAL, LUMB/SACR SINGLE      COLONOSCOPY  3/11/2013    Procedure: COLONOSCOPY;  colonoscopy;  Surgeon: Lobito Mas MD;  Location: [facility]    COLONOSCOPY N/A 9/13/2021    Procedure: COLONOSCOPY, WITH POLYPECTOMY AND BIOPSY;  Surgeon: Sam Liz MD;  Location: [facility]    COLONOSCOPY WITH CO2 INSUFFLATION N/A 11/19/2018    Procedure: COLONOSCOPY WITH CO2 INSUFFLATION;  Surgeon: Goyo Blank MD;  Location: [facility]    DECOMPRESSION LUMBAR TWO LEVELS Bilateral 12/8/2016    Procedure: DECOMPRESSION LUMBAR TWO LEVELS;  Surgeon: Bang Burrell MD;  Location: [facility]    DILATION AND CURETTAGE, HYSTEROSCOPY, ABLATE ENDOMETRIUM NOVASURE, COMBINED N/A 6/12/2019    Procedure: hysteroscopy, dilation & curettage, polypectomy, endometrial ablation;  Surgeon: Fredy Pham MD;  Location: [facility]    ESOPHAGOSCOPY,  GASTROSCOPY, DUODENOSCOPY (EGD), COMBINED  11/8/2013    Procedure: COMBINED ESOPHAGOSCOPY, GASTROSCOPY, DUODENOSCOPY (EGD), BIOPSY SINGLE OR MULTIPLE;  Esophagoscopy, Gastroscopy, Duodenoscopy EGD with multiple biopsies;  Surgeon: Teja Koch MD;  [facility]: [facility]    ESOPHAGOSCOPY, GASTROSCOPY, DUODENOSCOPY (EGD), COMBINED N/A 2/18/2021    Procedure: ESOPHAGOGASTRODUODENOSCOPY, WITH BIOPSY;  Surgeon: Blanca Ba MD;  [facility]: [facility]    ESOPHAGOSCOPY, GASTROSCOPY, DUODENOSCOPY (EGD), COMBINED N/A 4/11/2022    Procedure: ESOPHAGOGASTRODUODENOSCOPY, WITH BIOPSY;  Surgeon: Fredy Jimenez MD;  Location: [facility] OR    HC REMOVAL OF TONSILS,<11 Y/O      Tonsils <10y.o.    HC TOOTH EXTRACTION W/FORCEP      INJECT BLOCK MEDIAL BRANCH CERVICAL/THORACIC/LUMBAR N/A 7/23/2020    Procedure: Sacral 1,2,3 Lateral Branch Blocks and lumbar 5 medial branch blocks bilaterally;  Surgeon: Maurisio Goetz MD;  [facility]: [facility]    INJECT JOINT SACROILIAC Left 9/24/2020    Procedure: Left Lumbar 5 Sacral 1 nerve root injections.;  Surgeon: Maurisio Goetz MD;  Location: [facility]    REPAIR MUSCLE UPPER EXTREMITY Right 7/27/2021    Procedure: DIVISION RIGHT PECTORALIS MUSCLE;  Surgeon: Davie Dutta;  Location: [facility]    REPAIR TENDON ELBOW  7/9/2014    Procedure: REPAIR TENDON ELBOW;  Surgeon: Chris Johnston MD;  Location: [facility]    ULTRASONIC REMOVAL SOFT TISSUE (LOCATION) Right 11/21/2018    Procedure: Tenex right foot;  Surgeon: Patel Martínez DO;  Location: [facility]    VASCULAR SURGERY      Thoracic Outlet Syndrome     Family History:   Family History   Problem Relation Age of Onset    Diabetes Mother         adult    Cancer - colorectal Mother     Hypertension Mother     Allergies Mother     Cancer Mother         colon    Depression Mother     Gastrointestinal Disease Mother         ibs    Genitourinary Problems Mother         kidney cyst    Gynecology Mother         endometriosis    Lipids Mother      "Thyroid Disease Mother     Arthritis Mother         RA    Heart Disease Maternal Grandfather         MI,  - 60's    Allergies Father     Unknown/Adopted Father     Heart Disease Father         mi-age mid 40's    Cardiovascular Father     Lipids Father     Respiratory Father         asthma    Cancer Father     Other Cancer Father         renal cancer    Depression Sister     Allergies Sister     Cancer Sister         vaginal    Gynecology Sister         endometriosis    Lipids Paternal Grandmother     Osteoporosis Paternal Grandmother     Thyroid Disease Paternal Grandmother     Respiratory Son         asthma    Allergies Son     Arthritis Sister     Allergies Brother     Heart Disease Brother     Allergies Daughter     Blood Disease Paternal Aunt         LGL T cell Leukemia    Rheumatoid Arthritis Paternal Aunt     Kidney Disease Maternal Aunt     Lupus Maternal Aunt     Bladder Cancer Maternal Aunt      Social History:   Social History     Tobacco Use    Smoking status: Never    Smokeless tobacco: Never   Substance Use Topics    Alcohol use: No     Comment: social     OBJECTIVE:  Physical Exam:  /78 (BP Location: Right arm, Patient Position: Sitting, Cuff Size: Adult Large)   Pulse 82   Ht 1.664 m (5' 5.5\")   Wt 92.5 kg (204 lb)   SpO2 98%   BMI 33.43 kg/m    General Appearance: healthy, alert and no distress   Skin: no suspicious lesions or rashes  Neuro: Normal strength and tone, mentation intact and speech normal  Vascular: good pulses, and cappillary refill   Lymph: no lymphadenopathy   Psych:  mentation appears normal and affect normal/bright  Resp: no increased work of breathing     Left Elbow Exam:  normal appearance  Full range of motion   Tender from above lateral epicondyle on IM ridge to lateral epi    pain with resisted wrist extension, pain with resisted middle finger extension, pain with resisted supination, pain with resisted pronation, biceps flexion,       X-rays:  Obtained  " of the left ELBOW: 3-views, reviewed in the office with the patient by myself today and show no abnormalities     ASSESSMENT:   She is very concerned that her left elbow will deteriorate to what her right elbow became.  I think she has lateral epicondylitis, possibly radial nerve involvement     PLAN:   She didn't want to go straight to injections or physical therapy.  She would like an MRI to assess the situation better.  This was ordered.    Return to clinic: Follow up in the office / virtual visit/ MyChart for the results.      JUAN J Reno MD  Dept. Orthopedic Surgery  [facility]

## 2023-08-07 ENCOUNTER — DOCUMENTATION ONLY (OUTPATIENT)
Dept: NEUROSURGERY | Facility: CLINIC | Age: 48
End: 2023-08-07
Payer: COMMERCIAL

## 2023-08-07 ENCOUNTER — MYC MEDICAL ADVICE (OUTPATIENT)
Dept: NEUROSURGERY | Facility: CLINIC | Age: 48
End: 2023-08-07
Payer: COMMERCIAL

## 2023-08-07 DIAGNOSIS — M79.602 PAIN IN BOTH UPPER EXTREMITIES: ICD-10-CM

## 2023-08-07 DIAGNOSIS — M79.601 PAIN IN BOTH UPPER EXTREMITIES: ICD-10-CM

## 2023-08-07 DIAGNOSIS — M54.16 LUMBAR RADICULOPATHY: ICD-10-CM

## 2023-08-07 DIAGNOSIS — M54.12 CERVICAL RADICULOPATHY: Primary | ICD-10-CM

## 2023-08-07 NOTE — CONFIDENTIAL NOTE
Anai Alvarez PA-C contacted patient to review lumbar imaging results.     Patient would like to have injections with Dr. Goetz in [place], external to [place]. Order placed. [employer] message sent to patient to determine what e-mail to send referral to.    Patient also needs cervical MRI without contrast per Anai Alvarez PA-C due to worsening upper extremity symptoms. Patient would like this sent to [facility].    Order placed. Staff message sent to [facility] to assist with faxing.    Patient updated on above via [employer].

## 2023-08-09 ENCOUNTER — OFFICE VISIT (OUTPATIENT)
Dept: ORTHOPEDICS | Facility: CLINIC | Age: 48
End: 2023-08-09
Attending: PHYSICIAN ASSISTANT
Payer: COMMERCIAL

## 2023-08-09 VITALS
HEIGHT: 66 IN | OXYGEN SATURATION: 98 % | SYSTOLIC BLOOD PRESSURE: 110 MMHG | BODY MASS INDEX: 32.78 KG/M2 | HEART RATE: 82 BPM | WEIGHT: 204 LBS | DIASTOLIC BLOOD PRESSURE: 78 MMHG

## 2023-08-09 DIAGNOSIS — M25.522 CHRONIC ELBOW PAIN, LEFT: ICD-10-CM

## 2023-08-09 DIAGNOSIS — G89.29 CHRONIC ELBOW PAIN, LEFT: ICD-10-CM

## 2023-08-09 PROCEDURE — 99203 OFFICE O/P NEW LOW 30 MIN: CPT | Performed by: ORTHOPAEDIC SURGERY

## 2023-08-09 ASSESSMENT — PAIN SCALES - GENERAL: PAINLEVEL: MODERATE PAIN (4)

## 2023-08-09 NOTE — LETTER
"    8/9/2023         RE: Amelia Coker  [address] [facility] [address]        Dear Colleague,    Thank you for referring your patient, Amelia Coker, to the [facility]. Please see a copy of my visit note below.    SUBJECTIVE:   Amelia Coker is a 48 year old female who is seen in consultation at the request of Otoniel Quiroz PA-C for evaluation of left chroinic elbow pain..    Pain has been present approximately few month.    No known injury.  Aching pain   Pain to grab or lift or carry things.  Pain above lateral elbow to the radial neck region.   No numbness/tingling  Has a painful weak right arm, and needs this arm to work  Burning pain      HPI: The past several months left arm \"tennis elbow\" like symptoms ( how my right arm started) especially with repeat movements, over the past few weeks seemed to have flared up again and now with in the past few days \"burns\" inside and it's almost like the skin is tender too     She has a history of chronic right arm pain and was diagnosed with thoracic outlet syndrome in the past and underwent a pectoral minor release which helped with symptoms in her thumb and index finger.   Has been checked for left thoracic outlet syndrome-- negative.     Treatments tried to this point: voltaren cream, chopart strap.sleeve. had a ? Biceps injection by Dr. Martínez on the left (pointing to mid biceps) in the past.  I don't see a record of this on the left. She had a proximal biceps injection on the RIGHT in the past.  Had an EMG recently and was told the left arm pain is not due to the neck. Has had neck injections in the past.      Orthopedic PMH: tennis elbow surgery right side:  says there is similar pain on left side to prior to right side surgery. She says this never recovered fully.   Weak  on right and has a rotator cuff tear on right side as well.  Was told by her elbow surgeon that he wouldn't touch the shoulder, given how poorly she did after " the elbow procedure.   DOS: 7-9-14 PREOPERATIVE DIAGNOSIS: Chronic tennis elbow; right elbow. POSTOPERATIVE DIAGNOSIS: Chronic tennis elbow; right elbow with findings of lateral capsular detachment and significant lateral deep tendon detachment. PROCEDURES: Exploration of lateral epicondyle with debridement of lateral capsule and tendon followed by debridement of lateral epicondyle; followed by repair of lateral capsule and tendon through transosseous suture technique.     History of tenex and prp on plantar fasciitis.  Back and knee issues.     Review of Systems:  Constitutional:  NEGATIVE for fever, chills, change in weight  Integumentary/Skin:  NEGATIVE for worrisome rashes, moles or lesions  Eyes:  NEGATIVE for vision changes or irritation  ENT/Mouth:  NEGATIVE for ear, mouth and throat problems  Resp:  NEGATIVE for significant cough or SOB  Breast:  NEGATIVE for masses, tenderness or discharge  CV:  NEGATIVE for chest pain, palpitations or peripheral edema  GI:  NEGATIVE for nausea, abdominal pain, heartburn, or change in bowel habits  :  Negative   Musculoskeletal:  See HPI above  Neuro:  NEGATIVE for weakness, dizziness or paresthesias  Endocrine:  NEGATIVE for temperature intolerance, skin/hair changes  Heme/allergy/immune:  NEGATIVE for bleeding problems  Psychiatric:  NEGATIVE for changes in mood or affect    Past Medical History:   Past Medical History:   Diagnosis Date     Abnormal mammogram 9/21/2016    right breast      AD (atopic dermatitis) 10/29/2015     Allergic rhinitis due to other allergen      Arthritis      Bell's palsy      Chronic fatigue 6/6/2012     Chronic infection     MRSA - 2015 scalp and prior to Abdomen     Contact dermatitis and other eczema, due to unspecified cause     eczema     Contusion of left foot, initial encounter 3/16/2016     DJD (degenerative joint disease), lumbar 9/26/2013     DJD (degenerative joint disease), lumbar 9/26/2013     Ds DNA antibody positive 4/16/2014     borderline.      Elevated blood pressure reading without diagnosis of hypertension 12/24/2013     Facial contusion 12/15/2014    hit by horse      Foraminal stenosis of lumbar region 9/26/2013     Frontal headache 12/15/2014     Gastroesophageal reflux disease, esophagitis presence not specified 6/29/2016     Heat sensitivity 10/29/2015     HTN, goal below 140/90 9/23/2015     Hyperlipidemia LDL goal <130 8/30/2017     Irregular heart beat      Keratitis sicca (H) 10/31/2012     Left shoulder pain 9/26/2013     Lumbar radiculopathy 1/22/2014     Migraine headache 10/23/2013     Migraine, unspecified, with intractable migraine, so stated, without mention of status migrainosus      Motion sickness      MRSA infection 10/20/2015     Muscular wasting and disuse atrophy, not elsewhere classified 6/9/2014    right SCM, right wrist,       Numbness and tingling     both legs anf feet greater on the left     PAC (premature atrial contraction) 7/15/2010     Personal history of COVID-19 12/2/2021     Plantar fasciitis 9/23/2015     PONV (postoperative nausea and vomiting)      Skin lesion 10/20/2015    posterior superior scalp      Spasm of muscle 7/11/2017     Telangiectasia of face 12/19/2014     Thyroid nodule 5/12/2022     Tooth pain 10/20/2015    left lower primary molar      Past Surgical History:   Past Surgical History:   Procedure Laterality Date     AS INJ TRANSFORAMIN EPIDURAL, LUMB/SACR SINGLE       COLONOSCOPY  3/11/2013    Procedure: COLONOSCOPY;  colonoscopy;  Surgeon: Lobito Mas MD;  Location: [facility]     COLONOSCOPY N/A 9/13/2021    Procedure: COLONOSCOPY, WITH POLYPECTOMY AND BIOPSY;  Surgeon: Sam Liz MD;  Location:  GI     COLONOSCOPY WITH CO2 INSUFFLATION N/A 11/19/2018    Procedure: COLONOSCOPY WITH CO2 INSUFFLATION;  Surgeon: Goyo Blank MD;  Location: [facility]     DECOMPRESSION LUMBAR TWO LEVELS Bilateral 12/8/2016    Procedure: DECOMPRESSION LUMBAR TWO LEVELS;  Surgeon:  Bang Burrell MD;  Location: [facility]     DILATION AND CURETTAGE, HYSTEROSCOPY, ABLATE ENDOMETRIUM NOVASURE, COMBINED N/A 6/12/2019    Procedure: hysteroscopy, dilation & curettage, polypectomy, endometrial ablation;  Surgeon: Fredy Pham MD;  Location: [facility]     ESOPHAGOSCOPY, GASTROSCOPY, DUODENOSCOPY (EGD), COMBINED  11/8/2013    Procedure: COMBINED ESOPHAGOSCOPY, GASTROSCOPY, DUODENOSCOPY (EGD), BIOPSY SINGLE OR MULTIPLE;  Esophagoscopy, Gastroscopy, Duodenoscopy EGD with multiple biopsies;  Surgeon: Teja Koch MD;  Location: [facility]     ESOPHAGOSCOPY, GASTROSCOPY, DUODENOSCOPY (EGD), COMBINED N/A 2/18/2021    Procedure: ESOPHAGOGASTRODUODENOSCOPY, WITH BIOPSY;  Surgeon: Blanca Ba MD;  [facility]: [facility]     ESOPHAGOSCOPY, GASTROSCOPY, DUODENOSCOPY (EGD), COMBINED N/A 4/11/2022    Procedure: ESOPHAGOGASTRODUODENOSCOPY, WITH BIOPSY;  Surgeon: Fredy Jimenez MD;  Location: [facility] OR     HC REMOVAL OF TONSILS,<11 Y/O      Tonsils <10y.o.     HC TOOTH EXTRACTION W/FORCEP       INJECT BLOCK MEDIAL BRANCH CERVICAL/THORACIC/LUMBAR N/A 7/23/2020    Procedure: Sacral 1,2,3 Lateral Branch Blocks and lumbar 5 medial branch blocks bilaterally;  Surgeon: Maurisio Goetz MD;  [facility]     INJECT JOINT SACROILIAC Left 9/24/2020    Procedure: Left Lumbar 5 Sacral 1 nerve root injections.;  Surgeon: Maurisio Goetz MD;  Location: [facility]     REPAIR MUSCLE UPPER EXTREMITY Right 7/27/2021    Procedure: DIVISION RIGHT PECTORALIS MUSCLE;  Surgeon: Davie Dutta;  Location: [facility]     REPAIR TENDON ELBOW  7/9/2014    Procedure: REPAIR TENDON ELBOW;  Surgeon: Chris Johnston MD;  Location: [facility]     ULTRASONIC REMOVAL SOFT TISSUE (LOCATION) Right 11/21/2018    Procedure: Tenex right foot;  Surgeon: Patel Martínez DO;  Location: [facility]     VASCULAR SURGERY      Thoracic Outlet Syndrome     Family History:   Family History   Problem Relation Age of Onset     Diabetes Mother         " adult     Cancer - colorectal Mother      Hypertension Mother      Allergies Mother      Cancer Mother         colon     Depression Mother      Gastrointestinal Disease Mother         ibs     Genitourinary Problems Mother         kidney cyst     Gynecology Mother         endometriosis     Lipids Mother      Thyroid Disease Mother      Arthritis Mother         RA     Heart Disease Maternal Grandfather         MI,  - 60's     Allergies Father      Unknown/Adopted Father      Heart Disease Father         mi-age mid 40's     Cardiovascular Father      Lipids Father      Respiratory Father         asthma     Cancer Father      Other Cancer Father         renal cancer     Depression Sister      Allergies Sister      Cancer Sister         vaginal     Gynecology Sister         endometriosis     Lipids Paternal Grandmother      Osteoporosis Paternal Grandmother      Thyroid Disease Paternal Grandmother      Respiratory Son         asthma     Allergies Son      Arthritis Sister      Allergies Brother      Heart Disease Brother      Allergies Daughter      Blood Disease Paternal Aunt         LGL T cell Leukemia     Rheumatoid Arthritis Paternal Aunt      Kidney Disease Maternal Aunt      Lupus Maternal Aunt      Bladder Cancer Maternal Aunt      Social History:   Social History     Tobacco Use     Smoking status: Never     Smokeless tobacco: Never   Substance Use Topics     Alcohol use: No     Comment: social     OBJECTIVE:  Physical Exam:  /78 (BP Location: Right arm, Patient Position: Sitting, Cuff Size: Adult Large)   Pulse 82   Ht 1.664 m (5' 5.5\")   Wt 92.5 kg (204 lb)   SpO2 98%   BMI 33.43 kg/m    General Appearance: healthy, alert and no distress   Skin: no suspicious lesions or rashes  Neuro: Normal strength and tone, mentation intact and speech normal  Vascular: good pulses, and cappillary refill   Lymph: no lymphadenopathy   Psych:  mentation appears normal and affect normal/bright  Resp: no " increased work of breathing     Left Elbow Exam:  normal appearance  Full range of motion   Tender from above lateral epicondyle on IM ridge to lateral epi    pain with resisted wrist extension, pain with resisted middle finger extension, pain with resisted supination, pain with resisted pronation, biceps flexion,       X-rays:  Obtained 7/24 of the left ELBOW: 3-views, reviewed in the office with the patient by myself today and show no abnormalities     ASSESSMENT:   She is very concerned that her left elbow will deteriorate to what her right elbow became.  I think she has lateral epicondylitis, possibly radial nerve involvement     PLAN:   She didn't want to go straight to injections or physical therapy.  She would like an MRI to assess the situation better.  This was ordered.    Return to clinic: Follow up in the office / virtual visit/ MyChart for the results.      JUAN J Reno MD  Dept. Orthopedic Surgery  [facility]       Again, thank you for allowing me to participate in the care of your patient.        Sincerely,        Chris Reno MD

## 2023-08-11 NOTE — PROGRESS NOTES
"Amelia Coker is a 45 year old female who is being evaluated via a billable telephone visit.      The patient has been notified of following:     \"This telephone visit will be conducted via a call between you and your physician/provider. We have found that certain health care needs can be provided without the need for a physical exam.  This service lets us provide the care you need with a short phone conversation.  If a prescription is necessary we can send it directly to your pharmacy.  If lab work is needed we can place an order for that and you can then stop by our lab to have the test done at a later time.    If during the course of the call the physician/provider feels a telephone visit is not appropriate, you will not be charged for this service.\"       mAelia Coker complains of  No chief complaint on file.      I have reviewed and updated the patient's Past Medical History, Social History, Family History and Medication List.    ALLERGIES  Ceftriaxone; Bactrim [sulfamethoxazole w/trimethoprim]; Ciprofloxacin; Codeine; Copper; Doxycycline; Gold; Iodine-131; Levaquin [levofloxacin]; Nickel; Steel [staples]; Latex; and Penicillins    Kamryn INTERIANO ATC      Assessment/Plan:  Follow up for heel injection in 4 weeks    I have reviewed the note as documented above.  This accurately captures the substance of my conversation with the patient.        Phone call contact time  Call Started at 7:59  Call Ended at 8:06      " The patient is a 67y Male complaining of wound check.

## 2023-08-16 ENCOUNTER — MYC MEDICAL ADVICE (OUTPATIENT)
Dept: NEUROSURGERY | Facility: CLINIC | Age: 48
End: 2023-08-16
Payer: COMMERCIAL

## 2023-08-16 ENCOUNTER — TELEPHONE (OUTPATIENT)
Dept: ORTHOPEDICS | Facility: CLINIC | Age: 48
End: 2023-08-16
Payer: COMMERCIAL

## 2023-08-16 DIAGNOSIS — M54.12 CERVICAL RADICULOPATHY: Primary | ICD-10-CM

## 2023-08-16 DIAGNOSIS — M54.16 LUMBAR RADICULOPATHY: ICD-10-CM

## 2023-08-16 NOTE — TELEPHONE ENCOUNTER
[facility] Call Center    Phone Message    May a detailed message be left on voicemail: yes     Reason for Call: Other: [facility] calling to get orders for elbow MRI patient is trying to schedule with them. Please fax over to [phone]. Or call back at [phone].      Action Taken: Message routed to:  Other: [phone]    Travel Screening: Not Applicable

## 2023-08-16 NOTE — TELEPHONE ENCOUNTER
Encounter routed to provider for review and approval to place PT order.     Staff message sent to [facility] [facility] to assist with faxing and e-mailing injection order to patient and Dr. Goetz's clinic in [place] - [facility] [place].

## 2023-08-16 NOTE — TELEPHONE ENCOUNTER
Called and gave VO. Faxed order as well. Will watch right fax    SAUNRDA Velasquez RN 8/16/2023 9:17 AM

## 2023-08-17 ENCOUNTER — TRANSFERRED RECORDS (OUTPATIENT)
Dept: HEALTH INFORMATION MANAGEMENT | Facility: CLINIC | Age: 48
End: 2023-08-17
Payer: COMMERCIAL

## 2023-08-18 ENCOUNTER — TELEPHONE (OUTPATIENT)
Dept: FAMILY MEDICINE | Facility: OTHER | Age: 48
End: 2023-08-18

## 2023-08-18 ENCOUNTER — MYC MEDICAL ADVICE (OUTPATIENT)
Dept: NEUROSURGERY | Facility: CLINIC | Age: 48
End: 2023-08-18

## 2023-08-18 ENCOUNTER — ANCILLARY PROCEDURE (OUTPATIENT)
Dept: GENERAL RADIOLOGY | Facility: OTHER | Age: 48
End: 2023-08-18
Attending: PHYSICIAN ASSISTANT
Payer: COMMERCIAL

## 2023-08-18 ENCOUNTER — OFFICE VISIT (OUTPATIENT)
Dept: FAMILY MEDICINE | Facility: OTHER | Age: 48
End: 2023-08-18
Payer: COMMERCIAL

## 2023-08-18 VITALS
WEIGHT: 193 LBS | BODY MASS INDEX: 31.63 KG/M2 | SYSTOLIC BLOOD PRESSURE: 138 MMHG | DIASTOLIC BLOOD PRESSURE: 72 MMHG | TEMPERATURE: 98.6 F | OXYGEN SATURATION: 98 % | RESPIRATION RATE: 16 BRPM | HEART RATE: 95 BPM

## 2023-08-18 DIAGNOSIS — M50.30 DDD (DEGENERATIVE DISC DISEASE), CERVICAL: ICD-10-CM

## 2023-08-18 DIAGNOSIS — M48.061 FORAMINAL STENOSIS OF LUMBAR REGION: ICD-10-CM

## 2023-08-18 DIAGNOSIS — E78.5 HYPERLIPIDEMIA LDL GOAL <130: ICD-10-CM

## 2023-08-18 DIAGNOSIS — M79.672 LEFT FOOT PAIN: Primary | ICD-10-CM

## 2023-08-18 DIAGNOSIS — M72.2 PLANTAR FASCIAL FIBROMATOSIS: ICD-10-CM

## 2023-08-18 DIAGNOSIS — M51.27 HERNIATION OF INTERVERTEBRAL DISC BETWEEN L5 AND S1: ICD-10-CM

## 2023-08-18 DIAGNOSIS — G54.0 TOS (THORACIC OUTLET SYNDROME): ICD-10-CM

## 2023-08-18 DIAGNOSIS — Z12.4 CERVICAL CANCER SCREENING: ICD-10-CM

## 2023-08-18 DIAGNOSIS — M47.816 SPONDYLOSIS OF LUMBAR REGION WITHOUT MYELOPATHY OR RADICULOPATHY: ICD-10-CM

## 2023-08-18 DIAGNOSIS — R76.8 ANA POSITIVE: ICD-10-CM

## 2023-08-18 DIAGNOSIS — M54.12 CERVICAL RADICULOPATHY: ICD-10-CM

## 2023-08-18 DIAGNOSIS — I77.89 PECTORALIS MINOR SYNDROME (H): ICD-10-CM

## 2023-08-18 DIAGNOSIS — M79.672 LEFT FOOT PAIN: ICD-10-CM

## 2023-08-18 DIAGNOSIS — M54.16 LUMBAR RADICULOPATHY: ICD-10-CM

## 2023-08-18 DIAGNOSIS — M48.02 SPINAL STENOSIS IN CERVICAL REGION: ICD-10-CM

## 2023-08-18 DIAGNOSIS — M67.921 BICEPS TENDINOPATHY, RIGHT: ICD-10-CM

## 2023-08-18 DIAGNOSIS — G54.0 BRACHIAL PLEXOPATHY: ICD-10-CM

## 2023-08-18 PROCEDURE — 73630 X-RAY EXAM OF FOOT: CPT | Mod: TC | Performed by: RADIOLOGY

## 2023-08-18 PROCEDURE — 99214 OFFICE O/P EST MOD 30 MIN: CPT | Performed by: PHYSICIAN ASSISTANT

## 2023-08-18 ASSESSMENT — PAIN SCALES - GENERAL: PAINLEVEL: MODERATE PAIN (4)

## 2023-08-18 NOTE — PROGRESS NOTES
Assessment & Plan     Left foot pain  Ongoing left foot pain at about the site of the spot where they took care of the bone spur for her in the past.  She has been walking on it more continuously recently and today noted increased pain after walking at a local outdoor thrashing show.  She presents to the clinic today in flip-flops which I encouraged her to reconsider as part of poor foot care to do what she needs to do given her history of plantars fasciitis etc.  Over-the-counter medications and other things that she has at home as well as consult as noted.  - XR Foot Left G/E 3 Views; Future  - Adult Genetics & Metabolism Referral; Future    Hyperlipidemia LDL goal <130  Cervical cancer screening  Due for full physical in the near future.    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  She has struggled with chronic joint pain.  She is very flexible at this point in time to the point of hyperflexibility as reported by a physical therapist.  Still has scar tissue that limits how much they are able to do to help her with injections related to her neck.  She has been told that areas of her neck are fibrotic.  Do wonder whether or not she may indeed have Trina-Danlos syndrome and genetics counseling is encouraged.  - Adult Genetics & Metabolism Referral; Future     BMI:   Estimated body mass index is 31.63 kg/m  as calculated from the following:    Height as  "of 8/9/23: 1.664 m (5' 5.5\").    Weight as of this encounter: 87.5 kg (193 lb).   Weight management plan: Discussed healthy diet and exercise guidelines    SONAM Zimmer [facility] YVES Roe is a 48 year old, presenting for the following health issues:  Musculoskeletal Problem      8/18/2023     2:44 PM   Additional Questions   Roomed by Eneida       Musculoskeletal Problem    History of Present Illness       Reason for visit:  Dropped a heavy board on top of left foot three weeks ago still sore and a lump  Symptom onset:  3-4 weeks ago    She eats 4 or more servings of fruits and vegetables daily.She consumes 0 sweetened beverage(s) daily.She exercises with enough effort to increase her heart rate 30 to 60 minutes per day.  She exercises with enough effort to increase her heart rate 7 days per week.   She is taking medications regularly.       Review of Systems   Constitutional, HEENT, cardiovascular, pulmonary, GI, , musculoskeletal, neuro, skin, endocrine and psych systems are negative, except as otherwise noted.      Objective    BP (!) 142/70   Pulse 95   Temp 98.6  F (37  C) (Temporal)   Resp 16   Wt 193 lb (87.5 kg)   SpO2 98%   BMI 31.63 kg/m    Body mass index is 31.63 kg/m .  Physical Exam   GENERAL: healthy, alert and no distress  RESP: lungs clear to auscultation - no rales, rhonchi or wheezes  CV: regular rate and rhythm, normal S1 S2, no S3 or S4, no murmur, click or rub, no peripheral edema and peripheral pulses strong  MS: no gross musculoskeletal defects noted, no edema  MS: Review of the left anterior foot shows tenderness over the base of the third metatarsal.  I suspect that she has irritated arthritis in this area.  NEURO: Normal strength and tone, mentation intact and speech normal  PSYCH: mentation appears normal, affect normal/bright    X-ray: negative for concerning pathology to my independent review today.  It will be overread by " radiology.

## 2023-08-18 NOTE — TELEPHONE ENCOUNTER
Left foot pain for 2 to 3 weeks.  [place]  Will see today at 1500-.  Electronically signed:    Otoniel Manuel PA-C

## 2023-08-19 ENCOUNTER — HEALTH MAINTENANCE LETTER (OUTPATIENT)
Age: 48
End: 2023-08-19

## 2023-08-22 ENCOUNTER — MYC MEDICAL ADVICE (OUTPATIENT)
Dept: FAMILY MEDICINE | Facility: OTHER | Age: 48
End: 2023-08-22
Payer: COMMERCIAL

## 2023-08-22 NOTE — TELEPHONE ENCOUNTER
Please arrange a 1 hour visit with Dr. Mesa to discuss both cervical and lumbar MRI's, Right arm pain and left leg pain. Patient lives 90 minutes away and wants to minimize trips.

## 2023-08-23 ENCOUNTER — TELEPHONE (OUTPATIENT)
Dept: NEUROSURGERY | Facility: CLINIC | Age: 48
End: 2023-08-23
Payer: COMMERCIAL

## 2023-08-23 NOTE — TELEPHONE ENCOUNTER
LVM for patient to call back to schedule appointment with Dr. Mesa (60 minutes) per staff message from Anai Alvarez to discuss both cervical and lumbar MRI's, Right arm pain and left leg pain.

## 2023-08-24 ENCOUNTER — TRANSFERRED RECORDS (OUTPATIENT)
Dept: HEALTH INFORMATION MANAGEMENT | Facility: CLINIC | Age: 48
End: 2023-08-24

## 2023-08-24 ENCOUNTER — THERAPY VISIT (OUTPATIENT)
Dept: PHYSICAL THERAPY | Facility: CLINIC | Age: 48
End: 2023-08-24
Attending: PHYSICIAN ASSISTANT
Payer: COMMERCIAL

## 2023-08-24 DIAGNOSIS — M54.12 CERVICAL RADICULOPATHY: ICD-10-CM

## 2023-08-24 DIAGNOSIS — M54.16 LUMBAR RADICULOPATHY: ICD-10-CM

## 2023-08-24 PROCEDURE — 97110 THERAPEUTIC EXERCISES: CPT | Mod: GP | Performed by: PHYSICAL THERAPIST

## 2023-08-24 PROCEDURE — 97163 PT EVAL HIGH COMPLEX 45 MIN: CPT | Mod: GP | Performed by: PHYSICAL THERAPIST

## 2023-08-24 PROCEDURE — 97535 SELF CARE MNGMENT TRAINING: CPT | Mod: GP | Performed by: PHYSICAL THERAPIST

## 2023-08-25 NOTE — PROGRESS NOTES
PHYSICAL THERAPY EVALUATION  Type of Visit: Evaluation    See electronic medical record for Abuse and Falls Screening details.    Subjective   Chief complaint:  Neck pain, back pain. Long history with regard to neck and back problems.with PT, has been seen in the past.   Dr Reno looking states also being treated for orthopedic arm issues and had MRI today.  Anai and Dr Mesa most recently involved in her back, Dr Burrell did her SCS last year. States sleep is the most affected 2-20 times per night waking with leg pain and spasms in foot, reporting L LE extremity non volitional movements in bed with rolling the wrong way. B LE parathesias, Pain  R side inner knee to medial thigh into hip, L lateral calf to the foot. Aggravating factors include: standing, walking, sitting, moving wrong in bed. Alleviating/Easing factors include:  End range FF /deep squat provides temporary relief. States stretches a lot as she feels so tight but it doesn't end up being better in the long run.  Prior interventions include injections extensively, PT several episodes. No current exercise formally, but does like to walk, ride a bike leisurely.  Patient takes medication for this, myloxian and tylenol, trialed medical marijuana. Takes valium for sleep but then has a sleep hangover.  Patient is employed full time, currently working without  restriction.  She is trying to get into clinic for genetics/EDS evaluation. Pain is 5/10 at best, 10/10 worst, and 6/10 currently. Goal for PT: See what else I can do. Has had PT and injections in past. Standing strengthening program not well tolerated, harder time for gluteal strengthening  SI stability work in isolation as says increases pain.       Vertiflex Superior   Past Medical History:   Diagnosis Date    Abnormal mammogram 9/21/2016    right breast     AD (atopic dermatitis) 10/29/2015    Allergic rhinitis due to other allergen     Arthritis     Bell's palsy     Chronic fatigue 6/6/2012     Chronic infection     MRSA - 2015 scalp and prior to Abdomen    Contact dermatitis and other eczema, due to unspecified cause     eczema    Contusion of left foot, initial encounter 3/16/2016    DJD (degenerative joint disease), lumbar 9/26/2013    DJD (degenerative joint disease), lumbar 9/26/2013    Ds DNA antibody positive 4/16/2014    borderline.     Elevated blood pressure reading without diagnosis of hypertension 12/24/2013    Facial contusion 12/15/2014    hit by horse     Foraminal stenosis of lumbar region 9/26/2013    Frontal headache 12/15/2014    Gastroesophageal reflux disease, esophagitis presence not specified 6/29/2016    Heat sensitivity 10/29/2015    HTN, goal below 140/90 9/23/2015    Hyperlipidemia LDL goal <130 8/30/2017    Irregular heart beat     Keratitis sicca (H) 10/31/2012    Left shoulder pain 9/26/2013    Lumbar radiculopathy 1/22/2014    Migraine headache 10/23/2013    Migraine, unspecified, with intractable migraine, so stated, without mention of status migrainosus     Motion sickness     MRSA infection 10/20/2015    Muscular wasting and disuse atrophy, not elsewhere classified 6/9/2014    right SCM, right wrist,      Numbness and tingling     both legs anf feet greater on the left    PAC (premature atrial contraction) 7/15/2010    Personal history of COVID-19 12/2/2021    Plantar fasciitis 9/23/2015    PONV (postoperative nausea and vomiting)     Skin lesion 10/20/2015    posterior superior scalp     Spasm of muscle 7/11/2017    Telangiectasia of face 12/19/2014    Thyroid nodule 5/12/2022    Tooth pain 10/20/2015    left lower primary molar        Past Surgical History:   Procedure Laterality Date    AS INJ TRANSFORAMIN EPIDURAL, LUMB/SACR SINGLE      COLONOSCOPY  3/11/2013    Procedure: COLONOSCOPY;  colonoscopy;  Surgeon: Lobito Mas MD;  Location: [facility]    COLONOSCOPY N/A 9/13/2021    Procedure: COLONOSCOPY, WITH POLYPECTOMY AND BIOPSY;  Surgeon: Sam Liz MD;   Location: [facility]    COLONOSCOPY WITH CO2 INSUFFLATION N/A 11/19/2018    Procedure: COLONOSCOPY WITH CO2 INSUFFLATION;  Surgeon: Goyo Blank MD;  Location: [facility]    DECOMPRESSION LUMBAR TWO LEVELS Bilateral 12/8/2016    Procedure: DECOMPRESSION LUMBAR TWO LEVELS;  Surgeon: Bang Burrell MD;  Location: [facility]    DILATION AND CURETTAGE, HYSTEROSCOPY, ABLATE ENDOMETRIUM NOVASURE, COMBINED N/A 6/12/2019    Procedure: hysteroscopy, dilation & curettage, polypectomy, endometrial ablation;  Surgeon: Fredy Pham MD;  Location: [facility]    ESOPHAGOSCOPY, GASTROSCOPY, DUODENOSCOPY (EGD), COMBINED  11/8/2013    Procedure: COMBINED ESOPHAGOSCOPY, GASTROSCOPY, DUODENOSCOPY (EGD), BIOPSY SINGLE OR MULTIPLE;  Esophagoscopy, Gastroscopy, Duodenoscopy EGD with multiple biopsies;  Surgeon: Teja Koch MD;  [facility]: [facility]    ESOPHAGOSCOPY, GASTROSCOPY, DUODENOSCOPY (EGD), COMBINED N/A 2/18/2021    Procedure: ESOPHAGOGASTRODUODENOSCOPY, WITH BIOPSY;  Surgeon: Blanca Ba MD;  [facility]    ESOPHAGOSCOPY, GASTROSCOPY, DUODENOSCOPY (EGD), COMBINED N/A 4/11/2022    Procedure: ESOPHAGOGASTRODUODENOSCOPY, WITH BIOPSY;  Surgeon: Fredy Jimenez MD;  Location: [facility] OR    HC REMOVAL OF TONSILS,<13 Y/O      Tonsils <10y.o.    HC TOOTH EXTRACTION W/FORCEP      INJECT BLOCK MEDIAL BRANCH CERVICAL/THORACIC/LUMBAR N/A 7/23/2020    Procedure: Sacral 1,2,3 Lateral Branch Blocks and lumbar 5 medial branch blocks bilaterally;  Surgeon: Maurisio Goetz MD;  [facility]    INJECT JOINT SACROILIAC Left 9/24/2020    Procedure: Left Lumbar 5 Sacral 1 nerve root injections.;  Surgeon: Maurisio Goetz MD;  [facility]    REPAIR MUSCLE UPPER EXTREMITY Right 7/27/2021    Procedure: DIVISION RIGHT PECTORALIS MUSCLE;  Surgeon: Davie Dutta;  Location: [facility]    REPAIR TENDON ELBOW  7/9/2014    Procedure: REPAIR TENDON ELBOW;  Surgeon: Chris Johnston MD;  [facility]: [facility]    ULTRASONIC  "REMOVAL SOFT TISSUE (LOCATION) Right 11/21/2018    Procedure: Tenex right foot;  Surgeon: Ptael Martínez DO;  Location: [facility]    VASCULAR SURGERY      Thoracic Outlet Syndrome           Presenting condition or subjective complaint: Leg nerve pain with muscle spasms  Date of onset: 08/16/23 (Date of referral, back pain many years)    Relevant medical history: Arthritis; Implanted device; Migraines or headaches; Overweight; Pain at night or rest; Thyroid problems   Dates & types of surgery: All in my records    Prior diagnostic imaging/testing results: MRI; X-ray; EMG     Prior therapy history for the same diagnosis, illness or injury: Yes Lots of PT and injections, spinal cord stimulator and NSAIDS    States had a spinal cord spine stimulator placed  2022, which helps some but states \"rapidly declining\" in its helpfulness lately.   Prior Level of Function  Transfers: Independent  Ambulation: Independent  ADL: Independent  IADL:     Living Environment  Social support: With a significant other or spouse   Type of home: House   Stairs to enter the home: Yes 2 Is there a railing: Yes   Ramp: No   Stairs inside the home: Yes 8 Is there a railing: Yes   Help at home: Other  Equipment owned:       Employment: Yes   Hobbies/Interests: Anything active, I hate TV and sitting doing nothing.    Patient goals for therapy: My every day work and hobbies and sleep a whole night         Objective   LUMBAR SPINE EVALUATION    INTEGUMENTARY (edema, incisions):   POSTURE:  R facial changes from chronic bells palsy. Moves around often to reduce pain.   GAIT:   Weightbearing Status: WBAT  Assistive Device(s): None  Gait Deviations:  WFL  BALANCE/PROPRIOCEPTION: Single Leg Stance Eyes Open (seconds): 60  LSLS  able but with increased pain in her leg and calf and toes but maintains level pelvis and good balance. R side is less painful and no hip drop or lateral lean but causes mild LBP  WEIGHTBEARING ALIGNMENT: " "  NON-WEIGHTBEARING ALIGNMENT:    ROM:  Normal to excess ROM in LE joints and spine, ER >55 deg B Hips, IR >30 deg with hips flexed but does lack some hip ER in long leg/hip extension , Full flexion, hip extension tight in hip flexors but normal ROM.   OP to L hip IR adds to posterior pain  PELVIC/SI SCREEN:   STRENGTH:   Good quad mm girth/hypertrophy. Tends to overuse psoas/hip flexors for stability which creates tightness for her.   Poor TA engagement/isolation but with fair RA, Oblique activation. Loss of control into TPR with SLR observed in hooklying, 50% better with cues to engage TA  Hip abductor 4/5 L and with poor range of control with compensations at trunk rotation and SB observed. Hip rotation ER 4 to 4+ /5 L, 4+/5 R within range of control      MYOTOMES: WNL  DTR S: WNL  CORD SIGNS: WNL  DERMATOMES:  Intact sensation noted  NEURAL TENSION: Lumbar WNL  FLEXIBILITY:   Beighton Hypermobility Scale 8/28/2023   B elbow (2) 1L   B knees (2) 0   B thumbs (2) 2   B small finger HE (2) 0   Bend and touch floor with flat palms (1) 1   Score: (0-9)    Hyperextension knees to 5 deg, R elbow has surgical changes and normal extension, L is hyper 8, Pinkies close to 90, Thumbs oppose to Forearm.   90/90 HS length 45 deg B. Long leg hip flexion SLR normal to excess  LUMBAR/HIP Special Tests:    Standing states comfortable  in slight lean  R and elevated shoulder. Full forward flexion palms to floor, flat on floor. Extension moderately limited d/t pain, L>R referral pain down both legs immediately. Supine hard time laying on her back, prefers SL.  SB WFL B.       PELVIS/SI SPECIAL TESTS:  Painful Shear test B SIJ. Less painful with hooklying  FUNCTIONAL TESTS: + prone instability testing  PALPATION: Piriformis tight and tender at insertion at sacrum and pressure \"feels good\"     CERVICAL SPINE EVALUATION: No cervical tx done today with regard to time. Defer to future sessions depending on goals of care.     Assessment " & Plan   CLINICAL IMPRESSIONS  Medical Diagnosis: Cervical and lumbar radiculopathy    Treatment Diagnosis: Cervical and lumbar radiculopathy   Impression/Assessment: Patient is a 48 year old female with LBP, neck pain complaints.  The following significant findings have been identified: Pain, Impaired balance, Impaired muscle performance, and Decreased activity tolerance. These impairments interfere with their ability to perform self care tasks, work tasks, and recreational activities as compared to previous level of function.   Generalized joint hypermobility and could benefit from work up to confirm /rule out Connective disorder, EDS etc. PCP had referral in for genetics but that doesn't seem to working out. Will connect with our EDS type PT and see about a resource list to help. Plan for this course of PT is education, Core stabilization focus and posture.     Clinical Decision Making (Complexity):  Clinical Presentation: Unstable/Unpredictable   Clinical Presentation Rationale: based on medical and personal factors listed in PT evaluation  Clinical Decision Making (Complexity): High complexity    PLAN OF CARE  Treatment Interventions:  Interventions: Therapeutic Activity, Therapeutic Exercise, Self-Care/Home Management    Long Term Goals     PT Goal 1  Goal Identifier: HEP  Goal Description: Patient has HEP for stability work with minimal increase in pain that is well tolerated to do at home  Target Date: 10/19/23  PT Goal 2  Goal Identifier: Core  Goal Description: Patient demonstrates good triplanar control of spine with LE work in supine, quadruped, SL and standing for advancement of HEP with apprpriate spinal stability properties to manage hypermobilities and spinal condition in 8 weeks  Target Date: 10/19/23      Frequency of Treatment: 1x/week  Duration of Treatment: 8 weeks    Recommended Referrals to Other Professionals: Work up for hypermobility and possible EDS, needs provider identified  Education  Assessment:        Risks and benefits of evaluation/treatment have been explained.   Patient/Family/caregiver agrees with Plan of Care.     Evaluation Time:     PT Eval, High Complexity Minutes (53515): 45       Signing Clinician: Stacie Blankenship PT

## 2023-08-28 ENCOUNTER — THERAPY VISIT (OUTPATIENT)
Dept: PHYSICAL THERAPY | Facility: CLINIC | Age: 48
End: 2023-08-28
Attending: PHYSICIAN ASSISTANT
Payer: COMMERCIAL

## 2023-08-28 DIAGNOSIS — M54.12 CERVICAL RADICULOPATHY: Primary | ICD-10-CM

## 2023-08-28 DIAGNOSIS — M54.16 LUMBAR RADICULOPATHY: ICD-10-CM

## 2023-08-28 PROCEDURE — 97535 SELF CARE MNGMENT TRAINING: CPT | Mod: GP | Performed by: PHYSICAL THERAPIST

## 2023-08-28 PROCEDURE — 97110 THERAPEUTIC EXERCISES: CPT | Mod: GP | Performed by: PHYSICAL THERAPIST

## 2023-09-06 ENCOUNTER — THERAPY VISIT (OUTPATIENT)
Dept: PHYSICAL THERAPY | Facility: CLINIC | Age: 48
End: 2023-09-06
Attending: PHYSICIAN ASSISTANT
Payer: COMMERCIAL

## 2023-09-06 DIAGNOSIS — M54.12 CERVICAL RADICULOPATHY: Primary | ICD-10-CM

## 2023-09-06 DIAGNOSIS — M54.16 LUMBAR RADICULOPATHY: ICD-10-CM

## 2023-09-06 PROCEDURE — 97110 THERAPEUTIC EXERCISES: CPT | Mod: GP | Performed by: PHYSICAL THERAPIST

## 2023-09-14 ENCOUNTER — THERAPY VISIT (OUTPATIENT)
Dept: PHYSICAL THERAPY | Facility: CLINIC | Age: 48
End: 2023-09-14
Attending: PHYSICIAN ASSISTANT
Payer: COMMERCIAL

## 2023-09-14 DIAGNOSIS — M54.16 LUMBAR RADICULOPATHY: Primary | ICD-10-CM

## 2023-09-14 DIAGNOSIS — M54.12 CERVICAL RADICULOPATHY: ICD-10-CM

## 2023-09-14 PROCEDURE — 97110 THERAPEUTIC EXERCISES: CPT | Mod: GP | Performed by: PHYSICAL THERAPIST

## 2023-09-14 PROCEDURE — 97530 THERAPEUTIC ACTIVITIES: CPT | Mod: GP | Performed by: PHYSICAL THERAPIST

## 2023-09-14 PROCEDURE — 97140 MANUAL THERAPY 1/> REGIONS: CPT | Mod: GP | Performed by: PHYSICAL THERAPIST

## 2023-09-19 NOTE — PROGRESS NOTES
Bhupinder is a 48 year old who is being evaluated via a billable telephone visit.      What phone number would you like to be contacted at? [phone]       Subjective   Bhupinder is a 48 year old, for a discussion about her MRI results     The results:   MRI left elbow from 8/24/2023.  Done at [facility]:  Tendinosis and small area of partial-thickness tearing of the proximal common extensor tendon origin is noted.  Mild adjacent soft tissue swelling and fluid and inflammatory signal is noted the findings can be seen with lateral epicondylitis.  No high-grade or retracted tear is identified      Review of Systems     CONSTITUTIONAL:NEGATIVE for fever, chills, change in weight  INTEGUMENTARY/SKIN: NEGATIVE for worrisome rashes, moles or lesions  MUSCULOSKELETAL:See HPI above  NEURO: NEGATIVE for weakness, dizziness or paresthesias        Objective       Vitals:  No vitals were obtained today due to virtual visit.    Physical Exam   healthy, alert, and no distress  PSYCH: Alert and oriented times 3; coherent speech, normal   rate and volume, able to articulate logical thoughts, able   to abstract reason, no tangential thoughts, no hallucinations   or delusions  Her affect is normal  RESP: No cough, no audible wheezing, able to talk in full sentences  Remainder of exam unable to be completed due to telephone visits    Impression: Lateral epicondylitis and a small partial-thickness tear of the common extensor origin.    Plan: She is made it very clear that she does not want surgery but wants other options that can help prevent progression of this problem that occurred on her right side requiring surgery.    I provided these options to her via [employer] earlier..we talked about the treatment options which include:  * rest/activity modification - avoid aggravating activities and reduce strenuous activities for 6-8 weeks  * ice, ice massage  * Physical therapy, modalities as indicated including ultrasound, massage, iontophoresis  *  counterforce elbow brace/strap, available OTC  * wrist brace to prevent wrist extension  * modify lifting technique, palm upwards with lifting to relieve stress on extensor tendons of wrist.  * NSAIDS regularly three times daily x2-3 weeks, if able to take  * cortisone injection discussed, which is normally placed at point of maximal tenderness, and distributed in a peppering fashion to stimulate a healing response.  *PRP/stem cell injections  *Tenex procedure  *surgery    She's tried most non-operative treatments, including physical therapy.   Physical therapy has told her not to do strengthening because of her suspected EDS    She had Tenex and PRP for plantar fasciitis.  And is interested in this for the elbow.      The patient decided to proceed with referral for Tenex procedure.  She is hoping this could be scheduled without a pre-evaluation, but she knows it is up to the sports med provider       Phone call duration: 15 minutes    Total time spent was 30 minutes; including but not limited to prep time and discussion.    JUAN J Reno MD  Dept. Orthopedic Surgery  [facility]

## 2023-09-20 ENCOUNTER — VIRTUAL VISIT (OUTPATIENT)
Dept: ORTHOPEDICS | Facility: CLINIC | Age: 48
End: 2023-09-20
Payer: COMMERCIAL

## 2023-09-20 ENCOUNTER — THERAPY VISIT (OUTPATIENT)
Dept: PHYSICAL THERAPY | Facility: CLINIC | Age: 48
End: 2023-09-20
Attending: PHYSICIAN ASSISTANT
Payer: COMMERCIAL

## 2023-09-20 DIAGNOSIS — G89.29 CHRONIC ELBOW PAIN, LEFT: ICD-10-CM

## 2023-09-20 DIAGNOSIS — M54.16 LUMBAR RADICULOPATHY: Primary | ICD-10-CM

## 2023-09-20 DIAGNOSIS — M25.522 CHRONIC ELBOW PAIN, LEFT: ICD-10-CM

## 2023-09-20 DIAGNOSIS — M77.12 LATERAL EPICONDYLITIS OF LEFT ELBOW: Primary | ICD-10-CM

## 2023-09-20 DIAGNOSIS — M54.12 CERVICAL RADICULOPATHY: ICD-10-CM

## 2023-09-20 PROCEDURE — 97140 MANUAL THERAPY 1/> REGIONS: CPT | Mod: GP | Performed by: PHYSICAL THERAPIST

## 2023-09-20 PROCEDURE — 97110 THERAPEUTIC EXERCISES: CPT | Mod: GP | Performed by: PHYSICAL THERAPIST

## 2023-09-20 PROCEDURE — 99213 OFFICE O/P EST LOW 20 MIN: CPT | Mod: 95 | Performed by: ORTHOPAEDIC SURGERY

## 2023-09-20 NOTE — LETTER
9/20/2023        RE: Amelia Coker  [address] [facility] [address]        Hi Patel;  This patient had Tenex for her plantar fasciitis and did well, and would like this for her lateral epicondylitis.  She would prefer if she wouldn't have to come in for a separate visit to determine if it is indicated, but I told her wasn't sure you would feel comfortable with that, but just wanted to give you a heads-up.    --Tawny Roe is a 48 year old who is being evaluated via a billable telephone visit.      What phone number would you like to be contacted at? [phone]       Subjective  Bhupinder is a 48 year old, for a discussion about her MRI results     The results:   MRI left elbow from 8/24/2023.  Done at [facility]:  Tendinosis and small area of partial-thickness tearing of the proximal common extensor tendon origin is noted.  Mild adjacent soft tissue swelling and fluid and inflammatory signal is noted the findings can be seen with lateral epicondylitis.  No high-grade or retracted tear is identified      Review of Systems     CONSTITUTIONAL:NEGATIVE for fever, chills, change in weight  INTEGUMENTARY/SKIN: NEGATIVE for worrisome rashes, moles or lesions  MUSCULOSKELETAL:See HPI above  NEURO: NEGATIVE for weakness, dizziness or paresthesias        Objective      Vitals:  No vitals were obtained today due to virtual visit.    Physical Exam   healthy, alert, and no distress  PSYCH: Alert and oriented times 3; coherent speech, normal   rate and volume, able to articulate logical thoughts, able   to abstract reason, no tangential thoughts, no hallucinations   or delusions  Her affect is normal  RESP: No cough, no audible wheezing, able to talk in full sentences  Remainder of exam unable to be completed due to telephone visits    Impression: Lateral epicondylitis and a small partial-thickness tear of the common extensor origin.    Plan: She is made it very clear that she does not want surgery but wants other options  that can help prevent progression of this problem that occurred on her right side requiring surgery.    I provided these options to her via [employer] earlier..we talked about the treatment options which include:  * rest/activity modification - avoid aggravating activities and reduce strenuous activities for 6-8 weeks  * ice, ice massage  * Physical therapy, modalities as indicated including ultrasound, massage, iontophoresis  * counterforce elbow brace/strap, available OTC  * wrist brace to prevent wrist extension  * modify lifting technique, palm upwards with lifting to relieve stress on extensor tendons of wrist.  * NSAIDS regularly three times daily x2-3 weeks, if able to take  * cortisone injection discussed, which is normally placed at point of maximal tenderness, and distributed in a peppering fashion to stimulate a healing response.  *PRP/stem cell injections  *Tenex procedure  *surgery    She's tried most non-operative treatments, including physical therapy.   Physical therapy has told her not to do strengthening because of her suspected EDS    She had Tenex and PRP for plantar fasciitis.  And is interested in this for the elbow.      The patient decided to proceed with referral for Tenex procedure.  She is hoping this could be scheduled without a pre-evaluation, but she knows it is up to the sports med provider       Phone call duration: 15 minutes    Total time spent was 30 minutes; including but not limited to prep time and discussion.    JUAN J Reno MD  Dept. Orthopedic Surgery  [facility]            Sincerely,        Chris Reno MD

## 2023-09-20 NOTE — LETTER
9/20/2023         RE: Amelia Coker  [address] [facility] [address]        Dear Colleague,    Thank you for referring your patient, Amelia Coker, to the [facility]. Please see a copy of my visit note below.    Bhupinder is a 48 year old who is being evaluated via a billable telephone visit.      What phone number would you like to be contacted at? [phone]       Subjective  Bhupinder is a 48 year old, for a discussion about her MRI results     The results:   MRI left elbow from 8/24/2023.  Done at [facility]:  Tendinosis and small area of partial-thickness tearing of the proximal common extensor tendon origin is noted.  Mild adjacent soft tissue swelling and fluid and inflammatory signal is noted the findings can be seen with lateral epicondylitis.  No high-grade or retracted tear is identified      Review of Systems     CONSTITUTIONAL:NEGATIVE for fever, chills, change in weight  INTEGUMENTARY/SKIN: NEGATIVE for worrisome rashes, moles or lesions  MUSCULOSKELETAL:See HPI above  NEURO: NEGATIVE for weakness, dizziness or paresthesias        Objective      Vitals:  No vitals were obtained today due to virtual visit.    Physical Exam   healthy, alert, and no distress  PSYCH: Alert and oriented times 3; coherent speech, normal   rate and volume, able to articulate logical thoughts, able   to abstract reason, no tangential thoughts, no hallucinations   or delusions  Her affect is normal  RESP: No cough, no audible wheezing, able to talk in full sentences  Remainder of exam unable to be completed due to telephone visits    Impression: Lateral epicondylitis and a small partial-thickness tear of the common extensor origin.    Plan: She is made it very clear that she does not want surgery but wants other options that can help prevent progression of this problem that occurred on her right side requiring surgery.    I provided these options to her via [employer] earlier..we talked about the treatment  options which include:  * rest/activity modification - avoid aggravating activities and reduce strenuous activities for 6-8 weeks  * ice, ice massage  * Physical therapy, modalities as indicated including ultrasound, massage, iontophoresis  * counterforce elbow brace/strap, available OTC  * wrist brace to prevent wrist extension  * modify lifting technique, palm upwards with lifting to relieve stress on extensor tendons of wrist.  * NSAIDS regularly three times daily x2-3 weeks, if able to take  * cortisone injection discussed, which is normally placed at point of maximal tenderness, and distributed in a peppering fashion to stimulate a healing response.  *PRP/stem cell injections  *Tenex procedure  *surgery    She's tried most non-operative treatments, including physical therapy.   Physical therapy has told her not to do strengthening because of her suspected EDS    She had Tenex and PRP for plantar fasciitis.  And is interested in this for the elbow.      The patient decided to proceed with referral for Tenex procedure.  She is hoping this could be scheduled without a pre-evaluation, but she knows it is up to the sports med provider       Phone call duration: 15 minutes    Total time spent was 30 minutes; including but not limited to prep time and discussion.    JUAN J Reno MD  Dept. Orthopedic Surgery  [facility]            Again, thank you for allowing me to participate in the care of your patient.        Sincerely,        Chris Reno MD

## 2023-09-21 ENCOUNTER — TELEPHONE (OUTPATIENT)
Dept: ORTHOPEDICS | Facility: CLINIC | Age: 48
End: 2023-09-21

## 2023-09-21 ENCOUNTER — TELEPHONE (OUTPATIENT)
Dept: ORTHOPEDICS | Facility: CLINIC | Age: 48
End: 2023-09-21
Payer: COMMERCIAL

## 2023-09-21 NOTE — TELEPHONE ENCOUNTER
9/21 Called and spoke to patient, appointment is currently scheduled.     [facility]   Orthopedics, Podiatry, Sports Medicine, Ent ,Eye , Audiology, Adult Endocrine & Diabetes, Nutrition & Medication Therapy Management Specialties   [facility]

## 2023-09-21 NOTE — TELEPHONE ENCOUNTER
"[facility] Call Center    Phone Message    May a detailed message be left on voicemail: yes     Reason for Call: Other: Patient is calling to schedule referral. She says it is for Tenex injection. Looking at the TE this can only be scheduled at [facility]. Unsure of the provider to  her with as she \"wants fewest number of appointments\"      Action Taken: Other: message sent to team     Travel Screening: Not Applicable                                                                   "

## 2023-09-21 NOTE — TELEPHONE ENCOUNTER
Attempted to call Bhupinder about the Tenex procedure that she was referred to us for by Dr. Reno. I was unable to reach her so I left a detailed voicemail explaining that she would have to have a consult appointment with one of our physicians who does this procedure prior to actually scheduling it. I clarified that we would not be able to do the procedure the same day as the consult. I also mentioned that our physicians no longer do Tenex procedures at the [facility] location in [place], but we do at the [place] location. I left the callback number for any questions.        Rg Joyner M.A., LAT, ATC  Certified Athletic Trainer

## 2023-09-21 NOTE — TELEPHONE ENCOUNTER
Health Call Center    Phone Message    May a detailed message be left on voicemail: yes     Reason for Call: Patient has referral for Tenex procedure ,  patient wants to make sure that the procedure will be done at this appointment  TE sent to [facility] pw 9/21  Lateral epicondylitis of left elbow [M77.12]  Chronic elbow pain, left [M25.522, G89.29]  Chris Reno MD in  ORTHOPEDIC SURGERY    Action Taken: Message routed to:  [facility] ([facility]): [facility]    Travel Screening: Not Applicable

## 2023-09-28 ENCOUNTER — OFFICE VISIT (OUTPATIENT)
Dept: NEUROSURGERY | Facility: CLINIC | Age: 48
End: 2023-09-28
Payer: COMMERCIAL

## 2023-09-28 VITALS
DIASTOLIC BLOOD PRESSURE: 78 MMHG | HEIGHT: 66 IN | SYSTOLIC BLOOD PRESSURE: 129 MMHG | HEART RATE: 61 BPM | BODY MASS INDEX: 31.02 KG/M2 | WEIGHT: 193 LBS

## 2023-09-28 DIAGNOSIS — M79.601 RIGHT ARM PAIN: ICD-10-CM

## 2023-09-28 DIAGNOSIS — I77.89 PECTORALIS MINOR SYNDROME (H): ICD-10-CM

## 2023-09-28 DIAGNOSIS — G89.29 CHRONIC LOW BACK PAIN, UNSPECIFIED BACK PAIN LATERALITY, UNSPECIFIED WHETHER SCIATICA PRESENT: Primary | ICD-10-CM

## 2023-09-28 DIAGNOSIS — M54.2 NECK PAIN: ICD-10-CM

## 2023-09-28 DIAGNOSIS — M54.50 CHRONIC LOW BACK PAIN, UNSPECIFIED BACK PAIN LATERALITY, UNSPECIFIED WHETHER SCIATICA PRESENT: Primary | ICD-10-CM

## 2023-09-28 PROCEDURE — 99417 PROLNG OP E/M EACH 15 MIN: CPT | Performed by: STUDENT IN AN ORGANIZED HEALTH CARE EDUCATION/TRAINING PROGRAM

## 2023-09-28 PROCEDURE — 99215 OFFICE O/P EST HI 40 MIN: CPT | Performed by: STUDENT IN AN ORGANIZED HEALTH CARE EDUCATION/TRAINING PROGRAM

## 2023-09-28 ASSESSMENT — PAIN SCALES - GENERAL: PAINLEVEL: MODERATE PAIN (4)

## 2023-09-28 NOTE — NURSING NOTE
"Amelia Coker's goals for this visit include:   Chief Complaint   Patient presents with    RECHECK     1 hour visit needed per Anai Alvarez staff message to review MRI's and consult  about current leg and arm pain.  sent message if updated XR's needed - AY           She requests these members of her care team be copied on today's visit information: yes    PCP: Otoniel Quiroz    Referring Provider:  No referring provider defined for this encounter.    /78   Pulse 61   Ht 1.664 m (5' 5.5\")   Wt 87.5 kg (193 lb)   BMI 31.63 kg/m      Do you need any medication refills at today's visit? No  FUAD Walsh., JAIMEE ([facility])      "

## 2023-09-28 NOTE — LETTER
9/28/2023         RE: Amelia Coker  [address] [facility] [address]        Dear Colleague,    Thank you for referring your patient, Amelia Coker, to the [facility] [place]. Please see a copy of my visit note below.    HPI:  48-year-old female with chronic neck low back arm and leg pain.  She complains of midline neck pain that worsens with flexion extension.  This does go down to her shoulder blades as well as radiates into her right arm.  She has had epidural steroid injections in the past at which the first 1 did seem to help for a few months but the subsequent injections have not helped as much.  She does seem to gain weight with each injection and is not eager to undergo any further injections at this time due to the side effects as well as the lack of long-term response.  She does continue to have right arm pain which encompasses the entire arm.  She does have a history of thoracic outlet syndrome with surgery for this.  The surgery did seem to help the pain to some extent and she notes the pain is similar yet a little bit different.  She denies any significant left arm pain.  She also notes low back pain chronic in nature as well.  This radiates to the left lower extremity.  The pain starts in her buttock region and goes down the back of her leg to the bottom of her foot.  She has had a history of an L5-S1 laminotomy which did not seem to significantly change the symptoms.  Prior to this surgery she did have an L5-S1 transforaminal injection which did seem to help the pain temporarily.  No injections recently in the lumbar spine.  She has been working with physical therapy who questions a possibility of piriformis syndrome causing the left leg pain as well as changes in her ribs on the right side which could be contributing to further of thoracic outlet syndrome symptoms.  She also has a history of allergies to metals and blisters easily with metals touching her  "skin.  She has seen an allergist without any definitive positive testing.  Current Outpatient Medications   Medication     ACETAMINOPHEN PO     betamethasone valerate (VALISONE) 0.1 % external lotion     meloxicam (MOBIC) 15 MG tablet     mupirocin (BACTROBAN) 2 % external ointment     Semaglutide, 1 MG/DOSE, (OZEMPIC, 1 MG/DOSE,) 4 MG/3ML pen     tacrolimus (PROTOPIC) 0.1 % external ointment     hydrocortisone (CORTIZONE 10) 1 % external lotion     rizatriptan (MAXALT-MLT) 5 MG ODT     valACYclovir (VALTREX) 1000 mg tablet     No current facility-administered medications for this visit.      Physical Exam:  Vital signs:      BP: 129/78 Pulse: 61           Height: 166.4 cm (5' 5.5\") Weight: 87.5 kg (193 lb)  Estimated body mass index is 31.63 kg/m  as calculated from the following:    Height as of this encounter: 1.664 m (5' 5.5\").    Weight as of this encounter: 87.5 kg (193 lb).  She has full strength in her bilateral upper and lower extremities.  Sensation is intact light touch throughout.  No Anh sign present.  Gait is normal.  Results Reviewed:  I personally viewed the images of an MRI of the cervical spine.  This shows multilevel cervical spondylosis throughout the cervical spine.  There is foraminal stenosis most severe on the right side at C5-6 and C7-T1.  No significant central canal stenosis present.  I also reviewed the images of an MRI of the lumbar spine showing postoperative changes at L5-S1 on the left with scar tissue into the neuroforamen.  There is no significant central canal stenosis throughout the lumbar spine.  There does appear to be a slight spondylolisthesis on the right side at L4-5 which could be narrowing the foramen on this side.  There does appear to be some widening of the facet joint at this level as well.  Assessment:  48-year-old female with neck and back pain along with right arm and left leg pain.  The neck pain is likely due to facet arthropathy as it is mostly midline and " worsens with extension and flexion movements.  The right arm pain does not seem consistent with a radiculopathy and in fact the prior EMG was negative.  With her history of thoracic outlet syndrome I believe this could be a potential cause for her right arm symptoms still.  Her back pain is likely due to facet arthropathy as well and the left leg pain has a number of things on the differential however her symptoms seem to be most consistent with an S1 radiculopathy if coming from the lumbar spine.  On imaging this does not seem to correlate as well as an L5 radiculopathy would which brings in the question if this is what is causing her leg pain.  Plan:  We discussed surgical and conservative management options going forward.  With her history of allergies any fusion surgery would put her at risk for an allergic reaction to the metals which once implanted will be difficult and potentially impossible to remove due to instability if removed.  Clearly in the lumbar spine I believe the only opportunity for surgery would be an L4-S1 fusion due to the widening of the facet joint at L4-5 with the foraminal stenosis as well as the prior surgery and loss of foraminal height on the left at L5-S1.  I do question if this would be improvement in her symptoms as they do not seem to be as consistent with an S1 radiculopathy overall.  For her cervical spine I will recommend she see pain management for medial branch blocks and potential RFA's if necessary as I believe the neck pain is due to facet arthropathy.  For her right arm pain we will have her see provider for thoracic outlet syndrome and she has seen somebody at [facility] in the past and we will make referral for this as well.  Should she want to consider surgery in the future I will have her come back to discuss with me again.    I spent 75 minutes reviewing the patient's chart, interviewing examining the patient as well as discussing diagnosis and treatment options.    George  MD Bon[place]      Again, thank you for allowing me to participate in the care of your patient.        Sincerely,        George Mesa MD

## 2023-09-28 NOTE — PROGRESS NOTES
HPI:  48-year-old female with chronic neck low back arm and leg pain.  She complains of midline neck pain that worsens with flexion extension.  This does go down to her shoulder blades as well as radiates into her right arm.  She has had epidural steroid injections in the past at which the first 1 did seem to help for a few months but the subsequent injections have not helped as much.  She does seem to gain weight with each injection and is not eager to undergo any further injections at this time due to the side effects as well as the lack of long-term response.  She does continue to have right arm pain which encompasses the entire arm.  She does have a history of thoracic outlet syndrome with surgery for this.  The surgery did seem to help the pain to some extent and she notes the pain is similar yet a little bit different.  She denies any significant left arm pain.  She also notes low back pain chronic in nature as well.  This radiates to the left lower extremity.  The pain starts in her buttock region and goes down the back of her leg to the bottom of her foot.  She has had a history of an L5-S1 laminotomy which did not seem to significantly change the symptoms.  Prior to this surgery she did have an L5-S1 transforaminal injection which did seem to help the pain temporarily.  No injections recently in the lumbar spine.  She has been working with physical therapy who questions a possibility of piriformis syndrome causing the left leg pain as well as changes in her ribs on the right side which could be contributing to further of thoracic outlet syndrome symptoms.  She also has a history of allergies to metals and blisters easily with metals touching her skin.  She has seen an allergist without any definitive positive testing.  Current Outpatient Medications   Medication    ACETAMINOPHEN PO    betamethasone valerate (VALISONE) 0.1 % external lotion    meloxicam (MOBIC) 15 MG tablet    mupirocin (BACTROBAN) 2 %  "external ointment    Semaglutide, 1 MG/DOSE, (OZEMPIC, 1 MG/DOSE,) 4 MG/3ML pen    tacrolimus (PROTOPIC) 0.1 % external ointment    hydrocortisone (CORTIZONE 10) 1 % external lotion    rizatriptan (MAXALT-MLT) 5 MG ODT    valACYclovir (VALTREX) 1000 mg tablet     No current facility-administered medications for this visit.      Physical Exam:  Vital signs:      BP: 129/78 Pulse: 61           Height: 166.4 cm (5' 5.5\") Weight: 87.5 kg (193 lb)  Estimated body mass index is 31.63 kg/m  as calculated from the following:    Height as of this encounter: 1.664 m (5' 5.5\").    Weight as of this encounter: 87.5 kg (193 lb).  She has full strength in her bilateral upper and lower extremities.  Sensation is intact light touch throughout.  No Anh sign present.  Gait is normal.  Results Reviewed:  I personally viewed the images of an MRI of the cervical spine.  This shows multilevel cervical spondylosis throughout the cervical spine.  There is foraminal stenosis most severe on the right side at C5-6 and C7-T1.  No significant central canal stenosis present.  I also reviewed the images of an MRI of the lumbar spine showing postoperative changes at L5-S1 on the left with scar tissue into the neuroforamen.  There is no significant central canal stenosis throughout the lumbar spine.  There does appear to be a slight spondylolisthesis on the right side at L4-5 which could be narrowing the foramen on this side.  There does appear to be some widening of the facet joint at this level as well.  Assessment:  48-year-old female with neck and back pain along with right arm and left leg pain.  The neck pain is likely due to facet arthropathy as it is mostly midline and worsens with extension and flexion movements.  The right arm pain does not seem consistent with a radiculopathy and in fact the prior EMG was negative.  With her history of thoracic outlet syndrome I believe this could be a potential cause for her right arm symptoms " still.  Her back pain is likely due to facet arthropathy as well and the left leg pain has a number of things on the differential however her symptoms seem to be most consistent with an S1 radiculopathy if coming from the lumbar spine.  On imaging this does not seem to correlate as well as an L5 radiculopathy would which brings in the question if this is what is causing her leg pain.  Plan:  We discussed surgical and conservative management options going forward.  With her history of allergies any fusion surgery would put her at risk for an allergic reaction to the metals which once implanted will be difficult and potentially impossible to remove due to instability if removed.  Clearly in the lumbar spine I believe the only opportunity for surgery would be an L4-S1 fusion due to the widening of the facet joint at L4-5 with the foraminal stenosis as well as the prior surgery and loss of foraminal height on the left at L5-S1.  I do question if this would be improvement in her symptoms as they do not seem to be as consistent with an S1 radiculopathy overall.  For her cervical spine I will recommend she see pain management for medial branch blocks and potential RFA's if necessary as I believe the neck pain is due to facet arthropathy.  For her right arm pain we will have her see provider for thoracic outlet syndrome and she has seen somebody at [facility] in the past and we will make referral for this as well.  Should she want to consider surgery in the future I will have her come back to discuss with me again.    I spent 75 minutes reviewing the patient's chart, interviewing examining the patient as well as discussing diagnosis and treatment options.    George Mesa MD

## 2023-09-29 ENCOUNTER — THERAPY VISIT (OUTPATIENT)
Dept: PHYSICAL THERAPY | Facility: CLINIC | Age: 48
End: 2023-09-29
Attending: PHYSICIAN ASSISTANT
Payer: COMMERCIAL

## 2023-09-29 DIAGNOSIS — M54.12 CERVICAL RADICULOPATHY: Primary | ICD-10-CM

## 2023-09-29 DIAGNOSIS — M54.16 LUMBAR RADICULOPATHY: ICD-10-CM

## 2023-09-29 PROCEDURE — 97140 MANUAL THERAPY 1/> REGIONS: CPT | Mod: GP | Performed by: PHYSICAL THERAPIST

## 2023-09-29 PROCEDURE — 97110 THERAPEUTIC EXERCISES: CPT | Mod: GP | Performed by: PHYSICAL THERAPIST

## 2023-10-02 ENCOUNTER — TELEPHONE (OUTPATIENT)
Dept: PALLIATIVE MEDICINE | Facility: CLINIC | Age: 48
End: 2023-10-02
Payer: COMMERCIAL

## 2023-10-02 DIAGNOSIS — M47.812 SPONDYLOSIS OF CERVICAL REGION WITHOUT MYELOPATHY OR RADICULOPATHY: Primary | ICD-10-CM

## 2023-10-02 NOTE — TELEPHONE ENCOUNTER
Screening questions for MBB Injections:    Injection to be done at which interventional clinic site? [facility]    Procedure ordered by      Procedure ordered? Cervical Medial Branch Block    What insurance would patient like us to bill for this procedure? UMR     MEDICA: REQUIRES A PA FOR BOTH MBB   Worker's comp- Any injection DO NOT SCHEDULE and route to Jenelle Coleman.    [employer] insurance - If scheduling an SI joint injection DO NOT SCHEDULE and route to No Mcadams.     MBBs must be scheduled with elapsed time interval of at least 2 weeks and not more than 6 months between the First MBB and the Second MBB for insurance purposes     [employer] - Any injection besides hip/shoulder/knee joint DO NOT SCHEDULE and route to No Mcadams. She will obtain PA and call pt back to schedule procedure or notify pt of denial.     [employer]- PA required for all MBB's    **BCBS- ALL need to be routed to No for review if a PA is needed**  IF SCHEDULING IN [place] PAIN OR SPINE PLEASE SCHEDULE AT LEAST 7-10         BUSINESS DAYS OUT SO A PA CAN BE OBTAINED    Genicular Nerve blocks- ALL insurances except for- [employer], Medicare (straight not supplement) and [employer]. Need to be reviewed by No before scheduling.       Is patient scheduled at [facility]?    If YES, route every encounter to [facility] CARE NAVIGATION POOL [[phone]]    Any chance of pregnancy? Not Applicable   If YES, do NOT schedule and route to RN pool  - Dr. Dennis route to Dasyi Castillo and PM&R Nurse  [[id number]]      Is an  needed? No     Patient has a drive home? (mandatory) Yes     Is patient taking any blood thinners (plavix, coumadin, jantoven, warfarin, heparin, pradaxa or dabigatran )? No    If hold needed, do NOT schedule, route to RN pool/ Dr. Dennis's Team     Is patient taking any aspirin products? No   If more than 325mg/day, OK to schedule; Instruct pt to decrease  to less than 325 mg for 7 days AND route to RN pool/ Dr. Dennis's Team  For CERVICAL procedures, hold all aspirin products for 6 days.   Tell pt that if aspirin product is not held for 6 days, the procedure WILL BE cancelled.      Does the patient have a bleeding or clotting disorder? No  If YES, okay to schedule AND route to RN nurse pool/ Dr. Dennis's Team  **For any patients with platelet count <100, must be forwarded to provider**    Is patient diabetic? NO If YES, have them bring their glucometer.    Does patient have an active infection or treated for one within the past week? No    Is patient currently taking any antibiotics?  No  For patients on chronic, preventative, or prophylactic antibiotics, procedures may be scheduled.   For patients on antibiotics for active or recent infection:antibiotic course must have been completed for 4 days    Is patient currently taking any steroid medications? (i.e. Prednisone, Medrol)  No   For patients on steroid medications, course must have been completed for 4 days    Is patient actively being treated for cancer or immunocompromised? No   If YES, do NOT schedule and route to INDIGO/ Dr. Dennis's Team    Are you able to get on and off an exam table with minimal or no assistance? Yes   If NO, do NOT schedule and route to INDIGO/ Dr. Dennis's Team    Are you able to roll over and lay on your stomach with minimal or no assistance? Yes   If NO, do NOT schedule and route to RN/ Dr. Dennis's Team    Any allergies to contrast dye, iodine, shellfish, or numbing and steroid medications? No  (If so, inform nursing and note in scheduling comments.)    Allergies: Ceftriaxone, Bactrim [sulfamethoxazole-trimethoprim], Ciprofloxacin, Codeine, Copper, Doxycycline, Gold, Iodine, Iodine-131, Levaquin [levofloxacin], Morphine and related, Nickel, Steel [staples], Sulfa antibiotics, Latex, and Penicillins     Does patient have an MRI/CT?  YES: 2023  Check Procedure Scheduling Grid to see if  required.    Was the MRI done within the last 3 years?  Yes  If yes, where was the MRI done i.e.[facility], [facility], [facility], [facility] etc? [facility]     If no, do not schedule and route to nursing/ Dr. Dennis's Team  If MRI was not done at [facility], [facility] or [facility] do NOT schedule and route to nursing.    If pt has an imaging disc, the injection MAY be scheduled but pt has to bring disc to appt.   If they show up without the disc the injection cannot be done    Is patient able to transfer to a procedure table with minimal or no assistance? Yes  If NO, do NOT schedule and route to RN/ Dr. Dennis's Team    Medial Branch Block Pre-Procedure Instructions  It is okay to take long acting pain medications (if you are on them) the day of the procedure but try not to take any short acting medications unless absolutely necessary.    YES:    Long acting meds would include: Gabapentin (Neurontin), MS Contin, Oxycontin        Short acting meds would include:  Percocet, Oxycodone, Vicodin, Ibuprofen   The day of the procedure, you should try to do things that provoke your pain, since the injection is being done to see if it will relieve your pain . YES:    If your pain level is a 4 out of 10 or less on the day of the procedu re, please call [phone] to reschedule.  YES:      Reminders:    If you are started on any steroids or antibiotics between now and your appointment, you must contact us because it may affect our ability to perform your procedure     Instructed pt to arrive 30 minutes early for IV start if required. (Check Procedure Scheduling Grid) INot Applicable     If this is for a cervical MBB aspirin needs to be held for 6 days.  Not Applicable     Do not schedule procedures requiring IV placement in the first appointment of the day or first appointment after lunch. Do NOT schedule at 0745, 0815 or 1245.      For patients 85 or older we recommend having an adult stay w/ them for the remainder of the  day.      Does the patient have any questions?

## 2023-10-03 ENCOUNTER — TELEPHONE (OUTPATIENT)
Dept: OTHER | Facility: CLINIC | Age: 48
End: 2023-10-03
Payer: COMMERCIAL

## 2023-10-03 ENCOUNTER — TELEPHONE (OUTPATIENT)
Dept: NEUROSURGERY | Facility: CLINIC | Age: 48
End: 2023-10-03
Payer: COMMERCIAL

## 2023-10-03 DIAGNOSIS — G54.0 TOS (THORACIC OUTLET SYNDROME): Primary | ICD-10-CM

## 2023-10-03 NOTE — TELEPHONE ENCOUNTER
I called patient and LM to call back and reschedule Dr. Rice appt on 10/19/2023. She was scheduled for a return but needs a consult (New Pt appt).

## 2023-10-03 NOTE — TELEPHONE ENCOUNTER
7/27/21 - division of right pectoralis minor muscle with Dr. Dutta.  8/12/21 - LOV with Dr. Dutta, recommendation to follow up in 2 months, does not appear this appointment was ever scheduled.    Reviewed 9/28/23 note by Dr. George Mesa (Neurosurgery).  Reviewed imaging from [facility], not applicable to    Please schedule patient for the following at next available:  Right upper extremity arterial US with maneuvers  Right upper extremity venous US with maneuvers  In clinic visit with Dr. Dutta (please schedule as a 30 minute return)    Appt note in order comments.    Maribell Lofton, JESSYN, RN-[facility] [place]

## 2023-10-03 NOTE — TELEPHONE ENCOUNTER
[facility]    Who is the name of the provider?:  Dr. Dutta    What is the location you see this provider at/preferred location?: [place]  Person calling / Facility: Patient   Nurse call back needed:  Yes     Reason for call:  Patient states she had pectoralis release surgery with Dr. Dutta a few years back.  Patient is stating that she has been in Physical Therapy.  Patient states physical therapist noted her rib is out and forward.  Patient states she has herniated discs C4, 5, 7 and 8.  Patient states therapist  is trying to get rib to drop but it is not working.  Patient states she saw Dr. Mesa last week who states patient symptoms are more TOS than neck related and would like her to be evaulated for TOS before he does anything.  Patient states her symptoms are neck pain that radiates down right shoulder to front of shoulder and down the back of the arm to the hand.  Patient believes she would see Dr. Dutta for TOS.  Patient states MRIs done at [facility] are in the system that Dr. Mesa reviewed. Routing to RN to review.     Outside Imaging: [facility]-patient states they are in our system   Can we leave a detailed message on this number?  Yes.

## 2023-10-04 NOTE — TELEPHONE ENCOUNTER
LM on patients mobile number for her to call us back to schedule at next available:    Right upper extremity arterial US with maneuvers  Right upper extremity venous US with maneuvers  In clinic visit with Dr. Dutta (please schedule as a 30 minute return)    Per comments in demographic screen tried calling patient at her work number but did not LM as it was not the patient's secure VM.

## 2023-10-04 NOTE — TELEPHONE ENCOUNTER
SPINE PATIENTS - NEW PROTOCOL PREVISIT    RECORDS RECEIVED FROM: Internal   REASON FOR VISIT: Chronic low back pain, unspecified back pain laterality, unspecified whether sc    Date of Appt: 11/15/2023   NOTES (FOR ALL VISITS) STATUS DETAILS   OFFICE NOTE from referring provider Internal 09/28/2023 Dr Mesa [facility]    OFFICE NOTE from other specialist Internal 09/29/2023 PT [facility]   07/11/2023 Anai Alvarze [facility]    DISCHARGE SUMMARY from hospital N/A    DISCHARGE REPORT from ER N/A    OPERATIVE REPORT N/A    EMG REPORT N/A    MEDICATION LIST N/A    IMAGING  (FOR ALL VISITS)     MRI (HEAD, NECK, SPINE) Received 07/21/2023 lumbar spine   XRAY (SPINE) *NEUROSURGERY* N/A    CT (HEAD, NECK, SPINE) N/A

## 2023-10-11 ENCOUNTER — THERAPY VISIT (OUTPATIENT)
Dept: PHYSICAL THERAPY | Facility: CLINIC | Age: 48
End: 2023-10-11
Attending: PHYSICIAN ASSISTANT
Payer: COMMERCIAL

## 2023-10-11 DIAGNOSIS — M54.12 CERVICAL RADICULOPATHY: ICD-10-CM

## 2023-10-11 DIAGNOSIS — M54.16 LUMBAR RADICULOPATHY: Primary | ICD-10-CM

## 2023-10-11 PROCEDURE — 97110 THERAPEUTIC EXERCISES: CPT | Mod: GP | Performed by: PHYSICAL THERAPIST

## 2023-10-11 PROCEDURE — 97140 MANUAL THERAPY 1/> REGIONS: CPT | Mod: GP | Performed by: PHYSICAL THERAPIST

## 2023-10-12 ENCOUNTER — OFFICE VISIT (OUTPATIENT)
Dept: ORTHOPEDICS | Facility: CLINIC | Age: 48
End: 2023-10-12
Payer: COMMERCIAL

## 2023-10-12 DIAGNOSIS — M77.12 LATERAL EPICONDYLITIS OF LEFT ELBOW: ICD-10-CM

## 2023-10-12 PROCEDURE — 99214 OFFICE O/P EST MOD 30 MIN: CPT | Performed by: FAMILY MEDICINE

## 2023-10-12 ASSESSMENT — PAIN SCALES - GENERAL: PAINLEVEL: SEVERE PAIN (6)

## 2023-10-12 NOTE — NURSING NOTE
DME FITTING    Relevant Diagnosis: Left wrist pain/tendonopathy   Wrist/forearm brace was fit on patient's Left wrist/forearm.     Person(s) involved in teaching:   Patient    Brace was applied in standard [place]:  Yes  Brace fit well:  Yes  Patient reports brace to fit comfortably:  Yes    Education:   Patient shown self application and removal of brace: Yes  Patient shown how to adjust brace fit, if necessary: Yes  Patient educated on billing and return policy: Yes  Patient confirmed understanding when and how to contact clinic with concerns: Yes

## 2023-10-12 NOTE — PROGRESS NOTES
CHIEF COMPLAINT:  Pain of the Left Elbow (Left elbow pain with flexion, supination, and pronation.  Soft tissue pain.  Has had previous tendon issues in body.)       HISTORY OF PRESENT ILLNESS  Ms. Coker is a pleasant 48 year old female who presents to clinic today with left elbow pain.  Bhupinder has felt pain in her left elbow for the past few years.  She points to the lateral aspect of her elbow.  She was seen and managed by Dr. Reno, she has had physical therapy based treatment and exercise based treatment over the years.  She has tried a strap as well, unfortunately this did not help her.  She is interested in discussing percutaneous tenotomy with Tenex.        Additional history: as documented    MEDICAL HISTORY  Patient Active Problem List   Diagnosis    Migraine, unspecified, with intractable migraine, so stated, without mention of status migrainosus    Bell's palsy    Contact dermatitis and other eczema, due to unspecified cause    Lesion of skin of scalp    Lyme disease    PAC (premature atrial contraction)    Palpitations    Raynaud phenomenon    Chronic fatigue    Hair loss    Abnormal PFT    Shoulder joint instability    Family history of melanoma    Dry eyes    Keratitis sicca (H24)    Family history of colon cancer    Pain in joint, upper arm    FLORIDALMA positive    Plantar fascial fibromatosis    Foraminal stenosis of lumbar region    DJD (degenerative joint disease), lumbar    Migraine without aura and without status migrainosus, not intractable    Lumbar radiculopathy    Ds DNA antibody positive    Muscular wasting and disuse atrophy, not elsewhere classified    Frontal headache    Telangiectasia of face    Lateral epicondylitis    Right shoulder pain    Plantar fasciitis    Skin lesion    AD (atopic dermatitis)    Heat sensitivity    Rotator cuff arthropathy, right    Gastroesophageal reflux disease, esophagitis presence not specified    Abnormal mammogram    Sternocleidomastoid muscle tenderness    Acute  cervical myofascial strain, initial encounter    Spasm of muscle    Hyperlipidemia LDL goal <130    Biceps tendinopathy, right    Superficial swelling of scalp    Intractable right heel pain    Intractable episodic tension-type headache    Jaycob complexion    Lip lesion    Abdominal pain, epigastric    Abdominal distension    PONV (postoperative nausea and vomiting)    Menorrhalgia    Uterine polyp    Loose stools    Hyperactive bowel sounds    Abnormality of nail surface    Dry hair    Dry skin    Malaise and fatigue    Lymphadenopathy    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    Endometrium, hyperplasia - on recent CT scan    Pain in joint involving pelvic region and thigh, right    Right lateral abdominal pain    Right foot pain    Dental abscess - left mandible molar    Brachial plexopathy - right shoulder    Balance problems    Sinus arrhythmia seen on electrocardiogram    Family history of ischemic heart disease - father at 46 massive MI    Elevated blood pressure reading without diagnosis of hypertension    Hypertrophy of breast    History of cold sores    AK (actinic keratosis) - both hands at the lateral thenar aspect.    Dermatitis    Musculoskeletal pain    Fatigue, unspecified type    TOS (thoracic outlet syndrome)    Colitis    Abnormal CT scan, colon    Personal history of COVID-19    Pectoralis minor syndrome (H24)    History of endometrial ablation    Thyroid nodule    Abdominal wall strain, initial encounter    Abdominal wall pain in periumbilical region    Cervical radiculopathy    DDD (degenerative disc disease), cervical    Spinal stenosis in cervical region    Pain of right upper extremity    Weight gain    Obesity, Class II, BMI 35-39.9    Morbid obesity (H)    Hyperglycemia    Weight loss    Weight disorder       Current Outpatient Medications   Medication Sig Dispense Refill    ACETAMINOPHEN PO Take 650 mg by mouth every 6 hours as needed for mild pain      betamethasone valerate  (VALISONE) 0.1 % external lotion Apply topically 2 times daily 60 mL 3    hydrocortisone (CORTIZONE 10) 1 % external lotion Apply topically 2 times daily (Patient not taking: Reported on 8/18/2023) 113 g 0    meloxicam (MOBIC) 15 MG tablet TAKE 1 TABLET (15 MG) BY MOUTH DAILY 90 tablet 3    mupirocin (BACTROBAN) 2 % external ointment Apply topically 3 times daily 30 g 3    rizatriptan (MAXALT-MLT) 5 MG ODT Take 1 tablet (5 mg) by mouth at onset of headache for migraine May repeat in 2 hours. Max 6 tablets/24 hours. (Patient not taking: Reported on 8/18/2023) 30 tablet 1    Semaglutide, 1 MG/DOSE, (OZEMPIC, 1 MG/DOSE,) 4 MG/3ML pen Inject 1 mg Subcutaneous every 7 days 9 mL 1    tacrolimus (PROTOPIC) 0.1 % external ointment Apply topically 2 times daily On eczematous lesions 2xdaily 60 g 3    valACYclovir (VALTREX) 1000 mg tablet Take 1 tablet (1,000 mg) by mouth 3 times daily for 7 days 21 tablet 0       Allergies   Allergen Reactions    Ceftriaxone Anaphylaxis     Hives, rash, racing heart beat    Bactrim [Sulfamethoxazole-Trimethoprim] Hives    Ciprofloxacin Other (See Comments)     Tendon Issues    Codeine     Copper      Rash      Doxycycline      Loss of skin pigmentation, skin loss.    Gold      Rash      Iodine Hives     WELTS    Iodine-131 Hives    Levaquin [Levofloxacin] Other (See Comments)     Tendon issues with levaquin and cipro    Morphine And Related Nausea and Vomiting    Nickel      rash    Steel [Staples]      Rash from staples      Sulfa Antibiotics Hives     Full Body    Latex Rash    Penicillins Rash       Family History   Problem Relation Age of Onset    Diabetes Mother         adult    Cancer - colorectal Mother     Hypertension Mother     Allergies Mother     Cancer Mother         colon    Depression Mother     Gastrointestinal Disease Mother         ibs    Genitourinary Problems Mother         kidney cyst    Gynecology Mother         endometriosis    Lipids Mother     Thyroid Disease Mother      Arthritis Mother         RA    Heart Disease Maternal Grandfather         MI,  - 60's    Allergies Father     Unknown/Adopted Father     Heart Disease Father         mi-age mid 40's    Cardiovascular Father     Lipids Father     Respiratory Father         asthma    Cancer Father     Other Cancer Father         renal cancer    Depression Sister     Allergies Sister     Cancer Sister         vaginal    Gynecology Sister         endometriosis    Lipids Paternal Grandmother     Osteoporosis Paternal Grandmother     Thyroid Disease Paternal Grandmother     Respiratory Son         asthma    Allergies Son     Arthritis Sister     Allergies Brother     Heart Disease Brother     Allergies Daughter     Blood Disease Paternal Aunt         LGL T cell Leukemia    Rheumatoid Arthritis Paternal Aunt     Kidney Disease Maternal Aunt     Lupus Maternal Aunt     Bladder Cancer Maternal Aunt        Additional medical/Social/Surgical histories reviewed in [facility] and updated as appropriate.        PHYSICAL EXAM  General  - normal appearance, in no obvious distress    Musculoskeletal - left elbow  - inspection: normal joint alignment, no obvious deformity, mild soft tissue swelling laterally  - palpation: tender at the origin of the common extensor tendon  - ROM:  160 deg flexion   0 deg extension   90 deg pronation   90 deg supination  - strength: 5/5 wrist extension with elbow flexed, 4/5 with elbow extended, painful resisted extension of long finger with elbow flexed, worse with extension, 5/5  strength  - special tests:  (-) varus  (-) valgus  Neuro  - no sensory or motor deficit, grossly normal coordination, normal muscle tone             ASSESSMENT & PLAN  Ms. Coker is a 48 year old female who presents to clinic today with left elbow pain consistent with lateral epicondylitis.    I reviewed her x-ray and MRI in the room with her.  Her MR does reveal a partial tear and tendinosis of the common extensor tendon.    We  revisited treatment options from a global standpoint.  She has not done a wrist brace yet, I do think that this would help her.  We did discuss that she should wear this at night and as much as she can throughout the day.  We also discussed that if this fails to bring her any significant relief it would be reasonable to consider percutaneous tenotomy with Tenex.  We should catch up on the following 4 to 6 weeks.    It was a pleasure seeing Amelia today.    Patel Martínez DO, [facility]  Primary Care Sports Medicine      This note was constructed using Dragon dictation software, please excuse any minor errors in spelling, grammar, or syntax.

## 2023-10-12 NOTE — LETTER
10/12/2023         RE: Amelia Coker  [address] [facility] [id number]        Dear Colleague,    Thank you for referring your patient, Amelia Coker, to the [facility] [place]. Please see a copy of my visit note below.    CHIEF COMPLAINT:  Pain of the Left Elbow (Left elbow pain with flexion, supination, and pronation.  Soft tissue pain.  Has had previous tendon issues in body.)       HISTORY OF PRESENT ILLNESS  Ms. Coker is a pleasant 48 year old female who presents to clinic today with left elbow pain.  Bhupinder has felt pain in her left elbow for the past few years.  She points to the lateral aspect of her elbow.  She was seen and managed by Dr. Reno, she has had physical therapy based treatment and exercise based treatment over the years.  She has tried a strap as well, unfortunately this did not help her.  She is interested in discussing percutaneous tenotomy with Tenex.        Additional history: as documented    MEDICAL HISTORY  Patient Active Problem List   Diagnosis     Migraine, unspecified, with intractable migraine, so stated, without mention of status migrainosus     Bell's palsy     Contact dermatitis and other eczema, due to unspecified cause     Lesion of skin of scalp     Lyme disease     PAC (premature atrial contraction)     Palpitations     Raynaud phenomenon     Chronic fatigue     Hair loss     Abnormal PFT     Shoulder joint instability     Family history of melanoma     Dry eyes     Keratitis sicca (H24)     Family history of colon cancer     Pain in joint, upper arm     FLORIDALMA positive     Plantar fascial fibromatosis     Foraminal stenosis of lumbar region     DJD (degenerative joint disease), lumbar     Migraine without aura and without status migrainosus, not intractable     Lumbar radiculopathy     Ds DNA antibody positive     Muscular wasting and disuse atrophy, not elsewhere classified     Frontal headache     Telangiectasia of face     Lateral  epicondylitis     Right shoulder pain     Plantar fasciitis     Skin lesion     AD (atopic dermatitis)     Heat sensitivity     Rotator cuff arthropathy, right     Gastroesophageal reflux disease, esophagitis presence not specified     Abnormal mammogram     Sternocleidomastoid muscle tenderness     Acute cervical myofascial strain, initial encounter     Spasm of muscle     Hyperlipidemia LDL goal <130     Biceps tendinopathy, right     Superficial swelling of scalp     Intractable right heel pain     Intractable episodic tension-type headache     Jaycob complexion     Lip lesion     Abdominal pain, epigastric     Abdominal distension     PONV (postoperative nausea and vomiting)     Menorrhalgia     Uterine polyp     Loose stools     Hyperactive bowel sounds     Abnormality of nail surface     Dry hair     Dry skin     Malaise and fatigue     Lymphadenopathy     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     Endometrium, hyperplasia - on recent CT scan     Pain in joint involving pelvic region and thigh, right     Right lateral abdominal pain     Right foot pain     Dental abscess - left mandible molar     Brachial plexopathy - right shoulder     Balance problems     Sinus arrhythmia seen on electrocardiogram     Family history of ischemic heart disease - father at 46 massive MI     Elevated blood pressure reading without diagnosis of hypertension     Hypertrophy of breast     History of cold sores     AK (actinic keratosis) - both hands at the lateral thenar aspect.     Dermatitis     Musculoskeletal pain     Fatigue, unspecified type     TOS (thoracic outlet syndrome)     Colitis     Abnormal CT scan, colon     Personal history of COVID-19     Pectoralis minor syndrome (H24)     History of endometrial ablation     Thyroid nodule     Abdominal wall strain, initial encounter     Abdominal wall pain in periumbilical region     Cervical radiculopathy     DDD (degenerative disc disease), cervical     Spinal  stenosis in cervical region     Pain of right upper extremity     Weight gain     Obesity, Class II, BMI 35-39.9     Morbid obesity (H)     Hyperglycemia     Weight loss     Weight disorder       Current Outpatient Medications   Medication Sig Dispense Refill     ACETAMINOPHEN PO Take 650 mg by mouth every 6 hours as needed for mild pain       betamethasone valerate (VALISONE) 0.1 % external lotion Apply topically 2 times daily 60 mL 3     hydrocortisone (CORTIZONE 10) 1 % external lotion Apply topically 2 times daily (Patient not taking: Reported on 8/18/2023) 113 g 0     meloxicam (MOBIC) 15 MG tablet TAKE 1 TABLET (15 MG) BY MOUTH DAILY 90 tablet 3     mupirocin (BACTROBAN) 2 % external ointment Apply topically 3 times daily 30 g 3     rizatriptan (MAXALT-MLT) 5 MG ODT Take 1 tablet (5 mg) by mouth at onset of headache for migraine May repeat in 2 hours. Max 6 tablets/24 hours. (Patient not taking: Reported on 8/18/2023) 30 tablet 1     Semaglutide, 1 MG/DOSE, (OZEMPIC, 1 MG/DOSE,) 4 MG/3ML pen Inject 1 mg Subcutaneous every 7 days 9 mL 1     tacrolimus (PROTOPIC) 0.1 % external ointment Apply topically 2 times daily On eczematous lesions 2xdaily 60 g 3     valACYclovir (VALTREX) 1000 mg tablet Take 1 tablet (1,000 mg) by mouth 3 times daily for 7 days 21 tablet 0       Allergies   Allergen Reactions     Ceftriaxone Anaphylaxis     Hives, rash, racing heart beat     Bactrim [Sulfamethoxazole-Trimethoprim] Hives     Ciprofloxacin Other (See Comments)     Tendon Issues     Codeine      Copper      Rash       Doxycycline      Loss of skin pigmentation, skin loss.     Gold      Rash       Iodine Hives     WELTS     Iodine-131 Hives     Levaquin [Levofloxacin] Other (See Comments)     Tendon issues with levaquin and cipro     Morphine And Related Nausea and Vomiting     Nickel      rash     Steel [Staples]      Rash from staples       Sulfa Antibiotics Hives     Full Body     Latex Rash     Penicillins Rash        Family History   Problem Relation Age of Onset     Diabetes Mother         adult     Cancer - colorectal Mother      Hypertension Mother      Allergies Mother      Cancer Mother         colon     Depression Mother      Gastrointestinal Disease Mother         ibs     Genitourinary Problems Mother         kidney cyst     Gynecology Mother         endometriosis     Lipids Mother      Thyroid Disease Mother      Arthritis Mother         RA     Heart Disease Maternal Grandfather         MI,  - 60's     Allergies Father      Unknown/Adopted Father      Heart Disease Father         mi-age mid 40's     Cardiovascular Father      Lipids Father      Respiratory Father         asthma     Cancer Father      Other Cancer Father         renal cancer     Depression Sister      Allergies Sister      Cancer Sister         vaginal     Gynecology Sister         endometriosis     Lipids Paternal Grandmother      Osteoporosis Paternal Grandmother      Thyroid Disease Paternal Grandmother      Respiratory Son         asthma     Allergies Son      Arthritis Sister      Allergies Brother      Heart Disease Brother      Allergies Daughter      Blood Disease Paternal Aunt         LGL T cell Leukemia     Rheumatoid Arthritis Paternal Aunt      Kidney Disease Maternal Aunt      Lupus Maternal Aunt      Bladder Cancer Maternal Aunt        Additional medical/Social/Surgical histories reviewed in [facility] and updated as appropriate.        PHYSICAL EXAM  General  - normal appearance, in no obvious distress    Musculoskeletal - left elbow  - inspection: normal joint alignment, no obvious deformity, mild soft tissue swelling laterally  - palpation: tender at the origin of the common extensor tendon  - ROM:  160 deg flexion   0 deg extension   90 deg pronation   90 deg supination  - strength: 5/5 wrist extension with elbow flexed, 4/5 with elbow extended, painful resisted extension of long finger with elbow flexed, worse with extension,  5/5  strength  - special tests:  (-) varus  (-) valgus  Neuro  - no sensory or motor deficit, grossly normal coordination, normal muscle tone             ASSESSMENT & PLAN  Ms. Coker is a 48 year old female who presents to clinic today with left elbow pain consistent with lateral epicondylitis.    I reviewed her x-ray and MRI in the room with her.  Her MR does reveal a partial tear and tendinosis of the common extensor tendon.    We revisited treatment options from a global standpoint.  She has not done a wrist brace yet, I do think that this would help her.  We did discuss that she should wear this at night and as much as she can throughout the day.  We also discussed that if this fails to bring her any significant relief it would be reasonable to consider percutaneous tenotomy with Tenex.  We should catch up on the following 4 to 6 weeks.    It was a pleasure seeing Amelia today.    Patel Martínez DO, [facility]  Primary Care Sports Medicine      This note was constructed using Dragon dictation software, please excuse any minor errors in spelling, grammar, or syntax.      Again, thank you for allowing me to participate in the care of your patient.        Sincerely,        Patel Martínez DO

## 2023-10-13 ENCOUNTER — TELEPHONE (OUTPATIENT)
Dept: ORTHOPEDICS | Facility: CLINIC | Age: 48
End: 2023-10-13

## 2023-10-13 NOTE — TELEPHONE ENCOUNTER
Date Scheduled: 11/16/23 11:00  Facility: Surgery Locations: [facility]  Surgeon: Dr. Martínez   Post-op appointment scheduled:    scheduled?: No  Surgery packet/instructions confirmed received?  Yes  Pre op physical/PAC appointment: N/A  Special Considerations:     Treasure Cruz  Surgery Scheduling Coordinator  Ph: [phone]

## 2023-10-13 NOTE — TELEPHONE ENCOUNTER
Message left for the patient to call back to get surgery scheduled for her tenex procedure with Dr. Martínez in the  ASC.     Treasure Cruz  Surgery Scheduling Coordinator  Ph: [phone]

## 2023-10-17 ENCOUNTER — THERAPY VISIT (OUTPATIENT)
Dept: PHYSICAL THERAPY | Facility: CLINIC | Age: 48
End: 2023-10-17
Attending: PHYSICIAN ASSISTANT
Payer: COMMERCIAL

## 2023-10-17 DIAGNOSIS — M54.12 CERVICAL RADICULOPATHY: Primary | ICD-10-CM

## 2023-10-17 DIAGNOSIS — M54.16 LUMBAR RADICULOPATHY: ICD-10-CM

## 2023-10-17 PROCEDURE — 97140 MANUAL THERAPY 1/> REGIONS: CPT | Mod: GP | Performed by: PHYSICAL THERAPIST

## 2023-10-24 NOTE — PROGRESS NOTES
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  Unusual findings with her arm actually feeling better when it was at 90 degrees.  Pectoralis minor muscle block gave her temporary relief.  Underwent excision of the pectoralis minor muscle attachment as an outpatient 7/22/2021.  She did notice some initial improvement.    However she has ongoing issues more with neck pain and shoulder and scapular pain.  She does have known bulging cervical disks.  Her neurosurgeon is concerned about removing the cervical disks and it may may not improve her symptoms because they are somewhat atypical.  In the past she has undergone 2 EMGs which were unremarkable except for minor ulnar nerve compression at the elbow region prior to our surgery.    On 6/4/2020 TOS duplex studies at 180 degrees she did have a decrease in the PPG of the right subclavian artery but no other maneuvers.  It was due to her atypical symptoms that we did the pectoralis major release after the block gave her some temporary relief.    She has been seeing physical therapy.  They feel that she has very tight muscles.  They are also concerned that this may be due to the classic neurogenic TOS from the first rib and scalene muscles.    Her neurosurgeon recommend neurology but she has already seen them in the past with EMGs as described.  Thus, she comes to see me today.  Neurosurgery is concerned about doing the cervical disc that they may not improve her symptoms and perhaps decompression of the thoracic outlet should be done first.    Exam: Alert and appropriate.  Normal affect.  Very pleasant.   Blood pressure 122/79 left arm.  Pulse 64   Chest= clear   Cardiovascular= regular rate   Very well-healed right infraclavicular pectoralis minor incision   No arm swelling.  Normal CMS.    I reviewed an old chest x-ray from 8/11/2020 with no cervical rib or other abnormalities of the clavicle.    We performed TOS testing today.  There  is a decrease in the right subclavian vein at 100 degrees from 11.5 to 4.9 mm.  Otherwise completely unremarkable.  No compression of the subclavian artery which is closer to the brachial plexus.   -- Though there is compression of the subclavian vein this would not explain any of the brachial plexus potential symptoms.   -- We do know patients with neurogenic TOS can get referred to pain to the neck and shoulder muscles but typically the symptoms are more in the arm and hand.         IMPRESSION: Ongoing issues with right arm.  It may be multifactorial.  Certainly cannot rule out neurogenic TOS is part of her issue.  We cannot prove this 1 way or the other and this was also discussed.   we discussed the possibility of decompression of the thoracic and with a transaxillary first rib resection scalenectomy.  Risk benefits of the procedure discussed with her.  All people of the drain.  Most of our patients are in the hospital for 1 or 2 days and gradually increased activity.      Our main discussion was that it is hard to determine whether surgery will be beneficial and she will need to make this decision.    Over 25 minutes with patient today.    Davie Dutta MD   This note was created using Dragon voice recognition software which may result in transcription errors.

## 2023-10-26 ENCOUNTER — HOSPITAL ENCOUNTER (OUTPATIENT)
Dept: ULTRASOUND IMAGING | Facility: CLINIC | Age: 48
Discharge: HOME | End: 2023-10-26
Attending: SURGERY
Payer: COMMERCIAL

## 2023-10-26 ENCOUNTER — OFFICE VISIT (OUTPATIENT)
Dept: OTHER | Facility: CLINIC | Age: 48
End: 2023-10-26
Attending: SURGERY
Payer: COMMERCIAL

## 2023-10-26 VITALS — SYSTOLIC BLOOD PRESSURE: 122 MMHG | HEART RATE: 64 BPM | DIASTOLIC BLOOD PRESSURE: 79 MMHG

## 2023-10-26 DIAGNOSIS — G54.0 TOS (THORACIC OUTLET SYNDROME): Primary | ICD-10-CM

## 2023-10-26 DIAGNOSIS — G54.0 NEUROGENIC THORACIC OUTLET SYNDROME: ICD-10-CM

## 2023-10-26 DIAGNOSIS — G54.0 TOS (THORACIC OUTLET SYNDROME): ICD-10-CM

## 2023-10-26 PROCEDURE — 99213 OFFICE O/P EST LOW 20 MIN: CPT | Mod: 25 | Performed by: SURGERY

## 2023-10-26 PROCEDURE — 99214 OFFICE O/P EST MOD 30 MIN: CPT | Performed by: SURGERY

## 2023-10-26 PROCEDURE — 93971 EXTREMITY STUDY: CPT | Mod: RT

## 2023-10-26 PROCEDURE — 93931 UPPER EXTREMITY STUDY: CPT | Mod: RT

## 2023-10-26 NOTE — PROGRESS NOTES
[facility]        Patient is here for a  follow up.     Pt is currently taking no meds that would impact our treatment plan.    /79 (BP Location: Left arm, Patient Position: Chair, Cuff Size: Adult Regular)   Pulse 64     The provider has been notified that the patient has no concerns.     Questions patient would like addressed today are: N/A.    Refills are needed: N/A    Has homecare services and agency name:  Ale Crisostomo MA

## 2023-10-26 NOTE — NURSING NOTE
Patient Education    Procedure: Right transaxillary first rib resection and scalenectomy  Diagnosis: Neurogenic TOS  Anticoagulation Instruction: n/a  GLP-1 Agonists Instruction: hold Mounjaro 7 days prior  Pre-Operative Physical Exam: You need to have a pre-op physical exam within 30 days of your procedure. Your procedure may be cancelled if you do not have a current History and Physical. Call your PCP's office to schedule.  Allergies:  Updated in Epic  Bowel Prep: n/a  NPO for solid 8 hours prior to arrival time.   NPO for clear liquids 2 hours prior to arrival time.   Post Procedure Education: [facility] post-procedure fact sheet reviewed with patient.    Showering instructions reviewed: Yes    Learner(s):patient  Method: Listening, Reading  Barriers to Learning:No Barrier  Outcome: Patient did verbalize understanding of above education.    Patient stated she will call after this weekend to discuss scheduling.    Maribell Lofton, JESSYN, RN-[facility] [place]

## 2023-10-27 ENCOUNTER — MYC MEDICAL ADVICE (OUTPATIENT)
Dept: FAMILY MEDICINE | Facility: OTHER | Age: 48
End: 2023-10-27
Payer: COMMERCIAL

## 2023-10-27 DIAGNOSIS — S16.1XXA ACUTE CERVICAL MYOFASCIAL STRAIN, INITIAL ENCOUNTER: ICD-10-CM

## 2023-10-27 RX ORDER — MELOXICAM 15 MG/1
15 TABLET ORAL DAILY
Qty: 90 TABLET | Refills: 3 | Status: SHIPPED | OUTPATIENT
Start: 2023-10-27 | End: 2024-06-13

## 2023-10-30 ENCOUNTER — TELEPHONE (OUTPATIENT)
Dept: OTHER | Facility: CLINIC | Age: 48
End: 2023-10-30
Payer: COMMERCIAL

## 2023-10-30 NOTE — TELEPHONE ENCOUNTER
CASE RECEIVED ON 10/30/23 FOR RIGHT TRANSAXILLARY FIRST RIB RESECTION AND SCALENECTOMY.    CASE ID: [id number]    Spoke to patient states she prefers 12/19/23 as she has another scheduled procedure and if that revolves the problems with her arm she would cancel procedure if scheduled with Dr Dutta.

## 2023-11-01 ENCOUNTER — MYC MEDICAL ADVICE (OUTPATIENT)
Dept: NEUROSURGERY | Facility: CLINIC | Age: 48
End: 2023-11-01
Payer: COMMERCIAL
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
## 2023-11-01 DIAGNOSIS — M25.521 RIGHT ELBOW PAIN: ICD-10-CM
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
## 2023-11-01 NOTE — TELEPHONE ENCOUNTER
Patient sent Ionat, routed to Dr. Mesa for review.     Dr. Mesa is recommending patient schedule a telephone or in person appointment with him to further discuss.

## 2023-11-06 ENCOUNTER — THERAPY VISIT (OUTPATIENT)
Dept: PHYSICAL THERAPY | Facility: CLINIC | Age: 48
End: 2023-11-06
Attending: PHYSICIAN ASSISTANT
Payer: COMMERCIAL

## 2023-11-06 DIAGNOSIS — M54.16 LUMBAR RADICULOPATHY: ICD-10-CM

## 2023-11-06 DIAGNOSIS — M54.12 CERVICAL RADICULOPATHY: Primary | ICD-10-CM

## 2023-11-06 PROCEDURE — 97110 THERAPEUTIC EXERCISES: CPT | Mod: GP | Performed by: PHYSICAL THERAPIST

## 2023-11-07 NOTE — TELEPHONE ENCOUNTER
Spoke with patient.  She needs to cancel her 12/19/23 surgery date and reschedule it anytime after [place].  She would prefer a Tuesday, but any day would be fine.    Informed her we will work on getting her surgery rescheduled and will get back to her.

## 2023-11-08 NOTE — PROGRESS NOTES
"    SUBJECTIVE:  HPI:  Amelia Coker  Is a 48 year old female who presents today for new patient evaluation of low back pain.  She saw Dr. George Mesa on 9/28/2023 was note records:  \"48-year-old female with chronic neck low back arm and leg pain.  She complains of midline neck pain that worsens with flexion extension.  This does go down to her shoulder blades as well as radiates into her right arm.  She has had epidural steroid injections in the past at which the first 1 did seem to help for a few months but the subsequent injections have not helped as much.  She does seem to gain weight with each injection and is not eager to undergo any further injections at this time due to the side effects as well as the lack of long-term response.  She does continue to have right arm pain which encompasses the entire arm.  She does have a history of thoracic outlet syndrome with surgery for this.  The surgery did seem to help the pain to some extent and she notes the pain is similar yet a little bit different.  She denies any significant left arm pain.  She also notes low back pain chronic in nature as well.  This radiates to the left lower extremity.  The pain starts in her buttock region and goes down the back of her leg to the bottom of her foot.  She has had a history of an L5-S1 laminotomy which did not seem to significantly change the symptoms.  Prior to this surgery she did have an L5-S1 transforaminal injection which did seem to help the pain temporarily.  No injections recently in the lumbar spine.  She has been working with physical therapy who questions a possibility of piriformis syndrome causing the left leg pain as well as changes in her ribs on the right side which could be contributing to further of thoracic outlet syndrome symptoms.  She also has a history of allergies to metals and blisters easily with metals touching her skin.  She has seen an allergist without any definitive positive testing...    MRI " of the cervical spine.  This shows multilevel cervical spondylosis throughout the cervical spine.  There is foraminal stenosis most severe on the right side at C5-6 and C7-T1.  No significant central canal stenosis present.  MRI of the lumbar spine showing postoperative changes at L5-S1 on the left with scar tissue into the neuroforamen.  There is no significant central canal stenosis throughout the lumbar spine.  There does appear to be a slight spondylolisthesis on the right side at L4-5 which could be narrowing the foramen on this side.  There does appear to be some widening of the facet joint at this level as well.     Assessment:  48-year-old female with neck and back pain along with right arm and left leg pain.  The neck pain is likely due to facet arthropathy as it is mostly midline and worsens with extension and flexion movements.  The right arm pain does not seem consistent with a radiculopathy and in fact the prior EMG was negative.  With her history of thoracic outlet syndrome I believe this could be a potential cause for her right arm symptoms still.  Her back pain is likely due to facet arthropathy as well and the left leg pain has a number of things on the differential however her symptoms seem to be most consistent with an S1 radiculopathy if coming from the lumbar spine.  On imaging this does not seem to correlate as well as an L5 radiculopathy would which brings in the question if this is what is causing her leg pain.  Plan:  We discussed surgical and conservative management options going forward.  With her history of allergies any fusion surgery would put her at risk for an allergic reaction to the metals which once implanted will be difficult and potentially impossible to remove due to instability if removed.  Clearly in the lumbar spine I believe the only opportunity for surgery would be an L4-S1 fusion due to the widening of the facet joint at L4-5 with the foraminal stenosis as well as the  "prior surgery and loss of foraminal height on the left at L5-S1.  I do question if this would be improvement in her symptoms as they do not seem to be as consistent with an S1 radiculopathy overall.  For her cervical spine I will recommend she see pain management for medial branch blocks and potential RFA's if necessary as I believe the neck pain is due to facet arthropathy.  For her right arm pain we will have her see provider for thoracic outlet syndrome and she has seen somebody at [facility] in the past and we will make referral for this as well.  Should she want to consider surgery in the future I will have her come back to discuss with me again.\"    She is scheduled to see Dr. Toi Solis for medial branch blocks on 11/14/2023.  9/24/2020 left SI joint injection  7/23/2020 sacral 1, 2, 3, lumbar 5, bilateral MBB  12/8/2016 2 level bilateral lumbar decompression    History today:  Her physical therapist, Stacie Blankenship, sent me a staff message indicating that she has hypermobility, and wondering about work-up for Piriformis syndrome.  Apparently some abnormal signals distal to the Piriformis on her pelvic MRI 2/14/2021, which showed mild hyperintensity of the left sciatic nerve while traversing the piriformis  muscle.  Her staff message documents:  \"We havent made any progress since then. Weve tried SI belt no help, she cannot progress core or gluteal, abductor strengthening d/t mm activation adding pain in L LE. Connective tissue laxity with localized tightness make this challenging for the stretching protocol. She has gluteal atrophy and weakness on that side, but the challenge in symptoms worse with trying to work on strength. I have introduced the myofascial ex to do as she is able to tolerate.\"  Bhupinder confirms the above history.  In the last 10 days she has had tingling in her right great toe cramping intermittently of the right anterior thigh.  The left leg pain is constant.  The back and buttock pain is worse " "with standing and improves with a full squat.    Right C4-6 median branch block 11/14/2023 Dr. Solis: Pain scores 9/10-3/10.    She is inclined to repeat this rather than consider \"removing body parts\".  Today we will focus on the Piriformis syndrome possibility.  She says that she \"knows I am going to need a fusion\" from L4-S1 but because of her metal allergies is very hesitant to consider that for now.    She is also being followed by Dr. Patel Martínez for distal biceps tendinosis and is going to undergo a left lateral epicondyle Tenex procedure tomorrow.    Pain score and diagram reviewed.  See questionnaire.      See the patient's intake questionnaire from today for details.      MEDICATIONS:  Reviewed.    ALLERGIES:  Reviewed.     Past medical and surgical history:   Pertinent for intractable headaches/migraine, obesity, thoracic outlet syndrome, GERD, atopic dermatitis with multiple allergies, FLORIDALMA positive, chronic fatigue, Lyme disease, hypertension, status post right L4-5 and left L5-S1 decompression.    SOCIAL HX: She is an .  She and her  have a blended family with 3 children.  Sports hobbies and activities: Daily walking and biking: Driving for tractor pulling, outdoor activities      OBJECTIVE:    IMAGING: Images and reports reviewed reviewed.  See 11/15/2023 initial history and summary from Dr. Mesa.    Lumbar MRI 7/21/2023 ([facility]):    1.  Nonimpinging DDD L4-5 and L5-S1.  2.  Moderate to severe left L5-S1 foraminal stenosis with left L5 ganglionic impingement.  Mild-moderate right foraminal narrowing L4-5 with slight impingement of the right L4 ganglion.  3.  L5-S1 a left and L4-5 right facetectomy.  Mild left L3-4 facet arthropathy.    Cervical spine MRI 8/17/2023 ([facility]):    1.  C7-T1 right HNP with right C8 nerve root impingement,, and moderate left foraminal stenosis with left C8 nerve root encroachment.  2.  Severe right C5-6 foraminal stenosis and moderate-severe left C6 nerve " impingement.  3.  No high-grade central stenosis or cord compression.  4. Compared to cervical MRI from 2022 more right C7-T1 foraminal herniation noted, and no change in the C5-6 bilateral foraminal stenosis.    MR pelvis without contrast 2/14/2021     On the left, the sciatic nerve reveals prominence in signal as it  courses through the piriformis muscle. However, no definite evidence  of perceivable atrophy of the piriformis muscle on the left when  compared to the right.    Impression:  1. No significant atrophy, hypertrophy, edema noted in the left  piriformis muscle, no asymmetry when compared to the right. Mild hyper  intensity of the left sciatic nerve while traversing the piriformis  muscle.  2. No evidence of acute osseous abnormalities. Bilateral hip joint  spaces appear preserved.  3. Changes of the lumbar spine or lumbosacral junction are not well  assessed in the absence of sagittal images.    EXAMINATION:    --CONSTITUTIONAL:   No acute distress.  The patient is well nourished and well groomed.  She transitions without pushoff and moves fluidly about the room.    --GAIT:  is non-antalgic. Flat foot, heel and toe walking:  normal   .  Squat and rise   normal    .  --STANDING EXAMINATION:         Range of motion hyper normal in flexion with no change in her left leg symptoms.  Extension full with some back and buttock pain but no increase in leg symptoms..     --NEUROLOGICAL:     Deferred  Pace test (resisted AB duction and external rotation of the hip while seated): Negative on the right.  On the left she has worsening of the pre-existing chronic lateral thigh and lateral shin pain but no change in her pre-existing symptoms in the sole of her left foot and the lateral 3 toes.  Freiberg test (prone resisted external rotation on an extended internally rotated hip): Negative right.  On the left it reproduces gluteal pain and shooting pain all the way down to her toes.  --PELVIC/HIP JOINTS:                   PELVIC ALIGNMENT negative for Pelvic Joint Dysfunction.   --LUMBAR/GLUTEAL MUSCLES: Tender in the left sciatic notch overlying the piriformis; negative in the right sciatic notch.    ASSESSMENT: Amelia Coker is a 48 year old female who presents  today for new patient evaluation of:    Chronic low back pain  Chronic leg pain  Acute onset of right leg symptoms  Pre-existing negative leg EMGs.  Possible left piriformis syndrome with positive Freiberg test and increased symptoms with Pace test  Hypermobility  Status post left pectoralis minor release for TOS  Chronic neck pain with multiple procedures.      PLAN:  I am only addressing the Piriformis situation today.  Because she gains 20 pounds and only loses 10 pounds every time she has a steroid injection she is opposed to considering that.  We talked about an ultrasound of the piriformis muscle to assess thickness and cross-sectional area but I do not think we have enough normal values to make any sense of that.  Instead I am going to refer her for an ultrasound-guided Botox injection of the left piriformis.  If that is beneficial, I will have her continue to follow-up with that practitioner for repeat injections as necessary.  I will order an EMG of the left leg which she can cancel if the Botox is successful.      60 minutes of time spent doing chart review, history and exam, documentation, counseling, education, coordination of care, and other activities as described above.        Advised patient to call or return early if symptoms worsen, or having problems controlling bladder and bowel function or worsening leg weakness.     Please note: Voice recognition software was used in this dictation.  It may therefore contain typographical errors.    Atul Rice MD

## 2023-11-08 NOTE — TELEPHONE ENCOUNTER
Faxed  Neurosurgery referral to [facility]  November 8, 2023 to fax number [phone]    Right Fax confirmed at 200 PM    Quang Garcia RN

## 2023-11-13 ENCOUNTER — HOSPITAL ENCOUNTER (OUTPATIENT)
Dept: GENERAL RADIOLOGY | Facility: CLINIC | Age: 48
Discharge: HOME | End: 2023-11-13
Attending: PHYSICIAN ASSISTANT | Admitting: PHYSICIAN ASSISTANT
Payer: COMMERCIAL

## 2023-11-13 DIAGNOSIS — M25.521 RIGHT ELBOW PAIN: ICD-10-CM
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
## 2023-11-13 PROCEDURE — 73080 X-RAY EXAM OF ELBOW: CPT | Mod: RT

## 2023-11-14 ENCOUNTER — RADIOLOGY INJECTION OFFICE VISIT (OUTPATIENT)
Dept: PALLIATIVE MEDICINE | Facility: CLINIC | Age: 48
End: 2023-11-14
Payer: COMMERCIAL

## 2023-11-14 VITALS — HEART RATE: 60 BPM | DIASTOLIC BLOOD PRESSURE: 77 MMHG | OXYGEN SATURATION: 98 % | SYSTOLIC BLOOD PRESSURE: 123 MMHG

## 2023-11-14 DIAGNOSIS — M47.812 SPONDYLOSIS OF CERVICAL REGION WITHOUT MYELOPATHY OR RADICULOPATHY: ICD-10-CM

## 2023-11-14 DIAGNOSIS — M54.2 NECK PAIN: ICD-10-CM

## 2023-11-14 PROCEDURE — 64491 INJ PARAVERT F JNT C/T 2 LEV: CPT | Mod: RT | Performed by: PAIN MEDICINE

## 2023-11-14 PROCEDURE — 64490 INJ PARAVERT F JNT C/T 1 LEV: CPT | Mod: RT | Performed by: PAIN MEDICINE

## 2023-11-14 ASSESSMENT — PAIN SCALES - GENERAL
PAINLEVEL: EXTREME PAIN (9)
PAINLEVEL: MILD PAIN (3)

## 2023-11-14 NOTE — NURSING NOTE
Pre-procedure Intake  If YES to any questions or NO to having a   Please complete laminated checklist and leave on the computer keyboard for Provider, verbally inform provider if able.    For SCS Trial, RFA's or any sedation procedure:  Have you been fasting? NA  If yes, for how long?     Are you taking any any blood thinners such as Coumadin, Warfarin, Jantoven, Pradaxa Xarelto, Eliquis, Edoxaban, Enoxaparin, Lovenox, Heparin, Arixtra, Fondaparinux, or Fragmin? OR Antiplatelet medication such as Plavix, Brilinta, or Effient?   No   If yes, when did you take your last dose?     Do you take aspirin?  No  If cervical procedure, have you held aspirin for 6 days?   NA    Do you have any allergies to contrast dye,Topical  iodine, steroid and/or numbing medications?  Topical  iodine    Are you currently taking antibiotics or have an active infection?  NO    Have you had a fever/elevated temperature within the past week? NO    Are you currently taking oral steroids? NO    Do you have a ? Yes    Are you pregnant or breastfeeding?  NO    Have you received the COVID-19 vaccine? No   If yes, was it your 1st, 2nd or only dose needed?   Date of most recent vaccine:     Notify provider and RNs if systolic BP >170, diastolic BP >100, P >100 or O2 sats < 90%     Daysi Evans MA  [facility] [place]

## 2023-11-14 NOTE — PATIENT INSTRUCTIONS
[facility]   Medial Branch Block Discharge Instructions      Your procedure was performed by: Dr. Toi Solis       Medications used include:  Lidocaine  Bupivicaine      You will need to complete the Pain Scale Log form and return it to us as soon as possible.  Once we have received the form, we will review it and call you to determine the next steps.     The form can be faxed to [phone] or mailed to:   [facility]   [id number]   [place] [address]    You may resume your regular medications  You may resume your regular activities  Be cautious  as numbness and/or weakness in the upper extremities may occur for up to 6-8 hours due to the effects of the anesthetic.  Avoid driving for 6 hours. The local anesthetic could slow your reflexes.   You may shower, however no swimming or tub baths or hot tubs for 24 hours following your procedure.  Your pain will return after the numbing medications have worn off.  You may use your current pain medications as needed.  Unless you have been directed to avoid the use of anti-inflammatory medications (NSAIDS), you may use medications such as ibuprofen, Aleve or Tylenol for pain control if needed.  Some people find it helpful to alternate ibuprofen and Tylenol every 3 hours for a couple of days.  You may use ice packs 10-15 minutes three to four times a day at the injection site for comfort.   Do not use heat to painful areas for 6 to 8 hours. This will give the local anesthetic time to wear off and prevent you from accidentally burning your skin.   If you experience any of the following, call the Pain Clinic during work hours (Monday through Friday 8 am-4:30 pm) at [phone] or the Provider Line after hours at [phone]:  -Fever over 100 degree F  -Swelling, bleeding, redness, drainage, warmth at the injection site  -Progressive weakness or numbness in your legs or arms  -If lumbar, call if you have  a loss of bowel or bladder function  -If cervical, call if you have any unusual headache that is not relieved by Tylenol  -Unusual new onset of pain that is not improving

## 2023-11-14 NOTE — PROGRESS NOTES
Pre procedure Diagnosis: Cervical spondylosis   Post procedure Diagnosis: Same  Procedure performed: cervical medial branch block right C4,5,6  Anesthesia: none  Complications: none  Operators: Toi Solis MD    Indications:   Amelia Coker is a 48 year old female. 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  Exam shows pain with extension/rotation  and they have tried conservative treatment including meds/pt.    Options/alternatives, benefits and risks were discussed with the patient including but not limited to bleeding, infection, tissue trauma, exposure to radiation, reaction to medications, spinal cord injury, weakness, numbness and paralysis.  Questions were answered to her satisfaction and she wishes to proceed. Voluntary informed consent was obtained and signed.     Vitals were reviewed: Yes  Allergies were reviewed:  Yes   Medications were reviewed:  Yes   Pre-procedure pain score: 9/10    Procedure:  After obtaining signed, informed consent, the patient was taken to the procedure room and placed in the prone position on the [facility] frame.  A Pause for the Cause was completed prior to procedure start.  The patient was prepped and draped in the usual sterile fashion.    The areas of interest were identified with fluoroscopy.  The skin and subcutaneous tissue were anesthetized by injecting Lidocaine 1% 1 ml at each injection site utilizing a 27 gauge 1.25 inch needle.  Then,  25 gauge 3.5 inch spinal needles with slightly curved tips were advanced tangential to the medial branch nerves, abutting the articular pillars at right C4,5,6 utilizing AP and Lateral fluoroscopic guidance.  Aspiration for blood and CSF was negative at all the levels.  Then, after repeated negative aspiration, each level was injected with 0.5 ml of 0.25% bupivacaine and the needles were restyletted and withdrawn.    The injection sites were cleaned and sterile dressings were applied where  indicated.    The patient tolerated the procedure well without complications and was taken to the recovery area for continued observation.  Fluoroscopic images were saved to PACS.    The patient was re-evaluated following the procedure and they noted dec pain and improved range of motion.    Post-procedure pain:  3/10    The patient was given a pain diary and instructed to document their pain throughout the day.  The patient was asked to return the pain diary the next day in person or by fax at which time it will be reviewed and the decision made whether or not to proceed.    Follow-up:  pending results     Toi Solis MD  [facility]

## 2023-11-15 ENCOUNTER — OFFICE VISIT (OUTPATIENT)
Dept: NEUROSURGERY | Facility: CLINIC | Age: 48
End: 2023-11-15
Attending: STUDENT IN AN ORGANIZED HEALTH CARE EDUCATION/TRAINING PROGRAM
Payer: COMMERCIAL

## 2023-11-15 ENCOUNTER — TELEPHONE (OUTPATIENT)
Dept: PALLIATIVE MEDICINE | Facility: CLINIC | Age: 48
End: 2023-11-15

## 2023-11-15 ENCOUNTER — PRE VISIT (OUTPATIENT)
Dept: NEUROSURGERY | Facility: CLINIC | Age: 48
End: 2023-11-15

## 2023-11-15 VITALS
HEIGHT: 66 IN | BODY MASS INDEX: 30.7 KG/M2 | WEIGHT: 191 LBS | HEART RATE: 61 BPM | SYSTOLIC BLOOD PRESSURE: 118 MMHG | DIASTOLIC BLOOD PRESSURE: 76 MMHG

## 2023-11-15 DIAGNOSIS — G89.29 CHRONIC LOW BACK PAIN, UNSPECIFIED BACK PAIN LATERALITY, UNSPECIFIED WHETHER SCIATICA PRESENT: ICD-10-CM

## 2023-11-15 DIAGNOSIS — M54.50 CHRONIC LOW BACK PAIN, UNSPECIFIED BACK PAIN LATERALITY, UNSPECIFIED WHETHER SCIATICA PRESENT: ICD-10-CM

## 2023-11-15 DIAGNOSIS — G89.29 CHRONIC PAIN OF LEFT LOWER EXTREMITY: Primary | ICD-10-CM

## 2023-11-15 DIAGNOSIS — G57.02 PIRIFORMIS SYNDROME OF LEFT SIDE: ICD-10-CM

## 2023-11-15 DIAGNOSIS — M79.605 CHRONIC PAIN OF LEFT LOWER EXTREMITY: Primary | ICD-10-CM

## 2023-11-15 DIAGNOSIS — R25.2 SPASM: ICD-10-CM

## 2023-11-15 DIAGNOSIS — M47.812 SPONDYLOSIS OF CERVICAL REGION WITHOUT MYELOPATHY OR RADICULOPATHY: Primary | ICD-10-CM

## 2023-11-15 PROCEDURE — 99205 OFFICE O/P NEW HI 60 MIN: CPT | Performed by: PREVENTIVE MEDICINE

## 2023-11-15 ASSESSMENT — PAIN SCALES - GENERAL: PAINLEVEL: SEVERE PAIN (6)

## 2023-11-15 NOTE — LETTER
"    11/15/2023         RE: Amelia Coker  [address] [facility] [id number]        Dear Colleague,    Thank you for referring your patient, Amelia Coker, to the [facility] [place]. Please see a copy of my visit note below.        SUBJECTIVE:  HPI:  Amelia Coker  Is a 48 year old female who presents today for new patient evaluation of low back pain.  She saw Dr. George Mesa on 9/28/2023 was note records:  \"48-year-old female with chronic neck low back arm and leg pain.  She complains of midline neck pain that worsens with flexion extension.  This does go down to her shoulder blades as well as radiates into her right arm.  She has had epidural steroid injections in the past at which the first 1 did seem to help for a few months but the subsequent injections have not helped as much.  She does seem to gain weight with each injection and is not eager to undergo any further injections at this time due to the side effects as well as the lack of long-term response.  She does continue to have right arm pain which encompasses the entire arm.  She does have a history of thoracic outlet syndrome with surgery for this.  The surgery did seem to help the pain to some extent and she notes the pain is similar yet a little bit different.  She denies any significant left arm pain.  She also notes low back pain chronic in nature as well.  This radiates to the left lower extremity.  The pain starts in her buttock region and goes down the back of her leg to the bottom of her foot.  She has had a history of an L5-S1 laminotomy which did not seem to significantly change the symptoms.  Prior to this surgery she did have an L5-S1 transforaminal injection which did seem to help the pain temporarily.  No injections recently in the lumbar spine.  She has been working with physical therapy who questions a possibility of piriformis syndrome causing the left leg pain as well as changes in her ribs on the " right side which could be contributing to further of thoracic outlet syndrome symptoms.  She also has a history of allergies to metals and blisters easily with metals touching her skin.  She has seen an allergist without any definitive positive testing...    MRI of the cervical spine.  This shows multilevel cervical spondylosis throughout the cervical spine.  There is foraminal stenosis most severe on the right side at C5-6 and C7-T1.  No significant central canal stenosis present.  MRI of the lumbar spine showing postoperative changes at L5-S1 on the left with scar tissue into the neuroforamen.  There is no significant central canal stenosis throughout the lumbar spine.  There does appear to be a slight spondylolisthesis on the right side at L4-5 which could be narrowing the foramen on this side.  There does appear to be some widening of the facet joint at this level as well.     Assessment:  48-year-old female with neck and back pain along with right arm and left leg pain.  The neck pain is likely due to facet arthropathy as it is mostly midline and worsens with extension and flexion movements.  The right arm pain does not seem consistent with a radiculopathy and in fact the prior EMG was negative.  With her history of thoracic outlet syndrome I believe this could be a potential cause for her right arm symptoms still.  Her back pain is likely due to facet arthropathy as well and the left leg pain has a number of things on the differential however her symptoms seem to be most consistent with an S1 radiculopathy if coming from the lumbar spine.  On imaging this does not seem to correlate as well as an L5 radiculopathy would which brings in the question if this is what is causing her leg pain.  Plan:  We discussed surgical and conservative management options going forward.  With her history of allergies any fusion surgery would put her at risk for an allergic reaction to the metals which once implanted will be  "difficult and potentially impossible to remove due to instability if removed.  Clearly in the lumbar spine I believe the only opportunity for surgery would be an L4-S1 fusion due to the widening of the facet joint at L4-5 with the foraminal stenosis as well as the prior surgery and loss of foraminal height on the left at L5-S1.  I do question if this would be improvement in her symptoms as they do not seem to be as consistent with an S1 radiculopathy overall.  For her cervical spine I will recommend she see pain management for medial branch blocks and potential RFA's if necessary as I believe the neck pain is due to facet arthropathy.  For her right arm pain we will have her see provider for thoracic outlet syndrome and she has seen somebody at [place] in the past and we will make referral for this as well.  Should she want to consider surgery in the future I will have her come back to discuss with me again.\"    She is scheduled to see Dr. Toi Solis for medial branch blocks on 11/14/2023.  9/24/2020 left SI joint injection  7/23/2020 sacral 1, 2, 3, lumbar 5, bilateral MBB  12/8/2016 2 level bilateral lumbar decompression    History today:  Her physical therapist, Stacie Blankenship, sent me a staff message indicating that she has hypermobility, and wondering about work-up for Piriformis syndrome.  Apparently some abnormal signals distal to the Piriformis on her pelvic MRI 2/14/2021, which showed mild hyperintensity of the left sciatic nerve while traversing the piriformis  muscle.  Her staff message documents:  \"We havent made any progress since then. Weve tried SI belt no help, she cannot progress core or gluteal, abductor strengthening d/t mm activation adding pain in L LE. Connective tissue laxity with localized tightness make this challenging for the stretching protocol. She has gluteal atrophy and weakness on that side, but the challenge in symptoms worse with trying to work on strength. I have introduced the " "myofascial ex to do as she is able to tolerate.\"  Bhupinder confirms the above history.  In the last 10 days she has had tingling in her right great toe cramping intermittently of the right anterior thigh.  The left leg pain is constant.  The back and buttock pain is worse with standing and improves with a full squat.    Right C4-6 median branch block 11/14/2023 Dr. Solis: Pain scores 9/10-3/10.    She is inclined to repeat this rather than consider \"removing body parts\".  Today we will focus on the Piriformis syndrome possibility.  She says that she \"knows I am going to need a fusion\" from L4-S1 but because of her metal allergies is very hesitant to consider that for now.    She is also being followed by Dr. Patel Martínez for distal biceps tendinosis and is going to undergo a left lateral epicondyle Tenex procedure tomorrow.    Pain score and diagram reviewed.  See questionnaire.      See the patient's intake questionnaire from today for details.      MEDICATIONS:  Reviewed.    ALLERGIES:  Reviewed.     Past medical and surgical history:   Pertinent for intractable headaches/migraine, obesity, thoracic outlet syndrome, GERD, atopic dermatitis with multiple allergies, FLORIDALMA positive, chronic fatigue, Lyme disease, hypertension, status post right L4-5 and left L5-S1 decompression.    SOCIAL HX: She is an .  She and her  have a blended family with 3 children.  Sports hobbies and activities: Daily walking and biking: Driving for tractor pulling, outdoor activities      OBJECTIVE:    IMAGING: Images and reports reviewed reviewed.  See 11/15/2023 initial history and summary from Dr. Mesa.    Lumbar MRI 7/21/2023 ([facility]):    1.  Nonimpinging DDD L4-5 and L5-S1.  2.  Moderate to severe left L5-S1 foraminal stenosis with left L5 ganglionic impingement.  Mild-moderate right foraminal narrowing L4-5 with slight impingement of the right L4 ganglion.  3.  L5-S1 a left and L4-5 right facetectomy.  Mild left L3-4 facet " arthropathy.    Cervical spine MRI 8/17/2023 ([facility]):    1.  C7-T1 right HNP with right C8 nerve root impingement,, and moderate left foraminal stenosis with left C8 nerve root encroachment.  2.  Severe right C5-6 foraminal stenosis and moderate-severe left C6 nerve impingement.  3.  No high-grade central stenosis or cord compression.  4. Compared to cervical MRI from 2022 more right C7-T1 foraminal herniation noted, and no change in the C5-6 bilateral foraminal stenosis.    MR pelvis without contrast 2/14/2021     On the left, the sciatic nerve reveals prominence in signal as it  courses through the piriformis muscle. However, no definite evidence  of perceivable atrophy of the piriformis muscle on the left when  compared to the right.    Impression:  1. No significant atrophy, hypertrophy, edema noted in the left  piriformis muscle, no asymmetry when compared to the right. Mild hyper  intensity of the left sciatic nerve while traversing the piriformis  muscle.  2. No evidence of acute osseous abnormalities. Bilateral hip joint  spaces appear preserved.  3. Changes of the lumbar spine or lumbosacral junction are not well  assessed in the absence of sagittal images.    EXAMINATION:    --CONSTITUTIONAL:   No acute distress.  The patient is well nourished and well groomed.  She transitions without pushoff and moves fluidly about the room.    --GAIT:  is non-antalgic. Flat foot, heel and toe walking:  normal   .  Squat and rise   normal    .  --STANDING EXAMINATION:         Range of motion hyper normal in flexion with no change in her left leg symptoms.  Extension full with some back and buttock pain but no increase in leg symptoms..     --NEUROLOGICAL:     Deferred  Pace test (resisted AB duction and external rotation of the hip while seated): Negative on the right.  On the left she has worsening of the pre-existing chronic lateral thigh and lateral shin pain but no change in her pre-existing symptoms in the sole of  her left foot and the lateral 3 toes.  Freiberg test (prone resisted external rotation on an extended internally rotated hip): Negative right.  On the left it reproduces gluteal pain and shooting pain all the way down to her toes.  --PELVIC/HIP JOINTS:                  PELVIC ALIGNMENT negative for Pelvic Joint Dysfunction.   --LUMBAR/GLUTEAL MUSCLES: Tender in the left sciatic notch overlying the piriformis; negative in the right sciatic notch.    ASSESSMENT: Amelia Coker is a 48 year old female who presents  today for new patient evaluation of:    Chronic low back pain  Chronic leg pain  Acute onset of right leg symptoms  Pre-existing negative leg EMGs.  Possible left piriformis syndrome with positive Freiberg test and increased symptoms with Pace test  Hypermobility  Status post left pectoralis minor release for TOS  Chronic neck pain with multiple procedures.      PLAN:  I am only addressing the Piriformis situation today.  Because she gains 20 pounds and only loses 10 pounds every time she has a steroid injection she is opposed to considering that.  We talked about an ultrasound of the piriformis muscle to assess thickness and cross-sectional area but I do not think we have enough normal values to make any sense of that.  Instead I am going to refer her for an ultrasound-guided Botox injection of the left piriformis.  If that is beneficial, I will have her continue to follow-up with that practitioner for repeat injections as necessary.  I will order an EMG of the left leg which she can cancel if the Botox is successful.      60 minutes of time spent doing chart review, history and exam, documentation, counseling, education, coordination of care, and other activities as described above.        Advised patient to call or return early if symptoms worsen, or having problems controlling bladder and bowel function or worsening leg weakness.     Please note: Voice recognition software was used in this dictation.   It may therefore contain typographical errors.    Atul Rice MD             Again, thank you for allowing me to participate in the care of your patient.        Sincerely,        Atul Rice MD

## 2023-11-15 NOTE — TELEPHONE ENCOUNTER
Pt had a rightCervical  medial branch block # 1 on 11/14/23.  The post medial branch block form was received.    According to pain diary pt would like to proceed with medial branch block #2.  Max relief from block is:    Pt had significant on the day of the block. (80 to 100%)  Physical therapy:    Last done in?:  CURRENT .   How many sessions?:  >4.    Where was it  done?  [facility] .     Routed to [place] to review. Does pt had enough relief insurance-wise to proceed to medial branch block #2?  If so please schedule.      Brisa Townsend, RN-BSN  [facility] Center[facility]

## 2023-11-15 NOTE — PATIENT INSTRUCTIONS
Kwan lets try a Botox injection under ultrasound guidance for your left Piriformis.  If it works follow-up with Dr. Dennis for repeat injections as needed.  If that does not work, proceed with the EMG that I am ordering today.

## 2023-11-15 NOTE — NURSING NOTE
"Reason For Visit:   Chief Complaint   Patient presents with    Consult     Low back pain         Occupation: works in eye clinic  Currently working? Yes.  Work status?  Full time.    Sports: n  Activities: walking,biking             /76   Pulse 61   Ht 1.664 m (5' 5.5\")   Wt 86.6 kg (191 lb)   BMI 31.30 kg/m        Allergies   Allergen Reactions    Ceftriaxone Anaphylaxis     Hives, rash, racing heart beat    Bactrim [Sulfamethoxazole-Trimethoprim] Hives    Ciprofloxacin Other (See Comments)     Tendon Issues    Codeine     Copper      Rash      Doxycycline      Loss of skin pigmentation, skin loss.    Gold      Rash      Iodine Hives     WELTS    Iodine-131 Hives    Levaquin [Levofloxacin] Other (See Comments)     Tendon issues with levaquin and cipro    Morphine And Related Nausea and Vomiting    Nickel      rash    Steel [Staples]      Rash from staples      Sulfa Antibiotics Hives     Full Body    Latex Rash    Penicillins Rash       Current Outpatient Medications   Medication    ACETAMINOPHEN PO    betamethasone valerate (VALISONE) 0.1 % external lotion    meloxicam (MOBIC) 15 MG tablet    mupirocin (BACTROBAN) 2 % external ointment    tacrolimus (PROTOPIC) 0.1 % external ointment     No current facility-administered medications for this visit.         Darla Severin-Brown, LPN   "

## 2023-11-16 ENCOUNTER — HOSPITAL ENCOUNTER (OUTPATIENT)
Facility: AMBULATORY SURGERY CENTER | Age: 48
Discharge: HOME | End: 2023-11-16
Attending: FAMILY MEDICINE | Admitting: FAMILY MEDICINE
Payer: COMMERCIAL

## 2023-11-16 ENCOUNTER — OFFICE VISIT (OUTPATIENT)
Dept: FAMILY MEDICINE | Facility: OTHER | Age: 48
End: 2023-11-16
Payer: COMMERCIAL

## 2023-11-16 ENCOUNTER — DOCUMENTATION ONLY (OUTPATIENT)
Dept: ORTHOPEDICS | Facility: CLINIC | Age: 48
End: 2023-11-16

## 2023-11-16 VITALS
RESPIRATION RATE: 16 BRPM | HEART RATE: 74 BPM | WEIGHT: 189 LBS | OXYGEN SATURATION: 100 % | HEIGHT: 66 IN | BODY MASS INDEX: 30.37 KG/M2 | SYSTOLIC BLOOD PRESSURE: 122 MMHG | DIASTOLIC BLOOD PRESSURE: 74 MMHG | TEMPERATURE: 97.2 F

## 2023-11-16 VITALS
RESPIRATION RATE: 18 BRPM | DIASTOLIC BLOOD PRESSURE: 75 MMHG | TEMPERATURE: 97.5 F | WEIGHT: 189 LBS | BODY MASS INDEX: 30.92 KG/M2 | HEART RATE: 61 BPM | OXYGEN SATURATION: 97 % | SYSTOLIC BLOOD PRESSURE: 121 MMHG

## 2023-11-16 DIAGNOSIS — M77.12 LATERAL EPICONDYLITIS OF LEFT ELBOW: Primary | ICD-10-CM

## 2023-11-16 DIAGNOSIS — G62.89 OTHER POLYNEUROPATHY: ICD-10-CM

## 2023-11-16 DIAGNOSIS — R53.82 CHRONIC FATIGUE: ICD-10-CM

## 2023-11-16 DIAGNOSIS — Z12.4 CERVICAL CANCER SCREENING: ICD-10-CM

## 2023-11-16 DIAGNOSIS — G61.9 INFLAMMATORY NEUROPATHY (H): ICD-10-CM

## 2023-11-16 DIAGNOSIS — E78.5 HYPERLIPIDEMIA LDL GOAL <130: ICD-10-CM

## 2023-11-16 DIAGNOSIS — E66.01 MORBID OBESITY (H): ICD-10-CM

## 2023-11-16 DIAGNOSIS — L30.9 ECZEMA, UNSPECIFIED TYPE: ICD-10-CM

## 2023-11-16 DIAGNOSIS — Z12.31 VISIT FOR SCREENING MAMMOGRAM: ICD-10-CM

## 2023-11-16 DIAGNOSIS — M54.16 LUMBAR RADICULOPATHY: ICD-10-CM

## 2023-11-16 DIAGNOSIS — Z00.00 ROUTINE HISTORY AND PHYSICAL EXAMINATION OF ADULT: Primary | ICD-10-CM

## 2023-11-16 DIAGNOSIS — M54.12 CERVICAL RADICULOPATHY: ICD-10-CM

## 2023-11-16 LAB
ALBUMIN SERPL BCG-MCNC: 4.6 G/DL (ref 3.5–5.2)
ALP SERPL-CCNC: 59 U/L (ref 40–150)
ALT SERPL W P-5'-P-CCNC: 18 U/L (ref 0–50)
ANION GAP SERPL CALCULATED.3IONS-SCNC: 11 MMOL/L (ref 7–15)
AST SERPL W P-5'-P-CCNC: 22 U/L (ref 0–45)
BILIRUB SERPL-MCNC: 0.8 MG/DL
BUN SERPL-MCNC: 16.8 MG/DL (ref 6–20)
CALCIUM SERPL-MCNC: 9.3 MG/DL (ref 8.6–10)
CHLORIDE SERPL-SCNC: 103 MMOL/L (ref 98–107)
CHOLEST SERPL-MCNC: 194 MG/DL
CREAT SERPL-MCNC: 0.51 MG/DL (ref 0.51–0.95)
DEPRECATED HCO3 PLAS-SCNC: 26 MMOL/L (ref 22–29)
EGFRCR SERPLBLD CKD-EPI 2021: >90 ML/MIN/1.73M2
ERYTHROCYTE [DISTWIDTH] IN BLOOD BY AUTOMATED COUNT: 13.2 % (ref 10–15)
GLUCOSE SERPL-MCNC: 92 MG/DL (ref 70–99)
HCT VFR BLD AUTO: 36.6 % (ref 35–47)
HDLC SERPL-MCNC: 74 MG/DL
HGB BLD-MCNC: 12 G/DL (ref 11.7–15.7)
LDLC SERPL CALC-MCNC: 111 MG/DL
MCH RBC QN AUTO: 30.7 PG (ref 26.5–33)
MCHC RBC AUTO-ENTMCNC: 32.8 G/DL (ref 31.5–36.5)
MCV RBC AUTO: 94 FL (ref 78–100)
NONHDLC SERPL-MCNC: 120 MG/DL
PLATELET # BLD AUTO: 295 10E3/UL (ref 150–450)
POTASSIUM SERPL-SCNC: 4.3 MMOL/L (ref 3.4–5.3)
PROT SERPL-MCNC: 7.2 G/DL (ref 6.4–8.3)
RBC # BLD AUTO: 3.91 10E6/UL (ref 3.8–5.2)
SODIUM SERPL-SCNC: 140 MMOL/L (ref 135–145)
TRIGL SERPL-MCNC: 43 MG/DL
WBC # BLD AUTO: 4.4 10E3/UL (ref 4–11)

## 2023-11-16 PROCEDURE — 87624 HPV HI-RISK TYP POOLED RSLT: CPT | Performed by: PHYSICIAN ASSISTANT

## 2023-11-16 PROCEDURE — 24357 REPAIR ELBOW PERC: CPT | Mod: LT

## 2023-11-16 PROCEDURE — 90471 IMMUNIZATION ADMIN: CPT | Performed by: PHYSICIAN ASSISTANT

## 2023-11-16 PROCEDURE — G8907 PT DOC NO EVENTS ON DISCHARG: HCPCS

## 2023-11-16 PROCEDURE — 24357 REPAIR ELBOW PERC: CPT | Mod: LT | Performed by: FAMILY MEDICINE

## 2023-11-16 PROCEDURE — G0145 SCR C/V CYTO,THINLAYER,RESCR: HCPCS | Performed by: PHYSICIAN ASSISTANT

## 2023-11-16 PROCEDURE — 99214 OFFICE O/P EST MOD 30 MIN: CPT | Mod: 25 | Performed by: PHYSICIAN ASSISTANT

## 2023-11-16 PROCEDURE — 99396 PREV VISIT EST AGE 40-64: CPT | Mod: 25 | Performed by: PHYSICIAN ASSISTANT

## 2023-11-16 PROCEDURE — 36415 COLL VENOUS BLD VENIPUNCTURE: CPT | Performed by: PHYSICIAN ASSISTANT

## 2023-11-16 PROCEDURE — G8918 PT W/O PREOP ORDER IV AB PRO: HCPCS

## 2023-11-16 PROCEDURE — 80053 COMPREHEN METABOLIC PANEL: CPT | Performed by: PHYSICIAN ASSISTANT

## 2023-11-16 PROCEDURE — 90750 HZV VACC RECOMBINANT IM: CPT | Performed by: PHYSICIAN ASSISTANT

## 2023-11-16 PROCEDURE — 80061 LIPID PANEL: CPT | Performed by: PHYSICIAN ASSISTANT

## 2023-11-16 PROCEDURE — 85027 COMPLETE CBC AUTOMATED: CPT | Performed by: PHYSICIAN ASSISTANT

## 2023-11-16 RX ORDER — METFORMIN HYDROCHLORIDE EXTENDED-RELEASE TABLETS 500 MG/1
1 TABLET, FILM COATED, EXTENDED RELEASE ORAL
COMMUNITY
Start: 2023-07-19 | End: 2024-04-18

## 2023-11-16 RX ORDER — CLOTRIMAZOLE AND BETAMETHASONE DIPROPIONATE 10; .64 MG/G; MG/G
CREAM TOPICAL 2 TIMES DAILY
Qty: 45 G | Refills: 11 | Status: SHIPPED | OUTPATIENT
Start: 2023-11-16 | End: 2024-04-18

## 2023-11-16 RX ORDER — LIDOCAINE HYDROCHLORIDE 10 MG/ML
INJECTION, SOLUTION INFILTRATION; PERINEURAL PRN
Status: DISCONTINUED | OUTPATIENT
Start: 2023-11-16 | End: 2023-11-16 | Stop reason: HOSPADM

## 2023-11-16 ASSESSMENT — ENCOUNTER SYMPTOMS
WEAKNESS: 0
FREQUENCY: 0
CHILLS: 0
COUGH: 0
ABDOMINAL PAIN: 0
SHORTNESS OF BREATH: 0
ARTHRALGIAS: 0
HEARTBURN: 0
PALPITATIONS: 0
FEVER: 0
CONSTIPATION: 0
DYSURIA: 0
JOINT SWELLING: 0
DIZZINESS: 0
HEADACHES: 0
HEMATURIA: 0
BREAST MASS: 0
DIARRHEA: 0
NERVOUS/ANXIOUS: 0
HEMATOCHEZIA: 0
EYE PAIN: 0
NAUSEA: 0
PARESTHESIAS: 0
SORE THROAT: 0
MYALGIAS: 0

## 2023-11-16 ASSESSMENT — PAIN SCALES - GENERAL: PAINLEVEL: SEVERE PAIN (6)

## 2023-11-16 NOTE — PROGRESS NOTES
"SUBJECTIVE:   Bhupinder is a 48 year old, presenting for the following:  Physical        11/16/2023     7:18 AM   Additional Questions   Roomed by Payton   Accompanied by Self         11/16/2023     7:18 AM   Patient Reported Additional Medications   Patient reports taking the following new medications [facility] ([facility])       Healthy Habits:     Getting at least 3 servings of Calcium per day:  Yes    Bi-annual eye exam:  Yes    Dental care twice a year:  Yes    Sleep apnea or symptoms of sleep apnea:  None    Diet:  Carbohydrate counting    Frequency of exercise:  6-7 days/week    Duration of exercise:  30-45 minutes    Taking medications regularly:  Yes    Additional concerns today:  Yes      Social History     Tobacco Use    Smoking status: Never    Smokeless tobacco: Never   Substance Use Topics    Alcohol use: Yes     Comment: social           11/16/2023     7:14 AM   Alcohol Use   Prescreen: >3 drinks/day or >7 drinks/week? No     Last PSA: No results found for: \"PSA\"    Reviewed orders with patient. Reviewed health maintenance and updated orders accordingly - Yes  Lab work is in process  Labs reviewed in EPIC  BP Readings from Last 3 Encounters:   11/16/23 122/74   11/15/23 118/76   11/14/23 123/77    Wt Readings from Last 3 Encounters:   11/16/23 85.7 kg (189 lb)   11/15/23 86.6 kg (191 lb)   09/28/23 87.5 kg (193 lb)                  Patient Active Problem List   Diagnosis    Migraine, unspecified, with intractable migraine, so stated, without mention of status migrainosus    Bell's palsy    Contact dermatitis and other eczema, due to unspecified cause    Lesion of skin of scalp    Lyme disease    PAC (premature atrial contraction)    Palpitations    Raynaud phenomenon    Chronic fatigue    Hair loss    Abnormal PFT    Shoulder joint instability    Family history of melanoma    Dry eyes    Keratitis sicca (H24)    Family history of colon cancer    Pain in joint, upper arm    FLORIDALMA " positive    Plantar fascial fibromatosis    Foraminal stenosis of lumbar region    DJD (degenerative joint disease), lumbar    Migraine without aura and without status migrainosus, not intractable    Lumbar radiculopathy    Ds DNA antibody positive    Muscular wasting and disuse atrophy, not elsewhere classified    Frontal headache    Telangiectasia of face    Lateral epicondylitis    Right shoulder pain    Plantar fasciitis    Skin lesion    AD (atopic dermatitis)    Heat sensitivity    Rotator cuff arthropathy, right    Gastroesophageal reflux disease, esophagitis presence not specified    Abnormal mammogram    Sternocleidomastoid muscle tenderness    Acute cervical myofascial strain, initial encounter    Spasm of muscle    Hyperlipidemia LDL goal <130    Biceps tendinopathy, right    Superficial swelling of scalp    Intractable right heel pain    Intractable episodic tension-type headache    Jaycob complexion    Lip lesion    Abdominal pain, epigastric    Abdominal distension    PONV (postoperative nausea and vomiting)    Menorrhalgia    Uterine polyp    Loose stools    Hyperactive bowel sounds    Abnormality of nail surface    Dry hair    Dry skin    Malaise and fatigue    Lymphadenopathy    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    Endometrium, hyperplasia - on recent CT scan    Pain in joint involving pelvic region and thigh, right    Right lateral abdominal pain    Right foot pain    Dental abscess - left mandible molar    Brachial plexopathy - right shoulder    Balance problems    Sinus arrhythmia seen on electrocardiogram    Family history of ischemic heart disease - father at 46 massive MI    Elevated blood pressure reading without diagnosis of hypertension    Hypertrophy of breast    History of cold sores    AK (actinic keratosis) - both hands at the lateral thenar aspect.    Eczema, unspecified type    Musculoskeletal pain    Fatigue, unspecified type    TOS (thoracic outlet syndrome)     Colitis    Abnormal CT scan, colon    Personal history of COVID-19    Pectoralis minor syndrome (H24)    History of endometrial ablation    Thyroid nodule    Abdominal wall strain, initial encounter    Abdominal wall pain in periumbilical region    Cervical radiculopathy    DDD (degenerative disc disease), cervical    Spinal stenosis in cervical region    Pain of right upper extremity    Weight gain    Obesity, Class II, BMI 35-39.9    Morbid obesity (H)    Hyperglycemia    Weight loss    Weight disorder     Past Surgical History:   Procedure Laterality Date    AS INJ TRANSFORAMIN EPIDURAL, LUMB/SACR SINGLE      COLONOSCOPY  3/11/2013    Procedure: COLONOSCOPY;  colonoscopy;  Surgeon: Lobito Mas MD;  [facility]    COLONOSCOPY N/A 9/13/2021    Procedure: COLONOSCOPY, WITH POLYPECTOMY AND BIOPSY;  Surgeon: Sam Liz MD;  [facility]    COLONOSCOPY WITH CO2 INSUFFLATION N/A 11/19/2018    Procedure: COLONOSCOPY WITH CO2 INSUFFLATION;  Surgeon: Goyo Blank MD;  [facility]    DECOMPRESSION LUMBAR TWO LEVELS Bilateral 12/8/2016    Procedure: DECOMPRESSION LUMBAR TWO LEVELS;  Surgeon: Bang Burrell MD;  [facility]    DILATION AND CURETTAGE, HYSTEROSCOPY, ABLATE ENDOMETRIUM NOVASURE, COMBINED N/A 6/12/2019    Procedure: hysteroscopy, dilation & curettage, polypectomy, endometrial ablation;  Surgeon: Fredy Pham MD;  [facility]    ESOPHAGOSCOPY, GASTROSCOPY, DUODENOSCOPY (EGD), COMBINED  11/8/2013    Procedure: COMBINED ESOPHAGOSCOPY, GASTROSCOPY, DUODENOSCOPY (EGD), BIOPSY SINGLE OR MULTIPLE;  Esophagoscopy, Gastroscopy, Duodenoscopy EGD with multiple biopsies;  Surgeon: Teja Koch MD;  [facility]    ESOPHAGOSCOPY, GASTROSCOPY, DUODENOSCOPY (EGD), COMBINED N/A 2/18/2021    Procedure: ESOPHAGOGASTRODUODENOSCOPY, WITH BIOPSY;  Surgeon: Blanca Ba MD;  [facility]    ESOPHAGOSCOPY, GASTROSCOPY, DUODENOSCOPY (EGD), COMBINED N/A 4/11/2022     Procedure: ESOPHAGOGASTRODUODENOSCOPY, WITH BIOPSY;  Surgeon: Fredy Jimenez MD;  Location: [facility]    HC REMOVAL OF TONSILS,<13 Y/O      Tonsils <10y.o.    HC TOOTH EXTRACTION W/FORCEP      INJECT BLOCK MEDIAL BRANCH CERVICAL/THORACIC/LUMBAR N/A 2020    Procedure: Sacral 1,2,3 Lateral Branch Blocks and lumbar 5 medial branch blocks bilaterally;  Surgeon: Maurisio Goetz MD;  [facility]    INJECT JOINT SACROILIAC Left 2020    Procedure: Left Lumbar 5 Sacral 1 nerve root injections.;  Surgeon: Maurisio Goetz MD;  [facility]    REPAIR MUSCLE UPPER EXTREMITY Right 2021    Procedure: DIVISION RIGHT PECTORALIS MUSCLE;  Surgeon: Davie Dutta;  [facility]    REPAIR TENDON ELBOW  2014    Procedure: REPAIR TENDON ELBOW;  Surgeon: Chris Johnston MD;  [facility]    ULTRASONIC REMOVAL SOFT TISSUE (LOCATION) Right 2018    Procedure: Tenex right foot;  Surgeon: Patel Martínez DO;  [facility]: [facility]    VASCULAR SURGERY      Thoracic Outlet Syndrome       Social History     Tobacco Use    Smoking status: Never    Smokeless tobacco: Never   Substance Use Topics    Alcohol use: Yes     Comment: social     Family History   Problem Relation Age of Onset    Diabetes Mother         adult    Cancer - colorectal Mother     Hypertension Mother     Allergies Mother     Cancer Mother         colon    Depression Mother     Gastrointestinal Disease Mother         ibs    Genitourinary Problems Mother         kidney cyst    Gynecology Mother         endometriosis    Lipids Mother     Thyroid Disease Mother     Arthritis Mother         RA    Heart Disease Maternal Grandfather         MI,  - 60's    Allergies Father     Unknown/Adopted Father     Heart Disease Father         mi-age mid 40's    Cardiovascular Father     Lipids Father     Respiratory Father         asthma    Cancer Father     Other Cancer Father         renal cancer    Depression Sister     Allergies  Sister     Cancer Sister         vaginal    Gynecology Sister         endometriosis    Lipids Paternal Grandmother     Osteoporosis Paternal Grandmother     Thyroid Disease Paternal Grandmother     Respiratory Son         asthma    Allergies Son     Arthritis Sister     Allergies Brother     Heart Disease Brother     Allergies Daughter     Blood Disease Paternal Aunt         LGL T cell Leukemia    Rheumatoid Arthritis Paternal Aunt     Kidney Disease Maternal Aunt     Lupus Maternal Aunt     Bladder Cancer Maternal Aunt          Current Outpatient Medications   Medication Sig Dispense Refill    ACETAMINOPHEN PO Take 650 mg by mouth every 6 hours as needed for mild pain      betamethasone valerate (VALISONE) 0.1 % external lotion Apply topically 2 times daily 60 mL 3    clotrimazole-betamethasone (LOTRISONE) 1-0.05 % external cream Apply topically 2 times daily 45 g 11    meloxicam (MOBIC) 15 MG tablet Take 1 tablet (15 mg) by mouth daily 90 tablet 3    mupirocin (BACTROBAN) 2 % external ointment Apply topically 3 times daily 30 g 3    Naltrexone HCl, Pain, 1.5 MG CAPS Take 1.5 mg by mouth daily 90 capsule 0    tacrolimus (PROTOPIC) 0.1 % external ointment Apply topically 2 times daily On eczematous lesions 2xdaily 60 g 3     Allergies   Allergen Reactions    Ceftriaxone Anaphylaxis     Hives, rash, racing heart beat    Bactrim [Sulfamethoxazole-Trimethoprim] Hives    Ciprofloxacin Other (See Comments)     Tendon Issues    Codeine     Copper      Rash      Doxycycline      Loss of skin pigmentation, skin loss.    Gold      Rash      Iodine Hives     WELTS    Iodine-131 Hives    Levaquin [Levofloxacin] Other (See Comments)     Tendon issues with levaquin and cipro    Morphine And Related Nausea and Vomiting    Nickel      rash    Steel [Staples]      Rash from staples      Sulfa Antibiotics Hives     Full Body    Latex Rash    Penicillins Rash     Recent Labs   Lab Test 05/01/23  1614 02/13/23  1616 01/10/23  0807  12/19/22  1737 07/07/22  1739 08/30/21  0820 07/27/21  1208 05/25/21  0740 11/05/20  1646 09/15/16  1123 06/08/16  0745   A1C 5.3  --   --   --   --   --  5.3  --   --   --  5.5   LDL  --   --   --  132* 116*  --  98 118*  --    < >  --    HDL  --   --   --   --  72  --  68 68  --    < >  --    TRIG  --   --   --   --  95  --  68 81  --    < >  --    ALT 20  --  39 41 33   < >  --  27 32   < >  --    CR 0.62  --  0.51* 0.61 0.59   < > 0.63 0.62 0.73   < > 0.54   GFRESTIMATED >90  --  >90 >90 >90   < > >90 >90 >90   < > >90  Non  GFR Calc     GFRESTBLACK  --   --   --   --   --   --   --  >90 >90   < > >90  African American GFR Calc     POTASSIUM 3.9  --  4.1 3.9 3.8   < > 4.1 3.9 3.8   < > 4.2   TSH  --  0.99 0.87 1.61  --    < >  --  0.90  --    < >  --     < > = values in this interval not displayed.        Reviewed and updated as needed this visit by clinical staff   Tobacco  Allergies  Meds              Reviewed and updated as needed this visit by Provider                 Past Medical History:   Diagnosis Date    Abnormal mammogram 9/21/2016    right breast     AD (atopic dermatitis) 10/29/2015    Allergic rhinitis due to other allergen     Arthritis     Bell's palsy     Chronic fatigue 6/6/2012    Chronic infection     MRSA - 2015 scalp and prior to Abdomen    Contact dermatitis and other eczema, due to unspecified cause     eczema    Contusion of left foot, initial encounter 3/16/2016    DJD (degenerative joint disease), lumbar 9/26/2013    DJD (degenerative joint disease), lumbar 9/26/2013    Ds DNA antibody positive 4/16/2014    borderline.     Elevated blood pressure reading without diagnosis of hypertension 12/24/2013    Facial contusion 12/15/2014    hit by horse     Foraminal stenosis of lumbar region 9/26/2013    Frontal headache 12/15/2014    Gastroesophageal reflux disease, esophagitis presence not specified 6/29/2016    Heat sensitivity 10/29/2015    HTN, goal below 140/90  9/23/2015    Hyperlipidemia LDL goal <130 8/30/2017    Irregular heart beat     Keratitis sicca (H24) 10/31/2012    Left shoulder pain 9/26/2013    Lumbar radiculopathy 1/22/2014    Migraine headache 10/23/2013    Migraine, unspecified, with intractable migraine, so stated, without mention of status migrainosus     Motion sickness     MRSA infection 10/20/2015    Muscular wasting and disuse atrophy, not elsewhere classified 6/9/2014    right SCM, right wrist,      Numbness and tingling     both legs anf feet greater on the left    PAC (premature atrial contraction) 7/15/2010    Personal history of COVID-19 12/2/2021    Plantar fasciitis 9/23/2015    PONV (postoperative nausea and vomiting)     Skin lesion 10/20/2015    posterior superior scalp     Spasm of muscle 7/11/2017    Telangiectasia of face 12/19/2014    Thyroid nodule 5/12/2022    Tooth pain 10/20/2015    left lower primary molar       Past Surgical History:   Procedure Laterality Date    AS INJ TRANSFORAMIN EPIDURAL, LUMB/SACR SINGLE      COLONOSCOPY  3/11/2013    Procedure: COLONOSCOPY;  colonoscopy;  Surgeon: Loibto Mas MD;  [facility]    COLONOSCOPY N/A 9/13/2021    Procedure: COLONOSCOPY, WITH POLYPECTOMY AND BIOPSY;  Surgeon: Sam Liz MD;  [facility]    COLONOSCOPY WITH CO2 INSUFFLATION N/A 11/19/2018    Procedure: COLONOSCOPY WITH CO2 INSUFFLATION;  Surgeon: Goyo Blank MD;  [facility]    DECOMPRESSION LUMBAR TWO LEVELS Bilateral 12/8/2016    Procedure: DECOMPRESSION LUMBAR TWO LEVELS;  Surgeon: Bang Burrell MD;  [facility]    DILATION AND CURETTAGE, HYSTEROSCOPY, ABLATE ENDOMETRIUM NOVASURE, COMBINED N/A 6/12/2019    Procedure: hysteroscopy, dilation & curettage, polypectomy, endometrial ablation;  Surgeon: Fredy Pham MD;  [facility]    ESOPHAGOSCOPY, GASTROSCOPY, DUODENOSCOPY (EGD), COMBINED  11/8/2013    Procedure: COMBINED ESOPHAGOSCOPY, GASTROSCOPY, DUODENOSCOPY (EGD),  BIOPSY SINGLE OR MULTIPLE;  Esophagoscopy, Gastroscopy, Duodenoscopy EGD with multiple biopsies;  Surgeon: Teja Kohc MD;  [facility]    ESOPHAGOSCOPY, GASTROSCOPY, DUODENOSCOPY (EGD), COMBINED N/A 2/18/2021    Procedure: ESOPHAGOGASTRODUODENOSCOPY, WITH BIOPSY;  Surgeon: Blanca Ba MD;  [facility]    ESOPHAGOSCOPY, GASTROSCOPY, DUODENOSCOPY (EGD), COMBINED N/A 4/11/2022    Procedure: ESOPHAGOGASTRODUODENOSCOPY, WITH BIOPSY;  Surgeon: Fredy Jimenez MD;  Location: [facility]    HC REMOVAL OF TONSILS,<9 Y/O      Tonsils <10y.o.    HC TOOTH EXTRACTION W/FORCEP      INJECT BLOCK MEDIAL BRANCH CERVICAL/THORACIC/LUMBAR N/A 7/23/2020    Procedure: Sacral 1,2,3 Lateral Branch Blocks and lumbar 5 medial branch blocks bilaterally;  Surgeon: Maurisio Goetz MD;  [facility]    INJECT JOINT SACROILIAC Left 9/24/2020    Procedure: Left Lumbar 5 Sacral 1 nerve root injections.;  Surgeon: Maurisio Goetz MD;  [facility]    REPAIR MUSCLE UPPER EXTREMITY Right 7/27/2021    Procedure: DIVISION RIGHT PECTORALIS MUSCLE;  Surgeon: Davie Dutta;  [facility]    REPAIR TENDON ELBOW  7/9/2014    Procedure: REPAIR TENDON ELBOW;  Surgeon: Chris Johntson MD;  [facility]    ULTRASONIC REMOVAL SOFT TISSUE (LOCATION) Right 11/21/2018    Procedure: Tenex right foot;  Surgeon: Patel Martínez DO;  [facility]    VASCULAR SURGERY      Thoracic Outlet Syndrome       Review of Systems   Constitutional:  Negative for chills and fever.   HENT:  Negative for congestion, ear pain, hearing loss and sore throat.    Eyes:  Negative for pain and visual disturbance.   Respiratory:  Negative for cough and shortness of breath.    Cardiovascular:  Negative for chest pain, palpitations and peripheral edema.   Gastrointestinal:  Negative for abdominal pain, constipation, diarrhea, heartburn, hematochezia and nausea.   Breasts:  Negative for tenderness, breast mass and discharge.  "  Genitourinary:  Negative for dysuria, frequency, genital sores, hematuria, pelvic pain, urgency, vaginal bleeding and vaginal discharge.   Musculoskeletal:  Negative for arthralgias, joint swelling and myalgias.   Skin:  Negative for rash.   Neurological:  Negative for dizziness, weakness, headaches and paresthesias.   Psychiatric/Behavioral:  Negative for mood changes. The patient is not nervous/anxious.      OBJECTIVE:   /74   Pulse 74   Temp 97.2  F (36.2  C) (Temporal)   Resp 16   Ht 1.665 m (5' 5.55\")   Wt 85.7 kg (189 lb)   SpO2 100%   BMI 30.92 kg/m      Physical Exam  GENERAL: healthy, alert and no distress  EYES: Eyes grossly normal to inspection, PERRL and conjunctivae and sclerae normal  HENT: ear canals and TM's normal, nose and mouth without ulcers or lesions  NECK: no adenopathy, no asymmetry, masses, or scars and thyroid normal to palpation  RESP: lungs clear to auscultation - no rales, rhonchi or wheezes  CV: regular rate and rhythm, normal S1 S2, no S3 or S4, no murmur, click or rub, no peripheral edema and peripheral pulses strong  ABDOMEN: soft, nontender, no hepatosplenomegaly, no masses and bowel sounds normal   (female): normal female external genitalia, normal urethral meatus, vaginal mucosa pink, moist, well rugated, and normal cervix/adnexa/uterus without masses or discharge  MS: no gross musculoskeletal defects noted, no edema  SKIN: no suspicious lesions or rashes  NEURO: Normal strength and tone, mentation intact and speech normal  PSYCH: mentation appears normal, affect normal/bright    Diagnostic Test Results:  Labs reviewed in Epic    ASSESSMENT/PLAN:   Bhupinder was seen today for physical.    Diagnoses and all orders for this visit:    Routine history and physical examination of adult  -     Lipid panel reflex to direct LDL Non-fasting; Future  -     CBC with platelets; Future  -     Comprehensive metabolic panel (BMP + Alb, Alk Phos, ALT, AST, Total. Bili, TP); Future  - "     Lipid panel reflex to direct LDL Non-fasting  -     CBC with platelets  -     Comprehensive metabolic panel (BMP + Alb, Alk Phos, ALT, AST, Total. Bili, TP)    Obesity - BMI >30    Hyperlipidemia LDL goal <130  -     Lipid panel reflex to direct LDL Non-fasting; Future  -     Lipid panel reflex to direct LDL Non-fasting    Eczema, unspecified type  -     clotrimazole-betamethasone (LOTRISONE) 1-0.05 % external cream; Apply topically 2 times daily    Chronic fatigue  -     Naltrexone HCl, Pain, 1.5 MG CAPS; Take 1.5 mg by mouth daily    Other polyneuropathy  -     Naltrexone HCl, Pain, 1.5 MG CAPS; Take 1.5 mg by mouth daily    Inflammatory neuropathy (H24)  -     Naltrexone HCl, Pain, 1.5 MG CAPS; Take 1.5 mg by mouth daily    Cervical radiculopathy    Lumbar radiculopathy    Visit for screening mammogram  -     MA SCREENING DIGITAL BILAT - Future  (s+30); Future    Cervical cancer screening  -     Pap Screen with HPV - recommended age 30 - 65 years    Other orders  -     Cancel: HEPATITIS B, ADULT 20+ (ENGERIX-B/RECOMBIVAX HB)  -     REVIEW OF HEALTH MAINTENANCE PROTOCOL ORDERS  -     ZOSTER RECOMBINANT ADJUVANTED (SHINGRIX)    48-year-old female here for routine physical.  Follow-up in 1 year.  Due for Pap smear today.    Still struggles with obesity with a body mass index just over 30.  Recommend increased physical exercise and lifestyle modifications as best she is able and follow-up as needed.    LDL goal established.  Labs pending.  Follow-up based on results.    He does struggle with eczema to the hands.  She has been using some of her sons Lotrisone with good effect.  We prescribed this for her today.    She has been struggling with inflammatory state with some neuropathy as well.  We have done some research related to low-dose naltrexone and she would like to try that.  Prescription is sent.  If this does not pass through her insurance I would have to defer to pain specialist for further evaluation and  treatment options.    Continues to work with specialist for cervical and lumbar radiculopathy as well as other joint related pains.    Orders placed for cancer screenings.    Patient has been advised of split billing requirements and indicates understanding: Yes      COUNSELING:   Reviewed preventive health counseling, as reflected in patient instructions       Consider AAA screening for ages 65-75 and > 100 cig smoking history or family history of AAA       Regular exercise       Healthy diet/nutrition       Vision screening       Hearing screening        She reports that she has never smoked. She has never used smokeless tobacco.          Otoniel Quiroz PA-C  [facility]

## 2023-11-16 NOTE — DISCHARGE INSTRUCTIONS
Elbow Percutaneous Tenotomy Procedure - After Care Instructions    Rest arm and hand today    May resume non-repetitive use of arm and hand in 2 days. Gentle range of motion is encouraged, light typing only if not painful.    No lifting objects with arm/hand greater than 5 pounds for 6 weeks.    Keep bandages and procedure area clean and dry.    Do not bathe (submerge arm in water) arm for 1 week, showering is ok.    If you experience discomfort in the first few days after the procedure, you may use application of an ice pack over the elbow. Keep in place for 20 minutes and then remove for at least 20 minutes before reapplying if necessary. You may also use acetaminophen (Tylenol) as directed on container.    Tramadol will be prescribed for pain after the procedure, if needed.    If you notice increasing redness, warmth and pain, fever, or drainage from the wound then notify our office at [phone]    Return to this clinic for your follow-up visit in 2 weeks.

## 2023-11-16 NOTE — OP NOTE
Ms. Coker is a 48 year old year old female here for a percutaneous tenotomy of the common extensor tendon using ultrasound guidance to cut and remove the pathologic tissue.  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    The anatomy was identified and the diseased tissue was visualized and confirmed at the common extensor tendon near the insertion.  The area was prepped with chlorhexidine.  Local anesthesia was provided with a local injection of 10 mL of 1% lidocaine, using a 25 gauge needle.  A #11 blade was used to puncture the skin to the site of the pathologic tissue.  A sterile sleeve was placed over the ultrasound transducer.    To section the tendon the surgical instrument is introduced through the puncture site advancing to the diseased tendon.  Once the tip of the instrument is confirmed to be on the pathologic tissue, the foot pedal was depressed and the tendon is incised along its length, cutting and removing the diseased tendon tissue.  Procedure was performed with a total of 1 minute and 54 seconds of cutting time.    Following the procedure, steri-strips were placed, followed by Tegaderm and a Tubigrip compression sleeve.  Post-operative instructions were given as follows:    Elbow Percutaneous Tenotomy Procedure - After Care Instructions  Rest arm and hand today  May resume non-repetitive use of arm and hand in 2 days. Gentle range of motion is encouraged.  May resume normal use of arm in 6 weeks.  No repetitive lifting greater than 20lbs for 3 months.  Keep compression sleeve on arm for 1-2 days.  Keep bandages and procedure area clean and dry.  Do not bathe (submerge arm in water) arm for 1 week, showering is ok.  If you experience discomfort in the first few days after the procedure, you may use application of an ice pack over the elbow. Keep in place for 20 minutes and then remove for at least 20 minute before reapplying if necessary. You  may also use acetaminophen (Tylenol) as directed on container.   If you notice increasing redness, warmth and pain, fever, or drainage from the wound then notify our office at [phone]  Return to this clinic for your follow-up visit in 2 weeks.      Amelia is scheduled to follow up in 2 weeks.        Prakash Martínez DO [facility]

## 2023-11-16 NOTE — PROGRESS NOTES
Please see documentation for today's percutaneous tenotomy with Tenex for lateral epicondylitis of her left elbow.    DS

## 2023-11-16 NOTE — TELEPHONE ENCOUNTER
[facility] Medical Policy- No PA required    Guidelines    Facet Joint Interventions-   Facet Joint Interventions are considered medically reasonable and necessary for the diagnosis and treatment of chronic pain in patients who meet ALL the following criteria:  Moderate to severe chronic neck or low back pain, predominantly axial, that causes functional deficit measured on pain or disability scale*  Pain present for minimum of 3 months with documented failure to respond to noninvasive conservative management (as tolerated)  Absence of untreated radiculopathy or neurogenic claudication (except for radiculopathy caused by facet joint synovial cyst)  There is no non-facet pathology per clinical assessment or radiology studies that could explain the source of the patient's pain, including but not limited to fracture, tumor, infection, or significant deformity.  *Pain assessment must be performed and documented at baseline, after each diagnostic procedure and at each follow-up using the same pain scale for each assessment. A disability scale must also be obtained at baseline to be used for functional assessment (if patient qualifies for treatment).    Diagnostic Facet Joint Procedures (IA or MBB)  The primary indication of a diagnostic facet joint procedure is to diagnose whether the patient has facet syndrome. Intraarticular (IA) facet block(s) are considered reasonable and necessary as a diagnostic test only if medial branch blocks (MMB) cannot be performed due to specific documented anatomic restrictions or there is an indication to proceed with therapeutic intraarticular injections. These restrictions must be clearly documented in the medical record and made available upon request.  Diagnostic procedures should be performed with the intent that if successful, radiofrequency ablation (RFA) procedure would be considered the primary treatment goal at the diagnosed level(s).  A second diagnostic facet procedure is  considered medically necessary to confirm validity of the initial diagnostic facet procedure when administered at the same level. The second diagnostic procedure may only be performed a minimum of 2 weeks after the initial diagnostic procedure. Clinical circumstances that necessitate an exception to the two-week duration may be considered on an individual basis and must be clearly documented in the medical record.  For the first diagnostic facet joint procedure:  For the first diagnostic facet joint procedure to be considered medically reasonable and necessary, the patient must meet the criteria outlined under indications for facet joint interventions.  A second confirmatory diagnostic facet joint procedure is considered medically reasonable and necessary in patients who meet ALL the following criteria:  The patient meets the criteria for the first diagnostic procedure; AND  After the first diagnostic facet joint procedure, there must be a consistent positive response of at least 80% relief of primary (index) pain (with the duration of relief being consistent with the agent used).  Frequency limitation: For each covered spinal region, no more than four (4) diagnostic joint sessions will be reimbursed per rolling 12 months, in recognition that the pain generator cannot always be identified with the initial and confirmatory diagnostic procedure.        Okay to schedule MBB #2    No Green   [facility]

## 2023-11-16 NOTE — TELEPHONE ENCOUNTER
Screening questions for MBB Injections:    Injection to be done at which interventional clinic site? [facility]    Procedure ordered by Dr. Solis    Procedure ordered? Cervical Medial Branch #2Block    What insurance would patient like us to bill for this procedure? [facility]    MEDICA: REQUIRES A PA FOR BOTH MBB   Worker's comp- Any injection DO NOT SCHEDULE and route to Jenelle Coleman.    [employer] insurance - If scheduling an SI joint injection DO NOT SCHEDULE and route to No Mcadams.     MBBs must be scheduled with elapsed time interval of at least 2 weeks and not more than 6 months between the First MBB and the Second MBB for insurance purposes     [employer] - Any injection besides hip/shoulder/knee joint DO NOT SCHEDULE and route to No Mcadams. She will obtain PA and call pt back to schedule procedure or notify pt of denial.     [employer]- PA required for all MBB's    **BCBS- ALL need to be routed to No for review if a PA is needed**  IF SCHEDULING IN [facility] PLEASE SCHEDULE AT LEAST 7-10         BUSINESS DAYS OUT SO A PA CAN BE OBTAINED    Genicular Nerve blocks- ALL insurances except for- [employer], Medicare (straight not supplement) and [employer]. Need to be reviewed by No before scheduling.       Is patient scheduled at [facility]? no   If YES, route every encounter to [facility] CARE NAVIGATION POOL [[phone]]    Any chance of pregnancy? No   If YES, do NOT schedule and route to RN pool  - Dr. Dennis route to Daysi Castillo and PM&R Nurse  [[id number]]      Is an  needed? No     Patient has a drive home? (mandatory) Yes     Is patient taking any blood thinners (plavix, coumadin, jantoven, warfarin, heparin, pradaxa or dabigatran )? No    If hold needed, do NOT schedule, route to RN pool/ Dr. Dennis's Team     Is patient taking any aspirin products? No   If more than 325mg/day, OK to schedule; Instruct pt to decrease to  less than 325 mg for 7 days AND route to RN pool/ Dr. Dennis's Team  For CERVICAL procedures, hold all aspirin products for 6 days.   Tell pt that if aspirin product is not held for 6 days, the procedure WILL BE cancelled.      Does the patient have a bleeding or clotting disorder? No  If YES, okay to schedule AND route to RN nurse pool/ Dr. Dennis's Team  **For any patients with platelet count <100, must be forwarded to provider**    Is patient diabetic? no If YES, have them bring their glucometer.    Does patient have an active infection or treated for one within the past week? No    Is patient currently taking any antibiotics?  No  For patients on chronic, preventative, or prophylactic antibiotics, procedures may be scheduled.   For patients on antibiotics for active or recent infection:antibiotic course must have been completed for 4 days    Is patient currently taking any steroid medications? (i.e. Prednisone, Medrol)  No   For patients on steroid medications, course must have been completed for 4 days    Is patient actively being treated for cancer or immunocompromised? No   If YES, do NOT schedule and route to INDIGO/ Dr. Dennis's Team    Are you able to get on and off an exam table with minimal or no assistance? Yes   If NO, do NOT schedule and route to INDIGO/ Dr. Dennis's Team    Are you able to roll over and lay on your stomach with minimal or no assistance? Yes   If NO, do NOT schedule and route to RN/ Dr. Dennis's Team    Any allergies to contrast dye, iodine, shellfish, or numbing and steroid medications? No  (If so, inform nursing and note in scheduling comments.)    Allergies: Ceftriaxone, Bactrim [sulfamethoxazole-trimethoprim], Ciprofloxacin, Codeine, Copper, Doxycycline, Gold, Iodine, Iodine-131, Levaquin [levofloxacin], Morphine and related, Nickel, Steel [staples], Sulfa antibiotics, Latex, and Penicillins     Does patient have an MRI/CT?  Not Applicable  Check Procedure Scheduling Grid to see if  required.    Was the MRI done within the last 3 years?  NA  If yes, where was the MRI done i.e.[facility], [facility], [place], [facility] etc?     If no, do not schedule and route to nursing/ Dr. Dennis's Team  If MRI was not done at [place], [facility] or [facility] do NOT schedule and route to nursing.    If pt has an imaging disc, the injection MAY be scheduled but pt has to bring disc to appt.   If they show up without the disc the injection cannot be done    Is patient able to transfer to a procedure table with minimal or no assistance? Yes  If NO, do NOT schedule and route to RN/ Dr. Dennis's Team    Medial Branch Block Pre-Procedure Instructions  It is okay to take long acting pain medications (if you are on them) the day of the procedure but try not to take any short acting medications unless absolutely necessary.    YES: ok   Long acting meds would include: Gabapentin (Neurontin), MS Contin, Oxycontin        Short acting meds would include:  Percocet, Oxycodone, Vicodin, Ibuprofen   The day of the procedure, you should try to do things that provoke your pain, since the injection is being done to see if it will relieve your pain . YES: ok   If your pain level is a 4 out of 10 or less on the day of the procedu re, please call [phone] to reschedule.  YES: ok     Reminders:    If you are started on any steroids or antibiotics between now and your appointment, you must contact us because it may affect our ability to perform your procedure ok    Instructed pt to arrive 30 minutes early for IV start if required. (Check Procedure Scheduling Grid) ELADIA     If this is for a cervical MBB aspirin needs to be held for 6 days.  NO     Do not schedule procedures requiring IV placement in the first appointment of the day or first appointment after lunch. Do NOT schedule at 0745, 0815 or 1245.  ok    For patients 85 or older we recommend having an adult stay w/ them for the remainder of the day.      Does the  patient have any questions? no

## 2023-11-17 ENCOUNTER — MYC MEDICAL ADVICE (OUTPATIENT)
Dept: FAMILY MEDICINE | Facility: OTHER | Age: 48
End: 2023-11-17
Payer: COMMERCIAL

## 2023-11-17 DIAGNOSIS — M67.921 BICEPS TENDINOPATHY, RIGHT: ICD-10-CM

## 2023-11-17 DIAGNOSIS — G61.9 INFLAMMATORY NEUROPATHY (H): ICD-10-CM

## 2023-11-17 DIAGNOSIS — G62.89 OTHER POLYNEUROPATHY: ICD-10-CM

## 2023-11-17 DIAGNOSIS — R53.82 CHRONIC FATIGUE: ICD-10-CM

## 2023-11-17 DIAGNOSIS — M47.26 OSTEOARTHRITIS OF SPINE WITH RADICULOPATHY, LUMBAR REGION: ICD-10-CM

## 2023-11-17 DIAGNOSIS — R76.8 ANA POSITIVE: Primary | ICD-10-CM

## 2023-11-17 NOTE — TELEPHONE ENCOUNTER
Naltrexone isn't covered by insurance and is over $400 with discount card.     Do you have any other suggestions now for patient?    Tari Bradford, JESSYN, RN

## 2023-11-20 ENCOUNTER — TELEPHONE (OUTPATIENT)
Dept: ORTHOPEDICS | Facility: CLINIC | Age: 48
End: 2023-11-20
Payer: COMMERCIAL

## 2023-11-20 LAB
BKR LAB AP GYN ADEQUACY: NORMAL
BKR LAB AP GYN INTERPRETATION: NORMAL
BKR LAB AP HPV REFLEX: NORMAL
BKR LAB AP PREVIOUS ABNORMAL: NORMAL
PATH REPORT.COMMENTS IMP SPEC: NORMAL
PATH REPORT.COMMENTS IMP SPEC: NORMAL
PATH REPORT.RELEVANT HX SPEC: NORMAL

## 2023-11-20 NOTE — TELEPHONE ENCOUNTER
[facility] Call Center    Phone Message    May a detailed message be left on voicemail: yes     Reason for Call: Other: PT IS NEEDING A APPT FOR 11/30/23 FOR A FOLLOW UP FROM SURGERY PT PREFERS THIS DAY IN [place]. NO APPTS WITH HIM TILL 12/14/23 NOT SURE IF HE WANTS HER TO WAIT THAT LONG OR WORK HER IN.      Action Taken: [facility] [place] [[id number]]    Travel Screening: Not Applicable

## 2023-11-21 NOTE — TELEPHONE ENCOUNTER
SPINE PATIENTS - NEW PROTOCOL PREVISIT    RECORDS RECEIVED FROM: Internal   REASON FOR VISIT: Chronic low back pain, unspecified back pain laterality, unspecified whether sci...  Chronic pain of left lower extremity [M79.605, G89.29]  Piriformis syndrome of left side \ wanting injection    Date of Appt: 01/05/2024   NOTES (FOR ALL VISITS) STATUS DETAILS   OFFICE NOTE from referring provider Internal 11/15/2023 Dr Rice [facility]    OFFICE NOTE from other specialist Internal 09/28/2023 Dr Mesa [facility]   09/29/2023 PT [facility]   07/11/2023 Anai Alvarez [facility]   DISCHARGE SUMMARY from hospital N/A    DISCHARGE REPORT from ER N/A    OPERATIVE REPORT N/A    EMG REPORT N/A    MEDICATION LIST N/A    IMAGING  (FOR ALL VISITS)     MRI (HEAD, NECK, SPINE) zReceived 07/21/2023 lumbar spine    XRAY (SPINE) *NEUROSURGERY* N/A    CT (HEAD, NECK, SPINE) N/A

## 2023-11-22 LAB
HUMAN PAPILLOMA VIRUS 16 DNA: NEGATIVE
HUMAN PAPILLOMA VIRUS 18 DNA: NEGATIVE
HUMAN PAPILLOMA VIRUS FINAL DIAGNOSIS: NORMAL
HUMAN PAPILLOMA VIRUS OTHER HR: NEGATIVE

## 2023-11-28 NOTE — TELEPHONE ENCOUNTER
Patient left a message on the surgery scheduling line that she needs to cancel her surgery scheduled on 12/26/23 as her insurance will not cover it.  She has to get other opinions; that is what her insurance is requiring.  She requested I call her to let her know her surgery has been cancelled.

## 2023-11-28 NOTE — TELEPHONE ENCOUNTER
LM for patient that I received her message and that her 12/26/23 surgery has been cancelled and to call us back if she wants to reschedule in the future.    Surgery form placed in Dr. Dutta's pending file.

## 2023-11-30 ENCOUNTER — VIRTUAL VISIT (OUTPATIENT)
Dept: NEUROSURGERY | Facility: CLINIC | Age: 48
End: 2023-11-30
Payer: COMMERCIAL

## 2023-11-30 ENCOUNTER — OFFICE VISIT (OUTPATIENT)
Dept: ORTHOPEDICS | Facility: CLINIC | Age: 48
End: 2023-11-30
Payer: COMMERCIAL

## 2023-11-30 DIAGNOSIS — M77.12 LATERAL EPICONDYLITIS OF LEFT ELBOW: Primary | ICD-10-CM

## 2023-11-30 DIAGNOSIS — M79.601 PAIN OF RIGHT UPPER EXTREMITY: Primary | ICD-10-CM

## 2023-11-30 PROCEDURE — 99443 PR PHYSICIAN TELEPHONE EVALUATION 21-30 MIN: CPT | Performed by: STUDENT IN AN ORGANIZED HEALTH CARE EDUCATION/TRAINING PROGRAM

## 2023-11-30 PROCEDURE — 99024 POSTOP FOLLOW-UP VISIT: CPT | Performed by: FAMILY MEDICINE

## 2023-11-30 ASSESSMENT — PAIN SCALES - GENERAL: PAINLEVEL: EXTREME PAIN (8)

## 2023-11-30 NOTE — PROGRESS NOTES
HISTORY OF PRESENT ILLNESS  Ms. Coker is a pleasant 48 year old female following up with lateral epicondylitis.  Bhupinder underwent a percutaneous tenotomy with Tenex 2 weeks ago today for her left elbow.  She feels ongoing pain in her elbow, she feels pain when she is gripping objects and when she is generally moving her elbow.  She denies any redness, fever, or other systemic symptoms.     PHYSICAL EXAM  General  - normal appearance, in no obvious distress    Musculoskeletal - left elbow  - inspection: Mild left elbow swelling  - palpation: Tender lateral elbow  - ROM:  160 deg flexion   0 deg extension   90 deg pronation   90 deg supination  - strength: 5/5  strength  Neuro  - no sensory or motor deficit, grossly normal coordination, normal muscle tone      ASSESSMENT & PLAN  Ms. Coker is a 48 year old female following up with lateral epicondylitis, she underwent percutaneous tenotomy with Tenex 2 weeks ago.    At this point I do think that her pain is under the realm of what would be expected for postprocedural pain at the 2-week jeffy.    We did discuss that her pain should steadily improve over the coming weeks, although day-to-day her pain may be better or worse.    I do think she should apply topical diclofenac gel, ice, and can compress as helpful using an Ace wrap.    We should follow-up in 4 weeks via phone.    It was a pleasure seeing Amelia.        Patel Martínez DO, CAM      This note was constructed using Dragon dictation software, please excuse any minor errors in spelling, grammar, or syntax.

## 2023-11-30 NOTE — LETTER
11/30/2023         RE: Amelia Coker  [address] [facility] [address]        Dear Colleague,    Thank you for referring your patient, Amelia Coker, to the [facility] [place]. Please see a copy of my visit note below.    Bhupinder is a 48 year old who is being evaluated via a billable telephone visit.      What phone number would you like to be contacted at? [phone]  How would you like to obtain your AVS? MyChart    Distant Location (provider location):  On-site          HPI:  48-year-old female with chronic neck pain radiating to the upper thoracic spine as well as right shoulder blade down the right shoulder and into the right arm.  She has a history of thoracic outlet syndrome with a prior muscular decompression which had improved some symptoms.  We have seen her in the past for neck and back pain and discussed surgery options.  Her symptoms are not completely consistent with radiculopathies and EMGs have been negative to date so she is exploring other options.  She was looking at doing thoracic outlet syndrome surgery but her insurance company has denied this right now.  She is working on a second opinion from [place] with vascular surgeons.  She did recently undergo right sided medial branch blocks in the neck which did improve the pain fairly significantly and she is planning on undergoing the radiofrequency ablation soon.  Current Outpatient Medications   Medication     ACETAMINOPHEN PO     betamethasone valerate (VALISONE) 0.1 % external lotion     clotrimazole-betamethasone (LOTRISONE) 1-0.05 % external cream     meloxicam (MOBIC) 15 MG tablet     metFORMIN ER osmotic (FORTAMET) 500 MG 24 hr tablet     mupirocin (BACTROBAN) 2 % external ointment     naltrexone (REVIA) 1 mg/mL SOLN     Naltrexone HCl, Pain, 1.5 MG CAPS     tacrolimus (PROTOPIC) 0.1 % external ointment     No current facility-administered medications for this visit.      Physical Exam:  Vital signs:      "                    Estimated body mass index is 30.92 kg/m  as calculated from the following:    Height as of 11/16/23: 1.665 m (5' 5.55\").    Weight as of 11/16/23: 85.7 kg (189 lb).   No physical exam as this was a telephone visit.  Results Reviewed:  I personally viewed the images of the MRI of the cervical spine once again.  This shows foraminal stenosis most significant at C5-6 and C7-T1 on the right.  Assessment:  48-year-old female with right arm pain not consistent with radiculopathy however not ruled out as foraminal stenosis is present at C7-T1 and C5-6.  Also possibility of underlying thoracic outlet syndrome causing the symptoms.  Plan:  Would recommend continuing with plan for and she will foraminotomies at C7-T1 as I believe this is more consistent with her pain pattern in the C5-6 area.  We discussed risks and benefits of this procedure in the initial stages as well.  She does have a history of some hypermobility which could lead to potential complications in the future.  Should she want to inquire more about this type of surgery I would have her see another spine surgeon for second opinion given the complexity of her case.  The other with radiofrequency ablation to discuss surgery options further in the future.    I spent 30 minutes reviewing the patient's chart, interviewing the patient as well as discussing diagnosis and treatment options.    George Mesa MD      Again, thank you for allowing me to participate in the care of your patient.        Sincerely,        George Mesa MD  "

## 2023-11-30 NOTE — LETTER
11/30/2023         RE: Amelia Coker  [address] [facility] [address]        Dear Colleague,    Thank you for referring your patient, Amelia Coker, to the [facility] [place]. Please see a copy of my visit note below.    HISTORY OF PRESENT ILLNESS  Ms. Coker is a pleasant 48 year old female following up with lateral epicondylitis.  Bhupinder underwent a percutaneous tenotomy with Tenex 2 weeks ago today for her left elbow.  She feels ongoing pain in her elbow, she feels pain when she is gripping objects and when she is generally moving her elbow.  She denies any redness, fever, or other systemic symptoms.     PHYSICAL EXAM  General  - normal appearance, in no obvious distress    Musculoskeletal - left elbow  - inspection: Mild left elbow swelling  - palpation: Tender lateral elbow  - ROM:  160 deg flexion   0 deg extension   90 deg pronation   90 deg supination  - strength: 5/5  strength  Neuro  - no sensory or motor deficit, grossly normal coordination, normal muscle tone      ASSESSMENT & PLAN  Ms. Coker is a 48 year old female following up with lateral epicondylitis, she underwent percutaneous tenotomy with Tenex 2 weeks ago.    At this point I do think that her pain is under the realm of what would be expected for postprocedural pain at the 2-week jeffy.    We did discuss that her pain should steadily improve over the coming weeks, although day-to-day her pain may be better or worse.    I do think she should apply topical diclofenac gel, ice, and can compress as helpful using an Ace wrap.    We should follow-up in 4 weeks via phone.    It was a pleasure seeing Amelia.        Patel Martínez DO, CAQSM      This note was constructed using Dragon dictation software, please excuse any minor errors in spelling, grammar, or syntax.      Again, thank you for allowing me to participate in the care of your patient.        Sincerely,        Patel Martínez DO

## 2023-11-30 NOTE — NURSING NOTE
Chief Complaint   Patient presents with    Left Forearm - Follow Up, Pain    Left Elbow - Follow Up, Pain    Left Upper Arm - Pain, Follow Up       There were no vitals filed for this visit.    There is no height or weight on file to calculate BMI.      CHUY Jones NREMT

## 2023-11-30 NOTE — PROGRESS NOTES
"HPI:  48-year-old female with chronic neck pain radiating to the upper thoracic spine as well as right shoulder blade down the right shoulder and into the right arm.  She has a history of thoracic outlet syndrome with a prior muscular decompression which had improved some symptoms.  We have seen her in the past for neck and back pain and discussed surgery options.  Her symptoms are not completely consistent with radiculopathies and EMGs have been negative to date so she is exploring other options.  She was looking at doing thoracic outlet syndrome surgery but her insurance company has denied this right now.  She is working on a second opinion from [place] with vascular surgeons.  She did recently undergo right sided medial branch blocks in the neck which did improve the pain fairly significantly and she is planning on undergoing the radiofrequency ablation soon.  Current Outpatient Medications   Medication    ACETAMINOPHEN PO    betamethasone valerate (VALISONE) 0.1 % external lotion    clotrimazole-betamethasone (LOTRISONE) 1-0.05 % external cream    meloxicam (MOBIC) 15 MG tablet    metFORMIN ER osmotic (FORTAMET) 500 MG 24 hr tablet    mupirocin (BACTROBAN) 2 % external ointment    naltrexone (REVIA) 1 mg/mL SOLN    Naltrexone HCl, Pain, 1.5 MG CAPS    tacrolimus (PROTOPIC) 0.1 % external ointment     No current facility-administered medications for this visit.      Physical Exam:  Vital signs:                         Estimated body mass index is 30.92 kg/m  as calculated from the following:    Height as of 11/16/23: 1.665 m (5' 5.55\").    Weight as of 11/16/23: 85.7 kg (189 lb).   No physical exam as this was a telephone visit.  Results Reviewed:  I personally viewed the images of the MRI of the cervical spine once again.  This shows foraminal stenosis most significant at C5-6 and C7-T1 on the right.  Assessment:  48-year-old female with right arm pain not consistent with radiculopathy however not ruled out as " foraminal stenosis is present at C7-T1 and C5-6.  Also possibility of underlying thoracic outlet syndrome causing the symptoms.  Plan:  Would recommend continuing with plan for and she will foraminotomies at C7-T1 as I believe this is more consistent with her pain pattern in the C5-6 area.  We discussed risks and benefits of this procedure in the initial stages as well.  She does have a history of some hypermobility which could lead to potential complications in the future.  Should she want to inquire more about this type of surgery I would have her see another spine surgeon for second opinion given the complexity of her case.  The other with radiofrequency ablation to discuss surgery options further in the future.    I spent 30 minutes reviewing the patient's chart, interviewing the patient as well as discussing diagnosis and treatment options.    George Mesa MD

## 2023-11-30 NOTE — PROGRESS NOTES
Bhupinder is a 48 year old who is being evaluated via a billable telephone visit.      What phone number would you like to be contacted at? [phone]  How would you like to obtain your AVS? Fredishart    Distant Location (provider location):  On-site

## 2023-12-07 ENCOUNTER — TELEPHONE (OUTPATIENT)
Dept: PHYSICAL MEDICINE AND REHAB | Facility: CLINIC | Age: 48
End: 2023-12-07
Payer: COMMERCIAL

## 2023-12-07 DIAGNOSIS — G57.02 PIRIFORMIS SYNDROME, LEFT: ICD-10-CM

## 2023-12-07 DIAGNOSIS — R25.2 CRAMP AND SPASM: Primary | ICD-10-CM

## 2023-12-07 NOTE — TELEPHONE ENCOUNTER
Patient called and wants to get injection procedure with dr. Dennis. Patient is currently scheduled for consult with dr. Dennis on 01/05/24. Patient does not want the consult. Pleas place a case request for this patient and call back.

## 2023-12-07 NOTE — TELEPHONE ENCOUNTER
DIAGNOSIS: right shoulder    APPOINTMENT DATE: 02/05/2024   NOTES STATUS DETAILS   OFFICE NOTE from referring provider Internal 11/30/2023 Dr Martínez [facility]    OFFICE NOTE from other specialist Internal 11/30/2023 Dr Mesa [facility]   11/06/2023 PT [facility]    DISCHARGE SUMMARY from hospital N/A    DISCHARGE REPORT from the ER N/A    OPERATIVE REPORT N/A    MEDICATION LIST N/A    EMG (for Spine) N/A    IMPLANT RECORD/STICKER N/A    LABS     CBC/DIFF N/A    CULTURES N/A    INJECTIONS DONE IN RADIOLOGY N/A    MRI N/A    CT SCAN N/A    XRAYS (IMAGES & REPORTS) N/A    TUMOR     PATHOLOGY  Slides & report N/A

## 2023-12-08 NOTE — TELEPHONE ENCOUNTER
Patient called back and writer spoke with her. Patient wanted to know what a pain management referral could do for her. She states she has tried various medications, injections, spinal cord stimulator, and she is not sure what else they could do for her. Writer stated they are specialists, and she is not able to give patient an exact idea of what treatment options they would recommend. Patient states that Dr. Rice has said it is her piriformis and that the botox is what she needs. She is frustrated with the process and wants to know why everything takes months to schedule, and everyone pushes her onto a different person. Writer stated she was not able to answer that question. Let patient know that she would be contacted later today regarding next steps for the botox.

## 2023-12-08 NOTE — TELEPHONE ENCOUNTER
Called and left message for patient. Writer spoke with Dr. Dennis who recommended that patient speak with Dr. Rice regarding a pain management consult as another option as she is having issues sleeping due to the pain/spasms. Writer asked patient to call back to let her know if she would be interested in this.

## 2023-12-08 NOTE — TELEPHONE ENCOUNTER
Called and spoke with patient. Initially, patient was told that Dr. Dennis would need to do a nerve block before proceeding with botox as this is typically what insurance needs before they will cover it. However, patient had informed us that she has a high tolerance for numbing agents and they often don't work for her. Writer has spoken with Dr. Dennis and due to the fact she has resistance to numbing agents, he stated that he would try to go straight to trying to get approval for the botox, but is unsure if it will be approved. Patient stated understanding, but is very frustrated with the process. She states that she cannot sleep right now due to pain/spasms. She is asking to schedule the botox appointment now so there is something on the schedule so she doesn't have to wait if it does get approved. Writer stated that she does not schedule the botox appointments as they are only done at the [facility] and she is unsure what their process is. Let patient know that writer would be messaging with Dr. Dennis's nurse at the [facility] to see if this would be possible. Patient asks to be kept informed of all developments. Writer stated understanding.

## 2023-12-08 NOTE — TELEPHONE ENCOUNTER
Called patient to review the Botox approval process and assist with getting her in to see Dr. Dennis at the [facility] location for her Left Piriformis Botox injections.     Discussed the Botox Prior Authorization process and that it can take 14-15 business days with her insurance to find out if they will approve the Botox injections, and if we get coverage before January we will need to reverify the PA coverage with the new year's policy in January.     Reviewed the dates available for an appt in clinic, patient could not make the 1/17/24 appt, but stated if there was availability for 1/24/24 she could she him then. Offered patient the 12:30 PM with 12:15 PM check in appt spot and she stated that would work perfectly. Advised that the Clinic Navigator will finalize her appt and then send her a My Chart message with the confirmation that her appt is set and the info for her visit.     Routed to Clinic Navigator to assist with finalizing patient's Wednesday 1/24/24 appointment at 12:30 PM with 12:15 PM with Dr. Dennis on the 3rd floor of the [facility] at the [facility] [place].     Daysi Castillo, JESSYN RN  RN Care Coordinator for Physical Medicine and Rehabilitation  [facility] - [address] [address]  Office: [phone]  Fax: [phone]

## 2023-12-14 ENCOUNTER — RADIOLOGY INJECTION OFFICE VISIT (OUTPATIENT)
Dept: PALLIATIVE MEDICINE | Facility: CLINIC | Age: 48
End: 2023-12-14
Payer: COMMERCIAL

## 2023-12-14 ENCOUNTER — TELEPHONE (OUTPATIENT)
Dept: NEUROLOGY | Facility: CLINIC | Age: 48
End: 2023-12-14

## 2023-12-14 VITALS — OXYGEN SATURATION: 99 % | SYSTOLIC BLOOD PRESSURE: 111 MMHG | HEART RATE: 61 BPM | DIASTOLIC BLOOD PRESSURE: 69 MMHG

## 2023-12-14 DIAGNOSIS — M47.812 SPONDYLOSIS OF CERVICAL REGION WITHOUT MYELOPATHY OR RADICULOPATHY: ICD-10-CM

## 2023-12-14 PROCEDURE — 64491 INJ PARAVERT F JNT C/T 2 LEV: CPT | Mod: RT | Performed by: PAIN MEDICINE

## 2023-12-14 PROCEDURE — 64490 INJ PARAVERT F JNT C/T 1 LEV: CPT | Mod: RT | Performed by: PAIN MEDICINE

## 2023-12-14 RX ORDER — TIRZEPATIDE 15 MG/.5ML
15 INJECTION, SOLUTION SUBCUTANEOUS
COMMUNITY
End: 2024-03-25

## 2023-12-14 ASSESSMENT — PAIN SCALES - GENERAL
PAINLEVEL: MODERATE PAIN (4)
PAINLEVEL: SEVERE PAIN (6)

## 2023-12-14 NOTE — NURSING NOTE
Pre-procedure Intake  If YES to any questions or NO to having a   Please complete laminated checklist and leave on the computer keyboard for Provider, verbally inform provider if able.    For SCS Trial, RFA's or any sedation procedure:  Have you been fasting? NA  If yes, for how long?     Are you taking any any blood thinners such as Coumadin, Warfarin, Jantoven, Pradaxa Xarelto, Eliquis, Edoxaban, Enoxaparin, Lovenox, Heparin, Arixtra, Fondaparinux, or Fragmin? OR Antiplatelet medication such as Plavix, Brilinta, or Effient?   No   If yes, when did you take your last dose?     Do you take aspirin?  No  If cervical procedure, have you held aspirin for 6 days?   NA    Do you have any allergies to contrast dye, iodine, steroid and/or numbing medications?  YES:  iodine    Are you currently taking antibiotics or have an active infection?  NO    Have you had a fever/elevated temperature within the past week? NO    Are you currently taking oral steroids? NO    Do you have a ? Yes    Are you pregnant or breastfeeding?  NO    Have you received the COVID-19 vaccine? No   If yes, was it your 1st, 2nd or only dose needed?   Date of most recent vaccine:     Notify provider and RNs if systolic BP >170, diastolic BP >100, P >100 or O2 sats < 90%     Daysi Evans MA  [facility] [place]

## 2023-12-14 NOTE — NURSING NOTE
Discharge Information    IV Discontiued Time:  NA    Amount of Fluid Infused:  NA    Discharge Criteria = When patient returns to baseline or as per MD order    Consciousness:  Pt is fully awake    Circulation:  BP +/- 20% of pre-procedure level    Respiration:  Patient is able to breathe deeply    O2 Sat:  Patient is able to maintain O2 Sat >92% on room air    Activity:  Moves 4 extremities on command    Ambulation:  Patient is able to stand and walk or stand and pivot into wheelchair    Dressing:  Clean/dry or No Dressing    Notes:   Discharge instructions and AVS given to patient    Patient meets criteria for discharge?  YES    Admitted to PCU?  No    Responsible adult present to accompany patient home?  Yes    Signature/Title:    Dominga Lemus RN  RN Care Coordinator  [facility] [place]

## 2023-12-14 NOTE — PROGRESS NOTES
Pre procedure Diagnosis: Cervical spondylosis   Post procedure Diagnosis: Same  Procedure performed: cervical medial branch block right C4,5,6  Anesthesia: none  Complications: none  Operators: Toi Solis MD    Indications:   Amelia Coker is a 48 year old female. 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  Exam shows pain with extension/rotation  and they have tried conservative treatment including meds/pt.    Options/alternatives, benefits and risks were discussed with the patient including but not limited to bleeding, infection, tissue trauma, exposure to radiation, reaction to medications, spinal cord injury, weakness, numbness and paralysis.  Questions were answered to her satisfaction and she wishes to proceed. Voluntary informed consent was obtained and signed.     Vitals were reviewed: Yes  Allergies were reviewed:  Yes   Medications were reviewed:  Yes   Pre-procedure pain score: 4/10    Procedure:  After obtaining signed, informed consent, the patient was taken to the procedure room and placed in the prone position on the [facility] frame.  A Pause for the Cause was completed prior to procedure start.  The patient was prepped and draped in the usual sterile fashion.    The areas of interest were identified with fluoroscopy.  The skin and subcutaneous tissue were anesthetized by injecting Lidocaine 1% 1 ml at each injection site utilizing a 27 gauge 1.25 inch needle.  Then,  25 gauge 3.5 inch spinal needles with slightly curved tips were advanced tangential to the medial branch nerves, abutting the articular pillars at right C4,5,6 utilizing AP and Lateral fluoroscopic guidance.  Aspiration for blood and CSF was negative at all the levels.  Then, after repeated negative aspiration, each level was injected with 0.5 ml of 0.25% bupivacaine and the needles were restyletted and withdrawn.    The injection sites were cleaned and sterile dressings were applied where  indicated.    The patient tolerated the procedure well without complications and was taken to the recovery area for continued observation.  Fluoroscopic images were saved to PACS.    The patient was re-evaluated following the procedure and they noted dec pain and improved range of motion.    Post-procedure pain:  6/10    The patient was given a pain diary and instructed to document their pain throughout the day.  The patient was asked to return the pain diary the next day in person or by fax at which time it will be reviewed and the decision made whether or not to proceed.    Follow-up:  pending results     Toi Solis MD  [facility]

## 2023-12-14 NOTE — PATIENT INSTRUCTIONS
[facility]   Medial Branch Block Discharge Instructions      Your procedure was performed by: Dr. Toi Solis       Medications used include:  Lidocaine  Bupivicaine  Omnipaque  Omniscan  Ropivicaine Normal saline     You will need to complete the Pain Scale Log form and return it to us as soon as possible.  Once we have received the form, we will review it and call you to determine the next steps.     The form can be faxed to [phone] or mailed to:   [facility]   [id number]   [place] [address]    You may resume your regular medications  If you were holding your blood thinning medication, please restart taking it: N/A  You may resume your regular activities  Be cautious as numbness and/or weakness in the upper extremities may occur for up to 6-8 hours due to the effects of the anesthetic.  Avoid driving for 6 hours. The local anesthetic could slow your reflexes.   You may shower, however no swimming or tub baths or hot tubs for 24 hours following your procedure.  Your pain will return after the numbing medications have worn off.  You may use your current pain medications as needed.  Unless you have been directed to avoid the use of anti-inflammatory medications (NSAIDS), you may use medications such as ibuprofen, Aleve or Tylenol for pain control if needed.  Some people find it helpful to alternate ibuprofen and Tylenol every 3 hours for a couple of days.  You may use ice packs 10-15 minutes three to four times a day at the injection site for comfort.   Do not use heat to painful areas for 6 to 8 hours. This will give the local anesthetic time to wear off and prevent you from accidentally burning your skin.   If you experience any of the following, call the Pain Clinic during work hours (Monday through Friday 8 am-4:30 pm) at [phone] or the Provider Line after hours at [phone]:  -Fever over 100 degree F  -Swelling, bleeding,  redness, drainage, warmth at the injection site  -Progressive weakness or numbness in your arms  -If cervical, call if you have any unusual headache that is not relieved by Tylenol  -Unusual new onset of pain that is not improving

## 2023-12-14 NOTE — TELEPHONE ENCOUNTER
Writer faxed referral for second opinion to [facility] at Fax number [phone].  Faxed face sheet, OV note and referral.  Verified delivered by right fax.  FUAD Walsh., JAIMEE ([facility])

## 2023-12-20 ENCOUNTER — HOSPITAL ENCOUNTER (OUTPATIENT)
Dept: MAMMOGRAPHY | Facility: CLINIC | Age: 48
Discharge: HOME | End: 2023-12-20
Attending: PHYSICIAN ASSISTANT | Admitting: PHYSICIAN ASSISTANT
Payer: COMMERCIAL

## 2023-12-20 DIAGNOSIS — Z12.31 VISIT FOR SCREENING MAMMOGRAM: ICD-10-CM

## 2023-12-20 PROCEDURE — 77067 SCR MAMMO BI INCL CAD: CPT

## 2023-12-21 ENCOUNTER — TELEPHONE (OUTPATIENT)
Dept: PALLIATIVE MEDICINE | Facility: CLINIC | Age: 48
End: 2023-12-21

## 2023-12-21 DIAGNOSIS — M47.812 SPONDYLOSIS OF CERVICAL REGION WITHOUT MYELOPATHY OR RADICULOPATHY: Primary | ICD-10-CM

## 2023-12-21 DIAGNOSIS — G43.109 MIGRAINE WITH AURA AND WITHOUT STATUS MIGRAINOSUS, NOT INTRACTABLE: ICD-10-CM

## 2023-12-21 NOTE — TELEPHONE ENCOUNTER
Pt had a right Cervical  medial branch block # 2 on 12/14/2023.  The post medial branch block form was  received.    Max % of pain relief from medial branch block #1:           %  Max relief from block #2 is:     Max relief from the block: % on procedure day  Physical therapy:    Last done in?:  2023.   How many sessions?:  9.    Where was it done?  [facility]  Called pt and informed him/her that insurance would be checked and will be  called once we get a response.  Done  Fax the pain diary to No.   Done  Rightfax confirmed    Routed to [place] to check insurance coverage.      Dominga Lemus RN  [facility]  [phone]

## 2023-12-22 NOTE — TELEPHONE ENCOUNTER
Prior authorization and predetermination are not a guarantee of benefits.Please contact the benefits department to verify coverage and benefit information for the member.  Transaction Submission Confirmation  Case ID# [id number] was submitted on 12- by [email or web address]        PA and clinicals submitted via web portal for CRFA to [employer].              No HILL    [facility]

## 2023-12-27 NOTE — TELEPHONE ENCOUNTER
Screening Questions for RFA Procedure      Procedure ordered? CRFA     What insurance are we billing for this procedure? [facility]   IF SCHEDULING AT [facility] PLEASE SCHEDULE AT LEAST 7-10 BUSINESS DAYS OUT SO A PA CAN BE OBTAINED    Is patient scheduled at [facility]? No   If YES, route every encounter to [facility] CARE NAVIGATION POOL [[phone]]  Has patient had this injection before? No  Any chance of pregnancy? NO   If YES, do NOT schedule and route to RN pool     Is  Needed?: No  Will patient have a ?  Yes   If pt is given sedation meds, no driving for 24 hours.  Is pt taking a cab or transportation service? NO        If so will need to be accompanied by an adult too (friend/family member) in order for IV sedation to be given.      Per [place] Policy:  Outpatients are to have responsible adult or family member to accompany them at discharge and drive them home. A service providing medically trained drivers or attendants would be acceptable. Public transportation would not be acceptable unless the patient is accompanied by a responsible adult or family member.  Is patient taking any blood thinners (i.e. plavix, coumadin, jantoven, warfarin, heparin, pradaxa or dabigatran, etc)? No   If YES, do NOT schedule, and route to RN pool    Is patient taking any aspirin products? No     If more than 325mg/day, OK to schedule; Instruct pt to decrease to less than 325 mg for 7 days AND route to RN pool    For CERVICAL procedures, hold all aspirin products for 6 days.     Tell pt that if aspirin product is not held for 6 days, the procedure WILL BE cancelled.      Does the patient have a bleeding or clotting disorder? No     If YES, it it OKAY to schedule AND route to RN pool    **For any patients with platelet count <100, must be forwarded to provider**    Is patient diabetic? No If YES, have them bring their glucometer.    Does patient have an active infection or treated for one  [Time Spent: ___ minutes] : I have spent [unfilled] minutes of time on the encounter. within the past week? No   If YES, do NOT schedule and route to RN nurse pool     Is patient currently taking any antibiotics?  No    For patients on chronic, preventative, or prophylactic antibiotics, procedures may be scheduled.     For patients on antibiotics for active or recent infection:antibiotic course must have been completed for 4 days    Is patient currently taking any steroid medications? (i.e. Prednisone, Medrol)  No     For patients on steroid medications, course must have been completed for 4 days    Is patient actively being treated for cancer or immunocompromised, including the spleen having been removed? No  If YES, do NOT schedule and route to RN pool     Any history of complications with sedation medications?  Yes, pt stated she had issues before with medication that are narcotic based, pt stated that she will vomit for hours after taking it. Please review and call pt back for advice.    If YES, OK to schedule AND route to RN pool     Any history of sleep apnea?  NO   If YES, OK to schedule AND route to RN pool     Any cardiac history?  NO   If YES, OK to schedule AND route to RN pool     Do you have an implanted pacemaker, ICD (implanted cardiac device) or AICD (automatic implanted cardiac device)?  NO    If YES, do NOT schedule AND route to RN pool.     Obtain name of device :       Obtain name of cardiologist:       Do you have an implanted stimulator?  YES    If YES, OK to schedule AND route to nursing.     Instruct patient to bring in the remote to the appointment and it will need to be turned off.  reviewed      Does patient have an allergy to contrast dye, iodine or shellfish?  No   If YES, OK to schedule. Route to RN pool AND add allergy information to appointment notes    Are you able to get on and off an exam table with minimal or no assistance? Yes   If NO, do NOT schedule and route to RN pool    Are you able to roll over and lay on your stomach with minimal or no  assistance? Yes   If NO, do NOT schedule and route to RN pool    Reminders:    If you are started on any steroids or antibiotics between now and your appointment, you must contact us because it may affect our ability to perform your procedure.  Yes    Informed patient that s/he needs to fast for 6 hours before procedure?  YES    Informed patient that it is OK to take normal medications with sips of water, especially blood pressure medications, before the procedure and must hold blood thinners as instructed.  Yes    Informed patient to arrive 30 minutes before procedure time to have an IV inserted.  reviewed   Do NOT schedule at 0745, 0815 or 1245.  reviewed     All radiofrequency ablations are in a 90 minute time slot.  reviewed     Pt wants to know if she needs to stop taking this medication Naltrexone HCl, Pain, 1.5 MG CAPS, pt wants to know if it could interfered with the sedation medication. Please review.     Kylah Jimenez      [facility]  Pain Management

## 2023-12-28 NOTE — TELEPHONE ENCOUNTER
Flags present in screening:  Pt wants to know if she needs to stop taking this medication Naltrexone HCl, Pain, 1.5 MG CAPS, pt wants to know if it could interfered with the sedation medication. Please review.     Do you have an implanted stimulator?  YES  If YES, OK to schedule AND route to nursing.   Instruct patient to bring in the remote to the appointment and it will need to be turned off.  reviewed    Any history of complications with sedation medications?  Yes, pt stated she had issues before with medication that are narcotic based, pt stated that she will vomit for hours after taking it. Please review and call pt back for advice.       Patient has been scheduled for 1/9/2023 CRFA.    Please review and advise.     Routing to provider.     Dominga Lemus RN  [facility]  [phone]

## 2023-12-29 ENCOUNTER — TELEPHONE (OUTPATIENT)
Dept: NEUROSURGERY | Facility: CLINIC | Age: 48
End: 2023-12-29
Payer: COMMERCIAL

## 2023-12-29 NOTE — TELEPHONE ENCOUNTER
[facility] Call Center    Phone Message    May a detailed message be left on voicemail: yes     Reason for Call: The patient called stating she received a confirmation call for her 1/5/24 appointment with Dr. Dennis, but she was under the impression that appointment was canceled since she has botox scheduled on 1/24/24. She needs to know if she needs keep the 1/5/24 appointment so she can notify her work ASAP. Please contact patient. Thank you.    Action Taken: Message routed to:  Adult Clinics: Neurology p [id number]    Travel Screening: Not Applicable

## 2023-12-29 NOTE — TELEPHONE ENCOUNTER
See TE from 12/7/23. Writer confirmed with PM&R nurse that pt does not need consult with Dr. Dennis on 1/5/23 prior to her botox injection. See TE from 12/7/23. Insurance approval is still pending at this time. Pt verbalized understanding and she does not feel the need to see provider before her botox injection so writer cancelled appt on 1/5/23.      Reyna [facility], RNCC  Neurology/Neurosurgery

## 2023-12-29 NOTE — TELEPHONE ENCOUNTER
I called patient and discussed that she has not had consult for Botox so appt with Dr. Jordan on 01/05/2023 is probably consult for Botox. 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

## 2024-01-02 NOTE — TELEPHONE ENCOUNTER
Left detailed message communicating recommendation to patient.     Dominga Lemus, RN  [facility]  [phone]

## 2024-01-03 ENCOUNTER — TELEPHONE (OUTPATIENT)
Dept: PHYSICAL MEDICINE AND REHAB | Facility: CLINIC | Age: 49
End: 2024-01-03
Payer: COMMERCIAL

## 2024-01-03 NOTE — TELEPHONE ENCOUNTER
Provider: Dr. Dennis    Left patient a voicemail reminding them of the appointment on [id number] Wednesday with a check in time of 12:15 PM    Reviewed their allergies and medications.     Reason for visit? Injections.      Follow up/Return appointment scheduled? No    Physical Medicine and Rehabilitation Scheduling Phone Number: [phone]

## 2024-01-04 ENCOUNTER — TELEPHONE (OUTPATIENT)
Dept: PHYSICAL MEDICINE AND REHAB | Facility: CLINIC | Age: 49
End: 2024-01-04

## 2024-01-04 ENCOUNTER — VIRTUAL VISIT (OUTPATIENT)
Dept: ORTHOPEDICS | Facility: CLINIC | Age: 49
End: 2024-01-04
Payer: COMMERCIAL

## 2024-01-04 DIAGNOSIS — M25.522 CHRONIC ELBOW PAIN, LEFT: Primary | ICD-10-CM

## 2024-01-04 DIAGNOSIS — G89.29 CHRONIC ELBOW PAIN, LEFT: Primary | ICD-10-CM

## 2024-01-04 PROCEDURE — 99024 POSTOP FOLLOW-UP VISIT: CPT | Mod: 93 | Performed by: FAMILY MEDICINE

## 2024-01-04 NOTE — TELEPHONE ENCOUNTER
[facility] Call Center    Phone Message    May a detailed message be left on voicemail: yes     Reason for Call: Other: Pt is calling and stating that she got a letter in the mail from her insurance stating that her botox was denied for her appt on 1/24/2024 Pt would like to know if the office will appeal that. Please call Pt back to discuss.    Action Taken: Message routed to:  [facility] ([facility] PMR    Travel Screening: Not Applicable                                                                    soft/nondistended

## 2024-01-04 NOTE — LETTER
1/4/2024         RE: Amelia Coker  [address] [facility] [address]        Dear Colleague,    Thank you for referring your patient, Amelia Coker, to the [facility]. Please see a copy of my visit note below.    Bhupinder is a 48 year old who is being evaluated via a billable telephone visit.        Assessment & Plan    Chronic elbow pain, left  It is very encouraging that her pain at her lateral epicondylar site has improved, although her ongoing anterior elbow pain is unsettling.  Given her level of disability I am ordering a repeat MRI, specifically to assess the biceps tendon.  I will be in touch with her with the results.  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  Her procedure was on November 16.  Her elbow is feeling quite a bit better at the procedural site, unfortunately her anterior elbow still bothering her quite a bit.  She has quite a bit of pain with lifting a coffee cup up to her mouth.  She also has a chronic, constant pain that does get worse with most activities.      Review of Systems         Objective          Vitals:  No vitals were obtained today due to virtual visit.    Physical Exam   healthy, alert, and no distress  PSYCH: Alert and oriented times 3; coherent speech, normal   rate and volume, able to articulate logical thoughts, able   to abstract reason, no tangential thoughts, no hallucinations   or delusions  Her affect is normal  RESP: No cough, no audible wheezing, able to talk in full sentences  Remainder of exam unable to be completed due to telephone visits        Phone call duration: 8 minutes, 4 minutes spent on charting        Again, thank you for allowing me to participate in the care of your patient.         Sincerely,        Patel Martínez, DO

## 2024-01-04 NOTE — LETTER
1/4/2024         RE: Amelia Coker  [address] [facility] [address]        Dear Colleague,    Thank you for referring your patient, Amelia Coker, to the [facility]. Please see a copy of my visit note below.    Bhupinder is a 48 year old who is being evaluated via a billable telephone visit.        Assessment & Plan    Chronic elbow pain, left  It is very encouraging that her pain at her lateral epicondylar site has improved, although her ongoing anterior elbow pain is unsettling.  Given her level of disability I am ordering a repeat MRI, specifically to assess the biceps tendon.  I will be in touch with her with the results.  - MR Elbow Left w/o Contrast; Future      Patel Martínez, DO  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  Her procedure was on November 16.  Her elbow is feeling quite a bit better at the procedural site, unfortunately her anterior elbow still bothering her quite a bit.  She has quite a bit of pain with lifting a coffee cup up to her mouth.  She also has a chronic, constant pain that does get worse with most activities.      Review of Systems         Objective          Vitals:  No vitals were obtained today due to virtual visit.    Physical Exam   healthy, alert, and no distress  PSYCH: Alert and oriented times 3; coherent speech, normal   rate and volume, able to articulate logical thoughts, able   to abstract reason, no tangential thoughts, no hallucinations   or delusions  Her affect is normal  RESP: No cough, no audible wheezing, able to talk in full sentences  Remainder of exam unable to be completed due to telephone visits        Phone call duration: 8 minutes, 4 minutes spent on charting        Again, thank you for allowing me to participate in the care of your patient.         Sincerely,        Patel Martínez, DO

## 2024-01-04 NOTE — PROGRESS NOTES
Bhupinder is a 48 year old who is being evaluated via a billable telephone visit.        Assessment & Plan     Chronic elbow pain, left  It is very encouraging that her pain at her lateral epicondylar site has improved, although her ongoing anterior elbow pain is unsettling.  Given her level of disability I am ordering a repeat MRI, specifically to assess the biceps tendon.  I will be in touch with her with the results.  - MR Elbow Left w/o Contrast; Future      Patel Martínez [facility] [place]    Florence Roe is a 48 year old woman following up today after undergoing percutaneous tenotomy of her left elbow for chronic lateral epicondylitis.  Her procedure was on November 16.  Her elbow is feeling quite a bit better at the procedural site, unfortunately her anterior elbow still bothering her quite a bit.  She has quite a bit of pain with lifting a coffee cup up to her mouth.  She also has a chronic, constant pain that does get worse with most activities.      Review of Systems         Objective           Vitals:  No vitals were obtained today due to virtual visit.    Physical Exam   healthy, alert, and no distress  PSYCH: Alert and oriented times 3; coherent speech, normal   rate and volume, able to articulate logical thoughts, able   to abstract reason, no tangential thoughts, no hallucinations   or delusions  Her affect is normal  RESP: No cough, no audible wheezing, able to talk in full sentences  Remainder of exam unable to be completed due to telephone visits        Phone call duration: 8 minutes, 4 minutes spent on charting

## 2024-01-05 ENCOUNTER — PRE VISIT (OUTPATIENT)
Dept: NEUROSURGERY | Facility: CLINIC | Age: 49
End: 2024-01-05

## 2024-01-05 ENCOUNTER — MYC MEDICAL ADVICE (OUTPATIENT)
Dept: PHYSICAL MEDICINE AND REHAB | Facility: CLINIC | Age: 49
End: 2024-01-05

## 2024-01-05 NOTE — TELEPHONE ENCOUNTER
Called and spoke with patient.     Updated that the [facility] [employer] department confirmed that the medical insurance denied the Prior Auth for the Botox due to not covering Botox use for spasms caused by the Piriformis muscle. Updated that the Finance department requested to see if the patient's Pharmacy benefits will cover the Botox injections and that it may be 1-2 weeks until we get a response on the status of the Prior Auth request.     Patient requested to speak with the finance department to review what the cost of the Botox injections would be to know in case she has to look at out of pocket cost.     Routed to the [facility] [employer] PA team to call the patient back regarding her questions.     JESSY QuinteroN RN  RN Care Coordinator for Physical Medicine and Rehabilitation  [facility] - [address] [address]  Office: [phone]  Fax: [phone]

## 2024-01-05 NOTE — TELEPHONE ENCOUNTER
Called and spoke with patient see telephone encounter in chart started 1/4/24.    JESSY QuinteroN RN  RN Care Coordinator for Physical Medicine and Rehabilitation  [facility] [place] - [address] [address]  Office: [phone]  Fax: [phone]

## 2024-01-08 ENCOUNTER — TELEPHONE (OUTPATIENT)
Dept: PALLIATIVE MEDICINE | Facility: CLINIC | Age: 49
End: 2024-01-08
Payer: COMMERCIAL

## 2024-01-08 NOTE — TELEPHONE ENCOUNTER
Preprocedure appointment reminder call Cervical RFA    Arrival time 10 am    Location: [place] [facility]    Do you have a ? yes    If patient doesn't have a  they will need to call and reschedule    Are you taking any blood thinners? no    Have you held the blood thinner at least 6 days? na      To call and reschedule or talk to the nurse about any questions you may have please dial    [phone]

## 2024-01-09 ENCOUNTER — RADIOLOGY INJECTION OFFICE VISIT (OUTPATIENT)
Dept: PALLIATIVE MEDICINE | Facility: CLINIC | Age: 49
End: 2024-01-09
Payer: COMMERCIAL

## 2024-01-09 ENCOUNTER — DOCUMENTATION ONLY (OUTPATIENT)
Dept: NEUROSURGERY | Facility: CLINIC | Age: 49
End: 2024-01-09

## 2024-01-09 VITALS
SYSTOLIC BLOOD PRESSURE: 121 MMHG | OXYGEN SATURATION: 99 % | HEART RATE: 77 BPM | DIASTOLIC BLOOD PRESSURE: 75 MMHG | RESPIRATION RATE: 20 BRPM

## 2024-01-09 DIAGNOSIS — M47.812 SPONDYLOSIS OF CERVICAL REGION WITHOUT MYELOPATHY OR RADICULOPATHY: ICD-10-CM

## 2024-01-09 DIAGNOSIS — G89.18 POST PROCEDURE DISCOMFORT: Primary | ICD-10-CM

## 2024-01-09 DIAGNOSIS — M79.18 MUSCULOSKELETAL PAIN: ICD-10-CM

## 2024-01-09 DIAGNOSIS — M79.18 MYOFASCIAL MUSCLE PAIN: ICD-10-CM

## 2024-01-09 PROCEDURE — 99153 MOD SED SAME PHYS/QHP EA: CPT | Performed by: PAIN MEDICINE

## 2024-01-09 PROCEDURE — 64634 DESTROY C/TH FACET JNT ADDL: CPT | Mod: RT | Performed by: PAIN MEDICINE

## 2024-01-09 PROCEDURE — 64633 DESTROY CERV/THOR FACET JNT: CPT | Mod: RT | Performed by: PAIN MEDICINE

## 2024-01-09 PROCEDURE — 99152 MOD SED SAME PHYS/QHP 5/>YRS: CPT | Performed by: PAIN MEDICINE

## 2024-01-09 RX ORDER — OXYCODONE HYDROCHLORIDE 5 MG/1
5 TABLET ORAL EVERY 6 HOURS PRN
Qty: 6 TABLET | Refills: 0 | Status: SHIPPED | OUTPATIENT
Start: 2024-01-09 | End: 2024-01-09 | Stop reason: SINTOL

## 2024-01-09 RX ORDER — METAXALONE 800 MG/1
400-800 TABLET ORAL 3 TIMES DAILY PRN
Qty: 30 TABLET | Refills: 0 | Status: SHIPPED | OUTPATIENT
Start: 2024-01-09 | End: 2024-04-02

## 2024-01-09 RX ORDER — FENTANYL CITRATE 50 UG/ML
12.5-5 INJECTION, SOLUTION INTRAMUSCULAR; INTRAVENOUS EVERY 5 MIN PRN
Status: ACTIVE | OUTPATIENT
Start: 2024-01-09 | End: 2024-01-10

## 2024-01-09 ASSESSMENT — PAIN SCALES - GENERAL
PAINLEVEL: EXTREME PAIN (9)
PAINLEVEL: MODERATE PAIN (5)

## 2024-01-09 NOTE — PROGRESS NOTES
I called and spoke with the patient.  On 9/24/2020 she had a left L5 and left S1 selective nerve root block with Dr. Goetz.  She reported that was the only thing that took her pain away.  Botox is not covered by her insurance and she is unwilling to pay the $5000 it would cost to do it.  Her left leg EMG is not until March.  Given all the other procedures she has had, which have not been successful, and the back surgery that did not help her leg pain, she is reluctant to consider a Piriformis resection as I am reluctant to advise it.  In addition, steroid injection into the piriformis was discussed, I am not strongly recommending it, and she is opposed to it.  I am short of options for her at this time.  She will call her insurance and see what will be covered and get back to me.

## 2024-01-09 NOTE — NURSING NOTE
20 gauge Peripheral IV inserted into right anticubital - attempts: 1    Brisa RN-BSN  [facility]   [phone]      
Discharge Information    IV Discontiued Time:  1130 Catheter intact       Discharge Criteria = When patient returns to baseline or as per MD order    Consciousness:  Pt is fully awake    Circulation:  BP +/- 20% of pre-procedure level    Respiration:  Patient is able to breathe deeply    O2 Sat:  Patient is able to maintain O2 Sat >92% on room air    Activity:  Moves 4 extremities on command    Ambulation:  Patient is able to stand and walk or stand and pivot into wheelchair    Dressing:  Clean/dry or No Dressing    Notes:   Discharge instructions and AVS given to patient    Patient meets criteria for discharge?  YES    Admitted to PCU?  No    Responsible adult present to accompany patient home?  Yes    Signature/Title:    dhara finn RN  RN Care Coordinator  [facility]    
MD Time IN: 1026   Sedation start time:  1028  Sedation end time:  1115    Medications given: fentanyl 0 mcg IV; versed 2 mg IV; toradol 0 mg IV  Intravenous fluids were administered, normal saline 100 cc's.  Sedation Level Achieved:  Moderate (conscious) sedation      INDIGO Ledezma-BSN  [facility]   [phone]    Discharge Information    IV Discontiued Time:  NA    Amount of Fluid Infused:  NA    Discharge Criteria = When patient returns to baseline or as per MD order    Consciousness:  Pt is fully awake    Circulation:  BP +/- 20% of pre-procedure level    Respiration:  Patient is able to breathe deeply    O2 Sat:  Patient is able to maintain O2 Sat >92% on room air    Activity:  Moves 4 extremities on command    Ambulation:  Patient is able to stand and walk or stand and pivot into wheelchair    Dressing:  Clean/dry or No Dressing    Notes:   Discharge instructions and AVS given to patient    Patient meets criteria for discharge?  YES    Admitted to PCU?  No    Responsible adult present to accompany patient home?  Yes    Signature/Title:    dhara finn RN  RN Care Coordinator  [facility]    
Pre-procedure Intake  If YES to any questions or NO to having a   Please complete laminated checklist and leave on the computer keyboard for Provider, verbally inform provider if able.    For SCS Trial, RFA's or any sedation procedure:  Have you been fasting? Yes  If yes, for how long? Patient reports last eating at 7Pm on 01/08/2024    Are you taking any any blood thinners such as Coumadin, Warfarin, Jantoven, Pradaxa Xarelto, Eliquis, Edoxaban, Enoxaparin, Lovenox, Heparin, Arixtra, Fondaparinux, or Fragmin? OR Antiplatelet medication such as Plavix, Brilinta, or Effient?   No   If yes, when did you take your last dose?     Do you take aspirin?  No  If cervical procedure, have you held aspirin for 6 days?   NA    Do you have any allergies to contrast dye, iodine, steroid and/or numbing medications?  YES: Topical Iodine-    Are you currently taking antibiotics or have an active infection?  NO    Have you had a fever/elevated temperature within the past week? NO    Are you currently taking oral steroids? NO    Do you have a ? Yes    Are you pregnant or breastfeeding?  NO    Have you received the COVID-19 vaccine? No   If yes, was it your 1st, 2nd or only dose needed?   Date of most recent vaccine:     Notify provider and RNs if systolic BP >170, diastolic BP >100, P >100 or O2 sats < 90%     Daysi Evans MA  [facility] [place]   
I have personally performed a face to face diagnostic evaluation on this patient. I have reviewed the PA note and agree with the history, exam, and plan of care, except as noted.

## 2024-01-09 NOTE — PROGRESS NOTES
Pre procedure Diagnosis: , Cervical spondylosis   Post procedure Diagnosis: Same  Procedure performed: cervical medial branch radiofrequency ablation right at C4,5,6, fluoroscopically guided  Anesthesia:        MD Time IN: 1026   Sedation start time:  1028  Sedation end time:  1115     Medications given: fentanyl 0 mcg IV; versed 2 mg IV; toradol 0 mg IV        Complications: none  Operators: Toi Solis MD and Jitendra Marrero MD (fellow)       Indications:   Amelia Coker is a 49 year old female was sent for cervical facet procedures.  The patient has a history of chronic  neck pain without radiation.  Exam shows increased pain with extension and lateral rotation of the cervical spine and they have tried conservative treatment including physical therapy,medications, and previous interventional procedures. Pt had a positive diagnostic cervical medial branch block with significant reduction in her pain with improved cervical range of motion.     Options/alternatives, benefits and risks were discussed with the patient including but not limited to bleeding, infection, tissue trauma, exposure to radiation, reaction to medications, spinal cord injury, weakness, numbness and paralysis.  Questions were answered to her satisfaction and she wishes to proceed. Voluntary informed consent was obtained and signed.      Vitals were reviewed: Yes  BP (!) 154/96   Pulse 84   Resp 16   LMP 09/15/2015 (Exact Date)   SpO2 99%   Allergies were reviewed:  Yes   Medications were reviewed:  Yes   Pre-procedure pain score: 9/10     Procedure:  After obtaining signed, informed consent, the patient was taken to the procedure room and placed in the prone position on the [facility] frame.  A Pause for the Cause was completed prior to procedure start.  The patient was prepped and draped in the usual sterile fashion.   The areas of interest were identified with fluoroscopy on the right side.  The skin and subcutaneous tissues were  anesthetized by injecting Lidocaine 1% 1 ml at each injection site utilizing a 30 gauge 1 inch needle.  Then,  20 gauge 100 mm curved tip RF needles with 10 mm active tips were advanced tangential to the medial branch nerves, abutting the articular pillars of C4, C5, and C6  utilizing AP and Lateral fluoroscopy.  Aspiration was negative at all the levels.    Each level was then tested for motor and sensory stimulation, and positioned so that stimulation was negative for stimuli outside the immediate area of the desired lesion.  Sensory stimulation was completed at 50 Hz, with max stimulation up to 0.3V.  Motor stimulation was completed at 2Hz, up to 1.5V and was negative except for multifidus movement.       Each level was then injected with 0.5 ml of bupivacaine 0.25 %, and after allowing the local anesthetic to set up a 90 second, 80 degree Centigrade lesion was generated.  The electrodes were withdrawn slightly and the needles were rotated 180 degrees to widen the lesion area.  The electrodes were replaced and a second 80 degree 90 second lesion was generated.     Needles were then flushed with Lidocaine as they were removed and  hemostasis was achieved.    The patient tolerated the procedure well without complications and was taken to the recovery area for continued observation.  Fluoroscopic images were saved to PACS.          Post-procedure pain:  9/10    The patient was given a prescription for  skelaxin 400-800mg tid prn     Follow-up includes:    - follow up with PCP and in the pain clinic as scheduled     Toi Solis MD  [facility]

## 2024-01-22 ENCOUNTER — HOSPITAL ENCOUNTER (OUTPATIENT)
Dept: MRI IMAGING | Facility: CLINIC | Age: 49
Discharge: HOME | End: 2024-01-22
Attending: FAMILY MEDICINE
Payer: COMMERCIAL

## 2024-01-22 ENCOUNTER — HOSPITAL ENCOUNTER (OUTPATIENT)
Dept: GENERAL RADIOLOGY | Facility: CLINIC | Age: 49
Discharge: HOME | End: 2024-01-22
Attending: FAMILY MEDICINE
Payer: COMMERCIAL

## 2024-01-22 DIAGNOSIS — Z01.89 ENCOUNTER FOR IMAGING TO SCREEN FOR METAL PRIOR TO MAGNETIC RESONANCE IMAGING (MRI): ICD-10-CM

## 2024-01-22 DIAGNOSIS — M25.522 CHRONIC ELBOW PAIN, LEFT: ICD-10-CM

## 2024-01-22 DIAGNOSIS — G89.29 CHRONIC ELBOW PAIN, LEFT: ICD-10-CM

## 2024-01-22 PROCEDURE — 74019 RADEX ABDOMEN 2 VIEWS: CPT

## 2024-01-22 PROCEDURE — 73221 MRI JOINT UPR EXTREM W/O DYE: CPT | Mod: 26 | Performed by: RADIOLOGY

## 2024-01-22 PROCEDURE — 73221 MRI JOINT UPR EXTREM W/O DYE: CPT | Mod: LT

## 2024-02-05 ENCOUNTER — OFFICE VISIT (OUTPATIENT)
Dept: ORTHOPEDICS | Facility: CLINIC | Age: 49
End: 2024-02-05
Payer: COMMERCIAL

## 2024-02-05 ENCOUNTER — PRE VISIT (OUTPATIENT)
Dept: ORTHOPEDICS | Facility: CLINIC | Age: 49
End: 2024-02-05

## 2024-02-05 VITALS — BODY MASS INDEX: 30.99 KG/M2 | WEIGHT: 186 LBS | HEIGHT: 65 IN

## 2024-02-05 DIAGNOSIS — G89.29 CHRONIC RIGHT SHOULDER PAIN: Primary | ICD-10-CM

## 2024-02-05 DIAGNOSIS — M25.521 CHRONIC ELBOW PAIN, RIGHT: ICD-10-CM

## 2024-02-05 DIAGNOSIS — G89.29 CHRONIC ELBOW PAIN, RIGHT: ICD-10-CM

## 2024-02-05 DIAGNOSIS — M25.511 CHRONIC RIGHT SHOULDER PAIN: Primary | ICD-10-CM

## 2024-02-05 PROCEDURE — 99024 POSTOP FOLLOW-UP VISIT: CPT | Performed by: ORTHOPAEDIC SURGERY

## 2024-02-05 NOTE — LETTER
"    2/5/2024         RE: Amelia Coker  [address] [facility] [id number]        Dear Colleague,    Thank you for referring your patient, Amelia Coker, to the [facility]. Please see a copy of my visit note below.    CHIEF CONCERN:  Right shoulder pain    HISTORY OF PRESENT ILLNESS:  Ms. Coker is a 49 year old RHD woman I am seeing back today for her right shoulder. Of note, she was seen March 2018 here (please refer to that note; seen for partial thickness cuff disease and long head biceps disease).   She was scheduled to be seen today for symptoms related to thoracic outlet syndrome. Note, she had 1 surgery (pec minor release) with Dr. Dutta in 2021 which she says helped some. She has been offered another surgery by Dr Dutta and more recently Dr. Cisneros based on vascular studies.   She reports that pain is her primary concern and the most bothersome pain radiates down over her shoulder and through her elbow/anterior forearm/biceps. Pain is worse with elevation of the arm.   She has seen Dr. Prakash Martínez for her arm symptoms and had \"percutaneous tenotomy with Tenex\" in Nov 2023 for lateral epicondylitis which she did not think helped.     Past Medical History:   Diagnosis Date     Abnormal mammogram 9/21/2016    right breast      AD (atopic dermatitis) 10/29/2015     Allergic rhinitis due to other allergen      Arthritis      Bell's palsy      Chronic fatigue 6/6/2012     Chronic infection     MRSA - 2015 scalp and prior to Abdomen     Contact dermatitis and other eczema, due to unspecified cause     eczema     Contusion of left foot, initial encounter 3/16/2016     DJD (degenerative joint disease), lumbar 9/26/2013     DJD (degenerative joint disease), lumbar 9/26/2013     Ds DNA antibody positive 4/16/2014    borderline.      Elevated blood pressure reading without diagnosis of hypertension 12/24/2013     Facial contusion 12/15/2014    hit by horse      Foraminal stenosis of lumbar " region 9/26/2013     Frontal headache 12/15/2014     Gastroesophageal reflux disease, esophagitis presence not specified 6/29/2016     Heat sensitivity 10/29/2015     HTN, goal below 140/90 9/23/2015     Hyperlipidemia LDL goal <130 8/30/2017     Irregular heart beat      Keratitis sicca (H24) 10/31/2012     Left shoulder pain 9/26/2013     Lumbar radiculopathy 1/22/2014     Migraine headache 10/23/2013     Migraine, unspecified, with intractable migraine, so stated, without mention of status migrainosus      Motion sickness      MRSA infection 10/20/2015     Muscular wasting and disuse atrophy, not elsewhere classified 6/9/2014    right SCM, right wrist,       Numbness and tingling     both legs anf feet greater on the left     PAC (premature atrial contraction) 7/15/2010     Personal history of COVID-19 12/2/2021     Plantar fasciitis 9/23/2015     PONV (postoperative nausea and vomiting)      Skin lesion 10/20/2015    posterior superior scalp      Spasm of muscle 7/11/2017     Telangiectasia of face 12/19/2014     Thyroid nodule 5/12/2022     Tooth pain 10/20/2015    left lower primary molar        Past Surgical History:   Procedure Laterality Date     AS INJ TRANSFORAMIN EPIDURAL, LUMB/SACR SINGLE       COLONOSCOPY  3/11/2013    Procedure: COLONOSCOPY;  colonoscopy;  Surgeon: Lobito Mas MD;  Location: [facility]     COLONOSCOPY N/A 9/13/2021    Procedure: COLONOSCOPY, WITH POLYPECTOMY AND BIOPSY;  Surgeon: Sam Liz MD;  Location:  GI     COLONOSCOPY WITH CO2 INSUFFLATION N/A 11/19/2018    Procedure: COLONOSCOPY WITH CO2 INSUFFLATION;  Surgeon: Goyo Blank MD;  Location: [facility]     DECOMPRESSION LUMBAR TWO LEVELS Bilateral 12/8/2016    Procedure: DECOMPRESSION LUMBAR TWO LEVELS;  Surgeon: Bang Burrell MD;  Location: [facility]     DILATION AND CURETTAGE, HYSTEROSCOPY, ABLATE ENDOMETRIUM NOVASURE, COMBINED N/A 6/12/2019    Procedure: hysteroscopy, dilation & curettage, polypectomy,  endometrial ablation;  Surgeon: Fredy Pham MD;  Location: [facility]     ESOPHAGOSCOPY, GASTROSCOPY, DUODENOSCOPY (EGD), COMBINED  11/8/2013    Procedure: COMBINED ESOPHAGOSCOPY, GASTROSCOPY, DUODENOSCOPY (EGD), BIOPSY SINGLE OR MULTIPLE;  Esophagoscopy, Gastroscopy, Duodenoscopy EGD with multiple biopsies;  Surgeon: Teja Koch MD;  Location: [facility]     ESOPHAGOSCOPY, GASTROSCOPY, DUODENOSCOPY (EGD), COMBINED N/A 2/18/2021    Procedure: ESOPHAGOGASTRODUODENOSCOPY, WITH BIOPSY;  Surgeon: Blanca Ba MD;  Location: [facility]     ESOPHAGOSCOPY, GASTROSCOPY, DUODENOSCOPY (EGD), COMBINED N/A 4/11/2022    Procedure: ESOPHAGOGASTRODUODENOSCOPY, WITH BIOPSY;  Surgeon: Fredy Jimenez MD;  Location: [facility] OR     HC REMOVAL OF TONSILS,<13 Y/O      Tonsils <10y.o.     HC TOOTH EXTRACTION W/FORCEP       INJECT BLOCK MEDIAL BRANCH CERVICAL/THORACIC/LUMBAR N/A 7/23/2020    Procedure: Sacral 1,2,3 Lateral Branch Blocks and lumbar 5 medial branch blocks bilaterally;  Surgeon: Maurisio Goetz MD;  Location: [facility]     INJECT JOINT SACROILIAC Left 9/24/2020    Procedure: Left Lumbar 5 Sacral 1 nerve root injections.;  Surgeon: Maurisio Goetz MD;  Location: [facility]     REPAIR MUSCLE UPPER EXTREMITY Right 7/27/2021    Procedure: DIVISION RIGHT PECTORALIS MUSCLE;  Surgeon: Davie Dutta;  Location: [facility]     REPAIR TENDON ELBOW  7/9/2014    Procedure: REPAIR TENDON ELBOW;  Surgeon: Chris Johnston MD;  Location: [facility]     ULTRASONIC REMOVAL SOFT TISSUE (LOCATION) Right 11/21/2018    Procedure: Tenex right foot;  Surgeon: Patel Martínez DO;  Location: [facility]     ULTRASONIC REMOVAL SOFT TISSUE (LOCATION) Left 11/16/2023    Procedure: EXCISION, SOFT TISSUE, ELBOW, USING ULTRASONIC ASPIRATOR SYSTEM;  Surgeon: Patel Martínez DO;  Location: [facility]     VASCULAR SURGERY      Thoracic Outlet Syndrome       Current Outpatient Medications   Medication Sig Dispense Refill     ACETAMINOPHEN PO Take 650 mg by  mouth every 6 hours as needed for mild pain       betamethasone valerate (VALISONE) 0.1 % external lotion Apply topically 2 times daily 60 mL 3     clotrimazole-betamethasone (LOTRISONE) 1-0.05 % external cream Apply topically 2 times daily 45 g 11     diclofenac (VOLTAREN) 1 % topical gel Apply 2 g topically 4 times daily as needed for moderate pain 100 g 1     meloxicam (MOBIC) 15 MG tablet Take 1 tablet (15 mg) by mouth daily 90 tablet 3     metaxalone (SKELAXIN) 800 MG tablet Take 0.5-1 tablets (400-800 mg) by mouth 3 times daily as needed for muscle spasms 30 tablet 0     metFORMIN ER osmotic (FORTAMET) 500 MG 24 hr tablet Take 1 tablet by mouth daily at 2 pm (Patient not taking: Reported on 12/14/2023)       mupirocin (BACTROBAN) 2 % external ointment Apply topically 3 times daily 30 g 3     naltrexone (REVIA) 1 mg/mL [facility] to titrate from 0.25-6mg daily 100 mL 11     Naltrexone HCl, Pain, 1.5 MG CAPS Take 1.5 mg by mouth daily 90 capsule 0     tacrolimus (PROTOPIC) 0.1 % external ointment Apply topically 2 times daily On eczematous lesions 2xdaily 60 g 3     tirzepatide (MOUNJARO) 15 MG/0.5ML pen Inject 15 mg Subcutaneous every 7 days            Allergies   Allergen Reactions     Ceftriaxone Anaphylaxis     Hives, rash, racing heart beat     Bactrim [Sulfamethoxazole-Trimethoprim] Hives     Ciprofloxacin Other (See Comments)     Tendon Issues     Codeine      Copper      Rash       Doxycycline      Loss of skin pigmentation, skin loss.     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   Tobacco Use     Smoking status: Never     Smokeless tobacco: Never   Vaping Use     Vaping Use: Former     Substances: THC     Devices: Pre-filled or refillable cartridge   Substance and Sexual Activity     Alcohol use: Yes     Comment: social     Drug use: No     Types: Marijuana     Comment: Medical marijuana     Sexual activity: Yes     Partners: Male     Birth control/protection: Surgical     Comment: vasectomy   Other Topics Concern      Service No     Blood Transfusions No     Caffeine Concern No     Comment: 0     Occupational Exposure No     Hobby Hazards Yes     Comment: horses     Sleep Concern No     Stress Concern No     Weight Concern Yes     Special Diet Yes     Comment: nhung's     Back Care No     Exercise Yes     Comment: occ     Bike Helmet Not Asked     Comment: na     Seat Belt Yes     Self-Exams Yes     Comment: occ     Parent/sibling w/ CABG, MI or angioplasty before 65F 55M? Not Asked   Social History Narrative     Not on file     Social Determinants of Health     Financial Resource Strain: Low Risk  (11/16/2023)    Financial Resource Strain      Within the past 12 months, have you or your family members you live with been unable to get utilities (heat, electricity) when it was really needed?: No   Food Insecurity: Low Risk  (11/16/2023)    Food Insecurity      Within the past 12 months, did you worry that your food would run out before you got money to buy more?: No      Within the past 12 months, did the food you bought just not last and you didn t have money to get more?: No   Transportation Needs: Low Risk  (11/16/2023)    Transportation Needs      Within the past 12 months, has lack of transportation kept you from medical appointments, getting your medicines, non-medical meetings or appointments, work, or from getting  things that you need?: No   Physical Activity: Not on file   Stress: Not on file   Social Connections: Not on file   Interpersonal Safety: Low Risk  (11/16/2023)    Interpersonal Safety      Do you feel physically and emotionally safe where you currently live?: Yes      Within the past 12 months, have you been hit, slapped, kicked or otherwise physically hurt by someone?: No      Within the past 12 months, have you been humiliated or emotionally abused in other ways by your partner or ex-partner?: No   Housing Stability: Low Risk  (11/16/2023)    Housing Stability      Do you have housing? : Yes      Are you worried about losing your housing?: No       FAMILY HISTORY: Reviewed in EMR      REVIEW OF SYSTEMS: Positive for that noted in past medical history and history of present illness and otherwise reviewed in EMR     PHYSICAL EXAM:    Adult patient in no acute distress. Articulates and communicates with normal affect.  Respirations even and unlabored  Focused upper extremity exam: Skin intact. No erythema. Sensation intact all dermatomes into the hand to light touch. EPL, FPL, and Intrinsics intact. Right shoulder active motion is FE to 170, ER at side to 75, and IR to T12. Left shoulder active motion is FE to 175, ER to 75, and IR to T12.  On the right, some discomfort with Neer and Ayala. No focal pain on palpation over the AC joint. Some anterior shoulder pain diffusely so not focal on palpation over the long head of the biceps.   FE and ER strength 4/5 and limited by pain.   Tenderness to palpation over distal biceps tendon.   Inspection of scapular mechanics notes that at times she demonstrates scapular asymmetry but she is able to maintain good scapular mechanics at times.       IMAGING:  Right elbow MRI dated 1/22/24 was reviewed and I agree with the Impression below:  Impression:  1. Tendinosis of the proximal common extensor tendon with tiny focal low-grade intrasubstance tear, similar to prior.  2.  Intact distal biceps tendon with mild tendinosis.    ASSESSMENT:    Patient undergoing workup for TOS with Vascular surgeons  Right distal biceps tendinosis  History of low grade partial right cuff tear    PLAN:  I reviewed with the patient her extensive RUE symptoms and history. I would defer to her other surgeons with regards to TOS as that is not a condition I treat in my practice. We discussed eccentric strengthening for her elbow symptoms and she may consider Hand/OT for distal biceps symptoms. We discussed the history of her shoulder with remote imaging but she is most bothered by her biceps and TOS symptoms at this time.   She will follow up prn.     Rona James MD    Again, thank you for allowing me to participate in the care of your patient.        Sincerely,        Rona James MD

## 2024-02-05 NOTE — NURSING NOTE
"Reason For Visit:   Chief Complaint   Patient presents with    Consult For     Right shoulder TOS - was in a long arm cast in 2014; and had MRI that showed rotator cuff tear; worsen since 2020- radiating pain to scapula, burning, numbness or tingling  Left biceps tendon - started 1 year ago        PCP: Otoniel Quiroz  Ref: Dr. Martínez    ?  No  Occupation [employer].  Currently working? Yes.  Work status?  Full time.  Date of injury: 2014  Type of injury: Was in a long arm cast and used it was a lever..  Type of surgery: S/p right shoulder pec minor release DOS: spring 2022  Smoker: No  Request smoking cessation information: No    Right hand dominant    SANE score  Affected shoulder: Right shoulder/left elbow  Right shoulder SANE: <50  Left shoulder SANE: 30    Ht 1.651 m (5' 5\")   Wt 84.4 kg (186 lb)   BMI 30.95 kg/m      Ange Bennett ATC  "

## 2024-02-08 ENCOUNTER — MYC MEDICAL ADVICE (OUTPATIENT)
Dept: FAMILY MEDICINE | Facility: OTHER | Age: 49
End: 2024-02-08
Payer: COMMERCIAL

## 2024-02-08 ENCOUNTER — LAB (OUTPATIENT)
Dept: LAB | Facility: CLINIC | Age: 49
End: 2024-02-08
Payer: COMMERCIAL

## 2024-02-08 DIAGNOSIS — M47.16 OSTEOARTHRITIS OF LUMBAR SPINE WITH MYELOPATHY: ICD-10-CM

## 2024-02-08 DIAGNOSIS — M50.30 DDD (DEGENERATIVE DISC DISEASE), CERVICAL: ICD-10-CM

## 2024-02-08 DIAGNOSIS — M50.30 DDD (DEGENERATIVE DISC DISEASE), CERVICAL: Primary | ICD-10-CM

## 2024-02-08 DIAGNOSIS — R76.8 DS DNA ANTIBODY POSITIVE: ICD-10-CM

## 2024-02-08 DIAGNOSIS — E66.812 OBESITY, CLASS II, BMI 35-39.9: ICD-10-CM

## 2024-02-08 LAB
ALBUMIN SERPL BCG-MCNC: 4.4 G/DL (ref 3.5–5.2)
ALP SERPL-CCNC: 61 U/L (ref 40–150)
ALT SERPL W P-5'-P-CCNC: 19 U/L (ref 0–50)
ANION GAP SERPL CALCULATED.3IONS-SCNC: 11 MMOL/L (ref 7–15)
AST SERPL W P-5'-P-CCNC: 20 U/L (ref 0–45)
BILIRUB SERPL-MCNC: 0.5 MG/DL
BUN SERPL-MCNC: 13.7 MG/DL (ref 6–20)
CALCIUM SERPL-MCNC: 9.1 MG/DL (ref 8.6–10)
CHLORIDE SERPL-SCNC: 105 MMOL/L (ref 98–107)
CREAT SERPL-MCNC: 0.53 MG/DL (ref 0.51–0.95)
DEPRECATED HCO3 PLAS-SCNC: 23 MMOL/L (ref 22–29)
EGFRCR SERPLBLD CKD-EPI 2021: >90 ML/MIN/1.73M2
GLUCOSE SERPL-MCNC: 88 MG/DL (ref 70–99)
HBA1C MFR BLD: 5.3 %
HGB BLD-MCNC: 12.4 G/DL (ref 11.7–15.7)
POTASSIUM SERPL-SCNC: 4.6 MMOL/L (ref 3.4–5.3)
PROT SERPL-MCNC: 7.5 G/DL (ref 6.4–8.3)
SODIUM SERPL-SCNC: 139 MMOL/L (ref 135–145)
TSH SERPL DL<=0.005 MIU/L-ACNC: 1.02 UIU/ML (ref 0.3–4.2)

## 2024-02-08 PROCEDURE — 36415 COLL VENOUS BLD VENIPUNCTURE: CPT

## 2024-02-08 PROCEDURE — 83036 HEMOGLOBIN GLYCOSYLATED A1C: CPT

## 2024-02-08 PROCEDURE — 84443 ASSAY THYROID STIM HORMONE: CPT

## 2024-02-08 PROCEDURE — 80053 COMPREHEN METABOLIC PANEL: CPT

## 2024-02-08 PROCEDURE — 85018 HEMOGLOBIN: CPT

## 2024-02-19 NOTE — PROGRESS NOTES
"CHIEF CONCERN:  Right shoulder pain    HISTORY OF PRESENT ILLNESS:  Ms. Coker is a 49 year old RHD woman I am seeing back today for her right shoulder. Of note, she was seen March 2018 here (please refer to that note; seen for partial thickness cuff disease and long head biceps disease).   She was scheduled to be seen today for symptoms related to thoracic outlet syndrome. Note, she had 1 surgery (pec minor release) with Dr. Dutta in 2021 which she says helped some. She has been offered another surgery by Dr Dutta and more recently Dr. Cisneros based on vascular studies.   She reports that pain is her primary concern and the most bothersome pain radiates down over her shoulder and through her elbow/anterior forearm/biceps. Pain is worse with elevation of the arm.   She has seen Dr. Prakash Martínez for her arm symptoms and had \"percutaneous tenotomy with Tenex\" in Nov 2023 for lateral epicondylitis which she did not think helped.     Past Medical History:   Diagnosis Date    Abnormal mammogram 9/21/2016    right breast     AD (atopic dermatitis) 10/29/2015    Allergic rhinitis due to other allergen     Arthritis     Bell's palsy     Chronic fatigue 6/6/2012    Chronic infection     MRSA - 2015 scalp and prior to Abdomen    Contact dermatitis and other eczema, due to unspecified cause     eczema    Contusion of left foot, initial encounter 3/16/2016    DJD (degenerative joint disease), lumbar 9/26/2013    DJD (degenerative joint disease), lumbar 9/26/2013    Ds DNA antibody positive 4/16/2014    borderline.     Elevated blood pressure reading without diagnosis of hypertension 12/24/2013    Facial contusion 12/15/2014    hit by horse     Foraminal stenosis of lumbar region 9/26/2013    Frontal headache 12/15/2014    Gastroesophageal reflux disease, esophagitis presence not specified 6/29/2016    Heat sensitivity 10/29/2015    HTN, goal below 140/90 9/23/2015    Hyperlipidemia LDL goal <130 8/30/2017    Irregular heart beat     " Keratitis sicca (H24) 10/31/2012    Left shoulder pain 9/26/2013    Lumbar radiculopathy 1/22/2014    Migraine headache 10/23/2013    Migraine, unspecified, with intractable migraine, so stated, without mention of status migrainosus     Motion sickness     MRSA infection 10/20/2015    Muscular wasting and disuse atrophy, not elsewhere classified 6/9/2014    right SCM, right wrist,      Numbness and tingling     both legs anf feet greater on the left    PAC (premature atrial contraction) 7/15/2010    Personal history of COVID-19 12/2/2021    Plantar fasciitis 9/23/2015    PONV (postoperative nausea and vomiting)     Skin lesion 10/20/2015    posterior superior scalp     Spasm of muscle 7/11/2017    Telangiectasia of face 12/19/2014    Thyroid nodule 5/12/2022    Tooth pain 10/20/2015    left lower primary molar        Past Surgical History:   Procedure Laterality Date    AS INJ TRANSFORAMIN EPIDURAL, LUMB/SACR SINGLE      COLONOSCOPY  3/11/2013    Procedure: COLONOSCOPY;  colonoscopy;  Surgeon: Lobito Mas MD;  Location: [facility]    COLONOSCOPY N/A 9/13/2021    Procedure: COLONOSCOPY, WITH POLYPECTOMY AND BIOPSY;  Surgeon: Sam Liz MD;  Location: [facility]    COLONOSCOPY WITH CO2 INSUFFLATION N/A 11/19/2018    Procedure: COLONOSCOPY WITH CO2 INSUFFLATION;  Surgeon: Goyo Blank MD;  Location: [facility]    DECOMPRESSION LUMBAR TWO LEVELS Bilateral 12/8/2016    Procedure: DECOMPRESSION LUMBAR TWO LEVELS;  Surgeon: Bang Burrell MD;  Location: [facility]    DILATION AND CURETTAGE, HYSTEROSCOPY, ABLATE ENDOMETRIUM NOVASURE, COMBINED N/A 6/12/2019    Procedure: hysteroscopy, dilation & curettage, polypectomy, endometrial ablation;  Surgeon: Fredy Pham MD;  Location: [facility]    ESOPHAGOSCOPY, GASTROSCOPY, DUODENOSCOPY (EGD), COMBINED  11/8/2013    Procedure: COMBINED ESOPHAGOSCOPY, GASTROSCOPY, DUODENOSCOPY (EGD), BIOPSY SINGLE OR MULTIPLE;  Esophagoscopy, Gastroscopy, Duodenoscopy EGD with  multiple biopsies;  Surgeon: Teja Koch MD;  [facility]: [facility]    ESOPHAGOSCOPY, GASTROSCOPY, DUODENOSCOPY (EGD), COMBINED N/A 2/18/2021    Procedure: ESOPHAGOGASTRODUODENOSCOPY, WITH BIOPSY;  Surgeon: Blanca Ba MD;  [facility]: [facility]    ESOPHAGOSCOPY, GASTROSCOPY, DUODENOSCOPY (EGD), COMBINED N/A 4/11/2022    Procedure: ESOPHAGOGASTRODUODENOSCOPY, WITH BIOPSY;  Surgeon: Fredy Jimenez MD;  Location: [facility]    HC REMOVAL OF TONSILS,<11 Y/O      Tonsils <10y.o.    HC TOOTH EXTRACTION W/FORCEP      INJECT BLOCK MEDIAL BRANCH CERVICAL/THORACIC/LUMBAR N/A 7/23/2020    Procedure: Sacral 1,2,3 Lateral Branch Blocks and lumbar 5 medial branch blocks bilaterally;  Surgeon: Maurisio Goetz MD;  [facility]    INJECT JOINT SACROILIAC Left 9/24/2020    Procedure: Left Lumbar 5 Sacral 1 nerve root injections.;  Surgeon: Maurisio Goetz MD;  [facility]: [facility]    REPAIR MUSCLE UPPER EXTREMITY Right 7/27/2021    Procedure: DIVISION RIGHT PECTORALIS MUSCLE;  Surgeon: Davie Dutta;  Location: [facility]    REPAIR TENDON ELBOW  7/9/2014    Procedure: REPAIR TENDON ELBOW;  Surgeon: Chris Johnston MD;  [facility]: [facility]    ULTRASONIC REMOVAL SOFT TISSUE (LOCATION) Right 11/21/2018    Procedure: Tenex right foot;  Surgeon: Patel Martínez DO;  Location: [facility]    ULTRASONIC REMOVAL SOFT TISSUE (LOCATION) Left 11/16/2023    Procedure: EXCISION, SOFT TISSUE, ELBOW, USING ULTRASONIC ASPIRATOR SYSTEM;  Surgeon: Patel Martínez DO;  Location: [facility]    VASCULAR SURGERY      Thoracic Outlet Syndrome       Current Outpatient Medications   Medication Sig Dispense Refill    ACETAMINOPHEN PO Take 650 mg by mouth every 6 hours as needed for mild pain      betamethasone valerate (VALISONE) 0.1 % external lotion Apply topically 2 times daily 60 mL 3    clotrimazole-betamethasone (LOTRISONE) 1-0.05 % external cream Apply topically 2 times daily 45 g 11    diclofenac (VOLTAREN) 1 % topical gel Apply 2 g topically  4 times daily as needed for moderate pain 100 g 1    meloxicam (MOBIC) 15 MG tablet Take 1 tablet (15 mg) by mouth daily 90 tablet 3    metaxalone (SKELAXIN) 800 MG tablet Take 0.5-1 tablets (400-800 mg) by mouth 3 times daily as needed for muscle spasms 30 tablet 0    metFORMIN ER osmotic (FORTAMET) 500 MG 24 hr tablet Take 1 tablet by mouth daily at 2 pm (Patient not taking: Reported on 12/14/2023)      mupirocin (BACTROBAN) 2 % external ointment Apply topically 3 times daily 30 g 3    naltrexone (REVIA) 1 mg/mL [facility] to titrate from 0.25-6mg daily 100 mL 11    Naltrexone HCl, Pain, 1.5 MG CAPS Take 1.5 mg by mouth daily 90 capsule 0    tacrolimus (PROTOPIC) 0.1 % external ointment Apply topically 2 times daily On eczematous lesions 2xdaily 60 g 3    tirzepatide (MOUNJARO) 15 MG/0.5ML pen Inject 15 mg Subcutaneous every 7 days            Allergies   Allergen Reactions    Ceftriaxone Anaphylaxis     Hives, rash, racing heart beat    Bactrim [Sulfamethoxazole-Trimethoprim] Hives    Ciprofloxacin Other (See Comments)     Tendon Issues    Codeine     Copper      Rash      Doxycycline      Loss of skin pigmentation, skin loss.    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   Tobacco Use    Smoking status: Never    Smokeless tobacco: Never   Vaping Use    Vaping Use: Former    Substances: THC    Devices: Pre-filled or refillable cartridge   Substance  and Sexual Activity    Alcohol use: Yes     Comment: social    Drug use: No     Types: Marijuana     Comment: Medical marijuana    Sexual activity: Yes     Partners: Male     Birth control/protection: Surgical     Comment: vasectomy   Other Topics Concern     Service No    Blood Transfusions No    Caffeine Concern No     Comment: 0    Occupational Exposure No    Hobby Hazards Yes     Comment: horses    Sleep Concern No    Stress Concern No    Weight Concern Yes    Special Diet Yes     Comment: nhung's    Back Care No    Exercise Yes     Comment: occ    Bike Helmet Not Asked     Comment: na    Seat Belt Yes    Self-Exams Yes     Comment: occ    Parent/sibling w/ CABG, MI or angioplasty before 65F 55M? Not Asked   Social History Narrative    Not on file     Social Determinants of Health     Financial Resource Strain: Low Risk  (11/16/2023)    Financial Resource Strain     Within the past 12 months, have you or your family members you live with been unable to get utilities (heat, electricity) when it was really needed?: No   Food Insecurity: Low Risk  (11/16/2023)    Food Insecurity     Within the past 12 months, did you worry that your food would run out before you got money to buy more?: No     Within the past 12 months, did the food you bought just not last and you didn t have money to get more?: No   Transportation Needs: Low Risk  (11/16/2023)    Transportation Needs     Within the past 12 months, has lack of transportation kept you from medical appointments, getting your medicines, non-medical meetings or appointments, work, or from getting things that you need?: No   Physical Activity: Not on file   Stress: Not on file   Social Connections: Not on file   Interpersonal Safety: Low Risk  (11/16/2023)    Interpersonal Safety     Do you feel physically and emotionally safe where you currently live?: Yes     Within the past 12 months, have you been hit, slapped, kicked or otherwise physically hurt by  someone?: No     Within the past 12 months, have you been humiliated or emotionally abused in other ways by your partner or ex-partner?: No   Housing Stability: Low Risk  (11/16/2023)    Housing Stability     Do you have housing? : Yes     Are you worried about losing your housing?: No       FAMILY HISTORY: Reviewed in EMR      REVIEW OF SYSTEMS: Positive for that noted in past medical history and history of present illness and otherwise reviewed in EMR     PHYSICAL EXAM:    Adult patient in no acute distress. Articulates and communicates with normal affect.  Respirations even and unlabored  Focused upper extremity exam: Skin intact. No erythema. Sensation intact all dermatomes into the hand to light touch. EPL, FPL, and Intrinsics intact. Right shoulder active motion is FE to 170, ER at side to 75, and IR to T12. Left shoulder active motion is FE to 175, ER to 75, and IR to T12.  On the right, some discomfort with Neer and Ayala. No focal pain on palpation over the AC joint. Some anterior shoulder pain diffusely so not focal on palpation over the long head of the biceps.   FE and ER strength 4/5 and limited by pain.   Tenderness to palpation over distal biceps tendon.   Inspection of scapular mechanics notes that at times she demonstrates scapular asymmetry but she is able to maintain good scapular mechanics at times.       IMAGING:  Right elbow MRI dated 1/22/24 was reviewed and I agree with the Impression below:  Impression:  1. Tendinosis of the proximal common extensor tendon with tiny focal low-grade intrasubstance tear, similar to prior.  2. Intact distal biceps tendon with mild tendinosis.    ASSESSMENT:    Patient undergoing workup for TOS with Vascular surgeons  Right distal biceps tendinosis  History of low grade partial right cuff tear    PLAN:  I reviewed with the patient her extensive RUE symptoms and history. I would defer to her other surgeons with regards to TOS as that is not a condition I treat  in my practice. We discussed eccentric strengthening for her elbow symptoms and she may consider Hand/OT for distal biceps symptoms. We discussed the history of her shoulder with remote imaging but she is most bothered by her biceps and TOS symptoms at this time.   She will follow up prn.     Rona James MD

## 2024-02-22 NOTE — PROGRESS NOTES
DISCHARGE  Reason for Discharge: No further expectation of progress.  Slow to minimal progress towards goals     Equipment Issued: HEP    Discharge Plan: Other services: Continue to seek care for piriformis based symptoms, unable to progress SI stability protocol due to increased symptoms with gluteal and piriformis activation, no better with SI belt, hypermobility doesn't allow for typical stretching response. As far as neck, did MT 1st rib, cervical tx and mm localized releases which reduced HA but no meaning ful reduction in UE symptoms sustained.    Referring Provider:  Dorotanixon Alvarez      Was seen from 8/24/23-11/6/23 in PT for 8 visits   11/06/23 0500   Appointment Info   Signing clinician's name / credentials Stacie Blankenship, PT, DPT   Total/Authorized Visits 8   Visits Used [facility] CHOICE   Medical Diagnosis Cervical and lumbar radiculopathy   PT Tx Diagnosis Cervical and lumbar radiculopathy   Progress Note/Certification   Onset of illness/injury or Date of Surgery 08/16/23  (Date of referral, back pain many years)   Therapy Frequency 1x/week   Predicted Duration 8 weeks   Progress Note Due Date 10/19/23   GOALS   PT Goals 2;3   PT Goal 1   Goal Identifier HEP   Goal Description Patient has HEP for stability work with minimal increase in pain that is well tolerated to do at home   Goal Progress Patient is struggling with HEP for stability, so has still difficulty with DNF activation isolated but is trying. TA isolation is much better but states   Target Date 10/19/23   PT Goal 2   Goal Identifier Core   Goal Description Patient demonstrates good triplanar control of spine with LE work in supine, quadruped, SL and standing for advancement of HEP with apprpriate spinal stability properties to manage hypermobilities and spinal condition in 8 weeks   Goal Progress Patient is better TA recruitment and is attempting to complete SI stabilty protocol with difficulty using any posterior or lateral musculature  wtih differential L piriformis and lumbar radicular pain contributions with complexity d/t hypermobility /connective tissue   Target Date 10/19/23   PT Goal 3   Goal Identifier R UE   Goal Description Patient will have 50% pain/UE radicular components in scapula and RUE to improve sleep and R UE use   Goal Progress Patient has had less headaches since starting PT but still about the same with arm pain varing. Still working out cervicogenic vs thoracic outlet contributions. Posture focus   Target Date 11/15/23   Subjective Report   Subjective Report Patient saw vascular clinic for TOS, scalenectomy and 1st rib procedure end of December. Going to start LDN here to see if helps manage iiflammatory reponses.   Objective Measure 1   Objective Measure Imaging   Details IMPRESSION: Moderate compression of the right subclavian vein with the  arm in the 180 degrees position.   Plan   Plan for next session Hold, going to have cervical MMB and consider if other myofascial based interventiosn help with neck mm relaxation, will connect with Dr Rice

## 2024-02-27 DIAGNOSIS — G54.0 TOS (THORACIC OUTLET SYNDROME): Primary | ICD-10-CM

## 2024-02-27 NOTE — PROGRESS NOTES
Per vascular surgery, Dr Cisneros, at [facility] -Case request placed for Ultrasound-guided RIGHT anterior scalene muscle diagnostic block

## 2024-02-28 ENCOUNTER — TELEPHONE (OUTPATIENT)
Dept: PHYSICAL MEDICINE AND REHAB | Facility: CLINIC | Age: 49
End: 2024-02-28
Payer: COMMERCIAL

## 2024-02-28 PROBLEM — G54.0 TOS (THORACIC OUTLET SYNDROME): Status: ACTIVE | Noted: 2021-06-07

## 2024-02-28 NOTE — TELEPHONE ENCOUNTER
Patient is scheduled for surgery with Dr. Dennis    Spoke with: patient    Date of Surgery: 3/29/24    Location: [facility]    Informed patient they will need an adult  yes    Additional comments: Patient is aware of date and time of the procedure.        Loni Gaston MA on 2/28/2024 at 11:18 AM

## 2024-03-04 NOTE — TELEPHONE ENCOUNTER
This has been faxed to [facility] [phone]  Patient has been informed   Closing encounterr  Sandi Alas RT (R)         
3

## 2024-03-07 DIAGNOSIS — E04.1 THYROID NODULE: Primary | ICD-10-CM

## 2024-03-12 ENCOUNTER — HOSPITAL ENCOUNTER (OUTPATIENT)
Dept: ULTRASOUND IMAGING | Facility: CLINIC | Age: 49
Discharge: HOME | End: 2024-03-12
Attending: PHYSICIAN ASSISTANT | Admitting: PHYSICIAN ASSISTANT
Payer: COMMERCIAL

## 2024-03-12 DIAGNOSIS — E04.1 THYROID NODULE: ICD-10-CM

## 2024-03-12 PROCEDURE — 76536 US EXAM OF HEAD AND NECK: CPT

## 2024-03-18 ENCOUNTER — OFFICE VISIT (OUTPATIENT)
Dept: NEUROLOGY | Facility: CLINIC | Age: 49
End: 2024-03-18
Attending: PREVENTIVE MEDICINE
Payer: COMMERCIAL

## 2024-03-18 DIAGNOSIS — G89.29 CHRONIC LOW BACK PAIN, UNSPECIFIED BACK PAIN LATERALITY, UNSPECIFIED WHETHER SCIATICA PRESENT: ICD-10-CM

## 2024-03-18 DIAGNOSIS — M79.605 CHRONIC PAIN OF LEFT LOWER EXTREMITY: ICD-10-CM

## 2024-03-18 DIAGNOSIS — M54.50 CHRONIC LOW BACK PAIN, UNSPECIFIED BACK PAIN LATERALITY, UNSPECIFIED WHETHER SCIATICA PRESENT: ICD-10-CM

## 2024-03-18 DIAGNOSIS — G57.02 PIRIFORMIS SYNDROME OF LEFT SIDE: ICD-10-CM

## 2024-03-18 DIAGNOSIS — G89.29 CHRONIC PAIN OF LEFT LOWER EXTREMITY: ICD-10-CM

## 2024-03-18 PROCEDURE — 95886 MUSC TEST DONE W/N TEST COMP: CPT | Performed by: PSYCHIATRY & NEUROLOGY

## 2024-03-18 PROCEDURE — 95908 NRV CNDJ TST 3-4 STUDIES: CPT | Performed by: PSYCHIATRY & NEUROLOGY

## 2024-03-18 NOTE — PROGRESS NOTES
[facility]  Electrodiagnostic Laboratory                 Department of Neurology                                                                                                         Test Date:  3/18/2024    Patient: Bhupinder Coker : 1975 Physician: Patel Flowers MD   Sex: Male AGE: 49 year Ref Phys:    ID#: [id number]   Technician: Edith Mccarthy     History and Examination:  Amelia Coker is a 49 year old woman with left leg pain. Past history is notable for a lumbar surgical procedure and a suspected diagnosis of piriformis syndrome. She is referred for evaluation of possible lumbar radiculopathy.    Techniques:  Motor conduction studies were done with surface recording electrodes. Sensory conduction studies were performed with surface electrodes, unless indicated otherwise by (n), designating the use of subdermal recording electrodes. Temperature was monitored and recorded throughout the study. EMG was done with a concentric needle electrode. Paraspinal electromyography was deferred because of prior laminotomy.    Results:  Left superficial fibular and sural sensory conduction studies were normal. Left fibular and tibial motor conduction studies were normal. Electromyography did not demonstrate reproducible abnormal spontaneous activity. Voluntary activation demonstrated evidence of motor unit remodeling in the fibularis tertius muscle and was otherwise normal.     Interpretation:  This study demonstrates no diagnostic abnormalities. The findings in the fibularis tertius may reflect prior axonal injury and reinnervation but are non-localizing, and of limited clinical significance, as an isolated finding.    ___________________________  Patel Flowers MD        Nerve Conduction Studies  Motor Sites      Latency Amplitude Neg. Amp Diff Segment Distance Velocity Neg. Dur Neg Area Diff Temperature Comment   Site (ms) Norm (mV) Norm (%)  cm m/s Norm (ms) (%) ( C)    Left Fibular  (EDB) Motor   Ankle 4.5  < 6.0 2.5  > 2.5  Ankle-EDB 8   4.8  30.7    Bel Fib Head 11.5 - 2.2 - -12 Bel Fib Head-Ankle 32.8 47  > 38 5.1 -9 30.7    Pop Fossa 13.3 - 1.94 - -12 Pop Fossa-Bel Fib Head 9.5 53  > 38 5.3 -2 30.6    Left Tibial (AHB) Motor   Ankle 4.0  < 6.5 9.7  > 5.0  Ankle-AHB 8   5.6  31.6    Knee 12.3 - 5.5 - -43 Knee-Ankle 38 46  > 38 6.5 -25 31.5      Sensory Sites      Onset Lat Peak Lat Amp (O-P) Amp (P-P) Segment Distance Velocity Temperature Comment   Site ms (ms)  V Norm ( V)  cm m/s Norm ( C)    Left Superficial Fibular Sensory   14 cm-Ankle 2.4 3.1 8  > 3 8 14 cm-Ankle 12.5 52  > 38 30.1    Left Sural Sensory   Calf-Lat Mall 2.8 3.5 13  > 5 19 Calf-Lat Mall 14 50  > 38 31      F Wave Studies     Min-F Max-F Dispersion Persistence Mean-F F-Norm L-R Mean-F L-R Mean-F Norm F/M Ratio F-M Lat (ms)   Left Tibial (Abd Hallucis)  31.5  C   49.69 55.78 6.09 100.00 51.39 <61  <5.7 1.56 45.31       Electromyography     Side Muscle Ins Act Fibs/PSW Fasc HF Amp Dur Poly Recrt Int Pat   Left Tib Anterior Nml None Nml 0 Nml Nml 0 Nml Nml   Left Gastroc MH Nml None Nml 0 Nml Nml 0 Nml Nml   Left Tib Posterior Nml None Nml 0 Nml Nml 0 Nml Nml   Left Fib Tertius Nml None Nml 0 1+ 1+ 0 Jovita Nml   Left Vastus Lat Nml None Nml 0 Nml Nml 0 Nml Nml   Left Biceps Fem LH Nml None Nml 0 Nml Nml 0 Nml Nml   Left Biceps Fem SH Nml None Nml 0 Nml Nml 0 Nml Nml   Left Glut Med Nml None Nml 0 Nml Nml 0 Nml Nml         NCS Waveforms:    Motor         Sensory

## 2024-03-18 NOTE — LETTER
3/18/2024         RE: Amelia Coker  [address] [facility] [address]        Dear Colleague,    Thank you for referring your patient, Amelia Coker, to the [facility] [place]. Please see a copy of my visit note below.                            [facility]  Electrodiagnostic Laboratory                 Department of Neurology                                                                                                         Test Date:  3/18/2024    Patient: Bhupinder Coker : 1975 Physician: aPtel Flowers MD   Sex: Male AGE: 49 year Ref Phys:    ID#: [id number]   Technician: Edith Mccarthy     History and Examination:  Amelia Coker is a 49 year old woman with left leg pain. Past history is notable for a lumbar surgical procedure and a suspected diagnosis of piriformis syndrome. She is referred for evaluation of possible lumbar radiculopathy.    Techniques:  Motor conduction studies were done with surface recording electrodes. Sensory conduction studies were performed with surface electrodes, unless indicated otherwise by (n), designating the use of subdermal recording electrodes. Temperature was monitored and recorded throughout the study. EMG was done with a concentric needle electrode. Paraspinal electromyography was deferred because of prior laminotomy.    Results:  Left superficial fibular and sural sensory conduction studies were normal. Left fibular and tibial motor conduction studies were normal. Electromyography did not demonstrate reproducible abnormal spontaneous activity. Voluntary activation demonstrated evidence of motor unit remodeling in the fibularis tertius muscle and was otherwise normal.     Interpretation:  This study demonstrates no diagnostic abnormalities. The findings in the fibularis tertius may reflect prior axonal injury and reinnervation but are non-localizing, and of limited clinical significance, as an isolated  finding.    ___________________________  Patel Flowers MD        Nerve Conduction Studies  Motor Sites      Latency Amplitude Neg. Amp Diff Segment Distance Velocity Neg. Dur Neg Area Diff Temperature Comment   Site (ms) Norm (mV) Norm (%)  cm m/s Norm (ms) (%) ( C)    Left Fibular (EDB) Motor   Ankle 4.5  < 6.0 2.5  > 2.5  Ankle-EDB 8   4.8  30.7    Bel Fib Head 11.5 - 2.2 - -12 Bel Fib Head-Ankle 32.8 47  > 38 5.1 -9 30.7    Pop Fossa 13.3 - 1.94 - -12 Pop Fossa-Bel Fib Head 9.5 53  > 38 5.3 -2 30.6    Left Tibial (AHB) Motor   Ankle 4.0  < 6.5 9.7  > 5.0  Ankle-AHB 8   5.6  31.6    Knee 12.3 - 5.5 - -43 Knee-Ankle 38 46  > 38 6.5 -25 31.5      Sensory Sites      Onset Lat Peak Lat Amp (O-P) Amp (P-P) Segment Distance Velocity Temperature Comment   Site ms (ms)  V Norm ( V)  cm m/s Norm ( C)    Left Superficial Fibular Sensory   14 cm-Ankle 2.4 3.1 8  > 3 8 14 cm-Ankle 12.5 52  > 38 30.1    Left Sural Sensory   Calf-Lat Mall 2.8 3.5 13  > 5 19 Calf-Lat Mall 14 50  > 38 31      F Wave Studies     Min-F Max-F Dispersion Persistence Mean-F F-Norm L-R Mean-F L-R Mean-F Norm F/M Ratio F-M Lat (ms)   Left Tibial (Abd Hallucis)  31.5  C   49.69 55.78 6.09 100.00 51.39 <61  <5.7 1.56 45.31       Electromyography     Side Muscle Ins Act Fibs/PSW Fasc HF Amp Dur Poly Recrt Int Pat   Left Tib Anterior Nml None Nml 0 Nml Nml 0 Nml Nml   Left Gastroc MH Nml None Nml 0 Nml Nml 0 Nml Nml   Left Tib Posterior Nml None Nml 0 Nml Nml 0 Nml Nml   Left Fib Tertius Nml None Nml 0 1+ 1+ 0 Jovita Nml   Left Vastus Lat Nml None Nml 0 Nml Nml 0 Nml Nml   Left Biceps Fem LH Nml None Nml 0 Nml Nml 0 Nml Nml   Left Biceps Fem SH Nml None Nml 0 Nml Nml 0 Nml Nml   Left Glut Med Nml None Nml 0 Nml Nml 0 Nml Nml         NCS Waveforms:    Motor         Sensory                                    Again, thank you for allowing me to participate in the care of your patient.        Sincerely,        Patel Flowers MD

## 2024-03-25 ENCOUNTER — MYC MEDICAL ADVICE (OUTPATIENT)
Dept: FAMILY MEDICINE | Facility: OTHER | Age: 49
End: 2024-03-25
Payer: COMMERCIAL

## 2024-03-25 DIAGNOSIS — E66.812 OBESITY, CLASS II, BMI 35-39.9: Primary | ICD-10-CM

## 2024-03-25 RX ORDER — TIRZEPATIDE 15 MG/.5ML
15 INJECTION, SOLUTION SUBCUTANEOUS
Qty: 6 ML | Refills: 1 | Status: SHIPPED | OUTPATIENT
Start: 2024-03-25 | End: 2024-04-02

## 2024-03-25 NOTE — TELEPHONE ENCOUNTER
"To provider to advise on mychart request for you to take over prescribing her \"compounded Mounjaro (tirzepatide 15mg weekly) rather than her continuing to have it prescribed through a \"telehealth clinic\".  If willing, are you wanting an in clinic appointment or virtual appointment for this?  Thank you.  Josefina PERDOMO RN  "

## 2024-03-29 ENCOUNTER — MYC MEDICAL ADVICE (OUTPATIENT)
Dept: PALLIATIVE MEDICINE | Facility: CLINIC | Age: 49
End: 2024-03-29

## 2024-03-29 ENCOUNTER — ANCILLARY PROCEDURE (OUTPATIENT)
Dept: ULTRASOUND IMAGING | Facility: CLINIC | Age: 49
End: 2024-03-29
Attending: PHYSICAL MEDICINE & REHABILITATION
Payer: COMMERCIAL

## 2024-03-29 ENCOUNTER — TELEPHONE (OUTPATIENT)
Dept: FAMILY MEDICINE | Facility: OTHER | Age: 49
End: 2024-03-29

## 2024-03-29 ENCOUNTER — HOSPITAL ENCOUNTER (OUTPATIENT)
Facility: AMBULATORY SURGERY CENTER | Age: 49
Discharge: HOME | End: 2024-03-29
Attending: PHYSICAL MEDICINE & REHABILITATION | Admitting: PHYSICAL MEDICINE & REHABILITATION
Payer: COMMERCIAL

## 2024-03-29 VITALS
DIASTOLIC BLOOD PRESSURE: 72 MMHG | OXYGEN SATURATION: 98 % | RESPIRATION RATE: 16 BRPM | SYSTOLIC BLOOD PRESSURE: 118 MMHG | TEMPERATURE: 96.8 F

## 2024-03-29 DIAGNOSIS — R52 PAIN: ICD-10-CM

## 2024-03-29 PROCEDURE — 20552 NJX 1/MLT TRIGGER POINT 1/2: CPT

## 2024-03-29 PROCEDURE — G8918 PT W/O PREOP ORDER IV AB PRO: HCPCS

## 2024-03-29 PROCEDURE — G8907 PT DOC NO EVENTS ON DISCHARG: HCPCS

## 2024-03-29 RX ORDER — BUPIVACAINE HYDROCHLORIDE 5 MG/ML
INJECTION, SOLUTION PERINEURAL PRN
Status: DISCONTINUED | OUTPATIENT
Start: 2024-03-29 | End: 2024-03-29 | Stop reason: HOSPADM

## 2024-03-29 NOTE — TELEPHONE ENCOUNTER
RN called patient regarding her scheduled procedure with Dr. Dennis this afternoon. Left VM for patient with request to call our office back.     Writer had called to clarify the purpose of this procedure. Per Dr. Dennis, patient had stated that numbing medications are ineffective for her. He wanted to be sure that patient has an understanding of what the procedure entails- which is essentially using a numbing medication (lidocaine or bupivacaine) as a diagnostic tool for her vascular team. He wants to make sure that she understands this procedure likely won't give her pain relief today, if she truly doesn't benefit from numbing medications.     JESSY ZamoraN RN Care Coordinator  Neurology/Neurosurgery/PM&R/Pain Management

## 2024-03-29 NOTE — CONFIDENTIAL NOTE
RN was able to speak to the pt and clarify what today's procedure entails. She explained that it is not that numbing medications don't work, it is more so that an increased amount of numbing medication is needed than the average person. Her dentist won't even try using Novocain on her because it takes 4 times the amount to get her numb. She states that the injections that Dr Solis did last fall ([facility]Bs) was successful. He used Lidocaine to numb the area and then he injected Bupivacaine to numb the nerve. She understands that there is a possibility she will not get any relief or she may get some but it will not last long. She also verbalized understanding that this injection is diagnostic and not therapeutic. I updated Dr Dennis.    Maria E Bullock, RN Care Coordinator   Neurology/Neurosurgery/PM&R/ Pain Management

## 2024-03-29 NOTE — DISCHARGE INSTRUCTIONS
PAIN INJECTION HOME CARE INSTRUCTIONS  Activity  -Rest today  -Do not work today  -Resume normal activity tomorrow  -DO NOT shower for 24 hours  -DO NOT remove bandaid for 24 hours    Pain  -You may experience soreness at the injection site for one or two days  -You may use an ice pack for 20 minutes every 2 hours for the first 24 hours  -You may use a heating pad after the first 24 hours  -You may use Tylenol (acetaminophen) every 4 hours or other pain medicines as directed by your physician    You may experience numbness radiating into your legs or arms (depending on the procedure location). This numbness may last several hours. Until sensation returns to normal; please use caution in walking, climbing stairs, and stepping out of your vehicle, etc.    DID YOU RECEIVE SEDATION TODAY?  No    Safety  Sedation medicine, if given, may remain active for many hours. It is important for the next 24 hours that you do not:  -Drive a car  -Operate machines or power tools  -Consume alcohol, including beer  -Sign any important papers or legal documents    DID YOU RECEIVE STEROIDS TODAY?       Common side effects of steroids:  Not everyone will experience corticosteroid side effects. If side effects are experienced, they will gradually subside in the 7-10 day period following an injection. Most common side effects include:  -Flushed face and/or chest  -Feeling of warmth, particularly in the face but could be an overall feeling of warmth  -Increased blood sugar in diabetic patients  -Menstrual irregularities my occur. If taking hormone-based birth control an alternate method of birth control is recommended  -Sleep disturbances and/or mood swings are possible  -Leg cramps    Please contact us if you have:  -Severe pain  -Fever more than 101.5 degrees Fahrenheit  -Signs of infection at the injection site (redness, swelling, or drainage)    FOR PAIN CENTER PATIENTS:  If you have questions, please contact the Pain Clinic at  [phone] Option #1 between the hours of 7:00 am and 3:00 pm Monday through Friday. After office hours you can contact the on call provider by dialing [phone]. If you need immediate attention, we recommend that you go to a hospital emergency room or dial [phone].      FOR PM&R PATIENTS:  For patients seen by the PM and R service, please call [phone]. (Monday through Friday 8a-5p.  After business hours and weekends call [phone] and ask for the PM and R doctor on call). If you need to fax a pain diary to PM&R the fax number is [phone]. If you are unable to fax, uploading to [employer] is encouraged, then send to provider. If you have procedure scheduling questions please call [phone] Option #2.

## 2024-03-29 NOTE — TELEPHONE ENCOUNTER
"Pharmacist calling asking if provider is ok with them \"compounding Mounjaro\"    Clarified with pharmacist that this message is correct  Will route to provider    Danica Cox RN on 3/29/2024 at 3:51 PM    "

## 2024-03-29 NOTE — OP NOTE
PROCEDURE NOTE: Intramuscular Injection Under Ultrasound Guidance    PROCEDURE DATE: 3/29/2024    PATIENT NAME: Amelia Coker  YOB: 1975    ATTENDING PHYSICIAN: Brandi Dennis MD  FELLOW/RESIDENT PHYSICIAN: None    PREOPERATIVE DIAGNOSIS:   Thoracic outlet syndrome  POSTOPERATIVE DIAGNOSIS: same    PROCEDURE PERFORMED: Right Anterior Scalene Muscle Block Under Ultrasound Guidance    ULTRASOUND WAS USED.     INDICATIONS FOR THE PROCEDURE:  Amelia Coker is a 49 year old female who presents with thoracic outlet syndrome as evaluated by the vascular surgery clinic at [facility] and referred for diagnostic block.    PROCEDURE AND FINDINGS:  She was greeted in the clinic. The risk, benefits and alternatives to the procedure were again reviewed with her and informed consent was obtained and the patient agreed to proceed. A time-out was performed. Following review alternatives, benefits and risks, the procedure was carried out under sterile prep with sterile gel. The use of direct sonographic guidance was used to ensure accurate placement of the needle (rather than non-guided injection) and required to minimize the risk of bleeding or injury to nearby neurovascular structures. Images were recorded and stored.     A high-frequency linear array ultrasound transducer was used to visualize the relevant structures and determine the optimal needle path for the procedure. A 27-gauge 1.5 inch needle was advanced utilizing an out-of-plane approach, under continuous ultrasound guidance to the anterior scalene muscle on the right. After negative aspiration, slow injection of the treatment solution 1.75mL total consisting of 0.5% bupivacaine was instilled into affected area per muscle. The tip of the needle was visualized throughout the procedure. The remainder of the single-use vials were discarded.      Before the procedure, she reported a pain score of 5/10.   After the procedure, she reported a pain score of  4/10.      She tolerated the procedure well, was discharged home in stable condition.     Follow-up will be determined after review of symptom diary/block sheet by Dr. Cisneros in vascular surgery clinic.      COMPLICATIONS: None    COMMENTS: None

## 2024-04-01 ENCOUNTER — NURSE TRIAGE (OUTPATIENT)
Dept: NURSING | Facility: CLINIC | Age: 49
End: 2024-04-01
Payer: COMMERCIAL

## 2024-04-01 DIAGNOSIS — E66.812 OBESITY, CLASS II, BMI 35-39.9: ICD-10-CM

## 2024-04-01 NOTE — TELEPHONE ENCOUNTER
"Received a call from \"[facility]\"     They Received a prescription for \"MOUNJARO PEN\" They need the prescription to state \"Authorization to compound medication\"            Reason for Disposition   [1] Caller has NON-URGENT medicine question about med that PCP prescribed AND [2] triager unable to answer question    Protocols used: Medication Question Call-A-  Barbara Mora RN, Triage Nurse Advisor, 4/1/2024 6:23 PM  "

## 2024-04-02 RX ORDER — TIRZEPATIDE 15 MG/.5ML
15 INJECTION, SOLUTION SUBCUTANEOUS
Qty: 6 ML | Refills: 1 | Status: SHIPPED | OUTPATIENT
Start: 2024-04-02 | End: 2024-04-08

## 2024-04-08 ENCOUNTER — TELEPHONE (OUTPATIENT)
Dept: FAMILY MEDICINE | Facility: OTHER | Age: 49
End: 2024-04-08

## 2024-04-08 DIAGNOSIS — E66.812 OBESITY, CLASS II, BMI 35-39.9: ICD-10-CM

## 2024-04-08 RX ORDER — TIRZEPATIDE 15 MG/.5ML
15 INJECTION, SOLUTION SUBCUTANEOUS
Qty: 6 ML | Refills: 3 | Status: SHIPPED | OUTPATIENT
Start: 2024-04-08

## 2024-04-08 NOTE — TELEPHONE ENCOUNTER
Retail Pharmacy Prior Authorization Team   Phone: [phone]    PRIOR AUTHORIZATION DENIED    Medication: MOUNJARO 15 MG/0.5ML SC SOPN  Insurance Company: [employer] ([facility]) - Phone [phone] Fax [phone]  Denial Date: 4/8/2024  Denial Reason(s): MUST HAVE TYPE 2 DIABETES AND PROVIDE DOCUMENTATION CONFIRMING DX      Appeal Information: IF THE PROVIDER WOULD LIKE TO APPEAL THIS DECISION PLEASE PROVIDE THE PA TEAM WITH A LETTER OF MEDICAL NECESSITY    Patient Notified: NO

## 2024-04-09 ENCOUNTER — TELEPHONE (OUTPATIENT)
Dept: FAMILY MEDICINE | Facility: OTHER | Age: 49
End: 2024-04-09
Payer: COMMERCIAL

## 2024-04-09 NOTE — TELEPHONE ENCOUNTER
[facility] pharmacy calling to make sure the Mounjaro asking if it is ok to compound.    Prescription reads ok to compound. Pharmacy notified.  Sandi Mckenna RN on 4/9/2024 at 1:30 PM

## 2024-04-11 ENCOUNTER — MYC MEDICAL ADVICE (OUTPATIENT)
Dept: FAMILY MEDICINE | Facility: OTHER | Age: 49
End: 2024-04-11
Payer: COMMERCIAL

## 2024-04-11 DIAGNOSIS — M50.30 DDD (DEGENERATIVE DISC DISEASE), CERVICAL: Primary | ICD-10-CM

## 2024-04-11 DIAGNOSIS — M51.27 HERNIATION OF INTERVERTEBRAL DISC BETWEEN L5 AND S1: ICD-10-CM

## 2024-04-11 DIAGNOSIS — M47.26 OSTEOARTHRITIS OF SPINE WITH RADICULOPATHY, LUMBAR REGION: ICD-10-CM

## 2024-04-11 DIAGNOSIS — M48.061 FORAMINAL STENOSIS OF LUMBAR REGION: ICD-10-CM

## 2024-04-11 NOTE — TELEPHONE ENCOUNTER
Patient called back to get scheduled. Patient's schedule does not work with openings for next week. She is wondering if she can be worked in around 7am or after 4pm anyday next week or on Thursday 4/18 around 7a-9a would work. She refused to see another provider and could not take any other green spot on schedule. Please call her back at [phone].

## 2024-04-18 ENCOUNTER — OFFICE VISIT (OUTPATIENT)
Dept: FAMILY MEDICINE | Facility: OTHER | Age: 49
End: 2024-04-18
Payer: COMMERCIAL

## 2024-04-18 VITALS
BODY MASS INDEX: 29.24 KG/M2 | HEART RATE: 76 BPM | OXYGEN SATURATION: 98 % | HEIGHT: 65 IN | RESPIRATION RATE: 18 BRPM | DIASTOLIC BLOOD PRESSURE: 78 MMHG | WEIGHT: 175.5 LBS | TEMPERATURE: 98.6 F | SYSTOLIC BLOOD PRESSURE: 120 MMHG

## 2024-04-18 DIAGNOSIS — M79.18 MUSCULOSKELETAL PAIN: ICD-10-CM

## 2024-04-18 DIAGNOSIS — E04.1 THYROID NODULE: ICD-10-CM

## 2024-04-18 DIAGNOSIS — Z82.49 FAMILY HISTORY OF ISCHEMIC HEART DISEASE: ICD-10-CM

## 2024-04-18 DIAGNOSIS — E78.5 HYPERLIPIDEMIA LDL GOAL <130: ICD-10-CM

## 2024-04-18 DIAGNOSIS — M54.16 LUMBAR RADICULOPATHY: Primary | ICD-10-CM

## 2024-04-18 DIAGNOSIS — M47.896 OTHER OSTEOARTHRITIS OF SPINE, LUMBAR REGION: ICD-10-CM

## 2024-04-18 DIAGNOSIS — M51.27 HERNIATION OF INTERVERTEBRAL DISC BETWEEN L5 AND S1: ICD-10-CM

## 2024-04-18 DIAGNOSIS — M48.061 FORAMINAL STENOSIS OF LUMBAR REGION: ICD-10-CM

## 2024-04-18 LAB
ALBUMIN SERPL BCG-MCNC: 4.6 G/DL (ref 3.5–5.2)
ALP SERPL-CCNC: 63 U/L (ref 40–150)
ALT SERPL W P-5'-P-CCNC: 17 U/L (ref 0–50)
ANION GAP SERPL CALCULATED.3IONS-SCNC: 11 MMOL/L (ref 7–15)
AST SERPL W P-5'-P-CCNC: 20 U/L (ref 0–45)
BILIRUB SERPL-MCNC: 0.4 MG/DL
BUN SERPL-MCNC: 18.6 MG/DL (ref 6–20)
CALCIUM SERPL-MCNC: 9.4 MG/DL (ref 8.6–10)
CHLORIDE SERPL-SCNC: 106 MMOL/L (ref 98–107)
CREAT SERPL-MCNC: 0.56 MG/DL (ref 0.51–0.95)
DEPRECATED HCO3 PLAS-SCNC: 26 MMOL/L (ref 22–29)
EGFRCR SERPLBLD CKD-EPI 2021: >90 ML/MIN/1.73M2
GLUCOSE SERPL-MCNC: 96 MG/DL (ref 70–99)
POTASSIUM SERPL-SCNC: 4 MMOL/L (ref 3.4–5.3)
PROT SERPL-MCNC: 7.8 G/DL (ref 6.4–8.3)
SODIUM SERPL-SCNC: 143 MMOL/L (ref 135–145)

## 2024-04-18 PROCEDURE — 93000 ELECTROCARDIOGRAM COMPLETE: CPT | Performed by: PHYSICIAN ASSISTANT

## 2024-04-18 PROCEDURE — 82306 VITAMIN D 25 HYDROXY: CPT | Performed by: PHYSICIAN ASSISTANT

## 2024-04-18 PROCEDURE — 36415 COLL VENOUS BLD VENIPUNCTURE: CPT | Performed by: PHYSICIAN ASSISTANT

## 2024-04-18 PROCEDURE — 99214 OFFICE O/P EST MOD 30 MIN: CPT | Mod: 25 | Performed by: PHYSICIAN ASSISTANT

## 2024-04-18 PROCEDURE — 80053 COMPREHEN METABOLIC PANEL: CPT | Performed by: PHYSICIAN ASSISTANT

## 2024-04-18 NOTE — PROGRESS NOTES
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  Needs to have some labs done related to this.  Will also double check EKG and stress echo from a family history of cardiovascular disease.  There has not been any evidence of cardiovascular disease for her other than some questionable left ventricular hypertrophy on an EKG over a year ago.  Echo in July 2020 had excellent results with no evidence of cardiovascular dysfunction.  Follow-up as needed.  - EKG 12-lead complete w/read - Clinics  - Echocardiogram Exercise Stress; Future  - Vitamin D Deficiency; Future  - Comprehensive metabolic panel (BMP + Alb, Alk Phos, ALT, AST, Total. Bili, TP); Future    BMI  Estimated body mass index is 29.2 kg/m  as calculated from the following:    Height as of this encounter: 1.651 m (5' 5\").    Weight as of this encounter: 79.6 kg (175 lb 8 oz).   Weight management plan: Discussed healthy diet and exercise guidelines      Work on weight loss  Regular exercise    Florence Roe is a 49 year old, presenting for the following health issues:  Order labs and Discuss medications ([facility] wants her to have certain meds but needs a local provider to manage them)      4/18/2024     7:25 AM   Additional Questions   Roomed by Lillian SINGH   Accompanied by self     History of Present Illness       Reason for visit:  Labs, possibly start new med    She eats 4 or more servings of fruits and vegetables daily.She consumes 0 sweetened beverage(s) daily.She exercises with enough effort to increase her heart rate 30 to 60 minutes per day.  " "She exercises with enough effort to increase her heart rate 7 days per week.   She is taking medications regularly.     Discuss labs and starting some new medications that the [facility] wants her to start but she needs a local doctor to manage them for her.      Review of Systems  Constitutional, HEENT, cardiovascular, pulmonary, GI, , musculoskeletal, neuro, skin, endocrine and psych systems are negative, except as otherwise noted.      Objective    /78   Pulse 76   Temp 98.6  F (37  C) (Temporal)   Resp 18   Ht 1.651 m (5' 5\")   Wt 79.6 kg (175 lb 8 oz)   SpO2 98%   BMI 29.20 kg/m    Body mass index is 29.2 kg/m .  Physical Exam   GENERAL: alert and no distress  NECK: no adenopathy, no asymmetry, masses, or scars  RESP: lungs clear to auscultation - no rales, rhonchi or wheezes  CV: regular rate and rhythm, normal S1 S2, no S3 or S4, no murmur, click or rub, no peripheral edema  ABDOMEN: soft, nontender, no hepatosplenomegaly, no masses and bowel sounds normal  MS: no gross musculoskeletal defects noted, no edema    No results found. However, due to the size of the patient record, not all encounters were searched. Please check Results Review for a complete set of results.    Office EKG demonstrates:  Normal Sinus Rhythm, unchanged from previous tracings, appears normal, NSR,normal axis, normal intervals, no acute ST/T changes c/w ischemia,no LVH by voltage criteria,similar to previous tracings.          Signed Electronically by: Otoniel Quiroz PA-C    "

## 2024-04-19 LAB — VIT D+METAB SERPL-MCNC: 29 NG/ML (ref 20–50)

## 2024-04-22 ENCOUNTER — HOSPITAL ENCOUNTER (OUTPATIENT)
Dept: BONE DENSITY | Facility: CLINIC | Age: 49
Discharge: HOME | End: 2024-04-22
Attending: PHYSICIAN ASSISTANT | Admitting: PHYSICIAN ASSISTANT
Payer: COMMERCIAL

## 2024-04-22 DIAGNOSIS — M48.061 FORAMINAL STENOSIS OF LUMBAR REGION: ICD-10-CM

## 2024-04-22 DIAGNOSIS — M51.27 HERNIATION OF INTERVERTEBRAL DISC BETWEEN L5 AND S1: ICD-10-CM

## 2024-04-22 DIAGNOSIS — M47.26 OSTEOARTHRITIS OF SPINE WITH RADICULOPATHY, LUMBAR REGION: ICD-10-CM

## 2024-04-22 PROCEDURE — 77080 DXA BONE DENSITY AXIAL: CPT

## 2024-05-06 ENCOUNTER — HOSPITAL ENCOUNTER (OUTPATIENT)
Dept: CARDIOLOGY | Facility: CLINIC | Age: 49
Discharge: HOME | End: 2024-05-06
Attending: PHYSICIAN ASSISTANT | Admitting: PHYSICIAN ASSISTANT
Payer: COMMERCIAL

## 2024-05-06 DIAGNOSIS — M79.18 MUSCULOSKELETAL PAIN: ICD-10-CM

## 2024-05-06 DIAGNOSIS — E78.5 HYPERLIPIDEMIA LDL GOAL <130: ICD-10-CM

## 2024-05-06 DIAGNOSIS — M54.16 LUMBAR RADICULOPATHY: ICD-10-CM

## 2024-05-06 DIAGNOSIS — M47.896 OTHER OSTEOARTHRITIS OF SPINE, LUMBAR REGION: ICD-10-CM

## 2024-05-06 DIAGNOSIS — E04.1 THYROID NODULE: ICD-10-CM

## 2024-05-06 DIAGNOSIS — Z82.49 FAMILY HISTORY OF ISCHEMIC HEART DISEASE: ICD-10-CM

## 2024-05-06 DIAGNOSIS — M48.061 FORAMINAL STENOSIS OF LUMBAR REGION: ICD-10-CM

## 2024-05-06 DIAGNOSIS — M51.27 HERNIATION OF INTERVERTEBRAL DISC BETWEEN L5 AND S1: ICD-10-CM

## 2024-05-06 PROCEDURE — 93350 STRESS TTE ONLY: CPT | Mod: 26 | Performed by: INTERNAL MEDICINE

## 2024-05-06 PROCEDURE — 255N000002 HC RX 255 OP 636: Performed by: INTERNAL MEDICINE

## 2024-05-06 PROCEDURE — 93018 CV STRESS TEST I&R ONLY: CPT | Performed by: INTERNAL MEDICINE

## 2024-05-06 PROCEDURE — 93016 CV STRESS TEST SUPVJ ONLY: CPT | Performed by: INTERNAL MEDICINE

## 2024-05-06 PROCEDURE — 93325 DOPPLER ECHO COLOR FLOW MAPG: CPT | Mod: 26 | Performed by: INTERNAL MEDICINE

## 2024-05-06 PROCEDURE — 93325 DOPPLER ECHO COLOR FLOW MAPG: CPT | Mod: TC

## 2024-05-06 PROCEDURE — 93321 DOPPLER ECHO F-UP/LMTD STD: CPT | Mod: 26 | Performed by: INTERNAL MEDICINE

## 2024-05-06 RX ADMIN — HUMAN ALBUMIN MICROSPHERES AND PERFLUTREN 4 ML: 10; .22 INJECTION, SOLUTION INTRAVENOUS at 10:56

## 2024-05-09 ENCOUNTER — MYC MEDICAL ADVICE (OUTPATIENT)
Dept: PHYSICAL THERAPY | Facility: CLINIC | Age: 49
End: 2024-05-09
Payer: COMMERCIAL

## 2024-05-09 DIAGNOSIS — M25.50 HYPERMOBILITY ARTHRALGIA: Primary | ICD-10-CM

## 2024-05-09 NOTE — TELEPHONE ENCOUNTER
Jhonatan Quinones,    Although I follow many EDS patients for primary care, I don't do the EDS diagnostic evaluation myself- I refer them see Dr. Perze for this.  He is available for one time diagnostic consults at this time.  I placed an order for the rare disease program referral, and they should be able to get the patient set up with him.    Bishop Lala MD

## 2024-05-09 NOTE — TELEPHONE ENCOUNTER
Jhonatan Jiang (epic keeps correcting your last name- sorry!)  I have a patient looking for an EDS literate/knowledgeable provider. I got your name form a list of our EDS Physical therapist at our clinic has. Would you be willing to see a patient for clinical discussion and a checklist that the [facility] is requesting? She is looking for a clinical diagnosis and is telling that [place] will provide the actual medical diagnosis/genetics work up. She  has a form she needs a doctor to fill out [email or web address]  And her PCP is a PA and reportedly cannot complete the form.      Please let me know if you do think this is something you can help the patient with and I can have her get an appointment with you.

## 2024-05-13 ENCOUNTER — TELEPHONE (OUTPATIENT)
Dept: INTERNAL MEDICINE | Facility: CLINIC | Age: 49
End: 2024-05-13
Payer: COMMERCIAL

## 2024-05-13 NOTE — TELEPHONE ENCOUNTER
Health Call Center    Phone Message    May a detailed message be left on voicemail: yes     Reason for Call: Other:     Diagnostic Journey:   Undiagnosed            Rare Disease:   consult with Dr. 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 If you don t hear from a representative within 2 business days, please call [phone].        Writer unsure of how to schedule rare diease referral from Dr. Lala. Please assist pt. Above # [phone] stated to call PCP # to schedule appt.Thank you        Action Taken: Message routed to:  [facility] ([facility]):      Travel Screening: Not Applicable

## 2024-05-14 NOTE — CONFIDENTIAL NOTE
Coordinators, please contact patient and:   -- mention the myChart that I sent   -- Offer an in-person appointment with me, for 1 hour. No KHANG or video.  Thanks     Yousuf Perez MD  Internal Medicine & Pediatrics  [facility], [facility] [place]

## 2024-05-14 NOTE — CONFIDENTIAL NOTE
"Will send message separately.    Hello!  My name is Dr. Perez, and I am contacting you as part of the [facility] Rare Disease program. My team was forwarded your record, as someone who is interested in a formal diagnosis of hypermobility spectrum disorder (including Trina-Danlos Syndrome or EDS).         About me: I am an internist and pediatrician who specializes in complex primary care and diagnostic evaluations. These days, most of my diagnostic evaluations are for EDS and other hypermobility spectrum disorders. As you know, hypermobility is somewhat orphaned, without its own medical specialty. I have established a hypermobility diagnosis for a few hundred patients, following evidence-based methods. I can also provide one-time advice about ongoing care.         Can you establish with me for primary care? Unfortunately, I'm not able to accept more patients with hypermobility, autonomic dysfunction, or the conditions sometimes referred to as \"mast cell\" disorders. My primary care panel includes several dozen people with these conditions, and my clinic is too understaffed to serve more. However, I will make some useful recommendations to you and your current primary care provider.         Do I order genetic blood tests for EDS? The short answer is, no, genetic tests aren't necessary. Technically there are some tests that can be done, but these tests are not very sensitive (many people with EDS or hypermobility are missed by the tests). Also, a positive or negative test doesn't affect our management. I was part of a group that was planning a much broader research program with hypermobility, but unfortunately federal funding for all research was cut and the project was stopped. If you still wish to meet, I can show you how to order your own tests.         Should you see a ? You are welcome to see a  if you wish, but the geneticists don't do anything different than what I do. Also, " the geneticists are a lot harder to see! The key thing is to be assessed by somebody with lots of EDS and hypermobility experience.       What happens next?       -- Someone from my team will contact you soon. If you wish to schedule an appointment, we will get you in as soon as possible.       -- We will meet for an hour, in-person. If further counseling is necessary, we can arrange for a second appointment.       -- I will generate a letter to your primary care provider, and CC you.       -- If further evaluation is needed, you will be offered an appointment with other colleagues at the Rare Diseases program or elsewhere in [facility].     Sincerely,      Yousuf Perez MD  Primary Care Center, and [facility] Rare Diseases Program  [facility], [facility] Clinics [facility] [place]

## 2024-05-29 ENCOUNTER — OFFICE VISIT (OUTPATIENT)
Dept: INTERNAL MEDICINE | Facility: CLINIC | Age: 49
End: 2024-05-29
Payer: COMMERCIAL

## 2024-05-29 VITALS
BODY MASS INDEX: 28.44 KG/M2 | DIASTOLIC BLOOD PRESSURE: 81 MMHG | OXYGEN SATURATION: 99 % | HEART RATE: 85 BPM | WEIGHT: 170.9 LBS | SYSTOLIC BLOOD PRESSURE: 118 MMHG

## 2024-05-29 DIAGNOSIS — Q79.60 EHLERS-DANLOS SYNDROME: Primary | ICD-10-CM

## 2024-05-29 DIAGNOSIS — M35.7 HYPERMOBILITY SYNDROME: ICD-10-CM

## 2024-05-29 PROCEDURE — 99205 OFFICE O/P NEW HI 60 MIN: CPT | Performed by: PEDIATRICS

## 2024-05-29 NOTE — PROGRESS NOTES
"Dear patient. Thank you for visiting with me. I want you to feel respected, understood, and empowered. \"Respect\" is valuing you as much as I would a close family member. \"Empowerment\" happens when you are fully informed, and can make the best possible decision for you.  Please ask me questions!  Challenge anything that is not clear.    Medical records are primarily used as memory aids for me and my colleagues. Things to know about my documentation style:  - The 'problem list' includes current symptoms or diagnoses, and some problems that are resolved but may return. I use the past medical history for problems not expected to return.  - I use single quotation marks for things that you or I said, when I want to clarify who was speaking.  - I use double quotation marks when copying a term from elsewhere in your records. Italics (besides here) may also denote a quotation.  If you have questions or concerns, please contact me; I will reply as soon as time allows.    Amelia Coker is a 49 year old woman, with concerns including:  Patient presents with:  Consult: Pt here for consultation on hypermobility       PCP: Otoniel Quiroz   Visit type: consult patient, seen at the request of KLAUDIA Quiroz    Disposition comment:        Amelia was seen as part of the [facility] Rare Disease program. I specialize in complex care for young adults, and have substantial experience with EDS (as well as the sometimes correlated conditions of autonomic dysfunction and/or the urticaria/edema condition sometimes known as \"mast cell\" disorder). Unfortunately, my current clinic is insufficiently organized for me to take on additional patients with EDS and other related conditions. Therefore it will be important for Amelia to continue with her regular primary care provider. I am available to this provider to answer questions or help guide ongoing care.        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 I explained about this condition, what it is, and what to do about it. We discussed this in detail, and also how the official designation of EDS is no longer particularly important. Some of this information is provided in the patient instruction section of this encounter. The keys for HMS are to (a) work on muscle strength around problem joints, (b) protect problem joints in unusual circumstances of strain or effort, (c) be aware of additional conditions that may develop such as autonomic dysfunction, and (d) ensure that additional conditions do not become worse because of inactivity or other pitfalls.     Given skin stretchability is close to classic phenotype - may have difficulty with healing after surgery (hasn't had many scars)     A diagnosis of hypermobility polyarthralgia syndrome reflects joint pain and cracking/subluxing in the setting of joint hypermobility.      Question autonomic dysfunction (perhaps inappropriate sinus tachycardia). Although today was not designed to evaluate for the possibility of autonomic dysfunction, she does have several historical details that suggest inappropriate tachycardia. I am not concerned about perioperative risk for this, given industry-standard monitoring perioperatively.      Time note ((n5, 60'): The total of my time (on the date of service) for this service was 65 minutes, including discussion/face-to-face, chart review, interpretation not otherwise reported, documentation, and updating of the computerized record.        Florence Roe is a 49 year old, presenting for the following health issues:  Consult (Pt here for consultation on hypermobility )        5/29/2024    10:58 AM   Additional Questions   Roomed by YARITZA STOKES     History of Present Illness       Reason for visit:  Hypermobility eval    She eats 4 or more servings of fruits and vegetables daily.She consumes 0 sweetened beverage(s) daily.She exercises with enough effort to  increase her heart rate 30 to 60 minutes per day.    She is taking medications regularly.       Bhpuinder was prompted about hypermobility by several health care professionals. She has a lumbar fusion procedure coming up.      Joint history  Has had multiple tendon stretches, injuries, and interventions.   Recalls party tricks involving feet and back when younger.  Was in gymnastic 4-9th grade.  Upcoming lumbar fusion. There is planning for possible cervical spine intervention, but they are planning a TOS-intervention first on ribs.  Currently bikes 5 miles or walks up to 1-2 miles    Additional issues:  She struggles sometimes with variable HR, with varying degrees of discomfort.  Brother's son has Stickler syndrome      Objective    /81 (BP Location: Right arm, Patient Position: Sitting, Cuff Size: Adult Regular)   Pulse 85   Wt 77.5 kg (170 lb 14.4 oz)   SpO2 99%   BMI 28.44 kg/m    Body mass index is 28.44 kg/m .  Physical Exam  Constitutional:       General: She is not in acute distress.     Appearance: Normal appearance. She is not ill-appearing.   HENT:      Head: Normocephalic.      Nose: Nose normal.   Eyes:      General: No scleral icterus.        Right eye: No discharge.         Left eye: No discharge.      Extraocular Movements: Extraocular movements intact.   Pulmonary:      Effort: Pulmonary effort is normal. No respiratory distress.   Neurological:      Mental Status: She is alert.   Psychiatric:         Mood and Affect: Mood normal.         Behavior: Behavior normal.            A [place] exam was performed, based on published recommendations. The findings:   - Passive apposition of the thumbs to the flexor aspect of the forearm:Both sides (2 point)   - Passive dorsiflexion of the little fingers beyond 90 degrees: Right only (1 point)   - Hyperextension of the elbows beyond 10 degrees: Absent (0 points) - right doesn't count secondary to injury and repair. Had lesser intervention   -  Hyperextension of the knees beyond 10 degrees: Right only (1 point)   - Forward flexion of the trunk with knees fully extended so that the palms of the hand rest flat on the floor: Present (1 point)  Total points: 4/8    The following additional signs of hypermobility were noted:   - mild skin hyperextensibilityat hand and jaw (extensibility more similar to 'classic' than 'hypermobile' phenotype)   - bilateral piezogenic papules of the heel   - atrophic scarring involving at least two sites and without the formation of truly papyraceous and/or hemosideric scars as would be seen in classical Trina-Danlos syndrome   - dental crowding and high and narrow palate   - arachnodactyly, as defined by...       ... positive wrist sign (Walker sign) on both sides       ... positive thumb sign (Nacho sign) on both sides    I also observed:   - mildly loose shoulder on right side   - handshake behind back, with high on both sides            Signed Electronically by: Pedro Perez MD

## 2024-06-02 PROBLEM — M35.7 HYPERMOBILITY SYNDROME: Status: ACTIVE | Noted: 2024-06-02

## 2024-06-09 ENCOUNTER — MYC MEDICAL ADVICE (OUTPATIENT)
Dept: FAMILY MEDICINE | Facility: OTHER | Age: 49
End: 2024-06-09
Payer: COMMERCIAL

## 2024-06-13 ENCOUNTER — OFFICE VISIT (OUTPATIENT)
Dept: FAMILY MEDICINE | Facility: OTHER | Age: 49
End: 2024-06-13
Payer: COMMERCIAL

## 2024-06-13 ENCOUNTER — MYC MEDICAL ADVICE (OUTPATIENT)
Dept: FAMILY MEDICINE | Facility: OTHER | Age: 49
End: 2024-06-13

## 2024-06-13 VITALS
BODY MASS INDEX: 28.04 KG/M2 | OXYGEN SATURATION: 95 % | TEMPERATURE: 98.4 F | DIASTOLIC BLOOD PRESSURE: 70 MMHG | HEIGHT: 66 IN | WEIGHT: 174.5 LBS | HEART RATE: 75 BPM | SYSTOLIC BLOOD PRESSURE: 118 MMHG | RESPIRATION RATE: 16 BRPM

## 2024-06-13 DIAGNOSIS — M43.10 ACQUIRED SPONDYLOLISTHESIS: ICD-10-CM

## 2024-06-13 DIAGNOSIS — B37.2 YEAST DERMATITIS: ICD-10-CM

## 2024-06-13 DIAGNOSIS — M48.07 LUMBOSACRAL STENOSIS: ICD-10-CM

## 2024-06-13 DIAGNOSIS — M47.896 OTHER OSTEOARTHRITIS OF SPINE, LUMBAR REGION: Primary | ICD-10-CM

## 2024-06-13 DIAGNOSIS — Z09 HOSPITAL DISCHARGE FOLLOW-UP: ICD-10-CM

## 2024-06-13 DIAGNOSIS — M48.061 SPINAL STENOSIS, LUMBAR REGION, WITHOUT NEUROGENIC CLAUDICATION: Primary | ICD-10-CM

## 2024-06-13 DIAGNOSIS — M43.26 FUSION OF SPINE OF LUMBAR REGION: ICD-10-CM

## 2024-06-13 DIAGNOSIS — Z98.1 S/P LUMBAR FUSION: ICD-10-CM

## 2024-06-13 LAB
ALBUMIN SERPL BCG-MCNC: 4.1 G/DL (ref 3.5–5.2)
ALP SERPL-CCNC: 72 U/L (ref 40–150)
ALT SERPL W P-5'-P-CCNC: 37 U/L (ref 0–50)
ANION GAP SERPL CALCULATED.3IONS-SCNC: 9 MMOL/L (ref 7–15)
AST SERPL W P-5'-P-CCNC: 23 U/L (ref 0–45)
BILIRUB SERPL-MCNC: 0.3 MG/DL
BUN SERPL-MCNC: 10.3 MG/DL (ref 6–20)
CALCIUM SERPL-MCNC: 9.6 MG/DL (ref 8.6–10)
CHLORIDE SERPL-SCNC: 101 MMOL/L (ref 98–107)
CREAT SERPL-MCNC: 0.5 MG/DL (ref 0.51–0.95)
DEPRECATED HCO3 PLAS-SCNC: 28 MMOL/L (ref 22–29)
EGFRCR SERPLBLD CKD-EPI 2021: >90 ML/MIN/1.73M2
ERYTHROCYTE [DISTWIDTH] IN BLOOD BY AUTOMATED COUNT: 13.2 % (ref 10–15)
GLUCOSE SERPL-MCNC: 96 MG/DL (ref 70–99)
HCT VFR BLD AUTO: 33.3 % (ref 35–47)
HGB BLD-MCNC: 10.8 G/DL (ref 11.7–15.7)
MCH RBC QN AUTO: 30.3 PG (ref 26.5–33)
MCHC RBC AUTO-ENTMCNC: 32.4 G/DL (ref 31.5–36.5)
MCV RBC AUTO: 94 FL (ref 78–100)
PLATELET # BLD AUTO: 482 10E3/UL (ref 150–450)
POTASSIUM SERPL-SCNC: 4.1 MMOL/L (ref 3.4–5.3)
PROT SERPL-MCNC: 7.3 G/DL (ref 6.4–8.3)
RBC # BLD AUTO: 3.56 10E6/UL (ref 3.8–5.2)
SODIUM SERPL-SCNC: 138 MMOL/L (ref 135–145)
WBC # BLD AUTO: 6.3 10E3/UL (ref 4–11)

## 2024-06-13 PROCEDURE — 99214 OFFICE O/P EST MOD 30 MIN: CPT | Performed by: PHYSICIAN ASSISTANT

## 2024-06-13 PROCEDURE — 80053 COMPREHEN METABOLIC PANEL: CPT | Performed by: PHYSICIAN ASSISTANT

## 2024-06-13 PROCEDURE — 85027 COMPLETE CBC AUTOMATED: CPT | Performed by: PHYSICIAN ASSISTANT

## 2024-06-13 PROCEDURE — 36415 COLL VENOUS BLD VENIPUNCTURE: CPT | Performed by: PHYSICIAN ASSISTANT

## 2024-06-13 RX ORDER — KETOCONAZOLE 20 MG/G
CREAM TOPICAL DAILY
Qty: 60 G | Refills: 1 | Status: SHIPPED | OUTPATIENT
Start: 2024-06-13

## 2024-06-13 NOTE — PROGRESS NOTES
Assessment & Plan     Hospital discharge follow-up  S/P lumbar fusion  Other osteoarthritis of spine, lumbar region  Patient is having slow improvement with respect to mobility and decreased pain related to lumbar fusion.  Numbness and tingling though still remain to a certain extent but she is tolerating this fairly well.  She was given Robaxin for muscle relaxation and this is causing some ear related problems and hearing problems.  Will have her try an alternative muscle relaxant.  Related labs pending.  Follow-up based on results.    - tiZANidine (ZANAFLEX) 4 MG tablet; Take 1 tablet (4 mg) by mouth 3 times daily  - CBC with platelets; Future  - Comprehensive metabolic panel (BMP + Alb, Alk Phos, ALT, AST, Total. Bili, TP); Future  - CBC with platelets  - Comprehensive metabolic panel (BMP + Alb, Alk Phos, ALT, AST, Total. Bili, TP)    Yeast dermatitis  Very mild to the abdomen at the pannus crease.  - ketoconazole (NIZORAL) 2 % external cream; Apply topically daily      Regular exercise    Florence Roe is a 49 year old, presenting for the following health issues:  Post spine surgery      6/13/2024     9:47 AM   Additional Questions   Roomed by Lillian SINGH   Accompanied by self         6/13/2024     9:47 AM   Patient Reported Additional Medications   Patient reports taking the following new medications Methocarbamol 750 MG 1 tablet QID prn, Dilaudid 4 MG 1 tablet q 3 hrs prn for severe pain     History of Present Illness       Reason for visit:  Recheck post op from spine surgery    She eats 4 or more servings of fruits and vegetables daily.She consumes 0 sweetened beverage(s) daily.She exercises with enough effort to increase her heart rate 9 or less minutes per day.  She exercises with enough effort to increase her heart rate 3 or less days per week.   She is taking medications regularly.       Hospital Follow-up Visit:    Hospital/Nursing Home/IP Rehab Facility:  [facility]  Date of Admission:  "06/03/2024  Date of Discharge: 06/07/2024  Reason(s) for Admission: Spine Surgery - L4,L5,S1 sacrum fusion  Was the patient in the ICU or did the patient experience delirium during hospitalization?  Yes (delirium, just from anesthesia after surgery)      Do you have any other stressors you would like to discuss with your provider? No    Problems taking medications regularly:  None  Medication changes since discharge: Methocarbamol 750 MG 1 tablet QID prn  Dilaudid 4 MG 1 tablet q 3 hrs prn for severe pain  Problems adhering to non-medication therapy:  None    Summary of hospitalization:  CareEverywhere information obtained and reviewed  Diagnostic Tests/Treatments reviewed.  Follow up needed:    Related labs today and follow-up per recommendations of her surgeon.      Other Healthcare Providers Involved in Patient s Care:         Related labs today and follow-up per recommendations of her surgeon.  Update since discharge: improved.         Plan of care communicated with patient             Review of Systems  Constitutional, HEENT, cardiovascular, pulmonary, GI, , musculoskeletal, neuro, skin, endocrine and psych systems are negative, except as otherwise noted.      Objective    /70   Pulse 75   Temp 98.4  F (36.9  C) (Temporal)   Resp 16   Ht 1.67 m (5' 5.75\")   Wt 79.2 kg (174 lb 8 oz)   SpO2 95%   BMI 28.38 kg/m    Body mass index is 28.38 kg/m .  Physical Exam   GENERAL: alert and no distress  NECK: no adenopathy, no asymmetry, masses, or scars  RESP: lungs clear to auscultation - no rales, rhonchi or wheezes  CV: regular rate and rhythm, normal S1 S2, no S3 or S4, no murmur, click or rub, no peripheral edema  ABDOMEN: soft, nontender, no hepatosplenomegaly, no masses and bowel sounds normal  MS: no gross musculoskeletal defects noted, no edema    No results found. However, due to the size of the patient record, not all encounters were searched. Please check Results Review for a complete set of " results.        Signed Electronically by: Otoniel Quiroz PA-C

## 2024-06-17 ENCOUNTER — VIRTUAL VISIT (OUTPATIENT)
Dept: INTERNAL MEDICINE | Facility: CLINIC | Age: 49
End: 2024-06-17
Payer: COMMERCIAL

## 2024-06-17 ENCOUNTER — MYC MEDICAL ADVICE (OUTPATIENT)
Dept: FAMILY MEDICINE | Facility: OTHER | Age: 49
End: 2024-06-17

## 2024-06-17 DIAGNOSIS — I95.9 HYPOTENSION, UNSPECIFIED HYPOTENSION TYPE: ICD-10-CM

## 2024-06-17 DIAGNOSIS — R21 RASH: ICD-10-CM

## 2024-06-17 DIAGNOSIS — L30.9 DERMATITIS: Primary | ICD-10-CM

## 2024-06-17 DIAGNOSIS — Q79.60 EHLERS-DANLOS SYNDROME: Primary | ICD-10-CM

## 2024-06-17 PROCEDURE — 99214 OFFICE O/P EST MOD 30 MIN: CPT | Mod: 95 | Performed by: PEDIATRICS

## 2024-06-17 NOTE — PATIENT INSTRUCTIONS
Here are some individual comments. This is going to be a long message. We can discuss this further at a video or in-person visit.    -- There are 5 distinct conditions to consider.        (1) Mastocytosis means 'too many mast cells,' but generally this term is reserved for tumors, including those visible on the skin.        (2) MCAS is a condition where the mast cells are activating on their own, for no apparent reason (although usually trigger/s are known). MCAS is meant to be reserved for very high results of mast cell mediators urine tests for mast cell mediators (serum tryptase, and urine N-Methylhistamine, Leukotriene E4 and 2,3-dinor 11B-Prostaglandin F2a ... all of which need to be done under certain conditions to be accurate).        (3) Mast cell disorder is a nonspecific term that technically could include #1, #2, and many cases of #4 ... but is supposed to be reserved for an increased number of mast cells that does not meet the criteria for #1 or #2.  The two tests for this include serum heparin and  stains (usually on GI biopsies). Most of these cases are secondarily due to an immune reaction, anything from allergies to certain types of food intolerance. Sometimes we declare mast cell disorder or secondary mast cell burden based on clinical response to oral cromolyn. #3 is different from #2 because of causation, degree, behavior, and response to treatment.       (4) Histaminic dysfunction is a separate but often overlapping condition. Histamines are made in cells all over the body, and are used for more things than allergies. Sometimes the term 'urticaria' is used interchangeably with histaminic dysfunction ... but there are other types of urticaria that have only limited overlap with histamines.       (5) Autonomic dysfunction. This is a very broad term, and can be explained separately if needed. Some days I wish we used the term 'homeostatic dysfunction,' which would be more accurate but less  "familiar to many people. In theory #5 should include #2, #3, and #4 ... just as much as POTS, inappropriate tachycardia, etc, etc.    All this terminology matters a LOT, because it guides treatment, and helps us to anticipate response to treatment. Unfortunately, complex health care is full of theories and terms that become tangled, misunderstood, and controversial. To make things worse, doctors (including me) can be sloppy about how we use the different terms. Some doctors become very tied to their theories and terms. Even worse, many people talk about \"belief\" in MCAS, e.g. in phrases like \"so-and-so doesn't believe in MCAS.\"    I try to say \"mast cell dysfunction\" instead of MCAS (#2 above) or mast cell disorder/burden (#3 above), to avoid the need for the finicky test results to prove #2.   Incidentally, the conditions known as \"anaphylaxis\" and \"anaphylactoid\" are a whole separate story. MCAS and anaphylaxis may coincide, but may occur on their own.    One more context thing I have to explain. I have 4 jobs:         1. As a diagnostic clinician, I collect information, document results, and explain in writing what is going on. Some diagnoses are clear (#1 above), others have squidgy terms (#2 and #3 above), and some are based solely on symptoms (e.g. knee pain). Doctors spend whole careers trying to classify symptoms and syndromes until a decent test is available. Think of where we are with Trina-Danlos syndrome, for example.        2. As a management clinician, I care about symptoms and treatment. If we have a clear diagnosis, great. If not, then you still need help. Some treatments may have unclear indications and risks, e.g. benzodiazepines for people with mast cell symptoms.        3. In care coordination, I am supposed to keep track of what the subspecialists are doing, and telling you.        4. Prevention and primary care for simple things. Even complicated patients need simple care.  Of course, all of " "the above is limited by available time.     Why am I telling you this?  I want you to know the necessary background, so you can understand what people are saying about MCAS. If someone tells you that you have MCAS, and I tell you that you do NOT have MCAS ... there's a pretty good chance we're talk about the same thing. In the end, what matters is whether you feel better, and whether you get side effects from treatment. In my opinion, terminology matters because complex care requires precision and understanding. Also, I work very hard on documentation because health care providers are easily confused about some complex diagnosis, and patients can suffer because of misdiagnoses and emotional trauma. I am a mainstream clinician, and I have to work with other mainstream clinicians. I will always do my best to understand your situation, look up records and document, and explain why I am doing things. I feel I owe my patients precision, details, and honesty. For what it's worth ... I have seen patients with MCAS, I \"believe\" in it, and I know what to do about it. I also recognize when mast cell issues are part of a bigger picture that needs to be addressed.    So, within the above framework, I can say this: in your care and records, I have not seen evidence of MCAS, as the diagnosis is recognized in mainstream medicine. I _have_ seen evidence of mast cell burden (#3 above), as well as histaminic and autonomic dysfunction (#4 and #5 above). I'm sorry that this whole process is confusing and frustrating ... I wish I could make it easier for you. The only thing I know how to do, is to explain the basics like in this message.  Also, if you want me to re-investigate a diagnosis of MCAS, I can do so (off meds and with an appropriate trigger).    I hope this has been helpful. I look forward to further discussions, and answering your questions and concerns.           Thank you for visiting the Primary Care Center today at the " [facility]! The following is some information about our clinic:     Primary Care Center Frequently-Asked Questions    (1) How do I schedule appointments at the [facility]?     Primary Care--to schedule or make changes to an existing appointment, please call our primary care line at [phone].    Labs--to schedule a lab appointment at the [facility] you can use [employer] or call [phone]. If you have a [place] location that is closer to home, you can reach out to that location for scheduling options.     Imaging--if you need to schedule a CT, X-ray, MRI, ultrasound, or other imaging study you can call [phone] to schedule at the [facility] or any other [facility] imaging location.     Referrals--if a referral to another specialty was ordered you can expect a phone call from their scheduling team. If you have not heard from them in a week, please call us or send us a [employer] message to check the status or get a scheduling number. Please note that this only applies to internal [facility] referrals. If the referral is external you would need to contact their office for scheduling.     (2) I have a question about my visit, who do I contact?     You can call us at the primary care line at [phone] to ask questions about your visit. You can also send a secure message through [employer], which is reviewed by clinic staff. Please note that [employer] messages have a twenty-four to forty-eight business hour turnaround time and should not be used for urgent concerns.    (3) How will I get the results of my tests?    If you are signed up for [employer] all tests will be released to you within twenty-four hours of resulting. Please allow three to five days for your doctor to review your results and place a note interpreting the results. If you do not have [facility] you will receive your results through mail seven to ten business days following the  return of the tests. Please note that if there should be any urgent or concerning results that your doctor or their registered nurse will reach out to you the same day as the tests come back. If you have follow up questions about your results or would like to discuss the results in detail please schedule a follow up with your provider either in person or virtually.     (4) How do I get refills of my prescriptions?     You should always first contact your pharmacy for refills of your medications. If submitting a refill request on [employer], please be sure to submit the request only once--repeat requests can cause delays in refill. If you are requesting a NEW medication or a medication related to new symptoms you will need to schedule an appointment with a provider prior to approval. Please note: Routine medication refills have up to one to three business day turnaround whereas controlled substances refills have up to five to seven business day turnaround.    (5) I have new symptoms, what do I do?     If you are having an immediate medical emergency, you should dial 911 for assistance.   For anything urgent that needs to be seen within a few hours to one day you should visit a local urgent care for assistance.  For non-urgent symptoms that need to be seen within a few days to a week you can schedule with an available provider in primary care by going to [facility] or calling [phone].   If you are not sure how serious your symptoms are or you would like to receive medical advice you can always call [phone] to speak with a triage nurse.

## 2024-06-17 NOTE — PATIENT INSTRUCTIONS
"-----------------------------------------------------------------------------  Dr. Perez's comments about Hypermobility and  Trina-Danlos syndrome (EDS)                          -----------------------------------------------------------------------------         Trina-Danlos syndrome (EDS) is a term used for a collection of inborn conditions that may not be closely related, but all have hypermobility of joints and some other tissues (\"Hypermobility\" means that the joint or connective tissue is more flexible or stretchy than for other people).       Hypermobility is traditionally assessed by the Beighton score, but most providers with EDS experience will allow some leeway for partial hypermobility because the Beighton score only accounts for 8 joints plus a single type of bending for the spine.          In years past, the EDS diagnosis was given to lots of people with mild joint hypermobility but no other symptoms. In 2017, experts rewrote guidelines for terms so that hypermobile-type EDS (hEDS) would only be used for patients with several other findings besides joint hypermobility. Criteria can be found by doing an Internet search for \"hypermobile Trina Danlos criteria.\"       The 2017 criteria can be a bit frustrating because they vastly restrict the EDS term for patients who were previously referred to as having EDS. In fact, the criteria are seemingly designed such that it is very difficult for youth without obvious malformations to qualify. Instead, we now use these terms:       - \"Hypermobility Syndrome\" is the term for pain symptoms AND            either global hypermobility OR partial hypermobility plus certain            additional features.        - \"Joint hypermobility\" or \"hypermobile joint(s)\" is the term for            hypermobility without joint symptoms. These people may             have other conditions, however.    Of course, the official label is not terribly important because the treatment " "approach for hypermobile EDS and hypermobility syndrome is essentially the same: strengthen the areas around problem joints, and be aware of symptoms and syndromes that may develop.         Technically, EDS and hypermobility should not be regarded as the \"cause\" of pain, but rather a variant in connective tissue that increases the risk for certain types of pain or dysautonomia. Anybody with hypermobility should have their joints assessed, and ensure that they have enough strength around the problem joints to prevent future problems.     What should I do, about my hypermobility?  I have several suggestions:      -- Most important: work on muscles around your problem joints. Pay special attention to your lower back and knees. Muscles will help your joints to stay together. You may want to work with a physical therapist or  - but make sure they know about hypermobility.      -- Subluxing or even dislocations may occur. You may be able to get these back in on your own. If the joint stays stuck, then seek medical help.      -- Warm up carefully before physical activity.      -- Keep active physically. For some reason, hypermobility-associated pain gets worse with inactivity. If activity makes you hurt, talk with me about finding the \"sweet spot\" between overuse and under-use.      -- Please don't show off your \"hypermobility party tricks.\"      -- What about joint cracking?  Never crack your neck intentionally (this can lead to pinched nerves, numbness, or pain). Try to avoid cracking your other joints.      -- For back strength, I recommend: swim laps at a gentle speed, 20-30 minutes, 3-4 times per week. Use a snorkel, don't worry about swimming form. The point is to be horizontal in the water, and move forward slowly. If you have autonomic symptoms (dizziness, heart-racing, etc), you will need additional exercise besides swimming.      -- Work on posture, by (a) increasing abdominal muscle tone, and (b) " "try holding your elbows parallel to your body.      -- What about braces?  Train without braces or tapes as much as possible. Avoid braces or taping except (a) when injured, (b) when doing unusually strenuous activity (e.g. trip to [place]), or (c) for the second half of moderate activity that lasts a long time (hiking).       -- Ice or heat? In general, ice is good for inflammation or injury in the past 24 hours. Heat is only good for tight muscles. Heat will not help most pain in your joints themselves.      -- What about anti-inflammatory medications?  Talk with me about this. Ibuprofen or naproxen can reduce pain after injury. Ideally, you should avoid taking them regularly (they lose effectiveness over time). If you have daily pain, your primary provider will recommend a different approach.      -- Headache medicines? If you have headaches, check with me for a diagnosis and medicine options. Ibuprofen or naproxen shouldn't be needed for headaches more often than twice per month. Acetaminophen shouldn't be needed for headaches more often than twice per week.      -- Read \"The Hypermobility Handbook\" by Dr. Carlos Chandler (just keep in mind that the entire book may not apply to you).  A variety of autonomic symptoms can occur in people with hypermobility, although technically they are not a result of the hypermobility itself. if you have other symptoms not related to joint or muscle problems, check with your health care providers.      Answers to some other questions       What about heart or aorta complications?The risk for cardiovascular disease remains incompletely defined in hypermobile EDS, but is not thought to be predictably elevated compared to the general population. Some patients will have valve or aortic abnormalities, but these patients may turn out to be qualitatively different than their other hypermobile peers. My personal recommendation about this is to (a) keep a longitudinal relationship with a " primary care provider who is competent at detecting new murmurs, (b) through age 40 at least, have an annual well exam to ensure that murmurs are assessed, (c) have a heart exam when being evaluated for any illnesses or health problems, and (d) have an evaluation by a cardiologist and/or echocardiogram for murmurs or other unexplainable changes in exam.         Aren't there genetic blood tests for EDS? Technically there are some tests that can be done, but these tests are not very sensitive (many people with EDS or hypermobility are missed by the tests). Also, a positive or negative test doesn't affect our management. I was part of a group that was planning a much broader research program with hypermobility, but unfortunately federal funding for all research was cut and the project was stopped.         Should I see a ? You are welcome to see a  if you wish, but the geneticists don't do anything different than what I do. Also, the geneticists are a lot harder to see! The key thing is to be assessed by somebody with lots of EDS and hypermobility experience. I highly recommend that you establish with an EDS / hypermobility center close to your home, so that you can have ongoing care at an organization that knows you and your history. An important component of such a center is either Physical Therapy or Physical Medicine and Rehabilitation (PM&R).    Useful medical references regarding Trina-Danlos Syndrome   An overview of the different types of can be found at https://[email or web address].com/eds-types/  Criteria for hypermobile type EDS (hEDS) can be found at https://[email or web address]/wp-content/uploads/oEFJ-Cx-Extzcciq-checklist-1.pdf  (the main reference for criteria is Alexandr etienne al, Am J Med Sariah 2017, 175C:8-26)  For a nice article about hEDS versus other hypermobility conditions, see Iftikhar et al, Am J Med Sariah 2017, 175c:148-157.

## 2024-06-17 NOTE — PROGRESS NOTES
"Dear patient. Thank you for visiting with me. I want you to feel respected, understood, and empowered. \"Respect\" is valuing you as much as I would a close family member. \"Empowerment\" happens when you are fully informed, and can make the best possible decision for you.  Please ask me questions!  Challenge anything that is not clear.    Medical records are primarily used as memory aids for me and my colleagues. Things to know about my documentation style:  - The 'problem list' includes current symptoms or diagnoses, and some problems that are resolved but may return. I use the past medical history for problems not expected to return.  - I use single quotation marks for things that you or I said, when I want to clarify who was speaking.  - I use double quotation marks when copying a term from elsewhere in your records. Italics (besides here) may also denote a quotation.  If you have questions or concerns, please contact me; I will reply as soon as time allows.    Context    PCP: Otoniel Quiroz   Visit type: overview of problems, with several issues identified by patient discussed within limits of available time    Amelia Coker is a 49 year old woman, with concerns including:  Chief Complaint   Patient presents with    RECHECK    Surgical Followup       History, update, and/or problems      I did an EDS diagnostic consult shortly before her lumbosacral surgery, and we hit on the idea of a follow-up video visit for more questions, even though it was going to be after surgery.  She had a 6/3/24 procedure, with the following listed:  VA ARTHRDSIS LUMB W POSTR/POSTRLT   VA ARTHRDSIS LUMB POSTR/POSTRLT ADD   VA INSJ BIOMECHANICAL DEVICE   VA INSTRUM POSTR SEGM 3-6 SEGMS   VA AUTOGRAFT MORSEL SPINE SURG SEP   L4-sacrum fusion, TLIF, iliac crest bone graft   GRAFT BONE ILIAC CREST   Imaging on 6/5/24 did not identify any signs of hardware failure.  She is wondering about a plan for the future.  Wound incision pain is " tolerable.  The SI joint has anchor screws, which hurt.  Toes on left foot that were painful are better now, she can touch and bend them.  She is up and trying to do more walking, and local pain seems to vary.       ASSESSMENT and PLAN: we talked about questions and strategies for back and joint health in the setting of hypermobility spectrum disorders.   I am especially positive about swimming to improve back and neck health.       How often?  Try for 20-30 minutes of gentle laps, 2-5 times per week.   Time in the hot tub afterwards is a bonus!       How good a swimmer?  Doesn't matter. If you can stay alive in the water, you can swim. Use a snorkel if you have any concern about swimming form. Definitely use a snorkel if there are any neck concerns.       How hard?  Don't worry about cardiovascular benefit. Back health just requires that you be flat in the water and move forward. Your speed can be nice and slow.       Where can you swim?  Look for locations of the [facility], unless you prefer a local gym that happens to have a pool. If there are financial challenges, the [facility] has many programs for reduced costs.          Rash, she searched a photo with EDS and found [facility] as a possibility. There was a question about local traction, allergic reaction. The rash itched for a while, she was placing . She was wondering about yeast, and she applied a cream. Lesion on abdomen are slightly raised.        OBJECTIVE: Not able to confirm with exam.       ASSESSMENT and PLAN: Some description of this is consistent with urticaria. Advised her I try to avoid the terminology of the 'mast cell' theory , focusing instead on observable problems like urticaria, other rashes, edema, etc.  It isn't unusual for people who get such reactions to develop flares after physical, immunologic, or (potentially) emotional stresses.  Suggested:  -- Daily nonsedating antihistamine (e.g. allegra or cetirizine) and H2 blocker (famotidine). If necessary,  these can be taken twice daily.  -- Discuss with primary care provider about seeing dermatology IF the problems continue. Along the way, she could also try oral cromolyn.          Was hypotensive around surgery - which apparently has been a problem in the past but is not unusual for her (counseled about this). Further testing and treatment could be considered, by a cardiologist.      Time note (e4, 30'): The total time (on the date of service) for this service was 37 minutes, including discussion/face-to-face, chart review, interpretation not otherwise reported, documentation, and updating of the computerized record.    Start Time: 1030  End Time:10:58 AM      Bhupinder is a 49 year old who is being evaluated via a billable video visit.    How would you like to obtain your AVS? MyChart  If the video visit is dropped, the invitation should be resent by: Text to cell phone: [phone]  Will anyone else be joining your video visit? No      Are refills needed on medications prescribed by this physician? NO        Subjective   Bhupinder is a 49 year old, presenting for the following health issues:  RECHECK    History of Present Illness       Reason for visit:  Recheck post op from spine surgery    She eats 4 or more servings of fruits and vegetables daily.She consumes 0 sweetened beverage(s) daily.She exercises with enough effort to increase her heart rate 9 or less minutes per day.  She exercises with enough effort to increase her heart rate 3 or less days per week.   She is taking medications regularly.           Objective           Vitals:  No vitals were obtained today due to virtual visit.    Physical Exam             Video-Visit Details    Type of service:  Video Visit   Originating Location (pt. Location): Home    Distant Location (provider location):  Off-site  Platform used for Video Visit: [employer]  Signed Electronically by: Pedro Perez MD

## 2024-06-21 RX ORDER — FLUCONAZOLE 150 MG/1
150 TABLET ORAL
Qty: 3 TABLET | Refills: 0 | Status: SHIPPED | OUTPATIENT
Start: 2024-06-21 | End: 2024-06-28

## 2024-06-21 NOTE — TELEPHONE ENCOUNTER
Called and spoke to patient. Scheduled patient for next week per PCP recommendation and note below.

## 2024-06-25 ENCOUNTER — OFFICE VISIT (OUTPATIENT)
Dept: FAMILY MEDICINE | Facility: OTHER | Age: 49
End: 2024-06-25
Payer: COMMERCIAL

## 2024-06-25 VITALS
WEIGHT: 170 LBS | SYSTOLIC BLOOD PRESSURE: 118 MMHG | DIASTOLIC BLOOD PRESSURE: 68 MMHG | BODY MASS INDEX: 27.65 KG/M2 | RESPIRATION RATE: 16 BRPM | TEMPERATURE: 97.8 F | HEART RATE: 76 BPM | OXYGEN SATURATION: 100 %

## 2024-06-25 DIAGNOSIS — H69.93 DYSFUNCTION OF BOTH EUSTACHIAN TUBES: ICD-10-CM

## 2024-06-25 DIAGNOSIS — L30.9 DERMATITIS: ICD-10-CM

## 2024-06-25 DIAGNOSIS — D75.839 THROMBOCYTOSIS, UNSPECIFIED: ICD-10-CM

## 2024-06-25 DIAGNOSIS — Z98.1 S/P LUMBAR FUSION: ICD-10-CM

## 2024-06-25 DIAGNOSIS — M54.16 LUMBAR RADICULOPATHY: Primary | ICD-10-CM

## 2024-06-25 LAB
BASOPHILS # BLD AUTO: 0 10E3/UL (ref 0–0.2)
BASOPHILS NFR BLD AUTO: 1 %
EOSINOPHIL # BLD AUTO: 0.1 10E3/UL (ref 0–0.7)
EOSINOPHIL NFR BLD AUTO: 1 %
ERYTHROCYTE [DISTWIDTH] IN BLOOD BY AUTOMATED COUNT: 13 % (ref 10–15)
HCT VFR BLD AUTO: 34.4 % (ref 35–47)
HGB BLD-MCNC: 11.1 G/DL (ref 11.7–15.7)
IMM GRANULOCYTES # BLD: 0 10E3/UL
IMM GRANULOCYTES NFR BLD: 0 %
KOH PREPARATION: ABNORMAL
KOH PREPARATION: ABNORMAL
LYMPHOCYTES # BLD AUTO: 2 10E3/UL (ref 0.8–5.3)
LYMPHOCYTES NFR BLD AUTO: 31 %
MCH RBC QN AUTO: 30.2 PG (ref 26.5–33)
MCHC RBC AUTO-ENTMCNC: 32.3 G/DL (ref 31.5–36.5)
MCV RBC AUTO: 94 FL (ref 78–100)
MONOCYTES # BLD AUTO: 0.5 10E3/UL (ref 0–1.3)
MONOCYTES NFR BLD AUTO: 9 %
NEUTROPHILS # BLD AUTO: 3.8 10E3/UL (ref 1.6–8.3)
NEUTROPHILS NFR BLD AUTO: 59 %
PLATELET # BLD AUTO: 492 10E3/UL (ref 150–450)
RBC # BLD AUTO: 3.68 10E6/UL (ref 3.8–5.2)
RETICS # AUTO: 0.07 10E6/UL (ref 0.03–0.1)
RETICS/RBC NFR AUTO: 1.9 % (ref 0.5–2)
WBC # BLD AUTO: 6.4 10E3/UL (ref 4–11)

## 2024-06-25 PROCEDURE — 85045 AUTOMATED RETICULOCYTE COUNT: CPT | Performed by: PHYSICIAN ASSISTANT

## 2024-06-25 PROCEDURE — 99207 BLOOD MORPHOLOGY PATHOLOGIST REVIEW: CPT | Performed by: PATHOLOGY

## 2024-06-25 PROCEDURE — 99214 OFFICE O/P EST MOD 30 MIN: CPT | Performed by: PHYSICIAN ASSISTANT

## 2024-06-25 PROCEDURE — 87220 TISSUE EXAM FOR FUNGI: CPT | Performed by: PHYSICIAN ASSISTANT

## 2024-06-25 PROCEDURE — 36415 COLL VENOUS BLD VENIPUNCTURE: CPT | Performed by: PHYSICIAN ASSISTANT

## 2024-06-25 PROCEDURE — 85025 COMPLETE CBC W/AUTO DIFF WBC: CPT | Performed by: PHYSICIAN ASSISTANT

## 2024-06-25 ASSESSMENT — PAIN SCALES - GENERAL: PAINLEVEL: MODERATE PAIN (4)

## 2024-06-25 NOTE — PROGRESS NOTES
"  Assessment & Plan     Lumbar radiculopathy  S/P lumbar fusion  Stable at this time    Dysfunction of both eustachian tubes  Suspected based on normal exam    Thrombocytosis, unspecified  Possibly due to surgery since she was normal during the early stages of evaluation at [facility].  - CBC with platelets; Future  - Lab Blood Morphology Pathologist Review; Future  - Lab Blood Morphology Pathologist Review    Dermatitis  Question fungal involvement vs heparin induced  - KOH prep (skin, hair or nails only)      Florence Roe is a 49 year old, presenting for the following health issues:  Derm Problem        6/25/2024    11:23 AM   Additional Questions   Roomed by Payton   Accompanied by self     History of Present Illness       Reason for visit:  Recheck post op from spine surgery    She eats 4 or more servings of fruits and vegetables daily.She consumes 0 sweetened beverage(s) daily.She exercises with enough effort to increase her heart rate 9 or less minutes per day.  She exercises with enough effort to increase her heart rate 3 or less days per week.   She is taking medications regularly.         Rash  Onset/Duration: 6/4/24  Description  Location: torso   Character: round, flakey, red  Itching: no  Intensity:  spreading   Progression of Symptoms:  worsening  Accompanying signs and symptoms:   Fever: YES- in the evening low grade   Body aches or joint pain: YES- in the evening   Sore throat symptoms: No  Recent cold symptoms: No  History:           Previous episodes of similar rash: None  New exposures:  medication \"heparin\"   Recent travel: No  Exposure to similar rash: No  Precipitating or alleviating factors: none  Therapies tried and outcome: topical cream -  not effective Benadryl- not effective         Review of Systems  Constitutional, HEENT, cardiovascular, pulmonary, GI, , musculoskeletal, neuro, skin, endocrine and psych systems are negative, except as otherwise noted.      Objective    /68   Pulse " 76   Temp 97.8  F (36.6  C) (Temporal)   Resp 16   Wt 77.1 kg (170 lb)   SpO2 100%   BMI 27.65 kg/m    Body mass index is 27.65 kg/m .  Physical Exam   GENERAL: alert and no distress  HENT: ear canals and TM's normal, nose and mouth without ulcers or lesions  NECK: no adenopathy, no asymmetry, masses, or scars  MS: no gross musculoskeletal defects noted, no edema  SKIN: no suspicious lesions or rashes with exception of small round flat spots to the abdomen that are suspicious for fungal involvment but not respondiong to typical approach.  NEURO: Normal strength and tone, mentation intact and speech normal  PSYCH: mentation appears normal, affect normal/bright            Signed Electronically by: Otoniel Quiroz PA-C

## 2024-06-26 LAB
PATH REPORT.COMMENTS IMP SPEC: NORMAL
PATH REPORT.FINAL DX SPEC: NORMAL
PATH REPORT.MICROSCOPIC SPEC OTHER STN: NORMAL
PATH REPORT.MICROSCOPIC SPEC OTHER STN: NORMAL

## 2024-07-08 DIAGNOSIS — D75.839 THROMBOCYTOSIS, UNSPECIFIED: Primary | ICD-10-CM

## 2024-07-10 ENCOUNTER — ANCILLARY PROCEDURE (OUTPATIENT)
Dept: GENERAL RADIOLOGY | Facility: CLINIC | Age: 49
End: 2024-07-10
Attending: PHYSICIAN ASSISTANT
Payer: COMMERCIAL

## 2024-07-10 ENCOUNTER — TELEPHONE (OUTPATIENT)
Dept: FAMILY MEDICINE | Facility: OTHER | Age: 49
End: 2024-07-10

## 2024-07-10 ENCOUNTER — LAB (OUTPATIENT)
Dept: LAB | Facility: OTHER | Age: 49
End: 2024-07-10
Payer: COMMERCIAL

## 2024-07-10 DIAGNOSIS — M43.26 FUSION OF SPINE OF LUMBAR REGION: ICD-10-CM

## 2024-07-10 DIAGNOSIS — M43.10 ACQUIRED SPONDYLOLISTHESIS: ICD-10-CM

## 2024-07-10 DIAGNOSIS — M48.061 SPINAL STENOSIS, LUMBAR REGION, WITHOUT NEUROGENIC CLAUDICATION: Primary | ICD-10-CM

## 2024-07-10 DIAGNOSIS — M48.061 SPINAL STENOSIS, LUMBAR REGION, WITHOUT NEUROGENIC CLAUDICATION: ICD-10-CM

## 2024-07-10 DIAGNOSIS — D75.839 THROMBOCYTOSIS, UNSPECIFIED: ICD-10-CM

## 2024-07-10 DIAGNOSIS — M48.07 LUMBOSACRAL STENOSIS: ICD-10-CM

## 2024-07-10 LAB
BASOPHILS # BLD AUTO: 0 10E3/UL (ref 0–0.2)
BASOPHILS NFR BLD AUTO: 1 %
EOSINOPHIL # BLD AUTO: 0.1 10E3/UL (ref 0–0.7)
EOSINOPHIL NFR BLD AUTO: 1 %
ERYTHROCYTE [DISTWIDTH] IN BLOOD BY AUTOMATED COUNT: 13 % (ref 10–15)
HCT VFR BLD AUTO: 33.6 % (ref 35–47)
HGB BLD-MCNC: 11.1 G/DL (ref 11.7–15.7)
IMM GRANULOCYTES # BLD: 0 10E3/UL
IMM GRANULOCYTES NFR BLD: 0 %
LYMPHOCYTES # BLD AUTO: 1.8 10E3/UL (ref 0.8–5.3)
LYMPHOCYTES NFR BLD AUTO: 31 %
MCH RBC QN AUTO: 30.1 PG (ref 26.5–33)
MCHC RBC AUTO-ENTMCNC: 33 G/DL (ref 31.5–36.5)
MCV RBC AUTO: 91 FL (ref 78–100)
MONOCYTES # BLD AUTO: 0.5 10E3/UL (ref 0–1.3)
MONOCYTES NFR BLD AUTO: 9 %
NEUTROPHILS # BLD AUTO: 3.3 10E3/UL (ref 1.6–8.3)
NEUTROPHILS NFR BLD AUTO: 58 %
PLATELET # BLD AUTO: 326 10E3/UL (ref 150–450)
RBC # BLD AUTO: 3.69 10E6/UL (ref 3.8–5.2)
WBC # BLD AUTO: 5.7 10E3/UL (ref 4–11)

## 2024-07-10 PROCEDURE — 36415 COLL VENOUS BLD VENIPUNCTURE: CPT

## 2024-07-10 PROCEDURE — 72082 X-RAY EXAM ENTIRE SPI 2/3 VW: CPT | Performed by: RADIOLOGY

## 2024-07-10 PROCEDURE — 85025 COMPLETE CBC W/AUTO DIFF WBC: CPT

## 2024-07-10 NOTE — TELEPHONE ENCOUNTER
Jennifer with surgery center is calling regarding imaging order for scoliosis 4 view. Usually the standard is 2 view. Patient did have a 2 view done prior so if this is follow up then would provider want 2 view again? If the 4 view is needed then they need to know what 4 views provider would like.     Tari Bradford, JESSYN, RN

## 2024-07-10 NOTE — TELEPHONE ENCOUNTER
Last documentation states stable so if he is not looking for any changes then I would say go to 2 view as per standard.       LAURI Long CNP  Questions or concerns please feel free to send me a [employer] message or call me  Phone : [phone]

## 2024-07-29 ENCOUNTER — MYC MEDICAL ADVICE (OUTPATIENT)
Dept: FAMILY MEDICINE | Facility: OTHER | Age: 49
End: 2024-07-29
Payer: COMMERCIAL

## 2024-08-02 ENCOUNTER — MEDICAL CORRESPONDENCE (OUTPATIENT)
Dept: HEALTH INFORMATION MANAGEMENT | Facility: CLINIC | Age: 49
End: 2024-08-02
Payer: COMMERCIAL

## 2024-08-07 ENCOUNTER — MYC MEDICAL ADVICE (OUTPATIENT)
Dept: FAMILY MEDICINE | Facility: OTHER | Age: 49
End: 2024-08-07
Payer: COMMERCIAL

## 2024-08-07 DIAGNOSIS — D64.9 POSTOPERATIVE ANEMIA: Primary | ICD-10-CM

## 2024-08-07 NOTE — TELEPHONE ENCOUNTER
Routing to team to print xray order and assist with scheduling.     Also routing to PCP to review if any labs should be ordered.     Sabiha Alonzo BSN, RN

## 2024-08-13 ENCOUNTER — LAB (OUTPATIENT)
Dept: LAB | Facility: OTHER | Age: 49
End: 2024-08-13
Payer: COMMERCIAL

## 2024-08-13 DIAGNOSIS — D64.9 POSTOPERATIVE ANEMIA: ICD-10-CM

## 2024-08-13 LAB
ERYTHROCYTE [DISTWIDTH] IN BLOOD BY AUTOMATED COUNT: 13.3 % (ref 10–15)
FERRITIN SERPL-MCNC: 244 NG/ML (ref 11–328)
HCT VFR BLD AUTO: 36.6 % (ref 35–47)
HGB BLD-MCNC: 11.8 G/DL (ref 11.7–15.7)
IRON BINDING CAPACITY (ROCHE): 247 UG/DL (ref 240–430)
IRON SATN MFR SERPL: 35 % (ref 15–46)
IRON SERPL-MCNC: 86 UG/DL (ref 37–145)
MCH RBC QN AUTO: 29.6 PG (ref 26.5–33)
MCHC RBC AUTO-ENTMCNC: 32.2 G/DL (ref 31.5–36.5)
MCV RBC AUTO: 92 FL (ref 78–100)
PLATELET # BLD AUTO: 333 10E3/UL (ref 150–450)
RBC # BLD AUTO: 3.98 10E6/UL (ref 3.8–5.2)
TRANSFERRIN SERPL-MCNC: 200 MG/DL (ref 200–360)
WBC # BLD AUTO: 6 10E3/UL (ref 4–11)

## 2024-08-13 PROCEDURE — 36415 COLL VENOUS BLD VENIPUNCTURE: CPT

## 2024-08-13 PROCEDURE — 82728 ASSAY OF FERRITIN: CPT

## 2024-08-13 PROCEDURE — 84466 ASSAY OF TRANSFERRIN: CPT

## 2024-08-13 PROCEDURE — 85027 COMPLETE CBC AUTOMATED: CPT

## 2024-08-13 PROCEDURE — 83540 ASSAY OF IRON: CPT

## 2024-08-13 PROCEDURE — 83550 IRON BINDING TEST: CPT

## 2024-08-29 ENCOUNTER — HOSPITAL ENCOUNTER (OUTPATIENT)
Dept: GENERAL RADIOLOGY | Facility: CLINIC | Age: 49
Discharge: HOME | End: 2024-08-29
Attending: NURSE PRACTITIONER | Admitting: NURSE PRACTITIONER
Payer: COMMERCIAL

## 2024-08-29 DIAGNOSIS — M43.26 FUSION OF SPINE OF LUMBAR REGION: ICD-10-CM

## 2024-08-29 PROCEDURE — 72100 X-RAY EXAM L-S SPINE 2/3 VWS: CPT

## 2024-09-10 ENCOUNTER — TRANSCRIBE ORDERS (OUTPATIENT)
Dept: OTHER | Age: 49
End: 2024-09-10

## 2024-09-10 DIAGNOSIS — Z98.1 S/P LUMBAR SPINAL FUSION: Primary | ICD-10-CM

## 2024-09-11 NOTE — PROGRESS NOTES
" PHYSICAL THERAPY EVALUATION  Type of Visit: Evaluation             Subjective       Presenting condition or subjective complaint: Therapy after lumbar fusion L4, L5 S1 and sacrut anchors wity iliac bone graft.  Date of onset: 06/03/24    Chief complaint:  s/p lumbar fusion L4-S1 with sacral anchor. Medical history is noted below, and since last PT has been formally diagnosed by Dr Wang with EDS. She underwent spinal fusion at [facility] in [place]. She did have a fall the end of August when her dogs tripped her, a lot of flare up the back with L foot numbness/tingling/altered sensation. States lumbar fusion immediately had improvements in L toes and less night cramps had been relieved since her surgery until her fall. She states  since the fall, she had return of L leg pain. Sleeping is uncomfortable, not because of cramping and cannot get comfortable; less of the excruciating pain. Does best on her back with knees flexed. Of other note, patient has existing spinal cord stimulator which the battery pack has migrated up towards her ribcage, and will need this addressed, has connected with her provider on this for revision Oct 3.   Aggravating factors include: sustained positioning, needs to move or change, standing still, sitting is worse. Alleviating/Easing factors include:  changing position. Steroids.  Prior interventions include injections. Patient takes Tylenol medication for this,  mm relaxer at bedtime.  Patient is employed  full time at the eye clinic, will return to work Half days x 1 -2 weeks, will return next week.  Pain is 3/10 at best (with steroids), 8/10 worst, and 6/10 currently, L toes 1-5 altered sensation/tingling.       Notes from admission 6/3: HOSPITAL COURSE  \"Amelia Coker is a 49 y.o. female w PMH GERD, HTN, migraines, and overweight BMI (28.72kg/m2) admitted 6/3/2024 for elective lumbosacral fusion. Patient presented to clinic with bilateral (left > right) leg pain and a history of two " "prior lumbar spine surgeries at outside facilities. She underwent right L4/5 decompression w cyst removal and left L5/S1 decompression on 12/82016 at [facility] w Dr Burrell with initial pain relief, but with subsequent recurrence of her pain. She subsequently underwent another procedure in 2020 (details not available), subsequent ot which she reported worsened leg leg pain. She had trialed multiple nonoperative interventions including medical therapy, TENS unit function, and spinal cord stimulator placement. She saw Dr Osmel hatch complaints of LLE L5 radicular pain w features of chronic nerve injury and mechanical back pain. Imaging showed a grade 1 L4/5 spondylolisthesis, L5/S1 foraminal stenosis. An L4-S1 decompression and fusion was recommended.  On 6/3/2024 patient was taken to OR for L4-pelvis fusion w S2AI instrumentation; L5/S1 TLIF; IOBL 500mL. Transferred postop to the general cares floor. Patient was able to stand out of bed by POD0 PM and was able to tolerate some oral intake. Urinary catheter was removed POD1 and patient began walking out of bed with the assistance of a front-wheeled walker. PCA pump discontinued POD1 PM and transitioned successfully to oral PRNs alone. Patient was able to continue to progress with mobilization, taking multiple laps on the floor by POD2 and underwent upright Xrays. Surgical drains were removed 6/7 AM and patient discharged to self cares 06/07/24.   MD visit notes 9/9/2024: \"History of Present Illness:  She had a fall August 30th and since that time has had significantly more difficulties with pain. She has pain that will radiate down the left leg and into the bottom of the foot. This seems to be worst from the knee down. She has a sense of numbness in the foot and feels like she is walking on sand. She will also have some symptoms in the right side, but not as severe as the left. She did have the injection performed which took away the \"locking\" symptoms in her back " "and some of the swelling, but this lasted only for two weeks.  ASSESSMENT / PLAN Imaging: I reviewed her x-rays from today which show the instrumentation to be in good position without evidence of loosening or failure.  #1 Status post lumbar fusion  I have asked her to continue on a 20-25 pound lifting restriction, continuing to avoid twisting and bending through her spine. She can start physical therapy to work on stretching and strengthening and I have provided an order for this. Given the symptoms that worsened after her fall, we will do a steroid pack to try to knock out the inflammation. She could also resume taking her meloxicam or ibuprofen as needed at this point. We could provide an injection around the iliac screws at some point if she would like to proceed with that and she will let us know. We would plan on routine x-rays and a follow-up in 3 months and she can call in the interim with any questions or concerns. \"      Past Medical History:   Diagnosis Date    Abnormal mammogram 9/21/2016    right breast     AD (atopic dermatitis) 10/29/2015    Allergic rhinitis due to other allergen     Arthritis     Bell's palsy     Chronic fatigue 6/6/2012    Chronic infection     MRSA - 2015 scalp and prior to Abdomen    Contact dermatitis and other eczema, due to unspecified cause     eczema    Contusion of left foot, initial encounter 3/16/2016    DJD (degenerative joint disease), lumbar 9/26/2013    DJD (degenerative joint disease), lumbar 9/26/2013    Ds DNA antibody positive 4/16/2014    borderline.     Elevated blood pressure reading without diagnosis of hypertension 12/24/2013    Facial contusion 12/15/2014    hit by horse     Foraminal stenosis of lumbar region 9/26/2013    Frontal headache 12/15/2014    Gastroesophageal reflux disease, esophagitis presence not specified 6/29/2016    Heat sensitivity 10/29/2015    HTN, goal below 140/90 9/23/2015    Hyperlipidemia LDL goal <130 8/30/2017    Irregular heart " beat     Keratitis sicca (H24) 10/31/2012    Left shoulder pain 9/26/2013    Lumbar radiculopathy 1/22/2014    Migraine headache 10/23/2013    Migraine, unspecified, with intractable migraine, so stated, without mention of status migrainosus     Motion sickness     MRSA infection 10/20/2015    Muscular wasting and disuse atrophy, not elsewhere classified 6/9/2014    right SCM, right wrist,      Numbness and tingling     both legs anf feet greater on the left    PAC (premature atrial contraction) 7/15/2010    Personal history of COVID-19 12/2/2021    Plantar fasciitis 9/23/2015    PONV (postoperative nausea and vomiting)     Skin lesion 10/20/2015    posterior superior scalp     Spasm of muscle 7/11/2017    Telangiectasia of face 12/19/2014    Thyroid nodule 5/12/2022    Tooth pain 10/20/2015    left lower primary molar        Past Surgical History:   Procedure Laterality Date    AS INJ TRANSFORAMIN EPIDURAL, LUMB/SACR SINGLE      COLONOSCOPY  3/11/2013    Procedure: COLONOSCOPY;  colonoscopy;  Surgeon: Lobito Mas MD;  Location: [facility]    COLONOSCOPY N/A 9/13/2021    Procedure: COLONOSCOPY, WITH POLYPECTOMY AND BIOPSY;  Surgeon: Sam Liz MD;  Location: [facility]    COLONOSCOPY WITH CO2 INSUFFLATION N/A 11/19/2018    Procedure: COLONOSCOPY WITH CO2 INSUFFLATION;  Surgeon: Goyo Blank MD;  Location: [facility]    DECOMPRESSION LUMBAR TWO LEVELS Bilateral 12/8/2016    Procedure: DECOMPRESSION LUMBAR TWO LEVELS;  Surgeon: Bang Burrell MD;  Location: [facility]    DILATION AND CURETTAGE, HYSTEROSCOPY, ABLATE ENDOMETRIUM NOVASURE, COMBINED N/A 6/12/2019    Procedure: hysteroscopy, dilation & curettage, polypectomy, endometrial ablation;  Surgeon: Fredy Pham MD;  Location: [facility]    ESOPHAGOSCOPY, GASTROSCOPY, DUODENOSCOPY (EGD), COMBINED  11/8/2013    Procedure: COMBINED ESOPHAGOSCOPY, GASTROSCOPY, DUODENOSCOPY (EGD), BIOPSY SINGLE OR MULTIPLE;  Esophagoscopy, Gastroscopy, Duodenoscopy EGD  with multiple biopsies;  Surgeon: Teja Koch MD;  Location: [facility]    ESOPHAGOSCOPY, GASTROSCOPY, DUODENOSCOPY (EGD), COMBINED N/A 2/18/2021    Procedure: ESOPHAGOGASTRODUODENOSCOPY, WITH BIOPSY;  Surgeon: Blanca Ba MD;  [facility]    ESOPHAGOSCOPY, GASTROSCOPY, DUODENOSCOPY (EGD), COMBINED N/A 4/11/2022    Procedure: ESOPHAGOGASTRODUODENOSCOPY, WITH BIOPSY;  Surgeon: Fredy Jimenez MD;  Location: [facility]    HC REMOVAL OF TONSILS,<13 Y/O      Tonsils <10y.o.    HC TOOTH EXTRACTION W/FORCEP      INJECT BLOCK MEDIAL BRANCH CERVICAL/THORACIC/LUMBAR N/A 7/23/2020    Procedure: Sacral 1,2,3 Lateral Branch Blocks and lumbar 5 medial branch blocks bilaterally;  Surgeon: Maurisio Goetz MD;  [facility]    INJECT JOINT SACROILIAC Left 9/24/2020    Procedure: Left Lumbar 5 Sacral 1 nerve root injections.;  Surgeon: Maurisio Goetz MD;  [facility]: [facility]    INJECT TRIGGER POINT Right 3/29/2024    Procedure: Ultrasound-guided RIGHT anterior scalene muscle diagnostic block;  Surgeon: Brandi Dennis MD;  Location: [facility]    REPAIR MUSCLE UPPER EXTREMITY Right 7/27/2021    Procedure: DIVISION RIGHT PECTORALIS MUSCLE;  Surgeon: Davie Dutta;  Location: [facility]    REPAIR TENDON ELBOW  7/9/2014    Procedure: REPAIR TENDON ELBOW;  Surgeon: Chris Johnston MD;  [facility]: [facility]    ULTRASONIC REMOVAL SOFT TISSUE (LOCATION) Right 11/21/2018    Procedure: Tenex right foot;  Surgeon: Patel Martínez DO;  Location: [facility]    ULTRASONIC REMOVAL SOFT TISSUE (LOCATION) Left 11/16/2023    Procedure: EXCISION, SOFT TISSUE, ELBOW, USING ULTRASONIC ASPIRATOR SYSTEM;  Surgeon: Patel Martínez DO;  Location: [facility]    VASCULAR SURGERY      Thoracic Outlet Syndrome         Prior diagnostic imaging/testing results: CT scan; X-ray     Results:   Left superficial fibular and sural sensory conduction studies were normal. Left fibular and tibial motor conduction studies were normal. Electromyography did  not demonstrate reproducible abnormal spontaneous activity. Voluntary activation demonstrated evidence of motor unit remodeling in the fibularis tertius muscle and was otherwise normal.      Interpretation:  3/2024 EMG  This study demonstrates no diagnostic abnormalities. The findings in the fibularis tertius may reflect prior axonal injury and reinnervation but are non-localizing, and of limited clinical significance, as an isolated finding.  Xray 9/2024: IMPRESSION: Postsurgical changes of instrumented posterior spinal  fusion L4-S1 and bilateral sacroiliac joint anchors. Surgical hardware  is intact with no evidence of fracture or loosening. Stepwise  retrolisthesis L3-S1. No loss of vertebral body height. Multilevel  disc height loss and osteophyte formation.     Prior therapy history for the same diagnosis, illness or injury: No        Prior Level of Function  Transfers: Independent  Ambulation: Independent  ADL: Independent  IADL:     Living Environment  Social support: With a significant other or spouse   Type of home: House; 2-story   Stairs to enter the home: Yes 2 Is there a railing: Yes     Ramp: No   Stairs inside the home: Yes 9 Is there a railing: Yes     Help at home: Home and Yard maintenance tasks  Equipment owned: Raised toilet seat     Employment: Yes   Hobbies/Interests: Tractor pulling, walk/hike, bike ride, yard work, stacking wood playing with my dogs.    Patient goals for therapy: Walk a couple miles, ride bike and sleep a whole night    Pain assessment: Pain present     Objective   LUMBAR SPINE EVALUATION    INTEGUMENTARY (edema, incisions): WFL   POSTURE:  Rounded shoulders, states hips used to feel better with IR in [facility], but now states that's painful and needs ER to maintain more comfort.   GAIT:   Weightbearing Status: WBAT  Assistive Device(s): None  Gait Deviations: WFL  BALANCE/PROPRIOCEPTION:   SLS > 10 seconds, fair to good proximal control bilaterally, R sided SI pain noted  "with R side only    ROM:   Flexion distal shins (previous to surgery palms flat on floor)   Extension moderate limitation and painful lumbar/sacral region  R SB Moderate limitation with Thoracic and R sacral pain  L  SB Mild to moderate limitation with L sided SI pain    PELVIC/SI SCREEN: NT  STRENGTH:   SLR 5/5 with good core engagement R , L has some weakness with posterior trunk and light TPR with increased resistance  Bridging is perceived weak and increased pain  3/5 noted partial range.   Deeper TA engagement has improved and is better motor planned than last course of care.   Tingling in toes with increased resistance ER in SL  HIp ER is 5-/5 and hip abduction 4/5, considerable compensation with hip hike QL, and poor Glute medius isolation        MYOTOMES: WNL  DTR S: WNL  CORD SIGNS: WNL  DERMATOMES: States R toes tingling 1-5, intermittent L radicular pain, tingling,numbness  NEURAL TENSION:  ASLR -  FLEXIBILITY: WFL underlying hypermobility, has \"tightness\" but ROM WNL. Reduced R hip ER compared to her baseline prior to fusion. Normal to excess HS length   LUMBAR/HIP Special Tests:    PELVIS/SI SPECIAL TESTS: Sacral anchor screws present, patient reports plans for all hardware to be removed once evidence of healing d/t her metal allergies  FUNCTIONAL TESTS:   PALPATION:  NT  SPINAL SEGMENTAL CONCLUSIONS:  NT      Assessment & Plan   CLINICAL IMPRESSIONS  Medical Diagnosis: S/P lumbar spinal fusion (Z98.1)  - Primary    Treatment Diagnosis: S/P lumbar spinal fusion (Z98.1)  - Primary   Impression/Assessment: Patient is a 49 year old female with weakness, B LE L>R  leg pain complaints.  History of suspected components of piriformis syndrome which was difficult to advance SI myofascial strengthening. Presents s/p Lumbar fusion 6/3/2024. Remains at limited 20# lifting restriction, but considering land vs pool therapy for progressive strengthening. The following significant findings have been identified: " Decreased strength, Impaired balance, Decreased proprioception, Impaired muscle performance, Decreased activity tolerance, and Impaired posture. She is functionally very active and has been walking diligently since her surgery. Set back in her LLE pain after a fall with the dogs in August a few weeks ago. Persistence/return of LLE pain the last few weeks which had improved post fusion. Was diagnosed with EDS in May of this year with Dr Perez. WIll progress core and LE stability and strengthening, pool if needed for pain relief, combination may be helpful . These impairments interfere with their ability to perform self care tasks, work tasks, recreational activities, and household chores as compared to previous level of function.     Clinical Decision Making (Complexity):  Clinical Presentation: Evolving/Changing  Clinical Presentation Rationale: based on medical and personal factors listed in PT evaluation  Clinical Decision Making (Complexity): High complexity    PLAN OF CARE  Treatment Interventions:  Interventions: Gait Training, Neuromuscular Re-education, Therapeutic Activity, Therapeutic Exercise, Self-Care/Home Management, Aquatic Therapy, MT    Long Term Goals     PT Goal 1  Goal Identifier: DAK[place]  Goal Description: Patient will demo improvements in DAKOTA related disability from 50% down to 30% to better tolerance  standing and sitting tasks for work and rec  Target Date: 11/22/24  PT Goal 2  Goal Identifier: Strengthening  Goal Description: Patient will demo tolerance to Core and strengthening program with manageable pain levels  Target Date: 11/22/24      Frequency of Treatment: 1x/week  Duration of Treatment: 10 weeks    Recommended Referrals to Other Professionals:   Education Assessment:   Learner/Method: Patient  Education Comments: plan, anatomy    Risks and benefits of evaluation/treatment have been explained.   Patient/Family/caregiver agrees with Plan of Care.     Evaluation Time:     PT Yudi  Moderate Complexity Minutes (51535): 40       Signing Clinician: Stacie Blankenship PT

## 2024-09-12 ENCOUNTER — THERAPY VISIT (OUTPATIENT)
Dept: PHYSICAL THERAPY | Facility: CLINIC | Age: 49
End: 2024-09-12
Attending: NURSE PRACTITIONER
Payer: COMMERCIAL

## 2024-09-12 DIAGNOSIS — Z98.1 S/P LUMBAR FUSION: Primary | ICD-10-CM

## 2024-09-12 DIAGNOSIS — Q79.60 EHLERS-DANLOS SYNDROME: ICD-10-CM

## 2024-09-12 PROCEDURE — 97162 PT EVAL MOD COMPLEX 30 MIN: CPT | Mod: GP | Performed by: PHYSICAL THERAPIST

## 2024-09-12 PROCEDURE — 97110 THERAPEUTIC EXERCISES: CPT | Mod: GP | Performed by: PHYSICAL THERAPIST

## 2024-09-12 PROCEDURE — 97530 THERAPEUTIC ACTIVITIES: CPT | Mod: GP | Performed by: PHYSICAL THERAPIST

## 2024-09-12 NOTE — PROGRESS NOTES
09/11/24 2001   Oswestry Disability Index (DAKOTA   Quang Dawson 1980 Version 2.1a, All rights reserved)   Section 1 - Pain intensity 2   Section 2 - Personal care (washing, dressing, etc.)  2   Section 3 - Lifting 3   Section 4 - Walking 1   Section 5 - Sitting 3   Section 6 - Standing 3   Section 7 - Sleeping 4   Section 8 - Sex life (if applicable) 3   Section 9 - Social life 3   Section 10 - Traveling 2   Sum 26   Count 10   Oswestry Score (%) 52 %

## 2024-09-20 ENCOUNTER — THERAPY VISIT (OUTPATIENT)
Dept: PHYSICAL THERAPY | Facility: CLINIC | Age: 49
End: 2024-09-20
Attending: NURSE PRACTITIONER
Payer: COMMERCIAL

## 2024-09-20 DIAGNOSIS — Z98.1 S/P LUMBAR FUSION: Primary | ICD-10-CM

## 2024-09-20 DIAGNOSIS — Q79.60 EHLERS-DANLOS SYNDROME: ICD-10-CM

## 2024-09-20 PROCEDURE — 97140 MANUAL THERAPY 1/> REGIONS: CPT | Mod: GP | Performed by: PHYSICAL THERAPIST

## 2024-09-20 PROCEDURE — 97110 THERAPEUTIC EXERCISES: CPT | Mod: GP | Performed by: PHYSICAL THERAPIST

## 2024-09-25 ENCOUNTER — THERAPY VISIT (OUTPATIENT)
Dept: PHYSICAL THERAPY | Facility: CLINIC | Age: 49
End: 2024-09-25
Attending: NURSE PRACTITIONER
Payer: COMMERCIAL

## 2024-09-25 DIAGNOSIS — Q79.60 EHLERS-DANLOS SYNDROME: Primary | ICD-10-CM

## 2024-09-25 DIAGNOSIS — Z98.1 S/P LUMBAR FUSION: ICD-10-CM

## 2024-09-25 DIAGNOSIS — M54.16 LUMBAR RADICULOPATHY: ICD-10-CM

## 2024-09-25 PROCEDURE — 97530 THERAPEUTIC ACTIVITIES: CPT | Mod: GP | Performed by: PHYSICAL THERAPIST

## 2024-09-25 PROCEDURE — 97140 MANUAL THERAPY 1/> REGIONS: CPT | Mod: GP | Performed by: PHYSICAL THERAPIST

## 2024-09-30 ENCOUNTER — MYC MEDICAL ADVICE (OUTPATIENT)
Dept: FAMILY MEDICINE | Facility: OTHER | Age: 49
End: 2024-09-30
Payer: COMMERCIAL

## 2024-09-30 DIAGNOSIS — M48.061 FORAMINAL STENOSIS OF LUMBAR REGION: ICD-10-CM

## 2024-09-30 DIAGNOSIS — M47.26 OSTEOARTHRITIS OF SPINE WITH RADICULOPATHY, LUMBAR REGION: Primary | ICD-10-CM

## 2024-09-30 DIAGNOSIS — M51.27 HERNIATION OF INTERVERTEBRAL DISC BETWEEN L5 AND S1: ICD-10-CM

## 2024-09-30 RX ORDER — MELOXICAM 15 MG/1
15 TABLET ORAL DAILY
Qty: 90 TABLET | Refills: 1 | Status: SHIPPED | OUTPATIENT
Start: 2024-09-30

## 2024-10-01 ENCOUNTER — TELEPHONE (OUTPATIENT)
Dept: PHYSICAL MEDICINE AND REHAB | Facility: CLINIC | Age: 49
End: 2024-10-01
Payer: COMMERCIAL

## 2024-10-01 ENCOUNTER — THERAPY VISIT (OUTPATIENT)
Dept: PHYSICAL THERAPY | Facility: CLINIC | Age: 49
End: 2024-10-01
Attending: NURSE PRACTITIONER
Payer: COMMERCIAL

## 2024-10-01 DIAGNOSIS — M54.16 LUMBAR RADICULOPATHY: ICD-10-CM

## 2024-10-01 DIAGNOSIS — Q79.60 EHLERS-DANLOS SYNDROME: Primary | ICD-10-CM

## 2024-10-01 DIAGNOSIS — Z98.1 S/P LUMBAR FUSION: ICD-10-CM

## 2024-10-01 PROCEDURE — 97140 MANUAL THERAPY 1/> REGIONS: CPT | Mod: GP | Performed by: PHYSICAL THERAPIST

## 2024-10-01 PROCEDURE — 97530 THERAPEUTIC ACTIVITIES: CPT | Mod: GP | Performed by: PHYSICAL THERAPIST

## 2024-10-01 PROCEDURE — 97110 THERAPEUTIC EXERCISES: CPT | Mod: GP | Performed by: PHYSICAL THERAPIST

## 2024-10-01 NOTE — TELEPHONE ENCOUNTER
[facility] Call Center    Phone Message    May a detailed message be left on voicemail: yes     Reason for Call: Other: Patient calling to schedule appt from referral.  Writer unable to schedule with diagnosis of S/P Lumbar fussion and Peronal Nerve Entrapment Assessment not on out protocols. Please call her back to schedule.      Action Taken: Message routed to:  [facility] ([facility]): PMR    Travel Screening: Not Applicable     Date of Service:

## 2024-10-01 NOTE — TELEPHONE ENCOUNTER
Spoke with pt, she said referral is NOT spine related, she said she just had Spine Infusion and that she wants to discuss about her leg pain. Appt scheduled with Dr Stauffer [facility]

## 2024-10-04 ENCOUNTER — OFFICE VISIT (OUTPATIENT)
Dept: FAMILY MEDICINE | Facility: OTHER | Age: 49
End: 2024-10-04
Payer: COMMERCIAL

## 2024-10-04 VITALS
DIASTOLIC BLOOD PRESSURE: 78 MMHG | HEIGHT: 66 IN | TEMPERATURE: 96.8 F | WEIGHT: 173.5 LBS | OXYGEN SATURATION: 96 % | SYSTOLIC BLOOD PRESSURE: 116 MMHG | RESPIRATION RATE: 14 BRPM | BODY MASS INDEX: 27.88 KG/M2 | HEART RATE: 79 BPM

## 2024-10-04 DIAGNOSIS — Z01.818 PREOP GENERAL PHYSICAL EXAM: Primary | ICD-10-CM

## 2024-10-04 DIAGNOSIS — D75.839 THROMBOCYTOSIS, UNSPECIFIED: ICD-10-CM

## 2024-10-04 DIAGNOSIS — M47.896 OTHER OSTEOARTHRITIS OF SPINE, LUMBAR REGION: ICD-10-CM

## 2024-10-04 DIAGNOSIS — M51.27 HERNIATION OF INTERVERTEBRAL DISC BETWEEN L5 AND S1: ICD-10-CM

## 2024-10-04 DIAGNOSIS — M48.061 FORAMINAL STENOSIS OF LUMBAR REGION: ICD-10-CM

## 2024-10-04 DIAGNOSIS — Z96.82 S/P PLACEMENT OF NERVE STIMULATOR: ICD-10-CM

## 2024-10-04 DIAGNOSIS — R03.0 ELEVATED BLOOD PRESSURE READING WITHOUT DIAGNOSIS OF HYPERTENSION: ICD-10-CM

## 2024-10-04 LAB
ALBUMIN SERPL BCG-MCNC: 4.4 G/DL (ref 3.5–5.2)
ALP SERPL-CCNC: 79 U/L (ref 40–150)
ALT SERPL W P-5'-P-CCNC: 18 U/L (ref 0–50)
ANION GAP SERPL CALCULATED.3IONS-SCNC: 12 MMOL/L (ref 7–15)
AST SERPL W P-5'-P-CCNC: 19 U/L (ref 0–45)
BILIRUB SERPL-MCNC: 0.5 MG/DL
BUN SERPL-MCNC: 14 MG/DL (ref 6–20)
CALCIUM SERPL-MCNC: 9.2 MG/DL (ref 8.8–10.4)
CHLORIDE SERPL-SCNC: 104 MMOL/L (ref 98–107)
CREAT SERPL-MCNC: 0.61 MG/DL (ref 0.51–0.95)
EGFRCR SERPLBLD CKD-EPI 2021: >90 ML/MIN/1.73M2
ERYTHROCYTE [DISTWIDTH] IN BLOOD BY AUTOMATED COUNT: 14.4 % (ref 10–15)
GLUCOSE SERPL-MCNC: 91 MG/DL (ref 70–99)
HCO3 SERPL-SCNC: 24 MMOL/L (ref 22–29)
HCT VFR BLD AUTO: 34.8 % (ref 35–47)
HGB BLD-MCNC: 11.5 G/DL (ref 11.7–15.7)
MCH RBC QN AUTO: 29.3 PG (ref 26.5–33)
MCHC RBC AUTO-ENTMCNC: 33 G/DL (ref 31.5–36.5)
MCV RBC AUTO: 89 FL (ref 78–100)
PLATELET # BLD AUTO: 280 10E3/UL (ref 150–450)
POTASSIUM SERPL-SCNC: 4.2 MMOL/L (ref 3.4–5.3)
PROT SERPL-MCNC: 7 G/DL (ref 6.4–8.3)
RBC # BLD AUTO: 3.93 10E6/UL (ref 3.8–5.2)
SODIUM SERPL-SCNC: 140 MMOL/L (ref 135–145)
WBC # BLD AUTO: 5.1 10E3/UL (ref 4–11)

## 2024-10-04 PROCEDURE — 85027 COMPLETE CBC AUTOMATED: CPT | Performed by: PHYSICIAN ASSISTANT

## 2024-10-04 PROCEDURE — 36415 COLL VENOUS BLD VENIPUNCTURE: CPT | Performed by: PHYSICIAN ASSISTANT

## 2024-10-04 PROCEDURE — 99214 OFFICE O/P EST MOD 30 MIN: CPT | Performed by: PHYSICIAN ASSISTANT

## 2024-10-04 PROCEDURE — 80053 COMPREHEN METABOLIC PANEL: CPT | Performed by: PHYSICIAN ASSISTANT

## 2024-10-04 ASSESSMENT — PAIN SCALES - GENERAL: PAINLEVEL: MODERATE PAIN (5)

## 2024-10-04 NOTE — PROGRESS NOTES
Preoperative Evaluation  [facility]  [address]  [facility] [address]  Phone: [phone]  Primary Provider: Otoniel Quiroz PA-C  Pre-op Performing Provider: Otoniel Quiroz PA-C  Oct 4, 2024              10/4/2024   Surgical Information   What procedure is being done? spinal cord stimulator battery revision   Facility or Hospital where procedure/surgery will be performed: [facility]   Who is doing the procedure / surgery? dr Meza   Date of surgery / procedure: october 14   Time of surgery / procedure: ?   Where do you plan to recover after surgery? at home with family        Fax number for surgical facility: [phone]    Assessment & Plan     The proposed surgical procedure is considered INTERMEDIATE risk.    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  Related labs are pending.  Follow-up based on results.  We note that her spinal cord stimulator has migrated around the original pocket and is causing difficulty with clothing items and is also concerned that it may kink off the leads evidently.  She is status post fusion in the lumbar region via [place].   - Comprehensive metabolic panel (BMP + Alb, Alk Phos, ALT, AST, Total. Bili, TP); Future  - CBC with platelets; Future       Implanted Device   - Type of device: Nerve stimulator Patient advised to bring device information on day of surgery.       - No identified additional risk factors other than previously addressed    Antiplatelet or Anticoagulation Medication Instructions   - Patient is on no antiplatelet or anticoagulation  medications.    Additional Medication Instructions   - GLP-1 Injectable (exenitide, liraglutide, semaglutide, dulaglutide, etc.): DO NOT TAKE 7 days before surgery    - meloxicam (Mobic): DO NOT TAKE 10 days before surgery.     Recommendation  Approval given to proceed with proposed procedure, without further diagnostic evaluation.    Florence Roe is a 49 year old, presenting for the following:  Pre-Op Exam          10/4/2024     2:42 PM   Additional Questions   Roomed by geovany   Accompanied by self     HPI related to upcoming procedure: nerve stimulator of the lumbar spine in need of intervention related to fixation of position and battery assessment.        10/4/2024   Pre-Op Questionnaire   Have you ever had a heart attack or stroke? No   Have you ever had surgery on your heart or blood vessels, such as a stent placement, a coronary artery bypass, or surgery on an artery in your head, neck, heart, or legs? No   Do you have chest pain with activity? No   Do you have a history of heart failure? No   Do you currently have a cold, bronchitis or symptoms of other infection? No   Do you have a cough, shortness of breath, or wheezing? No   Do you or anyone in your family have previous history of blood clots? No   Do you or does anyone in your family have a serious bleeding problem such as prolonged bleeding following surgeries or cuts? No   Have you ever had problems with anemia or been told to take iron pills? (!) YES      Have you had any abnormal blood loss such as black, tarry or bloody stools, or abnormal vaginal bleeding? No   Have you ever had a blood transfusion? No   Are you willing to have a blood transfusion if it is medically needed before, during, or after your surgery? Yes   Have you or any of your relatives ever had problems with anesthesia? No   Do you have sleep apnea, excessive snoring or daytime drowsiness? No   Do you have any artifical heart valves or other implanted medical devices like a  pacemaker, defibrillator, or continuous glucose monitor? (!) YES   What type of device do you have? spine stimulator   Name of the clinic that manages your device dr salazar   Do you have artificial joints? No   Are you allergic to latex? (!) YES        Health Care Directive  Patient does not have a Health Care Directive or Living Will: Discussed advance care planning with patient; however, patient declined at this time.    Preoperative Review of    reviewed - no record of controlled substances prescribed.      Status of Chronic Conditions:  See problem list for active medical problems.  Problems all longstanding and stable, except as noted/documented.  See ROS for pertinent symptoms related to these conditions.    Patient Active Problem List    Diagnosis Date Noted    Thrombocytosis, unspecified 06/25/2024     Priority: Medium    Dysfunction of both eustachian tubes 06/25/2024     Priority: Medium    S/P lumbar fusion 06/13/2024     Priority: Medium    Yeast dermatitis 06/13/2024     Priority: Medium    Hypermobility polyarthralgia syndrome 06/02/2024     Priority: Medium     A diagnosis of hypermobility polyarthralgia syndrome reflects joint pain and cracking/subluxing in the setting of joint hypermobility.      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 Perez note 5/29/24. Beighton 4/8 (right elbow can't be counted because of injury and repair) plus several features.  Close to classic phenotype - may have difficulty with healing after surgery (hasn't had many scars)      Weight loss 05/01/2023     Priority: Medium    Weight disorder 05/01/2023     Priority: Medium    Hyperglycemia 12/20/2022     Priority: Medium    Weight gain 12/19/2022     Priority: Medium    Obesity, Class II, BMI 35-39.9 12/19/2022     Priority: Medium    Morbid obesity (H) 12/19/2022     Priority: Medium    Cervical radiculopathy 06/13/2022     Priority: Medium    DDD (degenerative disc disease), cervical  06/13/2022     Priority: Medium    Spinal stenosis in cervical region 06/13/2022     Priority: Medium    Pain of right upper extremity 06/13/2022     Priority: Medium    Abdominal wall strain, initial encounter 05/19/2022     Priority: Medium    Abdominal wall pain in periumbilical region 05/19/2022     Priority: Medium    Pectoralis minor syndrome (H) 05/12/2022     Priority: Medium    History of endometrial ablation 05/12/2022     Priority: Medium    Thyroid nodule 05/12/2022     Priority: Medium    Personal history of COVID-19 12/02/2021     Priority: Medium    Colitis 09/09/2021     Priority: Medium    Abnormal CT scan, colon 09/09/2021     Priority: Medium    TOS (thoracic outlet syndrome) 06/07/2021     Priority: Medium     Added automatically from request for surgery [id number]      Eczema, unspecified type 03/03/2021     Priority: Medium    Musculoskeletal pain 03/03/2021     Priority: Medium    Fatigue, unspecified type 03/03/2021     Priority: Medium    AK (actinic keratosis) - both hands at the lateral thenar aspect. 12/08/2020     Priority: Medium    Hypertrophy of breast 11/05/2020     Priority: Medium    History of cold sores 11/05/2020     Priority: Medium    Elevated blood pressure reading without diagnosis of hypertension 09/24/2020     Priority: Medium    Balance problems 07/17/2020     Priority: Medium    Sinus arrhythmia seen on electrocardiogram 07/17/2020     Priority: Medium    Family history of ischemic heart disease - father at 46 massive MI 07/17/2020     Priority: Medium    Brachial plexopathy - right shoulder 05/21/2020     Priority: Medium    Dental abscess - left mandible molar 03/05/2020     Priority: Medium    Endometrium, hyperplasia - on recent CT scan 02/10/2020     Priority: Medium    Pain in joint involving pelvic region and thigh, right 02/10/2020     Priority: Medium    Right lateral abdominal pain 02/10/2020     Priority: Medium    Right foot pain 02/10/2020     Priority: Medium     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 10/10/2019     Priority: Medium    Abnormality of nail surface 08/29/2019     Priority: Medium    Dry hair 08/29/2019     Priority: Medium    Dry skin 08/29/2019     Priority: Medium    Malaise and fatigue 08/29/2019     Priority: Medium    Lymphadenopathy 08/29/2019     Priority: Medium    PONV (postoperative nausea and vomiting) 06/10/2019     Priority: Medium    Menorrhalgia 06/10/2019     Priority: Medium    Uterine polyp 06/10/2019     Priority: Medium    Loose stools 06/10/2019     Priority: Medium    Hyperactive bowel sounds 06/10/2019     Priority: Medium    Jaycob complexion 05/23/2019     Priority: Medium    Lip lesion 05/23/2019     Priority: Medium    Abdominal pain, epigastric 05/23/2019     Priority: Medium    Abdominal distension 05/23/2019     Priority: Medium    Intractable episodic tension-type headache 03/11/2019     Priority: Medium    Intractable right heel pain 05/29/2018     Priority: Medium    Superficial swelling of scalp 04/12/2018     Priority: Medium    Biceps tendinopathy, right 03/22/2018     Priority: Medium    Hyperlipidemia LDL goal <130 08/30/2017     Priority: Medium    Spasm of muscle 07/11/2017     Priority: Medium    Sternocleidomastoid muscle tenderness 04/05/2017     Priority: Medium     left        Acute cervical myofascial strain, initial encounter 04/05/2017     Priority: Medium    Abnormal mammogram 09/21/2016     Priority: Medium     right breast      Gastroesophageal reflux disease, esophagitis presence not specified 06/29/2016     Priority: Medium     IMO Regulatory Load OCT 2020      Rotator cuff arthropathy, right 03/07/2016     Priority: Medium    AD (atopic dermatitis) 10/29/2015     Priority: Medium    Heat sensitivity 10/29/2015     Priority: Medium    Skin lesion 10/20/2015     Priority: Medium     posterior superior scalp      Plantar fasciitis 09/23/2015     Priority: Medium    Lateral epicondylitis  03/16/2015     Priority: Medium     Problem list name updated by automated process. Provider to review      Right shoulder pain 03/16/2015     Priority: Medium    Telangiectasia of face 12/19/2014     Priority: Medium    Frontal headache 12/15/2014     Priority: Medium    Muscular wasting and disuse atrophy, not elsewhere classified 06/09/2014     Priority: Medium     right SCM, right wrist,       Ds DNA antibody positive 04/16/2014     Priority: Medium     borderline.      Lumbar radiculopathy 01/22/2014     Priority: Medium    Migraine without aura and without status migrainosus, not intractable 10/23/2013     Priority: Medium    Foraminal stenosis of lumbar region 09/26/2013     Priority: Medium    DJD (degenerative joint disease), lumbar 09/26/2013     Priority: Medium    FLORIDALMA positive 07/18/2013     Priority: Medium    Plantar fascial fibromatosis 07/18/2013     Priority: Medium    Pain in joint, upper arm 04/15/2013     Priority: Medium    Family history of colon cancer 03/05/2013     Priority: Medium     mother      Keratitis sicca 10/31/2012     Priority: Medium    Dry eyes 10/15/2012     Priority: Medium    Family history of melanoma 08/24/2012     Priority: Medium    Shoulder joint instability 08/15/2012     Priority: Medium    Abnormal PFT 07/31/2012     Priority: Medium     question of left heart failure and/or shunting in need of further investigation      Chronic fatigue 06/06/2012     Priority: Medium    Hair loss 06/06/2012     Priority: Medium    Raynaud phenomenon 04/11/2012     Priority: Medium    PAC (premature atrial contraction) 07/15/2010     Priority: Medium    Palpitations 07/15/2010     Priority: Medium    Lyme disease 06/07/2010     Priority: Medium    Lesion of skin of scalp 05/26/2010     Priority: Medium    Migraine, unspecified, with intractable migraine, so stated, without mention of status migrainosus 04/04/2003     Priority: Medium     Problem list name updated by automated process.  Provider to review  IMO Regulatory Load OCT 2020      Bell's palsy 04/04/2003     Priority: Medium    Contact dermatitis and other eczema, due to unspecified cause 04/04/2003     Priority: Medium      Past Medical History:   Diagnosis Date    Abnormal mammogram 9/21/2016    right breast     AD (atopic dermatitis) 10/29/2015    Allergic rhinitis due to other allergen     Arthritis     Bell's palsy     Chronic fatigue 6/6/2012    Chronic infection     MRSA - 2015 scalp and prior to Abdomen    Contact dermatitis and other eczema, due to unspecified cause     eczema    Contusion of left foot, initial encounter 3/16/2016    DJD (degenerative joint disease), lumbar 9/26/2013    DJD (degenerative joint disease), lumbar 9/26/2013    Ds DNA antibody positive 4/16/2014    borderline.     Elevated blood pressure reading without diagnosis of hypertension 12/24/2013    Facial contusion 12/15/2014    hit by horse     Foraminal stenosis of lumbar region 9/26/2013    Frontal headache 12/15/2014    Gastroesophageal reflux disease, esophagitis presence not specified 6/29/2016    Heat sensitivity 10/29/2015    HTN, goal below 140/90 9/23/2015    Hyperlipidemia LDL goal <130 8/30/2017    Irregular heart beat     Keratitis sicca 10/31/2012    Left shoulder pain 9/26/2013    Lumbar radiculopathy 1/22/2014    Migraine headache 10/23/2013    Migraine, unspecified, with intractable migraine, so stated, without mention of status migrainosus     Motion sickness     MRSA infection 10/20/2015    Muscular wasting and disuse atrophy, not elsewhere classified 6/9/2014    right SCM, right wrist,      Numbness and tingling     both legs anf feet greater on the left    PAC (premature atrial contraction) 7/15/2010    Personal history of COVID-19 12/2/2021    Plantar fasciitis 9/23/2015    PONV (postoperative nausea and vomiting)     Skin lesion 10/20/2015    posterior superior scalp     Spasm of muscle 7/11/2017    Telangiectasia of face 12/19/2014     Thyroid nodule 5/12/2022    Tooth pain 10/20/2015    left lower primary molar      Past Surgical History:   Procedure Laterality Date    AS INJ TRANSFORAMIN EPIDURAL, LUMB/SACR SINGLE      COLONOSCOPY  3/11/2013    Procedure: COLONOSCOPY;  colonoscopy;  Surgeon: Lobito Mas MD;  Location: [facility]    COLONOSCOPY N/A 9/13/2021    Procedure: COLONOSCOPY, WITH POLYPECTOMY AND BIOPSY;  Surgeon: Sam Liz MD;  Location: [facility]    COLONOSCOPY WITH CO2 INSUFFLATION N/A 11/19/2018    Procedure: COLONOSCOPY WITH CO2 INSUFFLATION;  Surgeon: Goyo Blank MD;  Location: [facility]    DECOMPRESSION LUMBAR TWO LEVELS Bilateral 12/8/2016    Procedure: DECOMPRESSION LUMBAR TWO LEVELS;  Surgeon: Bang Burrell MD;  Location: [facility]    DILATION AND CURETTAGE, HYSTEROSCOPY, ABLATE ENDOMETRIUM NOVASURE, COMBINED N/A 6/12/2019    Procedure: hysteroscopy, dilation & curettage, polypectomy, endometrial ablation;  Surgeon: Fredy Pham MD;  Location: [facility]    ESOPHAGOSCOPY, GASTROSCOPY, DUODENOSCOPY (EGD), COMBINED  11/8/2013    Procedure: COMBINED ESOPHAGOSCOPY, GASTROSCOPY, DUODENOSCOPY (EGD), BIOPSY SINGLE OR MULTIPLE;  Esophagoscopy, Gastroscopy, Duodenoscopy EGD with multiple biopsies;  Surgeon: Teja Koch MD;  Location: [facility]    ESOPHAGOSCOPY, GASTROSCOPY, DUODENOSCOPY (EGD), COMBINED N/A 2/18/2021    Procedure: ESOPHAGOGASTRODUODENOSCOPY, WITH BIOPSY;  Surgeon: Blanca Ba MD;  [facility]    ESOPHAGOSCOPY, GASTROSCOPY, DUODENOSCOPY (EGD), COMBINED N/A 4/11/2022    Procedure: ESOPHAGOGASTRODUODENOSCOPY, WITH BIOPSY;  Surgeon: Fredy Jimenez MD;  Location: [facility]     REMOVAL OF TONSILS,<11 Y/O      Tonsils <10y.o.    HC TOOTH EXTRACTION W/FORCEP      INJECT BLOCK MEDIAL BRANCH CERVICAL/THORACIC/LUMBAR N/A 7/23/2020    Procedure: Sacral 1,2,3 Lateral Branch Blocks and lumbar 5 medial branch blocks bilaterally;  Surgeon: Maurisio Goetz MD;  [facility]    INJECT JOINT  SACROILIAC Left 9/24/2020    Procedure: Left Lumbar 5 Sacral 1 nerve root injections.;  Surgeon: Maurisio Goetz MD;  [facility]: [facility]    INJECT TRIGGER POINT Right 3/29/2024    Procedure: Ultrasound-guided RIGHT anterior scalene muscle diagnostic block;  Surgeon: Brandi Dennis MD;  Location: [facility]    REPAIR MUSCLE UPPER EXTREMITY Right 7/27/2021    Procedure: DIVISION RIGHT PECTORALIS MUSCLE;  Surgeon: Davie Dutta;  Location: [facility]    REPAIR TENDON ELBOW  7/9/2014    Procedure: REPAIR TENDON ELBOW;  Surgeon: Chris Johnston MD;  [facility]    ULTRASONIC REMOVAL SOFT TISSUE (LOCATION) Right 11/21/2018    Procedure: Tenex right foot;  Surgeon: Patel Martínez DO;  Location: [facility]    ULTRASONIC REMOVAL SOFT TISSUE (LOCATION) Left 11/16/2023    Procedure: EXCISION, SOFT TISSUE, ELBOW, USING ULTRASONIC ASPIRATOR SYSTEM;  Surgeon: Patel Martínez DO;  Location: [facility]    VASCULAR SURGERY      Thoracic Outlet Syndrome     Current Outpatient Medications   Medication Sig Dispense Refill    ACETAMINOPHEN PO Take 650 mg by mouth every 6 hours as needed for mild pain      meloxicam (MOBIC) 15 MG tablet Take 1 tablet (15 mg) by mouth daily. 90 tablet 1    tirzepatide (MOUNJARO) 15 MG/0.5ML pen Inject 15 mg Subcutaneous every 7 days 6 mL 3    ketoconazole (NIZORAL) 2 % external cream Apply topically daily (Patient not taking: Reported on 6/25/2024) 60 g 1       Allergies   Allergen Reactions    Ceftriaxone Anaphylaxis     Hives, rash, racing heart beat    Bactrim [Sulfamethoxazole-Trimethoprim] Hives    Ciprofloxacin Other (See Comments)     Tendon Issues    Codeine     Copper      Rash      Doxycycline      Loss of skin pigmentation, skin loss.    Gold      Rash      Iodine Hives     WELTS    Iodine-131 Hives    Levaquin [Levofloxacin] Other (See Comments)     Tendon issues with levaquin and cipro    Morphine And Codeine Nausea and Vomiting    Nickel      rash    Steel [Staples]      Rash from  "staples      Sulfa Antibiotics Hives     Full Body    Latex Rash    Penicillins Rash        Social History     Tobacco Use    Smoking status: Never    Smokeless tobacco: Never   Substance Use Topics    Alcohol use: Yes     Comment: social     Family History   Problem Relation Age of Onset    Diabetes Mother         adult    Cancer - colorectal Mother     Hypertension Mother     Allergies Mother     Cancer Mother         colon    Depression Mother     Gastrointestinal Disease Mother         ibs    Genitourinary Problems Mother         kidney cyst    Gynecology Mother         endometriosis    Lipids Mother     Thyroid Disease Mother     Arthritis Mother         RA    Heart Disease Maternal Grandfather         MI,  - 60's    Allergies Father     Unknown/Adopted Father     Heart Disease Father         mi-age mid 40's    Cardiovascular Father     Lipids Father     Respiratory Father         asthma    Cancer Father     Other Cancer Father         renal cancer    Depression Sister     Allergies Sister     Cancer Sister         vaginal    Gynecology Sister         endometriosis    Lipids Paternal Grandmother     Osteoporosis Paternal Grandmother     Thyroid Disease Paternal Grandmother     Respiratory Son         asthma    Allergies Son     Arthritis Sister     Allergies Brother     Heart Disease Brother     Allergies Daughter     Blood Disease Paternal Aunt         LGL T cell Leukemia    Rheumatoid Arthritis Paternal Aunt     Kidney Disease Maternal Aunt     Lupus Maternal Aunt     Bladder Cancer Maternal Aunt      History   Drug Use No     Comment: Medical marijuana             Review of Systems  Constitutional, HEENT, cardiovascular, pulmonary, GI, , musculoskeletal, neuro, skin, endocrine and psych systems are negative, except as otherwise noted.    Objective    /78   Pulse 79   Temp 96.8  F (36  C) (Temporal)   Resp 14   Ht 1.665 m (5' 5.55\")   Wt 78.7 kg (173 lb 8 oz)   SpO2 96%   BMI 28.39 " "kg/m     Estimated body mass index is 28.39 kg/m  as calculated from the following:    Height as of this encounter: 1.665 m (5' 5.55\").    Weight as of this encounter: 78.7 kg (173 lb 8 oz).  Physical Exam  GENERAL: alert and no distress  EYES: Eyes grossly normal to inspection, PERRL and conjunctivae and sclerae normal  HENT: ear canals and TM's normal, nose and mouth without ulcers or lesions  NECK: no adenopathy, no asymmetry, masses, or scars  RESP: lungs clear to auscultation - no rales, rhonchi or wheezes  CV: regular rate and rhythm, normal S1 S2, no S3 or S4, no murmur, click or rub, no peripheral edema  ABDOMEN: soft, nontender, no hepatosplenomegaly, no masses and bowel sounds normal  MS: no gross musculoskeletal defects noted, no edema  SKIN: no suspicious lesions or rashes  NEURO: Normal strength and tone, mentation intact and speech normal  PSYCH: mentation appears normal, affect normal/bright    Recent Labs   Lab Test 08/13/24  1140 07/10/24  1421 06/25/24  1155 06/13/24  1019 04/18/24  0804 02/08/24  0822   HGB 11.8 11.1*   < > 10.8*  --  12.4    326   < > 482*  --   --    NA  --   --   --  138 143 139   POTASSIUM  --   --   --  4.1 4.0 4.6   CR  --   --   --  0.50* 0.56 0.53   A1C  --   --   --   --   --  5.3    < > = values in this interval not displayed.        Diagnostics  Labs pending at this time.  Results will be reviewed when available.   No EKG required, no history of coronary heart disease, significant arrhythmia, peripheral arterial disease or other structural heart disease.    Revised Cardiac Risk Index (RCRI)  The patient has the following serious cardiovascular risks for perioperative complications:   - No serious cardiac risks = 0 points     RCRI Interpretation: 1 point: Class II (low risk - 0.9% complication rate)         Signed Electronically by: Otoniel Quiroz PA-C  A copy of this evaluation report is provided to the requesting physician.         "

## 2024-10-08 ENCOUNTER — THERAPY VISIT (OUTPATIENT)
Dept: PHYSICAL THERAPY | Facility: CLINIC | Age: 49
End: 2024-10-08
Attending: NURSE PRACTITIONER
Payer: COMMERCIAL

## 2024-10-08 DIAGNOSIS — M54.16 LUMBAR RADICULOPATHY: ICD-10-CM

## 2024-10-08 DIAGNOSIS — Q79.60 EHLERS-DANLOS SYNDROME: Primary | ICD-10-CM

## 2024-10-08 DIAGNOSIS — Z98.1 S/P LUMBAR FUSION: ICD-10-CM

## 2024-10-08 PROCEDURE — 97110 THERAPEUTIC EXERCISES: CPT | Mod: GP | Performed by: PHYSICAL THERAPIST

## 2024-10-08 PROCEDURE — 97140 MANUAL THERAPY 1/> REGIONS: CPT | Mod: GP | Performed by: PHYSICAL THERAPIST

## 2024-10-14 ENCOUNTER — TRANSFERRED RECORDS (OUTPATIENT)
Dept: HEALTH INFORMATION MANAGEMENT | Facility: CLINIC | Age: 49
End: 2024-10-14
Payer: COMMERCIAL

## 2024-10-17 ENCOUNTER — THERAPY VISIT (OUTPATIENT)
Dept: PHYSICAL THERAPY | Facility: CLINIC | Age: 49
End: 2024-10-17
Attending: NURSE PRACTITIONER
Payer: COMMERCIAL

## 2024-10-17 DIAGNOSIS — Q79.60 EHLERS-DANLOS SYNDROME: Primary | ICD-10-CM

## 2024-10-17 DIAGNOSIS — M54.16 LUMBAR RADICULOPATHY: ICD-10-CM

## 2024-10-17 DIAGNOSIS — Z98.1 S/P LUMBAR FUSION: ICD-10-CM

## 2024-10-17 DIAGNOSIS — M25.50 HYPERMOBILITY ARTHRALGIA: ICD-10-CM

## 2024-10-17 PROCEDURE — 97110 THERAPEUTIC EXERCISES: CPT | Mod: GP | Performed by: PHYSICAL THERAPIST

## 2024-10-17 PROCEDURE — 97140 MANUAL THERAPY 1/> REGIONS: CPT | Mod: GP | Performed by: PHYSICAL THERAPIST

## 2024-10-18 ENCOUNTER — OFFICE VISIT (OUTPATIENT)
Dept: PHYSICAL MEDICINE AND REHAB | Facility: CLINIC | Age: 49
End: 2024-10-18
Payer: COMMERCIAL

## 2024-10-18 VITALS — SYSTOLIC BLOOD PRESSURE: 113 MMHG | HEART RATE: 75 BPM | OXYGEN SATURATION: 99 % | DIASTOLIC BLOOD PRESSURE: 75 MMHG

## 2024-10-18 DIAGNOSIS — G89.29 OTHER CHRONIC PAIN: ICD-10-CM

## 2024-10-18 DIAGNOSIS — M79.18 MYALGIA, OTHER SITE: ICD-10-CM

## 2024-10-18 DIAGNOSIS — Z98.1 S/P LUMBAR FUSION: ICD-10-CM

## 2024-10-18 DIAGNOSIS — M47.817 LUMBOSACRAL SPONDYLOSIS WITHOUT MYELOPATHY: Primary | ICD-10-CM

## 2024-10-18 DIAGNOSIS — M47.812 CERVICAL SPONDYLOSIS WITHOUT MYELOPATHY: ICD-10-CM

## 2024-10-18 PROCEDURE — 99205 OFFICE O/P NEW HI 60 MIN: CPT | Performed by: PHYSICAL MEDICINE & REHABILITATION

## 2024-10-18 RX ORDER — HYDROXYZINE HYDROCHLORIDE 10 MG/1
10 TABLET, FILM COATED ORAL 3 TIMES DAILY PRN
COMMUNITY

## 2024-10-18 NOTE — NURSING NOTE
Amelia Coker is a 49 year old female who presents for:  Chief Complaint   Patient presents with    Consult     Leg pain       Initial Vitals: /75 (BP Location: Left arm, Patient Position: Supine, Cuff Size: Adult Regular)   Pulse 75   SpO2 99%   BP cuff size: regular      Lois Krishnamurthy MA

## 2024-10-18 NOTE — LETTER
10/18/2024       RE: Amelia Coker  [address] [facility] [id number]     Dear Colleague,    Thank you for referring your patient, Amelia Coker, to the [facility] at [facility]. Please see a copy of my visit note below.    CC: I am seeing this patient for pain and to determine if this is related to peroneal nerve entrapment.    Patient is a 49-year-old woman with a complex medical history.  She reports having lumbar issues requiring decompression.  Subsequently developed instability and required a fusion from L4-S1.  She also has a spinal cord stimulator which was recently repositioned to the gluteal region this past Monday.  She is still sore from the surgery.  She has been evaluated at multiple locations including [facility] and the [facility].    She recalls problems starting around 2011.  She had been running 6 days a week and eventually was unable to even walk.  She was felt to have widespread pain due to inflammation.  She was also diagnosed with Lyme's.  It was affecting her hips and back.  She also gives history of horseback riding.  She developed increased low back pain which turned out to be sciatica going on the left side worse on the right side.  It would extend all the way to the toes.  She also gives history of neck pain and shoulder pain and thoracic outlet syndrome.    She has been working with physical therapy and is unsure if they are able to offer her much.  She denies any issues with her bowel and bladder function.  She denies fevers or weight loss.    She works as an  and enjoys it.  She is currently only doing part-time.  She does not smoke there is no alcohol use except occasional.  She denies chemical use.  She would like to become more active and tells me that she cannot be sitting idle and watching TV.    Past Medical History:   Diagnosis Date     Abnormal mammogram 9/21/2016    right breast      AD (atopic  dermatitis) 10/29/2015     Allergic rhinitis due to other allergen      Arthritis      Bell's palsy      Chronic fatigue 6/6/2012     Chronic infection     MRSA - 2015 scalp and prior to Abdomen     Contact dermatitis and other eczema, due to unspecified cause     eczema     Contusion of left foot, initial encounter 3/16/2016     DJD (degenerative joint disease), lumbar 9/26/2013     DJD (degenerative joint disease), lumbar 9/26/2013     Ds DNA antibody positive 4/16/2014    borderline.      Elevated blood pressure reading without diagnosis of hypertension 12/24/2013     Facial contusion 12/15/2014    hit by horse      Foraminal stenosis of lumbar region 9/26/2013     Frontal headache 12/15/2014     Gastroesophageal reflux disease, esophagitis presence not specified 6/29/2016     Heat sensitivity 10/29/2015     HTN, goal below 140/90 9/23/2015     Hyperlipidemia LDL goal <130 8/30/2017     Irregular heart beat      Keratitis sicca 10/31/2012     Left shoulder pain 9/26/2013     Lumbar radiculopathy 1/22/2014     Migraine headache 10/23/2013     Migraine, unspecified, with intractable migraine, so stated, without mention of status migrainosus      Motion sickness      MRSA infection 10/20/2015     Muscular wasting and disuse atrophy, not elsewhere classified 6/9/2014    right SCM, right wrist,       Numbness and tingling     both legs anf feet greater on the left     PAC (premature atrial contraction) 7/15/2010     Personal history of COVID-19 12/2/2021     Plantar fasciitis 9/23/2015     PONV (postoperative nausea and vomiting)      Skin lesion 10/20/2015    posterior superior scalp      Spasm of muscle 7/11/2017     Telangiectasia of face 12/19/2014     Thyroid nodule 5/12/2022     Tooth pain 10/20/2015    left lower primary molar      Past Surgical History:   Procedure Laterality Date     AS INJ TRANSFORAMIN EPIDURAL, LUMB/SACR SINGLE       COLONOSCOPY  3/11/2013    Procedure: COLONOSCOPY;  colonoscopy;  Surgeon:  Lobito Mas MD;  Location: [facility]     COLONOSCOPY N/A 9/13/2021    Procedure: COLONOSCOPY, WITH POLYPECTOMY AND BIOPSY;  Surgeon: Sam Liz MD;  Location: [facility]     COLONOSCOPY WITH CO2 INSUFFLATION N/A 11/19/2018    Procedure: COLONOSCOPY WITH CO2 INSUFFLATION;  Surgeon: Goyo Blank MD;  Location: [facility]     DECOMPRESSION LUMBAR TWO LEVELS Bilateral 12/8/2016    Procedure: DECOMPRESSION LUMBAR TWO LEVELS;  Surgeon: Bang Burrell MD;  Location: [facility]     DILATION AND CURETTAGE, HYSTEROSCOPY, ABLATE ENDOMETRIUM NOVASURE, COMBINED N/A 6/12/2019    Procedure: hysteroscopy, dilation & curettage, polypectomy, endometrial ablation;  Surgeon: Fredy Pham MD;  Location: [facility]     ESOPHAGOSCOPY, GASTROSCOPY, DUODENOSCOPY (EGD), COMBINED  11/8/2013    Procedure: COMBINED ESOPHAGOSCOPY, GASTROSCOPY, DUODENOSCOPY (EGD), BIOPSY SINGLE OR MULTIPLE;  Esophagoscopy, Gastroscopy, Duodenoscopy EGD with multiple biopsies;  Surgeon: Teja Koch MD;  Location: [facility]     ESOPHAGOSCOPY, GASTROSCOPY, DUODENOSCOPY (EGD), COMBINED N/A 2/18/2021    Procedure: ESOPHAGOGASTRODUODENOSCOPY, WITH BIOPSY;  Surgeon: Blanca Ba MD;  [facility]: [facility]     ESOPHAGOSCOPY, GASTROSCOPY, DUODENOSCOPY (EGD), COMBINED N/A 4/11/2022    Procedure: ESOPHAGOGASTRODUODENOSCOPY, WITH BIOPSY;  Surgeon: Fredy Jimenez MD;  Location: [facility] OR      REMOVAL OF TONSILS,<11 Y/O      Tonsils <10y.o.     HC TOOTH EXTRACTION W/FORCEP       INJECT BLOCK MEDIAL BRANCH CERVICAL/THORACIC/LUMBAR N/A 7/23/2020    Procedure: Sacral 1,2,3 Lateral Branch Blocks and lumbar 5 medial branch blocks bilaterally;  Surgeon: Maurisio Goetz MD;  [facility]: [facility]     INJECT JOINT SACROILIAC Left 9/24/2020    Procedure: Left Lumbar 5 Sacral 1 nerve root injections.;  Surgeon: Maurisio Goetz MD;  Location: [facility]     INJECT TRIGGER POINT Right 3/29/2024    Procedure: Ultrasound-guided RIGHT anterior scalene muscle diagnostic  block;  Surgeon: Brandi Dennis MD;  Location: [facility]     REPAIR MUSCLE UPPER EXTREMITY Right 2021    Procedure: DIVISION RIGHT PECTORALIS MUSCLE;  Surgeon: Davie Dutta;  Location: [facility]     REPAIR TENDON ELBOW  2014    Procedure: REPAIR TENDON ELBOW;  Surgeon: Chris Johnston MD;  Location: [facility]     ULTRASONIC REMOVAL SOFT TISSUE (LOCATION) Right 2018    Procedure: Tenex right foot;  Surgeon: Patel Martínez DO;  Location: [facility]     ULTRASONIC REMOVAL SOFT TISSUE (LOCATION) Left 2023    Procedure: EXCISION, SOFT TISSUE, ELBOW, USING ULTRASONIC ASPIRATOR SYSTEM;  Surgeon: Patel Martínez DO;  Location: [facility]     VASCULAR SURGERY      Thoracic Outlet Syndrome     Family History       Problem (# of Occurrences) Relation (Name,Age of Onset)    Unknown/Adopted (1) Father    Kidney Disease (1) Maternal Aunt    Allergies (6) Mother, Father, Sister, Son, Brother, Daughter    Arthritis (2) Mother: RA, Sister    Blood Disease (1) Paternal Aunt: LGL T cell Leukemia    Cancer (3) Mother: colon, Father, Sister: vaginal    Cardiovascular (1) Father    Depression (2) Mother, Sister    Diabetes (1) Mother: adult    Gastrointestinal Disease (1) Mother: ibs    Genitourinary Problems (1) Mother: kidney cyst    Gynecology (2) Mother: endometriosis, Sister: endometriosis    Heart Disease (3) Maternal Grandfather: MI,  - 60's, Father: mi-age mid 40's, Brother    Hypertension (1) Mother    Lipids (3) Mother, Father, Paternal Grandmother    Osteoporosis (1) Paternal Grandmother    Respiratory (2) Father: asthma, Son: asthma    Thyroid Disease (2) Mother, Paternal Grandmother    Cancer - colorectal (1) Mother    Lupus (1) Maternal Aunt    Other Cancer (1) Father: renal cancer    Rheumatoid Arthritis (1) Paternal Aunt    Bladder Cancer (1) Maternal Aunt           Social History     Socioeconomic History     Marital status:      Spouse name: Robert     Number of children: 2     Years  of education: 12     Highest education level: Not on file   Occupational History     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   Tobacco Use     Smoking status: Never     Smokeless tobacco: Never   Vaping Use     Vaping status: Former     Substances: THC     Devices: Pre-filled or refillable cartridge   Substance and Sexual Activity     Alcohol use: Yes     Comment: social     Drug use: No     Types: Marijuana     Comment: Medical marijuana     Sexual activity: Yes     Partners: Male     Birth control/protection: Surgical     Comment: vasectomy   Other Topics Concern      Service No     Blood Transfusions No     Caffeine Concern No     Comment: 0     Occupational Exposure No     Hobby Hazards Yes     Comment: horses     Sleep Concern No     Stress Concern No     Weight Concern Yes     Special Diet Yes     Comment: nhung's     Back Care No     Exercise Yes     Comment: occ     Bike Helmet Not Asked     Comment: na     Seat Belt Yes     Self-Exams Yes     Comment: occ     Parent/sibling w/ CABG, MI or angioplasty before 65F 55M? Not Asked   Social History Narrative     Not on file     Social Determinants of Health     Financial Resource Strain: Low Risk  (6/17/2024)    Financial Resource Strain      Within the past 12 months, have you or your family members you live with been unable to get utilities (heat, electricity) when it was really needed?: No   Food Insecurity: Low Risk  (6/17/2024)    Food Insecurity      Within the past 12 months, did you worry that your food would run out before you got money to buy more?: No      Within the past 12 months, did the food you bought just not last and you didn t have money to get more?: No   Transportation Needs: Low Risk  (6/17/2024)    Transportation Needs      Within the past 12 months, has lack of transportation kept you from medical appointments, getting your medicines, non-medical meetings or appointments, work, or from getting things that you need?: No    Physical Activity: Sufficiently Active (4/5/2024)    Received from [facility]    Exercise Vital Sign      Days of Exercise per Week: 7 days      Minutes of Exercise per Session: 30 min   Stress: No Stress Concern Present (11/21/2019)    Received [facility], [facility]    [employer] - Occupational Stress Questionnaire      Feeling of Stress : Not at all   Social Connections: Unknown (8/13/2024)    Received from [email or web address]    Social Connections      Frequency of Communication with Friends and Family: Not on file   Interpersonal Safety: Low Risk  (9/15/2024)    Interpersonal Safety      Do you feel physically and emotionally safe where you currently live?: Yes      Within the past 12 months, have you been hit, slapped, kicked or otherwise physically hurt by someone?: No      Within the past 12 months, have you been humiliated or emotionally abused in other ways by your partner or ex-partner?: No   Housing Stability: Low Risk  (6/17/2024)    Housing Stability      Do you have housing? : Yes      Are you worried about losing your housing?: No       Current Outpatient Medications   Medication Sig Dispense Refill     hydrOXYzine HCl (ATARAX) 10 MG tablet Take 10 mg by mouth 3 times daily as needed for itching.       tirzepatide (MOUNJARO) 15 MG/0.5ML pen Inject 15 mg Subcutaneous every 7 days 6 mL 3     ACETAMINOPHEN PO Take 650 mg by mouth every 6 hours as needed for mild pain (Patient not taking: Reported on 10/18/2024)       ketoconazole (NIZORAL) 2 % external cream Apply topically daily (Patient not taking: Reported on 6/25/2024) 60 g 1     meloxicam (MOBIC) 15 MG tablet Take 1 tablet (15 mg) by mouth daily. (Patient not taking: Reported on 10/18/2024) 90 tablet 1       /75 (BP Location: Left arm, Patient Position: Supine, Cuff Size: Adult Regular)   Pulse 75   SpO2 99%      On examination, patient is alert and cooperative.  Vitals are  stable.  She is afebrile.    HEENT is notable for right facial paresis from Bell's palsy.  She is able to move her upper extremities functionally.  She is able to carry out straight leg raising test.  She has some difficulty with Gaurav's due to the recent surgery especially on the left side.  Other tests were not performed due to her recent hip surgery.  In a prone position she has Steri-Strips on the incision sites.  There is also healed lumbar scar.  She is tender over the cervical facets bilaterally.  There is some discomfort over the lumbar spine.  On the left side the SI joint gluteal region piriformis region were limited by recent surgery.    Neurologically she is able to stand.  Romberg is negative.  Coordination tests are intact.  Strength is full.  She is able to walk on her heels and toes.  Reflexes are symmetric and plantars are downgoing.    She responds to touch including temperature vibration.    Impression: At this point this 49-year-old woman with a known history of Trina-Danlos, history of Lyme's disease, history of widespread aches and pains.  She also has had issues with her back and has undergone decompression followed by spinal cord stimulator followed by fusion and replacement of her spinal cord stimulator.  She is still using it though she is not sure if it is helping as much.    Currently her examination does not support peroneal nerve issues.  She has issues in the back of the hip as well as the left knee.  I will have her return for reassessment in about 4 weeks when the incision sites will be better healed.  Depending on the findings, I will make additional recommendations for treatment.  I clarified the goals for her as continuing to function adequately without making things worse.  I suspect at some point she will increase her work hours.    60 minutes visit, greater than 50% was for counseling on above-mentioned issues.  All of which was on the day of encounter.  All questions were  answered to her satisfaction.    Sunny Arechiga MD       Again, thank you for allowing me to participate in the care of your patient.      Sincerely,    Sunny Arechiga MD

## 2024-10-18 NOTE — PROGRESS NOTES
CC: I am seeing this patient for pain and to determine if this is related to peroneal nerve entrapment.    Patient is a 49-year-old woman with a complex medical history.  She reports having lumbar issues requiring decompression.  Subsequently developed instability and required a fusion from L4-S1.  She also has a spinal cord stimulator which was recently repositioned to the gluteal region this past Monday.  She is still sore from the surgery.  She has been evaluated at multiple locations including [facility] and the [facility].    She recalls problems starting around 2011.  She had been running 6 days a week and eventually was unable to even walk.  She was felt to have widespread pain due to inflammation.  She was also diagnosed with Lyme's.  It was affecting her hips and back.  She also gives history of horseback riding.  She developed increased low back pain which turned out to be sciatica going on the left side worse on the right side.  It would extend all the way to the toes.  She also gives history of neck pain and shoulder pain and thoracic outlet syndrome.    She has been working with physical therapy and is unsure if they are able to offer her much.  She denies any issues with her bowel and bladder function.  She denies fevers or weight loss.    She works as an  and enjoys it.  She is currently only doing part-time.  She does not smoke there is no alcohol use except occasional.  She denies chemical use.  She would like to become more active and tells me that she cannot be sitting idle and watching TV.    Past Medical History:   Diagnosis Date    Abnormal mammogram 9/21/2016    right breast     AD (atopic dermatitis) 10/29/2015    Allergic rhinitis due to other allergen     Arthritis     Bell's palsy     Chronic fatigue 6/6/2012    Chronic infection     MRSA - 2015 scalp and prior to Abdomen    Contact dermatitis and other eczema, due to unspecified cause     eczema    Contusion of left foot, initial  encounter 3/16/2016    DJD (degenerative joint disease), lumbar 9/26/2013    DJD (degenerative joint disease), lumbar 9/26/2013    Ds DNA antibody positive 4/16/2014    borderline.     Elevated blood pressure reading without diagnosis of hypertension 12/24/2013    Facial contusion 12/15/2014    hit by horse     Foraminal stenosis of lumbar region 9/26/2013    Frontal headache 12/15/2014    Gastroesophageal reflux disease, esophagitis presence not specified 6/29/2016    Heat sensitivity 10/29/2015    HTN, goal below 140/90 9/23/2015    Hyperlipidemia LDL goal <130 8/30/2017    Irregular heart beat     Keratitis sicca 10/31/2012    Left shoulder pain 9/26/2013    Lumbar radiculopathy 1/22/2014    Migraine headache 10/23/2013    Migraine, unspecified, with intractable migraine, so stated, without mention of status migrainosus     Motion sickness     MRSA infection 10/20/2015    Muscular wasting and disuse atrophy, not elsewhere classified 6/9/2014    right SCM, right wrist,      Numbness and tingling     both legs anf feet greater on the left    PAC (premature atrial contraction) 7/15/2010    Personal history of COVID-19 12/2/2021    Plantar fasciitis 9/23/2015    PONV (postoperative nausea and vomiting)     Skin lesion 10/20/2015    posterior superior scalp     Spasm of muscle 7/11/2017    Telangiectasia of face 12/19/2014    Thyroid nodule 5/12/2022    Tooth pain 10/20/2015    left lower primary molar      Past Surgical History:   Procedure Laterality Date    AS INJ TRANSFORAMIN EPIDURAL, LUMB/SACR SINGLE      COLONOSCOPY  3/11/2013    Procedure: COLONOSCOPY;  colonoscopy;  Surgeon: Lobito Mas MD;  Location:  GI    COLONOSCOPY N/A 9/13/2021    Procedure: COLONOSCOPY, WITH POLYPECTOMY AND BIOPSY;  Surgeon: Sam Liz MD;  Location:  GI    COLONOSCOPY WITH CO2 INSUFFLATION N/A 11/19/2018    Procedure: COLONOSCOPY WITH CO2 INSUFFLATION;  Surgeon: Goyo Blank MD;  Location:  OR     DECOMPRESSION LUMBAR TWO LEVELS Bilateral 12/8/2016    Procedure: DECOMPRESSION LUMBAR TWO LEVELS;  Surgeon: Bang Burrell MD;  Location: [facility] OR    DILATION AND CURETTAGE, HYSTEROSCOPY, ABLATE ENDOMETRIUM NOVASURE, COMBINED N/A 6/12/2019    Procedure: hysteroscopy, dilation & curettage, polypectomy, endometrial ablation;  Surgeon: Fredy Pham MD;  Location: [facility] OR    ESOPHAGOSCOPY, GASTROSCOPY, DUODENOSCOPY (EGD), COMBINED  11/8/2013    Procedure: COMBINED ESOPHAGOSCOPY, GASTROSCOPY, DUODENOSCOPY (EGD), BIOPSY SINGLE OR MULTIPLE;  Esophagoscopy, Gastroscopy, Duodenoscopy EGD with multiple biopsies;  Surgeon: Teja Koch MD;  Location: [facility] GI    ESOPHAGOSCOPY, GASTROSCOPY, DUODENOSCOPY (EGD), COMBINED N/A 2/18/2021    Procedure: ESOPHAGOGASTRODUODENOSCOPY, WITH BIOPSY;  Surgeon: Blanca Ba MD;  Location: [facility]    ESOPHAGOSCOPY, GASTROSCOPY, DUODENOSCOPY (EGD), COMBINED N/A 4/11/2022    Procedure: ESOPHAGOGASTRODUODENOSCOPY, WITH BIOPSY;  Surgeon: Fredy Jimenez MD;  Location: [facility] OR    HC REMOVAL OF TONSILS,<13 Y/O      Tonsils <10y.o.    HC TOOTH EXTRACTION W/FORCEP      INJECT BLOCK MEDIAL BRANCH CERVICAL/THORACIC/LUMBAR N/A 7/23/2020    Procedure: Sacral 1,2,3 Lateral Branch Blocks and lumbar 5 medial branch blocks bilaterally;  Surgeon: Maurisio Goetz MD;  Location: [facility]    INJECT JOINT SACROILIAC Left 9/24/2020    Procedure: Left Lumbar 5 Sacral 1 nerve root injections.;  Surgeon: Maurisio Goetz MD;  Location: [facility]    INJECT TRIGGER POINT Right 3/29/2024    Procedure: Ultrasound-guided RIGHT anterior scalene muscle diagnostic block;  Surgeon: Brandi Dennis MD;  Location: [facility] OR    REPAIR MUSCLE UPPER EXTREMITY Right 7/27/2021    Procedure: DIVISION RIGHT PECTORALIS MUSCLE;  Surgeon: Davie Dutta;  Location: [facility] OR    REPAIR TENDON ELBOW  7/9/2014    Procedure: REPAIR TENDON ELBOW;  Surgeon: Chris Johnston MD;  Location: [facility]    ULTRASONIC REMOVAL  SOFT TISSUE (LOCATION) Right 2018    Procedure: Tenex right foot;  Surgeon: Patel Martínez DO;  Location: [facility] OR    ULTRASONIC REMOVAL SOFT TISSUE (LOCATION) Left 2023    Procedure: EXCISION, SOFT TISSUE, ELBOW, USING ULTRASONIC ASPIRATOR SYSTEM;  Surgeon: Patel Martínez DO;  Location: [facility] OR    VASCULAR SURGERY      Thoracic Outlet Syndrome     Family History       Problem (# of Occurrences) Relation (Name,Age of Onset)    Unknown/Adopted (1) Father    Kidney Disease (1) Maternal Aunt    Allergies (6) Mother, Father, Sister, Son, Brother, Daughter    Arthritis (2) Mother: RA, Sister    Blood Disease (1) Paternal Aunt: LGL T cell Leukemia    Cancer (3) Mother: colon, Father, Sister: vaginal    Cardiovascular (1) Father    Depression (2) Mother, Sister    Diabetes (1) Mother: adult    Gastrointestinal Disease (1) Mother: ibs    Genitourinary Problems (1) Mother: kidney cyst    Gynecology (2) Mother: endometriosis, Sister: endometriosis    Heart Disease (3) Maternal Grandfather: MI,  - 60's, Father: mi-age mid 40's, Brother    Hypertension (1) Mother    Lipids (3) Mother, Father, Paternal Grandmother    Osteoporosis (1) Paternal Grandmother    Respiratory (2) Father: asthma, Son: asthma    Thyroid Disease (2) Mother, Paternal Grandmother    Cancer - colorectal (1) Mother    Lupus (1) Maternal Aunt    Other Cancer (1) Father: renal cancer    Rheumatoid Arthritis (1) Paternal Aunt    Bladder Cancer (1) Maternal Aunt           Social History     Socioeconomic History    Marital status:      Spouse name: Robert    Number of children: 2    Years of education: 12    Highest education level: Not on file   Occupational History    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   Tobacco Use    Smoking status: Never    Smokeless tobacco: Never   Vaping Use    Vaping status: Former    Substances: THC    Devices: Pre-filled or refillable cartridge   Substance and Sexual Activity    Alcohol use: Yes      Comment: social    Drug use: No     Types: Marijuana     Comment: Medical marijuana    Sexual activity: Yes     Partners: Male     Birth control/protection: Surgical     Comment: vasectomy   Other Topics Concern     Service No    Blood Transfusions No    Caffeine Concern No     Comment: 0    Occupational Exposure No    Hobby Hazards Yes     Comment: horses    Sleep Concern No    Stress Concern No    Weight Concern Yes    Special Diet Yes     Comment: nhung's    Back Care No    Exercise Yes     Comment: occ    Bike Helmet Not Asked     Comment: na    Seat Belt Yes    Self-Exams Yes     Comment: occ    Parent/sibling w/ CABG, MI or angioplasty before 65F 55M? Not Asked   Social History Narrative    Not on file     Social Determinants of Health     Financial Resource Strain: Low Risk  (6/17/2024)    Financial Resource Strain     Within the past 12 months, have you or your family members you live with been unable to get utilities (heat, electricity) when it was really needed?: No   Food Insecurity: Low Risk  (6/17/2024)    Food Insecurity     Within the past 12 months, did you worry that your food would run out before you got money to buy more?: No     Within the past 12 months, did the food you bought just not last and you didn t have money to get more?: No   Transportation Needs: Low Risk  (6/17/2024)    Transportation Needs     Within the past 12 months, has lack of transportation kept you from medical appointments, getting your medicines, non-medical meetings or appointments, work, or from getting things that you need?: No   Physical Activity: Sufficiently Active (4/5/2024)    Received from [facility]    Exercise Vital Sign     Days of Exercise per Week: 7 days     Minutes of Exercise per Session: 30 min   Stress: No Stress Concern Present (11/21/2019)    Received from [facility], [facility]    [employer] - Occupational Stress Questionnaire     Feeling of Stress : Not at  all   Social Connections: Unknown (8/13/2024)    [facility]    Social Connections     Frequency of Communication with Friends and Family: Not on file   Interpersonal Safety: Low Risk  (9/15/2024)    Interpersonal Safety     Do you feel physically and emotionally safe where you currently live?: Yes     Within the past 12 months, have you been hit, slapped, kicked or otherwise physically hurt by someone?: No     Within the past 12 months, have you been humiliated or emotionally abused in other ways by your partner or ex-partner?: No   Housing Stability: Low Risk  (6/17/2024)    Housing Stability     Do you have housing? : Yes     Are you worried about losing your housing?: No       Current Outpatient Medications   Medication Sig Dispense Refill    hydrOXYzine HCl (ATARAX) 10 MG tablet Take 10 mg by mouth 3 times daily as needed for itching.      tirzepatide (MOUNJARO) 15 MG/0.5ML pen Inject 15 mg Subcutaneous every 7 days 6 mL 3    ACETAMINOPHEN PO Take 650 mg by mouth every 6 hours as needed for mild pain (Patient not taking: Reported on 10/18/2024)      ketoconazole (NIZORAL) 2 % external cream Apply topically daily (Patient not taking: Reported on 6/25/2024) 60 g 1    meloxicam (MOBIC) 15 MG tablet Take 1 tablet (15 mg) by mouth daily. (Patient not taking: Reported on 10/18/2024) 90 tablet 1       /75 (BP Location: Left arm, Patient Position: Supine, Cuff Size: Adult Regular)   Pulse 75   SpO2 99%      On examination, patient is alert and cooperative.  Vitals are stable.  She is afebrile.    HEENT is notable for right facial paresis from Bell's palsy.  She is able to move her upper extremities functionally.  She is able to carry out straight leg raising test.  She has some difficulty with Gaurav's due to the recent surgery especially on the left side.  Other tests were not performed due to her recent hip surgery.  In a prone position she has Steri-Strips on  the incision sites.  There is also healed lumbar scar.  She is tender over the cervical facets bilaterally.  There is some discomfort over the lumbar spine.  On the left side the SI joint gluteal region piriformis region were limited by recent surgery.    Neurologically she is able to stand.  Romberg is negative.  Coordination tests are intact.  Strength is full.  She is able to walk on her heels and toes.  Reflexes are symmetric and plantars are downgoing.    She responds to touch including temperature vibration.    Impression: At this point this 49-year-old woman with a known history of Trina-Danlos, history of Lyme's disease, history of widespread aches and pains.  She also has had issues with her back and has undergone decompression followed by spinal cord stimulator followed by fusion and replacement of her spinal cord stimulator.  She is still using it though she is not sure if it is helping as much.    Currently her examination does not support peroneal nerve issues.  She has issues in the back of the hip as well as the left knee.  I will have her return for reassessment in about 4 weeks when the incision sites will be better healed.  Depending on the findings, I will make additional recommendations for treatment.  I clarified the goals for her as continuing to function adequately without making things worse.  I suspect at some point she will increase her work hours.    60 minutes visit, greater than 50% was for counseling on above-mentioned issues.  All of which was on the day of encounter.  All questions were answered to her satisfaction.    Sunny Arechiga MD

## 2024-11-15 ENCOUNTER — MYC MEDICAL ADVICE (OUTPATIENT)
Dept: FAMILY MEDICINE | Facility: OTHER | Age: 49
End: 2024-11-15
Payer: COMMERCIAL

## 2024-11-15 NOTE — TELEPHONE ENCOUNTER
[facility] requesting DX Lumbar Spine 2-3 views. Faxed outside orders to central imaging at [phone].     Results to be faxed back to [facility] at [phone] and actual images be pushed into PACS. Will postpone to make sure patient gets scheduled.

## 2024-11-22 ENCOUNTER — HOSPITAL ENCOUNTER (OUTPATIENT)
Dept: GENERAL RADIOLOGY | Facility: CLINIC | Age: 49
Discharge: HOME | End: 2024-11-22
Admitting: NURSE PRACTITIONER
Payer: COMMERCIAL

## 2024-11-22 DIAGNOSIS — Z98.1 STATUS POST LUMBAR SPINAL FUSION: ICD-10-CM

## 2024-11-22 PROCEDURE — 72100 X-RAY EXAM L-S SPINE 2/3 VWS: CPT

## 2024-11-27 ENCOUNTER — OFFICE VISIT (OUTPATIENT)
Dept: PHYSICAL MEDICINE AND REHAB | Facility: CLINIC | Age: 49
End: 2024-11-27
Payer: COMMERCIAL

## 2024-11-27 VITALS — OXYGEN SATURATION: 97 % | DIASTOLIC BLOOD PRESSURE: 73 MMHG | HEART RATE: 66 BPM | SYSTOLIC BLOOD PRESSURE: 109 MMHG

## 2024-11-27 DIAGNOSIS — M53.3 DISORDER OF SACRUM: ICD-10-CM

## 2024-11-27 DIAGNOSIS — G89.29 OTHER CHRONIC PAIN: ICD-10-CM

## 2024-11-27 DIAGNOSIS — M79.18 MYALGIA, OTHER SITE: Primary | ICD-10-CM

## 2024-11-27 DIAGNOSIS — Z98.1 S/P LUMBAR FUSION: ICD-10-CM

## 2024-11-27 RX ORDER — BETAMETHASONE SODIUM PHOSPHATE AND BETAMETHASONE ACETATE 3; 3 MG/ML; MG/ML
6 INJECTION, SUSPENSION INTRA-ARTICULAR; INTRALESIONAL; INTRAMUSCULAR; SOFT TISSUE ONCE
Status: COMPLETED | OUTPATIENT
Start: 2024-11-27 | End: 2024-11-27

## 2024-11-27 RX ORDER — BUPIVACAINE HYDROCHLORIDE 5 MG/ML
4 INJECTION, SOLUTION EPIDURAL; INTRACAUDAL ONCE
Status: COMPLETED | OUTPATIENT
Start: 2024-11-27 | End: 2024-11-27

## 2024-11-27 RX ADMIN — BUPIVACAINE HYDROCHLORIDE 20 MG: 5 INJECTION, SOLUTION EPIDURAL; INTRACAUDAL at 12:38

## 2024-11-27 RX ADMIN — BETAMETHASONE SODIUM PHOSPHATE AND BETAMETHASONE ACETATE 6 MG: 3; 3 INJECTION, SUSPENSION INTRA-ARTICULAR; INTRALESIONAL; INTRAMUSCULAR; SOFT TISSUE at 12:38

## 2024-11-27 NOTE — PROGRESS NOTES
CC: Patient returns for follow-up visit for her ongoing issues related to left lower extremity aches and pains with numbness and tingling.    Patient is a 49-year-old woman with a complex medical history.  She reports having lumbar issues requiring decompression.  Subsequently developed instability and required a fusion from L4-S1.  She also has a spinal cord stimulator which was recently repositioned to the gluteal region.  When I last saw her on 10/18/2024, she was still sore from the surgery.  She has been evaluated at multiple locations including [facility] and [facility].     She recalls problems starting around 2011.  She had been running 6 days a week and eventually was unable to even walk.  She was felt to have widespread pain due to inflammation.  She was also diagnosed with Lyme's.  It was affecting her hips and back.  She also gives history of horseback riding.  She developed increased low back pain which turned out to be sciatica going on the left side worse on the right side.  It would extend all the way to the toes.  She also gives history of neck pain and shoulder pain and thoracic outlet syndrome.     She has been working with physical therapy and is unsure if they are able to offer her much.  She denies any issues with her bowel and bladder function.  She denies fevers or weight loss.     She works as an  and enjoys it.  She is currently only doing part-time.  She does not smoke there is no alcohol use except occasional.  She denies chemical use.  She would like to become more active and tells me that she cannot be sitting idle and watching TV.     Past Medical History        Past Medical History:   Diagnosis Date    Abnormal mammogram 9/21/2016     right breast     AD (atopic dermatitis) 10/29/2015    Allergic rhinitis due to other allergen      Arthritis      Bell's palsy      Chronic fatigue 6/6/2012    Chronic infection       MRSA - 2015 scalp and prior to Abdomen    Contact  dermatitis and other eczema, due to unspecified cause       eczema    Contusion of left foot, initial encounter 3/16/2016    DJD (degenerative joint disease), lumbar 9/26/2013    DJD (degenerative joint disease), lumbar 9/26/2013    Ds DNA antibody positive 4/16/2014     borderline.     Elevated blood pressure reading without diagnosis of hypertension 12/24/2013    Facial contusion 12/15/2014     hit by horse     Foraminal stenosis of lumbar region 9/26/2013    Frontal headache 12/15/2014    Gastroesophageal reflux disease, esophagitis presence not specified 6/29/2016    Heat sensitivity 10/29/2015    HTN, goal below 140/90 9/23/2015    Hyperlipidemia LDL goal <130 8/30/2017    Irregular heart beat      Keratitis sicca 10/31/2012    Left shoulder pain 9/26/2013    Lumbar radiculopathy 1/22/2014    Migraine headache 10/23/2013    Migraine, unspecified, with intractable migraine, so stated, without mention of status migrainosus      Motion sickness      MRSA infection 10/20/2015    Muscular wasting and disuse atrophy, not elsewhere classified 6/9/2014     right SCM, right wrist,      Numbness and tingling       both legs anf feet greater on the left    PAC (premature atrial contraction) 7/15/2010    Personal history of COVID-19 12/2/2021    Plantar fasciitis 9/23/2015    PONV (postoperative nausea and vomiting)      Skin lesion 10/20/2015     posterior superior scalp     Spasm of muscle 7/11/2017    Telangiectasia of face 12/19/2014    Thyroid nodule 5/12/2022    Tooth pain 10/20/2015     left lower primary molar          Past Surgical History         Past Surgical History:   Procedure Laterality Date    AS INJ TRANSFORAMIN EPIDURAL, LUMB/SACR SINGLE        COLONOSCOPY   3/11/2013     Procedure: COLONOSCOPY;  colonoscopy;  Surgeon: Lobito Mas MD;  Location:  GI    COLONOSCOPY N/A 9/13/2021     Procedure: COLONOSCOPY, WITH POLYPECTOMY AND BIOPSY;  Surgeon: Sam Liz MD;  Location:  GI    COLONOSCOPY  WITH CO2 INSUFFLATION N/A 11/19/2018     Procedure: COLONOSCOPY WITH CO2 INSUFFLATION;  Surgeon: Goyo Blank MD;  Location: [facility]    DECOMPRESSION LUMBAR TWO LEVELS Bilateral 12/8/2016     Procedure: DECOMPRESSION LUMBAR TWO LEVELS;  Surgeon: Bang Burrell MD;  Location: [facility] OR    DILATION AND CURETTAGE, HYSTEROSCOPY, ABLATE ENDOMETRIUM NOVASURE, COMBINED N/A 6/12/2019     Procedure: hysteroscopy, dilation & curettage, polypectomy, endometrial ablation;  Surgeon: Fredy Pham MD;  Location: [facility] OR    ESOPHAGOSCOPY, GASTROSCOPY, DUODENOSCOPY (EGD), COMBINED   11/8/2013     Procedure: COMBINED ESOPHAGOSCOPY, GASTROSCOPY, DUODENOSCOPY (EGD), BIOPSY SINGLE OR MULTIPLE;  Esophagoscopy, Gastroscopy, Duodenoscopy EGD with multiple biopsies;  Surgeon: Teja Koch MD;  Location: [facility]    ESOPHAGOSCOPY, GASTROSCOPY, DUODENOSCOPY (EGD), COMBINED N/A 2/18/2021     Procedure: ESOPHAGOGASTRODUODENOSCOPY, WITH BIOPSY;  Surgeon: Blanca Ba MD;  Location: [facility]    ESOPHAGOSCOPY, GASTROSCOPY, DUODENOSCOPY (EGD), COMBINED N/A 4/11/2022     Procedure: ESOPHAGOGASTRODUODENOSCOPY, WITH BIOPSY;  Surgeon: Fredy Jimenez MD;  Location: [facility] OR    HC REMOVAL OF TONSILS,<11 Y/O         Tonsils <10y.o.    HC TOOTH EXTRACTION W/FORCEP        INJECT BLOCK MEDIAL BRANCH CERVICAL/THORACIC/LUMBAR N/A 7/23/2020     Procedure: Sacral 1,2,3 Lateral Branch Blocks and lumbar 5 medial branch blocks bilaterally;  Surgeon: Maurisio Goetz MD;  [facility]    INJECT JOINT SACROILIAC Left 9/24/2020     Procedure: Left Lumbar 5 Sacral 1 nerve root injections.;  Surgeon: Maurisio Goetz MD;  Location: [facility]    INJECT TRIGGER POINT Right 3/29/2024     Procedure: Ultrasound-guided RIGHT anterior scalene muscle diagnostic block;  Surgeon: Brandi Dennis MD;  Location: [facility]    REPAIR MUSCLE UPPER EXTREMITY Right 7/27/2021     Procedure: DIVISION RIGHT PECTORALIS MUSCLE;  Surgeon: Davie Dutta;   Location: [facility] OR    REPAIR TENDON ELBOW   2014     Procedure: REPAIR TENDON ELBOW;  Surgeon: Chris Johnston MD;  Location: [facility]    ULTRASONIC REMOVAL SOFT TISSUE (LOCATION) Right 2018     Procedure: Tenex right foot;  Surgeon: Patel Martínez DO;  Location: [facility]    ULTRASONIC REMOVAL SOFT TISSUE (LOCATION) Left 2023     Procedure: EXCISION, SOFT TISSUE, ELBOW, USING ULTRASONIC ASPIRATOR SYSTEM;  Surgeon: Patel Martínez DO;  Location: [facility]    VASCULAR SURGERY         Thoracic Outlet Syndrome         Family History            Problem (# of Occurrences) Relation (Name,Age of Onset)     Unknown/Adopted (1) Father     Kidney Disease (1) Maternal Aunt     Allergies (6) Mother, Father, Sister, Son, Brother, Daughter     Arthritis (2) Mother: RA, Sister     Blood Disease (1) Paternal Aunt: LGL T cell Leukemia     Cancer (3) Mother: colon, Father, Sister: vaginal     Cardiovascular (1) Father     Depression (2) Mother, Sister     Diabetes (1) Mother: adult     Gastrointestinal Disease (1) Mother: ibs     Genitourinary Problems (1) Mother: kidney cyst     Gynecology (2) Mother: endometriosis, Sister: endometriosis     Heart Disease (3) Maternal Grandfather: MI,  - 60's, Father: mi-age mid 40's, Brother     Hypertension (1) Mother     Lipids (3) Mother, Father, Paternal Grandmother     Osteoporosis (1) Paternal Grandmother     Respiratory (2) Father: asthma, Son: asthma     Thyroid Disease (2) Mother, Paternal Grandmother     Cancer - colorectal (1) Mother     Lupus (1) Maternal Aunt     Other Cancer (1) Father: renal cancer     Rheumatoid Arthritis (1) Paternal Aunt     Bladder Cancer (1) Maternal Aunt                Social History   Social History            Socioeconomic History    Marital status:        Spouse name: Robert    Number of children: 2    Years of education: 12    Highest education level: Not on file   Occupational History    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   Tobacco Use    Smoking status: Never    Smokeless tobacco: Never   Vaping Use    Vaping status: Former    Substances: THC    Devices: Pre-filled or refillable cartridge   Substance and Sexual Activity    Alcohol use: Yes       Comment: social    Drug use: No       Types: Marijuana       Comment: Medical marijuana    Sexual activity: Yes       Partners: Male       Birth control/protection: Surgical       Comment: vasectomy   Other Topics Concern     Service No    Blood Transfusions No    Caffeine Concern No       Comment: 0    Occupational Exposure No    Hobby Hazards Yes       Comment: horses    Sleep Concern No    Stress Concern No    Weight Concern Yes    Special Diet Yes       Comment: nhung's    Back Care No    Exercise Yes       Comment: occ    Bike Helmet Not Asked       Comment: na    Seat Belt Yes    Self-Exams Yes       Comment: occ    Parent/sibling w/ CABG, MI or angioplasty before 65F 55M? Not Asked   Social History Narrative    Not on file      Social Determinants of Health           Financial Resource Strain: Low Risk  (6/17/2024)     Financial Resource Strain      Within the past 12 months, have you or your family members you live with been unable to get utilities (heat, electricity) when it was really needed?: No   Food Insecurity: Low Risk  (6/17/2024)     Food Insecurity      Within the past 12 months, did you worry that your food would run out before you got money to buy more?: No      Within the past 12 months, did the food you bought just not last and you didn t have money to get more?: No   Transportation Needs: Low Risk  (6/17/2024)     Transportation Needs      Within the past 12 months, has lack of transportation kept you from medical appointments, getting your medicines, non-medical meetings or appointments, work, or from getting things that you need?: No   Physical Activity: Sufficiently Active (4/5/2024)     Received from [facility]     Exercise Vital Sign       Days of Exercise per Week: 7 days      Minutes of Exercise per Session: 30 min   Stress: No Stress Concern Present (11/21/2019)     Received from [facility] - Occupational Stress Questionnaire      Feeling of Stress : Not at all   Social Connections: Unknown (8/13/2024)     Received from [facility]     Social Connections      Frequency of Communication with Friends and Family: Not on file   Interpersonal Safety: Low Risk  (9/15/2024)     Interpersonal Safety      Do you feel physically and emotionally safe where you currently live?: Yes      Within the past 12 months, have you been hit, slapped, kicked or otherwise physically hurt by someone?: No      Within the past 12 months, have you been humiliated or emotionally abused in other ways by your partner or ex-partner?: No   Housing Stability: Low Risk  (6/17/2024)     Housing Stability      Do you have housing? : Yes      Are you worried about losing your housing?: No            Current Outpatient Prescriptions     Current Outpatient Medications   Medication Sig Dispense Refill    ACETAMINOPHEN PO Take 650 mg by mouth every 6 hours as needed for mild pain.      hydrOXYzine HCl (ATARAX) 10 MG tablet Take 10 mg by mouth 3 times daily as needed for itching.      tirzepatide (MOUNJARO) 15 MG/0.5ML pen Inject 15 mg Subcutaneous every 7 days 6 mL 3    ketoconazole (NIZORAL) 2 % external cream Apply topically daily (Patient not taking: Reported on 11/27/2024) 60 g 1    meloxicam (MOBIC) 15 MG tablet Take 1 tablet (15 mg) by mouth daily. (Patient not taking: Reported on 11/27/2024) 90 tablet 1          On examination, patient is alert and cooperative.  Vitals are stable.  She is afebrile.     HEENT is notable for right facial paresis from Bell's palsy.  She is able to move her upper extremities functionally.  She is able to carry out straight leg raising test.  She has some  difficulty with Gaurav's on the left side.  She has healed scars in the back.  On the left side the SI joint and piriformis were tender.    Musculoskeletal lamination of the left lower extremity revealed a number of triggers in the left peroneal region.  These recreated her symptoms.    Neurologically she is able to stand.  Romberg is negative.  Coordination tests are intact.  Strength is full.  She is able to walk on her heels and toes.  Reflexes are symmetric and plantars are downgoing.     She responds to touch including temperature vibration.     Impression: At this point this 49-year-old woman with a known history of Trina-Danlos, history of Lyme's disease, history of widespread aches and pains.  She also has had issues with her back and has undergone decompression followed by spinal cord stimulator followed by fusion and replacement of her spinal cord stimulator.  She is still using it though she is not sure if it is helping as much.     Currently her examination is notable for issues in her lower back especially the left SI and piriformis.  There is also involvement of the muscles in the left peroneal compartment which may be tender.  I would go with trigger point injections for these and have her return in 2 weeks time.  She has had previous SI joint injections and it may need to wait for 6 months before it can be repeated.    PROCEDURE NOTE: With her informed consent, after explaining the benefits and risk of the procedure, using ChloraPrep for skin prep, 1 cc of Celestone with 4 cc of 0.5% Marcaine, 2 cc utilized to inject triggers in the left leg overlying the peroneal muscles in 2 different areas.  She tolerated the procedure well and experienced improvement in her range and tightness.    She can ice the region, continue to remain active taking breaks if her symptoms flareup.     20 minutes were spent for the E and M portion of the visit, exclusive of the procedure time,, greater than 50% was for  counseling on above-mentioned issues.       Sunny Arechiga MD

## 2024-11-27 NOTE — LETTER
11/27/2024       RE: Amelia Coker  [address] [facility] [address]     Dear Colleague,    Thank you for referring your patient, Amelia Coker, to the [facility] at [facility]. Please see a copy of my visit note below.    CC: Patient returns for follow-up visit for her ongoing issues related to left lower extremity aches and pains with numbness and tingling.    Patient is a 49-year-old woman with a complex medical history.  She reports having lumbar issues requiring decompression.  Subsequently developed instability and required a fusion from L4-S1.  She also has a spinal cord stimulator which was recently repositioned to the gluteal region.  When I last saw her on 10/18/2024, she was still sore from the surgery.  She has been evaluated at multiple locations including [facility] and the [facility].     She recalls problems starting around 2011.  She had been running 6 days a week and eventually was unable to even walk.  She was felt to have widespread pain due to inflammation.  She was also diagnosed with Lyme's.  It was affecting her hips and back.  She also gives history of horseback riding.  She developed increased low back pain which turned out to be sciatica going on the left side worse on the right side.  It would extend all the way to the toes.  She also gives history of neck pain and shoulder pain and thoracic outlet syndrome.     She has been working with physical therapy and is unsure if they are able to offer her much.  She denies any issues with her bowel and bladder function.  She denies fevers or weight loss.     She works as an  and enjoys it.  She is currently only doing part-time.  She does not smoke there is no alcohol use except occasional.  She denies chemical use.  She would like to become more active and tells me that she cannot be sitting idle and watching TV.     Past Medical History        Past Medical  History:   Diagnosis Date     Abnormal mammogram 9/21/2016     right breast      AD (atopic dermatitis) 10/29/2015     Allergic rhinitis due to other allergen       Arthritis       Bell's palsy       Chronic fatigue 6/6/2012     Chronic infection       MRSA - 2015 scalp and prior to Abdomen     Contact dermatitis and other eczema, due to unspecified cause       eczema     Contusion of left foot, initial encounter 3/16/2016     DJD (degenerative joint disease), lumbar 9/26/2013     DJD (degenerative joint disease), lumbar 9/26/2013     Ds DNA antibody positive 4/16/2014     borderline.      Elevated blood pressure reading without diagnosis of hypertension 12/24/2013     Facial contusion 12/15/2014     hit by horse      Foraminal stenosis of lumbar region 9/26/2013     Frontal headache 12/15/2014     Gastroesophageal reflux disease, esophagitis presence not specified 6/29/2016     Heat sensitivity 10/29/2015     HTN, goal below 140/90 9/23/2015     Hyperlipidemia LDL goal <130 8/30/2017     Irregular heart beat       Keratitis sicca 10/31/2012     Left shoulder pain 9/26/2013     Lumbar radiculopathy 1/22/2014     Migraine headache 10/23/2013     Migraine, unspecified, with intractable migraine, so stated, without mention of status migrainosus       Motion sickness       MRSA infection 10/20/2015     Muscular wasting and disuse atrophy, not elsewhere classified 6/9/2014     right SCM, right wrist,       Numbness and tingling       both legs anf feet greater on the left     PAC (premature atrial contraction) 7/15/2010     Personal history of COVID-19 12/2/2021     Plantar fasciitis 9/23/2015     PONV (postoperative nausea and vomiting)       Skin lesion 10/20/2015     posterior superior scalp      Spasm of muscle 7/11/2017     Telangiectasia of face 12/19/2014     Thyroid nodule 5/12/2022     Tooth pain 10/20/2015     left lower primary molar          Past Surgical History         Past Surgical History:   Procedure  Laterality Date     AS INJ TRANSFORAMIN EPIDURAL, LUMB/SACR SINGLE         COLONOSCOPY   3/11/2013     Procedure: COLONOSCOPY;  colonoscopy;  Surgeon: Lobito Mas MD;  Location: [facility]     COLONOSCOPY N/A 9/13/2021     Procedure: COLONOSCOPY, WITH POLYPECTOMY AND BIOPSY;  Surgeon: Sam Liz MD;  Location: [facility]     COLONOSCOPY WITH CO2 INSUFFLATION N/A 11/19/2018     Procedure: COLONOSCOPY WITH CO2 INSUFFLATION;  Surgeon: Goyo Blank MD;  Location: [facility]     DECOMPRESSION LUMBAR TWO LEVELS Bilateral 12/8/2016     Procedure: DECOMPRESSION LUMBAR TWO LEVELS;  Surgeon: Bang Burrell MD;  Location: [facility]     DILATION AND CURETTAGE, HYSTEROSCOPY, ABLATE ENDOMETRIUM NOVASURE, COMBINED N/A 6/12/2019     Procedure: hysteroscopy, dilation & curettage, polypectomy, endometrial ablation;  Surgeon: Fredy Pham MD;  Location: [facility]     ESOPHAGOSCOPY, GASTROSCOPY, DUODENOSCOPY (EGD), COMBINED   11/8/2013     Procedure: COMBINED ESOPHAGOSCOPY, GASTROSCOPY, DUODENOSCOPY (EGD), BIOPSY SINGLE OR MULTIPLE;  Esophagoscopy, Gastroscopy, Duodenoscopy EGD with multiple biopsies;  Surgeon: Teja Koch MD;  Location: [facility]     ESOPHAGOSCOPY, GASTROSCOPY, DUODENOSCOPY (EGD), COMBINED N/A 2/18/2021     Procedure: ESOPHAGOGASTRODUODENOSCOPY, WITH BIOPSY;  Surgeon: Blanca Ba MD;  [facility]     ESOPHAGOSCOPY, GASTROSCOPY, DUODENOSCOPY (EGD), COMBINED N/A 4/11/2022     Procedure: ESOPHAGOGASTRODUODENOSCOPY, WITH BIOPSY;  Surgeon: Fredy Jimenez MD;  Location: [facility] OR      REMOVAL OF TONSILS,<13 Y/O         Tonsils <10y.o.     HC TOOTH EXTRACTION W/FORCEP         INJECT BLOCK MEDIAL BRANCH CERVICAL/THORACIC/LUMBAR N/A 7/23/2020     Procedure: Sacral 1,2,3 Lateral Branch Blocks and lumbar 5 medial branch blocks bilaterally;  Surgeon: Maurisio Goetz MD;  [facility]: [facility]     INJECT JOINT SACROILIAC Left 9/24/2020     Procedure: Left Lumbar 5 Sacral 1 nerve root injections.;   Surgeon: Maurisio Goetz MD;  Location: [facility]     INJECT TRIGGER POINT Right 3/29/2024     Procedure: Ultrasound-guided RIGHT anterior scalene muscle diagnostic block;  Surgeon: Brandi Dennis MD;  Location: [facility]     REPAIR MUSCLE UPPER EXTREMITY Right 2021     Procedure: DIVISION RIGHT PECTORALIS MUSCLE;  Surgeon: Davie Dutta;  Location: [facility]     REPAIR TENDON ELBOW   2014     Procedure: REPAIR TENDON ELBOW;  Surgeon: Chris Johnston MD;  Location: [facility]     ULTRASONIC REMOVAL SOFT TISSUE (LOCATION) Right 2018     Procedure: Tenex right foot;  Surgeon: Patel Martínez DO;  Location: [facility]     ULTRASONIC REMOVAL SOFT TISSUE (LOCATION) Left 2023     Procedure: EXCISION, SOFT TISSUE, ELBOW, USING ULTRASONIC ASPIRATOR SYSTEM;  Surgeon: Patel Martínez DO;  Location: [facility]     VASCULAR SURGERY         Thoracic Outlet Syndrome         Family History            Problem (# of Occurrences) Relation (Name,Age of Onset)     Unknown/Adopted (1) Father     Kidney Disease (1) Maternal Aunt     Allergies (6) Mother, Father, Sister, Son, Brother, Daughter     Arthritis (2) Mother: RA, Sister     Blood Disease (1) Paternal Aunt: LGL T cell Leukemia     Cancer (3) Mother: colon, Father, Sister: vaginal     Cardiovascular (1) Father     Depression (2) Mother, Sister     Diabetes (1) Mother: adult     Gastrointestinal Disease (1) Mother: ibs     Genitourinary Problems (1) Mother: kidney cyst     Gynecology (2) Mother: endometriosis, Sister: endometriosis     Heart Disease (3) Maternal Grandfather: MI,  - 60's, Father: mi-age mid 40's, Brother     Hypertension (1) Mother     Lipids (3) Mother, Father, Paternal Grandmother     Osteoporosis (1) Paternal Grandmother     Respiratory (2) Father: asthma, Son: asthma     Thyroid Disease (2) Mother, Paternal Grandmother     Cancer - colorectal (1) Mother     Lupus (1) Maternal Aunt     Other Cancer (1) Father: renal cancer     Rheumatoid  Arthritis (1) Paternal Aunt     Bladder Cancer (1) Maternal Aunt                Social History   Social History            Socioeconomic History     Marital status:        Spouse name: Robert     Number of children: 2     Years of education: 12     Highest education level: Not on file   Occupational History     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   Tobacco Use     Smoking status: Never     Smokeless tobacco: Never   Vaping Use     Vaping status: Former     Substances: THC     Devices: Pre-filled or refillable cartridge   Substance and Sexual Activity     Alcohol use: Yes       Comment: social     Drug use: No       Types: Marijuana       Comment: Medical marijuana     Sexual activity: Yes       Partners: Male       Birth control/protection: Surgical       Comment: vasectomy   Other Topics Concern      Service No     Blood Transfusions No     Caffeine Concern No       Comment: 0     Occupational Exposure No     Hobby Hazards Yes       Comment: horses     Sleep Concern No     Stress Concern No     Weight Concern Yes     Special Diet Yes       Comment: nhung's     Back Care No     Exercise Yes       Comment: occ     Bike Helmet Not Asked       Comment: na     Seat Belt Yes     Self-Exams Yes       Comment: occ     Parent/sibling w/ CABG, MI or angioplasty before 65F 55M? Not Asked   Social History Narrative     Not on file      Social Determinants of Health           Financial Resource Strain: Low Risk  (6/17/2024)     Financial Resource Strain       Within the past 12 months, have you or your family members you live with been unable to get utilities (heat, electricity) when it was really needed?: No   Food Insecurity: Low Risk  (6/17/2024)     Food Insecurity       Within the past 12 months, did you worry that your food would run out before you got money to buy more?: No       Within the past 12 months, did the food you bought just not last and you didn t have money to get more?: No    Transportation Needs: Low Risk  (6/17/2024)     Transportation Needs       Within the past 12 months, has lack of transportation kept you from medical appointments, getting your medicines, non-medical meetings or appointments, work, or from getting things that you need?: No   Physical Activity: Sufficiently Active (4/5/2024)     Received from [facility]     Exercise Vital Sign       Days of Exercise per Week: 7 days       Minutes of Exercise per Session: 30 min   Stress: No Stress Concern Present (11/21/2019)     [facility], [facility]     [place] [facility] [employer] - Occupational Stress Questionnaire       Feeling of Stress : Not at all   Social Connections: Unknown (8/13/2024)     Received from [facility]     Social Connections       Frequency of Communication with Friends and Family: Not on file   Interpersonal Safety: Low Risk  (9/15/2024)     Interpersonal Safety       Do you feel physically and emotionally safe where you currently live?: Yes       Within the past 12 months, have you been hit, slapped, kicked or otherwise physically hurt by someone?: No       Within the past 12 months, have you been humiliated or emotionally abused in other ways by your partner or ex-partner?: No   Housing Stability: Low Risk  (6/17/2024)     Housing Stability       Do you have housing? : Yes       Are you worried about losing your housing?: No            Current Outpatient Prescriptions     Current Outpatient Medications   Medication Sig Dispense Refill     ACETAMINOPHEN PO Take 650 mg by mouth every 6 hours as needed for mild pain.       hydrOXYzine HCl (ATARAX) 10 MG tablet Take 10 mg by mouth 3 times daily as needed for itching.       tirzepatide (MOUNJARO) 15 MG/0.5ML pen Inject 15 mg Subcutaneous every 7 days 6 mL 3     ketoconazole (NIZORAL) 2 % external cream Apply topically daily (Patient not taking: Reported on 11/27/2024) 60 g 1     meloxicam (MOBIC)  15 MG tablet Take 1 tablet (15 mg) by mouth daily. (Patient not taking: Reported on 11/27/2024) 90 tablet 1          On examination, patient is alert and cooperative.  Vitals are stable.  She is afebrile.     HEENT is notable for right facial paresis from Bell's palsy.  She is able to move her upper extremities functionally.  She is able to carry out straight leg raising test.  She has some difficulty with Gaurav's on the left side.  She has healed scars in the back.  On the left side the SI joint and piriformis were tender.    Musculoskeletal lamination of the left lower extremity revealed a number of triggers in the left peroneal region.  These recreated her symptoms.    Neurologically she is able to stand.  Romberg is negative.  Coordination tests are intact.  Strength is full.  She is able to walk on her heels and toes.  Reflexes are symmetric and plantars are downgoing.     She responds to touch including temperature vibration.     Impression: At this point this 49-year-old woman with a known history of Trina-Danlos, history of Lyme's disease, history of widespread aches and pains.  She also has had issues with her back and has undergone decompression followed by spinal cord stimulator followed by fusion and replacement of her spinal cord stimulator.  She is still using it though she is not sure if it is helping as much.     Currently her examination is notable for issues in her lower back especially the left SI and piriformis.  There is also involvement of the muscles in the left peroneal compartment which may be tender.  I would go with trigger point injections for these and have her return in 2 weeks time.  She has had previous SI joint injections and it may need to wait for 6 months before it can be repeated.    PROCEDURE NOTE: With her informed consent, after explaining the benefits and risk of the procedure, using ChloraPrep for skin prep, 1 cc of Celestone with 4 cc of 0.5% Marcaine, 2 cc utilized to  inject triggers in the left leg overlying the peroneal muscles in 2 different areas.  She tolerated the procedure well and experienced improvement in her range and tightness.    She can ice the region, continue to remain active taking breaks if her symptoms flareup.     20 minutes were spent for the E and M portion of the visit, exclusive of the procedure time,, greater than 50% was for counseling on above-mentioned issues.       Sunny Arechiga MD       Again, thank you for allowing me to participate in the care of your patient.      Sincerely,    Sunny Arechiga MD

## 2024-11-27 NOTE — NURSING NOTE
Amelia Coker is a 49 year old female who presents for:  Chief Complaint   Patient presents with    Follow Up     3 week leg pain follow up, no improvement       Initial Vitals: /73 (BP Location: Left arm, Patient Position: Supine, Cuff Size: Adult Regular)   Pulse 66   SpO2 97%   BP cuff size: regular      Lois Krishnamurthy MA

## 2024-12-11 ENCOUNTER — OFFICE VISIT (OUTPATIENT)
Dept: PHYSICAL MEDICINE AND REHAB | Facility: CLINIC | Age: 49
End: 2024-12-11
Payer: COMMERCIAL

## 2024-12-11 VITALS — OXYGEN SATURATION: 99 % | HEART RATE: 81 BPM | DIASTOLIC BLOOD PRESSURE: 84 MMHG | SYSTOLIC BLOOD PRESSURE: 123 MMHG

## 2024-12-11 DIAGNOSIS — G24.8 FOCAL DYSTONIA: Primary | ICD-10-CM

## 2024-12-11 DIAGNOSIS — G89.29 OTHER CHRONIC PAIN: ICD-10-CM

## 2024-12-11 DIAGNOSIS — M62.838 SPASM OF MUSCLE: ICD-10-CM

## 2024-12-11 DIAGNOSIS — Z98.1 S/P LUMBAR FUSION: ICD-10-CM

## 2024-12-11 PROCEDURE — 99214 OFFICE O/P EST MOD 30 MIN: CPT | Performed by: PHYSICAL MEDICINE & REHABILITATION

## 2024-12-11 NOTE — NURSING NOTE
Amelia Coker is a 49 year old female who presents for:  Chief Complaint   Patient presents with    Follow Up     TPI helped for a couple hours and pain returned       Initial Vitals: /84 (BP Location: Left arm, Patient Position: Supine, Cuff Size: Adult Regular)   Pulse 81   SpO2 99%   BP cuff size: regular      Lois Krishnamurthy MA

## 2024-12-11 NOTE — PROGRESS NOTES
CC: Patient returns for follow-up visit for her ongoing issues related to left lower extremity aches and pains with numbness and tingling.    She was last seen by me on 11/27/2024.  Her interval history is notable for improvement for several hours after the injection.  She noticed that she was able to extend her left lower extremity without spasms.  She also noticed the ball and the toes of her left foot felt better.  She still has some areas of numbness and tingling in the dorsum of the foot.  She can still keeps the spinal cord stimulator on.  She has not found that it made a difference.  She has L4-S1 fusion with a screw across the SI joints.     Patient is a 49-year-old woman with a complex medical history.  She reports having lumbar issues requiring decompression.  Subsequently developed instability and required a fusion from L4-S1.  She also has a spinal cord stimulator which was recently repositioned to the gluteal region.  When I last saw her on 10/18/2024, she was still sore from the surgery.  She has been evaluated at multiple locations including [facility] and the [place].     She recalls problems starting around 2011.  She had been running 6 days a week and eventually was unable to even walk.  She was felt to have widespread pain due to inflammation.  She was also diagnosed with Lyme's.  It was affecting her hips and back.  She also gives history of horseback riding.  She developed increased low back pain which turned out to be sciatica going on the left side worse on the right side.  It would extend all the way to the toes.  She also gives history of neck pain and shoulder pain and thoracic outlet syndrome.     She has been working with physical therapy and is unsure if they are able to offer her much.  She denies any issues with her bowel and bladder function.  She denies fevers or weight loss.     She works as an  and enjoys it.  She is currently only doing part-time.  She does not smoke  there is no alcohol use except occasional.  She denies chemical use.  She would like to become more active and tells me that she cannot be sitting idle and watching TV.     Past Medical History           Past Medical History:   Diagnosis Date    Abnormal mammogram 9/21/2016     right breast     AD (atopic dermatitis) 10/29/2015    Allergic rhinitis due to other allergen      Arthritis      Bell's palsy      Chronic fatigue 6/6/2012    Chronic infection       MRSA - 2015 scalp and prior to Abdomen    Contact dermatitis and other eczema, due to unspecified cause       eczema    Contusion of left foot, initial encounter 3/16/2016    DJD (degenerative joint disease), lumbar 9/26/2013    DJD (degenerative joint disease), lumbar 9/26/2013    Ds DNA antibody positive 4/16/2014     borderline.     Elevated blood pressure reading without diagnosis of hypertension 12/24/2013    Facial contusion 12/15/2014     hit by horse     Foraminal stenosis of lumbar region 9/26/2013    Frontal headache 12/15/2014    Gastroesophageal reflux disease, esophagitis presence not specified 6/29/2016    Heat sensitivity 10/29/2015    HTN, goal below 140/90 9/23/2015    Hyperlipidemia LDL goal <130 8/30/2017    Irregular heart beat      Keratitis sicca 10/31/2012    Left shoulder pain 9/26/2013    Lumbar radiculopathy 1/22/2014    Migraine headache 10/23/2013    Migraine, unspecified, with intractable migraine, so stated, without mention of status migrainosus      Motion sickness      MRSA infection 10/20/2015    Muscular wasting and disuse atrophy, not elsewhere classified 6/9/2014     right SCM, right wrist,      Numbness and tingling       both legs anf feet greater on the left    PAC (premature atrial contraction) 7/15/2010    Personal history of COVID-19 12/2/2021    Plantar fasciitis 9/23/2015    PONV (postoperative nausea and vomiting)      Skin lesion 10/20/2015     posterior superior scalp     Spasm of muscle 7/11/2017    Telangiectasia  of face 12/19/2014    Thyroid nodule 5/12/2022    Tooth pain 10/20/2015     left lower primary molar          Past Surgical History             Past Surgical History:   Procedure Laterality Date    AS INJ TRANSFORAMIN EPIDURAL, LUMB/SACR SINGLE        COLONOSCOPY   3/11/2013     Procedure: COLONOSCOPY;  colonoscopy;  Surgeon: Lobito Mas MD;  Location: [facility]    COLONOSCOPY N/A 9/13/2021     Procedure: COLONOSCOPY, WITH POLYPECTOMY AND BIOPSY;  Surgeon: Sam Liz MD;  Location: [facility]    COLONOSCOPY WITH CO2 INSUFFLATION N/A 11/19/2018     Procedure: COLONOSCOPY WITH CO2 INSUFFLATION;  Surgeon: Goyo Blank MD;  Location: [facility]    DECOMPRESSION LUMBAR TWO LEVELS Bilateral 12/8/2016     Procedure: DECOMPRESSION LUMBAR TWO LEVELS;  Surgeon: Bang Burrell MD;  Location: [facility]    DILATION AND CURETTAGE, HYSTEROSCOPY, ABLATE ENDOMETRIUM NOVASURE, COMBINED N/A 6/12/2019     Procedure: hysteroscopy, dilation & curettage, polypectomy, endometrial ablation;  Surgeon: Fredy Pham MD;  Location: [facility]    ESOPHAGOSCOPY, GASTROSCOPY, DUODENOSCOPY (EGD), COMBINED   11/8/2013     Procedure: COMBINED ESOPHAGOSCOPY, GASTROSCOPY, DUODENOSCOPY (EGD), BIOPSY SINGLE OR MULTIPLE;  Esophagoscopy, Gastroscopy, Duodenoscopy EGD with multiple biopsies;  Surgeon: Teja Koch MD;  Location: [facility]    ESOPHAGOSCOPY, GASTROSCOPY, DUODENOSCOPY (EGD), COMBINED N/A 2/18/2021     Procedure: ESOPHAGOGASTRODUODENOSCOPY, WITH BIOPSY;  Surgeon: Blanca Ba MD;  [facility]    ESOPHAGOSCOPY, GASTROSCOPY, DUODENOSCOPY (EGD), COMBINED N/A 4/11/2022     Procedure: ESOPHAGOGASTRODUODENOSCOPY, WITH BIOPSY;  Surgeon: Fredy Jimenez MD;  Location: [facility]     REMOVAL OF TONSILS,<13 Y/O         Tonsils <10y.o.    HC TOOTH EXTRACTION W/FORCEP        INJECT BLOCK MEDIAL BRANCH CERVICAL/THORACIC/LUMBAR N/A 7/23/2020     Procedure: Sacral 1,2,3 Lateral Branch Blocks and lumbar 5 medial branch  blocks bilaterally;  Surgeon: Maurisio Goetz MD;  [facility]    INJECT JOINT SACROILIAC Left 2020     Procedure: Left Lumbar 5 Sacral 1 nerve root injections.;  Surgeon: Maurisio Goetz MD;  [facility]    INJECT TRIGGER POINT Right 3/29/2024     Procedure: Ultrasound-guided RIGHT anterior scalene muscle diagnostic block;  Surgeon: Brandi Dennis MD;  Location: [facility]    REPAIR MUSCLE UPPER EXTREMITY Right 2021     Procedure: DIVISION RIGHT PECTORALIS MUSCLE;  Surgeon: Davie Dutta;  Location: [facility]    REPAIR TENDON ELBOW   2014     Procedure: REPAIR TENDON ELBOW;  Surgeon: Chris Johnston MD;  [facility]    ULTRASONIC REMOVAL SOFT TISSUE (LOCATION) Right 2018     Procedure: Tenex right foot;  Surgeon: Patel Martínez DO;  Location: [facility]    ULTRASONIC REMOVAL SOFT TISSUE (LOCATION) Left 2023     Procedure: EXCISION, SOFT TISSUE, ELBOW, USING ULTRASONIC ASPIRATOR SYSTEM;  Surgeon: Patel Martínez DO;  Location: [facility]    VASCULAR SURGERY         Thoracic Outlet Syndrome         Family History            Problem (# of Occurrences) Relation (Name,Age of Onset)     Unknown/Adopted (1) Father     Kidney Disease (1) Maternal Aunt     Allergies (6) Mother, Father, Sister, Son, Brother, Daughter     Arthritis (2) Mother: RA, Sister     Blood Disease (1) Paternal Aunt: LGL T cell Leukemia     Cancer (3) Mother: colon, Father, Sister: vaginal     Cardiovascular (1) Father     Depression (2) Mother, Sister     Diabetes (1) Mother: adult     Gastrointestinal Disease (1) Mother: ibs     Genitourinary Problems (1) Mother: kidney cyst     Gynecology (2) Mother: endometriosis, Sister: endometriosis     Heart Disease (3) Maternal Grandfather: MI,  - 60's, Father: mi-age mid 40's, Brother     Hypertension (1) Mother     Lipids (3) Mother, Father, Paternal Grandmother     Osteoporosis (1) Paternal Grandmother     Respiratory (2) Father: asthma, Son: asthma      Thyroid Disease (2) Mother, Paternal Grandmother     Cancer - colorectal (1) Mother     Lupus (1) Maternal Aunt     Other Cancer (1) Father: renal cancer     Rheumatoid Arthritis (1) Paternal Aunt     Bladder Cancer (1) Maternal Aunt                Social History   Social History                Socioeconomic History    Marital status:        Spouse name: Robert    Number of children: 2    Years of education: 12    Highest education level: Not on file   Occupational History    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   Tobacco Use    Smoking status: Never    Smokeless tobacco: Never   Vaping Use    Vaping status: Former    Substances: THC    Devices: Pre-filled or refillable cartridge   Substance and Sexual Activity    Alcohol use: Yes       Comment: social    Drug use: No       Types: Marijuana       Comment: Medical marijuana    Sexual activity: Yes       Partners: Male       Birth control/protection: Surgical       Comment: vasectomy   Other Topics Concern     Service No    Blood Transfusions No    Caffeine Concern No       Comment: 0    Occupational Exposure No    Hobby Hazards Yes       Comment: horses    Sleep Concern No    Stress Concern No    Weight Concern Yes    Special Diet Yes       Comment: nhung's    Back Care No    Exercise Yes       Comment: occ    Bike Helmet Not Asked       Comment: na    Seat Belt Yes    Self-Exams Yes       Comment: occ    Parent/sibling w/ CABG, MI or angioplasty before 65F 55M? Not Asked   Social History Narrative    Not on file      Social Determinants of Health              Financial Resource Strain: Low Risk  (6/17/2024)     Financial Resource Strain      Within the past 12 months, have you or your family members you live with been unable to get utilities (heat, electricity) when it was really needed?: No   Food Insecurity: Low Risk  (6/17/2024)     Food Insecurity      Within the past 12 months, did you worry that your food would run out before you got  money to buy more?: No      Within the past 12 months, did the food you bought just not last and you didn t have money to get more?: No   Transportation Needs: Low Risk  (6/17/2024)     Transportation Needs      Within the past 12 months, has lack of transportation kept you from medical appointments, getting your medicines, non-medical meetings or appointments, work, or from getting things that you need?: No   Physical Activity: Sufficiently Active (4/5/2024)     Received from [facility]     Exercise Vital Sign      Days of Exercise per Week: 7 days      Minutes of Exercise per Session: 30 min   Stress: No Stress Concern Present (11/21/2019)     Received from [facility], [facility]     [employer] - Occupational Stress Questionnaire      Feeling of Stress : Not at all   Social Connections: Unknown (8/13/2024)     Received from [facility]     Social Connections      Frequency of Communication with Friends and Family: Not on file   Interpersonal Safety: Low Risk  (9/15/2024)     Interpersonal Safety      Do you feel physically and emotionally safe where you currently live?: Yes      Within the past 12 months, have you been hit, slapped, kicked or otherwise physically hurt by someone?: No      Within the past 12 months, have you been humiliated or emotionally abused in other ways by your partner or ex-partner?: No   Housing Stability: Low Risk  (6/17/2024)     Housing Stability      Do you have housing? : Yes      Are you worried about losing your housing?: No            Current Outpatient Medications   Medication Sig Dispense Refill    ACETAMINOPHEN PO Take 650 mg by mouth every 6 hours as needed for mild pain.      hydrOXYzine HCl (ATARAX) 10 MG tablet Take 10 mg by mouth 3 times daily as needed for itching.      ketoconazole (NIZORAL) 2 % external cream Apply topically daily (Patient not taking: Reported on 11/27/2024) 60 g 1    meloxicam (MOBIC) 15  MG tablet Take 1 tablet (15 mg) by mouth daily. (Patient not taking: Reported on 11/27/2024) 90 tablet 1    tirzepatide (MOUNJARO) 15 MG/0.5ML pen Inject 15 mg Subcutaneous every 7 days 6 mL 3          /84 (BP Location: Left arm, Patient Position: Supine, Cuff Size: Adult Regular)   Pulse 81   SpO2 99%       On examination, patient is alert and cooperative.  Vitals are stable.  She is afebrile.     HEENT is notable for right facial paresis from Bell's palsy.  She is able to move her upper extremities functionally.  She is able to carry out straight leg raising test.  She has some difficulty with Gaurav's on the left side.  She has healed scars in the back.  On the left side the SI joint and piriformis were tender.     Musculoskeletal examination of the left lower extremity revealed a number of triggers in the left peroneal region.  There is abnormal tone in the affected muscles including the piriformis and the peroneal's.  These recreated her symptoms.     Neurologically she is able to stand.  Romberg is negative.  Coordination tests are intact.  Strength is full.  She is able to walk on her heels and toes.  Reflexes are symmetric and plantars are downgoing.     She responds to touch including temperature vibration.     Impression: At this point this 49-year-old woman with a known history of Trina-Danlos, history of Lyme's disease, history of widespread aches and pains.  She also has had issues with her back and has undergone decompression followed by spinal cord stimulator followed by fusion and replacement of her spinal cord stimulator.  She is still using it though she is not sure if it is helping as much.     Currently her examination is notable for issues in her lower back especially the left SI and piriformis.  There is also involvement of the muscles in the left peroneal compartment which may be tender.  Her trigger point injections were beneficial while the medication was working.  Her current  problems are related to the abnormal tone and muscle spasms in the affected muscles.  These are focal in nature and are consistent with dystonia/focal dystonia.  I would therefore recommend that we treat them with neurotoxin and I would use for 100 units of Botox.  Depending on her improvement, she would likely repeat  every 3 months.    30 minutes were spent for this visit with, greater than 50% was for counseling on above-mentioned issues.       Sunny Arechiga MD

## 2024-12-11 NOTE — LETTER
12/11/2024       RE: Amelia Coker  [address] [facility] [address]     Dear Colleague,    Thank you for referring your patient, Amelia Coker, to the [employer] [facility] at [facility]. Please see a copy of my visit note below.    CC: Patient returns for follow-up visit for her ongoing issues related to left lower extremity aches and pains with numbness and tingling.    She was last seen by me on 11/27/2024.  Her interval history is notable for improvement for several hours after the injection.  She noticed that she was able to extend her left lower extremity without spasms.  She also noticed the ball and the toes of her left foot felt better.  She still has some areas of numbness and tingling in the dorsum of the foot.  She can still keeps the spinal cord stimulator on.  She has not found that it made a difference.  She has L4-S1 fusion with a screw across the SI joints.     Patient is a 49-year-old woman with a complex medical history.  She reports having lumbar issues requiring decompression.  Subsequently developed instability and required a fusion from L4-S1.  She also has a spinal cord stimulator which was recently repositioned to the gluteal region.  When I last saw her on 10/18/2024, she was still sore from the surgery.  She has been evaluated at multiple locations including [facility] and [facility].     She recalls problems starting around 2011.  She had been running 6 days a week and eventually was unable to even walk.  She was felt to have widespread pain due to inflammation.  She was also diagnosed with Lyme's.  It was affecting her hips and back.  She also gives history of horseback riding.  She developed increased low back pain which turned out to be sciatica going on the left side worse on the right side.  It would extend all the way to the toes.  She also gives history of neck pain and shoulder pain and thoracic outlet syndrome.      She has been working with physical therapy and is unsure if they are able to offer her much.  She denies any issues with her bowel and bladder function.  She denies fevers or weight loss.     She works as an  and enjoys it.  She is currently only doing part-time.  She does not smoke there is no alcohol use except occasional.  She denies chemical use.  She would like to become more active and tells me that she cannot be sitting idle and watching TV.     Past Medical History           Past Medical History:   Diagnosis Date     Abnormal mammogram 9/21/2016     right breast      AD (atopic dermatitis) 10/29/2015     Allergic rhinitis due to other allergen       Arthritis       Bell's palsy       Chronic fatigue 6/6/2012     Chronic infection       MRSA - 2015 scalp and prior to Abdomen     Contact dermatitis and other eczema, due to unspecified cause       eczema     Contusion of left foot, initial encounter 3/16/2016     DJD (degenerative joint disease), lumbar 9/26/2013     DJD (degenerative joint disease), lumbar 9/26/2013     Ds DNA antibody positive 4/16/2014     borderline.      Elevated blood pressure reading without diagnosis of hypertension 12/24/2013     Facial contusion 12/15/2014     hit by horse      Foraminal stenosis of lumbar region 9/26/2013     Frontal headache 12/15/2014     Gastroesophageal reflux disease, esophagitis presence not specified 6/29/2016     Heat sensitivity 10/29/2015     HTN, goal below 140/90 9/23/2015     Hyperlipidemia LDL goal <130 8/30/2017     Irregular heart beat       Keratitis sicca 10/31/2012     Left shoulder pain 9/26/2013     Lumbar radiculopathy 1/22/2014     Migraine headache 10/23/2013     Migraine, unspecified, with intractable migraine, so stated, without mention of status migrainosus       Motion sickness       MRSA infection 10/20/2015     Muscular wasting and disuse atrophy, not elsewhere classified 6/9/2014     right SCM, right wrist,       Numbness and  tingling       both legs anf feet greater on the left     PAC (premature atrial contraction) 7/15/2010     Personal history of COVID-19 12/2/2021     Plantar fasciitis 9/23/2015     PONV (postoperative nausea and vomiting)       Skin lesion 10/20/2015     posterior superior scalp      Spasm of muscle 7/11/2017     Telangiectasia of face 12/19/2014     Thyroid nodule 5/12/2022     Tooth pain 10/20/2015     left lower primary molar          Past Surgical History             Past Surgical History:   Procedure Laterality Date     AS INJ TRANSFORAMIN EPIDURAL, LUMB/SACR SINGLE         COLONOSCOPY   3/11/2013     Procedure: COLONOSCOPY;  colonoscopy;  Surgeon: Lobito Mas MD;  Location: [facility]     COLONOSCOPY N/A 9/13/2021     Procedure: COLONOSCOPY, WITH POLYPECTOMY AND BIOPSY;  Surgeon: Sam Liz MD;  Location: [facility]     COLONOSCOPY WITH CO2 INSUFFLATION N/A 11/19/2018     Procedure: COLONOSCOPY WITH CO2 INSUFFLATION;  Surgeon: Goyo Blank MD;  Location: [facility]     DECOMPRESSION LUMBAR TWO LEVELS Bilateral 12/8/2016     Procedure: DECOMPRESSION LUMBAR TWO LEVELS;  Surgeon: Bang Burrell MD;  Location: [facility]     DILATION AND CURETTAGE, HYSTEROSCOPY, ABLATE ENDOMETRIUM NOVASURE, COMBINED N/A 6/12/2019     Procedure: hysteroscopy, dilation & curettage, polypectomy, endometrial ablation;  Surgeon: Fredy Pham MD;  Location: [facility]     ESOPHAGOSCOPY, GASTROSCOPY, DUODENOSCOPY (EGD), COMBINED   11/8/2013     Procedure: COMBINED ESOPHAGOSCOPY, GASTROSCOPY, DUODENOSCOPY (EGD), BIOPSY SINGLE OR MULTIPLE;  Esophagoscopy, Gastroscopy, Duodenoscopy EGD with multiple biopsies;  Surgeon: Teja Koch MD;  [facility]: [facility]     ESOPHAGOSCOPY, GASTROSCOPY, DUODENOSCOPY (EGD), COMBINED N/A 2/18/2021     Procedure: ESOPHAGOGASTRODUODENOSCOPY, WITH BIOPSY;  Surgeon: Blanca Ba MD;  [facility]     ESOPHAGOSCOPY, GASTROSCOPY, DUODENOSCOPY (EGD), COMBINED N/A 4/11/2022     Procedure:  ESOPHAGOGASTRODUODENOSCOPY, WITH BIOPSY;  Surgeon: Fredy Jimenez MD;  Location: [facility]     HC REMOVAL OF TONSILS,<11 Y/O         Tonsils <10y.o.     HC TOOTH EXTRACTION W/FORCEP         INJECT BLOCK MEDIAL BRANCH CERVICAL/THORACIC/LUMBAR N/A 7/23/2020     Procedure: Sacral 1,2,3 Lateral Branch Blocks and lumbar 5 medial branch blocks bilaterally;  Surgeon: Maurisio Goetz MD;  [facility]     INJECT JOINT SACROILIAC Left 9/24/2020     Procedure: Left Lumbar 5 Sacral 1 nerve root injections.;  Surgeon: Maurisio Goetz MD;  [facility]     INJECT TRIGGER POINT Right 3/29/2024     Procedure: Ultrasound-guided RIGHT anterior scalene muscle diagnostic block;  Surgeon: Brandi Dennis MD;  Location: [facility]     REPAIR MUSCLE UPPER EXTREMITY Right 7/27/2021     Procedure: DIVISION RIGHT PECTORALIS MUSCLE;  Surgeon: Davie Dutta;  Location: [facility]     REPAIR TENDON ELBOW   7/9/2014     Procedure: REPAIR TENDON ELBOW;  Surgeon: Chris Johnston MD;  [facility]     ULTRASONIC REMOVAL SOFT TISSUE (LOCATION) Right 11/21/2018     Procedure: Tenex right foot;  Surgeon: Patel Martínez DO;  Location: [facility]     ULTRASONIC REMOVAL SOFT TISSUE (LOCATION) Left 11/16/2023     Procedure: EXCISION, SOFT TISSUE, ELBOW, USING ULTRASONIC ASPIRATOR SYSTEM;  Surgeon: Patel Martínez DO;  Location: [facility]     VASCULAR SURGERY         Thoracic Outlet Syndrome         Family History            Problem (# of Occurrences) Relation (Name,Age of Onset)     Unknown/Adopted (1) Father     Kidney Disease (1) Maternal Aunt     Allergies (6) Mother, Father, Sister, Son, Brother, Daughter     Arthritis (2) Mother: RA, Sister     Blood Disease (1) Paternal Aunt: LGL T cell Leukemia     Cancer (3) Mother: colon, Father, Sister: vaginal     Cardiovascular (1) Father     Depression (2) Mother, Sister     Diabetes (1) Mother: adult     Gastrointestinal Disease (1) Mother: ibs     Genitourinary Problems (1) Mother:  kidney cyst     Gynecology (2) Mother: endometriosis, Sister: endometriosis     Heart Disease (3) Maternal Grandfather: MI,  - 60's, Father: mi-age mid 40's, Brother     Hypertension (1) Mother     Lipids (3) Mother, Father, Paternal Grandmother     Osteoporosis (1) Paternal Grandmother     Respiratory (2) Father: asthma, Son: asthma     Thyroid Disease (2) Mother, Paternal Grandmother     Cancer - colorectal (1) Mother     Lupus (1) Maternal Aunt     Other Cancer (1) Father: renal cancer     Rheumatoid Arthritis (1) Paternal Aunt     Bladder Cancer (1) Maternal Aunt                Social History   Social History                Socioeconomic History     Marital status:        Spouse name: Robert     Number of children: 2     Years of education: 12     Highest education level: Not on file   Occupational History     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   Tobacco Use     Smoking status: Never     Smokeless tobacco: Never   Vaping Use     Vaping status: Former     Substances: THC     Devices: Pre-filled or refillable cartridge   Substance and Sexual Activity     Alcohol use: Yes       Comment: social     Drug use: No       Types: Marijuana       Comment: Medical marijuana     Sexual activity: Yes       Partners: Male       Birth control/protection: Surgical       Comment: vasectomy   Other Topics Concern      Service No     Blood Transfusions No     Caffeine Concern No       Comment: 0     Occupational Exposure No     Hobby Hazards Yes       Comment: horses     Sleep Concern No     Stress Concern No     Weight Concern Yes     Special Diet Yes       Comment: nhung's     Back Care No     Exercise Yes       Comment: occ     Bike Helmet Not Asked       Comment: na     Seat Belt Yes     Self-Exams Yes       Comment: occ     Parent/sibling w/ CABG, MI or angioplasty before 65F 55M? Not Asked   Social History Narrative     Not on file      Social Determinants of Health              Financial  Resource Strain: Low Risk  (6/17/2024)     Financial Resource Strain       Within the past 12 months, have you or your family members you live with been unable to get utilities (heat, electricity) when it was really needed?: No   Food Insecurity: Low Risk  (6/17/2024)     Food Insecurity       Within the past 12 months, did you worry that your food would run out before you got money to buy more?: No       Within the past 12 months, did the food you bought just not last and you didn t have money to get more?: No   Transportation Needs: Low Risk  (6/17/2024)     Transportation Needs       Within the past 12 months, has lack of transportation kept you from medical appointments, getting your medicines, non-medical meetings or appointments, work, or from getting things that you need?: No   Physical Activity: Sufficiently Active (4/5/2024)     Received from [facility]     Exercise Vital Sign       Days of Exercise per Week: 7 days       Minutes of Exercise per Session: 30 min   Stress: No Stress Concern Present (11/21/2019)     Received from [facility], [facility]     [employer] - Occupational Stress Questionnaire       Feeling of Stress : Not at all   Social Connections: Unknown (8/13/2024)     Received from [facility]     Social Connections       Frequency of Communication with Friends and Family: Not on file   Interpersonal Safety: Low Risk  (9/15/2024)     Interpersonal Safety       Do you feel physically and emotionally safe where you currently live?: Yes       Within the past 12 months, have you been hit, slapped, kicked or otherwise physically hurt by someone?: No       Within the past 12 months, have you been humiliated or emotionally abused in other ways by your partner or ex-partner?: No   Housing Stability: Low Risk  (6/17/2024)     Housing Stability       Do you have housing? : Yes       Are you worried about losing your housing?: No             Current Outpatient Medications   Medication Sig Dispense Refill     ACETAMINOPHEN PO Take 650 mg by mouth every 6 hours as needed for mild pain.       hydrOXYzine HCl (ATARAX) 10 MG tablet Take 10 mg by mouth 3 times daily as needed for itching.       ketoconazole (NIZORAL) 2 % external cream Apply topically daily (Patient not taking: Reported on 11/27/2024) 60 g 1     meloxicam (MOBIC) 15 MG tablet Take 1 tablet (15 mg) by mouth daily. (Patient not taking: Reported on 11/27/2024) 90 tablet 1     tirzepatide (MOUNJARO) 15 MG/0.5ML pen Inject 15 mg Subcutaneous every 7 days 6 mL 3          /84 (BP Location: Left arm, Patient Position: Supine, Cuff Size: Adult Regular)   Pulse 81   SpO2 99%       On examination, patient is alert and cooperative.  Vitals are stable.  She is afebrile.     HEENT is notable for right facial paresis from Bell's palsy.  She is able to move her upper extremities functionally.  She is able to carry out straight leg raising test.  She has some difficulty with Gaurav's on the left side.  She has healed scars in the back.  On the left side the SI joint and piriformis were tender.     Musculoskeletal examination of the left lower extremity revealed a number of triggers in the left peroneal region.  There is abnormal tone in the affected muscles including the piriformis and the peroneal's.  These recreated her symptoms.     Neurologically she is able to stand.  Romberg is negative.  Coordination tests are intact.  Strength is full.  She is able to walk on her heels and toes.  Reflexes are symmetric and plantars are downgoing.     She responds to touch including temperature vibration.     Impression: At this point this 49-year-old woman with a known history of Trina-Danlos, history of Lyme's disease, history of widespread aches and pains.  She also has had issues with her back and has undergone decompression followed by spinal cord stimulator followed by fusion and replacement of her  spinal cord stimulator.  She is still using it though she is not sure if it is helping as much.     Currently her examination is notable for issues in her lower back especially the left SI and piriformis.  There is also involvement of the muscles in the left peroneal compartment which may be tender.  Her trigger point injections were beneficial while the medication was working.  Her current problems are related to the abnormal tone and muscle spasms in the affected muscles.  These are focal in nature and are consistent with dystonia/focal dystonia.  I would therefore recommend that we treat them with neurotoxin and I would use for 100 units of Botox.  Depending on her improvement, she would likely repeat  every 3 months.    30 minutes were spent for this visit with, greater than 50% was for counseling on above-mentioned issues.       Sunny Arechiga MD       Again, thank you for allowing me to participate in the care of your patient.      Sincerely,    Sunny Arechiga MD

## 2024-12-21 ENCOUNTER — HEALTH MAINTENANCE LETTER (OUTPATIENT)
Age: 49
End: 2024-12-21

## 2024-12-24 ENCOUNTER — HOSPITAL ENCOUNTER (OUTPATIENT)
Dept: MAMMOGRAPHY | Facility: CLINIC | Age: 49
Discharge: HOME | End: 2024-12-24
Attending: PHYSICIAN ASSISTANT
Payer: COMMERCIAL

## 2024-12-24 DIAGNOSIS — Z12.31 VISIT FOR SCREENING MAMMOGRAM: ICD-10-CM

## 2024-12-24 PROCEDURE — 77067 SCR MAMMO BI INCL CAD: CPT

## 2024-12-24 PROCEDURE — 77063 BREAST TOMOSYNTHESIS BI: CPT

## 2024-12-30 ENCOUNTER — ANCILLARY PROCEDURE (OUTPATIENT)
Dept: GENERAL RADIOLOGY | Facility: OTHER | Age: 49
End: 2024-12-30
Attending: PHYSICIAN ASSISTANT
Payer: COMMERCIAL

## 2024-12-30 ENCOUNTER — OFFICE VISIT (OUTPATIENT)
Dept: FAMILY MEDICINE | Facility: OTHER | Age: 49
End: 2024-12-30
Payer: COMMERCIAL

## 2024-12-30 VITALS
TEMPERATURE: 98.4 F | SYSTOLIC BLOOD PRESSURE: 132 MMHG | HEART RATE: 88 BPM | RESPIRATION RATE: 17 BRPM | DIASTOLIC BLOOD PRESSURE: 84 MMHG | BODY MASS INDEX: 27.65 KG/M2 | OXYGEN SATURATION: 99 % | WEIGHT: 169 LBS

## 2024-12-30 DIAGNOSIS — R53.83 MALAISE AND FATIGUE: ICD-10-CM

## 2024-12-30 DIAGNOSIS — J40 BRONCHITIS: Primary | ICD-10-CM

## 2024-12-30 DIAGNOSIS — R53.81 MALAISE AND FATIGUE: ICD-10-CM

## 2024-12-30 DIAGNOSIS — R09.89 CHEST CONGESTION: ICD-10-CM

## 2024-12-30 DIAGNOSIS — R50.9 FEVER, UNSPECIFIED: ICD-10-CM

## 2024-12-30 DIAGNOSIS — R05.1 ACUTE COUGH: ICD-10-CM

## 2024-12-30 DIAGNOSIS — E78.5 HYPERLIPIDEMIA LDL GOAL <130: ICD-10-CM

## 2024-12-30 PROBLEM — Z86.19 H/O LYME DISEASE: Status: ACTIVE | Noted: 2024-12-30

## 2024-12-30 PROBLEM — M43.10 ACQUIRED SPONDYLOLISTHESIS: Status: ACTIVE | Noted: 2024-04-10

## 2024-12-30 PROBLEM — R22.0 SUPERFICIAL SWELLING OF SCALP: Status: RESOLVED | Noted: 2018-04-12 | Resolved: 2024-12-30

## 2024-12-30 PROBLEM — R23.8 RUDDY COMPLEXION: Status: RESOLVED | Noted: 2019-05-23 | Resolved: 2024-12-30

## 2024-12-30 PROBLEM — M79.12 STERNOCLEIDOMASTOID MUSCLE TENDERNESS: Status: RESOLVED | Noted: 2017-04-05 | Resolved: 2024-12-30

## 2024-12-30 PROBLEM — K13.0 LIP LESION: Status: RESOLVED | Noted: 2019-05-23 | Resolved: 2024-12-30

## 2024-12-30 PROBLEM — Z82.49 FAMILY HISTORY OF CARDIOVASCULAR DISEASE: Status: ACTIVE | Noted: 2024-05-22

## 2024-12-30 PROBLEM — G44.211 INTRACTABLE EPISODIC TENSION-TYPE HEADACHE: Status: RESOLVED | Noted: 2019-03-11 | Resolved: 2024-12-30

## 2024-12-30 PROBLEM — R19.12 HYPERACTIVE BOWEL SOUNDS: Status: RESOLVED | Noted: 2019-06-10 | Resolved: 2024-12-30

## 2024-12-30 PROBLEM — R19.5 LOOSE STOOLS: Status: RESOLVED | Noted: 2019-06-10 | Resolved: 2024-12-30

## 2024-12-30 LAB
BASOPHILS # BLD AUTO: 0 10E3/UL (ref 0–0.2)
BASOPHILS NFR BLD AUTO: 1 %
EOSINOPHIL # BLD AUTO: 0.1 10E3/UL (ref 0–0.7)
EOSINOPHIL NFR BLD AUTO: 1 %
ERYTHROCYTE [DISTWIDTH] IN BLOOD BY AUTOMATED COUNT: 13 % (ref 10–15)
FLUAV AG SPEC QL IA: NEGATIVE
FLUBV AG SPEC QL IA: NEGATIVE
HCT VFR BLD AUTO: 35.7 % (ref 35–47)
HGB BLD-MCNC: 11.9 G/DL (ref 11.7–15.7)
IMM GRANULOCYTES # BLD: 0 10E3/UL
IMM GRANULOCYTES NFR BLD: 0 %
IRON BINDING CAPACITY (ROCHE): 232 UG/DL (ref 240–430)
IRON SATN MFR SERPL: 13 % (ref 15–46)
IRON SERPL-MCNC: 30 UG/DL (ref 37–145)
LYMPHOCYTES # BLD AUTO: 1.8 10E3/UL (ref 0.8–5.3)
LYMPHOCYTES NFR BLD AUTO: 38 %
MCH RBC QN AUTO: 30.4 PG (ref 26.5–33)
MCHC RBC AUTO-ENTMCNC: 33.3 G/DL (ref 31.5–36.5)
MCV RBC AUTO: 91 FL (ref 78–100)
MONOCYTES # BLD AUTO: 0.7 10E3/UL (ref 0–1.3)
MONOCYTES NFR BLD AUTO: 14 %
NEUTROPHILS # BLD AUTO: 2.2 10E3/UL (ref 1.6–8.3)
NEUTROPHILS NFR BLD AUTO: 46 %
PLATELET # BLD AUTO: 290 10E3/UL (ref 150–450)
RBC # BLD AUTO: 3.92 10E6/UL (ref 3.8–5.2)
WBC # BLD AUTO: 4.8 10E3/UL (ref 4–11)

## 2024-12-30 PROCEDURE — 71046 X-RAY EXAM CHEST 2 VIEWS: CPT | Mod: TC | Performed by: RADIOLOGY

## 2024-12-30 PROCEDURE — 36415 COLL VENOUS BLD VENIPUNCTURE: CPT | Performed by: PHYSICIAN ASSISTANT

## 2024-12-30 PROCEDURE — 83550 IRON BINDING TEST: CPT | Performed by: PHYSICIAN ASSISTANT

## 2024-12-30 PROCEDURE — 87804 INFLUENZA ASSAY W/OPTIC: CPT | Performed by: PHYSICIAN ASSISTANT

## 2024-12-30 PROCEDURE — 85025 COMPLETE CBC W/AUTO DIFF WBC: CPT | Performed by: PHYSICIAN ASSISTANT

## 2024-12-30 PROCEDURE — 83540 ASSAY OF IRON: CPT | Performed by: PHYSICIAN ASSISTANT

## 2024-12-30 PROCEDURE — 99213 OFFICE O/P EST LOW 20 MIN: CPT | Performed by: PHYSICIAN ASSISTANT

## 2024-12-30 RX ORDER — BENZONATATE 200 MG/1
200 CAPSULE ORAL 3 TIMES DAILY PRN
Qty: 30 CAPSULE | Refills: 0 | Status: SHIPPED | OUTPATIENT
Start: 2024-12-30

## 2024-12-30 RX ORDER — ALBUTEROL SULFATE 90 UG/1
2 INHALANT RESPIRATORY (INHALATION) EVERY 6 HOURS PRN
Qty: 18 G | Refills: 0 | Status: SHIPPED | OUTPATIENT
Start: 2024-12-30

## 2024-12-30 ASSESSMENT — PAIN SCALES - GENERAL: PAINLEVEL_OUTOF10: NO PAIN (0)

## 2024-12-30 ASSESSMENT — ENCOUNTER SYMPTOMS: COUGH: 1

## 2024-12-30 NOTE — PROGRESS NOTES
"  Assessment & Plan     Bronchitis  Chest congestion  Acute cough  Malaise and fatigue  Fever, unspecified  Etiology of this is suspected to be viral at this point in time.  Will trial medications as noted below and follow-up based on results.  She has had some mild anemia recently thought to be due to blood loss but also the potential for iron deficiency so she was asked to have iron deficiency anemia evaluation done today.  We will address that when we get labs back.  - CBC with platelets and differential; Future  - XR Chest 2 Views; Future  - Influenza A & B Antigen - Clinic Collect  - Iron and iron binding capacity; Future  - CBC with platelets and differential  - Iron and iron binding capacity  - albuterol (PROAIR HFA/PROVENTIL HFA/VENTOLIN HFA) 108 (90 Base) MCG/ACT inhaler; Inhale 2 puffs into the lungs every 6 hours as needed.  - benzonatate (TESSALON) 200 MG capsule; Take 1 capsule (200 mg) by mouth 3 times daily as needed.    Hyperlipidemia LDL goal <130  Due for related labs but she is not fasting today.    BMI  Estimated body mass index is 27.65 kg/m  as calculated from the following:    Height as of 10/4/24: 1.665 m (5' 5.55\").    Weight as of this encounter: 76.7 kg (169 lb).   Weight management plan: Discussed healthy diet and exercise guidelines      Work on weight loss  Regular exercise    Florence Roe is a 49 year old, presenting for the following health issues:  Cough      12/30/2024     5:27 PM   Additional Questions   Roomed by chacha   Accompanied by self     Cough    History of Present Illness       Reason for visit:  Cough  Symptom onset:  3-4 weeks ago   She is taking medications regularly.     Acute Illness  Symptoms:  Fever: YES at night   Chills/Sweats: YES- Chills  Headache (location?): No  Sinus Pressure: No  Conjunctivitis:  No  Ear Pain: YES: right  Rhinorrhea: YES- only when couhing  Congestion: No  Sore Throat: YES- coughing   Cough: YES  Wheeze: YES  Decreased Appetite: " No  Nausea: No  Vomiting: No  Diarrhea: No  Fatigue/Achiness: YES  Sick/Strep Exposure: YES  Therapies tried and outcome: Sudafed mucinex Nyquil Tylenol       Review of Systems  Constitutional, HEENT, cardiovascular, pulmonary, GI, , musculoskeletal, neuro, skin, endocrine and psych systems are negative, except as otherwise noted.      Objective    /84   Pulse 88   Temp 98.4  F (36.9  C) (Temporal)   Resp 17   Wt 76.7 kg (169 lb)   SpO2 99%   BMI 27.65 kg/m    Body mass index is 27.65 kg/m .  Physical Exam   GENERAL: alert and no distress  NECK: no adenopathy, no asymmetry, masses, or scars  RESP: lungs clear to auscultation - no rales, rhonchi or wheezes  CV: regular rate and rhythm, normal S1 S2, no S3 or S4, no murmur, click or rub, no peripheral edema  ABDOMEN: soft, nontender, no hepatosplenomegaly, no masses and bowel sounds normal  MS: no gross musculoskeletal defects noted, no edema    Results for orders placed or performed in visit on 12/30/24 (from the past 24 hours)   Influenza A & B Antigen - Clinic Collect    Specimen: Nose; Swab   Result Value Ref Range    Influenza A antigen Negative Negative    Influenza B antigen Negative Negative    Narrative    Test results must be correlated with clinical data. If necessary, results should be confirmed by a molecular assay or viral culture.   CBC with platelets and differential    Narrative    The following orders were created for panel order CBC with platelets and differential.  Procedure                               Abnormality         Status                     ---------                               -----------         ------                     CBC with platelets and d...[[id number]]                      Final result                 Please view results for these tests on the individual orders.   CBC with platelets and differential   Result Value Ref Range    WBC Count 4.8 4.0 - 11.0 10e3/uL    RBC Count 3.92 3.80 - 5.20 10e6/uL    Hemoglobin  11.9 11.7 - 15.7 g/dL    Hematocrit 35.7 35.0 - 47.0 %    MCV 91 78 - 100 fL    MCH 30.4 26.5 - 33.0 pg    MCHC 33.3 31.5 - 36.5 g/dL    RDW 13.0 10.0 - 15.0 %    Platelet Count 290 150 - 450 10e3/uL    % Neutrophils 46 %    % Lymphocytes 38 %    % Monocytes 14 %    % Eosinophils 1 %    % Basophils 1 %    % Immature Granulocytes 0 %    Absolute Neutrophils 2.2 1.6 - 8.3 10e3/uL    Absolute Lymphocytes 1.8 0.8 - 5.3 10e3/uL    Absolute Monocytes 0.7 0.0 - 1.3 10e3/uL    Absolute Eosinophils 0.1 0.0 - 0.7 10e3/uL    Absolute Basophils 0.0 0.0 - 0.2 10e3/uL    Absolute Immature Granulocytes 0.0 <=0.4 10e3/uL     *Note: Due to a large number of results and/or encounters for the requested time period, some results have not been displayed. A complete set of results can be found in Results Review.     X-ray: negative for concerning pathology to my independent review today.  It will be overread by radiology.          Signed Electronically by: Otoniel Quiroz PA-C

## 2024-12-31 ENCOUNTER — MYC MEDICAL ADVICE (OUTPATIENT)
Dept: FAMILY MEDICINE | Facility: OTHER | Age: 49
End: 2024-12-31
Payer: COMMERCIAL

## 2025-01-21 ENCOUNTER — OFFICE VISIT (OUTPATIENT)
Dept: PHYSICAL MEDICINE AND REHAB | Facility: CLINIC | Age: 50
End: 2025-01-21
Payer: COMMERCIAL

## 2025-01-21 DIAGNOSIS — G24.8 FOCAL DYSTONIA: Primary | ICD-10-CM

## 2025-01-21 DIAGNOSIS — G89.29 OTHER CHRONIC PAIN: ICD-10-CM

## 2025-01-21 DIAGNOSIS — M62.838 SPASM OF MUSCLE: ICD-10-CM

## 2025-01-21 NOTE — LETTER
1/21/2025       RE: Amelia Coker  [address] [facility] [address]     Dear Colleague,    Thank you for referring your patient, Amelia Coker, to the [facility]. Please see a copy of my visit note below.    CC: Patient returns for follow-up visit for her ongoing issues related to left lower extremity aches and pains with numbness and tingling.     She was last seen by me on 112/11/2024.  Her interval history is notable for improvement for several hours after the injection.  She noticed that she was able to extend her left lower extremity without spasms.  She also noticed the ball and the toes of her left foot felt better.  She still has some areas of numbness and tingling in the dorsum of the foot.  She can still keeps the spinal cord stimulator on.  She has not found that it made a difference.  She has L4-S1 fusion with a screw across the SI joints.     Patient is a 50-year-old woman with a complex medical history.  She reports having lumbar issues requiring decompression.  Subsequently developed instability and required a fusion from L4-S1.  She also has a spinal cord stimulator which was recently repositioned to the gluteal region.  When I last saw her on 10/18/2024, she was still sore from the surgery.  She has been evaluated at multiple locations including [facility], [facility].     She recalls problems starting around 2011.  She had been running 6 days a week and eventually was unable to even walk.  She was felt to have widespread pain due to inflammation.  She was also diagnosed with Lyme's.  It was affecting her hips and back.  She also gives history of horseback riding.  She developed increased low back pain which turned out to be sciatica going on the left side worse on the right side.  It would extend all the way to the toes.  She also gives history of neck pain and shoulder pain and thoracic outlet  syndrome.     She has been working with physical therapy and is unsure if they are able to offer her much.  She denies any issues with her bowel and bladder function.  She denies fevers or weight loss.     She works as an  and enjoys it.  She is currently only doing part-time.  She does not smoke there is no alcohol use except occasional.  She denies chemical use.  She would like to become more active and tells me that she cannot be sitting idle and watching TV.     Past Medical History           Past Medical History:   Diagnosis Date     Abnormal mammogram 9/21/2016     right breast      AD (atopic dermatitis) 10/29/2015     Allergic rhinitis due to other allergen       Arthritis       Bell's palsy       Chronic fatigue 6/6/2012     Chronic infection       MRSA - 2015 scalp and prior to Abdomen     Contact dermatitis and other eczema, due to unspecified cause       eczema     Contusion of left foot, initial encounter 3/16/2016     DJD (degenerative joint disease), lumbar 9/26/2013     DJD (degenerative joint disease), lumbar 9/26/2013     Ds DNA antibody positive 4/16/2014     borderline.      Elevated blood pressure reading without diagnosis of hypertension 12/24/2013     Facial contusion 12/15/2014     hit by horse      Foraminal stenosis of lumbar region 9/26/2013     Frontal headache 12/15/2014     Gastroesophageal reflux disease, esophagitis presence not specified 6/29/2016     Heat sensitivity 10/29/2015     HTN, goal below 140/90 9/23/2015     Hyperlipidemia LDL goal <130 8/30/2017     Irregular heart beat       Keratitis sicca 10/31/2012     Left shoulder pain 9/26/2013     Lumbar radiculopathy 1/22/2014     Migraine headache 10/23/2013     Migraine, unspecified, with intractable migraine, so stated, without mention of status migrainosus       Motion sickness       MRSA infection 10/20/2015     Muscular wasting and disuse atrophy, not elsewhere classified 6/9/2014     right SCM, right wrist,        Numbness and tingling       both legs anf feet greater on the left     PAC (premature atrial contraction) 7/15/2010     Personal history of COVID-19 12/2/2021     Plantar fasciitis 9/23/2015     PONV (postoperative nausea and vomiting)       Skin lesion 10/20/2015     posterior superior scalp      Spasm of muscle 7/11/2017     Telangiectasia of face 12/19/2014     Thyroid nodule 5/12/2022     Tooth pain 10/20/2015     left lower primary molar          Past Surgical History             Past Surgical History:   Procedure Laterality Date     AS INJ TRANSFORAMIN EPIDURAL, LUMB/SACR SINGLE         COLONOSCOPY   3/11/2013     Procedure: COLONOSCOPY;  colonoscopy;  Surgeon: Lobito Mas MD;  Location: [facility]     COLONOSCOPY N/A 9/13/2021     Procedure: COLONOSCOPY, WITH POLYPECTOMY AND BIOPSY;  Surgeon: Sam Liz MD;  Location: [facility]     COLONOSCOPY WITH CO2 INSUFFLATION N/A 11/19/2018     Procedure: COLONOSCOPY WITH CO2 INSUFFLATION;  Surgeon: Goyo Blank MD;  Location: [facility]     DECOMPRESSION LUMBAR TWO LEVELS Bilateral 12/8/2016     Procedure: DECOMPRESSION LUMBAR TWO LEVELS;  Surgeon: Bang Burrell MD;  Location: [facility]     DILATION AND CURETTAGE, HYSTEROSCOPY, ABLATE ENDOMETRIUM NOVASURE, COMBINED N/A 6/12/2019     Procedure: hysteroscopy, dilation & curettage, polypectomy, endometrial ablation;  Surgeon: Fredy Pham MD;  Location: [facility]     ESOPHAGOSCOPY, GASTROSCOPY, DUODENOSCOPY (EGD), COMBINED   11/8/2013     Procedure: COMBINED ESOPHAGOSCOPY, GASTROSCOPY, DUODENOSCOPY (EGD), BIOPSY SINGLE OR MULTIPLE;  Esophagoscopy, Gastroscopy, Duodenoscopy EGD with multiple biopsies;  Surgeon: Teja Koch MD;  Location: [facility]     ESOPHAGOSCOPY, GASTROSCOPY, DUODENOSCOPY (EGD), COMBINED N/A 2/18/2021     Procedure: ESOPHAGOGASTRODUODENOSCOPY, WITH BIOPSY;  Surgeon: Blanca Ba MD;  [facility]     ESOPHAGOSCOPY, GASTROSCOPY, DUODENOSCOPY (EGD), COMBINED N/A 4/11/2022      Procedure: ESOPHAGOGASTRODUODENOSCOPY, WITH BIOPSY;  Surgeon: Fredy Jimenez MD;  Location: [facility]     HC REMOVAL OF TONSILS,<11 Y/O         Tonsils <10y.o.     HC TOOTH EXTRACTION W/FORCEP         INJECT BLOCK MEDIAL BRANCH CERVICAL/THORACIC/LUMBAR N/A 7/23/2020     Procedure: Sacral 1,2,3 Lateral Branch Blocks and lumbar 5 medial branch blocks bilaterally;  Surgeon: Maurisio Goetz MD;  [facility]     INJECT JOINT SACROILIAC Left 9/24/2020     Procedure: Left Lumbar 5 Sacral 1 nerve root injections.;  Surgeon: Maurisio Goetz MD;  [facility]: [facility]     INJECT TRIGGER POINT Right 3/29/2024     Procedure: Ultrasound-guided RIGHT anterior scalene muscle diagnostic block;  Surgeon: Brandi Dennis MD;  Location: [facility]     REPAIR MUSCLE UPPER EXTREMITY Right 7/27/2021     Procedure: DIVISION RIGHT PECTORALIS MUSCLE;  Surgeon: Davie Dutta;  Location: [facility]     REPAIR TENDON ELBOW   7/9/2014     Procedure: REPAIR TENDON ELBOW;  Surgeon: Chris Johnston MD;  [facility]: [facility]     ULTRASONIC REMOVAL SOFT TISSUE (LOCATION) Right 11/21/2018     Procedure: Tenex right foot;  Surgeon: Patel Martínez DO;  Location: [facility]     ULTRASONIC REMOVAL SOFT TISSUE (LOCATION) Left 11/16/2023     Procedure: EXCISION, SOFT TISSUE, ELBOW, USING ULTRASONIC ASPIRATOR SYSTEM;  Surgeon: Patel Martínez DO;  Location: [facility]     VASCULAR SURGERY         Thoracic Outlet Syndrome         Family History            Problem (# of Occurrences) Relation (Name,Age of Onset)     Unknown/Adopted (1) Father     Kidney Disease (1) Maternal Aunt     Allergies (6) Mother, Father, Sister, Son, Brother, Daughter     Arthritis (2) Mother: RA, Sister     Blood Disease (1) Paternal Aunt: LGL T cell Leukemia     Cancer (3) Mother: colon, Father, Sister: vaginal     Cardiovascular (1) Father     Depression (2) Mother, Sister     Diabetes (1) Mother: adult     Gastrointestinal Disease (1) Mother: ibs     Genitourinary Problems (1)  Mother: kidney cyst     Gynecology (2) Mother: endometriosis, Sister: endometriosis     Heart Disease (3) Maternal Grandfather: MI,  - 60's, Father: mi-age mid 40's, Brother     Hypertension (1) Mother     Lipids (3) Mother, Father, Paternal Grandmother     Osteoporosis (1) Paternal Grandmother     Respiratory (2) Father: asthma, Son: asthma     Thyroid Disease (2) Mother, Paternal Grandmother     Cancer - colorectal (1) Mother     Lupus (1) Maternal Aunt     Other Cancer (1) Father: renal cancer     Rheumatoid Arthritis (1) Paternal Aunt     Bladder Cancer (1) Maternal Aunt                Social History   Social History                Socioeconomic History     Marital status:        Spouse name: Robert     Number of children: 2     Years of education: 12     Highest education level: Not on file   Occupational History     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   Tobacco Use     Smoking status: Never     Smokeless tobacco: Never   Vaping Use     Vaping status: Former     Substances: THC     Devices: Pre-filled or refillable cartridge   Substance and Sexual Activity     Alcohol use: Yes       Comment: social     Drug use: No       Types: Marijuana       Comment: Medical marijuana     Sexual activity: Yes       Partners: Male       Birth control/protection: Surgical       Comment: vasectomy   Other Topics Concern      Service No     Blood Transfusions No     Caffeine Concern No       Comment: 0     Occupational Exposure No     Hobby Hazards Yes       Comment: horses     Sleep Concern No     Stress Concern No     Weight Concern Yes     Special Diet Yes       Comment: nhung's     Back Care No     Exercise Yes       Comment: occ     Bike Helmet Not Asked       Comment: na     Seat Belt Yes     Self-Exams Yes       Comment: occ     Parent/sibling w/ CABG, MI or angioplasty before 65F 55M? Not Asked   Social History Narrative     Not on file      Social Determinants of Health               Financial Resource Strain: Low Risk  (6/17/2024)     Financial Resource Strain       Within the past 12 months, have you or your family members you live with been unable to get utilities (heat, electricity) when it was really needed?: No   Food Insecurity: Low Risk  (6/17/2024)     Food Insecurity       Within the past 12 months, did you worry that your food would run out before you got money to buy more?: No       Within the past 12 months, did the food you bought just not last and you didn t have money to get more?: No   Transportation Needs: Low Risk  (6/17/2024)     Transportation Needs       Within the past 12 months, has lack of transportation kept you from medical appointments, getting your medicines, non-medical meetings or appointments, work, or from getting things that you need?: No   Physical Activity: Sufficiently Active (4/5/2024)     Received from [facility]     Exercise Vital Sign       Days of Exercise per Week: 7 days       Minutes of Exercise per Session: 30 min   Stress: No Stress Concern Present (11/21/2019)     Received from [facility], [facility]     [employer] [place] [employer] - Occupational Stress Questionnaire       Feeling of Stress : Not at all   Social Connections: Unknown (8/13/2024)     Received from [facility]     Social Connections       Frequency of Communication with Friends and Family: Not on file   Interpersonal Safety: Low Risk  (9/15/2024)     Interpersonal Safety       Do you feel physically and emotionally safe where you currently live?: Yes       Within the past 12 months, have you been hit, slapped, kicked or otherwise physically hurt by someone?: No       Within the past 12 months, have you been humiliated or emotionally abused in other ways by your partner or ex-partner?: No   Housing Stability: Low Risk  (6/17/2024)     Housing Stability       Do you have housing? : Yes       Are you worried about losing your housing?:  No            Current Outpatient Prescriptions     Current Outpatient Medications   Medication Sig Dispense Refill     ACETAMINOPHEN PO Take 650 mg by mouth every 6 hours as needed for mild pain.       albuterol (PROAIR HFA/PROVENTIL HFA/VENTOLIN HFA) 108 (90 Base) MCG/ACT inhaler Inhale 2 puffs into the lungs every 6 hours as needed. 18 g 0     benzonatate (TESSALON) 200 MG capsule Take 1 capsule (200 mg) by mouth 3 times daily as needed. 30 capsule 0     hydrOXYzine HCl (ATARAX) 10 MG tablet Take 10 mg by mouth 3 times daily as needed for itching.       ketoconazole (NIZORAL) 2 % external cream Apply topically daily 60 g 1     meloxicam (MOBIC) 15 MG tablet Take 1 tablet (15 mg) by mouth daily. 90 tablet 1     tirzepatide (MOUNJARO) 15 MG/0.5ML pen Inject 15 mg Subcutaneous every 7 days 6 mL 3             On examination, patient is alert and cooperative.  Vitals are stable.  She is afebrile.     HEENT is notable for right facial paresis from Bell's palsy.  She is able to move her upper extremities functionally.  She is able to carry out straight leg raising test.  She has some difficulty with Gaurav's on the left side.  She has healed scars in the back.  On the left side the SI joint and piriformis were tender.     Musculoskeletal examination of the left lower extremity revealed areas of tenderness with increased tone in the left peroneus longus.  There was also an area affecting the left medial gastrocnemius.  The left piriformis was also tender with abnormal tone in the affected muscles including the piriformis and the peroneal's.  These recreated her symptoms.     Neurologically she is able to stand.  Romberg is negative.  Coordination tests are intact.  Strength is full.  She is able to walk on her heels and toes.  Reflexes are symmetric and plantars are downgoing.     She responds to touch including temperature and vibration.     Impression: At this point this 50-year-old woman with a known history of  Trina-Danlos, history of Lyme's disease, history of widespread aches and pains.  She also has had issues with her back and has undergone decompression followed by spinal cord stimulator followed by fusion and replacement of her spinal cord stimulator.  She is still using it though she is not sure if it is helping as much.     Currently her examination is notable for issues in her lower back especially the left SI and piriformis.  There is also involvement of the left peroneus longus and gastrocnemius medius and left piriformis and the gluteal region.  Her trigger point injections were beneficial while the medication was working.  Her current problems are related to the abnormal tone and muscle spasms in the affected muscles.  These are focal in nature and are consistent with dystonia/focal dystonia.  I would therefore proceed with neurotoxin and use 100 units of Botox.  I will see her in follow-up in 4 to 6 weeks time.  Depending on her improvement, she would likely repeat  every 3 months.     20 minutes were spent on the day of the encounter for this visit, exclusive of the procedure time, with greater than 50% was for counseling on above-mentioned issues.       PROCEDURE NOTE: With her informed consent, after explaining the benefits and risks of the procedure, using ChloraPrep for skin prep, EMG for localization, preservative-free normal saline for dilution, 2 cc per 100 units, 25 units were injected into the left peroneus longus muscle at 2 different sites for a total of 50 units.  25 units were injected into the left medial gastrocs and finally 25 units were injected into the left piriformis.  She tolerated the procedure well.    She can ice the region, anticipate the onset of Botox within 1 to 3 days, peak in 2 weeks and a duration of about 12 weeks.    Thank you are much for allow me to assist in her care.    Sunny Arechiga MD       Again, thank you for allowing me to participate in the care of your  patient.      Sincerely,    Sunny Arechiga MD

## 2025-01-21 NOTE — PROGRESS NOTES
CC: Patient returns for follow-up visit for her ongoing issues related to left lower extremity aches and pains with numbness and tingling.     She was last seen by me on 112/11/2024.  Her interval history is notable for improvement for several hours after the injection.  She noticed that she was able to extend her left lower extremity without spasms.  She also noticed the ball and the toes of her left foot felt better.  She still has some areas of numbness and tingling in the dorsum of the foot.  She can still keeps the spinal cord stimulator on.  She has not found that it made a difference.  She has L4-S1 fusion with a screw across the SI joints.     Patient is a 50-year-old woman with a complex medical history.  She reports having lumbar issues requiring decompression.  Subsequently developed instability and required a fusion from L4-S1.  She also has a spinal cord stimulator which was recently repositioned to the gluteal region.  When I last saw her on 10/18/2024, she was still sore from the surgery.  She has been evaluated at multiple locations including [facility] and the [facility].     She recalls problems starting around 2011.  She had been running 6 days a week and eventually was unable to even walk.  She was felt to have widespread pain due to inflammation.  She was also diagnosed with Lyme's.  It was affecting her hips and back.  She also gives history of horseback riding.  She developed increased low back pain which turned out to be sciatica going on the left side worse on the right side.  It would extend all the way to the toes.  She also gives history of neck pain and shoulder pain and thoracic outlet syndrome.     She has been working with physical therapy and is unsure if they are able to offer her much.  She denies any issues with her bowel and bladder function.  She denies fevers or weight loss.     She works as an  and enjoys it.  She is currently only doing part-time.  She does not smoke  there is no alcohol use except occasional.  She denies chemical use.  She would like to become more active and tells me that she cannot be sitting idle and watching TV.     Past Medical History           Past Medical History:   Diagnosis Date    Abnormal mammogram 9/21/2016     right breast     AD (atopic dermatitis) 10/29/2015    Allergic rhinitis due to other allergen      Arthritis      Bell's palsy      Chronic fatigue 6/6/2012    Chronic infection       MRSA - 2015 scalp and prior to Abdomen    Contact dermatitis and other eczema, due to unspecified cause       eczema    Contusion of left foot, initial encounter 3/16/2016    DJD (degenerative joint disease), lumbar 9/26/2013    DJD (degenerative joint disease), lumbar 9/26/2013    Ds DNA antibody positive 4/16/2014     borderline.     Elevated blood pressure reading without diagnosis of hypertension 12/24/2013    Facial contusion 12/15/2014     hit by horse     Foraminal stenosis of lumbar region 9/26/2013    Frontal headache 12/15/2014    Gastroesophageal reflux disease, esophagitis presence not specified 6/29/2016    Heat sensitivity 10/29/2015    HTN, goal below 140/90 9/23/2015    Hyperlipidemia LDL goal <130 8/30/2017    Irregular heart beat      Keratitis sicca 10/31/2012    Left shoulder pain 9/26/2013    Lumbar radiculopathy 1/22/2014    Migraine headache 10/23/2013    Migraine, unspecified, with intractable migraine, so stated, without mention of status migrainosus      Motion sickness      MRSA infection 10/20/2015    Muscular wasting and disuse atrophy, not elsewhere classified 6/9/2014     right SCM, right wrist,      Numbness and tingling       both legs anf feet greater on the left    PAC (premature atrial contraction) 7/15/2010    Personal history of COVID-19 12/2/2021    Plantar fasciitis 9/23/2015    PONV (postoperative nausea and vomiting)      Skin lesion 10/20/2015     posterior superior scalp     Spasm of muscle 7/11/2017    Telangiectasia  of face 12/19/2014    Thyroid nodule 5/12/2022    Tooth pain 10/20/2015     left lower primary molar          Past Surgical History             Past Surgical History:   Procedure Laterality Date    AS INJ TRANSFORAMIN EPIDURAL, LUMB/SACR SINGLE        COLONOSCOPY   3/11/2013     Procedure: COLONOSCOPY;  colonoscopy;  Surgeon: Lobito Mas MD;  Location: [facility]    COLONOSCOPY N/A 9/13/2021     Procedure: COLONOSCOPY, WITH POLYPECTOMY AND BIOPSY;  Surgeon: Sam Liz MD;  Location: [facility]    COLONOSCOPY WITH CO2 INSUFFLATION N/A 11/19/2018     Procedure: COLONOSCOPY WITH CO2 INSUFFLATION;  Surgeon: Goyo Blank MD;  Location: [facility]    DECOMPRESSION LUMBAR TWO LEVELS Bilateral 12/8/2016     Procedure: DECOMPRESSION LUMBAR TWO LEVELS;  Surgeon: Bang Burrell MD;  Location: [facility]    DILATION AND CURETTAGE, HYSTEROSCOPY, ABLATE ENDOMETRIUM NOVASURE, COMBINED N/A 6/12/2019     Procedure: hysteroscopy, dilation & curettage, polypectomy, endometrial ablation;  Surgeon: Fredy Pham MD;  Location: [facility]    ESOPHAGOSCOPY, GASTROSCOPY, DUODENOSCOPY (EGD), COMBINED   11/8/2013     Procedure: COMBINED ESOPHAGOSCOPY, GASTROSCOPY, DUODENOSCOPY (EGD), BIOPSY SINGLE OR MULTIPLE;  Esophagoscopy, Gastroscopy, Duodenoscopy EGD with multiple biopsies;  Surgeon: eTja Koch MD;  Location: [facility]    ESOPHAGOSCOPY, GASTROSCOPY, DUODENOSCOPY (EGD), COMBINED N/A 2/18/2021     Procedure: ESOPHAGOGASTRODUODENOSCOPY, WITH BIOPSY;  Surgeon: Blanca Ba MD;  [facility]    ESOPHAGOSCOPY, GASTROSCOPY, DUODENOSCOPY (EGD), COMBINED N/A 4/11/2022     Procedure: ESOPHAGOGASTRODUODENOSCOPY, WITH BIOPSY;  Surgeon: Fredy Jimenez MD;  Location: [facility]     REMOVAL OF TONSILS,<13 Y/O         Tonsils <10y.o.    HC TOOTH EXTRACTION W/FORCEP        INJECT BLOCK MEDIAL BRANCH CERVICAL/THORACIC/LUMBAR N/A 7/23/2020     Procedure: Sacral 1,2,3 Lateral Branch Blocks and lumbar 5 medial branch  blocks bilaterally;  Surgeon: Maurisio Goetz MD;  [facility]    INJECT JOINT SACROILIAC Left 2020     Procedure: Left Lumbar 5 Sacral 1 nerve root injections.;  Surgeon: Maurisio Goetz MD;  [facility]    INJECT TRIGGER POINT Right 3/29/2024     Procedure: Ultrasound-guided RIGHT anterior scalene muscle diagnostic block;  Surgeon: Brandi Dennis MD;  [facility]: [facility]    REPAIR MUSCLE UPPER EXTREMITY Right 2021     Procedure: DIVISION RIGHT PECTORALIS MUSCLE;  Surgeon: Davie Dutta;  Location: [facility]    REPAIR TENDON ELBOW   2014     Procedure: REPAIR TENDON ELBOW;  Surgeon: Chris Johnston MD;  [facility]    ULTRASONIC REMOVAL SOFT TISSUE (LOCATION) Right 2018     Procedure: Tenex right foot;  Surgeon: Patel Martínez DO;  Location: [facility]    ULTRASONIC REMOVAL SOFT TISSUE (LOCATION) Left 2023     Procedure: EXCISION, SOFT TISSUE, ELBOW, USING ULTRASONIC ASPIRATOR SYSTEM;  Surgeon: Patel Martínez DO;  Location: [facility]    VASCULAR SURGERY         Thoracic Outlet Syndrome         Family History            Problem (# of Occurrences) Relation (Name,Age of Onset)     Unknown/Adopted (1) Father     Kidney Disease (1) Maternal Aunt     Allergies (6) Mother, Father, Sister, Son, Brother, Daughter     Arthritis (2) Mother: RA, Sister     Blood Disease (1) Paternal Aunt: LGL T cell Leukemia     Cancer (3) Mother: colon, Father, Sister: vaginal     Cardiovascular (1) Father     Depression (2) Mother, Sister     Diabetes (1) Mother: adult     Gastrointestinal Disease (1) Mother: ibs     Genitourinary Problems (1) Mother: kidney cyst     Gynecology (2) Mother: endometriosis, Sister: endometriosis     Heart Disease (3) Maternal Grandfather: MI,  - 60's, Father: mi-age mid 40's, Brother     Hypertension (1) Mother     Lipids (3) Mother, Father, Paternal Grandmother     Osteoporosis (1) Paternal Grandmother     Respiratory (2) Father: asthma, Son: asthma      Thyroid Disease (2) Mother, Paternal Grandmother     Cancer - colorectal (1) Mother     Lupus (1) Maternal Aunt     Other Cancer (1) Father: renal cancer     Rheumatoid Arthritis (1) Paternal Aunt     Bladder Cancer (1) Maternal Aunt                Social History   Social History                Socioeconomic History    Marital status:        Spouse name: Robert    Number of children: 2    Years of education: 12    Highest education level: Not on file   Occupational History    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   Tobacco Use    Smoking status: Never    Smokeless tobacco: Never   Vaping Use    Vaping status: Former    Substances: THC    Devices: Pre-filled or refillable cartridge   Substance and Sexual Activity    Alcohol use: Yes       Comment: social    Drug use: No       Types: Marijuana       Comment: Medical marijuana    Sexual activity: Yes       Partners: Male       Birth control/protection: Surgical       Comment: vasectomy   Other Topics Concern     Service No    Blood Transfusions No    Caffeine Concern No       Comment: 0    Occupational Exposure No    Hobby Hazards Yes       Comment: horses    Sleep Concern No    Stress Concern No    Weight Concern Yes    Special Diet Yes       Comment: nhung's    Back Care No    Exercise Yes       Comment: occ    Bike Helmet Not Asked       Comment: na    Seat Belt Yes    Self-Exams Yes       Comment: occ    Parent/sibling w/ CABG, MI or angioplasty before 65F 55M? Not Asked   Social History Narrative    Not on file      Social Determinants of Health              Financial Resource Strain: Low Risk  (6/17/2024)     Financial Resource Strain      Within the past 12 months, have you or your family members you live with been unable to get utilities (heat, electricity) when it was really needed?: No   Food Insecurity: Low Risk  (6/17/2024)     Food Insecurity      Within the past 12 months, did you worry that your food would run out before you got  money to buy more?: No      Within the past 12 months, did the food you bought just not last and you didn t have money to get more?: No   Transportation Needs: Low Risk  (6/17/2024)     Transportation Needs      Within the past 12 months, has lack of transportation kept you from medical appointments, getting your medicines, non-medical meetings or appointments, work, or from getting things that you need?: No   Physical Activity: Sufficiently Active (4/5/2024)     Received from [facility]     Exercise Vital Sign      Days of Exercise per Week: 7 days      Minutes of Exercise per Session: 30 min   Stress: No Stress Concern Present (11/21/2019)     Received from [facility], [facility]     [place] [employer] - Occupational Stress Questionnaire      Feeling of Stress : Not at all   Social Connections: Unknown (8/13/2024)     Received from [facility]     Social Connections      Frequency of Communication with Friends and Family: Not on file   Interpersonal Safety: Low Risk  (9/15/2024)     Interpersonal Safety      Do you feel physically and emotionally safe where you currently live?: Yes      Within the past 12 months, have you been hit, slapped, kicked or otherwise physically hurt by someone?: No      Within the past 12 months, have you been humiliated or emotionally abused in other ways by your partner or ex-partner?: No   Housing Stability: Low Risk  (6/17/2024)     Housing Stability      Do you have housing? : Yes      Are you worried about losing your housing?: No            Current Outpatient Prescriptions     Current Outpatient Medications   Medication Sig Dispense Refill    ACETAMINOPHEN PO Take 650 mg by mouth every 6 hours as needed for mild pain.      albuterol (PROAIR HFA/PROVENTIL HFA/VENTOLIN HFA) 108 (90 Base) MCG/ACT inhaler Inhale 2 puffs into the lungs every 6 hours as needed. 18 g 0    benzonatate (TESSALON) 200 MG capsule Take 1 capsule (200  mg) by mouth 3 times daily as needed. 30 capsule 0    hydrOXYzine HCl (ATARAX) 10 MG tablet Take 10 mg by mouth 3 times daily as needed for itching.      ketoconazole (NIZORAL) 2 % external cream Apply topically daily 60 g 1    meloxicam (MOBIC) 15 MG tablet Take 1 tablet (15 mg) by mouth daily. 90 tablet 1    tirzepatide (MOUNJARO) 15 MG/0.5ML pen Inject 15 mg Subcutaneous every 7 days 6 mL 3             On examination, patient is alert and cooperative.  Vitals are stable.  She is afebrile.     HEENT is notable for right facial paresis from Bell's palsy.  She is able to move her upper extremities functionally.  She is able to carry out straight leg raising test.  She has some difficulty with Gaurav's on the left side.  She has healed scars in the back.  On the left side the SI joint and piriformis were tender.     Musculoskeletal examination of the left lower extremity revealed areas of tenderness with increased tone in the left peroneus longus.  There was also an area affecting the left medial gastrocnemius.  The left piriformis was also tender with abnormal tone in the affected muscles including the piriformis and the peroneal's.  These recreated her symptoms.     Neurologically she is able to stand.  Romberg is negative.  Coordination tests are intact.  Strength is full.  She is able to walk on her heels and toes.  Reflexes are symmetric and plantars are downgoing.     She responds to touch including temperature and vibration.     Impression: At this point this 50-year-old woman with a known history of Trina-Danlos, history of Lyme's disease, history of widespread aches and pains.  She also has had issues with her back and has undergone decompression followed by spinal cord stimulator followed by fusion and replacement of her spinal cord stimulator.  She is still using it though she is not sure if it is helping as much.     Currently her examination is notable for issues in her lower back especially the left  SI and piriformis.  There is also involvement of the left peroneus longus and gastrocnemius medius and left piriformis and the gluteal region.  Her trigger point injections were beneficial while the medication was working.  Her current problems are related to the abnormal tone and muscle spasms in the affected muscles.  These are focal in nature and are consistent with dystonia/focal dystonia.  I would therefore proceed with neurotoxin and use 100 units of Botox.  I will see her in follow-up in 4 to 6 weeks time.  Depending on her improvement, she would likely repeat  every 3 months.     20 minutes were spent on the day of the encounter for this visit, exclusive of the procedure time, with greater than 50% was for counseling on above-mentioned issues.       PROCEDURE NOTE: With her informed consent, after explaining the benefits and risks of the procedure, using ChloraPrep for skin prep, EMG for localization, preservative-free normal saline for dilution, 2 cc per 100 units, 25 units were injected into the left peroneus longus muscle at 2 different sites for a total of 50 units.  25 units were injected into the left medial gastrocs and finally 25 units were injected into the left piriformis.  She tolerated the procedure well.    She can ice the region, anticipate the onset of Botox within 1 to 3 days, peak in 2 weeks and a duration of about 12 weeks.    Thank you are much for allow me to assist in her care.    Sunny Arechiga MD

## 2025-02-03 ENCOUNTER — ANCILLARY PROCEDURE (OUTPATIENT)
Dept: GENERAL RADIOLOGY | Facility: OTHER | Age: 50
End: 2025-02-03
Attending: PHYSICIAN ASSISTANT
Payer: COMMERCIAL

## 2025-02-03 ENCOUNTER — OFFICE VISIT (OUTPATIENT)
Dept: FAMILY MEDICINE | Facility: OTHER | Age: 50
End: 2025-02-03
Payer: COMMERCIAL

## 2025-02-03 VITALS
TEMPERATURE: 97.5 F | BODY MASS INDEX: 26.76 KG/M2 | OXYGEN SATURATION: 100 % | WEIGHT: 166.5 LBS | HEART RATE: 75 BPM | HEIGHT: 66 IN | DIASTOLIC BLOOD PRESSURE: 76 MMHG | RESPIRATION RATE: 15 BRPM | SYSTOLIC BLOOD PRESSURE: 129 MMHG

## 2025-02-03 DIAGNOSIS — Z98.1 S/P LUMBAR FUSION: ICD-10-CM

## 2025-02-03 DIAGNOSIS — Z96.82 S/P PLACEMENT OF NERVE STIMULATOR: Primary | ICD-10-CM

## 2025-02-03 DIAGNOSIS — M51.27 HERNIATION OF INTERVERTEBRAL DISC BETWEEN L5 AND S1: ICD-10-CM

## 2025-02-03 DIAGNOSIS — E78.5 HYPERLIPIDEMIA LDL GOAL <130: ICD-10-CM

## 2025-02-03 DIAGNOSIS — M47.896 OTHER OSTEOARTHRITIS OF SPINE, LUMBAR REGION: ICD-10-CM

## 2025-02-03 DIAGNOSIS — E04.1 THYROID NODULE: ICD-10-CM

## 2025-02-03 DIAGNOSIS — Z96.82 S/P PLACEMENT OF NERVE STIMULATOR: ICD-10-CM

## 2025-02-03 PROCEDURE — G2211 COMPLEX E/M VISIT ADD ON: HCPCS | Performed by: PHYSICIAN ASSISTANT

## 2025-02-03 PROCEDURE — 99213 OFFICE O/P EST LOW 20 MIN: CPT | Performed by: PHYSICIAN ASSISTANT

## 2025-02-03 PROCEDURE — 72100 X-RAY EXAM L-S SPINE 2/3 VWS: CPT | Mod: TC | Performed by: RADIOLOGY

## 2025-02-03 ASSESSMENT — PAIN SCALES - GENERAL: PAINLEVEL_OUTOF10: MODERATE PAIN (5)

## 2025-02-03 NOTE — PROGRESS NOTES
"  Assessment & Plan   The longitudinal plan of care for the diagnosis(es)/condition(s) as documented were addressed during this visit. Due to the added complexity in care, I will continue to support Bhupinder in the subsequent management and with ongoing continuity of care.    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  She was afraid that it could have migrated again.  X-rays today reveal that it appears to be within the similar position as previously.  I do wonder that with some of the redundancy of tissue that she has that she is lost weight that this could be shifting when she sits down or puts any pressure in this region and that is what she is feeling.  Would have her follow-up with her specialist in the near future.   - XR Lumbar Spine 2/3 Views; Future    Hyperlipidemia LDL goal <130  Thyroid nodule   Due for related labs but is being seen next week for this.    Subjective   Bhupinder is a 50 year old, presenting for the following health issues:  Nerve Stimulator pain        2/3/2025     3:43 PM   Additional Questions   Roomed by chacha   Accompanied by self     History of Present Illness       Reason for visit:  Check spine stimulator battery area pain   She is taking medications regularly.     Repositioned in October, now having increased pain. Radiates in right leg.        Review of Systems  Constitutional, HEENT, cardiovascular, pulmonary, GI, , musculoskeletal, neuro, skin, endocrine and psych systems are negative, except as otherwise noted.      Objective    /76   Pulse 75   Temp 97.5  F (36.4  C) (Temporal)   Resp 15   Ht 1.665 m (5' 5.55\")   Wt 75.5 kg (166 lb 8 oz)   SpO2 100%   BMI 27.24 kg/m    Body mass index is 27.24 kg/m .  Physical Exam "   GENERAL: alert and no distress  RESP: lungs clear to auscultation - no rales, rhonchi or wheezes  CV: regular rate and rhythm, normal S1 S2, no S3 or S4, no murmur, click or rub, no peripheral edema  MS: no gross musculoskeletal defects noted, no edema  SKIN: no suspicious lesions or rashes multiple scars are present to exposed visible skin today.  Good healing of current scars are noted.  The nerve stimulator is relatively easy to palpate in the area of the most recent scar to the right lower lumbo-sacral region.  NEURO: Normal strength and tone, mentation intact and speech normal  PSYCH: mentation appears normal, affect normal/bright    X-ray: negative for concerning pathology to my independent review today.  Stable presentation at this point in kristen.  it will be overread by radiology.    No results found. However, due to the size of the patient record, not all encounters were searched. Please check Results Review for a complete set of results.        Signed Electronically by: Otoniel Quiroz PA-C

## 2025-02-13 ENCOUNTER — OFFICE VISIT (OUTPATIENT)
Dept: FAMILY MEDICINE | Facility: OTHER | Age: 50
End: 2025-02-13
Payer: COMMERCIAL

## 2025-02-13 VITALS
HEIGHT: 66 IN | WEIGHT: 165 LBS | SYSTOLIC BLOOD PRESSURE: 122 MMHG | DIASTOLIC BLOOD PRESSURE: 72 MMHG | RESPIRATION RATE: 15 BRPM | BODY MASS INDEX: 26.52 KG/M2 | TEMPERATURE: 97 F | HEART RATE: 91 BPM | OXYGEN SATURATION: 98 %

## 2025-02-13 DIAGNOSIS — G47.09 OTHER INSOMNIA: ICD-10-CM

## 2025-02-13 DIAGNOSIS — E04.1 THYROID NODULE: ICD-10-CM

## 2025-02-13 DIAGNOSIS — M47.896 OTHER OSTEOARTHRITIS OF SPINE, LUMBAR REGION: ICD-10-CM

## 2025-02-13 DIAGNOSIS — G89.29 OTHER CHRONIC PAIN: ICD-10-CM

## 2025-02-13 DIAGNOSIS — E78.5 HYPERLIPIDEMIA LDL GOAL <130: ICD-10-CM

## 2025-02-13 DIAGNOSIS — Z00.00 ROUTINE HISTORY AND PHYSICAL EXAMINATION OF ADULT: Primary | ICD-10-CM

## 2025-02-13 LAB
ALBUMIN SERPL BCG-MCNC: 4.7 G/DL (ref 3.5–5.2)
ALP SERPL-CCNC: 72 U/L (ref 40–150)
ALT SERPL W P-5'-P-CCNC: 16 U/L (ref 0–50)
ANION GAP SERPL CALCULATED.3IONS-SCNC: 13 MMOL/L (ref 7–15)
AST SERPL W P-5'-P-CCNC: 19 U/L (ref 0–45)
BILIRUB SERPL-MCNC: 0.4 MG/DL
BUN SERPL-MCNC: 14.8 MG/DL (ref 6–20)
CALCIUM SERPL-MCNC: 9.6 MG/DL (ref 8.8–10.4)
CHLORIDE SERPL-SCNC: 104 MMOL/L (ref 98–107)
CHOLEST SERPL-MCNC: 194 MG/DL
CREAT SERPL-MCNC: 0.58 MG/DL (ref 0.51–0.95)
EGFRCR SERPLBLD CKD-EPI 2021: >90 ML/MIN/1.73M2
FASTING STATUS PATIENT QL REPORTED: YES
FASTING STATUS PATIENT QL REPORTED: YES
GLUCOSE SERPL-MCNC: 89 MG/DL (ref 70–99)
HCO3 SERPL-SCNC: 24 MMOL/L (ref 22–29)
HDLC SERPL-MCNC: 70 MG/DL
HGB BLD-MCNC: 12.6 G/DL (ref 11.7–15.7)
IRON BINDING CAPACITY (ROCHE): 240 UG/DL (ref 240–430)
IRON SATN MFR SERPL: 23 % (ref 15–46)
IRON SERPL-MCNC: 54 UG/DL (ref 37–145)
LDLC SERPL CALC-MCNC: 112 MG/DL
NONHDLC SERPL-MCNC: 124 MG/DL
POTASSIUM SERPL-SCNC: 4 MMOL/L (ref 3.4–5.3)
PROT SERPL-MCNC: 7.7 G/DL (ref 6.4–8.3)
SODIUM SERPL-SCNC: 141 MMOL/L (ref 135–145)
TRIGL SERPL-MCNC: 59 MG/DL
TSH SERPL DL<=0.005 MIU/L-ACNC: 1.16 UIU/ML (ref 0.3–4.2)

## 2025-02-13 RX ORDER — HYDROXYZINE HYDROCHLORIDE 25 MG/1
25-50 TABLET, FILM COATED ORAL 3 TIMES DAILY PRN
Qty: 90 TABLET | Refills: 1 | Status: SHIPPED | OUTPATIENT
Start: 2025-02-13

## 2025-02-13 SDOH — HEALTH STABILITY: PHYSICAL HEALTH: ON AVERAGE, HOW MANY DAYS PER WEEK DO YOU ENGAGE IN MODERATE TO STRENUOUS EXERCISE (LIKE A BRISK WALK)?: 7 DAYS

## 2025-02-13 SDOH — HEALTH STABILITY: PHYSICAL HEALTH: ON AVERAGE, HOW MANY MINUTES DO YOU ENGAGE IN EXERCISE AT THIS LEVEL?: 20 MIN

## 2025-02-13 ASSESSMENT — SOCIAL DETERMINANTS OF HEALTH (SDOH): HOW OFTEN DO YOU GET TOGETHER WITH FRIENDS OR RELATIVES?: ONCE A WEEK

## 2025-02-13 ASSESSMENT — PAIN SCALES - GENERAL: PAINLEVEL_OUTOF10: MODERATE PAIN (5)

## 2025-02-13 NOTE — PROGRESS NOTES
Preventive Care Visit  [facility]  Otoniel Quiroz PA-C, Family Medicine  Feb 13, 2025      Assessment & Plan     Routine history and physical examination of adult  50-year-old female here for routine physical.  Related labs.  Follow-up in 1 year.  - Lipid panel reflex to direct LDL Fasting; Future  - Comprehensive metabolic panel (BMP + Alb, Alk Phos, ALT, AST, Total. Bili, TP); Future  - Hemoglobin; Future  - Lipid panel reflex to direct LDL Fasting  - Comprehensive metabolic panel (BMP + Alb, Alk Phos, ALT, AST, Total. Bili, TP)  - Hemoglobin    Hyperlipidemia LDL goal <130  LDL goal based on age.  Labs pending.  Follow-up based on results.  - Lipid panel reflex to direct LDL Fasting; Future  - Comprehensive metabolic panel (BMP + Alb, Alk Phos, ALT, AST, Total. Bili, TP); Future  - Lipid panel reflex to direct LDL Fasting  - Comprehensive metabolic panel (BMP + Alb, Alk Phos, ALT, AST, Total. Bili, TP)    Thyroid nodule  Historically noted.  Due for recheck of this.  Follow-up based on results.  - TSH WITH FREE T4 REFLEX; Future  - US Thyroid; Future  - TSH WITH FREE T4 REFLEX    Other osteoarthritis of spine, lumbar region  Other insomnia  Other chronic pain  Definitely helps with sleep.  Follow-up as needed.  - hydrOXYzine HCl (ATARAX) 25 MG tablet; Take 1-2 tablets (25-50 mg) by mouth 3 times daily as needed for itching or other (insomnia pain).    Patient has been advised of split billing requirements and indicates understanding: Yes        Counseling  Appropriate preventive services were addressed with this patient via screening, questionnaire, or discussion as appropriate for fall prevention, nutrition, physical activity, Tobacco-use cessation, social engagement, weight loss and cognition.  Checklist reviewing preventive services available has been given to the patient.  Reviewed patient's diet, addressing concerns and/or questions.       Regular exercise    Subjective   Bhupinder is a 50  year old, presenting for the following:  Physical        2/13/2025     7:50 AM   Additional Questions   Roomed by saman RENTERIA      Health Care Directive  Patient does not have a Health Care Directive: Patient states has Advance Directive and will bring in a copy to clinic.      2/13/2025   General Health   How would you rate your overall physical health? Good   Feel stress (tense, anxious, or unable to sleep) Not at all         2/13/2025   Nutrition   Three or more servings of calcium each day? Yes   Diet: Carbohydrate counting   How many servings of fruit and vegetables per day? 4 or more   How many sweetened beverages each day? 0-1         2/13/2025   Exercise   Days per week of moderate/strenous exercise 7 days   Average minutes spent exercising at this level 20 min         2/13/2025   Social Factors   Frequency of gathering with friends or relatives Once a week   Worry food won't last until get money to buy more No   Food not last or not have enough money for food? No   Do you have housing? (Housing is defined as stable permanent housing and does not include staying ouside in a car, in a tent, in an abandoned building, in an overnight shelter, or couch-surfing.) Yes   Are you worried about losing your housing? No   Lack of transportation? No   Unable to get utilities (heat,electricity)? No         2/13/2025   Fall Risk   Fallen 2 or more times in the past year? No   Trouble with walking or balance? Yes           2/13/2025   Dental   Dentist two times every year? Yes            Today's PHQ-2 Score:       2/13/2025     7:49 AM   PHQ-2 ( 1999 [employer])   Q1: Little interest or pleasure in doing things 0    Q2: Feeling down, depressed or hopeless 0    PHQ-2 Score 0    Q1: Little interest or pleasure in doing things Not at all   Q2: Feeling down, depressed or hopeless Not at all   PHQ-2 Score 0       Proxy-reported           2/13/2025   Substance Use   Alcohol more than 3/day or more than 7/wk No   Do you use  any other substances recreationally? No     Social History     Tobacco Use    Smoking status: Never    Smokeless tobacco: Never   Vaping Use    Vaping status: Former    Substances: THC    Devices: Pre-filled or refillable cartridge   Substance Use Topics    Alcohol use: Yes     Comment: social    Drug use: No     Types: Marijuana     Comment: Medical marijuana           12/24/2024   LAST FHS-7 RESULTS   1st degree relative breast or ovarian cancer No   Any relative bilateral breast cancer No   Any male have breast cancer No   Any ONE woman have BOTH breast AND ovarian cancer No   Any woman with breast cancer before 50yrs No   2 or more relatives with breast AND/OR ovarian cancer No   2 or more relatives with breast AND/OR bowel cancer No        Mammogram Screening - Mammogram every 1-2 years updated in Health Maintenance based on mutual decision making        2/13/2025   STI Screening   New sexual partner(s) since last STI/HIV test? No     History of abnormal Pap smear: No - age 30- 64 PAP with HPV every 5 years recommended        Latest Ref Rng & Units 11/16/2023     7:41 AM 12/31/2018     7:56 AM 12/31/2018     7:55 AM   PAP / HPV   PAP  Negative for Intraepithelial Lesion or Malignancy (NILM)      PAP (Historical)   NIL     HPV 16 DNA Negative Negative   Negative    HPV 18 DNA Negative Negative   Negative    Other HR HPV Negative Negative   Negative      ASCVD Risk   The 10-year ASCVD risk score (Sincere DK, et al., 2019) is: 0.9%    Values used to calculate the score:      Age: 50 years      Sex: Female      Is Non- : No      Diabetic: No      Tobacco smoker: No      Systolic Blood Pressure: 122 mmHg      Is BP treated: No      HDL Cholesterol: 74 mg/dL      Total Cholesterol: 194 mg/dL       Reviewed and updated as needed this visit by Provider                    Past Medical History:   Diagnosis Date    Abnormal mammogram 09/21/2016    right breast     AD (atopic dermatitis)  10/29/2015    Allergic rhinitis due to other allergen     Arthritis     Bell's palsy     Chronic fatigue 06/06/2012    Chronic infection     MRSA - 2015 scalp and prior to Abdomen    Contact dermatitis and other eczema, due to unspecified cause     eczema    Contusion of left foot, initial encounter 03/16/2016    DJD (degenerative joint disease), lumbar 09/26/2013    DJD (degenerative joint disease), lumbar 09/26/2013    Ds DNA antibody positive 04/16/2014    borderline.     Elevated blood pressure reading without diagnosis of hypertension 12/24/2013    Facial contusion 12/15/2014    hit by horse     Foraminal stenosis of lumbar region 09/26/2013    Frontal headache 12/15/2014    Gastroesophageal reflux disease, esophagitis presence not specified 06/29/2016    Heat sensitivity 10/29/2015    HTN, goal below 140/90 09/23/2015    Hyperlipidemia LDL goal <130 08/30/2017    Irregular heart beat     Keratitis sicca 10/31/2012    Left shoulder pain 09/26/2013    Lumbar radiculopathy 01/22/2014    Migraine headache 10/23/2013    Migraine, unspecified, with intractable migraine, so stated, without mention of status migrainosus     Motion sickness     MRSA infection 10/20/2015    Muscular wasting and disuse atrophy, not elsewhere classified 06/09/2014    right SCM, right wrist,      Numbness and tingling     both legs anf feet greater on the left    PAC (premature atrial contraction) 07/15/2010    Personal history of COVID-19 12/02/2021    Plantar fasciitis 09/23/2015    PONV (postoperative nausea and vomiting)     Jaycob complexion 05/23/2019    Skin lesion 10/20/2015    posterior superior scalp     Spasm of muscle 07/11/2017    Sternocleidomastoid muscle tenderness 04/05/2017    left         Telangiectasia of face 12/19/2014    Thyroid nodule 05/12/2022    Tooth pain 10/20/2015    left lower primary molar      Past Surgical History:   Procedure Laterality Date    AS INJ TRANSFORAMIN EPIDURAL, LUMB/SACR SINGLE       COLONOSCOPY  3/11/2013    Procedure: COLONOSCOPY;  colonoscopy;  Surgeon: Lobito Mas MD;  [facility]    COLONOSCOPY N/A 9/13/2021    Procedure: COLONOSCOPY, WITH POLYPECTOMY AND BIOPSY;  Surgeon: Sam Liz MD;  Location: [facility]    COLONOSCOPY WITH CO2 INSUFFLATION N/A 11/19/2018    Procedure: COLONOSCOPY WITH CO2 INSUFFLATION;  Surgeon: Goyo Blank MD;  [facility]: [facility]    DECOMPRESSION LUMBAR TWO LEVELS Bilateral 12/8/2016    Procedure: DECOMPRESSION LUMBAR TWO LEVELS;  Surgeon: Bang Burrell MD;  Location: [facility]    DILATION AND CURETTAGE, HYSTEROSCOPY, ABLATE ENDOMETRIUM NOVASURE, COMBINED N/A 6/12/2019    Procedure: hysteroscopy, dilation & curettage, polypectomy, endometrial ablation;  Surgeon: Fredy Pham MD;  [facility]    ESOPHAGOSCOPY, GASTROSCOPY, DUODENOSCOPY (EGD), COMBINED  11/8/2013    Procedure: COMBINED ESOPHAGOSCOPY, GASTROSCOPY, DUODENOSCOPY (EGD), BIOPSY SINGLE OR MULTIPLE;  Esophagoscopy, Gastroscopy, Duodenoscopy EGD with multiple biopsies;  Surgeon: Teja Koch MD;  [facility]    ESOPHAGOSCOPY, GASTROSCOPY, DUODENOSCOPY (EGD), COMBINED N/A 2/18/2021    Procedure: ESOPHAGOGASTRODUODENOSCOPY, WITH BIOPSY;  Surgeon: Blanca Ba MD;  [facility]    ESOPHAGOSCOPY, GASTROSCOPY, DUODENOSCOPY (EGD), COMBINED N/A 4/11/2022    Procedure: ESOPHAGOGASTRODUODENOSCOPY, WITH BIOPSY;  Surgeon: Fredy Jimenez MD;  Location: [facility]     REMOVAL OF TONSILS,<13 Y/O      Tonsils <10y.o.    HC TOOTH EXTRACTION W/FORCEP      INJECT BLOCK MEDIAL BRANCH CERVICAL/THORACIC/LUMBAR N/A 7/23/2020    Procedure: Sacral 1,2,3 Lateral Branch Blocks and lumbar 5 medial branch blocks bilaterally;  Surgeon: Maurisio Goetz MD;  [facility]    INJECT JOINT SACROILIAC Left 9/24/2020    Procedure: Left Lumbar 5 Sacral 1 nerve root injections.;  Surgeon: Maurisio Goetz MD;  [facility]    INJECT TRIGGER POINT Right 3/29/2024    Procedure:  Ultrasound-guided RIGHT anterior scalene muscle diagnostic block;  Surgeon: Brandi Dennis MD;  [facility]: [facility]    REPAIR MUSCLE UPPER EXTREMITY Right 2021    Procedure: DIVISION RIGHT PECTORALIS MUSCLE;  Surgeon: Davie Dutta;  [facility]: [facility]    REPAIR TENDON ELBOW  2014    Procedure: REPAIR TENDON ELBOW;  Surgeon: Chris Johnston MD;  [facility]    ULTRASONIC REMOVAL SOFT TISSUE (LOCATION) Right 2018    Procedure: Tenex right foot;  Surgeon: Patel Martínez DO;  [facility]: [facility]    ULTRASONIC REMOVAL SOFT TISSUE (LOCATION) Left 2023    Procedure: EXCISION, SOFT TISSUE, ELBOW, USING ULTRASONIC ASPIRATOR SYSTEM;  Surgeon: Patel Martínez DO;  [facility]: [facility]    VASCULAR SURGERY      Thoracic Outlet Syndrome     OB History    Para Term  AB Living   2 2 0 2 0 2   SAB IAB Ectopic Multiple Live Births   0 0 0 0 2      # Outcome Date GA Lbr Levy/2nd Weight Sex Type Anes PTL Lv   2  98        JIMMY      Name: Gera Comsb  89        JIMMY      Name: Ruth     Lab work is in process  Labs reviewed in EPIC  BP Readings from Last 3 Encounters:   25 122/72   25 129/76   24 132/84    Wt Readings from Last 3 Encounters:   25 74.8 kg (165 lb)   25 75.5 kg (166 lb 8 oz)   24 76.7 kg (169 lb)                  Patient Active Problem List   Diagnosis    Migraine, unspecified, with intractable migraine, so stated, without mention of status migrainosus    Bell's palsy    Contact dermatitis and other eczema, due to unspecified cause    Lesion of skin of scalp    Lyme disease    PAC (premature atrial contraction)    Palpitations    Raynaud phenomenon    Hair loss    Shoulder joint instability    Family history of melanoma    Dry eyes    Keratitis sicca    Family history of colon cancer    Pain in joint, upper arm    FLORIDALMA positive    Plantar fascial fibromatosis    Foraminal stenosis of lumbar region    DJD  (degenerative joint disease), lumbar    Lumbar radiculopathy    Ds DNA antibody positive    Muscular wasting and disuse atrophy, not elsewhere classified    Telangiectasia of face    Lateral epicondylitis    Right shoulder pain    AD (atopic dermatitis)    Heat sensitivity    Rotator cuff arthropathy, right    Gastroesophageal reflux disease, esophagitis presence not specified    Abnormal mammogram    Acute cervical myofascial strain, initial encounter    Spasm of muscle    Hyperlipidemia LDL goal <130    Biceps tendinopathy, right    Intractable right heel pain    Abdominal pain, epigastric    Abdominal distension    PONV (postoperative nausea and vomiting)    Menorrhalgia    Uterine polyp    Abnormality of nail surface    Dry hair    Dry skin    Malaise and fatigue    Lymphadenopathy    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    Endometrium, hyperplasia - on recent CT scan    Pain in joint involving pelvic region and thigh, right    Right lateral abdominal pain    Right foot pain    Dental abscess - left mandible molar    Brachial plexopathy - right shoulder    Balance problems    Sinus arrhythmia seen on electrocardiogram    Family history of ischemic heart disease - father at 46 massive MI    Elevated blood pressure reading without diagnosis of hypertension    Hypertrophy of breast    History of cold sores    AK (actinic keratosis) - both hands at the lateral thenar aspect.    Eczema, unspecified type    Musculoskeletal pain    Fatigue, unspecified type    TOS (thoracic outlet syndrome)    Colitis    Abnormal CT scan, colon    Personal history of COVID-19    Pectoralis minor syndrome    History of endometrial ablation    Thyroid nodule    Abdominal wall strain, initial encounter    Abdominal wall pain in periumbilical region    Cervical radiculopathy    DDD (degenerative disc disease), cervical    Spinal stenosis in cervical region    Pain of right upper extremity    Weight gain    Obesity, Class II,  BMI 35-39.9    Morbid obesity (H)    Hyperglycemia    Weight loss    Weight disorder    Trina-Danlos syndrome    Hypermobility polyarthralgia syndrome    S/P lumbar fusion    Yeast dermatitis    Thrombocytosis, unspecified    Dysfunction of both eustachian tubes    S/P placement of nerve stimulator - migration noted    Acquired spondylolisthesis    Family history of cardiovascular disease    H/o Lyme disease    Leg pain    Low back pain    Other insomnia    Other chronic pain     Past Surgical History:   Procedure Laterality Date    AS INJ TRANSFORAMIN EPIDURAL, LUMB/SACR SINGLE      COLONOSCOPY  3/11/2013    Procedure: COLONOSCOPY;  colonoscopy;  Surgeon: Lobito Mas MD;  [facility]    COLONOSCOPY N/A 9/13/2021    Procedure: COLONOSCOPY, WITH POLYPECTOMY AND BIOPSY;  Surgeon: Sam Liz MD;  [facility]    COLONOSCOPY WITH CO2 INSUFFLATION N/A 11/19/2018    Procedure: COLONOSCOPY WITH CO2 INSUFFLATION;  Surgeon: Goyo Blank MD;  [facility]    DECOMPRESSION LUMBAR TWO LEVELS Bilateral 12/8/2016    Procedure: DECOMPRESSION LUMBAR TWO LEVELS;  Surgeon: Bang Burrell MD;  [facility]: [facility]    DILATION AND CURETTAGE, HYSTEROSCOPY, ABLATE ENDOMETRIUM NOVASURE, COMBINED N/A 6/12/2019    Procedure: hysteroscopy, dilation & curettage, polypectomy, endometrial ablation;  Surgeon: Fredy Pham MD;  [facility]    ESOPHAGOSCOPY, GASTROSCOPY, DUODENOSCOPY (EGD), COMBINED  11/8/2013    Procedure: COMBINED ESOPHAGOSCOPY, GASTROSCOPY, DUODENOSCOPY (EGD), BIOPSY SINGLE OR MULTIPLE;  Esophagoscopy, Gastroscopy, Duodenoscopy EGD with multiple biopsies;  Surgeon: Teja Koch MD;  Location:  GI    ESOPHAGOSCOPY, GASTROSCOPY, DUODENOSCOPY (EGD), COMBINED N/A 2/18/2021    Procedure: ESOPHAGOGASTRODUODENOSCOPY, WITH BIOPSY;  Surgeon: Blanca Ba MD;  [facility]    ESOPHAGOSCOPY, GASTROSCOPY, DUODENOSCOPY (EGD), COMBINED N/A 4/11/2022    Procedure:  ESOPHAGOGASTRODUODENOSCOPY, WITH BIOPSY;  Surgeon: Fredy Jimenez MD;  Location: [facility] OR    HC REMOVAL OF TONSILS,<13 Y/O      Tonsils <10y.o.    HC TOOTH EXTRACTION W/FORCEP      INJECT BLOCK MEDIAL BRANCH CERVICAL/THORACIC/LUMBAR N/A 7/23/2020    Procedure: Sacral 1,2,3 Lateral Branch Blocks and lumbar 5 medial branch blocks bilaterally;  Surgeon: Maurisio Goetz MD;  [facility]    INJECT JOINT SACROILIAC Left 9/24/2020    Procedure: Left Lumbar 5 Sacral 1 nerve root injections.;  Surgeon: Maurisio Goetz MD;  [facility]    INJECT TRIGGER POINT Right 3/29/2024    Procedure: Ultrasound-guided RIGHT anterior scalene muscle diagnostic block;  Surgeon: Brandi Dennis MD;  [facility]    REPAIR MUSCLE UPPER EXTREMITY Right 7/27/2021    Procedure: DIVISION RIGHT PECTORALIS MUSCLE;  Surgeon: Davie Dutta;  [facility]: [facility]    REPAIR TENDON ELBOW  7/9/2014    Procedure: REPAIR TENDON ELBOW;  Surgeon: Chris Johnston MD;  [facility]    ULTRASONIC REMOVAL SOFT TISSUE (LOCATION) Right 11/21/2018    Procedure: Tenex right foot;  Surgeon: Patel Martínez DO;  [facility]    ULTRASONIC REMOVAL SOFT TISSUE (LOCATION) Left 11/16/2023    Procedure: EXCISION, SOFT TISSUE, ELBOW, USING ULTRASONIC ASPIRATOR SYSTEM;  Surgeon: Patel Martínez DO;  [facility]    VASCULAR SURGERY      Thoracic Outlet Syndrome       Social History     Tobacco Use    Smoking status: Never    Smokeless tobacco: Never   Substance Use Topics    Alcohol use: Yes     Comment: social     Family History   Problem Relation Age of Onset    Diabetes Mother         adult    Cancer - colorectal Mother     Hypertension Mother     Allergies Mother     Cancer Mother         colon    Depression Mother     Gastrointestinal Disease Mother         ibs    Genitourinary Problems Mother         kidney cyst    Gynecology Mother         endometriosis    Lipids Mother     Thyroid Disease Mother     Arthritis Mother          RA    Heart Disease Maternal Grandfather         MI,  - 60's    Allergies Father     Unknown/Adopted Father     Heart Disease Father         mi-age mid 40's    Cardiovascular Father     Lipids Father     Respiratory Father         asthma    Cancer Father     Other Cancer Father         renal cancer    Depression Sister     Allergies Sister     Cancer Sister         vaginal    Gynecology Sister         endometriosis    Lipids Paternal Grandmother     Osteoporosis Paternal Grandmother     Thyroid Disease Paternal Grandmother     Respiratory Son         asthma    Allergies Son     Arthritis Sister     Allergies Brother     Heart Disease Brother     Allergies Daughter     Blood Disease Paternal Aunt         LGL T cell Leukemia    Rheumatoid Arthritis Paternal Aunt     Kidney Disease Maternal Aunt     Lupus Maternal Aunt     Bladder Cancer Maternal Aunt          Current Outpatient Medications   Medication Sig Dispense Refill    ACETAMINOPHEN PO Take 650 mg by mouth every 6 hours as needed for mild pain.      hydrOXYzine HCl (ATARAX) 25 MG tablet Take 1-2 tablets (25-50 mg) by mouth 3 times daily as needed for itching or other (insomnia pain). 90 tablet 1    meloxicam (MOBIC) 15 MG tablet Take 1 tablet (15 mg) by mouth daily. 90 tablet 1    tirzepatide (MOUNJARO) 15 MG/0.5ML pen Inject 15 mg Subcutaneous every 7 days 6 mL 3     Allergies   Allergen Reactions    Ceftriaxone Anaphylaxis     Hives, rash, racing heart beat    Bactrim [Sulfamethoxazole-Trimethoprim] Hives    Ciprofloxacin Other (See Comments)     Tendon Issues    Codeine     Copper      Rash      Doxycycline      Loss of skin pigmentation, skin loss.    Gold      Rash      Iodine Hives     WELTS    Iodine-131 Hives    Levaquin [Levofloxacin] Other (See Comments)     Tendon issues with levaquin and cipro    Morphine And Codeine Nausea and Vomiting    Nickel      rash    Steel [Staples]      Rash from staples      Sulfa Antibiotics Hives     Full Body     "Latex Rash    Penicillins Rash     Recent Labs   Lab Test 10/04/24  1502 06/13/24  1019 04/18/24  0804 02/08/24  0822 11/16/23  0800 05/01/23  1614 02/13/23  1616 01/10/23  0807 12/19/22  1737 07/07/22  1739 08/30/21  0820 07/27/21  1208 05/25/21  0740 11/05/20  1646   0000   A1C  --   --   --  5.3  --  5.3  --   --   --   --   --  5.3  --   --   --    LDL  --   --   --   --  111*  --   --   --  132* 116*  --  98 118*  --    < >   HDL  --   --   --   --  74  --   --   --   --  72  --  68 68  --   --    TRIG  --   --   --   --  43  --   --   --   --  95  --  68 81  --   --    ALT 18 37 17 19 18 20  --    < > 41 33   < >  --  27 32  --    CR 0.61 0.50* 0.56 0.53 0.51 0.62  --    < > 0.61 0.59   < > 0.63 0.62 0.73  --    GFRESTIMATED >90 >90 >90 >90 >90 >90  --    < > >90 >90   < > >90 >90 >90  --    GFRESTBLACK  --   --   --   --   --   --   --   --   --   --   --   --  >90 >90  --    POTASSIUM 4.2 4.1 4.0 4.6 4.3 3.9  --    < > 3.9 3.8   < > 4.1 3.9 3.8  --    TSH  --   --   --  1.02  --   --  0.99   < > 1.61  --    < >  --  0.90  --   --     < > = values in this interval not displayed.          Review of Systems  Constitutional, HEENT, cardiovascular, pulmonary, GI, , musculoskeletal, neuro, skin, endocrine and psych systems are negative, except as otherwise noted.     Objective    Exam  /72 (BP Location: Right arm, Cuff Size: Adult Regular)   Pulse 91   Temp 97  F (36.1  C) (Temporal)   Resp 15   Ht 1.67 m (5' 5.75\")   Wt 74.8 kg (165 lb)   SpO2 98%   BMI 26.84 kg/m     Estimated body mass index is 26.84 kg/m  as calculated from the following:    Height as of this encounter: 1.67 m (5' 5.75\").    Weight as of this encounter: 74.8 kg (165 lb).    Physical Exam  GENERAL: alert and no distress  EYES: Eyes grossly normal to inspection, PERRL and conjunctivae and sclerae normal  HENT: ear canals and TM's normal, nose and mouth without ulcers or lesions  NECK: no adenopathy, no asymmetry, masses, or " scars  RESP: lungs clear to auscultation - no rales, rhonchi or wheezes  CV: regular rate and rhythm, normal S1 S2, no S3 or S4, no murmur, click or rub, no peripheral edema  ABDOMEN: soft, nontender, no hepatosplenomegaly, no masses and bowel sounds normal  MS: no gross musculoskeletal defects noted, no edema  SKIN: no suspicious lesions or rashes  NEURO: Normal strength and tone, mentation intact and speech normal  PSYCH: mentation appears normal, affect normal/bright        Signed Electronically by: Otoniel Quiroz PA-C

## 2025-02-17 ENCOUNTER — HOSPITAL ENCOUNTER (OUTPATIENT)
Dept: ULTRASOUND IMAGING | Facility: CLINIC | Age: 50
Discharge: HOME | End: 2025-02-17
Attending: PHYSICIAN ASSISTANT | Admitting: PHYSICIAN ASSISTANT
Payer: COMMERCIAL

## 2025-02-17 DIAGNOSIS — E04.1 THYROID NODULE: ICD-10-CM

## 2025-02-17 PROCEDURE — 76536 US EXAM OF HEAD AND NECK: CPT

## 2025-02-27 ENCOUNTER — OFFICE VISIT (OUTPATIENT)
Dept: PHYSICAL MEDICINE AND REHAB | Facility: CLINIC | Age: 50
End: 2025-02-27
Payer: COMMERCIAL

## 2025-02-27 DIAGNOSIS — G24.8 FOCAL DYSTONIA: Primary | ICD-10-CM

## 2025-02-27 DIAGNOSIS — Z98.1 S/P LUMBAR FUSION: ICD-10-CM

## 2025-02-27 DIAGNOSIS — G89.29 OTHER CHRONIC PAIN: ICD-10-CM

## 2025-02-27 DIAGNOSIS — M62.838 SPASM OF MUSCLE: ICD-10-CM

## 2025-02-27 NOTE — PROGRESS NOTES
CC: Patient returns for follow-up visit for her ongoing issues related to left lower extremity aches and pains with numbness and tingling.     She was last seen by me on 1/21/2025.  Her interval history is notable for improvement for several weeks after the injection.  She has since noticed some return and spasms.  She has also had SI joint injections at [place].  This was only helpful for a short period of time.      She reports she is sleeping better without cramping.  She noticed that she was able to extend her left lower extremity without spasms.  She also noticed the ball and the toes of her left foot felt better.   She can still keeps the spinal cord stimulator on.  She has not found that it made a difference.  She has L4-S1 fusion with a screw across the SI joints.     Patient is a 50-year-old woman with a complex medical history.  She reports having lumbar issues requiring decompression.  Subsequently developed instability and required a fusion from L4-S1.  She also has a spinal cord stimulator which was recently repositioned to the gluteal region.  When I last saw her on 10/18/2024, she was still sore from the surgery.  She has been evaluated at multiple locations including [facility], [facility] and the [facility].     She recalls problems starting around 2011.  She had been running 6 days a week and eventually was unable to even walk.  She was felt to have widespread pain due to inflammation.  She was also diagnosed with Lyme's.  It was affecting her hips and back.  She also gives history of horseback riding.  She developed increased low back pain which turned out to be sciatica going on the left side worse on the right side.  It would extend all the way to the toes.  She also gives history of neck pain and shoulder pain and thoracic outlet syndrome.     She has been working with physical therapy and is unsure if they are able to offer her much.  She denies any issues with her bowel and bladder function.  She denies  fevers or weight loss.     She works as an  and enjoys it.  She is currently only doing part-time.  She does not smoke there is no alcohol use except occasional.  She denies chemical use.  She would like to become more active and tells me that she cannot be sitting idle and watching TV.     Past Medical History           Past Medical History:   Diagnosis Date    Abnormal mammogram 9/21/2016     right breast     AD (atopic dermatitis) 10/29/2015    Allergic rhinitis due to other allergen      Arthritis      Bell's palsy      Chronic fatigue 6/6/2012    Chronic infection       MRSA - 2015 scalp and prior to Abdomen    Contact dermatitis and other eczema, due to unspecified cause       eczema    Contusion of left foot, initial encounter 3/16/2016    DJD (degenerative joint disease), lumbar 9/26/2013    DJD (degenerative joint disease), lumbar 9/26/2013    Ds DNA antibody positive 4/16/2014     borderline.     Elevated blood pressure reading without diagnosis of hypertension 12/24/2013    Facial contusion 12/15/2014     hit by horse     Foraminal stenosis of lumbar region 9/26/2013    Frontal headache 12/15/2014    Gastroesophageal reflux disease, esophagitis presence not specified 6/29/2016    Heat sensitivity 10/29/2015    HTN, goal below 140/90 9/23/2015    Hyperlipidemia LDL goal <130 8/30/2017    Irregular heart beat      Keratitis sicca 10/31/2012    Left shoulder pain 9/26/2013    Lumbar radiculopathy 1/22/2014    Migraine headache 10/23/2013    Migraine, unspecified, with intractable migraine, so stated, without mention of status migrainosus      Motion sickness      MRSA infection 10/20/2015    Muscular wasting and disuse atrophy, not elsewhere classified 6/9/2014     right SCM, right wrist,      Numbness and tingling       both legs anf feet greater on the left    PAC (premature atrial contraction) 7/15/2010    Personal history of COVID-19 12/2/2021    Plantar fasciitis 9/23/2015    PONV (postoperative  nausea and vomiting)      Skin lesion 10/20/2015     posterior superior scalp     Spasm of muscle 7/11/2017    Telangiectasia of face 12/19/2014    Thyroid nodule 5/12/2022    Tooth pain 10/20/2015     left lower primary molar          Past Surgical History             Past Surgical History:   Procedure Laterality Date    AS INJ TRANSFORAMIN EPIDURAL, LUMB/SACR SINGLE        COLONOSCOPY   3/11/2013     Procedure: COLONOSCOPY;  colonoscopy;  Surgeon: Lobito Mas MD;  Location: [facility]    COLONOSCOPY N/A 9/13/2021     Procedure: COLONOSCOPY, WITH POLYPECTOMY AND BIOPSY;  Surgeon: Sam Liz MD;  Location: [facility]    COLONOSCOPY WITH CO2 INSUFFLATION N/A 11/19/2018     Procedure: COLONOSCOPY WITH CO2 INSUFFLATION;  Surgeon: Goyo Blank MD;  Location: [facility]    DECOMPRESSION LUMBAR TWO LEVELS Bilateral 12/8/2016     Procedure: DECOMPRESSION LUMBAR TWO LEVELS;  Surgeon: Bang Burrell MD;  Location: [facility]    DILATION AND CURETTAGE, HYSTEROSCOPY, ABLATE ENDOMETRIUM NOVASURE, COMBINED N/A 6/12/2019     Procedure: hysteroscopy, dilation & curettage, polypectomy, endometrial ablation;  Surgeon: Fredy Pham MD;  Location: [facility]    ESOPHAGOSCOPY, GASTROSCOPY, DUODENOSCOPY (EGD), COMBINED   11/8/2013     Procedure: COMBINED ESOPHAGOSCOPY, GASTROSCOPY, DUODENOSCOPY (EGD), BIOPSY SINGLE OR MULTIPLE;  Esophagoscopy, Gastroscopy, Duodenoscopy EGD with multiple biopsies;  Surgeon: Teja Koch MD;  [facility]    ESOPHAGOSCOPY, GASTROSCOPY, DUODENOSCOPY (EGD), COMBINED N/A 2/18/2021     Procedure: ESOPHAGOGASTRODUODENOSCOPY, WITH BIOPSY;  Surgeon: Blanca Ba MD;  [facility]    ESOPHAGOSCOPY, GASTROSCOPY, DUODENOSCOPY (EGD), COMBINED N/A 4/11/2022     Procedure: ESOPHAGOGASTRODUODENOSCOPY, WITH BIOPSY;  Surgeon: Fredy Jimenez MD;  Location: [facility]    HC REMOVAL OF TONSILS,<13 Y/O         Tonsils <10y.o.    HC TOOTH EXTRACTION W/FORCEP        INJECT BLOCK MEDIAL  BRANCH CERVICAL/THORACIC/LUMBAR N/A 2020     Procedure: Sacral 1,2,3 Lateral Branch Blocks and lumbar 5 medial branch blocks bilaterally;  Surgeon: Maurisio Goezt MD;  [facility]    INJECT JOINT SACROILIAC Left 2020     Procedure: Left Lumbar 5 Sacral 1 nerve root injections.;  Surgeon: Maurisio Goetz MD;  [facility]    INJECT TRIGGER POINT Right 3/29/2024     Procedure: Ultrasound-guided RIGHT anterior scalene muscle diagnostic block;  Surgeon: Brandi Dennis MD;  Location: [facility]    REPAIR MUSCLE UPPER EXTREMITY Right 2021     Procedure: DIVISION RIGHT PECTORALIS MUSCLE;  Surgeon: Davie Dutta;  Location: [facility]    REPAIR TENDON ELBOW   2014     Procedure: REPAIR TENDON ELBOW;  Surgeon: Chris Johnston MD;  [facility]    ULTRASONIC REMOVAL SOFT TISSUE (LOCATION) Right 2018     Procedure: Tenex right foot;  Surgeon: Patel Martínez DO;  Location: [facility]    ULTRASONIC REMOVAL SOFT TISSUE (LOCATION) Left 2023     Procedure: EXCISION, SOFT TISSUE, ELBOW, USING ULTRASONIC ASPIRATOR SYSTEM;  Surgeon: Patel Martínez DO;  Location: [facility]    VASCULAR SURGERY         Thoracic Outlet Syndrome         Family History            Problem (# of Occurrences) Relation (Name,Age of Onset)     Unknown/Adopted (1) Father     Kidney Disease (1) Maternal Aunt     Allergies (6) Mother, Father, Sister, Son, Brother, Daughter     Arthritis (2) Mother: RA, Sister     Blood Disease (1) Paternal Aunt: LGL T cell Leukemia     Cancer (3) Mother: colon, Father, Sister: vaginal     Cardiovascular (1) Father     Depression (2) Mother, Sister     Diabetes (1) Mother: adult     Gastrointestinal Disease (1) Mother: ibs     Genitourinary Problems (1) Mother: kidney cyst     Gynecology (2) Mother: endometriosis, Sister: endometriosis     Heart Disease (3) Maternal Grandfather: MI,  - 60's, Father: mi-age mid 40's, Brother     Hypertension (1) Mother     Lipids (3) Mother,  Father, Paternal Grandmother     Osteoporosis (1) Paternal Grandmother     Respiratory (2) Father: asthma, Son: asthma     Thyroid Disease (2) Mother, Paternal Grandmother     Cancer - colorectal (1) Mother     Lupus (1) Maternal Aunt     Other Cancer (1) Father: renal cancer     Rheumatoid Arthritis (1) Paternal Aunt     Bladder Cancer (1) Maternal Aunt                Social History   Social History                Socioeconomic History    Marital status:        Spouse name: Robert    Number of children: 2    Years of education: 12    Highest education level: Not on file   Occupational History    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   Tobacco Use    Smoking status: Never    Smokeless tobacco: Never   Vaping Use    Vaping status: Former    Substances: THC    Devices: Pre-filled or refillable cartridge   Substance and Sexual Activity    Alcohol use: Yes       Comment: social    Drug use: No       Types: Marijuana       Comment: Medical marijuana    Sexual activity: Yes       Partners: Male       Birth control/protection: Surgical       Comment: vasectomy   Other Topics Concern     Service No    Blood Transfusions No    Caffeine Concern No       Comment: 0    Occupational Exposure No    Hobby Hazards Yes       Comment: horses    Sleep Concern No    Stress Concern No    Weight Concern Yes    Special Diet Yes       Comment: nhung's    Back Care No    Exercise Yes       Comment: occ    Bike Helmet Not Asked       Comment: na    Seat Belt Yes    Self-Exams Yes       Comment: occ    Parent/sibling w/ CABG, MI or angioplasty before 65F 55M? Not Asked   Social History Narrative    Not on file      Social Determinants of Health              Financial Resource Strain: Low Risk  (6/17/2024)     Financial Resource Strain      Within the past 12 months, have you or your family members you live with been unable to get utilities (heat, electricity) when it was really needed?: No   Food Insecurity: Low Risk   (6/17/2024)     Food Insecurity      Within the past 12 months, did you worry that your food would run out before you got money to buy more?: No      Within the past 12 months, did the food you bought just not last and you didn t have money to get more?: No   Transportation Needs: Low Risk  (6/17/2024)     Transportation Needs      Within the past 12 months, has lack of transportation kept you from medical appointments, getting your medicines, non-medical meetings or appointments, work, or from getting things that you need?: No   Physical Activity: Sufficiently Active (4/5/2024)     Received from [facility]     Exercise Vital Sign      Days of Exercise per Week: 7 days      Minutes of Exercise per Session: 30 min   Stress: No Stress Concern Present (11/21/2019)     Received from [facility], [facility]     [employer] [place] [employer] - Occupational Stress Questionnaire      Feeling of Stress : Not at all   Social Connections: Unknown (8/13/2024)     Received from [facility]     Social Connections      Frequency of Communication with Friends and Family: Not on file   Interpersonal Safety: Low Risk  (9/15/2024)     Interpersonal Safety      Do you feel physically and emotionally safe where you currently live?: Yes      Within the past 12 months, have you been hit, slapped, kicked or otherwise physically hurt by someone?: No      Within the past 12 months, have you been humiliated or emotionally abused in other ways by your partner or ex-partner?: No   Housing Stability: Low Risk  (6/17/2024)     Housing Stability      Do you have housing? : Yes      Are you worried about losing your housing?: No            Current Outpatient Prescriptions      Current Outpatient Prescriptions     Current Outpatient Medications   Medication Sig Dispense Refill    ACETAMINOPHEN PO Take 650 mg by mouth every 6 hours as needed for mild pain.      hydrOXYzine HCl (ATARAX) 25 MG tablet Take  1-2 tablets (25-50 mg) by mouth 3 times daily as needed for itching or other (insomnia pain). 90 tablet 1    meloxicam (MOBIC) 15 MG tablet Take 1 tablet (15 mg) by mouth daily. 90 tablet 1    tirzepatide (MOUNJARO) 15 MG/0.5ML pen Inject 15 mg Subcutaneous every 7 days 6 mL 3         On examination, patient is alert and cooperative.  Vitals are stable.  She is afebrile.     HEENT is notable for right facial paresis from Bell's palsy.  She is able to move her upper extremities functionally.  She is able to carry out straight leg raising test.  She has some difficulty with Gaurav's on the left side.  She has healed scars in the back.  On the left side the SI joint and piriformis were tender.     Musculoskeletal examination of the left lower extremity revealed residual areas of tenderness with increased tone in the left peroneus longus.  There was also an area affecting the left medial gastrocnemius.  The left piriformis was also slightly tender with abnormal tone in the affected muscles including the piriformis and the peroneal's.        Neurologically she is able to stand.  Romberg is negative.  Coordination tests are intact.  Strength is full.  She is able to walk on her heels and toes.  Reflexes are symmetric and plantars are downgoing.     She responds to touch including temperature and vibration.     Impression: At this point this 50-year-old woman with a known history of Trina-Danlos, history of Lyme's disease, history of widespread aches and pains.  She also has had issues with her back and has undergone decompression followed by spinal cord stimulator followed by fusion and replacement of her spinal cord stimulator.  She is still using it though she is not sure if it is helping as much.     Currently her examination is notable for some improvement following Botox injections.  There is also involvement of the left peroneus longus and gastrocnemius medius and left piriformis and the gluteal region.  Her  trigger point injections were beneficial while the medication was working.  I will increase her Botox to 200 units and anticipate further improvement in her spasm control.      30 minutes were spent on the day of the encounter for this visit, with greater than 50% was for counseling on above-mentioned issues.         Thank you are much for allow me to assist in her care.     Sunny Arechiga MD

## 2025-02-27 NOTE — LETTER
2/27/2025       RE: Amelia Coker  [address] [facility] [id number]     Dear Colleague,    Thank you for referring your patient, Amelia Coker, to the [facility] [place] at [facility]. Please see a copy of my visit note below.    CC: Patient returns for follow-up visit for her ongoing issues related to left lower extremity aches and pains with numbness and tingling.     She was last seen by me on 1/21/2025.  Her interval history is notable for improvement for several weeks after the injection.  She has since noticed some return and spasms.  She has also had SI joint injections at [facility].  This was only helpful for a short period of time.      She reports she is sleeping better without cramping.  She noticed that she was able to extend her left lower extremity without spasms.  She also noticed the ball and the toes of her left foot felt better.   She can still keeps the spinal cord stimulator on.  She has not found that it made a difference.  She has L4-S1 fusion with a screw across the SI joints.     Patient is a 50-year-old woman with a complex medical history.  She reports having lumbar issues requiring decompression.  Subsequently developed instability and required a fusion from L4-S1.  She also has a spinal cord stimulator which was recently repositioned to the gluteal region.  When I last saw her on 10/18/2024, she was still sore from the surgery.  She has been evaluated at multiple locations including [facility], [facility] and the [facility].     She recalls problems starting around 2011.  She had been running 6 days a week and eventually was unable to even walk.  She was felt to have widespread pain due to inflammation.  She was also diagnosed with Lyme's.  It was affecting her hips and back.  She also gives history of horseback riding.  She developed increased low back pain which turned out to be sciatica going on  the left side worse on the right side.  It would extend all the way to the toes.  She also gives history of neck pain and shoulder pain and thoracic outlet syndrome.     She has been working with physical therapy and is unsure if they are able to offer her much.  She denies any issues with her bowel and bladder function.  She denies fevers or weight loss.     She works as an  and enjoys it.  She is currently only doing part-time.  She does not smoke there is no alcohol use except occasional.  She denies chemical use.  She would like to become more active and tells me that she cannot be sitting idle and watching TV.     Past Medical History           Past Medical History:   Diagnosis Date     Abnormal mammogram 9/21/2016     right breast      AD (atopic dermatitis) 10/29/2015     Allergic rhinitis due to other allergen       Arthritis       Bell's palsy       Chronic fatigue 6/6/2012     Chronic infection       MRSA - 2015 scalp and prior to Abdomen     Contact dermatitis and other eczema, due to unspecified cause       eczema     Contusion of left foot, initial encounter 3/16/2016     DJD (degenerative joint disease), lumbar 9/26/2013     DJD (degenerative joint disease), lumbar 9/26/2013     Ds DNA antibody positive 4/16/2014     borderline.      Elevated blood pressure reading without diagnosis of hypertension 12/24/2013     Facial contusion 12/15/2014     hit by horse      Foraminal stenosis of lumbar region 9/26/2013     Frontal headache 12/15/2014     Gastroesophageal reflux disease, esophagitis presence not specified 6/29/2016     Heat sensitivity 10/29/2015     HTN, goal below 140/90 9/23/2015     Hyperlipidemia LDL goal <130 8/30/2017     Irregular heart beat       Keratitis sicca 10/31/2012     Left shoulder pain 9/26/2013     Lumbar radiculopathy 1/22/2014     Migraine headache 10/23/2013     Migraine, unspecified, with intractable migraine, so stated, without mention of status migrainosus        Motion sickness       MRSA infection 10/20/2015     Muscular wasting and disuse atrophy, not elsewhere classified 6/9/2014     right SCM, right wrist,       Numbness and tingling       both legs anf feet greater on the left     PAC (premature atrial contraction) 7/15/2010     Personal history of COVID-19 12/2/2021     Plantar fasciitis 9/23/2015     PONV (postoperative nausea and vomiting)       Skin lesion 10/20/2015     posterior superior scalp      Spasm of muscle 7/11/2017     Telangiectasia of face 12/19/2014     Thyroid nodule 5/12/2022     Tooth pain 10/20/2015     left lower primary molar          Past Surgical History             Past Surgical History:   Procedure Laterality Date     AS INJ TRANSFORAMIN EPIDURAL, LUMB/SACR SINGLE         COLONOSCOPY   3/11/2013     Procedure: COLONOSCOPY;  colonoscopy;  Surgeon: Lobito Mas MD;  Location: [facility]     COLONOSCOPY N/A 9/13/2021     Procedure: COLONOSCOPY, WITH POLYPECTOMY AND BIOPSY;  Surgeon: Sam Liz MD;  Location: [facility]     COLONOSCOPY WITH CO2 INSUFFLATION N/A 11/19/2018     Procedure: COLONOSCOPY WITH CO2 INSUFFLATION;  Surgeon: Goyo Blank MD;  Location: [facility]     DECOMPRESSION LUMBAR TWO LEVELS Bilateral 12/8/2016     Procedure: DECOMPRESSION LUMBAR TWO LEVELS;  Surgeon: Bang Burrell MD;  Location: [facility]     DILATION AND CURETTAGE, HYSTEROSCOPY, ABLATE ENDOMETRIUM NOVASURE, COMBINED N/A 6/12/2019     Procedure: hysteroscopy, dilation & curettage, polypectomy, endometrial ablation;  Surgeon: Fredy Pham MD;  Location: [facility]     ESOPHAGOSCOPY, GASTROSCOPY, DUODENOSCOPY (EGD), COMBINED   11/8/2013     Procedure: COMBINED ESOPHAGOSCOPY, GASTROSCOPY, DUODENOSCOPY (EGD), BIOPSY SINGLE OR MULTIPLE;  Esophagoscopy, Gastroscopy, Duodenoscopy EGD with multiple biopsies;  Surgeon: Teja Koch MD;  Location[facility]     ESOPHAGOSCOPY, GASTROSCOPY, DUODENOSCOPY (EGD), COMBINED N/A 2/18/2021     Procedure:  ESOPHAGOGASTRODUODENOSCOPY, WITH BIOPSY;  Surgeon: Blanca Ba MD;  [facility]     ESOPHAGOSCOPY, GASTROSCOPY, DUODENOSCOPY (EGD), COMBINED N/A 4/11/2022     Procedure: ESOPHAGOGASTRODUODENOSCOPY, WITH BIOPSY;  Surgeon: Fredy Jimenez MD;  Location: [facility]     HC REMOVAL OF TONSILS,<11 Y/O         Tonsils <10y.o.     HC TOOTH EXTRACTION W/FORCEP         INJECT BLOCK MEDIAL BRANCH CERVICAL/THORACIC/LUMBAR N/A 7/23/2020     Procedure: Sacral 1,2,3 Lateral Branch Blocks and lumbar 5 medial branch blocks bilaterally;  Surgeon: Maurisio Goetz MD;  [facility]     INJECT JOINT SACROILIAC Left 9/24/2020     Procedure: Left Lumbar 5 Sacral 1 nerve root injections.;  Surgeon: Maurisio Goetz MD;  Location: [facility]     INJECT TRIGGER POINT Right 3/29/2024     Procedure: Ultrasound-guided RIGHT anterior scalene muscle diagnostic block;  Surgeon: Brandi Dennis MD;  Location: [facility]     REPAIR MUSCLE UPPER EXTREMITY Right 7/27/2021     Procedure: DIVISION RIGHT PECTORALIS MUSCLE;  Surgeon: Davie Dutta;  Location: [facility]     REPAIR TENDON ELBOW   7/9/2014     Procedure: REPAIR TENDON ELBOW;  Surgeon: Chris Johnston MD;  [facility]: [facility]     ULTRASONIC REMOVAL SOFT TISSUE (LOCATION) Right 11/21/2018     Procedure: Tenex right foot;  Surgeon: Patel Martínez DO;  Location: [facility]     ULTRASONIC REMOVAL SOFT TISSUE (LOCATION) Left 11/16/2023     Procedure: EXCISION, SOFT TISSUE, ELBOW, USING ULTRASONIC ASPIRATOR SYSTEM;  Surgeon: Patel Martínez DO;  Location: [facility]     VASCULAR SURGERY         Thoracic Outlet Syndrome         Family History            Problem (# of Occurrences) Relation (Name,Age of Onset)     Unknown/Adopted (1) Father     Kidney Disease (1) Maternal Aunt     Allergies (6) Mother, Father, Sister, Son, Brother, Daughter     Arthritis (2) Mother: RA, Sister     Blood Disease (1) Paternal Aunt: LGL T cell Leukemia     Cancer (3) Mother: colon, Father, Sister: vaginal      Cardiovascular (1) Father     Depression (2) Mother, Sister     Diabetes (1) Mother: adult     Gastrointestinal Disease (1) Mother: ibs     Genitourinary Problems (1) Mother: kidney cyst     Gynecology (2) Mother: endometriosis, Sister: endometriosis     Heart Disease (3) Maternal Grandfather: MI,  - 60's, Father: mi-age mid 40's, Brother     Hypertension (1) Mother     Lipids (3) Mother, Father, Paternal Grandmother     Osteoporosis (1) Paternal Grandmother     Respiratory (2) Father: asthma, Son: asthma     Thyroid Disease (2) Mother, Paternal Grandmother     Cancer - colorectal (1) Mother     Lupus (1) Maternal Aunt     Other Cancer (1) Father: renal cancer     Rheumatoid Arthritis (1) Paternal Aunt     Bladder Cancer (1) Maternal Aunt                Social History   Social History                Socioeconomic History     Marital status:        Spouse name: Robert     Number of children: 2     Years of education: 12     Highest education level: Not on file   Occupational History     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   Tobacco Use     Smoking status: Never     Smokeless tobacco: Never   Vaping Use     Vaping status: Former     Substances: THC     Devices: Pre-filled or refillable cartridge   Substance and Sexual Activity     Alcohol use: Yes       Comment: social     Drug use: No       Types: Marijuana       Comment: Medical marijuana     Sexual activity: Yes       Partners: Male       Birth control/protection: Surgical       Comment: vasectomy   Other Topics Concern      Service No     Blood Transfusions No     Caffeine Concern No       Comment: 0     Occupational Exposure No     Hobby Hazards Yes       Comment: horses     Sleep Concern No     Stress Concern No     Weight Concern Yes     Special Diet Yes       Comment: nhung's     Back Care No     Exercise Yes       Comment: occ     Bike Helmet Not Asked       Comment: na     Seat Belt Yes     Self-Exams Yes       Comment:  occ     Parent/sibling w/ CABG, MI or angioplasty before 65F 55M? Not Asked   Social History Narrative     Not on file      Social Determinants of Health              Financial Resource Strain: Low Risk  (6/17/2024)     Financial Resource Strain       Within the past 12 months, have you or your family members you live with been unable to get utilities (heat, electricity) when it was really needed?: No   Food Insecurity: Low Risk  (6/17/2024)     Food Insecurity       Within the past 12 months, did you worry that your food would run out before you got money to buy more?: No       Within the past 12 months, did the food you bought just not last and you didn t have money to get more?: No   Transportation Needs: Low Risk  (6/17/2024)     Transportation Needs       Within the past 12 months, has lack of transportation kept you from medical appointments, getting your medicines, non-medical meetings or appointments, work, or from getting things that you need?: No   Physical Activity: Sufficiently Active (4/5/2024)     Received from [facility]     Exercise Vital Sign       Days of Exercise per Week: 7 days       Minutes of Exercise per Session: 30 min   Stress: No Stress Concern Present (11/21/2019)     [facility], [facility]     [employer] [place] [employer] - Occupational Stress Questionnaire       Feeling of Stress : Not at all   Social Connections: Unknown (8/13/2024)     Received from [facility]     Social Connections       Frequency of Communication with Friends and Family: Not on file   Interpersonal Safety: Low Risk  (9/15/2024)     Interpersonal Safety       Do you feel physically and emotionally safe where you currently live?: Yes       Within the past 12 months, have you been hit, slapped, kicked or otherwise physically hurt by someone?: No       Within the past 12 months, have you been humiliated or emotionally abused in other ways by your partner or  ex-partner?: No   Housing Stability: Low Risk  (6/17/2024)     Housing Stability       Do you have housing? : Yes       Are you worried about losing your housing?: No            Current Outpatient Prescriptions      Current Outpatient Prescriptions     Current Outpatient Medications   Medication Sig Dispense Refill     ACETAMINOPHEN PO Take 650 mg by mouth every 6 hours as needed for mild pain.       hydrOXYzine HCl (ATARAX) 25 MG tablet Take 1-2 tablets (25-50 mg) by mouth 3 times daily as needed for itching or other (insomnia pain). 90 tablet 1     meloxicam (MOBIC) 15 MG tablet Take 1 tablet (15 mg) by mouth daily. 90 tablet 1     tirzepatide (MOUNJARO) 15 MG/0.5ML pen Inject 15 mg Subcutaneous every 7 days 6 mL 3         On examination, patient is alert and cooperative.  Vitals are stable.  She is afebrile.     HEENT is notable for right facial paresis from Bell's palsy.  She is able to move her upper extremities functionally.  She is able to carry out straight leg raising test.  She has some difficulty with Gaurav's on the left side.  She has healed scars in the back.  On the left side the SI joint and piriformis were tender.     Musculoskeletal examination of the left lower extremity revealed residual areas of tenderness with increased tone in the left peroneus longus.  There was also an area affecting the left medial gastrocnemius.  The left piriformis was also slightly tender with abnormal tone in the affected muscles including the piriformis and the peroneal's.        Neurologically she is able to stand.  Romberg is negative.  Coordination tests are intact.  Strength is full.  She is able to walk on her heels and toes.  Reflexes are symmetric and plantars are downgoing.     She responds to touch including temperature and vibration.     Impression: At this point this 50-year-old woman with a known history of Trina-Danlos, history of Lyme's disease, history of widespread aches and pains.  She also has had  issues with her back and has undergone decompression followed by spinal cord stimulator followed by fusion and replacement of her spinal cord stimulator.  She is still using it though she is not sure if it is helping as much.     Currently her examination is notable for some improvement following Botox injections.  There is also involvement of the left peroneus longus and gastrocnemius medius and left piriformis and the gluteal region.  Her trigger point injections were beneficial while the medication was working.  I will increase her Botox to 200 units and anticipate further improvement in her spasm control.      30 minutes were spent on the day of the encounter for this visit, with greater than 50% was for counseling on above-mentioned issues.         Thank you are much for allow me to assist in her care.     Sunny Arechiga MD       Again, thank you for allowing me to participate in the care of your patient.      Sincerely,    Sunny Arechiga MD

## 2025-03-13 ENCOUNTER — OFFICE VISIT (OUTPATIENT)
Dept: FAMILY MEDICINE | Facility: OTHER | Age: 50
End: 2025-03-13
Payer: COMMERCIAL

## 2025-03-13 VITALS
RESPIRATION RATE: 15 BRPM | OXYGEN SATURATION: 99 % | HEIGHT: 66 IN | HEART RATE: 60 BPM | DIASTOLIC BLOOD PRESSURE: 79 MMHG | TEMPERATURE: 97.9 F | BODY MASS INDEX: 26.52 KG/M2 | SYSTOLIC BLOOD PRESSURE: 126 MMHG | WEIGHT: 165 LBS

## 2025-03-13 DIAGNOSIS — L98.9 SKIN LESION: Primary | ICD-10-CM

## 2025-03-13 NOTE — PROGRESS NOTES
"Assessment & Plan     Skin lesion - left hand, left medial lower leg  2 lesions removed today and sent for pathology.  Await diagnosis.  Follow-up based on results.  - Surgical Pathology Exam  - Surgical Pathology Exam    Florence Roe is a 50 year old, presenting for the following health issues:  Lesion Removal      3/13/2025     5:09 PM   Additional Questions   Roomed by saman     History of Present Illness       Reason for visit:  Remove suspicious mole or skin areas   She is taking medications regularly.          Review of Systems  Constitutional, HEENT, cardiovascular, pulmonary, GI, , musculoskeletal, neuro, skin, endocrine and psych systems are negative, except as otherwise noted.      Objective    /79 (BP Location: Left arm, Cuff Size: Adult Regular)   Pulse 60   Temp 97.9  F (36.6  C) (Temporal)   Resp 15   Ht 1.67 m (5' 5.75\")   Wt 74.8 kg (165 lb)   SpO2 99%   BMI 26.83 kg/m    Body mass index is 26.83 kg/m .  Physical Exam   GENERAL: alert and no distress  MS: no gross musculoskeletal defects noted, no edema  SKIN: 2 lesions are discussed today.  The first is to the left hand at the web between the base of the thumb and the first finger.  Abnormal skin is approximately 1 cm rough diameter though a central area of poorly healing and dry skin is noted with crack to the surface.  I have concerns that this may be a squamous cell carcinoma.  Second lesion that is approximately 4 mm in rough diameter to the left medial lower leg just above the ankle.  This 1 is much darker in coloration but irregularly pigmented and irregularly bordered.  I do have concerns that this may be a melanoma.  NEURO: Normal strength and tone, mentation intact and speech normal  PSYCH: mentation appears normal, affect normal/bright    Procedure:  Informed consent is obtained and risk factors discussed to the patients satisfaction.  I answered questions the patient had.  We paused for identification and review of " the lesions at hand.  The area of concern is/are cleansed with chlorhexidine since she is allergic to Betadine 3x's.  The base of the lesion(s) is / are infiltrated with 2% lidocaine local with good effect.  The lesion(s) are removed with 6 mm punch biopsy tool and then we subject this to removal via 15 blade scalpel and the resulting disc of skin is placed in pathology containers which have been labeled appropriately.  Cautery is used to obtain hemostasis.  Surgical sites are cleansed and dressed in the usual fashion.  Aftercare discussed with the patient and instructions given.  Patient tolerated the procedure well.          Signed Electronically by: Otoniel Quiroz PA-C

## 2025-03-17 LAB
PATH REPORT.COMMENTS IMP SPEC: NORMAL
PATH REPORT.FINAL DX SPEC: NORMAL
PATH REPORT.FINAL DX SPEC: NORMAL
PATH REPORT.GROSS SPEC: NORMAL
PATH REPORT.GROSS SPEC: NORMAL
PATH REPORT.MICROSCOPIC SPEC OTHER STN: NORMAL
PATH REPORT.MICROSCOPIC SPEC OTHER STN: NORMAL
PATH REPORT.RELEVANT HX SPEC: NORMAL
PATH REPORT.RELEVANT HX SPEC: NORMAL
PHOTO IMAGE: NORMAL
PHOTO IMAGE: NORMAL

## 2025-03-20 DIAGNOSIS — M47.26 OSTEOARTHRITIS OF SPINE WITH RADICULOPATHY, LUMBAR REGION: ICD-10-CM

## 2025-03-20 DIAGNOSIS — M51.27 HERNIATION OF INTERVERTEBRAL DISC BETWEEN L5 AND S1: ICD-10-CM

## 2025-03-20 DIAGNOSIS — M48.061 FORAMINAL STENOSIS OF LUMBAR REGION: ICD-10-CM

## 2025-03-20 RX ORDER — MELOXICAM 15 MG/1
15 TABLET ORAL DAILY
Qty: 90 TABLET | Refills: 3 | Status: SHIPPED | OUTPATIENT
Start: 2025-03-20

## 2025-04-16 ENCOUNTER — OFFICE VISIT (OUTPATIENT)
Dept: PHYSICAL MEDICINE AND REHAB | Facility: CLINIC | Age: 50
End: 2025-04-16
Payer: COMMERCIAL

## 2025-04-16 DIAGNOSIS — Z98.1 S/P LUMBAR FUSION: ICD-10-CM

## 2025-04-16 DIAGNOSIS — G89.29 OTHER CHRONIC PAIN: Primary | ICD-10-CM

## 2025-04-16 DIAGNOSIS — G24.8 FOCAL DYSTONIA: ICD-10-CM

## 2025-04-16 DIAGNOSIS — M53.3 DISORDER OF SACRUM: ICD-10-CM

## 2025-04-16 PROCEDURE — 99213 OFFICE O/P EST LOW 20 MIN: CPT | Mod: 25 | Performed by: PHYSICAL MEDICINE & REHABILITATION

## 2025-04-16 PROCEDURE — 95874 GUIDE NERV DESTR NEEDLE EMG: CPT | Performed by: PHYSICAL MEDICINE & REHABILITATION

## 2025-04-16 PROCEDURE — 64644 CHEMODENERV 1 EXTREM 5/> MUS: CPT | Performed by: PHYSICAL MEDICINE & REHABILITATION

## 2025-04-16 NOTE — LETTER
4/16/2025       RE: Amelia Coker  [address] [facility] [id number]     Dear Colleague,    Thank you for referring your patient, Amelia Coker, to the [employer] [employer] [facility] at [facility]. Please see a copy of my visit note below.    CC: Patient returns for follow-up visit for her ongoing issues related to left lower extremity aches and pains with numbness and tingling.     She was seen by me on 2/27/2025.  She finds Botox was helpful but it has since come back.  She also has found triggers that activate when she straightens her left knee.    She was seen by me on 1/21/25.   Her interval history is notable for improvement for several weeks after the injection.  She has since noticed some return and spasms.  She has also had SI joint injections at [place].  This was only helpful for a short period of time.     Her interval history is notable for improvement for several hours after the injection.  She noticed that she was able to extend her left lower extremity without spasms.  She also noticed the ball and the toes of her left foot felt better.  She still has some areas of numbness and tingling in the dorsum of the foot.  She can still keeps the spinal cord stimulator on.  She has not found that it made a difference.  She has L4-S1 fusion with a screw across the SI joints.     Patient is a 50-year-old woman with a complex medical history.  She reports having lumbar issues requiring decompression.  Subsequently developed instability and required a fusion from L4-S1.  She also has a spinal cord stimulator which was recently repositioned to the gluteal region.  When I last saw her on 10/18/2024, she was still sore from the surgery.  She has been evaluated at multiple locations including [facility] and the [place].     She recalls problems starting around 2011.  She had been running 6 days a week and eventually was unable to even walk.  She was felt to have  widespread pain due to inflammation.  She was also diagnosed with Lyme's.  It was affecting her hips and back.  She also gives history of horseback riding.  She developed increased low back pain which turned out to be sciatica going on the left side worse on the right side.  It would extend all the way to the toes.  She also gives history of neck pain and shoulder pain and thoracic outlet syndrome.     She has been working with physical therapy and is unsure if they are able to offer her much.  She denies any issues with her bowel and bladder function.  She denies fevers or weight loss.     She works as an  and enjoys it.  She is currently only doing part-time.  She does not smoke there is no alcohol use except occasional.  She denies chemical use.  She would like to become more active and tells me that she cannot be sitting idle and watching TV.     Past Medical History           Past Medical History:   Diagnosis Date     Abnormal mammogram 9/21/2016     right breast      AD (atopic dermatitis) 10/29/2015     Allergic rhinitis due to other allergen       Arthritis       Bell's palsy       Chronic fatigue 6/6/2012     Chronic infection       MRSA - 2015 scalp and prior to Abdomen     Contact dermatitis and other eczema, due to unspecified cause       eczema     Contusion of left foot, initial encounter 3/16/2016     DJD (degenerative joint disease), lumbar 9/26/2013     DJD (degenerative joint disease), lumbar 9/26/2013     Ds DNA antibody positive 4/16/2014     borderline.      Elevated blood pressure reading without diagnosis of hypertension 12/24/2013     Facial contusion 12/15/2014     hit by horse      Foraminal stenosis of lumbar region 9/26/2013     Frontal headache 12/15/2014     Gastroesophageal reflux disease, esophagitis presence not specified 6/29/2016     Heat sensitivity 10/29/2015     HTN, goal below 140/90 9/23/2015     Hyperlipidemia LDL goal <130 8/30/2017     Irregular heart beat        Keratitis sicca 10/31/2012     Left shoulder pain 9/26/2013     Lumbar radiculopathy 1/22/2014     Migraine headache 10/23/2013     Migraine, unspecified, with intractable migraine, so stated, without mention of status migrainosus       Motion sickness       MRSA infection 10/20/2015     Muscular wasting and disuse atrophy, not elsewhere classified 6/9/2014     right SCM, right wrist,       Numbness and tingling       both legs anf feet greater on the left     PAC (premature atrial contraction) 7/15/2010     Personal history of COVID-19 12/2/2021     Plantar fasciitis 9/23/2015     PONV (postoperative nausea and vomiting)       Skin lesion 10/20/2015     posterior superior scalp      Spasm of muscle 7/11/2017     Telangiectasia of face 12/19/2014     Thyroid nodule 5/12/2022     Tooth pain 10/20/2015     left lower primary molar          Past Surgical History             Past Surgical History:   Procedure Laterality Date     AS INJ TRANSFORAMIN EPIDURAL, LUMB/SACR SINGLE         COLONOSCOPY   3/11/2013     Procedure: COLONOSCOPY;  colonoscopy;  Surgeon: Lobito Mas MD;  Location: [facility]     COLONOSCOPY N/A 9/13/2021     Procedure: COLONOSCOPY, WITH POLYPECTOMY AND BIOPSY;  Surgeon: Sam Liz MD;  Location: [facility]     COLONOSCOPY WITH CO2 INSUFFLATION N/A 11/19/2018     Procedure: COLONOSCOPY WITH CO2 INSUFFLATION;  Surgeon: Goyo Blank MD;  Location: [facility]     DECOMPRESSION LUMBAR TWO LEVELS Bilateral 12/8/2016     Procedure: DECOMPRESSION LUMBAR TWO LEVELS;  Surgeon: Bang Burrell MD;  Location: [facility]     DILATION AND CURETTAGE, HYSTEROSCOPY, ABLATE ENDOMETRIUM NOVASURE, COMBINED N/A 6/12/2019     Procedure: hysteroscopy, dilation & curettage, polypectomy, endometrial ablation;  Surgeon: Fredy Pham MD;  Location: [facility]     ESOPHAGOSCOPY, GASTROSCOPY, DUODENOSCOPY (EGD), COMBINED   11/8/2013     Procedure: COMBINED ESOPHAGOSCOPY, GASTROSCOPY, DUODENOSCOPY (EGD), BIOPSY  SINGLE OR MULTIPLE;  Esophagoscopy, Gastroscopy, Duodenoscopy EGD with multiple biopsies;  Surgeon: Teja Koch MD;  Location[facility]     ESOPHAGOSCOPY, GASTROSCOPY, DUODENOSCOPY (EGD), COMBINED N/A 2/18/2021     Procedure: ESOPHAGOGASTRODUODENOSCOPY, WITH BIOPSY;  Surgeon: Blanca Ba MD;  [facility]     ESOPHAGOSCOPY, GASTROSCOPY, DUODENOSCOPY (EGD), COMBINED N/A 4/11/2022     Procedure: ESOPHAGOGASTRODUODENOSCOPY, WITH BIOPSY;  Surgeon: Fredy Jimenez MD;  Location: [facility]     HC REMOVAL OF TONSILS,<11 Y/O         Tonsils <10y.o.     HC TOOTH EXTRACTION W/FORCEP         INJECT BLOCK MEDIAL BRANCH CERVICAL/THORACIC/LUMBAR N/A 7/23/2020     Procedure: Sacral 1,2,3 Lateral Branch Blocks and lumbar 5 medial branch blocks bilaterally;  Surgeon: Maurisio Goetz MD;  [facility]     INJECT JOINT SACROILIAC Left 9/24/2020     Procedure: Left Lumbar 5 Sacral 1 nerve root injections.;  Surgeon: Maurisio Goetz MD;  Location[facility]     INJECT TRIGGER POINT Right 3/29/2024     Procedure: Ultrasound-guided RIGHT anterior scalene muscle diagnostic block;  Surgeon: Brandi Dennis MD;  [facility]     REPAIR MUSCLE UPPER EXTREMITY Right 7/27/2021     Procedure: DIVISION RIGHT PECTORALIS MUSCLE;  Surgeon: Davie Dutta;  Location: [facility]     REPAIR TENDON ELBOW   7/9/2014     Procedure: REPAIR TENDON ELBOW;  Surgeon: Chris Johnston MD;  [facility]: [facility]     ULTRASONIC REMOVAL SOFT TISSUE (LOCATION) Right 11/21/2018     Procedure: Tenex right foot;  Surgeon: Patel Martínez DO;  Location: [facility]     ULTRASONIC REMOVAL SOFT TISSUE (LOCATION) Left 11/16/2023     Procedure: EXCISION, SOFT TISSUE, ELBOW, USING ULTRASONIC ASPIRATOR SYSTEM;  Surgeon: Patel Martínez DO;  Location: [facility]     VASCULAR SURGERY         Thoracic Outlet Syndrome         Family History            Problem (# of Occurrences) Relation (Name,Age of Onset)     Unknown/Adopted (1) Father     Kidney Disease (1)  Maternal Aunt     Allergies (6) Mother, Father, Sister, Son, Brother, Daughter     Arthritis (2) Mother: RA, Sister     Blood Disease (1) Paternal Aunt: LGL T cell Leukemia     Cancer (3) Mother: colon, Father, Sister: vaginal     Cardiovascular (1) Father     Depression (2) Mother, Sister     Diabetes (1) Mother: adult     Gastrointestinal Disease (1) Mother: ibs     Genitourinary Problems (1) Mother: kidney cyst     Gynecology (2) Mother: endometriosis, Sister: endometriosis     Heart Disease (3) Maternal Grandfather: MI,  - 60's, Father: mi-age mid 40's, Brother     Hypertension (1) Mother     Lipids (3) Mother, Father, Paternal Grandmother     Osteoporosis (1) Paternal Grandmother     Respiratory (2) Father: asthma, Son: asthma     Thyroid Disease (2) Mother, Paternal Grandmother     Cancer - colorectal (1) Mother     Lupus (1) Maternal Aunt     Other Cancer (1) Father: renal cancer     Rheumatoid Arthritis (1) Paternal Aunt     Bladder Cancer (1) Maternal Aunt                Social History   Social History                Socioeconomic History     Marital status:        Spouse name: Robert     Number of children: 2     Years of education: 12     Highest education level: Not on file   Occupational History     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   Tobacco Use     Smoking status: Never     Smokeless tobacco: Never   Vaping Use     Vaping status: Former     Substances: THC     Devices: Pre-filled or refillable cartridge   Substance and Sexual Activity     Alcohol use: Yes       Comment: social     Drug use: No       Types: Marijuana       Comment: Medical marijuana     Sexual activity: Yes       Partners: Male       Birth control/protection: Surgical       Comment: vasectomy   Other Topics Concern      Service No     Blood Transfusions No     Caffeine Concern No       Comment: 0     Occupational Exposure No     Hobby Hazards Yes       Comment: horses     Sleep Concern No      Stress Concern No     Weight Concern Yes     Special Diet Yes       Comment: nhung's     Back Care No     Exercise Yes       Comment: occ     Bike Helmet Not Asked       Comment: na     Seat Belt Yes     Self-Exams Yes       Comment: occ     Parent/sibling w/ CABG, MI or angioplasty before 65F 55M? Not Asked   Social History Narrative     Not on file      Social Determinants of Health              Financial Resource Strain: Low Risk  (6/17/2024)     Financial Resource Strain       Within the past 12 months, have you or your family members you live with been unable to get utilities (heat, electricity) when it was really needed?: No   Food Insecurity: Low Risk  (6/17/2024)     Food Insecurity       Within the past 12 months, did you worry that your food would run out before you got money to buy more?: No       Within the past 12 months, did the food you bought just not last and you didn t have money to get more?: No   Transportation Needs: Low Risk  (6/17/2024)     Transportation Needs       Within the past 12 months, has lack of transportation kept you from medical appointments, getting your medicines, non-medical meetings or appointments, work, or from getting things that you need?: No   Physical Activity: Sufficiently Active (4/5/2024)     Received from [facility]     Exercise Vital Sign       Days of Exercise per Week: 7 days       Minutes of Exercise per Session: 30 min   Stress: No Stress Concern Present (11/21/2019)     Received from [facility], [facility]     [place] [employer] - Occupational Stress Questionnaire       Feeling of Stress : Not at all   Social Connections: Unknown (8/13/2024)     Received from [employer] [facility]     Social Connections       Frequency of Communication with Friends and Family: Not on file   Interpersonal Safety: Low Risk  (9/15/2024)     Interpersonal Safety       Do you feel physically and emotionally safe where you currently  live?: Yes       Within the past 12 months, have you been hit, slapped, kicked or otherwise physically hurt by someone?: No       Within the past 12 months, have you been humiliated or emotionally abused in other ways by your partner or ex-partner?: No   Housing Stability: Low Risk  (6/17/2024)     Housing Stability       Do you have housing? : Yes       Are you worried about losing your housing?: No            Current Outpatient Prescriptions      Current Outpatient Prescriptions     Current Outpatient Medications   Medication Sig Dispense Refill     ACETAMINOPHEN PO Take 650 mg by mouth every 6 hours as needed for mild pain.       hydrOXYzine HCl (ATARAX) 25 MG tablet Take 1-2 tablets (25-50 mg) by mouth 3 times daily as needed for itching or other (insomnia pain). 90 tablet 1     meloxicam (MOBIC) 15 MG tablet TAKE ONE TABLET BY MOUTH ONCE DAILY 90 tablet 3     tirzepatide (MOUNJARO) 15 MG/0.5ML pen Inject 15 mg Subcutaneous every 7 days 6 mL 3            On examination, patient is alert and cooperative.  Vitals are stable.  She is afebrile.     HEENT is notable for right facial paresis from Bell's palsy.  She is able to move her upper extremities functionally.  She is able to carry out straight leg raising test.  She has some difficulty with Gaurav's on the left side.  She has healed scars in the back.  On the left side the SI joint and piriformis were tender.     Musculoskeletal examination of the left lower extremity revealed areas of tenderness with increased tone in the left peroneus longus in 2 different areas.  There was also an area affecting the left medial gastrocnemius.  The left piriformis was also tender, as was the left gluteus medius.  These muscles had abnormal tone and recreated her symptoms.     Neurologically she is able to stand.  Romberg is negative.  Coordination tests are intact.  Strength is full.  She is able to walk on her heels and toes.  Reflexes are symmetric and plantars are  downgoing.     She responds to touch including temperature and vibration.     Impression: At this point this 50-year-old woman with a known history of Trina-Danlos, history of Lyme's disease, history of widespread aches and pains.  She also has had issues with her back and has undergone decompression followed by spinal cord stimulator followed by fusion and replacement of her spinal cord stimulator.  She is still using it though she is not sure if it is helping as much.     Currently her examination is notable for issues in her lower back especially the left SI and piriformis.  There is also involvement of the left peroneus longus and gastrocnemius medius and left piriformis and the gluteal region.  Her trigger point injections were beneficial while the medication was working.  Her current problems are related to the abnormal tone and muscle spasms in the affected muscles.  These are focal in nature and are consistent with dystonia/focal dystonia.  She received some improvement with 100 units but has more muscles affected and will go with 200 units today.  I will see her in follow-up in 4 to 6 weeks time.  Depending on her improvement, she would likely repeat  every 3 months.     20 minutes were spent on the day of the encounter for this visit, exclusive of the procedure time, with greater than 50% was for counseling on above-mentioned issues.       PROCEDURE NOTE: With her informed consent, after explaining the benefits and risks of the procedure, using ChloraPrep for skin prep, EMG for localization, preservative-free normal saline for dilution, 2 cc per 100 units, 200 units were injected as follows.  67 units were injected into the left peroneus longus muscle at 2 different sites.  33 units were injected into the left medial gastrocs, 33 units units into the left piriformis, 33 units into the left gluteus medius and 34 units into left medial hamstrings, all for a total of 200 units..  She tolerated the procedure  well.     She can ice the region, anticipate the onset of Botox within 1 to 3 days, peak in 2 weeks and a duration of about 12 weeks.     Thank you are much for allow me to assist in her care.     Sunny Arechiga MD       Again, thank you for allowing me to participate in the care of your patient.      Sincerely,    Sunny Arechiga MD

## 2025-04-16 NOTE — PROGRESS NOTES
CC: Patient returns for follow-up visit for her ongoing issues related to left lower extremity aches and pains with numbness and tingling.     She was seen by me on 2/27/2025.  She finds Botox was helpful but it has since come back.  She also has found triggers that activate when she straightens her left knee.    She was seen by me on 1/21/25.   Her interval history is notable for improvement for several weeks after the injection.  She has since noticed some return and spasms.  She has also had SI joint injections at [place].  This was only helpful for a short period of time.     Her interval history is notable for improvement for several hours after the injection.  She noticed that she was able to extend her left lower extremity without spasms.  She also noticed the ball and the toes of her left foot felt better.  She still has some areas of numbness and tingling in the dorsum of the foot.  She can still keeps the spinal cord stimulator on.  She has not found that it made a difference.  She has L4-S1 fusion with a screw across the SI joints.     Patient is a 50-year-old woman with a complex medical history.  She reports having lumbar issues requiring decompression.  Subsequently developed instability and required a fusion from L4-S1.  She also has a spinal cord stimulator which was recently repositioned to the gluteal region.  When I last saw her on 10/18/2024, she was still sore from the surgery.  She has been evaluated at multiple locations including [facility] [facility], [facility] and the [facility].     She recalls problems starting around 2011.  She had been running 6 days a week and eventually was unable to even walk.  She was felt to have widespread pain due to inflammation.  She was also diagnosed with Lyme's.  It was affecting her hips and back.  She also gives history of horseback riding.  She developed increased low back pain which turned out to be sciatica going on the left side worse on the right side.  It would extend all  the way to the toes.  She also gives history of neck pain and shoulder pain and thoracic outlet syndrome.     She has been working with physical therapy and is unsure if they are able to offer her much.  She denies any issues with her bowel and bladder function.  She denies fevers or weight loss.     She works as an  and enjoys it.  She is currently only doing part-time.  She does not smoke there is no alcohol use except occasional.  She denies chemical use.  She would like to become more active and tells me that she cannot be sitting idle and watching TV.     Past Medical History           Past Medical History:   Diagnosis Date    Abnormal mammogram 9/21/2016     right breast     AD (atopic dermatitis) 10/29/2015    Allergic rhinitis due to other allergen      Arthritis      Bell's palsy      Chronic fatigue 6/6/2012    Chronic infection       MRSA - 2015 scalp and prior to Abdomen    Contact dermatitis and other eczema, due to unspecified cause       eczema    Contusion of left foot, initial encounter 3/16/2016    DJD (degenerative joint disease), lumbar 9/26/2013    DJD (degenerative joint disease), lumbar 9/26/2013    Ds DNA antibody positive 4/16/2014     borderline.     Elevated blood pressure reading without diagnosis of hypertension 12/24/2013    Facial contusion 12/15/2014     hit by horse     Foraminal stenosis of lumbar region 9/26/2013    Frontal headache 12/15/2014    Gastroesophageal reflux disease, esophagitis presence not specified 6/29/2016    Heat sensitivity 10/29/2015    HTN, goal below 140/90 9/23/2015    Hyperlipidemia LDL goal <130 8/30/2017    Irregular heart beat      Keratitis sicca 10/31/2012    Left shoulder pain 9/26/2013    Lumbar radiculopathy 1/22/2014    Migraine headache 10/23/2013    Migraine, unspecified, with intractable migraine, so stated, without mention of status migrainosus      Motion sickness      MRSA infection 10/20/2015    Muscular wasting and disuse atrophy,  not elsewhere classified 6/9/2014     right SCM, right wrist,      Numbness and tingling       both legs anf feet greater on the left    PAC (premature atrial contraction) 7/15/2010    Personal history of COVID-19 12/2/2021    Plantar fasciitis 9/23/2015    PONV (postoperative nausea and vomiting)      Skin lesion 10/20/2015     posterior superior scalp     Spasm of muscle 7/11/2017    Telangiectasia of face 12/19/2014    Thyroid nodule 5/12/2022    Tooth pain 10/20/2015     left lower primary molar          Past Surgical History             Past Surgical History:   Procedure Laterality Date    AS INJ TRANSFORAMIN EPIDURAL, LUMB/SACR SINGLE        COLONOSCOPY   3/11/2013     Procedure: COLONOSCOPY;  colonoscopy;  Surgeon: Lobito Mas MD;  Location: [facility]    COLONOSCOPY N/A 9/13/2021     Procedure: COLONOSCOPY, WITH POLYPECTOMY AND BIOPSY;  Surgeon: Sam Liz MD;  Location: [facility]    COLONOSCOPY WITH CO2 INSUFFLATION N/A 11/19/2018     Procedure: COLONOSCOPY WITH CO2 INSUFFLATION;  Surgeon: Goyo Blank MD;  Location: [facility]    DECOMPRESSION LUMBAR TWO LEVELS Bilateral 12/8/2016     Procedure: DECOMPRESSION LUMBAR TWO LEVELS;  Surgeon: Bang Burrell MD;  Location: [facility]    DILATION AND CURETTAGE, HYSTEROSCOPY, ABLATE ENDOMETRIUM NOVASURE, COMBINED N/A 6/12/2019     Procedure: hysteroscopy, dilation & curettage, polypectomy, endometrial ablation;  Surgeon: Fredy Pham MD;  Location: [facility]    ESOPHAGOSCOPY, GASTROSCOPY, DUODENOSCOPY (EGD), COMBINED   11/8/2013     Procedure: COMBINED ESOPHAGOSCOPY, GASTROSCOPY, DUODENOSCOPY (EGD), BIOPSY SINGLE OR MULTIPLE;  Esophagoscopy, Gastroscopy, Duodenoscopy EGD with multiple biopsies;  Surgeon: Teja Koch MD;  [facility]    ESOPHAGOSCOPY, GASTROSCOPY, DUODENOSCOPY (EGD), COMBINED N/A 2/18/2021     Procedure: ESOPHAGOGASTRODUODENOSCOPY, WITH BIOPSY;  Surgeon: Blanca Ba MD;  [facility]    ESOPHAGOSCOPY,  GASTROSCOPY, DUODENOSCOPY (EGD), COMBINED N/A 4/11/2022     Procedure: ESOPHAGOGASTRODUODENOSCOPY, WITH BIOPSY;  Surgeon: Fredy Jimenez MD;  Location: [facility]    HC REMOVAL OF TONSILS,<11 Y/O         Tonsils <10y.o.    HC TOOTH EXTRACTION W/FORCEP        INJECT BLOCK MEDIAL BRANCH CERVICAL/THORACIC/LUMBAR N/A 7/23/2020     Procedure: Sacral 1,2,3 Lateral Branch Blocks and lumbar 5 medial branch blocks bilaterally;  Surgeon: Maurisio Goetz MD;  [facility]    INJECT JOINT SACROILIAC Left 9/24/2020     Procedure: Left Lumbar 5 Sacral 1 nerve root injections.;  Surgeon: Maurisio Goetz MD;  [facility]    INJECT TRIGGER POINT Right 3/29/2024     Procedure: Ultrasound-guided RIGHT anterior scalene muscle diagnostic block;  Surgeon: Brandi Dennis MD;  [facility]    REPAIR MUSCLE UPPER EXTREMITY Right 7/27/2021     Procedure: DIVISION RIGHT PECTORALIS MUSCLE;  Surgeon: Davie Dutta;  Location: [facility]    REPAIR TENDON ELBOW   7/9/2014     Procedure: REPAIR TENDON ELBOW;  Surgeon: Chris Johnston MD;  [facility]    ULTRASONIC REMOVAL SOFT TISSUE (LOCATION) Right 11/21/2018     Procedure: Tenex right foot;  Surgeon: Patel Martínez DO;  [facility]    ULTRASONIC REMOVAL SOFT TISSUE (LOCATION) Left 11/16/2023     Procedure: EXCISION, SOFT TISSUE, ELBOW, USING ULTRASONIC ASPIRATOR SYSTEM;  Surgeon: Patel Martínez DO;  Location: [facility]    VASCULAR SURGERY         Thoracic Outlet Syndrome         Family History            Problem (# of Occurrences) Relation (Name,Age of Onset)     Unknown/Adopted (1) Father     Kidney Disease (1) Maternal Aunt     Allergies (6) Mother, Father, Sister, Son, Brother, Daughter     Arthritis (2) Mother: RA, Sister     Blood Disease (1) Paternal Aunt: LGL T cell Leukemia     Cancer (3) Mother: colon, Father, Sister: vaginal     Cardiovascular (1) Father     Depression (2) Mother, Sister     Diabetes (1) Mother: adult     Gastrointestinal Disease  (1) Mother: ibs     Genitourinary Problems (1) Mother: kidney cyst     Gynecology (2) Mother: endometriosis, Sister: endometriosis     Heart Disease (3) Maternal Grandfather: MI,  - 60's, Father: mi-age mid 40's, Brother     Hypertension (1) Mother     Lipids (3) Mother, Father, Paternal Grandmother     Osteoporosis (1) Paternal Grandmother     Respiratory (2) Father: asthma, Son: asthma     Thyroid Disease (2) Mother, Paternal Grandmother     Cancer - colorectal (1) Mother     Lupus (1) Maternal Aunt     Other Cancer (1) Father: renal cancer     Rheumatoid Arthritis (1) Paternal Aunt     Bladder Cancer (1) Maternal Aunt                Social History   Social History                Socioeconomic History    Marital status:        Spouse name: Robert    Number of children: 2    Years of education: 12    Highest education level: Not on file   Occupational History    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   Tobacco Use    Smoking status: Never    Smokeless tobacco: Never   Vaping Use    Vaping status: Former    Substances: THC    Devices: Pre-filled or refillable cartridge   Substance and Sexual Activity    Alcohol use: Yes       Comment: social    Drug use: No       Types: Marijuana       Comment: Medical marijuana    Sexual activity: Yes       Partners: Male       Birth control/protection: Surgical       Comment: vasectomy   Other Topics Concern     Service No    Blood Transfusions No    Caffeine Concern No       Comment: 0    Occupational Exposure No    Hobby Hazards Yes       Comment: horses    Sleep Concern No    Stress Concern No    Weight Concern Yes    Special Diet Yes       Comment: nhung's    Back Care No    Exercise Yes       Comment: occ    Bike Helmet Not Asked       Comment: na    Seat Belt Yes    Self-Exams Yes       Comment: occ    Parent/sibling w/ CABG, MI or angioplasty before 65F 55M? Not Asked   Social History Narrative    Not on file      Social Determinants of  Health              Financial Resource Strain: Low Risk  (6/17/2024)     Financial Resource Strain      Within the past 12 months, have you or your family members you live with been unable to get utilities (heat, electricity) when it was really needed?: No   Food Insecurity: Low Risk  (6/17/2024)     Food Insecurity      Within the past 12 months, did you worry that your food would run out before you got money to buy more?: No      Within the past 12 months, did the food you bought just not last and you didn t have money to get more?: No   Transportation Needs: Low Risk  (6/17/2024)     Transportation Needs      Within the past 12 months, has lack of transportation kept you from medical appointments, getting your medicines, non-medical meetings or appointments, work, or from getting things that you need?: No   Physical Activity: Sufficiently Active (4/5/2024)     Received from [facility]     Exercise Vital Sign      Days of Exercise per Week: 7 days      Minutes of Exercise per Session: 30 min   Stress: No Stress Concern Present (11/21/2019)     [facility], [facility]     [employer] [place] [employer] - Occupational Stress Questionnaire      Feeling of Stress : Not at all   Social Connections: Unknown (8/13/2024)     Received from [facility]     Social Connections      Frequency of Communication with Friends and Family: Not on file   Interpersonal Safety: Low Risk  (9/15/2024)     Interpersonal Safety      Do you feel physically and emotionally safe where you currently live?: Yes      Within the past 12 months, have you been hit, slapped, kicked or otherwise physically hurt by someone?: No      Within the past 12 months, have you been humiliated or emotionally abused in other ways by your partner or ex-partner?: No   Housing Stability: Low Risk  (6/17/2024)     Housing Stability      Do you have housing? : Yes      Are you worried about losing your  housing?: No            Current Outpatient Prescriptions      Current Outpatient Prescriptions     Current Outpatient Medications   Medication Sig Dispense Refill    ACETAMINOPHEN PO Take 650 mg by mouth every 6 hours as needed for mild pain.      hydrOXYzine HCl (ATARAX) 25 MG tablet Take 1-2 tablets (25-50 mg) by mouth 3 times daily as needed for itching or other (insomnia pain). 90 tablet 1    meloxicam (MOBIC) 15 MG tablet TAKE ONE TABLET BY MOUTH ONCE DAILY 90 tablet 3    tirzepatide (MOUNJARO) 15 MG/0.5ML pen Inject 15 mg Subcutaneous every 7 days 6 mL 3            On examination, patient is alert and cooperative.  Vitals are stable.  She is afebrile.     HEENT is notable for right facial paresis from Bell's palsy.  She is able to move her upper extremities functionally.  She is able to carry out straight leg raising test.  She has some difficulty with Gaurav's on the left side.  She has healed scars in the back.  On the left side the SI joint and piriformis were tender.     Musculoskeletal examination of the left lower extremity revealed areas of tenderness with increased tone in the left peroneus longus in 2 different areas.  There was also an area affecting the left medial gastrocnemius.  The left piriformis was also tender, as was the left gluteus medius.  These muscles had abnormal tone and recreated her symptoms.     Neurologically she is able to stand.  Romberg is negative.  Coordination tests are intact.  Strength is full.  She is able to walk on her heels and toes.  Reflexes are symmetric and plantars are downgoing.     She responds to touch including temperature and vibration.     Impression: At this point this 50-year-old woman with a known history of Trina-Danlos, history of Lyme's disease, history of widespread aches and pains.  She also has had issues with her back and has undergone decompression followed by spinal cord stimulator followed by fusion and replacement of her spinal cord  stimulator.  She is still using it though she is not sure if it is helping as much.     Currently her examination is notable for issues in her lower back especially the left SI and piriformis.  There is also involvement of the left peroneus longus and gastrocnemius medius and left piriformis and the gluteal region.  Her trigger point injections were beneficial while the medication was working.  Her current problems are related to the abnormal tone and muscle spasms in the affected muscles.  These are focal in nature and are consistent with dystonia/focal dystonia.  She received some improvement with 100 units but has more muscles affected and will go with 200 units today.  I will see her in follow-up in 4 to 6 weeks time.  Depending on her improvement, she would likely repeat  every 3 months.     20 minutes were spent on the day of the encounter for this visit, exclusive of the procedure time, with greater than 50% was for counseling on above-mentioned issues.       PROCEDURE NOTE: With her informed consent, after explaining the benefits and risks of the procedure, using ChloraPrep for skin prep, EMG for localization, preservative-free normal saline for dilution, 2 cc per 100 units, 200 units were injected as follows.  67 units were injected into the left peroneus longus muscle at 2 different sites.  33 units were injected into the left medial gastrocs, 33 units units into the left piriformis, 33 units into the left gluteus medius and 34 units into left medial hamstrings, all for a total of 200 units..  She tolerated the procedure well.     She can ice the region, anticipate the onset of Botox within 1 to 3 days, peak in 2 weeks and a duration of about 12 weeks.     Thank you are much for allow me to assist in her care.     Sunny Arechiga MD

## 2025-05-27 ENCOUNTER — TELEPHONE (OUTPATIENT)
Dept: FAMILY MEDICINE | Facility: CLINIC | Age: 50
End: 2025-05-27
Payer: COMMERCIAL

## 2025-05-27 ENCOUNTER — MYC MEDICAL ADVICE (OUTPATIENT)
Dept: FAMILY MEDICINE | Facility: OTHER | Age: 50
End: 2025-05-27
Payer: COMMERCIAL

## 2025-05-27 NOTE — TELEPHONE ENCOUNTER
TRC to schedule patient for the float schedule for tomorrow 5/28/25 for a tetanus shot     Eboni

## 2025-05-27 NOTE — TELEPHONE ENCOUNTER
Patient calling to be scheduled for tetanus shot in [place] between [id number]. Writer assisted with scheduling. Order for tetanus is in patients chart.    Gina Adames RN on 5/27/2025 at 5:11 PM

## 2025-05-27 NOTE — TELEPHONE ENCOUNTER
"  Reason for Call:  Appointment Request    Patient requesting this type of appt:  Amelia Coker \"Bhupinder\"    Requested provider: MA/VF/LPN Visit    Reason patient unable to be scheduled: Not within requested timeframe    When does patient want to be seen/preferred time: Tomorrow morning 5/28    Comments: pt had an incident with a chicken wire and will need a tetanus shot as soon as possible. She was hoping to be squeezed in for a nurse only appt to get her tdap 7+ (pcp put in the order in her chart). She has to go to work, so she was hoping to be squeezed in at either 8, 8:15, or 8:30 am tomorrow 5/28     Could we send this information to you in [place] or would you prefer to receive a phone call?:   No preference   Okay to leave a detailed message?: Yes at Cell number on file:    Telephone Information:   Mobile [phone]       Call taken on 5/27/2025 at 3:32 PM by Melva Becker  "

## 2025-05-28 ENCOUNTER — ALLIED HEALTH/NURSE VISIT (OUTPATIENT)
Dept: FAMILY MEDICINE | Facility: CLINIC | Age: 50
End: 2025-05-28
Payer: COMMERCIAL

## 2025-05-28 DIAGNOSIS — Z23 ENCOUNTER FOR IMMUNIZATION: Primary | ICD-10-CM

## 2025-05-28 PROCEDURE — 90471 IMMUNIZATION ADMIN: CPT

## 2025-05-28 PROCEDURE — 90715 TDAP VACCINE 7 YRS/> IM: CPT

## 2025-05-28 PROCEDURE — 99207 PR NO CHARGE NURSE ONLY: CPT

## 2025-05-28 NOTE — PROGRESS NOTES
Prior to immunization administration, verified patients identity using patient s name and date of birth. Please see Immunization Activity for additional information.     Screening Questionnaire for Adult Immunization    Are you sick today?   No   Do you have allergies to medications, food, a vaccine component or latex?   No   Have you ever had a serious reaction after receiving a vaccination?   No   Do you have a long-term health problem with heart, lung, kidney, or metabolic disease (e.g., diabetes), asthma, a blood disorder, no spleen, complement component deficiency, a cochlear implant, or a spinal fluid leak?  Are you on long-term aspirin therapy?   No   Do you have cancer, leukemia, HIV/AIDS, or any other immune system problem?   No   Do you have a parent, brother, or sister with an immune system problem?   No   In the past 3 months, have you taken medications that affect  your immune system, such as prednisone, other steroids, or anticancer drugs; drugs for the treatment of rheumatoid arthritis, Crohn s disease, or psoriasis; or have you had radiation treatments?   No   Have you had a seizure, or a brain or other nervous system problem?   No   During the past year, have you received a transfusion of blood or blood    products, or been given immune (gamma) globulin or antiviral drug?   No   For women: Are you pregnant or is there a chance you could become       pregnant during the next month?   No   Have you received any vaccinations in the past 4 weeks?   No     Immunization questionnaire answers were all negative.    I have reviewed the following standing orders:   This patient is due and qualifies for a TDAP vaccine.    Click here for Tdap Standing Order    I have reviewed the vaccines inclusion and exclusion criteria; No concerns regarding eligibility.     Patient instructed to remain in clinic for 15 minutes afterwards, and to report any adverse reactions.     Screening performed by Rachel King MA on  5/28/2025 at 8:08 AM.

## 2025-06-04 ENCOUNTER — OFFICE VISIT (OUTPATIENT)
Dept: PHYSICAL MEDICINE AND REHAB | Facility: CLINIC | Age: 50
End: 2025-06-04
Payer: COMMERCIAL

## 2025-06-04 DIAGNOSIS — G57.02 PIRIFORMIS SYNDROME, LEFT: ICD-10-CM

## 2025-06-04 DIAGNOSIS — M53.3 DISORDER OF SACRUM: ICD-10-CM

## 2025-06-04 DIAGNOSIS — R25.2 CRAMP AND SPASM: Primary | ICD-10-CM

## 2025-06-04 DIAGNOSIS — G24.8 FOCAL DYSTONIA: ICD-10-CM

## 2025-06-04 DIAGNOSIS — G89.29 OTHER CHRONIC PAIN: ICD-10-CM

## 2025-06-04 NOTE — LETTER
6/4/2025       RE: Amelia Coker  [address] [facility] [id number]     Dear Colleague,    Thank you for referring your patient, Amelia Coker, to the [facility] at [facility]. Please see a copy of my visit note below.    CC: Patient returns for follow-up visit for her ongoing issues related to left lower extremity aches and pains with numbness and tingling.     She was last seen by me on 4/16/2025 when she underwent Botox injections.  She finds Botox the best that has happened to her in terms of improving her left lower extremity.  Nothing previously has helped.  He still has some twitching along the peroneal nerve on the left starting from the fibular head down into the ankle and foot.  This also impacts her walking.  She is getting an SI joint injection at [place] later and will return for repeat Botox injections 12 weeks from previous.    She acknowledged that none of the previous medications such as Lyrica, Cymbalta or gabapentin have been helpful.  She does have tizanidine that she plans to use and see if it helps at this point.    She was seen by me on 2/27/2025.  She finds Botox was helpful but it has since come back.  She also has found triggers that activate when she straightens her left knee.     She was seen by me on 1/21/25.   Her interval history is notable for improvement for several weeks after the injection.  She has since noticed some return and spasms.  She has also had SI joint injections at [employer].  This was only helpful for a short period of time.      Her interval history is notable for improvement for several hours after the injection.  She noticed that she was able to extend her left lower extremity without spasms.  She also noticed the ball and the toes of her left foot felt better.  She still has some areas of numbness and tingling in the dorsum of the foot.  She can still keeps the spinal cord stimulator on.  She has not  found that it made a difference.  She has L4-S1 fusion with a screw across the SI joints.     Patient is a 50-year-old woman with a complex medical history.  She reports having lumbar issues requiring decompression.  Subsequently developed instability and required a fusion from L4-S1.  She also has a spinal cord stimulator which was recently repositioned to the gluteal region.  When I last saw her on 10/18/2024, she was still sore from the surgery.  She has been evaluated at multiple locations including [facility] and the [facility].     She recalls problems starting around 2011.  She had been running 6 days a week and eventually was unable to even walk.  She was felt to have widespread pain due to inflammation.  She was also diagnosed with Lyme's.  It was affecting her hips and back.  She also gives history of horseback riding.  She developed increased low back pain which turned out to be sciatica going on the left side worse on the right side.  It would extend all the way to the toes.  She also gives history of neck pain and shoulder pain and thoracic outlet syndrome.     She has been working with physical therapy and is unsure if they are able to offer her much.  She denies any issues with her bowel and bladder function.  She denies fevers or weight loss.     She works as an  and enjoys it.  She is currently only doing part-time.  She does not smoke there is no alcohol use except occasional.  She denies chemical use.  She would like to become more active and tells me that she cannot be sitting idle and watching TV.     Past Medical History           Past Medical History:   Diagnosis Date     Abnormal mammogram 9/21/2016     right breast      AD (atopic dermatitis) 10/29/2015     Allergic rhinitis due to other allergen       Arthritis       Bell's palsy       Chronic fatigue 6/6/2012     Chronic infection       MRSA - 2015 scalp and prior to Abdomen     Contact dermatitis and other eczema, due to  unspecified cause       eczema     Contusion of left foot, initial encounter 3/16/2016     DJD (degenerative joint disease), lumbar 9/26/2013     DJD (degenerative joint disease), lumbar 9/26/2013     Ds DNA antibody positive 4/16/2014     borderline.      Elevated blood pressure reading without diagnosis of hypertension 12/24/2013     Facial contusion 12/15/2014     hit by horse      Foraminal stenosis of lumbar region 9/26/2013     Frontal headache 12/15/2014     Gastroesophageal reflux disease, esophagitis presence not specified 6/29/2016     Heat sensitivity 10/29/2015     HTN, goal below 140/90 9/23/2015     Hyperlipidemia LDL goal <130 8/30/2017     Irregular heart beat       Keratitis sicca 10/31/2012     Left shoulder pain 9/26/2013     Lumbar radiculopathy 1/22/2014     Migraine headache 10/23/2013     Migraine, unspecified, with intractable migraine, so stated, without mention of status migrainosus       Motion sickness       MRSA infection 10/20/2015     Muscular wasting and disuse atrophy, not elsewhere classified 6/9/2014     right SCM, right wrist,       Numbness and tingling       both legs anf feet greater on the left     PAC (premature atrial contraction) 7/15/2010     Personal history of COVID-19 12/2/2021     Plantar fasciitis 9/23/2015     PONV (postoperative nausea and vomiting)       Skin lesion 10/20/2015     posterior superior scalp      Spasm of muscle 7/11/2017     Telangiectasia of face 12/19/2014     Thyroid nodule 5/12/2022     Tooth pain 10/20/2015     left lower primary molar          Past Surgical History             Past Surgical History:   Procedure Laterality Date     AS INJ TRANSFORAMIN EPIDURAL, LUMB/SACR SINGLE         COLONOSCOPY   3/11/2013     Procedure: COLONOSCOPY;  colonoscopy;  Surgeon: Lobito Mas MD;  Location:  GI     COLONOSCOPY N/A 9/13/2021     Procedure: COLONOSCOPY, WITH POLYPECTOMY AND BIOPSY;  Surgeon: Sam Liz MD;  Location:  GI     COLONOSCOPY  WITH CO2 INSUFFLATION N/A 11/19/2018     Procedure: COLONOSCOPY WITH CO2 INSUFFLATION;  Surgeon: Goyo Blank MD;  Location: [facility]     DECOMPRESSION LUMBAR TWO LEVELS Bilateral 12/8/2016     Procedure: DECOMPRESSION LUMBAR TWO LEVELS;  Surgeon: Bang Burrell MD;  Location: [facility] OR     DILATION AND CURETTAGE, HYSTEROSCOPY, ABLATE ENDOMETRIUM NOVASURE, COMBINED N/A 6/12/2019     Procedure: hysteroscopy, dilation & curettage, polypectomy, endometrial ablation;  Surgeon: Fredy Pham MD;  Location: [facility] OR     ESOPHAGOSCOPY, GASTROSCOPY, DUODENOSCOPY (EGD), COMBINED   11/8/2013     Procedure: COMBINED ESOPHAGOSCOPY, GASTROSCOPY, DUODENOSCOPY (EGD), BIOPSY SINGLE OR MULTIPLE;  Esophagoscopy, Gastroscopy, Duodenoscopy EGD with multiple biopsies;  Surgeon: Teja Koch MD;  Location: [facility]     ESOPHAGOSCOPY, GASTROSCOPY, DUODENOSCOPY (EGD), COMBINED N/A 2/18/2021     Procedure: ESOPHAGOGASTRODUODENOSCOPY, WITH BIOPSY;  Surgeon: Blanca Ba MD;  Location: [facility]     ESOPHAGOSCOPY, GASTROSCOPY, DUODENOSCOPY (EGD), COMBINED N/A 4/11/2022     Procedure: ESOPHAGOGASTRODUODENOSCOPY, WITH BIOPSY;  Surgeon: Fredy Jimenez MD;  Location: [facility] OR     HC REMOVAL OF TONSILS,<11 Y/O         Tonsils <10y.o.     HC TOOTH EXTRACTION W/FORCEP         INJECT BLOCK MEDIAL BRANCH CERVICAL/THORACIC/LUMBAR N/A 7/23/2020     Procedure: Sacral 1,2,3 Lateral Branch Blocks and lumbar 5 medial branch blocks bilaterally;  Surgeon: Maurisio Goetz MD;  Location: [facility]     INJECT JOINT SACROILIAC Left 9/24/2020     Procedure: Left Lumbar 5 Sacral 1 nerve root injections.;  Surgeon: Maurisio Goetz MD;  Location: [facility]     INJECT TRIGGER POINT Right 3/29/2024     Procedure: Ultrasound-guided RIGHT anterior scalene muscle diagnostic block;  Surgeon: Brandi Dennis MD;  Location: [facility]     REPAIR MUSCLE UPPER EXTREMITY Right 7/27/2021     Procedure: DIVISION RIGHT PECTORALIS MUSCLE;  Surgeon: Luzmaria  Davie Fletcher;  Location: [facility] OR     REPAIR TENDON ELBOW   2014     Procedure: REPAIR TENDON ELBOW;  Surgeon: Chris Johnston MD;  Location: [facility]     ULTRASONIC REMOVAL SOFT TISSUE (LOCATION) Right 2018     Procedure: Tenex right foot;  Surgeon: Patel Martínez DO;  Location: [facility]     ULTRASONIC REMOVAL SOFT TISSUE (LOCATION) Left 2023     Procedure: EXCISION, SOFT TISSUE, ELBOW, USING ULTRASONIC ASPIRATOR SYSTEM;  Surgeon: Patel Martínez DO;  Location: [facility] OR     VASCULAR SURGERY         Thoracic Outlet Syndrome         Family History            Problem (# of Occurrences) Relation (Name,Age of Onset)     Unknown/Adopted (1) Father     Kidney Disease (1) Maternal Aunt     Allergies (6) Mother, Father, Sister, Son, Brother, Daughter     Arthritis (2) Mother: RA, Sister     Blood Disease (1) Paternal Aunt: LGL T cell Leukemia     Cancer (3) Mother: colon, Father, Sister: vaginal     Cardiovascular (1) Father     Depression (2) Mother, Sister     Diabetes (1) Mother: adult     Gastrointestinal Disease (1) Mother: ibs     Genitourinary Problems (1) Mother: kidney cyst     Gynecology (2) Mother: endometriosis, Sister: endometriosis     Heart Disease (3) Maternal Grandfather: MI,  - 60's, Father: mi-age mid 40's, Brother     Hypertension (1) Mother     Lipids (3) Mother, Father, Paternal Grandmother     Osteoporosis (1) Paternal Grandmother     Respiratory (2) Father: asthma, Son: asthma     Thyroid Disease (2) Mother, Paternal Grandmother     Cancer - colorectal (1) Mother     Lupus (1) Maternal Aunt     Other Cancer (1) Father: renal cancer     Rheumatoid Arthritis (1) Paternal Aunt     Bladder Cancer (1) Maternal Aunt                Social History   Social History                Socioeconomic History     Marital status:        Spouse name: Robert     Number of children: 2     Years of education: 12     Highest education level: Not on file   Occupational History      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   Tobacco Use     Smoking status: Never     Smokeless tobacco: Never   Vaping Use     Vaping status: Former     Substances: THC     Devices: Pre-filled or refillable cartridge   Substance and Sexual Activity     Alcohol use: Yes       Comment: social     Drug use: No       Types: Marijuana       Comment: Medical marijuana     Sexual activity: Yes       Partners: Male       Birth control/protection: Surgical       Comment: vasectomy   Other Topics Concern      Service No     Blood Transfusions No     Caffeine Concern No       Comment: 0     Occupational Exposure No     Hobby Hazards Yes       Comment: horses     Sleep Concern No     Stress Concern No     Weight Concern Yes     Special Diet Yes       Comment: nhung's     Back Care No     Exercise Yes       Comment: occ     Bike Helmet Not Asked       Comment: na     Seat Belt Yes     Self-Exams Yes       Comment: occ     Parent/sibling w/ CABG, MI or angioplasty before 65F 55M? Not Asked   Social History Narrative     Not on file      Social Determinants of Health              Financial Resource Strain: Low Risk  (6/17/2024)     Financial Resource Strain       Within the past 12 months, have you or your family members you live with been unable to get utilities (heat, electricity) when it was really needed?: No   Food Insecurity: Low Risk  (6/17/2024)     Food Insecurity       Within the past 12 months, did you worry that your food would run out before you got money to buy more?: No       Within the past 12 months, did the food you bought just not last and you didn t have money to get more?: No   Transportation Needs: Low Risk  (6/17/2024)     Transportation Needs       Within the past 12 months, has lack of transportation kept you from medical appointments, getting your medicines, non-medical meetings or appointments, work, or from getting things that you need?: No   Physical Activity: Sufficiently Active  (4/5/2024)     Received from [facility]     Exercise Vital Sign       Days of Exercise per Week: 7 days       Minutes of Exercise per Session: 30 min   Stress: No Stress Concern Present (11/21/2019)     [facility] from [facility], [facility]     [employer] - Occupational Stress Questionnaire       Feeling of Stress : Not at all   Social Connections: Unknown (8/13/2024)     [facility]     Social Connections       Frequency of Communication with Friends and Family: Not on file   Interpersonal Safety: Low Risk  (9/15/2024)     Interpersonal Safety       Do you feel physically and emotionally safe where you currently live?: Yes       Within the past 12 months, have you been hit, slapped, kicked or otherwise physically hurt by someone?: No       Within the past 12 months, have you been humiliated or emotionally abused in other ways by your partner or ex-partner?: No   Housing Stability: Low Risk  (6/17/2024)     Housing Stability       Do you have housing? : Yes       Are you worried about losing your housing?: No            Current Outpatient Medications   Medication Sig Dispense Refill     ACETAMINOPHEN PO Take 650 mg by mouth every 6 hours as needed for mild pain.       hydrOXYzine HCl (ATARAX) 25 MG tablet Take 1-2 tablets (25-50 mg) by mouth 3 times daily as needed for itching or other (insomnia pain). 90 tablet 1     meloxicam (MOBIC) 15 MG tablet TAKE ONE TABLET BY MOUTH ONCE DAILY 90 tablet 3     tirzepatide (MOUNJARO) 15 MG/0.5ML pen Inject 15 mg Subcutaneous every 7 days 6 mL 3               On examination, patient is alert and cooperative.  Vitals are stable.  She is afebrile.     HEENT is notable for right facial paresis from Bell's palsy.  She is able to move her upper extremities functionally.  She is able to carry out straight leg raising test.  She has some difficulty with Gaurav's on the left side.  She is tender over the  left SI joint.  She has healed scars in the back.       Musculoskeletal examination of the left lower extremity revealed areas of tenderness with proved tone in the left peroneus longus in 2 different areas.  There was also improvement in the area area of the left medial gastrocnemius.       Neurologically she is able to stand.  Romberg is negative.  Coordination tests are intact.  Strength is full.  She is able to walk on her heels and toes.  Reflexes are symmetric and plantars are downgoing.     She responds to touch including temperature and vibration.    She is also tender over the cervical facets right side worse than the left in the C2-C4 distribution.     Impression: At this point this 50-year-old woman with a known history of Trina-Danlos, history of Lyme's disease, history of widespread aches and pains.  She also has had issues with her back and has undergone decompression followed by spinal cord stimulator followed by fusion and replacement of her spinal cord stimulator.  She is sure that it is not helping her anymore.     Currently her examination is notable for  involvement of the left peroneus longus and gastrocnemius medius and left piriformis and the gluteal region.  Her trigger point injections were beneficial while the medication was working.  Her current problems are related to the abnormal tone and muscle spasms in the affected muscles.  These are focal in nature and are consistent with dystonia/focal dystonia.  She received some improvement with 100 units but has more muscles affected and we increase this to 200 last time.  I will see if we need to increase this further or introduce trigger point injections to fine-tune it better.    30 minutes were spent on the day of the encounter for this visit, with greater than 50% was for counseling on above-mentioned issues.          Thank you are much for allow me to assist in her care.     Sunny Arechiga MD     Again, thank you for allowing me to  participate in the care of your patient.      Sincerely,    Sunny Arechiga MD

## 2025-07-10 ENCOUNTER — OFFICE VISIT (OUTPATIENT)
Dept: PHYSICAL MEDICINE AND REHAB | Facility: CLINIC | Age: 50
End: 2025-07-10
Payer: COMMERCIAL

## 2025-07-10 DIAGNOSIS — G24.8 FOCAL DYSTONIA: Primary | ICD-10-CM

## 2025-07-10 DIAGNOSIS — R25.2 CRAMP AND SPASM: ICD-10-CM

## 2025-07-10 DIAGNOSIS — Z98.1 S/P LUMBAR FUSION: ICD-10-CM

## 2025-07-10 DIAGNOSIS — G89.29 OTHER CHRONIC PAIN: ICD-10-CM

## 2025-07-10 NOTE — LETTER
7/10/2025       RE: Amelia Coker  [address] [facility] [id number]     Dear Colleague,    Thank you for referring your patient, Amelia Coker, to the [facility] [place] at [facility]. Please see a copy of my visit note below.    CC: Patient returns for follow-up visit for her ongoing issues related to left lower extremity aches and pains with numbness and tingling.    She was last seen by me on 6/4/2025.  She had previously received Botox injections on 4/16/2025.  She feels it has been helping though she still has significant spasms.  She feels we are going in the right direction.  She is planning to get her hardware out as her surgeon noted that her fusion is healed fully.     She was seen by me on 2/27/2025.  She finds Botox was helpful but it has since come back.  She also has found triggers that activate when she straightens her left knee.     She was seen by me on 1/21/25.   Her interval history is notable for improvement for several weeks after the injection.  She has since noticed some return and spasms.  She has also had SI joint injections at [place].  This was only helpful for a short period of time.      Her interval history is notable for improvement for several hours after the injection.  She noticed that she was able to extend her left lower extremity without spasms.  She also noticed the ball and the toes of her left foot felt better.  She still has some areas of numbness and tingling in the dorsum of the foot.  She can still keeps the spinal cord stimulator on.  She has not found that it made a difference.  She has L4-S1 fusion with a screw across the SI joints.     Patient is a 50-year-old woman with a complex medical history.  She reports having lumbar issues requiring decompression.  Subsequently developed instability and required a fusion from L4-S1.  She also has a spinal cord stimulator  which was recently repositioned to the gluteal region.  When I last saw her on 10/18/2024, she was still sore from the surgery.  She has been evaluated at multiple locations including [facility] and the [place].     She recalls problems starting around 2011.  She had been running 6 days a week and eventually was unable to even walk.  She was felt to have widespread pain due to inflammation.  She was also diagnosed with Lyme's.  It was affecting her hips and back.  She also gives history of horseback riding.  She developed increased low back pain which turned out to be sciatica going on the left side worse on the right side.  It would extend all the way to the toes.  She also gives history of neck pain and shoulder pain and thoracic outlet syndrome.     She has been working with physical therapy and is unsure if they are able to offer her much.  She denies any issues with her bowel and bladder function.  She denies fevers or weight loss.     She works as an  and enjoys it.  She is currently only doing part-time.  She does not smoke there is no alcohol use except occasional.  She denies chemical use.  She would like to become more active and tells me that she cannot be sitting idle and watching TV.     Past Medical History           Past Medical History:   Diagnosis Date     Abnormal mammogram 9/21/2016     right breast      AD (atopic dermatitis) 10/29/2015     Allergic rhinitis due to other allergen       Arthritis       Bell's palsy       Chronic fatigue 6/6/2012     Chronic infection       MRSA - 2015 scalp and prior to Abdomen     Contact dermatitis and other eczema, due to unspecified cause       eczema     Contusion of left foot, initial encounter 3/16/2016     DJD (degenerative joint disease), lumbar 9/26/2013     DJD (degenerative joint disease), lumbar 9/26/2013     Ds DNA antibody positive 4/16/2014     borderline.      Elevated blood pressure reading without diagnosis of hypertension 12/24/2013      Facial contusion 12/15/2014     hit by horse      Foraminal stenosis of lumbar region 9/26/2013     Frontal headache 12/15/2014     Gastroesophageal reflux disease, esophagitis presence not specified 6/29/2016     Heat sensitivity 10/29/2015     HTN, goal below 140/90 9/23/2015     Hyperlipidemia LDL goal <130 8/30/2017     Irregular heart beat       Keratitis sicca 10/31/2012     Left shoulder pain 9/26/2013     Lumbar radiculopathy 1/22/2014     Migraine headache 10/23/2013     Migraine, unspecified, with intractable migraine, so stated, without mention of status migrainosus       Motion sickness       MRSA infection 10/20/2015     Muscular wasting and disuse atrophy, not elsewhere classified 6/9/2014     right SCM, right wrist,       Numbness and tingling       both legs anf feet greater on the left     PAC (premature atrial contraction) 7/15/2010     Personal history of COVID-19 12/2/2021     Plantar fasciitis 9/23/2015     PONV (postoperative nausea and vomiting)       Skin lesion 10/20/2015     posterior superior scalp      Spasm of muscle 7/11/2017     Telangiectasia of face 12/19/2014     Thyroid nodule 5/12/2022     Tooth pain 10/20/2015     left lower primary molar          Past Surgical History             Past Surgical History:   Procedure Laterality Date     AS INJ TRANSFORAMIN EPIDURAL, LUMB/SACR SINGLE         COLONOSCOPY   3/11/2013     Procedure: COLONOSCOPY;  colonoscopy;  Surgeon: Lobito Mas MD;  Location: [facility]     COLONOSCOPY N/A 9/13/2021     Procedure: COLONOSCOPY, WITH POLYPECTOMY AND BIOPSY;  Surgeon: Sam Liz MD;  Location:  GI     COLONOSCOPY WITH CO2 INSUFFLATION N/A 11/19/2018     Procedure: COLONOSCOPY WITH CO2 INSUFFLATION;  Surgeon: Goyo Blank MD;  Location: [facility]     DECOMPRESSION LUMBAR TWO LEVELS Bilateral 12/8/2016     Procedure: DECOMPRESSION LUMBAR TWO LEVELS;  Surgeon: Bang Burrell MD;  Location: [facility]     DILATION AND CURETTAGE,  HYSTEROSCOPY, ABLATE ENDOMETRIUM NOVASURE, COMBINED N/A 6/12/2019     Procedure: hysteroscopy, dilation & curettage, polypectomy, endometrial ablation;  Surgeon: Fredy Pham MD;  Location: [facility]     ESOPHAGOSCOPY, GASTROSCOPY, DUODENOSCOPY (EGD), COMBINED   11/8/2013     Procedure: COMBINED ESOPHAGOSCOPY, GASTROSCOPY, DUODENOSCOPY (EGD), BIOPSY SINGLE OR MULTIPLE;  Esophagoscopy, Gastroscopy, Duodenoscopy EGD with multiple biopsies;  Surgeon: Teja Koch MD;  Location: [facility]     ESOPHAGOSCOPY, GASTROSCOPY, DUODENOSCOPY (EGD), COMBINED N/A 2/18/2021     Procedure: ESOPHAGOGASTRODUODENOSCOPY, WITH BIOPSY;  Surgeon: Blanca Ba MD;  Location: [facility]     ESOPHAGOSCOPY, GASTROSCOPY, DUODENOSCOPY (EGD), COMBINED N/A 4/11/2022     Procedure: ESOPHAGOGASTRODUODENOSCOPY, WITH BIOPSY;  Surgeon: Fredy Jimenez MD;  Location: [facility] OR     HC REMOVAL OF TONSILS,<11 Y/O         Tonsils <10y.o.     HC TOOTH EXTRACTION W/FORCEP         INJECT BLOCK MEDIAL BRANCH CERVICAL/THORACIC/LUMBAR N/A 7/23/2020     Procedure: Sacral 1,2,3 Lateral Branch Blocks and lumbar 5 medial branch blocks bilaterally;  Surgeon: Maurisio Goetz MD;  Location: [facility]     INJECT JOINT SACROILIAC Left 9/24/2020     Procedure: Left Lumbar 5 Sacral 1 nerve root injections.;  Surgeon: Maurisio Goetz MD;  Location: [facility]     INJECT TRIGGER POINT Right 3/29/2024     Procedure: Ultrasound-guided RIGHT anterior scalene muscle diagnostic block;  Surgeon: Brandi Dennis MD;  Location: [facility]     REPAIR MUSCLE UPPER EXTREMITY Right 7/27/2021     Procedure: DIVISION RIGHT PECTORALIS MUSCLE;  Surgeon: Davie Dutta;  Location: [facility] OR     REPAIR TENDON ELBOW   7/9/2014     Procedure: REPAIR TENDON ELBOW;  Surgeon: Chris Johnston MD;  Location: [facility]     ULTRASONIC REMOVAL SOFT TISSUE (LOCATION) Right 11/21/2018     Procedure: Tenex right foot;  Surgeon: Patel Martínez DO;  Location: [facility]     ULTRASONIC REMOVAL SOFT  TISSUE (LOCATION) Left 2023     Procedure: EXCISION, SOFT TISSUE, ELBOW, USING ULTRASONIC ASPIRATOR SYSTEM;  Surgeon: Patel Martínez DO;  Location: [facility]     VASCULAR SURGERY         Thoracic Outlet Syndrome         Family History            Problem (# of Occurrences) Relation (Name,Age of Onset)     Unknown/Adopted (1) Father     Kidney Disease (1) Maternal Aunt     Allergies (6) Mother, Father, Sister, Son, Brother, Daughter     Arthritis (2) Mother: RA, Sister     Blood Disease (1) Paternal Aunt: LGL T cell Leukemia     Cancer (3) Mother: colon, Father, Sister: vaginal     Cardiovascular (1) Father     Depression (2) Mother, Sister     Diabetes (1) Mother: adult     Gastrointestinal Disease (1) Mother: ibs     Genitourinary Problems (1) Mother: kidney cyst     Gynecology (2) Mother: endometriosis, Sister: endometriosis     Heart Disease (3) Maternal Grandfather: MI,  - 60's, Father: mi-age mid 40's, Brother     Hypertension (1) Mother     Lipids (3) Mother, Father, Paternal Grandmother     Osteoporosis (1) Paternal Grandmother     Respiratory (2) Father: asthma, Son: asthma     Thyroid Disease (2) Mother, Paternal Grandmother     Cancer - colorectal (1) Mother     Lupus (1) Maternal Aunt     Other Cancer (1) Father: renal cancer     Rheumatoid Arthritis (1) Paternal Aunt     Bladder Cancer (1) Maternal Aunt                Social History   Social History                Socioeconomic History     Marital status:        Spouse name: Robert     Number of children: 2     Years of education: 12     Highest education level: Not on file   Occupational History     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   Tobacco Use     Smoking status: Never     Smokeless tobacco: Never   Vaping Use     Vaping status: Former     Substances: THC     Devices: Pre-filled or refillable cartridge   Substance and Sexual Activity     Alcohol use: Yes       Comment: social     Drug use: No       Types: Marijuana        Comment: Medical marijuana     Sexual activity: Yes       Partners: Male       Birth control/protection: Surgical       Comment: vasectomy   Other Topics Concern      Service No     Blood Transfusions No     Caffeine Concern No       Comment: 0     Occupational Exposure No     Hobby Hazards Yes       Comment: horses     Sleep Concern No     Stress Concern No     Weight Concern Yes     Special Diet Yes       Comment: nhung's     Back Care No     Exercise Yes       Comment: occ     Bike Helmet Not Asked       Comment: na     Seat Belt Yes     Self-Exams Yes       Comment: occ     Parent/sibling w/ CABG, MI or angioplasty before 65F 55M? Not Asked   Social History Narrative     Not on file      Social Determinants of Health              Financial Resource Strain: Low Risk  (6/17/2024)     Financial Resource Strain       Within the past 12 months, have you or your family members you live with been unable to get utilities (heat, electricity) when it was really needed?: No   Food Insecurity: Low Risk  (6/17/2024)     Food Insecurity       Within the past 12 months, did you worry that your food would run out before you got money to buy more?: No       Within the past 12 months, did the food you bought just not last and you didn t have money to get more?: No   Transportation Needs: Low Risk  (6/17/2024)     Transportation Needs       Within the past 12 months, has lack of transportation kept you from medical appointments, getting your medicines, non-medical meetings or appointments, work, or from getting things that you need?: No   Physical Activity: Sufficiently Active (4/5/2024)     Received from [facility]     Exercise Vital Sign       Days of Exercise per Week: 7 days       Minutes of Exercise per Session: 30 min   Stress: No Stress Concern Present (11/21/2019)     Received from [facility], [facility]     [place] [employer] - Occupational Stress Questionnaire       Feeling of  Stress : Not at all   Social Connections: Unknown (8/13/2024)     Received from [facility]     Social Connections       Frequency of Communication with Friends and Family: Not on file   Interpersonal Safety: Low Risk  (9/15/2024)     Interpersonal Safety       Do you feel physically and emotionally safe where you currently live?: Yes       Within the past 12 months, have you been hit, slapped, kicked or otherwise physically hurt by someone?: No       Within the past 12 months, have you been humiliated or emotionally abused in other ways by your partner or ex-partner?: No   Housing Stability: Low Risk  (6/17/2024)     Housing Stability       Do you have housing? : Yes       Are you worried about losing your housing?: No            Current Outpatient Prescriptions      Current Outpatient Prescriptions      Current Outpatient Prescriptions          Current Outpatient Medications   Medication Sig Dispense Refill     ACETAMINOPHEN PO Take 650 mg by mouth every 6 hours as needed for mild pain.         hydrOXYzine HCl (ATARAX) 25 MG tablet Take 1-2 tablets (25-50 mg) by mouth 3 times daily as needed for itching or other (insomnia pain). 90 tablet 1     meloxicam (MOBIC) 15 MG tablet TAKE ONE TABLET BY MOUTH ONCE DAILY 90 tablet 3     tirzepatide (MOUNJARO) 15 MG/0.5ML pen Inject 15 mg Subcutaneous every 7 days 6 mL 3            On examination, patient is alert and cooperative.  Vitals are stable.  She is afebrile.     HEENT is notable for right facial paresis from Bell's palsy.  She is able to move her upper extremities functionally.  She is able to carry out straight leg raising test.  She has some difficulty with Gaurav's on the left side.  She has healed scars in the back.  On the left side the SI joint and piriformis were tender.     Musculoskeletal examination of the left lower extremity revealed areas of tenderness with increased tone in the left peroneus longus in 2 different  areas.  There was also an area affecting the left medial gastrocnemius.  The left piriformis was also tender, as was the left gluteus medius.  These muscles had abnormal tone and recreated her symptoms.     Neurologically she is able to stand.  Romberg is negative.  Coordination tests are intact.  Strength is full.  She is able to walk on her heels and toes.  Reflexes are symmetric and plantars are downgoing.     She responds to touch including temperature and vibration.     Impression: At this point this 50-year-old woman with a known history of Trina-Danlos, history of Lyme's disease, history of widespread aches and pains.  She also has had issues with her back and has undergone decompression followed by spinal cord stimulator followed by fusion and replacement of her spinal cord stimulator.  She is still using it though she is not sure if it is helping as much.     Currently her examination is notable for issues in her lower back especially the left SI and piriformis.  There is also involvement of the left peroneus longus and gastrocnemius medius and left piriformis and the gluteal region.  Her trigger point injections were beneficial while the medication was working.  Her current problems are related to the abnormal tone and muscle spasms in the affected muscles.  These are focal in nature and are consistent with dystonia/focal dystonia.  She received some improvement with 100 units but has more muscles affected and we increased it to 200 units last time. Depending on her improvement, she would likely repeat  every 3 months.     20 minutes were spent on the day of the encounter for this visit, exclusive of the procedure time, with greater than 50% was for counseling on above-mentioned issues.       PROCEDURE NOTE: With her informed consent, after explaining the benefits and risks of the procedure, using ChloraPrep for skin prep, EMG for localization, preservative-free normal saline for dilution, 2 cc per 100  units, 200 units were injected as follows.  67 units were injected into the left peroneus longus muscle at 2 different sites.  33 units were injected into the left medial gastrocs, 33 units units into the left piriformis, 33 units into the left gluteus medius and 34 units into left medial hamstrings, all for a total of 200 units..  She tolerated the procedure well.     She can ice the region, anticipate the onset of Botox within 1 to 3 days, peak in 2 weeks and a duration of about 12 weeks.     Thank you are much for allow me to assist in her care.     Sunny Arechiga MD     Again, thank you for allowing me to participate in the care of your patient.      Sincerely,    Sunny Arechiga MD

## 2025-07-10 NOTE — PROGRESS NOTES
CC: Patient returns for follow-up visit for her ongoing issues related to left lower extremity aches and pains with numbness and tingling.    She was last seen by me on 6/4/2025.  She had previously received Botox injections on 4/16/2025.  She feels it has been helping though she still has significant spasms.  She feels we are going in the right direction.  She is planning to get her hardware out as her surgeon noted that her fusion is healed fully.     She was seen by me on 2/27/2025.  She finds Botox was helpful but it has since come back.  She also has found triggers that activate when she straightens her left knee.     She was seen by me on 1/21/25.   Her interval history is notable for improvement for several weeks after the injection.  She has since noticed some return and spasms.  She has also had SI joint injections at [place].  This was only helpful for a short period of time.      Her interval history is notable for improvement for several hours after the injection.  She noticed that she was able to extend her left lower extremity without spasms.  She also noticed the ball and the toes of her left foot felt better.  She still has some areas of numbness and tingling in the dorsum of the foot.  She can still keeps the spinal cord stimulator on.  She has not found that it made a difference.  She has L4-S1 fusion with a screw across the SI joints.     Patient is a 50-year-old woman with a complex medical history.  She reports having lumbar issues requiring decompression.  Subsequently developed instability and required a fusion from L4-S1.  She also has a spinal cord stimulator which was recently repositioned to the gluteal region.  When I last saw her on 10/18/2024, she was still sore from the surgery.  She has been evaluated at multiple locations including [facility] and the [facility].     She recalls problems starting around 2011.  She had been running 6 days a week and eventually was unable to even walk.   She was felt to have widespread pain due to inflammation.  She was also diagnosed with Lyme's.  It was affecting her hips and back.  She also gives history of horseback riding.  She developed increased low back pain which turned out to be sciatica going on the left side worse on the right side.  It would extend all the way to the toes.  She also gives history of neck pain and shoulder pain and thoracic outlet syndrome.     She has been working with physical therapy and is unsure if they are able to offer her much.  She denies any issues with her bowel and bladder function.  She denies fevers or weight loss.     She works as an  and enjoys it.  She is currently only doing part-time.  She does not smoke there is no alcohol use except occasional.  She denies chemical use.  She would like to become more active and tells me that she cannot be sitting idle and watching TV.     Past Medical History           Past Medical History:   Diagnosis Date    Abnormal mammogram 9/21/2016     right breast     AD (atopic dermatitis) 10/29/2015    Allergic rhinitis due to other allergen      Arthritis      Bell's palsy      Chronic fatigue 6/6/2012    Chronic infection       MRSA - 2015 scalp and prior to Abdomen    Contact dermatitis and other eczema, due to unspecified cause       eczema    Contusion of left foot, initial encounter 3/16/2016    DJD (degenerative joint disease), lumbar 9/26/2013    DJD (degenerative joint disease), lumbar 9/26/2013    Ds DNA antibody positive 4/16/2014     borderline.     Elevated blood pressure reading without diagnosis of hypertension 12/24/2013    Facial contusion 12/15/2014     hit by horse     Foraminal stenosis of lumbar region 9/26/2013    Frontal headache 12/15/2014    Gastroesophageal reflux disease, esophagitis presence not specified 6/29/2016    Heat sensitivity 10/29/2015    HTN, goal below 140/90 9/23/2015    Hyperlipidemia LDL goal <130 8/30/2017    Irregular heart beat       Keratitis sicca 10/31/2012    Left shoulder pain 9/26/2013    Lumbar radiculopathy 1/22/2014    Migraine headache 10/23/2013    Migraine, unspecified, with intractable migraine, so stated, without mention of status migrainosus      Motion sickness      MRSA infection 10/20/2015    Muscular wasting and disuse atrophy, not elsewhere classified 6/9/2014     right SCM, right wrist,      Numbness and tingling       both legs anf feet greater on the left    PAC (premature atrial contraction) 7/15/2010    Personal history of COVID-19 12/2/2021    Plantar fasciitis 9/23/2015    PONV (postoperative nausea and vomiting)      Skin lesion 10/20/2015     posterior superior scalp     Spasm of muscle 7/11/2017    Telangiectasia of face 12/19/2014    Thyroid nodule 5/12/2022    Tooth pain 10/20/2015     left lower primary molar          Past Surgical History             Past Surgical History:   Procedure Laterality Date    AS INJ TRANSFORAMIN EPIDURAL, LUMB/SACR SINGLE        COLONOSCOPY   3/11/2013     Procedure: COLONOSCOPY;  colonoscopy;  Surgeon: Lobito Mas MD;  Location: [facility]    COLONOSCOPY N/A 9/13/2021     Procedure: COLONOSCOPY, WITH POLYPECTOMY AND BIOPSY;  Surgeon: Sam Liz MD;  Location: [facility]    COLONOSCOPY WITH CO2 INSUFFLATION N/A 11/19/2018     Procedure: COLONOSCOPY WITH CO2 INSUFFLATION;  Surgeon: Goyo Blank MD;  Location: [facility]    DECOMPRESSION LUMBAR TWO LEVELS Bilateral 12/8/2016     Procedure: DECOMPRESSION LUMBAR TWO LEVELS;  Surgeon: Bang Burrell MD;  Location: [facility]    DILATION AND CURETTAGE, HYSTEROSCOPY, ABLATE ENDOMETRIUM NOVASURE, COMBINED N/A 6/12/2019     Procedure: hysteroscopy, dilation & curettage, polypectomy, endometrial ablation;  Surgeon: Fredy Pham MD;  Location: [facility]    ESOPHAGOSCOPY, GASTROSCOPY, DUODENOSCOPY (EGD), COMBINED   11/8/2013     Procedure: COMBINED ESOPHAGOSCOPY, GASTROSCOPY, DUODENOSCOPY (EGD), BIOPSY SINGLE OR MULTIPLE;   Esophagoscopy, Gastroscopy, Duodenoscopy EGD with multiple biopsies;  Surgeon: Teja Koch MD;  [facility]    ESOPHAGOSCOPY, GASTROSCOPY, DUODENOSCOPY (EGD), COMBINED N/A 2/18/2021     Procedure: ESOPHAGOGASTRODUODENOSCOPY, WITH BIOPSY;  Surgeon: Blanca Ba MD;  [facility]    ESOPHAGOSCOPY, GASTROSCOPY, DUODENOSCOPY (EGD), COMBINED N/A 4/11/2022     Procedure: ESOPHAGOGASTRODUODENOSCOPY, WITH BIOPSY;  Surgeon: Fredy Jimenez MD;  Location: [facility]    HC REMOVAL OF TONSILS,<13 Y/O         Tonsils <10y.o.    HC TOOTH EXTRACTION W/FORCEP        INJECT BLOCK MEDIAL BRANCH CERVICAL/THORACIC/LUMBAR N/A 7/23/2020     Procedure: Sacral 1,2,3 Lateral Branch Blocks and lumbar 5 medial branch blocks bilaterally;  Surgeon: Maurisio Goetz MD;  [facility]    INJECT JOINT SACROILIAC Left 9/24/2020     Procedure: Left Lumbar 5 Sacral 1 nerve root injections.;  Surgeon: Maurisio Goetz MD;  [facility]    INJECT TRIGGER POINT Right 3/29/2024     Procedure: Ultrasound-guided RIGHT anterior scalene muscle diagnostic block;  Surgeon: Brandi Dennis MD;  [facility]: [facility]    REPAIR MUSCLE UPPER EXTREMITY Right 7/27/2021     Procedure: DIVISION RIGHT PECTORALIS MUSCLE;  Surgeon: Davie Dutta;  Location: [facility]    REPAIR TENDON ELBOW   7/9/2014     Procedure: REPAIR TENDON ELBOW;  Surgeon: Chris Johnston MD;  [facility]    ULTRASONIC REMOVAL SOFT TISSUE (LOCATION) Right 11/21/2018     Procedure: Tenex right foot;  Surgeon: Patel Martínez DO;  Location: [facility]    ULTRASONIC REMOVAL SOFT TISSUE (LOCATION) Left 11/16/2023     Procedure: EXCISION, SOFT TISSUE, ELBOW, USING ULTRASONIC ASPIRATOR SYSTEM;  Surgeon: Patel Martínez DO;  Location: [facility]    VASCULAR SURGERY         Thoracic Outlet Syndrome         Family History            Problem (# of Occurrences) Relation (Name,Age of Onset)     Unknown/Adopted (1) Father     Kidney Disease (1) Maternal Aunt     Allergies (6)  Mother, Father, Sister, Son, Brother, Daughter     Arthritis (2) Mother: RA, Sister     Blood Disease (1) Paternal Aunt: LGL T cell Leukemia     Cancer (3) Mother: colon, Father, Sister: vaginal     Cardiovascular (1) Father     Depression (2) Mother, Sister     Diabetes (1) Mother: adult     Gastrointestinal Disease (1) Mother: ibs     Genitourinary Problems (1) Mother: kidney cyst     Gynecology (2) Mother: endometriosis, Sister: endometriosis     Heart Disease (3) Maternal Grandfather: MI,  - 60's, Father: mi-age mid 40's, Brother     Hypertension (1) Mother     Lipids (3) Mother, Father, Paternal Grandmother     Osteoporosis (1) Paternal Grandmother     Respiratory (2) Father: asthma, Son: asthma     Thyroid Disease (2) Mother, Paternal Grandmother     Cancer - colorectal (1) Mother     Lupus (1) Maternal Aunt     Other Cancer (1) Father: renal cancer     Rheumatoid Arthritis (1) Paternal Aunt     Bladder Cancer (1) Maternal Aunt                Social History   Social History                Socioeconomic History    Marital status:        Spouse name: Robert    Number of children: 2    Years of education: 12    Highest education level: Not on file   Occupational History    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   Tobacco Use    Smoking status: Never    Smokeless tobacco: Never   Vaping Use    Vaping status: Former    Substances: THC    Devices: Pre-filled or refillable cartridge   Substance and Sexual Activity    Alcohol use: Yes       Comment: social    Drug use: No       Types: Marijuana       Comment: Medical marijuana    Sexual activity: Yes       Partners: Male       Birth control/protection: Surgical       Comment: vasectomy   Other Topics Concern     Service No    Blood Transfusions No    Caffeine Concern No       Comment: 0    Occupational Exposure No    Hobby Hazards Yes       Comment: horses    Sleep Concern No    Stress Concern No    Weight Concern Yes    Special Diet  Yes       Comment: nhung's    Back Care No    Exercise Yes       Comment: occ    Bike Helmet Not Asked       Comment: na    Seat Belt Yes    Self-Exams Yes       Comment: occ    Parent/sibling w/ CABG, MI or angioplasty before 65F 55M? Not Asked   Social History Narrative    Not on file      Social Determinants of Health              Financial Resource Strain: Low Risk  (6/17/2024)     Financial Resource Strain      Within the past 12 months, have you or your family members you live with been unable to get utilities (heat, electricity) when it was really needed?: No   Food Insecurity: Low Risk  (6/17/2024)     Food Insecurity      Within the past 12 months, did you worry that your food would run out before you got money to buy more?: No      Within the past 12 months, did the food you bought just not last and you didn t have money to get more?: No   Transportation Needs: Low Risk  (6/17/2024)     Transportation Needs      Within the past 12 months, has lack of transportation kept you from medical appointments, getting your medicines, non-medical meetings or appointments, work, or from getting things that you need?: No   Physical Activity: Sufficiently Active (4/5/2024)     Received from [facility]     Exercise Vital Sign      Days of Exercise per Week: 7 days      Minutes of Exercise per Session: 30 min   Stress: No Stress Concern Present (11/21/2019)     [facility], [facility]     [place] [facility] [employer] [facility] - Occupational Stress Questionnaire      Feeling of Stress : Not at all   Social Connections: Unknown (8/13/2024)     [facility]     Social Connections      Frequency of Communication with Friends and Family: Not on file   Interpersonal Safety: Low Risk  (9/15/2024)     Interpersonal Safety      Do you feel physically and emotionally safe where you currently live?: Yes      Within the past 12 months, have you been hit, slapped, kicked  or otherwise physically hurt by someone?: No      Within the past 12 months, have you been humiliated or emotionally abused in other ways by your partner or ex-partner?: No   Housing Stability: Low Risk  (6/17/2024)     Housing Stability      Do you have housing? : Yes      Are you worried about losing your housing?: No            Current Outpatient Prescriptions      Current Outpatient Prescriptions      Current Outpatient Prescriptions          Current Outpatient Medications   Medication Sig Dispense Refill    ACETAMINOPHEN PO Take 650 mg by mouth every 6 hours as needed for mild pain.        hydrOXYzine HCl (ATARAX) 25 MG tablet Take 1-2 tablets (25-50 mg) by mouth 3 times daily as needed for itching or other (insomnia pain). 90 tablet 1    meloxicam (MOBIC) 15 MG tablet TAKE ONE TABLET BY MOUTH ONCE DAILY 90 tablet 3    tirzepatide (MOUNJARO) 15 MG/0.5ML pen Inject 15 mg Subcutaneous every 7 days 6 mL 3            On examination, patient is alert and cooperative.  Vitals are stable.  She is afebrile.     HEENT is notable for right facial paresis from Bell's palsy.  She is able to move her upper extremities functionally.  She is able to carry out straight leg raising test.  She has some difficulty with Gaurav's on the left side.  She has healed scars in the back.  On the left side the SI joint and piriformis were tender.     Musculoskeletal examination of the left lower extremity revealed areas of tenderness with increased tone in the left peroneus longus in 2 different areas.  There was also an area affecting the left medial gastrocnemius.  The left piriformis was also tender, as was the left gluteus medius.  These muscles had abnormal tone and recreated her symptoms.     Neurologically she is able to stand.  Romberg is negative.  Coordination tests are intact.  Strength is full.  She is able to walk on her heels and toes.  Reflexes are symmetric and plantars are downgoing.     She responds to touch including  temperature and vibration.     Impression: At this point this 50-year-old woman with a known history of Trina-Danlos, history of Lyme's disease, history of widespread aches and pains.  She also has had issues with her back and has undergone decompression followed by spinal cord stimulator followed by fusion and replacement of her spinal cord stimulator.  She is still using it though she is not sure if it is helping as much.     Currently her examination is notable for issues in her lower back especially the left SI and piriformis.  There is also involvement of the left peroneus longus and gastrocnemius medius and left piriformis and the gluteal region.  Her trigger point injections were beneficial while the medication was working.  Her current problems are related to the abnormal tone and muscle spasms in the affected muscles.  These are focal in nature and are consistent with dystonia/focal dystonia.  She received some improvement with 100 units but has more muscles affected and we increased it to 200 units last time. Depending on her improvement, she would likely repeat  every 3 months.     20 minutes were spent on the day of the encounter for this visit, exclusive of the procedure time, with greater than 50% was for counseling on above-mentioned issues.       PROCEDURE NOTE: With her informed consent, after explaining the benefits and risks of the procedure, using ChloraPrep for skin prep, EMG for localization, preservative-free normal saline for dilution, 2 cc per 100 units, 200 units were injected as follows.  67 units were injected into the left peroneus longus muscle at 2 different sites.  33 units were injected into the left medial gastrocs, 33 units units into the left piriformis, 33 units into the left gluteus medius and 34 units into left medial hamstrings, all for a total of 200 units..  She tolerated the procedure well.     She can ice the region, anticipate the onset of Botox within 1 to 3 days, peak  in 2 weeks and a duration of about 12 weeks.     Thank you are much for allow me to assist in her care.     Sunny Arechiga MD

## 2025-08-13 ENCOUNTER — MYC MEDICAL ADVICE (OUTPATIENT)
Dept: FAMILY MEDICINE | Facility: OTHER | Age: 50
End: 2025-08-13
Payer: COMMERCIAL

## 2025-08-20 DIAGNOSIS — Q79.60 EHLERS-DANLOS SYNDROME: ICD-10-CM

## 2025-08-20 DIAGNOSIS — M48.061 FORAMINAL STENOSIS OF LUMBAR REGION: ICD-10-CM

## 2025-08-20 DIAGNOSIS — M51.27 HERNIATION OF INTERVERTEBRAL DISC BETWEEN L5 AND S1: ICD-10-CM

## 2025-08-20 DIAGNOSIS — Z98.1 S/P LUMBAR FUSION: ICD-10-CM

## 2025-08-20 DIAGNOSIS — Z96.82 S/P PLACEMENT OF NERVE STIMULATOR: ICD-10-CM

## 2025-08-20 DIAGNOSIS — M54.16 LUMBAR RADICULOPATHY: ICD-10-CM

## 2025-08-20 DIAGNOSIS — M47.26 OSTEOARTHRITIS OF SPINE WITH RADICULOPATHY, LUMBAR REGION: Primary | ICD-10-CM

## 2025-08-21 ENCOUNTER — ANCILLARY PROCEDURE (OUTPATIENT)
Dept: GENERAL RADIOLOGY | Facility: OTHER | Age: 50
End: 2025-08-21
Attending: PHYSICIAN ASSISTANT
Payer: COMMERCIAL

## 2025-08-21 ENCOUNTER — OFFICE VISIT (OUTPATIENT)
Dept: FAMILY MEDICINE | Facility: OTHER | Age: 50
End: 2025-08-21
Payer: COMMERCIAL

## 2025-08-21 VITALS
TEMPERATURE: 98.1 F | BODY MASS INDEX: 26.03 KG/M2 | OXYGEN SATURATION: 96 % | SYSTOLIC BLOOD PRESSURE: 124 MMHG | RESPIRATION RATE: 15 BRPM | WEIGHT: 162 LBS | HEIGHT: 66 IN | HEART RATE: 74 BPM | DIASTOLIC BLOOD PRESSURE: 72 MMHG

## 2025-08-21 DIAGNOSIS — M47.896 OTHER OSTEOARTHRITIS OF SPINE, LUMBAR REGION: ICD-10-CM

## 2025-08-21 DIAGNOSIS — M48.061 FORAMINAL STENOSIS OF LUMBAR REGION: ICD-10-CM

## 2025-08-21 DIAGNOSIS — M54.42 CHRONIC BILATERAL LOW BACK PAIN WITH LEFT-SIDED SCIATICA: ICD-10-CM

## 2025-08-21 DIAGNOSIS — Q79.60 EHLERS-DANLOS SYNDROME: ICD-10-CM

## 2025-08-21 DIAGNOSIS — M54.50 LUMBAR BACK PAIN: Primary | ICD-10-CM

## 2025-08-21 DIAGNOSIS — G89.29 CHRONIC BILATERAL LOW BACK PAIN WITH LEFT-SIDED SCIATICA: ICD-10-CM

## 2025-08-21 DIAGNOSIS — M47.26 OSTEOARTHRITIS OF SPINE WITH RADICULOPATHY, LUMBAR REGION: ICD-10-CM

## 2025-08-21 DIAGNOSIS — M54.16 LUMBAR RADICULOPATHY: ICD-10-CM

## 2025-08-21 DIAGNOSIS — Z98.1 S/P LUMBAR FUSION: ICD-10-CM

## 2025-08-21 DIAGNOSIS — M51.27 HERNIATION OF INTERVERTEBRAL DISC BETWEEN L5 AND S1: ICD-10-CM

## 2025-08-21 DIAGNOSIS — Z96.82 S/P PLACEMENT OF NERVE STIMULATOR: ICD-10-CM

## 2025-08-21 LAB
ALBUMIN SERPL BCG-MCNC: 4.8 G/DL (ref 3.5–5.2)
ALP SERPL-CCNC: 68 U/L (ref 40–150)
ALT SERPL W P-5'-P-CCNC: 21 U/L (ref 0–50)
ANION GAP SERPL CALCULATED.3IONS-SCNC: 13 MMOL/L (ref 7–15)
AST SERPL W P-5'-P-CCNC: 22 U/L (ref 0–45)
BILIRUB SERPL-MCNC: 0.5 MG/DL
BUN SERPL-MCNC: 18.4 MG/DL (ref 6–20)
CALCIUM SERPL-MCNC: 9.8 MG/DL (ref 8.8–10.4)
CHLORIDE SERPL-SCNC: 103 MMOL/L (ref 98–107)
CREAT SERPL-MCNC: 0.58 MG/DL (ref 0.51–0.95)
EGFRCR SERPLBLD CKD-EPI 2021: >90 ML/MIN/1.73M2
ERYTHROCYTE [DISTWIDTH] IN BLOOD BY AUTOMATED COUNT: 13 % (ref 10–15)
GLUCOSE SERPL-MCNC: 93 MG/DL (ref 70–99)
HCO3 SERPL-SCNC: 26 MMOL/L (ref 22–29)
HCT VFR BLD AUTO: 38.2 % (ref 35–47)
HGB BLD-MCNC: 12.7 G/DL (ref 11.7–15.7)
IRON BINDING CAPACITY (ROCHE): 259 UG/DL (ref 240–430)
IRON SATN MFR SERPL: 27 % (ref 15–46)
IRON SERPL-MCNC: 69 UG/DL (ref 37–145)
MCH RBC QN AUTO: 30.6 PG (ref 26.5–33)
MCHC RBC AUTO-ENTMCNC: 33.2 G/DL (ref 31.5–36.5)
MCV RBC AUTO: 92 FL (ref 78–100)
PLATELET # BLD AUTO: 313 10E3/UL (ref 150–450)
POTASSIUM SERPL-SCNC: 4.2 MMOL/L (ref 3.4–5.3)
PROT SERPL-MCNC: 7.8 G/DL (ref 6.4–8.3)
RBC # BLD AUTO: 4.15 10E6/UL (ref 3.8–5.2)
SODIUM SERPL-SCNC: 142 MMOL/L (ref 135–145)
WBC # BLD AUTO: 6.29 10E3/UL (ref 4–11)

## 2025-08-21 RX ORDER — METHYLPREDNISOLONE 4 MG/1
TABLET ORAL
Qty: 21 TABLET | Refills: 0 | Status: SHIPPED | OUTPATIENT
Start: 2025-08-21

## 2025-08-21 ASSESSMENT — PAIN SCALES - GENERAL: PAINLEVEL_OUTOF10: MODERATE PAIN (5)

## 2025-08-23 ENCOUNTER — RESULTS FOLLOW-UP (OUTPATIENT)
Dept: FAMILY MEDICINE | Facility: OTHER | Age: 50
End: 2025-08-23
Payer: COMMERCIAL

## 2025-08-23 DIAGNOSIS — Z96.82 S/P PLACEMENT OF NERVE STIMULATOR: ICD-10-CM

## 2025-08-23 DIAGNOSIS — R26.89 BALANCE PROBLEMS: ICD-10-CM

## 2025-08-23 DIAGNOSIS — H53.8 BLURRED VISION: Primary | ICD-10-CM

## 2025-08-28 ENCOUNTER — TELEPHONE (OUTPATIENT)
Dept: FAMILY MEDICINE | Facility: OTHER | Age: 50
End: 2025-08-28
Payer: COMMERCIAL

## 2025-08-28 ENCOUNTER — LAB (OUTPATIENT)
Dept: LAB | Facility: CLINIC | Age: 50
End: 2025-08-28
Attending: PHYSICIAN ASSISTANT
Payer: COMMERCIAL

## 2025-08-28 DIAGNOSIS — R53.83 MALAISE AND FATIGUE: ICD-10-CM

## 2025-08-28 DIAGNOSIS — M48.061 FORAMINAL STENOSIS OF LUMBAR REGION: ICD-10-CM

## 2025-08-28 DIAGNOSIS — M51.27 HERNIATION OF INTERVERTEBRAL DISC BETWEEN L5 AND S1: ICD-10-CM

## 2025-08-28 DIAGNOSIS — H53.9 VISION CHANGES: ICD-10-CM

## 2025-08-28 DIAGNOSIS — H53.8 BLURRED VISION: ICD-10-CM

## 2025-08-28 DIAGNOSIS — G89.29 CHRONIC MIDLINE LOW BACK PAIN, UNSPECIFIED WHETHER SCIATICA PRESENT: ICD-10-CM

## 2025-08-28 DIAGNOSIS — M54.50 CHRONIC MIDLINE LOW BACK PAIN, UNSPECIFIED WHETHER SCIATICA PRESENT: ICD-10-CM

## 2025-08-28 DIAGNOSIS — M47.816 OSTEOARTHRITIS OF LUMBAR SPINE, UNSPECIFIED SPINAL OSTEOARTHRITIS COMPLICATION STATUS: ICD-10-CM

## 2025-08-28 DIAGNOSIS — R53.81 MALAISE AND FATIGUE: ICD-10-CM

## 2025-08-28 DIAGNOSIS — Z96.82 S/P PLACEMENT OF NERVE STIMULATOR: ICD-10-CM

## 2025-08-28 DIAGNOSIS — H53.9 VISION CHANGES: Primary | ICD-10-CM

## 2025-08-28 DIAGNOSIS — R26.89 BALANCE PROBLEMS: ICD-10-CM

## 2025-08-28 LAB
ALBUMIN UR-MCNC: NEGATIVE MG/DL
APPEARANCE UR: CLEAR
BILIRUB UR QL STRIP: NEGATIVE
COLOR UR AUTO: NORMAL
CRP SERPL-MCNC: <3 MG/L
ERYTHROCYTE [SEDIMENTATION RATE] IN BLOOD BY WESTERGREN METHOD: 8 MM/HR (ref 0–30)
GLUCOSE UR STRIP-MCNC: NEGATIVE MG/DL
HGB UR QL STRIP: NEGATIVE
KETONES UR STRIP-MCNC: NEGATIVE MG/DL
LEUKOCYTE ESTERASE UR QL STRIP: NEGATIVE
NITRATE UR QL: NEGATIVE
PH UR STRIP: 6 [PH] (ref 5–7)
SP GR UR STRIP: 1 (ref 1–1.03)
TSH SERPL DL<=0.005 MIU/L-ACNC: 1.11 UIU/ML (ref 0.3–4.2)
UROBILINOGEN UR STRIP-MCNC: NORMAL MG/DL

## (undated) DEVICE — NDL COUNTER 10CT

## (undated) DEVICE — GLOVE PROTEXIS W/NEU-THERA 7.5  2D73TE75

## (undated) DEVICE — PACK MAJOR SBA15MAFSI

## (undated) DEVICE — GLOVE EXAM NITRILE LG PF LATEX FREE 5064

## (undated) DEVICE — DRSG STERI STRIP 1/2X4" R1547

## (undated) DEVICE — TRAY PAIN INJECTION 97A 640

## (undated) DEVICE — GLOVE ESTEEM BLUE W/NEU-THERA 8.0  2D73PB80

## (undated) DEVICE — SOL WATER IRRIG 500ML BOTTLE 2F7113

## (undated) DEVICE — PREP CHLORAPREP 26ML TINTED ORANGE  260815

## (undated) DEVICE — SPONGE RAY-TEC 4X4" 7317

## (undated) DEVICE — SPECIMEN CONTAINER 3OZ W/FORMALIN 59901

## (undated) DEVICE — SOL WATER IRRIG 1000ML BOTTLE 2F7114

## (undated) DEVICE — SU VICRYL 2-0 CT-1 27" J339H

## (undated) DEVICE — SOL NACL 0.9% IRRIG 1000ML BOTTLE 2F7124

## (undated) DEVICE — SOL WATER IRRIG 1000ML BOTTLE 07139-09

## (undated) DEVICE — PAD CHUX UNDERPAD 23X24" 7136

## (undated) DEVICE — NDL COUNTER 20CT 31142493

## (undated) DEVICE — PEN MARKING SKIN

## (undated) DEVICE — GLOVE PROTEXIS W/NEU-THERA 8.0  2D73TE80

## (undated) DEVICE — NDL ECLIPSE 18GA 1.5"

## (undated) DEVICE — CAST STOCKINETTE 4"

## (undated) DEVICE — SU MONOCRYL 4-0 PS-2 18" UND Y496G

## (undated) DEVICE — SYR 10ML FINGER CONTROL W/O NDL 309695

## (undated) DEVICE — NDL SPINAL 22GA 3.5" QUINCKE 405181

## (undated) DEVICE — PREP CHLORAPREP W/ORANGE TINT 10.5ML 930715

## (undated) DEVICE — SYR 05ML LL W/O NDL

## (undated) DEVICE — KIT ENDO TURNOVER/PROCEDURE CARRY-ON 101822

## (undated) DEVICE — SUCTION MANIFOLD NEPTUNE 2 SYS 1 PORT 702-025-000

## (undated) DEVICE — SOL NACL 0.9% IRRIG 1000ML BOTTLE 07138-09

## (undated) DEVICE — CATH INTERMITTENT CLEAN-CATH FEMALE 14FR 6" VINYL LF 420614

## (undated) DEVICE — MARKER SKIN DOUBLE TIP W/FLEXI-RULER W/LABELS

## (undated) DEVICE — DRAPE IOBAN INCISE 23X17" 6650EZ

## (undated) DEVICE — GOWN IMPERVIOUS 2XL BLUE

## (undated) DEVICE — DRAPE GYN/UROLOGY FLUID POUCH TUR 29455

## (undated) DEVICE — ENDO FORCEP BX CAPTURA PRO SPIKE G50696

## (undated) DEVICE — BARRIER SEPRAFILM 3X5" X6 SHEETS 5086-02

## (undated) DEVICE — ENDO BITE BLOCK ADULT OMNI-BLOC

## (undated) DEVICE — NDL 25GA 1.5" 305127

## (undated) DEVICE — PREP POVIDONE IODINE SOLUTION 10% 120ML

## (undated) DEVICE — SYR 03ML LL W/O NDL

## (undated) DEVICE — COVER ULTRASOUND PROBE W/GEL FLEXI-FEEL 6"X58" LF  25-FF658

## (undated) DEVICE — GOWN IMPERVIOUS BREATHABLE 2XL/XLONG

## (undated) DEVICE — DRSG GAUZE 4X4" 3033

## (undated) DEVICE — TUBING CYSTO/BLADDER IRRIG SET 80" 06544-01

## (undated) DEVICE — TUBING IV EXTENSION SET 60" HI FLOW 2N3349

## (undated) DEVICE — SUCTION CATH AIRLIFE TRI-FLO W/CONTROL PORT 14FR  T60C

## (undated) DEVICE — GLOVE BIOGEL PI ULTRATOUCH G SZ 7.0 42170

## (undated) DEVICE — SOL NACL 0.9% INJ 1000ML BAG 07983-09

## (undated) DEVICE — GLOVE BIOGEL PI ULTRATOUCH G SZ 7.5 42175

## (undated) DEVICE — SYR 30ML SLIP TIP W/O NDL 302833

## (undated) DEVICE — SU VICRYL 3-0 SH 27" J316H

## (undated) DEVICE — NDL SPINAL 25GA 4.69" QUINCKE 405234

## (undated) DEVICE — SU SILK 2-0 TIE 24" SA75H

## (undated) DEVICE — NDL SPINAL 25GA 3.5" QUINCKE 405180

## (undated) DEVICE — DRAPE LAP W/ARMBOARD 29410

## (undated) DEVICE — DRSG GAUZE 4X4" TRAY 6939

## (undated) DEVICE — PREP POVIDONE IODINE SCRUB 7.5% 120ML

## (undated) DEVICE — PAD PERI INDIV WRAP 11" 2022A

## (undated) DEVICE — LINEN TOWEL PACK X5 5464

## (undated) DEVICE — TUBING SUCTION 12"X1/4" N612

## (undated) DEVICE — DRSG TELFA 3X8" 1238

## (undated) DEVICE — COVER SHOE HI-TOP FLUID RESISTANT

## (undated) DEVICE — Device

## (undated) DEVICE — DRSG TEGADERM 4X4 3/4" 1626

## (undated) DEVICE — ESU GROUND PAD UNIVERSAL W/O CORD

## (undated) DEVICE — PACK SET-UP STD 9102

## (undated) RX ORDER — PROPOFOL 10 MG/ML
INJECTION, EMULSION INTRAVENOUS
Status: DISPENSED
Start: 2021-02-18

## (undated) RX ORDER — LIDOCAINE HYDROCHLORIDE 20 MG/ML
INJECTION, SOLUTION EPIDURAL; INFILTRATION; INTRACAUDAL; PERINEURAL
Status: DISPENSED
Start: 2021-02-18

## (undated) RX ORDER — BUPIVACAINE HYDROCHLORIDE 5 MG/ML
INJECTION, SOLUTION EPIDURAL; INTRACAUDAL
Status: DISPENSED
Start: 2024-03-29

## (undated) RX ORDER — LIDOCAINE HYDROCHLORIDE 10 MG/ML
INJECTION, SOLUTION EPIDURAL; INFILTRATION; INTRACAUDAL; PERINEURAL
Status: DISPENSED
Start: 2023-11-16

## (undated) RX ORDER — ACETAMINOPHEN 325 MG/1
TABLET ORAL
Status: DISPENSED
Start: 2021-07-27

## (undated) RX ORDER — LIDOCAINE HYDROCHLORIDE 20 MG/ML
INJECTION, SOLUTION EPIDURAL; INFILTRATION; INTRACAUDAL; PERINEURAL
Status: DISPENSED
Start: 2021-07-27

## (undated) RX ORDER — DIMENHYDRINATE 50 MG/ML
INJECTION, SOLUTION INTRAMUSCULAR; INTRAVENOUS
Status: DISPENSED
Start: 2019-06-12

## (undated) RX ORDER — LIDOCAINE HYDROCHLORIDE 10 MG/ML
INJECTION, SOLUTION EPIDURAL; INFILTRATION; INTRACAUDAL; PERINEURAL
Status: DISPENSED
Start: 2021-04-16

## (undated) RX ORDER — SCOLOPAMINE TRANSDERMAL SYSTEM 1 MG/1
PATCH, EXTENDED RELEASE TRANSDERMAL
Status: DISPENSED
Start: 2019-06-12

## (undated) RX ORDER — ONDANSETRON 2 MG/ML
INJECTION INTRAMUSCULAR; INTRAVENOUS
Status: DISPENSED
Start: 2018-11-19

## (undated) RX ORDER — SULFAMETHOXAZOLE AND TRIMETHOPRIM 80; 16 MG/ML; MG/ML
INJECTION INTRAVENOUS
Status: DISPENSED
Start: 2021-06-21

## (undated) RX ORDER — LIDOCAINE HYDROCHLORIDE 10 MG/ML
INJECTION, SOLUTION EPIDURAL; INFILTRATION; INTRACAUDAL; PERINEURAL
Status: DISPENSED
Start: 2018-11-21

## (undated) RX ORDER — BUPIVACAINE HYDROCHLORIDE AND EPINEPHRINE 2.5; 5 MG/ML; UG/ML
INJECTION, SOLUTION EPIDURAL; INFILTRATION; INTRACAUDAL; PERINEURAL
Status: DISPENSED
Start: 2019-06-12

## (undated) RX ORDER — PROPOFOL 10 MG/ML
INJECTION, EMULSION INTRAVENOUS
Status: DISPENSED
Start: 2021-07-27

## (undated) RX ORDER — SCOLOPAMINE TRANSDERMAL SYSTEM 1 MG/1
PATCH, EXTENDED RELEASE TRANSDERMAL
Status: DISPENSED
Start: 2021-07-27

## (undated) RX ORDER — FENTANYL CITRATE 50 UG/ML
INJECTION, SOLUTION INTRAMUSCULAR; INTRAVENOUS
Status: DISPENSED
Start: 2021-07-27

## (undated) RX ORDER — LIDOCAINE HYDROCHLORIDE 20 MG/ML
INJECTION, SOLUTION INFILTRATION; PERINEURAL
Status: DISPENSED
Start: 2021-04-16

## (undated) RX ORDER — KETOROLAC TROMETHAMINE 30 MG/ML
INJECTION, SOLUTION INTRAMUSCULAR; INTRAVENOUS
Status: DISPENSED
Start: 2021-07-27

## (undated) RX ORDER — CEFTRIAXONE SODIUM 250 MG/1
INJECTION, POWDER, FOR SOLUTION INTRAMUSCULAR; INTRAVENOUS
Status: DISPENSED
Start: 2021-06-21

## (undated) RX ORDER — LIDOCAINE HYDROCHLORIDE 10 MG/ML
INJECTION, SOLUTION EPIDURAL; INFILTRATION; INTRACAUDAL; PERINEURAL
Status: DISPENSED
Start: 2022-04-11

## (undated) RX ORDER — ONDANSETRON 2 MG/ML
INJECTION INTRAMUSCULAR; INTRAVENOUS
Status: DISPENSED
Start: 2021-07-27

## (undated) RX ORDER — CEFTAZIDIME 1 G/1
INJECTION, POWDER, FOR SOLUTION INTRAMUSCULAR; INTRAVENOUS
Status: DISPENSED
Start: 2021-06-21

## (undated) RX ORDER — FENTANYL CITRATE 50 UG/ML
INJECTION, SOLUTION INTRAMUSCULAR; INTRAVENOUS
Status: DISPENSED
Start: 2018-11-19

## (undated) RX ORDER — AMPICILLIN 250 MG/1
INJECTION, POWDER, FOR SOLUTION INTRAMUSCULAR; INTRAVENOUS
Status: DISPENSED
Start: 2021-06-21

## (undated) RX ORDER — CLINDAMYCIN PHOSPHATE 900 MG/50ML
INJECTION, SOLUTION INTRAVENOUS
Status: DISPENSED
Start: 2021-07-27

## (undated) RX ORDER — GLYCOPYRROLATE 0.2 MG/ML
INJECTION, SOLUTION INTRAMUSCULAR; INTRAVENOUS
Status: DISPENSED
Start: 2021-07-27

## (undated) RX ORDER — CEFAZOLIN SODIUM 500 MG/2.2ML
INJECTION, POWDER, FOR SOLUTION INTRAMUSCULAR; INTRAVENOUS
Status: DISPENSED
Start: 2021-06-21

## (undated) RX ORDER — PENICILLIN G POTASSIUM 5000000 [IU]/1
INJECTION, POWDER, FOR SOLUTION INTRAMUSCULAR; INTRAVENOUS
Status: DISPENSED
Start: 2021-06-21

## (undated) RX ORDER — BUPIVACAINE HYDROCHLORIDE 5 MG/ML
INJECTION, SOLUTION EPIDURAL; INTRACAUDAL
Status: DISPENSED
Start: 2021-07-27

## (undated) RX ORDER — KETOROLAC TROMETHAMINE 30 MG/ML
INJECTION, SOLUTION INTRAMUSCULAR; INTRAVENOUS
Status: DISPENSED
Start: 2019-06-12

## (undated) RX ORDER — DEXAMETHASONE SODIUM PHOSPHATE 4 MG/ML
INJECTION, SOLUTION INTRA-ARTICULAR; INTRALESIONAL; INTRAMUSCULAR; INTRAVENOUS; SOFT TISSUE
Status: DISPENSED
Start: 2021-07-27

## (undated) RX ORDER — APREPITANT 40 MG/1
CAPSULE ORAL
Status: DISPENSED
Start: 2021-07-27

## (undated) RX ORDER — LIDOCAINE HYDROCHLORIDE 20 MG/ML
INJECTION, SOLUTION INFILTRATION; PERINEURAL
Status: DISPENSED
Start: 2021-05-24

## (undated) RX ORDER — SIMETHICONE 40MG/0.6ML
SUSPENSION, DROPS(FINAL DOSAGE FORM)(ML) ORAL
Status: DISPENSED
Start: 2018-11-19

## (undated) RX ORDER — FENTANYL CITRATE 50 UG/ML
INJECTION, SOLUTION INTRAMUSCULAR; INTRAVENOUS
Status: DISPENSED
Start: 2019-06-12